# Patient Record
Sex: MALE | Race: WHITE | NOT HISPANIC OR LATINO | Employment: OTHER | URBAN - METROPOLITAN AREA
[De-identification: names, ages, dates, MRNs, and addresses within clinical notes are randomized per-mention and may not be internally consistent; named-entity substitution may affect disease eponyms.]

---

## 2017-02-20 ENCOUNTER — ALLSCRIPTS OFFICE VISIT (OUTPATIENT)
Dept: OTHER | Facility: OTHER | Age: 62
End: 2017-02-20

## 2017-02-21 ENCOUNTER — GENERIC CONVERSION - ENCOUNTER (OUTPATIENT)
Dept: OTHER | Facility: OTHER | Age: 62
End: 2017-02-21

## 2017-03-24 ENCOUNTER — GENERIC CONVERSION - ENCOUNTER (OUTPATIENT)
Dept: OTHER | Facility: OTHER | Age: 62
End: 2017-03-24

## 2017-03-29 ENCOUNTER — ALLSCRIPTS OFFICE VISIT (OUTPATIENT)
Dept: OTHER | Facility: OTHER | Age: 62
End: 2017-03-29

## 2017-04-03 ENCOUNTER — ANESTHESIA (OUTPATIENT)
Dept: GASTROENTEROLOGY | Facility: AMBULARY SURGERY CENTER | Age: 62
End: 2017-04-03
Payer: MEDICARE

## 2017-04-03 ENCOUNTER — GENERIC CONVERSION - ENCOUNTER (OUTPATIENT)
Dept: OTHER | Facility: OTHER | Age: 62
End: 2017-04-03

## 2017-04-03 ENCOUNTER — HOSPITAL ENCOUNTER (OUTPATIENT)
Facility: AMBULARY SURGERY CENTER | Age: 62
Setting detail: OUTPATIENT SURGERY
Discharge: HOME | End: 2017-04-03
Attending: INTERNAL MEDICINE | Admitting: INTERNAL MEDICINE
Payer: MEDICARE

## 2017-04-03 VITALS
DIASTOLIC BLOOD PRESSURE: 58 MMHG | RESPIRATION RATE: 20 BRPM | OXYGEN SATURATION: 100 % | SYSTOLIC BLOOD PRESSURE: 116 MMHG | TEMPERATURE: 98.7 F | HEART RATE: 69 BPM

## 2017-04-03 DIAGNOSIS — D63.8 ANEMIA IN OTHER CHRONIC DISEASES CLASSIFIED ELSEWHERE: ICD-10-CM

## 2017-04-03 DIAGNOSIS — R19.7 DIARRHEA: ICD-10-CM

## 2017-04-03 DIAGNOSIS — E11.9 TYPE 2 DIABETES MELLITUS WITHOUT COMPLICATIONS (HCC): ICD-10-CM

## 2017-04-03 DIAGNOSIS — R19.4 CHANGE IN BOWEL HABITS: ICD-10-CM

## 2017-04-03 PROCEDURE — 88305 TISSUE EXAM BY PATHOLOGIST: CPT | Performed by: INTERNAL MEDICINE

## 2017-04-03 RX ORDER — PROPOFOL 10 MG/ML
INJECTION, EMULSION INTRAVENOUS AS NEEDED
Status: DISCONTINUED | OUTPATIENT
Start: 2017-04-03 | End: 2017-04-03 | Stop reason: SURG

## 2017-04-03 RX ORDER — SODIUM CHLORIDE, SODIUM LACTATE, POTASSIUM CHLORIDE, CALCIUM CHLORIDE 600; 310; 30; 20 MG/100ML; MG/100ML; MG/100ML; MG/100ML
INJECTION, SOLUTION INTRAVENOUS CONTINUOUS PRN
Status: DISCONTINUED | OUTPATIENT
Start: 2017-04-03 | End: 2017-04-03 | Stop reason: SURG

## 2017-04-03 RX ADMIN — PROPOFOL 100 MG: 10 INJECTION, EMULSION INTRAVENOUS at 12:22

## 2017-04-03 RX ADMIN — PROPOFOL 50 MG: 10 INJECTION, EMULSION INTRAVENOUS at 12:29

## 2017-04-03 RX ADMIN — LIDOCAINE HYDROCHLORIDE 40 MG: 20 INJECTION, SOLUTION INTRAVENOUS at 12:22

## 2017-04-03 RX ADMIN — SODIUM CHLORIDE, SODIUM LACTATE, POTASSIUM CHLORIDE, AND CALCIUM CHLORIDE: .6; .31; .03; .02 INJECTION, SOLUTION INTRAVENOUS at 12:20

## 2017-04-03 RX ADMIN — PROPOFOL 10 MG: 10 INJECTION, EMULSION INTRAVENOUS at 12:27

## 2017-04-03 RX ADMIN — PROPOFOL 50 MG: 10 INJECTION, EMULSION INTRAVENOUS at 12:25

## 2017-04-07 ENCOUNTER — GENERIC CONVERSION - ENCOUNTER (OUTPATIENT)
Dept: OTHER | Facility: OTHER | Age: 62
End: 2017-04-07

## 2017-04-10 ENCOUNTER — GENERIC CONVERSION - ENCOUNTER (OUTPATIENT)
Dept: OTHER | Facility: OTHER | Age: 62
End: 2017-04-10

## 2017-04-11 ENCOUNTER — GENERIC CONVERSION - ENCOUNTER (OUTPATIENT)
Dept: OTHER | Facility: OTHER | Age: 62
End: 2017-04-11

## 2017-04-11 LAB
BASOPHILS # BLD AUTO: 0.1 X10E3/UL (ref 0–0.2)
BASOPHILS # BLD AUTO: 1 %
DEPRECATED RDW RBC AUTO: 13.4 % (ref 12.3–15.4)
EOSINOPHIL # BLD AUTO: 0.1 X10E3/UL (ref 0–0.4)
EOSINOPHIL # BLD AUTO: 2 %
HCT VFR BLD AUTO: 33.2 % (ref 37.5–51)
HGB BLD-MCNC: 11.1 G/DL (ref 12.6–17.7)
IMM.GRANULOCYTES (CD4/8) (HISTORICAL): 0 X10E3/UL (ref 0–0.1)
IMM.GRANULOCYTES (CD4/8) (HISTORICAL): 1 %
LYMPHOCYTES # BLD AUTO: 0.8 X10E3/UL (ref 0.7–3.1)
LYMPHOCYTES # BLD AUTO: 13 %
MCH RBC QN AUTO: 35.6 PG (ref 26.6–33)
MCHC RBC AUTO-ENTMCNC: 33.4 G/DL (ref 31.5–35.7)
MCV RBC AUTO: 106 FL (ref 79–97)
MONOCYTES # BLD AUTO: 0.5 X10E3/UL (ref 0.1–0.9)
MONOCYTES (HISTORICAL): 8 %
NEUTROPHILS # BLD AUTO: 4.7 X10E3/UL (ref 1.4–7)
NEUTROPHILS # BLD AUTO: 75 %
PLATELET # BLD AUTO: 172 X10E3/UL (ref 150–379)
RBC (HISTORICAL): 3.12 X10E6/UL (ref 4.14–5.8)
WBC # BLD AUTO: 6.2 X10E3/UL (ref 3.4–10.8)

## 2017-04-12 ENCOUNTER — GENERIC CONVERSION - ENCOUNTER (OUTPATIENT)
Dept: OTHER | Facility: OTHER | Age: 62
End: 2017-04-12

## 2017-04-12 LAB
A/G RATIO (HISTORICAL): 1.6 (ref 1.2–2.2)
ALBUMIN SERPL BCP-MCNC: 4.1 G/DL (ref 3.6–4.8)
ALP SERPL-CCNC: 181 IU/L (ref 39–117)
AST SERPL W P-5'-P-CCNC: 28 IU/L (ref 0–40)
BILIRUB SERPL-MCNC: 0.4 MG/DL (ref 0–1.2)
BUN SERPL-MCNC: 22 MG/DL (ref 8–27)
BUN/CREA RATIO (HISTORICAL): 3 (ref 10–24)
CALCIUM SERPL-MCNC: 9 MG/DL (ref 8.6–10.2)
CHLORIDE SERPL-SCNC: 97 MMOL/L (ref 96–106)
CO2 SERPL-SCNC: 28 MMOL/L (ref 18–29)
CREAT SERPL-MCNC: 6.88 MG/DL (ref 0.76–1.27)
EGFR AFRICAN AMERICAN (HISTORICAL): 9 ML/MIN/1.73
EGFR-AMERICAN CALC (HISTORICAL): 8 ML/MIN/1.73
GLUCOSE SERPL-MCNC: 131 MG/DL (ref 65–99)
INTERPRETATION (HISTORICAL): NORMAL
PDF IMAGE (HISTORICAL): NORMAL
POTASSIUM SERPL-SCNC: 4.7 MMOL/L (ref 3.5–5.2)
SODIUM SERPL-SCNC: 143 MMOL/L (ref 134–144)
TOT. GLOBULIN, SERUM (HISTORICAL): 2.6 G/DL (ref 1.5–4.5)
TOTAL PROTEIN (HISTORICAL): 6.7 G/DL (ref 6–8.5)

## 2017-04-13 ENCOUNTER — GENERIC CONVERSION - ENCOUNTER (OUTPATIENT)
Dept: OTHER | Facility: OTHER | Age: 62
End: 2017-04-13

## 2017-04-13 LAB
EST. AVERAGE GLUCOSE BLD GHB EST-MCNC: 108 MG/DL
HBA1C MFR BLD HPLC: 5.4 %HB

## 2017-04-17 ENCOUNTER — ALLSCRIPTS OFFICE VISIT (OUTPATIENT)
Dept: OTHER | Facility: OTHER | Age: 62
End: 2017-04-17

## 2017-05-03 ENCOUNTER — ALLSCRIPTS OFFICE VISIT (OUTPATIENT)
Dept: OTHER | Facility: OTHER | Age: 62
End: 2017-05-03

## 2017-05-31 LAB
GGT (HISTORICAL): 89 IU/L (ref 0–65)
PROSTATE SPECIFIC ANTIGEN (HISTORICAL): 0.3 NG/ML (ref 0–4)
REFLEX CRITERIA (HISTORICAL): NORMAL

## 2017-06-01 ENCOUNTER — GENERIC CONVERSION - ENCOUNTER (OUTPATIENT)
Dept: OTHER | Facility: OTHER | Age: 62
End: 2017-06-01

## 2017-06-08 ENCOUNTER — ALLSCRIPTS OFFICE VISIT (OUTPATIENT)
Dept: OTHER | Facility: OTHER | Age: 62
End: 2017-06-08

## 2017-08-02 ENCOUNTER — ALLSCRIPTS OFFICE VISIT (OUTPATIENT)
Dept: OTHER | Facility: OTHER | Age: 62
End: 2017-08-02

## 2017-08-08 ENCOUNTER — ALLSCRIPTS OFFICE VISIT (OUTPATIENT)
Dept: OTHER | Facility: OTHER | Age: 62
End: 2017-08-08

## 2017-08-15 ENCOUNTER — ALLSCRIPTS OFFICE VISIT (OUTPATIENT)
Dept: OTHER | Facility: OTHER | Age: 62
End: 2017-08-15

## 2017-08-22 ENCOUNTER — ALLSCRIPTS OFFICE VISIT (OUTPATIENT)
Dept: OTHER | Facility: OTHER | Age: 62
End: 2017-08-22

## 2018-01-05 ENCOUNTER — GENERIC CONVERSION - ENCOUNTER (OUTPATIENT)
Dept: OTHER | Facility: OTHER | Age: 63
End: 2018-01-05

## 2018-01-10 NOTE — RESULT NOTES
Verified Results  (1) MICROALBUMIN CREATININE RATIO, RANDOM URINE 26Oct2016 11:15AM Re[facility]      Test Name Result Flag Reference   MICROALBUMIN/ CREAT R 373 mg/g creatinine H 0-30   MICROALBUMIN,URINE 855 0 mg/L H 0 0-20 0   CREATININE URINE 229 0 mg/dL       (1) CBC/PLT/DIFF 25Oct2016 07:48AM Re[facility]      Test Name Result Flag Reference   WBC COUNT 7 92 Thousand/uL  4 31-10 16   RBC COUNT 2 95 Million/uL L 3 88-5 62   HEMOGLOBIN 10 4 g/dL L 12 0-17 0   HEMATOCRIT 32 0 % L 36 5-49 3    fL H 82-98   MCH 35 3 pg H 26 8-34 3   MCHC 32 5 g/dL  31 4-37 4   RDW 14 4 %  11 6-15 1   MPV 10 5 fL  8 9-12 7   PLATELET COUNT 915 Thousands/uL  149-390   nRBC AUTOMATED 0 /100 WBCs     NEUTROPHILS RELATIVE PERCENT 77 % H 43-75   LYMPHOCYTES RELATIVE PERCENT 12 % L 14-44   MONOCYTES RELATIVE PERCENT 8 %  4-12   EOSINOPHILS RELATIVE PERCENT 2 %  0-6   BASOPHILS RELATIVE PERCENT 1 %  0-1   NEUTROPHILS ABSOLUTE COUNT 6 10 Thousands/?L  1 85-7 62   LYMPHOCYTES ABSOLUTE COUNT 0 98 Thousands/?L  0 60-4 47   MONOCYTES ABSOLUTE COUNT 0 62 Thousand/?L  0 17-1 22   EOSINOPHILS ABSOLUTE COUNT 0 16 Thousand/?L  0 00-0 61   BASOPHILS ABSOLUTE COUNT 0 04 Thousands/?L  0 00-0 10     (1) MAGNESIUM 25Oct2016 07:48AM [facility]      Test Name Result Flag Reference   MAGNESIUM 2 5 mg/dL  1 6-2 6     (1) COMPREHENSIVE METABOLIC PANEL [id number] 99:69KS Re[facility]      Test Name Result Flag Reference   GLUCOSE,RANDM 138 mg/dL     If the patient is fasting, the ADA then defines impaired fasting glucose as > 100 mg/dL and diabetes as > or equal to 123 mg/dL     SODIUM 138 mmol/L  136-145   POTASSIUM 3 6 mmol/L  3 5-5 3   CHLORIDE 100 mmol/L  100-108   CARBON DIOXIDE 31 mmol/L  21-32   ANION GAP (CALC) 7 mmol/L  4-13   BLOOD UREA NITROGEN 23 mg/dL  5-25   CREATININE 6 44 mg/dL H 0 60-1 30   Standardized to IDMS reference method   CALCIUM 8 5 mg/dL  8 3-10 1   BILI, TOTAL 0 70 mg/dL  0 20-1 00   ALK PHOSPHATAS 107 U/L   ALT (SGPT) 29 U/L  12-78   AST(SGOT) 29 U/L  5-45   ALBUMIN 3 6 g/dL  3 5-5 0   TOTAL PROTEIN 7 9 g/dL  6 4-8 2   eGFR Non-African American 8 8 ml/min/1 73sq tonya [facility] Disease Education Program recommendations are as follows:  GFR calculation is accurate only with a steady state creatinine  Chronic Kidney disease less than 60 ml/min/1 73 sq  meters  Kidney failure less than 15 ml/min/1 73 sq  meters  (1) LIPID PANEL, FASTING 25Oct2016 07:48AM Alisha [email or web address]     Test Name Result Flag Reference   CHOLESTEROL 123 mg/dL     HDL,DIRECT 72 mg/dL H 40-60   Specimen collection should occur prior to Metamizole administration due to the potential for falsely depressed results  LDL CHOLESTEROL CALCULATED 35 mg/dL  0-100   Triglyceride:         Normal              <150 mg/dl       Borderline High    150-199 mg/dl       High               200-499 mg/dl       Very High          >499 mg/dl  Cholesterol:         Desirable        <200 mg/dl      Borderline High  200-239 mg/dl      High             >239 mg/dl  HDL Cholesterol:        High    >59 mg/dL      Low     <41 mg/dL  LDL CALCULATED:    This screening LDL is a calculated result  It does not have the accuracy of the Direct Measured LDL in the monitoring of patients with hyperlipidemia and/or statin therapy  Direct Measure LDL (CHJ980) must be ordered separately in these patients  TRIGLYCERIDES 78 mg/dL  <=150   Specimen collection should occur prior to N-Acetylcysteine or Metamizole administration due to the potential for falsely depressed results  (1) TSH 25Oct2016 07:48AM Alisha Hinton     Test Name Result Flag Reference   TSH 3 190 uIU/mL  0 358-3 740   Patients undergoing fluorescein dye angiography may retain small amounts of fluorescein in the body for 48-72 hours post procedure  Samples containing fluorescein can produce falsely depressed TSH values   If the patient had this procedure,a specimen should be resubmitted post fluorescein clearance  (1) HEMOGLOBIN A1C 25Oct2016 07:48AM Kit Proctor     Test Name Result Flag Reference   HEMOGLOBIN A1C 5 1 %  4 2-6 3   EST  AVG   GLUCOSE 100 mg/dl         Discussion/Summary   EDWARD,   REVIEWED BW   LOOKED OK   YOU ARE ANEMIC   SUGAR WAS GOOD   DR Skye Mayer AND DR MYLES CAN SEE THIS BW   RV IF THERE ARE ANY QUESTIONS   DR WATSON

## 2018-01-12 VITALS
WEIGHT: 204 LBS | HEIGHT: 70 IN | SYSTOLIC BLOOD PRESSURE: 130 MMHG | RESPIRATION RATE: 16 BRPM | HEART RATE: 87 BPM | DIASTOLIC BLOOD PRESSURE: 80 MMHG | TEMPERATURE: 98.7 F | BODY MASS INDEX: 29.2 KG/M2 | OXYGEN SATURATION: 98 %

## 2018-01-12 VITALS
WEIGHT: 197 LBS | DIASTOLIC BLOOD PRESSURE: 60 MMHG | SYSTOLIC BLOOD PRESSURE: 110 MMHG | HEART RATE: 72 BPM | RESPIRATION RATE: 14 BRPM | HEIGHT: 70 IN | BODY MASS INDEX: 28.2 KG/M2 | TEMPERATURE: 97.2 F

## 2018-01-12 NOTE — RESULT NOTES
Verified Results  (19[address] [facility]) Comp  Metabolic Panel (13) [id number] 07:09AM Bruna Peacock     Test Name Result Flag Reference   Glucose, Serum 131 mg/dL H 65-99   BUN 22 mg/dL  8-27   Creatinine, Serum 6 88 mg/dL H 0 76-1 27   eGFR If NonAfricn Am 8 mL/min/1 73 L >59   eGFR If Africn Am 9 mL/min/1 73 L >59   BUN/Creatinine Ratio 3 L 10-24   Sodium, Serum 143 mmol/L  134-144   Potassium, Serum 4 7 mmol/L  3 5-5 2   Chloride, Serum 97 mmol/L     Carbon Dioxide, Total 28 mmol/L  18-29   Calcium, Serum 9 0 mg/dL  8 6-10 2   Protein, Total, Serum 6 7 g/dL  6 0-8 5   Albumin, Serum 4 1 g/dL  3 6-4 8   Globulin, Total 2 6 g/dL  1 5-4 5   A/G Ratio 1 6  1 2-2 2   Bilirubin, Total 0 4 mg/dL  0 0-1 2   Alkaline Phosphatase, S 181 IU/L H    AST (SGOT) 28 IU/L  0-40     (LC) [facility] CKD Program [id number] 07:09AM Bruna Peacock     Test Name Result Flag Reference   Interpretation Note     -------------------------------  CHRONIC KIDNEY DISEASE:  EGFR, BLOOD PRESSURE, AND PROTEINURIA ASSESSMENT  We presume eGFR has been less than 60 mL/min/1 73mE2 on at  least two occasions spaced at least 3 months apart  Current  eGFR is 8 mL/min/1 73mE2 corresponding to CKD stage 5  Multiply eGFR by 1 159 if patient is   Consider referral to a nephrologist and appropriate patient  education concerning renal replacement therapies  Suggest  hepatitis B vaccination and confirmation of immunity with  serologic testing  Consider plans for renal replacement  therapy  Creatinine , 6 88 mg/dL  Potassium is within goal,  4 7 mmol/L  EGFR, BLOOD PRESSURE, AND PROTEINURIA TREATMENT SUGGESTIONS  -  Consider institution of renal replacement therapy  Guidelines recommend a target blood pressure of 140/90 mmHg  or less in CKD patients to reduce cardiovascular risk and  CKD progression   Assessment of albuminuria (urine  albumin:creatinine ratio or urine protein:creatinine ratio  preferred) is recommended at least annually in CKD patients  for staging and disease prognosis  EGFR, BLOOD PRESSURE, AND PROTEINURIA FOLLOW-UP  -  fasting Renal Panel within 2 months; Spot Urine Panel is  recommended by KDOQI guidelines, at least yearly;  -  BONE and MINERAL ASSESSMENT  Calcium is within goal, 9 0 mg/dL  Carbon Dioxide is within  goal, 28 mmol/L  KDOQI guidelines recommend the measurement  of 25-hydroxy vitamin D in patients with CKD  BONE and MINERAL TREATMENT SUGGESTIONS  -  Interpretations require simultaneous measurements of serum  calcium and phosphorus  BONE and MINERAL FOLLOW-UP  -  fasting PTH with Renal Panel within 3 months; 25-Hydroxy  Vitamin D is recommended by KDOQI guidelines, at least  yearly;  -  LIPIDS ASSESSMENT  Most recent order does not include a fasting Lipid Panel  LIPIDS FOLLOW-UP  -  fasting Lipid Panel is recommended by KDOQI guidelines, at  least yearly;  -  ANEMIA ASSESSMENT  Most recent order does not include a CBC Panel or iron  studies  ANEMIA FOLLOW-UP  -  CBC is recommended by KDOQI guidelines, at least yearly;  -------------------------------  DISCLAIMER  These assessments and treatment suggestions are provided as  a convenience in support of the physician-patient  relationship and are not intended to replace the physician's  clinical judgment  They are derived from the national  guidelines in addition to other evidence and expert opinion  The clinician should consider this information within the  context of clinical opinion and the individual patient  SEE GUIDANCE FOR CHRONIC KIDNEY DISEASE PROGRAM: National  Kidney Foundation Kidney Disease Outcomes Quality Initiative  (KDOQI (TM)), with its limitations and disclaimers, are at  www kidney  org/professionals/KDOQI  Kidney Disease Improving  Global Outcomes (KDIGO) clinical practice guidelines are at  http://kdigo  org/home/guidelines/  The members of  Fahad Arciniega national advisory panel are listed at  www  [employer] com   This program is intended for patients who  have been diagnosed with stages 3, 4, or pre-dialysis 5 CKD  It is not intended for children, pregnant patients, or  transplant patients  PDF Image

## 2018-01-12 NOTE — RESULT NOTES
Verified Results  (1) GGT [id number] 07:08AM [employer]     Test Name Result Flag Reference   GGT 89 IU/L H 0-65     (LC) PSA Total (Reflex To Free) [id number] 07:08AM [employer]     Test Name Result Flag Reference   Prostate Specific Ag, Serum 0 3 ng/mL  0 0-4 0   Roche ECLIA methodology  According to the American Urological Association, Serum PSA should  decrease and remain at undetectable levels after radical  prostatectomy  The AUA defines biochemical recurrence as an initial  PSA value 0 2 ng/mL or greater followed by a subsequent confirmatory  PSA value 0 2 ng/mL or greater  Values obtained with different assay methods or kits cannot be used  interchangeably  Results cannot be interpreted as absolute evidence  of the presence or absence of malignant disease  Reflex Criteria Comment     The percent free PSA is performed on a reflex basis only when the  total PSA is between 4 0 and 10 0 ng/mL

## 2018-01-13 VITALS
TEMPERATURE: 98.1 F | HEART RATE: 80 BPM | WEIGHT: 206 LBS | DIASTOLIC BLOOD PRESSURE: 70 MMHG | BODY MASS INDEX: 29.49 KG/M2 | SYSTOLIC BLOOD PRESSURE: 120 MMHG | RESPIRATION RATE: 16 BRPM | HEIGHT: 70 IN

## 2018-01-13 VITALS
HEIGHT: 70 IN | RESPIRATION RATE: 16 BRPM | TEMPERATURE: 96.9 F | OXYGEN SATURATION: 99 % | HEART RATE: 69 BPM | DIASTOLIC BLOOD PRESSURE: 80 MMHG | SYSTOLIC BLOOD PRESSURE: 140 MMHG | WEIGHT: 206.5 LBS | BODY MASS INDEX: 29.56 KG/M2

## 2018-01-13 VITALS
RESPIRATION RATE: 16 BRPM | SYSTOLIC BLOOD PRESSURE: 130 MMHG | DIASTOLIC BLOOD PRESSURE: 70 MMHG | TEMPERATURE: 98.5 F | WEIGHT: 202 LBS | BODY MASS INDEX: 28.92 KG/M2 | HEART RATE: 80 BPM | HEIGHT: 70 IN

## 2018-01-14 VITALS
TEMPERATURE: 98.5 F | BODY MASS INDEX: 29.06 KG/M2 | SYSTOLIC BLOOD PRESSURE: 126 MMHG | HEIGHT: 70 IN | WEIGHT: 203 LBS | RESPIRATION RATE: 12 BRPM | DIASTOLIC BLOOD PRESSURE: 86 MMHG | HEART RATE: 70 BPM

## 2018-01-14 NOTE — RESULT NOTES
Message    Spoke to patient      Colon polyps were came back as tubular and tubulovillous adenomas  lmom for pt to call office for results  reminder set  sent task to   [facility]  Repeat colonoscopy in 4 months**            Verified Results  (1) TISSUE EXAM [id number] 08:18AM Lolis Saez     Test Name Result Flag Reference   LAB AP CASE REPORT (Report)     Surgical Pathology Report             Case: [id number]                   Authorizing Provider: Jodie Higgins MD     Collected:      12/12/2016 0818        Ordering Location:   [facility]  Received:      12/12/2016 [address] [facility]                                     Pathologist:      Nakul Garcia DO                               Specimens:  A) - Colon, Random colon biopsies                                   B) - Polyp, Colorectal, Mid ascending colon polyp/hot snare                      C) - Polyp, Colorectal, Distal ascending colon polyp/hot snare   LAB AP FINAL DIAGNOSIS (Report)     A  Colon, random, biopsy:  - Colonic mucosa with no significant pathologic abnormalities  - No changes of chronic, active or microscopic colitis identified    - No dysplasia identified  B  Colon, mid ascending, polyp, biopsy:  - Tubular adenomas (3)  - Negative for high-grade dysplasia  C  Colon, distal ascending, polyp, biopsy:  - Tubulovillous adenoma  - Cauterized margin positive for adenomatous changes  - Negative for high-grade dysplasia  Interpretation performed at [place] [address]  Electronically signed by Nakul aGrcia DO on 12/13/2016 at 1:09 PM   LAB AP SURGICAL ADDITIONAL INFORMATION (Report)     These tests were developed and their performance characteristics   determined by [facility] or [place]  They may not be cleared or approved by the [place] S  Food and   Drug Administration   The FDA has determined that such clearance or approval is not necessary  These tests are used for clinical purposes  They should not be regarded as investigational or for research  This   laboratory has been approved by [place] [address], designated as a high-complexity   laboratory and is qualified to perform these tests  LAB AP GROSS DESCRIPTION (Report)     A  The specimen is received in formalin, labeled with the patient's name   and hospital number, and is designated random colon biopsies  The   specimen consists of multiple tan soft tissue fragments measuring in   aggregate 0 6 x 0 6 x 0 1 cm  Entirely submitted  One cassette  Note: The estimated total formalin fixation time based upon information   provided by the submitting clinician and the standard processing schedule   is 12 25 hours  B  The specimen is received in formalin, labeled with the patient's name   and hospital number, and is designated mid ascending colon polyp  The   specimen consists of 3 tan soft tissue/polypoid fragments measuring 0 6,   0 7 and 0 7 cm  Entirely submitted  One cassette  Note: The estimated total formalin fixation time based upon information   provided by the submitting clinician and the standard processing schedule   is 12 0 hours  C  The specimen is received in formalin, labeled with the patient's name   and hospital number, and is designated distal ascending colon polyp  The specimen consists of a tan polypoid soft tissue fragment measuring 2 6   x 2 5 x 2 2 cm  No margin is identified grossly  The polyp is sectioned   revealing friable tan pink cut surfaces  Entirely submitted  Eight   cassettes  Multiple pieces in each cassette  Note: The estimated total formalin fixation time based upon information   provided by the submitting clinician and the standard processing schedule   is 11 75 hours      MAC

## 2018-01-15 NOTE — PROGRESS NOTES
Chief Complaint  Vit B12 injection given in right deltoid  Active Problems    1  Acute bacterial sinusitis (461 9) (J01 90,B96 89)   2  Anemia in chronic illness (285 29) (D63 8)   3  Anemia in chronic kidney disease (CKD) (285 21) (N18 9,D63 1)   4  Arteriosclerosis of coronary artery (414 00) (I25 10)   5  Benign essential hypertension (401 1) (I10)   6  Change in bowel habits (787 99) (R19 4)   7  Chronic myocardial infarction (414 9) (I25 9)   8  Diabetes with neurologic complications (840 63) (D80 15)   9  Dialysis patient (V45 11) (Z99 2)   10  Diarrhea, unspecified type (787 91) (R19 7)   11  Elevated serum alkaline phosphatase level (790 5) (R74 8)   12  End stage renal disease (585 6) (N18 6)   13  Eyelash inversion (374 05) (H02 059)   14  History of colon polyps (V12 72) (Z86 010)   15  Intestinal malabsorption (579 9) (K90 9)   16  Irritable bowel syndrome with diarrhea (564 1) (K58 0)   17  Long-term insulin use in type 2 diabetes (250 00,V58 67) (E11 9,Z79 4)   18  Malignant essential hypertension (401 0) (I10)   19  Mixed hyperlipidemia (272 2) (E78 2)   20  Nicotine dependence (305 1) (F17 200)   21  Organic impotence (607 84) (N52 9)   22  Pernicious anemia (281 0) (D51 0)   23  Screening PSA (prostate specific antigen) (V76 44) (Z12 5)   24  Status post angioplasty (V45 89) (Z98 62)   25  Type 2 diabetes mellitus (250 00) (E11 9)   26  History of Type 2 diabetes, uncontrolled, with renal manifestation (250 42)    (E11 29,E11 65)    Current Meds   1  Baby Aspirin 81 MG CHEW; 1 Every Day; Therapy: 44SHK3738 to  Requested for: 38BVF3090 Recorded   2  Carvedilol 25 MG Oral Tablet; 1 two times a day; Therapy: 00QDG3621 to (Last Rx:04Oct2012)  Requested for: 46XLT4994 Ordered   3  Clopidogrel Bisulfate 75 MG Oral Tablet; 1 every day; Therapy: 82AEQ5171 to (Last Rx:04Oct2012)  Requested for: 81VMJ9000 Ordered   4  Crestor 20 MG Oral Tablet; TAKE 1 TABLET DAILY;    Therapy: 72Qhv3710 to Recorded   5  Cyanocobalamin 1000 MCG/ML Injection Solution; INJECT 1 ML Weekly 1ml once   weekly for one month, then once monthly; Therapy: 39Onq6799 to (Evaluate:28Cqj2472); Last Rx:86Phq3594 Ordered   6  Enzyme Digest Oral Capsule; Therapy: (Recorded:08Jun2017) to Recorded   7  Lantus SoloStar 100 UNIT/ML SOLN; 28 units at 9 pm;   Therapy: 13UQI2716 to (Last Rx:04Oct2012)  Requested for: 10OIV4489 Ordered   8  Losartan Potassium 50 MG Oral Tablet; Therapy: (Recorded:20Oct2016) to Recorded   9  Nitroglycerin 0 4 MG Sublingual Tablet Sublingual; 1 SL prn; Therapy: 70STC2583 to  Requested for: 20FOL0276 Recorded   10  OneTouch Test STRP; 1 Two Times A Day; Therapy: 39RTO7076 to  Requested for: 31MFL0746 Recorded   11  Pantoprazole Sodium 40 MG Oral Tablet Delayed Release; Therapy: 10QXQ5352 to Recorded   12  Triphrocaps 1 MG Oral Capsule; TAKE 1 CAPSULE BY MOUTH EVERY EVENING; Therapy: 29Rdx2799 to Recorded   13  Xifaxan 550 MG Oral Tablet; One tab TID for 14 days; Therapy: 67JKA0453 to (Last Rx:08Jun2017) Ordered    Allergies    1  No Known Drug Allergies    Future Appointments    Date/Time Provider Specialty Site   09/13/2017 10:45 AM Sophy Donis, [address] [place] [address]   08/22/2017 09:00 AM [facility], Nurse Schedule  [facility]   08/29/2017 09:00 AM [facility], Nurse Schedule  [facility]     Signatures   Electronically signed by : Brittany Luther, [address];  Aug 16 2017 11:57AM EST                       (Author)    Electronically signed by : Amando Etienne DO; Aug 16 2017  1:53PM EST                       (Author)

## 2018-01-15 NOTE — RESULT NOTES
Message      Colon polyps removed came back as precancerous tubular adenoma  Next colonoscopy in 3 years  Patient to call our office for any GI symptoms  patient is aware  [address]         Verified Results  (1) TISSUE EXAM [id number] 12:30PM Rg Santana     Test Name Result Flag Reference   LAB AP CASE REPORT (Report)     Surgical Pathology Report             Case: [id number]                   Authorizing Provider: Raúl Multani MD     Collected:      04/03/2017 1230        Ordering Location:   [facility]  Received:      04/03/2017 [place]                                     Pathologist:      Lisbeth Cedillo MD                              Specimen:  Rectum, Rectal polyp snared   LAB AP FINAL DIAGNOSIS (Report)     A  Rectal polyp:  - Tubular adenoma (adenomatous polyp)  - No high grade dysplasia and no evidence of malignancy  Interpretation performed at [place] [address]  Electronically signed by Lisbeth Cedillo MD on 4/6/2017 at 8:23 AM   LAB AP SURGICAL ADDITIONAL INFORMATION (Report)     These tests were developed and their performance characteristics   determined by [facility] or [employer]  They may not be cleared or approved by the [place]  Food and   Drug Administration  The FDA has determined that such clearance or   approval is not necessary  These tests are used for clinical purposes  They should not be regarded as investigational or for research  This   laboratory has been approved by CLIA 88, designated as a high-complexity   laboratory and is qualified to perform these tests  LAB AP GROSS DESCRIPTION (Report)     A  The specimen is received in formalin, labeled with the patient's name   and hospital number, and is designated rectal polyp snare, is a single   irregularly shaped fragment of tan soft tissue measuring 0 3 cm in   greatest dimension  Entirely submitted   One cassette  Note: The estimated total formalin fixation time based upon information   provided by the submitting clinician and the standard processing schedule   is 16 0 hours   RLR

## 2018-01-15 NOTE — PROGRESS NOTES
Chief Complaint  Vitamin B-12 injection given rt  arm  ck      Active Problems    1  Acute bacterial sinusitis (461 9) (J01 90,B96 89)   2  Anemia in chronic illness (285 29) (D63 8)   3  Anemia in chronic kidney disease (CKD) (285 21) (N18 9,D63 1)   4  Arteriosclerosis of coronary artery (414 00) (I25 10)   5  Benign essential hypertension (401 1) (I10)   6  Change in bowel habits (787 99) (R19 4)   7  Chronic myocardial infarction (414 9) (I25 9)   8  Diabetes with neurologic complications (789 82) (H70 62)   9  Dialysis patient (V45 11) (Z99 2)   10  Diarrhea, unspecified type (787 91) (R19 7)   11  Elevated serum alkaline phosphatase level (790 5) (R74 8)   12  End stage renal disease (585 6) (N18 6)   13  Eyelash inversion (374 05) (H02 059)   14  History of colon polyps (V12 72) (Z86 010)   15  Intestinal malabsorption (579 9) (K90 9)   16  Irritable bowel syndrome with diarrhea (564 1) (K58 0)   17  Long-term insulin use in type 2 diabetes (250 00,V58 67) (E11 9,Z79 4)   18  Malignant essential hypertension (401 0) (I10)   19  Mixed hyperlipidemia (272 2) (E78 2)   20  Nicotine dependence (305 1) (F17 200)   21  Organic impotence (607 84) (N52 9)   22  Pernicious anemia (281 0) (D51 0)   23  Screening PSA (prostate specific antigen) (V76 44) (Z12 5)   24  Status post angioplasty (V45 89) (Z98 62)   25  Type 2 diabetes mellitus (250 00) (E11 9)   26  History of Type 2 diabetes, uncontrolled, with renal manifestation (250 42)    (E11 29,E11 65)    Current Meds   1  Baby Aspirin 81 MG CHEW; 1 Every Day; Therapy: 70MSI8694 to  Requested for: 48ZWZ8680 Recorded   2  Carvedilol 25 MG Oral Tablet; 1 two times a day; Therapy: 07NWR9710 to (Last Rx:04Oct2012)  Requested for: 67FEO4109 Ordered   3  Clopidogrel Bisulfate 75 MG Oral Tablet; 1 every day; Therapy: 11MEQ8634 to (Last Rx:04Oct2012)  Requested for: 01LXL9976 Ordered   4  Crestor 20 MG Oral Tablet; TAKE 1 TABLET DAILY;    Therapy: 58Nic5085 to Recorded   5  Cyanocobalamin 1000 MCG/ML Injection Solution; INJECT 1 ML Weekly 1ml once   weekly for one month, then once monthly; Therapy: 56Mtw1384 to (Evaluate:42Qba2711); Last Rx:23Gdd3814 Ordered   6  Enzyme Digest Oral Capsule; Therapy: (Recorded:08Jun2017) to Recorded   7  Lantus SoloStar 100 UNIT/ML SOLN; 28 units at 9 pm;   Therapy: 24OXX8878 to (Last Rx:04Oct2012)  Requested for: 27PEO9832 Ordered   8  Losartan Potassium 50 MG Oral Tablet; Therapy: (Recorded:20Oct2016) to Recorded   9  Nitroglycerin 0 4 MG Sublingual Tablet Sublingual; 1 SL prn; Therapy: 77CYR6970 to  Requested for: 86AVP3988 Recorded   10  OneTouch Test STRP; 1 Two Times A Day; Therapy: 25HCP5426 to  Requested for: 29PKG5306 Recorded   11  Pantoprazole Sodium 40 MG Oral Tablet Delayed Release; Therapy: 03CAN2611 to Recorded   12  Triphrocaps 1 MG Oral Capsule; TAKE 1 CAPSULE BY MOUTH EVERY EVENING; Therapy: 69Kkp1650 to Recorded   13  Xifaxan 550 MG Oral Tablet; One tab TID for 14 days; Therapy: 90TTL3324 to (Last Rx:08Jun2017) Ordered    Allergies    1   No Known Drug Allergies    Plan  Anemia in chronic illness    · Cyanocobalamin 1000 MCG/ML Injection Solution    Future Appointments    Date/Time Provider Specialty Site   09/13/2017 10:45 AM [place] [address]   08/29/2017 09:00 AM [facility], Nurse Schedule  Akiko Dumas [facility]     Signatures   Electronically signed by : Alejandra Douglas DO; Aug 23 2017  9:24AM EST                       (Author)

## 2018-01-16 NOTE — RESULT NOTES
Verified Results  (LC) Hemoglobin A1c 59Smg7966 07:09AM [facility]     Test Name Result Flag Reference   Hemoglobin A1c 5 4 %Hb     Reference Range:  American Diabetes Association (ADA) Guidelines:  <5 7: Decreased risk for diabetes  5 7 - 6 4: Increased risk for diabetes  >6 4: Ongoing Hyperglycemia of any cause  <7 0: Glycemic control for adults with diabetes   Estimated Average Glucose 108 mg/dL

## 2018-01-17 NOTE — PROGRESS NOTES
Chief Complaint  patient here for Vit B12 injection  Given in right deltoid  PAD#[id number] expires 8/18  Active Problems    1  Acute bacterial sinusitis (461 9) (J01 90,B96 89)   2  Anemia in chronic illness (285 29) (D63 8)   3  Anemia in chronic kidney disease (CKD) (285 21) (N18 9,D63 1)   4  Arteriosclerosis of coronary artery (414 00) (I25 10)   5  Benign essential hypertension (401 1) (I10)   6  Change in bowel habits (787 99) (R19 4)   7  Chronic myocardial infarction (414 9) (I25 9)   8  Diabetes with neurologic complications (418 09) (S58 85)   9  Dialysis patient (V45 11) (Z99 2)   10  Diarrhea, unspecified type (787 91) (R19 7)   11  Elevated serum alkaline phosphatase level (790 5) (R74 8)   12  End stage renal disease (585 6) (N18 6)   13  Eyelash inversion (374 05) (H02 059)   14  History of colon polyps (V12 72) (Z86 010)   15  Intestinal malabsorption (579 9) (K90 9)   16  Irritable bowel syndrome with diarrhea (564 1) (K58 0)   17  Long-term insulin use in type 2 diabetes (250 00,V58 67) (E11 9,Z79 4)   18  Malignant essential hypertension (401 0) (I10)   19  Mixed hyperlipidemia (272 2) (E78 2)   20  Nicotine dependence (305 1) (F17 200)   21  Organic impotence (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 84) (N52 9)   22  Pernicious anemia (281 0) (D51 0)   23  Screening PSA (prostate specific antigen) (V76 44) (Z12 5)   24  Status post angioplasty (V45 89) (Z98 62)   25  Type 2 diabetes mellitus (250 00) (E11 9)   26  History of Type 2 diabetes, uncontrolled, with renal manifestation (250 42)    (E11 29,E11 65)    Current Meds   1  Baby Aspirin 81 MG CHEW; 1 Every Day; Therapy: 40DHE1383 to  Requested for: 06GSA4997 Recorded   2  Carvedilol 25 MG Oral Tablet; 1 two times a day; Therapy: 93SYV2202 to (Last Rx:04Oct2012)  Requested for: 63XFT3572 Ordered   3  Clopidogrel Bisulfate 75 MG Oral Tablet; 1 every day; Therapy: 56YMV5290 to (Last Rx:04Oct2012)  Requested for: 51VMF0167 Ordered   4   Crestor 20 MG Oral Tablet; TAKE 1 TABLET DAILY; Therapy: 70Txf9419 to Recorded   5  Cyanocobalamin 1000 MCG/ML Injection Solution; INJECT 1 ML Weekly 1ml once   weekly for one month, then once monthly; Therapy: 09Rap6622 to (Evaluate:62Gtv3258); Last Rx:40Ozr3176 Ordered   6  Enzyme Digest Oral Capsule; Therapy: (Recorded:08Jun2017) to Recorded   7  Lantus SoloStar 100 UNIT/ML SOLN; 28 units at 9 pm;   Therapy: 74NHO7088 to (Last Rx:04Oct2012)  Requested for: 76OZX0065 Ordered   8  Losartan Potassium 50 MG Oral Tablet; Therapy: (Recorded:20Oct2016) to Recorded   9  Nitroglycerin 0 4 MG Sublingual Tablet Sublingual; 1 SL prn; Therapy: 31IKI7818 to  Requested for: 74LKI6401 Recorded   10  OneTouch Test STRP; 1 Two Times A Day; Therapy: 61NRO5447 to  Requested for: 88OOW4002 Recorded   11  Pantoprazole Sodium 40 MG Oral Tablet Delayed Release; Therapy: 70VRX3871 to Recorded   12  Triphrocaps 1 MG Oral Capsule; TAKE 1 CAPSULE BY MOUTH EVERY EVENING; Therapy: 59Jvp4565 to Recorded   13  Xifaxan 550 MG Oral Tablet; One tab TID for 14 days; Therapy: 82WWA6452 to (Last Rx:08Jun2017) Ordered    Allergies    1  No Known Drug Allergies    Future Appointments    Date/Time Provider Specialty Site   09/13/2017 10:45 AM Prasanth Alvarez, [address] [place] [address],[facility]   08/15/2017 09:00 AM [facility], Nurse Schedule  [facility]   08/22/2017 09:00 AM [facility] [address] [facility], Nurse Schedule  [facility]   08/29/2017 09:00 AM [facility], Nurse Schedule  [facility]     Signatures   Electronically signed by : Glynn Doss [facility];  Aug  9 2017  8:07AM EST                       (Author)    Electronically signed by : Tawanda Garibay DO; Aug  9 2017 11:30AM EST                       (Author)

## 2018-01-23 NOTE — MISCELLANEOUS
Message  GI Reminder Recall [facility]:   Date: 01/05/2018   Dear Mili Favorite:     Review of our records shows you are due for the following: Consult  Please call the following office to schedule your appointment:   [address], [address] [phone]  We look forward to hearing from you!      Sincerely,     [facility]      Signatures   Electronically signed by : Carmenza Dejesus, ; Jan 5 2018  9:23AM EST                       (Author)

## 2018-10-17 ENCOUNTER — OFFICE VISIT (OUTPATIENT)
Dept: FAMILY MEDICINE CLINIC | Facility: CLINIC | Age: 63
End: 2018-10-17
Payer: MEDICARE

## 2018-10-17 VITALS
HEART RATE: 80 BPM | OXYGEN SATURATION: 97 % | BODY MASS INDEX: 30.21 KG/M2 | TEMPERATURE: 99.3 F | SYSTOLIC BLOOD PRESSURE: 160 MMHG | DIASTOLIC BLOOD PRESSURE: 84 MMHG | RESPIRATION RATE: 20 BRPM | HEIGHT: 70 IN | WEIGHT: 211 LBS

## 2018-10-17 DIAGNOSIS — I10 BENIGN ESSENTIAL HYPERTENSION: Primary | ICD-10-CM

## 2018-10-17 DIAGNOSIS — J06.9 ACUTE UPPER RESPIRATORY INFECTION: ICD-10-CM

## 2018-10-17 PROBLEM — R74.8 ELEVATED SERUM ALKALINE PHOSPHATASE LEVEL: Status: ACTIVE | Noted: 2017-05-03

## 2018-10-17 PROBLEM — K90.9 INTESTINAL MALABSORPTION: Status: ACTIVE | Noted: 2017-08-02

## 2018-10-17 PROBLEM — D63.1 ANEMIA IN CHRONIC KIDNEY DISEASE (CKD): Status: ACTIVE | Noted: 2017-02-23

## 2018-10-17 PROBLEM — N18.9 ANEMIA IN CHRONIC KIDNEY DISEASE (CKD): Status: ACTIVE | Noted: 2017-02-23

## 2018-10-17 PROBLEM — D51.0 PERNICIOUS ANEMIA: Status: ACTIVE | Noted: 2017-08-02

## 2018-10-17 PROBLEM — K58.0 IRRITABLE BOWEL SYNDROME WITH DIARRHEA: Status: ACTIVE | Noted: 2017-06-08

## 2018-10-17 PROCEDURE — 99214 OFFICE O/P EST MOD 30 MIN: CPT | Performed by: NURSE PRACTITIONER

## 2018-10-17 RX ORDER — PANTOPRAZOLE SODIUM 40 MG/1
TABLET, DELAYED RELEASE ORAL
COMMUNITY
Start: 2014-01-08 | End: 2019-08-21 | Stop reason: ALTCHOICE

## 2018-10-17 RX ORDER — NITROGLYCERIN 0.4 MG/1
TABLET SUBLINGUAL
COMMUNITY
Start: 2011-07-18

## 2018-10-17 RX ORDER — AZITHROMYCIN 250 MG/1
TABLET, FILM COATED ORAL
Qty: 6 TABLET | Refills: 0 | Status: SHIPPED | OUTPATIENT
Start: 2018-10-17 | End: 2018-10-21

## 2018-10-17 RX ORDER — ALBUTEROL SULFATE 90 UG/1
2 AEROSOL, METERED RESPIRATORY (INHALATION) EVERY 6 HOURS PRN
Qty: 8.5 G | Refills: 0 | Status: SHIPPED | OUTPATIENT
Start: 2018-10-17 | End: 2021-07-26 | Stop reason: HOSPADM

## 2018-10-17 RX ORDER — B COMPLEX, C NO.20/FOLIC ACID 1 MG
1 CAPSULE ORAL
COMMUNITY
Start: 2017-02-20

## 2018-10-17 NOTE — PROGRESS NOTES
Chief Complaint   Patient presents with    URI     cough , congestion    Diarrhea        Patient ID: Luc Crain is a 58 y o  male  Pt states his primary care site for chronic problems/DM as well as his dialysis is at [facility]  Today he presents new c/o onset two days ago a cough and vomiting  Pt notes associated fever, nasal and chest congestion with associated copious mucous production  He denies sore throat or ear pain  He states at times the cough is so bad he vomits and feels short of breath  + smoker  + hx dm  Pt reports he has not had a cigarette since Monday this week/2 days ago  Pt reprots he had a flu vac with his primary  Room air sao2 97%, hr 88  Blood sugar this am 104 and pt reports his primary told him his a1c "Is in a great range"  130-150/70-80's  PT states no bp meds in system because he vomited them up this am  Last dialysis was Monday, due today but was referred for eval due to illness  Past Medical History:   Diagnosis Date    Chronic kidney disease 2012    on dialysis     Diabetes mellitus [facility] )     Hypertension     Myocardial infarction ([facility] [address] ) 2014    x3 others were in 2009 & 2010    Stroke [facility]) 2007    x1       Past Surgical History:   Procedure Laterality Date    CARDIAC SURGERY  2010    stents x3    COLONOSCOPY N/A 12/12/2016    Procedure: COLONOSCOPY;  Surgeon: Dori Mckinnon MD;  Location: [place] [address] GI LAB; Service:     COLONOSCOPY N/A 4/3/2017    Procedure:  COLONOSCOPY;  Surgeon: Dori Mckinnon MD;  Location: [place] [address] GI LAB;   Service:        Patient Active Problem List   Diagnosis    Anemia in chronic illness    Anemia in chronic kidney disease (CKD)    Arteriosclerosis of coronary artery    Benign essential hypertension    Diabetes with neurologic complications (HCC)    Elevated serum alkaline phosphatase level    End stage renal disease (HCC)    Intestinal malabsorption    Irritable bowel syndrome with diarrhea    Long-term insulin use in type 2 diabetes ([facility] )    Mixed hyperlipidemia    Nicotine dependence    Organic impotence    Pernicious anemia    Type 2 diabetes mellitus (HCC)    Acute upper respiratory infection       Family History   Problem Relation Age of Onset    Leukemia Mother     Crohn's disease Father     Transient ischemic attack Brother        Immunization History   Administered Date(s) Administered     Influenza (IM) Preservative Free 10/05/2016, 10/03/2018    Influenza TIV (IM) 12/04/2012, 11/01/2015    Tdap 10/26/2010       No Known Allergies    Current Outpatient Prescriptions   Medication Sig Dispense Refill    amLODIPine (NORVASC) 10 mg tablet Take 10 mg by mouth 2 (two) times a day      aspirin 81 MG tablet Take 81 mg by mouth daily      B Complex-C-Folic Acid (TRIPHROCAPS) 1 MG CAPS Take 1 capsule by mouth      carvedilol (COREG) 25 mg tablet Take 25 mg by mouth 2 (two) times a day with meals      clopidogrel (PLAVIX) 75 mg tablet Take 75 mg by mouth daily      glucose blood test strip by In Vitro route 2 (two) times a day      Insulin Glargine (LANTUS SOLOSTAR) 100 UNIT/ML SOPN Inject 8 Units under the skin daily      LOSARTAN POTASSIUM PO Take 50 mg by mouth daily      nitroglycerin (NITROSTAT) 0 4 mg SL tablet Place under the tongue      pantoprazole (PROTONIX) 40 mg tablet Take by mouth      rifaximin (XIFAXAN) 550 mg tablet Take by mouth      albuterol (PROAIR HFA) 90 mcg/act inhaler Inhale 2 puffs every 6 (six) hours as needed for wheezing 8 5 g 0    azithromycin (ZITHROMAX) 250 mg tablet Take 2 tablets today then 1 tablet daily x 4 days 6 tablet 0     No current facility-administered medications for this visit          Social History     Social History    Marital status: /Civil Union     Spouse name: N/A    Number of children: N/A    Years of education: N/A     Social History Main Topics    Smoking status: Current Every Day Smoker     Packs/day: 0 50     Years: 30 00    Smokeless tobacco: Never Used    Alcohol use Yes      Comment: rarely    Drug use: No    Sexual activity: Not Asked     Other Topics Concern    None     Social History Narrative    None       Review of Systems      Objective:    /84   Pulse 80 Comment: irregular  Temp 99 3 °F (37 4 °C)   Resp 20   Ht 5' 10" (1 778 m)   Wt 95 7 kg (211 lb)   SpO2 97%   BMI 30 28 kg/m²        Physical Exam   Constitutional: He appears well-developed and well-nourished  HENT:   Head: Normocephalic and atraumatic  Mouth/Throat: Oropharynx is clear and moist  No oropharyngeal exudate  Scant effusion bilateral no erythema or exudate  Turbinates are erythematous with clear discharge  Eyes: Conjunctivae are normal    Cardiovascular: Normal rate, regular rhythm and normal heart sounds  Pulmonary/Chest: Effort normal  He has wheezes  Lymphadenopathy:     He has no cervical adenopathy  Assessment/Plan:    No problem-specific Assessment & Plan notes found for this encounter  Diagnoses and all orders for this visit:    Benign essential hypertension  Comments:  Repeat med dose of those lost with vomiting in the morning  Monitor BP at home  Follow-up in two days  ER instructions reviewed  Acute upper respiratory infection  Comments:  Long-term smoker some concern with coexisting COPD  Will treat  F/u on Fri w Dr SANTOS in 2 days  Re dose of BP meds when home and monitor bp  ED instr  rev  Orders:  -     albuterol (PROAIR HFA) 90 mcg/act inhaler; Inhale 2 puffs every 6 (six) hours as needed for wheezing  -     azithromycin (ZITHROMAX) 250 mg tablet; Take 2 tablets today then 1 tablet daily x 4 days    Other orders  -     nitroglycerin (NITROSTAT) 0 4 mg SL tablet; Place under the tongue  -     glucose blood test strip; by In Vitro route 2 (two) times a day  -     pantoprazole (PROTONIX) 40 mg tablet; Take by mouth  -     B Complex-C-Folic Acid (TRIPHROCAPS) 1 MG CAPS;  Take 1 capsule by mouth  - rifaximin (XIFAXAN) 550 mg tablet; Take by mouth        Multiple high risk comorbids  Will treat  Monitor BP and glucose at home  Tylenol for fever as needed  Follow-up in two days  ER instructions reviewed  Patient Instructions   Get an over-the-counter cough syrup with guaifenesin  Report to the emergency room if feeling worse and/or unable to keep down medications  You can take Tylenol as needed for fever  Follow up in the office in two days                      Bossman Hammonds

## 2018-10-17 NOTE — PATIENT INSTRUCTIONS
Get an over-the-counter cough syrup with guaifenesin  Report to the emergency room if feeling worse and/or unable to keep down medications  You can take Tylenol as needed for fever  Follow up in the office in two days

## 2019-03-02 ENCOUNTER — OFFICE VISIT (OUTPATIENT)
Dept: FAMILY MEDICINE CLINIC | Facility: CLINIC | Age: 64
End: 2019-03-02
Payer: MEDICARE

## 2019-03-02 VITALS
HEART RATE: 86 BPM | TEMPERATURE: 97.8 F | SYSTOLIC BLOOD PRESSURE: 138 MMHG | WEIGHT: 212.6 LBS | RESPIRATION RATE: 16 BRPM | BODY MASS INDEX: 30.43 KG/M2 | HEIGHT: 70 IN | DIASTOLIC BLOOD PRESSURE: 64 MMHG

## 2019-03-02 DIAGNOSIS — H66.91 RIGHT OTITIS MEDIA, UNSPECIFIED OTITIS MEDIA TYPE: ICD-10-CM

## 2019-03-02 DIAGNOSIS — K13.79 SORE IN MOUTH: Primary | ICD-10-CM

## 2019-03-02 PROBLEM — Z99.2 ENCOUNTER FOR EXTRACORPOREAL DIALYSIS (HCC): Status: ACTIVE | Noted: 2019-03-02

## 2019-03-02 PROCEDURE — 99213 OFFICE O/P EST LOW 20 MIN: CPT | Performed by: FAMILY MEDICINE

## 2019-03-02 PROCEDURE — 3008F BODY MASS INDEX DOCD: CPT | Performed by: FAMILY MEDICINE

## 2019-03-02 RX ORDER — AMOXICILLIN 400 MG/5ML
800 POWDER, FOR SUSPENSION ORAL 2 TIMES DAILY
Qty: 200 ML | Refills: 0 | Status: SHIPPED | OUTPATIENT
Start: 2019-03-02 | End: 2019-03-12

## 2019-03-02 NOTE — PROGRESS NOTES
Assessment/Plan:     Diagnoses and all orders for this visit:    Sore in mouth  -     amoxicillin (AMOXIL) 400 MG/5ML suspension; Take 10 mL (800 mg total) by mouth 2 (two) times a day for 10 days  -     al mag oxide-diphenhydramine-lidocaine viscous (MAGIC MOUTHWASH) 1:1:1 suspension; Swish and spit 10 mL every 4 (four) hours as needed for mouth pain or discomfort    Right otitis media, unspecified otitis media type  -     amoxicillin (AMOXIL) 400 MG/5ML suspension; Take 10 mL (800 mg total) by mouth 2 (two) times a day for 10 days            Subjective:      Patient ID: Naveed Barber is a 58 y o  male  Chief Complaint   Patient presents with    Sore     on tongue    Earache     right       53-year-old diabetic patient in with complaint of sore on tongue as well as right earache  No discharge from ear, no significant sore throat  No fever or rash  No nausea, vomiting or diarrhea  Some weight loss per patient which she attributes to mouth pain  Overdue for dental work and labs  The following portions of the patient's history were reviewed and updated as appropriate: allergies, current medications, past family history, past medical history, past social history, past surgical history and problem list      Review of Systems   Constitutional: Positive for fatigue  Negative for fever  HENT: Positive for dental problem, ear pain and postnasal drip  Negative for ear discharge  Respiratory: Negative  Cardiovascular: Negative  Endocrine:        DM   Musculoskeletal: Positive for arthralgias  Objective:    /64 (BP Location: Left arm, Patient Position: Sitting, Cuff Size: Standard)   Pulse 86   Temp 97 8 °F (36 6 °C)   Resp 16   Ht 5' 10" (1 778 m)   Wt 96 4 kg (212 lb 9 6 oz)   BMI 30 50 kg/m²        Physical Exam   Constitutional: No distress  HENT:   Mouth/Throat: No oropharyngeal exudate     Poor dentition/gingivitis  Positive apthous ulcer  Right TM pink/dull, canal with mild swelling  Eyes: Conjunctivae are normal  No scleral icterus  Cardiovascular: Normal rate and regular rhythm  Pulmonary/Chest: Effort normal and breath sounds normal  No respiratory distress  Skin: Skin is warm and dry  Nursing note and vitals reviewed            Emil Agudelo MD

## 2019-08-16 ENCOUNTER — OFFICE VISIT (OUTPATIENT)
Dept: URGENT CARE | Facility: CLINIC | Age: 64
End: 2019-08-16
Payer: MEDICARE

## 2019-08-16 VITALS
OXYGEN SATURATION: 97 % | DIASTOLIC BLOOD PRESSURE: 70 MMHG | SYSTOLIC BLOOD PRESSURE: 115 MMHG | BODY MASS INDEX: 29.98 KG/M2 | HEART RATE: 80 BPM | TEMPERATURE: 97.8 F | WEIGHT: 209.44 LBS | RESPIRATION RATE: 17 BRPM | HEIGHT: 70 IN

## 2019-08-16 DIAGNOSIS — S92.351A CLOSED NON-PHYSEAL FRACTURE OF FIFTH METATARSAL BONE OF RIGHT FOOT, INITIAL ENCOUNTER: Primary | ICD-10-CM

## 2019-08-16 PROCEDURE — 99213 OFFICE O/P EST LOW 20 MIN: CPT | Performed by: FAMILY MEDICINE

## 2019-08-16 RX ORDER — ROSUVASTATIN CALCIUM 20 MG/1
20 TABLET, COATED ORAL EVERY EVENING
Refills: 1 | COMMUNITY
Start: 2019-05-29

## 2019-08-16 NOTE — PROGRESS NOTES
[facility]        NAME: Pippa Palmer is a 71 y o  male  : 1955    MRN: [id number]  DATE: 2019  TIME: 9:39 AM    Assessment and Plan   Closed non-physeal fracture of fifth metatarsal bone of right foot, initial encounter [S92 351A]  1  Closed non-physeal fracture of fifth metatarsal bone of right foot, initial encounter  Ambulatory referral to Orthopedic Surgery     Refer to Orthopedic surgery for right 5th metatarsal fracture  Kept in hard sole shoe to reduce risk of future ankle stiffness or gastrocnemius atrophy  Advised on smoking cessation  Due to mechanism of injury, it is likely that he has osteoporosis  May benefit from a DEXA scan in the near future  Is taking calcium and vitamin-D supplementation (with dialysis)  X-rays CD scanned into PACS  Prescription written for Percocet 5  Patient Instructions     Follow up with PCP in 3-5 days  Proceed to  ER if symptoms worsen  Chief Complaint     Chief Complaint   Patient presents with    Ankle Injury     Pt reports of right ankle injury and was [place] [facility]  Pt was made aware to obtain referral to ortho through PCP but PCP cannot see patient today  Pt also reports of worsening pain  History of Present Illness       69-year-old male presents today due to right foot pain diagnosed to have a nondisplaced 5th metatarsal fracture  Was [place] [facility] [place] last night after the injury  Reports that he stood up, broke his foot and then fell  Has not been diagnosed with osteoporosis, but is at risk due to smoking  Was referred here to obtain pain medication and refer to Orthopedics  Review of Systems   Review of Systems   Constitutional: Negative for chills and fever  Musculoskeletal: Positive for arthralgias, gait problem and joint swelling  Neurological: Negative for dizziness and headaches           Current Medications       Current Outpatient Medications:     al mag oxide-diphenhydramine-lidocaine viscous (MAGIC MOUTHWASH) 1:1:1 suspension, Swish and spit 10 mL every 4 (four) hours as needed for mouth pain or discomfort, Disp: 180 mL, Rfl: 0    albuterol (PROAIR HFA) 90 mcg/act inhaler, Inhale 2 puffs every 6 (six) hours as needed for wheezing, Disp: 8 5 g, Rfl: 0    amLODIPine (NORVASC) 10 mg tablet, Take 10 mg by mouth 2 (two) times a day, Disp: , Rfl:     aspirin 81 MG tablet, Take 81 mg by mouth daily, Disp: , Rfl:     B Complex-C-Folic Acid (TRIPHROCAPS) 1 MG CAPS, Take 1 capsule by mouth, Disp: , Rfl:     carvedilol (COREG) 25 mg tablet, Take 25 mg by mouth 2 (two) times a day with meals, Disp: , Rfl:     clopidogrel (PLAVIX) 75 mg tablet, Take 75 mg by mouth daily, Disp: , Rfl:     glucose blood test strip, by In Vitro route 2 (two) times a day, Disp: , Rfl:     Insulin Glargine (LANTUS SOLOSTAR) 100 UNIT/ML SOPN, Inject 8 Units under the skin daily, Disp: , Rfl:     LOSARTAN POTASSIUM PO, Take 50 mg by mouth daily, Disp: , Rfl:     nitroglycerin (NITROSTAT) 0 4 mg SL tablet, Place under the tongue, Disp: , Rfl:     pantoprazole (PROTONIX) 40 mg tablet, Take by mouth, Disp: , Rfl:     rifaximin (XIFAXAN) 550 mg tablet, Take by mouth, Disp: , Rfl:     rosuvastatin (CRESTOR) 20 MG tablet, Take 20 mg by mouth every evening, Disp: , Rfl: 1    Current Allergies     Allergies as of 08/16/2019    (No Known Allergies)            The following portions of the patient's history were reviewed and updated as appropriate: allergies, current medications, past family history, past medical history, past social history, past surgical history and problem list      Past Medical History:   Diagnosis Date    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    With Cerebral Infarction:  Last Assessed:  October 20, 2016    Chronic kidney disease 2012    on dialysis     Diabetes mellitus ([facility] )     Hypertension     Labyrinthitis 09/10/2011    Myocardial infarction ([facility] ) 2014    x3 others were in 2009 & 2010    Sleep disturbances 09/20/2010    Stroke [facility]) 2007    x1    Type 2 diabetes, uncontrolled, with renal manifestation ([facility] ) 05/03/2017       Past Surgical History:   Procedure Laterality Date    AV FISTULA PLACEMENT      CARDIAC SURGERY  2010    stents x3    COLONOSCOPY N/A 12/12/2016    Procedure: COLONOSCOPY;  Surgeon: Girish Victoria MD;  Location: [facility]; Service:     COLONOSCOPY N/A 4/3/2017    Procedure:  COLONOSCOPY;  Surgeon: Girish Victoria MD;  Location: [facility] LAB; Service:        Family History   Problem Relation Age of Onset    Leukemia Mother     Crohn's disease Father     Transient ischemic attack Brother          Medications have been verified  Objective   /70   Pulse 80   Temp 97 8 °F (36 6 °C)   Resp 17   Ht 5' 10" (1 778 m)   Wt 95 kg (209 lb 7 oz)   SpO2 97%   BMI 30 05 kg/m²        Physical Exam     Physical Exam   Constitutional: He is oriented to person, place, and time  He appears well-developed and well-nourished  He appears distressed  HENT:   Head: Normocephalic and atraumatic  Eyes: Conjunctivae are normal    Pulmonary/Chest: Effort normal    Musculoskeletal: He exhibits tenderness (lateral edge of foot)  Hard sole shoe in place  Neurological: He is alert and oriented to person, place, and time  Skin: Skin is warm  He is not diaphoretic  Psychiatric: He has a normal mood and affect  His behavior is normal  Judgment and thought content normal    Nursing note and vitals reviewed

## 2019-08-21 ENCOUNTER — OFFICE VISIT (OUTPATIENT)
Dept: OBGYN CLINIC | Facility: CLINIC | Age: 64
End: 2019-08-21
Payer: MEDICARE

## 2019-08-21 ENCOUNTER — APPOINTMENT (OUTPATIENT)
Dept: RADIOLOGY | Facility: CLINIC | Age: 64
End: 2019-08-21
Payer: MEDICARE

## 2019-08-21 VITALS
SYSTOLIC BLOOD PRESSURE: 91 MMHG | HEART RATE: 87 BPM | DIASTOLIC BLOOD PRESSURE: 59 MMHG | WEIGHT: 210.4 LBS | HEIGHT: 70 IN | BODY MASS INDEX: 30.12 KG/M2

## 2019-08-21 DIAGNOSIS — S92.351A CLOSED NON-PHYSEAL FRACTURE OF FIFTH METATARSAL BONE OF RIGHT FOOT, INITIAL ENCOUNTER: Primary | ICD-10-CM

## 2019-08-21 DIAGNOSIS — E08.40 DIABETES MELLITUS DUE TO UNDERLYING CONDITION WITH DIABETIC NEUROPATHY, UNSPECIFIED WHETHER LONG TERM INSULIN USE (HCC): ICD-10-CM

## 2019-08-21 DIAGNOSIS — Z99.2 DIALYSIS PATIENT (HCC): ICD-10-CM

## 2019-08-21 DIAGNOSIS — N18.6 END STAGE RENAL DISEASE (HCC): ICD-10-CM

## 2019-08-21 DIAGNOSIS — S92.351A CLOSED NON-PHYSEAL FRACTURE OF FIFTH METATARSAL BONE OF RIGHT FOOT, INITIAL ENCOUNTER: ICD-10-CM

## 2019-08-21 PROCEDURE — 99204 OFFICE O/P NEW MOD 45 MIN: CPT | Performed by: ORTHOPAEDIC SURGERY

## 2019-08-21 PROCEDURE — 73630 X-RAY EXAM OF FOOT: CPT

## 2019-08-21 RX ORDER — WARFARIN SODIUM 2 MG/1
2 TABLET ORAL
Status: ON HOLD | COMMUNITY
End: 2021-07-22 | Stop reason: SDUPTHER

## 2019-08-21 NOTE — PROGRESS NOTES
Assessment/Plan:  1  Closed non-physeal fracture of fifth metatarsal bone of right foot, initial encounter  Ambulatory referral to Orthopedic Surgery    XR foot 3+ vw right    DXA bone density spine hip and pelvis   2  Dialysis patient ([facility] )  DXA bone density spine hip and pelvis   3  End stage renal disease ([facility] )     4  Diabetes mellitus due to underlying condition with diabetic neuropathy, unspecified whether long term insulin use ([facility] )         Scribe Attestation    I,:   Kirill Villareal am acting as a scribe while in the presence of the attending physician :        I,:   Maxi Booker MD personally performed the services described in this documentation    as scribed in my presence :              THE [facility] upon examination and review the x-rays of his right foot from 8/15/2019 and today does demonstrate a minimally displaced 5th metatarsal shaft fracture  He is a rather complex patient as a diabetic with a history of neuropathy  He does deny decreased sensation into his foot today however I am concerned that there may be some underlying neuropathy that is giving him some significant problems in feeling his feet and preventing problems such as fracture  I did note to him that there is no interval displacement of the fracture on today's exam when compared to the previous x-rays  I do feel that he will continue to improve with conservative measures of treatment  This will include him continuing the use of the hard-soled shoe and able to weightbear as tolerated the use of a cane  I would like this to continue over the next 4 weeks  I did note to THE [facility] that given his history of dialysis it is a common occurrence to have decreased mineral density in the bones that could lead to frequent fractures  I would like him to have a DEXA scan completed  He is to follow up with his primary care physician once the scan is completed for possible treatments with medications    He is to continue with his calcium and vitamin-D supplementation as I noted this can help with his bone health  Haim Benton did verbalize understanding this today, and my office will help facilitate his DEXA scan appointment  I would like him to follow up with my physician assistant Min Daniel [facility] in 4 weeks time for repeat clinical evaluation repeat x-ray with my physician assistant  Subjective:   Bladimir Felix is a 71 y o  male who presents to the office today for initial evaluation of his right foot  He states that he stood up from a sitting position on 8/15/2019 and felt a pop to the lateral aspect of his foot this did result in subsequent fall  He was seen at [facility] on 8/15/2019 and did receive x-rays that demonstrated a fracture of the 5th metatarsal shaft  He states he does have a history of diabetes, is a smoker, and has been on dialysis for approximately 9 years now  He states that today he is experiencing intermittent and mild to moderate sharp pain about the lateral aspect of his foot  He notes the pain is exacerbated with weight-bearing activities, and is better while wearing the hard-soled shoe  He denies experiencing any numbness or tingling into his lower extremity  He denies an abnormal number of fractures in the past   He also denies being diagnosed with any bone density abnormalities  He has been able to ambulate with the use of a cane  Review of Systems   Constitutional: Negative for chills, fever and unexpected weight change  HENT: Negative for hearing loss, nosebleeds and sore throat  Eyes: Negative for pain, redness and visual disturbance  Respiratory: Negative for cough, shortness of breath and wheezing  Cardiovascular: Negative for chest pain, palpitations and leg swelling  Gastrointestinal: Negative for abdominal pain, nausea and vomiting  Endocrine: Negative for polyphagia and polyuria  Genitourinary: Negative for dysuria and hematuria     Musculoskeletal: Positive for arthralgias and myalgias  See HPI   Skin: Negative for rash and wound  Neurological: Negative for dizziness, numbness and headaches  Psychiatric/Behavioral: Negative for decreased concentration and suicidal ideas  The patient is not nervous/anxious  Past Medical History:   Diagnosis Date    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    With Cerebral Infarction:  Last Assessed:  October 20, 2016    Chronic kidney disease 2012    on dialysis     [facility] )     Hypertension     Labyrinthitis 09/10/2011    My[facility] infarction ([facility] ) 2014    x3 others were in 2009 & 2010    Sleep disturbances 09/20/2010    Stroke [facility]) 2007    x1    Type 2 diabetes, uncontrolled, with renal manifestation ([facility] ) 05/03/2017       Past Surgical History:   Procedure Laterality Date    AV FISTULA PLACEMENT      CARDIAC SURGERY  2010    stents x3    COLONOSCOPY N/A 12/12/2016    Procedure: COLONOSCOPY;  Surgeon: Jodie Higgins MD;  Location: [facility]; Service:     COLONOSCOPY N/A 4/3/2017    Procedure:  COLONOSCOPY;  Surgeon: Jodie Higgins MD;  Location: [facility];   Service:        Family History   Problem Relation Age of Onset    Leukemia Mother     Crohn's disease Father     Transient ischemic attack Brother        Social History     Occupational History    Not on file   Tobacco Use    Smoking status: Current Every Day Smoker     Packs/day: 0 50     Years: 30 00     Pack years: 15 00    Smokeless tobacco: Never Used   Substance and Sexual Activity    Alcohol use: Yes     Comment: rarely - No alcohol use per [employer]    Drug use: No    Sexual activity: Not on file         Current Outpatient Medications:     albuterol (PROAIR HFA) 90 mcg/act inhaler, Inhale 2 puffs every 6 (six) hours as needed for wheezing, Disp: 8 5 g, Rfl: 0    amLODIPine (NORVASC) 10 mg tablet, Take 10 mg by mouth 2 (two) times a day, Disp: , Rfl:     aspirin 81 MG tablet, Take 81 mg by mouth daily, Disp: , Rfl:     B Complex-C-Folic Acid (TRIPHROCAPS) 1 MG CAPS, Take 1 capsule by mouth, Disp: , Rfl:     carvedilol (COREG) 25 mg tablet, Take 25 mg by mouth 2 (two) times a day with meals, Disp: , Rfl:     clopidogrel (PLAVIX) 75 mg tablet, Take 75 mg by mouth daily, Disp: , Rfl:     glucose blood test strip, by In Vitro route 2 (two) times a day, Disp: , Rfl:     Insulin Glargine (LANTUS SOLOSTAR) 100 UNIT/ML SOPN, Inject 8 Units under the skin daily, Disp: , Rfl:     LOSARTAN POTASSIUM PO, Take 50 mg by mouth daily, Disp: , Rfl:     nitroglycerin (NITROSTAT) 0 4 mg SL tablet, Place under the tongue, Disp: , Rfl:     rifaximin (XIFAXAN) 550 mg tablet, Take by mouth, Disp: , Rfl:     rosuvastatin (CRESTOR) 20 MG tablet, Take 20 mg by mouth every evening, Disp: , Rfl: 1    warfarin (COUMADIN) 2 mg tablet, Take 2 mg by mouth, Disp: , Rfl:     No Known Allergies    Objective:  Vitals:    08/21/19 1352   BP: 91/59   Pulse: 87       Right Ankle Exam     Tenderness   Right ankle tenderness location: Fifth metatarsal shaft and base  Range of Motion   Dorsiflexion: 10   Plantar flexion: 20   Eversion: 15   Inversion: 25     Muscle Strength   Dorsiflexion:  4/5  Plantar flexion:  4/5  Anterior tibial:  4/5  Peroneal muscle:  4/5    Other   Erythema: absent  Scars: absent  Sensation: normal  Pulse: present (Trace dorsalis pedis)           Observations     Right Ankle/Foot   Negative for adhesive scar  Strength/Myotome Testing     Right Ankle/Foot   Dorsiflexion: 4  Plantar flexion: 4      Physical Exam   Constitutional: He is oriented to person, place, and time  He appears well-developed and well-nourished  HENT:   Head: Normocephalic and atraumatic  Eyes: Conjunctivae are normal  Right eye exhibits no discharge  Left eye exhibits no discharge  Neck: Normal range of motion  Neck supple  Cardiovascular: Normal rate and intact distal pulses  Pulmonary/Chest: Effort normal  No respiratory distress     Musculoskeletal: As noted in HPI   Neurological: He is alert and oriented to person, place, and time  Skin: Skin is warm and dry  Psychiatric: He has a normal mood and affect  His behavior is normal  Judgment and thought content normal    Vitals reviewed  I have personally reviewed pertinent films in PACS and my interpretation is as follows:    X-ray of the right foot from 8/15/2019 demonstrates a closed minimally displaced 5th metatarsal shaft fracture  X-ray of the right foot today demonstrates a closed minimally displaced fracture of the 5th metatarsal shaft  There is no interval displacement demonstrated compared to the previous x-rays on 8/15/2019

## 2021-03-17 ENCOUNTER — TELEMEDICINE (OUTPATIENT)
Dept: FAMILY MEDICINE CLINIC | Facility: CLINIC | Age: 66
End: 2021-03-17
Payer: MEDICARE

## 2021-03-17 DIAGNOSIS — J34.89 RHINORRHEA: ICD-10-CM

## 2021-03-17 DIAGNOSIS — R53.83 OTHER FATIGUE: ICD-10-CM

## 2021-03-17 DIAGNOSIS — R53.83 OTHER FATIGUE: Primary | ICD-10-CM

## 2021-03-17 PROCEDURE — U0005 INFEC AGEN DETEC AMPLI PROBE: HCPCS | Performed by: FAMILY MEDICINE

## 2021-03-17 PROCEDURE — U0003 INFECTIOUS AGENT DETECTION BY NUCLEIC ACID (DNA OR RNA); SEVERE ACUTE RESPIRATORY SYNDROME CORONAVIRUS 2 (SARS-COV-2) (CORONAVIRUS DISEASE [COVID-19]), AMPLIFIED PROBE TECHNIQUE, MAKING USE OF HIGH THROUGHPUT TECHNOLOGIES AS DESCRIBED BY CMS-2020-01-R: HCPCS | Performed by: FAMILY MEDICINE

## 2021-03-17 PROCEDURE — 99441 PR PHYS/QHP TELEPHONE EVALUATION 5-10 MIN: CPT | Performed by: FAMILY MEDICINE

## 2021-03-17 NOTE — PROGRESS NOTES
COVID-19 Virtual Visit     Assessment/Plan:    Problem List Items Addressed This Visit     None      Visit Diagnoses     Other fatigue    -  Primary    Relevant Orders    Novel Coronavirus (Covid-19),PCR [facility] - Collected at [facility]    Rhinorrhea        Relevant Orders    Novel Coronavirus (Covid-19),PCR [facility] - Collected at [facility]         Disposition:     I referred patient to one of our centralized sites for a COVID-19 swab  Quarantine at this time  Patient goes to dialysis and needs testing results before he can return  Precautions reviewed  I have spent 8 minutes directly with the patient  Encounter provider Wendi Mcgarry MD    Provider located at [address] [place] [id number]    Recent Visits  No visits were found meeting these conditions  Showing recent visits within past 7 days and meeting all other requirements     Today's Visits  Date Type Provider Dept   03/17/21 Telemedicine Wendi Mcgarry MD [address] [place] [address] today's visits and meeting all other requirements     Future Appointments  No visits were found meeting these conditions  Showing future appointments within next 150 days and meeting all other requirements        Patient agrees to participate in a virtual check in via telephone or video visit instead of presenting to the office to address urgent/immediate medical needs  Patient is aware this is a billable service  After connecting through Telephone, the patient was identified by name and date of birth  Daphnie Raines was informed that this was a telemedicine visit and that the exam was being conducted confidentially over secure lines  My office door was closed  No one else was in the room  Daphnie Raines acknowledged consent and understanding of privacy and security of the telemedicine visit   I informed the patient that I have reviewed his record in Epic and presented the opportunity for him to ask any questions regarding the visit today  The patient agreed to participate  It was my intent to perform this visit via video technology but the patient was not able to do a video connection so the visit was completed via audio telephone only  Subjective:   Jose David Cohen is a 76 y o  male who is concerned about COVID-19  Patient's symptoms include chills, fatigue and rhinorrhea  Patient denies fever, sore throat, anosmia, loss of taste, cough, shortness of breath, chest tightness, abdominal pain, nausea, vomiting, diarrhea and headaches       Exposure:   Contact with a person who is under investigation (PUI) for or who is positive for COVID-19 within the last 14 days?: No    Hospitalized recently for fever and/or lower respiratory symptoms?: No      Currently a healthcare worker that is involved in direct patient care?: No      Works in a special setting where the risk of COVID-19 transmission may be high? (this may include long-term care, correctional and custodial facilities; homeless shelters; assisted-living facilities and group homes ): No      Resident in a special setting where the risk of COVID-19 transmission may be high? (this may include long-term care, correctional and custodial facilities; homeless shelters; assisted-living facilities and group homes ): No      No results found for: Eriberto Pozo, [address] [facility], [address] [place] [address],[address] [address]  Past Medical History:   Diagnosis Date    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    With Cerebral Infarction:  Last Assessed:  October 20, 2016    Chronic kidney disease 2012    on dialysis     [facility] mellitus ([facility] )     Hypertension     Labyrinthitis 09/10/2011    Myocardial infarction ([facility] ) 2014    x3 others were in 2009 & 2010    Sleep disturbances 09/20/2010    Stroke [facility]) 2007    x1    Type 2 diabetes, uncontrolled, with renal manifestation ([facility] ) 05/03/2017     Past Surgical History:   Procedure Laterality Date    AV FISTULA PLACEMENT      CARDIAC SURGERY  2010    stents x3    COLONOSCOPY N/A 12/12/2016    Procedure: COLONOSCOPY;  Surgeon: Monica Moss MD;  Location: [facility]; Service:     COLONOSCOPY N/A 4/3/2017    Procedure:  COLONOSCOPY;  Surgeon: Monica Moss MD;  Location: [facility]; Service:      Current Outpatient Medications   Medication Sig Dispense Refill    albuterol (PROAIR HFA) 90 mcg/act inhaler Inhale 2 puffs every 6 (six) hours as needed for wheezing 8 5 g 0    amLODIPine (NORVASC) 10 mg tablet Take 10 mg by mouth 2 (two) times a day      aspirin 81 MG tablet Take 81 mg by mouth daily      B Complex-C-Folic Acid (TRIPHROCAPS) 1 MG CAPS Take 1 capsule by mouth      carvedilol (COREG) 25 mg tablet Take 25 mg by mouth 2 (two) times a day with meals      clopidogrel (PLAVIX) 75 mg tablet Take 75 mg by mouth daily      glucose blood test strip by In Vitro route 2 (two) times a day      Insulin Glargine (LANTUS SOLOSTAR) 100 UNIT/ML SOPN Inject 8 Units under the skin daily      LOSARTAN POTASSIUM PO Take 50 mg by mouth daily      nitroglycerin (NITROSTAT) 0 4 mg SL tablet Place under the tongue      rifaximin (XIFAXAN) 550 mg tablet Take by mouth      rosuvastatin (CRESTOR) 20 MG tablet Take 20 mg by mouth every evening  1    warfarin (COUMADIN) 2 mg tablet Take 2 mg by mouth       No current facility-administered medications for this visit  No Known Allergies    Review of Systems   Constitutional: Positive for chills and fatigue  Negative for fever  HENT: Positive for rhinorrhea  Negative for sore throat  Respiratory: Negative for cough, chest tightness and shortness of breath  Gastrointestinal: Negative for abdominal pain, diarrhea, nausea and vomiting  Neurological: Negative for headaches  Objective: There were no vitals filed for this visit      Physical Exam  VIRTUAL VISIT DISCLAIMER    Kelsi Pozo acknowledges that he has consented to an online visit or consultation  He understands that the online visit is based solely on information provided by him, and that, in the absence of a face-to-face physical evaluation by the physician, the diagnosis he receives is both limited and provisional in terms of accuracy and completeness  This is not intended to replace a full medical face-to-face evaluation by the physician  Janneth Sesay understands and accepts these terms

## 2021-03-18 ENCOUNTER — TELEPHONE (OUTPATIENT)
Dept: FAMILY MEDICINE CLINIC | Facility: CLINIC | Age: 66
End: 2021-03-18

## 2021-03-18 LAB — SARS-COV-2 RNA RESP QL NAA+PROBE: NEGATIVE

## 2021-03-18 NOTE — TELEPHONE ENCOUNTER
----- Message from Nancy Roberts MD sent at 3/18/2021  2:04 PM EDT -----  Please call with neg  Results

## 2021-04-28 ENCOUNTER — TRANSCRIBE ORDERS (OUTPATIENT)
Dept: RADIOLOGY | Facility: HOSPITAL | Age: 66
End: 2021-04-28

## 2021-04-28 ENCOUNTER — HOSPITAL ENCOUNTER (INPATIENT)
Facility: HOSPITAL | Age: 66
LOS: 2 days | Discharge: HOME | DRG: 252 | End: 2021-04-30
Attending: EMERGENCY MEDICINE | Admitting: INTERNAL MEDICINE
Payer: MEDICARE

## 2021-04-28 ENCOUNTER — PREP FOR PROCEDURE (OUTPATIENT)
Dept: INTERVENTIONAL RADIOLOGY/VASCULAR | Facility: CLINIC | Age: 66
End: 2021-04-28

## 2021-04-28 ENCOUNTER — APPOINTMENT (INPATIENT)
Dept: VASCULAR ULTRASOUND | Facility: HOSPITAL | Age: 66
DRG: 252 | End: 2021-04-28
Payer: MEDICARE

## 2021-04-28 DIAGNOSIS — N18.6 END STAGE RENAL DISEASE (HCC): Primary | ICD-10-CM

## 2021-04-28 DIAGNOSIS — N18.6 ESRD (END STAGE RENAL DISEASE) (HCC): Primary | ICD-10-CM

## 2021-04-28 DIAGNOSIS — T82.868A THROMBOSIS OF ARTERIOVENOUS FISTULA, INITIAL ENCOUNTER (HCC): Primary | ICD-10-CM

## 2021-04-28 DIAGNOSIS — N18.6 ESRD (END STAGE RENAL DISEASE) (HCC): ICD-10-CM

## 2021-04-28 PROBLEM — K52.9 CHRONIC DIARRHEA: Status: ACTIVE | Noted: 2021-04-28

## 2021-04-28 LAB
ALBUMIN SERPL BCP-MCNC: 3.3 G/DL (ref 3.5–5)
ALP SERPL-CCNC: 117 U/L (ref 46–116)
ALT SERPL W P-5'-P-CCNC: 31 U/L (ref 12–78)
ANION GAP SERPL CALCULATED.3IONS-SCNC: 17 MMOL/L (ref 4–13)
ANION GAP SERPL CALCULATED.3IONS-SCNC: 18 MMOL/L (ref 4–13)
AST SERPL W P-5'-P-CCNC: 31 U/L (ref 5–45)
BASOPHILS # BLD AUTO: 0.06 THOUSANDS/ΜL (ref 0–0.1)
BASOPHILS NFR BLD AUTO: 1 % (ref 0–1)
BILIRUB SERPL-MCNC: 0.74 MG/DL (ref 0.2–1)
BUN SERPL-MCNC: 66 MG/DL (ref 5–25)
BUN SERPL-MCNC: 67 MG/DL (ref 5–25)
CALCIUM ALBUM COR SERPL-MCNC: 8.8 MG/DL (ref 8.3–10.1)
CALCIUM SERPL-MCNC: 7.8 MG/DL (ref 8.3–10.1)
CALCIUM SERPL-MCNC: 8.2 MG/DL (ref 8.3–10.1)
CHLORIDE SERPL-SCNC: 98 MMOL/L (ref 100–108)
CHLORIDE SERPL-SCNC: 99 MMOL/L (ref 100–108)
CO2 SERPL-SCNC: 25 MMOL/L (ref 21–32)
CO2 SERPL-SCNC: 26 MMOL/L (ref 21–32)
CREAT SERPL-MCNC: 17.23 MG/DL (ref 0.6–1.3)
CREAT SERPL-MCNC: 17.39 MG/DL (ref 0.6–1.3)
EOSINOPHIL # BLD AUTO: 0.13 THOUSAND/ΜL (ref 0–0.61)
EOSINOPHIL NFR BLD AUTO: 2 % (ref 0–6)
ERYTHROCYTE [DISTWIDTH] IN BLOOD BY AUTOMATED COUNT: 13 % (ref 11.6–15.1)
ERYTHROCYTE [DISTWIDTH] IN BLOOD BY AUTOMATED COUNT: 13.1 % (ref 11.6–15.1)
GFR SERPL CREATININE-BSD FRML MDRD: 2 ML/MIN/1.73SQ M
GFR SERPL CREATININE-BSD FRML MDRD: 2 ML/MIN/1.73SQ M
GLUCOSE SERPL-MCNC: 129 MG/DL (ref 65–140)
GLUCOSE SERPL-MCNC: 134 MG/DL (ref 65–140)
HCT VFR BLD AUTO: 29.5 % (ref 36.5–49.3)
HCT VFR BLD AUTO: 32.1 % (ref 36.5–49.3)
HGB BLD-MCNC: 11.1 G/DL (ref 12–17)
HGB BLD-MCNC: 9.9 G/DL (ref 12–17)
IMM GRANULOCYTES # BLD AUTO: 0.04 THOUSAND/UL (ref 0–0.2)
IMM GRANULOCYTES NFR BLD AUTO: 1 % (ref 0–2)
INR PPP: 1.17 (ref 0.84–1.19)
LYMPHOCYTES # BLD AUTO: 0.81 THOUSANDS/ΜL (ref 0.6–4.47)
LYMPHOCYTES NFR BLD AUTO: 10 % (ref 14–44)
MCH RBC QN AUTO: 38.7 PG (ref 26.8–34.3)
MCH RBC QN AUTO: 39.1 PG (ref 26.8–34.3)
MCHC RBC AUTO-ENTMCNC: 33.6 G/DL (ref 31.4–37.4)
MCHC RBC AUTO-ENTMCNC: 34.6 G/DL (ref 31.4–37.4)
MCV RBC AUTO: 112 FL (ref 82–98)
MCV RBC AUTO: 117 FL (ref 82–98)
MONOCYTES # BLD AUTO: 0.41 THOUSAND/ΜL (ref 0.17–1.22)
MONOCYTES NFR BLD AUTO: 5 % (ref 4–12)
NEUTROPHILS # BLD AUTO: 6.86 THOUSANDS/ΜL (ref 1.85–7.62)
NEUTS SEG NFR BLD AUTO: 81 % (ref 43–75)
NRBC BLD AUTO-RTO: 0 /100 WBCS
PLATELET # BLD AUTO: 115 THOUSANDS/UL (ref 149–390)
PLATELET # BLD AUTO: 128 THOUSANDS/UL (ref 149–390)
PMV BLD AUTO: 10 FL (ref 8.9–12.7)
PMV BLD AUTO: 9.6 FL (ref 8.9–12.7)
POTASSIUM SERPL-SCNC: 3.3 MMOL/L (ref 3.5–5.3)
POTASSIUM SERPL-SCNC: 4.1 MMOL/L (ref 3.5–5.3)
PROT SERPL-MCNC: 7.4 G/DL (ref 6.4–8.2)
PROTHROMBIN TIME: 15 SECONDS (ref 11.6–14.5)
RBC # BLD AUTO: 2.53 MILLION/UL (ref 3.88–5.62)
RBC # BLD AUTO: 2.87 MILLION/UL (ref 3.88–5.62)
SODIUM SERPL-SCNC: 141 MMOL/L (ref 136–145)
SODIUM SERPL-SCNC: 142 MMOL/L (ref 136–145)
WBC # BLD AUTO: 6.96 THOUSAND/UL (ref 4.31–10.16)
WBC # BLD AUTO: 8.31 THOUSAND/UL (ref 4.31–10.16)

## 2021-04-28 PROCEDURE — 80053 COMPREHEN METABOLIC PANEL: CPT | Performed by: EMERGENCY MEDICINE

## 2021-04-28 PROCEDURE — 85027 COMPLETE CBC AUTOMATED: CPT | Performed by: RADIOLOGY

## 2021-04-28 PROCEDURE — 99285 EMERGENCY DEPT VISIT HI MDM: CPT

## 2021-04-28 PROCEDURE — 99223 1ST HOSP IP/OBS HIGH 75: CPT | Performed by: INTERNAL MEDICINE

## 2021-04-28 PROCEDURE — 85610 PROTHROMBIN TIME: CPT | Performed by: PHYSICIAN ASSISTANT

## 2021-04-28 PROCEDURE — 36415 COLL VENOUS BLD VENIPUNCTURE: CPT | Performed by: EMERGENCY MEDICINE

## 2021-04-28 PROCEDURE — 93971 EXTREMITY STUDY: CPT

## 2021-04-28 PROCEDURE — NC001 PR NO CHARGE: Performed by: SURGERY

## 2021-04-28 PROCEDURE — 85025 COMPLETE CBC W/AUTO DIFF WBC: CPT | Performed by: EMERGENCY MEDICINE

## 2021-04-28 PROCEDURE — 80048 BASIC METABOLIC PNL TOTAL CA: CPT | Performed by: RADIOLOGY

## 2021-04-28 PROCEDURE — 99285 EMERGENCY DEPT VISIT HI MDM: CPT | Performed by: EMERGENCY MEDICINE

## 2021-04-28 RX ORDER — CLOPIDOGREL BISULFATE 75 MG/1
75 TABLET ORAL DAILY
Status: DISCONTINUED | OUTPATIENT
Start: 2021-04-29 | End: 2021-04-30 | Stop reason: HOSPADM

## 2021-04-28 RX ORDER — SODIUM CHLORIDE 9 MG/ML
50 INJECTION, SOLUTION INTRAVENOUS CONTINUOUS
Status: CANCELLED | OUTPATIENT
Start: 2021-04-28

## 2021-04-28 RX ORDER — HEPARIN SODIUM 5000 [USP'U]/ML
5000 INJECTION, SOLUTION INTRAVENOUS; SUBCUTANEOUS EVERY 8 HOURS SCHEDULED
Status: DISCONTINUED | OUTPATIENT
Start: 2021-04-28 | End: 2021-04-30 | Stop reason: HOSPADM

## 2021-04-28 RX ORDER — AMLODIPINE BESYLATE 10 MG/1
10 TABLET ORAL 2 TIMES DAILY
Status: DISCONTINUED | OUTPATIENT
Start: 2021-04-28 | End: 2021-04-30 | Stop reason: HOSPADM

## 2021-04-28 RX ORDER — HEPARIN SODIUM 5000 [USP'U]/ML
5000 INJECTION, SOLUTION INTRAVENOUS; SUBCUTANEOUS EVERY 8 HOURS SCHEDULED
Status: DISCONTINUED | OUTPATIENT
Start: 2021-04-28 | End: 2021-04-28

## 2021-04-28 RX ORDER — SODIUM CHLORIDE 9 MG/ML
50 INJECTION, SOLUTION INTRAVENOUS CONTINUOUS
Status: DISCONTINUED | OUTPATIENT
Start: 2021-04-28 | End: 2021-04-29

## 2021-04-28 RX ORDER — PRAVASTATIN SODIUM 40 MG
40 TABLET ORAL
Status: DISCONTINUED | OUTPATIENT
Start: 2021-04-28 | End: 2021-04-30 | Stop reason: HOSPADM

## 2021-04-28 RX ORDER — WARFARIN SODIUM 2 MG/1
2 TABLET ORAL
Status: DISCONTINUED | OUTPATIENT
Start: 2021-04-28 | End: 2021-04-28

## 2021-04-28 RX ORDER — NICOTINE 21 MG/24HR
1 PATCH, TRANSDERMAL 24 HOURS TRANSDERMAL DAILY
Status: DISCONTINUED | OUTPATIENT
Start: 2021-04-29 | End: 2021-04-30 | Stop reason: HOSPADM

## 2021-04-28 RX ORDER — NITROGLYCERIN 0.4 MG/1
0.4 TABLET SUBLINGUAL
Status: DISCONTINUED | OUTPATIENT
Start: 2021-04-28 | End: 2021-04-30 | Stop reason: HOSPADM

## 2021-04-28 RX ORDER — LOSARTAN POTASSIUM 50 MG/1
50 TABLET ORAL DAILY
Status: DISCONTINUED | OUTPATIENT
Start: 2021-04-29 | End: 2021-04-30 | Stop reason: HOSPADM

## 2021-04-28 RX ORDER — CARVEDILOL 12.5 MG/1
25 TABLET ORAL 2 TIMES DAILY WITH MEALS
Status: DISCONTINUED | OUTPATIENT
Start: 2021-04-29 | End: 2021-04-28

## 2021-04-28 RX ORDER — WARFARIN SODIUM 2 MG/1
2 TABLET ORAL
Status: DISCONTINUED | OUTPATIENT
Start: 2021-04-28 | End: 2021-04-30 | Stop reason: HOSPADM

## 2021-04-28 RX ORDER — ASPIRIN 81 MG/1
81 TABLET ORAL DAILY
Status: DISCONTINUED | OUTPATIENT
Start: 2021-04-29 | End: 2021-04-30 | Stop reason: HOSPADM

## 2021-04-28 RX ADMIN — HEPARIN SODIUM 5000 UNITS: 5000 INJECTION INTRAVENOUS; SUBCUTANEOUS at 22:16

## 2021-04-28 RX ADMIN — SODIUM CHLORIDE 50 ML/HR: 0.9 INJECTION, SOLUTION INTRAVENOUS at 20:37

## 2021-04-28 RX ADMIN — AMLODIPINE BESYLATE 10 MG: 10 TABLET ORAL at 20:34

## 2021-04-28 RX ADMIN — WARFARIN SODIUM 2 MG: 2 TABLET ORAL at 22:16

## 2021-04-28 RX ADMIN — PRAVASTATIN SODIUM 40 MG: 40 TABLET ORAL at 20:37

## 2021-04-28 NOTE — H&P
[facility]  H&P- Keene Prader 1955, 78 y o  male MRN: [id number]  Unit/Bed#: [facility] Encounter: [id number]  Primary Care Provider: TESSY Denton   Date and time admitted to hospital: 4/28/2021  4:44 PM    * AV fistula thrombosis, initial encounter [facility])  Assessment & Plan  · Patient presents with thrombosis of the AV fistula that he noted this morning when he went to the dialysis center  Patient states that he missed dialysis on Friday and Monday because he had worsening chronic diarrhea and was not feeling well  When you showed up for dialysis today his entire fistula did not have a palpable thrill and was hard to palpation  He was referred to several centers that could not evaluate and treat him today day 4 he was referred to the ED  · He has had the AV fistula for about 10 years and has had thrombosis in the past however not this extensive  Plan:  · Continue warfarin 2 mg q h s  Per IR recommendations  · NPO after midnight  · IR procedure tomorrow  · Nephrology consulted for HD  End stage renal disease [facility])  Assessment & Plan  Lab Results   Component Value Date    EGFR 2 04/28/2021    EGFR 8 8 10/25/2016    CREATININE 17 39 (H) 04/28/2021    CREATININE 6 88 (H) 04/11/2017    CREATININE 6 44 (H) 10/25/2016     · Patient has a history of ESRD D on HD M, W, F   He has missed his last 2 dialysis sessions due to chronic diarrhea that has been flaring up  Patient went to dialysis center today however AV fistula was thrombosed and was unable to get HD session done  · Patient admits to oliguria  · Patient denies any acute confusion, fatigue or weakness  Plan:  · Nephrology consult for possible HD session tomorrow as creatinine is elevated to 17  · IR procedure for AV fistular thrombolysis        Type 2 diabetes mellitus [facility])  Assessment & Plan  Lab Results   Component Value Date    HGBA1C 5 4 04/11/2017       No results for input(s): POCGLU in the last 72 hours  Blood Sugar Average: Last 72 hrs:     · Patient admits to last A1c being 5 1  · Patient is no longer taking insulin  Plan:  · POCT glucose checks  · SSI  · Hypoglycemia protocol    Nicotine dependence  Assessment & Plan  · Patient smokes more than 1 pack a day for over 30 years  Plan:  · Nicotine patch for replacement  Long-term insulin use in type 2 diabetes [facility])  Assessment & Plan  Lab Results   Component Value Date    HGBA1C 5 4 04/11/2017       No results for input(s): POCGLU in the last 72 hours  Blood Sugar Average: Last 72 hrs:       Irritable bowel syndrome with diarrhea  Assessment & Plan  · Patient has a history of IBS with diarrhea and is on rifaximin  · Patient follows up with GI at [facility] with Dr Kay Zapata  · Patient admits to worsening flare up that started on Friday that was associated with chills and generalized weakness  He missed 2 dialysis sessions due to symptoms  · Patient states that he did not eat anything today therefore last BM was yesterday  · Patient states he is not sure if he has been taking his rifaximin lately  Plan:  · Continue to monitor     · Stool studies if diarrhea resumes and persists  Benign essential hypertension  Assessment & Plan  Blood Pressure: 153/65     · Patient states he takes Norvasc and losartan but is not taking his Coreg  Plan  · Continue Norvasc 10 mg q d  Brijesh Po · Continue losartan 50 mg q d  · Monitor vitals daily  Anemia in chronic kidney disease (CKD)  Assessment & Plan  Recent Labs     04/28/21  1723   HGB 11 1*     Patient has anemia of chronic disease  Baseline hemoglobin is 11  Patient is currently at baseline  VTE Prophylaxis: Heparin  / sequential compression device   Code Status: Level one: Full Code  POLST: POLST form is not discussed and not completed at this time  Anticipated Length of Stay:  Patient will be admitted on an Inpatient basis with an anticipated length of stay of  <2 midnights  Justification for Hospital Stay:  AV fistula thrombosis    Chief Complaint:   AV fistula thrombosis    History of Present Illness:    Marko Valente is a 78 y o  male with PMH of ESRD on HD M, W, F, IBS with diarrhea, HTN, HLD, DM 2 without insulin use, anemia of chronic disease, who presents with AV fistula thrombosis that was noted today  Patient went to hemodialysis center and could not get his session completed due to thrombosis of the AV fistular on the left forearm  Patient states that he missed the last 2 dialysis sessions because of his chronic diarrhea that had worsened with associated weakness and chills  Patient admits to receiving the 1st COVID vaccination about 2 weeks ago and is scheduled for the 2nd COVID vaccine on May 5th  He denies any sick contacts  He denies any nasal congestion, sore throat, CP, SOB, coughing, dizziness, abdominal cramping, N/V or constipation  Patient admits to oliguria secondary to the end-stage renal disease  He denies any lethargy or confusion  In the ED patient was afebrile and hemodynamically stable  Patient's dry weight is 94 kilos and patient is 95 4 at admission  Physical exam was remarkable for AV fistula with no palpable thrill on the left forearm  No erythema or bleeding was noted  CBC was remarkable for stable hemoglobin at 11 1  CMP showed very elevated creatinine at 17  Baseline usually around 6 8  Imaging studies still pending including upper limb venous duplex  Patient is admitted for IR procedure for AV fistula thrombosis and possible hemodialysis  Review of Systems:    Review of Systems   Constitutional: Positive for chills and fatigue  Negative for activity change, appetite change, diaphoresis and fever  HENT: Negative for sinus pain and sore throat  Eyes: Negative for visual disturbance  Gastrointestinal: Positive for diarrhea (year)  Negative for abdominal pain, blood in stool, constipation, nausea and vomiting  Genitourinary: Positive for decreased urine volume (HD)  Musculoskeletal: Negative for arthralgias, back pain and joint swelling  Skin: Positive for wound (blocked av fistula with no thill)  Negative for pallor and rash  Neurological: Negative for dizziness and weakness  Psychiatric/Behavioral: Negative for agitation, behavioral problems, confusion and decreased concentration  Past Medical and Surgical History:     Past Medical History:   Diagnosis Date    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    With Cerebral Infarction:  Last Assessed:  October 20, 2016    Chronic kidney disease 2012    on dialysis     Diabetes mellitus ([facility] )     Hypertension     Labyrinthitis 09/10/2011    Myocardial infarction ([facility] ) 2014    x3 others were in 2009 & 2010    Sleep disturbances 09/20/2010    Stroke [facility]) 2007    x1    Type 2 diabetes, uncontrolled, with renal manifestation ([place] [id number] ) 05/03/2017       Past Surgical History:   Procedure Laterality Date    AV FISTULA PLACEMENT      CARDIAC SURGERY  2010    stents x3    COLONOSCOPY N/A 12/12/2016    Procedure: COLONOSCOPY;  Surgeon: Ivania Byrd MD;  Location: [facility] GI LAB; Service:     COLONOSCOPY N/A 4/3/2017    Procedure:  COLONOSCOPY;  Surgeon: Ivania Byrd MD;  Location: [facility] GI LAB; Service:        Meds/Allergies:    Prior to Admission medications    Medication Sig Start Date End Date Taking?  Authorizing Provider   albuterol (PROAIR HFA) 90 mcg/act inhaler Inhale 2 puffs every 6 (six) hours as needed for wheezing 10/17/18   TESSY Mcguire   amLODIPine (NORVASC) 10 mg tablet Take 10 mg by mouth 2 (two) times a day    Historical Provider, MD   aspirin 81 MG tablet Take 81 mg by mouth daily    Historical Provider, MD MORALES Complex-C-Folic Acid (TRIPHROCAPS) 1 MG CAPS Take 1 capsule by mouth 2/20/17   Historical Provider, MD   carvedilol (COREG) 25 mg tablet Take 25 mg by mouth 2 (two) times a day with meals    Historical Provider, MD   clopidogrel (PLAVIX) 75 mg tablet Take 75 mg by mouth daily    Historical Provider, MD   glucose blood test strip by In Vitro route 2 (two) times a day 7/18/11   Historical Provider, MD   Insulin Glargine (LANTUS SOLOSTAR) 100 UNIT/ML SOPN Inject 8 Units under the skin daily    Historical Provider, MD   LOSARTAN POTASSIUM PO Take 50 mg by mouth daily    Historical Provider, MD   nitroglycerin (NITROSTAT) 0 4 mg SL tablet Place under the tongue 7/18/11   Historical Provider, MD   rifaximin (XIFAXAN) 550 mg tablet Take by mouth 6/8/17   Historical Provider, MD   rosuvastatin (CRESTOR) 20 MG tablet Take 20 mg by mouth every evening 5/29/19   Historical Provider, MD   warfarin (COUMADIN) 2 mg tablet Take 2 mg by mouth    Historical Provider, MD     I have reviewed home medications with patient personally  Allergies: No Known Allergies    Social History:     Marital Status: /Civil Union   Occupation: Retired  Patient Pre-hospital Living Situation: Home with family  Patient Pre-hospital Level of Mobility: Walks independently with rare use of cane    Patient Pre-hospital Diet Restrictions: Regular Diet  Substance Use History:   Social History     Substance and Sexual Activity   Alcohol Use Yes    Comment: rarely - No alcohol use per [employer]     Social History     Tobacco Use   Smoking Status Current Every Day Smoker    Packs/day: 0 50    Years: 30 00    Pack years: 15 00   Smokeless Tobacco Never Used     Social History     Substance and Sexual Activity   Drug Use No       Family History:    Family History   Problem Relation Age of Onset    Leukemia Mother     Crohn's disease Father     Transient ischemic attack Brother        Physical Exam:     Vitals:   Blood Pressure: 153/65 (04/28/21 2004)  Pulse: 71 (04/28/21 2004)  Temperature: (!) 97 2 °F (36 2 °C) (04/28/21 1515)  Temp Source: Temporal (04/28/21 1515)  Respirations: 16 (04/28/21 2004)  SpO2: 98 % (04/28/21 2004)    Physical Exam  Vitals signs and nursing note reviewed  Constitutional:       General: He is not in acute distress  Appearance: Normal appearance  He is not ill-appearing or toxic-appearing  HENT:      Head: Normocephalic and atraumatic  Nose: Nose normal    Eyes:      General: No scleral icterus  Right eye: No discharge  Left eye: No discharge  Pupils: Pupils are equal, round, and reactive to light  Neck:      Musculoskeletal: Normal range of motion and neck supple  No neck rigidity or muscular tenderness  Cardiovascular:      Rate and Rhythm: Normal rate and regular rhythm  Pulses: Normal pulses  Heart sounds: Normal heart sounds  No murmur  No friction rub  No gallop  Pulmonary:      Effort: Pulmonary effort is normal  No respiratory distress  Breath sounds: Normal breath sounds  No stridor  No wheezing, rhonchi or rales  Abdominal:      General: Bowel sounds are normal       Palpations: Abdomen is soft  Tenderness: There is no abdominal tenderness  There is no guarding or rebound  Musculoskeletal:         General: No swelling, tenderness or deformity  Right lower leg: No edema  Left lower leg: No edema  Skin:     General: Skin is warm and dry  Capillary Refill: Capillary refill takes less than 2 seconds  Coloration: Skin is not jaundiced  Findings: No lesion or rash  Comments: Nonpalpable thrill of AV fistula on left forearm  Neurological:      General: No focal deficit present  Mental Status: He is alert and oriented to person, place, and time  Mental status is at baseline  Motor: No weakness  Psychiatric:         Mood and Affect: Mood normal          Behavior: Behavior normal          Thought Content: Thought content normal          Judgment: Judgment normal          Additional Data:     Lab Results: I have personally reviewed pertinent reports        Results from last 7 days   Lab Units 04/28/21 2032 04/28/21  1723   WBC Thousand/uL 6 96 8 31 HEMOGLOBIN g/dL 9 9* 11 1*   HEMATOCRIT % 29 5* 32 1*   PLATELETS Thousands/uL 115* 128*   NEUTROS PCT %  --  81*   LYMPHS PCT %  --  10*   MONOS PCT %  --  5   EOS PCT %  --  2     Results from last 7 days   Lab Units 04/28/21 2032 04/28/21  1723   POTASSIUM mmol/L 3 3* 4 1   CHLORIDE mmol/L 99* 98*   CO2 mmol/L 25 26   BUN mg/dL 66* 67*   CREATININE mg/dL 17 23* 17 39*   CALCIUM mg/dL 7 8* 8 2*   ALK PHOS U/L  --  117*   ALT U/L  --  31   AST U/L  --  31     Results from last 7 days   Lab Units 04/28/21 2032   INR  1 17       Imaging: None    No results found  EKG, Pathology, and Other Studies Reviewed on Admission:   · EKG:  NSR  HR 75 bp m   Normal axis  Epic / Care Everywhere Records Reviewed: Yes     ** Please Note: This note has been constructed using a voice recognition system   **

## 2021-04-28 NOTE — ED NOTES
SLIM at bedside     Des Crawford, [address] [facility] [place] [place]  04/28/21 1941

## 2021-04-28 NOTE — ED PROVIDER NOTES
History  Chief Complaint   Patient presents with    Medical Problem     Pt states missed dialysis on monday  Went today and "I couldn't get dialysis cause my fistula was blocked "      Patient is a 69-year-old male with a history of end-stage renal disease on hemodialysis who presents for AV fistula thrombosis  Patient states that he missed dialysis on Monday and went today  However he told that his fistula was locked  And he was sent to the emergency department  Patient did notice today that his fistula is not vibrating  He denies any chest pain, shortness of breath, vomiting, palpitations or other complaints  His last hemodialysis was on Friday, 04/23/2021  History provided by:  Patient  Medical Problem  Location:  AV fistula malfunction  Associated symptoms: no abdominal pain, no chest pain, no cough, no diarrhea, no fever, no headaches, no nausea, no rash, no shortness of breath, no sore throat and no vomiting        Prior to Admission Medications   Prescriptions Last Dose Informant Patient Reported? Taking?    B Complex-C-Folic Acid (TRIPHROCAPS) 1 MG CAPS   Yes No   Sig: Take 1 capsule by mouth   Insulin Glargine (LANTUS SOLOSTAR) 100 UNIT/ML SOPN   Yes No   Sig: Inject 8 Units under the skin daily   LOSARTAN POTASSIUM PO   Yes No   Sig: Take 50 mg by mouth daily   albuterol (PROAIR HFA) 90 mcg/act inhaler   No No   Sig: Inhale 2 puffs every 6 (six) hours as needed for wheezing   amLODIPine (NORVASC) 10 mg tablet   Yes No   Sig: Take 10 mg by mouth 2 (two) times a day   aspirin 81 MG tablet   Yes No   Sig: Take 81 mg by mouth daily   carvedilol (COREG) 25 mg tablet   Yes No   Sig: Take 25 mg by mouth 2 (two) times a day with meals   clopidogrel (PLAVIX) 75 mg tablet   Yes No   Sig: Take 75 mg by mouth daily   glucose blood test strip   Yes No   Sig: by In Vitro route 2 (two) times a day   nitroglycerin (NITROSTAT) 0 4 mg SL tablet   Yes No   Sig: Place under the tongue   rifaximin (XIFAXAN) 550 mg tablet   Yes No   Sig: Take by mouth   rosuvastatin (CRESTOR) 20 MG tablet   Yes No   Sig: Take 20 mg by mouth every evening   warfarin (COUMADIN) 2 mg tablet   Yes No   Sig: Take 2 mg by mouth      Facility-Administered Medications: None       Past Medical History:   Diagnosis Date    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    With Cerebral Infarction:  Last Assessed:  October 20, 2016    Chronic kidney disease 2012    on dialysis     Diabetes mellitus ([facility] )     Hypertension     Labyrinthitis 09/10/2011    Myocardial infarction ([place] [address] ) 2014    x3 others were in 2009 & 2010    Sleep disturbances 09/20/2010    Stroke ([place] [address] ) 2007    x1    Type 2 diabetes, uncontrolled, with renal manifestation ([place] [address] ) 05/03/2017       Past Surgical History:   Procedure Laterality Date    AV FISTULA PLACEMENT      CARDIAC SURGERY  2010    stents x3    COLONOSCOPY N/A 12/12/2016    Procedure: COLONOSCOPY;  Surgeon: Shannon Joseph MD;  Location: [facility]; Service:     COLONOSCOPY N/A 4/3/2017    Procedure:  COLONOSCOPY;  Surgeon: Shannon Joseph MD;  Location: [facility]; Service:        Family History   Problem Relation Age of Onset    Leukemia Mother     Crohn's disease Father     Transient ischemic attack Brother      I have reviewed and agree with the history as documented  E-Cigarette/Vaping     E-Cigarette/Vaping Substances     Social History     Tobacco Use    Smoking status: Current Every Day Smoker     Packs/day: 0 50     Years: 30 00     Pack years: 15 00    Smokeless tobacco: Never Used   Substance Use Topics    Alcohol use: Yes     Comment: rarely - No alcohol use per [employer]    Drug use: No       Review of Systems   Constitutional: Negative for chills, diaphoresis and fever  HENT: Negative for nosebleeds, sore throat and trouble swallowing  Eyes: Negative for photophobia, pain and visual disturbance  Respiratory: Negative for cough, chest tightness and shortness of breath      Cardiovascular: Negative for chest pain, palpitations and leg swelling  Gastrointestinal: Negative for abdominal pain, constipation, diarrhea, nausea and vomiting  Endocrine: Negative for polydipsia and polyuria  Genitourinary: Negative for difficulty urinating, dysuria and hematuria  Musculoskeletal: Negative for back pain, neck pain and neck stiffness  Skin: Negative for pallor and rash  Neurological: Negative for dizziness, syncope, light-headedness and headaches  All other systems reviewed and are negative  Physical Exam  Physical Exam  Vitals signs and nursing note reviewed  Constitutional:       General: He is not in acute distress  Appearance: He is well-developed  HENT:      Head: Normocephalic and atraumatic  Eyes:      Pupils: Pupils are equal, round, and reactive to light  Neck:      Musculoskeletal: Normal range of motion and neck supple  Cardiovascular:      Rate and Rhythm: Normal rate and regular rhythm  Pulses: Normal pulses  Heart sounds: Normal heart sounds  Pulmonary:      Effort: Pulmonary effort is normal  No respiratory distress  Breath sounds: Normal breath sounds  Abdominal:      General: There is no distension  Palpations: Abdomen is soft  Abdomen is not rigid  Tenderness: There is no abdominal tenderness  There is no guarding or rebound  Musculoskeletal: Normal range of motion  General: No tenderness  Arms:    Lymphadenopathy:      Cervical: No cervical adenopathy  Skin:     General: Skin is warm and dry  Capillary Refill: Capillary refill takes less than 2 seconds  Neurological:      Mental Status: He is alert and oriented to person, place, and time  Cranial Nerves: No cranial nerve deficit  Sensory: No sensory deficit           Vital Signs  ED Triage Vitals   Temperature Pulse Respirations Blood Pressure SpO2   04/28/21 1515 04/28/21 1515 04/28/21 1515 04/28/21 1515 04/28/21 1515   (!) 97 2 °F (36 2 °C) 75 16 (!) 171/84 100 %      Temp Source Heart Rate Source Patient Position - Orthostatic VS BP Location FiO2 (%)   04/28/21 1515 04/28/21 1515 04/28/21 1515 04/28/21 1515 --   Temporal Monitor Sitting Right arm       Pain Score       04/28/21 2202       No Pain           Vitals:    04/30/21 0930 04/30/21 1000 04/30/21 1030 04/30/21 1100   BP: 124/67 121/66 121/67 123/70   Pulse: 74 87 67 80   Patient Position - Orthostatic VS: Lying Lying Lying Lying         Visual Acuity      ED Medications  Medications   amLODIPine (NORVASC) tablet 10 mg (10 mg Oral Given 4/29/21 1719)   aspirin (ECOTRIN LOW STRENGTH) EC tablet 81 mg (81 mg Oral Not Given 4/29/21 0801)   clopidogrel (PLAVIX) tablet 75 mg (75 mg Oral Not Given 4/29/21 0802)   losartan (COZAAR) tablet 50 mg (50 mg Oral Given 4/29/21 0801)   nitroglycerin (NITROSTAT) SL tablet 0 4 mg (has no administration in time range)   pravastatin (PRAVACHOL) tablet 40 mg (40 mg Oral Given 4/29/21 1719)   nicotine (NICODERM CQ) 21 mg/24 hr TD 24 hr patch 1 patch (1 patch Transdermal Not Given 4/29/21 0803)   warfarin (COUMADIN) tablet 2 mg (2 mg Oral Given 4/29/21 1719)   heparin (porcine) subcutaneous injection 5,000 Units (5,000 Units Subcutaneous Given 4/30/21 0528)   guaiFENesin (MUCINEX) 12 hr tablet 600 mg (600 mg Oral Given 4/29/21 2010)   ondansetron (ZOFRAN) injection 4 mg (4 mg Intravenous Given 4/29/21 2025)   aluminum-magnesium hydroxide-simethicone (MYLANTA) oral suspension 30 mL (30 mL Oral Not Given 4/29/21 2108)   potassium chloride (K-DUR,KLOR-CON) CR tablet 20 mEq (20 mEq Oral Given 4/29/21 1036)   midazolam (VERSED) injection (0 5 mg Intravenous Given 4/29/21 1503)   fentanyl citrate (PF) 100 MCG/2ML (25 mcg Intravenous Given 4/29/21 1504)   alteplase (CATHFLO) injection (4 mg Intracatheter Given 4/29/21 1401)   heparin (porcine) injection (4,000 Units Intravenous Given 4/29/21 1422)   iodixanol (VISIPAQUE) 320 MG/ML injection 65 mL (65 mL Intravenous Given 4/29/21 1518)       Diagnostic Studies  Results Reviewed     Procedure Component Value Units Date/Time    Comprehensive metabolic panel [[id number]]  (Abnormal) Collected: 04/29/21 0441    Lab Status: Final result Specimen: Blood from Arm, Right Updated: 04/29/21 0513     Sodium 142 mmol/L      Potassium 3 2 mmol/L      Chloride 101 mmol/L      CO2 25 mmol/L      ANION GAP 16 mmol/L      BUN 70 mg/dL      Creatinine 17 74 mg/dL      Glucose 125 mg/dL      Calcium 7 5 mg/dL      Corrected Calcium 8 4 mg/dL      AST 13 U/L      ALT 22 U/L      Alkaline Phosphatase 102 U/L      Total Protein 6 3 g/dL      Albumin 2 9 g/dL      Total Bilirubin 0 73 mg/dL      eGFR 2 ml/min/1 73sq m     Narrative:      [place] guidelines for Chronic Kidney Disease (CKD):     Stage 1 with normal or high GFR (GFR > 90 mL/min/1 73 square meters)    Stage 2 Mild CKD (GFR = 60-89 mL/min/1 73 square meters)    Stage 3A Moderate CKD (GFR = 45-59 mL/min/1 73 square meters)    Stage 3B Moderate CKD (GFR = 30-44 mL/min/1 73 square meters)    Stage 4 Severe CKD (GFR = 15-29 mL/min/1 73 square meters)    Stage 5 End Stage CKD (GFR <15 mL/min/1 73 square meters)  Note: GFR calculation is accurate only with a steady state creatinine    Protime-INR [[id number]]  (Abnormal) Collected: 04/29/21 0441    Lab Status: Final result Specimen: Blood from Arm, Right Updated: 04/29/21 0502     Protime 14 6 seconds      INR 1 13    Protime-INR [[id number]]  (Abnormal) Collected: 04/28/21 2032    Lab Status: Final result Specimen: Blood from Arm, Left Updated: 04/28/21 2101     Protime 15 0 seconds      INR 8 22    Basic metabolic panel [[id number]]  (Abnormal) Collected: 04/28/21 2032    Lab Status: Final result Specimen: Blood from Arm, Left Updated: 04/28/21 2100     Sodium 141 mmol/L      Potassium 3 3 mmol/L      Chloride 99 mmol/L      CO2 25 mmol/L      ANION GAP 17 mmol/L      BUN 66 mg/dL      Creatinine 17 23 mg/dL      Glucose 129 mg/dL      Calcium 7 8 mg/dL      eGFR 2 ml/min/1 73sq m     Narrative:      [place] guidelines for Chronic Kidney Disease (CKD):     Stage 1 with normal or high GFR (GFR > 90 mL/min/1 73 square meters)    Stage 2 Mild CKD (GFR = 60-89 mL/min/1 73 square meters)    Stage 3A Moderate CKD (GFR = 45-59 mL/min/1 73 square meters)    Stage 3B Moderate CKD (GFR = 30-44 mL/min/1 73 square meters)    Stage 4 Severe CKD (GFR = 15-29 mL/min/1 73 square meters)    Stage 5 End Stage CKD (GFR <15 mL/min/1 73 square meters)  Note: GFR calculation is accurate only with a steady state creatinine    CBC [[id number]]  (Abnormal) Collected: 04/28/21 2032    Lab Status: Final result Specimen: Blood from Arm, Left Updated: 04/28/21 2050     WBC 6 96 Thousand/uL      RBC 2 53 Million/uL      Hemoglobin 9 9 g/dL      Hematocrit 29 5 %       fL      MCH 39 1 pg      MCHC 33 6 g/dL      RDW 13 0 %      Platelets 824 Thousands/uL      MPV 9 6 fL     Comprehensive metabolic panel [[id number]]  (Abnormal) Collected: 04/28/21 1723    Lab Status: Final result Specimen: Blood from Arm, Right Updated: 04/28/21 1753     Sodium 142 mmol/L      Potassium 4 1 mmol/L      Chloride 98 mmol/L      CO2 26 mmol/L      ANION GAP 18 mmol/L      BUN 67 mg/dL      Creatinine 17 39 mg/dL      Glucose 134 mg/dL      Calcium 8 2 mg/dL      Corrected Calcium 8 8 mg/dL      AST 31 U/L      ALT 31 U/L      Alkaline Phosphatase 117 U/L      Total Protein 7 4 g/dL      Albumin 3 3 g/dL      Total Bilirubin 0 74 mg/dL      eGFR 2 ml/min/1 73sq m     Narrative:      [place] guidelines for Chronic Kidney Disease (CKD):     Stage 1 with normal or high GFR (GFR > 90 mL/min/1 73 square meters)    Stage 2 Mild CKD (GFR = 60-89 mL/min/1 73 square meters)    Stage 3A Moderate CKD (GFR = 45-59 mL/min/1 73 square meters)    Stage 3B Moderate CKD (GFR = 30-44 mL/min/1 73 square meters)    Stage 4 Severe CKD (GFR = 15-29 mL/min/1 73 square meters)    Stage 5 End Stage CKD (GFR <15 mL/min/1 73 square meters)  Note: GFR calculation is accurate only with a steady state creatinine    CBC and differential [[id number]]  (Abnormal) Collected: 04/28/21 1723    Lab Status: Final result Specimen: Blood from Arm, Right Updated: 04/28/21 1730     WBC 8 31 Thousand/uL      RBC 2 87 Million/uL      Hemoglobin 11 1 g/dL      Hematocrit 32 1 %       fL      MCH 38 7 pg      MCHC 34 6 g/dL      RDW 13 1 %      MPV 10 0 fL      Platelets 879 Thousands/uL      nRBC 0 /100 WBCs      Neutrophils Relative 81 %      Immat GRANS % 1 %      Lymphocytes Relative 10 %      Monocytes Relative 5 %      Eosinophils Relative 2 %      Basophils Relative 1 %      Neutrophils Absolute 6 86 Thousands/µL      Immature Grans Absolute 0 04 Thousand/uL      Lymphocytes Absolute 0 81 Thousands/µL      Monocytes Absolute 0 41 Thousand/µL      Eosinophils Absolute 0 13 Thousand/µL      Basophils Absolute 0 06 Thousands/µL                  VAS upper limb venous duplex scan, unilateral/limited   Final Result by Bard Shoshana MD (04/29 1027)      IR AV fistula/graft declot    (Results Pending)              Procedures  ECG 12 Lead Documentation Only    Date/Time: 4/28/2021 5:17 PM  Performed by: Lelo Hernandez DO  Authorized by: Lelo Hernandez DO     ECG reviewed by me, the ED Provider: yes    Patient location:  ED  Previous ECG:     Previous ECG:  Compared to current    Similarity:  No change    Comparison to cardiac monitor: Yes    Comments:      Normal sinus rhythm at a rate of 75 beats per minute  Prolonged QTc  Normal Axis  Normal QRS  No ST T wave abnormalities  Similar to previous from 11/25/2014        Conscious Sedation Assessment      Classification Score   ASA Scale Assessment  3-Severe systemic disease that results in functional limitation filed at 04/29/2021 1327   Mallampati Classification  Class III: soft palate, base of uvula visible - Moderate Difficulty filed at 04/29/2021 1327             ED Course  ED Course as of Apr 30 1106   Wed Apr 28, 2021   1716 Spoke with vascular surgery who recommends an upper extremity duplex  Also reached out to Interventional Radiology  IR states that they may be able to declot in AV fistula but it depends on how long it has been thrombosed  Fistulas that have been thrombosed for more than a few days or difficult to salvage  He recommends admission with IR, vascular and Nephrology consults  MDM  Number of Diagnoses or Management Options  ESRD (end stage renal disease) ([facility] ): Thrombosis of arteriovenous fistula, initial encounter [facility]): new and requires workup  Diagnosis management comments: Patient with a history of end-stage renal disease presents with AV fistula thrombosis  I discussed case with Nephrology, vascular surgery and Interventional Radiology  Patient does not require emergent dialysis  He will be admitted overnight  Vascular surgery will consult  Interventional Radiology will need to perform a fistulogram tomorrow to determine if they are able to salvage the fistula  Patient is stable at this time  Amount and/or Complexity of Data Reviewed  Clinical lab tests: ordered and reviewed  Tests in the radiology section of CPT®: reviewed and ordered  Tests in the medicine section of CPT®: ordered and reviewed  Review and summarize past medical records: yes  Discuss the patient with other providers: yes  Independent visualization of images, tracings, or specimens: yes    Risk of Complications, Morbidity, and/or Mortality  Presenting problems: high  Diagnostic procedures: moderate  Management options: high    Patient Progress  Patient progress: stable      Disposition  Final diagnoses:    Thrombosis of arteriovenous fistula, initial encounter ([facility] )   ESRD (end stage renal disease) ([facility] )     Time reflects when diagnosis was documented in both MDM as applicable and the Disposition within this note     Time User Action Codes Description Comment    4/28/2021  6:08 PM Karl Kathy Add [[id number]] Thrombosis of arteriovenous fistula, initial encounter ([facility] )     4/28/2021  6:08 PM Jenifer Shena HOLLY Add [N18 [id number]] ESRD (end stage renal disease) [facility])       ED Disposition     ED Disposition Condition Date/Time Comment    Admit Stable Wed Apr 28, 2021  6:07 PM Case was discussed with JOS and the patient's admission status was agreed to be Admission Status: inpatient status to the service of Dr MILLER   Follow-up Information    None         Current Discharge Medication List      CONTINUE these medications which have NOT CHANGED    Details   albuterol (PROAIR HFA) 90 mcg/act inhaler Inhale 2 puffs every 6 (six) hours as needed for wheezing  Qty: 8 5 g, Refills: 0    Associated Diagnoses: Acute upper respiratory infection      amLODIPine (NORVASC) 10 mg tablet Take 10 mg by mouth 2 (two) times a day      aspirin 81 MG tablet Take 81 mg by mouth daily      B Complex-C-Folic Acid (TRIPHROCAPS) 1 MG CAPS Take 1 capsule by mouth      carvedilol (COREG) 25 mg tablet Take 25 mg by mouth 2 (two) times a day with meals      clopidogrel (PLAVIX) 75 mg tablet Take 75 mg by mouth daily      glucose blood test strip by In Vitro route 2 (two) times a day      Insulin Glargine (LANTUS SOLOSTAR) 100 UNIT/ML SOPN Inject 8 Units under the skin daily      LOSARTAN POTASSIUM PO Take 50 mg by mouth daily      nitroglycerin (NITROSTAT) 0 4 mg SL tablet Place under the tongue      rifaximin (XIFAXAN) 550 mg tablet Take by mouth      rosuvastatin (CRESTOR) 20 MG tablet Take 20 mg by mouth every evening  Refills: 1      warfarin (COUMADIN) 2 mg tablet Take 2 mg by mouth           No discharge procedures on file      PDMP Review     None          ED Provider  Electronically Signed by           Mary Beth Sarabia DO  04/30/21 7961

## 2021-04-29 ENCOUNTER — APPOINTMENT (INPATIENT)
Dept: RADIOLOGY | Facility: HOSPITAL | Age: 66
DRG: 252 | End: 2021-04-29
Attending: INTERNAL MEDICINE
Payer: MEDICARE

## 2021-04-29 LAB
ALBUMIN SERPL BCP-MCNC: 2.9 G/DL (ref 3.5–5)
ALP SERPL-CCNC: 102 U/L (ref 46–116)
ALT SERPL W P-5'-P-CCNC: 22 U/L (ref 12–78)
ANION GAP SERPL CALCULATED.3IONS-SCNC: 16 MMOL/L (ref 4–13)
AST SERPL W P-5'-P-CCNC: 13 U/L (ref 5–45)
BILIRUB SERPL-MCNC: 0.73 MG/DL (ref 0.2–1)
BUN SERPL-MCNC: 70 MG/DL (ref 5–25)
CALCIUM ALBUM COR SERPL-MCNC: 8.4 MG/DL (ref 8.3–10.1)
CALCIUM SERPL-MCNC: 7.5 MG/DL (ref 8.3–10.1)
CHLORIDE SERPL-SCNC: 101 MMOL/L (ref 100–108)
CO2 SERPL-SCNC: 25 MMOL/L (ref 21–32)
CREAT SERPL-MCNC: 17.74 MG/DL (ref 0.6–1.3)
GFR SERPL CREATININE-BSD FRML MDRD: 2 ML/MIN/1.73SQ M
GLUCOSE SERPL-MCNC: 125 MG/DL (ref 65–140)
INR PPP: 1.13 (ref 0.84–1.19)
POTASSIUM SERPL-SCNC: 3.2 MMOL/L (ref 3.5–5.3)
PROT SERPL-MCNC: 6.3 G/DL (ref 6.4–8.2)
PROTHROMBIN TIME: 14.6 SECONDS (ref 11.6–14.5)
SODIUM SERPL-SCNC: 142 MMOL/L (ref 136–145)

## 2021-04-29 PROCEDURE — C1894 INTRO/SHEATH, NON-LASER: HCPCS

## 2021-04-29 PROCEDURE — 93971 EXTREMITY STUDY: CPT | Performed by: SURGERY

## 2021-04-29 PROCEDURE — 03CY3ZZ EXTIRPATION OF MATTER FROM UPPER ARTERY, PERCUTANEOUS APPROACH: ICD-10-PCS | Performed by: RADIOLOGY

## 2021-04-29 PROCEDURE — NC001 PR NO CHARGE: Performed by: SURGERY

## 2021-04-29 PROCEDURE — C1757 CATH, THROMBECTOMY/EMBOLECT: HCPCS

## 2021-04-29 PROCEDURE — 80053 COMPREHEN METABOLIC PANEL: CPT | Performed by: PHYSICIAN ASSISTANT

## 2021-04-29 PROCEDURE — 99152 MOD SED SAME PHYS/QHP 5/>YRS: CPT | Performed by: RADIOLOGY

## 2021-04-29 PROCEDURE — 99223 1ST HOSP IP/OBS HIGH 75: CPT | Performed by: INTERNAL MEDICINE

## 2021-04-29 PROCEDURE — 3E05317 INTRODUCTION OF OTHER THROMBOLYTIC INTO PERIPHERAL ARTERY, PERCUTANEOUS APPROACH: ICD-10-PCS | Performed by: RADIOLOGY

## 2021-04-29 PROCEDURE — 99024 POSTOP FOLLOW-UP VISIT: CPT | Performed by: RADIOLOGY

## 2021-04-29 PROCEDURE — 76937 US GUIDE VASCULAR ACCESS: CPT | Performed by: RADIOLOGY

## 2021-04-29 PROCEDURE — 85610 PROTHROMBIN TIME: CPT | Performed by: PHYSICIAN ASSISTANT

## 2021-04-29 PROCEDURE — C1769 GUIDE WIRE: HCPCS

## 2021-04-29 PROCEDURE — 99232 SBSQ HOSP IP/OBS MODERATE 35: CPT | Performed by: INTERNAL MEDICINE

## 2021-04-29 PROCEDURE — 36904 THRMBC/NFS DIALYSIS CIRCUIT: CPT

## 2021-04-29 PROCEDURE — 36901 INTRO CATH DIALYSIS CIRCUIT: CPT

## 2021-04-29 PROCEDURE — 037Y3ZZ DILATION OF UPPER ARTERY, PERCUTANEOUS APPROACH: ICD-10-PCS | Performed by: RADIOLOGY

## 2021-04-29 PROCEDURE — B51W1ZZ FLUOROSCOPY OF DIALYSIS SHUNT/FISTULA USING LOW OSMOLAR CONTRAST: ICD-10-PCS | Performed by: RADIOLOGY

## 2021-04-29 PROCEDURE — 36904 THRMBC/NFS DIALYSIS CIRCUIT: CPT | Performed by: RADIOLOGY

## 2021-04-29 RX ORDER — FENTANYL CITRATE 50 UG/ML
INJECTION, SOLUTION INTRAMUSCULAR; INTRAVENOUS CODE/TRAUMA/SEDATION MEDICATION
Status: COMPLETED | OUTPATIENT
Start: 2021-04-29 | End: 2021-04-29

## 2021-04-29 RX ORDER — GUAIFENESIN 600 MG
600 TABLET, EXTENDED RELEASE 12 HR ORAL EVERY 12 HOURS SCHEDULED
Status: DISCONTINUED | OUTPATIENT
Start: 2021-04-29 | End: 2021-04-30 | Stop reason: HOSPADM

## 2021-04-29 RX ORDER — HEPARIN SODIUM 1000 [USP'U]/ML
INJECTION, SOLUTION INTRAVENOUS; SUBCUTANEOUS CODE/TRAUMA/SEDATION MEDICATION
Status: COMPLETED | OUTPATIENT
Start: 2021-04-29 | End: 2021-04-29

## 2021-04-29 RX ORDER — POTASSIUM CHLORIDE 20 MEQ/1
20 TABLET, EXTENDED RELEASE ORAL ONCE
Status: COMPLETED | OUTPATIENT
Start: 2021-04-29 | End: 2021-04-29

## 2021-04-29 RX ORDER — MAGNESIUM HYDROXIDE/ALUMINUM HYDROXICE/SIMETHICONE 120; 1200; 1200 MG/30ML; MG/30ML; MG/30ML
30 SUSPENSION ORAL EVERY 4 HOURS PRN
Status: DISCONTINUED | OUTPATIENT
Start: 2021-04-29 | End: 2021-04-30 | Stop reason: HOSPADM

## 2021-04-29 RX ORDER — ONDANSETRON 2 MG/ML
4 INJECTION INTRAMUSCULAR; INTRAVENOUS EVERY 8 HOURS PRN
Status: DISCONTINUED | OUTPATIENT
Start: 2021-04-29 | End: 2021-04-30 | Stop reason: HOSPADM

## 2021-04-29 RX ORDER — MIDAZOLAM HYDROCHLORIDE 2 MG/2ML
INJECTION, SOLUTION INTRAMUSCULAR; INTRAVENOUS CODE/TRAUMA/SEDATION MEDICATION
Status: COMPLETED | OUTPATIENT
Start: 2021-04-29 | End: 2021-04-29

## 2021-04-29 RX ADMIN — MIDAZOLAM HYDROCHLORIDE 0.5 MG: 1 INJECTION, SOLUTION INTRAMUSCULAR; INTRAVENOUS at 14:01

## 2021-04-29 RX ADMIN — HEPARIN SODIUM 4000 UNITS: 1000 INJECTION INTRAVENOUS; SUBCUTANEOUS at 14:22

## 2021-04-29 RX ADMIN — POTASSIUM CHLORIDE 20 MEQ: 1500 TABLET, EXTENDED RELEASE ORAL at 10:36

## 2021-04-29 RX ADMIN — WARFARIN SODIUM 2 MG: 2 TABLET ORAL at 17:19

## 2021-04-29 RX ADMIN — GUAIFENESIN 600 MG: 600 TABLET, EXTENDED RELEASE ORAL at 20:10

## 2021-04-29 RX ADMIN — MIDAZOLAM HYDROCHLORIDE 0.5 MG: 1 INJECTION, SOLUTION INTRAMUSCULAR; INTRAVENOUS at 14:31

## 2021-04-29 RX ADMIN — FENTANYL CITRATE 25 MCG: 50 INJECTION, SOLUTION INTRAMUSCULAR; INTRAVENOUS at 15:04

## 2021-04-29 RX ADMIN — FENTANYL CITRATE 25 MCG: 50 INJECTION, SOLUTION INTRAMUSCULAR; INTRAVENOUS at 13:49

## 2021-04-29 RX ADMIN — LOSARTAN POTASSIUM 50 MG: 50 TABLET, FILM COATED ORAL at 08:01

## 2021-04-29 RX ADMIN — ALTEPLASE 4 MG: 2.2 INJECTION, POWDER, LYOPHILIZED, FOR SOLUTION INTRAVENOUS at 14:01

## 2021-04-29 RX ADMIN — PRAVASTATIN SODIUM 40 MG: 40 TABLET ORAL at 17:19

## 2021-04-29 RX ADMIN — FENTANYL CITRATE 25 MCG: 50 INJECTION, SOLUTION INTRAMUSCULAR; INTRAVENOUS at 14:31

## 2021-04-29 RX ADMIN — IODIXANOL 65 ML: 320 INJECTION, SOLUTION INTRAVASCULAR at 15:18

## 2021-04-29 RX ADMIN — MIDAZOLAM HYDROCHLORIDE 0.5 MG: 1 INJECTION, SOLUTION INTRAMUSCULAR; INTRAVENOUS at 13:49

## 2021-04-29 RX ADMIN — HEPARIN SODIUM 5000 UNITS: 5000 INJECTION INTRAVENOUS; SUBCUTANEOUS at 21:08

## 2021-04-29 RX ADMIN — AMLODIPINE BESYLATE 10 MG: 10 TABLET ORAL at 17:19

## 2021-04-29 RX ADMIN — MIDAZOLAM HYDROCHLORIDE 0.5 MG: 1 INJECTION, SOLUTION INTRAMUSCULAR; INTRAVENOUS at 15:03

## 2021-04-29 RX ADMIN — FENTANYL CITRATE 25 MCG: 50 INJECTION, SOLUTION INTRAMUSCULAR; INTRAVENOUS at 14:01

## 2021-04-29 RX ADMIN — ONDANSETRON 4 MG: 2 INJECTION INTRAMUSCULAR; INTRAVENOUS at 20:25

## 2021-04-29 RX ADMIN — AMLODIPINE BESYLATE 10 MG: 10 TABLET ORAL at 08:01

## 2021-04-29 NOTE — ASSESSMENT & PLAN NOTE
· Patient has a history of IBS with diarrhea and is on rifaximin  · Patient follows up with GI at [facility] with Dr Pratibha Garcia  · Patient admits to worsening flare up that started on Friday that was associated with chills and generalized weakness  He missed 2 dialysis sessions due to symptoms  · Patient states that he did not eat anything today therefore last BM was yesterday  · Patient states he is not sure if he has been taking his rifaximin lately  Plan:  · Continue to monitor     · Stool studies if diarrhea resumes and persists

## 2021-04-29 NOTE — ASSESSMENT & PLAN NOTE
Lab Results   Component Value Date    HGBA1C 5 4 04/11/2017       No results for input(s): POCGLU in the last 72 hours  Blood Sugar Average: Last 72 hrs:     · Patient admits to last A1c being 5 1  · Patient is no longer taking insulin  Plan:  · POCT glucose checks    · SSI  · Hypoglycemia protocol

## 2021-04-29 NOTE — PLAN OF CARE
Problem: Potential for Falls  Goal: Patient will remain free of falls  Description: INTERVENTIONS:  - Assess patient frequently for physical needs  -  Identify cognitive and physical deficits and behaviors that affect risk of falls    -  [place] fall precautions as indicated by assessment   - Educate patient/family on patient safety including physical limitations  - Instruct patient to call for assistance with activity based on assessment  - Modify environment to reduce risk of injury  - Consider OT/PT consult to assist with strengthening/mobility  Outcome: Progressing     Problem: PAIN - ADULT  Goal: Verbalizes/displays adequate comfort level or baseline comfort level  Description: Interventions:  - Encourage patient to monitor pain and request assistance  - Assess pain using appropriate pain scale  - Administer analgesics based on type and severity of pain and evaluate response  - Implement non-pharmacological measures as appropriate and evaluate response  - Consider cultural and social influences on pain and pain management  - Notify physician/advanced practitioner if interventions unsuccessful or patient reports new pain  Outcome: Progressing     Problem: INFECTION - ADULT  Goal: Absence or prevention of progression during hospitalization  Description: INTERVENTIONS:  - Assess and monitor for signs and symptoms of infection  - Monitor lab/diagnostic results  - Monitor all insertion sites, i e  indwelling lines, tubes, and drains  - Monitor endotracheal if appropriate and nasal secretions for changes in amount and color  - [place] appropriate cooling/warming therapies per order  - Administer medications as ordered  - Instruct and encourage patient and family to use good hand hygiene technique  - Identify and instruct in appropriate isolation precautions for identified infection/condition  Outcome: Progressing  Goal: Absence of fever/infection during neutropenic period  Description: INTERVENTIONS:  - Monitor WBC    Outcome: Progressing     Problem: SAFETY ADULT  Goal: Patient will remain free of falls  Description: INTERVENTIONS:  - Assess patient frequently for physical needs  -  Identify cognitive and physical deficits and behaviors that affect risk of falls    -  [place] fall precautions as indicated by assessment   - Educate patient/family on patient safety including physical limitations  - Instruct patient to call for assistance with activity based on assessment  - Modify environment to reduce risk of injury  - Consider OT/PT consult to assist with strengthening/mobility  Outcome: Progressing  Goal: Maintain or return to baseline ADL function  Description: INTERVENTIONS:  -  Assess patient's ability to carry out ADLs; assess patient's baseline for ADL function and identify physical deficits which impact ability to perform ADLs (bathing, care of mouth/teeth, toileting, grooming, dressing, etc )  - Assess/evaluate cause of self-care deficits   - Assess range of motion  - Assess patient's mobility; develop plan if impaired  - Assess patient's need for assistive devices and provide as appropriate  - Encourage maximum independence but intervene and supervise when necessary  - Involve family in performance of ADLs  - Assess for home care needs following discharge   - Consider OT consult to assist with ADL evaluation and planning for discharge  - Provide patient education as appropriate  Outcome: Progressing  Goal: Maintain or return mobility status to optimal level  Description: INTERVENTIONS:  - Assess patient's baseline mobility status (ambulation, transfers, stairs, etc )    - Identify cognitive and physical deficits and behaviors that affect mobility  - Identify mobility aids required to assist with transfers and/or ambulation (gait belt, sit-to-stand, lift, walker, cane, etc )  - [place] fall precautions as indicated by assessment  - Record patient progress and toleration of activity level on Mobility SBAR; progress patient to next Phase/Stage  - Instruct patient to call for assistance with activity based on assessment  - Consider rehabilitation consult to assist with strengthening/weightbearing, etc   Outcome: Progressing     Problem: DISCHARGE PLANNING  Goal: Discharge to home or other facility with appropriate resources  Description: INTERVENTIONS:  - Identify barriers to discharge w/patient and caregiver  - Arrange for needed discharge resources and transportation as appropriate  - Identify discharge learning needs (meds, wound care, etc )  - Arrange for interpretive services to assist at discharge as needed  - Refer to Case Management Department for coordinating discharge planning if the patient needs post-hospital services based on physician/advanced practitioner order or complex needs related to functional status, cognitive ability, or social support system  Outcome: Progressing     Problem: Knowledge Deficit  Goal: Patient/family/caregiver demonstrates understanding of disease process, treatment plan, medications, and discharge instructions  Description: Complete learning assessment and assess knowledge base    Interventions:  - Provide teaching at level of understanding  - Provide teaching via preferred learning methods  Outcome: Progressing

## 2021-04-29 NOTE — CONSULTS
Consultation - Vascular Surgery  Roger Bishop 78 y o  male MRN: [id number]  Unit/Bed#: [facility] Encounter: [id number]        Assessment/Plan     Assessment:  78 M with HTN, CAD, DM, ESRD with left upper extremity AV fistula, likely thrombosed    Plan:  IR evaluation for fistulogram and HD catheter placement  Hemodialysis per nephrology  Will likely need new vascular access, may follow-up as an outpatient to discuss this    History of Present Illness     HPI:  Lencho Bishop is a 78 y o  male with a history of hypertension, diabetes, MI, CKD, stroke, and end stage renal disease  He receives dialysis through a left upper extremity AV fistula which was done over 10 years ago in [place]  He states that he last was dialyzed on Friday  He missed dialysis on Monday and when he went for a session today, the staff was unable to access his fistula  He has not notice a thrill or pulse in the area since Friday  He states he has not had problems with the fistula in the past and denies needing any procedures or revisions  Otherwise denies any fevers, chills, chest pain, or shortness of breath  No numbness, tingling or weakness  Review of Systems   Constitutional: Negative  HENT: Negative  Eyes: Negative  Respiratory: Negative  Cardiovascular: Negative  Gastrointestinal: Negative  Endocrine: Negative  Genitourinary: Negative  Musculoskeletal: Negative  Skin: Negative  Allergic/Immunologic: Negative  Neurological: Negative  Hematological: Negative          Historical Information   Past Medical History:   Diagnosis Date    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    With Cerebral Infarction:  Last Assessed:  October 20, 2016    Chronic kidney disease 2012    on dialysis     Diabetes mellitus ([facility] [address] )     Hypertension     Labyrinthitis 09/10/2011    Myocardial infarction [facility]) 2014    x3 others were in 2009 & 2010    Sleep disturbances 09/20/2010    Stroke [facility]) 2007    x1    Type 2 diabetes, uncontrolled, with renal manifestation ([facility] [address] ) 05/03/2017     Past Surgical History:   Procedure Laterality Date    AV FISTULA PLACEMENT      CARDIAC SURGERY  2010    stents x3    COLONOSCOPY N/A 12/12/2016    Procedure: COLONOSCOPY;  Surgeon: Van Dale MD;  Location: [place] [address] GI LAB; Service:     COLONOSCOPY N/A 4/3/2017    Procedure:  COLONOSCOPY;  Surgeon: Van Dale MD;  Location: [place] [address] GI LAB; Service:      Social History   Social History     Substance and Sexual Activity   Alcohol Use Yes    Comment: rarely - No alcohol use per [employer]     Social History     Substance and Sexual Activity   Drug Use No     Social History     Tobacco Use   Smoking Status Current Every Day Smoker    Packs/day: 0 50    Years: 30 00    Pack years: 15 00   Smokeless Tobacco Never Used     Family History:   Family History   Problem Relation Age of Onset    Leukemia Mother     Crohn's disease Father     Transient ischemic attack Brother        Meds/Allergies   PTA meds:   Prior to Admission Medications   Prescriptions Last Dose Informant Patient Reported? Taking?    B Complex-C-Folic Acid (TRIPHROCAPS) 1 MG CAPS   Yes No   Sig: Take 1 capsule by mouth   Insulin Glargine (LANTUS SOLOSTAR) 100 UNIT/ML SOPN   Yes No   Sig: Inject 8 Units under the skin daily   LOSARTAN POTASSIUM PO   Yes No   Sig: Take 50 mg by mouth daily   albuterol (PROAIR HFA) 90 mcg/act inhaler   No No   Sig: Inhale 2 puffs every 6 (six) hours as needed for wheezing   amLODIPine (NORVASC) 10 mg tablet   Yes No   Sig: Take 10 mg by mouth 2 (two) times a day   aspirin 81 MG tablet   Yes No   Sig: Take 81 mg by mouth daily   carvedilol (COREG) 25 mg tablet   Yes No   Sig: Take 25 mg by mouth 2 (two) times a day with meals   clopidogrel (PLAVIX) 75 mg tablet   Yes No   Sig: Take 75 mg by mouth daily   glucose blood test strip   Yes No   Sig: by In Vitro route 2 (two) times a day   nitroglycerin (NITROSTAT) 0 4 mg SL tablet   Yes No   Sig: Place under the tongue   rifaximin (XIFAXAN) 550 mg tablet   Yes No   Sig: Take by mouth   rosuvastatin (CRESTOR) 20 MG tablet   Yes No   Sig: Take 20 mg by mouth every evening   warfarin (COUMADIN) 2 mg tablet   Yes No   Sig: Take 2 mg by mouth      Facility-Administered Medications: None     No Known Allergies    Objective   First Vitals:   Blood Pressure: (!) 171/84 (04/28/21 1515)  Pulse: 75 (04/28/21 1515)  Temperature: (!) 97 2 °F (36 2 °C) (04/28/21 1515)  Temp Source: Temporal (04/28/21 1515)  Respirations: 16 (04/28/21 1515)  SpO2: 100 % (04/28/21 1515)    Current Vitals:   Blood Pressure: 153/65 (04/28/21 2004)  Pulse: 71 (04/28/21 2004)  Temperature: (!) 97 2 °F (36 2 °C) (04/28/21 1515)  Temp Source: Temporal (04/28/21 1515)  Respirations: 16 (04/28/21 2004)  SpO2: 98 % (04/28/21 2004)    No intake or output data in the 24 hours ending 04/28/21 2055    Invasive Devices     Peripheral Intravenous Line            Peripheral IV 04/28/21 Right Arm less than 1 day                Physical Exam  Constitutional:       General: He is not in acute distress  HENT:      Head: Normocephalic  Nose: Nose normal       Mouth/Throat:      Mouth: Mucous membranes are moist    Eyes:      Pupils: Pupils are equal, round, and reactive to light  Neck:      Musculoskeletal: Normal range of motion  Cardiovascular:      Rate and Rhythm: Normal rate  Pulmonary:      Effort: Pulmonary effort is normal    Abdominal:      Palpations: Abdomen is soft  Tenderness: There is no abdominal tenderness  Musculoskeletal:         General: No tenderness  Skin:     General: Skin is warm  Capillary Refill: Capillary refill takes less than 2 seconds  Neurological:      Mental Status: He is alert and oriented to person, place, and time     Psychiatric:         Mood and Affect: Mood normal      L radial AV fistula without palpable thrill    Lab Results:   CBC:   Lab Results   Component Value Date    WBC 6 96 04/28/2021    HGB 9 9 (L) 04/28/2021    HCT 29 5 (L) 04/28/2021     (H) 04/28/2021     (L) 04/28/2021    MCH 39 1 (H) 04/28/2021    MCHC 33 6 04/28/2021    RDW 13 0 04/28/2021    MPV 9 6 04/28/2021    NRBC 0 04/28/2021   , CMP:   Lab Results   Component Value Date    SODIUM 141 04/28/2021    K 3 3 (L) 04/28/2021    CL 99 (L) 04/28/2021    CO2 25 04/28/2021    BUN 66 (H) 04/28/2021    CREATININE 17 23 (H) 04/28/2021    CALCIUM 7 8 (L) 04/28/2021    AST 31 04/28/2021    ALT 31 04/28/2021    ALKPHOS 117 (H) 04/28/2021    EGFR 2 04/28/2021     Imaging: I have personally reviewed pertinent reports  EKG, Pathology, and Other Studies: I have personally reviewed pertinent reports        Code Status: Level 1 - Full Code  Advance Directive and Living Will:      Power of :    POLST:

## 2021-04-29 NOTE — ASSESSMENT & PLAN NOTE
Blood Pressure: 139/78     · Patient states he takes Norvasc and losartan but is not taking his Coreg  Plan  · Continue Norvasc 10 mg q d  Promise Muhammad · Continue losartan 50 mg q d  · Monitor vitals daily

## 2021-04-29 NOTE — ASSESSMENT & PLAN NOTE
Blood Pressure: 153/65     · Patient states he takes Norvasc and losartan but is not taking his Coreg  Plan  · Continue Norvasc 10 mg q d  Gagan Go · Continue losartan 50 mg q d  · Monitor vitals daily

## 2021-04-29 NOTE — ASSESSMENT & PLAN NOTE
Recent Labs     04/28/21  1723   HGB 11 1*     Patient has anemia of chronic disease  Baseline hemoglobin is 11  Patient is currently at baseline

## 2021-04-29 NOTE — ASSESSMENT & PLAN NOTE
· Patient has a history of IBS with diarrhea and is on rifaximin  · Patient follows up with GI at [facility] with Dr Mckinley Morales  · Patient admits to worsening flare up that started on Friday that was associated with chills and generalized weakness  He missed 2 dialysis sessions due to symptoms  · Patient states that he did not eat anything today therefore last BM was yesterday  · Patient states he is not sure if he has been taking his rifaximin lately  Plan:  · Continue to monitor  · Stool studies if diarrhea resumes and persists

## 2021-04-29 NOTE — PLAN OF CARE
Problem: Potential for Falls  Goal: Patient will remain free of falls  Description: INTERVENTIONS:  - Assess patient frequently for physical needs  -  Identify cognitive and physical deficits and behaviors that affect risk of falls    -  [place] fall precautions as indicated by assessment   - Educate patient/family on patient safety including physical limitations  - Instruct patient to call for assistance with activity based on assessment  - Modify environment to reduce risk of injury  - Consider OT/PT consult to assist with strengthening/mobility  Outcome: Progressing     Problem: PAIN - ADULT  Goal: Verbalizes/displays adequate comfort level or baseline comfort level  Description: Interventions:  - Encourage patient to monitor pain and request assistance  - Assess pain using appropriate pain scale  - Administer analgesics based on type and severity of pain and evaluate response  - Implement non-pharmacological measures as appropriate and evaluate response  - Consider cultural and social influences on pain and pain management  - Notify physician/advanced practitioner if interventions unsuccessful or patient reports new pain  Outcome: Progressing     Problem: INFECTION - ADULT  Goal: Absence or prevention of progression during hospitalization  Description: INTERVENTIONS:  - Assess and monitor for signs and symptoms of infection  - Monitor lab/diagnostic results  - Monitor all insertion sites, i e  indwelling lines, tubes, and drains  - Monitor endotracheal if appropriate and nasal secretions for changes in amount and color  - [place] appropriate cooling/warming therapies per order  - Administer medications as ordered  - Instruct and encourage patient and family to use good hand hygiene technique  - Identify and instruct in appropriate isolation precautions for identified infection/condition  Outcome: Progressing  Goal: Absence of fever/infection during neutropenic period  Description: INTERVENTIONS:  - Monitor WBC    Outcome: Progressing     Problem: SAFETY ADULT  Goal: Patient will remain free of falls  Description: INTERVENTIONS:  - Assess patient frequently for physical needs  -  Identify cognitive and physical deficits and behaviors that affect risk of falls    -  [place] fall precautions as indicated by assessment   - Educate patient/family on patient safety including physical limitations  - Instruct patient to call for assistance with activity based on assessment  - Modify environment to reduce risk of injury  - Consider OT/PT consult to assist with strengthening/mobility  Outcome: Progressing  Goal: Maintain or return to baseline ADL function  Description: INTERVENTIONS:  -  Assess patient's ability to carry out ADLs; assess patient's baseline for ADL function and identify physical deficits which impact ability to perform ADLs (bathing, care of mouth/teeth, toileting, grooming, dressing, etc )  - Assess/evaluate cause of self-care deficits   - Assess range of motion  - Assess patient's mobility; develop plan if impaired  - Assess patient's need for assistive devices and provide as appropriate  - Encourage maximum independence but intervene and supervise when necessary  - Involve family in performance of ADLs  - Assess for home care needs following discharge   - Consider OT consult to assist with ADL evaluation and planning for discharge  - Provide patient education as appropriate  Outcome: Progressing  Goal: Maintain or return mobility status to optimal level  Description: INTERVENTIONS:  - Assess patient's baseline mobility status (ambulation, transfers, stairs, etc )    - Identify cognitive and physical deficits and behaviors that affect mobility  - Identify mobility aids required to assist with transfers and/or ambulation (gait belt, sit-to-stand, lift, walker, cane, etc )  - [place] fall precautions as indicated by assessment  - Record patient progress and toleration of activity level on Mobility SBAR; progress patient to next Phase/Stage  - Instruct patient to call for assistance with activity based on assessment  - Consider rehabilitation consult to assist with strengthening/weightbearing, etc   Outcome: Progressing     Problem: DISCHARGE PLANNING  Goal: Discharge to home or other facility with appropriate resources  Description: INTERVENTIONS:  - Identify barriers to discharge w/patient and caregiver  - Arrange for needed discharge resources and transportation as appropriate  - Identify discharge learning needs (meds, wound care, etc )  - Arrange for interpretive services to assist at discharge as needed  - Refer to Case Management Department for coordinating discharge planning if the patient needs post-hospital services based on physician/advanced practitioner order or complex needs related to functional status, cognitive ability, or social support system  Outcome: Progressing     Problem: Knowledge Deficit  Goal: Patient/family/caregiver demonstrates understanding of disease process, treatment plan, medications, and discharge instructions  Description: Complete learning assessment and assess knowledge base  Interventions:  - Provide teaching at level of understanding  - Provide teaching via preferred learning methods  Outcome: Progressing     Problem: Nutrition/Hydration-ADULT  Goal: Nutrient/Hydration intake appropriate for improving, restoring or maintaining nutritional needs  Description: Monitor and assess patient's nutrition/hydration status for malnutrition  Collaborate with interdisciplinary team and initiate plan and interventions as ordered  Monitor patient's weight and dietary intake as ordered or per policy  Utilize nutrition screening tool and intervene as necessary  Determine patient's food preferences and provide high-protein, high-caloric foods as appropriate       INTERVENTIONS:  - Monitor oral intake, urinary output, labs, and treatment plans  - Assess nutrition and hydration status and recommend course of action  - Evaluate amount of meals eaten  - Assist patient with eating if necessary   - Allow adequate time for meals  - Recommend/ encourage appropriate diets, oral nutritional supplements, and vitamin/mineral supplements  - Order, calculate, and assess calorie counts as needed  - Recommend, monitor, and adjust tube feedings and TPN/PPN based on assessed needs  - Assess need for intravenous fluids  - Provide specific nutrition/hydration education as appropriate  - Include patient/family/caregiver in decisions related to nutrition  Outcome: Progressing

## 2021-04-29 NOTE — ASSESSMENT & PLAN NOTE
Recent Labs     04/28/21  1723 04/28/21 2032   HGB 11 1* 9 9*     Patient has anemia of chronic disease  Baseline hemoglobin is 11  Patient is currently at baseline

## 2021-04-29 NOTE — PROGRESS NOTES
[facility]  Progress Note - [facility] 1955, 78 y o  male MRN: [id number]  Unit/Bed#: W -01 Encounter: [id number]  Primary Care Provider: TESSY Lundy   Date and time admitted to hospital: 4/28/2021  4:44 PM    * AV fistula thrombosis, initial encounter [facility])  Assessment & Plan  · Patient presents with thrombosis of the AV fistula that he noted this morning when he went to the dialysis center  Patient states that he missed dialysis on Friday and Monday because he had worsening chronic diarrhea and was not feeling well  When you showed up for dialysis today his entire fistula did not have a palpable thrill and was hard to palpation  He was referred to several centers that could not evaluate and treat him today day 4 he was referred to the ED  · He has had the AV fistula for about 10 years and has had thrombosis in the past however not this extensive  Plan:  · Continue warfarin 2 mg q h s  Per IR recommendations  · Regular diet  · IR procedure complete  · Nephrology consulted for HD  HD planned for tomorrow before discharge  End stage renal disease [facility])  Assessment & Plan  Lab Results   Component Value Date    EGFR 2 04/29/2021    EGFR 2 04/28/2021    EGFR 2 04/28/2021    CREATININE 17 74 (H) 04/29/2021    CREATININE 17 23 (H) 04/28/2021    CREATININE 17 39 (H) 04/28/2021     · Patient has a history of ESRD D on HD M, W, F   He has missed his last 2 dialysis sessions due to chronic diarrhea that has been flaring up  Patient went to dialysis center today however AV fistula was thrombosed and was unable to get HD session done  · Patient admits to oliguria  · Patient denies any acute confusion, fatigue or weakness  · IR procedure complete    Plan:  · Nephrology consult for possible HD session tomorrow as creatinine is elevated to 17  At admission        Type 2 diabetes mellitus [facility])  Assessment & Plan  Lab Results   Component Value Date    HGBA1C 5 4 04/11/2017       No results for input(s): POCGLU in the last 72 hours  Blood Sugar Average: Last 72 hrs:     · Patient admits to last A1c being 5 1  · Patient is no longer taking insulin  Plan:  · POCT glucose checks  · SSI  · Hypoglycemia protocol    Nicotine dependence  Assessment & Plan  · Patient smokes more than 1 pack a day for over 30 years  Plan:  · Nicotine patch for replacement  Irritable bowel syndrome with diarrhea  Assessment & Plan  · Patient has a history of IBS with diarrhea and is on rifaximin  · Patient follows up with GI at [facility] with Dr Alicia Wong  · Patient admits to worsening flare up that started on Friday that was associated with chills and generalized weakness  He missed 2 dialysis sessions due to symptoms  · Patient states that he did not eat anything today therefore last BM was yesterday  · Patient states he is not sure if he has been taking his rifaximin lately  Plan:  · Continue to monitor  · Stool studies if diarrhea resumes and persists  Benign essential hypertension  Assessment & Plan  Blood Pressure: 139/78     · Patient states he takes Norvasc and losartan but is not taking his Coreg  Plan  · Continue Norvasc 10 mg q d  Lajean Cassette · Continue losartan 50 mg q d  · Monitor vitals daily  Anemia in chronic kidney disease (CKD)  Assessment & Plan  Recent Labs     04/28/21  1723 04/28/21  2032   HGB 11 1* 9 9*     Patient has anemia of chronic disease  Baseline hemoglobin is 11  Patient is currently at baseline          VTE Pharmacologic Prophylaxis:   Pharmacologic: Warfarin (Coumadin)  Mechanical VTE Prophylaxis in Place: No    Discussions with Specialists or Other Care Team Provider: IR Team  Nephrology Team     Education and Discussions with Family / Patient: Wife    Current Length of Stay: 1 day(s)    Current Patient Status: Inpatient     Discharge Plan / Estimated Discharge Date: 24 hours    Code Status: Level 1 - Full Code      Subjective:   Patient was awake and stated he did not sleep last night  He states that sometimes he has a hard sleeping  He has been fasting for IR procedures  He denies HA, CP, SOB, palpitations, abdominal pain, n/v/d  Objective:     Vitals:   Temp (24hrs), Av 1 °F (36 7 °C), Min:98 °F (36 7 °C), Max:98 2 °F (36 8 °C)    Temp:  [98 °F (36 7 °C)-98 2 °F (36 8 °C)] 98 °F (36 7 °C)  HR:  [68-95] 95  Resp:  [15-19] 18  BP: (138-188)/(59-93) 139/78  SpO2:  [93 %-100 %] 100 %  Body mass index is 30 02 kg/m²  Input and Output Summary (last 24 hours): Intake/Output Summary (Last 24 hours) at 2021 1650  Last data filed at 2021 1200  Gross per 24 hour   Intake 600 84 ml   Output --   Net 600 84 ml       Physical Exam:     Physical Exam  Vitals signs and nursing note reviewed  Constitutional:       General: He is not in acute distress  Appearance: Normal appearance  He is not ill-appearing or toxic-appearing  HENT:      Head: Normocephalic and atraumatic  Nose: Nose normal    Eyes:      General: No scleral icterus  Right eye: No discharge  Left eye: No discharge  Pupils: Pupils are equal, round, and reactive to light  Neck:      Musculoskeletal: Normal range of motion and neck supple  No neck rigidity or muscular tenderness  Cardiovascular:      Rate and Rhythm: Normal rate and regular rhythm  Pulses: Normal pulses  Heart sounds: Normal heart sounds  No murmur  No friction rub  No gallop  Pulmonary:      Effort: Pulmonary effort is normal  No respiratory distress  Breath sounds: Normal breath sounds  No stridor  No wheezing, rhonchi or rales  Abdominal:      General: Bowel sounds are normal       Palpations: Abdomen is soft  Tenderness: There is no abdominal tenderness  There is no guarding or rebound  Musculoskeletal:         General: No swelling, tenderness or deformity  Right lower leg: No edema  Left lower leg: No edema     Skin:     General: Skin is warm and dry  Capillary Refill: Capillary refill takes less than 2 seconds  Coloration: Skin is not jaundiced  Findings: Bruising (Left forearm AV fistula thrombosis) present  No lesion or rash  Neurological:      General: No focal deficit present  Mental Status: He is alert and oriented to person, place, and time  Mental status is at baseline  Motor: No weakness  Psychiatric:         Mood and Affect: Mood normal          Behavior: Behavior normal          Thought Content: Thought content normal          Judgment: Judgment normal        Additional Data:     Labs:    Results from last 7 days   Lab Units 04/28/21 2032 04/28/21  1723   WBC Thousand/uL 6 96 8 31   HEMOGLOBIN g/dL 9 9* 11 1*   HEMATOCRIT % 29 5* 32 1*   PLATELETS Thousands/uL 115* 128*   NEUTROS PCT %  --  81*   LYMPHS PCT %  --  10*   MONOS PCT %  --  5   EOS PCT %  --  2     Results from last 7 days   Lab Units 04/29/21  0441   POTASSIUM mmol/L 3 2*   CHLORIDE mmol/L 101   CO2 mmol/L 25   BUN mg/dL 70*   CREATININE mg/dL 17 74*   CALCIUM mg/dL 7 5*   ALK PHOS U/L 102   ALT U/L 22   AST U/L 13     Results from last 7 days   Lab Units 04/29/21  0441   INR  1 13       * I Have Reviewed All Lab Data Listed Above  * Additional Pertinent Lab Tests Reviewed:  All Labs Within Last 24 Hours Reviewed    Imaging:    Imaging Reports Reviewed Today Include: None  Imaging Personally Reviewed by Myself Includes:  None    Recent Cultures (last 7 days):           Last 24 Hours Medication List:   Current Facility-Administered Medications   Medication Dose Route Frequency Provider Last Rate    amLODIPine  10 mg Oral BID Erasmo Kevin MD      aspirin  81 mg Oral Daily Erasmo Kevin MD      clopidogrel  75 mg Oral Daily Erasmo Kevin MD      heparin (porcine)  5,000 Units Subcutaneous [facility] Jaylene Alfaro MD      losartan  50 mg Oral Daily Erasmo Kevin MD      nicotine  1 patch Transdermal Daily MD Dajuan Dennis nitroglycerin  0 4 mg Sublingual Q5 Min PRN Zach Brown MD      pravastatin  40 mg Oral Daily With Corky Hernandez MD      warfarin  2 mg Oral Daily (warfarin) Zach Brown MD          Today, Patient Was Seen By: Zach Brown MD    ** Please Note: This note has been constructed using a voice recognition system   **

## 2021-04-29 NOTE — ASSESSMENT & PLAN NOTE
Lab Results   Component Value Date    EGFR 2 04/28/2021    EGFR 8 8 10/25/2016    CREATININE 17 39 (H) 04/28/2021    CREATININE 6 88 (H) 04/11/2017    CREATININE 6 44 (H) 10/25/2016     · Patient has a history of ESRD D on HD M, W, F   He has missed his last 2 dialysis sessions due to chronic diarrhea that has been flaring up  Patient went to dialysis center today however AV fistula was thrombosed and was unable to get HD session done  · Patient admits to oliguria  · Patient denies any acute confusion, fatigue or weakness  Plan:  · Nephrology consult for possible HD session tomorrow as creatinine is elevated to 17  · IR procedure for AV fistular thrombolysis

## 2021-04-29 NOTE — ASSESSMENT & PLAN NOTE
· Patient presents with thrombosis of the AV fistula that he noted this morning when he went to the dialysis center  Patient states that he missed dialysis on Friday and Monday because he had worsening chronic diarrhea and was not feeling well  When you showed up for dialysis today his entire fistula did not have a palpable thrill and was hard to palpation  He was referred to several centers that could not evaluate and treat him today day 4 he was referred to the ED  · He has had the AV fistula for about 10 years and has had thrombosis in the past however not this extensive  Plan:  · Continue warfarin 2 mg q h s  Per IR recommendations  · Regular diet  · IR procedure complete  · Nephrology consulted for HD  HD planned for tomorrow before discharge

## 2021-04-29 NOTE — ASSESSMENT & PLAN NOTE
Lab Results   Component Value Date    HGBA1C 5 4 04/11/2017       No results for input(s): POCGLU in the last 72 hours      Blood Sugar Average: Last 72 hrs:

## 2021-04-29 NOTE — CONSULTS
[address] [facility] Dario 78 y o  male MRN: [id number]  Unit/Bed#: W -01 Encounter: [id number]    ASSESSMENT and PLAN:  1  [facility], Wednesday, Friday[facility]   · Patient missed treatment on Monday and Wednesday this week presents with nonfunctioning fistula  · Thankfully labs are stable, no evidence of volume overload  · Hemodialysis 4/30 unless access is treated earlier today and we can plan on a treatment this afternoon  2  Access:    · Left forearm AVF with no bruit or thrill  · Seen by vascular  Fistula thrombosed patient to go to IR for fistulogram today  · From chart review it appears that last intervention was in 2015  3  Hypertension:    · Blood pressure acceptable, continue outpatient medications  4  Anemia:    · Hemoglobin at goal/near goal  5  Mineral and bone disease/secondary:  Continue outpatient medications  6  Electrolytes:  Hypokalemic will give 20 mEq of K-Dur x1  7  Other:  CAD status post PCI    SUMMARY OF RECOMMENDATIONS:   IR today for fistulogram    HISTORY OF PRESENT ILLNESS:  Requesting Physician: Melba Monk DO  Reason for Consult: ESRD on HD    Zachary Bauer is a 78 y o  male with a history of ESRD on hemodialysis , hypertension , diabetes mellitus, CAD who was admitted to S [facility] after presenting with nonfunctional AV fistula  Patient has hemodialysis every Monday, Wednesday, Friday and missed treatment on Monday  He was sent to the emergency room from hemodialysis unit on Wednesday since AV fistula had no bruit and thrill  He was seen and examined  He is awake and oriented  No shortness of breath  No unusual edema  No bruit and thrill over left arm AV fistula  A renal consultation is requested today for assistance in the management of ESRD  Zachary Bauer is a known ESRD patient who undergoes maintenance hemodialysis at [facility] on Monday, Wednesday, Friday      PAST MEDICAL HISTORY:  Past Medical History:   Diagnosis Date    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    With Cerebral Infarction:  Last Assessed:  October 20, 2016    Chronic kidney disease 2012    on dialysis     Diabetes mellitus ([facility] )     Hypertension     Labyrinthitis 09/10/2011    Myocardial infarction ([facility] ) 2014    x3 others were in 2009 & 2010    Sleep disturbances 09/20/2010    Stroke ([place] [address] ) 2007    x1    Type 2 diabetes, uncontrolled, with renal manifestation ([place] [address] ) 05/03/2017       PAST SURGICAL HISTORY:  Past Surgical History:   Procedure Laterality Date    AV FISTULA PLACEMENT      CARDIAC SURGERY  2010    stents x3    COLONOSCOPY N/A 12/12/2016    Procedure: COLONOSCOPY;  Surgeon: Alvaro Montilla MD;  Location: [facility] GI LAB; Service:     COLONOSCOPY N/A 4/3/2017    Procedure:  COLONOSCOPY;  Surgeon: Alvaro Montilla MD;  Location: [facility] GI LAB;   Service:        ALLERGIES:  No Known Allergies    SOCIAL HISTORY:  Social History     Substance and Sexual Activity   Alcohol Use Yes    Comment: rarely - No alcohol use per [employer]     Social History     Substance and Sexual Activity   Drug Use No     Social History     Tobacco Use   Smoking Status Current Every Day Smoker    Packs/day: 0 50    Years: 30 00    Pack years: 15 00   Smokeless Tobacco Never Used       FAMILY HISTORY:  Family History   Problem Relation Age of Onset   Evans Leukemia Mother     Crohn's disease Father     Transient ischemic attack Brother        MEDICATIONS:    Current Facility-Administered Medications:     amLODIPine (NORVASC) tablet 10 mg, 10 mg, Oral, BID, Сергей Thomas MD, 10 mg at 04/28/21 2034    aspirin (ECOTRIN LOW STRENGTH) EC tablet 81 mg, 81 mg, Oral, Daily, Сергей Thomas MD    clopidogrel (PLAVIX) tablet 75 mg, 75 mg, Oral, Daily, Сергей Thomas MD    heparin (porcine) subcutaneous injection 5,000 Units, 5,000 Units, Subcutaneous, Q8H [address], Julieth Hall MD, Stopped at 04/29/21 0600    losartan (COZAAR) tablet 50 mg, 50 mg, Oral, Daily, MD Nathan Buck nicotine (NICODERM CQ) 21 mg/24 hr TD 24 hr patch 1 patch, 1 patch, Transdermal, Daily, Venus Flores MD    nitroglycerin (NITROSTAT) SL tablet 0 4 mg, 0 4 mg, Sublingual, Q5 Min PRN, Venus Flores MD    pravastatin (PRAVACHOL) tablet 40 mg, 40 mg, Oral, Daily With Nicola Fair MD, 40 mg at 04/28/21 2037    sodium chloride 0 9 % infusion, 50 mL/hr, Intravenous, Continuous, Venus Flores MD, Last Rate: 50 mL/hr at 04/28/21 2037, 50 mL/hr at 04/28/21 2037    warfarin (COUMADIN) tablet 2 mg, 2 mg, Oral, Daily (warfarin), Venus Flores MD, 2 mg at 04/28/21 2216    REVIEW OF SYSTEMS:  Constitutional:  Negative for fevers or chills   HENT: Negative for postnasal drip  Eyes: Negative for visual disturbance  Respiratory: Negative for cough, shortness of breath and wheezing  Cardiovascular: Negative for chest pain, palpitations and leg swelling  Gastrointestinal: Negative for abdominal pain, constipation, diarrhea, nausea and vomiting  Genitourinary: No dysuria, hematuria  Makes very little urine  Musculoskeletal: Negative for unusual arthralgias, back pain and joint swelling  Skin: Negative for rash  Neurological: Negative for focal weakness, headaches, dizziness  Hematological: Negative for easy bruising or bleeding  Psychiatric/Behavioral: Negative for confusion and sleep disturbance  All the systems were reviewed and were negative except as documented on the HPI      PHYSICAL EXAM:  Current Weight: Weight - Scale: 94 9 kg (209 lb 3 5 oz)  First Weight: Weight - Scale: 94 9 kg (209 lb 3 5 oz)  Vitals:    04/28/21 1731 04/28/21 2004 04/28/21 2157 04/29/21 0619   BP: 162/78 153/65 143/77 138/77   BP Location: Right arm Right arm Right arm    Pulse: 72 71 74 74   Resp: 16 16 15 19   Temp:   98 1 °F (36 7 °C) 98 2 °F (36 8 °C)   TempSrc:   Oral    SpO2: 99% 98% 98% 97%   Weight:   94 9 kg (209 lb 3 5 oz)    Height:   5' 10" (1 778 m)        Intake/Output Summary (Last 24 hours) at 4/29/2021 0734  Last data filed at 4/29/2021 0055  Gross per 24 hour   Intake 69 17 ml   Output --   Net 69 17 ml     Physical Exam  Constitutional:       General: He is not in acute distress  Appearance: Normal appearance  He is not toxic-appearing or diaphoretic  HENT:      Head: Normocephalic and atraumatic  Eyes:      General: No scleral icterus  Extraocular Movements: Extraocular movements intact  Conjunctiva/sclera: Conjunctivae normal    Neck:      Musculoskeletal: Normal range of motion and neck supple  Vascular: No carotid bruit  Cardiovascular:      Rate and Rhythm: Normal rate and regular rhythm  Heart sounds: No murmur  No friction rub  No gallop  Arteriovenous access: left arteriovenous access is present  Comments: No bruit or thrill  Pulmonary:      Effort: Pulmonary effort is normal  No respiratory distress  Breath sounds: Normal breath sounds  No stridor  No wheezing, rhonchi or rales  Abdominal:      General: Bowel sounds are normal  There is no distension  Palpations: Abdomen is soft  There is no mass  Tenderness: There is no abdominal tenderness  There is no guarding  Hernia: No hernia is present  Musculoskeletal:         General: No swelling, tenderness or signs of injury  Right lower leg: No edema  Left lower leg: No edema  Skin:     General: Skin is warm and dry  Coloration: Skin is not jaundiced or pale  Findings: No erythema, lesion or rash  Neurological:      Mental Status: He is alert and oriented to person, place, and time  Psychiatric:         Mood and Affect: Mood normal          Behavior: Behavior normal          Thought Content:  Thought content normal          Judgment: Judgment normal          Invasive Devices:      Lab Results:   Results from last 7 days   Lab Units 04/29/21  0441 04/28/21 2032 04/28/21  1723   WBC Thousand/uL  --  6 96 8 31   HEMOGLOBIN g/dL  --  9 9* 11 1*   HEMATOCRIT %  -- 29 5* 32 1*   PLATELETS Thousands/uL  --  115* 128*   POTASSIUM mmol/L 3 2* 3 3* 4 1   CHLORIDE mmol/L 101 99* 98*   CO2 mmol/L 25 25 26   BUN mg/dL 70* 66* 67*   CREATININE mg/dL 17 74* 17 23* 17 39*   CALCIUM mg/dL 7 5* 7 8* 8 2*   ALK PHOS U/L 102  --  117*   ALT U/L 22  --  31   AST U/L 13  --  31     Lab Results   Component Value Date    CALCIUM 7 5 (L) 04/29/2021     Other Studies:

## 2021-04-29 NOTE — CONSULTS
Interventional Radiology  Consultation 2021     IP Consult to IR  Consult performed by: Werner Simpson MD  Consult ordered by: MD Marly Herrera   1955   [id number]      Assessment/Plan:  78year old male with radio-cephalic fistula created 10 years ago  The patient reports no thrill, last dialysis was Friday, 21  US shows completely occluded fistula  Will attempt to perform declot, however given length of occlusion this may be difficult  If unsuccessful will need to place perm-cath for dialysis  Problem List     * (Principal) AV fistula thrombosis, initial encounter ([facility] )    Anemia in chronic illness    Anemia in chronic kidney disease (CKD)    Arteriosclerosis of coronary artery    Benign essential hypertension    Diabetes with neurologic complications (HCC)    Lab Results   Component Value Date    HGBA1C 5 4 2017       No results for input(s): POCGLU in the last 72 hours  Blood Sugar Average: Last 72 hrs:          Elevated serum alkaline phosphatase level    End stage renal disease ([facility] )    Overview Signed 10/17/2018 11:56 AM by TESSY Weir     Procedure/Onset: 2011         Lab Results   Component Value Date    EGFR 2 2021    EGFR 2 2021    EGFR 2 2021    CREATININE 17 74 (H) 2021    CREATININE 17 23 (H) 2021    CREATININE 17 39 (H) 2021         Intestinal malabsorption    Irritable bowel syndrome with diarrhea    Long-term insulin use in type 2 diabetes [facility])    Lab Results   Component Value Date    HGBA1C 5 4 2017       No results for input(s): POCGLU in the last 72 hours      Blood Sugar Average: Last 72 hrs:          Mixed hyperlipidemia    Nicotine dependence    Overview Signed 10/17/2018 11:56 AM by TESSY Weir     Procedure/Onset: 2011         Organic impotence    Overview Signed 10/17/2018 11:56 AM by TESSY Weir     Procedure/Onset: 2004         Pernicious anemia    Type 2 diabetes mellitus ([facility] )    Overview Signed 10/17/2018 11:56 AM by TESSY Gil     Procedure/Onset: 05/21/2007         Lab Results   Component Value Date    HGBA1C 5 4 04/11/2017       No results for input(s): POCGLU in the last 72 hours  Blood Sugar Average: Last 72 hrs:          Acute upper respiratory infection    Encounter for extracorporeal dialysis ([facility] )            Subjective:     Patient ID: Naveed Barber is a 78 y o  male  History of Present Illness  78year old male with radio-cephalic fistula created 10 years ago  The patient reports no thrill since last Friday  US shows completely occluded fistula  Will attempt to perform declot, however given length of occlusion this may be difficult  If unsuccessful will need to place perm-cath for dialysis  Review of Systems  Not performed    Past Medical History:   Diagnosis Date    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    With Cerebral Infarction:  Last Assessed:  October 20, 2016    Chronic kidney disease 2012    on dialysis     Diabetes mellitus ([facility] )     Hypertension     Labyrinthitis 09/10/2011    Myocardial infarction ([facility] ) 2014    x3 others were in 2009 & 2010    Sleep disturbances 09/20/2010    Stroke [facility]) 2007    x1    Type 2 diabetes, uncontrolled, with renal manifestation ([place] [address] ) 05/03/2017        Past Surgical History:   Procedure Laterality Date    AV FISTULA PLACEMENT      CARDIAC SURGERY  2010    stents x3    COLONOSCOPY N/A 12/12/2016    Procedure: COLONOSCOPY;  Surgeon: Leia Lay MD;  Location: [facility] GI LAB; Service:     COLONOSCOPY N/A 4/3/2017    Procedure:  COLONOSCOPY;  Surgeon: Leia Lay MD;  Location: [facility] GI LAB;   Service:         Social History     Tobacco Use   Smoking Status Current Every Day Smoker    Packs/day: 0 50    Years: 30 00    Pack years: 15 00   Smokeless Tobacco Never Used        Social History     Substance and Sexual Activity   Alcohol Use Yes    Comment: rarely - No alcohol use per [employer]        Social History     Substance and Sexual Activity   Drug Use No        No Known Allergies    Current Facility-Administered Medications   Medication Dose Route Frequency Provider Last Rate Last Admin    amLODIPine (NORVASC) tablet 10 mg  10 mg Oral BID Em Alves MD   10 mg at 04/29/21 0801    aspirin (ECOTRIN LOW STRENGTH) EC tablet 81 mg  81 mg Oral Daily Em Alves MD        clopidogrel (PLAVIX) tablet 75 mg  75 mg Oral Daily Em Alves MD        heparin (porcine) subcutaneous injection 5,000 Units  5,000 Units Subcutaneous [facility] Av[facility] Arana MD   Stopped at 04/29/21 0600    losartan (COZAAR) tablet 50 mg  50 mg Oral Daily Em Alves MD   50 mg at 04/29/21 0801    nicotine (NICODERM CQ) 21 mg/24 hr TD 24 hr patch 1 patch  1 patch Transdermal Daily Em Alves MD        nitroglycerin (NITROSTAT) SL tablet 0 4 mg  0 4 mg Sublingual Q5 Min PRN Em Alves MD        pravastatin (PRAVACHOL) tablet 40 mg  40 mg Oral Daily With Milena Valdes MD   40 mg at 04/28/21 2037    warfarin (COUMADIN) tablet 2 mg  2 mg Oral Daily (warfarin) Em Alves MD   2 mg at 04/28/21 2216          Objective:    Vitals:    04/28/21 1731 04/28/21 2004 04/28/21 2157 04/29/21 0619   BP: 162/78 153/65 143/77 138/77   BP Location: Right arm Right arm Right arm    Pulse: 72 71 74 74   Resp: 16 16 15 19   Temp:   98 1 °F (36 7 °C) 98 2 °F (36 8 °C)   TempSrc:   Oral    SpO2: 99% 98% 98% 97%   Weight:   94 9 kg (209 lb 3 5 oz)    Height:   5' 10" (1 778 m)         Physical Exam  Not performed    No results found for: BNP   Lab Results   Component Value Date    WBC 6 96 04/28/2021    HGB 9 9 (L) 04/28/2021    HCT 29 5 (L) 04/28/2021     (H) 04/28/2021     (L) 04/28/2021     Lab Results   Component Value Date    INR 1 13 04/29/2021    INR 1 17 04/28/2021    PROTIME 14 6 (H) 04/29/2021    PROTIME 15 0 (H) 04/28/2021     No results found for: PTT      I have personally reviewed pertinent imaging and laboratory results  Code Status: Level 1 - Full Code  Advance Directive and Living Will:      Power of :    POLST:      IR has been consulted to evaluate the patient, determine the appropriate procedure, and whether or not a procedure can and should be performed  Thank you for allowing me to participate in the care of Devora Campbell  Please don't hesitate to call, text, email, or TigerText with any questions  This text is generated with voice recognition software  There may be translation, syntax,  or grammatical errors  If you have any questions, please contact the dictating provider

## 2021-04-29 NOTE — ASSESSMENT & PLAN NOTE
Lab Results   Component Value Date    EGFR 2 04/29/2021    EGFR 2 04/28/2021    EGFR 2 04/28/2021    CREATININE 17 74 (H) 04/29/2021    CREATININE 17 23 (H) 04/28/2021    CREATININE 17 39 (H) 04/28/2021     · Patient has a history of ESRD D on HD M, W, F   He has missed his last 2 dialysis sessions due to chronic diarrhea that has been flaring up  Patient went to dialysis center today however AV fistula was thrombosed and was unable to get HD session done  · Patient admits to oliguria  · Patient denies any acute confusion, fatigue or weakness  · IR procedure complete    Plan:  · Nephrology consult for possible HD session tomorrow as creatinine is elevated to 17  At admission

## 2021-04-29 NOTE — BRIEF OP NOTE (RAD/CATH)
INTERVENTIONAL RADIOLOGY PROCEDURE NOTE    Date: 4/29/2021    Procedure: Left radio-cephalic fistula declot    Preoperative diagnosis:   1  Thrombosis of arteriovenous fistula, initial encounter ([facility] )    2  ESRD (end stage renal disease) ([facility] )         Postoperative diagnosis: Same  Surgeon: Ken Celaya MD     Assistant: None  No qualified resident was available  Blood loss: Minimal    Specimens: None     Findings: Completely thrombosed left radiocephalic fistula  TPA given, angiojet performed followed by balloon maceration and pulling of arterial anastomotic plug  Flow restored with a good thrill felt on physical exam     Complications: None immediate      Anesthesia: conscious sedation

## 2021-04-29 NOTE — QUICK NOTE
78year old male s/p left arm fistula declot  Called to assess bleeding from access sites following removal of purse string sutures  No active bleeding seen from the lower access site (the one of concern)  Steri strips placed over the site and bandage re-applied  The patient was told to call for his nurse if bleeding continues  The nurse was told to contact IR if bleeding continues  Please do not hesitate to contact IR with any concerns or questions

## 2021-04-29 NOTE — DISCHARGE INSTRUCTIONS
Fistulagram   WHAT YOU NEED TO KNOW:   Your arm or leg my  be sore, swollen, and bruised after the procedure  This is normal and should get better in a few days  DISCHARGE INSTRUCTIONS:     Contact Interventional Radiology at [phone] [place] PATIENTS: Contact Interventional Radiology at [phone]) [place] PATIENTS: Contact Interventional Radiology at [phone]) if:    · You have a fever or chills  · Your puncture site is red, swollen, or draining pus  · You have nausea or are vomiting  · Your skin is itchy, swollen, or you have a rash  · You cannot feel a thrill over your graft or fistula  · You have questions or concerns about your condition or care  Seek care immediately if:     · You have bleeding that does not stop after 10 minutes of holding firm, direct pressure over the puncture site  · Blood soaks through your bandage  · Your hand or foot closest to the graft or fistula feels cold, painful, or numb  · Your hand or foot closest to the graft or fistula is pale or blue  · You have trouble moving your arm or leg closest to the graft or fistula  · Your bruise suddenly gets bigger  Care for your wound as directed:  Remove the bandage in 4 to 6 hours or as         directed  Wash the area once a day with soap and water  Gently pat the area dry  Apply firm, steady pressure to the puncture site if it bleeds  Use a clean gauze or towel to hold pressure for 10 to 15 minutes  Call 911 if you cannot stop the bleeding or the bleeding gets heavier  Feel for a thrill once a day or as directed  Place your index and second finger over your fistula or graft as directed  You should feel a vibration  The vibration means that blood is flowing through your graft or fistula correctly  Rest your arm or leg as directed  Do not lift anything heavier than 5 pounds or do strenuous activity for 24 hours  Prevent damage to your graft or fistula    Do not wear tight-fitting clothing over your graft or fistula  Do not wear tight jewelry on the arm or leg with the graft or fistula  Tell healthcare providers not to do, IVs, blood draws, and blood pressure readings in the arm with your graft or fistula  Do not allow flu shots or vaccinations in your arm with your graft or fistula      Follow up with your healthcare provider as directed

## 2021-04-29 NOTE — PROGRESS NOTES
Progress Note - Vascular Surgery   Tammy Bishop 78 y o  male MRN: [id number]  Unit/Bed#: W -01 Encounter: [id number]    Assessment:  78 M with thrombosed LUE fistula    Plan: To IR for fistulogram today    Subjective/Objective     Subjective: No acute events overnight  Denies chest pain, shortness of breath  Objective:    Blood pressure 138/77, pulse 74, temperature 98 2 °F (36 8 °C), resp  rate 19, height 5' 10" (1 778 m), weight 94 9 kg (209 lb 3 5 oz), SpO2 97 %  ,Body mass index is 30 02 kg/m²        Intake/Output Summary (Last 24 hours) at 4/29/2021 0659  Last data filed at 4/29/2021 0055  Gross per 24 hour   Intake 69 17 ml   Output --   Net 69 17 ml       Invasive Devices     Peripheral Intravenous Line            Peripheral IV 04/28/21 Right Arm less than 1 day                Physical Exam:   General: NAD, AAOx3  Eyes: PERRL  ENT: moist mucous membranes  Neck: supple  CV: RRR +S1/S2  Chest: respirations non-labored  Abdomen: soft, NT ND  Extremities: atraumatic, no edema      Results from last 7 days   Lab Units 04/28/21 2032 04/28/21  1723   WBC Thousand/uL 6 96 8 31   HEMOGLOBIN g/dL 9 9* 11 1*   HEMATOCRIT % 29 5* 32 1*   PLATELETS Thousands/uL 115* 128*     Results from last 7 days   Lab Units 04/29/21  0441 04/28/21 2032 04/28/21  1723   POTASSIUM mmol/L 3 2* 3 3* 4 1   CHLORIDE mmol/L 101 99* 98*   CO2 mmol/L 25 25 26   BUN mg/dL 70* 66* 67*   CREATININE mg/dL 17 74* 17 23* 17 39*   CALCIUM mg/dL 7 5* 7 8* 8 2*     Results from last 7 days   Lab Units 04/29/21 0441 04/28/21 2032   INR  1 13 1 17

## 2021-04-29 NOTE — PLAN OF CARE
Problem: Potential for Falls  Goal: Patient will remain free of falls  Description: INTERVENTIONS:  - Assess patient frequently for physical needs  -  Identify cognitive and physical deficits and behaviors that affect risk of falls    -  [place] fall precautions as indicated by assessment   - Educate patient/family on patient safety including physical limitations  - Instruct patient to call for assistance with activity based on assessment  - Modify environment to reduce risk of injury  - Consider OT/PT consult to assist with strengthening/mobility  Outcome: Progressing

## 2021-04-29 NOTE — ASSESSMENT & PLAN NOTE
· Patient presents with thrombosis of the AV fistula that he noted this morning when he went to the dialysis center  Patient states that he missed dialysis on Friday and Monday because he had worsening chronic diarrhea and was not feeling well  When you showed up for dialysis today his entire fistula did not have a palpable thrill and was hard to palpation  He was referred to several centers that could not evaluate and treat him today day 4 he was referred to the ED  · He has had the AV fistula for about 10 years and has had thrombosis in the past however not this extensive  Plan:  · Continue warfarin 2 mg q h s  Per IR recommendations  · NPO after midnight  · IR procedure tomorrow  · Nephrology consulted for HD

## 2021-04-30 ENCOUNTER — APPOINTMENT (INPATIENT)
Dept: DIALYSIS | Facility: HOSPITAL | Age: 66
DRG: 252 | End: 2021-04-30
Payer: MEDICARE

## 2021-04-30 ENCOUNTER — TRANSITIONAL CARE MANAGEMENT (OUTPATIENT)
Dept: FAMILY MEDICINE CLINIC | Facility: CLINIC | Age: 66
End: 2021-04-30

## 2021-04-30 VITALS
DIASTOLIC BLOOD PRESSURE: 80 MMHG | WEIGHT: 209.22 LBS | BODY MASS INDEX: 29.95 KG/M2 | RESPIRATION RATE: 16 BRPM | TEMPERATURE: 98.2 F | OXYGEN SATURATION: 95 % | SYSTOLIC BLOOD PRESSURE: 122 MMHG | HEIGHT: 70 IN | HEART RATE: 59 BPM

## 2021-04-30 PROCEDURE — NC001 PR NO CHARGE: Performed by: SURGERY

## 2021-04-30 PROCEDURE — NC001 PR NO CHARGE: Performed by: INTERNAL MEDICINE

## 2021-04-30 PROCEDURE — 99239 HOSP IP/OBS DSCHRG MGMT >30: CPT | Performed by: INTERNAL MEDICINE

## 2021-04-30 PROCEDURE — 90935 HEMODIALYSIS ONE EVALUATION: CPT | Performed by: INTERNAL MEDICINE

## 2021-04-30 RX ADMIN — LOSARTAN POTASSIUM 50 MG: 50 TABLET, FILM COATED ORAL at 11:54

## 2021-04-30 RX ADMIN — GUAIFENESIN 600 MG: 600 TABLET, EXTENDED RELEASE ORAL at 11:54

## 2021-04-30 RX ADMIN — CLOPIDOGREL BISULFATE 75 MG: 75 TABLET ORAL at 11:54

## 2021-04-30 RX ADMIN — ASPIRIN 81 MG: 81 TABLET, COATED ORAL at 11:54

## 2021-04-30 RX ADMIN — HEPARIN SODIUM 5000 UNITS: 5000 INJECTION INTRAVENOUS; SUBCUTANEOUS at 05:28

## 2021-04-30 RX ADMIN — AMLODIPINE BESYLATE 10 MG: 10 TABLET ORAL at 11:54

## 2021-04-30 NOTE — ASSESSMENT & PLAN NOTE
Lab Results   Component Value Date    HGBA1C 5 4 04/11/2017       No results for input(s): POCGLU in the last 72 hours  Blood Sugar Average: Last 72 hrs:     · Patient admits to last A1c being 5 1  · Patient is no longer taking insulin  Plan:  ·   Continue dietary management and follow-up with PCP as needed

## 2021-04-30 NOTE — PROGRESS NOTES
Progress Note - Vascular Surgery   Lori Bishop 78 y o  male MRN: [id number]  Unit/Bed#: W -01 Encounter: [id number]    Assessment:  78 M with thrombosed L radiocephalic fistula s/p IR fistulogram/ thrombectomy on 4/29    Plan:  --Palpable thrill on exam  --Resume diet  --Pain control  --Vascular Surgery will sign off, can followup as needed    Subjective/Objective     Subjective:     No acute events overnight  This morning, pt denies any complaints in the left upper extremity  Objective:     Blood pressure 120/65, pulse 72, temperature 98 6 °F (37 °C), temperature source Oral, resp  rate 18, height 5' 10" (1 778 m), weight 94 9 kg (209 lb 3 5 oz), SpO2 92 %  ,Body mass index is 30 02 kg/m²  Intake/Output Summary (Last 24 hours) at 4/30/2021 0239  Last data filed at 4/29/2021 2200  Gross per 24 hour   Intake 771 67 ml   Output 0 ml   Net 771 67 ml       Invasive Devices     Peripheral Intravenous Line            Peripheral IV 04/28/21 Right Arm 1 day          Line            Hemodialysis AV Fistula  Left Forearm -- days                Physical Exam:    GEN: NAD  HEENT: MMM  CV: RRR  Lung: normal effort  Ab: Soft, NT/ND  Extrem: L AVF with palpable thrill  Neuro:  A+Ox3, motor and sensation grossly intact    Lab, Imaging and other studies:  CBC: No results found for: WBC, HGB, HCT, MCV, PLT, ADJUSTEDWBC, MCH, MCHC, RDW, MPV, NRBC, CMP:   Lab Results   Component Value Date    SODIUM 142 04/29/2021    K 3 2 (L) 04/29/2021     04/29/2021    CO2 25 04/29/2021    BUN 70 (H) 04/29/2021    CREATININE 17 74 (H) 04/29/2021    CALCIUM 7 5 (L) 04/29/2021    AST 13 04/29/2021    ALT 22 04/29/2021    ALKPHOS 102 04/29/2021    EGFR 2 04/29/2021   , Coagulation:   Lab Results   Component Value Date    INR 1 13 04/29/2021     VTE Pharmacologic Prophylaxis: Heparin  VTE Mechanical Prophylaxis: sequential compression device

## 2021-04-30 NOTE — ASSESSMENT & PLAN NOTE
Blood Pressure: 122/80     · Patient states he takes Norvasc and losartan but is not taking his Coreg  Plan  · Continue Norvasc 10 mg q d  Golden Venegas · Continue losartan 50 mg q d

## 2021-04-30 NOTE — ASSESSMENT & PLAN NOTE
· Patient has a history of IBS with diarrhea and is on rifaximin  · Patient follows up with GI at [facility] with Dr Jessica Mcknight  · Patient admits to worsening flare up that started on Friday that was associated with chills and generalized weakness  He missed 2 dialysis sessions due to symptoms  · Patient states that he did not eat anything today therefore last BM was yesterday  · Patient states he is not sure if he has been taking his rifaximin lately  Plan:  · Continue follow-up with GI specialist as scheduled

## 2021-04-30 NOTE — PROGRESS NOTES
NEPHROLOGY HOSPITAL PROGRESS NOTE   Baudilio Ashraf 78 y o  male MRN: [id number]  Unit/Bed#: W -01 Encounter: [id number]  Reason for Consult:  ESRD    ASSESSMENT and PLAN:    70-year-old admitted with nonfunctional AV fistula and missed hemodialysis treatment  1  [facility]   · Patient missed treatment on Monday and Wednesday this week presents with nonfunctioning fistula  · AV fistula thrombosed  Taken to IR 4/29 Flow established  · Hemodialysis today after which patient be discharged with plan to return to his outpatient facility on Monday  2  Access:    · Left forearm AVF with no bruit or thrill on admission  · Seen by vascular  Fistula thrombosed   · Status post thrombectomy 4/29  3  Hypertension:    · Blood pressure acceptable, continue outpatient medications  4  Anemia:    · Hemoglobin at goal/near goal  5  Mineral and bone disease/secondary:  Continue outpatient medications  6  Electrolytes:   hypokalemic given a dose of K-Dur 4/29  7  Other:  CAD status post PCI       DISPOSITION:  Stable for discharge after hemodialysis today    SUBJECTIVE / 24H INTERVAL HISTORY:  No acute complaints  No shortness of Breath  No chest pain  No nausea, vomiting, diarrhea  OBJECTIVE:  Current Weight: Weight - Scale: 94 9 kg (209 lb 3 5 oz)  Vitals:    04/29/21 1751 04/29/21 1846 04/29/21 2142 04/30/21 0714   BP: 152/90 139/82 120/65 116/61   BP Location:   Right arm    Pulse: 83 78 72 65   Resp:   18 14   Temp:   98 6 °F (37 °C) 98 3 °F (36 8 °C)   TempSrc:   Oral    SpO2: 96% 93% 92% 95%   Weight:       Height:           Intake/Output Summary (Last 24 hours) at 4/30/2021 0753  Last data filed at 4/29/2021 2200  Gross per 24 hour   Intake 771 67 ml   Output 0 ml   Net 771 67 ml     General: NAD, comfortably lying in bed  Skin: no rash  Eyes: anicteric sclera  ENT: moist mucous membrane  Neck: supple  Chest: CTA b/l, no ronchii, no wheeze, no rubs, no rales  Normal effort  CVS: s1s2, no murmur, no gallop, no rub  Regular rhythm  Abdomen: soft, nontender, nl sounds  Extremities: no edema LE b/l    Bruit and thrill over AV fistula left arm  : no bary  Neuro: AAOX3  Psych: normal affect  Medications:    Current Facility-Administered Medications:     aluminum-magnesium hydroxide-simethicone (MYLANTA) oral suspension 30 mL, 30 mL, Oral, Q4H PRN, Nato Baker MD    amLODIPine (NORVASC) tablet 10 mg, 10 mg, Oral, BID, Francie Joseph MD, 10 mg at 04/29/21 1719    aspirin (ECOTRIN LOW STRENGTH) EC tablet 81 mg, 81 mg, Oral, Daily, Francie Joseph MD    clopidogrel (PLAVIX) tablet 75 mg, 75 mg, Oral, Daily, Francie Joseph MD    [facility]) 12 hr tablet 600 mg, 600 mg, Oral, Q12H [address], Taras Mayorga DO, 600 mg at 04/29/21 2010    heparin (porcine) subcutaneous injection 5,000 Units, 5,000 Units, Subcutaneous, Q8H [address], Keila Escudero MD, 5,000 Units at 04/30/21 0528    losartan (COZAAR) tablet 50 mg, 50 mg, Oral, Daily, Francie Joseph MD, 50 mg at 04/29/21 0801    nicotine (NICODERM CQ) 21 mg/24 hr TD 24 hr patch 1 patch, 1 patch, Transdermal, Daily, Francie Joseph MD    nitroglycerin (NITROSTAT) SL tablet 0 4 mg, 0 4 mg, Sublingual, Q5 Min PRN, Francie Joseph MD    ondansetron [facility]) injection 4 mg, 4 mg, Intravenous, Q8H PRN, Nato Baker MD, 4 mg at 04/29/21 2025    pravastatin (PRAVACHOL) tablet 40 mg, 40 mg, Oral, Daily With Benjamin Hong MD, 40 mg at 04/29/21 1719    warfarin (COUMADIN) tablet 2 mg, 2 mg, Oral, Daily (warfarin), Francie Joseph MD, 2 mg at 04/29/21 1719    Laboratory Results:  Results from last 7 days   Lab Units 04/29/21  0441 04/28/21 2032 04/28/21  1723   WBC Thousand/uL  --  6 96 8 31   HEMOGLOBIN g/dL  --  9 9* 11 1*   HEMATOCRIT %  --  29 5* 32 1*   PLATELETS Thousands/uL  --  115* 128*   POTASSIUM mmol/L 3 2* 3 3* 4 1   CHLORIDE mmol/L 101 99* 98*   CO2 mmol/L 25 25 26   BUN mg/dL 70* 66* 67*   CREATININE mg/dL 17 74* 17 23* 17 39*   CALCIUM mg/dL 7 5* 7 8* 8 2*

## 2021-04-30 NOTE — PLAN OF CARE
Post-Dialysis RN Treatment Note    Blood Pressure:  Pre: 110/64 mm/Hg  Post: 122/80 mmHg   EDW: 95 0 kg    Weight:  Pre: 93 8 kg   Post: 92 8 kg   Mode of weight measurement: Bed Scale   Volume Removed: 1000 ml    Treatment duration: 210 minutes    NS given  No    Treatment shortened?  Yes, system clotted   Medications given during Rx None Reported   Estimated Kt/V  Not Applicable   Access type: AV fistula   Access Status: Yes, describe: Ordered BFR maintained     Report called to primary nurse   Yes, Loyda Rodgers    Problem: METABOLIC, FLUID AND ELECTROLYTES - ADULT  Goal: Electrolytes maintained within normal limits  Description: INTERVENTIONS:  - Monitor labs and assess patient for signs and symptoms of electrolyte imbalances  - Administer electrolyte replacement as ordered  - Monitor response to electrolyte replacements, including repeat lab results as appropriate  - Instruct patient on fluid and nutrition as appropriate  Outcome: Progressing  Goal: Fluid balance maintained  Description: INTERVENTIONS:  - Monitor labs   - Monitor I/O and WT  - Instruct patient on fluid and nutrition as appropriate  - Assess for signs & symptoms of volume excess or deficit  Outcome: Progressing     Problem: HEMATOLOGIC - ADULT  Goal: Maintains hematologic stability  Description: INTERVENTIONS  - Assess for signs and symptoms of bleeding or hemorrhage  - Monitor labs  - Administer supportive blood products/factors as ordered and appropriate  Outcome: Progressing

## 2021-04-30 NOTE — DISCHARGE INSTR - AVS FIRST PAGE
Dear Valentina Aguilar,     It was our pleasure to care for you here at [facility]  It is our hope that we were always able to exceed the expected standards for your care during your stay  You were hospitalized due to AV Fistula   You were cared for on the *** floor by Mi Baird MD under the service of Candelaria Wren, DO with the [facility] who covers for your primary care physician (PCP), Yobani Gudino, while you were hospitalized  If you have any questions or concerns related to this hospitalization, you may contact us at [phone]  For follow up as well as any medication refills, we recommend that you follow up with your primary care physician  A registered nurse will reach out to you by phone within a few days after your discharge to answer any additional questions that you may have after going home  However, at this time we provide for you here, the most important instructions / recommendations at discharge:     · Notable Medication Adjustments -   · None  · Testing Required after Discharge -   · None  · Important follow up information -   · Please follow up with your dialysis center as scheduled on Monday, Wednesday and Friday  · Please follow up with your GI doctor Dr Mone Clinton for your persistent diarrhea as scheduled  · Please follow up with your Primary Care Doctor in one week  · Other Instructions -   Practice social distancing  Adequate hand washing hygiene  Wear mask in public at all times  · Please review this entire after visit summary as additional general instructions including medication list, appointments, activity, diet, any pertinent wound care, and other additional recommendations from your care team that may be provided for you        Sincerely,     Mi Baird MD

## 2021-04-30 NOTE — ASSESSMENT & PLAN NOTE
Lab Results   Component Value Date    EGFR 2 04/29/2021    EGFR 2 04/28/2021    EGFR 2 04/28/2021    CREATININE 17 74 (H) 04/29/2021    CREATININE 17 23 (H) 04/28/2021    CREATININE 17 39 (H) 04/28/2021     · Patient has a history of ESRD D on HD M, W, F   He has missed his last 2 dialysis sessions due to chronic diarrhea that has been flaring up  Patient went to dialysis center today however AV fistula was thrombosed and was unable to get HD session done  · Patient admits to oliguria  · Patient denies any acute confusion, fatigue or weakness  ·   Patient is status post fistulogram/thrombectomy  Of AV fistula  Plan:  ·   Continue outpatient HD as scheduled

## 2021-04-30 NOTE — DISCHARGE SUMMARY
[facility]  Discharge- Deatra [place] 1955, 78 y o  male MRN: [id number]  Unit/Bed#: W -01 Encounter: [id number]  Primary Care Provider: TESSY Gregory   Date and time admitted to hospital: 4/28/2021  4:44 PM    * AV fistula thrombosis, initial encounter [facility])  Assessment & Plan  · Patient presents with thrombosis of the AV fistula that he noted this morning when he went to the dialysis center  Patient states that he missed dialysis on Friday and Monday because he had worsening chronic diarrhea and was not feeling well  When you showed up for dialysis today his entire fistula did not have a palpable thrill and was hard to palpation  He was referred to several centers that could not evaluate and treat him today day 4 he was referred to the ED  · He has had the AV fistula for about 10 years and has had thrombosis in the past however not this extensive  ·   IR  Fistulogram/thrombectomy was successful  Patient had HD today with no issues  Plan:  · Continue warfarin 2 mg q h s  Per IR recommendations  ·   Continue HD via AV fistula  End stage renal disease [facility])  Assessment & Plan  Lab Results   Component Value Date    EGFR 2 04/29/2021    EGFR 2 04/28/2021    EGFR 2 04/28/2021    CREATININE 17 74 (H) 04/29/2021    CREATININE 17 23 (H) 04/28/2021    CREATININE 17 39 (H) 04/28/2021     · Patient has a history of ESRD D on HD M, W, F   He has missed his last 2 dialysis sessions due to chronic diarrhea that has been flaring up  Patient went to dialysis center today however AV fistula was thrombosed and was unable to get HD session done  · Patient admits to oliguria  · Patient denies any acute confusion, fatigue or weakness  ·   Patient is status post fistulogram/thrombectomy  Of AV fistula  Plan:  ·   Continue outpatient HD as scheduled        Type 2 diabetes mellitus [facility])  Assessment & Plan  Lab Results   Component Value Date    HGBA1C 5 4 04/11/2017 No results for input(s): POCGLU in the last 72 hours  Blood Sugar Average: Last 72 hrs:     · Patient admits to last A1c being 5 1  · Patient is no longer taking insulin  Plan:  ·   Continue dietary management and follow-up with PCP as needed  Nicotine dependence  Assessment & Plan  · Patient smokes more than 1 pack a day for over 30 years  Plan:  · Nicotine patch for replacement  Irritable bowel syndrome with diarrhea  Assessment & Plan  · Patient has a history of IBS with diarrhea and is on rifaximin  · Patient follows up with GI at [facility] with Dr Miguel Soliz  · Patient admits to worsening flare up that started on Friday that was associated with chills and generalized weakness  He missed 2 dialysis sessions due to symptoms  · Patient states that he did not eat anything today therefore last BM was yesterday  · Patient states he is not sure if he has been taking his rifaximin lately  Plan:  · Continue follow-up with GI specialist as scheduled  Benign essential hypertension  Assessment & Plan  Blood Pressure: 122/80     · Patient states he takes Norvasc and losartan but is not taking his Coreg  Plan  · Continue Norvasc 10 mg q d  Félix Manifold · Continue losartan 50 mg q d  Anemia in chronic kidney disease (CKD)  Assessment & Plan  Recent Labs     04/28/21  1723 04/28/21 2032   HGB 11 1* 9 9*     Patient has anemia of chronic disease  Baseline hemoglobin is 11  Patient is currently at baseline  Discharging Resident Physician: Clarance Hodgkins, MD  Attending: Corey Wilde DO  PCP: TESSY Doss  Admission Date: 4/28/2021  Discharge Date: 04/30/21    Disposition:     Home    Reason for Admission:   AV fistula thrombosis  Consultations During Hospital Stay:  ·   IR      Procedures Performed:     · AV Fistulogram    Significant Findings / Test Results:     · VAS upper limb  4/28/21 Result: Evaluation shows no evidence of thrombus in the internal jugular vein, subclavian vein, and the brachiocephalic vein  Incidental Findings:   · None    Test Results Pending at Discharge (will require follow up): · None     Outpatient Tests Requested:  · None    Complications:  None    Hospital Course:     Loulou Majano is a 78 y o  male patient   With PMH of ESRD on HD M, W, F, IBS with diarrhea, diabetes who originally presented to the hospital on 4/28/2021 due to  Thrombosed AV Fistula on the left forearm  Patient had missed dialysis twice due to a flare-up of his IBS diarrhea and when he presented for  Dialysis the day  Of admission on 04/28 at his outpatient hemodialysis center, it was found that his AV fistulas completely thrombosed with no palpable thrill  He was referred to several centers which were unable to accommodate patient and patient followed up with the ED  In the ED patient was hemodynamically stable and afebrile  Physical exam was only remarkable for left forearm AV fistula with no palpable thrill  Upper limb venous Doppler scans were negative for thrombus extending to internal jugular vein, subclavian vein or brachiocephalic vein  Patient was admitted for fistulogram/thrombectomy  during admission patient remained in stable condition and  Had successful fistulogram/thrombectomy  He was discharged after successful hemodialysis  And will continue hemodialysis outpatient as scheduled  Condition at Discharge: stable     Discharge Day Visit / Exam:     Subjective:    Vitals: Blood Pressure: 122/80 (04/30/21 1150)  Pulse: 59 (04/30/21 1150)  Temperature: 98 2 °F (36 8 °C) (04/30/21 1150)  Temp Source: Oral (04/30/21 1150)  Respirations: 16 (04/30/21 1150)  Height: 5' 10" (177 8 cm) (04/28/21 2157)  Weight - Scale: 94 9 kg (209 lb 3 5 oz) (04/28/21 2157)  SpO2: 95 % (04/30/21 0714)    Exam:   Physical Exam  Vitals signs and nursing note reviewed  Constitutional:       General: He is not in acute distress  Appearance: Normal appearance   He is not ill-appearing or toxic-appearing  HENT:      Head: Normocephalic and atraumatic  Nose: Nose normal    Eyes:      General: No scleral icterus  Right eye: No discharge  Left eye: No discharge  Pupils: Pupils are equal, round, and reactive to light  Neck:      Musculoskeletal: Normal range of motion and neck supple  No neck rigidity or muscular tenderness  Cardiovascular:      Rate and Rhythm: Normal rate and regular rhythm  Pulses: Normal pulses  Heart sounds: Normal heart sounds  No murmur  No friction rub  No gallop  Pulmonary:      Effort: Pulmonary effort is normal  No respiratory distress  Breath sounds: Normal breath sounds  No stridor  No wheezing, rhonchi or rales  Abdominal:      General: Bowel sounds are normal       Palpations: Abdomen is soft  Tenderness: There is no abdominal tenderness  There is no guarding or rebound  Musculoskeletal:         General: No swelling, tenderness or deformity  Right lower leg: No edema  Left lower leg: No edema  Skin:     General: Skin is warm and dry  Capillary Refill: Capillary refill takes less than 2 seconds  Coloration: Skin is not jaundiced  Findings: No lesion or rash  Comments: AV fistula has palpable thrill  No bleeding at the site  Neurological:      General: No focal deficit present  Mental Status: He is alert and oriented to person, place, and time  Mental status is at baseline  Motor: No weakness  Psychiatric:         Mood and Affect: Mood normal          Behavior: Behavior normal          Thought Content: Thought content normal          Judgment: Judgment normal          Discussion with Family:  Wife  Discharge instructions/Information to patient and family:   See after visit summary for information provided to patient and family        Provisions for Follow-Up Care:  See after visit summary for information related to follow-up care and any pertinent home health orders  Planned Readmission:   None     Discharge Medications:  See after visit summary for reconciled discharge medications provided to patient and family        ** Please Note: This note has been constructed using a voice recognition system **

## 2021-05-11 ENCOUNTER — OFFICE VISIT (OUTPATIENT)
Dept: VASCULAR SURGERY | Facility: CLINIC | Age: 66
End: 2021-05-11
Payer: MEDICARE

## 2021-05-11 ENCOUNTER — TELEPHONE (OUTPATIENT)
Dept: VASCULAR SURGERY | Facility: CLINIC | Age: 66
End: 2021-05-11

## 2021-05-11 VITALS
SYSTOLIC BLOOD PRESSURE: 94 MMHG | DIASTOLIC BLOOD PRESSURE: 50 MMHG | WEIGHT: 210 LBS | HEIGHT: 70 IN | BODY MASS INDEX: 30.06 KG/M2

## 2021-05-11 DIAGNOSIS — N18.6 END STAGE RENAL DISEASE (HCC): Primary | ICD-10-CM

## 2021-05-11 DIAGNOSIS — T82.868A AV FISTULA THROMBOSIS, INITIAL ENCOUNTER (HCC): ICD-10-CM

## 2021-05-11 DIAGNOSIS — I25.118 CORONARY ARTERY DISEASE OF NATIVE HEART WITH STABLE ANGINA PECTORIS, UNSPECIFIED VESSEL OR LESION TYPE (HCC): ICD-10-CM

## 2021-05-11 DIAGNOSIS — I25.10 ARTERIOSCLEROSIS OF CORONARY ARTERY: ICD-10-CM

## 2021-05-11 PROCEDURE — 99204 OFFICE O/P NEW MOD 45 MIN: CPT | Performed by: SURGERY

## 2021-05-11 NOTE — PATIENT INSTRUCTIONS
Obtain cardiology consult  Obtain vein mapping  Return to office in 3 weeks to discuss planning for upper extremity hemodialysis access

## 2021-05-11 NOTE — PROGRESS NOTES
Assessment/Plan:    End stage renal disease ([facility] )  Lab Results   Component Value Date    EGFR 2 04/29/2021    EGFR 2 04/28/2021    EGFR 2 04/28/2021    CREATININE 17 74 (H) 04/29/2021    CREATININE 17 23 (H) 04/28/2021    CREATININE 17 39 (H) 04/28/2021     Patient with history of left radiocephalic AV fistula which is presently functioning  However this is required declotting procedure twice within the last month  Will obtain vein mapping of the bilateral upper extremities to determine best access site for subsequent hemodialysis access procedure  Patient on aspirin, Plavix and Coumadin  Will require cardiology risk assessment with further testing per Cardiology prior to surgery  This was discussed with patient  Patient will return to the office in 2-3 weeks to discuss  Diagnoses and all orders for this visit:    End stage renal disease ([place] [address] )  -     VAS vessel mapping for hemodialysis upper; Future  -     Ambulatory referral to Cardiology; Future    AV fistula thrombosis, initial encounter ([place] [address] )  -     VAS vessel mapping for hemodialysis upper; Future  -     Ambulatory referral to Cardiology; Future    Arteriosclerosis of coronary artery  -     VAS vessel mapping for hemodialysis upper; Future  -     Ambulatory referral to Cardiology; Future    Coronary artery disease of native heart with stable angina pectoris, unspecified vessel or lesion type [facility])  -     Ambulatory referral to Cardiology; Future          Subjective:      Patient ID: Derrick Pink is a 78 y o  male  Patient is here to   Discuss new hemodialysis access  Patient has a left upper extremity radiocephalic AV fistula placed approximately 10 years ago  The fistula has now required declotting twice in the past month  We will obtain a vein mapping study to determine best location for subsequent hemodialysis access  Patient with significant coronary history having undergone PTCA approximately 6 years ago per the patient    Patient states he has occasional chest pain and takes nitroglycerin  Therefore we will obtain a cardiology risk assessment and further testing as needed per Cardiology prior to fistula creation  Patient is on HD at [facility] on M/W/F  Patient's fistula has thrill and bruit  Patient takes ASA 81mg, Rosuvastatin, Plavix, and Warfarin  Patient is a current 0 5 ppd smoker  The following portions of the patient's history were reviewed and updated as appropriate: allergies, current medications, past family history, past medical history, past social history, past surgical history and problem list     Review of Systems   Constitutional: Negative  HENT: Negative  Eyes: Negative  Respiratory: Negative  Cardiovascular: Negative  Gastrointestinal: Negative  Endocrine: Negative  Genitourinary: Negative  Musculoskeletal: Negative  Skin: Negative  Allergic/Immunologic: Negative  Hematological: Negative  Psychiatric/Behavioral: Negative  Objective:      BP 94/50 (BP Location: Right arm, Patient Position: Sitting)   Ht 5' 10" (1 778 m)   Wt 95 3 kg (210 lb)   BMI 30 13 kg/m²          Physical Exam  Vitals signs and nursing note reviewed  Constitutional:       Appearance: He is well-developed  HENT:      Head: Normocephalic and atraumatic  Eyes:      Conjunctiva/sclera: Conjunctivae normal       Pupils: Pupils are equal, round, and reactive to light  Neck:      Musculoskeletal: Normal range of motion and neck supple  Vascular: No JVD  Cardiovascular:      Rate and Rhythm: Normal rate and regular rhythm  Heart sounds: Normal heart sounds  Comments: Palpable thrill left radiocephalic AV fistula  Palpable brachial pulses bilaterally  Pulmonary:      Effort: Pulmonary effort is normal  No respiratory distress  Breath sounds: Normal breath sounds  No stridor  No wheezing or rales  Chest:      Chest wall: No tenderness     Abdominal:      General: There is no distension  Palpations: Abdomen is soft  There is no mass  Tenderness: There is no abdominal tenderness  There is no guarding or rebound  Musculoskeletal: Normal range of motion  General: No tenderness or deformity  Skin:     General: Skin is warm and dry  Findings: No erythema or rash  Neurological:      Mental Status: He is alert and oriented to person, place, and time  Psychiatric:         Behavior: Behavior normal          Thought Content:  Thought content normal

## 2021-05-11 NOTE — LETTER
May 11, 2021     Roseanne June, [address] [facility] [address] [facility] [facility] [address] [address]    Patient: Fiona Markham   YOB: 1955   Date of Visit: 5/11/2021       Dear Dr Nolan Daughters: Thank you for referring Arabella Bradford to me for evaluation  Below are the relevant portions of my assessment and plan of care  Patient is here to discuss new hemodialysis access  Patient has a left upper extremity radiocephalic AV fistula placed approximately 10 years ago  The fistula has now required declotting twice in the past month  We will obtain a vein mapping study to determine best location for subsequent hemodialysis access  Patient with significant coronary history having undergone PTCA approximately 6 years ago per the patient  Patient states he has occasional chest pain and takes nitroglycerin  Therefore we will obtain a cardiology risk assessment and further testing as needed per Cardiology prior to fistula creation  Patient is on HD at [facility] on M/W/F  Patient's fistula has thrill and bruit  Patient takes ASA 81mg, Rosuvastatin, Plavix, and Warfarin  Patient is a current 0 5 ppd smoker  Patient with history of left radiocephalic AV fistula which is presently functioning  However this is required declotting procedure twice within the last month  Will obtain vein mapping of the bilateral upper extremities to determine best access site for subsequent hemodialysis access procedure  Patient on aspirin, Plavix and Coumadin  Will require cardiology risk assessment with further testing per Cardiology prior to surgery  This was discussed with patient  Patient will return to the office in 2-3 weeks to discuss  If you have questions, please do not hesitate to call me  I look forward to following Eugenio Mejia along with you           Sincerely,        Aspen Moe MD        CC: Referral Self  [facility]

## 2021-05-11 NOTE — ASSESSMENT & PLAN NOTE
Lab Results   Component Value Date    EGFR 2 04/29/2021    EGFR 2 04/28/2021    EGFR 2 04/28/2021    CREATININE 17 74 (H) 04/29/2021    CREATININE 17 23 (H) 04/28/2021    CREATININE 17 39 (H) 04/28/2021     Patient with history of left radiocephalic AV fistula which is presently functioning  However this is required declotting procedure twice within the last month  Will obtain vein mapping of the bilateral upper extremities to determine best access site for subsequent hemodialysis access procedure  Patient on aspirin, Plavix and Coumadin  Will require cardiology risk assessment with further testing per Cardiology prior to surgery  This was discussed with patient  Patient will return to the office in 2-3 weeks to discuss

## 2021-05-11 NOTE — TELEPHONE ENCOUNTER
Check out staff:   REMINDER: Reason For Call MUST be formatted as:   (Ordering Provider's Initials) / (Procedure)    ORDERING PROVIDER: Prateek Rock (NPI: [id number])    PROCEDURE: AV fistula thrombosis    ALLERGIES: Patient has no known allergies  DIALYSIS: Yes  Where: [address] [facility] [address] Rangel Days: M/W/F    *Please ROUTE this encounter to [facility] Clearance Pool   AND   Send CLEARANCE FORM(S) to [facility] e-mail group*    Patient requires Cardiology  clearance  Spoke with, Elliott Gifford at [facility], Kameron Bower    Patient's appointment with Oswaldo Figueroa MD has been scheduled for 05/24/2021 at 3:20 pm     Office contact information P: [phone] - F: [phone]

## 2021-05-24 ENCOUNTER — HOSPITAL ENCOUNTER (EMERGENCY)
Facility: HOSPITAL | Age: 66
Discharge: HOME | End: 2021-05-24
Attending: EMERGENCY MEDICINE | Admitting: EMERGENCY MEDICINE
Payer: MEDICARE

## 2021-05-24 ENCOUNTER — APPOINTMENT (EMERGENCY)
Dept: RADIOLOGY | Facility: HOSPITAL | Age: 66
End: 2021-05-24
Attending: RADIOLOGY
Payer: MEDICARE

## 2021-05-24 VITALS
DIASTOLIC BLOOD PRESSURE: 87 MMHG | SYSTOLIC BLOOD PRESSURE: 168 MMHG | RESPIRATION RATE: 20 BRPM | OXYGEN SATURATION: 98 % | TEMPERATURE: 97.2 F | HEART RATE: 81 BPM

## 2021-05-24 DIAGNOSIS — N18.6 ESRD (END STAGE RENAL DISEASE) ON DIALYSIS (HCC): Primary | ICD-10-CM

## 2021-05-24 DIAGNOSIS — Z99.2 ESRD (END STAGE RENAL DISEASE) ON DIALYSIS (HCC): Primary | ICD-10-CM

## 2021-05-24 DIAGNOSIS — T82.868A THROMBOSIS OF ARTERIOVENOUS FISTULA, INITIAL ENCOUNTER (HCC): ICD-10-CM

## 2021-05-24 LAB
ALBUMIN SERPL BCP-MCNC: 3.4 G/DL (ref 3.5–5)
ALP SERPL-CCNC: 114 U/L (ref 46–116)
ALT SERPL W P-5'-P-CCNC: 16 U/L (ref 12–78)
ANION GAP SERPL CALCULATED.3IONS-SCNC: 12 MMOL/L (ref 4–13)
AST SERPL W P-5'-P-CCNC: 20 U/L (ref 5–45)
BASOPHILS # BLD AUTO: 0.06 THOUSANDS/ΜL (ref 0–0.1)
BASOPHILS NFR BLD AUTO: 1 % (ref 0–1)
BILIRUB SERPL-MCNC: 0.63 MG/DL (ref 0.2–1)
BUN SERPL-MCNC: 30 MG/DL (ref 5–25)
CALCIUM ALBUM COR SERPL-MCNC: 9.1 MG/DL (ref 8.3–10.1)
CALCIUM SERPL-MCNC: 8.6 MG/DL (ref 8.3–10.1)
CHLORIDE SERPL-SCNC: 102 MMOL/L (ref 100–108)
CO2 SERPL-SCNC: 30 MMOL/L (ref 21–32)
CREAT SERPL-MCNC: 12.44 MG/DL (ref 0.6–1.3)
EOSINOPHIL # BLD AUTO: 0.12 THOUSAND/ΜL (ref 0–0.61)
EOSINOPHIL NFR BLD AUTO: 2 % (ref 0–6)
ERYTHROCYTE [DISTWIDTH] IN BLOOD BY AUTOMATED COUNT: 13.7 % (ref 11.6–15.1)
GFR SERPL CREATININE-BSD FRML MDRD: 4 ML/MIN/1.73SQ M
GLUCOSE SERPL-MCNC: 125 MG/DL (ref 65–140)
HCT VFR BLD AUTO: 32.5 % (ref 36.5–49.3)
HGB BLD-MCNC: 10.3 G/DL (ref 12–17)
IMM GRANULOCYTES # BLD AUTO: 0.03 THOUSAND/UL (ref 0–0.2)
IMM GRANULOCYTES NFR BLD AUTO: 1 % (ref 0–2)
LYMPHOCYTES # BLD AUTO: 0.66 THOUSANDS/ΜL (ref 0.6–4.47)
LYMPHOCYTES NFR BLD AUTO: 10 % (ref 14–44)
MCH RBC QN AUTO: 37.6 PG (ref 26.8–34.3)
MCHC RBC AUTO-ENTMCNC: 31.7 G/DL (ref 31.4–37.4)
MCV RBC AUTO: 119 FL (ref 82–98)
MONOCYTES # BLD AUTO: 0.32 THOUSAND/ΜL (ref 0.17–1.22)
MONOCYTES NFR BLD AUTO: 5 % (ref 4–12)
NEUTROPHILS # BLD AUTO: 5.39 THOUSANDS/ΜL (ref 1.85–7.62)
NEUTS SEG NFR BLD AUTO: 81 % (ref 43–75)
NRBC BLD AUTO-RTO: 0 /100 WBCS
PLATELET # BLD AUTO: 119 THOUSANDS/UL (ref 149–390)
PMV BLD AUTO: 9.8 FL (ref 8.9–12.7)
POTASSIUM SERPL-SCNC: 3.9 MMOL/L (ref 3.5–5.3)
PROT SERPL-MCNC: 7.7 G/DL (ref 6.4–8.2)
RBC # BLD AUTO: 2.74 MILLION/UL (ref 3.88–5.62)
SODIUM SERPL-SCNC: 144 MMOL/L (ref 136–145)
WBC # BLD AUTO: 6.58 THOUSAND/UL (ref 4.31–10.16)

## 2021-05-24 PROCEDURE — 80053 COMPREHEN METABOLIC PANEL: CPT | Performed by: EMERGENCY MEDICINE

## 2021-05-24 PROCEDURE — 85025 COMPLETE CBC W/AUTO DIFF WBC: CPT | Performed by: EMERGENCY MEDICINE

## 2021-05-24 PROCEDURE — 36558 INSERT TUNNELED CV CATH: CPT

## 2021-05-24 PROCEDURE — 77001 FLUOROGUIDE FOR VEIN DEVICE: CPT

## 2021-05-24 PROCEDURE — 99285 EMERGENCY DEPT VISIT HI MDM: CPT | Performed by: EMERGENCY MEDICINE

## 2021-05-24 PROCEDURE — 36415 COLL VENOUS BLD VENIPUNCTURE: CPT | Performed by: EMERGENCY MEDICINE

## 2021-05-24 PROCEDURE — 99152 MOD SED SAME PHYS/QHP 5/>YRS: CPT

## 2021-05-24 PROCEDURE — C1750 CATH, HEMODIALYSIS,LONG-TERM: HCPCS

## 2021-05-24 PROCEDURE — 99284 EMERGENCY DEPT VISIT MOD MDM: CPT

## 2021-05-24 PROCEDURE — 76937 US GUIDE VASCULAR ACCESS: CPT

## 2021-05-24 RX ORDER — CEFAZOLIN SODIUM 2 G/50ML
SOLUTION INTRAVENOUS
Status: COMPLETED | OUTPATIENT
Start: 2021-05-24 | End: 2021-05-24

## 2021-05-24 RX ORDER — HEPARIN SODIUM 1000 [USP'U]/ML
INJECTION, SOLUTION INTRAVENOUS; SUBCUTANEOUS CODE/TRAUMA/SEDATION MEDICATION
Status: COMPLETED | OUTPATIENT
Start: 2021-05-24 | End: 2021-05-24

## 2021-05-24 RX ORDER — MIDAZOLAM HYDROCHLORIDE 2 MG/2ML
INJECTION, SOLUTION INTRAMUSCULAR; INTRAVENOUS CODE/TRAUMA/SEDATION MEDICATION
Status: COMPLETED | OUTPATIENT
Start: 2021-05-24 | End: 2021-05-24

## 2021-05-24 RX ORDER — FENTANYL CITRATE 50 UG/ML
INJECTION, SOLUTION INTRAMUSCULAR; INTRAVENOUS CODE/TRAUMA/SEDATION MEDICATION
Status: COMPLETED | OUTPATIENT
Start: 2021-05-24 | End: 2021-05-24

## 2021-05-24 RX ADMIN — HEPARIN SODIUM 2300 UNITS: 1000 INJECTION INTRAVENOUS; SUBCUTANEOUS at 15:32

## 2021-05-24 RX ADMIN — CEFAZOLIN SODIUM 2000 MG: 2 SOLUTION INTRAVENOUS at 15:17

## 2021-05-24 RX ADMIN — MIDAZOLAM HYDROCHLORIDE 2 MG: 1 INJECTION, SOLUTION INTRAMUSCULAR; INTRAVENOUS at 15:23

## 2021-05-24 RX ADMIN — FENTANYL CITRATE 100 MCG: 50 INJECTION, SOLUTION INTRAMUSCULAR; INTRAVENOUS at 15:22

## 2021-05-24 RX ADMIN — Medication 20 ML: at 15:22

## 2021-05-24 NOTE — ED NOTES
Pt up for discharge, received sedation during procedure, waiting for ride home       Martha Louis RN  05/24/21 6313

## 2021-05-24 NOTE — ED NOTES
Per dr Maurice Rosa, pt to be d/c after IR procedure  No dialysis       Lorna Bass, RN  05/24/21 6979 [address] [place] [address], RN  05/24/21 6738

## 2021-05-24 NOTE — CONSULTS
Interventional Radiology  Consultation 5/24/2021     Consults  Danisluis Bishop   1955   [phone]      Assessment/Plan:  Patient with end-stage renal disease with nonfunctioning fistula  Patient known to interventional radiology department for most recent thrombolysis of occluded left arm fistula on 04/29/2021  Patient recently had a consult with vascular surgery on 05/11/2021 and plan for new access creation was made  Patient recent dialysis on Friday was uneventful however now is thrombosed   Plan to place a PermCath for now till new access is created by vascular team     This was discussed with Dr Dao Joyner          Problem List     Anemia in chronic illness    Anemia in chronic kidney disease (CKD)    Arteriosclerosis of coronary artery    Benign essential hypertension    Diabetes with neurologic complications (HCC)      Lab Results   Component Value Date    HGBA1C 5 4 04/11/2017         Elevated serum alkaline phosphatase level    [facility]    Overview Signed 10/17/2018 11:56 AM by TESSY Tsang     Procedure/Onset: 07/18/2011         Lab Results   Component Value Date    EGFR 4 05/24/2021    EGFR 2 04/29/2021    EGFR 2 04/28/2021    CREATININE 12 44 (H) 05/24/2021    CREATININE 17 74 (H) 04/29/2021    CREATININE 17 23 (H) 04/28/2021         Intestinal malabsorption    Irritable bowel syndrome with diarrhea    Long-term insulin use in type 2 diabetes [facility])      Lab Results   Component Value Date    HGBA1C 5 4 04/11/2017         Mixed hyperlipidemia    Nicotine dependence    Overview Signed 10/17/2018 11:56 AM by TESSY Tsang     Procedure/Onset: 07/18/2011         Organic impotence    Overview Signed 10/17/2018 11:56 AM by TESSY Tsang     Procedure/Onset: 02/19/2004         Pernicious anemia    Type 2 diabetes mellitus ([facility] )    Overview Signed 10/17/2018 11:56 AM by TESSY Tsang     Procedure/Onset: 05/21/2007           Lab Results   Component Value Date HGBA1C 5 4 04/11/2017         Acute upper respiratory infection    Encounter for extracorporeal dialysis ([facility] )    AV fistula thrombosis, initial encounter ([place] [address] )            Subjective:     Patient ID: Naveed Barber is a 78 y o  male  History of Present Illness  Recurrent thrombosis of left arm fistula  Plan for new access creation by vascular team   Plan to place PermCath for now  Review of Systems      Past Medical History:   Diagnosis Date    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    With Cerebral Infarction:  Last Assessed:  October 20, 2016    Chronic kidney disease 2012    on dialysis     Diabetes mellitus ([place] [address] )     Hypertension     Labyrinthitis 09/10/2011    Myocardial infarction ([place] [address] ) 2014    x3 others were in 2009 & 2010    Sleep disturbances 09/20/2010    Stroke [facility]) 2007    x1    Type 2 diabetes, uncontrolled, with renal manifestation ([place] [address] ) 05/03/2017        Past Surgical History:   Procedure Laterality Date    AV FISTULA PLACEMENT      CARDIAC SURGERY  2010    stents x3    COLONOSCOPY N/A 12/12/2016    Procedure: COLONOSCOPY;  Surgeon: Leia Lay MD;  Location: [place] [address] GI LAB; Service:     COLONOSCOPY N/A 4/3/2017    Procedure:  COLONOSCOPY;  Surgeon: Leia Lay MD;  Location: [place] [address] GI LAB; Service:     IR AV FISTULA/GRAFT DECLOT  4/29/2021        Social History     Tobacco Use   Smoking Status Current Every Day Smoker    Packs/day: 0 50    Years: 30 00    Pack years: 15 00   Smokeless Tobacco Never Used        Social History     Substance and Sexual Activity   Alcohol Use Yes    Comment: rarely - No alcohol use per [employer]        Social History     Substance and Sexual Activity   Drug Use No        No Known Allergies    No current facility-administered medications for this encounter        Current Outpatient Medications   Medication Sig Dispense Refill    albuterol (PROAIR HFA) 90 mcg/act inhaler Inhale 2 puffs every 6 (six) hours as needed for wheezing 8 5 g 0    amLODIPine (NORVASC) 10 mg tablet Take 10 mg by mouth 2 (two) times a day      aspirin 81 MG tablet Take 81 mg by mouth daily      B Complex-C-Folic Acid (TRIPHROCAPS) 1 MG CAPS Take 1 capsule by mouth      clopidogrel (PLAVIX) 75 mg tablet Take 75 mg by mouth daily      glucose blood test strip by In Vitro route 2 (two) times a day      Insulin Glargine (LANTUS SOLOSTAR) 100 UNIT/ML SOPN Inject 8 Units under the skin daily      LOSARTAN POTASSIUM PO Take 50 mg by mouth daily      nitroglycerin (NITROSTAT) 0 4 mg SL tablet Place under the tongue      rifaximin (XIFAXAN) 550 mg tablet Take by mouth      rosuvastatin (CRESTOR) 20 MG tablet Take 20 mg by mouth every evening  1    warfarin (COUMADIN) 2 mg tablet Take 2 mg by mouth            Objective:    Vitals:    05/24/21 1116 05/24/21 1347   BP: (!) 182/79 (!) 179/92   BP Location: Right arm    Pulse: 88 73   Resp: 20 18   Temp: (!) 97 2 °F (36 2 °C)    TempSrc: Temporal    SpO2: 98% 100%        Physical Exam      No results found for: BNP   Lab Results   Component Value Date    WBC 6 58 05/24/2021    HGB 10 3 (L) 05/24/2021    HCT 32 5 (L) 05/24/2021     (H) 05/24/2021     (L) 05/24/2021     Lab Results   Component Value Date    INR 1 13 04/29/2021    INR 1 17 04/28/2021    PROTIME 14 6 (H) 04/29/2021    PROTIME 15 0 (H) 04/28/2021     No results found for: PTT      I have personally reviewed pertinent imaging and laboratory results  Code Status: Prior  Advance Directive and Living Will:      Power of :    POLST:      IR has been consulted to evaluate the patient, determine the appropriate procedure, and whether or not a procedure can and should be performed  Thank you for allowing me to participate in the care of Devora Campbell  Please don't hesitate to call, text, email, or TigerText with any questions  This text is generated with voice recognition software  There may be translation, syntax,  or grammatical errors   If you have any questions, please contact the dictating provider

## 2021-05-24 NOTE — ED NOTES
Pt reports he rescheduled his HD for 9455 tomorrow  Pts son arrived to take patient home  Pt ambulatory in [facility], offers no complaints       Bertha Nixon, RN  05/24/21 9572

## 2021-05-24 NOTE — QUICK NOTE
I spoke with and word in the emergency room  Nonfunctioning AV fistula  The fistula is 6years old in likely reaching the end of its use ability  The plan is for new fistula placement  At this time patient going for PermCath placement  I called [facility] and there is chair availability tomorrow at 1030 therefore he will report there tomorrow for hemodialysis  Labs reviewed  No compelling reason for dialysis today

## 2021-05-24 NOTE — SEDATION DOCUMENTATION
Perm HD successfully placed  Patient tolerated well with sedation  AAOx3 and stable for transfer back to room  AVS reviewed  Report and care assumed by primary RN

## 2021-05-24 NOTE — BRIEF OP NOTE (RAD/CATH)
INTERVENTIONAL RADIOLOGY PROCEDURE NOTE    Date: 5/24/2021    Procedure:  PermCath placement    Preoperative diagnosis:  End-stage renal disease  Recurrent occlusion of left AV fistula    Postoperative diagnosis: Same  Surgeon: Nida Fernandez MD     Assistant: None  No qualified resident was available  Blood loss:  Minimal    Specimens:  None     Findings:  Left neck 27 cm tip to cuff PermCath placed  Catheter positional due to catheter tip location placed in the distal right atrium  Recommending dialysis with patient in sitting position  Discussed with patient  Patient scheduled for new access creation  Complications: None immediate      Anesthesia: conscious sedation

## 2021-05-24 NOTE — ED PROVIDER NOTES
History  Chief Complaint   Patient presents with    Medical Problem     Pt states he went to diaysis today and was told his fistula wasnt working  States his fistula was clotted  Fistula was working fine on friday  Was here for similiar issue last month  Patient is a 65-year-old male with history of end-stage renal disease on hemodialysis who presents with AV fistula thrombosis  Patient states that he received dialysis on Friday without any issues  He went to dialysis today and they were unable to access his fistula  He was sent to the emergency department and told that his fistula was clotted once again  This has occurred twice previously  He was seen here last month and IR was able to declot and salvage his fistula  Patient has no complaints, including shortness of breath, chest pain or other concerns  Patient is following with vascular surgery to discuss new vascular access  History provided by:  Patient  Medical Problem  Location:  Left UE  Quality:  AV fistula thrombosis  Chronicity:  Recurrent  Ineffective treatments:  None tried  Associated symptoms: no abdominal pain, no chest pain, no cough, no diarrhea, no fever, no headaches, no nausea, no rash, no shortness of breath, no sore throat and no vomiting        Prior to Admission Medications   Prescriptions Last Dose Informant Patient Reported? Taking?    B Complex-C-Folic Acid (TRIPHROCAPS) 1 MG CAPS  Self Yes No   Sig: Take 1 capsule by mouth   Insulin Glargine (LANTUS SOLOSTAR) 100 UNIT/ML SOPN  Self Yes No   Sig: Inject 8 Units under the skin daily   LOSARTAN POTASSIUM PO  Self Yes No   Sig: Take 50 mg by mouth daily   albuterol (PROAIR HFA) 90 mcg/act inhaler  Self No No   Sig: Inhale 2 puffs every 6 (six) hours as needed for wheezing   amLODIPine (NORVASC) 10 mg tablet  Self Yes No   Sig: Take 10 mg by mouth 2 (two) times a day   aspirin 81 MG tablet  Self Yes No   Sig: Take 81 mg by mouth daily   clopidogrel (PLAVIX) 75 mg tablet  Self Yes No   Sig: Take 75 mg by mouth daily   glucose blood test strip  Self Yes No   Sig: by In Vitro route 2 (two) times a day   nitroglycerin (NITROSTAT) 0 4 mg SL tablet  Self Yes No   Sig: Place under the tongue   rifaximin (XIFAXAN) 550 mg tablet  Self Yes No   Sig: Take by mouth   rosuvastatin (CRESTOR) 20 MG tablet  Self Yes No   Sig: Take 20 mg by mouth every evening   warfarin (COUMADIN) 2 mg tablet  Self Yes No   Sig: Take 2 mg by mouth      Facility-Administered Medications: None       Past Medical History:   Diagnosis Date    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    With Cerebral Infarction:  Last Assessed:  October 20, 2016    Chronic kidney disease 2012    on dialysis     [facility] )     Hypertension     Labyrinthitis 09/10/2011    Myocardial infarction ([facility] ) 2014    x3 others were in 2009 & 2010    Sleep disturbances 09/20/2010    Stroke ([facility] ) 2007    x1    Type 2 diabetes, uncontrolled, with renal manifestation ([place] [address] ) 05/03/2017       Past Surgical History:   Procedure Laterality Date    AV FISTULA PLACEMENT      CARDIAC SURGERY  2010    stents x3    COLONOSCOPY N/A 12/12/2016    Procedure: COLONOSCOPY;  Surgeon: Van Dale MD;  Location: [facility] GI LAB; Service:     COLONOSCOPY N/A 4/3/2017    Procedure:  COLONOSCOPY;  Surgeon: Van Dale MD;  Location: [facility] GI LAB; Service:     IR AV FISTULA/GRAFT DECLOT  4/29/2021    IR TUNNELED DIALYSIS CATHETER PLACEMENT  5/24/2021       Family History   Problem Relation Age of Onset    Leukemia Mother     Crohn's disease Father     Transient ischemic attack Brother      I have reviewed and agree with the history as documented      E-Cigarette/Vaping     E-Cigarette/Vaping Substances     Social History     Tobacco Use    Smoking status: Current Every Day Smoker     Packs/day: 0 50     Years: 30 00     Pack years: 15 00    Smokeless tobacco: Never Used   Substance Use Topics    Alcohol use: Yes     Comment: rarely - No alcohol use per [employer]    Drug use: No       Review of Systems   Constitutional: Negative for chills, diaphoresis and fever  HENT: Negative for nosebleeds, sore throat and trouble swallowing  Eyes: Negative for photophobia, pain and visual disturbance  Respiratory: Negative for cough, chest tightness and shortness of breath  Cardiovascular: Negative for chest pain, palpitations and leg swelling  Gastrointestinal: Negative for abdominal pain, constipation, diarrhea, nausea and vomiting  Endocrine: Negative for polydipsia and polyuria  Genitourinary: Negative for difficulty urinating, dysuria and hematuria  Musculoskeletal: Negative for back pain, neck pain and neck stiffness  Skin: Negative for pallor and rash  Neurological: Negative for dizziness, syncope, light-headedness and headaches  All other systems reviewed and are negative  Physical Exam  Physical Exam  Vitals signs and nursing note reviewed  Constitutional:       General: He is not in acute distress  Appearance: He is well-developed  HENT:      Head: Normocephalic and atraumatic  Eyes:      Pupils: Pupils are equal, round, and reactive to light  Neck:      Musculoskeletal: Normal range of motion and neck supple  Cardiovascular:      Rate and Rhythm: Normal rate and regular rhythm  Pulses: Normal pulses  Heart sounds: Normal heart sounds  Pulmonary:      Effort: Pulmonary effort is normal  No respiratory distress  Breath sounds: Normal breath sounds  Abdominal:      General: There is no distension  Palpations: Abdomen is soft  Abdomen is not rigid  Tenderness: There is no abdominal tenderness  There is no guarding or rebound  Musculoskeletal: Normal range of motion  General: No tenderness  Arms:    Lymphadenopathy:      Cervical: No cervical adenopathy  Skin:     General: Skin is warm and dry  Capillary Refill: Capillary refill takes less than 2 seconds     Neurological: Mental Status: He is alert and oriented to person, place, and time  Cranial Nerves: No cranial nerve deficit  Sensory: No sensory deficit           Vital Signs  ED Triage Vitals   Temperature Pulse Respirations Blood Pressure SpO2   05/24/21 1116 05/24/21 1116 05/24/21 1116 05/24/21 1116 05/24/21 1116   (!) 97 2 °F (36 2 °C) 88 20 (!) 182/79 98 %      Temp Source Heart Rate Source Patient Position - Orthostatic VS BP Location FiO2 (%)   05/24/21 1116 05/24/21 1116 05/24/21 1116 05/24/21 1116 --   Temporal Monitor Sitting Right arm       Pain Score       05/24/21 1601       No Pain           Vitals:    05/24/21 1525 05/24/21 1530 05/24/21 1535 05/24/21 1601   BP: 169/77 162/79 144/70 168/87   Pulse: 79 78 78 81   Patient Position - Orthostatic VS:             Visual Acuity      ED Medications  Medications   ceFAZolin (ANCEF) IVPB (premix in dextrose) (2,000 mg Intravenous New Bag 5/24/21 1517)   lidocaine-epinephrine 1%-1:036378 buffered (20 mL Infiltration Given 5/24/21 1522)   fentanyl citrate (PF) 100 MCG/2ML (100 mcg Intravenous Given 5/24/21 1522)   midazolam (VERSED) injection (2 mg Intravenous Given 5/24/21 1523)   heparin (porcine) injection (2,300 Units Intravenous Given 5/24/21 1532)       Diagnostic Studies  Results Reviewed     Procedure Component Value Units Date/Time    Comprehensive metabolic panel [[id number]]  (Abnormal) Collected: 05/24/21 1345    Lab Status: Final result Specimen: Blood from Arm, Right Updated: 05/24/21 1412     Sodium 144 mmol/L      Potassium 3 9 mmol/L      Chloride 102 mmol/L      CO2 30 mmol/L      ANION GAP 12 mmol/L      BUN 30 mg/dL      Creatinine 12 44 mg/dL      Glucose 125 mg/dL      Calcium 8 6 mg/dL      Corrected Calcium 9 1 mg/dL      AST 20 U/L      ALT 16 U/L      Alkaline Phosphatase 114 U/L      Total Protein 7 7 g/dL      Albumin 3 4 g/dL      Total Bilirubin 0 63 mg/dL      eGFR 4 ml/min/1 73sq m     Narrative:      National Kidney Disease Foundation guidelines for Chronic Kidney Disease (CKD):     Stage 1 with normal or high GFR (GFR > 90 mL/min/1 73 square meters)    Stage 2 Mild CKD (GFR = 60-89 mL/min/1 73 square meters)    Stage 3A Moderate CKD (GFR = 45-59 mL/min/1 73 square meters)    Stage 3B Moderate CKD (GFR = 30-44 mL/min/1 73 square meters)    Stage 4 Severe CKD (GFR = 15-29 mL/min/1 73 square meters)    Stage 5 End Stage CKD (GFR <15 mL/min/1 73 square meters)  Note: GFR calculation is accurate only with a steady state creatinine    CBC and differential [[id number]]  (Abnormal) Collected: 05/24/21 1345    Lab Status: Final result Specimen: Blood from Arm, Right Updated: 05/24/21 1358     WBC 6 58 Thousand/uL      RBC 2 74 Million/uL      Hemoglobin 10 3 g/dL      Hematocrit 32 5 %       fL      MCH 37 6 pg      MCHC 31 7 g/dL      RDW 13 7 %      MPV 9 8 fL      Platelets 927 Thousands/uL      nRBC 0 /100 WBCs      Neutrophils Relative 81 %      Immat GRANS % 1 %      Lymphocytes Relative 10 %      Monocytes Relative 5 %      Eosinophils Relative 2 %      Basophils Relative 1 %      Neutrophils Absolute 5 39 Thousands/µL      Immature Grans Absolute 0 03 Thousand/uL      Lymphocytes Absolute 0 66 Thousands/µL      Monocytes Absolute 0 32 Thousand/µL      Eosinophils Absolute 0 12 Thousand/µL      Basophils Absolute 0 06 Thousands/µL                  IR tunneled dialysis catheter placement   Final Result by Earline Steiner MD (05/24 1639)   Impression:   1  Successful ultrasound and fluoroscopically guided placement of a 32cm Titan cath via the left internal jugular vein  The tip of the catheter is in the right atrium and may be used immediately                 Workstation performed: [id number]                    Procedures  Procedures  Conscious Sedation Assessment      Classification Score   ASA Scale Assessment  3-Severe systemic disease that results in functional limitation filed at 05/24/2021 1504   Mallampati Classification  Class III: soft palate, base of uvula visible - Moderate Difficulty filed at 05/24/2021 1504             ED Course  ED Course as of May 24 1647   Mon May 24, 2021   1343 Message sent to vascular surgery and Interventional Radiology  1346 Vascular surgery states that they will evaluate patient if IR is unsuccessful at opening his fistula  Dr Guillaume Blanco with IR will evaluate patient  IR consult placed  1418 Message sent to Nephrology  [id number] Nephrology recommends dialysis tomorrow as outpatient  They have arranged for him to get dialysis at 10:30 a m     Vascular surgery agrees with discharge in outpatient follow-up with vascular surgeon in the office  They will discuss new vascular access  In the meantime, patient has a PermaCath in order to receive dialysis  SBIRT 20yo+      Most Recent Value   SBIRT (24 yo +)   In order to provide better care to our patients, we are screening all of our patients for alcohol and drug use  Would it be okay to ask you these screening questions? No Filed at: 05/24/2021 1349                    MDM  Number of Diagnoses or Management Options  ESRD (end stage renal disease) on dialysis [facility]): Thrombosis of arteriovenous fistula, initial encounter [facility]): new and requires workup  Diagnosis management comments: Patient with a history of end-stage renal disease presents with AV fistula thrombosis  Patient received dialysis on Friday but was unable to receive dialysis today  He has no palpable thrill or audible bruit to left AV fistula  I discussed case with vascular surgery, IR and Nephrology  IR placed a PermaCath since this is the 3rd time his current fistula has thrombosed  I discussed case with vascular surgery who states that he may follow-up as an outpatient to discuss surgical intervention  Nephrology agrees that patient is stable for outpatient dialysis  He may follow-up tomorrow at 10:30 a m   At his dialysis center  Patient understands these instructions and is comfortable with this plan  He is asymptomatic this time  He is stable for discharge  Amount and/or Complexity of Data Reviewed  Clinical lab tests: ordered and reviewed  Tests in the medicine section of CPT®: ordered and reviewed  Review and summarize past medical records: yes  Discuss the patient with other providers: yes  Independent visualization of images, tracings, or specimens: yes    Risk of Complications, Morbidity, and/or Mortality  Presenting problems: high  Diagnostic procedures: moderate  Management options: high    Patient Progress  Patient progress: improved      Disposition  Final diagnoses:   ESRD (end stage renal disease) on dialysis ([place] [address] )   Thrombosis of arteriovenous fistula, initial encounter ([place] [address] )     Time reflects when diagnosis was documented in both MDM as applicable and the Disposition within this note     Time User Action Codes Description Comment    5/24/2021  3:49 PM Guanako Chol Add [N18 6,  Z99 2] ESRD (end stage renal disease) on dialysis ([place] [address] )     5/24/2021  3:51 PM Guanako Chol Add [S05 552P] Thrombosis of arteriovenous fistula, initial encounter [facility])       ED Disposition     ED Disposition Condition Date/Time Comment    Discharge Stable Mon May 24, 2021  3:49 PM Dori Bishop discharge to home/self care  Follow-up Information     Follow up With Specialties Details Why Contact Info    Dialysis   Follow-up tomorrow at 10:30 a m  As scheduled for dialysis  Patient's Medications   Discharge Prescriptions    No medications on file     No discharge procedures on file      PDMP Review     None          ED Provider  Electronically Signed by           Alvaro Farias DO  05/24/21 0221

## 2021-05-24 NOTE — DISCHARGE INSTRUCTIONS
Perma-cath Placement   WHAT YOU NEED TO KNOW:   A perma-cath is a catheter placed through a vein into or near your right atrium  Your right atrium is the right upper chamber of your heart  A perma-cath is used for dialysis in an emergency or until a long-term device is ready to use  After your procedure, you will have some pain and swelling on your chest and neck  You may have some bruises on your chest and neck  You may also have 2 dressings, one on your chest and one on your neck  DISCHARGE INSTRUCTIONS:   Call 911 for any of the following:   · You feel lightheaded, short of breath, and have chest pain  · Your catheter comes out   Contact Interventional Radiology at [phone] Dustin PATIENTS: Contact Interventional Radiology at [phone]) Josselin Crzu PATIENTS: Contact Interventional Radiology at [phone]) if:  · Blood soaks through your bandage  · You have new swelling in your arm, neck, face, or chest on your right side  · Your catheter gets wet  · Your bruises or pain get worse  · You have a fever or chills  · Persistent nausea or vomiting  · Your incision is red, swollen, or draining pus  · You have questions or concerns about your condition or care  Self-care:       · Resume your normal diet  · Keep your dressings dry  Do not take a shower or swim  You may take a tub bath, but do not get your dressings wet  Water in your wound can cause bacteria to grow and cause an infection  If your dressing gets wet, dry it off and cover it with dry sterile gauze  Call your healthcare provider  Do not use soaps or ointments  · Do not change your dressings  Your healthcare provider or dialysis nurse will change your dressings  Your dressings should stay in place until your healthcare provider removes them  The dressing on your chest will stay as long as you have the catheter in place  The dressing prevents infection  · Do not remove the red and blue caps from the end of your catheter   The caps prevent air from getting into your catheter    Follow up with your healthcare provider as directed: Write down your questions so you remember to ask them during your visits

## 2021-05-25 NOTE — TELEPHONE ENCOUNTER
Pt had to cx his clearance apt yesterday due to being in ED  Can you please contact him to reschedule? Thank  You

## 2021-05-26 ENCOUNTER — OFFICE VISIT (OUTPATIENT)
Dept: CARDIOLOGY CLINIC | Facility: CLINIC | Age: 66
End: 2021-05-26
Payer: MEDICARE

## 2021-05-26 VITALS
BODY MASS INDEX: 28.49 KG/M2 | SYSTOLIC BLOOD PRESSURE: 122 MMHG | TEMPERATURE: 99.3 F | WEIGHT: 199 LBS | HEIGHT: 70 IN | OXYGEN SATURATION: 96 % | HEART RATE: 82 BPM | DIASTOLIC BLOOD PRESSURE: 68 MMHG | RESPIRATION RATE: 18 BRPM

## 2021-05-26 DIAGNOSIS — Z01.810 PREOP CARDIOVASCULAR EXAM: Primary | ICD-10-CM

## 2021-05-26 DIAGNOSIS — R07.9 CHEST PAIN IN ADULT: ICD-10-CM

## 2021-05-26 DIAGNOSIS — I25.118 CORONARY ARTERY DISEASE OF NATIVE ARTERY OF NATIVE HEART WITH STABLE ANGINA PECTORIS (HCC): ICD-10-CM

## 2021-05-26 PROCEDURE — 99203 OFFICE O/P NEW LOW 30 MIN: CPT | Performed by: INTERNAL MEDICINE

## 2021-05-26 PROCEDURE — 93000 ELECTROCARDIOGRAM COMPLETE: CPT | Performed by: INTERNAL MEDICINE

## 2021-05-26 RX ORDER — PANTOPRAZOLE SODIUM 40 MG/1
40 TABLET, DELAYED RELEASE ORAL DAILY
COMMUNITY
End: 2021-07-26 | Stop reason: HOSPADM

## 2021-05-26 NOTE — PROGRESS NOTES
Consultation - Cardiology Office  [facility]  Zara Bishop 78 y o  male MRN: [id number]  : 1955  Unit/Bed#:  Encounter: [id number]      ASSESSMENT:  Perioperative cardiac risk assessment for AV fistula by vascular surgery    CAD,   Had 3 VD by Cath in 2008, s/p PTCA with bare metal stent to circumflex and PDA and POBA of diagnonal  s/p MI in ,  and acute NSTEMI on 2011 s/p PCI/KARLSO of distal RCA(WS) at [facility]    Patient has not had any cardiology follow-up for 10 years and has not had any testing done either    Chest pain,   occasional requiring use of nitroglycerin    End-stage renal disease, on HD, recurrent occlusion/thrombosis of left AV fistula, status post placement of left neck PermCath    Diabetes mellitus, type 2    Hypertension    Hyperlipidemia    History of CVA     On anticoagulation with warfarin  Patient does not know why he is on warfarin but denies history of atrial fibrillation      RECOMMENDATIONS:  Echocardiogram to evaluate LV function  Pharmacologic nuclear stress test to rule out myocardial ischemia and recurrence of significant Coronary artery disease    Will determine perioperative cardiac risk after checking result of above tests      Thank you for your consultation  If you have any question please call me at [phone]      Primary Care Physician Requesting Consult: Glynn Garner      Reason for Consult / Principal Problem:  Perioperative cardiac risk assessment        HPI :     Migel Bills is a 78y o  year old male who was referred by primary care doctor for evaluation perioperative cardiac risk  Patient has a history of Coronary artery disease, multiple MIs and PCIs, last 1 about 10 years ago since then he has not had any cardiology follow-up    According to the patient she was initially seen at [facility] and then sent to [facility] for probable stent placement  He also admits to chest pain specially with exertion requiring sublingual nitroglycerin  He has thrombosed AV fistula and requires a new fistula implantation    REVIEW OF SYSTEMS:    Constitutional: Negative for activity change, appetite change, chills, fatigue, fever and unexpected weight change  HENT: Negative for congestion, sore throat and trouble swallowing  Eyes: Negative for discharge and redness  Respiratory: Negative for apnea, cough, chest tightness, shortness of breath and wheezing  Cardiovascular: Positive for chest pain, shortness of breath, palpitations and leg swelling  Gastrointestinal: Negative for abdominal distention, abdominal pain, anal bleeding, blood in stool, constipation, diarrhea, nausea and vomiting  Endocrine: Negative for polydipsia, polyphagia and polyuria  Genitourinary: Negative for difficulty urinating, dysuria, flank pain and hematuria  Musculoskeletal: Negative for arthralgias, myalgias and neck stiffness  Skin: Negative for pallor and rash  Allergic/Immunologic: Negative for environmental allergies  Neurological: Negative for dizziness, syncope, light-headedness, numbness and headaches  Hematological: Negative for adenopathy  Does not bruise/bleed easily  Psychiatric/Behavioral: Negative for confusion and hallucinations  The patient is not nervous/anxious        Historical Information   Past Medical History:   Diagnosis Date    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    With Cerebral Infarction:  Last Assessed:  October 20, 2016    Chronic kidney disease 2012    on dialysis     [facility]     Hypertension     Labyrinthitis 09/10/2011    [facility] ) 2014    x3 others were in 2009 & 2010    Sleep disturbances 09/20/2010    Stroke [facility]) 2007    x1    Type 2 diabetes, uncontrolled, with renal manifestation [facility] 05/03/2017     Past Surgical History:   Procedure Laterality Date    AV FISTULA PLACEMENT      CARDIAC SURGERY  2010    stents x3    COLONOSCOPY N/A 12/12/2016 Procedure: COLONOSCOPY;  Surgeon: Miky Blanco MD;  Location: [facility]; Service:     COLONOSCOPY N/A 4/3/2017    Procedure:  COLONOSCOPY;  Surgeon: Miky Blanco MD;  Location: [facility];   Service:     IR AV FISTULA/GRAFT DECLOT  4/29/2021    IR TUNNELED DIALYSIS CATHETER PLACEMENT  5/24/2021     Social History     Substance and Sexual Activity   Alcohol Use Yes    Comment: rarely - No alcohol use per [employer]     Social History     Substance and Sexual Activity   Drug Use No     Social History     Tobacco Use   Smoking Status Current Every Day Smoker    Packs/day: 0 50    Years: 30 00    Pack years: 15 00   Smokeless Tobacco Never Used     Family History:   Family History   Problem Relation Age of Onset    Leukemia Mother     Crohn's disease Father     Transient ischemic attack Brother        Meds/Allergies     No Known Allergies    Current Outpatient Medications:     albuterol (PROAIR HFA) 90 mcg/act inhaler, Inhale 2 puffs every 6 (six) hours as needed for wheezing, Disp: 8 5 g, Rfl: 0    amLODIPine (NORVASC) 10 mg tablet, Take 10 mg by mouth 2 (two) times a day, Disp: , Rfl:     aspirin 81 MG tablet, Take 81 mg by mouth daily, Disp: , Rfl:     B Complex-C-Folic Acid (TRIPHROCAPS) 1 MG CAPS, Take 1 capsule by mouth, Disp: , Rfl:     clopidogrel (PLAVIX) 75 mg tablet, Take 75 mg by mouth daily, Disp: , Rfl:     glucose blood test strip, by In Vitro route 2 (two) times a day, Disp: , Rfl:     Insulin Glargine (LANTUS SOLOSTAR) 100 UNIT/ML SOPN, Inject 8 Units under the skin daily, Disp: , Rfl:     LOSARTAN POTASSIUM PO, Take 50 mg by mouth daily, Disp: , Rfl:     nitroglycerin (NITROSTAT) 0 4 mg SL tablet, Place under the tongue, Disp: , Rfl:     rifaximin (XIFAXAN) 550 mg tablet, Take by mouth, Disp: , Rfl:     rosuvastatin (CRESTOR) 20 MG tablet, Take 20 mg by mouth every evening, Disp: , Rfl: 1    warfarin (COUMADIN) 2 mg tablet, Take 2 mg by mouth, Disp: , Rfl:     Vitals: Blood pressure 122/68, pulse 75, temperature 99 3 °F (37 4 °C), temperature source Temporal, resp  rate 18, height 5' 10" (1 778 m), weight 90 3 kg (199 lb), SpO2 96 %  Body mass index is 28 55 kg/m²  Vitals:    05/26/21 1325   Weight: 90 3 kg (199 lb)     BP Readings from Last 3 Encounters:   05/26/21 122/68   05/24/21 168/87   05/11/21 94/50         PHYSICAL EXAMINATION:  Neurologic:  Alert & oriented x 3, no new focal deficits, Not in any acute distress,  Constitutional:  Well developed, well nourished, non-toxic appearance   Eyes:  Pupil equal and reacting to light, conjunctiva normal, No JVP, No LNP   HENT:  Atraumatic, oropharynx moist, Neck- normal range of motion, no tenderness,  Neck supple   Respiratory:  Bilateral air entry, mostly clear to auscultation  Cardiovascular: S1-S2 regular with a I/VI systolic murmur   GI:  Soft, nondistended, normal bowel sounds, nontender, no hepatosplenomegaly appreciated  Musculoskeletal:  Arthralgias  Skin:  Well hydrated, no rash   Lymphatic:  No lymphadenopathy noted   Extremities:  Trace lower extremity edema    Diagnostic Studies Review Cardio:      EKG:  Normal sinus rhythm, heart rate 85 per minute, prolonged QTC at 497ms    Cardiac testing:   No results found for this or any previous visit  Imaging:  Chest X-Ray:   No Chest XR results available for this patient  CT-scan of the chest:     No CTA results available for this patient    Lab Review   Lab Results   Component Value Date    WBC 6 58 05/24/2021    HGB 10 3 (L) 05/24/2021    HCT 32 5 (L) 05/24/2021     (H) 05/24/2021    RDW 13 7 05/24/2021     (L) 05/24/2021     BMP:  Lab Results   Component Value Date    SODIUM 144 05/24/2021    K 3 9 05/24/2021     05/24/2021    CO2 30 05/24/2021    BUN 30 (H) 05/24/2021    CREATININE 12 44 (H) 05/24/2021    GLUC 125 05/24/2021    CALCIUM 8 6 05/24/2021    CORRECTEDCA 9 1 05/24/2021    EGFR 4 05/24/2021    MG 2 5 10/25/2016     LFT:  Lab Results Component Value Date    AST 20 05/24/2021    ALT 16 05/24/2021    ALKPHOS 114 05/24/2021    TP 7 7 05/24/2021    ALB 3 4 (L) 05/24/2021      Lab Results   Component Value Date    MNE1IOPSXMGS 3 190 10/25/2016     No components found for: [employer] [facility]  Lab Results   Component Value Date    HGBA1C 5 4 04/11/2017     Lipid Profile:   Lab Results   Component Value Date    CHOLESTEROL 123 10/25/2016    HDL 72 (H) 10/25/2016    LDLCALC 35 10/25/2016    TRIG 78 10/25/2016     Lab Results   Component Value Date    CHOLESTEROL 123 10/25/2016     No results found for: CKTOTAL, CKMB, CKMBINDEX, TROPONINI  No results found for: NTBNP   Recent Results (from the past 672 hour(s))   CBC and differential    Collection Time: 04/28/21  5:23 PM   Result Value Ref Range    WBC 8 31 4 31 - 10 16 Thousand/uL    RBC 2 87 (L) 3 88 - 5 62 Million/uL    Hemoglobin 11 1 (L) 12 0 - 17 0 g/dL    Hematocrit 32 1 (L) 36 5 - 49 3 %     (H) 82 - 98 fL    MCH 38 7 (H) 26 8 - 34 3 pg    MCHC 34 6 31 4 - 37 4 g/dL    RDW 13 1 11 6 - 15 1 %    MPV 10 0 8 9 - 12 7 fL    Platelets 709 (L) 225 - 390 Thousands/uL    nRBC 0 /100 WBCs    Neutrophils Relative 81 (H) 43 - 75 %    Immat GRANS % 1 0 - 2 %    Lymphocytes Relative 10 (L) 14 - 44 %    Monocytes Relative 5 4 - 12 %    Eosinophils Relative 2 0 - 6 %    Basophils Relative 1 0 - 1 %    Neutrophils Absolute 6 86 1 85 - 7 62 Thousands/µL    Immature Grans Absolute 0 04 0 00 - 0 20 Thousand/uL    Lymphocytes Absolute 0 81 0 60 - 4 47 Thousands/µL    Monocytes Absolute 0 41 0 17 - 1 22 Thousand/µL    Eosinophils Absolute 0 13 0 00 - 0 61 Thousand/µL    Basophils Absolute 0 06 0 00 - 0 10 Thousands/µL   Comprehensive metabolic panel    Collection Time: 04/28/21  5:23 PM   Result Value Ref Range    Sodium 142 136 - 145 mmol/L    Potassium 4 1 3 5 - 5 3 mmol/L    Chloride 98 (L) 100 - 108 mmol/L    CO2 26 21 - 32 mmol/L    ANION GAP 18 (H) 4 - 13 mmol/L    BUN 67 (H) 5 - 25 mg/dL    Creatinine 17 39 (H) 0 60 - 1 30 mg/dL    Glucose 134 65 - 140 mg/dL    Calcium 8 2 (L) 8 3 - 10 1 mg/dL    Corrected Calcium 8 8 8 3 - 10 1 mg/dL    AST 31 5 - 45 U/L    ALT 31 12 - 78 U/L    Alkaline Phosphatase 117 (H) 46 - 116 U/L    Total Protein 7 4 6 4 - 8 2 g/dL    Albumin 3 3 (L) 3 5 - 5 0 g/dL    Total Bilirubin 0 74 0 20 - 1 00 mg/dL    eGFR 2 ml/min/1 73sq m   Protime-INR    Collection Time: 04/28/21  8:32 PM   Result Value Ref Range    Protime 15 0 (H) 11 6 - 14 5 seconds    INR 1 17 0 84 - 9 57   Basic metabolic panel    Collection Time: 04/28/21  8:32 PM   Result Value Ref Range    Sodium 141 136 - 145 mmol/L    Potassium 3 3 (L) 3 5 - 5 3 mmol/L    Chloride 99 (L) 100 - 108 mmol/L    CO2 25 21 - 32 mmol/L    ANION GAP 17 (H) 4 - 13 mmol/L    BUN 66 (H) 5 - 25 mg/dL    Creatinine 17 23 (H) 0 60 - 1 30 mg/dL    Glucose 129 65 - 140 mg/dL    Calcium 7 8 (L) 8 3 - 10 1 mg/dL    eGFR 2 ml/min/1 73sq m   CBC    Collection Time: 04/28/21  8:32 PM   Result Value Ref Range    WBC 6 96 4 31 - 10 16 Thousand/uL    RBC 2 53 (L) 3 88 - 5 62 Million/uL    Hemoglobin 9 9 (L) 12 0 - 17 0 g/dL    Hematocrit 29 5 (L) 36 5 - 49 3 %     (H) 82 - 98 fL    MCH 39 1 (H) 26 8 - 34 3 pg    MCHC 33 6 31 4 - 37 4 g/dL    RDW 13 0 11 6 - 15 1 %    Platelets 702 (L) 199 - 390 Thousands/uL    MPV 9 6 8 9 - 12 7 fL   Comprehensive metabolic panel    Collection Time: 04/29/21  4:41 AM   Result Value Ref Range    Sodium 142 136 - 145 mmol/L    Potassium 3 2 (L) 3 5 - 5 3 mmol/L    Chloride 101 100 - 108 mmol/L    CO2 25 21 - 32 mmol/L    ANION GAP 16 (H) 4 - 13 mmol/L    BUN 70 (H) 5 - 25 mg/dL    Creatinine 17 74 (H) 0 60 - 1 30 mg/dL    Glucose 125 65 - 140 mg/dL    Calcium 7 5 (L) 8 3 - 10 1 mg/dL    Corrected Calcium 8 4 8 3 - 10 1 mg/dL    AST 13 5 - 45 U/L    ALT 22 12 - 78 U/L    Alkaline Phosphatase 102 46 - 116 U/L    Total Protein 6 3 (L) 6 4 - 8 2 g/dL    Albumin 2 9 (L) 3 5 - 5 0 g/dL    Total Bilirubin 0 73 0 20 - 1 00 mg/dL    eGFR 2 ml/min/1 73sq m   Protime-INR    Collection Time: 04/29/21  4:41 AM   Result Value Ref Range    Protime 14 6 (H) 11 6 - 14 5 seconds    INR 1 13 0 84 - 1 19   CBC and differential    Collection Time: 05/24/21  1:45 PM   Result Value Ref Range    WBC 6 58 4 31 - 10 16 Thousand/uL    RBC 2 74 (L) 3 88 - 5 62 Million/uL    Hemoglobin 10 3 (L) 12 0 - 17 0 g/dL    Hematocrit 32 5 (L) 36 5 - 49 3 %     (H) 82 - 98 fL    MCH 37 6 (H) 26 8 - 34 3 pg    MCHC 31 7 31 4 - 37 4 g/dL    RDW 13 7 11 6 - 15 1 %    MPV 9 8 8 9 - 12 7 fL    Platelets 591 (L) 085 - 390 Thousands/uL    nRBC 0 /100 WBCs    Neutrophils Relative 81 (H) 43 - 75 %    Immat GRANS % 1 0 - 2 %    Lymphocytes Relative 10 (L) 14 - 44 %    Monocytes Relative 5 4 - 12 %    Eosinophils Relative 2 0 - 6 %    Basophils Relative 1 0 - 1 %    Neutrophils Absolute 5 39 1 85 - 7 62 Thousands/µL    Immature Grans Absolute 0 03 0 00 - 0 20 Thousand/uL    Lymphocytes Absolute 0 66 0 60 - 4 47 Thousands/µL    Monocytes Absolute 0 32 0 17 - 1 22 Thousand/µL    Eosinophils Absolute 0 12 0 00 - 0 61 Thousand/µL    Basophils Absolute 0 06 0 00 - 0 10 Thousands/µL   Comprehensive metabolic panel    Collection Time: 05/24/21  1:45 PM   Result Value Ref Range    Sodium 144 136 - 145 mmol/L    Potassium 3 9 3 5 - 5 3 mmol/L    Chloride 102 100 - 108 mmol/L    CO2 30 21 - 32 mmol/L    ANION GAP 12 4 - 13 mmol/L    BUN 30 (H) 5 - 25 mg/dL    Creatinine 12 44 (H) 0 60 - 1 30 mg/dL    Glucose 125 65 - 140 mg/dL    Calcium 8 6 8 3 - 10 1 mg/dL    Corrected Calcium 9 1 8 3 - 10 1 mg/dL    AST 20 5 - 45 U/L    ALT 16 12 - 78 U/L    Alkaline Phosphatase 114 46 - 116 U/L    Total Protein 7 7 6 4 - 8 2 g/dL    Albumin 3 4 (L) 3 5 - 5 0 g/dL    Total Bilirubin 0 63 0 20 - 1 00 mg/dL    eGFR 4 ml/min/1 73sq m           Dr Shaka Chan MD, [facility]      "This note has been constructed using a voice recognition system  Therefore there may be syntax, spelling, and/or grammatical errors   Please call if you have any questions  "

## 2021-05-27 ENCOUNTER — TELEPHONE (OUTPATIENT)
Dept: VASCULAR SURGERY | Facility: CLINIC | Age: 66
End: 2021-05-27

## 2021-05-27 NOTE — TELEPHONE ENCOUNTER
LM for patient to call the office to confirm new appointment time and date with Dr Andrew Jain on 6/25/2021  Patient had r/s his vein mapping and his OV needed to be r/s  Wanted to put him in a spot since we are booking so far out

## 2021-05-28 ENCOUNTER — TELEPHONE (OUTPATIENT)
Dept: CARDIOLOGY CLINIC | Facility: CLINIC | Age: 66
End: 2021-05-28

## 2021-06-03 ENCOUNTER — HOSPITAL ENCOUNTER (OUTPATIENT)
Dept: RADIOLOGY | Facility: HOSPITAL | Age: 66
Discharge: HOME | End: 2021-06-03
Attending: INTERNAL MEDICINE
Payer: MEDICARE

## 2021-06-03 ENCOUNTER — TELEPHONE (OUTPATIENT)
Dept: CARDIOLOGY CLINIC | Facility: CLINIC | Age: 66
End: 2021-06-03

## 2021-06-03 ENCOUNTER — HOSPITAL ENCOUNTER (OUTPATIENT)
Dept: NON INVASIVE DIAGNOSTICS | Facility: HOSPITAL | Age: 66
Discharge: HOME | End: 2021-06-03
Attending: INTERNAL MEDICINE
Payer: MEDICARE

## 2021-06-03 DIAGNOSIS — I25.118 CORONARY ARTERY DISEASE OF NATIVE ARTERY OF NATIVE HEART WITH STABLE ANGINA PECTORIS (HCC): ICD-10-CM

## 2021-06-03 DIAGNOSIS — Z01.810 PREOP CARDIOVASCULAR EXAM: ICD-10-CM

## 2021-06-03 DIAGNOSIS — R07.9 CHEST PAIN IN ADULT: ICD-10-CM

## 2021-06-03 LAB
CHEST PAIN STATEMENT: NORMAL
MAX DIASTOLIC BP: 79 MMHG
MAX HEART RATE: 93 BPM
MAX PREDICTED HEART RATE: 153 BPM
MAX. SYSTOLIC BP: 164 MMHG
PROTOCOL NAME: NORMAL
REASON FOR TERMINATION: NORMAL
TARGET HR FORMULA: NORMAL
TIME IN EXERCISE PHASE: NORMAL

## 2021-06-03 PROCEDURE — 78452 HT MUSCLE IMAGE SPECT MULT: CPT | Performed by: INTERNAL MEDICINE

## 2021-06-03 PROCEDURE — A9502 TC99M TETROFOSMIN: HCPCS

## 2021-06-03 PROCEDURE — G1004 CDSM NDSC: HCPCS

## 2021-06-03 PROCEDURE — 93016 CV STRESS TEST SUPVJ ONLY: CPT | Performed by: INTERNAL MEDICINE

## 2021-06-03 PROCEDURE — 93018 CV STRESS TEST I&R ONLY: CPT | Performed by: INTERNAL MEDICINE

## 2021-06-03 PROCEDURE — 93017 CV STRESS TEST TRACING ONLY: CPT

## 2021-06-03 PROCEDURE — 78452 HT MUSCLE IMAGE SPECT MULT: CPT

## 2021-06-03 RX ADMIN — REGADENOSON 0.4 MG: 0.08 INJECTION, SOLUTION INTRAVENOUS at 09:15

## 2021-06-03 NOTE — TELEPHONE ENCOUNTER
----- Message from Jenel Galeazzi, MD sent at 6/3/2021  3:36 PM EDT -----  Please call and inform patient that his stress test was abnormal   Please call the office to make appointment to discuss the findings and further testing and treatment plans

## 2021-06-03 NOTE — TELEPHONE ENCOUNTER
Pt's stress test was abnormal and he needs an OV w/ cardiology per their note from today  They have left the pt a message

## 2021-06-08 ENCOUNTER — TELEPHONE (OUTPATIENT)
Dept: CARDIOLOGY CLINIC | Facility: CLINIC | Age: 66
End: 2021-06-08

## 2021-06-08 NOTE — TELEPHONE ENCOUNTER
----- Message from Sanket Smith MD sent at 6/3/2021  3:36 PM EDT -----  Please call and inform patient that his stress test was abnormal   Please call the office to make appointment to discuss the findings and further testing and treatment plans

## 2021-06-08 NOTE — TELEPHONE ENCOUNTER
Spoke with patient, message given per Dr Robina Dickens  Patient verbalized understanding  Transferred to  for appointment

## 2021-06-09 ENCOUNTER — HOSPITAL ENCOUNTER (OUTPATIENT)
Dept: RADIOLOGY | Facility: HOSPITAL | Age: 66
Discharge: HOME | End: 2021-06-09
Attending: SURGERY
Payer: MEDICARE

## 2021-06-09 DIAGNOSIS — T82.868A AV FISTULA THROMBOSIS, INITIAL ENCOUNTER (HCC): ICD-10-CM

## 2021-06-09 DIAGNOSIS — N18.6 END STAGE RENAL DISEASE (HCC): ICD-10-CM

## 2021-06-09 DIAGNOSIS — I25.10 ARTERIOSCLEROSIS OF CORONARY ARTERY: ICD-10-CM

## 2021-06-09 PROCEDURE — 93985 DUP-SCAN HEMO COMPL BI STD: CPT

## 2021-06-09 PROCEDURE — 93985 DUP-SCAN HEMO COMPL BI STD: CPT | Performed by: SURGERY

## 2021-06-11 ENCOUNTER — OFFICE VISIT (OUTPATIENT)
Dept: CARDIOLOGY CLINIC | Facility: CLINIC | Age: 66
End: 2021-06-11
Payer: MEDICARE

## 2021-06-11 VITALS
WEIGHT: 205 LBS | HEIGHT: 70 IN | HEART RATE: 109 BPM | SYSTOLIC BLOOD PRESSURE: 140 MMHG | TEMPERATURE: 98.6 F | DIASTOLIC BLOOD PRESSURE: 90 MMHG | OXYGEN SATURATION: 97 % | RESPIRATION RATE: 18 BRPM | BODY MASS INDEX: 29.35 KG/M2

## 2021-06-11 DIAGNOSIS — I10 ESSENTIAL HYPERTENSION: ICD-10-CM

## 2021-06-11 DIAGNOSIS — I25.118 CORONARY ARTERY DISEASE OF NATIVE ARTERY OF NATIVE HEART WITH STABLE ANGINA PECTORIS (HCC): Primary | ICD-10-CM

## 2021-06-11 PROCEDURE — 99214 OFFICE O/P EST MOD 30 MIN: CPT | Performed by: INTERNAL MEDICINE

## 2021-06-11 RX ORDER — CARVEDILOL 3.12 MG/1
3.12 TABLET ORAL 2 TIMES DAILY WITH MEALS
Qty: 180 TABLET | Refills: 3 | Status: SHIPPED | OUTPATIENT
Start: 2021-06-11 | End: 2022-07-25

## 2021-06-11 NOTE — TELEPHONE ENCOUNTER
Pt saw Dr Stacy Dominique today and per note, "I discussed with him the findings of his stress test and informed him that there is some increased cardiac risk for his vascular procedure  I offered him to undergo further testing with catheterization and if needed PCI before the vascular procedure or have the vascular procedure done 1st with some increased cardiac risk  Patient prefers to have the AV fistula done 1st since he is tired of the dialysis catheter  His cardiac medical treatment is being optimized      As such patient has intermediate risk of perioperative cardiac complications with vascular surgery/procedure      Patient understands and accepts the risk and prefers to have the vascular surgery done prior to having further cardiac workup and intervention"

## 2021-06-11 NOTE — PROGRESS NOTES
Progress Note - Cardiology Office  [facility]    Pauly Hatch 78 y o  male MRN: [id number]  : 1955  Encounter: [id number]      ASSESSMENT:  Perioperative cardiac risk assessment for AV fistula by vascular surgery  Patient had mildly abnormal stress test showing a small area of reversible ischemia in the inferior and lateral walls  He does not have any anginal symptoms and is hemodynamically stable  I discussed with him the findings of his stress test and informed him that there is some increased cardiac risk for his vascular procedure  I offered him to undergo further testing with catheterization and if needed PCI before the vascular procedure or have the vascular procedure done 1st with some increased cardiac risk  Patient prefers to have the AV fistula done 1st since he is tired of the dialysis catheter  His cardiac medical treatment is being optimized  As such patient has intermediate risk of perioperative cardiac complications with vascular surgery/procedure      Patient understands and accepts the risk and prefers to have the vascular surgery done prior to having further cardiac workup and intervention     CAD,   Had 3 VD by Cath in 2008, s/p PTCA with bare metal stent to circumflex and PDA and POBA of diagnonal  s/p MI in ,  and acute NSTEMI on 2011 s/p PCI/KARLOS of distal RCA(WS) at [facility]    Patient has not had any cardiology follow-up for 10 years and has not had any testing done either     Chest pain,   occasional requiring use of nitroglycerin    Pharmacologic stress test, 2021:  Abnormal Lexiscan nuclear stress test  There is a small area of reversible ischemia noted in the inferior and lateral walls  Estimated left ventricular ejection fraction is 44%  TID ratio is 1 04     End-stage renal disease, on HD, recurrent occlusion/thrombosis of left AV fistula, status post placement of left neck PermCath     Diabetes mellitus, type 2     Hypertension     Hyperlipidemia     History of CVA 2006 with MRI showing thrombus in the right vertebral artery     On anticoagulation with warfarin  Patient does not know why he is on warfarin but denies history of atrial fibrillation        RECOMMENDATIONS:  Stop amlodipine  Start Coreg 3 125 mg b i d  and titrate up to control blood pressure with heart rate parameters    Patient has intermediate risk of perioperative cardiac complications with vascular procedure/surgery  Patient understands and accepts this risk and prefers to have vascular surgery done prior to further cardiac investigation and intervention      Please call [phone] if any questions  HPI :     Derrick Pink is a 78y o  year old male who came for follow up  Patient had a pharmacologic stress test done as part of his perioperative cardiac risk assessment    The result of the stress test was discussed in detail with the patient and he was also informed of the perioperative cardiac risk in new of the small inferior and lateral wall abnormalities noted on the stress test   Patient denies having any chest pain  Patient understands and accepts the cardiac risk and prefers to have the vascular procedure done prior to undergoing further cardiac testing or intervention        REVIEW OF SYSTEMS:  Review of Systems  Patient denies having chest pain, unusual dyspnea, palpitations or syncope  He has ambulatory/gait dysfunction    Historical Information   Past Medical History:   Diagnosis Date    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    With Cerebral Infarction:  Last Assessed:  October 20, 2016    Chronic kidney disease 2012    on dialysis     Diabetes mellitus ([facility] )     Hypertension     Labyrinthitis 09/10/2011    Myocardial infarction ([facility] ) 2014    x3 others were in 2009 & 2010    Sleep disturbances 09/20/2010    Stroke [facility]) 2007    x1    Type 2 diabetes, uncontrolled, with renal manifestation ([facility] ) 05/03/2017     Past Surgical History:   Procedure Laterality Date    AV FISTULA PLACEMENT      CARDIAC SURGERY  2010    stents x3    COLONOSCOPY N/A 12/12/2016    Procedure: COLONOSCOPY;  Surgeon: Janet Decker MD;  Location: [facility] GI [facility]; Service:     COLONOSCOPY N/A 4/3/2017    Procedure:  COLONOSCOPY;  Surgeon: Janet Decker MD;  Location: [facility];   Service:     IR AV FISTULA/GRAFT DECLOT  4/29/2021    IR TUNNELED DIALYSIS CATHETER PLACEMENT  5/24/2021     Social History     Substance and Sexual Activity   Alcohol Use Yes    Comment: rarely - No alcohol use per [employer]     Social History     Substance and Sexual Activity   Drug Use No     Social History     Tobacco Use   Smoking Status Current Every Day Smoker    Packs/day: 0 50    Years: 30 00    Pack years: 15 00   Smokeless Tobacco Never Used     Family History:   Family History   Problem Relation Age of Onset    Leukemia Mother     Crohn's disease Father     Transient ischemic attack Brother        Meds/Allergies     No Known Allergies    Current Outpatient Medications:     albuterol (PROAIR HFA) 90 mcg/act inhaler, Inhale 2 puffs every 6 (six) hours as needed for wheezing (Patient not taking: Reported on 5/26/2021), Disp: 8 5 g, Rfl: 0    amLODIPine (NORVASC) 10 mg tablet, Take 10 mg by mouth 2 (two) times a day, Disp: , Rfl:     aspirin 81 MG tablet, Take 81 mg by mouth daily, Disp: , Rfl:     B Complex-C-Folic Acid (TRIPHROCAPS) 1 MG CAPS, Take 1 capsule by mouth, Disp: , Rfl:     clopidogrel (PLAVIX) 75 mg tablet, Take 75 mg by mouth daily, Disp: , Rfl:     glucose blood test strip, by In Vitro route 2 (two) times a day, Disp: , Rfl:     Insulin Glargine (LANTUS SOLOSTAR) 100 UNIT/ML SOPN, Inject 8 Units under the skin daily, Disp: , Rfl:     LOSARTAN POTASSIUM PO, Take 50 mg by mouth daily, Disp: , Rfl:     nitroglycerin (NITROSTAT) 0 4 mg SL tablet, Place under the tongue, Disp: , Rfl:     pantoprazole (PROTONIX) 40 mg tablet, Take 40 mg by mouth daily, Disp: , Rfl:     rifaximin (XIFAXAN) 550 mg tablet, Take by mouth, Disp: , Rfl:     rosuvastatin (CRESTOR) 20 MG tablet, Take 20 mg by mouth every evening, Disp: , Rfl: 1    warfarin (COUMADIN) 2 mg tablet, Take 2 mg by mouth, Disp: , Rfl:     Vitals: There were no vitals taken for this visit  There is no height or weight on file to calculate BMI  There were no vitals filed for this visit  BP Readings from Last 3 Encounters:   21 122/68   21 168/87   21 94/50       Physical Exam:  Physical Exam    Neurologic:  Alert & oriented x 3, no new focal deficits, Not in any acute distress,  Constitutional:  Well developed, well nourished, non-toxic appearance   Eyes:  Pupil equal and reacting to light, conjunctiva normal,   HENT:  Neck is supple, no JVD or carotid bruit  Respiratory:  Bilateral air entry, mostly clear to auscultation  Cardiovascular: S1-S2 regular with a I/VI ejection systolic murmur   GI:  Soft, nondistended, normal bowel sounds, nontender, no hepatosplenomegaly appreciated  Musculoskeletal:  Trace edema, and arthralgias  Skin:  Well hydrated, no rash   Lymphatic:  No lymphadenopathy noted   Extremities:   There is trace bilateral lower extremity edema        Diagnostic Studies Review Cardio:    Cardiac testing:     Results for orders placed during the hospital encounter of 21   NM myocardial perfusion spect (rx stress and/or rest)    Sophia Sanchez [address]  [address] [facility] 6 [phone]    Rest/Stress Gated SPECT Myocardial Perfusion Imaging After Regadenoson    Patient: Dylon Ayala  MR number: [id number]  Account number: [de-identified]  : 1955  Age: 78 years  Gender: Male  Status: Outpatient  Location: Stress lab  Height: 70 in  Weight: 199 lb  BP: 164/ 79 mmHg    Allergies: NO KNOWN ALLERGIES    Diagnosis: R07 9 - Chest pain, unspecified, Z01 810 - Encounter for preprocedural cardiovascular examination    Primary Physician:  TESSY Spencer  RN:  PAULINE Bundy  Referring Physician:  Shaka Chan MD  Group:  Aneudy Aleshia  Report Prepared By[de-identified]  PAULINE Bundy  Interpreting Physician:  Shaka Chan MD    INDICATIONS: Evaluation of known coronary artery disease  Pre-operative risk assessment  HISTORY: The patient is male  a 78year old Chest pain status: chest pain  Coronary artery disease risk factors: dyslipidemia, hypertension, smoking, family history of premature coronary artery disease, and diabetes mellitus  Medications:  a blood thinner, a calcium channel blocker, an ACE inhibitor/ARB, aspirin, clopidogrel, a lipid lowering agent, and diabetic medications  PHYSICAL EXAM: Baseline physical exam screening: scant wheezing audible  REST ECG: Normal sinus rhythm  The ECG showed rare premature ventricular contractions  PROCEDURE: The study was performed in the the Stress lab  A regadenoson infusion pharmacologic stress test was performed  Gated SPECT myocardial perfusion imaging was performed after stress and at rest  Systolic blood pressure was 164  mmHg, at the start of the study  Diastolic blood pressure was 79 mmHg, at the start of the study  The heart rate was 76 bpm, at the start of the study  IV double checked  Regadenoson protocol:  Time HR bpm SBP mmHg DBP mmHg Symptoms Rhythm/conduct  Baseline 09:12 76 164 79 none NSR, rare PVC's  Immediate 09:15 91 149 75 none same as above  1 min 09:16 88 151 75 none NSR  2 min 09:17 85 146 63 none same as above  3 min 09:18 81 155 70 none same as above  4 min 09:19 87 140 68 none same as above  No medications or fluids given  STRESS SUMMARY: Duration of pharmacologic stress was 3 min  Maximal heart rate during stress was 93 bpm  The heart rate response to stress was normal  There was resting hypertension with an appropriate blood pressure response to stress    The rate-pressure product for the peak heart rate and blood pressure was 35247  There was no chest pain during stress  The stress test was terminated due to protocol completion  Pre oxygen saturation: 98 %  Peak oxygen saturation: 99 %  No  EKG evidence of Lexiscan induced myocardial ischemia    ISOTOPE ADMINISTRATION:  Resting isotope administration Stress isotope administration  Agent Tetrofosmin Tetrofosmin  Dose 10 2 mCi 32 6 mCi  Date 06/03/2021 06/03/2021    Stress isotope administration  Agent Tetrofosmin  Dose 10 2 mCi  Date 06/03/2021    The radiopharmaceutical was injected at the peak effect of pharmacologic stress  MYOCARDIAL PERFUSION IMAGING:  There is a small sized mild intensity reversible perfusion defect noted in the inferior and lateral walls  SSS 5, SRS 2, SDS 3    GATED SPECT:  Estimated left ventricular ejection fraction is 44%  TID ratio 1 04    SUMMARY:  -  Stress results: There was resting hypertension with an appropriate blood pressure response to stress  There was no chest pain during stress  IMPRESSIONS: Abnormal Lexiscan nuclear stress test  There is a small area of reversible ischemia noted in the inferior and lateral walls  Estimated left ventricular ejection fraction is 44%  TID ratio is 1 04    Prepared and signed by    Catia Ghosh MD  Signed 06/03/2021 13:59:02           Imaging:  Chest X-Ray:   No Chest XR results available for this patient  CT-scan of the chest:     No CTA results available for this patient    Lab Review   Lab Results   Component Value Date    WBC 6 58 05/24/2021    HGB 10 3 (L) 05/24/2021    HCT 32 5 (L) 05/24/2021     (H) 05/24/2021    RDW 13 7 05/24/2021     (L) 05/24/2021     BMP:  Lab Results   Component Value Date    SODIUM 144 05/24/2021    K 3 9 05/24/2021     05/24/2021    CO2 30 05/24/2021    BUN 30 (H) 05/24/2021    CREATININE 12 44 (H) 05/24/2021    GLUC 125 05/24/2021    CALCIUM 8 6 05/24/2021    CORRECTEDCA 9 1 05/24/2021    EGFR 4 05/24/2021    MG 2 5 10/25/2016     LFT:  Lab Results Component Value Date    AST 20 05/24/2021    ALT 16 05/24/2021    ALKPHOS 114 05/24/2021    TP 7 7 05/24/2021    ALB 3 4 (L) 05/24/2021      No components found for: [employer] [facility]  Lab Results   Component Value Date    VGZ7HAOLIHXY 3 190 10/25/2016     Lab Results   Component Value Date    HGBA1C 5 4 04/11/2017     Lipid Profile:   Lab Results   Component Value Date    CHOLESTEROL 123 10/25/2016    HDL 72 (H) 10/25/2016    LDLCALC 35 10/25/2016    TRIG 78 10/25/2016     Lab Results   Component Value Date    CHOLESTEROL 123 10/25/2016     No results found for: CKTOTAL, CKMB, CKMBINDEX, TROPONINI  No results found for: NTBNP   Recent Results (from the past 672 hour(s))   CBC and differential    Collection Time: 05/24/21  1:45 PM   Result Value Ref Range    WBC 6 58 4 31 - 10 16 Thousand/uL    RBC 2 74 (L) 3 88 - 5 62 Million/uL    Hemoglobin 10 3 (L) 12 0 - 17 0 g/dL    Hematocrit 32 5 (L) 36 5 - 49 3 %     (H) 82 - 98 fL    MCH 37 6 (H) 26 8 - 34 3 pg    MCHC 31 7 31 4 - 37 4 g/dL    RDW 13 7 11 6 - 15 1 %    MPV 9 8 8 9 - 12 7 fL    Platelets 506 (L) 782 - 390 Thousands/uL    nRBC 0 /100 WBCs    Neutrophils Relative 81 (H) 43 - 75 %    Immat GRANS % 1 0 - 2 %    Lymphocytes Relative 10 (L) 14 - 44 %    Monocytes Relative 5 4 - 12 %    Eosinophils Relative 2 0 - 6 %    Basophils Relative 1 0 - 1 %    Neutrophils Absolute 5 39 1 85 - 7 62 Thousands/µL    Immature Grans Absolute 0 03 0 00 - 0 20 Thousand/uL    Lymphocytes Absolute 0 66 0 60 - 4 47 Thousands/µL    Monocytes Absolute 0 32 0 17 - 1 22 Thousand/µL    Eosinophils Absolute 0 12 0 00 - 0 61 Thousand/µL    Basophils Absolute 0 06 0 00 - 0 10 Thousands/µL   Comprehensive metabolic panel    Collection Time: 05/24/21  1:45 PM   Result Value Ref Range    Sodium 144 136 - 145 mmol/L    Potassium 3 9 3 5 - 5 3 mmol/L    Chloride 102 100 - 108 mmol/L    CO2 30 21 - 32 mmol/L    ANION GAP 12 4 - 13 mmol/L    BUN 30 (H) 5 - 25 mg/dL    Creatinine 12 44 (H) 0 60 - 1 30 mg/dL    Glucose 125 65 - 140 mg/dL    Calcium 8 6 8 3 - 10 1 mg/dL    Corrected Calcium 9 1 8 3 - 10 1 mg/dL    AST 20 5 - 45 U/L    ALT 16 12 - 78 U/L    Alkaline Phosphatase 114 46 - 116 U/L    Total Protein 7 7 6 4 - 8 2 g/dL    Albumin 3 4 (L) 3 5 - 5 0 g/dL    Total Bilirubin 0 63 0 20 - 1 00 mg/dL    eGFR 4 ml/min/1 73sq m   Stress strip    Collection Time: 06/03/21  9:12 AM   Result Value Ref Range    Protocol Name 100  [employer] SIT     Time In Exercise Phase 00:01:00     MAX  SYSTOLIC  mmHg    Max Diastolic Bp 79 mmHg    Max Heart Rate 93 BPM    Max Predicted Heart Rate 153 BPM    Reason for Termination PROTOCOL COMPLETED     Test Indication Pre-Op Evaluation  Chest Discomfort       Target Hr Formular (220 - Age)*100%     Arrhy During Ex      ECG Interp Before Ex      ECG Interp during Ex      Ex Summary Comment      Chest Pain Statement none     Overall Hr Response To Exercise      Overall BP Response To Exercise               Dr Willi Mcgovern MD, [facility]      "This note has been constructed using a voice recognition system  Therefore there may be syntax, spelling, and/or grammatical errors   Please call if you have any questions  "

## 2021-06-25 ENCOUNTER — OFFICE VISIT (OUTPATIENT)
Dept: VASCULAR SURGERY | Facility: CLINIC | Age: 66
End: 2021-06-25
Payer: MEDICARE

## 2021-06-25 ENCOUNTER — TELEPHONE (OUTPATIENT)
Dept: ADMINISTRATIVE | Facility: HOSPITAL | Age: 66
End: 2021-06-25

## 2021-06-25 VITALS
DIASTOLIC BLOOD PRESSURE: 86 MMHG | HEART RATE: 73 BPM | SYSTOLIC BLOOD PRESSURE: 130 MMHG | WEIGHT: 205 LBS | HEIGHT: 70 IN | BODY MASS INDEX: 29.35 KG/M2 | TEMPERATURE: 98.3 F

## 2021-06-25 DIAGNOSIS — N18.6 END STAGE RENAL DISEASE (HCC): Primary | ICD-10-CM

## 2021-06-25 PROCEDURE — 99214 OFFICE O/P EST MOD 30 MIN: CPT | Performed by: SURGERY

## 2021-06-25 RX ORDER — CHLORHEXIDINE GLUCONATE 0.12 MG/ML
15 RINSE ORAL ONCE
Status: CANCELLED | OUTPATIENT
Start: 2021-07-22 | End: 2021-06-25

## 2021-06-25 RX ORDER — CEFAZOLIN SODIUM 2 G/50ML
2000 SOLUTION INTRAVENOUS ONCE
Status: CANCELLED | OUTPATIENT
Start: 2021-07-22 | End: 2021-06-25

## 2021-06-25 NOTE — ASSESSMENT & PLAN NOTE
Lab Results   Component Value Date    EGFR 4 05/24/2021    EGFR 2 04/29/2021    EGFR 2 04/28/2021    CREATININE 12 44 (H) 05/24/2021    CREATININE 17 74 (H) 04/29/2021    CREATININE 17 23 (H) 04/28/2021     Left upper extremity radiocephalic fistula now occluded  Patient receiving hemodialysis via a left IJ tunneled PermCath  Will schedule for left upper extremity brachiocephalic AV fistula  Patient will require cardiology risk assessment/clearance prior to procedure  Will attempt to schedule within the next 2 weeks

## 2021-06-25 NOTE — PROGRESS NOTES
Assessment/Plan:    End stage renal disease [facility])  Lab Results   Component Value Date    EGFR 4 05/24/2021    EGFR 2 04/29/2021    EGFR 2 04/28/2021    CREATININE 12 44 (H) 05/24/2021    CREATININE 17 74 (H) 04/29/2021    CREATININE 17 23 (H) 04/28/2021     Left upper extremity radiocephalic fistula now occluded  Patient receiving hemodialysis via a left IJ tunneled PermCath  Will schedule for left upper extremity brachiocephalic AV fistula  Patient will require cardiology risk assessment/clearance prior to procedure  Will attempt to schedule within the next 2 weeks  Diagnoses and all orders for this visit:    End stage renal disease ([facility] [address] )  -     Case request operating room: CREATION FISTULA ARTERIOVENOUS (AV); Standing  -     Basic metabolic panel; Future  -     CBC and Platelet; Future  -     APTT; Future  -     PAT Covid Screening; Future  -     Protime-INR; Future  -     Case request operating room: CREATION FISTULA ARTERIOVENOUS (AV)    Other orders  -     Cholecalciferol 50 MCG (2000 UT) CAPS; Take by mouth daily  -     Apply SCD only; Standing  -     Diet NPO; Sips with meds; Standing  -     Void on call to OR; Standing  -     Insert peripheral IV; Standing  -     Nursing Communication CHG bath, have staff wash entire body (neck down) per pre op bathing protocol  Routine, evening prior to, and day of surgery ; Standing  -     Nursing Communication Swab both nares with Povidone-Iodine solution, EXCLUDE if patient has shellfish/Iodine allergy  Routine, day of surgery, on call to OR ; Standing  -     chlorhexidine (PERIDEX) 0 12 % oral rinse 15 mL  -     Place sequential compression device; Standing  -     Shower/scrub; Standing  -     ceFAZolin (ANCEF) IVPB (premix in dextrose) 2,000 mg 50 mL          Subjective:      Patient ID: Fiona Markham is a 78 y o  male  Patient is here to review vein mapping done on 6/09/21    Patient was previously seen in May at which time recommendation for the patient to undergo vein mapping was given  Unfortunately patient did not return to office to discuss surgery  In the interim the left upper extremity radiocephalic fistula has occluded  He now receives his hemodialysis via a left IJ tunneled PermCath  The vein mapping suggests adequate cephalic vein from the antecubital fossa to the shoulder ranging from 2 8-3 mm  The brachial artery bifurcates distal to the antecubital fossa and measures 5 6 mm  Will plan for left upper extremity brachiocephalic AV fistula within the next 1-2 weeks  Patient does have active angina requiring nitroglycerin  Will require cardiology clearance  This was discussed with patient  Risks of surgery discussed with patient  Patient expressed understanding and wishes me to proceed  undergo the vein mapping until Patient is right handed  Patient is currently on dialysis [address] M-W-F  Patient is taking ASA 81mg and smokes 1/2 ppd  Previous visit:  Patient is here to   Discuss new hemodialysis access  Patient has a left upper extremity radiocephalic AV fistula placed approximately 10 years ago  The fistula has now required declotting twice in the past month  We will obtain a vein mapping study to determine best location for subsequent hemodialysis access  Patient with significant coronary history having undergone PTCA approximately 6 years ago per the patient  Patient states he has occasional chest pain and takes nitroglycerin  Therefore we will obtain a cardiology risk assessment and further testing as needed per Cardiology prior to fistula creation  Patient is on HD at [address] on M/W/F  Patient's fistula has thrill and bruit  Patient takes ASA 81mg, Rosuvastatin, Plavix, and Warfarin  Patient is a current 0 5 ppd smoker         The following portions of the patient's history were reviewed and updated as appropriate: allergies, current medications, past family history, past medical history, past social history, past surgical history and problem list     Review of Systems   Constitutional: Negative  HENT: Negative  Eyes: Negative  Respiratory: Negative  Cardiovascular: Negative  Gastrointestinal: Negative  Endocrine: Negative  Genitourinary: Negative  Musculoskeletal: Negative  Skin: Negative  Allergic/Immunologic: Negative  Neurological: Negative  Hematological: Negative  Psychiatric/Behavioral: Negative  Objective:      /86 (BP Location: Right arm, Patient Position: Sitting, Cuff Size: Standard)   Pulse 73   Temp 98 3 °F (36 8 °C) (Tympanic)   Ht 5' 10" (1 778 m)   Wt 93 kg (205 lb)   BMI 29 41 kg/m²          Physical Exam  Vitals and nursing note reviewed  Constitutional:       Appearance: He is well-developed  HENT:      Head: Normocephalic and atraumatic  Eyes:      Conjunctiva/sclera: Conjunctivae normal       Pupils: Pupils are equal, round, and reactive to light  Neck:      Vascular: No JVD  Cardiovascular:      Rate and Rhythm: Normal rate and regular rhythm  Heart sounds: Normal heart sounds  Pulmonary:      Effort: Pulmonary effort is normal  No respiratory distress  Breath sounds: Normal breath sounds  No stridor  No wheezing or rales  Chest:      Chest wall: No tenderness  Abdominal:      General: There is no distension  Palpations: Abdomen is soft  There is no mass  Tenderness: There is no abdominal tenderness  There is no guarding or rebound  Musculoskeletal:         General: No tenderness or deformity  Normal range of motion  Cervical back: Normal range of motion and neck supple  Skin:     General: Skin is warm and dry  Findings: No erythema or rash  Neurological:      Mental Status: He is alert and oriented to person, place, and time  Psychiatric:         Behavior: Behavior normal          Thought Content:  Thought content normal            Operative Scheduling Information:    Hospital:  [facility]    Physician:  Me    Surgery: CHARLES TORRES-NUZHAT needs cards clearence    Urgency:  Urgent: 2wks    Case Length:  Normal    Post-op Bed:  Outpatient    OR Table:  Standard    Equipment Needs:  None    Medication Instructions:  Aspirin:   Continue (do not hold)  Plavix:  Continue (do not hold)    Hydration:  No

## 2021-06-25 NOTE — TELEPHONE ENCOUNTER
S/w pt's wife, Darleen Alves, to schedule his [facility] BC-AVF with Dr Jhony Rasmussen for 7/15/21 but she said she will be away that week and he would like to wait until after so that she can be there to take care of him  I advised we will call next week with a new date

## 2021-06-25 NOTE — H&P (VIEW-ONLY)
Assessment/Plan:    End stage renal disease [facility])  Lab Results   Component Value Date    EGFR 4 05/24/2021    EGFR 2 04/29/2021    EGFR 2 04/28/2021    CREATININE 12 44 (H) 05/24/2021    CREATININE 17 74 (H) 04/29/2021    CREATININE 17 23 (H) 04/28/2021     Left upper extremity radiocephalic fistula now occluded  Patient receiving hemodialysis via a left IJ tunneled PermCath  Will schedule for left upper extremity brachiocephalic AV fistula  Patient will require cardiology risk assessment/clearance prior to procedure  Will attempt to schedule within the next 2 weeks  Diagnoses and all orders for this visit:    End stage renal disease ([facility] [address] )  -     Case request operating room: CREATION FISTULA ARTERIOVENOUS (AV); Standing  -     Basic metabolic panel; Future  -     CBC and Platelet; Future  -     APTT; Future  -     PAT Covid Screening; Future  -     Protime-INR; Future  -     Case request operating room: CREATION FISTULA ARTERIOVENOUS (AV)    Other orders  -     Cholecalciferol 50 MCG (2000 UT) CAPS; Take by mouth daily  -     Apply SCD only; Standing  -     Diet NPO; Sips with meds; Standing  -     Void on call to OR; Standing  -     Insert peripheral IV; Standing  -     Nursing Communication CHG bath, have staff wash entire body (neck down) per pre op bathing protocol  Routine, evening prior to, and day of surgery ; Standing  -     Nursing Communication Swab both nares with Povidone-Iodine solution, EXCLUDE if patient has shellfish/Iodine allergy  Routine, day of surgery, on call to OR ; Standing  -     chlorhexidine (PERIDEX) 0 12 % oral rinse 15 mL  -     Place sequential compression device; Standing  -     Shower/scrub; Standing  -     ceFAZolin (ANCEF) IVPB (premix in dextrose) 2,000 mg 50 mL          Subjective:      Patient ID: Nickie Flores is a 78 y o  male  Patient is here to review vein mapping done on 6/09/21    Patient was previously seen in May at which time recommendation for the patient to undergo vein mapping was given  Unfortunately patient did not return to office to discuss surgery  In the interim the left upper extremity radiocephalic fistula has occluded  He now receives his hemodialysis via a left IJ tunneled PermCath  The vein mapping suggests adequate cephalic vein from the antecubital fossa to the shoulder ranging from 2 8-3 mm  The brachial artery bifurcates distal to the antecubital fossa and measures 5 6 mm  Will plan for left upper extremity brachiocephalic AV fistula within the next 1-2 weeks  Patient does have active angina requiring nitroglycerin  Will require cardiology clearance  This was discussed with patient  Risks of surgery discussed with patient  Patient expressed understanding and wishes me to proceed  undergo the vein mapping until Patient is right handed  Patient is currently on dialysis [address] M-W-F  Patient is taking ASA 81mg and smokes 1/2 ppd  Previous visit:  Patient is here to   Discuss new hemodialysis access  Patient has a left upper extremity radiocephalic AV fistula placed approximately 10 years ago  The fistula has now required declotting twice in the past month  We will obtain a vein mapping study to determine best location for subsequent hemodialysis access  Patient with significant coronary history having undergone PTCA approximately 6 years ago per the patient  Patient states he has occasional chest pain and takes nitroglycerin  Therefore we will obtain a cardiology risk assessment and further testing as needed per Cardiology prior to fistula creation  Patient is on HD at [address] on M/W/F  Patient's fistula has thrill and bruit  Patient takes ASA 81mg, Rosuvastatin, Plavix, and Warfarin  Patient is a current 0 5 ppd smoker         The following portions of the patient's history were reviewed and updated as appropriate: allergies, current medications, past family history, past medical history, past social history, past surgical history and problem list     Review of Systems   Constitutional: Negative  HENT: Negative  Eyes: Negative  Respiratory: Negative  Cardiovascular: Negative  Gastrointestinal: Negative  Endocrine: Negative  Genitourinary: Negative  Musculoskeletal: Negative  Skin: Negative  Allergic/Immunologic: Negative  Neurological: Negative  Hematological: Negative  Psychiatric/Behavioral: Negative  Objective:      /86 (BP Location: Right arm, Patient Position: Sitting, Cuff Size: Standard)   Pulse 73   Temp 98 3 °F (36 8 °C) (Tympanic)   Ht 5' 10" (1 778 m)   Wt 93 kg (205 lb)   BMI 29 41 kg/m²          Physical Exam  Vitals and nursing note reviewed  Constitutional:       Appearance: He is well-developed  HENT:      Head: Normocephalic and atraumatic  Eyes:      Conjunctiva/sclera: Conjunctivae normal       Pupils: Pupils are equal, round, and reactive to light  Neck:      Vascular: No JVD  Cardiovascular:      Rate and Rhythm: Normal rate and regular rhythm  Heart sounds: Normal heart sounds  Pulmonary:      Effort: Pulmonary effort is normal  No respiratory distress  Breath sounds: Normal breath sounds  No stridor  No wheezing or rales  Chest:      Chest wall: No tenderness  Abdominal:      General: There is no distension  Palpations: Abdomen is soft  There is no mass  Tenderness: There is no abdominal tenderness  There is no guarding or rebound  Musculoskeletal:         General: No tenderness or deformity  Normal range of motion  Cervical back: Normal range of motion and neck supple  Skin:     General: Skin is warm and dry  Findings: No erythema or rash  Neurological:      Mental Status: He is alert and oriented to person, place, and time  Psychiatric:         Behavior: Behavior normal          Thought Content:  Thought content normal            Operative Scheduling Information:    Hospital[facility]  [facility]    Physician:  Me    Surgery: CHARLES LINO needs cards clearence    Urgency:  Urgent: 2wks    Case Length:  Normal    Post-op Bed:  Outpatient    OR Table:  Standard    Equipment Needs:  None    Medication Instructions:  Aspirin:   Continue (do not hold)  Plavix:  Continue (do not hold)    Hydration:  No

## 2021-06-25 NOTE — TELEPHONE ENCOUNTER
I see this note under another telephone encounter dated 5/11/21  I think he is already cleared  June 11, 2021  Hernandez Valentine RN  to Liz Cha MD  [facility] Surgery Coordinator        9:25 AM  Note     Pt saw Dr Jamel Guillen today and per note, "I discussed with him the findings of his stress test and informed him that there is some increased cardiac risk for his vascular procedure  I offered him to undergo further testing with catheterization and if needed PCI before the vascular procedure or have the vascular procedure done 1st with some increased cardiac risk  Patient prefers to have the AV fistula done 1st since he is tired of the dialysis catheter  His cardiac medical treatment is being optimized      As such patient has intermediate risk of perioperative cardiac complications with vascular surgery/procedure      Patient understands and accepts the risk and prefers to have the vascular surgery done prior to having further cardiac workup and intervention"      Liz Cha MD  to Hernandez Valentine RN    GD    12:57 PM  ok

## 2021-06-25 NOTE — TELEPHONE ENCOUNTER
Dr Haim Stroud, please see note from 6/11/21, pt was cleared  See note for details  Removed from clearance board

## 2021-06-25 NOTE — LETTER
June 25, 2021     Suri Hairston, [address] [address]    Patient: Marko Valente   YOB: 1955   Date of Visit: 6/25/2021       Dear Dr Stefani Arenas: Thank you for referring Juany Hernandez to me for evaluation  Below are the relevant portions of my assessment and plan of care  Patient is here to review vein mapping done on 6/09/21  Patient was previously seen in May at which time recommendation for the patient to undergo vein mapping was given  Unfortunately patient did not return to office to discuss surgery  In the interim the left upper extremity radiocephalic fistula has occluded  He now receives his hemodialysis via a left IJ tunneled PermCath  The vein mapping suggests adequate cephalic vein from the antecubital fossa to the shoulder ranging from 2 8-3 mm  The brachial artery bifurcates distal to the antecubital fossa and measures 5 6 mm  Will plan for left upper extremity brachiocephalic AV fistula within the next 1-2 weeks  Patient does have active angina requiring nitroglycerin  Will require cardiology clearance  This was discussed with patient  Risks of surgery discussed with patient  Patient expressed understanding and wishes me to proceed  undergo the vein mapping until Patient is right handed  Patient is currently on dialysis [address] 7 M-W-F  Patient is taking ASA 81mg and smokes 1/2 ppd  If you have questions, please do not hesitate to call me  I look forward to following Beryl Gaspar along with you           Sincerely,        Liz Cha MD        CC: [facility]

## 2021-06-25 NOTE — TELEPHONE ENCOUNTER
REMINDER: Under Reason For Call, comments MUST be formatted as:   (Surgeon's Initials) / (Procedure)    Physician / Hospital: Janette Santana (NPI: [id number]) / Amna (Tax: [id number] / NPI: [id number])    Procedure: LUE BC-AVF     Level: 2 - Route clearance(s) to [facility] Clearance Pool     Equipment / Rep Needs: No    Assistant Surgeon: No    Allergies: Patient has no known allergies  Instructions Given: NO Bowel Prep General Instructions     Blood Thinners / Medication Hold: Do not hold Rx - Plavix (Clopidogrel)  and Aspirin (ASA)   Hydration Required: Patient does not require hydration  Dialysis: Yes  [facility] MON WED [facility]    Consent: I certify that patient has signed, printed, timed, and dated their surgery consent  I certify that BOTH sides of the completed surgery consent have been scanned into the patient's Epic chart by myself on 6/25/2021  Yes, I have LABELED the consent in Epic as Consent for Vascular Procedure  Clearances     Levels   1-3 ROUTE this encounter to [facility] Clearance Pool   AND   SEND Clearance Form(s) to Vascular Nursing e-mail group   Level   4 ROUTE this encounter to [facility] Surgery Coordinator Pool  AND   SEND Clearance Form(s) to Vascular Surgery Schedulers e-mail group     (1) ONE CLEARANCE NEEDED - Patient requires Cardiology  clearance  Spoke withSAHRA , at [address]  NO APPOINTMENT REQUIRED FOR CLEARANCE - Per office staff, patient recently seen in office and does not require an appointment  Office contact information P: [phone] - F: [phone]    Yes, I have ROUTED this encounter to [facility] Surgery Coordinator and/or [facility] Clearance Pool

## 2021-06-28 ENCOUNTER — PREP FOR PROCEDURE (OUTPATIENT)
Dept: VASCULAR SURGERY | Facility: CLINIC | Age: 66
End: 2021-06-28

## 2021-06-28 NOTE — TELEPHONE ENCOUNTER
Authorization requirements reviewed  Please refer to Sherita Fallon / Chris Fall number [id number] for case updates

## 2021-06-28 NOTE — TELEPHONE ENCOUNTER
Is patient requesting a call when authorization has been obtained? Patient did not request a call  Surgery Date: 7-22-21  Primary Surgeon: Erma Aguilar (NPI: [id number])  Assisting Surgeon: Not Applicable (N/A)  Facility: [facility] (Tax: [id number] / NPI: [id number])  Inpatient / Outpatient: Outpatient  Level: 2    Clearance Received: Yes, Cardiology   Consent Received: Yes, scanned into Epic on 6-25-21  Medication Hold / Last Dose: No Hold on ASA or Plavix  VQI Spreadsheet: Yes  IR Notified: Not Applicable (N/A)  Rep  Notified: Not Applicable (N/A)  Equipment Needs: Not Applicable (N/A)  Vas Lab Requested: Not Applicable (N/A)  Patient Contacted: 6-28-21    Diagnosis: N18 6  Procedure/ CPT Code(s): (AV) Arteriovenous Fistula // CPT: 37843    For varicose vein related procedures, last LEVDR? Not Applicable (N/A)    Post Operative Date/ Time: 8-6-21 , 11:45am [facility] ([place]) with Erma Aguilar (NPI: [id number])     *Please review with patient med hold, PATs, and check H&P *  PATIENT WAS MAILED SURGERY/SHOWERING/DISCHARGE/COVID INSTRUCTIONS AFTER REVIEWING WITH HER VIA PHONE CALL       Spoke to patient to schedule his surgery patient will go for blood work and have the Covid test done on 7-16-21 [facility]

## 2021-06-29 ENCOUNTER — APPOINTMENT (OUTPATIENT)
Dept: LAB | Facility: CLINIC | Age: 66
End: 2021-06-29
Payer: MEDICARE

## 2021-06-29 DIAGNOSIS — N18.6 END STAGE RENAL DISEASE (HCC): ICD-10-CM

## 2021-06-29 LAB
ANION GAP SERPL CALCULATED.3IONS-SCNC: 5 MMOL/L (ref 4–13)
APTT PPP: 29 SECONDS (ref 23–37)
BUN SERPL-MCNC: 12 MG/DL (ref 5–25)
CALCIUM SERPL-MCNC: 8.8 MG/DL (ref 8.3–10.1)
CHLORIDE SERPL-SCNC: 104 MMOL/L (ref 100–108)
CO2 SERPL-SCNC: 28 MMOL/L (ref 21–32)
CREAT SERPL-MCNC: 7.52 MG/DL (ref 0.6–1.3)
ERYTHROCYTE [DISTWIDTH] IN BLOOD BY AUTOMATED COUNT: 13.4 % (ref 11.6–15.1)
GFR SERPL CREATININE-BSD FRML MDRD: 7 ML/MIN/1.73SQ M
GLUCOSE P FAST SERPL-MCNC: 135 MG/DL (ref 65–99)
HCT VFR BLD AUTO: 32.5 % (ref 36.5–49.3)
HGB BLD-MCNC: 10.7 G/DL (ref 12–17)
INR PPP: 1.08 (ref 0.84–1.19)
MCH RBC QN AUTO: 37.9 PG (ref 26.8–34.3)
MCHC RBC AUTO-ENTMCNC: 32.9 G/DL (ref 31.4–37.4)
MCV RBC AUTO: 115 FL (ref 82–98)
PLATELET # BLD AUTO: 137 THOUSANDS/UL (ref 149–390)
PMV BLD AUTO: 11.3 FL (ref 8.9–12.7)
POTASSIUM SERPL-SCNC: 3.3 MMOL/L (ref 3.5–5.3)
PROTHROMBIN TIME: 14 SECONDS (ref 11.6–14.5)
RBC # BLD AUTO: 2.82 MILLION/UL (ref 3.88–5.62)
SODIUM SERPL-SCNC: 137 MMOL/L (ref 136–145)
WBC # BLD AUTO: 7.28 THOUSAND/UL (ref 4.31–10.16)

## 2021-06-29 PROCEDURE — 85610 PROTHROMBIN TIME: CPT

## 2021-06-29 PROCEDURE — 80048 BASIC METABOLIC PNL TOTAL CA: CPT

## 2021-06-29 PROCEDURE — 85730 THROMBOPLASTIN TIME PARTIAL: CPT

## 2021-06-29 PROCEDURE — 85027 COMPLETE CBC AUTOMATED: CPT

## 2021-06-29 PROCEDURE — 36415 COLL VENOUS BLD VENIPUNCTURE: CPT

## 2021-07-20 NOTE — PRE-PROCEDURE INSTRUCTIONS
My Surgical Experience    The following information was developed to assist you to prepare for your operation  What do I need to do before coming to the hospital?   Arrange for a responsible person to drive you to and from the hospital    Arrange care for your children at home  Children are not allowed in the recovery areas of the hospital   Plan to wear clothing that is easy to put on and take off  If you are having shoulder surgery, wear a shirt that buttons or zippers in the front  Bathing  o Shower the evening before and the morning of your surgery with an antibacterial soap  Please refer to the Pre Op Showering Instructions for Surgery Patients Sheet   o Remove nail polish and all body piercing jewelry  o Do not shave any body part for at least 24 hours before surgery-this includes face, arms, legs and upper body  Food  o Nothing to eat or drink after midnight the night before your surgery  This includes candy and chewing gum  o Exception: If your surgery is after 12:00pm (noon), you may have clear liquids such as 7-Up®, ginger ale, apple or cranberry juice, Jell-O®, water, or clear broth until 8:00 am  o Do not drink milk or juice with pulp on the morning before surgery  o Do not drink alcohol 24 hours before surgery  Medicine  o Follow instructions you received from your surgeon about which medicines you may take on the day of surgery  o If instructed to take medicine on the morning of surgery, take pills with just a small sip of water  Call your prescribing doctor for specific infroamtion on what to do if you take insulin    What should I bring to the hospital?    Bring:  Yue  or a walker, if you have them, for foot or knee surgery   A list of the daily medicines, vitamins, minerals, herbals and nutritional supplements you take   Include the dosages of medicines and the time you take them each day   Glasses, dentures or hearing aids   Minimal clothing; you will be wearing hospital sleepwear   Photo ID; required to verify your identity   If you have a Living Will or Power of , bring a copy of the documents   If you have an ostomy, bring an extra pouch and any supplies you use    Do not bring   Medicines or inhalers   Money, valuables or jewelry    What other information should I know about the day of surgery?  Notify your surgeons if you develop a cold, sore throat, cough, fever, rash or any other illness   Report to the Ambulatory Surgical/Same Day Surgery Unit   You will be instructed to stop at Registration only if you have not been pre-registered   Inform your  fi they do not stay that they will be asked by the staff to leave a phone number where they can be reached   Be available to be reached before surgery  In the event the operating room schedule changes, you may be asked to come in earlier or later than expected    *It is important to tell your doctor and others involved in your health care if you are taking or have been taking any non-prescription drugs, vitamins, minerals, herbals or other nutritional supplements  Any of these may interact with some food or medicines and cause a reaction      Pre-Surgery Instructions:   Medication Instructions    aspirin 81 MG tablet Instructed patient per Anesthesia Guidelines   B Complex-C-Folic Acid (TRIPHROCAPS) 1 MG CAPS Instructed patient per Anesthesia Guidelines   carvedilol (COREG) 3 125 mg tablet Instructed patient per Anesthesia Guidelines   Cholecalciferol 50 MCG (2000 UT) CAPS Instructed patient per Anesthesia Guidelines   clopidogrel (PLAVIX) 75 mg tablet Instructed patient per Anesthesia Guidelines   glucose blood test strip Instructed patient per Anesthesia Guidelines   nitroglycerin (NITROSTAT) 0 4 mg SL tablet Instructed patient per Anesthesia Guidelines   pantoprazole (PROTONIX) 40 mg tablet Instructed patient per Anesthesia Guidelines      rosuvastatin (CRESTOR) 20 MG tablet Instructed patient per Anesthesia Guidelines  To take coreg and protonix a m  of surgery

## 2021-07-21 ENCOUNTER — ANESTHESIA EVENT (OUTPATIENT)
Dept: PERIOP | Facility: HOSPITAL | Age: 66
End: 2021-07-21
Payer: MEDICARE

## 2021-07-22 ENCOUNTER — ANESTHESIA (OUTPATIENT)
Dept: PERIOP | Facility: HOSPITAL | Age: 66
End: 2021-07-22
Payer: MEDICARE

## 2021-07-22 ENCOUNTER — HOSPITAL ENCOUNTER (OUTPATIENT)
Facility: HOSPITAL | Age: 66
Setting detail: OUTPATIENT SURGERY
Discharge: HOME | End: 2021-07-22
Attending: SURGERY | Admitting: SURGERY
Payer: MEDICARE

## 2021-07-22 VITALS
OXYGEN SATURATION: 96 % | SYSTOLIC BLOOD PRESSURE: 134 MMHG | TEMPERATURE: 97.8 F | HEART RATE: 76 BPM | RESPIRATION RATE: 18 BRPM | DIASTOLIC BLOOD PRESSURE: 63 MMHG

## 2021-07-22 DIAGNOSIS — Z98.890 S/P ARTERIOVENOUS (AV) FISTULA CREATION: Primary | ICD-10-CM

## 2021-07-22 LAB
GLUCOSE SERPL-MCNC: 104 MG/DL (ref 65–140)
GLUCOSE SERPL-MCNC: 148 MG/DL (ref 65–140)
POTASSIUM SERPL-SCNC: 3.1 MMOL/L (ref 3.5–5.3)

## 2021-07-22 PROCEDURE — 36821 AV FUSION DIRECT ANY SITE: CPT | Performed by: SURGERY

## 2021-07-22 PROCEDURE — 82948 REAGENT STRIP/BLOOD GLUCOSE: CPT

## 2021-07-22 PROCEDURE — 84132 ASSAY OF SERUM POTASSIUM: CPT | Performed by: ANESTHESIOLOGY

## 2021-07-22 RX ORDER — PROPOFOL 10 MG/ML
INJECTION, EMULSION INTRAVENOUS AS NEEDED
Status: DISCONTINUED | OUTPATIENT
Start: 2021-07-22 | End: 2021-07-22

## 2021-07-22 RX ORDER — SODIUM CHLORIDE 9 MG/ML
20 INJECTION, SOLUTION INTRAVENOUS CONTINUOUS
Status: CANCELLED | OUTPATIENT
Start: 2021-07-22

## 2021-07-22 RX ORDER — FENTANYL CITRATE 50 UG/ML
INJECTION, SOLUTION INTRAMUSCULAR; INTRAVENOUS AS NEEDED
Status: DISCONTINUED | OUTPATIENT
Start: 2021-07-22 | End: 2021-07-22

## 2021-07-22 RX ORDER — ONDANSETRON 2 MG/ML
INJECTION INTRAMUSCULAR; INTRAVENOUS AS NEEDED
Status: DISCONTINUED | OUTPATIENT
Start: 2021-07-22 | End: 2021-07-22

## 2021-07-22 RX ORDER — ACETAMINOPHEN AND CODEINE PHOSPHATE 300; 30 MG/1; MG/1
1 TABLET ORAL EVERY 8 HOURS PRN
Qty: 10 TABLET | Refills: 0 | Status: SHIPPED | OUTPATIENT
Start: 2021-07-22 | End: 2021-07-26 | Stop reason: HOSPADM

## 2021-07-22 RX ORDER — HEPARIN SODIUM 1000 [USP'U]/ML
INJECTION, SOLUTION INTRAVENOUS; SUBCUTANEOUS AS NEEDED
Status: DISCONTINUED | OUTPATIENT
Start: 2021-07-22 | End: 2021-07-22

## 2021-07-22 RX ORDER — WARFARIN SODIUM 2 MG/1
2 TABLET ORAL
Qty: 30 TABLET | Refills: 0
Start: 2021-07-23 | End: 2021-07-26 | Stop reason: HOSPADM

## 2021-07-22 RX ORDER — SODIUM CHLORIDE 9 MG/ML
30 INJECTION, SOLUTION INTRAVENOUS CONTINUOUS
Status: DISCONTINUED | OUTPATIENT
Start: 2021-07-22 | End: 2021-07-22 | Stop reason: HOSPADM

## 2021-07-22 RX ORDER — CHLORHEXIDINE GLUCONATE 0.12 MG/ML
15 RINSE ORAL ONCE
Status: COMPLETED | OUTPATIENT
Start: 2021-07-22 | End: 2021-07-22

## 2021-07-22 RX ORDER — CLOPIDOGREL BISULFATE 75 MG/1
75 TABLET ORAL DAILY
Qty: 30 TABLET | Refills: 0
Start: 2021-07-23

## 2021-07-22 RX ORDER — PROPOFOL 10 MG/ML
INJECTION, EMULSION INTRAVENOUS CONTINUOUS PRN
Status: DISCONTINUED | OUTPATIENT
Start: 2021-07-22 | End: 2021-07-22

## 2021-07-22 RX ORDER — ONDANSETRON 2 MG/ML
4 INJECTION INTRAMUSCULAR; INTRAVENOUS ONCE AS NEEDED
Status: DISCONTINUED | OUTPATIENT
Start: 2021-07-22 | End: 2021-07-22 | Stop reason: HOSPADM

## 2021-07-22 RX ORDER — CEFAZOLIN SODIUM 2 G/50ML
2000 SOLUTION INTRAVENOUS ONCE
Status: COMPLETED | OUTPATIENT
Start: 2021-07-22 | End: 2021-07-22

## 2021-07-22 RX ORDER — LIDOCAINE HYDROCHLORIDE 10 MG/ML
INJECTION, SOLUTION EPIDURAL; INFILTRATION; INTRACAUDAL; PERINEURAL AS NEEDED
Status: DISCONTINUED | OUTPATIENT
Start: 2021-07-22 | End: 2021-07-22

## 2021-07-22 RX ORDER — FENTANYL CITRATE/PF 50 MCG/ML
50 SYRINGE (ML) INJECTION
Status: DISCONTINUED | OUTPATIENT
Start: 2021-07-22 | End: 2021-07-22 | Stop reason: HOSPADM

## 2021-07-22 RX ADMIN — CHLORHEXIDINE GLUCONATE 15 ML: 1.2 SOLUTION ORAL at 10:46

## 2021-07-22 RX ADMIN — PROPOFOL 50 MG: 10 INJECTION, EMULSION INTRAVENOUS at 12:42

## 2021-07-22 RX ADMIN — PROPOFOL 50 MG: 10 INJECTION, EMULSION INTRAVENOUS at 13:07

## 2021-07-22 RX ADMIN — PROPOFOL 50 MG: 10 INJECTION, EMULSION INTRAVENOUS at 12:24

## 2021-07-22 RX ADMIN — PROPOFOL 50 MG: 10 INJECTION, EMULSION INTRAVENOUS at 12:37

## 2021-07-22 RX ADMIN — FENTANYL CITRATE 25 MCG: 50 INJECTION, SOLUTION INTRAMUSCULAR; INTRAVENOUS at 13:05

## 2021-07-22 RX ADMIN — PHENYLEPHRINE HYDROCHLORIDE 20 MCG/MIN: 10 INJECTION INTRAVENOUS at 12:46

## 2021-07-22 RX ADMIN — FENTANYL CITRATE 75 MCG: 50 INJECTION, SOLUTION INTRAMUSCULAR; INTRAVENOUS at 13:09

## 2021-07-22 RX ADMIN — HEPARIN SODIUM 3000 UNITS: 1000 INJECTION INTRAVENOUS; SUBCUTANEOUS at 13:13

## 2021-07-22 RX ADMIN — CEFAZOLIN SODIUM 2000 MG: 2 SOLUTION INTRAVENOUS at 12:14

## 2021-07-22 RX ADMIN — FENTANYL CITRATE 25 MCG: 50 INJECTION, SOLUTION INTRAMUSCULAR; INTRAVENOUS at 12:20

## 2021-07-22 RX ADMIN — PROPOFOL 150 MG: 10 INJECTION, EMULSION INTRAVENOUS at 12:20

## 2021-07-22 RX ADMIN — FENTANYL CITRATE 75 MCG: 50 INJECTION, SOLUTION INTRAMUSCULAR; INTRAVENOUS at 12:40

## 2021-07-22 RX ADMIN — SODIUM CHLORIDE 30 ML/HR: 0.9 INJECTION, SOLUTION INTRAVENOUS at 10:46

## 2021-07-22 RX ADMIN — ONDANSETRON 4 MG: 2 INJECTION INTRAMUSCULAR; INTRAVENOUS at 12:39

## 2021-07-22 RX ADMIN — LIDOCAINE HYDROCHLORIDE 50 MG: 10 INJECTION, SOLUTION EPIDURAL; INFILTRATION; INTRACAUDAL; PERINEURAL at 12:20

## 2021-07-22 NOTE — PERIOPERATIVE NURSING NOTE
Pt  Received from PACU in APU-10 from OR procedure  Operative limb WDL, VSS and pt tolerating liquid, no pain, dressing is dry and intact  Side rails up and call bell at bedside

## 2021-07-22 NOTE — ANESTHESIA POSTPROCEDURE EVALUATION
Post-Op Assessment Note    CV Status:  Stable  Pain Score: 0       Mental Status:  Arousable and sleepy   Hydration Status:  Stable   PONV Controlled:  None   Airway Patency:  Patent      Post Op Vitals Reviewed: Yes      Staff: CRNA   Comments: spontaneously breathing, HOB @ 30 degrees, vss, simnple mask to O2, fully endorsed to recovery w/o AC  Protecting airway  No complications documented      BP   108/64   Temp      Pulse  75   Resp   15   SpO2   98

## 2021-07-22 NOTE — DISCHARGE INSTRUCTIONS
DISCHARGE INSTRUCTIONS  DIALYSIS FISTULA SURGERY    Following discharge from the hospital, you may have some questions about your operation, your activities or your general condition  These instructions may answer some of your questions and help you adjust during the first few weeks following your operation  ACTIVITY:  Limit use of the operated arm to what is absolutely necessary for the first day after surgery  On the second day after surgery, you may start to increase use of your arm as tolerated  One week after surgery, you should start to exercise your hand on the side of the graft by squeezing a ball  This increases blood flow in your graft and arm so your graft will function better  DIET:   Resume your normal diet  Try to eat low fat and low cholesterol foods  DRESSING:   The dressing may be removed on the third day following surgery  INCISION:   You may include the operated area in a shower on the fourth day following surgery  It is normal to have some pain at the surgical site  You will receive a prescription for pain medicine at the time of discharge  There will also be some swelling and bruising around the surgical site  If increasing redness or pain develops at the incision or severe pain, numbness or weakness occurs in the hand, call our office immediately  Numbness in the region of the incision may occur following the surgery  This normally resolves in six to twelve months  ARM SWELLING:    Most patients have some noticeable arm swelling after surgery  This usually disappears within a few weeks  If swelling is present, elevate the arm whenever possible  RESTRICTIONS:   Do not have blood draws, IVs, or blood pressures performed on the operated arm  FISTULA USE:    Your fistula will not be used for six to 12 weeks      PLEASE CALL THE OFFICE IF YOU HAVE ANY QUESTIONS  [phone] Ron Llamas [phone] Judge Andrew SORIANO [phone]  [address] [place] [address] [address], [facility], [address]  [address], [place], [address]  [address] [facility], [address]    [address], 1st Floor, Carla Schmidt [address]  [facility] 19, [address] [facility], [address] [facility] 97   12[address] [facility], [address] [facility], [address]  One [facility], [address] [facility], [facility],[address] [place] [address], Susan Castillo 6

## 2021-07-22 NOTE — INTERVAL H&P NOTE
H&P reviewed  After examining the patient I find no changes in the patients condition since the H&P had been written      Vitals:    07/22/21 0915   BP: 125/69   Pulse: 62   Temp: 97 8 °F (36 6 °C)   SpO2: 100%

## 2021-07-22 NOTE — PERIOPERATIVE NURSING NOTE
Pt remained consistent with findings noted upon return to APU  Discharge criteria was met, IV removed and post op instructions given  Pt and spouse Yogesh Jurist verbalize understanding

## 2021-07-22 NOTE — OP NOTE
OPERATIVE REPORT  PATIENT NAME: Jessica Black    :  1955  MRN: [id number]  Pt Location: [facility] OR ROOM 02    SURGERY DATE: 2021    Surgeon(s) and Role:     * Roxann Mooney MD - Primary     * Lucita Kevin PA-C - Assisting    Preop Diagnosis:  End stage renal disease ([facility] [address] ) [N18 6]    Post-Op Diagnosis Codes:     * End stage renal disease ([facility] [address] ) [N18 6]    Procedure(s) (LRB):  CREATION FISTULA ARTERIOVENOUS (AV) (Left)    Specimen(s):  * No specimens in log *    Estimated Blood Loss:   Minimal    Drains:  * No LDAs found *    Anesthesia Type:   General    Operative Indications:  End stage renal disease ([facility] [address] ) [N18 6]    Operative Findings:  5 mm brachial artery  Cephalic vein noted to be approximately 3 mm  Excellent thrill at case end  Robust signal at ulnar artery at case end  Complications:   None    Procedure and Technique:  The patient was placed on the operating table in the supine position and identified by armband identification and a verbal communication  The patient was prepped and draped in usual sterile fashion and a formal timeout was then called  General mask inhalational anesthesia was administered  A 3 cm transverse incision was made just distal to the antecubital fossa  The cephalic vein was identified and dissected free from surrounding tissue  The cephalic vein was noted to be of adequate caliber, approximately 3 mm  Next the brachial artery was dissected free from surrounding tissue  A silastic vessel loop was placed  Patient then received 3000 units of intravenous heparin  An 18 in sterile tourniquet was then placed over Webril  The arm was exsanguinated with Esmarch  The distal cephalic vein was transected  Distal ends were ligated with 2-0 silk suture  An end-to-side anastomosis was then fashioned after an arteriotomy was created with an 11 blade scalpel and Nuñez scissors  An end-to-side anastomosis was then performed using running 6 0 Prolene suture  Prior to completion of the anastomosis the brachial artery and cephalic vein was flushed with heparinized saline  The tourniquet was released  Flow was 1st established through the fistula  The anastomosis was then completed  An excellent thrill was noted in the cephalic vein  Robust Doppler signal noted at the brachial artery and ulnar artery  The wound was then reapproximated with 3-0 Monocryl and 4-0 Monocryl subcuticular stitch for skin  Exofin glue was applied  Patient tolerated the procedure well and left the operating room in stable condition  The patient was transferred to recovery in stable condition    I was present for the entire procedure, A qualified resident physician was not available and A physician assistant was required during the procedure for retraction tissue handling,dissection and suturing    Vascular Quality Initiative - Hemodialysis Access Placement    Previous Access: L RC-AVF    Status: Outpatient    Side:left    Arterial Anastomosis: Brachial, antecubital    Completion Fistulogram: no    Anesthesia: General    Access Type: AVF     Venous Anastomosis: Cephalic, antecubital    Preop ARTERIAL evaluation and/or treatment: duplex    Preop VENOUS evaluation and/or treatment: ultrasound mapping    *Obtain Target Diameters from study    I was present for the entire procedure, A qualified resident physician was not available and A physician assistant was required during the procedure for retraction tissue handling,dissection and suturing    Patient Disposition:  PACU     SIGNATURE: Tomer Stacy MD  DATE: July 22, 2021  TIME: 2:15 PM

## 2021-07-22 NOTE — ANESTHESIA PREPROCEDURE EVALUATION
Procedure:  CREATION FISTULA ARTERIOVENOUS (AV) (Left Arm Upper)    Relevant Problems   ANESTHESIA (within normal limits)      CARDIO   (+) Arteriosclerosis of coronary artery   (+) Benign essential hypertension   (+) Mixed hyperlipidemia      ENDO   (+) Long-term insulin use in type 2 diabetes (HCC)   (+) Type 2 diabetes mellitus (HCC)      /RENAL   (+) End stage renal disease (HCC)      HEMATOLOGY   (+) Anemia in chronic illness   (+) Anemia in chronic kidney disease (CKD)   (+) Pernicious anemia      PULMONARY   (+) Acute upper respiratory infection        Physical Exam    Airway    Mallampati score: II  TM Distance: >3 FB  Neck ROM: full     Dental   Comment: Some broken teeth without significant loose ,     Cardiovascular  Rhythm: regular, Rate: normal,     Pulmonary  Breath sounds clear to auscultation,     Other Findings        Anesthesia Plan  ASA Score- 4     Anesthesia Type- general with ASA Monitors  Additional Monitors:   Airway Plan: LMA  Plan Factors-    Chart reviewed  EKG reviewed  Existing labs reviewed  Patient summary reviewed  Patient is not a current smoker  Induction- intravenous  Postoperative Plan- Plan for postoperative opioid use  Planned trial extubation    Informed Consent- Anesthetic plan and risks discussed with patient  I personally reviewed this patient with the CRNA  Discussed and agreed on the Anesthesia Plan with the CRNA  Ruben Graves

## 2021-07-26 ENCOUNTER — HOSPITAL ENCOUNTER (OUTPATIENT)
Facility: HOSPITAL | Age: 66
Setting detail: OBSERVATION
Discharge: HOME | End: 2021-07-26
Attending: EMERGENCY MEDICINE | Admitting: INTERNAL MEDICINE
Payer: MEDICARE

## 2021-07-26 ENCOUNTER — APPOINTMENT (EMERGENCY)
Dept: RADIOLOGY | Facility: HOSPITAL | Age: 66
End: 2021-07-26
Payer: MEDICARE

## 2021-07-26 ENCOUNTER — APPOINTMENT (OUTPATIENT)
Dept: DIALYSIS | Facility: HOSPITAL | Age: 66
End: 2021-07-26
Payer: MEDICARE

## 2021-07-26 VITALS
WEIGHT: 196.21 LBS | OXYGEN SATURATION: 99 % | SYSTOLIC BLOOD PRESSURE: 157 MMHG | HEART RATE: 77 BPM | HEIGHT: 70 IN | BODY MASS INDEX: 28.09 KG/M2 | DIASTOLIC BLOOD PRESSURE: 70 MMHG | TEMPERATURE: 97.9 F | RESPIRATION RATE: 16 BRPM

## 2021-07-26 DIAGNOSIS — N18.6 END STAGE RENAL DISEASE (HCC): Primary | ICD-10-CM

## 2021-07-26 DIAGNOSIS — F17.200 SMOKING: ICD-10-CM

## 2021-07-26 DIAGNOSIS — R77.8 ELEVATED TROPONIN: ICD-10-CM

## 2021-07-26 DIAGNOSIS — E87.6 HYPOKALEMIA: ICD-10-CM

## 2021-07-26 DIAGNOSIS — R53.1 WEAKNESS: ICD-10-CM

## 2021-07-26 PROBLEM — R42 DIZZINESS: Status: ACTIVE | Noted: 2021-07-26

## 2021-07-26 PROBLEM — R63.0 POOR APPETITE: Status: ACTIVE | Noted: 2021-07-26

## 2021-07-26 LAB
ALBUMIN SERPL BCP-MCNC: 2.9 G/DL (ref 3.5–5)
ALP SERPL-CCNC: 174 U/L (ref 46–116)
ALT SERPL W P-5'-P-CCNC: 40 U/L (ref 12–78)
ANION GAP SERPL CALCULATED.3IONS-SCNC: 14 MMOL/L (ref 4–13)
APTT PPP: 31 SECONDS (ref 23–37)
AST SERPL W P-5'-P-CCNC: 126 U/L (ref 5–45)
ATRIAL RATE: 77 BPM
BASOPHILS # BLD AUTO: 0.05 THOUSANDS/ΜL (ref 0–0.1)
BASOPHILS NFR BLD AUTO: 1 % (ref 0–1)
BILIRUB SERPL-MCNC: 1.26 MG/DL (ref 0.2–1)
BUN SERPL-MCNC: 21 MG/DL (ref 5–25)
CALCIUM ALBUM COR SERPL-MCNC: 9.2 MG/DL (ref 8.3–10.1)
CALCIUM SERPL-MCNC: 8.3 MG/DL (ref 8.3–10.1)
CHLORIDE SERPL-SCNC: 96 MMOL/L (ref 100–108)
CO2 SERPL-SCNC: 26 MMOL/L (ref 21–32)
CREAT SERPL-MCNC: 10.62 MG/DL (ref 0.6–1.3)
EOSINOPHIL # BLD AUTO: 0.04 THOUSAND/ΜL (ref 0–0.61)
EOSINOPHIL NFR BLD AUTO: 1 % (ref 0–6)
ERYTHROCYTE [DISTWIDTH] IN BLOOD BY AUTOMATED COUNT: 13.2 % (ref 11.6–15.1)
GFR SERPL CREATININE-BSD FRML MDRD: 4 ML/MIN/1.73SQ M
GLUCOSE SERPL-MCNC: 100 MG/DL (ref 65–140)
GLUCOSE SERPL-MCNC: 125 MG/DL (ref 65–140)
GLUCOSE SERPL-MCNC: 152 MG/DL (ref 65–140)
GLUCOSE SERPL-MCNC: 158 MG/DL (ref 65–140)
HCT VFR BLD AUTO: 32.4 % (ref 36.5–49.3)
HGB BLD-MCNC: 10.9 G/DL (ref 12–17)
IMM GRANULOCYTES # BLD AUTO: 0.06 THOUSAND/UL (ref 0–0.2)
IMM GRANULOCYTES NFR BLD AUTO: 1 % (ref 0–2)
INR PPP: 1.05 (ref 0.84–1.19)
LYMPHOCYTES # BLD AUTO: 0.32 THOUSANDS/ΜL (ref 0.6–4.47)
LYMPHOCYTES NFR BLD AUTO: 4 % (ref 14–44)
MCH RBC QN AUTO: 37.7 PG (ref 26.8–34.3)
MCHC RBC AUTO-ENTMCNC: 33.6 G/DL (ref 31.4–37.4)
MCV RBC AUTO: 112 FL (ref 82–98)
MONOCYTES # BLD AUTO: 0.41 THOUSAND/ΜL (ref 0.17–1.22)
MONOCYTES NFR BLD AUTO: 6 % (ref 4–12)
NEUTROPHILS # BLD AUTO: 6.57 THOUSANDS/ΜL (ref 1.85–7.62)
NEUTS SEG NFR BLD AUTO: 87 % (ref 43–75)
NRBC BLD AUTO-RTO: 0 /100 WBCS
P AXIS: 47 DEGREES
PLATELET # BLD AUTO: 98 THOUSANDS/UL (ref 149–390)
PMV BLD AUTO: 10.1 FL (ref 8.9–12.7)
POTASSIUM SERPL-SCNC: 3 MMOL/L (ref 3.5–5.3)
PR INTERVAL: 184 MS
PROT SERPL-MCNC: 6.8 G/DL (ref 6.4–8.2)
PROTHROMBIN TIME: 13.6 SECONDS (ref 11.6–14.5)
QRS AXIS: -9 DEGREES
QRSD INTERVAL: 114 MS
QT INTERVAL: 440 MS
QTC INTERVAL: 497 MS
RBC # BLD AUTO: 2.89 MILLION/UL (ref 3.88–5.62)
SODIUM SERPL-SCNC: 136 MMOL/L (ref 136–145)
T WAVE AXIS: 47 DEGREES
TROPONIN I SERPL-MCNC: 0.07 NG/ML
TROPONIN I SERPL-MCNC: 0.08 NG/ML
VENTRICULAR RATE: 77 BPM
WBC # BLD AUTO: 7.45 THOUSAND/UL (ref 4.31–10.16)

## 2021-07-26 PROCEDURE — 85610 PROTHROMBIN TIME: CPT | Performed by: EMERGENCY MEDICINE

## 2021-07-26 PROCEDURE — 1124F ACP DISCUSS-NO DSCNMKR DOCD: CPT | Performed by: EMERGENCY MEDICINE

## 2021-07-26 PROCEDURE — 93010 ELECTROCARDIOGRAM REPORT: CPT | Performed by: INTERNAL MEDICINE

## 2021-07-26 PROCEDURE — 71045 X-RAY EXAM CHEST 1 VIEW: CPT

## 2021-07-26 PROCEDURE — 82948 REAGENT STRIP/BLOOD GLUCOSE: CPT

## 2021-07-26 PROCEDURE — 84484 ASSAY OF TROPONIN QUANT: CPT | Performed by: EMERGENCY MEDICINE

## 2021-07-26 PROCEDURE — NC001 PR NO CHARGE: Performed by: INTERNAL MEDICINE

## 2021-07-26 PROCEDURE — 99214 OFFICE O/P EST MOD 30 MIN: CPT | Performed by: INTERNAL MEDICINE

## 2021-07-26 PROCEDURE — 85730 THROMBOPLASTIN TIME PARTIAL: CPT | Performed by: EMERGENCY MEDICINE

## 2021-07-26 PROCEDURE — 99285 EMERGENCY DEPT VISIT HI MDM: CPT | Performed by: EMERGENCY MEDICINE

## 2021-07-26 PROCEDURE — 85025 COMPLETE CBC W/AUTO DIFF WBC: CPT | Performed by: EMERGENCY MEDICINE

## 2021-07-26 PROCEDURE — 70450 CT HEAD/BRAIN W/O DYE: CPT

## 2021-07-26 PROCEDURE — G0257 UNSCHED DIALYSIS ESRD PT HOS: HCPCS

## 2021-07-26 PROCEDURE — 99285 EMERGENCY DEPT VISIT HI MDM: CPT

## 2021-07-26 PROCEDURE — 99236 HOSP IP/OBS SAME DATE HI 85: CPT | Performed by: INTERNAL MEDICINE

## 2021-07-26 PROCEDURE — 80053 COMPREHEN METABOLIC PANEL: CPT | Performed by: EMERGENCY MEDICINE

## 2021-07-26 PROCEDURE — 36415 COLL VENOUS BLD VENIPUNCTURE: CPT | Performed by: EMERGENCY MEDICINE

## 2021-07-26 PROCEDURE — 87081 CULTURE SCREEN ONLY: CPT | Performed by: INTERNAL MEDICINE

## 2021-07-26 PROCEDURE — G1004 CDSM NDSC: HCPCS

## 2021-07-26 PROCEDURE — 84484 ASSAY OF TROPONIN QUANT: CPT

## 2021-07-26 PROCEDURE — 93005 ELECTROCARDIOGRAM TRACING: CPT

## 2021-07-26 RX ORDER — NICOTINE 21 MG/24HR
1 PATCH, TRANSDERMAL 24 HOURS TRANSDERMAL DAILY
Status: DISCONTINUED | OUTPATIENT
Start: 2021-07-26 | End: 2021-07-26 | Stop reason: HOSPADM

## 2021-07-26 RX ORDER — NICOTINE 21 MG/24HR
1 PATCH, TRANSDERMAL 24 HOURS TRANSDERMAL DAILY
Qty: 28 PATCH | Refills: 0 | Status: SHIPPED | OUTPATIENT
Start: 2021-07-27 | End: 2022-06-30

## 2021-07-26 RX ORDER — HEPARIN SODIUM 5000 [USP'U]/ML
5000 INJECTION, SOLUTION INTRAVENOUS; SUBCUTANEOUS EVERY 8 HOURS SCHEDULED
Status: DISCONTINUED | OUTPATIENT
Start: 2021-07-26 | End: 2021-07-26 | Stop reason: HOSPADM

## 2021-07-26 RX ORDER — ASPIRIN 81 MG/1
81 TABLET, CHEWABLE ORAL DAILY
Status: DISCONTINUED | OUTPATIENT
Start: 2021-07-27 | End: 2021-07-26 | Stop reason: HOSPADM

## 2021-07-26 RX ORDER — PRAVASTATIN SODIUM 40 MG
40 TABLET ORAL
Status: DISCONTINUED | OUTPATIENT
Start: 2021-07-26 | End: 2021-07-26 | Stop reason: HOSPADM

## 2021-07-26 RX ORDER — POTASSIUM CHLORIDE 20 MEQ/1
40 TABLET, EXTENDED RELEASE ORAL ONCE
Status: COMPLETED | OUTPATIENT
Start: 2021-07-26 | End: 2021-07-26

## 2021-07-26 RX ORDER — CARVEDILOL 3.12 MG/1
3.12 TABLET ORAL 2 TIMES DAILY WITH MEALS
Status: DISCONTINUED | OUTPATIENT
Start: 2021-07-26 | End: 2021-07-26 | Stop reason: HOSPADM

## 2021-07-26 RX ORDER — CLOPIDOGREL BISULFATE 75 MG/1
75 TABLET ORAL DAILY
Status: DISCONTINUED | OUTPATIENT
Start: 2021-07-27 | End: 2021-07-26 | Stop reason: HOSPADM

## 2021-07-26 RX ORDER — MELATONIN
2000 DAILY
Status: DISCONTINUED | OUTPATIENT
Start: 2021-07-27 | End: 2021-07-26 | Stop reason: HOSPADM

## 2021-07-26 RX ORDER — CHOLECALCIFEROL (VITAMIN D3) 10 MCG
1 TABLET ORAL
Status: DISCONTINUED | OUTPATIENT
Start: 2021-07-26 | End: 2021-07-26 | Stop reason: HOSPADM

## 2021-07-26 RX ORDER — ACETAMINOPHEN 325 MG/1
650 TABLET ORAL EVERY 4 HOURS PRN
Status: DISCONTINUED | OUTPATIENT
Start: 2021-07-26 | End: 2021-07-26 | Stop reason: HOSPADM

## 2021-07-26 RX ADMIN — HEPARIN SODIUM 5000 UNITS: 5000 INJECTION INTRAVENOUS; SUBCUTANEOUS at 15:13

## 2021-07-26 RX ADMIN — CARVEDILOL 3.12 MG: 3.12 TABLET, FILM COATED ORAL at 16:49

## 2021-07-26 RX ADMIN — POTASSIUM CHLORIDE 40 MEQ: 1500 TABLET, EXTENDED RELEASE ORAL at 15:13

## 2021-07-26 RX ADMIN — Medication 1 CAPSULE: at 16:49

## 2021-07-26 NOTE — ASSESSMENT & PLAN NOTE
Secondary to were oral intake and dehydration possibly  Patient has not been eating and drinking well over the past 2-3 days, and has been having some diarrhea as well  We will place patient under observation on MedSur unit with telemetry  Twelve lead EKG is negative for any acute ischemia  We will check orthostatic vitals    Troponin was elevated however patient never complained of any shortness of breath or orthopnea or chest pain

## 2021-07-26 NOTE — CONSULTS
[address] [facility] Dario 78 y o  male MRN: [id number]  Unit/Bed#: 3 [place] [address]-02 Encounter: [id number]    ASSESSMENT and PLAN:  1  ESRD on HD ([address], MWF):   · Will arrange for dialysis today which is his regular dialysis day  2  Access:   · L IJ permcath  · He completed a left arm AVF creation on July 22, 2021  3  Hypertension:   · BP is controlled  · He reports taking 2 blood pressure medications at home but does not know the names of this  MAR records indicate the use of carvedilol 3 125 mg twice a day and losartan 50 mg daily  · Recommend restarting carvedilol and losartan  · His EDW is 89 5 kg  He appears to be below his dry weight at this time and there is no fluid overload on examination  · Plan to run even on dialysis today in light of diarrhea over the past 3 days  4  Anemia:   · Hemoglobin is at goal   · Not currently on ALICE  5  Mineral and bone disease:   · Continue renal diet  · He is not on any binders at home or vitamin-D analogs at dialysis  6  Hypokalemia:  · Due to diarrhea  · 4K bath on dialysis today  7  Diarrhea:    SUMMARY OF RECOMMENDATIONS:   HD today   Run even on HD today   Recommend restarting BP medications after HD today if BP is acceptable   Next HD is Wednesday  The above plan was discussed with Dr Vazquez Faustin  The images (CXR) were personally reviewed by me in PACS    HISTORY OF PRESENT ILLNESS:  Requesting Physician: Stephy Peralta MD  Reason for Consult: ESRD on HD    Yadira Wilson is a 78 y o  male who was admitted to [facility] on 7/26/21 after presenting with weakness and lightheadedness  A renal consultation is requested today for assistance in the management of ESRD  Yadira Wilson is a known ESRD patient who undergoes maintenance hemodialysis at [address] on a MWF schedule       Radhaklaudia Moya has a history of hypertension, diabetes mellitus, hyperlipidemia, ESRD on HD, coronary artery disease s/p stent, cardiomyopathy, cerebrovascular disease,  irritable bowel syndrome, anemia of chronic disease  He now presents to [facility] after complaining of dizziness, lightheadedness, and generalized weakness which started the morning of admission  He reports that his last dialysis treatment was on Friday, July 23, 2021, and was rather uneventful  His dry weight is 89 5 kg and he left at his dry weight after dialysis last Friday  Since completing dialysis last Friday, he reports that he had worsening diarrhea over the past 3 days and has not been eating well  He was on his way to dialysis this morning when he experienced symptoms of dizziness, lightheadedness, and generalized weakness  He also reports an episode of nausea and vomiting  Due to his symptoms, he came to the [facility] ER and was subsequently admitted  He denied any chest pain, fever, chills, cough, headache  He admitted to shortness of breath  PAST MEDICAL HISTORY:  Past Medical History:   Diagnosis Date    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    With Cerebral Infarction:  Last Assessed:  October 20, 2016    Chronic kidney disease 2012    on dialysis     Diabetes mellitus ([facility] )     Hypertension     Labyrinthitis 09/10/2011    Myocardial infarction ([facility] ) 2014    x3 others were in 2009 & 2010    Sleep disturbances 09/20/2010    Stroke ([facility] ) 2007    x1    Type 2 diabetes, uncontrolled, with renal manifestation ([facility] ) 05/03/2017     PAST SURGICAL HISTORY:  Past Surgical History:   Procedure Laterality Date    AV FISTULA PLACEMENT      CARDIAC SURGERY  2010    stents x3    COLONOSCOPY N/A 12/12/2016    Procedure: COLONOSCOPY;  Surgeon: Shaniqua Le MD;  Location: [place] [address] GI LAB; Service:     COLONOSCOPY N/A 4/3/2017    Procedure:  COLONOSCOPY;  Surgeon: Shaniqua Le MD;  Location: [place] [address] GI LAB;   Service:     IR AV FISTULA/GRAFT DECLOT  4/29/2021    IR TUNNELED DIALYSIS CATHETER PLACEMENT  5/24/2021    OR ANASTOMOSIS,AV,ANY SITE Left 7/22/2021    Procedure: CREATION FISTULA ARTERIOVENOUS (AV); Surgeon: Martina Serna MD;  Location: [facility];  Service: Vascular     ALLERGIES:  No Known Allergies    SOCIAL HISTORY:  Social History     Substance and Sexual Activity   Alcohol Use Yes    Comment: rarely - No alcohol use per [employer]     Social History     Substance and Sexual Activity   Drug Use No     Social History     Tobacco Use   Smoking Status Current Every Day Smoker    Packs/day: 0 50    Years: 30 00    Pack years: 15 00   Smokeless Tobacco Never Used     FAMILY HISTORY:  Family History   Problem Relation Age of Onset   Ian Ara Leukemia Mother     Crohn's disease Father     Transient ischemic attack Brother      MEDICATIONS:  No current facility-administered medications for this encounter  REVIEW OF SYSTEMS:  All the systems were reviewed and were negative except as documented on the HPI  PHYSICAL EXAM:  Current Weight: Weight - Scale: 89 kg (196 lb 3 4 oz)  First Weight: Weight - Scale: 89 kg (196 lb 3 4 oz)  Vitals:    07/26/21 0755 07/26/21 0828 07/26/21 0848 07/26/21 0900   BP: 139/63 126/73 124/69 118/71   BP Location: Right arm Right arm Right arm Right arm   Pulse: 77 79 65 66   Resp: 16 20 20 20   Temp: 98 2 °F (36 8 °C) 98 2 °F (36 8 °C)     TempSrc: Oral Oral     SpO2: 99%      Weight:       Height:           Intake/Output Summary (Last 24 hours) at 7/26/2021 0908  Last data filed at 7/26/2021 0848  Gross per 24 hour   Intake 200 ml   Output --   Net 200 ml     Physical Exam  General: conscious, coherent, cooperative, not in distress  Skin: warm, dry, good turgor  Eyes: pink conjunctivae, no scleral icterus  ENT: moist lips and mucous membranes  Neck: supple, no JVD  Chest/Lungs: clear breath sounds  CVS: distinct heart sounds, normal rate, regular rhythm, no rub  Abdomen: soft, non-tender, non-distended, normoactive bowel sounds  Extremities: no edema  L arm AVF  : no bray catheter     Neuro: awake, alert, oriented to time, place and person  Psych: appropriate affect  Invasive Devices: : L IJ permcath  Lab Results:   Results from last 7 days   Lab Units 07/26/21  0512 07/22/21  1042   WBC Thousand/uL 7 45  --    HEMOGLOBIN g/dL 10 9*  --    HEMATOCRIT % 32 4*  --    PLATELETS Thousands/uL 98*  --    POTASSIUM mmol/L 3 0* 3 1*   CHLORIDE mmol/L 96*  --    CO2 mmol/L 26  --    BUN mg/dL 21  --    CREATININE mg/dL 10 62*  --    CALCIUM mg/dL 8 3  --    ALK PHOS U/L 174*  --    ALT U/L 40  --    AST U/L 126*  --      Lab Results   Component Value Date    CALCIUM 8 3 07/26/2021     Other Studies:   Chest x-ray personally reviewed by me in PACS showed no infiltrates or vascular congestion

## 2021-07-26 NOTE — ASSESSMENT & PLAN NOTE
Possible secondary to flare-up of his irritable bowel syndrome  Continue supportive care  Encourage increased oral intake

## 2021-07-26 NOTE — ASSESSMENT & PLAN NOTE
Lab Results   Component Value Date    HGBA1C 5 4 04/11/2017       Recent Labs     07/26/21  0505 07/26/21  1132   POCGLU 158* 100       Blood Sugar Average: Last 72 hrs:  (P) 129     Continue patient on low-dose sliding scale insulin with Accu-Chek q i d  Unclear if patient actually has diabetes last to hemoglobin A1c are within normal limits

## 2021-07-26 NOTE — PLAN OF CARE
Problem: Nutrition/Hydration-ADULT  Goal: Nutrient/Hydration intake appropriate for improving, restoring or maintaining nutritional needs  Description: Monitor and assess patient's nutrition/hydration status for malnutrition  Collaborate with interdisciplinary team and initiate plan and interventions as ordered  Monitor patient's weight and dietary intake as ordered or per policy  Utilize nutrition screening tool and intervene as necessary  Determine patient's food preferences and provide high-protein, high-caloric foods as appropriate  INTERVENTIONS:  - Monitor oral intake, urinary output, labs, and treatment plans  - Assess nutrition and hydration status and recommend course of action  - Evaluate amount of meals eaten  - Assist patient with eating if necessary   - Allow adequate time for meals  - Recommend/ encourage appropriate diets, oral nutritional supplements, and vitamin/mineral supplements  - Order, calculate, and assess calorie counts as needed  - Recommend, monitor, and adjust tube feedings and TPN/PPN based on assessed needs  - Assess need for intravenous fluids  - Provide specific nutrition/hydration education as appropriate  - Include patient/family/caregiver in decisions related to nutrition  7/26/2021 0837 by Marisel German RN  Outcome: Progressing  7/26/2021 0837 by Marisel German RN  Outcome: Progressing     Problem: Nutrition/Hydration-ADULT  Goal: Nutrient/Hydration intake appropriate for improving, restoring or maintaining nutritional needs  Description: Monitor and assess patient's nutrition/hydration status for malnutrition  Collaborate with interdisciplinary team and initiate plan and interventions as ordered  Monitor patient's weight and dietary intake as ordered or per policy  Utilize nutrition screening tool and intervene as necessary  Determine patient's food preferences and provide high-protein, high-caloric foods as appropriate       INTERVENTIONS:  - Monitor oral intake, urinary output, labs, and treatment plans  - Assess nutrition and hydration status and recommend course of action  - Evaluate amount of meals eaten  - Assist patient with eating if necessary   - Allow adequate time for meals  - Recommend/ encourage appropriate diets, oral nutritional supplements, and vitamin/mineral supplements  - Order, calculate, and assess calorie counts as needed  - Recommend, monitor, and adjust tube feedings and TPN/PPN based on assessed needs  - Assess need for intravenous fluids  - Provide specific nutrition/hydration education as appropriate  - Include patient/family/caregiver in decisions related to nutrition  7/26/2021 0837 by Mindy Mejia RN  Outcome: Progressing  7/26/2021 0837 by Mindy Mejia RN  Outcome: Progressing     Problem: PAIN - ADULT  Goal: Verbalizes/displays adequate comfort level or baseline comfort level  Description: Interventions:  - Encourage patient to monitor pain and request assistance  - Assess pain using appropriate pain scale  - Administer analgesics based on type and severity of pain and evaluate response  - Implement non-pharmacological measures as appropriate and evaluate response  - Consider cultural and social influences on pain and pain management  - Notify physician/advanced practitioner if interventions unsuccessful or patient reports new pain  7/26/2021 0837 by Mindy Mejia RN  Outcome: Progressing  7/26/2021 0837 by Mindy Mejia RN  Outcome: Progressing

## 2021-07-26 NOTE — ED NOTES
Alert/oriented times four  Lungs with diminished breath sounds at the bases of both lungs  Abdomen semi soft,hypoactive bowel sounds  Left arm fistula with +bruii and +frill,site clean and intact,with healing skin noted due to new access site  Left chest permacath intact,dressing dry and intact  Patient denies pain,only feels tired  HL to RA,20g  Skin is warm and dry  Call bell in reach  Side rails are up       Leslie Patel RN  07/26/21 0393

## 2021-07-26 NOTE — Clinical Note
Case was discussed with Anna Gregorio NP and the patient's admission status was agreed to be Admission Status: inpatient status to the service of Dr Jose Angel Muir

## 2021-07-26 NOTE — ED PROVIDER NOTES
History  Chief Complaint   Patient presents with    Shortness of Breath     pt at dialysis at 0430, started with SOB and weakness before getting dialyzed, dialysis called squad for stroke like symptoms  Pt speaking in full sentences, AOx4  Patient is end-stage renal disease on HD (m/w/f), presents from dialysis center with complaint of shortness of breath and generalized weakness  Patient did not receive dialysis prior to transfer  Per medics, staff at the dialysis center was concerned that patient had stroke-like symptoms, however patient complains of generalized weakness and denies a focal deficit  Patient has a history of a prior CVA with right-sided deficit which is unchanged  He denies chest pain, cough fevers or chills  Patient also has a prior medical history of diabetes and hypertension      History provided by:  Patient  History limited by:  Acuity of condition   used: No    Shortness of Breath  Severity:  Moderate  Onset quality:  Unable to specify  Timing:  Constant  Progression:  Unchanged  Chronicity:  New  Relieved by:  Nothing  Worsened by:  Nothing  Ineffective treatments:  None tried  Associated symptoms: no abdominal pain, no chest pain, no cough, no fever, no rash, no vomiting and no wheezing        Prior to Admission Medications   Prescriptions Last Dose Informant Patient Reported? Taking?    B Complex-C-Folic Acid (TRIPHROCAPS) 1 MG CAPS  Self Yes No   Sig: Take 1 capsule by mouth   Cholecalciferol 50 MCG (2000 UT) CAPS  Self Yes No   Sig: Take by mouth daily   Insulin Glargine (LANTUS SOLOSTAR) 100 UNIT/ML SOPN  Self Yes No   Sig: Inject 8 Units under the skin daily   LOSARTAN POTASSIUM PO  Self Yes No   Sig: Take 50 mg by mouth daily   acetaminophen-codeine (TYLENOL #3) 300-30 mg per tablet   No No   Sig: Take 1 tablet by mouth every 8 (eight) hours as needed for moderate pain for up to 10 doses   albuterol (PROAIR HFA) 90 mcg/act inhaler  Self No No   Sig: Inhale 2 puffs every 6 (six) hours as needed for wheezing   Patient not taking: Reported on 6/25/2021   aspirin 81 MG tablet  Self Yes No   Sig: Take 81 mg by mouth daily   carvedilol (COREG) 3 125 mg tablet  Self No No   Sig: Take 1 tablet (3 125 mg total) by mouth 2 (two) times a day with meals   clopidogrel (Plavix) 75 mg tablet   No No   Sig: Take 1 tablet (75 mg total) by mouth daily   glucose blood test strip  Self Yes No   Sig: by In Vitro route 2 (two) times a day   nitroglycerin (NITROSTAT) 0 4 mg SL tablet  Self Yes No   Sig: Place under the tongue   pantoprazole (PROTONIX) 40 mg tablet  Self Yes No   Sig: Take 40 mg by mouth daily    rifaximin (XIFAXAN) 550 mg tablet  Self Yes No   Sig: Take by mouth    rosuvastatin (CRESTOR) 20 MG tablet  Self Yes No   Sig: Take 20 mg by mouth every evening    warfarin (COUMADIN) 2 mg tablet   No No   Sig: Take 1 tablet (2 mg total) by mouth daily      Facility-Administered Medications: None       Past Medical History:   Diagnosis Date    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    With Cerebral Infarction:  Last Assessed:  October 20, 2016    Chronic kidney disease 2012    on dialysis     Diabetes mellitus ([facility] [place] )     Hypertension     Labyrinthitis 09/10/2011    Myocardial infarction ([facility] ) 2014    x3 others were in 2009 & 2010    Sleep disturbances 09/20/2010    Stroke ([facility] ) 2007    x1    Type 2 diabetes, uncontrolled, with renal manifestation ([facility] [place] [facility] ) 05/03/2017       Past Surgical History:   Procedure Laterality Date    AV FISTULA PLACEMENT      CARDIAC SURGERY  2010    stents x3    COLONOSCOPY N/A 12/12/2016    Procedure: COLONOSCOPY;  Surgeon: Violet Rivera MD;  Location: [place] [address] GI LAB; Service:     COLONOSCOPY N/A 4/3/2017    Procedure:  COLONOSCOPY;  Surgeon: Violet Rivera MD;  Location: [place] [address] GI LAB;   Service:     IR AV FISTULA/GRAFT DECLOT  4/29/2021    IR TUNNELED DIALYSIS CATHETER PLACEMENT  5/24/2021    MO ANASTOMOSIS,AV,ANY SITE Left 7/22/2021    Procedure: CREATION FISTULA ARTERIOVENOUS (AV); Surgeon: Jen Almedia MD;  Location: [place] [facility];  Service: Vascular       Family History   Problem Relation Age of Onset    Leukemia Mother     Crohn's disease Father     Transient ischemic attack Brother      I have reviewed and agree with the history as documented  E-Cigarette/Vaping     E-Cigarette/Vaping Substances     Social History     Tobacco Use    Smoking status: Current Every Day Smoker     Packs/day: 0 50     Years: 30 00     Pack years: 15 00    Smokeless tobacco: Never Used   Substance Use Topics    Alcohol use: Yes     Comment: rarely - No alcohol use per [employer]    Drug use: No       Review of Systems   Constitutional: Negative for chills and fever  Respiratory: Positive for shortness of breath  Negative for cough and wheezing  Cardiovascular: Negative for chest pain and palpitations  Gastrointestinal: Negative for abdominal pain, constipation, diarrhea, nausea and vomiting  Genitourinary: Negative for dysuria, flank pain, hematuria and urgency  Musculoskeletal: Negative for back pain  Skin: Negative for color change and rash  Neurological: Positive for weakness  Negative for light-headedness  All other systems reviewed and are negative  Physical Exam  Physical Exam  Vitals and nursing note reviewed  Constitutional:       Appearance: He is well-developed  HENT:      Head: Normocephalic and atraumatic  Eyes:      Pupils: Pupils are equal, round, and reactive to light  Cardiovascular:      Rate and Rhythm: Normal rate and regular rhythm  Heart sounds: Normal heart sounds  Pulmonary:      Effort: Pulmonary effort is normal  No tachypnea, accessory muscle usage or respiratory distress  Breath sounds: Examination of the right-lower field reveals rales  Examination of the left-lower field reveals rales  Rales present  Chest:      Chest wall: No deformity or tenderness     Abdominal:      General: Bowel sounds are normal  There is no distension  Palpations: Abdomen is soft  There is no mass  Tenderness: There is no abdominal tenderness  There is no guarding or rebound  Musculoskeletal:      Cervical back: Normal range of motion and neck supple  Right lower leg: Edema present  Left lower leg: Edema present  Skin:     General: Skin is warm and dry  Capillary Refill: Capillary refill takes less than 2 seconds  Neurological:      Mental Status: He is alert and oriented to person, place, and time  Psychiatric:         Mood and Affect: Mood is anxious  Behavior: Behavior normal          Thought Content:  Thought content normal          Judgment: Judgment normal          Vital Signs  ED Triage Vitals [07/26/21 0507]   Temperature Pulse Respirations Blood Pressure SpO2   97 9 °F (36 6 °C) 74 20 146/70 100 %      Temp src Heart Rate Source Patient Position - Orthostatic VS BP Location FiO2 (%)   -- Monitor Sitting Right arm --      Pain Score       --           Vitals:    07/26/21 0530 07/26/21 0545 07/26/21 0600 07/26/21 0630   BP: 119/57  103/65 104/58   Pulse: 72 72 74 72   Patient Position - Orthostatic VS:   Lying Lying         Visual Acuity      ED Medications  Medications - No data to display    Diagnostic Studies  Results Reviewed     Procedure Component Value Units Date/Time    Troponin I [[id number]]     Lab Status: No result Specimen: Blood     Troponin I [[id number]]  (Abnormal) Collected: 07/26/21 0512    Lab Status: Final result Specimen: Blood from Arm, Right Updated: 07/26/21 0548     Troponin I 0 07 ng/mL     Comprehensive metabolic panel [[id number]]  (Abnormal) Collected: 07/26/21 0512    Lab Status: Final result Specimen: Blood from Arm, Right Updated: 07/26/21 0542     Sodium 136 mmol/L      Potassium 3 0 mmol/L      Chloride 96 mmol/L      CO2 26 mmol/L      ANION GAP 14 mmol/L      BUN 21 mg/dL      Creatinine 10 62 mg/dL      Glucose 152 mg/dL      Calcium 8 3 mg/dL Corrected Calcium 9 2 mg/dL       U/L      ALT 40 U/L      Alkaline Phosphatase 174 U/L      Total Protein 6 8 g/dL      Albumin 2 9 g/dL      Total Bilirubin 1 26 mg/dL      eGFR 4 ml/min/1 73sq m     Narrative:      National Kidney Disease Foundation guidelines for Chronic Kidney Disease (CKD):     Stage 1 with normal or high GFR (GFR > 90 mL/min/1 73 square meters)    Stage 2 Mild CKD (GFR = 60-89 mL/min/1 73 square meters)    Stage 3A Moderate CKD (GFR = 45-59 mL/min/1 73 square meters)    Stage 3B Moderate CKD (GFR = 30-44 mL/min/1 73 square meters)    Stage 4 Severe CKD (GFR = 15-29 mL/min/1 73 square meters)    Stage 5 End Stage CKD (GFR <15 mL/min/1 73 square meters)  Note: GFR calculation is accurate only with a steady state creatinine    CBC and differential [[id number]]  (Abnormal) Collected: 07/26/21 0512    Lab Status: Final result Specimen: Blood from Arm, Right Updated: 07/26/21 0540     WBC 7 45 Thousand/uL      RBC 2 89 Million/uL      Hemoglobin 10 9 g/dL      Hematocrit 32 4 %       fL      MCH 37 7 pg      MCHC 33 6 g/dL      RDW 13 2 %      MPV 10 1 fL      Platelets 98 Thousands/uL      nRBC 0 /100 WBCs      Neutrophils Relative 87 %      Immat GRANS % 1 %      Lymphocytes Relative 4 %      Monocytes Relative 6 %      Eosinophils Relative 1 %      Basophils Relative 1 %      Neutrophils Absolute 6 57 Thousands/µL      Immature Grans Absolute 0 06 Thousand/uL      Lymphocytes Absolute 0 32 Thousands/µL      Monocytes Absolute 0 41 Thousand/µL      Eosinophils Absolute 0 04 Thousand/µL      Basophils Absolute 0 05 Thousands/µL     Narrative: This is an appended report  These results have been appended to a previously verified report      Josselin Mercado [[id number]]  (Normal) Collected: 07/26/21 0512    Lab Status: Final result Specimen: Blood from Arm, Right Updated: 07/26/21 0533     Protime 13 6 seconds      INR 1 05    APTT [[id number]]  (Normal) Collected: 07/26/21 7228 Lab Status: Final result Specimen: Blood from Arm, Right Updated: 07/26/21 0533     PTT 31 seconds     Fingerstick Glucose (POCT) [[id number]]  (Abnormal) Collected: 07/26/21 0505    Lab Status: Final result Updated: 07/26/21 0511     POC Glucose 158 mg/dl                  CT head without contrast   Final Result by Ranjeet Murphy MD (07/26 0644)      No acute intracranial abnormality  Workstation performed: [id number]         XR chest 1 view portable   ED Interpretation by Gene Johnson DO (07/26 0535)   nad                 Procedures  ECG 12 Lead Documentation Only    Date/Time: 7/26/2021 5:07 AM  Performed by: Gene Johnson DO  Authorized by: Gene Johnson DO     Indications / Diagnosis:  Sob/weakness  ECG reviewed by me, the ED Provider: yes    Patient location:  ED  Previous ECG:     Previous ECG:  Compared to current    Similarity:  No change    Comparison to cardiac monitor: Yes    Interpretation:     Interpretation: non-specific    Rate:     ECG rate:  77bpm    ECG rate assessment: normal    Rhythm:     Rhythm: sinus rhythm    Ectopy:     Ectopy: none    QRS:     QRS axis:  Normal  Conduction:     Conduction: normal    ST segments:     ST segments:  Normal  T waves:     T waves: normal    Other findings:     Other findings: prolonged qTc interval               ED Course        patient ER with complaint of generalized weakness and shortness of breath  I NIH stroke scale is 2 due to previous deficit from prior CVA, patient denies any focal deficit  Labs, CT, x-ray reviewed  Patient with an elevated troponin, mild hypokalemia, chronic anemia  Patient is resting comfortably at this time  He is not in any respiratory distress  Nephrology consulted for dialysis  Patient will be admitted to [facility] for further evaluation            Stroke Assessment     Row Name 07/26/21 5997             NIH Stroke Scale    Interval  Baseline      Level of Consciousness (1a )  0      LOC Questions (1b )  0      LOC Commands (1c )  0      Best Gaze (2 )  0      Visual (3 )  0      Facial Palsy (4 )  0      Motor Arm, Left (5a )  0      Motor Arm, Right (5b )  1      Motor Leg, Left (6a )  0      Motor Leg, Right (6b )  1      Limb Ataxia (7 )  0      Sensory (8 )  0      Best Language (9 )  0      Dysarthria (10 )  0      Extinction and Inattention (11 ) (Formerly Neglect)  0      Total  2          First Filed Value   TPA Decision  Patient not a TPA candidate  Patient is not a candidate options  comment [No new focal weakness  Patient with right-sided deficits from previous CVA which is unchanged ]                  SBIRT 20yo+      Most Recent Value   SBIRT (22 yo +)   In order to provide better care to our patients, we are screening all of our patients for alcohol and drug use  Would it be okay to ask you these screening questions? Yes Filed at: 07/26/2021 7617   Initial Alcohol Screen: US AUDIT-C    1  How often do you have a drink containing alcohol?  0 Filed at: 07/26/2021 0529   2  How many drinks containing alcohol do you have on a typical day you are drinking? 0 Filed at: 07/26/2021 0529   3a  Male UNDER 65: How often do you have five or more drinks on one occasion? 0 Filed at: 07/26/2021 0529   3b  FEMALE Any Age, or MALE 65+: How often do you have 4 or more drinks on one occassion? 0 Filed at: 07/26/2021 0529   Audit-C Score  0 Filed at: 07/26/2021 4464   KIZZY: How many times in the past year have you    Used an illegal drug or used a prescription medication for non-medical reasons?   Never Filed at: 07/26/2021 9831                    MDM  Number of Diagnoses or Management Options  Elevated troponin: new and requires workup  End stage renal disease ([facility] ): established and worsening  Hypokalemia: new and requires workup  Weakness: new and requires workup     Amount and/or Complexity of Data Reviewed  Clinical lab tests: ordered and reviewed  Tests in the radiology section of CPT®: ordered and reviewed    Risk of Complications, Morbidity, and/or Mortality  Presenting problems: high  Diagnostic procedures: high  Management options: high    Patient Progress  Patient progress: improved      Disposition  Final diagnoses:   End stage renal disease ([facility] )   Weakness   Elevated troponin   Hypokalemia     Time reflects when diagnosis was documented in both MDM as applicable and the Disposition within this note     Time User Action Codes Description Comment    7/26/2021  6:47 AM Brand David O Add [N18 6] End stage renal disease ([facility] )     7/26/2021  6:52 AM Brand David O Add [R53 1] Weakness     7/26/2021  6:53 AM Brand David O Add [R77 8] Elevated troponin     7/26/2021  6:54 AM Brand David Add [E87 6] Hypokalemia       ED Disposition     ED Disposition Condition Date/Time Comment    Admit Stable Mon Jul 26, 2021  6:54 AM Case was discussed with Velma Ray NP and the patient's admission status was agreed to be Admission Status: observation status to the service of Dr Aishwarya Merino   Follow-up Information    None         Patient's Medications   Discharge Prescriptions    No medications on file     No discharge procedures on file      PDMP Review       Value Time User    PDMP Reviewed  Yes 7/22/2021  2:07 PM Anali Maxwell PA-C          ED Provider  Electronically Signed by           Jese Mills DO  07/26/21 9977

## 2021-07-26 NOTE — PLAN OF CARE
Problem: METABOLIC, FLUID AND ELECTROLYTES - ADULT  Goal: Electrolytes maintained within normal limits  Description: INTERVENTIONS:  - Monitor labs and assess patient for signs and symptoms of electrolyte imbalances  - Administer electrolyte replacement as ordered  - Monitor response to electrolyte replacements, including repeat lab results as appropriate  - Instruct patient on fluid and nutrition as appropriate  Outcome: Progressing  Goal: Fluid balance maintained  Description: INTERVENTIONS:  - Monitor labs   - Monitor I/O and WT  - Instruct patient on fluid and nutrition as appropriate  - Assess for signs & symptoms of volume excess or deficit  Outcome: Progressing   Post-Dialysis RN Treatment Note    Blood Pressure:  Pre 124/69 mm/Hg  Post 140/78 mmHg   EDW  89 5 kg    Weight:  Pre 86 7 kg   Post Not Applicable kg   Mode of weight measurement: Bed Scale   Volume Removed  0 ml    Treatment duration 240 minutes    NS given  No    Treatment shortened?  No   Medications given during Rx None Reported   Estimated Kt/V  None Reported   Access type: Permacath/TDC   Access Status: Yes, describe: maintained [email or web address]    Report called to primary nurse   Yes        Kenyatta Rodriguez RN

## 2021-07-26 NOTE — DISCHARGE SUMMARY
Discharge Summary - [address] [facility]    Patient Information: Brian Lin 78 y o  male MRN: [id number]  Unit/Bed#: [facility] 397-22 Encounter: [id number]    Discharging Physician / Practitioner: Rhona Nguyen MD  PCP: Glynn Tan  Admission Date: 7/26/2021  Discharge Date: 07/26/21    Reason for Admission: Shortness of Breath (pt at dialysis at 0430, started with SOB and weakness before getting dialyzed, dialysis called squad for stroke like symptoms  Pt speaking in full sentences, AOx4  )      Discharge Diagnoses:     Primary Discharge Diagnosis:     Principal Problem:    Dizziness  Active Problems:    Poor appetite    Anemia in chronic illness    Diabetes with neurologic complications (HCC)    End stage renal disease (HCC)    Irritable bowel syndrome with diarrhea  Resolved Problems:    * No resolved hospital problems  *    Consultations During Hospital Stay:  [facility] TO NEPHROLOGY    Procedures Performed:     Significant Findings:   · Refer to hospital course and above listed diagnosis related plan for details    Imaging while in hospital:  CT Of the head without contrast  Incidental Findings:   Test Results Pending at Discharge (will require follow up):   · As per After Visit Summary     Outpatient Tests Requested:  Complications:  Refer to hospital course and above listed diagnosis related plan, if any    Hospital Course:     Brian Lin is a 78 y o  male with PMHx significant for end-stage renal disease on hemodialysis, type 2 diabetes, hypertension, history of labyrinthitis in the past, history of stroke in 2016 with no residual weakness who presents with chief complaints of dizziness while he was driving towards hemodialysis center  Patient did not feel well and hence came to the ED for further evaluation    He mentioned that over the past few days he has been having worsening of his irritable bowel syndrome plan of with more diarrhea, but no bright red blood per rectum or melena  Did complain of some fatigue and weakness since then      Patient denies any sick contact, no fevers or chills, no cough or cold, no abdominal pain  No urinary symptoms  Patient then came to ED for further evaluation  Patient denies any chest pain or shortness of breath  On arrival to the ED patient his vitals were as follows:  Temperature 97 9°, pulse 74, respiratory 20, blood pressure 119/57, saturating 100% on 2 L of oxygen via nasal cannula  Twelve lead EKG was negative for any acute ischemia, troponin 1st time checked was 0 07 which repeated was 0 08  3 hours later  Laboratory data done in the ED did not show any leukocytosis, a hypokalemia was noted at 3 0  Hemoglobin stable as well  Accu-Cheks were stable at 100 as well  Patient was then admitted for dizziness and fatigue and weakness      Patient does complain of some dizziness while turning his head right or left  Denies any recent cough or cold-like symptoms  No earache or ringing in the ear  No sick contact, no travel history  Patient then underwent hemodialysis, and his shortness of breath improved  Patient did ambulate well without any dizziness later on  He wanted to go home and then patient was discharged home  Patient was encouraged to eat better and have a better oral intake  He was recommended to follow-up with GI as an outpatient regarding management of his Irritable bowel disease  Please see above list of diagnoses and related plan for additional information  Condition at Discharge: fair     Discharge Day Visit / Exam:     Subjective:  I have seen and examined the patient at bedside  Overnight events reviewed with the RN       Vitals: Blood Pressure: 157/70 (07/26/21 1500)  Pulse: 77 (07/26/21 1500)  Temperature: 97 9 °F (36 6 °C) (07/26/21 1500)  Temp Source: Oral (07/26/21 1500)  Respirations: 16 (07/26/21 1500)  Height: 5' 10" (177 8 cm) (07/26/21 0507)  Weight - Scale: 89 kg (196 lb 3 4 oz) (07/26/21 0507)  SpO2: 99 % (07/26/21 1500)  Exam:   Physical Exam  General Exam: Alert and Oriented x 3, NAD  Eyes: ALPA  Neck: Supple  CVS: S1, S2 [place], RRR    R/S: Clear to auscultate anteriorly  Left sided chest wall hemodialysis PermCath site clean and no discharge noted,  Abd: Soft, NT, ND, BS+ve  Extremities: No edema noted  Skin: No acute Rash noted  CNS: No acute FND  Moves all 4 extremities  Psych: Co-operative, Not agitated  Discharge instructions/Information to patient and family:(Discharge Medications and Follow up):   See after visit summary for information provided to patient and family  Provisions for Follow-Up Care:  See after visit summary for information related to follow-up care and any pertinent home health orders  Disposition: Home    Planned Readmission:  No     Discharge Statement:  I spent 35 minutes discharging the patient  This time was spent on the day of discharge  I had direct contact with the patient on the day of discharge  Greater than 50% of the total time was spent examining patient, answering all patient questions, arranging and discussing plan of care with patient as well as directly providing post-discharge instructions  Additional time then spent on discharge activities  Discharge Medications:  See after visit summary for reconciled discharge medications provided to patient and family  ** Please Note: "This note has been constructed using a voice recognition system  Therefore there may be syntax, spelling, and/or grammatical errors   Please call if you have any questions  "**

## 2021-07-26 NOTE — ED NOTES
Patient's O2 saturation decreased to abnormal level,placed on 2L of oxygen via nasal cannula and O2 saturation increased to 100%  Xray at bedside at this time       Sim Torrez RN  07/26/21 5583

## 2021-07-26 NOTE — H&P
Marbin 44  H&P- Jesus Rodríguez 1955, 78 y o  male MRN: [id number]  Unit/Bed#: [address] [place] [address] Encounter: [id number]  Primary Care Provider: TESSY Duenas   Date and time admitted to hospital: 7/26/2021  5:05 AM    * Dizziness  Assessment & Plan  Secondary to were oral intake and dehydration possibly  Patient has not been eating and drinking well over the past 2-3 days, and has been having some diarrhea as well  We will place patient under observation on MedSurg unit with telemetry  Twelve lead EKG is negative for any acute ischemia  We will check orthostatic vitals  Troponin was elevated however patient never complained of any shortness of breath or orthopnea or chest pain    Poor appetite  Assessment & Plan  Possible secondary to flare-up of his irritable bowel syndrome  Continue supportive care  Encourage increased oral intake  Irritable bowel syndrome with diarrhea  Assessment & Plan  Currently no episodes of diarrhea noted  Continue supportive care    End stage renal disease [facility])  Assessment & Plan  Lab Results   Component Value Date    EGFR 4 07/26/2021    EGFR 7 06/29/2021    EGFR 4 05/24/2021    CREATININE 10 62 (H) 07/26/2021    CREATININE 7 52 (H) 06/29/2021    CREATININE 12 44 (H) 05/24/2021     Consulted Nephrology  Patient follows up with [facility] center with Dr Izabel Mishra  Patient is below his dry weight and hence only ultrafiltrate will be done low volume will be taken out  Discussed with Nephrology  Diabetes with neurologic complications [facility])  Assessment & Plan  Lab Results   Component Value Date    HGBA1C 5 4 04/11/2017       Recent Labs     07/26/21  0505 07/26/21  1132   POCGLU 158* 100       Blood Sugar Average: Last 72 hrs:  (P) 129     Continue patient on low-dose sliding scale insulin with Accu-Chek q i d  Unclear if patient actually has diabetes last to hemoglobin A1c are within normal limits        Anemia in chronic illness  Assessment & Plan  Hemoglobin stable 0 9  History and Physical - [facility]    Patient Information: Brian Lin 78 y o  male MRN: [id number]  Unit/Bed#: [facility] 660-34 Encounter: [id number]  Admitting Physician: Rhona Nguyen MD  PCP: TESSY Tan  Date of Admission:  07/26/21        Date of Service:   07/26/21    Hospital Problem List:     Principal Problem:    Dizziness  Active Problems:    Poor appetite    Anemia in chronic illness    Diabetes with neurologic complications (HCC)    End stage renal disease ([facility] [address] )    Irritable bowel syndrome with diarrhea      VTE Prophylaxis: Heparin     Code Status: Level 1 - Full Code    Anticipated Length of Stay:  Patient will be admitted on an Observation basis with an anticipated length of stay of  < 2 midnights  Justification for Hospital Stay: Dizziness, Dehydration  Total Time for Visit, including Counseling / Coordination of Care: 1 hour  Greater than 50% of this total time spent on direct patient counseling and coordination of care  Chief Complaint:     Shortness of Breath (pt at dialysis at 0430, started with SOB and weakness before getting dialyzed, dialysis called squad for stroke like symptoms  Pt speaking in full sentences, AOx4  )    History of Present Illness:    Brian Lin is a 78 y o  male with PMHx significant for end-stage renal disease on hemodialysis, type 2 diabetes, hypertension, history of labyrinthitis in the past, history of stroke in 2016 with no residual weakness who presents with chief complaints of dizziness while he was driving towards hemodialysis center  Patient did not feel well and hence came to the ED for further evaluation  He mentioned that over the past few days he has been having worsening of his irritable bowel syndrome plan of with more diarrhea, but no bright red blood per rectum or melena  Did complain of some fatigue and weakness since then      Patient denies any sick contact, no fevers or chills, no cough or cold, no abdominal pain  No urinary symptoms  Patient then came to ED for further evaluation  Patient denies any chest pain or shortness of breath  On arrival to the ED patient his vitals were as follows:  Temperature 97 9°, pulse 74, respiratory 20, blood pressure 119/57, saturating 100% on 2 L of oxygen via nasal cannula  Twelve lead EKG was negative for any acute ischemia, troponin 1st time checked was 0 07 which repeated was 0 08  3 hours later  Laboratory data done in the ED did not show any leukocytosis, a hypokalemia was noted at 3 0  Hemoglobin stable as well  Accu-Cheks were stable at 100 as well  Patient was then admitted for dizziness and fatigue and weakness  Patient does complain of some dizziness while turning his head right or left  Denies any recent cough or cold-like symptoms  No earache or ringing in the ear  No sick contact, no travel history  Review of Systems:    Review of Systems: A thorough 10 point review of System was done which was negative for other than that mentioned in HPI  Past Medical and Surgical History:     Past Medical History:   Diagnosis Date    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    With Cerebral Infarction:  Last Assessed:  October 20, 2016    Chronic kidney disease 2012    on dialysis     Diabetes mellitus ([facility] [address] )     Hypertension     Labyrinthitis 09/10/2011    Myocardial infarction ([facility] [address] ) 2014    x3 others were in 2009 & 2010    Sleep disturbances 09/20/2010    Stroke [facility]) 2007    x1    Type 2 diabetes, uncontrolled, with renal manifestation ([facility] [address] ) 05/03/2017       Past Surgical History:   Procedure Laterality Date    AV FISTULA PLACEMENT      CARDIAC SURGERY  2010    stents x3    COLONOSCOPY N/A 12/12/2016    Procedure: COLONOSCOPY;  Surgeon: Nick Ramos MD;  Location: [place] [address] GI LAB;   Service:     COLONOSCOPY N/A 4/3/2017    Procedure:  COLONOSCOPY;  Surgeon: Nick Ramos MD;  Location: [place] [address] GI LAB;  Service:     IR AV FISTULA/GRAFT DECLOT  4/29/2021    IR TUNNELED DIALYSIS CATHETER PLACEMENT  5/24/2021    MA ANASTOMOSIS,AV,ANY SITE Left 7/22/2021    Procedure: CREATION FISTULA ARTERIOVENOUS (AV); Surgeon: Sally Winkler MD;  Location: [facility] MAIN OR;  Service: Vascular       Meds/Allergies:    PTA meds:   Prior to Admission Medications   Prescriptions Last Dose Informant Patient Reported? Taking?    B Complex-C-Folic Acid (TRIPHROCAPS) 1 MG CAPS 7/25/2021 at Unknown time Self Yes Yes   Sig: Take 1 capsule by mouth   Cholecalciferol 50 MCG (2000 UT) CAPS 7/25/2021 at Unknown time Self Yes Yes   Sig: Take by mouth daily   Insulin Glargine (LANTUS SOLOSTAR) 100 UNIT/ML SOPN Not Taking at Unknown time Self Yes No   Sig: Inject 8 Units under the skin daily   Patient not taking: Reported on 7/26/2021   LOSARTAN POTASSIUM PO 7/26/2021 at Unknown time Self Yes Yes   Sig: Take 50 mg by mouth daily   acetaminophen-codeine (TYLENOL #3) 300-30 mg per tablet Not Taking at Unknown time  No No   Sig: Take 1 tablet by mouth every 8 (eight) hours as needed for moderate pain for up to 10 doses   Patient not taking: Reported on 7/26/2021   albuterol (PROAIR HFA) 90 mcg/act inhaler  Self No No   Sig: Inhale 2 puffs every 6 (six) hours as needed for wheezing   Patient not taking: Reported on 6/25/2021   aspirin 81 MG tablet 7/26/2021 at Unknown time Self Yes Yes   Sig: Take 81 mg by mouth daily   carvedilol (COREG) 3 125 mg tablet 7/26/2021 at Unknown time Self No Yes   Sig: Take 1 tablet (3 125 mg total) by mouth 2 (two) times a day with meals   clopidogrel (Plavix) 75 mg tablet 7/26/2021 at Unknown time  No Yes   Sig: Take 1 tablet (75 mg total) by mouth daily   glucose blood test strip 7/26/2021 at Unknown time Self Yes Yes   Sig: by In Vitro route 2 (two) times a day   nitroglycerin (NITROSTAT) 0 4 mg SL tablet Unknown at Unknown time Self Yes No   Sig: Place under the tongue   pantoprazole (PROTONIX) 40 mg tablet Not Taking at Unknown time Self Yes No   Sig: Take 40 mg by mouth daily    Patient not taking: Reported on 7/26/2021   rifaximin (XIFAXAN) 550 mg tablet 7/26/2021 at Unknown time Self Yes Yes   Sig: Take by mouth    rosuvastatin (CRESTOR) 20 MG tablet 7/26/2021 at Unknown time Self Yes Yes   Sig: Take 20 mg by mouth every evening    warfarin (COUMADIN) 2 mg tablet Not Taking at Unknown time  No No   Sig: Take 1 tablet (2 mg total) by mouth daily   Patient not taking: Reported on 7/26/2021      Facility-Administered Medications: None       Allergies: No Known Allergies  History:     Marital Status: /Civil Union     Substance Use History:   Social History     Substance and Sexual Activity   Alcohol Use Yes    Comment: rarely - No alcohol use per Allscripts     Social History     Tobacco Use   Smoking Status Current Every Day Smoker    Packs/day: 0 50    Years: 30 00    Pack years: 15 00   Smokeless Tobacco Never Used     Social History     Substance and Sexual Activity   Drug Use No       Family History:    Family History   Problem Relation Age of Onset    Leukemia Mother     Crohn's disease Father     Transient ischemic attack Brother        Physical Exam:     Vitals:   Blood Pressure: 140/78 (07/26/21 1250)  Pulse: 75 (07/26/21 1250)  Temperature: 98 2 °F (36 8 °C) (07/26/21 0828)  Temp Source: Oral (07/26/21 0828)  Respirations: 18 (07/26/21 1250)  Height: 5' 10" (177 8 cm) (07/26/21 0507)  Weight - Scale: 89 kg (196 lb 3 4 oz) (07/26/21 0507)  SpO2: 99 % (07/26/21 0755)    Physical Exam  General Exam: Alert and Oriented x 3, NAD  Eyes: ALPA  Neck: Supple  CVS: S1, S2 [place], RRR    R/S: Clear to auscultate anteriorly  Left sided chest wall hemodialysis PermCath site clean and no discharge noted,  Abd: Soft, NT, ND, BS+ve  Extremities: No edema noted  Skin: No acute Rash noted  CNS: No acute FND  Moves all 4 extremities  Psych: Co-operative, Not agitated       Lab Results: I have personally reviewed pertinent reports  Results from last 7 days   Lab Units 07/26/21  0512   WBC Thousand/uL 7 45   HEMOGLOBIN g/dL 10 9*   HEMATOCRIT % 32 4*   PLATELETS Thousands/uL 98*   NEUTROS PCT % 87*   LYMPHS PCT % 4*   MONOS PCT % 6   EOS PCT % 1     Results from last 7 days   Lab Units 07/26/21  0512   POTASSIUM mmol/L 3 0*   CHLORIDE mmol/L 96*   CO2 mmol/L 26   BUN mg/dL 21   CREATININE mg/dL 10 62*   CALCIUM mg/dL 8 3   ALK PHOS U/L 174*   ALT U/L 40   AST U/L 126*     Results from last 7 days   Lab Units 07/26/21  0512   INR  1 05       Imaging: I have personally reviewed pertinent reports  XR chest 1 view portable    Result Date: 7/26/2021  Narrative: CHEST INDICATION:   sob  COMPARISON:  None EXAM PERFORMED/VIEWS:  XR CHEST PORTABLE FINDINGS:  Tip of dialysis catheter in right atrium  Cardiomediastinal silhouette appears unremarkable  The lungs are clear  No pneumothorax or pleural effusion  Osseous structures appear within normal limits for patient age  Impression: No acute disease in the chest  Workstation performed: [id number]     CT head without contrast    Result Date: 7/26/2021  Narrative: CT BRAIN - WITHOUT CONTRAST INDICATION:   Patient complained of shortness of breath and generalized weakness at dialysis center this morning  No focal neurological deficits, alert and oriented x4, speaking in full sentences  COMPARISON:  None available  TECHNIQUE:  CT examination of the brain was performed  In addition to axial images, sagittal and coronal 2D reformatted images were created and submitted for interpretation  Radiation dose length product (DLP) for this visit:  931 mGy-cm   This examination, like all CT scans performed in the [facility], was performed utilizing techniques to minimize radiation dose exposure, including the use of iterative reconstruction and automated exposure control  IMAGE QUALITY:  Diagnostic   FINDINGS: PARENCHYMA: Decreased attenuation is noted in periventricular and subcortical white matter demonstrating an appearance that is statistically most likely to represent moderate microangiopathic change  Old bilateral lacunae are present  No CT signs of acute territorial infarction  No intracranial mass, mass effect or midline shift  No acute parenchymal hemorrhage  VENTRICLES AND EXTRA-AXIAL SPACES:  Ventricles and extra-axial CSF spaces are mildly prominent commensurate with the degree of volume loss  No hydrocephalus  No acute extra-axial hemorrhage  VISUALIZED ORBITS AND PARANASAL SINUSES:  No acute abnormality involving the orbits  Mild scattered sinus mucosal thickening is noted  Retention cysts versus polyps bilateral maxillary sinuses  No fluid levels are seen  CALVARIUM AND EXTRACRANIAL SOFT TISSUES:  Normal      Impression: No acute intracranial abnormality  Workstation performed: [id number]       CT head without contrast   Final Result      No acute intracranial abnormality  Workstation performed: [id number]         XR chest 1 view portable   ED Interpretation   nad      Final Result      No acute disease in the chest                   Workstation performed: [id number]             EKG, Pathology, and Other Studies Reviewed on Admission:   · normal sinus rhythm at 77 beats per minute  Allscripts/EPIC Records Reviewed: Yes     ** Please Note: "This note has been constructed using a voice recognition system  Therefore there may be syntax, spelling, and/or grammatical errors   Please call if you have any questions  "**

## 2021-07-26 NOTE — ED NOTES
Patient returned from [address] [place] [address] at this time  Family member at bedside       Freddie Farias RN  07/26/21 1323

## 2021-07-26 NOTE — ASSESSMENT & PLAN NOTE
Lab Results   Component Value Date    EGFR 4 07/26/2021    EGFR 7 06/29/2021    EGFR 4 05/24/2021    CREATININE 10 62 (H) 07/26/2021    CREATININE 7 52 (H) 06/29/2021    CREATININE 12 44 (H) 05/24/2021     Consulted Nephrology  Patient follows up with [facility] with Dr Villa Barone  Patient is below his dry weight and hence only ultrafiltrate will be done low volume will be taken out  Discussed with Nephrology

## 2021-07-27 ENCOUNTER — TRANSITIONAL CARE MANAGEMENT (OUTPATIENT)
Dept: FAMILY MEDICINE CLINIC | Facility: CLINIC | Age: 66
End: 2021-07-27

## 2021-07-27 LAB — MRSA NOSE QL CULT: NORMAL

## 2021-07-29 ENCOUNTER — TELEPHONE (OUTPATIENT)
Dept: VASCULAR SURGERY | Facility: CLINIC | Age: 66
End: 2021-07-29

## 2021-07-29 NOTE — TELEPHONE ENCOUNTER
Vascular Nurse Navigator Post Op Phone Call    Post-Discharge phone call attempted to assess patient recovery after vascular surgery will try again later as there was no answer and patient's voicemail box was full and was unable to leave a message  Will attempt to contact at later date  Pt's chart was reviewed prior to call and leaving message  Procedure: CREATION FISTULA ARTERIOVENOUS (AV) (Left)    Date of Procedure: 7/22/21    Surgeon:    Michelle Siegel MD - Primary     * Luis Segovia PA-C - Assisting      Patient was readmitted to [address] [place] [address] on 7/26/21          Anticoagulation pt was discharged on post op?: Aspirin, Warfarin (Coumadin or Jantoven) and Clopidogrel (Plavix)    Statin pt was discharged on post op?:  Rosuvastatin (Crestor)    Dialysis Days and Location: [facility] at [address]    Reminded pt of the following in message:    NEXT SCHEDULED OFFICE VISIT:  8/6/21 at 11 :39 am with Dr Yuan Hilliard at The [address] [place] [address]    To contact [facility] with any concerns

## 2021-08-02 NOTE — TELEPHONE ENCOUNTER
Attempted to contact patient to follow up with him post procedure, but unable to leave a message as patient's voice mailbox is full

## 2021-08-06 ENCOUNTER — OFFICE VISIT (OUTPATIENT)
Dept: VASCULAR SURGERY | Facility: CLINIC | Age: 66
End: 2021-08-06

## 2021-08-06 VITALS
HEART RATE: 68 BPM | WEIGHT: 198 LBS | BODY MASS INDEX: 28.35 KG/M2 | DIASTOLIC BLOOD PRESSURE: 70 MMHG | TEMPERATURE: 98.4 F | SYSTOLIC BLOOD PRESSURE: 120 MMHG | HEIGHT: 70 IN

## 2021-08-06 DIAGNOSIS — I77.0 ARTERIOVENOUS FISTULA (HCC): Primary | ICD-10-CM

## 2021-08-06 DIAGNOSIS — T82.868A AV FISTULA THROMBOSIS, INITIAL ENCOUNTER (HCC): ICD-10-CM

## 2021-08-06 DIAGNOSIS — N18.6 END STAGE RENAL DISEASE (HCC): ICD-10-CM

## 2021-08-06 PROCEDURE — 99024 POSTOP FOLLOW-UP VISIT: CPT | Performed by: SURGERY

## 2021-08-06 NOTE — LETTER
August 6, 2021     Kevin Butcher, [address] [place] [address]    Patient: Temo Jacobson   YOB: 1955   Date of Visit: 8/6/2021       Dear Dr Alicia Medina: Thank you for referring Eddi Coffman to me for evaluation  Below are the relevant portions of my assessment and plan of care  Patient is s/p LUE BC-AVF creation on 7/22, presents today for post-op visit  + thrill and bruit  Denies sensory or motor dysfunction left hand  On HD, MWF @ [facility]  Will obtain ultrasound maturation study in 2 weeks with return visit in 3 weeks  If you have questions, please do not hesitate to call me  I look forward to following Tanika Davis along with you           Sincerely,        Carri Mathews MD        CC: [facility]

## 2021-08-06 NOTE — ASSESSMENT & PLAN NOTE
Group Topic: BH Process Group     Date: December 10  Start Time: 10:00 AM  End Time: 11:00 AM    Focus: Homework review/Check-in  Number in attendance: 9    Licensed Provider Directing Treatment: Cara Clements LPC     Documentation Completed By (Under Supervision of Licensed Provider): Jazmín Vora LPC Student Intern     I was present and agree with the content of the note.   Cara Clements LPC      Attendance: Present  Response Communication: Appropriate topic  MoodAffect: Appropriate to group  Behavior/Socialization: Appropriate to group  Thought Process: Appropriate  Patient Response: Appropriate feedback, Attentive and Interactive     Pt completed her goals over the weekend and reported feeling good.  Pt shared that she motivated herself by not allowing herself to decorate the basement until she completed goals.  Pt mentioned that she saw her brother over the weekend and he helped fix up the house.  Pt spent two hours at [employer] and purchased many items that she says she will return later.  Pt recognized this as compulsive behavior.      Status post creation left upper extremity brachiocephalic AV fistula on 39/63/2537  There is a positive thrill present  Incision is well healed  Patient will return in 3 weeks with a duplex maturation study  Patient instructed on importance of continuing hand motion exercises

## 2021-08-06 NOTE — PROGRESS NOTES
Assessment/Plan:    Arteriovenous fistula (HCC)  Status post creation left upper extremity brachiocephalic AV fistula on 34/52/8897  There is a positive thrill present  Incision is well healed  Patient will return in 3 weeks with a duplex maturation study  Patient instructed on importance of continuing hand motion exercises  Diagnoses and all orders for this visit:    Arteriovenous fistula ([place] [address] )  -     VAS hemodialysis access duplex left upper limb avf; Future    AV fistula thrombosis, initial encounter ([place] [address] )    End stage renal disease (HCC)          Subjective:      Patient ID: Chantel Rocha is a 78 y o  male  Patient is s/p KELLENE BC-AVF creation on 7/22, presents today for post-op visit  + thrill and bruit  Denies sensory or motor dysfunction left hand  On HD, MWF @ [facility]  Will obtain ultrasound maturation study in 2 weeks with return visit in 3 weeks  The following portions of the patient's history were reviewed and updated as appropriate: allergies, current medications, past family history, past medical history, past social history, past surgical history and problem list     Review of Systems   Constitutional: Negative  HENT: Negative  Eyes: Negative  Respiratory: Positive for cough and shortness of breath  Cardiovascular: Negative  Gastrointestinal: Negative  Endocrine: Negative  Genitourinary: Negative  Musculoskeletal: Positive for gait problem  Skin: Negative  Allergic/Immunologic: Negative  Hematological: Bruises/bleeds easily  Psychiatric/Behavioral: Negative  Objective:      /70 (BP Location: Right arm, Patient Position: Sitting)   Pulse 68   Temp 98 4 °F (36 9 °C) (Tympanic)   Ht 5' 10" (1 778 m)   Wt 89 8 kg (198 lb)   BMI 28 41 kg/m²          Physical Exam  Vitals and nursing note reviewed

## 2021-08-20 ENCOUNTER — HOSPITAL ENCOUNTER (OUTPATIENT)
Dept: RADIOLOGY | Facility: HOSPITAL | Age: 66
Discharge: HOME | End: 2021-08-20
Attending: SURGERY
Payer: MEDICARE

## 2021-08-20 DIAGNOSIS — I77.0 ARTERIOVENOUS FISTULA (HCC): ICD-10-CM

## 2021-08-20 PROCEDURE — 93990 DOPPLER FLOW TESTING: CPT

## 2021-08-30 ENCOUNTER — OFFICE VISIT (OUTPATIENT)
Dept: VASCULAR SURGERY | Facility: CLINIC | Age: 66
End: 2021-08-30

## 2021-08-30 VITALS
HEIGHT: 70 IN | WEIGHT: 197 LBS | SYSTOLIC BLOOD PRESSURE: 110 MMHG | BODY MASS INDEX: 28.2 KG/M2 | DIASTOLIC BLOOD PRESSURE: 62 MMHG

## 2021-08-30 DIAGNOSIS — T82.868A AV FISTULA THROMBOSIS, INITIAL ENCOUNTER (HCC): ICD-10-CM

## 2021-08-30 DIAGNOSIS — I77.0 ARTERIOVENOUS FISTULA (HCC): Primary | ICD-10-CM

## 2021-08-30 DIAGNOSIS — N18.6 END STAGE RENAL DISEASE (HCC): ICD-10-CM

## 2021-08-30 DIAGNOSIS — I25.118 CORONARY ARTERY DISEASE OF NATIVE HEART WITH STABLE ANGINA PECTORIS, UNSPECIFIED VESSEL OR LESION TYPE (HCC): ICD-10-CM

## 2021-08-30 DIAGNOSIS — I25.10 ARTERIOSCLEROSIS OF CORONARY ARTERY: ICD-10-CM

## 2021-08-30 PROCEDURE — 93990 DOPPLER FLOW TESTING: CPT | Performed by: SURGERY

## 2021-08-30 PROCEDURE — 99024 POSTOP FOLLOW-UP VISIT: CPT | Performed by: SURGERY

## 2021-08-30 NOTE — ASSESSMENT & PLAN NOTE
Patient presents today s/p L AVF creation done 7/22/21 by Dr Georgiana Malloy  Pt is here to review HD doppler done 8/20/21  Study shows radiographic maturation  On examination there is a good thrill noted in the cephalic vein  I believe the cephalic vein can now be accessed for hemodialysis  This will be performed at his next dialysis session  Patient denies sensory motor dysfunction left hand  Pt is on HD at [facility] on MWF  Patient is currently taking ASA 81mg, Plavix, and Rosuvastatin  Patient is a current smoker  He smokes 5 cigarettes daily

## 2021-08-30 NOTE — LETTER
August 30, 2021     [place] [address]  [address] [place] [address]    Patient: Loida Duke   YOB: 1955   Date of Visit: 8/30/2021       Dear Dr Julius Nino: Thank you for referring Mike Gaspar to me for evaluation  Below are the relevant portions of my assessment and plan of care  Patient presents today s/p L AVF creation done 7/22/21 by Dr Julius Nino  Pt is here to review HD doppler done 8/20/21  Study shows radiographic maturation  On examination there is a good thrill noted in the cephalic vein  I believe the cephalic vein can now be accessed for hemodialysis at his next dialysis session  Patient denies sensory motor dysfunction left hand  Pt is on HD at [address] [place] on MWF  Patient is currently taking ASA 81mg, Plavix, and Rosuvastatin  Patient is a current smoker  He smokes 5 cigarettes daily  If you have questions, please do not hesitate to call me  I look forward to following Tashi Burks along with you           Sincerely,        Zina Busby MD        CC: [facility]

## 2021-08-30 NOTE — PROGRESS NOTES
Assessment/Plan:    Arteriovenous fistula [facility])  Patient presents today s/p L AVF creation done 7/22/21 by Dr Maryann Villalta  Pt is here to review HD doppler done 8/20/21  Study shows radiographic maturation  On examination there is a good thrill noted in the cephalic vein  I believe the cephalic vein can now be accessed for hemodialysis  This will be performed at his next dialysis session  Patient denies sensory motor dysfunction left hand  Pt is on HD at [address] [facility] on MWF  Patient is currently taking ASA 81mg, Plavix, and Rosuvastatin  Patient is a current smoker  He smokes 5 cigarettes daily  Diagnoses and all orders for this visit:    Arteriovenous fistula ([facility] [address] )    End stage renal disease ([facility] [address] )  -     Ambulatory referral to Cardiology    AV fistula thrombosis, initial encounter [facility])  -     Ambulatory referral to Cardiology    Arteriosclerosis of coronary artery  -     Ambulatory referral to Cardiology    Coronary artery disease of native heart with stable angina pectoris, unspecified vessel or lesion type [facility])  -     Ambulatory referral to Cardiology          Subjective:      Patient ID: Memo Quezada is a 78 y o  male  Patient presents today s/p L AVF creation done 7/22/21 by Dr Maryann Villalta  Pt is here to review HD doppler done 8/20/21  Study shows radiographic maturation  On examination there is a good thrill noted in the cephalic vein  I believe the cephalic vein can now be accessed for hemodialysis  This will be performed at his next dialysis session  Patient denies sensory motor dysfunction left hand  Pt is on HD at [address] [facility] on MWF  Patient is currently taking ASA 81mg, Plavix, and Rosuvastatin  Patient is a current smoker  He smokes 5 cigarettes daily          The following portions of the patient's history were reviewed and updated as appropriate: allergies, current medications, past family history, past medical history, past social history, past surgical history and problem list     Review of Systems   Constitutional: Negative  HENT: Negative  Eyes: Negative  Respiratory: Negative  Cardiovascular: Negative  Gastrointestinal: Negative  Endocrine: Negative  Genitourinary: Negative  Musculoskeletal: Negative  Skin: Negative  Allergic/Immunologic: Negative  Neurological: Negative  Hematological: Negative  Psychiatric/Behavioral: Negative            Objective:      /62 (BP Location: Right arm, Patient Position: Sitting, Cuff Size: Standard)   Ht 5' 10" (1 778 m)   Wt 89 4 kg (197 lb)   BMI 28 27 kg/m²          Physical Exam

## 2021-10-03 ENCOUNTER — HOSPITAL ENCOUNTER (EMERGENCY)
Facility: HOSPITAL | Age: 66
Discharge: HOME | End: 2021-10-03
Attending: EMERGENCY MEDICINE
Payer: MEDICARE

## 2021-10-03 VITALS
SYSTOLIC BLOOD PRESSURE: 174 MMHG | OXYGEN SATURATION: 99 % | BODY MASS INDEX: 29.36 KG/M2 | WEIGHT: 204.59 LBS | DIASTOLIC BLOOD PRESSURE: 81 MMHG | RESPIRATION RATE: 20 BRPM | TEMPERATURE: 98.7 F | HEART RATE: 89 BPM
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
## 2021-10-03 DIAGNOSIS — T07.XXXA ABRASIONS OF MULTIPLE SITES: ICD-10-CM

## 2021-10-03 PROCEDURE — 90471 IMMUNIZATION ADMIN: CPT

## 2021-10-03 PROCEDURE — 99282 EMERGENCY DEPT VISIT SF MDM: CPT | Performed by: EMERGENCY MEDICINE

## 2021-10-03 PROCEDURE — 99283 EMERGENCY DEPT VISIT LOW MDM: CPT

## 2021-10-03 PROCEDURE — 90715 TDAP VACCINE 7 YRS/> IM: CPT | Performed by: EMERGENCY MEDICINE

## 2021-10-03 RX ORDER — GINSENG 100 MG
1 CAPSULE ORAL ONCE
Status: COMPLETED | OUTPATIENT
Start: 2021-10-03 | End: 2021-10-03

## 2021-10-03 RX ADMIN — BACITRACIN ZINC 1 LARGE APPLICATION: 500 OINTMENT TOPICAL at 16:28

## 2021-10-03 RX ADMIN — TETANUS TOXOID, REDUCED DIPHTHERIA TOXOID AND ACELLULAR PERTUSSIS VACCINE, ADSORBED 0.5 ML: 5; 2.5; 8; 8; 2.5 SUSPENSION INTRAMUSCULAR at 16:26

## 2021-10-05 ENCOUNTER — OFFICE VISIT (OUTPATIENT)
Dept: FAMILY MEDICINE CLINIC | Facility: CLINIC | Age: 66
End: 2021-10-05
Payer: MEDICARE

## 2021-10-05 VITALS
HEART RATE: 107 BPM | BODY MASS INDEX: 28.55 KG/M2 | TEMPERATURE: 97.9 F | WEIGHT: 199.4 LBS | OXYGEN SATURATION: 80 % | HEIGHT: 70 IN

## 2021-10-05 DIAGNOSIS — R29.6 FREQUENT FALLS: ICD-10-CM

## 2021-10-05 DIAGNOSIS — T14.8XXA WOUND INFECTION: ICD-10-CM

## 2021-10-05 DIAGNOSIS — R23.4 ESCHAR: ICD-10-CM

## 2021-10-05 DIAGNOSIS — S41.101D ARM WOUND, RIGHT, SUBSEQUENT ENCOUNTER: Primary | ICD-10-CM

## 2021-10-05 DIAGNOSIS — L08.9 WOUND INFECTION: ICD-10-CM

## 2021-10-05 PROCEDURE — 99213 OFFICE O/P EST LOW 20 MIN: CPT | Performed by: NURSE PRACTITIONER

## 2021-10-05 RX ORDER — CEPHALEXIN 500 MG/1
500 CAPSULE ORAL EVERY 8 HOURS SCHEDULED
Qty: 14 CAPSULE | Refills: 0 | Status: SHIPPED | OUTPATIENT
Start: 2021-10-05 | End: 2021-10-12

## 2021-10-08 ENCOUNTER — APPOINTMENT (EMERGENCY)
Dept: RADIOLOGY | Facility: HOSPITAL | Age: 66
End: 2021-10-08
Payer: MEDICARE

## 2021-10-08 ENCOUNTER — HOSPITAL ENCOUNTER (EMERGENCY)
Facility: HOSPITAL | Age: 66
Discharge: HOME | End: 2021-10-08
Attending: EMERGENCY MEDICINE
Payer: MEDICARE

## 2021-10-08 VITALS
OXYGEN SATURATION: 98 % | TEMPERATURE: 97.2 F | DIASTOLIC BLOOD PRESSURE: 83 MMHG | RESPIRATION RATE: 20 BRPM | HEART RATE: 83 BPM | SYSTOLIC BLOOD PRESSURE: 176 MMHG

## 2021-10-08 DIAGNOSIS — Z51.89 VISIT FOR WOUND CHECK: ICD-10-CM

## 2021-10-08 DIAGNOSIS — L08.9 WOUND INFECTION: Primary | ICD-10-CM

## 2021-10-08 DIAGNOSIS — T14.8XXA WOUND INFECTION: Primary | ICD-10-CM

## 2021-10-08 LAB
ALBUMIN SERPL BCP-MCNC: 3.1 G/DL (ref 3.5–5)
ALP SERPL-CCNC: 111 U/L (ref 46–116)
ALT SERPL W P-5'-P-CCNC: 23 U/L (ref 12–78)
ANION GAP SERPL CALCULATED.3IONS-SCNC: 7 MMOL/L (ref 4–13)
AST SERPL W P-5'-P-CCNC: 21 U/L (ref 5–45)
BASOPHILS # BLD AUTO: 0.08 THOUSANDS/ΜL (ref 0–0.1)
BASOPHILS NFR BLD AUTO: 1 % (ref 0–1)
BILIRUB SERPL-MCNC: 0.75 MG/DL (ref 0.2–1)
BUN SERPL-MCNC: 11 MG/DL (ref 5–25)
CALCIUM ALBUM COR SERPL-MCNC: 9 MG/DL (ref 8.3–10.1)
CALCIUM SERPL-MCNC: 8.3 MG/DL (ref 8.3–10.1)
CHLORIDE SERPL-SCNC: 99 MMOL/L (ref 100–108)
CO2 SERPL-SCNC: 34 MMOL/L (ref 21–32)
CREAT SERPL-MCNC: 4.2 MG/DL (ref 0.6–1.3)
EOSINOPHIL # BLD AUTO: 0.13 THOUSAND/ΜL (ref 0–0.61)
EOSINOPHIL NFR BLD AUTO: 2 % (ref 0–6)
ERYTHROCYTE [DISTWIDTH] IN BLOOD BY AUTOMATED COUNT: 13.1 % (ref 11.6–15.1)
GFR SERPL CREATININE-BSD FRML MDRD: 14 ML/MIN/1.73SQ M
GLUCOSE SERPL-MCNC: 114 MG/DL (ref 65–140)
HCT VFR BLD AUTO: 31.8 % (ref 36.5–49.3)
HGB BLD-MCNC: 10.4 G/DL (ref 12–17)
IMM GRANULOCYTES # BLD AUTO: 0.04 THOUSAND/UL (ref 0–0.2)
IMM GRANULOCYTES NFR BLD AUTO: 1 % (ref 0–2)
LYMPHOCYTES # BLD AUTO: 0.65 THOUSANDS/ΜL (ref 0.6–4.47)
LYMPHOCYTES NFR BLD AUTO: 10 % (ref 14–44)
MCH RBC QN AUTO: 37.3 PG (ref 26.8–34.3)
MCHC RBC AUTO-ENTMCNC: 32.7 G/DL (ref 31.4–37.4)
MCV RBC AUTO: 114 FL (ref 82–98)
MONOCYTES # BLD AUTO: 0.53 THOUSAND/ΜL (ref 0.17–1.22)
MONOCYTES NFR BLD AUTO: 8 % (ref 4–12)
NEUTROPHILS # BLD AUTO: 5.37 THOUSANDS/ΜL (ref 1.85–7.62)
NEUTS SEG NFR BLD AUTO: 78 % (ref 43–75)
NRBC BLD AUTO-RTO: 0 /100 WBCS
PLATELET # BLD AUTO: 103 THOUSANDS/UL (ref 149–390)
PMV BLD AUTO: 10.8 FL (ref 8.9–12.7)
POTASSIUM SERPL-SCNC: 3.5 MMOL/L (ref 3.5–5.3)
PROT SERPL-MCNC: 7 G/DL (ref 6.4–8.2)
RBC # BLD AUTO: 2.79 MILLION/UL (ref 3.88–5.62)
SODIUM SERPL-SCNC: 140 MMOL/L (ref 136–145)
WBC # BLD AUTO: 6.8 THOUSAND/UL (ref 4.31–10.16)

## 2021-10-08 PROCEDURE — 87070 CULTURE OTHR SPECIMN AEROBIC: CPT | Performed by: EMERGENCY MEDICINE

## 2021-10-08 PROCEDURE — 80053 COMPREHEN METABOLIC PANEL: CPT | Performed by: EMERGENCY MEDICINE

## 2021-10-08 PROCEDURE — 87186 SC STD MICRODIL/AGAR DIL: CPT | Performed by: EMERGENCY MEDICINE

## 2021-10-08 PROCEDURE — 99283 EMERGENCY DEPT VISIT LOW MDM: CPT

## 2021-10-08 PROCEDURE — 85025 COMPLETE CBC W/AUTO DIFF WBC: CPT | Performed by: EMERGENCY MEDICINE

## 2021-10-08 PROCEDURE — 36415 COLL VENOUS BLD VENIPUNCTURE: CPT | Performed by: EMERGENCY MEDICINE

## 2021-10-08 PROCEDURE — 99284 EMERGENCY DEPT VISIT MOD MDM: CPT | Performed by: EMERGENCY MEDICINE

## 2021-10-08 PROCEDURE — 87077 CULTURE AEROBIC IDENTIFY: CPT | Performed by: EMERGENCY MEDICINE

## 2021-10-08 PROCEDURE — 73090 X-RAY EXAM OF FOREARM: CPT

## 2021-10-08 PROCEDURE — 87205 SMEAR GRAM STAIN: CPT | Performed by: EMERGENCY MEDICINE

## 2021-10-08 RX ADMIN — MUPIROCIN 1 APPLICATION: 20 OINTMENT TOPICAL at 14:19

## 2021-10-10 LAB
BACTERIA WND AEROBE CULT: ABNORMAL
BACTERIA WND AEROBE CULT: ABNORMAL
GRAM STN SPEC: ABNORMAL

## 2021-10-12 ENCOUNTER — OFFICE VISIT (OUTPATIENT)
Dept: WOUND CARE | Facility: HOSPITAL | Age: 66
End: 2021-10-12
Payer: MEDICARE

## 2021-10-12 VITALS
HEIGHT: 70 IN | WEIGHT: 197.31 LBS | BODY MASS INDEX: 28.25 KG/M2 | SYSTOLIC BLOOD PRESSURE: 177 MMHG | RESPIRATION RATE: 15 BRPM | TEMPERATURE: 98.9 F | HEART RATE: 87 BPM | DIASTOLIC BLOOD PRESSURE: 96 MMHG

## 2021-10-12 DIAGNOSIS — S51.801A OPEN WOUND OF RIGHT FOREARM, INITIAL ENCOUNTER: Primary | ICD-10-CM

## 2021-10-12 PROCEDURE — 97597 DBRDMT OPN WND 1ST 20 CM/<: CPT | Performed by: NURSE PRACTITIONER

## 2021-10-12 PROCEDURE — 99213 OFFICE O/P EST LOW 20 MIN: CPT | Performed by: NURSE PRACTITIONER

## 2021-10-12 PROCEDURE — 97598 DBRDMT OPN WND ADDL 20CM/<: CPT | Performed by: NURSE PRACTITIONER

## 2021-10-12 PROCEDURE — 99203 OFFICE O/P NEW LOW 30 MIN: CPT | Performed by: NURSE PRACTITIONER

## 2021-10-12 RX ORDER — LIDOCAINE HYDROCHLORIDE 40 MG/ML
5 SOLUTION TOPICAL ONCE
Status: COMPLETED | OUTPATIENT
Start: 2021-10-12 | End: 2021-10-12

## 2021-10-12 RX ADMIN — LIDOCAINE HYDROCHLORIDE 5 ML: 40 SOLUTION TOPICAL at 09:26

## 2021-10-19 ENCOUNTER — OFFICE VISIT (OUTPATIENT)
Dept: WOUND CARE | Facility: HOSPITAL | Age: 66
End: 2021-10-19
Payer: MEDICARE

## 2021-10-19 ENCOUNTER — HOSPITAL ENCOUNTER (EMERGENCY)
Facility: HOSPITAL | Age: 66
Discharge: HOME | End: 2021-10-19
Attending: EMERGENCY MEDICINE
Payer: MEDICARE

## 2021-10-19 VITALS
HEART RATE: 87 BPM | HEIGHT: 70 IN | DIASTOLIC BLOOD PRESSURE: 77 MMHG | OXYGEN SATURATION: 94 % | RESPIRATION RATE: 19 BRPM | SYSTOLIC BLOOD PRESSURE: 140 MMHG | BODY MASS INDEX: 28.18 KG/M2 | WEIGHT: 196.87 LBS | TEMPERATURE: 99 F

## 2021-10-19 VITALS
RESPIRATION RATE: 20 BRPM | DIASTOLIC BLOOD PRESSURE: 79 MMHG | HEART RATE: 66 BPM | TEMPERATURE: 99.1 F | SYSTOLIC BLOOD PRESSURE: 136 MMHG

## 2021-10-19 DIAGNOSIS — L08.9: ICD-10-CM

## 2021-10-19 DIAGNOSIS — S51.801A OPEN WOUND OF RIGHT FOREARM, INITIAL ENCOUNTER: Primary | ICD-10-CM

## 2021-10-19 DIAGNOSIS — R89.9 ABNORMAL LABORATORY TEST: Primary | ICD-10-CM

## 2021-10-19 LAB
ALBUMIN SERPL BCP-MCNC: 2.5 G/DL (ref 3.5–5)
ALP SERPL-CCNC: 84 U/L (ref 46–116)
ALT SERPL W P-5'-P-CCNC: 14 U/L (ref 12–78)
ANION GAP SERPL CALCULATED.3IONS-SCNC: 6 MMOL/L (ref 4–13)
AST SERPL W P-5'-P-CCNC: 13 U/L (ref 5–45)
BASOPHILS # BLD AUTO: 0.04 THOUSANDS/ΜL (ref 0–0.1)
BASOPHILS NFR BLD AUTO: 1 % (ref 0–1)
BILIRUB SERPL-MCNC: 0.78 MG/DL (ref 0.2–1)
BUN SERPL-MCNC: 22 MG/DL (ref 5–25)
CALCIUM ALBUM COR SERPL-MCNC: 9.6 MG/DL (ref 8.3–10.1)
CALCIUM SERPL-MCNC: 8.4 MG/DL (ref 8.3–10.1)
CHLORIDE SERPL-SCNC: 99 MMOL/L (ref 100–108)
CO2 SERPL-SCNC: 33 MMOL/L (ref 21–32)
CREAT SERPL-MCNC: 6.8 MG/DL (ref 0.6–1.3)
EOSINOPHIL # BLD AUTO: 0.12 THOUSAND/ΜL (ref 0–0.61)
EOSINOPHIL NFR BLD AUTO: 3 % (ref 0–6)
ERYTHROCYTE [DISTWIDTH] IN BLOOD BY AUTOMATED COUNT: 12.7 % (ref 11.6–15.1)
GFR SERPL CREATININE-BSD FRML MDRD: 8 ML/MIN/1.73SQ M
GLUCOSE SERPL-MCNC: 131 MG/DL (ref 65–140)
HCT VFR BLD AUTO: 29.8 % (ref 36.5–49.3)
HGB BLD-MCNC: 9.6 G/DL (ref 12–17)
IMM GRANULOCYTES # BLD AUTO: 0.03 THOUSAND/UL (ref 0–0.2)
IMM GRANULOCYTES NFR BLD AUTO: 1 % (ref 0–2)
LACTATE SERPL-SCNC: 1.5 MMOL/L (ref 0.5–2)
LYMPHOCYTES # BLD AUTO: 0.38 THOUSANDS/ΜL (ref 0.6–4.47)
LYMPHOCYTES NFR BLD AUTO: 8 % (ref 14–44)
MCH RBC QN AUTO: 36.6 PG (ref 26.8–34.3)
MCHC RBC AUTO-ENTMCNC: 32.2 G/DL (ref 31.4–37.4)
MCV RBC AUTO: 114 FL (ref 82–98)
MONOCYTES # BLD AUTO: 0.28 THOUSAND/ΜL (ref 0.17–1.22)
MONOCYTES NFR BLD AUTO: 6 % (ref 4–12)
NEUTROPHILS # BLD AUTO: 3.97 THOUSANDS/ΜL (ref 1.85–7.62)
NEUTS SEG NFR BLD AUTO: 81 % (ref 43–75)
NRBC BLD AUTO-RTO: 0 /100 WBCS
PLATELET # BLD AUTO: 108 THOUSANDS/UL (ref 149–390)
PMV BLD AUTO: 11.1 FL (ref 8.9–12.7)
POTASSIUM SERPL-SCNC: 3.1 MMOL/L (ref 3.5–5.3)
PROT SERPL-MCNC: 6.7 G/DL (ref 6.4–8.2)
RBC # BLD AUTO: 2.62 MILLION/UL (ref 3.88–5.62)
SODIUM SERPL-SCNC: 138 MMOL/L (ref 136–145)
WBC # BLD AUTO: 4.82 THOUSAND/UL (ref 4.31–10.16)

## 2021-10-19 PROCEDURE — 99284 EMERGENCY DEPT VISIT MOD MDM: CPT | Performed by: PHYSICIAN ASSISTANT

## 2021-10-19 PROCEDURE — 87040 BLOOD CULTURE FOR BACTERIA: CPT | Performed by: PHYSICIAN ASSISTANT

## 2021-10-19 PROCEDURE — 97597 DBRDMT OPN WND 1ST 20 CM/<: CPT | Performed by: NURSE PRACTITIONER

## 2021-10-19 PROCEDURE — 97598 DBRDMT OPN WND ADDL 20CM/<: CPT | Performed by: NURSE PRACTITIONER

## 2021-10-19 PROCEDURE — 99283 EMERGENCY DEPT VISIT LOW MDM: CPT

## 2021-10-19 PROCEDURE — 36415 COLL VENOUS BLD VENIPUNCTURE: CPT | Performed by: PHYSICIAN ASSISTANT

## 2021-10-19 PROCEDURE — 85025 COMPLETE CBC W/AUTO DIFF WBC: CPT | Performed by: PHYSICIAN ASSISTANT

## 2021-10-19 PROCEDURE — 83605 ASSAY OF LACTIC ACID: CPT | Performed by: PHYSICIAN ASSISTANT

## 2021-10-19 PROCEDURE — 80053 COMPREHEN METABOLIC PANEL: CPT | Performed by: PHYSICIAN ASSISTANT

## 2021-10-19 PROCEDURE — 99215 OFFICE O/P EST HI 40 MIN: CPT | Performed by: NURSE PRACTITIONER

## 2021-10-19 RX ORDER — LIDOCAINE HYDROCHLORIDE 40 MG/ML
5 SOLUTION TOPICAL ONCE
Status: COMPLETED | OUTPATIENT
Start: 2021-10-19 | End: 2021-10-19

## 2021-10-19 RX ADMIN — LIDOCAINE HYDROCHLORIDE 5 ML: 40 SOLUTION TOPICAL at 09:48

## 2021-10-24 LAB
BACTERIA BLD CULT: NORMAL
BACTERIA BLD CULT: NORMAL

## 2021-10-25 ENCOUNTER — OFFICE VISIT (OUTPATIENT)
Dept: WOUND CARE | Facility: HOSPITAL | Age: 66
End: 2021-10-25
Payer: MEDICARE

## 2021-10-25 VITALS
HEART RATE: 89 BPM | RESPIRATION RATE: 15 BRPM | SYSTOLIC BLOOD PRESSURE: 149 MMHG | TEMPERATURE: 99.1 F | DIASTOLIC BLOOD PRESSURE: 76 MMHG

## 2021-10-25 DIAGNOSIS — S51.801A OPEN WOUND OF RIGHT FOREARM, INITIAL ENCOUNTER: Primary | ICD-10-CM

## 2021-10-25 PROCEDURE — 97597 DBRDMT OPN WND 1ST 20 CM/<: CPT | Performed by: NURSE PRACTITIONER

## 2021-10-25 RX ORDER — LIDOCAINE HYDROCHLORIDE 40 MG/ML
5 SOLUTION TOPICAL ONCE
Status: COMPLETED | OUTPATIENT
Start: 2021-10-25 | End: 2021-10-25

## 2021-10-25 RX ADMIN — LIDOCAINE HYDROCHLORIDE 5 ML: 40 SOLUTION TOPICAL at 15:21

## 2021-11-09 ENCOUNTER — TELEPHONE (OUTPATIENT)
Dept: OTHER | Facility: OTHER | Age: 66
End: 2021-11-09

## 2021-11-11 ENCOUNTER — PREP FOR PROCEDURE (OUTPATIENT)
Dept: INTERVENTIONAL RADIOLOGY/VASCULAR | Facility: CLINIC | Age: 66
End: 2021-11-11

## 2021-11-11 DIAGNOSIS — N18.6 ESRD (END STAGE RENAL DISEASE) (HCC): Primary | ICD-10-CM

## 2021-11-15 ENCOUNTER — DOCUMENTATION (OUTPATIENT)
Dept: WOUND CARE | Facility: HOSPITAL | Age: 66
End: 2021-11-15

## 2021-11-23 ENCOUNTER — TELEPHONE (OUTPATIENT)
Dept: VASCULAR SURGERY | Facility: CLINIC | Age: 66
End: 2021-11-23

## 2021-11-23 DIAGNOSIS — T82.868A AV FISTULA THROMBOSIS, INITIAL ENCOUNTER (HCC): Primary | ICD-10-CM

## 2021-11-23 DIAGNOSIS — I77.0 ARTERIOVENOUS FISTULA (HCC): ICD-10-CM

## 2021-11-24 ENCOUNTER — TELEPHONE (OUTPATIENT)
Dept: VASCULAR SURGERY | Facility: CLINIC | Age: 66
End: 2021-11-24

## 2021-11-24 ENCOUNTER — HOSPITAL ENCOUNTER (OUTPATIENT)
Dept: RADIOLOGY | Facility: HOSPITAL | Age: 66
Discharge: HOME | End: 2021-11-24
Payer: MEDICARE

## 2021-11-24 DIAGNOSIS — I77.0 ARTERIOVENOUS FISTULA (HCC): ICD-10-CM

## 2021-11-24 PROCEDURE — 93990 DOPPLER FLOW TESTING: CPT | Performed by: SURGERY

## 2021-11-24 PROCEDURE — 93990 DOPPLER FLOW TESTING: CPT

## 2021-12-03 ENCOUNTER — OFFICE VISIT (OUTPATIENT)
Dept: VASCULAR SURGERY | Facility: CLINIC | Age: 66
End: 2021-12-03
Payer: MEDICARE

## 2021-12-03 VITALS
WEIGHT: 194 LBS | BODY MASS INDEX: 27.77 KG/M2 | RESPIRATION RATE: 20 BRPM | HEART RATE: 84 BPM | HEIGHT: 70 IN | SYSTOLIC BLOOD PRESSURE: 128 MMHG | DIASTOLIC BLOOD PRESSURE: 82 MMHG

## 2021-12-03 DIAGNOSIS — I77.0 ARTERIOVENOUS FISTULA (HCC): Primary | ICD-10-CM

## 2021-12-03 PROCEDURE — 99212 OFFICE O/P EST SF 10 MIN: CPT | Performed by: SURGERY

## 2021-12-07 ENCOUNTER — HOSPITAL ENCOUNTER (OUTPATIENT)
Dept: NON INVASIVE DIAGNOSTICS | Facility: HOSPITAL | Age: 66
Discharge: HOME | End: 2021-12-07
Admitting: RADIOLOGY
Payer: MEDICARE

## 2021-12-07 DIAGNOSIS — N18.6 ESRD (END STAGE RENAL DISEASE) (HCC): ICD-10-CM

## 2021-12-07 PROCEDURE — 36589 REMOVAL TUNNELED CV CATH: CPT

## 2021-12-07 PROCEDURE — 36589 REMOVAL TUNNELED CV CATH: CPT | Performed by: RADIOLOGY

## 2021-12-07 RX ORDER — LIDOCAINE WITH 8.4% SOD BICARB 0.9%(10ML)
SYRINGE (ML) INJECTION CODE/TRAUMA/SEDATION MEDICATION
Status: COMPLETED | OUTPATIENT
Start: 2021-12-07 | End: 2021-12-07

## 2021-12-07 RX ADMIN — Medication 10 ML: at 08:25

## 2022-01-31 ENCOUNTER — APPOINTMENT (EMERGENCY)
Dept: RADIOLOGY | Facility: HOSPITAL | Age: 67
End: 2022-01-31
Payer: MEDICARE

## 2022-01-31 ENCOUNTER — HOSPITAL ENCOUNTER (EMERGENCY)
Facility: HOSPITAL | Age: 67
Discharge: HOME | End: 2022-01-31
Attending: EMERGENCY MEDICINE
Payer: MEDICARE

## 2022-01-31 VITALS
WEIGHT: 190 LBS | SYSTOLIC BLOOD PRESSURE: 138 MMHG | RESPIRATION RATE: 16 BRPM | TEMPERATURE: 97 F | HEART RATE: 85 BPM | BODY MASS INDEX: 27.26 KG/M2 | DIASTOLIC BLOOD PRESSURE: 62 MMHG | OXYGEN SATURATION: 99 %

## 2022-01-31 DIAGNOSIS — S09.90XA HEAD INJURY: ICD-10-CM
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
## 2022-01-31 LAB
ALBUMIN SERPL BCP-MCNC: 2.8 G/DL (ref 3.5–5)
ALP SERPL-CCNC: 208 U/L (ref 46–116)
ALT SERPL W P-5'-P-CCNC: 16 U/L (ref 12–78)
ANION GAP SERPL CALCULATED.3IONS-SCNC: 17 MMOL/L (ref 4–13)
AST SERPL W P-5'-P-CCNC: 26 U/L (ref 5–45)
BASOPHILS # BLD AUTO: 0.06 THOUSANDS/ΜL (ref 0–0.1)
BASOPHILS NFR BLD AUTO: 1 % (ref 0–1)
BILIRUB SERPL-MCNC: 0.74 MG/DL (ref 0.2–1)
BUN SERPL-MCNC: 36 MG/DL (ref 5–25)
CALCIUM ALBUM COR SERPL-MCNC: 10.2 MG/DL (ref 8.3–10.1)
CALCIUM SERPL-MCNC: 9.2 MG/DL (ref 8.3–10.1)
CHLORIDE SERPL-SCNC: 98 MMOL/L (ref 100–108)
CO2 SERPL-SCNC: 25 MMOL/L (ref 21–32)
CREAT SERPL-MCNC: 12.21 MG/DL (ref 0.6–1.3)
EOSINOPHIL # BLD AUTO: 0.16 THOUSAND/ΜL (ref 0–0.61)
EOSINOPHIL NFR BLD AUTO: 3 % (ref 0–6)
ERYTHROCYTE [DISTWIDTH] IN BLOOD BY AUTOMATED COUNT: 13.2 % (ref 11.6–15.1)
GFR SERPL CREATININE-BSD FRML MDRD: 3 ML/MIN/1.73SQ M
GLUCOSE SERPL-MCNC: 73 MG/DL (ref 65–140)
HCT VFR BLD AUTO: 34.1 % (ref 36.5–49.3)
HGB BLD-MCNC: 10.6 G/DL (ref 12–17)
IMM GRANULOCYTES # BLD AUTO: 0.02 THOUSAND/UL (ref 0–0.2)
IMM GRANULOCYTES NFR BLD AUTO: 0 % (ref 0–2)
LYMPHOCYTES # BLD AUTO: 0.55 THOUSANDS/ΜL (ref 0.6–4.47)
LYMPHOCYTES NFR BLD AUTO: 9 % (ref 14–44)
MCH RBC QN AUTO: 33 PG (ref 26.8–34.3)
MCHC RBC AUTO-ENTMCNC: 31.1 G/DL (ref 31.4–37.4)
MCV RBC AUTO: 106 FL (ref 82–98)
MONOCYTES # BLD AUTO: 0.38 THOUSAND/ΜL (ref 0.17–1.22)
MONOCYTES NFR BLD AUTO: 6 % (ref 4–12)
NEUTROPHILS # BLD AUTO: 5.09 THOUSANDS/ΜL (ref 1.85–7.62)
NEUTS SEG NFR BLD AUTO: 81 % (ref 43–75)
NRBC BLD AUTO-RTO: 0 /100 WBCS
PLATELET # BLD AUTO: 121 THOUSANDS/UL (ref 149–390)
PMV BLD AUTO: 11.2 FL (ref 8.9–12.7)
POTASSIUM SERPL-SCNC: 4.5 MMOL/L (ref 3.5–5.3)
PROT SERPL-MCNC: 7.1 G/DL (ref 6.4–8.2)
RBC # BLD AUTO: 3.21 MILLION/UL (ref 3.88–5.62)
SODIUM SERPL-SCNC: 140 MMOL/L (ref 136–145)
WBC # BLD AUTO: 6.26 THOUSAND/UL (ref 4.31–10.16)

## 2022-01-31 PROCEDURE — G1004 CDSM NDSC: HCPCS

## 2022-01-31 PROCEDURE — 80053 COMPREHEN METABOLIC PANEL: CPT | Performed by: EMERGENCY MEDICINE

## 2022-01-31 PROCEDURE — 85025 COMPLETE CBC W/AUTO DIFF WBC: CPT | Performed by: EMERGENCY MEDICINE

## 2022-01-31 PROCEDURE — 72125 CT NECK SPINE W/O DYE: CPT

## 2022-01-31 PROCEDURE — 99284 EMERGENCY DEPT VISIT MOD MDM: CPT

## 2022-01-31 PROCEDURE — 99284 EMERGENCY DEPT VISIT MOD MDM: CPT | Performed by: EMERGENCY MEDICINE

## 2022-01-31 PROCEDURE — 36415 COLL VENOUS BLD VENIPUNCTURE: CPT | Performed by: EMERGENCY MEDICINE

## 2022-01-31 PROCEDURE — 70450 CT HEAD/BRAIN W/O DYE: CPT

## 2022-01-31 NOTE — ED PROVIDER NOTES
History  Chief Complaint   Patient presents with    Fall     Pt had a mechanical fall from standing this AM while getting up to go to the bathroom  C/o back of head/neck pain  C-collar placed upon arrival  Takes asa + plavix  HPI   Patient is a 58-year-old male history of end-stage renal disease on dialysis, CAD, diabetes, hypertension, prior CVA on aspirin, Plavix presenting for evaluation after fall from standing  Patient states that about 2 hours prior to coming to the emergency department he was attempting to get out of bed at home, lost his balance while walking with his walker, fell backwards, and struck his head on a piece of furniture  Patient believes that he lost consciousness, is not sure for how long  Patient denies headache, neck pain, back pain, chest pain, shortness of breath  Patient denies fatigue, fevers, chills  Patient states that he missed his dialysis session on Friday, denies additional medical complaint  Patient recently completed rehabilitation following right hip fracture and arthroplasty  Prior to Admission Medications   Prescriptions Last Dose Informant Patient Reported? Taking?    B Complex-C-Folic Acid (TRIPHROCAPS) 1 MG CAPS  Self Yes No   Sig: Take 1 capsule by mouth   Cholecalciferol 50 MCG (2000 UT) CAPS  Self Yes No   Sig: Take by mouth daily   LOSARTAN POTASSIUM PO  Self Yes No   Sig: Take 50 mg by mouth daily   aspirin 81 MG tablet 1/30/2022 at Unknown time Self Yes Yes   Sig: Take 81 mg by mouth daily   carvedilol (COREG) 3 125 mg tablet  Self No No   Sig: Take 1 tablet (3 125 mg total) by mouth 2 (two) times a day with meals   clopidogrel (Plavix) 75 mg tablet 1/30/2022 at Unknown time Self No Yes   Sig: Take 1 tablet (75 mg total) by mouth daily   glucose blood test strip  Self Yes No   Sig: by In Vitro route 2 (two) times a day   mupirocin (BACTROBAN) 2 % ointment   No No   Sig: Apply topically 3 (three) times a day for 7 days   nicotine (NICODERM CQ) 14 mg/24hr TD 24 hr patch  Self No No   Sig: Place 1 patch on the skin daily   Patient not taking: Reported on 8/30/2021   nitroglycerin (NITROSTAT) 0 4 mg SL tablet  Self Yes No   Sig: Place under the tongue   rifaximin (XIFAXAN) 550 mg tablet  Self Yes No   Sig: Take by mouth    rosuvastatin (CRESTOR) 20 MG tablet  Self Yes No   Sig: Take 20 mg by mouth every evening       Facility-Administered Medications: None       Past Medical History:   Diagnosis Date    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    With Cerebral Infarction:  Last Assessed:  October 20, 2016    Chronic kidney disease 2012    on dialysis     Diabetes mellitus ([facility] )     Hypertension     Labyrinthitis 09/10/2011    Myocardial infarction ([facility] ) 2014    x3 others were in 2009 & 2010    Sleep disturbances 09/20/2010    Stroke ([place] [address] ) 2007    x1    Type 2 diabetes, uncontrolled, with renal manifestation ([place] [address] ) 05/03/2017       Past Surgical History:   Procedure Laterality Date    AV FISTULA PLACEMENT      CARDIAC SURGERY  2010    stents x3    COLONOSCOPY N/A 12/12/2016    Procedure: COLONOSCOPY;  Surgeon: Kamla Morton MD;  Location: [facility] GI LAB; Service:     COLONOSCOPY N/A 4/3/2017    Procedure:  COLONOSCOPY;  Surgeon: Kamla Morton MD;  Location: [facility] GI LAB; Service:     IR AV FISTULA/GRAFT DECLOT  4/29/2021    IR TUNNELED CENTRAL LINE REMOVAL  12/7/2021    IR TUNNELED DIALYSIS CATHETER PLACEMENT  5/24/2021    RI ANASTOMOSIS,AV,ANY SITE Left 7/22/2021    Procedure: CREATION FISTULA ARTERIOVENOUS (AV); Surgeon: Andria Balderas MD;  Location: [facility] OR;  Service: Vascular       Family History   Problem Relation Age of Onset    Leukemia Mother     Crohn's disease Father     Transient ischemic attack Brother      I have reviewed and agree with the history as documented      E-Cigarette/Vaping    E-Cigarette Use Never User      E-Cigarette/Vaping Substances    Nicotine No     THC No     CBD No     Flavoring No     Other No     Unknown No Social History     Tobacco Use    Smoking status: Current Every Day Smoker     Packs/day: 0 50     Years: 30 00     Pack years: 15 00    Smokeless tobacco: Never Used   Vaping Use    Vaping Use: Never used   Substance Use Topics    Alcohol use: Yes     Comment: rarely - No alcohol use per [employer]    Drug use: No       Review of Systems   Constitutional: Negative for chills, fatigue and fever  HENT: Negative for congestion, rhinorrhea and sore throat  Eyes: Negative for photophobia and visual disturbance  Respiratory: Negative for chest tightness and shortness of breath  Cardiovascular: Negative for chest pain, palpitations and leg swelling  Gastrointestinal: Negative for abdominal distention, abdominal pain, diarrhea, nausea and vomiting  Endocrine: Negative for polydipsia and polyuria  Genitourinary: Negative for dysuria and hematuria  Musculoskeletal: Positive for gait problem (At baseline ) and neck pain  Negative for arthralgias, back pain, myalgias and neck stiffness  Skin: Negative for color change, pallor, rash and wound  Neurological: Negative for weakness, numbness and headaches  Psychiatric/Behavioral: Negative for confusion  Physical Exam  Physical Exam  Vitals and nursing note reviewed  Constitutional:       General: He is not in acute distress  Appearance: He is well-developed  He is not diaphoretic  Comments: Well-appearing, nondistressed  HENT:      Head: Normocephalic and atraumatic  Comments: No external signs of trauma     Right Ear: External ear normal       Left Ear: External ear normal       Nose: Nose normal       Mouth/Throat:      Pharynx: No oropharyngeal exudate  Eyes:      Conjunctiva/sclera: Conjunctivae normal       Pupils: Pupils are equal, round, and reactive to light  Cardiovascular:      Rate and Rhythm: Normal rate and regular rhythm  Heart sounds: Normal heart sounds  No murmur heard  No friction rub  No gallop  Pulmonary:      Effort: Pulmonary effort is normal  No respiratory distress  Breath sounds: Normal breath sounds  No wheezing  Chest:      Chest wall: No tenderness  Abdominal:      General: Bowel sounds are normal  There is no distension  Palpations: Abdomen is soft  There is no mass  Tenderness: There is no abdominal tenderness  There is no guarding or rebound  Musculoskeletal:         General: No deformity  Comments: Midline C7 tenderness without step-offs or deformities  No thoracic or lumbar tenderness, step-offs, deformities  Full strength and sensation bilaterally in upper and lower extremities  Skin:     General: Skin is warm and dry  Capillary Refill: Capillary refill takes less than 2 seconds  Neurological:      Mental Status: He is alert and oriented to person, place, and time  Comments: AAO x4  Cranial nerves 2-12 intact  Pupils 3 mm bilaterally, reactive to light  Normal EOM  Vision grossly intact     Psychiatric:         Behavior: Behavior normal          Vital Signs  ED Triage Vitals   Temperature Pulse Respirations Blood Pressure SpO2   01/31/22 0608 01/31/22 0608 01/31/22 0608 01/31/22 0608 01/31/22 0608   (!) 96 8 °F (36 °C) 89 20 147/67 97 %      Temp Source Heart Rate Source Patient Position - Orthostatic VS BP Location FiO2 (%)   01/31/22 0608 01/31/22 0608 01/31/22 0608 01/31/22 0608 --   Tympanic Monitor Sitting Right arm       Pain Score       01/31/22 0743       No Pain           Vitals:    01/31/22 0608 01/31/22 0743   BP: 147/67 138/62   Pulse: 89 85   Patient Position - Orthostatic VS: Sitting Sitting         Visual Acuity      ED Medications  Medications - No data to display    Diagnostic Studies  Results Reviewed     Procedure Component Value Units Date/Time    Comprehensive metabolic panel [[id number]]  (Abnormal) Collected: 01/31/22 0614    Lab Status: Final result Specimen: Blood from Arm, Right Updated: 01/31/22 0644     Sodium 140 mmol/L      Potassium 4 5 mmol/L      Chloride 98 mmol/L      CO2 25 mmol/L      ANION GAP 17 mmol/L      BUN 36 mg/dL      Creatinine 12 21 mg/dL      Glucose 73 mg/dL      Calcium 9 2 mg/dL      Corrected Calcium 10 2 mg/dL      AST 26 U/L      ALT 16 U/L      Alkaline Phosphatase 208 U/L      Total Protein 7 1 g/dL      Albumin 2 8 g/dL      Total Bilirubin 0 74 mg/dL      eGFR 3 ml/min/1 73sq m     Narrative:      National Kidney Disease Foundation guidelines for Chronic Kidney Disease (CKD):     Stage 1 with normal or high GFR (GFR > 90 mL/min/1 73 square meters)    Stage 2 Mild CKD (GFR = 60-89 mL/min/1 73 square meters)    Stage 3A Moderate CKD (GFR = 45-59 mL/min/1 73 square meters)    Stage 3B Moderate CKD (GFR = 30-44 mL/min/1 73 square meters)    Stage 4 Severe CKD (GFR = 15-29 mL/min/1 73 square meters)    Stage 5 End Stage CKD (GFR <15 mL/min/1 73 square meters)  Note: GFR calculation is accurate only with a steady state creatinine    CBC and differential [[id number]]  (Abnormal) Collected: 01/31/22 0614    Lab Status: Final result Specimen: Blood from Arm, Right Updated: 01/31/22 0627     WBC 6 26 Thousand/uL      RBC 3 21 Million/uL      Hemoglobin 10 6 g/dL      Hematocrit 34 1 %       fL      MCH 33 0 pg      MCHC 31 1 g/dL      RDW 13 2 %      MPV 11 2 fL      Platelets 838 Thousands/uL      nRBC 0 /100 WBCs      Neutrophils Relative 81 %      Immat GRANS % 0 %      Lymphocytes Relative 9 %      Monocytes Relative 6 %      Eosinophils Relative 3 %      Basophils Relative 1 %      Neutrophils Absolute 5 09 Thousands/µL      Immature Grans Absolute 0 02 Thousand/uL      Lymphocytes Absolute 0 55 Thousands/µL      Monocytes Absolute 0 38 Thousand/µL      Eosinophils Absolute 0 16 Thousand/µL      Basophils Absolute 0 06 Thousands/µL                  CT spine cervical without contrast   Final Result by Seth Rajput MD (01/31 0719)      No cervical spine fracture or traumatic malalignment  Workstation performed: [id number]         CT head without contrast   Final Result by Matthew Huffman MD (01/31 3010)      No acute intracranial abnormality  Left parietal scalp hematoma  Workstation performed: [id number]                    Procedures  Procedures         ED Course                                             MDM  Number of Diagnoses or Management Options  Fall, initial encounter  Head injury  Diagnosis management comments: Plan for medical workup including CBC, CMP given patient's fall and recent missed dialysis session  CT head given patient's history of head trauma, loss of consciousness, anti-platelet use including aspirin, Plavix  Unable to clear patient via nexus criteria given midline cervical tenderness at C7  Plan for CT cervical spine  Patient signed out to Dr Adam Burgess pending imaging results and test of ambulation, ultimately discharged  Disposition  Final diagnoses:   Fall, initial encounter   Head injury     Time reflects when diagnosis was documented in both MDM as applicable and the Disposition within this note     Time User Action Codes Description Comment    1/31/2022  7:21 AM Mansoor Abarca Add [K43  AKCD] Fall, initial encounter     1/31/2022  7:21 AM Mansoor Abarca Add [S09 [id number]] Head injury       ED Disposition     ED Disposition Condition Date/Time Comment    Discharge Stable Mon Jan 31, 2022  7:21 AM Zara Bishop discharge to home/self care              Follow-up Information     Follow up With Specialties Details Why Martha [address], [address] Nurse Practitioner Schedule an appointment as soon as possible for a visit  As needed [address] [place] [id number]  [phone]            Discharge Medication List as of 1/31/2022  7:22 AM      CONTINUE these medications which have NOT CHANGED    Details   aspirin 81 MG tablet Take 81 mg by mouth daily, Historical Med      clopidogrel (Plavix) 75 mg tablet Take 1 tablet (75 mg total) by mouth daily, Starting Fri 7/23/2021, No Print      B Complex-C-Folic Acid (TRIPHROCAPS) 1 MG CAPS Take 1 capsule by mouth, Starting Mon 2/20/2017, Historical Med      carvedilol (COREG) 3 125 mg tablet Take 1 tablet (3 125 mg total) by mouth 2 (two) times a day with meals, Starting Fri 6/11/2021, Normal      Cholecalciferol 50 MCG (2000 UT) CAPS Take by mouth daily, Starting Mon 3/22/2021, Historical Med      glucose blood test strip by In Vitro route 2 (two) times a day, Starting Mon 7/18/2011, Historical Med      LOSARTAN POTASSIUM PO Take 50 mg by mouth daily, Historical Med      mupirocin (BACTROBAN) 2 % ointment Apply topically 3 (three) times a day for 7 days, Starting Fri 10/8/2021, Until Fri 10/15/2021, Normal      nicotine (NICODERM CQ) 14 mg/24hr TD 24 hr patch Place 1 patch on the skin daily, Starting Tue 7/27/2021, Normal      nitroglycerin (NITROSTAT) 0 4 mg SL tablet Place under the tongue, Starting Mon 7/18/2011, Historical Med      rifaximin (XIFAXAN) 550 mg tablet Take by mouth , Starting Thu 6/8/2017, Historical Med      rosuvastatin (CRESTOR) 20 MG tablet Take 20 mg by mouth every evening , Starting Wed 5/29/2019, Historical Med             No discharge procedures on file      PDMP Review       Value Time User    PDMP Reviewed  Yes 7/22/2021  2:07 PM Bertram Leung PA-C          ED Provider  Electronically Signed by           Sandra Goetz MD  01/31/22 4476

## 2022-03-25 ENCOUNTER — HOSPITAL ENCOUNTER (OUTPATIENT)
Dept: NON INVASIVE DIAGNOSTICS | Facility: HOSPITAL | Age: 67
Discharge: HOME | End: 2022-03-25
Attending: RADIOLOGY | Admitting: RADIOLOGY
Payer: MEDICARE

## 2022-03-25 ENCOUNTER — PREP FOR PROCEDURE (OUTPATIENT)
Dept: INTERVENTIONAL RADIOLOGY/VASCULAR | Facility: CLINIC | Age: 67
End: 2022-03-25

## 2022-03-25 VITALS
RESPIRATION RATE: 18 BRPM | BODY MASS INDEX: 25.31 KG/M2 | WEIGHT: 176.81 LBS | HEART RATE: 70 BPM | DIASTOLIC BLOOD PRESSURE: 72 MMHG | TEMPERATURE: 97.6 F | OXYGEN SATURATION: 98 % | HEIGHT: 70 IN | SYSTOLIC BLOOD PRESSURE: 136 MMHG

## 2022-03-25 DIAGNOSIS — N18.6 ESRD (END STAGE RENAL DISEASE) (HCC): ICD-10-CM

## 2022-03-25 DIAGNOSIS — N18.6 ESRD (END STAGE RENAL DISEASE) (HCC): Primary | ICD-10-CM

## 2022-03-25 LAB — GLUCOSE SERPL-MCNC: 121 MG/DL (ref 65–140)

## 2022-03-25 PROCEDURE — C1757 CATH, THROMBECTOMY/EMBOLECT: HCPCS

## 2022-03-25 PROCEDURE — C1769 GUIDE WIRE: HCPCS

## 2022-03-25 PROCEDURE — C1725 CATH, TRANSLUMIN NON-LASER: HCPCS

## 2022-03-25 PROCEDURE — C1894 INTRO/SHEATH, NON-LASER: HCPCS

## 2022-03-25 PROCEDURE — 76937 US GUIDE VASCULAR ACCESS: CPT | Performed by: RADIOLOGY

## 2022-03-25 PROCEDURE — 99152 MOD SED SAME PHYS/QHP 5/>YRS: CPT | Performed by: RADIOLOGY

## 2022-03-25 PROCEDURE — 36905 THRMBC/NFS DIALYSIS CIRCUIT: CPT | Performed by: RADIOLOGY

## 2022-03-25 PROCEDURE — 82948 REAGENT STRIP/BLOOD GLUCOSE: CPT

## 2022-03-25 PROCEDURE — G9198 NO ORDER FOR CEPH NO REASON: HCPCS | Performed by: RADIOLOGY

## 2022-03-25 PROCEDURE — 36905 THRMBC/NFS DIALYSIS CIRCUIT: CPT

## 2022-03-25 RX ORDER — OXYCODONE HYDROCHLORIDE 5 MG/1
5 TABLET ORAL EVERY 4 HOURS PRN
Status: DISCONTINUED | OUTPATIENT
Start: 2022-03-25 | End: 2022-03-26 | Stop reason: HOSPADM

## 2022-03-25 RX ORDER — HEPARIN SODIUM 1000 [USP'U]/ML
INJECTION, SOLUTION INTRAVENOUS; SUBCUTANEOUS CODE/TRAUMA/SEDATION MEDICATION
Status: DISCONTINUED | OUTPATIENT
Start: 2022-03-25 | End: 2022-03-26 | Stop reason: HOSPADM

## 2022-03-25 RX ORDER — NITROGLYCERIN 20 MG/100ML
INJECTION INTRAVENOUS CODE/TRAUMA/SEDATION MEDICATION
Status: DISCONTINUED | OUTPATIENT
Start: 2022-03-25 | End: 2022-03-26 | Stop reason: HOSPADM

## 2022-03-25 RX ORDER — FENTANYL CITRATE 50 UG/ML
INJECTION, SOLUTION INTRAMUSCULAR; INTRAVENOUS CODE/TRAUMA/SEDATION MEDICATION
Status: DISCONTINUED | OUTPATIENT
Start: 2022-03-25 | End: 2022-03-26 | Stop reason: HOSPADM

## 2022-03-25 RX ORDER — MIDAZOLAM HYDROCHLORIDE 2 MG/2ML
INJECTION, SOLUTION INTRAMUSCULAR; INTRAVENOUS CODE/TRAUMA/SEDATION MEDICATION
Status: DISCONTINUED | OUTPATIENT
Start: 2022-03-25 | End: 2022-03-26 | Stop reason: HOSPADM

## 2022-03-25 RX ORDER — ACETAMINOPHEN 325 MG/1
650 TABLET ORAL EVERY 4 HOURS PRN
Status: DISCONTINUED | OUTPATIENT
Start: 2022-03-25 | End: 2022-03-26 | Stop reason: HOSPADM

## 2022-03-25 RX ORDER — LIDOCAINE HYDROCHLORIDE 10 MG/ML
INJECTION, SOLUTION EPIDURAL; INFILTRATION; INTRACAUDAL; PERINEURAL CODE/TRAUMA/SEDATION MEDICATION
Status: DISCONTINUED | OUTPATIENT
Start: 2022-03-25 | End: 2022-03-26 | Stop reason: HOSPADM

## 2022-03-25 RX ORDER — FENTANYL CITRATE 50 UG/ML
25 INJECTION, SOLUTION INTRAMUSCULAR; INTRAVENOUS EVERY 2 HOUR PRN
Status: CANCELLED | OUTPATIENT
Start: 2022-03-25 | End: 2022-03-27

## 2022-03-25 RX ADMIN — Medication 200 MCG: at 16:16

## 2022-03-25 RX ADMIN — LIDOCAINE HYDROCHLORIDE 5 ML: 10 INJECTION, SOLUTION EPIDURAL; INFILTRATION; INTRACAUDAL; PERINEURAL at 15:14

## 2022-03-25 RX ADMIN — ALTEPLASE 2 MG: 2.2 INJECTION, POWDER, LYOPHILIZED, FOR SOLUTION INTRAVENOUS at 15:38

## 2022-03-25 RX ADMIN — FENTANYL CITRATE 25 MCG: 50 INJECTION, SOLUTION INTRAMUSCULAR; INTRAVENOUS at 15:30

## 2022-03-25 RX ADMIN — MIDAZOLAM 1 MG: 1 INJECTION INTRAMUSCULAR; INTRAVENOUS at 15:10

## 2022-03-25 RX ADMIN — FENTANYL CITRATE 25 MCG: 50 INJECTION, SOLUTION INTRAMUSCULAR; INTRAVENOUS at 15:25

## 2022-03-25 RX ADMIN — Medication 200 MCG: at 15:35

## 2022-03-25 RX ADMIN — Medication 200 MCG: at 16:51

## 2022-03-25 RX ADMIN — Medication 150 MCG: at 16:43

## 2022-03-25 RX ADMIN — MIDAZOLAM 1 MG: 1 INJECTION INTRAMUSCULAR; INTRAVENOUS at 15:24

## 2022-03-25 RX ADMIN — FENTANYL CITRATE 50 MCG: 50 INJECTION, SOLUTION INTRAMUSCULAR; INTRAVENOUS at 15:58

## 2022-03-25 RX ADMIN — FENTANYL CITRATE 50 MCG: 50 INJECTION, SOLUTION INTRAMUSCULAR; INTRAVENOUS at 16:37

## 2022-03-25 RX ADMIN — FENTANYL CITRATE 50 MCG: 50 INJECTION, SOLUTION INTRAMUSCULAR; INTRAVENOUS at 15:10

## 2022-03-25 RX ADMIN — MIDAZOLAM 1 MG: 1 INJECTION INTRAMUSCULAR; INTRAVENOUS at 15:50

## 2022-03-25 RX ADMIN — ALTEPLASE 2 MG: 2.2 INJECTION, POWDER, LYOPHILIZED, FOR SOLUTION INTRAVENOUS at 15:13

## 2022-03-25 RX ADMIN — IOHEXOL 50 ML: 350 INJECTION, SOLUTION INTRAVENOUS at 17:01

## 2022-03-25 RX ADMIN — HEPARIN SODIUM 3500 UNITS: 1000 INJECTION INTRAVENOUS; SUBCUTANEOUS at 15:34

## 2022-03-25 RX ADMIN — LIDOCAINE HYDROCHLORIDE 10 ML: 10 INJECTION, SOLUTION EPIDURAL; INFILTRATION; INTRACAUDAL; PERINEURAL at 16:55

## 2022-03-25 NOTE — BRIEF OP NOTE (RAD/CATH)
INTERVENTIONAL RADIOLOGY PROCEDURE NOTE    Date: 3/25/2022    Procedure: IR AV FISTULA/GRAFT DECLOT     Preoperative diagnosis:   1  ESRD (end stage renal disease) ([facility] )       Postoperative diagnosis: Same  Surgeon: Eligio Agee MD     Assistant: None  No qualified resident was available  Blood loss: minimal    Specimens: none    Findings: Successful fistula declot  May use fistula immediately  Complications: None immediate      Anesthesia: conscious sedation

## 2022-03-25 NOTE — DISCHARGE INSTRUCTIONS
[facility] [address]     Interventional Radiology Phone Number [phone]  WHAT YOU NEED TO KNOW:   Your arm or leg may be sore, swollen, and bruised after the procedure  This is normal and should get better in a few days  DISCHARGE INSTRUCTIONS:   Call 911 for any of the following:   · You have any of the following signs of a heart attack:    Squeezing, pressure, or pain in your chest that lasts longer than 5 minutes or returns  ¨ Discomfort or pain in your back, neck, jaw, stomach, or arm   ¨ Trouble breathing  ¨ Nausea or vomiting  ¨ Lightheadedness or a sudden cold sweat, especially with chest pain or trouble breathing  · You have any of the following signs of a stroke:    ¨ Numbness or drooping on one side of your face   ¨ Weakness in an arm or leg  ¨ Confusion or difficulty speaking  ¨ Dizziness, a severe headache, or vision loss  · You feel lightheaded, short of breath, and have chest pain  · You cough up blood  · You have trouble breathing  · You have bleeding that does not stop after 10 minutes of holding firm, direct pressure over the puncture site  ·   CALL Interventional Radiology at [phone]    ;    IF:   · Blood soaks through your bandage  · Your hand or foot closest to the graft or fistula feels cold, painful, or numb  Your hand or foot closest to the graft or fistula is pale or blue  · You have trouble moving your arm or leg closest to the graft or fistula  · Your bruise suddenly gets bigger   · You have a fever or chills  · Your puncture site is red, swollen, or draining pus  · You have nausea or are vomiting  · Your skin is itchy, swollen, or you have a rash  · You cannot feel a thrill over your graft or fistula  · You have questions or concerns about your condition or care  Procedural Sedation   WHAT YOU NEED TO KNOW:   Procedural sedation is medicine used during procedures to help you feel relaxed and calm  You will remember little to none of the procedure   After sedation you may feel tired, weak, or unsteady on your feet  You may also have trouble concentrating or short-term memory loss  These symptoms should go away in 24 hours or less  DISCHARGE INSTRUCTIONS:     Call 911 or have someone else call for any of the following: You have sudden trouble breathing      You cannot be woken  Contact Interventional Radiology at [phone]   [place] PATIENTS: Contact Interventional Radiology at [phone]) Lizette Escobedo PATIENTS: Contact Interventional Radiology at [phone]) if any of the following occur: You have a severe headache or dizziness      Your heart is beating faster than usual     You have a fever or chills      Your skin is itchy, swollen, or you have a rash      You have nausea or are vomiting for more than 8 hours after the procedure       You have questions or concerns about your condition or care  Self-care:   Have someone stay with you for 24 hours  This person can drive you to errands and help you do things around the house  This person can also watch for problems       Rest and do quiet activities for 24 hours  Do not exercise, ride a bike, or play sports  Stand up slowly to prevent dizziness and falls  Take short walks around the house with another person  Slowly return to your usual activities the next day       Do not drive or use dangerous machines or tools for 24 hours  You may injure yourself or others  Examples include a lawnmower, saw, or drill  Do not return to work for 24 hours if you use dangerous machines or tools for work       Do not make important decisions for 24 hours  For example, do not sign important papers or invest money       Drink liquids as directed  Liquids help flush the sedation medicine out of your body  Ask how much liquid to drink each day and which liquids are best for you       Eat small, frequent meals to prevent nausea and vomiting  Start with clear liquids such as juice or broth   If you do not vomit after clear liquids, you can eat your usual foods       Do not drink alcohol or take medicines that make you drowsy  This includes medicines that help you sleep and anxiety medicines  Ask your healthcare provider if it is safe for you to take pain medicine  Follow up with your healthcare provider as directed: Write down your questions so you remember to ask them during your visits  ·   Medicines: You may  need any of the following:  · Acetaminophen  decreases pain  It is available without a doctor's order  Ask how much to take and how often to take it  Follow directions  Read the labels of all other medicines you are using to see if they also contain acetaminophen, or ask your doctor or pharmacist  Acetaminophen can cause liver damage if not taken correctly  Do not take more than 4 grams (4,000 milligrams) of acetaminophen in one day  · Blood thinners help prevent blood clots  Examples of blood thinners include heparin and warfarin  Clots can cause strokes, heart attacks, and death  The following are general safety guidelines to follow while you are taking a blood thinner:  ¨ Watch for bleeding and bruising while you take blood thinners  Watch for bleeding from your gums or nose  Watch for blood in your urine and bowel movements  Use a soft washcloth on your skin, and a soft toothbrush to brush your teeth  This can keep your skin and gums from bleeding  If you shave, use an electric shaver  Do not play contact sports  ¨ Tell your dentist and other healthcare providers that you take anticoagulants  Wear a bracelet or necklace that says you take this medicine  ¨ Do not start or stop any medicines unless your healthcare provider tells you to  Many medicines cannot be used with blood thinners  ¨ Tell your healthcare provider right away if you forget to take the medicine, or if you take too much  ¨ Warfarin  is a blood thinner that you may need to take   The following are things you should be aware of if you take warfarin  § Foods and medicines can affect the amount of warfarin in your blood  Do not make major changes to your diet while you take warfarin  Warfarin works best when you eat about the same amount of vitamin K every day  Vitamin K is found in green leafy vegetables and certain other foods  Ask for more information about what to eat when you are taking warfarin  § You will need to see your healthcare provider for follow-up visits when you are on warfarin  You will need regular blood tests  These tests are used to decide how much medicine you need  · Take your medicine as directed  Call your healthcare provider if you think your medicine is not helping or if you have side effects  Tell him if you are allergic to any medicine  Keep a list of the medicines, vitamins, and herbs you take  Include the amounts, and when and why you take them  Bring the list or the pill bottles to follow-up visits  Carry your medicine list with you in case of an emergency  Care for your wound as directed:  Remove the bandage in 4 to 6 hours or as directed  Wash the area once a day with soap and water  Gently pat the area dry  Self-care:   · Apply firm, steady pressure to the puncture site if it bleeds  Use a clean gauze or towel to hold pressure for 10 to 15 minutes  Call 911 if you cannot stop the bleeding or the bleeding gets heavier  · Feel for a thrill once a day or as directed  Place your index and second finger over your fistula or graft as directed  You should feel a vibration  The vibration means that blood is flowing through your graft or fistula correctly  · Rest your arm or leg as directed  Do not lift anything heavier than 5 pounds or do strenuous activity for 24 hours  · Prevent damage to your graft or fistula  Do not wear tight-fitting clothing over your graft or fistula  Do not wear tight jewelry on the arm or leg with the graft or fistula   Tell healthcare providers not to do, IVs, blood draws, and blood pressure readings in the arm with your graft or fistula  Do not allow flu shots or vaccinations in your arm with your graft or fistula  Follow up with your healthcare provider as directed:  Write down your questions so you remember to ask them during your visits  © 2016 [address] [employer] is for End User's use only and may not be sold, redistributed or otherwise used for commercial purposes  All illustrations and images included in CareNotes® are the copyrighted property of [employer]  or Ritchie Corey  The above information is an  only  It is not intended as medical advice for individual conditions or treatments  Talk to your doctor, nurse or pharmacist before following any medical regimen to see if it is safe and effective for you

## 2022-03-25 NOTE — PERIOPERATIVE NURSING NOTE
Patient received into APU via wheelchair, oriented to room 2, patient assisted with walking to the bed with walker and RN  Patient assisted with changing into his gown  Patient denies pain or discomfort  Left upper arm fistula warm and dry, Rn could not feel the thrill or hear the bruit  Patient to go to IR for declotting of the fistula  Left radial pulse is weak as is right radial pulse

## 2022-03-25 NOTE — PERIOPERATIVE NURSING NOTE
During pt discharge at 6:03, pressure dressing sites persisted to bleed, pressure held again for ten minutes and IR was called  Additional dressings brought up and placed by IR nurses Magi and Scott  After pressure held for another 10 minutes, vss, pulses radial and pedal pulses remain to be bounding and thrill noted at procedure site  Doctor was called and updated on pt status since phase 2 and per Dr Linh Hurtado, pt was allowed to go home  IV removed  Assisted pt with dressing  Safely ambulated to family car and discharged to his son's care, Mirandaaime Portillo  All discharge instructions were given, no questions per the pt

## 2022-03-25 NOTE — PERIOPERATIVE NURSING NOTE
Received pt from IR and pt vss, no pain or nausea noted  Thrill and bounding noted at both procedure sites  Bleeding noted on steri strips, IR notified and alternative dressings to be brought up for effective wound pressure to assist in bleeding

## 2022-03-28 ENCOUNTER — HOSPITAL ENCOUNTER (OUTPATIENT)
Facility: HOSPITAL | Age: 67
Setting detail: OBSERVATION
Discharge: HOME | End: 2022-03-29
Attending: EMERGENCY MEDICINE | Admitting: FAMILY MEDICINE
Payer: MEDICARE

## 2022-03-28 ENCOUNTER — APPOINTMENT (OUTPATIENT)
Dept: NON INVASIVE DIAGNOSTICS | Facility: HOSPITAL | Age: 67
End: 2022-03-28
Attending: FAMILY MEDICINE
Payer: MEDICARE

## 2022-03-28 ENCOUNTER — APPOINTMENT (OUTPATIENT)
Dept: RADIOLOGY | Facility: HOSPITAL | Age: 67
End: 2022-03-28
Payer: MEDICARE

## 2022-03-28 DIAGNOSIS — Z99.2 END-STAGE RENAL DISEASE ON HEMODIALYSIS (HCC): ICD-10-CM

## 2022-03-28 DIAGNOSIS — T82.590A DIALYSIS AV FISTULA MALFUNCTION (HCC): Primary | ICD-10-CM

## 2022-03-28 DIAGNOSIS — N18.6 END-STAGE RENAL DISEASE ON HEMODIALYSIS (HCC): ICD-10-CM

## 2022-03-28 PROBLEM — E87.6 HYPOKALEMIA: Status: ACTIVE | Noted: 2022-03-28

## 2022-03-28 LAB
ALBUMIN SERPL BCP-MCNC: 2.7 G/DL (ref 3.5–5)
ALP SERPL-CCNC: 182 U/L (ref 46–116)
ALT SERPL W P-5'-P-CCNC: 12 U/L (ref 12–78)
ANION GAP SERPL CALCULATED.3IONS-SCNC: 14 MMOL/L (ref 4–13)
APTT PPP: 29 SECONDS (ref 23–37)
AST SERPL W P-5'-P-CCNC: 15 U/L (ref 5–45)
ATRIAL RATE: 70 BPM
BASOPHILS # BLD AUTO: 0.04 THOUSANDS/ΜL (ref 0–0.1)
BASOPHILS NFR BLD AUTO: 1 % (ref 0–1)
BILIRUB SERPL-MCNC: 0.61 MG/DL (ref 0.2–1)
BUN SERPL-MCNC: 36 MG/DL (ref 5–25)
CALCIUM ALBUM COR SERPL-MCNC: 8.9 MG/DL (ref 8.3–10.1)
CALCIUM SERPL-MCNC: 7.9 MG/DL (ref 8.3–10.1)
CARDIAC TROPONIN I PNL SERPL HS: 23 NG/L
CHLORIDE SERPL-SCNC: 99 MMOL/L (ref 100–108)
CO2 SERPL-SCNC: 24 MMOL/L (ref 21–32)
CREAT SERPL-MCNC: 13.22 MG/DL (ref 0.6–1.3)
EOSINOPHIL # BLD AUTO: 0.08 THOUSAND/ΜL (ref 0–0.61)
EOSINOPHIL NFR BLD AUTO: 1 % (ref 0–6)
ERYTHROCYTE [DISTWIDTH] IN BLOOD BY AUTOMATED COUNT: 16.3 % (ref 11.6–15.1)
FLUAV RNA RESP QL NAA+PROBE: NEGATIVE
FLUBV RNA RESP QL NAA+PROBE: NEGATIVE
GFR SERPL CREATININE-BSD FRML MDRD: 3 ML/MIN/1.73SQ M
GLUCOSE SERPL-MCNC: 106 MG/DL (ref 65–140)
GLUCOSE SERPL-MCNC: 121 MG/DL (ref 65–140)
GLUCOSE SERPL-MCNC: 139 MG/DL (ref 65–140)
GLUCOSE SERPL-MCNC: 81 MG/DL (ref 65–140)
GLUCOSE SERPL-MCNC: 91 MG/DL (ref 65–140)
GLUCOSE SERPL-MCNC: 94 MG/DL (ref 65–140)
HCT VFR BLD AUTO: 40.7 % (ref 36.5–49.3)
HGB BLD-MCNC: 13.3 G/DL (ref 12–17)
IMM GRANULOCYTES # BLD AUTO: 0.02 THOUSAND/UL (ref 0–0.2)
IMM GRANULOCYTES NFR BLD AUTO: 0 % (ref 0–2)
INR PPP: 1.09 (ref 0.84–1.19)
LYMPHOCYTES # BLD AUTO: 0.57 THOUSANDS/ΜL (ref 0.6–4.47)
LYMPHOCYTES NFR BLD AUTO: 8 % (ref 14–44)
MAGNESIUM SERPL-MCNC: 1.9 MG/DL (ref 1.6–2.6)
MCH RBC QN AUTO: 34.5 PG (ref 26.8–34.3)
MCHC RBC AUTO-ENTMCNC: 32.7 G/DL (ref 31.4–37.4)
MCV RBC AUTO: 106 FL (ref 82–98)
MONOCYTES # BLD AUTO: 0.36 THOUSAND/ΜL (ref 0.17–1.22)
MONOCYTES NFR BLD AUTO: 5 % (ref 4–12)
NEUTROPHILS # BLD AUTO: 6.18 THOUSANDS/ΜL (ref 1.85–7.62)
NEUTS SEG NFR BLD AUTO: 85 % (ref 43–75)
NRBC BLD AUTO-RTO: 0 /100 WBCS
NT-PROBNP SERPL-MCNC: ABNORMAL PG/ML
P AXIS: 43 DEGREES
PHOSPHATE SERPL-MCNC: 3.9 MG/DL (ref 2.3–4.1)
PLATELET # BLD AUTO: 125 THOUSANDS/UL (ref 149–390)
PMV BLD AUTO: 10 FL (ref 8.9–12.7)
POTASSIUM SERPL-SCNC: 2.9 MMOL/L (ref 3.5–5.3)
PR INTERVAL: 190 MS
PROT SERPL-MCNC: 6.4 G/DL (ref 6.4–8.2)
PROTHROMBIN TIME: 13.9 SECONDS (ref 11.6–14.5)
QRS AXIS: -26 DEGREES
QRSD INTERVAL: 118 MS
QT INTERVAL: 454 MS
QTC INTERVAL: 490 MS
RBC # BLD AUTO: 3.85 MILLION/UL (ref 3.88–5.62)
RSV RNA RESP QL NAA+PROBE: NEGATIVE
SARS-COV-2 RNA RESP QL NAA+PROBE: NEGATIVE
SODIUM SERPL-SCNC: 137 MMOL/L (ref 136–145)
T WAVE AXIS: 58 DEGREES
VENTRICULAR RATE: 70 BPM
WBC # BLD AUTO: 7.25 THOUSAND/UL (ref 4.31–10.16)

## 2022-03-28 PROCEDURE — C1725 CATH, TRANSLUMIN NON-LASER: HCPCS

## 2022-03-28 PROCEDURE — 36905 THRMBC/NFS DIALYSIS CIRCUIT: CPT

## 2022-03-28 PROCEDURE — 83880 ASSAY OF NATRIURETIC PEPTIDE: CPT | Performed by: EMERGENCY MEDICINE

## 2022-03-28 PROCEDURE — 84484 ASSAY OF TROPONIN QUANT: CPT | Performed by: EMERGENCY MEDICINE

## 2022-03-28 PROCEDURE — 84100 ASSAY OF PHOSPHORUS: CPT | Performed by: EMERGENCY MEDICINE

## 2022-03-28 PROCEDURE — 85025 COMPLETE CBC W/AUTO DIFF WBC: CPT | Performed by: EMERGENCY MEDICINE

## 2022-03-28 PROCEDURE — G9198 NO ORDER FOR CEPH NO REASON: HCPCS | Performed by: RADIOLOGY

## 2022-03-28 PROCEDURE — 99153 MOD SED SAME PHYS/QHP EA: CPT

## 2022-03-28 PROCEDURE — 99152 MOD SED SAME PHYS/QHP 5/>YRS: CPT

## 2022-03-28 PROCEDURE — 83735 ASSAY OF MAGNESIUM: CPT | Performed by: EMERGENCY MEDICINE

## 2022-03-28 PROCEDURE — 80053 COMPREHEN METABOLIC PANEL: CPT | Performed by: EMERGENCY MEDICINE

## 2022-03-28 PROCEDURE — C1894 INTRO/SHEATH, NON-LASER: HCPCS

## 2022-03-28 PROCEDURE — 93005 ELECTROCARDIOGRAM TRACING: CPT

## 2022-03-28 PROCEDURE — 85730 THROMBOPLASTIN TIME PARTIAL: CPT | Performed by: EMERGENCY MEDICINE

## 2022-03-28 PROCEDURE — 36905 THRMBC/NFS DIALYSIS CIRCUIT: CPT | Performed by: RADIOLOGY

## 2022-03-28 PROCEDURE — 0241U HB NFCT DS VIR RESP RNA 4 TRGT: CPT | Performed by: EMERGENCY MEDICINE

## 2022-03-28 PROCEDURE — C1757 CATH, THROMBECTOMY/EMBOLECT: HCPCS

## 2022-03-28 PROCEDURE — 99285 EMERGENCY DEPT VISIT HI MDM: CPT | Performed by: EMERGENCY MEDICINE

## 2022-03-28 PROCEDURE — NC001 PR NO CHARGE: Performed by: RADIOLOGY

## 2022-03-28 PROCEDURE — 99214 OFFICE O/P EST MOD 30 MIN: CPT | Performed by: INTERNAL MEDICINE

## 2022-03-28 PROCEDURE — 71045 X-RAY EXAM CHEST 1 VIEW: CPT

## 2022-03-28 PROCEDURE — C2623 CATH, TRANSLUMIN, DRUG-COAT: HCPCS

## 2022-03-28 PROCEDURE — 82948 REAGENT STRIP/BLOOD GLUCOSE: CPT

## 2022-03-28 PROCEDURE — C1769 GUIDE WIRE: HCPCS

## 2022-03-28 PROCEDURE — 93010 ELECTROCARDIOGRAM REPORT: CPT | Performed by: INTERNAL MEDICINE

## 2022-03-28 PROCEDURE — 99152 MOD SED SAME PHYS/QHP 5/>YRS: CPT | Performed by: RADIOLOGY

## 2022-03-28 PROCEDURE — 36415 COLL VENOUS BLD VENIPUNCTURE: CPT | Performed by: EMERGENCY MEDICINE

## 2022-03-28 PROCEDURE — 76937 US GUIDE VASCULAR ACCESS: CPT | Performed by: RADIOLOGY

## 2022-03-28 PROCEDURE — 99285 EMERGENCY DEPT VISIT HI MDM: CPT

## 2022-03-28 PROCEDURE — 85610 PROTHROMBIN TIME: CPT | Performed by: EMERGENCY MEDICINE

## 2022-03-28 PROCEDURE — 99220 PR INITIAL OBSERVATION CARE/DAY 70 MINUTES: CPT | Performed by: FAMILY MEDICINE

## 2022-03-28 RX ORDER — HEPARIN SODIUM 1000 [USP'U]/ML
INJECTION, SOLUTION INTRAVENOUS; SUBCUTANEOUS CODE/TRAUMA/SEDATION MEDICATION
Status: COMPLETED | OUTPATIENT
Start: 2022-03-28 | End: 2022-03-28

## 2022-03-28 RX ORDER — CLOPIDOGREL BISULFATE 75 MG/1
75 TABLET ORAL DAILY
Status: DISCONTINUED | OUTPATIENT
Start: 2022-03-29 | End: 2022-03-29 | Stop reason: HOSPADM

## 2022-03-28 RX ORDER — LIDOCAINE WITH 8.4% SOD BICARB 0.9%(10ML)
SYRINGE (ML) INJECTION CODE/TRAUMA/SEDATION MEDICATION
Status: COMPLETED | OUTPATIENT
Start: 2022-03-28 | End: 2022-03-28

## 2022-03-28 RX ORDER — POTASSIUM CHLORIDE 20 MEQ/1
40 TABLET, EXTENDED RELEASE ORAL ONCE
Status: COMPLETED | OUTPATIENT
Start: 2022-03-28 | End: 2022-03-28

## 2022-03-28 RX ORDER — HEPARIN SODIUM 5000 [USP'U]/ML
5000 INJECTION, SOLUTION INTRAVENOUS; SUBCUTANEOUS EVERY 8 HOURS SCHEDULED
Status: DISCONTINUED | OUTPATIENT
Start: 2022-03-29 | End: 2022-03-29 | Stop reason: HOSPADM

## 2022-03-28 RX ORDER — ASPIRIN 81 MG/1
81 TABLET ORAL DAILY
Status: DISCONTINUED | OUTPATIENT
Start: 2022-03-29 | End: 2022-03-29 | Stop reason: HOSPADM

## 2022-03-28 RX ORDER — POTASSIUM CHLORIDE 20 MEQ/1
40 TABLET, EXTENDED RELEASE ORAL ONCE
Status: DISCONTINUED | OUTPATIENT
Start: 2022-03-28 | End: 2022-03-28

## 2022-03-28 RX ORDER — CARVEDILOL 3.12 MG/1
3.12 TABLET ORAL 2 TIMES DAILY WITH MEALS
Status: DISCONTINUED | OUTPATIENT
Start: 2022-03-28 | End: 2022-03-29 | Stop reason: HOSPADM

## 2022-03-28 RX ORDER — MIDAZOLAM HYDROCHLORIDE 2 MG/2ML
INJECTION, SOLUTION INTRAMUSCULAR; INTRAVENOUS CODE/TRAUMA/SEDATION MEDICATION
Status: COMPLETED | OUTPATIENT
Start: 2022-03-28 | End: 2022-03-28

## 2022-03-28 RX ORDER — NICOTINE 21 MG/24HR
1 PATCH, TRANSDERMAL 24 HOURS TRANSDERMAL DAILY
Status: DISCONTINUED | OUTPATIENT
Start: 2022-03-28 | End: 2022-03-29 | Stop reason: HOSPADM

## 2022-03-28 RX ORDER — ACETAMINOPHEN 325 MG/1
650 TABLET ORAL EVERY 6 HOURS PRN
Status: DISCONTINUED | OUTPATIENT
Start: 2022-03-28 | End: 2022-03-29 | Stop reason: HOSPADM

## 2022-03-28 RX ORDER — ATORVASTATIN CALCIUM 40 MG/1
40 TABLET, FILM COATED ORAL
Status: DISCONTINUED | OUTPATIENT
Start: 2022-03-28 | End: 2022-03-29 | Stop reason: HOSPADM

## 2022-03-28 RX ORDER — FENTANYL CITRATE 50 UG/ML
INJECTION, SOLUTION INTRAMUSCULAR; INTRAVENOUS CODE/TRAUMA/SEDATION MEDICATION
Status: COMPLETED | OUTPATIENT
Start: 2022-03-28 | End: 2022-03-28

## 2022-03-28 RX ADMIN — CARVEDILOL 3.12 MG: 3.12 TABLET, FILM COATED ORAL at 17:11

## 2022-03-28 RX ADMIN — MIDAZOLAM 1 MG: 1 INJECTION INTRAMUSCULAR; INTRAVENOUS at 14:50

## 2022-03-28 RX ADMIN — FENTANYL CITRATE 50 MCG: 50 INJECTION, SOLUTION INTRAMUSCULAR; INTRAVENOUS at 14:39

## 2022-03-28 RX ADMIN — FENTANYL CITRATE 25 MCG: 50 INJECTION, SOLUTION INTRAMUSCULAR; INTRAVENOUS at 15:25

## 2022-03-28 RX ADMIN — FENTANYL CITRATE 25 MCG: 50 INJECTION, SOLUTION INTRAMUSCULAR; INTRAVENOUS at 16:15

## 2022-03-28 RX ADMIN — POTASSIUM CHLORIDE 40 MEQ: 1500 TABLET, EXTENDED RELEASE ORAL at 11:23

## 2022-03-28 RX ADMIN — LIDOCAINE HYDROCHLORIDE 3 ML: 10 INJECTION, SOLUTION INFILTRATION; PERINEURAL at 14:45

## 2022-03-28 RX ADMIN — ATORVASTATIN CALCIUM 40 MG: 40 TABLET, FILM COATED ORAL at 17:11

## 2022-03-28 RX ADMIN — IOHEXOL 150 ML: 350 INJECTION, SOLUTION INTRAVENOUS at 17:12

## 2022-03-28 RX ADMIN — Medication 100 MCG: at 16:22

## 2022-03-28 RX ADMIN — FENTANYL CITRATE 25 MCG: 50 INJECTION, SOLUTION INTRAMUSCULAR; INTRAVENOUS at 15:06

## 2022-03-28 RX ADMIN — HEPARIN SODIUM 3000 UNITS: 1000 INJECTION INTRAVENOUS; SUBCUTANEOUS at 14:51

## 2022-03-28 RX ADMIN — MIDAZOLAM 1 MG: 1 INJECTION INTRAMUSCULAR; INTRAVENOUS at 14:39

## 2022-03-28 RX ADMIN — ACETAMINOPHEN 650 MG: 325 TABLET, FILM COATED ORAL at 22:05

## 2022-03-28 RX ADMIN — ALTEPLASE 2 MG: 2.2 INJECTION, POWDER, LYOPHILIZED, FOR SOLUTION INTRAVENOUS at 14:52

## 2022-03-28 RX ADMIN — Medication 100 MCG: at 16:25

## 2022-03-28 NOTE — ASSESSMENT & PLAN NOTE
Patient presented to the ER due to nonfunctioning AV fistula  Patient underwent fistulogram on 03/25 with thrombectomy and angioplasty  Patient had repeat fistulogram by IR with mechanical thrombolysis and angioplasty of the thrombosed dialysis fistula  Patient had dialysis without any problem today

## 2022-03-28 NOTE — ASSESSMENT & PLAN NOTE
Lab Results   Component Value Date    HGBA1C 5 4 04/11/2017       Recent Labs     03/28/22  1008 03/28/22  1152   POCGLU 91 94     Blood sugars have been stable

## 2022-03-28 NOTE — CONSULTS
[address] [facility] Dario 78 y o  male MRN: [id number]  Unit/Bed#: ED 01 Encounter: [id number]    ASSESSMENT and PLAN:  1  ESRD on HD ([address], MWF):   · Last HD is 3/21/22  · Fortunately, no urgent need for HD today as electrolytes and volume  · Will plan for HD once access issue is addressed  2  Access:   · L arm AVF is non functional    · IR consulted for fistulagram      3  Hypertension:   · Home meds per MAR: Carvedilol 3 125 mg BID and Losartan 50 mg daily   · HD records indicate only Carvedilol 3 125 mg BID  · BP is acceptable  · Continue same home BP meds  · EDW is 77 kg      4  Anemia:   · Hgb at goal      5  Hypokalemia:  · KDur 40 meq PO x 1 today  6  Mineral and bone disease:   · Not on binders or Vitamin D analogues  7  Diarrhea  · Per SLIM  SUMMARY OF RECOMMENDATIONS:   No urgent need for HD   IR consult for fistulagram     Kdur 40 meq PO x 1     Will plan for HD once fistulagram completed - either later today or tomorrow  HISTORY OF PRESENT ILLNESS:  Requesting Physician: Noah Marsh DO  Reason for Consult: ESRD on HD    Maegan Bahena is a 78 y o  male who was admitted to [address] [place] [address] on 3/28/22 after being sent from his HD unit due to a non functioning AVF  A renal consultation is requested today for assistance in the management of ESRD  Maegan Bahena is a known ESRD patient who undergoes maintenance hemodialysis at [address] on a MWF schedule  He reports that he was in his usual state of health until about 1 week ago when he developed worsening diarrhea  He reports that his diarrhea is a chronic problem that comes in "waves"  His last HD treatment was on March 21, 2022 (Monday)  He reports missing HD on Wednesday and Friday due to diarrhea  Of note, he had a fistulagram done on 3/25/22 and had a declot at that time due to a non functioning AVF   He went to [address] [facility] [address] today for his regular HD treatment and his AVF was noted to be non functional and he was sent to the hospital for further evaluation  He also reports the presence of nausea, vomiting, and abdominal pain  He denied any fever, chills, chest pain, shortness of breath, leg swelling, headache, joint pain  PAST MEDICAL HISTORY:  Past Medical History:   Diagnosis Date    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    With Cerebral Infarction:  Last Assessed:  October 20, 2016    Chronic kidney disease 2012    on dialysis     Diabetes mellitus ([facility] )     Hypertension     Labyrinthitis 09/10/2011    Myocardial infarction ([facility] ) 2014    x3 others were in 2009 & 2010    Sleep disturbances 09/20/2010    Stroke ([place] [address] ) 2007    x1    Type 2 diabetes, uncontrolled, with renal manifestation ([place] [address] ) 05/03/2017     PAST SURGICAL HISTORY:  Past Surgical History:   Procedure Laterality Date    AV FISTULA PLACEMENT      CARDIAC SURGERY  2010    stents x3    COLONOSCOPY N/A 12/12/2016    Procedure: COLONOSCOPY;  Surgeon: Thony Sarmiento MD;  Location: [place] [address] GI LAB; Service:     COLONOSCOPY N/A 4/3/2017    Procedure:  COLONOSCOPY;  Surgeon: Thony Sarmiento MD;  Location: [place] [address] GI LAB; Service:     IR AV FISTULA/GRAFT DECLOT  4/29/2021    IR AV FISTULA/GRAFT DECLOT  3/25/2022    IR TUNNELED CENTRAL LINE REMOVAL  12/7/2021    IR TUNNELED DIALYSIS CATHETER PLACEMENT  5/24/2021    DC ANASTOMOSIS,AV,ANY SITE Left 7/22/2021    Procedure: CREATION FISTULA ARTERIOVENOUS (AV);   Surgeon: Yoon Johnson MD;  Location: [facility];  Service: Vascular     ALLERGIES:  No Known Allergies    SOCIAL HISTORY:  Social History     Substance and Sexual Activity   Alcohol Use Yes    Comment: rarely - No alcohol use per [employer]     Social History     Substance and Sexual Activity   Drug Use No     Social History     Tobacco Use   Smoking Status Current Every Day Smoker    Packs/day: 0 50    Years: 30 00    Pack years: 15 00    Types: Cigarettes   Smokeless Tobacco Never Used     FAMILY HISTORY:  Family History   Problem Relation Age of Onset   Lavon Vasquez Leukemia Mother     Crohn's disease Father     Transient ischemic attack Brother      MEDICATIONS:  No current facility-administered medications for this encounter  Current Outpatient Medications:     aspirin 81 MG tablet, Take 81 mg by mouth daily, Disp: , Rfl:     B Complex-C-Folic Acid (TRIPHROCAPS) 1 MG CAPS, Take 1 capsule by mouth, Disp: , Rfl:     carvedilol (COREG) 3 125 mg tablet, Take 1 tablet (3 125 mg total) by mouth 2 (two) times a day with meals, Disp: 180 tablet, Rfl: 3    Cholecalciferol 50 MCG (2000 UT) CAPS, Take by mouth daily, Disp: , Rfl:     clopidogrel (Plavix) 75 mg tablet, Take 1 tablet (75 mg total) by mouth daily, Disp: 30 tablet, Rfl: 0    glucose blood test strip, by In Vitro route 2 (two) times a day, Disp: , Rfl:     LOSARTAN POTASSIUM PO, Take 50 mg by mouth daily, Disp: , Rfl:     mupirocin (BACTROBAN) 2 % ointment, Apply topically 3 (three) times a day for 7 days, Disp: 15 g, Rfl: 0    nicotine (NICODERM CQ) 14 mg/24hr TD 24 hr patch, Place 1 patch on the skin daily (Patient not taking: Reported on 8/30/2021), Disp: 28 patch, Rfl: 0    nitroglycerin (NITROSTAT) 0 4 mg SL tablet, Place under the tongue, Disp: , Rfl:     rifaximin (XIFAXAN) 550 mg tablet, Take by mouth , Disp: , Rfl:     rosuvastatin (CRESTOR) 20 MG tablet, Take 20 mg by mouth every evening , Disp: , Rfl: 1    REVIEW OF SYSTEMS:  All the systems were reviewed and were negative except as documented on the HPI      PHYSICAL EXAM:  Current Weight: Weight - Scale: 80 7 kg (177 lb 14 6 oz)  First Weight: Weight - Scale: 80 7 kg (177 lb 14 6 oz)  Vitals:    03/28/22 0553 03/28/22 0555   BP:  154/74   BP Location:  Right arm   Pulse:  78   Resp:  18   Temp:  97 6 °F (36 4 °C)   TempSrc: Tympanic Oral   SpO2:  100%   Weight:  80 7 kg (177 lb 14 6 oz)     No intake or output data in the 24 hours ending 03/28/22 0907  Physical Exam  General: conscious, coherent, cooperative, not in distress  Skin: warm, dry, good turgor  Eyes: pink conjunctivae, no scleral icterus  ENT: moist lips and mucous membranes  Respiratory: equal chest expansion, clear breath sounds  Cardiovascular: distinct heart sounds, normal rate, regular rhythm, no rub  Abdomen: soft, non-tender, non-distended, normoactive bowel sounds  Extremities: no edema  L arm AVF without thrill or bruit  Genitourinary: no bray catheter  Neuro: awake, alert, oriented to time, place and person  Psych: appropriate affect  Lab Results:   Results from last 7 days   Lab Units 03/28/22  0607   WBC Thousand/uL 7 25   HEMOGLOBIN g/dL 13 3   HEMATOCRIT % 40 7   PLATELETS Thousands/uL 125*   POTASSIUM mmol/L 2 9*   CHLORIDE mmol/L 99*   CO2 mmol/L 24   BUN mg/dL 36*   CREATININE mg/dL 13 22*   CALCIUM mg/dL 7 9*   MAGNESIUM mg/dL 1 9   PHOSPHORUS mg/dL 3 9   ALK PHOS U/L 182*   ALT U/L 12   AST U/L 15     Lab Results   Component Value Date    CALCIUM 7 9 (L) 03/28/2022    PHOS 3 9 03/28/2022     Other Studies: None

## 2022-03-28 NOTE — DISCHARGE INSTRUCTIONS
Fistulagram   WHAT YOU NEED TO KNOW:   Your arm or leg my  be sore, swollen, and bruised after the procedure  This is normal and should get better in a few days  DISCHARGE INSTRUCTIONS:     Contact Interventional Radiology at [phone] [place] PATIENTS: Contact Interventional Radiology at [phone]) [place] PATIENTS: Contact Interventional Radiology at [phone]) if:    · You have a fever or chills  · Your puncture site is red, swollen, or draining pus  · You have nausea or are vomiting  · Your skin is itchy, swollen, or you have a rash  · You cannot feel a thrill over your graft or fistula  · You have questions or concerns about your condition or care  Seek care immediately if:     · You have bleeding that does not stop after 10 minutes of holding firm, direct pressure over the puncture site  · Blood soaks through your bandage  · Your hand or foot closest to the graft or fistula feels cold, painful, or numb  · Your hand or foot closest to the graft or fistula is pale or blue  · You have trouble moving your arm or leg closest to the graft or fistula  · Your bruise suddenly gets bigger  Care for your wound as directed:  Remove the bandage in 4 to 6 hours or as         directed  Wash the area once a day with soap and water  Gently pat the area dry  Apply firm, steady pressure to the puncture site if it bleeds  Use a clean gauze or towel to hold pressure for 10 to 15 minutes  Call 911 if you cannot stop the bleeding or the bleeding gets heavier  Feel for a thrill once a day or as directed  Place your index and second finger over your fistula or graft as directed  You should feel a vibration  The vibration means that blood is flowing through your graft or fistula correctly  Rest your arm or leg as directed  Do not lift anything heavier than 5 pounds or do strenuous activity for 24 hours  Prevent damage to your graft or fistula    Do not wear tight-fitting clothing over your graft or fistula  Do not wear tight jewelry on the arm or leg with the graft or fistula  Tell healthcare providers not to do, IVs, blood draws, and blood pressure readings in the arm with your graft or fistula  Do not allow flu shots or vaccinations in your arm with your graft or fistula      Follow up with your healthcare provider as directed

## 2022-03-28 NOTE — SEDATION DOCUMENTATION
Procedure ended  Fistulagram declot completed by Dr Teofilo Yoo  No bleeding or hematoma noted at both access site  Fistula positive for pulse and thrill  Patient transferred to inpatient room via bed

## 2022-03-28 NOTE — BRIEF OP NOTE (RAD/CATH)
INTERVENTIONAL RADIOLOGY PROCEDURE NOTE    Date: 3/28/2022    Procedure: IR AV FISTULA/GRAFT DECLOT    Preoperative diagnosis:   1  ESRD (end stage renal disease) ([facility] )         Postoperative diagnosis: Same  Surgeon: Ginny Paulson MD     Assistant: None  No qualified resident was available  Blood loss: Minimal    Specimens: None    Findings: Complete occlusion again seen of LUE brachiocephalic fistula  Mechanical thrombectomy performed with angiojet, sunil balloon and balloon maceration  Central cephalic vein stenosis again treated with 6 mm POBA and DCB  More peripheral mild stenosis treated with 6 mm POBA  At the end of the procedure a thrill was noted and no significant stenosis was seen  Complications: None immediate      Anesthesia: conscious sedation

## 2022-03-28 NOTE — ED PROVIDER NOTES
History  Chief Complaint   Patient presents with    Vascular Access Problem     pt informed has not had dialysis since monday 3/21, has had access problems, went friday 3/25 to IR to fix fistula  went to dialysis today and were unable to use access  58-year-old male with past history of end-stage renal disease on M/W/F hemodialysis, diabetes, hypertension, mi, CVA, presents to the ED for fistula malfunction  Patient states that his last dialysis was on 03/01/2022  His fistula has not work since that time  Patient went to dialysis today and they could not feel a thrill or bruit over his fistula site  Patient was sent to the emergency department for further evaluation  Patient has no other complaints at this time  History provided by:  Patient      Prior to Admission Medications   Prescriptions Last Dose Informant Patient Reported? Taking?    B Complex-C-Folic Acid (TRIPHROCAPS) 1 MG CAPS  Self Yes No   Sig: Take 1 capsule by mouth   Cholecalciferol 50 MCG (2000 UT) CAPS  Self Yes No   Sig: Take by mouth daily   LOSARTAN POTASSIUM PO  Self Yes No   Sig: Take 50 mg by mouth daily   aspirin 81 MG tablet  Self Yes No   Sig: Take 81 mg by mouth daily   carvedilol (COREG) 3 125 mg tablet  Self No No   Sig: Take 1 tablet (3 125 mg total) by mouth 2 (two) times a day with meals   clopidogrel (Plavix) 75 mg tablet  Self No No   Sig: Take 1 tablet (75 mg total) by mouth daily   glucose blood test strip  Self Yes No   Sig: by In Vitro route 2 (two) times a day   mupirocin (BACTROBAN) 2 % ointment   No No   Sig: Apply topically 3 (three) times a day for 7 days   nicotine (NICODERM CQ) 14 mg/24hr TD 24 hr patch  Self No No   Sig: Place 1 patch on the skin daily   Patient not taking: Reported on 8/30/2021   nitroglycerin (NITROSTAT) 0 4 mg SL tablet  Self Yes No   Sig: Place under the tongue   rifaximin (XIFAXAN) 550 mg tablet  Self Yes No   Sig: Take by mouth    rosuvastatin (CRESTOR) 20 MG tablet  Self Yes No   Sig: Take 20 mg by mouth every evening       Facility-Administered Medications: None       Past Medical History:   Diagnosis Date    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    With Cerebral Infarction:  Last Assessed:  October 20, 2016    Chronic kidney disease 2012    on dialysis     Diabetes mellitus ([facility] )     Hypertension     Labyrinthitis 09/10/2011    Myocardial infarction ([facility] ) 2014    x3 others were in 2009 & 2010    Sleep disturbances 09/20/2010    Stroke [facility]) 2007    x1    Type 2 diabetes, uncontrolled, with renal manifestation ([place] [address] ) 05/03/2017       Past Surgical History:   Procedure Laterality Date    AV FISTULA PLACEMENT      CARDIAC SURGERY  2010    stents x3    COLONOSCOPY N/A 12/12/2016    Procedure: COLONOSCOPY;  Surgeon: Mychal Sotelo MD;  Location: [facility] GI LAB; Service:     COLONOSCOPY N/A 4/3/2017    Procedure:  COLONOSCOPY;  Surgeon: Mychal Sotelo MD;  Location: [facility] GI LAB; Service:     IR AV FISTULA/GRAFT DECLOT  4/29/2021    IR AV FISTULA/GRAFT DECLOT  3/25/2022    IR TUNNELED CENTRAL LINE REMOVAL  12/7/2021    IR TUNNELED DIALYSIS CATHETER PLACEMENT  5/24/2021    AK ANASTOMOSIS,AV,ANY SITE Left 7/22/2021    Procedure: CREATION FISTULA ARTERIOVENOUS (AV); Surgeon: Amber De Oliveira MD;  Location: [facility];  Service: Vascular       Family History   Problem Relation Age of Onset    Leukemia Mother     Crohn's disease Father     Transient ischemic attack Brother      I have reviewed and agree with the history as documented  E-Cigarette/Vaping    E-Cigarette Use Never User      E-Cigarette/Vaping Substances    Nicotine No     THC No     CBD No     Flavoring No     Other No     Unknown No      Social History     Tobacco Use    Smoking status: Current Every Day Smoker     Packs/day: 0 50     Years: 30 00     Pack years: 15 00     Types: Cigarettes    Smokeless tobacco: Never Used   Vaping Use    Vaping Use: Never used   Substance Use Topics    Alcohol use:  Yes Comment: rarely - No alcohol use per [employer]    Drug use: No       Review of Systems   Constitutional: Negative for activity change, fatigue and fever  HENT: Negative for congestion, ear discharge and sore throat  Eyes: Negative for pain and redness  Respiratory: Negative for cough, chest tightness, shortness of breath and wheezing  Cardiovascular: Negative for chest pain  Gastrointestinal: Negative for abdominal pain, diarrhea, nausea and vomiting  Endocrine: Negative for cold intolerance  Genitourinary: Negative for dysuria and urgency  Musculoskeletal: Negative for arthralgias and back pain  Neurological: Negative for dizziness, weakness and headaches  Psychiatric/Behavioral: Negative for agitation and behavioral problems  Physical Exam  Physical Exam  Vitals and nursing note reviewed  Constitutional:       Appearance: He is well-developed  HENT:      Head: Normocephalic and atraumatic  Nose: Nose normal    Eyes:      Conjunctiva/sclera: Conjunctivae normal    Cardiovascular:      Rate and Rhythm: Normal rate and regular rhythm  Heart sounds: Normal heart sounds  Pulmonary:      Effort: Pulmonary effort is normal       Breath sounds: Normal breath sounds  Abdominal:      General: Bowel sounds are normal  There is no distension  Palpations: Abdomen is soft  Tenderness: There is no abdominal tenderness  Musculoskeletal:         General: Normal range of motion  Cervical back: Normal range of motion and neck supple  Comments: Pulses intact bilateral upper extremities  Fistula noted to left upper arm with no thrills or bruit noted  Skin:     General: Skin is warm  Neurological:      General: No focal deficit present  Mental Status: He is alert and oriented to person, place, and time  Psychiatric:         Mood and Affect: Mood normal          Behavior: Behavior normal          Thought Content:  Thought content normal          Judgment: Judgment normal          Vital Signs  ED Triage Vitals   Temperature Pulse Respirations Blood Pressure SpO2   03/28/22 0555 03/28/22 0555 03/28/22 0555 03/28/22 0555 03/28/22 0555   97 6 °F (36 4 °C) 78 18 154/74 100 %      Temp Source Heart Rate Source Patient Position - Orthostatic VS BP Location FiO2 (%)   03/28/22 0553 03/28/22 0555 03/28/22 0555 03/28/22 0555 --   Tympanic Monitor Lying Right arm       Pain Score       03/28/22 0555       No Pain           Vitals:    03/28/22 0555   BP: 154/74   Pulse: 78   Patient Position - Orthostatic VS: Lying         Visual Acuity      ED Medications  Medications - No data to display    Diagnostic Studies  Results Reviewed     Procedure Component Value Units Date/Time    HS Troponin 0hr (reflex protocol) [[id number]]  (Normal) Collected: 03/28/22 0607    Lab Status: Final result Specimen: Blood from Arm, Right Updated: 03/28/22 0636     hs TnI 0hr 23 ng/L     HS Troponin I 2hr [[id number]]     Lab Status: No result Specimen: Blood     Comprehensive metabolic panel [[id number]]  (Abnormal) Collected: 03/28/22 0607    Lab Status: Final result Specimen: Blood from Arm, Right Updated: 03/28/22 0631     Sodium 137 mmol/L      Potassium 2 9 mmol/L      Chloride 99 mmol/L      CO2 24 mmol/L      ANION GAP 14 mmol/L      BUN 36 mg/dL      Creatinine 13 22 mg/dL      Glucose 106 mg/dL      Calcium 7 9 mg/dL      Corrected Calcium 8 9 mg/dL      AST 15 U/L      ALT 12 U/L      Alkaline Phosphatase 182 U/L      Total Protein 6 4 g/dL      Albumin 2 7 g/dL      Total Bilirubin 0 61 mg/dL      eGFR 3 ml/min/1 73sq m     Narrative:      Michael guidelines for Chronic Kidney Disease (CKD):     Stage 1 with normal or high GFR (GFR > 90 mL/min/1 73 square meters)    Stage 2 Mild CKD (GFR = 60-89 mL/min/1 73 square meters)    Stage 3A Moderate CKD (GFR = 45-59 mL/min/1 73 square meters)    Stage 3B Moderate CKD (GFR = 30-44 mL/min/1 73 square meters)    Stage 4 Severe CKD (GFR = 15-29 mL/min/1 73 square meters)    Stage 5 End Stage CKD (GFR <15 mL/min/1 73 square meters)  Note: GFR calculation is accurate only with a steady state creatinine    Protime-INR [[id number]]  (Normal) Collected: 03/28/22 0607    Lab Status: Final result Specimen: Blood from Arm, Right Updated: 03/28/22 0627     Protime 13 9 seconds      INR 1 09    APTT [[id number]]  (Normal) Collected: 03/28/22 0607    Lab Status: Final result Specimen: Blood from Arm, Right Updated: 03/28/22 0627     PTT 29 seconds     CBC and differential [[id number]]  (Abnormal) Collected: 03/28/22 0607    Lab Status: Final result Specimen: Blood from Arm, Right Updated: 03/28/22 0619     WBC 7 25 Thousand/uL      RBC 3 85 Million/uL      Hemoglobin 13 3 g/dL      Hematocrit 40 7 %       fL      MCH 34 5 pg      MCHC 32 7 g/dL      RDW 16 3 %      MPV 10 0 fL      Platelets 038 Thousands/uL      nRBC 0 /100 WBCs      Neutrophils Relative 85 %      Immat GRANS % 0 %      Lymphocytes Relative 8 %      Monocytes Relative 5 %      Eosinophils Relative 1 %      Basophils Relative 1 %      Neutrophils Absolute 6 18 Thousands/µL      Immature Grans Absolute 0 02 Thousand/uL      Lymphocytes Absolute 0 57 Thousands/µL      Monocytes Absolute 0 36 Thousand/µL      Eosinophils Absolute 0 08 Thousand/µL      Basophils Absolute 0 04 Thousands/µL     COVID/FLU/RSV - 2 hour TAT [[id number]] Collected: 03/28/22 0898    Lab Status: In process Specimen: Nares from Nose Updated: 03/28/22 0613    Magnesium [[id number]] Collected: 03/28/22 0866    Lab Status: In process Specimen: Blood from Arm, Right Updated: 03/28/22 0610    Phosphorus [[id number]] Collected: 03/28/22 0347    Lab Status: In process Specimen: Blood from Arm, Right Updated: 03/28/22 0610    NT-BNP PRO [[id number]] Collected: 03/28/22 5859    Lab Status:  In process Specimen: Blood from Arm, Right Updated: 03/28/22 0610                 XR chest 1 view portable    (Results Pending)              Procedures  ECG 12 Lead Documentation Only    Date/Time: 3/28/2022 5:59 AM  Performed by: Nelly Caldera DO  Authorized by: Nelly Caldera DO     Indications / Diagnosis:  Dialysis access problem  ECG reviewed by me, the ED Provider: yes    Patient location:  ED  Previous ECG:     Previous ECG:  Compared to current    Similarity:  No change    Comparison to cardiac monitor: Yes    Interpretation:     Interpretation: abnormal    Comments:      Sinus rhythm, rate 70, normal axis, QTC prolonged at 490 milliseconds, no acute ST/T-wave abnormalities noted, QT prolongation is mildly improved compared to previous study, otherwise unchanged from baseline  ED Course  ED Course as of 03/28/22 0637   Mon Mar 28, 2022   0608 IR consult is placed for fistula evaluation  7617 Case discussed with nephrologist on-call, Dr Tigist Castanon, who agrees with IR consult and admission for dialysis  Dr Tigist Castanon will see patient in consult  MDM  Number of Diagnoses or Management Options  Dialysis AV fistula malfunction [facility]): new and requires workup  End-stage renal disease on hemodialysis [facility]): new and requires workup  Diagnosis management comments: Obtain blood work, EKG, chest x-ray  Consult IR and Nephrology  Plan for admission       Amount and/or Complexity of Data Reviewed  Clinical lab tests: ordered and reviewed  Tests in the radiology section of CPT®: ordered and reviewed  Tests in the medicine section of CPT®: ordered and reviewed  Review and summarize past medical records: yes  Independent visualization of images, tracings, or specimens: yes    Risk of Complications, Morbidity, and/or Mortality  General comments: Patient's EKG does not show any signs of hyperkalemia  Potassium is at baseline  BUN and creatinine is mildly elevated  Patient presented with malfunction of his fistula  IR consult and Nephrology consult placed    Case discussed with nephrologist, Dr Luca Judd, who agrees with admission for fistula evaluation as well as hemodialysis  Dr Luca Judd will see patient in consult  Patient is currently admitted to medicine service  Patient agrees with admission plans  Patient Progress  Patient progress: stable      Disposition  Final diagnoses:   Dialysis AV fistula malfunction ([facility] )   End-stage renal disease on hemodialysis ([facility] )     Time reflects when diagnosis was documented in both MDM as applicable and the Disposition within this note     Time User Action Codes Description Comment    3/28/2022  6:19 AM Rockney Half Add [T82 590A] Dialysis AV fistula malfunction ([facility] [address] )     3/28/2022  6:19 AM Rockney Half Add [N18 6,  Z99 2] End-stage renal disease on hemodialysis [facility])       ED Disposition     ED Disposition Condition Date/Time Comment    Admit Stable Mon Mar 28, 2022  6:32 AM Case was discussed with Dr Jasen Cassidy and the patient's admission status was agreed to be Admission Status: observation status to the service of Dr Ranjan Swain  Follow-up Information    None         Patient's Medications   Discharge Prescriptions    No medications on file       No discharge procedures on file      PDMP Review       Value Time User    PDMP Reviewed  Yes 7/22/2021  2:07 PM Pat Koo PA-C          ED Provider  Electronically Signed by           Claudette Grigsby DO  03/28/22 0493

## 2022-03-28 NOTE — ASSESSMENT & PLAN NOTE
Lab Results   Component Value Date    EGFR 3 03/28/2022    EGFR 3 01/31/2022    EGFR 8 10/19/2021    CREATININE 13 22 (H) 03/28/2022    CREATININE 12 21 (H) 01/31/2022    CREATININE 6 80 (H) 10/19/2021     Plan of care coordinated with Nephrology and Plan for dialysis tomorrow   Patient appears clinically stable and no indication for urgent dialysis  Patient underwent hemodialysis without any problem today

## 2022-03-28 NOTE — ASSESSMENT & PLAN NOTE
Hemoglobin stable  Repeat lab work in a m      Results from last 7 days   Lab Units 03/28/22  0607   HEMOGLOBIN g/dL 13 3   HEMATOCRIT % 40 7   MCV fL 106*

## 2022-03-28 NOTE — CONSULTS
Interventional Radiology  Consultation 3/28/2022     Consults  Opal Bishop   1955   [id number]      Assessment/Plan:  78year old male with ESRD and LUE fistula  Declot of the fistula was performed 3/25/22  Unfortunately the patient did not go to his dialysis treatment scheduled for Saturday morning so he has not had dialysis since last Monday (3/21/22)  When he arrived for dialysis this AM his fistula was non-functioning  Will plan for fistulagram and possible repeat declot this afternoon  Potassium is low, however vitals appear normal and stable      Medical Problems             Problem List     Anemia in chronic illness    Anemia in chronic kidney disease (CKD)    Arteriosclerosis of coronary artery    Benign essential hypertension    Diabetes with neurologic complications [facility])    Lab Results   Component Value Date    HGBA1C 5 4 04/11/2017       Recent Labs     03/25/22  1340   POCGLU 121       Blood Sugar Average: Last 72 hrs:          Elevated serum alkaline phosphatase level    End stage renal disease ([facility] )    Overview Signed 10/17/2018 11:56 AM by TESSY Blackburn     Procedure/Onset: 07/18/2011         Lab Results   Component Value Date    EGFR 3 03/28/2022    EGFR 3 01/31/2022    EGFR 8 10/19/2021    CREATININE 13 22 (H) 03/28/2022    CREATININE 12 21 (H) 01/31/2022    CREATININE 6 80 (H) 10/19/2021         Intestinal malabsorption    Irritable bowel syndrome with diarrhea    Long-term insulin use in type 2 diabetes ([facility] )    Lab Results   Component Value Date    HGBA1C 5 4 04/11/2017       Recent Labs     03/25/22  1340   POCGLU 121       Blood Sugar Average: Last 72 hrs:          Mixed hyperlipidemia    Nicotine dependence    Overview Signed 10/17/2018 11:56 AM by TESSY Blackburn     Procedure/Onset: 07/18/2011         Organic impotence    Overview Signed 10/17/2018 11:56 AM by TESSY Blackburn     Procedure/Onset: 02/19/2004         Pernicious anemia    Type 2 diabetes mellitus ([facility] ) Overview Signed 10/17/2018 11:56 AM by TESSY Valerio     Procedure/Onset: 05/21/2007         Lab Results   Component Value Date    HGBA1C 5 4 04/11/2017       Recent Labs     03/25/22  1340   POCGLU 121       Blood Sugar Average: Last 72 hrs:          Acute upper respiratory infection    Encounter for extracorporeal dialysis ([place] [address] )    AV fistula thrombosis, initial encounter ([place] [address] )    Dizziness    Poor appetite    Arteriovenous fistula (HCC)                  Subjective:     Patient ID: Desi Barker is a 78 y o  male  History of Present Illness  78year old male with ESRD and LUE fistula  Declot of the fistula was performed 3/25/22  Unfortunately the patient did not go to his dialysis treatment scheduled for Saturday morning so he has not had dialysis since last Monday (3/21/22)  When he arrived for dialysis this AM his fistula was non-functioning  Will plan for fistulagram and possible repeat declot this afternoon  Potassium is low, however vitals appear normal and stable  Review of Systems  as above    Past Medical History:   Diagnosis Date    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    With Cerebral Infarction:  Last Assessed:  October 20, 2016    Chronic kidney disease 2012    on dialysis     Diabetes mellitus ([facility] )     Hypertension     Labyrinthitis 09/10/2011    Myocardial infarction ([place] [address] ) 2014    x3 others were in 2009 & 2010    Sleep disturbances 09/20/2010    Stroke [facility]) 2007    x1    Type 2 diabetes, uncontrolled, with renal manifestation ([place] [address] ) 05/03/2017        Past Surgical History:   Procedure Laterality Date    AV FISTULA PLACEMENT      CARDIAC SURGERY  2010    stents x3    COLONOSCOPY N/A 12/12/2016    Procedure: COLONOSCOPY;  Surgeon: Rose Quezada MD;  Location: [facility] GI LAB; Service:     COLONOSCOPY N/A 4/3/2017    Procedure:  COLONOSCOPY;  Surgeon: Rose Quezada MD;  Location: [facility] GI LAB;   Service:     IR AV FISTULA/GRAFT DECLOT  4/29/2021    IR AV FISTULA/GRAFT DECLOT 3/25/2022    IR TUNNELED CENTRAL LINE REMOVAL  12/7/2021    IR TUNNELED DIALYSIS CATHETER PLACEMENT  5/24/2021    SD ANASTOMOSIS,AV,ANY SITE Left 7/22/2021    Procedure: CREATION FISTULA ARTERIOVENOUS (AV); Surgeon: Jackie Mcnair MD;  Location: [address] [place] [address];  Service: Vascular        Social History     Tobacco Use   Smoking Status Current Every Day Smoker    Packs/day: 0 50    Years: 30 00    Pack years: 15 00    Types: Cigarettes   Smokeless Tobacco Never Used        Social History     Substance and Sexual Activity   Alcohol Use Yes    Comment: rarely - No alcohol use per All[employer]        Social History     Substance and Sexual Activity   Drug Use No        No Known Allergies    Current Facility-Administered Medications   Medication Dose Route Frequency Provider Last Rate Last Admin    potassium chloride (K-DUR,KLOR-CON) CR tablet 40 mEq  40 mEq Oral Once Lala Martinez DO              Objective:    Vitals:    03/28/22 0553 03/28/22 0555 03/28/22 0915 03/28/22 0940   BP:  154/74 158/76 162/81   BP Location:  Right arm  Right arm   Pulse:  78 58 68   Resp:  18  20   Temp:  97 6 °F (36 4 °C)  98 2 °F (36 8 °C)   TempSrc: Tympanic Oral  Oral   SpO2:  100% 96% 98%   Weight:  80 7 kg (177 lb 14 6 oz)  78 kg (171 lb 14 4 oz)        Physical Exam  Not performed    No results found for: BNP   Lab Results   Component Value Date    WBC 7 25 03/28/2022    HGB 13 3 03/28/2022    HCT 40 7 03/28/2022     (H) 03/28/2022     (L) 03/28/2022     Lab Results   Component Value Date    INR 1 09 03/28/2022    INR 1 05 07/26/2021    INR 1 08 06/29/2021    PROTIME 13 9 03/28/2022    PROTIME 13 6 07/26/2021    PROTIME 14 0 06/29/2021     Lab Results   Component Value Date    PTT 29 03/28/2022         I have personally reviewed pertinent imaging and laboratory results       Code Status: Prior  Advance Directive and Living Will:      Power of :    POLST:      IR has been consulted to evaluate the patient, determine the appropriate procedure, and whether or not a procedure can and should be performed  Thank you for allowing me to participate in the care of Keene Prader  Please don't hesitate to call, text, email, or TigerText with any questions  This text is generated with voice recognition software  There may be translation, syntax,  or grammatical errors  If you have any questions, please contact the dictating provider

## 2022-03-28 NOTE — H&P
History and Physical - [facility]    Patient Information: Salty Nevarez 78 y o  male MRN: [id number]  Unit/Bed#: ED 01 Encounter: [id number]  Admitting Physician: Danisha Worrell DO  PCP: Glynn Ortega  Date of Admission:  03/28/22        Hospital Problem List:     Principal Problem:    Dialysis AV fistula malfunction ([facility] )  Active Problems:    Hypokalemia    End stage renal disease ([facility] )    Anemia in chronic kidney disease (CKD)    Benign essential hypertension    Type 2 diabetes mellitus ([facility] )      Assessment/Plan:    * Dialysis AV fistula malfunction [facility])  Assessment & Plan  Patient presented to the ER due to nonfunctioning AV fistula  Patient underwent fistulogram on 03/25 with thrombectomy and angioplasty  Plan for repeat fistulogram today by IR        Hypokalemia  Assessment & Plan  Potassium 2 9  Replete and Repeat lab work in a m  End stage renal disease [facility])  Assessment & Plan  Lab Results   Component Value Date    EGFR 3 03/28/2022    EGFR 3 01/31/2022    EGFR 8 10/19/2021    CREATININE 13 22 (H) 03/28/2022    CREATININE 12 21 (H) 01/31/2022    CREATININE 6 80 (H) 10/19/2021     Plan of care coordinated with Nephrology and Plan for dialysis tomorrow   Patient appears clinically stable and no indication for urgent dialysis  Last hemodialysis on 03/21    Type 2 diabetes mellitus [facility])  Assessment & Plan  Lab Results   Component Value Date    HGBA1C 5 4 04/11/2017       Recent Labs     03/28/22  1008 03/28/22  1152   POCGLU 91 94     As patient is NPO will place patient on Accu-Cheks only  Check A1c    Benign essential hypertension  Assessment & Plan  Continue Coreg  Patient not on losartan  Monitor BPs    Anemia in chronic kidney disease (CKD)  Assessment & Plan  Hemoglobin stable  Repeat lab work in a m      Results from last 7 days   Lab Units 03/28/22  0607   HEMOGLOBIN g/dL 13 3   HEMATOCRIT % 40 7   MCV fL 106*           VTE Prophylaxis: Pharmacologic VTE Prophylaxis contraindicated due to Hold for IR intervention  / sequential compression device   Code Status: Level 1 - Full Code    Anticipated Length of Stay:  Patient will be admitted on an Observation basis with an anticipated length of stay of  1 midnights  Justification for Hospital Stay:  AV fistula malfunction    Total Time for Visit, including Counseling / Coordination of Care: 45 minutes  Greater than 50% of this total time spent on direct patient counseling and coordination of care  Chief Complaint:     Vascular Access Problem (pt informed has not had dialysis since monday 3/21, has had access problems, went friday 3/25 to IR to fix fistula  went to dialysis today and were unable to use access  )    History of Present Illness:    Pranav Single is a 78 y o  male who presents with nonfunctioning AV fistula  He was sent in by dialysis center as they could not feel a thrill or bruit over his fistula site  Last dialysis was on 03/21  On 03/25 he underwent fistulogram by IR with thrombectomy and angioplasty  Review of Systems:    Review of Systems   Constitutional: Negative for appetite change, chills and fever  HENT: Negative for congestion and trouble swallowing  Eyes: Negative for photophobia and visual disturbance  Respiratory: Negative for cough, chest tightness and shortness of breath  Cardiovascular: Negative for chest pain and leg swelling  Gastrointestinal: Positive for diarrhea (chronic)  Negative for abdominal pain, nausea and vomiting  Genitourinary: Negative for dysuria, frequency and hematuria  Musculoskeletal: Negative for back pain  Skin: Negative for wound  Neurological: Negative for weakness and headaches  Hematological: Does not bruise/bleed easily  Psychiatric/Behavioral: Negative for agitation         Past Medical and Surgical History:     Past Medical History:   Diagnosis Date    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    With Cerebral Infarction:  Last Assessed:  October 20, 2016    Chronic kidney disease 2012    on dialysis     Diabetes mellitus ([facility] )     Hypertension     Labyrinthitis 09/10/2011    Myocardial infarction [facility]) 2014    x3 others were in 2009 & 2010    Sleep disturbances 09/20/2010    Stroke [facility]) 2007    x1    Type 2 diabetes, uncontrolled, with renal manifestation ([facility] ) 05/03/2017       Past Surgical History:   Procedure Laterality Date    AV FISTULA PLACEMENT      CARDIAC SURGERY  2010    stents x3    COLONOSCOPY N/A 12/12/2016    Procedure: COLONOSCOPY;  Surgeon: Lucy Aggarwal MD;  Location: [place] [address] GI LAB; Service:     COLONOSCOPY N/A 4/3/2017    Procedure:  COLONOSCOPY;  Surgeon: Lucy Aggarwal MD;  Location: [place] [address] GI LAB; Service:     IR AV FISTULA/GRAFT DECLOT  4/29/2021    IR AV FISTULA/GRAFT DECLOT  3/25/2022    IR TUNNELED CENTRAL LINE REMOVAL  12/7/2021    IR TUNNELED DIALYSIS CATHETER PLACEMENT  5/24/2021    NM ANASTOMOSIS,AV,ANY SITE Left 7/22/2021    Procedure: CREATION FISTULA ARTERIOVENOUS (AV); Surgeon: Aspen Moe MD;  Location: [place] [facility];  Service: Vascular       Meds/Allergies:    PTA meds:   Prior to Admission Medications   Prescriptions Last Dose Informant Patient Reported? Taking?    B Complex-C-Folic Acid (TRIPHROCAPS) 1 MG CAPS 3/27/2022 at Unknown time Self Yes Yes   Sig: Take 1 capsule by mouth   Cholecalciferol 50 MCG (2000 UT) CAPS 3/27/2022 at Unknown time Self Yes Yes   Sig: Take by mouth daily   LOSARTAN POTASSIUM PO 3/27/2022 at Unknown time Self Yes Yes   Sig: Take 50 mg by mouth daily   aspirin 81 MG tablet 3/27/2022 at Unknown time Self Yes Yes   Sig: Take 81 mg by mouth daily   carvedilol (COREG) 3 125 mg tablet 3/27/2022 at Unknown time Self No Yes   Sig: Take 1 tablet (3 125 mg total) by mouth 2 (two) times a day with meals   clopidogrel (Plavix) 75 mg tablet 3/27/2022 at Unknown time Self No Yes   Sig: Take 1 tablet (75 mg total) by mouth daily   glucose blood test strip 3/27/2022 at Unknown time Self Yes Yes   Sig: by In Vitro route 2 (two) times a day   mupirocin (BACTROBAN) 2 % ointment   No No   Sig: Apply topically 3 (three) times a day for 7 days   nicotine (NICODERM CQ) 14 mg/24hr TD 24 hr patch  Self No No   Sig: Place 1 patch on the skin daily   Patient not taking: Reported on 8/30/2021   nitroglycerin (NITROSTAT) 0 4 mg SL tablet More than a month at Unknown time Self Yes No   Sig: Place under the tongue   rifaximin (XIFAXAN) 550 mg tablet 3/27/2022 at Unknown time Self Yes Yes   Sig: Take by mouth    rosuvastatin (CRESTOR) 20 MG tablet 3/27/2022 at Unknown time Self Yes Yes   Sig: Take 20 mg by mouth every evening       Facility-Administered Medications: None       Allergies: No Known Allergies  History:     Marital Status: /Civil Union     Substance Use History:   Social History     Substance and Sexual Activity   Alcohol Use Yes    Comment: rarely - No alcohol use per Allscripts     Social History     Tobacco Use   Smoking Status Current Every Day Smoker    Packs/day: 0 50    Years: 30 00    Pack years: 15 00    Types: Cigarettes   Smokeless Tobacco Never Used     Social History     Substance and Sexual Activity   Drug Use No       Family History:    Family History   Problem Relation Age of Onset    Leukemia Mother     Crohn's disease Father     Transient ischemic attack Brother        Physical Exam:     Vitals:   Blood Pressure: 154/74 (03/28/22 0555)  Pulse: 78 (03/28/22 0555)  Temperature: 97 6 °F (36 4 °C) (03/28/22 0555)  Temp Source: Oral (03/28/22 0555)  Respirations: 18 (03/28/22 0555)  Weight - Scale: 80 7 kg (177 lb 14 6 oz) (03/28/22 0555)  SpO2: 100 % (03/28/22 0555)    Physical Exam  Constitutional:       General: He is not in acute distress  Appearance: He is not diaphoretic  HENT:      Head: Normocephalic and atraumatic  Eyes:      General: No scleral icterus  Right eye: No discharge  Left eye: No discharge     Cardiovascular:      Rate and Rhythm: Normal rate and regular rhythm  Pulmonary:      Effort: Pulmonary effort is normal  No respiratory distress  Breath sounds: Normal breath sounds  No wheezing or rales  Abdominal:      General: Bowel sounds are normal  There is no distension  Palpations: Abdomen is soft  Tenderness: There is no abdominal tenderness  Musculoskeletal:      Right lower leg: No edema  Left lower leg: No edema  Comments: Left arm AV fistula   Neurological:      Mental Status: He is alert and oriented to person, place, and time  Lab Results: I have personally reviewed pertinent reports  Results from last 7 days   Lab Units 03/28/22  0607   WBC Thousand/uL 7 25   HEMOGLOBIN g/dL 13 3   HEMATOCRIT % 40 7   PLATELETS Thousands/uL 125*   NEUTROS PCT % 85*   LYMPHS PCT % 8*   MONOS PCT % 5   EOS PCT % 1     Results from last 7 days   Lab Units 03/28/22  0607   POTASSIUM mmol/L 2 9*   CHLORIDE mmol/L 99*   CO2 mmol/L 24   BUN mg/dL 36*   CREATININE mg/dL 13 22*   CALCIUM mg/dL 7 9*   ALK PHOS U/L 182*   ALT U/L 12   AST U/L 15     Results from last 7 days   Lab Units 03/28/22  0607   INR  1 09       Imaging: I have personally reviewed pertinent reports  XR chest 1 view portable    Result Date: 3/28/2022  Narrative: CHEST INDICATION:   dialysis patient  Vascular access problem  COMPARISON:  Chest radiograph from 7/26/2021  EXAM PERFORMED/VIEWS:  XR CHEST PORTABLE FINDINGS: Cardiomediastinal silhouette appears unremarkable  Lungs clear  Mild blunting of the left costophrenic sulcus  No pneumothorax  Old posterior right rib fractures  Impression: No acute pulmonary parenchymal disease  Blunting of the left costophrenic sulcus which could be due to a trace left effusion or benign chronic pleural thickening   Workstation performed: [id number]      AV fistula/graft declot    Result Date: 3/26/2022  Narrative: Arteriovenous fistulogram decot and angioplasty Clinical History: N18 6: End stage renal disease prior left radiocephalic fistula occluded and this passed June, the patient underwent creation of a left brachiocephalic fistula which has now occluded 9 months later  Contrast: 50 mL of iohexol (OMNIPAQUE) Fluoro time: 14 4 min Number of Images: 76 Radiation Dose: 60 mGy Conscious sedation time: 2 hours Technique: The patient was brought to the interventional radiology suite and identified verbally and by wristband  The patient was placed supine on the table and the existing left upper extremity brachiocephalic fistula was prepped and draped in usual sterile fashion  Next, a a micropuncture needle was placed into the fistula just above the elbow just above the arterial anastomosis with the needle seen in the occluded fistula by ultrasound  2 mg of TPA was introduced  A micropuncture set was placed and exchanged for 6-Danish sheath  This was directed towards the venous outflow in the upper arm  A catheter was placed up into the cephalic vein and subclavian vein  AngioJet thrombolysis and thrombectomy was performed from the elbow up to the subclavian vein  Next, access was gained into the cephalic vein directed down the arm towards the arterial anastomosis with a 6-Danish sheath placed and then a catheter placed into the brachial artery for an angiogram followed by placing a Norm catheter into the brachial artery to pull the arterial plug  Angioplastied was then performed of the arterial anastomosis using 4 and 5 mm angioplasty balloons  Next, angioplasty was performed of the long 25 cm segment diffuse narrowing of the cephalic  vein in the upper arm  Nitroglycerin was given  This was angioplastied with 5 and 6 mm angioplasty balloons  A postprocedure fistulogram was performed  A postprocedure central venogram was performed  Next, both sheaths were removed, and hemostasis was achieved via purse strings which were removed 20 minutes later    The patient tolerated the procedure well, including conscious sedation with no immediate complications seen  Impression: Impression: Totally occluded left brachiocephalic fistula which was occluded all the way up the arm to the central cephalic vein  Successful AngioJet thrombectomy and declotting as well as angioplasty of the arterial anastomosis up a 5 mm balloon  The  entire cephalic vein in the upper arm was narrowed and was angioplastied with 5 and 6 mm angioplastied balloons and is now widely patent  It is difficult to say if this narrowing was spasm due to no arterial flow  There is likely a possible culprit stenosis at the upper cephalic vein near the junction of the subclavian vein  PLAN: The fistula may be used immediately  Workstation performed: [id number]       XR chest 1 view portable   Final Result      No acute pulmonary parenchymal disease  Blunting of the left costophrenic sulcus which could be due to a trace left effusion or benign chronic pleural thickening  Workstation performed: [id number]             EKG, Pathology, and Other Studies Reviewed on Admission:   · EKG shows sinus rhythm with prolonged QTC    Allscripts/EPIC Records Reviewed: Yes     ** Please Note: "This note has been constructed using a voice recognition system  Therefore there may be syntax, spelling, and/or grammatical errors   Please call if you have any questions  "**

## 2022-03-28 NOTE — PLAN OF CARE
Problem: MOBILITY - ADULT  Goal: Maintain or return to baseline ADL function  Description: INTERVENTIONS:  -  Assess patient's ability to carry out ADLs; assess patient's baseline for ADL function and identify physical deficits which impact ability to perform ADLs (bathing, care of mouth/teeth, toileting, grooming, dressing, etc )  - Assess/evaluate cause of self-care deficits   - Assess range of motion  - Assess patient's mobility; develop plan if impaired  - Assess patient's need for assistive devices and provide as appropriate  - Encourage maximum independence but intervene and supervise when necessary  - Involve family in performance of ADLs  - Assess for home care needs following discharge   - Consider OT consult to assist with ADL evaluation and planning for discharge  - Provide patient education as appropriate  Outcome: Progressing  Goal: Maintains/Returns to pre admission functional level  Description: INTERVENTIONS:  - Perform BMAT or MOVE assessment daily    - Set and communicate daily mobility goal to care team and patient/family/caregiver  - Collaborate with rehabilitation services on mobility goals if consulted  - Perform Range of Motion 3 times a day  - Reposition patient every 3 hours    - Dangle patient 3 times a day  - Stand patient 3 times a day  - Ambulate patient 3 times a day  - Out of bed to chair 3 times a day   - Out of bed for meals 3 times a day  - Out of bed for toileting  - Record patient progress and toleration of activity level   Outcome: Progressing     Problem: Potential for Falls  Goal: Patient will remain free of falls  Description: INTERVENTIONS:  - Educate patient/family on patient safety including physical limitations  - Instruct patient to call for assistance with activity   - Consult OT/PT to assist with strengthening/mobility   - Keep Call bell within reach  - Keep bed low and locked with side rails adjusted as appropriate  - Keep care items and personal belongings within reach  - Initiate and maintain comfort rounds  - Make Fall Risk Sign visible to staff  - Offer Toileting every 2 Hours, in advance of need  - Initiate/Maintain 1alarm  - Obtain necessary fall risk management equipment: call bell , bed alarm  - Apply yellow socks and bracelet for high fall risk patients  - Consider moving patient to room near nurses station  Outcome: Progressing     Problem: Prexisting or High Potential for Compromised Skin Integrity  Goal: Skin integrity is maintained or improved  Description: INTERVENTIONS:  - Identify patients at risk for skin breakdown  - Assess and monitor skin integrity  - Assess and monitor nutrition and hydration status  - Monitor labs   - Assess for incontinence   - Turn and reposition patient  - Assist with mobility/ambulation  - Relieve pressure over bony prominences  - Avoid friction and shearing  - Provide appropriate hygiene as needed including keeping skin clean and dry  - Evaluate need for skin moisturizer/barrier cream  - Collaborate with interdisciplinary team   - Patient/family teaching  - Consider wound care consult   Outcome: Progressing     Problem: PAIN - ADULT  Goal: Verbalizes/displays adequate comfort level or baseline comfort level  Description: Interventions:  - Encourage patient to monitor pain and request assistance  - Assess pain using appropriate pain scale  - Administer analgesics based on type and severity of pain and evaluate response  - Implement non-pharmacological measures as appropriate and evaluate response  - Consider cultural and social influences on pain and pain management  - Notify physician/advanced practitioner if interventions unsuccessful or patient reports new pain  Outcome: Progressing     Problem: INFECTION - ADULT  Goal: Absence or prevention of progression during hospitalization  Description: INTERVENTIONS:  - Assess and monitor for signs and symptoms of infection  - Monitor lab/diagnostic results  - Monitor all insertion sites, i e  indwelling lines, tubes, and drains  - Monitor endotracheal if appropriate and nasal secretions for changes in amount and color  - [place] appropriate cooling/warming therapies per order  - Administer medications as ordered  - Instruct and encourage patient and family to use good hand hygiene technique  - Identify and instruct in appropriate isolation precautions for identified infection/condition  Outcome: Progressing  Goal: Absence of fever/infection during neutropenic period  Description: INTERVENTIONS:  - Monitor WBC    Outcome: Progressing     Problem: SAFETY ADULT  Goal: Maintain or return to baseline ADL function  Description: INTERVENTIONS:  -  Assess patient's ability to carry out ADLs; assess patient's baseline for ADL function and identify physical deficits which impact ability to perform ADLs (bathing, care of mouth/teeth, toileting, grooming, dressing, etc )  - Assess/evaluate cause of self-care deficits   - Assess range of motion  - Assess patient's mobility; develop plan if impaired  - Assess patient's need for assistive devices and provide as appropriate  - Encourage maximum independence but intervene and supervise when necessary  - Involve family in performance of ADLs  - Assess for home care needs following discharge   - Consider OT consult to assist with ADL evaluation and planning for discharge  - Provide patient education as appropriate  Outcome: Progressing  Goal: Maintains/Returns to pre admission functional level  Description: INTERVENTIONS:  - Perform BMAT or MOVE assessment daily    - Set and communicate daily mobility goal to care team and patient/family/caregiver  - Collaborate with rehabilitation services on mobility goals if consulted  - Perform Range of Motion 3 times a day  - Reposition patient every 3 hours    - Dangle patient 3 times a day  - Stand patient 3 times a day  - Ambulate patient 3 times a day  - Out of bed to chair 3 times a day   - Out of bed for meals 3 times a day  - Out of bed for toileting  - Record patient progress and toleration of activity level   Outcome: Progressing  Goal: Patient will remain free of falls  Description: INTERVENTIONS:  - Educate patient/family on patient safety including physical limitations  - Instruct patient to call for assistance with activity   - Consult OT/PT to assist with strengthening/mobility   - Keep Call bell within reach  - Keep bed low and locked with side rails adjusted as appropriate  - Keep care items and personal belongings within reach  - Initiate and maintain comfort rounds  - Make Fall Risk Sign visible to staff  - Offer Toileting every 3 Hours, in advance of need  - Initiate/Maintain 3alarm  - Obtain necessary fall risk management equipment: 3  - Apply yellow socks and bracelet for high fall risk patients  - Consider moving patient to room near nurses station  Outcome: Progressing     Problem: DISCHARGE PLANNING  Goal: Discharge to home or other facility with appropriate resources  Description: INTERVENTIONS:  - Identify barriers to discharge w/patient and caregiver  - Arrange for needed discharge resources and transportation as appropriate  - Identify discharge learning needs (meds, wound care, etc )  - Arrange for interpretive services to assist at discharge as needed  - Refer to Case Management Department for coordinating discharge planning if the patient needs post-hospital services based on physician/advanced practitioner order or complex needs related to functional status, cognitive ability, or social support system  Outcome: Progressing     Problem: Knowledge Deficit  Goal: Patient/family/caregiver demonstrates understanding of disease process, treatment plan, medications, and discharge instructions  Description: Complete learning assessment and assess knowledge base    Interventions:  - Provide teaching at level of understanding  - Provide teaching via preferred learning methods  Outcome: Progressing

## 2022-03-29 ENCOUNTER — APPOINTMENT (OUTPATIENT)
Dept: DIALYSIS | Facility: HOSPITAL | Age: 67
End: 2022-03-29
Payer: MEDICARE

## 2022-03-29 VITALS
HEART RATE: 87 BPM | WEIGHT: 171.9 LBS | OXYGEN SATURATION: 98 % | SYSTOLIC BLOOD PRESSURE: 149 MMHG | TEMPERATURE: 97.5 F | DIASTOLIC BLOOD PRESSURE: 82 MMHG | BODY MASS INDEX: 24.61 KG/M2 | RESPIRATION RATE: 17 BRPM | HEIGHT: 70 IN

## 2022-03-29 LAB
ALBUMIN SERPL BCP-MCNC: 2.2 G/DL (ref 3.5–5)
ALP SERPL-CCNC: 157 U/L (ref 46–116)
ALT SERPL W P-5'-P-CCNC: 7 U/L (ref 12–78)
ANION GAP SERPL CALCULATED.3IONS-SCNC: 11 MMOL/L (ref 4–13)
AST SERPL W P-5'-P-CCNC: 10 U/L (ref 5–45)
BILIRUB SERPL-MCNC: 0.45 MG/DL (ref 0.2–1)
BUN SERPL-MCNC: 41 MG/DL (ref 5–25)
CALCIUM ALBUM COR SERPL-MCNC: 8.7 MG/DL (ref 8.3–10.1)
CALCIUM SERPL-MCNC: 7.3 MG/DL (ref 8.3–10.1)
CHLORIDE SERPL-SCNC: 100 MMOL/L (ref 100–108)
CO2 SERPL-SCNC: 23 MMOL/L (ref 21–32)
CREAT SERPL-MCNC: 14.3 MG/DL (ref 0.6–1.3)
ERYTHROCYTE [DISTWIDTH] IN BLOOD BY AUTOMATED COUNT: 16.2 % (ref 11.6–15.1)
EST. AVERAGE GLUCOSE BLD GHB EST-MCNC: 80 MG/DL
GFR SERPL CREATININE-BSD FRML MDRD: 3 ML/MIN/1.73SQ M
GLUCOSE P FAST SERPL-MCNC: 94 MG/DL (ref 65–99)
GLUCOSE SERPL-MCNC: 109 MG/DL (ref 65–140)
GLUCOSE SERPL-MCNC: 91 MG/DL (ref 65–140)
GLUCOSE SERPL-MCNC: 93 MG/DL (ref 65–140)
GLUCOSE SERPL-MCNC: 94 MG/DL (ref 65–140)
HBA1C MFR BLD: 4.4 %
HCT VFR BLD AUTO: 35 % (ref 36.5–49.3)
HGB BLD-MCNC: 11.6 G/DL (ref 12–17)
MCH RBC QN AUTO: 34.9 PG (ref 26.8–34.3)
MCHC RBC AUTO-ENTMCNC: 33.1 G/DL (ref 31.4–37.4)
MCV RBC AUTO: 105 FL (ref 82–98)
PLATELET # BLD AUTO: 85 THOUSANDS/UL (ref 149–390)
PMV BLD AUTO: 9.5 FL (ref 8.9–12.7)
POTASSIUM SERPL-SCNC: 3.2 MMOL/L (ref 3.5–5.3)
PROT SERPL-MCNC: 5.1 G/DL (ref 6.4–8.2)
RBC # BLD AUTO: 3.32 MILLION/UL (ref 3.88–5.62)
SODIUM SERPL-SCNC: 134 MMOL/L (ref 136–145)
WBC # BLD AUTO: 4.85 THOUSAND/UL (ref 4.31–10.16)

## 2022-03-29 PROCEDURE — 85027 COMPLETE CBC AUTOMATED: CPT | Performed by: FAMILY MEDICINE

## 2022-03-29 PROCEDURE — 90935 HEMODIALYSIS ONE EVALUATION: CPT | Performed by: INTERNAL MEDICINE

## 2022-03-29 PROCEDURE — 82948 REAGENT STRIP/BLOOD GLUCOSE: CPT

## 2022-03-29 PROCEDURE — G0257 UNSCHED DIALYSIS ESRD PT HOS: HCPCS

## 2022-03-29 PROCEDURE — 83036 HEMOGLOBIN GLYCOSYLATED A1C: CPT | Performed by: FAMILY MEDICINE

## 2022-03-29 PROCEDURE — 80053 COMPREHEN METABOLIC PANEL: CPT | Performed by: FAMILY MEDICINE

## 2022-03-29 PROCEDURE — 99217 PR OBSERVATION CARE DISCHARGE MANAGEMENT: CPT | Performed by: INTERNAL MEDICINE

## 2022-03-29 RX ADMIN — CLOPIDOGREL BISULFATE 75 MG: 75 TABLET ORAL at 12:06

## 2022-03-29 RX ADMIN — HEPARIN SODIUM 5000 UNITS: 5000 INJECTION INTRAVENOUS; SUBCUTANEOUS at 06:27

## 2022-03-29 RX ADMIN — ASPIRIN 81 MG: 81 TABLET, COATED ORAL at 12:06

## 2022-03-29 NOTE — DISCHARGE SUMMARY
Avelino U  65   Discharge- Kang Leonard 1955, 78 y o  male MRN: [id number]  Unit/Bed#: [address] [place] [address] Encounter: [id number]  Primary Care Provider: TESSY Baca   Date and time admitted to hospital: 3/28/2022  6:09 AM    * Dialysis AV fistula malfunction [facility])  Assessment & Plan  Patient presented to the ER due to nonfunctioning AV fistula  Patient underwent fistulogram on 03/25 with thrombectomy and angioplasty  Patient had repeat fistulogram by IR with mechanical thrombolysis and angioplasty of the thrombosed dialysis fistula  Patient had dialysis without any problem today        Hypokalemia  Assessment & Plan  Potassium 2 9  Improved with replacement  Patient received 4 K bath with dialysis    Type 2 diabetes mellitus [facility])  Assessment & Plan  Lab Results   Component Value Date    HGBA1C 5 4 04/11/2017       Recent Labs     03/28/22  1008 03/28/22  1152   POCGLU 91 94     Blood sugars have been stable    End stage renal disease [facility])  Assessment & Plan  Lab Results   Component Value Date    EGFR 3 03/28/2022    EGFR 3 01/31/2022    EGFR 8 10/19/2021    CREATININE 13 22 (H) 03/28/2022    CREATININE 12 21 (H) 01/31/2022    CREATININE 6 80 (H) 10/19/2021     Plan of care coordinated with Nephrology and Plan for dialysis tomorrow   Patient appears clinically stable and no indication for urgent dialysis  Patient underwent hemodialysis without any problem today    Benign essential hypertension  Assessment & Plan  Continue Coreg  Patient not on losartan      Anemia in chronic kidney disease (CKD)  Assessment & Plan  Hemoglobin stable  Repeat lab work in a m      Results from last 7 days   Lab Units 03/28/22  0607   HEMOGLOBIN g/dL 13 3   HEMATOCRIT % 40 7   MCV fL 106*       Medical Problems             Resolved Problems  Date Reviewed: 3/29/2022    None              Discharging Physician / Practitioner: Demi Hart MD  PCP: Glynn Baca  Admission Date:   Admission Orders (From admission, onward)     Ordered        03/28/22 0820  Place in Observation  Once                      Discharge Date: 03/29/22    Consultations During Hospital Stay:  · IR and Nephrology    Procedures Performed:   · Fistulogram with thrombolysis and insertion of stent       Outpatient Tests Requested:  · Follow-up outpatient at the dialysis center    Complications:  None    Reason for Admission:     Hospital Course:   Phyllis Ruiz is a 78 y o  male patient with past medical history of cerebral thrombosis, end-stage renal disease, diabetes, hypertension who originally presented to the hospital on 3/28/2022 due to malfunctioning AV fistula  Patient was sent in by the dialysis center as the could not feel the thrill or bruit over the fistula site  Patient's last dialysis was on 03/21  Patient underwent fistulogram with thrombectomy and angioplasty by IR  Patient later underwent dialysis without any problem patient remained stable  Please see above list of diagnoses and related plan for additional information  Condition at Discharge: stable    Discharge Day Visit / Exam:   Subjective:  Patient is feeling well and anxious to go home  Patient had some diarrhea which is chronic per patient  Patient denies any abdominal pain  Vitals: Blood Pressure: 149/82 (03/29/22 1203)  Pulse: 87 (03/29/22 1203)  Temperature: 97 5 °F (36 4 °C) (03/29/22 1203)  Temp Source: Oral (03/29/22 1203)  Respirations: 17 (03/29/22 1203)  Height: 5' 10" (177 8 cm) (03/28/22 0940)  Weight - Scale: 78 kg (171 lb 14 4 oz) (03/28/22 0940)  SpO2: 98 % (03/29/22 1203)  Exam:   Physical Exam  Constitutional:       Appearance: Normal appearance  HENT:      Head: Normocephalic and atraumatic  Eyes:      Extraocular Movements: Extraocular movements intact  Pupils: Pupils are equal, round, and reactive to light  Cardiovascular:      Rate and Rhythm: Normal rate and regular rhythm  Heart sounds: No murmur heard    No gallop  Pulmonary:      Effort: Pulmonary effort is normal       Breath sounds: Normal breath sounds  Abdominal:      General: Bowel sounds are normal       Palpations: Abdomen is soft  Tenderness: There is no abdominal tenderness  Musculoskeletal:         General: No swelling or deformity  Normal range of motion  Cervical back: Normal range of motion and neck supple  Skin:     General: Skin is warm and dry  Neurological:      General: No focal deficit present  Mental Status: He is alert  Discussion with Family: yes    Discharge instructions/Information to patient and family:   See after visit summary for information provided to patient and family  Provisions for Follow-Up Care:  See after visit summary for information related to follow-up care and any pertinent home health orders  Disposition:   Home    Planned Readmission: No     Discharge Statement:  I spent 25 minutes discharging the patient  This time was spent on the day of discharge  I had direct contact with the patient on the day of discharge  Greater than 50% of the total time was spent examining patient, answering all patient questions, arranging and discussing plan of care with patient as well as directly providing post-discharge instructions  Additional time then spent on discharge activities  Discharge Medications:  See after visit summary for reconciled discharge medications provided to patient and/or family        **Please Note: This note may have been constructed using a voice recognition system**

## 2022-03-29 NOTE — PLAN OF CARE
Post-Dialysis RN Treatment Note    Blood Pressure:  Pre 137/76 mm/Hg  Post 139/78 mmHg   EDW  77 kg    Weight:  Pre 75 kg   Post 75kg kg   Mode of weight measurement: Bed Scale   Volume Removed  0 ml    Treatment duration 180 minutes    NS given  No    Treatment shortened?  Yes, describe: 3 hrs at patient request   Medications given during Rx Not Applicable   Estimated Kt/V  None Reported   Access type: AV fistula   Access Issues: No    Report called to primary nurse   Yes    Pt on treatment for 3 hrs on a 4k bath, UF goal even due to pt being below dry weight  Problem: METABOLIC, FLUID AND ELECTROLYTES - ADULT  Goal: Electrolytes maintained within normal limits  Description: INTERVENTIONS:  - Monitor labs and assess patient for signs and symptoms of electrolyte imbalances  - Administer electrolyte replacement as ordered  - Monitor response to electrolyte replacements, including repeat lab results as appropriate  - Instruct patient on fluid and nutrition as appropriate  Outcome: Progressing  Goal: Fluid balance maintained  Description: INTERVENTIONS:  - Monitor labs   - Monitor I/O and WT  - Instruct patient on fluid and nutrition as appropriate  - Assess for signs & symptoms of volume excess or deficit  Outcome: Progressing

## 2022-03-29 NOTE — PROGRESS NOTES
Joleen 49 PROGRESS NOTE   Angeles Louis 78 y o  male MRN: [id number]  Unit/Bed#: [address] [place] [address] Encounter: [id number]  Reason for Consult: ESRD on HD    ASSESSMENT and PLAN:  1  ESRD on HD [facility]):   · HD today due to missed HD yesterday       2  Access:   · L arm AVF is functional now  · S/p fistulagram and declot on 3/28/22       3  Hypertension:   · Home meds: Carvedilol 3 125 mg BID  · BP is acceptable  · No changes  · EDW is 77 kg and he is under his EDW  · No UF on HD today  4  Anemia:   · Hgb at goal    · Not on ALICE in the hospital      5  Hypokalemia:  · K is 3 2 today - better  · 4K bath on HD       6  Mineral and bone disease:   · Not on binders or Vitamin D analogues  7  Diarrhea: Per SLIM  DISPOSITION:  · HD today  · Stable for discharge from renal standpoint after HD  SUBJECTIVE / 24H INTERVAL HISTORY:  Had fistulagram yesterday with declot of AVF  Still with diarrhea  No CP or SOB  HEMODIALYSIS PROCEDURE NOTE  The patient was seen and examined on hemodialysis    Time: 3 hours  Sodium: 137 Blood flow: 350   Dialyzer: F160 Potassium: 4 Dialysate flow: 500   Access: L arm AVF Bicarbonate: 33 Ultrafiltration goal: Even   Medications on HD: None     OBJECTIVE:  Current Weight: Weight - Scale: 78 kg (171 lb 14 4 oz)  Vitals:    03/29/22 0746 03/29/22 0810 03/29/22 0823 03/29/22 0830   BP: 142/77 137/76 134/73 138/79   BP Location:  Right arm Right arm Right arm   Pulse: 79 65 60 61   Resp: 18 18 16 16   Temp: 97 6 °F (36 4 °C) 97 5 °F (36 4 °C)     TempSrc: Oral Tympanic     SpO2: 97%      Weight:       Height:           Intake/Output Summary (Last 24 hours) at 3/29/2022 0841  Last data filed at 3/29/2022 5255  Gross per 24 hour   Intake 200 ml   Output --   Net 200 ml     General: conscious, cooperative, no distress  Skin: dry  Eyes: pink conjunctivae  ENT: moist mucous membranes  Respiratory: equal chest expansion, clear breath sounds  Cardiovascular: distinct heart sounds, normal rate, regular rhythm, no rub  Abdomen: soft, non tender, non distended, normal bowel sounds  Extremities: no edema  Genitourinary: no bray catheter  Neuro: awake, alert     Psych: appropriate affect    Medications:    Current Facility-Administered Medications:     acetaminophen (TYLENOL) tablet 650 mg, 650 mg, Oral, Q6H PRN, Rigoberto Song MD, 650 mg at 03/28/22 2205    aspirin (ECOTRIN LOW STRENGTH) EC tablet 81 mg, 81 mg, Oral, Daily, Lala Revankar, DO    atorvastatin (LIPITOR) tablet 40 mg, 40 mg, Oral, Daily With Dinner, Lala Marankar, DO, 40 mg at 03/28/22 1711    carvedilol (COREG) tablet 3 125 mg, 3 125 mg, Oral, BID With Meals, Lala Reyesar, DO, 3 125 mg at 03/28/22 1711    clopidogrel (PLAVIX) tablet 75 mg, 75 mg, Oral, Daily, Lala Revankar, DO    heparin (porcine) subcutaneous injection 5,000 Units, 5,000 Units, Subcutaneous, Q8H SRINI, Lala Revankar, DO, 5,000 Units at 03/29/22 0627    insulin lispro (HumaLOG) 100 units/mL subcutaneous injection 1-5 Units, 1-5 Units, Subcutaneous, TID AC **AND** Fingerstick Glucose (POCT), , , TID AC, Lala Revankar, DO    insulin lispro (HumaLOG) 100 units/mL subcutaneous injection 1-5 Units, 1-5 Units, Subcutaneous, HS, Lala Revankar, DO    nicotine (NICODERM CQ) 14 mg/24hr TD 24 hr patch 1 patch, 1 patch, Transdermal, Daily, Lala Revberthaar, DO    Laboratory Results:  Results from last 7 days   Lab Units 03/29/22  0642 03/28/22  0607   WBC Thousand/uL 4 85 7 25   HEMOGLOBIN g/dL 11 6* 13 3   HEMATOCRIT % 35 0* 40 7   PLATELETS Thousands/uL 85* 125*   POTASSIUM mmol/L 3 2* 2 9*   CHLORIDE mmol/L 100 99*   CO2 mmol/L 23 24   BUN mg/dL 41* 36*   CREATININE mg/dL 14 30* 13 22*   CALCIUM mg/dL 7 3* 7 9*   MAGNESIUM mg/dL  --  1 9   PHOSPHORUS mg/dL  --  3 9

## 2022-03-29 NOTE — PLAN OF CARE
Problem: MOBILITY - ADULT  Goal: Maintain or return to baseline ADL function  Description: INTERVENTIONS:  -  Assess patient's ability to carry out ADLs; assess patient's baseline for ADL function and identify physical deficits which impact ability to perform ADLs (bathing, care of mouth/teeth, toileting, grooming, dressing, etc )  - Assess/evaluate cause of self-care deficits   - Assess range of motion  - Assess patient's mobility; develop plan if impaired  - Assess patient's need for assistive devices and provide as appropriate  - Encourage maximum independence but intervene and supervise when necessary  - Involve family in performance of ADLs  - Assess for home care needs following discharge   - Consider OT consult to assist with ADL evaluation and planning for discharge  - Provide patient education as appropriate  Outcome: Progressing  Goal: Maintains/Returns to pre admission functional level  Description: INTERVENTIONS:  - Perform BMAT or MOVE assessment daily    - Set and communicate daily mobility goal to care team and patient/family/caregiver     - Collaborate with rehabilitation services on mobility goals if consulted  - Out of bed for toileting  - Record patient progress and toleration of activity level   Outcome: Progressing     Problem: Potential for Falls  Goal: Patient will remain free of falls  Description: INTERVENTIONS:  - Educate patient/family on patient safety including physical limitations  - Instruct patient to call for assistance with activity   - Consult OT/PT to assist with strengthening/mobility   - Keep Call bell within reach  - Keep bed low and locked with side rails adjusted as appropriate  - Keep care items and personal belongings within reach  - Initiate and maintain comfort rounds  - Make Fall Risk Sign visible to staff  - Offer Toileting every 2 Hours, in advance of need  - Initiate/Maintain bed alarm  - Obtain necessary fall risk management equipment:  - Apply yellow socks and bracelet for high fall risk patients  - Consider moving patient to room near nurses station  Outcome: Progressing     Problem: Prexisting or High Potential for Compromised Skin Integrity  Goal: Skin integrity is maintained or improved  Description: INTERVENTIONS:  - Identify patients at risk for skin breakdown  - Assess and monitor skin integrity  - Assess and monitor nutrition and hydration status  - Monitor labs   - Assess for incontinence   - Turn and reposition patient  - Assist with mobility/ambulation  - Relieve pressure over bony prominences  - Avoid friction and shearing  - Provide appropriate hygiene as needed including keeping skin clean and dry  - Evaluate need for skin moisturizer/barrier cream  - Collaborate with interdisciplinary team   - Patient/family teaching  - Consider wound care consult   Outcome: Progressing     Problem: PAIN - ADULT  Goal: Verbalizes/displays adequate comfort level or baseline comfort level  Description: Interventions:  - Encourage patient to monitor pain and request assistance  - Assess pain using appropriate pain scale  - Administer analgesics based on type and severity of pain and evaluate response  - Implement non-pharmacological measures as appropriate and evaluate response  - Consider cultural and social influences on pain and pain management  - Notify physician/advanced practitioner if interventions unsuccessful or patient reports new pain  Outcome: Progressing     Problem: INFECTION - ADULT  Goal: Absence or prevention of progression during hospitalization  Description: INTERVENTIONS:  - Assess and monitor for signs and symptoms of infection  - Monitor lab/diagnostic results  - Monitor all insertion sites, i e  indwelling lines, tubes, and drains  - Monitor endotracheal if appropriate and nasal secretions for changes in amount and color  - Mcbrides appropriate cooling/warming therapies per order  - Administer medications as ordered  - Instruct and encourage patient and family to use good hand hygiene technique  - Identify and instruct in appropriate isolation precautions for identified infection/condition  Outcome: Progressing  Goal: Absence of fever/infection during neutropenic period  Description: INTERVENTIONS:  - Monitor WBC    Outcome: Progressing     Problem: SAFETY ADULT  Goal: Maintain or return to baseline ADL function  Description: INTERVENTIONS:  -  Assess patient's ability to carry out ADLs; assess patient's baseline for ADL function and identify physical deficits which impact ability to perform ADLs (bathing, care of mouth/teeth, toileting, grooming, dressing, etc )  - Assess/evaluate cause of self-care deficits   - Assess range of motion  - Assess patient's mobility; develop plan if impaired  - Assess patient's need for assistive devices and provide as appropriate  - Encourage maximum independence but intervene and supervise when necessary  - Involve family in performance of ADLs  - Assess for home care needs following discharge   - Consider OT consult to assist with ADL evaluation and planning for discharge  - Provide patient education as appropriate  Outcome: Progressing  Goal: Maintains/Returns to pre admission functional level  Description: INTERVENTIONS:  - Perform BMAT or MOVE assessment daily    - Set and communicate daily mobility goal to care team and patient/family/caregiver  - Collaborate with rehabilitation services on mobility goals if consulted  - Perform Range of Motion 3 times a day    -- Out of bed for toileting  - Record patient progress and toleration of activity level   Outcome: Progressing  Goal: Patient will remain free of falls  Description: INTERVENTIONS:  - Educate patient/family on patient safety including physical limitations  - Instruct patient to call for assistance with activity   - Consult OT/PT to assist with strengthening/mobility   - Keep Call bell within reach  - Keep bed low and locked with side rails adjusted as appropriate  - Keep care items and personal belongings within reach  - Initiate and maintain comfort rounds  - Make Fall Risk Sign visible to staff  - Offer Toileting every 2 Hours, in advance of need  - Initiate/Maintain bed alarm  - Obtain necessary fall risk management equipment:   - Apply yellow socks and bracelet for high fall risk patients  - Consider moving patient to room near nurses station  Outcome: Progressing     Problem: DISCHARGE PLANNING  Goal: Discharge to home or other facility with appropriate resources  Description: INTERVENTIONS:  - Identify barriers to discharge w/patient and caregiver  - Arrange for needed discharge resources and transportation as appropriate  - Identify discharge learning needs (meds, wound care, etc )  - Arrange for interpretive services to assist at discharge as needed  - Refer to Case Management Department for coordinating discharge planning if the patient needs post-hospital services based on physician/advanced practitioner order or complex needs related to functional status, cognitive ability, or social support system  Outcome: Progressing

## 2022-04-03 ENCOUNTER — HOSPITAL ENCOUNTER (INPATIENT)
Facility: HOSPITAL | Age: 67
LOS: 1 days | DRG: 640 | End: 2022-04-03
Attending: EMERGENCY MEDICINE | Admitting: INTERNAL MEDICINE
Payer: MEDICARE

## 2022-04-03 ENCOUNTER — APPOINTMENT (EMERGENCY)
Dept: RADIOLOGY | Facility: HOSPITAL | Age: 67
DRG: 640 | End: 2022-04-03
Payer: MEDICARE

## 2022-04-03 ENCOUNTER — HOSPITAL ENCOUNTER (INPATIENT)
Facility: HOSPITAL | Age: 67
LOS: 6 days | Discharge: NURSING, REHAB OR LONG-TERM CARE | DRG: 521 | End: 2022-04-09
Attending: INTERNAL MEDICINE | Admitting: INTERNAL MEDICINE
Payer: MEDICARE

## 2022-04-03 ENCOUNTER — APPOINTMENT (INPATIENT)
Dept: RADIOLOGY | Facility: HOSPITAL | Age: 67
DRG: 521 | End: 2022-04-03
Payer: MEDICARE

## 2022-04-03 VITALS
WEIGHT: 177.03 LBS | SYSTOLIC BLOOD PRESSURE: 124 MMHG | OXYGEN SATURATION: 95 % | RESPIRATION RATE: 16 BRPM | BODY MASS INDEX: 25.34 KG/M2 | HEART RATE: 51 BPM | DIASTOLIC BLOOD PRESSURE: 68 MMHG | TEMPERATURE: 97.9 F | HEIGHT: 70 IN

## 2022-04-03 DIAGNOSIS — S72.001A CLOSED FRACTURE OF RIGHT HIP (HCC): ICD-10-CM

## 2022-04-03 DIAGNOSIS — R11.2 NAUSEA VOMITING AND DIARRHEA: ICD-10-CM

## 2022-04-03 DIAGNOSIS — R53.1 GENERALIZED WEAKNESS: ICD-10-CM

## 2022-04-03 DIAGNOSIS — S72.141A DISPLACED INTERTROCHANTERIC FRACTURE OF RIGHT FEMUR, INITIAL ENCOUNTER FOR CLOSED FRACTURE (HCC): Primary | ICD-10-CM

## 2022-04-03 DIAGNOSIS — N18.6 END STAGE RENAL DISEASE (HCC): ICD-10-CM

## 2022-04-03 DIAGNOSIS — F10.20 ALCOHOL DEPENDENCE (HCC): ICD-10-CM

## 2022-04-03 DIAGNOSIS — E87.6 HYPOKALEMIA: Primary | ICD-10-CM

## 2022-04-03 DIAGNOSIS — R19.7 NAUSEA VOMITING AND DIARRHEA: ICD-10-CM

## 2022-04-03 PROBLEM — R26.2 AMBULATORY DYSFUNCTION: Status: ACTIVE | Noted: 2022-04-03

## 2022-04-03 PROBLEM — E87.5 HYPERKALEMIA: Status: ACTIVE | Noted: 2022-04-03

## 2022-04-03 PROBLEM — D69.6 THROMBOCYTOPENIA (HCC): Status: ACTIVE | Noted: 2022-04-03

## 2022-04-03 PROBLEM — M84.351A: Status: ACTIVE | Noted: 2022-04-03

## 2022-04-03 LAB
2HR DELTA HS TROPONIN: 1 NG/L
4HR DELTA HS TROPONIN: -1 NG/L
ALBUMIN SERPL BCP-MCNC: 2.4 G/DL (ref 3.5–5)
ALP SERPL-CCNC: 217 U/L (ref 46–116)
ALT SERPL W P-5'-P-CCNC: 18 U/L (ref 12–78)
ANION GAP SERPL CALCULATED.3IONS-SCNC: 10 MMOL/L (ref 4–13)
APTT PPP: 29 SECONDS (ref 23–37)
AST SERPL W P-5'-P-CCNC: 22 U/L (ref 5–45)
ATRIAL RATE: 75 BPM
BASOPHILS # BLD AUTO: 0.05 THOUSANDS/ΜL (ref 0–0.1)
BASOPHILS NFR BLD AUTO: 1 % (ref 0–1)
BILIRUB SERPL-MCNC: 0.44 MG/DL (ref 0.2–1)
BUN SERPL-MCNC: 15 MG/DL (ref 5–25)
CALCIUM ALBUM COR SERPL-MCNC: 9 MG/DL (ref 8.3–10.1)
CALCIUM SERPL-MCNC: 7.7 MG/DL (ref 8.3–10.1)
CARDIAC TROPONIN I PNL SERPL HS: 24 NG/L
CARDIAC TROPONIN I PNL SERPL HS: 25 NG/L
CARDIAC TROPONIN I PNL SERPL HS: 26 NG/L
CHLORIDE SERPL-SCNC: 98 MMOL/L (ref 100–108)
CO2 SERPL-SCNC: 26 MMOL/L (ref 21–32)
CREAT SERPL-MCNC: 8.24 MG/DL (ref 0.6–1.3)
EOSINOPHIL # BLD AUTO: 0.18 THOUSAND/ΜL (ref 0–0.61)
EOSINOPHIL NFR BLD AUTO: 3 % (ref 0–6)
ERYTHROCYTE [DISTWIDTH] IN BLOOD BY AUTOMATED COUNT: 15.8 % (ref 11.6–15.1)
ETHANOL SERPL-MCNC: 57 MG/DL (ref 0–3)
FERRITIN SERPL-MCNC: 808 NG/ML (ref 8–388)
FLUAV RNA RESP QL NAA+PROBE: NEGATIVE
FLUBV RNA RESP QL NAA+PROBE: NEGATIVE
FOLATE SERPL-MCNC: 3.7 NG/ML (ref 3.1–17.5)
GFR SERPL CREATININE-BSD FRML MDRD: 6 ML/MIN/1.73SQ M
GLUCOSE SERPL-MCNC: 104 MG/DL (ref 65–140)
GLUCOSE SERPL-MCNC: 134 MG/DL (ref 65–140)
HCT VFR BLD AUTO: 37.1 % (ref 36.5–49.3)
HGB BLD-MCNC: 11.8 G/DL (ref 12–17)
IMM GRANULOCYTES # BLD AUTO: 0.03 THOUSAND/UL (ref 0–0.2)
IMM GRANULOCYTES NFR BLD AUTO: 1 % (ref 0–2)
INR PPP: 1.07 (ref 0.84–1.19)
IRON SATN MFR SERPL: 52 % (ref 20–50)
IRON SERPL-MCNC: 75 UG/DL (ref 65–175)
LYMPHOCYTES # BLD AUTO: 0.64 THOUSANDS/ΜL (ref 0.6–4.47)
LYMPHOCYTES NFR BLD AUTO: 12 % (ref 14–44)
MAGNESIUM SERPL-MCNC: 2 MG/DL (ref 1.6–2.6)
MCH RBC QN AUTO: 34.6 PG (ref 26.8–34.3)
MCHC RBC AUTO-ENTMCNC: 31.8 G/DL (ref 31.4–37.4)
MCV RBC AUTO: 109 FL (ref 82–98)
MONOCYTES # BLD AUTO: 0.3 THOUSAND/ΜL (ref 0.17–1.22)
MONOCYTES NFR BLD AUTO: 5 % (ref 4–12)
NEUTROPHILS # BLD AUTO: 4.35 THOUSANDS/ΜL (ref 1.85–7.62)
NEUTS SEG NFR BLD AUTO: 78 % (ref 43–75)
NRBC BLD AUTO-RTO: 0 /100 WBCS
P AXIS: 45 DEGREES
PLATELET # BLD AUTO: 100 THOUSANDS/UL (ref 149–390)
PMV BLD AUTO: 9.7 FL (ref 8.9–12.7)
POTASSIUM SERPL-SCNC: 2.6 MMOL/L (ref 3.5–5.3)
POTASSIUM SERPL-SCNC: 3.9 MMOL/L (ref 3.5–5.3)
PR INTERVAL: 214 MS
PROT SERPL-MCNC: 5.9 G/DL (ref 6.4–8.2)
PROTHROMBIN TIME: 13.7 SECONDS (ref 11.6–14.5)
QRS AXIS: -15 DEGREES
QRSD INTERVAL: 120 MS
QT INTERVAL: 444 MS
QTC INTERVAL: 495 MS
RBC # BLD AUTO: 3.41 MILLION/UL (ref 3.88–5.62)
RSV RNA RESP QL NAA+PROBE: NEGATIVE
SARS-COV-2 RNA RESP QL NAA+PROBE: NEGATIVE
SODIUM SERPL-SCNC: 134 MMOL/L (ref 136–145)
T WAVE AXIS: 53 DEGREES
TIBC SERPL-MCNC: 145 UG/DL (ref 250–450)
VENTRICULAR RATE: 75 BPM
VIT B12 SERPL-MCNC: 419 PG/ML (ref 100–900)
WBC # BLD AUTO: 5.55 THOUSAND/UL (ref 4.31–10.16)

## 2022-04-03 PROCEDURE — 82746 ASSAY OF FOLIC ACID SERUM: CPT | Performed by: INTERNAL MEDICINE

## 2022-04-03 PROCEDURE — 99285 EMERGENCY DEPT VISIT HI MDM: CPT

## 2022-04-03 PROCEDURE — 80053 COMPREHEN METABOLIC PANEL: CPT | Performed by: EMERGENCY MEDICINE

## 2022-04-03 PROCEDURE — 71045 X-RAY EXAM CHEST 1 VIEW: CPT

## 2022-04-03 PROCEDURE — 84484 ASSAY OF TROPONIN QUANT: CPT | Performed by: EMERGENCY MEDICINE

## 2022-04-03 PROCEDURE — 96365 THER/PROPH/DIAG IV INF INIT: CPT

## 2022-04-03 PROCEDURE — 99285 EMERGENCY DEPT VISIT HI MDM: CPT | Performed by: EMERGENCY MEDICINE

## 2022-04-03 PROCEDURE — 99236 HOSP IP/OBS SAME DATE HI 85: CPT | Performed by: INTERNAL MEDICINE

## 2022-04-03 PROCEDURE — 36415 COLL VENOUS BLD VENIPUNCTURE: CPT | Performed by: EMERGENCY MEDICINE

## 2022-04-03 PROCEDURE — 84132 ASSAY OF SERUM POTASSIUM: CPT | Performed by: INTERNAL MEDICINE

## 2022-04-03 PROCEDURE — 73552 X-RAY EXAM OF FEMUR 2/>: CPT

## 2022-04-03 PROCEDURE — 0241U HB NFCT DS VIR RESP RNA 4 TRGT: CPT | Performed by: EMERGENCY MEDICINE

## 2022-04-03 PROCEDURE — 82077 ASSAY SPEC XCP UR&BREATH IA: CPT | Performed by: EMERGENCY MEDICINE

## 2022-04-03 PROCEDURE — 73502 X-RAY EXAM HIP UNI 2-3 VIEWS: CPT

## 2022-04-03 PROCEDURE — 82728 ASSAY OF FERRITIN: CPT | Performed by: INTERNAL MEDICINE

## 2022-04-03 PROCEDURE — 85730 THROMBOPLASTIN TIME PARTIAL: CPT | Performed by: EMERGENCY MEDICINE

## 2022-04-03 PROCEDURE — 93005 ELECTROCARDIOGRAM TRACING: CPT

## 2022-04-03 PROCEDURE — 85025 COMPLETE CBC W/AUTO DIFF WBC: CPT | Performed by: EMERGENCY MEDICINE

## 2022-04-03 PROCEDURE — 83735 ASSAY OF MAGNESIUM: CPT | Performed by: EMERGENCY MEDICINE

## 2022-04-03 PROCEDURE — 85610 PROTHROMBIN TIME: CPT | Performed by: EMERGENCY MEDICINE

## 2022-04-03 PROCEDURE — 82607 VITAMIN B-12: CPT | Performed by: INTERNAL MEDICINE

## 2022-04-03 PROCEDURE — 83550 IRON BINDING TEST: CPT | Performed by: INTERNAL MEDICINE

## 2022-04-03 PROCEDURE — 82948 REAGENT STRIP/BLOOD GLUCOSE: CPT

## 2022-04-03 PROCEDURE — 99223 1ST HOSP IP/OBS HIGH 75: CPT | Performed by: INTERNAL MEDICINE

## 2022-04-03 PROCEDURE — 93010 ELECTROCARDIOGRAM REPORT: CPT | Performed by: INTERNAL MEDICINE

## 2022-04-03 PROCEDURE — 87081 CULTURE SCREEN ONLY: CPT | Performed by: INTERNAL MEDICINE

## 2022-04-03 PROCEDURE — 84484 ASSAY OF TROPONIN QUANT: CPT | Performed by: INTERNAL MEDICINE

## 2022-04-03 PROCEDURE — 83540 ASSAY OF IRON: CPT | Performed by: INTERNAL MEDICINE

## 2022-04-03 RX ORDER — HEPARIN SODIUM 5000 [USP'U]/ML
5000 INJECTION, SOLUTION INTRAVENOUS; SUBCUTANEOUS EVERY 8 HOURS SCHEDULED
Status: DISCONTINUED | OUTPATIENT
Start: 2022-04-03 | End: 2022-04-03 | Stop reason: HOSPADM

## 2022-04-03 RX ORDER — NYSTATIN 100000 [USP'U]/G
POWDER TOPICAL 2 TIMES DAILY
Status: DISCONTINUED | OUTPATIENT
Start: 2022-04-03 | End: 2022-04-09 | Stop reason: HOSPADM

## 2022-04-03 RX ORDER — CHOLECALCIFEROL (VITAMIN D3) 10 MCG
1 TABLET ORAL
Status: CANCELLED | OUTPATIENT
Start: 2022-04-04

## 2022-04-03 RX ORDER — LOSARTAN POTASSIUM 50 MG/1
50 TABLET ORAL DAILY
Status: DISCONTINUED | OUTPATIENT
Start: 2022-04-04 | End: 2022-04-03 | Stop reason: HOSPADM

## 2022-04-03 RX ORDER — MELATONIN
2000 DAILY
Status: CANCELLED | OUTPATIENT
Start: 2022-04-04

## 2022-04-03 RX ORDER — HEPARIN SODIUM 5000 [USP'U]/ML
5000 INJECTION, SOLUTION INTRAVENOUS; SUBCUTANEOUS EVERY 8 HOURS SCHEDULED
Status: COMPLETED | OUTPATIENT
Start: 2022-04-03 | End: 2022-04-03

## 2022-04-03 RX ORDER — ACETAMINOPHEN 325 MG/1
650 TABLET ORAL EVERY 6 HOURS PRN
Status: DISCONTINUED | OUTPATIENT
Start: 2022-04-03 | End: 2022-04-03 | Stop reason: HOSPADM

## 2022-04-03 RX ORDER — ACETAMINOPHEN 325 MG/1
650 TABLET ORAL EVERY 6 HOURS PRN
Status: DISCONTINUED | OUTPATIENT
Start: 2022-04-03 | End: 2022-04-03

## 2022-04-03 RX ORDER — ACETAMINOPHEN 325 MG/1
975 TABLET ORAL EVERY 8 HOURS SCHEDULED
Status: DISCONTINUED | OUTPATIENT
Start: 2022-04-03 | End: 2022-04-09 | Stop reason: HOSPADM

## 2022-04-03 RX ORDER — HYDROMORPHONE HCL/PF 1 MG/ML
0.5 SYRINGE (ML) INJECTION
Status: DISCONTINUED | OUTPATIENT
Start: 2022-04-03 | End: 2022-04-03

## 2022-04-03 RX ORDER — FOLIC ACID 1 MG/1
1 TABLET ORAL DAILY
Status: DISCONTINUED | OUTPATIENT
Start: 2022-04-04 | End: 2022-04-04

## 2022-04-03 RX ORDER — NICOTINE 21 MG/24HR
1 PATCH, TRANSDERMAL 24 HOURS TRANSDERMAL DAILY
Status: CANCELLED | OUTPATIENT
Start: 2022-04-04

## 2022-04-03 RX ORDER — MELATONIN
2000 DAILY
Status: DISCONTINUED | OUTPATIENT
Start: 2022-04-03 | End: 2022-04-03 | Stop reason: HOSPADM

## 2022-04-03 RX ORDER — ONDANSETRON 2 MG/ML
4 INJECTION INTRAMUSCULAR; INTRAVENOUS EVERY 6 HOURS PRN
Status: CANCELLED | OUTPATIENT
Start: 2022-04-03

## 2022-04-03 RX ORDER — LOSARTAN POTASSIUM 50 MG/1
50 TABLET ORAL DAILY
Status: CANCELLED | OUTPATIENT
Start: 2022-04-04

## 2022-04-03 RX ORDER — ATORVASTATIN CALCIUM 40 MG/1
40 TABLET, FILM COATED ORAL
Status: DISCONTINUED | OUTPATIENT
Start: 2022-04-03 | End: 2022-04-03 | Stop reason: HOSPADM

## 2022-04-03 RX ORDER — CHOLECALCIFEROL (VITAMIN D3) 10 MCG
1 TABLET ORAL
Status: DISCONTINUED | OUTPATIENT
Start: 2022-04-04 | End: 2022-04-09 | Stop reason: HOSPADM

## 2022-04-03 RX ORDER — CARVEDILOL 3.12 MG/1
3.12 TABLET ORAL 2 TIMES DAILY WITH MEALS
Status: DISCONTINUED | OUTPATIENT
Start: 2022-04-03 | End: 2022-04-03 | Stop reason: HOSPADM

## 2022-04-03 RX ORDER — HEPARIN SODIUM 5000 [USP'U]/ML
5000 INJECTION, SOLUTION INTRAVENOUS; SUBCUTANEOUS EVERY 8 HOURS SCHEDULED
Status: CANCELLED | OUTPATIENT
Start: 2022-04-03

## 2022-04-03 RX ORDER — HYDROMORPHONE HCL/PF 1 MG/ML
0.5 SYRINGE (ML) INJECTION
Status: CANCELLED | OUTPATIENT
Start: 2022-04-03

## 2022-04-03 RX ORDER — ASPIRIN 81 MG/1
81 TABLET ORAL DAILY
Status: CANCELLED | OUTPATIENT
Start: 2022-04-04

## 2022-04-03 RX ORDER — OXYCODONE HYDROCHLORIDE 5 MG/1
5 TABLET ORAL EVERY 6 HOURS PRN
Status: DISCONTINUED | OUTPATIENT
Start: 2022-04-03 | End: 2022-04-09 | Stop reason: HOSPADM

## 2022-04-03 RX ORDER — NICOTINE 21 MG/24HR
1 PATCH, TRANSDERMAL 24 HOURS TRANSDERMAL DAILY
Status: DISCONTINUED | OUTPATIENT
Start: 2022-04-03 | End: 2022-04-03 | Stop reason: HOSPADM

## 2022-04-03 RX ORDER — MELATONIN
2000 DAILY
Status: DISCONTINUED | OUTPATIENT
Start: 2022-04-04 | End: 2022-04-09 | Stop reason: HOSPADM

## 2022-04-03 RX ORDER — ATORVASTATIN CALCIUM 40 MG/1
40 TABLET, FILM COATED ORAL
Status: DISCONTINUED | OUTPATIENT
Start: 2022-04-04 | End: 2022-04-09 | Stop reason: HOSPADM

## 2022-04-03 RX ORDER — POTASSIUM CHLORIDE 14.9 MG/ML
20 INJECTION INTRAVENOUS ONCE
Status: COMPLETED | OUTPATIENT
Start: 2022-04-03 | End: 2022-04-03

## 2022-04-03 RX ORDER — POTASSIUM CHLORIDE 20 MEQ/1
40 TABLET, EXTENDED RELEASE ORAL ONCE
Status: COMPLETED | OUTPATIENT
Start: 2022-04-03 | End: 2022-04-03

## 2022-04-03 RX ORDER — OXYCODONE HYDROCHLORIDE 5 MG/1
5 TABLET ORAL EVERY 4 HOURS PRN
Status: CANCELLED | OUTPATIENT
Start: 2022-04-03

## 2022-04-03 RX ORDER — OXYCODONE HYDROCHLORIDE 5 MG/1
2.5 TABLET ORAL EVERY 6 HOURS PRN
Status: DISCONTINUED | OUTPATIENT
Start: 2022-04-03 | End: 2022-04-09 | Stop reason: HOSPADM

## 2022-04-03 RX ORDER — NICOTINE 21 MG/24HR
1 PATCH, TRANSDERMAL 24 HOURS TRANSDERMAL DAILY
Status: DISCONTINUED | OUTPATIENT
Start: 2022-04-04 | End: 2022-04-09 | Stop reason: HOSPADM

## 2022-04-03 RX ORDER — HYDROMORPHONE HCL/PF 1 MG/ML
0.5 SYRINGE (ML) INJECTION
Status: DISCONTINUED | OUTPATIENT
Start: 2022-04-03 | End: 2022-04-08

## 2022-04-03 RX ORDER — ONDANSETRON 2 MG/ML
4 INJECTION INTRAMUSCULAR; INTRAVENOUS EVERY 6 HOURS PRN
Status: DISCONTINUED | OUTPATIENT
Start: 2022-04-03 | End: 2022-04-09 | Stop reason: HOSPADM

## 2022-04-03 RX ORDER — ATORVASTATIN CALCIUM 40 MG/1
40 TABLET, FILM COATED ORAL
Status: CANCELLED | OUTPATIENT
Start: 2022-04-03

## 2022-04-03 RX ORDER — ONDANSETRON 2 MG/ML
4 INJECTION INTRAMUSCULAR; INTRAVENOUS EVERY 6 HOURS PRN
Status: DISCONTINUED | OUTPATIENT
Start: 2022-04-03 | End: 2022-04-03 | Stop reason: HOSPADM

## 2022-04-03 RX ORDER — OXYCODONE HYDROCHLORIDE 5 MG/1
5 TABLET ORAL EVERY 4 HOURS PRN
Status: DISCONTINUED | OUTPATIENT
Start: 2022-04-03 | End: 2022-04-03

## 2022-04-03 RX ORDER — LOSARTAN POTASSIUM 50 MG/1
50 TABLET ORAL DAILY
Status: DISCONTINUED | OUTPATIENT
Start: 2022-04-04 | End: 2022-04-05

## 2022-04-03 RX ORDER — CARVEDILOL 3.12 MG/1
3.12 TABLET ORAL 2 TIMES DAILY WITH MEALS
Status: DISCONTINUED | OUTPATIENT
Start: 2022-04-04 | End: 2022-04-05

## 2022-04-03 RX ORDER — CARVEDILOL 3.12 MG/1
3.12 TABLET ORAL 2 TIMES DAILY WITH MEALS
Status: CANCELLED | OUTPATIENT
Start: 2022-04-03

## 2022-04-03 RX ORDER — OXYCODONE HYDROCHLORIDE 5 MG/1
5 TABLET ORAL EVERY 4 HOURS PRN
Status: DISCONTINUED | OUTPATIENT
Start: 2022-04-03 | End: 2022-04-03 | Stop reason: HOSPADM

## 2022-04-03 RX ORDER — ASPIRIN 81 MG/1
81 TABLET ORAL DAILY
Status: DISCONTINUED | OUTPATIENT
Start: 2022-04-04 | End: 2022-04-09 | Stop reason: HOSPADM

## 2022-04-03 RX ORDER — CHOLECALCIFEROL (VITAMIN D3) 10 MCG
1 TABLET ORAL
Status: DISCONTINUED | OUTPATIENT
Start: 2022-04-04 | End: 2022-04-03 | Stop reason: HOSPADM

## 2022-04-03 RX ORDER — ACETAMINOPHEN 325 MG/1
650 TABLET ORAL EVERY 6 HOURS PRN
Status: CANCELLED | OUTPATIENT
Start: 2022-04-03

## 2022-04-03 RX ORDER — HYDROMORPHONE HCL/PF 1 MG/ML
0.5 SYRINGE (ML) INJECTION
Status: DISCONTINUED | OUTPATIENT
Start: 2022-04-03 | End: 2022-04-03 | Stop reason: HOSPADM

## 2022-04-03 RX ORDER — SODIUM CHLORIDE, SODIUM LACTATE, POTASSIUM CHLORIDE, CALCIUM CHLORIDE 600; 310; 30; 20 MG/100ML; MG/100ML; MG/100ML; MG/100ML
50 INJECTION, SOLUTION INTRAVENOUS CONTINUOUS
Status: DISCONTINUED | OUTPATIENT
Start: 2022-04-04 | End: 2022-04-05

## 2022-04-03 RX ORDER — ASPIRIN 81 MG/1
81 TABLET ORAL DAILY
Status: DISCONTINUED | OUTPATIENT
Start: 2022-04-04 | End: 2022-04-03 | Stop reason: HOSPADM

## 2022-04-03 RX ORDER — NICOTINE 21 MG/24HR
1 PATCH, TRANSDERMAL 24 HOURS TRANSDERMAL DAILY
Status: DISCONTINUED | OUTPATIENT
Start: 2022-04-03 | End: 2022-04-03 | Stop reason: SDUPTHER

## 2022-04-03 RX ORDER — POTASSIUM CHLORIDE 20 MEQ/1
20 TABLET, EXTENDED RELEASE ORAL ONCE
Status: COMPLETED | OUTPATIENT
Start: 2022-04-03 | End: 2022-04-03

## 2022-04-03 RX ADMIN — RIFAXIMIN 550 MG: 550 TABLET ORAL at 15:29

## 2022-04-03 RX ADMIN — SODIUM CHLORIDE, SODIUM LACTATE, POTASSIUM CHLORIDE, AND CALCIUM CHLORIDE 50 ML/HR: .6; .31; .03; .02 INJECTION, SOLUTION INTRAVENOUS at 23:33

## 2022-04-03 RX ADMIN — ATORVASTATIN CALCIUM 40 MG: 40 TABLET, FILM COATED ORAL at 15:35

## 2022-04-03 RX ADMIN — FOLIC ACID 1 MG: 5 INJECTION, SOLUTION INTRAMUSCULAR; INTRAVENOUS; SUBCUTANEOUS at 23:34

## 2022-04-03 RX ADMIN — POTASSIUM CHLORIDE 20 MEQ: 14.9 INJECTION, SOLUTION INTRAVENOUS at 10:43

## 2022-04-03 RX ADMIN — Medication 2000 UNITS: at 15:29

## 2022-04-03 RX ADMIN — NYSTATIN: 100000 POWDER TOPICAL at 22:42

## 2022-04-03 RX ADMIN — POTASSIUM CHLORIDE 40 MEQ: 1500 TABLET, EXTENDED RELEASE ORAL at 15:29

## 2022-04-03 RX ADMIN — OXYCODONE HYDROCHLORIDE 5 MG: 5 TABLET ORAL at 15:54

## 2022-04-03 RX ADMIN — CARVEDILOL 3.12 MG: 3.12 TABLET, FILM COATED ORAL at 15:35

## 2022-04-03 RX ADMIN — HEPARIN SODIUM 5000 UNITS: 5000 INJECTION INTRAVENOUS; SUBCUTANEOUS at 15:35

## 2022-04-03 RX ADMIN — POTASSIUM CHLORIDE 20 MEQ: 1500 TABLET, EXTENDED RELEASE ORAL at 10:48

## 2022-04-03 RX ADMIN — ACETAMINOPHEN 975 MG: 325 TABLET ORAL at 21:17

## 2022-04-03 RX ADMIN — HEPARIN SODIUM 5000 UNITS: 5000 INJECTION INTRAVENOUS; SUBCUTANEOUS at 21:17

## 2022-04-03 NOTE — QUICK NOTE
Notified of consult by ED after patient's admission  XRs reviewed  Patient will require an escalation in orthopedic care for this injury  Recommended SLIM to SLIM transfer to [facility]  SLIM attending and ED attending aware of recommendations  [facility] ortho team aware of impending SLIM to SLIM transfer for escalation of care  Dse ROSE    Division of Adult Reconstruction  Department of Orthopaedic Surgery  [facility]

## 2022-04-03 NOTE — ASSESSMENT & PLAN NOTE
Patient present nausea and vomiting for 1 week and diarrhea for the past 3 days  Possible viral gastroenteritis  Abdominal exam is benign  If patient does not get better with symptomatic treatment can consider CT scan of the abdomen pelvis and possible GI evaluation  Check stool for bacterial panel and C diff toxin A and B  Continue diabetic diet as tolerated  Zofran p r n

## 2022-04-03 NOTE — ASSESSMENT & PLAN NOTE
Patient sustained a fall about a week ago and has been complaining of right hip pain    Right hip and pelvic x-ray showed acute nondisplaced intertrochanteric fracture of the right femur and lesser trochanteric avulsion also noted not necessarily acute  Patient to be transferred to [facility] as orthopedic service not available tomorrow   Symptomatic treatment with oxycodone for moderate pain and Dilaudid for severe pain  Non weight-bearing   Patient is on Plavix should be held at the current time in preparation for surgery

## 2022-04-03 NOTE — ASSESSMENT & PLAN NOTE
Severe hypokalemia likely secondary to vomiting and diarrhea  Potassium 2 6 upon admission  Patient is a KCl 20 milliequivalents IV and 20 milliequivalents p o  Today in the ED    Patient will be given 40 milliequivalents times another dose  Follow-up BMP  EKG showed some nonspecific interventricular conduction delay  Patient will be monitored on telemetry

## 2022-04-03 NOTE — ASSESSMENT & PLAN NOTE
Patient sustained a fall about a week ago and has been complaining of right hip pain    Right hip and pelvic x-ray showed acute nondisplaced intertrochanteric fracture of the right femur and lesser trochanteric avulsion also noted not necessarily acute  Patient to be transferred to [facility] as orthopedic service not available tomorrow   Symptomatic treatment with oxycodone for moderate pain and Dilaudid for severe pain  Non weight-bearing   Patient is on Plavix should be held at the current time in preparation for surgery

## 2022-04-03 NOTE — PLAN OF CARE
Problem: Potential for Falls  Goal: Patient will remain free of falls  Description: INTERVENTIONS:  - Educate patient/family on patient safety including physical limitations  - Instruct patient to call for assistance with activity   - Consult OT/PT to assist with strengthening/mobility   - Keep Call bell within reach  - Keep bed low and locked with side rails adjusted as appropriate  - Keep care items and personal belongings within reach  - Initiate and maintain comfort rounds  - Make Fall Risk Sign visible to staff  - Offer Toileting every 2 Hours, in advance of need  - Initiate/Maintain bed alarm  - Obtain necessary fall risk management equipment: walker  - Apply yellow socks and bracelet for high fall risk patients  - Consider moving patient to room near nurses station  Outcome: Not Progressing     Problem: MOBILITY - ADULT  Goal: Maintain or return to baseline ADL function  Description: INTERVENTIONS:  -  Assess patient's ability to carry out ADLs; assess patient's baseline for ADL function and identify physical deficits which impact ability to perform ADLs (bathing, care of mouth/teeth, toileting, grooming, dressing, etc )  - Assess/evaluate cause of self-care deficits   - Assess range of motion  - Assess patient's mobility; develop plan if impaired  - Assess patient's need for assistive devices and provide as appropriate  - Encourage maximum independence but intervene and supervise when necessary  - Involve family in performance of ADLs  - Assess for home care needs following discharge   - Consider OT consult to assist with ADL evaluation and planning for discharge  - Provide patient education as appropriate  Outcome: Not Progressing  Goal: Maintains/Returns to pre admission functional level  Description: INTERVENTIONS:  - Perform BMAT or MOVE assessment daily    - Set and communicate daily mobility goal to care team and patient/family/caregiver     - Collaborate with rehabilitation services on mobility goals if consulted  - Perform Range of Motion 3 times a day  - Reposition patient every 2 hours    - Dangle patient 3 times a day  - Stand patient 3 times a day  - Ambulate patient 3 times a day  - Out of bed to chair 3 times a day   - Out of bed for meals 3 times a day  - Out of bed for toileting  - Record patient progress and toleration of activity level   Outcome: Not Progressing

## 2022-04-03 NOTE — ASSESSMENT & PLAN NOTE
Lab Results   Component Value Date    EGFR 6 04/03/2022    EGFR 3 03/29/2022    EGFR 3 03/28/2022    CREATININE 8 24 (H) 04/03/2022    CREATININE 14 30 (H) 03/29/2022    CREATININE 13 22 (H) 03/28/2022   Patient is on Monday Wednesday Friday dialysis schedule  [facility] for [facility] tomorrow

## 2022-04-03 NOTE — EMTALA/ACUTE CARE TRANSFER
[address] [facility] [address]  [place]  [place] [address]  Dept: [phone]      ACUTE CARE TRANSFER CONSENT    NAME Slime Vargas                                         1955                              MRN [id number]    I have been informed of my rights regarding examination, treatment, and transfer   by Dr Jeri Parmar MD    Benefits:   higher level of care    Risks:   worsening pain sudden death      Transfer Request:  I acknowledge that my medical condition has been evaluated and explained to me by the treating physician or other qualified medical person and/or my attending physician who has recommended and offered to me further medical examination and treatment  I understand the Hospital's obligation with respect to the treatment and stabilization of my medical condition  I nevertheless request to be transferred  I release the Hospital, the doctor, and any other persons caring for me from all responsibility or liability for any injury or ill effects that may result from my transfer and agree to accept all responsibility for the consequences of my choice to transfer, rather than receive stabilizing treatment at the Hospital  I understand that because the transfer is my request, my insurance may not provide reimbursement for the services  The Hospital will assist and direct me and my family in how to make arrangements for transfer, but the hospital is not liable for any fees charged by the transport service  In spite of this understanding, I refuse to consent to further medical examination and treatment which has been offered to me, and request transfer to    I authorize the performance of emergency medical procedures and treatments upon me in both transit and upon arrival at the receiving facility  Additionally, I authorize the release of any and all medical records to the receiving facility and request they be transported with me, if possible      I authorize the performance of emergency medical procedures and treatments upon me in both transit and upon arrival at the receiving facility  Additionally, I authorize the release of any and all medical records to the receiving facility and request they be transported with me, if possible  I understand that the safest mode of transportation during a medical emergency is an ambulance and that the Hospital advocates the use of this mode of transport  Risks of traveling to the receiving facility by car, including absence of medical control, life sustaining equipment, such as oxygen, and medical personnel has been explained to me and I fully understand them  (RODERICK CORRECT BOX BELOW)  [  ]  I consent to the stated transfer and to be transported by ambulance/helicopter  [  ]  I consent to the stated transfer, but refuse transportation by ambulance and accept full responsibility for my transportation by car  I understand the risks of non-ambulance transfers and I exonerate the Hospital and its staff from any deterioration in my condition that results from this refusal     X___________________________________________    DATE  22  TIME________  Signature of patient or legally responsible individual signing on patient behalf           RELATIONSHIP TO PATIENT_________________________          Provider Certification    NAME Kaleb Bishop                                        [facility] 1955                              MRN [id number]    A medical screening exam was performed on the above named patient    Based on the examination:    Condition Necessitating Transfer hip fracture    Patient Condition:   stable    Reason for Transfer:   fracture needing needing ORIF    Transfer Requirements: Facility     · Space available and qualified personnel available for treatment as acknowledged by    · Agreed to accept transfer and to provide appropriate medical treatment as acknowledged by          · Appropriate medical records of the examination and treatment of the patient are provided at the time of transfer   [address] [facility], [address] _______  · Transfer will be performed by qualified personnel from    and appropriate transfer equipment as required, including the use of necessary and appropriate life support measures  Provider Certification: I have examined the patient and explained the following risks and benefits of being transferred/refusing transfer to the patient/family:         Based on these reasonable risks and benefits to the patient and/or the unborn child(heather), and based upon the information available at the time of the patients examination, I certify that the medical benefits reasonably to be expected from the provision of appropriate medical treatments at another medical facility outweigh the increasing risks, if any, to the individuals medical condition, and in the case of labor to the unborn child, from effecting the transfer      X____________________________________________ DATE 04/03/22        TIME_______      ORIGINAL - SEND TO MEDICAL RECORDS   COPY - SEND WITH PATIENT DURING TRANSFER

## 2022-04-03 NOTE — DISCHARGE SUMMARY
Marbin 44  Discharge- Marva Heads 1955, 78 y o  male MRN: [id number]  Unit/Bed#: 2 [address] 202 Encounter: [id number]  Primary Care Provider: TESSY Feldman   Date and time admitted to hospital: 4/3/2022  9:05 AM    * Nausea, vomiting and diarrhea  Assessment & Plan  Patient present nausea and vomiting for 1 week and diarrhea for the past 3 days  Possible viral gastroenteritis  Abdominal exam is benign  If patient does not get better with symptomatic treatment can consider CT scan of the abdomen pelvis and possible GI evaluation  Check stool for bacterial panel and C diff toxin A and B  Continue diabetic diet as tolerated  Zofran p r n  Closed right hip fracture [facility])  Assessment & Plan  Patient sustained a fall about a week ago and has been complaining of right hip pain  Right hip and pelvic x-ray showed acute nondisplaced intertrochanteric fracture of the right femur and lesser trochanteric avulsion also noted not necessarily acute  Patient to be transferred to [facility] as orthopedic service not available tomorrow   Symptomatic treatment with oxycodone for moderate pain and Dilaudid for severe pain  Non weight-bearing   Patient is on Plavix should be held at the current time in preparation for surgery    Hypokalemia  Assessment & Plan  Severe hypokalemia likely secondary to vomiting and diarrhea  Potassium 2 6 upon admission  Patient is a KCl 20 milliequivalents IV and 20 milliequivalents p o  Today in the ED    Patient will be given 40 milliequivalents times another dose  Follow-up BMP in am  EKG showed some nonspecific interventricular conduction delay  Patient will be monitored on telemetry      Nicotine dependence  Assessment & Plan  Continue Nicoderm patch    End stage renal disease [facility])  Assessment & Plan  Lab Results   Component Value Date    EGFR 6 04/03/2022    EGFR 3 03/29/2022    EGFR 3 03/28/2022    CREATININE 8 24 (H) 04/03/2022    CREATININE 14 30 (H) 03/29/2022    CREATININE 13 22 (H) 03/28/2022   Patient is on Monday Wednesday Friday dialysis schedule  [facility] nephrology consultation for dialysis tomorrow    Hypertension  Assessment & Plan  Continue Coreg 3 125 milligram p o  B i d  And losartan 50 milligram p o  Daily    CAD (coronary artery disease)  Assessment & Plan  Status post PCI  Continue aspirin, Coreg and statin  Hold Plavix in preparation for surgery        Medical Problems             Resolved Problems  Date Reviewed: 4/3/2022    None              Discharging Physician / Practitioner: Song Dumont MD  PCP: Frederica Harada, [address] [place] [address]  Admission Date:   Admission Orders (From admission, onward)     Ordered        04/03/22 1330  Inpatient Admission  Once            04/03/22 1114  Place in Observation  Once                      Discharge Date: 14/16/70      Complications:  None    Reason for Admission:     Hospital Course:   Shannon Anglin is a 78 y o  male patient with past medical history of CAD status post PCI, consider disease, hypertension, diabetes, hyperlipidemia who originally presented to the hospital on 4/3/2022 due to nausea vomiting diarrhea on right hip pain  In the emergency room patient workup showed severe hypokalemia and also right hip fracture  Films were evaluated by Orthopedics who recommended transfer to [facility]  Discussed with David Christensen from [address] [place] [address]  Please see above list of diagnoses and related plan for additional information  Condition at Discharge: stable    Discharge Day Visit / Exam:   * Please refer to separate progress note for these details *    Discussion with Family: Updated  (wife) via phone  Discharge instructions/Information to patient and family:   See after visit summary for information provided to patient and family  Provisions for Follow-Up Care:  See after visit summary for information related to follow-up care and any pertinent home health orders  Disposition:   [address] Transfer to [facility]    Planned Readmission: No     Discharge Statement:  I spent 25 minutes discharging the patient  This time was spent on the day of discharge  I had direct contact with the patient on the day of discharge  Greater than 50% of the total time was spent examining patient, answering all patient questions, arranging and discussing plan of care with patient as well as directly providing post-discharge instructions  Additional time then spent on discharge activities  Discharge Medications:  See after visit summary for reconciled discharge medications provided to patient and/or family        **Please Note: This note may have been constructed using a voice recognition system**

## 2022-04-03 NOTE — H&P
[address] 1955, 78 y o  male MRN: [id number]  Unit/Bed#: 2 South 202 Encounter: [id number]  Primary Care Provider: TESSY Bañuelos   Date and time admitted to hospital: 4/3/2022  9:05 AM    * Nausea, vomiting and diarrhea  Assessment & Plan  Patient present nausea and vomiting for 1 week and diarrhea for the past 3 days  Possible viral gastroenteritis  Abdominal exam is benign  If patient does not get better with symptomatic treatment can consider CT scan of the abdomen pelvis and possible GI evaluation  Check stool for bacterial panel and C diff toxin A and B  Continue diabetic diet as tolerated  Zofran p r n  Closed right hip fracture [facility])  Assessment & Plan  Patient sustained a fall about a week ago and has been complaining of right hip pain  Right hip and pelvic x-ray showed acute nondisplaced intertrochanteric fracture of the right femur and lesser trochanteric avulsion also noted not necessarily acute  Patient to be [facility] to [facility] as orthopedic service not available tomorrow   Symptomatic treatment with oxycodone for moderate pain and Dilaudid for severe pain  Non weight-bearing   Patient is on Plavix should be held at the current time in preparation for surgery    Hypokalemia  Assessment & Plan  Severe hypokalemia likely secondary to vomiting and diarrhea  Potassium 2 6 upon admission  Patient is a KCl 20 milliequivalents IV and 20 milliequivalents p o  Today in the ED    Patient will be given 40 milliequivalents times another dose  Follow-up BMP  EKG showed some nonspecific interventricular conduction delay  Patient will be monitored on telemetry    Nicotine dependence  Assessment & Plan  Continue Nicoderm patch    End stage renal disease [facility])  Assessment & Plan  Lab Results   Component Value Date    EGFR 6 04/03/2022    EGFR 3 03/29/2022    EGFR 3 03/28/2022    CREATININE 8 24 (H) 04/03/2022    CREATININE 14 30 (H) 03/29/2022 CREATININE 13 22 (H) 03/28/2022   Patient is on Monday Wednesday Friday dialysis schedule  [facility] nephrology consultation for dialysis tomorrow    Hypertension  Assessment & Plan  Continue Coreg 3 125 milligram p o  B i d  And losartan 50 milligram p o  Daily    CAD (coronary artery disease)  Assessment & Plan  Status post PCI  Continue aspirin, Coreg and statin  Hold Plavix in preparation for surgery    VTE Pharmacologic Prophylaxis: VTE Score: 3 Moderate Risk (Score 3-4) - Pharmacological DVT Prophylaxis Ordered: heparin  Code Status: Level 1 - Full Code   Discussion with family: Updated  (wife) via phone  Anticipated Length of Stay: Patient will be admitted on an inpatient basis with an anticipated length of stay of greater than 2 midnights secondary to Nausea vomiting diarrhea and hip fracture from hypokalemia  Total Time for Visit, including Counseling / Coordination of Care: 60 minutes Greater than 50% of this total time spent on direct patient counseling and coordination of care  Chief Complaint:  Right hip pain nausea vomiting and diarrhea    History of Present Illness:  Magalie Rod is a 78 y o  male with a PMH of end-stage renal disease on hemodialysis, hypertension, diabetes who presents with nausea vomiting and diarrhea on hip pain  Patient reports he has been having nausea with multiple episodes of vomiting for the past 1 week  Patient denies any abdominal pain  Patient also was having diarrhea about 5-7 bowel movements per day for the past 3 days  Patient denies any blood or mucus in the stool  Patient also sustained a fall about a week ago and has been complaining of right hip pain  Patient was found by the family lying in supine posture on the couch  Patient otherwise denies any chest pain, headache, dizziness, fever or chills  In the patient's vital signs were stable  Labs showed a potassium level of 2 6    Hip x-ray showed acute intertrochanteric fracture of the right femur  Review of Systems:  Review of Systems   Constitutional: Negative for chills, diaphoresis, fatigue and unexpected weight change  HENT: Negative for congestion, ear discharge, ear pain, facial swelling, hearing loss, mouth sores, nosebleeds, postnasal drip, rhinorrhea, sinus pressure, sneezing, sore throat, tinnitus, trouble swallowing and voice change  Eyes: Negative for photophobia, discharge, redness and visual disturbance  Respiratory: Negative for cough, chest tightness, shortness of breath, wheezing and stridor  Cardiovascular: Negative for chest pain, palpitations and leg swelling  Gastrointestinal: Positive for diarrhea, nausea and vomiting  Negative for abdominal distention, abdominal pain, anal bleeding, blood in stool and constipation  Endocrine: Negative for polydipsia, polyphagia and polyuria  Genitourinary: Negative for decreased urine volume, difficulty urinating, dysuria, flank pain, frequency, hematuria and urgency  Musculoskeletal: Negative for arthralgias, back pain and neck stiffness  Right hip pain   Skin: Negative for pallor and rash  Neurological: Positive for weakness  Negative for dizziness, seizures, facial asymmetry, speech difficulty, light-headedness, numbness and headaches  Hematological: Negative for adenopathy  Does not bruise/bleed easily  Psychiatric/Behavioral: Negative for agitation and confusion         Past Medical and Surgical History:   Past Medical History:   Diagnosis Date    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    With Cerebral Infarction:  Last Assessed:  October 20, 2016    Chronic kidney disease 2012    on dialysis     [facility] mellitus [facility] [place]     Hypertension     Labyrinthitis 09/10/2011    Myocardial infarction ([facility] [place] [facility] ) 2014    x3 others were in 2009 & 2010    Sleep disturbances 09/20/2010    Stroke [facility]) 2007    x1    Type 2 diabetes, uncontrolled, with renal manifestation 05/03/2017       Past Surgical History:   Procedure Laterality Date    AV FISTULA PLACEMENT      CARDIAC SURGERY  2010    stents x3    COLONOSCOPY N/A 12/12/2016    Procedure: COLONOSCOPY;  Surgeon: Lux Caal MD;  Location: [place] [address] GI LAB; Service:     COLONOSCOPY N/A 4/3/2017    Procedure:  COLONOSCOPY;  Surgeon: Lux Caal MD;  Location: [place] [address] GI LAB; Service:     IR AV FISTULA/GRAFT DECLOT  4/29/2021    IR AV FISTULA/GRAFT DECLOT  3/25/2022    IR AV FISTULAGRAM/GRAFTOGRAM  3/28/2022    IR TUNNELED CENTRAL LINE REMOVAL  12/7/2021    IR TUNNELED DIALYSIS CATHETER PLACEMENT  5/24/2021    MA ANASTOMOSIS,AV,ANY SITE Left 7/22/2021    Procedure: CREATION FISTULA ARTERIOVENOUS (AV); Surgeon: Loc Mendez MD;  [facility] OR;  Service: Vascular       Meds/Allergies:  Prior to Admission medications    Medication Sig Start Date End Date Taking?  Authorizing Provider   aspirin 81 MG tablet Take 81 mg by mouth daily   Yes Historical Provider, MD   B Complex-ANITHA-Folic Acid (TRIPHROCAPS) 1 MG CAPS Take 1 capsule by mouth 2/20/17  Yes Historical Provider, MD   carvedilol (COREG) 3 125 mg tablet Take 1 tablet (3 125 mg total) by mouth 2 (two) times a day with meals 6/11/21  Yes Daniele Kinsey MD   clopidogrel (Plavix) 75 mg tablet Take 1 tablet (75 mg total) by mouth daily 7/23/21  Yes Aleyda Garduno PA-C   LOSARTAN POTASSIUM PO Take 50 mg by mouth daily   Yes Historical Provider, MD   rosuvastatin (CRESTOR) 20 MG tablet Take 20 mg by mouth every evening  5/29/19  Yes Historical Provider, MD   Cholecalciferol 50 MCG (2000 UT) CAPS Take by mouth daily 3/22/21   Historical Provider, MD   glucose blood test strip by In Vitro route 2 (two) times a day 7/18/11   Historical Provider, MD   mupirocin (BACTROBAN) 2 % ointment Apply topically 3 (three) times a day for 7 days 10/8/21 10/15/21  Fuentes Toussaint,    nicotine (NICODERM CQ) 14 mg/24hr TD 24 hr patch Place 1 patch on the skin daily  Patient not taking: Reported on 8/30/2021 7/27/21   Christel STRICKLAND Kee Rose MD   nitroglycerin (NITROSTAT) 0 4 mg SL tablet Place under the tongue  Patient not taking: Reported on 4/3/2022  7/18/11   Historical Provider, MD   rifaximin Prashanth Sloop) 550 mg tablet Take by mouth  6/8/17   Historical Provider, MD     I have reviewed home medications using recent Epic encounter  Allergies: No Known Allergies    Social History:  Marital Status: /Civil Union     Substance Use History:   Social History     Substance and Sexual Activity   Alcohol Use Yes    Comment: rarely - No alcohol use per [employer]     Social History     Tobacco Use   Smoking Status Current Every Day Smoker    Packs/day: 0 50    Years: 30 00    Pack years: 15 00    Types: Cigarettes   Smokeless Tobacco Never Used     Social History     Substance and Sexual Activity   Drug Use No       Family History:  Family History   Problem Relation Age of Onset    Leukemia Mother     Crohn's disease Father     Transient ischemic attack Brother        Physical Exam:     Vitals:   Blood Pressure: 121/69 (04/03/22 1227)  Pulse: 87 (04/03/22 1227)  Temperature: 97 9 °F (36 6 °C) (04/03/22 1227)  Temp Source: Tympanic (04/03/22 0911)  Respirations: 16 (04/03/22 1227)  Height: 5' 10" (177 8 cm) (04/03/22 0911)  Weight - Scale: 80 3 kg (177 lb 0 5 oz) (04/03/22 0911)  SpO2: 95 % (04/03/22 1227)    Physical Exam  Constitutional:       Appearance: Normal appearance  HENT:      Head: Normocephalic and atraumatic  Eyes:      Extraocular Movements: Extraocular movements intact  Pupils: Pupils are equal, round, and reactive to light  Cardiovascular:      Rate and Rhythm: Normal rate and regular rhythm  Heart sounds: No murmur heard  No gallop  Pulmonary:      Effort: Pulmonary effort is normal       Breath sounds: Normal breath sounds  Abdominal:      General: Bowel sounds are normal       Palpations: Abdomen is soft  Tenderness: There is no abdominal tenderness     Musculoskeletal:         General: No swelling or deformity  Normal range of motion  Cervical back: Normal range of motion and neck supple  Skin:     General: Skin is warm and dry  Neurological:      General: No focal deficit present  Mental Status: He is alert  Additional Data:     Lab Results:  Results from last 7 days   Lab Units 04/03/22  0939   WBC Thousand/uL 5 55   HEMOGLOBIN g/dL 11 8*   HEMATOCRIT % 37 1   PLATELETS Thousands/uL 100*   NEUTROS PCT % 78*   LYMPHS PCT % 12*   MONOS PCT % 5   EOS PCT % 3     Results from last 7 days   Lab Units 04/03/22  0939   SODIUM mmol/L 134*   POTASSIUM mmol/L 2 6*   CHLORIDE mmol/L 98*   CO2 mmol/L 26   BUN mg/dL 15   CREATININE mg/dL 8 24*   ANION GAP mmol/L 10   CALCIUM mg/dL 7 7*   ALBUMIN g/dL 2 4*   TOTAL BILIRUBIN mg/dL 0 44   ALK PHOS U/L 217*   ALT U/L 18   AST U/L 22   GLUCOSE RANDOM mg/dL 104     Results from last 7 days   Lab Units 04/03/22  0939   INR  1 07     Results from last 7 days   Lab Units 04/03/22  0912 03/29/22  1126 03/29/22  0742 03/29/22  0626 03/28/22  2336 03/28/22  2118 03/28/22  1708 03/28/22  1152 03/28/22  1008   POC GLUCOSE mg/dl 134 91 109 93 121 139 81 94 91     Results from last 7 days   Lab Units 03/29/22  0642   HEMOGLOBIN A1C % 4 4           Imaging: Reviewed radiology reports from this admission including: chest xray and Hip and pelvic x-ray  XR hip/pelv 2-3 vws right if performed   Final Result by Ila Freed MD (04/03 1147)      There is an acute appearing nondisplaced intertrochanteric fracture of the right femur  Lesser trochanteric avulsion is also noted but this is not necessarily acute  Compression screw tip projects beyond the cortex of the right femoral head  The study was marked in [facility] for immediate notification              Workstation performed: [id number]         XR chest 1 view   Final Result by Amrik López MD (04/03 1134)      Ill-defined opacity in the left cardiophrenic sulcus, likely a benign pericardial fat pad                   Workstation performed: [id number]             EKG and Other Studies Reviewed on Admission:   · EKG: NSR  HR 75, first-degree AV block on nonspecific intraventricular conduction delay  ** Please Note: This note has been constructed using a voice recognition system   **

## 2022-04-03 NOTE — [place] UNIVERSAL TRANSFER FORM
[place] UNIVERSAL TRANSFER FORM  (ALL ITEMS MUST BE COMPLETED)    1  TRANSFER FROM: [facility] [address] [facility]      TRANSFER TO: [facility]    2  DATE OF TRANSFER: 4/3/2022                        TIME OF TRANSFER:     3  PATIENT NAME: KYAW Mejia      YOB: 1955                             GENDER: male    4  LANGUAGE:   English    5  PHYSICIAN NAME:  Aly Hartman MD                   PHONE: [phone]    6  CODE STATUS: Level 1 - Full Code        Out of Hospital DNR Attached: No    7  :                                      :  Extended Emergency Contact Information  Primary Emergency Contact: Mary Bishop  Address: [address] [place] [address] [address] Phone: [phone]  Mobile Phone: [phone]  Relation: Randi Guerra [facility] Representative/Proxy:  No           Legal Guardian:  No             NAME OF:           HEALTH CARE REPRESENTATIVE/PROXY:                                         OR           LEGAL GUARDIAN, IF NOT :                                               PHONE:  (Day)           (Night)                        (Cell)    8  REASON FOR TRANSFER: (Must include brief medical history and recent changes in physical function or cognition ) surgery            V/S: /68   Pulse (!) 51   Temp 97 9 °F (36 6 °C)   Resp 16   Ht 5' 10" (1 778 m)   Wt 80 3 kg (177 lb 0 5 oz)   SpO2 95%   BMI 25 40 kg/m²           PAIN: None    9  PRIMARY DIAGNOSIS: Nausea, vomiting and diarrhea      Secondary Diagnosis:         Pacemaker: No      Internal Defib: No          Mental Health Diagnosis (if Applicable):    10  RESTRAINTS: No     11  RESPIRATORY NEEDS: None    12  ISOLATION/PRECAUTION: C-diff    13  ALLERGY: Patient has no known allergies  14  SENSORY:       Vision Good    15  SKIN CONDITION: No Wounds    16  DIET: Regular    17   IV ACCESS: Saline Lock    18  PERSONAL ITEMS SENT WITH PATIENT: Other clothes    19  ATTACHED DOCUMENTS: MUST ATTACH CURRENT MEDICATION INFORMATION Face Sheet and Discharge Summary    20  AT RISK ALERTS:Falls and Pressure Ulcer        HARM TO: N/A    21  WEIGHT BEARING STATUS:         Left Leg: Limited        Right Leg: Limited    22  MENTAL STATUS:Alert    23  FUNCTION:        Walk: Not Able        Transfer: Not Able        Toilet: Not Able        Feed: Self    24  IMMUNIZATIONS/SCREENING:     Immunization History   Administered Date(s) Administered    Influenza, seasonal, injectable 12/04/2012, 11/01/2015    Influenza, seasonal, injectable, preservative free 10/05/2016, 10/03/2018    Tdap 10/26/2010, 10/03/2021       25  BOWEL: Continent    26  BLADDER: Continent    27   SENDING FACILITY CONTACT: Ember Denny                  Title: RN        Unit: 2 South        Phone: [phone] [address] (if known):        Title:        Unit:         Phone:         FORM PREFILLED BY (if applicable)       Title:       Unit:        Phone:         [address]      Title: RN      Phone: [phone]

## 2022-04-03 NOTE — ASSESSMENT & PLAN NOTE
Lab Results   Component Value Date    EGFR 6 04/03/2022    EGFR 3 03/29/2022    EGFR 3 03/28/2022    CREATININE 8 24 (H) 04/03/2022    CREATININE 14 30 (H) 03/29/2022    CREATININE 13 22 (H) 03/28/2022   Patient is on Monday Wednesday Friday dialysis schedule  [facility]

## 2022-04-03 NOTE — ASSESSMENT & PLAN NOTE
Severe hypokalemia likely secondary to vomiting and diarrhea  Potassium 2 6 upon admission  Patient is a KCl 20 milliequivalents IV and 20 milliequivalents p o  Today in the ED    Patient will be given 40 milliequivalents times another dose  Follow-up BMP in am  EKG showed some nonspecific interventricular conduction delay  Patient will be monitored on telemetry

## 2022-04-03 NOTE — ED PROVIDER NOTES
History  Chief Complaint   Patient presents with    Weakness - Generalized     patient reports weakness for 4 days, as well as nausea, vomiting, and diarrhea  Family found the patient laying supine on the couch, per them, for four days since the fall    Hip Pain     patient reports falling out of bed on wednesday     Pt in the ED with c/o generalized weakness with n/v/d  He is ESRD on HD - m/w/f  Pt also suffered a mechanical fall 4 days ago, and has persistent right hip pain  He denies head trauma or LOC  Pt admits to frequent ETOH use - most recently yesterday  He has a hx of previous CVA with rle weakness, DM      History provided by:  Patient and EMS personnel  History limited by:  Mental status change   used: No    Hip Pain  Location:  Right hip  Severity:  Moderate  Onset quality:  Unable to specify  Timing:  Constant  Progression:  Unchanged  Chronicity:  New  Associated symptoms: no abdominal pain, no chest pain, no cough, no diarrhea, no fever, no nausea, no rash, no shortness of breath, no vomiting and no wheezing        Prior to Admission Medications   Prescriptions Last Dose Informant Patient Reported? Taking?    B Complex-C-Folic Acid (TRIPHROCAPS) 1 MG CAPS 4/3/2022 at Unknown time Self Yes Yes   Sig: Take 1 capsule by mouth   Cholecalciferol 50 MCG (2000 UT) CAPS Unknown at Unknown time Self Yes No   Sig: Take by mouth daily   LOSARTAN POTASSIUM PO 4/3/2022 at Unknown time Self Yes Yes   Sig: Take 50 mg by mouth daily   aspirin 81 MG tablet 4/3/2022 at Unknown time Self Yes Yes   Sig: Take 81 mg by mouth daily   carvedilol (COREG) 3 125 mg tablet 4/3/2022 at Unknown time Self No Yes   Sig: Take 1 tablet (3 125 mg total) by mouth 2 (two) times a day with meals   clopidogrel (Plavix) 75 mg tablet 4/3/2022 at Unknown time Self No Yes   Sig: Take 1 tablet (75 mg total) by mouth daily   glucose blood test strip  Self Yes No   Sig: by In Vitro route 2 (two) times a day   mupirocin (BACTROBAN) 2 % ointment   No No   Sig: Apply topically 3 (three) times a day for 7 days   nicotine (NICODERM CQ) 14 mg/24hr TD 24 hr patch  Self No No   Sig: Place 1 patch on the skin daily   Patient not taking: Reported on 8/30/2021   nitroglycerin (NITROSTAT) 0 4 mg SL tablet Not Taking at Unknown time Self Yes No   Sig: Place under the tongue   Patient not taking: Reported on 4/3/2022    rifaximin (XIFAXAN) 550 mg tablet Unknown at Unknown time Self Yes No   Sig: Take by mouth    rosuvastatin (CRESTOR) 20 MG tablet 4/2/2022 at Unknown time Self Yes Yes   Sig: Take 20 mg by mouth every evening       Facility-Administered Medications: None       Past Medical History:   Diagnosis Date    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    With Cerebral Infarction:  Last Assessed:  October 20, 2016    Chronic kidney disease 2012    on dialysis     Diabetes mellitus ([facility] [address] )     Hypertension     Labyrinthitis 09/10/2011    Myocardial infarction ([facility] [address] ) 2014    x3 others were in 2009 & 2010    Sleep disturbances 09/20/2010    Stroke [facility]) 2007    x1    Type 2 diabetes, uncontrolled, with renal manifestation 05/03/2017       Past Surgical History:   Procedure Laterality Date    AV FISTULA PLACEMENT      CARDIAC SURGERY  2010    stents x3    COLONOSCOPY N/A 12/12/2016    Procedure: COLONOSCOPY;  Surgeon: Klaudia Peace MD;  Location: [place] [address] GI LAB; Service:     COLONOSCOPY N/A 4/3/2017    Procedure:  COLONOSCOPY;  Surgeon: Klaudia Peace MD;  Location: [place] [address] GI LAB; Service:     IR AV FISTULA/GRAFT DECLOT  4/29/2021    IR AV FISTULA/GRAFT DECLOT  3/25/2022    IR AV FISTULAGRAM/GRAFTOGRAM  3/28/2022    IR TUNNELED CENTRAL LINE REMOVAL  12/7/2021    IR TUNNELED DIALYSIS CATHETER PLACEMENT  5/24/2021    NM ANASTOMOSIS,AV,ANY SITE Left 7/22/2021    Procedure: CREATION FISTULA ARTERIOVENOUS (AV);   Surgeon: Dano Helms MD;  Location: [facility];  Service: Vascular       Family History   Problem Relation Age of Onset    Leukemia Mother     Crohn's disease Father     Transient ischemic attack Brother      I have reviewed and agree with the history as documented  E-Cigarette/Vaping    E-Cigarette Use Never User      E-Cigarette/Vaping Substances    Nicotine No     THC No     CBD No     Flavoring No     Other No     Unknown No      Social History     Tobacco Use    Smoking status: Current Every Day Smoker     Packs/day: 0 50     Years: 30 00     Pack years: 15 00     Types: Cigarettes    Smokeless tobacco: Never Used   Vaping Use    Vaping Use: Never used   Substance Use Topics    Alcohol use: Yes     Comment: rarely - No alcohol use per [employer]    Drug use: No       Review of Systems   Constitutional: Negative for chills and fever  Respiratory: Negative for cough, shortness of breath and wheezing  Cardiovascular: Negative for chest pain and palpitations  Gastrointestinal: Negative for abdominal pain, constipation, diarrhea, nausea and vomiting  Genitourinary: Negative for dysuria, flank pain, hematuria and urgency  Musculoskeletal: Positive for gait problem  Negative for back pain and joint swelling  Skin: Negative for color change and rash  All other systems reviewed and are negative  Physical Exam  Physical Exam  Vitals and nursing note reviewed  Constitutional:       Appearance: He is well-developed  HENT:      Head: Normocephalic and atraumatic  Eyes:      Pupils: Pupils are equal, round, and reactive to light  Cardiovascular:      Rate and Rhythm: Normal rate and regular rhythm  Heart sounds: Normal heart sounds  Pulmonary:      Effort: Pulmonary effort is normal       Breath sounds: Normal breath sounds  Abdominal:      General: Bowel sounds are normal  There is no distension  Palpations: Abdomen is soft  There is no mass  Tenderness: There is no abdominal tenderness  There is no guarding or rebound  Musculoskeletal:         General: Tenderness present   No swelling or deformity  Cervical back: Normal range of motion and neck supple  Right hip: No tenderness or bony tenderness  Decreased range of motion  Left hip: Normal    Skin:     General: Skin is warm and dry  Capillary Refill: Capillary refill takes less than 2 seconds  Neurological:      General: No focal deficit present  Mental Status: He is alert and oriented to person, place, and time  Psychiatric:         Behavior: Behavior normal          Thought Content:  Thought content normal          Judgment: Judgment normal          Vital Signs  ED Triage Vitals [04/03/22 0911]   Temperature Pulse Respirations Blood Pressure SpO2   97 7 °F (36 5 °C) 76 16 119/62 98 %      Temp Source Heart Rate Source Patient Position - Orthostatic VS BP Location FiO2 (%)   Tympanic Monitor Lying Right arm --      Pain Score       10 - Worst Possible Pain           Vitals:    04/03/22 0945 04/03/22 1145 04/03/22 1224 04/03/22 1227   BP: 153/67 165/69 121/69 121/69   Pulse: 82 80 76 87   Patient Position - Orthostatic VS:             Visual Acuity      ED Medications  Medications   aspirin (ECOTRIN LOW STRENGTH) EC tablet 81 mg (has no administration in time range)   carvedilol (COREG) tablet 3 125 mg (has no administration in time range)   cholecalciferol (VITAMIN D3) tablet 2,000 Units (has no administration in time range)   losartan (COZAAR) tablet 50 mg (has no administration in time range)   nicotine (NICODERM CQ) 14 mg/24hr TD 24 hr patch 1 patch (has no administration in time range)   atorvastatin (LIPITOR) tablet 40 mg (has no administration in time range)   b complex-vitamin C-folic acid (NEPHROCAPS) capsule 1 capsule (has no administration in time range)   rifaximin (XIFAXAN) tablet 550 mg (has no administration in time range)   ondansetron (ZOFRAN) injection 4 mg (has no administration in time range)   acetaminophen (TYLENOL) tablet 650 mg (has no administration in time range)   nicotine (NICODERM CQ) 14 mg/24hr TD 24 hr patch 1 patch (has no administration in time range)   heparin (porcine) subcutaneous injection 5,000 Units (has no administration in time range)   oxyCODONE (ROXICODONE) IR tablet 5 mg (has no administration in time range)   HYDROmorphone (DILAUDID) injection 0 5 mg (has no administration in time range)   potassium chloride (K-DUR,KLOR-CON) CR tablet 40 mEq (has no administration in time range)   potassium chloride 20 mEq IVPB (premix) (20 mEq Intravenous New Bag 4/3/22 1043)   potassium chloride (K-DUR,KLOR-CON) CR tablet 20 mEq (20 mEq Oral Given 4/3/22 1048)       Diagnostic Studies  Results Reviewed     Procedure Component Value Units Date/Time    HS Troponin I 2hr [[id number]]  (Normal) Collected: 04/03/22 1140    Lab Status: Final result Specimen: Blood from Line, Venous Updated: 04/03/22 1210     hs TnI 2hr 26 ng/L      Delta 2hr hsTnI 1 ng/L     HS Troponin I 4hr [[id number]]     Lab Status: No result Specimen: Blood     COVID/FLU/RSV - 2 hour TAT [[id number]]  (Normal) Collected: 04/03/22 0939    Lab Status: Final result Specimen: Nares from Nose Updated: 04/03/22 1030     SARS-CoV-2 Negative     INFLUENZA A PCR Negative     INFLUENZA B PCR Negative     RSV PCR Negative    Narrative:      FOR PEDIATRIC PATIENTS - copy/paste COVID Guidelines URL to browser: [email or web address]    SARS-CoV-2 assay is a Nucleic Acid Amplification assay intended for the  qualitative detection of nucleic acid from SARS-CoV-2 in nasopharyngeal  swabs  Results are for the presumptive identification of SARS-CoV-2 RNA  Positive results are indicative of infection with SARS-CoV-2, the virus  causing COVID-19, but do not rule out bacterial infection or co-infection  with other viruses  Laboratories within the [place] and its  territories are required to report all positive results to the appropriate  public health authorities   Negative results do not preclude SARS-CoV-2  infection and should not be used as the sole basis for treatment or other  patient management decisions  Negative results must be combined with  clinical observations, patient history, and epidemiological information  This test has not been FDA cleared or approved  This test has been authorized by FDA under an Emergency Use Authorization  (EUA)  This test is only authorized for the duration of time the  declaration that circumstances exist justifying the authorization of the  emergency use of an in vitro diagnostic tests for detection of SARS-CoV-2  virus and/or diagnosis of COVID-19 infection under section 564(b)(1) of  the Act, 21 U  S C  493LMQ-0(I)(0), unless the authorization is terminated  or revoked sooner  The test has been validated but independent review by FDA  and CLIA is pending  Test performed using [employer] Gene[employer]pert: This RT-PCR assay targets N2,  a region unique to SARS-CoV-2  A conserved region in the E-gene was chosen  for pan-Sarbecovirus detection which includes SARS-CoV-2      CBC and differential [[id number]]  (Abnormal) Collected: 04/03/22 0939    Lab Status: Final result Specimen: Blood from Arm, Right Updated: 04/03/22 1016     WBC 5 55 Thousand/uL      RBC 3 41 Million/uL      Hemoglobin 11 8 g/dL      Hematocrit 37 1 %       fL      MCH 34 6 pg      MCHC 31 8 g/dL      RDW 15 8 %      MPV 9 7 fL      Platelets 737 Thousands/uL      nRBC 0 /100 WBCs      Neutrophils Relative 78 %      Immat GRANS % 1 %      Lymphocytes Relative 12 %      Monocytes Relative 5 %      Eosinophils Relative 3 %      Basophils Relative 1 %      Neutrophils Absolute 4 35 Thousands/µL      Immature Grans Absolute 0 03 Thousand/uL      Lymphocytes Absolute 0 64 Thousands/µL      Monocytes Absolute 0 30 Thousand/µL      Eosinophils Absolute 0 18 Thousand/µL      Basophils Absolute 0 05 Thousands/µL     Narrative:      No Clots    HS Troponin 0hr (reflex protocol) [[id number]]  (Normal) Collected: 04/03/22 0939    Lab Status: Final result Specimen: Blood from Arm, Right Updated: 04/03/22 1016     hs TnI 0hr 25 ng/L     Comprehensive metabolic panel [[id number]]  (Abnormal) Collected: 04/03/22 0939    Lab Status: Final result Specimen: Blood from Arm, Right Updated: 04/03/22 1015     Sodium 134 mmol/L      Potassium 2 6 mmol/L      Chloride 98 mmol/L      CO2 26 mmol/L      ANION GAP 10 mmol/L      BUN 15 mg/dL      Creatinine 8 24 mg/dL      Glucose 104 mg/dL      Calcium 7 7 mg/dL      Corrected Calcium 9 0 mg/dL      AST 22 U/L      ALT 18 U/L      Alkaline Phosphatase 217 U/L      Total Protein 5 9 g/dL      Albumin 2 4 g/dL      Total Bilirubin 0 44 mg/dL      eGFR 6 ml/min/1 73sq m     Narrative:      National Kidney Disease Foundation guidelines for Chronic Kidney Disease (CKD):     Stage 1 with normal or high GFR (GFR > 90 mL/min/1 73 square meters)    Stage 2 Mild CKD (GFR = 60-89 mL/min/1 73 square meters)    Stage 3A Moderate CKD (GFR = 45-59 mL/min/1 73 square meters)    Stage 3B Moderate CKD (GFR = 30-44 mL/min/1 73 square meters)    Stage 4 Severe CKD (GFR = 15-29 mL/min/1 73 square meters)    Stage 5 End Stage CKD (GFR <15 mL/min/1 73 square meters)  Note: GFR calculation is accurate only with a steady state creatinine    Magnesium [[id number]]  (Normal) Collected: 04/03/22 0939    Lab Status: Final result Specimen: Blood from Arm, Right Updated: 04/03/22 1009     Magnesium 2 0 mg/dL     Ethanol [[id number]]  (Abnormal) Collected: 04/03/22 0940    Lab Status: Final result Specimen: Blood from Arm, Right Updated: 04/03/22 1005     Ethanol Lvl 57 mg/dL     Protime-INR [[id number]]  (Normal) Collected: 04/03/22 0939    Lab Status: Final result Specimen: Blood from Arm, Right Updated: 04/03/22 1002     Protime 13 7 seconds      INR 1 07    APTT [[id number]]  (Normal) Collected: 04/03/22 0939    Lab Status: Final result Specimen: Blood from Arm, Right Updated: 04/03/22 1002     PTT 29 seconds     Fingerstick Glucose (POCT) [[id number]]  (Normal) Collected: 04/03/22 0912    Lab Status: Final result Updated: 04/03/22 0913     POC Glucose 134 mg/dl                  XR hip/pelv 2-3 vws right if performed   Final Result by Abigail Munoz MD (04/03 1147)      There is an acute appearing nondisplaced intertrochanteric fracture of the right femur  Lesser trochanteric avulsion is also noted but this is not necessarily acute  Compression screw tip projects beyond the cortex of the right femoral head  The study was marked in [facility] for immediate notification  Workstation performed: [id number]         XR chest 1 view   Final Result by Wade Chase MD (04/03 1134)      Ill-defined opacity in the left cardiophrenic sulcus, likely a benign pericardial fat pad  Workstation performed: [id number]                    Procedures  Procedures         ED Course                               SBIRT 20yo+      Most Recent Value   SBIRT (22 yo +)    In order to provide better care to our patients, we are screening all of our patients for alcohol and drug use  Would it be okay to ask you these screening questions? Yes Filed at: 04/03/2022 5903   Initial Alcohol Screen: US AUDIT-C     1  How often do you have a drink containing alcohol? 3 Filed at: 04/03/2022 0952   2  How many drinks containing alcohol do you have on a typical day you are drinking? 2 Filed at: 04/03/2022 0952   3a  Male UNDER 65: How often do you have five or more drinks on one occasion? 0 Filed at: 04/03/2022 0952   3b  FEMALE Any Age, or MALE 65+: How often do you have 4 or more drinks on one occassion? 0 Filed at: 04/03/2022 7297   Audit-C Score 5 Filed at: 04/03/2022 9376   KIZZY: How many times in the past year have you    Used an illegal drug or used a prescription medication for non-medical reasons?  Never Filed at: 04/03/2022 3857                    MDM  Number of Diagnoses or Management Options  Alcohol dependence ([facility] ): new and requires workup  Generalized weakness: new and requires workup  Hypokalemia: new and requires workup     Amount and/or Complexity of Data Reviewed  Clinical lab tests: ordered and reviewed  Tests in the radiology section of CPT®: ordered and reviewed    Risk of Complications, Morbidity, and/or Mortality  Presenting problems: high  Diagnostic procedures: high  Management options: high    Patient Progress  Patient progress: stable      Disposition  Final diagnoses:   Hypokalemia   Generalized weakness   Alcohol dependence ([place] [address] )     Time reflects when diagnosis was documented in both MDM as applicable and the Disposition within this note     Time User Action Codes Description Comment    4/3/2022 11:13 AM Jyl Shaggy O Add [E87 6] Hypokalemia     4/3/2022 11:13 AM Jyl Shaggy O Add [R53 1] Generalized weakness     4/3/2022 11:13 AM Jyl Shaggy O Add [F10 20] Alcohol dependence ([place] [address] )     4/3/2022 12:30 PM Micah Fontanez [N18 6] End stage renal disease ([place] [address] )     4/3/2022 12:33 PM Micah Fontanez Add [S72 001A] Closed fracture of right hip [facility])       ED Disposition     ED Disposition Condition Date/Time Comment    Admit Stable Sun Apr 3, 2022 11:12 AM Case was discussed with Dr Hank Calloway and the patient's admission status was agreed to be Admission Status: observation status to the service of Dr Hank Calloway   Follow-up Information    None         Current Discharge Medication List      CONTINUE these medications which have NOT CHANGED    Details   aspirin 81 MG tablet Take 81 mg by mouth daily      B Complex-C-Folic Acid (TRIPHROCAPS) 1 MG CAPS Take 1 capsule by mouth      carvedilol (COREG) 3 125 mg tablet Take 1 tablet (3 125 mg total) by mouth 2 (two) times a day with meals  Qty: 180 tablet, Refills: 3    Associated Diagnoses: Coronary artery disease of native artery of native heart with stable angina pectoris ([place] [address] );  Essential hypertension      clopidogrel (Plavix) 75 mg tablet Take 1 tablet (75 mg total) by mouth daily  Qty: 30 tablet, Refills: 0    Associated Diagnoses: S/P arteriovenous (AV) fistula creation      LOSARTAN POTASSIUM PO Take 50 mg by mouth daily      rosuvastatin (CRESTOR) 20 MG tablet Take 20 mg by mouth every evening   Refills: 1      Cholecalciferol 50 MCG (2000 UT) CAPS Take by mouth daily      glucose blood test strip by In Vitro route 2 (two) times a day      mupirocin (BACTROBAN) 2 % ointment Apply topically 3 (three) times a day for 7 days  Qty: 15 g, Refills: 0    Associated Diagnoses: Wound infection      nicotine (NICODERM CQ) 14 mg/24hr TD 24 hr patch Place 1 patch on the skin daily  Qty: 28 patch, Refills: 0    Associated Diagnoses: Smoking      nitroglycerin (NITROSTAT) 0 4 mg SL tablet Place under the tongue      rifaximin (XIFAXAN) 550 mg tablet Take by mouth              No discharge procedures on file      PDMP Review       Value Time User    PDMP Reviewed  Yes 7/22/2021  2:07 PM Faviola Wetzel PA-C          ED Provider  Electronically Signed by           Elizabeth Polo DO  04/03/22 5536

## 2022-04-04 ENCOUNTER — ANESTHESIA (INPATIENT)
Dept: PERIOP | Facility: HOSPITAL | Age: 67
DRG: 521 | End: 2022-04-04
Payer: MEDICARE

## 2022-04-04 ENCOUNTER — TELEPHONE (OUTPATIENT)
Dept: FAMILY MEDICINE CLINIC | Facility: CLINIC | Age: 67
End: 2022-04-04

## 2022-04-04 ENCOUNTER — APPOINTMENT (INPATIENT)
Dept: RADIOLOGY | Facility: HOSPITAL | Age: 67
DRG: 521 | End: 2022-04-04
Payer: MEDICARE

## 2022-04-04 ENCOUNTER — APPOINTMENT (INPATIENT)
Dept: DIALYSIS | Facility: HOSPITAL | Age: 67
DRG: 521 | End: 2022-04-04
Payer: MEDICARE

## 2022-04-04 ENCOUNTER — ANESTHESIA EVENT (INPATIENT)
Dept: PERIOP | Facility: HOSPITAL | Age: 67
DRG: 521 | End: 2022-04-04
Payer: MEDICARE

## 2022-04-04 LAB
ABO GROUP BLD: NORMAL
ABO GROUP BLD: NORMAL
ANION GAP SERPL CALCULATED.3IONS-SCNC: 5 MMOL/L (ref 4–13)
ANISOCYTOSIS BLD QL SMEAR: PRESENT
ARTIFACT: PRESENT
ATRIAL RATE: 71 BPM
BASOPHILS # BLD MANUAL: 0.07 THOUSAND/UL (ref 0–0.1)
BASOPHILS NFR MAR MANUAL: 2 % (ref 0–1)
BLD GP AB SCN SERPL QL: NEGATIVE
BUN SERPL-MCNC: 19 MG/DL (ref 5–25)
CALCIUM SERPL-MCNC: 7.9 MG/DL (ref 8.3–10.1)
CHLORIDE SERPL-SCNC: 102 MMOL/L (ref 100–108)
CO2 SERPL-SCNC: 28 MMOL/L (ref 21–32)
CREAT SERPL-MCNC: 9.12 MG/DL (ref 0.6–1.3)
EOSINOPHIL # BLD MANUAL: 0.1 THOUSAND/UL (ref 0–0.4)
EOSINOPHIL NFR BLD MANUAL: 3 % (ref 0–6)
ERYTHROCYTE [DISTWIDTH] IN BLOOD BY AUTOMATED COUNT: 15.8 % (ref 11.6–15.1)
GFR SERPL CREATININE-BSD FRML MDRD: 5 ML/MIN/1.73SQ M
GLUCOSE SERPL-MCNC: 152 MG/DL (ref 65–140)
GLUCOSE SERPL-MCNC: 197 MG/DL (ref 65–140)
GLUCOSE SERPL-MCNC: 219 MG/DL (ref 65–140)
GLUCOSE SERPL-MCNC: 87 MG/DL (ref 65–140)
GLUCOSE SERPL-MCNC: 94 MG/DL (ref 65–140)
HCT VFR BLD AUTO: 31.1 % (ref 36.5–49.3)
HGB BLD-MCNC: 9.9 G/DL (ref 12–17)
LYMPHOCYTES # BLD AUTO: 0.27 THOUSAND/UL (ref 0.6–4.47)
LYMPHOCYTES # BLD AUTO: 8 % (ref 14–44)
MACROCYTES BLD QL AUTO: PRESENT
MCH RBC QN AUTO: 34.4 PG (ref 26.8–34.3)
MCHC RBC AUTO-ENTMCNC: 31.8 G/DL (ref 31.4–37.4)
MCV RBC AUTO: 108 FL (ref 82–98)
MONOCYTES # BLD AUTO: 0 THOUSAND/UL (ref 0–1.22)
MONOCYTES NFR BLD: 0 % (ref 4–12)
NEUTROPHILS # BLD MANUAL: 2.97 THOUSAND/UL (ref 1.85–7.62)
NEUTS BAND NFR BLD MANUAL: 1 % (ref 0–8)
NEUTS SEG NFR BLD AUTO: 86 % (ref 43–75)
P AXIS: 51 DEGREES
PLATELET BLD QL SMEAR: ABNORMAL
PMV BLD AUTO: 10.1 FL (ref 8.9–12.7)
POTASSIUM SERPL-SCNC: 3.7 MMOL/L (ref 3.5–5.3)
PR INTERVAL: 212 MS
QRS AXIS: -25 DEGREES
QRSD INTERVAL: 110 MS
QT INTERVAL: 466 MS
QTC INTERVAL: 506 MS
RBC # BLD AUTO: 2.88 MILLION/UL (ref 3.88–5.62)
RBC MORPH BLD: PRESENT
RH BLD: POSITIVE
RH BLD: POSITIVE
SODIUM SERPL-SCNC: 135 MMOL/L (ref 136–145)
SPECIMEN EXPIRATION DATE: NORMAL
T WAVE AXIS: 34 DEGREES
VENTRICULAR RATE: 71 BPM
WBC # BLD AUTO: 3.41 THOUSAND/UL (ref 4.31–10.16)

## 2022-04-04 PROCEDURE — 5A1D70Z PERFORMANCE OF URINARY FILTRATION, INTERMITTENT, LESS THAN 6 HOURS PER DAY: ICD-10-PCS | Performed by: INTERNAL MEDICINE

## 2022-04-04 PROCEDURE — 99222 1ST HOSP IP/OBS MODERATE 55: CPT | Performed by: INTERNAL MEDICINE

## 2022-04-04 PROCEDURE — 0SRR0JZ REPLACEMENT OF RIGHT HIP JOINT, FEMORAL SURFACE WITH SYNTHETIC SUBSTITUTE, OPEN APPROACH: ICD-10-PCS | Performed by: ORTHOPAEDIC SURGERY

## 2022-04-04 PROCEDURE — 82948 REAGENT STRIP/BLOOD GLUCOSE: CPT

## 2022-04-04 PROCEDURE — 86920 COMPATIBILITY TEST SPIN: CPT

## 2022-04-04 PROCEDURE — 27236 TREAT THIGH FRACTURE: CPT | Performed by: ORTHOPAEDIC SURGERY

## 2022-04-04 PROCEDURE — 86850 RBC ANTIBODY SCREEN: CPT | Performed by: ORTHOPAEDIC SURGERY

## 2022-04-04 PROCEDURE — C1776 JOINT DEVICE (IMPLANTABLE): HCPCS | Performed by: ORTHOPAEDIC SURGERY

## 2022-04-04 PROCEDURE — 85007 BL SMEAR W/DIFF WBC COUNT: CPT | Performed by: INTERNAL MEDICINE

## 2022-04-04 PROCEDURE — 99223 1ST HOSP IP/OBS HIGH 75: CPT | Performed by: ORTHOPAEDIC SURGERY

## 2022-04-04 PROCEDURE — C1713 ANCHOR/SCREW BN/BN,TIS/BN: HCPCS | Performed by: ORTHOPAEDIC SURGERY

## 2022-04-04 PROCEDURE — 86900 BLOOD TYPING SEROLOGIC ABO: CPT | Performed by: ORTHOPAEDIC SURGERY

## 2022-04-04 PROCEDURE — 0QP604Z REMOVAL OF INTERNAL FIXATION DEVICE FROM RIGHT UPPER FEMUR, OPEN APPROACH: ICD-10-PCS | Performed by: ORTHOPAEDIC SURGERY

## 2022-04-04 PROCEDURE — 86901 BLOOD TYPING SEROLOGIC RH(D): CPT | Performed by: ORTHOPAEDIC SURGERY

## 2022-04-04 PROCEDURE — 90935 HEMODIALYSIS ONE EVALUATION: CPT | Performed by: INTERNAL MEDICINE

## 2022-04-04 PROCEDURE — 85027 COMPLETE CBC AUTOMATED: CPT | Performed by: INTERNAL MEDICINE

## 2022-04-04 PROCEDURE — 80048 BASIC METABOLIC PNL TOTAL CA: CPT | Performed by: INTERNAL MEDICINE

## 2022-04-04 PROCEDURE — 20680 REMOVAL OF IMPLANT DEEP: CPT | Performed by: ORTHOPAEDIC SURGERY

## 2022-04-04 PROCEDURE — 93010 ELECTROCARDIOGRAM REPORT: CPT | Performed by: INTERNAL MEDICINE

## 2022-04-04 DEVICE — ARTICUL/EZE FEMORAL HEAD DIAMETER 28MM +5 12/14 TAPER
Type: IMPLANTABLE DEVICE | Site: HIP | Status: FUNCTIONAL
Brand: ARTICUL/EZE

## 2022-04-04 DEVICE — CORAIL HIP SYSTEM CEMENTLESS FEMORAL STEM HA COATED REVISION 12/14 AMT 135 DEGREES STANDARD COLLARED SIZE 13
Type: IMPLANTABLE DEVICE | Status: FUNCTIONAL
Brand: CORAIL

## 2022-04-04 DEVICE — 1.7MM COCR CABLE WITH TI CRIMP 750MM-STERILE: Type: IMPLANTABLE DEVICE | Site: HIP | Status: FUNCTIONAL

## 2022-04-04 DEVICE — SELF CENTERING BI-POLAR HEAD 28MM ID 52MM OD
Type: IMPLANTABLE DEVICE | Site: HIP | Status: FUNCTIONAL
Brand: SELF CENTERING

## 2022-04-04 RX ORDER — ALBUMIN, HUMAN INJ 5% 5 %
SOLUTION INTRAVENOUS CONTINUOUS PRN
Status: DISCONTINUED | OUTPATIENT
Start: 2022-04-04 | End: 2022-04-04

## 2022-04-04 RX ORDER — FENTANYL CITRATE/PF 50 MCG/ML
25 SYRINGE (ML) INJECTION
Status: DISCONTINUED | OUTPATIENT
Start: 2022-04-04 | End: 2022-04-04

## 2022-04-04 RX ORDER — EPHEDRINE SULFATE 50 MG/ML
INJECTION INTRAVENOUS AS NEEDED
Status: DISCONTINUED | OUTPATIENT
Start: 2022-04-04 | End: 2022-04-04

## 2022-04-04 RX ORDER — ATORVASTATIN CALCIUM 40 MG/1
40 TABLET, FILM COATED ORAL
Status: DISCONTINUED | OUTPATIENT
Start: 2022-04-04 | End: 2022-04-04 | Stop reason: ALTCHOICE

## 2022-04-04 RX ORDER — HYDROMORPHONE HCL/PF 1 MG/ML
0.5 SYRINGE (ML) INJECTION
Status: DISCONTINUED | OUTPATIENT
Start: 2022-04-04 | End: 2022-04-04

## 2022-04-04 RX ORDER — CEFAZOLIN SODIUM 2 G/50ML
2000 SOLUTION INTRAVENOUS
Status: COMPLETED | OUTPATIENT
Start: 2022-04-05 | End: 2022-04-04

## 2022-04-04 RX ORDER — ALBUMIN, HUMAN INJ 5% 5 %
12.5 SOLUTION INTRAVENOUS ONCE
Status: COMPLETED | OUTPATIENT
Start: 2022-04-04 | End: 2022-04-04

## 2022-04-04 RX ORDER — OXYCODONE HYDROCHLORIDE 5 MG/1
5 TABLET ORAL EVERY 4 HOURS PRN
Qty: 30 TABLET | Refills: 0 | Status: SHIPPED | OUTPATIENT
Start: 2022-04-04 | End: 2022-04-09 | Stop reason: HOSPADM

## 2022-04-04 RX ORDER — LIDOCAINE HYDROCHLORIDE 10 MG/ML
INJECTION, SOLUTION EPIDURAL; INFILTRATION; INTRACAUDAL; PERINEURAL AS NEEDED
Status: DISCONTINUED | OUTPATIENT
Start: 2022-04-04 | End: 2022-04-04

## 2022-04-04 RX ORDER — ONDANSETRON 2 MG/ML
4 INJECTION INTRAMUSCULAR; INTRAVENOUS ONCE AS NEEDED
Status: DISCONTINUED | OUTPATIENT
Start: 2022-04-04 | End: 2022-04-04

## 2022-04-04 RX ORDER — CEFAZOLIN SODIUM 1 G/50ML
1000 SOLUTION INTRAVENOUS EVERY 8 HOURS
Status: COMPLETED | OUTPATIENT
Start: 2022-04-04 | End: 2022-04-05

## 2022-04-04 RX ORDER — ASPIRIN 81 MG/1
81 TABLET ORAL DAILY
Status: DISCONTINUED | OUTPATIENT
Start: 2022-04-05 | End: 2022-04-04 | Stop reason: SDUPTHER

## 2022-04-04 RX ORDER — ROCURONIUM BROMIDE 10 MG/ML
INJECTION, SOLUTION INTRAVENOUS AS NEEDED
Status: DISCONTINUED | OUTPATIENT
Start: 2022-04-04 | End: 2022-04-04

## 2022-04-04 RX ORDER — CLOPIDOGREL BISULFATE 75 MG/1
75 TABLET ORAL DAILY
Status: CANCELLED | OUTPATIENT
Start: 2022-04-05

## 2022-04-04 RX ORDER — LOSARTAN POTASSIUM 50 MG/1
50 TABLET ORAL DAILY
Status: DISCONTINUED | OUTPATIENT
Start: 2022-04-05 | End: 2022-04-04 | Stop reason: ALTCHOICE

## 2022-04-04 RX ORDER — FENTANYL CITRATE 50 UG/ML
INJECTION, SOLUTION INTRAMUSCULAR; INTRAVENOUS AS NEEDED
Status: DISCONTINUED | OUTPATIENT
Start: 2022-04-04 | End: 2022-04-04

## 2022-04-04 RX ORDER — SODIUM CHLORIDE 9 MG/ML
INJECTION, SOLUTION INTRAVENOUS CONTINUOUS PRN
Status: DISCONTINUED | OUTPATIENT
Start: 2022-04-04 | End: 2022-04-04

## 2022-04-04 RX ORDER — SODIUM CHLORIDE, SODIUM LACTATE, POTASSIUM CHLORIDE, CALCIUM CHLORIDE 600; 310; 30; 20 MG/100ML; MG/100ML; MG/100ML; MG/100ML
125 INJECTION, SOLUTION INTRAVENOUS CONTINUOUS
Status: DISCONTINUED | OUTPATIENT
Start: 2022-04-04 | End: 2022-04-04

## 2022-04-04 RX ORDER — DEXAMETHASONE SODIUM PHOSPHATE 10 MG/ML
INJECTION, SOLUTION INTRAMUSCULAR; INTRAVENOUS AS NEEDED
Status: DISCONTINUED | OUTPATIENT
Start: 2022-04-04 | End: 2022-04-04

## 2022-04-04 RX ORDER — VASOPRESSIN 20 U/ML
INJECTION PARENTERAL AS NEEDED
Status: DISCONTINUED | OUTPATIENT
Start: 2022-04-04 | End: 2022-04-04

## 2022-04-04 RX ORDER — CARVEDILOL 3.12 MG/1
3.12 TABLET ORAL 2 TIMES DAILY WITH MEALS
Status: DISCONTINUED | OUTPATIENT
Start: 2022-04-04 | End: 2022-04-04 | Stop reason: ALTCHOICE

## 2022-04-04 RX ORDER — HYDROMORPHONE HCL 110MG/55ML
PATIENT CONTROLLED ANALGESIA SYRINGE INTRAVENOUS AS NEEDED
Status: DISCONTINUED | OUTPATIENT
Start: 2022-04-04 | End: 2022-04-04

## 2022-04-04 RX ORDER — ONDANSETRON 2 MG/ML
INJECTION INTRAMUSCULAR; INTRAVENOUS AS NEEDED
Status: DISCONTINUED | OUTPATIENT
Start: 2022-04-04 | End: 2022-04-04

## 2022-04-04 RX ORDER — HEPARIN SODIUM 5000 [USP'U]/ML
5000 INJECTION, SOLUTION INTRAVENOUS; SUBCUTANEOUS EVERY 8 HOURS SCHEDULED
Status: DISCONTINUED | OUTPATIENT
Start: 2022-04-05 | End: 2022-04-09 | Stop reason: HOSPADM

## 2022-04-04 RX ORDER — GLYCOPYRROLATE 0.2 MG/ML
INJECTION INTRAMUSCULAR; INTRAVENOUS AS NEEDED
Status: DISCONTINUED | OUTPATIENT
Start: 2022-04-04 | End: 2022-04-04

## 2022-04-04 RX ORDER — CHOLECALCIFEROL (VITAMIN D3) 10 MCG
1 TABLET ORAL
Status: DISCONTINUED | OUTPATIENT
Start: 2022-04-04 | End: 2022-04-04 | Stop reason: ALTCHOICE

## 2022-04-04 RX ORDER — MELATONIN
2000 DAILY
Status: DISCONTINUED | OUTPATIENT
Start: 2022-04-05 | End: 2022-04-04 | Stop reason: ALTCHOICE

## 2022-04-04 RX ORDER — PROPOFOL 10 MG/ML
INJECTION, EMULSION INTRAVENOUS AS NEEDED
Status: DISCONTINUED | OUTPATIENT
Start: 2022-04-04 | End: 2022-04-04

## 2022-04-04 RX ADMIN — LIDOCAINE HYDROCHLORIDE 100 MG: 10 INJECTION, SOLUTION EPIDURAL; INFILTRATION; INTRACAUDAL; PERINEURAL at 13:54

## 2022-04-04 RX ADMIN — OXYCODONE HYDROCHLORIDE 5 MG: 5 TABLET ORAL at 20:57

## 2022-04-04 RX ADMIN — HYDROMORPHONE HYDROCHLORIDE 0.5 MG: 1 INJECTION, SOLUTION INTRAMUSCULAR; INTRAVENOUS; SUBCUTANEOUS at 18:17

## 2022-04-04 RX ADMIN — Medication 25 MCG: at 16:36

## 2022-04-04 RX ADMIN — SUGAMMADEX 161 MG: 100 INJECTION, SOLUTION INTRAVENOUS at 15:20

## 2022-04-04 RX ADMIN — ONDANSETRON 4 MG: 2 INJECTION INTRAMUSCULAR; INTRAVENOUS at 14:20

## 2022-04-04 RX ADMIN — EPHEDRINE SULFATE 10 MG: 50 INJECTION INTRAVENOUS at 14:53

## 2022-04-04 RX ADMIN — ASPIRIN 81 MG: 81 TABLET, COATED ORAL at 18:15

## 2022-04-04 RX ADMIN — ALBUMIN (HUMAN): 12.5 INJECTION, SOLUTION INTRAVENOUS at 15:19

## 2022-04-04 RX ADMIN — ACETAMINOPHEN 975 MG: 325 TABLET ORAL at 21:03

## 2022-04-04 RX ADMIN — ATORVASTATIN CALCIUM 40 MG: 40 TABLET, FILM COATED ORAL at 18:15

## 2022-04-04 RX ADMIN — SODIUM CHLORIDE: 9 INJECTION, SOLUTION INTRAVENOUS at 13:49

## 2022-04-04 RX ADMIN — FENTANYL CITRATE 25 MCG: 50 INJECTION INTRAMUSCULAR; INTRAVENOUS at 13:54

## 2022-04-04 RX ADMIN — GLYCOPYRROLATE 0.1 MG: 0.2 INJECTION, SOLUTION INTRAMUSCULAR; INTRAVENOUS at 14:20

## 2022-04-04 RX ADMIN — ALBUMIN (HUMAN): 12.5 INJECTION, SOLUTION INTRAVENOUS at 14:41

## 2022-04-04 RX ADMIN — FENTANYL CITRATE 75 MCG: 50 INJECTION INTRAMUSCULAR; INTRAVENOUS at 14:00

## 2022-04-04 RX ADMIN — VASOPRESSIN 0.5 UNITS: 20 INJECTION INTRAVENOUS at 14:55

## 2022-04-04 RX ADMIN — Medication 25 MCG: at 16:25

## 2022-04-04 RX ADMIN — ACETAMINOPHEN 975 MG: 325 TABLET ORAL at 05:24

## 2022-04-04 RX ADMIN — CEFAZOLIN SODIUM 2000 MG: 2 SOLUTION INTRAVENOUS at 14:00

## 2022-04-04 RX ADMIN — NOREPINEPHRINE BITARTRATE 2 MCG/MIN: 1 INJECTION, SOLUTION, CONCENTRATE INTRAVENOUS at 15:00

## 2022-04-04 RX ADMIN — PHENYLEPHRINE HYDROCHLORIDE 20 MCG/MIN: 10 INJECTION INTRAVENOUS at 14:00

## 2022-04-04 RX ADMIN — ROCURONIUM BROMIDE 4 MG: 50 INJECTION INTRAVENOUS at 13:54

## 2022-04-04 RX ADMIN — EPHEDRINE SULFATE 10 MG: 50 INJECTION INTRAVENOUS at 14:48

## 2022-04-04 RX ADMIN — ALBUMIN (HUMAN) 12.5 G: 12.5 INJECTION, SOLUTION INTRAVENOUS at 16:41

## 2022-04-04 RX ADMIN — RIFAXIMIN 550 MG: 550 TABLET ORAL at 20:58

## 2022-04-04 RX ADMIN — CEFAZOLIN SODIUM 1000 MG: 1 SOLUTION INTRAVENOUS at 21:03

## 2022-04-04 RX ADMIN — HYDROMORPHONE HYDROCHLORIDE 0.5 MG: 2 INJECTION, SOLUTION INTRAMUSCULAR; INTRAVENOUS; SUBCUTANEOUS at 15:24

## 2022-04-04 RX ADMIN — VASOPRESSIN 0.5 UNITS: 20 INJECTION INTRAVENOUS at 15:17

## 2022-04-04 RX ADMIN — VASOPRESSIN 0.5 UNITS: 20 INJECTION INTRAVENOUS at 15:35

## 2022-04-04 RX ADMIN — DEXAMETHASONE SODIUM PHOSPHATE 10 MG: 10 INJECTION, SOLUTION INTRAMUSCULAR; INTRAVENOUS at 14:20

## 2022-04-04 RX ADMIN — HYDROMORPHONE HYDROCHLORIDE 0.5 MG: 2 INJECTION, SOLUTION INTRAMUSCULAR; INTRAVENOUS; SUBCUTANEOUS at 14:32

## 2022-04-04 RX ADMIN — PROPOFOL 150 MG: 10 INJECTION, EMULSION INTRAVENOUS at 13:54

## 2022-04-04 RX ADMIN — SODIUM CHLORIDE 0.2 MCG/KG/HR: 900 INJECTION INTRAVENOUS at 14:20

## 2022-04-04 RX ADMIN — NYSTATIN: 100000 POWDER TOPICAL at 18:18

## 2022-04-04 NOTE — CASE MANAGEMENT
Case Management Assessment & Discharge Planning Note    Patient name Anne Gimenez  Location [facility] 607/[facility] 359-82 MRN [id number]  : 1955 Date 2022       Current Admission Date: 4/3/2022  Current Admission Diagnosis:Right intertrochanteric femur fracture   Patient Active Problem List    Diagnosis Date Noted    Nausea, vomiting and diarrhea 2022    Right intertrochanteric femur fracture 2022    Thrombocytopenia ([facility] [address] ) 2022    Ambulatory dysfunction 2022    Dialysis AV fistula malfunction ([facility] ) 2022    Hypokalemia 2022    Arteriovenous fistula ([place] [address] ) 2021    Dizziness 2021    Poor appetite 2021    AV fistula thrombosis, initial encounter ([place] [address] ) 2021    Encounter for extracorporeal dialysis ([place] [address] ) 2019    Acute upper respiratory infection 10/17/2018    Intestinal malabsorption 2017    Pernicious anemia 2017    Irritable bowel syndrome with diarrhea 2017    Elevated serum alkaline phosphatase level 2017    Anemia 2017    Anemia in chronic illness 10/20/2016    CAD (coronary artery disease) 10/20/2016    Hypertension 10/20/2016    Long-term insulin use in type 2 diabetes ([facility] ) 2016    Diabetes with neurologic complications ([place] [address] )     Mixed hyperlipidemia 10/04/2012    End stage renal disease ([place] [address] ) 2012    Nicotine dependence 2012    Organic impotence 2012    Type 2 diabetes mellitus ([facility] ) 2012      LOS (days): 1  Geometric Mean LOS (GMLOS) (days):   Days to GMLOS:     OBJECTIVE:  PATIENT READMITTED TO HOSPITAL  Risk of Unplanned Readmission Score: 28         Current admission status: Inpatient       Preferred Pharmacy:   [facility] [id number], [address] [facility], [address] [place] [id number]  Phone: [phone] Fax: [phone]    [facility] [address], [place] - [address]  [address]  [place] [address]   Phone: [phone] Fax: [phone]    Primary Care Provider: TESSY Coyne    Primary Insurance: MEDICARE  Secondary Insurance: AARP    ASSESSMENT:  Active Health Care Proxies     [facility] Representative - Spouse   Primary Phone: [phone] (Mobile)  Home Phone: [phone]                 Patient Information  Admitted from[de-identified] Home  Mental Status: Other (Comment) (off the floor)  During Assessment patient was accompanied by: Not accompanied during assessment  Assessment information provided by[de-identified] Spouse  Primary Caregiver: Self  Support Systems: Self,Spouse/significant other  [place] of Residence: Other (specify in comment box) ([place])  Type of Current Residence: 2 story home  Upon entering residence, is there a bedroom on the main floor (no further steps)?: No (patient was sleeping on the sofa)  A bedroom is located on the following floor levels of residence (select all that apply):: 2nd Floor  Upon entering residence, is there a bathroom on the main floor (no further steps)?: Yes  Number of steps to 2nd floor from main floor: One Flight  In the last 12 months, was there a time when you were not able to pay the mortgage or rent on time?: No  In the last 12 months, how many places have you lived?: 1  In the last 12 months, was there a time when you did not have a steady place to sleep or slept in a shelter (including now)?: No  Homeless/housing insecurity resource given?: No  Living Arrangements: Lives w/ Spouse/significant other  Is patient a ?: No    Activities of Daily Living Prior to Admission  Functional Status: Independent  Completes ADLs independently?: Yes  Ambulates independently?: Yes  Does patient use assisted devices?: Yes  Assisted Devices (DME) used: Walker,Straight Cane,Shower Chair  Does patient currently own DME?: Yes  What DME does the patient currently own?: Shower Chair,Straight Cane,Walker  Does patient have a history of Outpatient Therapy (PT/OT)?: No  Does the patient have a history of Short-Term Rehab?: Yes ([facility])  Does patient have a history of HHC?: Yes (unsure of agency)  Does patient currently have [facility]?: No         Patient Information Continued  Income Source: Pension/nursing home  Does patient have prescription coverage?: Yes  Within the past 12 months, you worried that your food would run out before you got the money to buy more : Never true  Within the past 12 months, the food you bought just didnt last and you didnt have money to get more : Never true  Food insecurity resource given?: N/A  Does patient receive dialysis treatments?: Yes (MWF, [facility] 4:30 am chair time)  Does patient have a history of substance abuse?: No  Does patient have a history of Mental Health Diagnosis?: No         Means of Transportation  Means of Transport to Appts[de-identified] Family transport  In the past 12 months, has lack of transportation kept you from medical appointments or from getting medications?: No  In the past 12 months, has lack of transportation kept you from meetings, work, or from getting things needed for daily living?: No  Was application for public transport provided?: N/A        DISCHARGE DETAILS:    Discharge planning discussed with[de-identified] Wife, Caio [place] of Choice: Yes  Comments - Freedom of Choice: discussed [address]       Contacts  Patient Contacts: Kortney Nevarez  Relationship to Patient[de-identified] Family  Contact Method: Phone  Phone Number: [phone]  Reason/Outcome: Continuity of Care,Emergency Contact,Discharge Planning    Other Referral/Resources/Interventions Provided:  Referral Comments: Wife agreeable to referral entered to [facility] dialysis center, due to patient having been there in the past    CM reviewed d/c planning process including the following: identifying help at home, patient preference for d/c planning needs, Discharge Lounge, [facility] Meds to Bed program, availability of treatment team to discuss questions or concerns patient and/or family may have regarding understanding medications and recognizing signs and symptoms once discharged  CM also encouraged patient to follow up with all recommended appointments after discharge  Patient advised of importance for patient and family to participate in managing patients medical well being  Information obtained from wife  Patient was IADLS, has walker, cane, shower chair at home  Fully vaccinated for covid    Dialysis [facility] MWF at 0430 am

## 2022-04-04 NOTE — PLAN OF CARE
Pt continues to be off floor in OR  Pt off unit since 0700 at HD then straight to OR   Will cont to follow and monitor

## 2022-04-04 NOTE — PLAN OF CARE
Patient is for a 4 hour HD session on a 3K3  0Ca in substitute for a 4K2 5Ca bath for a serum potassium of 3 7 mmol/L and a net UF goal of 3L as tolerated  Post-Dialysis RN Treatment Note    Blood Pressure:  Pre 123/83 mm/Hg  Post 136/75 mmHg   EDW  77 0 kg    Weight:  Pre 81 2 kg   Post 79 2 kg   Mode of weight measurement: Bed Scale   Volume Removed  3800 ml/ gross goal    Treatment duration 240 minutes    NS given  No    Treatment shortened?  No   Medications given during Rx None Reported   Estimated Kt/V  Not Applicable   Access type: AV fistula   Access Issues: No    Report called to primary nurse   Yes     Problem: METABOLIC, FLUID AND ELECTROLYTES - ADULT  Goal: Electrolytes maintained within normal limits  Description: INTERVENTIONS:  - Monitor labs and assess patient for signs and symptoms of electrolyte imbalances  - Administer electrolyte replacement as ordered  - Monitor response to electrolyte replacements, including repeat lab results as appropriate  - Instruct patient on fluid and nutrition as appropriate  Outcome: Progressing  Goal: Fluid balance maintained  Description: INTERVENTIONS:  - Monitor labs   - Monitor I/O and WT  - Instruct patient on fluid and nutrition as appropriate  - Assess for signs & symptoms of volume excess or deficit  Outcome: Progressing

## 2022-04-04 NOTE — ASSESSMENT & PLAN NOTE
History of coronary disease with drug-eluting stent placement  Continue aspirin beta-blocker statin  Plavix placed on hold at [facility] in anticipation of operative intervention for right femur fracture  Monitor

## 2022-04-04 NOTE — PLAN OF CARE
Problem: Prexisting or High Potential for Compromised Skin Integrity  Goal: Skin integrity is maintained or improved  Description: INTERVENTIONS:  - Identify patients at risk for skin breakdown  - Assess and monitor skin integrity  - Assess and monitor nutrition and hydration status  - Monitor labs   - Assess for incontinence   - Turn and reposition patient  - Assist with mobility/ambulation  - Relieve pressure over bony prominences  - Avoid friction and shearing  - Provide appropriate hygiene as needed including keeping skin clean and dry  - Evaluate need for skin moisturizer/barrier cream  - Collaborate with interdisciplinary team   - Patient/family teaching  - Consider wound care consult   Outcome: Progressing     Problem: MOBILITY - ADULT  Goal: Maintain or return to baseline ADL function  Description: INTERVENTIONS:  -  Assess patient's ability to carry out ADLs; assess patient's baseline for ADL function and identify physical deficits which impact ability to perform ADLs (bathing, care of mouth/teeth, toileting, grooming, dressing, etc )  - Assess/evaluate cause of self-care deficits   - Assess range of motion  - Assess patient's mobility; develop plan if impaired  - Assess patient's need for assistive devices and provide as appropriate  - Encourage maximum independence but intervene and supervise when necessary  - Involve family in performance of ADLs  - Assess for home care needs following discharge   - Consider OT consult to assist with ADL evaluation and planning for discharge  - Provide patient education as appropriate  Outcome: Progressing  Goal: Maintains/Returns to pre admission functional level  Description: INTERVENTIONS:  - Perform BMAT or MOVE assessment daily    - Set and communicate daily mobility goal to care team and patient/family/caregiver  - Collaborate with rehabilitation services on mobility goals if consulted  - Perform Range of Motion  times a day    - Reposition patient every hours   - Dangle patient  times a day  - Stand patient  times a day  - Ambulate patient  times a day  - Out of bed to chair  times a day   - Out of bed for meal times a day  - Out of bed for toileting  - Record patient progress and toleration of activity level   Outcome: Progressing     Problem: Nutrition/Hydration-ADULT  Goal: Nutrient/Hydration intake appropriate for improving, restoring or maintaining nutritional needs  Description: Monitor and assess patient's nutrition/hydration status for malnutrition  Collaborate with interdisciplinary team and initiate plan and interventions as ordered  Monitor patient's weight and dietary intake as ordered or per policy  Utilize nutrition screening tool and intervene as necessary  Determine patient's food preferences and provide high-protein, high-caloric foods as appropriate       INTERVENTIONS:  - Monitor oral intake, urinary output, labs, and treatment plans  - Assess nutrition and hydration status and recommend course of action  - Evaluate amount of meals eaten  - Assist patient with eating if necessary   - Allow adequate time for meals  - Recommend/ encourage appropriate diets, oral nutritional supplements, and vitamin/mineral supplements  - Order, calculate, and assess calorie counts as needed  - Recommend, monitor, and adjust tube feedings and TPN/PPN based on assessed needs  - Assess need for intravenous fluids  - Provide specific nutrition/hydration education as appropriate  - Include patient/family/caregiver in decisions related to nutrition  Outcome: Progressing     Problem: METABOLIC, FLUID AND ELECTROLYTES - ADULT  Goal: Electrolytes maintained within normal limits  Description: INTERVENTIONS:  - Monitor labs and assess patient for signs and symptoms of electrolyte imbalances  - Administer electrolyte replacement as ordered  - Monitor response to electrolyte replacements, including repeat lab results as appropriate  - Instruct patient on fluid and nutrition as appropriate  Outcome: Progressing  Goal: Fluid balance maintained  Description: INTERVENTIONS:  - Monitor labs   - Monitor I/O and WT  - Instruct patient on fluid and nutrition as appropriate  - Assess for signs & symptoms of volume excess or deficit  Outcome: Progressing

## 2022-04-04 NOTE — H&P
[facility]  H&P- [facility] 1955, 78 y o  male MRN: [id number]  Unit/Bed#: [facility] 607-01 Encounter: [id number]  Primary Care Provider: TESSY Smalls   Date and time admitted to hospital: 4/3/2022  7:19 PM    * Right intertrochanteric femur fracture  Assessment & Plan  Right intertrochanteric femur fracture following fall  Referred to [facility] [address] for orthopedic evaluation and possible operative intervention  Orthopedics following  DVT prophylaxis  Analgesics  Physical therapy  Preoperative evaluation - patient at least intermediate risk for proposed surgical procedure  06/2021 Outpatient cardiac stress test and cardiology inputs for preoperative evaluation at that time noted    Nausea, vomiting and diarrhea  Assessment & Plan  Nausea vomiting diarrhea likely gastroenteritis  Spouse reports ongoing symptoms, on and off since couple of weeks  Follow-up on stool studies  If persistent or worsens will consider GI consultation  Supportive care    Hypokalemia  Assessment & Plan  Hypokalemia likely due GI losses  Replete  Monitor    Ambulatory dysfunction  Assessment & Plan  Patient at high risk for falls  Safe ambulation  Fall precautions  Physical therapy      Thrombocytopenia ([facility] [address] )  Assessment & Plan  Monitor    Type 2 diabetes mellitus ([facility] [address] )  Assessment & Plan  Lab Results   Component Value Date    HGBA1C 4 4 03/29/2022       Recent Labs     04/03/22  0912   POCGLU 134       Blood Sugar Average: Last 72 hrs:     A1c 4 4  Monitor Accu-Cheks  Avoid hypoglycemia  Hypoglycemia protocol in place    Nicotine dependence  Assessment & Plan  Tobacco cessation discussed and encouraged    Mixed hyperlipidemia  Assessment & Plan  Continue statin    End stage renal disease [facility])  Assessment & Plan  Lab Results   Component Value Date    EGFR 6 04/03/2022    EGFR 3 03/29/2022    EGFR 3 03/28/2022    CREATININE 8 24 (H) 04/03/2022    CREATININE 14 30 (H) 03/29/2022 CREATININE 13 22 (H) 03/28/2022     History of ESRD on hemodialysis  Hemodialysis per nephrology      Hypertension  Assessment & Plan  Monitor blood pressures  Avoid hypotension    CAD (coronary artery disease)  Assessment & Plan  History of coronary disease with drug-eluting stent placement  Continue aspirin beta-blocker statin  Plavix placed on hold at [facility] in anticipation of operative intervention for right femur fracture  Monitor    Anemia  Assessment & Plan  Chronic anemia with macrocytosis  Check iron studies, folic acid Q50 levels  Monitor hemoglobin        VTE Pharmacologic Prophylaxis: VTE Score: 10 High Risk (Score >/= 5) - Pharmacological DVT Prophylaxis Ordered: heparin  Sequential Compression Devices Ordered  Code Status: Level 1 - Full Code   Discussion with family: Discussed with the patient, called and updated spouse [facility]  Anticipated Length of Stay: Patient will be admitted on an inpatient basis with an anticipated length of stay of greater than 2 midnights secondary to Right femur fracture for operative intervention with Orthopedics  Chief Complaint:     Transfer from [facility] for orthopedic evaluation    History of Present Illness:  Laury Munroe is a 78 y o  male with a PMH of diabetes mellitus type 2, hypertension, end-stage renal disease on hemodialysis, gait instability, history of coronary disease with stents in place, dyslipidemia    Patient initially presented to [facility] with symptoms of right hip pain  He also has ongoing symptoms of nausea vomiting and diarrhea ongoing for few weeks  He had a fall about few days back and had right hip pain    Given persistent symptoms he presented to [facility] imaging studies revealed right intertrochanteric femur fracture, case was discussed with Orthopedics and was recommended transferred to [facility] for operative intervention  Presently is comfortably in bed reports feeling tired  He has poor appetite going on for last few weeks  He reports presently his nausea is feeling better and no episodes of diarrhea since arrival to the [facility]     He denies exertional chest pain shortness of breath diaphoresis presyncope syncope  Denies cough chest tightness wheezing  No history of fever chills sweats constitutional symptoms  Outpatient Cardiac stress test and cardiology note dated 06/2021 reviewed  His hospital stay at [facility] reviewed  Additional history is obtained from spouse Bassam Estevez    Review of Systems:  Review of Systems   All other systems reviewed and are negative  Past Medical and Surgical History:   Past Medical History:   Diagnosis Date    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    With Cerebral Infarction:  Last Assessed:  October 20, 2016    Chronic kidney disease 2012    on dialysis     Diabetes mellitus ([facility] [address] )     Hypertension     Labyrinthitis 09/10/2011    Myocardial infarction ([facility] [address] ) 2014    x3 others were in 2009 & 2010    Sleep disturbances 09/20/2010    Stroke [facility]) 2007    x1    Type 2 diabetes, uncontrolled, with renal manifestation 05/03/2017       Past Surgical History:   Procedure Laterality Date    AV FISTULA PLACEMENT      CARDIAC SURGERY  2010    stents x3    COLONOSCOPY N/A 12/12/2016    Procedure: COLONOSCOPY;  Surgeon: No Nava MD;  Location: [place] [address] GI LAB; Service:     COLONOSCOPY N/A 4/3/2017    Procedure:  COLONOSCOPY;  Surgeon: No Nava MD;  Location: [place] [address] GI LAB; Service:     IR AV FISTULA/GRAFT DECLOT  4/29/2021    IR AV FISTULA/GRAFT DECLOT  3/25/2022    IR AV FISTULAGRAM/GRAFTOGRAM  3/28/2022    IR TUNNELED CENTRAL LINE REMOVAL  12/7/2021    IR TUNNELED DIALYSIS CATHETER PLACEMENT  5/24/2021    DE ANASTOMOSIS,AV,ANY SITE Left 7/22/2021    Procedure: CREATION FISTULA ARTERIOVENOUS (AV);   Surgeon: Mini Najera MD;  Location: [place] MAIN OR;  Service: Vascular       Meds/Allergies:  Prior to Admission medications    Medication Sig Start Date End Date Taking? Authorizing Provider   aspirin 81 MG tablet Take 81 mg by mouth daily    Historical Provider, MD   B Complex-C-Folic Acid (TRIPHROCAPS) 1 MG CAPS Take 1 capsule by mouth 2/20/17   Historical Provider, MD   carvedilol (COREG) 3 125 mg tablet Take 1 tablet (3 125 mg total) by mouth 2 (two) times a day with meals 6/11/21   Tami Buenrostro MD   Cholecalciferol 50 MCG (2000 UT) CAPS Take by mouth daily 3/22/21   Historical Provider, MD   clopidogrel (Plavix) 75 mg tablet Take 1 tablet (75 mg total) by mouth daily 7/23/21   Royce Bautista PA-C   glucose blood test strip by In Vitro route 2 (two) times a day 7/18/11   Historical Provider, MD   LOSARTAN POTASSIUM PO Take 50 mg by mouth daily    Historical Provider, MD   mupirocin (BACTROBAN) 2 % ointment Apply topically 3 (three) times a day for 7 days 10/8/21 10/15/21  Luis Toussaint DO   nicotine (NICODERM CQ) 14 mg/24hr TD 24 hr patch Place 1 patch on the skin daily  Patient not taking: Reported on 8/30/2021 7/27/21   Stefanie Camejo MD   nitroglycerin (NITROSTAT) 0 4 mg SL tablet Place under the tongue  Patient not taking: Reported on 4/3/2022  7/18/11   Historical Provider, MD   rifaximin Fronie Ask) 550 mg tablet Take by mouth  6/8/17   Historical Provider, MD   rosuvastatin (CRESTOR) 20 MG tablet Take 20 mg by mouth every evening  5/29/19   Historical Provider, MD     I have reviewed home medications using recent Epic encounter      Allergies: No Known Allergies    Social History:  Marital Status: /Civil Union   Occupation:   Patient Pre-hospital Living Situation: Home  Patient Pre-hospital Level of Mobility: Ambulatory dysfunction with high risk of falls, ambulates with a cane  Patient Pre-hospital Diet Restrictions: no  Substance Use History:   Social History     Substance and Sexual Activity Alcohol Use Yes    Comment: rarely - No alcohol use per [employer]     Social History     Tobacco Use   Smoking Status Current Every Day Smoker    Packs/day: 0 50    Years: 30 00    Pack years: 15 00    Types: Cigarettes   Smokeless Tobacco Never Used     Social History     Substance and Sexual Activity   Drug Use No       Family History:  Family History   Problem Relation Age of Onset    Leukemia Mother     Crohn's disease Father     Transient ischemic attack Brother        Physical Exam:     Vitals:   Blood Pressure: 137/66 (04/03/22 1940)  Pulse: 72 (04/03/22 1940)  Temperature: 97 6 °F (36 4 °C) (04/03/22 1940)  Respirations: 16 (04/03/22 1940)  SpO2: 99 % (04/03/22 1940)    Physical Exam     Comfortably in bed  Features of protein calorie malnutrition noted  Neck supple  Lungs emphysematous  Diminished breath sounds bilaterally  Heart sounds S1 and S2 noted  Abdomen soft nontender  Awake alert obeys simple commands  No pedal edema  Features of peripheral arterial disease lower extremities noted  No rash      Additional Data:     Lab Results:  Results from last 7 days   Lab Units 04/03/22  0939   WBC Thousand/uL 5 55   HEMOGLOBIN g/dL 11 8*   HEMATOCRIT % 37 1   PLATELETS Thousands/uL 100*   NEUTROS PCT % 78*   LYMPHS PCT % 12*   MONOS PCT % 5   EOS PCT % 3     Results from last 7 days   Lab Units 04/03/22 2019 04/03/22  0939 04/03/22  0939   SODIUM mmol/L  --   --  134*   POTASSIUM mmol/L 3 9   < > 2 6*   CHLORIDE mmol/L  --   --  98*   CO2 mmol/L  --   --  26   BUN mg/dL  --   --  15   CREATININE mg/dL  --   --  8 24*   ANION GAP mmol/L  --   --  10   CALCIUM mg/dL  --   --  7 7*   ALBUMIN g/dL  --   --  2 4*   TOTAL BILIRUBIN mg/dL  --   --  0 44   ALK PHOS U/L  --   --  217*   ALT U/L  --   --  18   AST U/L  --   --  22   GLUCOSE RANDOM mg/dL  --   --  104    < > = values in this interval not displayed       Results from last 7 days   Lab Units 04/03/22  0939   INR  1 07     Results from last 7 days Lab Units 04/03/22  0912 03/29/22  1126 03/29/22  0742 03/29/22  0626 03/28/22  2336 03/28/22  2118 03/28/22  1708 03/28/22  1152 03/28/22  1008   POC GLUCOSE mg/dl 134 91 109 93 121 139 81 94 91     Results from last 7 days   Lab Units 03/29/22  0642   HEMOGLOBIN A1C % 4 4           Imaging: Reviewed radiology reports from this admission including: chest xray and 06/2021 cardiac stress test      EKG and Other Studies Reviewed on Admission:   · EKG:  Not available for review, reported as sinus rhythm first-degree AV block    ** Please Note: This note has been constructed using a voice recognition system   **

## 2022-04-04 NOTE — ASSESSMENT & PLAN NOTE
Pt presented with acute nondisplaced intertrochanteric fracture of the right femur following mechanical fall   Pt with history of prior intertrochanteric femur fracture treated with cephalomedullary nail 12/25/2021  Transferred to [facility] from [facility] for ortho   Pt underwent hardware removal and conversion to a bipolar hemiarthroplasty on 4/4  DVT prophylaxis  Pain control   PT/OT

## 2022-04-04 NOTE — ASSESSMENT & PLAN NOTE
History of coronary disease with drug-eluting stent placement  Continue aspirin beta-blocker statin  Plavix placed on hold in anticipation of operative intervention for right femur fracture  Restart 4/5

## 2022-04-04 NOTE — DISCHARGE INSTRUCTIONS
Discharge Instructions - Orthopedics  Vinay Bishop 78 y o  male MRN: [id number]  Unit/Bed#: PACU 10    Weight Bearing Status:                                           Weight Bearing as tolerated to right leg  Be sure to maintain Hip Precautions (no bending at waist, no crossing legs, on internally rotating surgical leg)    DVT prophylaxis:  Complete course of Lovenox as directed    Pain:  Continue analgesics as directed    Showering Instructions:   Do not shower until followup     Dressing Instructions:   Keep dressing clean, dry and intact until follow up appointment  Driving Instructions:  No driving until cleared by Orthopaedic Surgery  PT/OT:  Continue PT/OT on outpatient basis as directed    Appt Instructions: If you do not have your appointment, please call the clinic at [phone] to follow up with Dr Timoteo Morris in 2 weeks    Contact the office sooner if you experience any increased numbness/tingling in the extremities        Miscellaneous:  None

## 2022-04-04 NOTE — PLAN OF CARE
Problem: Prexisting or High Potential for Compromised Skin Integrity  Goal: Skin integrity is maintained or improved  Description: INTERVENTIONS:  - Identify patients at risk for skin breakdown  - Assess and monitor skin integrity  - Assess and monitor nutrition and hydration status  - Monitor labs   - Assess for incontinence   - Turn and reposition patient  - Assist with mobility/ambulation  - Relieve pressure over bony prominences  - Avoid friction and shearing  - Provide appropriate hygiene as needed including keeping skin clean and dry  - Evaluate need for skin moisturizer/barrier cream  - Collaborate with interdisciplinary team   - Patient/family teaching  - Consider wound care consult   Outcome: Progressing     Problem: MOBILITY - ADULT  Goal: Maintain or return to baseline ADL function  Description: INTERVENTIONS:  -  Assess patient's ability to carry out ADLs; assess patient's baseline for ADL function and identify physical deficits which impact ability to perform ADLs (bathing, care of mouth/teeth, toileting, grooming, dressing, etc )  - Assess/evaluate cause of self-care deficits   - Assess range of motion  - Assess patient's mobility; develop plan if impaired  - Assess patient's need for assistive devices and provide as appropriate  - Encourage maximum independence but intervene and supervise when necessary  - Involve family in performance of ADLs  - Assess for home care needs following discharge   - Consider OT consult to assist with ADL evaluation and planning for discharge  - Provide patient education as appropriate  Outcome: Progressing  Goal: Maintains/Returns to pre admission functional level  Description: INTERVENTIONS:  - Perform BMAT or MOVE assessment daily    - Set and communicate daily mobility goal to care team and patient/family/caregiver  - Collaborate with rehabilitation services on mobility goals if consulted  - Perform Range of Motion  times a day    - Reposition patient every hours   - Dangle patient  times a day  - Stand patient  times a day  - Ambulate patient  times a day  - Out of bed to chair  times a day   - Out of bed for amarjit times a day  - Out of bed for toileting  - Record patient progress and toleration of activity level   Outcome: Progressing     Problem: Nutrition/Hydration-ADULT  Goal: Nutrient/Hydration intake appropriate for improving, restoring or maintaining nutritional needs  Description: Monitor and assess patient's nutrition/hydration status for malnutrition  Collaborate with interdisciplinary team and initiate plan and interventions as ordered  Monitor patient's weight and dietary intake as ordered or per policy  Utilize nutrition screening tool and intervene as necessary  Determine patient's food preferences and provide high-protein, high-caloric foods as appropriate       INTERVENTIONS:  - Monitor oral intake, urinary output, labs, and treatment plans  - Assess nutrition and hydration status and recommend course of action  - Evaluate amount of meals eaten  - Assist patient with eating if necessary   - Allow adequate time for meals  - Recommend/ encourage appropriate diets, oral nutritional supplements, and vitamin/mineral supplements  - Order, calculate, and assess calorie counts as needed  - Recommend, monitor, and adjust tube feedings and TPN/PPN based on assessed needs  - Assess need for intravenous fluids  - Provide specific nutrition/hydration education as appropriate  - Include patient/family/caregiver in decisions related to nutrition  Outcome: Progressing

## 2022-04-04 NOTE — CONSULTS
Orthopedics   Omega Kocher Scollon 78 y o  male MRN: [id number]  Unit/Bed#: [facility] 607-01      Chief Complaint:   right hip pain    HPI:   78 y o  male ambulates with walker status post fall 4 days ago complaining of right hip pain and inability to bear weight  Pain is well localized to the hip and is made worse with motion or contact to the area  Denies numbness or tingling  He had a prior right intertrochanteric femur fracture treated with a right cThe patient had a prior intertrochanteric femur fracture treated with cephalomedullary nail at [facility] 12/25/2021  He has ESRD on dialysis, DM, prior stroke and 3 MI's  Review Of Systems:   · Skin: Normal  · Neuro: See HPI  · Musculoskeletal: See HPI  · 14 point review of systems negative except as stated above     Past Medical History:   Past Medical History:   Diagnosis Date    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    With Cerebral Infarction:  Last Assessed:  October 20, 2016    Chronic kidney disease 2012    on dialysis     Diabetes mellitus ([facility] [address] )     Hypertension     Labyrinthitis 09/10/2011    Myocardial infarction ([facility] [address] ) 2014    x3 others were in 2009 & 2010    Sleep disturbances 09/20/2010    Stroke [facility]) 2007    x1    Type 2 diabetes, uncontrolled, with renal manifestation 05/03/2017       Past Surgical History:   Past Surgical History:   Procedure Laterality Date    AV FISTULA PLACEMENT      CARDIAC SURGERY  2010    stents x3    COLONOSCOPY N/A 12/12/2016    Procedure: COLONOSCOPY;  Surgeon: Bhupendra Kumari MD;  Location: [facility] GI LAB; Service:     COLONOSCOPY N/A 4/3/2017    Procedure:  COLONOSCOPY;  Surgeon: Bhupendra Kumari MD;  Location: [facility] GI LAB;   Service:     IR AV FISTULA/GRAFT DECLOT  4/29/2021    IR AV FISTULA/GRAFT DECLOT  3/25/2022    IR AV FISTULAGRAM/GRAFTOGRAM  3/28/2022    IR TUNNELED CENTRAL LINE REMOVAL  12/7/2021    IR TUNNELED DIALYSIS CATHETER PLACEMENT  5/24/2021    NJ ANASTOMOSIS,AV,ANY SITE Left 7/22/2021 Procedure: CREATION FISTULA ARTERIOVENOUS (AV);   Surgeon: Izabela Leon MD;  Location: [facility]IN OR;  Service: Vascular       Family History:  Family history reviewed and non-contributory  Family History   Problem Relation Age of Onset    Leukemia Mother     Crohn's disease Father     Transient ischemic attack Brother        Social History:  Social History     Socioeconomic History    Marital status: /Civil Union     Spouse name: Tevin Vasquez Number of children: 2    Years of education: 15    Highest education level: Some college, no degree   Occupational History    Not on file   Tobacco Use    Smoking status: Current Every Day Smoker     Packs/day: 0 50     Years: 30 00     Pack years: 15 00     Types: Cigarettes    Smokeless tobacco: Never Used   Vaping Use    Vaping Use: Never used   Substance and Sexual Activity    Alcohol use: Yes     Comment: rarely - No alcohol use per [employer]    Drug use: No    Sexual activity: Not on file   Other Topics Concern    Not on file   Social History Narrative    Daily caffeinated coffee consumption     Social Determinants of Health     Financial Resource Strain: Not on file   Food Insecurity: Not on file   Transportation Needs: Not on file   Physical Activity: Not on file   Stress: Not on file   Social Connections: Not on file   Intimate Partner Violence: Not on file   Housing Stability: Not on file       Allergies:   No Known Allergies        Labs:  0   Lab Value Date/Time    HCT 37 1 04/03/2022 0939    HCT 35 0 (L) 03/29/2022 0642    HCT 40 7 03/28/2022 0607    HCT 33 2 (L) 04/11/2017 0712    HGB 11 8 (L) 04/03/2022 0939    HGB 11 6 (L) 03/29/2022 0642    HGB 13 3 03/28/2022 0607    HGB 11 1 (L) 04/11/2017 0712    INR 1 07 04/03/2022 0939    WBC 5 55 04/03/2022 0939    WBC 4 85 03/29/2022 0642    WBC 7 25 03/28/2022 0607    WBC 6 2 04/11/2017 0712       Meds:    Current Facility-Administered Medications:     acetaminophen (TYLENOL) tablet 975 mg, 975 mg, Oral, Q8H [address], Kev Villeda MD, 975 mg at 04/03/22 2117    [START ON 4/4/2022] aspirin (ECOTRIN LOW STRENGTH) EC tablet 81 mg, 81 mg, Oral, Daily, MD Maribell Venegas  [START ON 4/4/2022] atorvastatin (LIPITOR) tablet 40 mg, 40 mg, Oral, Daily With MD Maribell Crews  [START ON 4/4/2022] b complex-vitamin C-folic acid (NEPHROCAPS) capsule 1 capsule, 1 capsule, Oral, Daily With MD Maribell Crews  [START ON 4/4/2022] carvedilol (COREG) tablet 3 125 mg, 3 125 mg, Oral, BID With Meals, MD Maribell Venegas  [START ON 4/4/2022] cholecalciferol (VITAMIN D3) tablet 2,000 Units, 2,000 Units, Oral, Daily, Kev Villeda MD    heparin (porcine) subcutaneous injection 5,000 Units, 5,000 Units, Subcutaneous, Q8H [address], Kev Villeda MD, 5,000 Units at 04/03/22 2117    HYDROmorphone (DILAUDID) injection 0 5 mg, 0 5 mg, Intravenous, Q3H PRN, MD Maribell Venegas  [START ON 4/4/2022] losartan (COZAAR) tablet 50 mg, 50 mg, Oral, Daily, MD Maribell Venegas  [START ON 4/4/2022] nicotine (NICODERM CQ) 14 mg/24hr TD 24 hr patch 1 patch, 1 patch, Transdermal, Daily, Kev Villeda MD    nystatin (MYCOSTATIN) powder, , Topical, BID, Kev Villeda MD    [facility]ron [facility]) injection 4 mg, 4 mg, Intravenous, Q6H PRN, Kev Villeda MD    oxyCODONE (ROXICODONE) IR tablet 2 5 mg, 2 5 mg, Oral, Q6H PRN, Kev Villeda MD    oxyCODONE (ROXICODONE) IR tablet 5 mg, 5 mg, Oral, Q6H PRN, Kev Villeda MD  Maribell Kilgore  [START ON 4/4/2022] rifaximin (XIFAXAN) tablet 550 mg, 550 mg, Oral, Q12H [address], Kev Villeda MD    Blood Culture:   Lab Results   Component Value Date    BLOODCX No Growth After 5 Days  10/19/2021    BLOODCX No Growth After 5 Days   10/19/2021       Wound Culture:   Lab Results   Component Value Date    WOUNDCULT 1+ Growth of Citrobacter freundii (A) 10/08/2021    WOUNDCULT 1+ Growth of Enterobacter cloacae complex (A) 10/08/2021       Ins and Outs:  No intake/output data recorded  Physical Exam:   /66   Pulse 72   Temp 97 6 °F (36 4 °C)   Resp 16   SpO2 99%   Gen: Alert and oriented to person, place, time  HEENT: EOMI, eyes clear, moist mucus membranes, hearing intact  Respiratory: Bilateral chest rise  No audible wheezing found  Cardiovascular: Regular Rate and Rhythm  Abdomen: soft nontender/nondistended  Musculoskeletal: right lower extremity  · Skin intact, limb externally rotated  · Tender to palpation over hip  · Pain with internal/external rotation of the hip  · Sensation intact L3-S1  · Intact ankle dorsi/plantar flexion, EHL/FHL  · 2+ DP pulse    Radiology:   I personally reviewed the films  X-rays right hip shows right cephalomedullarly josephine with intertrochanteric femur fracture and superior screw cut out     _*_*_*_*_*_*_*_*_*_*_*_*_*_*_*_*_*_*_*_*_*_*_*_*_*_*_*_*_*_*_*_*_*_*_*_*_*_*_*_*_*    Assessment:  66 y o male status post fall x 4 days with right cephalomedullarly screw cut out  The patient will require operative treatment including removal of implant and conversion to arthroplasty  Informed consent was obtained      Plan:   · Non weight bearing right lower extremity  · Analgesics for pain  · NPO at midnight  · Medicine consult for all medical management and preoperative risk stratification  · To OR for removal of hardware and conversion to hemiarthroplasty  · Dispo: Ortho will follow      Wilma Amaya MD

## 2022-04-04 NOTE — ASSESSMENT & PLAN NOTE
Lab Results   Component Value Date    EGFR 5 04/04/2022    EGFR 6 04/03/2022    EGFR 3 03/29/2022    CREATININE 9 12 (H) 04/04/2022    CREATININE 8 24 (H) 04/03/2022    CREATININE 14 30 (H) 03/29/2022     History of ESRD on hemodialysis on MWF schedule  Hemodialysis per nephrology

## 2022-04-04 NOTE — ASSESSMENT & PLAN NOTE
Nausea vomiting diarrhea likely gastroenteritis  Spouse reports ongoing symptoms, on and off since couple of weeks  Follow-up on stool studies  If persistent or worsens will consider GI consultation  Supportive care

## 2022-04-04 NOTE — ASSESSMENT & PLAN NOTE
Lab Results   Component Value Date    K 4 7 04/05/2022    K 3 7 04/04/2022    K 3 9 04/03/2022       Acute on chronic, possibly 2/2 GI losses   Now improved

## 2022-04-04 NOTE — ANESTHESIA PREPROCEDURE EVALUATION
Procedure:  REMOVAL HARDWARE HIP (Right Hip)  HEMIARTHROPLASTY HIP (BIPOLAR) (Right Hip)    Relevant Problems   CARDIO   (+) CAD (coronary artery disease)   (+) Hypertension   (+) Mixed hyperlipidemia      ENDO   (+) Long-term insulin use in type 2 diabetes (HCC)   (+) Type 2 diabetes mellitus (HCC)      /RENAL   (+) End stage renal disease (HCC)      HEMATOLOGY   (+) Anemia   (+) Anemia in chronic illness   (+) Pernicious anemia   (+) Thrombocytopenia (HCC)      PULMONARY   (+) Acute upper respiratory infection      Diabetes mellitus ([facility] [address] )    Chronic kidney disease on dialysis    Hypertension    Myocardial infarction ([facility] [address] ) x3 others were in 2009 & 2010   Stroke [facility]) x1   Cerebral thrombosis With Cerebral Infarction:  Last Assessed:  October 20, 2016   Labyrinthitis    Sleep disturbances    Type 2 diabetes, uncontrolled, with renal manifestation        Physical Exam    Airway    Mallampati score: II  TM Distance: >3 FB  Neck ROM: full     Dental       Cardiovascular  Cardiovascular exam normal    Pulmonary  Pulmonary exam normal     Other Findings        Anesthesia Plan  ASA Score- 4     Anesthesia Type- general with ASA Monitors  Additional Monitors:   Airway Plan:           Plan Factors-Exercise tolerance (METS): >4 METS  Chart reviewed  EKG reviewed  Imaging results reviewed  Existing labs reviewed  Patient summary reviewed  Induction- intravenous  Postoperative Plan- Plan for postoperative opioid use  Planned trial extubation    Informed Consent- Anesthetic plan and risks discussed with patient  I personally reviewed this patient with the CRNA  Discussed and agreed on the Anesthesia Plan with the CRNA  Mesha Garcia

## 2022-04-04 NOTE — ANESTHESIA POSTPROCEDURE EVALUATION
Post-Op Assessment Note    CV Status:  Stable  Pain Score: 0    Pain management: adequate     Mental Status:  Alert and awake   Hydration Status:  Euvolemic and stable   PONV Controlled:  Controlled   Airway Patency:  Patent and adequate      Post Op Vitals Reviewed: Yes      Staff: CRNA         No complications documented      BP   90/51   Temp 98 2 °F (36 8 °C) (04/04/22 1548)    Pulse  107   Resp   12   SpO2   100 RA

## 2022-04-04 NOTE — ASSESSMENT & PLAN NOTE
Likely 2/2 viral gastroenteritis  Spouse reports ongoing symptoms, on and off since couple of weeks  Follow-up on stool studies  If persistent or worsens will consider GI consultation  Supportive care

## 2022-04-04 NOTE — ASSESSMENT & PLAN NOTE
Lab Results   Component Value Date    EGFR 6 04/03/2022    EGFR 3 03/29/2022    EGFR 3 03/28/2022    CREATININE 8 24 (H) 04/03/2022    CREATININE 14 30 (H) 03/29/2022    CREATININE 13 22 (H) 03/28/2022     History of ESRD on hemodialysis  Hemodialysis per nephrology

## 2022-04-04 NOTE — ASSESSMENT & PLAN NOTE
Right intertrochanteric femur fracture following fall  Referred to [address] for orthopedic evaluation and possible operative intervention  Orthopedics following  DVT prophylaxis  Analgesics  Physical therapy  Preoperative evaluation - patient at least intermediate risk for proposed surgical procedure  06/2021 Outpatient cardiac stress test and cardiology inputs for preoperative evaluation at that time noted

## 2022-04-04 NOTE — PROGRESS NOTES
Dialysis note:    I saw and examined patient during hemodialysis treatment at 9:50 AM on 4/4/2022  The patient was receiving hemodialysis for treatment of end stage renal disease  I also reviewed vital signs, intake and output, lab results and recent events, and agree with dialysis order  Tolerating HD

## 2022-04-04 NOTE — OP NOTE
OPERATIVE REPORT  PATIENT NAME: Lina Phan    :  1955  MRN: [id number]  Pt Location: BE OR ROOM 18    SURGERY DATE: 2022    Surgeon(s) and Role:     * Nydia Rene MD - Primary     * Eddi Harrison MD - Assisting     * Vanda Bond PA-C - Assisting    Preop Diagnosis:  Displaced intertrochanteric fracture of right femur, initial encounter for closed fracture ([facility] ) Felisha Hassan    Post-Op Diagnosis Codes:     * Displaced intertrochanteric fracture of right femur, initial encounter for closed fracture ([facility] [address] ) [S72 141A]    Procedure(s) (LRB):  REMOVAL HARDWARE HIP (Right)  HEMIARTHROPLASTY HIP (BIPOLAR) (Right)    Specimen(s):  * No specimens in log *    Estimated Blood Loss:   500 mL    Drains:  * No LDAs found *    Anesthesia Type:   Choice    Operative Indications:  Displaced intertrochanteric fracture of right femur, initial encounter for closed fracture ([facility] ) [S72 141A]  Mechanical failure of internal orthopedic device right hip    Operative Findings:  TFN removed, bipolar hemiarthroplasty performed  13  Revision corail   Fifty-two outer shell, 28+ 5 inner head     Complications:   None    Procedure and Technique: Following induction of adequate level of general anesthesia, he was then placed in the left leg is, right-side-up position  An axillary roll was placed underneath the left axilla  The right hip and lateral thigh were then prepped draped sterilely  A posterior-lateral approach was created incorporating his 2 previous surgical incisions  Full-thickness flaps raised in order to access the tensor fascia  This was split, expose the deep layer the hip  The posterior soft tissue sleeve was taken off the proximal femur with subperiosteal dissection  Hip capsule was opened  The femoral head was then dislocated posteriorly  Screw cut of the femoral head was identified  The top of the nail was identified, the set screw was backed off  The lag screw was then removed    A freshen up cut was performed along the base the femoral neck, femoral head was sized on the back table  Utilizing a ball on a stick technique, 52 outer shell was noted be appropriate  TFN nail was removed if the distal locking bolt was removed  A tension cable was placed between the cortical hole proximally and along the proximal shaft  This was to prevent iatrogenic fracture during femoral broaching  The femoral canal was then sounded, and broached  Utilizing 13  Broach, good fit and fill were noted  The 13 trial was inserted, this was mated to a 52 outer shell, 28+ 5 inner head, hip was located, taken through range of motion, found to be quite stable  Trial components removed if the hip was carefully dislocated  The insert components were assembled and introduced the patient's right hip in standard fashion  Hip was once more located, taken through range of motion, deemed stable  Satisfied with the extent of surgery, the wounds then flushed with saline closed  A Betadine soak was initiated  Deep layers were closed number Vicryl suture  The tensor fascia was then closed number Vicryl suture  The subcu tissue closed with a mixture 1  For deep layer, 2 O Vicryl for the subcutaneous tissues, and skin staples the skin  Sterile dressings were applied    He was then awakened from general anesthesia and taken recovery room stable condition plans to include hip dislocation precautions, physical therapy weight-bearing to tolerance is my recommendation, and it will be up to the medical team to determine at this patient with end-stage renal disease requires any DVT prophylaxis stronger than what he currently takes   I was present for the entire procedure    Patient Disposition:  PACU       SIGNATURE: Jamie Drake MD  DATE: April 4, 2022  TIME: 3:38 PM

## 2022-04-04 NOTE — ASSESSMENT & PLAN NOTE
Lab Results   Component Value Date    HGBA1C 4 4 03/29/2022       Recent Labs     04/03/22  0912   POCGLU 134       Blood Sugar Average: Last 72 hrs:     A1c 4 4  Monitor Accu-Cheks  Avoid hypoglycemia  Hypoglycemia protocol in place

## 2022-04-04 NOTE — ASSESSMENT & PLAN NOTE
Lab Results   Component Value Date    HGBA1C 4 4 03/29/2022     Recent Labs     04/03/22  0912 04/04/22  0659   POCGLU 134 94   Blood Sugar Average: Last 72 hrs:  (P) 94   Monitor Accu-Cheks, if normal over next 24 hrs will d/c   Hypoglycemia protocol in place

## 2022-04-04 NOTE — ASSESSMENT & PLAN NOTE
Lab Results   Component Value Date    HGB 7 9 (L) 04/05/2022    HGB 9 9 (L) 04/04/2022    HGB 11 8 (L) 04/03/2022     Chronic anemia with macrocytosis  Iron 52%, TIBC 145, Iron 75   FA 3 7, B12 419  Acute post-op blood loss anemia due to a drop in Hbg of > 2 0g from preop levels  Monitor vital signs and resuscitate as needed  Consider blood transfusion in the next 24 hours with hemodialysis if continues to drop

## 2022-04-04 NOTE — CONSULTS
Consultation - Nephrology   Kaleb Maddi Bishop 78 y o  male MRN: [id number]  [facility] Encounter: [id number]    ASSESSMENT AND PLAN:  Patient is 57-year-old male with ESRD on HD on MWF schedule, anemia, hypertension, diabetes, CVA, presented with right side hip pain  We are consulted for dialysis management  ESRD on HD on MWF schedule, [address]  -HD today  Outpatient dry weight 77 kg  Today pre HD weight 81 2 kg  Goal UF to outpatient dry weight   -currently remains on LR 50 mL/hour in anticipation of surgery today  Discontinue IV fluid once patient is tolerating diet today  CKD anemia  -hemoglobin dropped to 9 9 today  Not on ALICE based on recent outpatient dialysis records   -transfuse p r n  For hemoglobin less than seven  -I had detailed discussion regarding risks versus benefit of Epogen  He does have remote history of CVA many years ago and also recently had a fistula thrombosis requiring thrombolysis and angioplasty in March 2022   -currently not on Epogen although if hemoglobin continued to drop further, this may need to be considered, patient fully understands all the risks involved and okay with Epogen if needed  Mild hyponatremia, continue to monitor with dialysis, strict fluid restriction 1 2 L per day  CKD BMD, continue to monitor phosphorus, PTH  Access, left upper arm AV fistula    Hypertension  -blood pressure overall acceptable with occasional high readings  ?  Pain contributing  Continue to monitor with UF removal   -currently on Coreg 3 125 mg b i d , losartan 50 mg daily,    Hypokalemia, improved with replacement  Continue to monitor with dialysis  Mechanical complication of internal orthopedic device on right hip, plan noted for hardware removal and bipolar hemiarthroplasty as per Orthopedic      Discussed above plan in detail with primary team     HISTORY OF PRESENT ILLNESS:  Requesting Physician: Smita Matt MD  Reason for Consult: ESRD    Alannah Alberts is a 78y o  year old male who was admitted to [place] [address] after presenting with right hip pain  A renal consultation is requested today for assistance in the management of dialysis  Old medical records including prior lab values were reviewed from [facility]  Patient had a fall several days ago, has been having right hip pain  Recently had a fistula thrombosis requiring thrombolysis  Patient currently has some right-sided hip pain, he is scheduled for OR as per Orthopedic  He had his dialysis on Friday without any issues  He denies any chest pain or worsening shortness of breath at the time of my encounter  PAST MEDICAL HISTORY:  Past Medical History:   Diagnosis Date    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    With Cerebral Infarction:  Last Assessed:  October 20, 2016    Chronic kidney disease 2012    on dialysis     Diabetes mellitus ([facility] [address] )     Hypertension     Labyrinthitis 09/10/2011    Myocardial infarction ([facility] [address] ) 2014    x3 others were in 2009 & 2010    Sleep disturbances 09/20/2010    Stroke ([facility] [address] ) 2007    x1    Type 2 diabetes, uncontrolled, with renal manifestation 05/03/2017       PAST SURGICAL HISTORY:  Past Surgical History:   Procedure Laterality Date    AV FISTULA PLACEMENT      CARDIAC SURGERY  2010    stents x3    COLONOSCOPY N/A 12/12/2016    Procedure: COLONOSCOPY;  Surgeon: Yomaira Katz MD;  Location: [place] [address] GI LAB; Service:     COLONOSCOPY N/A 4/3/2017    Procedure:  COLONOSCOPY;  Surgeon: Yomaira Katz MD;  Location: [place] [address] GI LAB; Service:     IR AV FISTULA/GRAFT DECLOT  4/29/2021    IR AV FISTULA/GRAFT DECLOT  3/25/2022    IR AV FISTULAGRAM/GRAFTOGRAM  3/28/2022    IR TUNNELED CENTRAL LINE REMOVAL  12/7/2021    IR TUNNELED DIALYSIS CATHETER PLACEMENT  5/24/2021    VA ANASTOMOSIS,AV,ANY SITE Left 7/22/2021    Procedure: CREATION FISTULA ARTERIOVENOUS (AV);   Surgeon: Marleta Jeans, MD;  Location: [address] [place] [address];  Service: Vascular ALLERGIES:  No Known Allergies    SOCIAL HISTORY:  Social History     Substance and Sexual Activity   Alcohol Use Yes    Comment: rarely - No alcohol use per [employer]     Social History     Substance and Sexual Activity   Drug Use No     Social History     Tobacco Use   Smoking Status Current Every Day Smoker    Packs/day: 0 50    Years: 30 00    Pack years: 15 00    Types: Cigarettes   Smokeless Tobacco Never Used       FAMILY HISTORY:  Family History   Problem Relation Age of Onset    Leukemia Mother     Crohn's disease Father     Transient ischemic attack Brother        MEDICATIONS:    Current Facility-Administered Medications:     acetaminophen (TYLENOL) tablet 975 mg, 975 mg, Oral, Q8H [address], Emily Ayon MD, 975 mg at 04/04/22 0524    aspirin (ECOTRIN LOW STRENGTH) EC tablet 81 mg, 81 mg, Oral, Daily, Emily Ayon MD    atorvastatin (LIPITOR) tablet 40 mg, 40 mg, Oral, Daily With Dinner, Emily Ayon MD    b complex-vitamin C-folic acid (NEPHROCAPS) capsule 1 capsule, 1 capsule, Oral, Daily With Dinner, Emily Ayon MD    carvedilol (COREG) tablet 3 125 mg, 3 125 mg, Oral, BID With Meals, Emily Ayon MD    cholecalciferol (VITAMIN D3) tablet 2,000 Units, 2,000 Units, Oral, Daily, Emily Ayon MD    folic acid (FOLVITE) tablet 1 mg, 1 mg, Oral, Daily, Emily Ayon MD    HYDROmorphone (DILAUDID) injection 0 5 mg, 0 5 mg, Intravenous, Q3H PRN, Emily Ayon MD    lactated ringers infusion, 50 mL/hr, Intravenous, Continuous, Daniel Das MD, Last Rate: 50 mL/hr at 04/04/22 0005, 50 mL/hr at 04/04/22 0005    losartan (COZAAR) tablet 50 mg, 50 mg, Oral, Daily, Emily Ayon MD    nicotine (NICODERM CQ) 14 mg/24hr TD 24 hr patch 1 patch, 1 patch, Transdermal, Daily, Emily Ayon MD    nystatin (MYCOSTATIN) powder, , Topical, BID, Emily Ayon MD, Given at 04/03/22 2242    ondansetron (Chavo Weinberg) injection 4 mg, 4 mg, Intravenous, Q6H PRN, Carolynn Romberg, MD    oxyCODONE (ROXICODONE) IR tablet 2 5 mg, 2 5 mg, Oral, Q6H PRN, Carolynn Romberg, MD    oxyCODONE (ROXICODONE) IR tablet 5 mg, 5 mg, Oral, Q6H PRN, Carolynn Romberg, MD    rifaximin Armando Quant) tablet 550 mg, 550 mg, Oral, Q12H [facility], Carolynn Romberg, MD    REVIEW OF SYSTEMS:  Complete 10 point review of systems were obtained and discussed in length with the patient  Complete review of systems were negative / unremarkable except mentioned in the HPI section       PHYSICAL EXAM:  Current Weight:    First Weight:    Vitals:    04/04/22 0900   BP: 136/72   Pulse: 71   Resp: 20   Temp:    SpO2:        Intake/Output Summary (Last 24 hours) at 4/4/2022 0926  Last data filed at 4/4/2022 0745  Gross per 24 hour   Intake 696 67 ml   Output 0 ml   Net 696 67 ml     Wt Readings from Last 3 Encounters:   04/03/22 80 3 kg (177 lb 0 5 oz)   03/28/22 78 kg (171 lb 14 4 oz)   03/25/22 80 2 kg (176 lb 12 9 oz)     Temp Readings from Last 3 Encounters:   04/04/22 97 7 °F (36 5 °C) (Oral)   04/03/22 97 9 °F (36 6 °C)   03/29/22 97 5 °F (36 4 °C) (Oral)     BP Readings from Last 3 Encounters:   04/04/22 136/72   04/03/22 124/68   03/29/22 149/82     Pulse Readings from Last 3 Encounters:   04/04/22 71   04/03/22 (!) 51   03/29/22 87        Physical Examination:  General:  Lying in bed, no acute distress  Eyes:  Mild conjunctival pallor present  ENT:  External examination of ears and nose unremarkable  Neck:  No obvious lymphadenopathy appreciated  Respiratory:  Bilateral air entry present  CVS:  S1, S2 present  GI:  Soft, nondistended  CNS:  Active alert oriented x3  Extremities:  No significant edema  Psych:  Conscious, coherent oriented  Skin:  No new rash    Invasive Devices:      Lab Results:   Results from last 7 days   Lab Units 04/04/22  0432 04/03/22 2019 04/03/22  0939 03/29/22  0642   WBC Thousand/uL 3 41*  --  5 55 4 85   HEMOGLOBIN g/dL 9 9* --  11 8* 11 6*   HEMATOCRIT % 31 1*  --  37 1 35 0*   PLATELETS Thousands/uL  --   --  100* 85*   POTASSIUM mmol/L 3 7 3 9 2 6* 3 2*   CHLORIDE mmol/L 102  --  98* 100   CO2 mmol/L 28  --  26 23   BUN mg/dL 19  --  15 41*   CREATININE mg/dL 9 12*  --  8 24* 14 30*   CALCIUM mg/dL 7 9*  --  7 7* 7 3*   MAGNESIUM mg/dL  --   --  2 0  --        Other Studies:   XR femur 2 vw right   Final Result by Say Huggins MD (04/04 1425)      Stable appearance right hip fracture status post ORIF                  Workstation performed: [id number]         Chest x-ray images personally reviewed which does not show any significant pleural effusion  Portions of the record may have been created with voice recognition software  Occasional wrong word or "sound a like" substitutions may have occurred due to the inherent limitations of voice recognition software  Read the chart carefully and recognize, using context, where substitutions have occurred

## 2022-04-04 NOTE — CONSULTS
Consultation - Geriatric Medicine   Princess Bishop 78 y o  male MRN: [id number]  Unit/Bed#: [facility] 607-01 Encounter: [id number]      Assessment/Plan     Ambulatory dysfunction with fall  -s/p fall approximately 2 weeks prior to initial presentation  -(+) head strike (-) loss of consciousness  -injuries as outlined below  -recently requiring use of cane for ambulation  -hx recurrent falls with at least one additional  in past six months  -remains high risk future falls due to age, impaired vision, hx fall, right hemiparesis as late effect of remote CVA, deconditioning/debility and unfamiliar environment   -encourage good body mechanics and assist with all transfers  -keep personal items and call bell close to prevent reaching  -maintain environment free of fall hazards  -encourage appropriate footwear and adequate lighting at all times when out of bed  -recommend home fall risk assessment and personal fall alert system if returning home  -PT and OT pending    Right femoral head screw cut out  -s/p fall as outlined above  -underwent implant removal and conversion to arthroplasty with Orthopedics yesterday  -WBAT  -neurovascular checks per protocol  -continue acute pain control  -continue tx acute blood loss anemia  -PT and OT pending    Acute pain due to trauma  -recommend pain control per Geriatric pain protocol:  Tylenol 975mg Q8H scheduled  Roxicodone 2 5mg Q4H PRN moderate pain  Roxicodone 5mg Q4H PRN severe pain  Dilaudid 0 2mg Q4H PRN  -consider adjuncts such as lidocaine patch topically  -encourage addition of non-pharmacologic pain treatment including ice and frequent repositioning  -recommend  bowel regimen to prevent and treat constipation due to increased risk with acute pain and opiate pain medications    Acute blood loss anemia  -evidenced by >2g drop in Hb from 11 8 on admission to 7 9 this morning  -monitor for ongoing/active bleeding  -transfuse/fluid resuscitate as indicated with consideration of volume status given patient maintained on HD for ESRD    Hypotension  -persistently hypotensive overnight lowest being 85/54 - asymptomatic  -optimize hemodynamics and recommend hold parameters for antihypertensives    ESRD on HD  -s/p HD session yesterday  -Nephrology on board - managing HD    Osteoporosis  -evidence by fragility fracture femur sustained in fall from standing height  -multifactorial including bone mineral disease secondary to ESRD  -consider checking vitamin-D levels to ensure adequate stores  -outpatient follow-up with PCP for discussion definitive anti-resorptive therapy    DM-II without long-term use of insulin  -hemoglobin A1c 4 4  -diet controlled  -continue close outpatient follow-up with PCP for routine diabetic screenings and cares    Nicotine dependence to cigarettes  -continue daily bedside cessation counseling  -nicotine patch available during hospitalization    Cognitive screening  -alert and oriented, denies memory concerns  -reportedly independent with ADLs and IADLs  -no prior cognitive testing on record for review   -CTH obtained 1/31/22 personally reviewed, reveals at least mild to moderate chronic microangiopathic changes  -B12 419, recommend checking TSH  -encourage use of sensory assist devices such as corrective lenses at appropriate times to reduce risk sensory impairment contributing to isolation, confusion, encephalopathy and more precipitous cognitive decline  -encourage patient remain physically, socially, and cognitively active and engaged to maintain cognitive acuity    Impaired Vision  -recommend use of corrective lenses at all appropriate times  -encourage adequate lighting and encourage use of assistance with ambulation  -keep personal belongings close to person to avoid reaching  -encourage appropriate footwear at all times  -consider large font for printed materials provided to patient    Deconditioning/debility/frailty  -clinical frailty scale stage 5, mildly frail  -multifactorial including age, history CVA with residual right hemiparesis, ESRD on HD multiple additional chronic medical comorbidities now with fall and acute traumatic injury superimposed  -albumin low at 2 4, encourage well-balanced nutrition, consider nutritional supplements between meals if oral intake is poor  -continue optimization chronic medical conditions and address acute metabolic derangements as arise  -monitor for and treat any anxiety/mood/depression symptoms if present as may impact patient's response to therapies as well as overall sense of well-being and quality of life  -continue psychosocial support of patient and family    Delirium precautions  -Patient is high risk of delirium due to age, fall, traumatic injury, acute pain, hospitalization  -Initiate delirium precautions  -maintain normal sleep/wake cycle  -minimize overnight interruptions, group overnight vitals/labs/nursing checks as possible  -dim lights, close blinds and turn off tv to minimize stimulation and encourage sleep environment in evenings  -ensure that pain is well controlled   -monitor for fecal and urinary retention which may precipitate delirium  -encourage early mobilization and ambulation with assistance  -provide frequent reorientation and redirection as indicated and appropriate  -encourage family and friends at the bedside to help help calm patient if anxious - for familiarity and reassurance  -minimize use of medications which may precipitate or worsen delirium such as tramadol, benzodiazepine, anticholinergics, and benadryl  -encourage hydration and nutrition   -redirect unwanted behaviors as first line    Home medication review    Will need to confirm with [address] [place] [address] [phone] when open during business hours as not open time of initial consultation      Care coordination:  Rounded with Jailene Cunningham (RN) and Laura Fu (RN)    History of Present Illness   Physician Requesting Consult: Mj Rowan MD  Reason for Consult / Principal Problem:  Fall  Hx and PE limited by: N/A  Additional history obtained from: Chart review and patient evaluation    HPI: Lucas Leung is a 78y o  year old male with ESRD on HD, hypertension, DM-II with long-term use of insulin, nicotine dependence due to cigarettes, B12 deficiency, vitamin-D deficiency, rickets, mild protein calorie malnutrition and hyperlipidemia who is admitted to the medical service with ambulatory dysfunction and fall found to have femur fracture on admission imaging, he is being seen in consultation by Geriatrics for high risk developing delirium during hospitalization  He seen examined at the bedside where she is lying resting comfortably, he reports that about 2 weeks ago he was having nausea vomiting and diarrhea and was rushing to the restroom when he tripped over his manan lift causing him to fall striking the right side of his body, he reports head strike with no loss of consciousness  He [facility] the [facility] on [facility] with severe right hip pain and was found to have screw cutout of the right femur which was recently inserted due to femur fracture  He was transferred to the [facility] for orthopedic evaluation and surgical intervention  He underwent conversion to right hip hemiarthroplasty yesterday with Orthopedics and reports that other than mild soreness he has been doing well since the procedure  Prior to admission he was residing home with his wife and family, he reports independence with ADLs and IADLs including driving until his initial fall this past December resulting in femur fracture requiring surgical fixation  Since that time his ambulation has been improving and he was only requiring use of cane for ambulation  He requires use of glasses but reports does not frequently wear them, he denies use of hearing aids or dentures, denies memory or cognitive concerns      Inpatient consult to Gerontology  Consult performed by: Raul Muhammad DO  Consult ordered by: Alberto Hardin PA-C        Review of Systems   Constitutional: Negative for appetite change, chills and fever  HENT: Negative  Eyes: Positive for visual disturbance (Requires glasses for distance -does not typically wear)  Respiratory: Negative  Negative for shortness of breath  Cardiovascular: Negative  Gastrointestinal: Negative  Endocrine: Negative  Genitourinary: Negative  Musculoskeletal: Positive for arthralgias ( right hip) and gait problem  Skin: Negative  Neurological: Positive for weakness ( right-sided as result of remote CVA)  Negative for dizziness, light-headedness, numbness and headaches  Hematological: Negative  Psychiatric/Behavioral: Positive for sleep disturbance ( last evening due to pain)  All other systems reviewed and are negative  Historical Information   Past Medical History:   Diagnosis Date    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    With Cerebral Infarction:  Last Assessed:  October 20, 2016    Chronic kidney disease 2012    on dialysis     Diabetes mellitus ([facility] [address] )     Hypertension     Labyrinthitis 09/10/2011    Myocardial infarction ([facility] [address] ) 2014    x3 others were in 2009 & 2010    Sleep disturbances 09/20/2010    Stroke [facility]) 2007    x1    Type 2 diabetes, uncontrolled, with renal manifestation 05/03/2017     Past Surgical History:   Procedure Laterality Date    AV FISTULA PLACEMENT      CARDIAC SURGERY  2010    stents x3    COLONOSCOPY N/A 12/12/2016    Procedure: COLONOSCOPY;  Surgeon: No Nava MD;  Location: [place] [address] GI LAB; Service:     COLONOSCOPY N/A 4/3/2017    Procedure:  COLONOSCOPY;  Surgeon: No Nava MD;  Location: [place] [address] GI LAB;   Service:     IR AV FISTULA/GRAFT DECLOT  4/29/2021    IR AV FISTULA/GRAFT DECLOT  3/25/2022    IR AV FISTULAGRAM/GRAFTOGRAM  3/28/2022    IR TUNNELED CENTRAL LINE REMOVAL  12/7/2021    IR TUNNELED DIALYSIS CATHETER PLACEMENT  5/24/2021    [place] ANASTOMOSIS,AV,ANY SITE Left 7/22/2021    Procedure: CREATION FISTULA ARTERIOVENOUS (AV); Surgeon: Suma Holman MD;  Location: [place] [facility];  Service: Vascular     Social History   Social History     Substance and Sexual Activity   Alcohol Use Yes    Comment: rarely - No alcohol use per [employer]     Social History     Substance and Sexual Activity   Drug Use No     Social History     Tobacco Use   Smoking Status Current Every Day Smoker    Packs/day: 0 50    Years: 30 00    Pack years: 15 00    Types: Cigarettes   Smokeless Tobacco Never Used     Family History:   Family History   Problem Relation Age of Onset    Leukemia Mother     Crohn's disease Father     Transient ischemic attack Brother      Meds/Allergies   all current active meds have been reviewed    No Known Allergies    Objective     Intake/Output Summary (Last 24 hours) at 4/4/2022 0710  Last data filed at 4/4/2022 0501  Gross per 24 hour   Intake 496 67 ml   Output 0 ml   Net 496 67 ml     Invasive Devices  Report    Peripheral Intravenous Line            Peripheral IV 04/03/22 Right;Upper;Ventral (anterior) Arm <1 day          Line            Hemodialysis AV Fistula  Left Forearm -- days              Physical Exam  Vitals and nursing note reviewed  Constitutional:       General: He is not in acute distress  Appearance: Normal appearance  Comments: Elderly male appears stated age   HENT:      Head: Normocephalic  Nose: Nose normal       Mouth/Throat:      Mouth: Mucous membranes are dry  Eyes:      General: No scleral icterus  Right eye: No discharge  Left eye: No discharge  Conjunctiva/sclera: Conjunctivae normal       Comments: Pupils asymmetric, right slightly larger than the left   Neck:      Comments: Trachea midline   Phonation normal  Cardiovascular:      Rate and Rhythm: Normal rate and regular rhythm     Pulmonary:      Effort: Pulmonary effort is normal  No respiratory distress  Breath sounds: No wheezing  Abdominal:      General: Bowel sounds are normal  There is no distension  Palpations: Abdomen is soft  Tenderness: There is no abdominal tenderness  Musculoskeletal:      Cervical back: Neck supple  Right lower leg: No edema  Left lower leg: No edema  Comments: Reduced overall muscle mass    Abductor pillow in place   Skin:     General: Skin is warm and dry  Comments: Debris under nails of hands bilaterally   Neurological:      Mental Status: He is alert  Comments: Awake and alert, oriented, answers questions appropriately   Psychiatric:         Mood and Affect: Mood normal          Behavior: Behavior normal       Comments: Polite pleasant and cooperative       Lab Results:     I have personally reviewed pertinent lab results      I have personally reviewed the following imaging study reports in PACS:    4/3/22  - chest x-ray, right hip/pelvis  4/4/22 - right femur XR    Therapies:   PT:  Pending  OT:  Pending    VTE Prophylaxis: Heparin    Code Status: Level 1 - Full Code  Advance Directive and Living Will:      Power of :    POLST:      Family and Social Support:   Living Arrangements: Lives w/ Family members  Support Systems: Self  Assistance Needed: tbd  Type of Current Residence: Private residence  [address]: No    Goals of Care: get moving

## 2022-04-05 PROBLEM — E87.6 HYPOKALEMIA: Status: RESOLVED | Noted: 2022-03-28 | Resolved: 2022-04-05

## 2022-04-05 LAB
ANION GAP SERPL CALCULATED.3IONS-SCNC: 7 MMOL/L (ref 4–13)
BUN SERPL-MCNC: 11 MG/DL (ref 5–25)
CALCIUM SERPL-MCNC: 8.1 MG/DL (ref 8.3–10.1)
CHLORIDE SERPL-SCNC: 102 MMOL/L (ref 100–108)
CO2 SERPL-SCNC: 26 MMOL/L (ref 21–32)
CREAT SERPL-MCNC: 5.05 MG/DL (ref 0.6–1.3)
ERYTHROCYTE [DISTWIDTH] IN BLOOD BY AUTOMATED COUNT: 15.7 % (ref 11.6–15.1)
GFR SERPL CREATININE-BSD FRML MDRD: 10 ML/MIN/1.73SQ M
GLUCOSE SERPL-MCNC: 134 MG/DL (ref 65–140)
GLUCOSE SERPL-MCNC: 191 MG/DL (ref 65–140)
GLUCOSE SERPL-MCNC: 206 MG/DL (ref 65–140)
GLUCOSE SERPL-MCNC: 218 MG/DL (ref 65–140)
GLUCOSE SERPL-MCNC: 227 MG/DL (ref 65–140)
HCT VFR BLD AUTO: 25 % (ref 36.5–49.3)
HGB BLD-MCNC: 7.9 G/DL (ref 12–17)
MCH RBC QN AUTO: 34.6 PG (ref 26.8–34.3)
MCHC RBC AUTO-ENTMCNC: 31.6 G/DL (ref 31.4–37.4)
MCV RBC AUTO: 110 FL (ref 82–98)
MRSA NOSE QL CULT: NORMAL
PLATELET # BLD AUTO: 75 THOUSANDS/UL (ref 149–390)
PMV BLD AUTO: 10.5 FL (ref 8.9–12.7)
POTASSIUM SERPL-SCNC: 4.7 MMOL/L (ref 3.5–5.3)
RBC # BLD AUTO: 2.28 MILLION/UL (ref 3.88–5.62)
SODIUM SERPL-SCNC: 135 MMOL/L (ref 136–145)
WBC # BLD AUTO: 5.66 THOUSAND/UL (ref 4.31–10.16)

## 2022-04-05 PROCEDURE — 99232 SBSQ HOSP IP/OBS MODERATE 35: CPT | Performed by: PHYSICIAN ASSISTANT

## 2022-04-05 PROCEDURE — 99232 SBSQ HOSP IP/OBS MODERATE 35: CPT | Performed by: INTERNAL MEDICINE

## 2022-04-05 PROCEDURE — 99222 1ST HOSP IP/OBS MODERATE 55: CPT | Performed by: INTERNAL MEDICINE

## 2022-04-05 PROCEDURE — 82948 REAGENT STRIP/BLOOD GLUCOSE: CPT

## 2022-04-05 PROCEDURE — 97167 OT EVAL HIGH COMPLEX 60 MIN: CPT

## 2022-04-05 PROCEDURE — NC001 PR NO CHARGE: Performed by: ORTHOPAEDIC SURGERY

## 2022-04-05 PROCEDURE — 80048 BASIC METABOLIC PNL TOTAL CA: CPT | Performed by: ORTHOPAEDIC SURGERY

## 2022-04-05 PROCEDURE — 97163 PT EVAL HIGH COMPLEX 45 MIN: CPT

## 2022-04-05 PROCEDURE — 85027 COMPLETE CBC AUTOMATED: CPT | Performed by: ORTHOPAEDIC SURGERY

## 2022-04-05 RX ORDER — ALBUMIN (HUMAN) 12.5 G/50ML
25 SOLUTION INTRAVENOUS ONCE
Status: COMPLETED | OUTPATIENT
Start: 2022-04-05 | End: 2022-04-05

## 2022-04-05 RX ORDER — LOSARTAN POTASSIUM 50 MG/1
50 TABLET ORAL DAILY
Status: DISCONTINUED | OUTPATIENT
Start: 2022-04-06 | End: 2022-04-05

## 2022-04-05 RX ORDER — CALCIUM CARBONATE 200(500)MG
1000 TABLET,CHEWABLE ORAL 2 TIMES DAILY PRN
Status: DISCONTINUED | OUTPATIENT
Start: 2022-04-05 | End: 2022-04-09 | Stop reason: HOSPADM

## 2022-04-05 RX ORDER — MIDODRINE HYDROCHLORIDE 5 MG/1
5 TABLET ORAL ONCE
Status: COMPLETED | OUTPATIENT
Start: 2022-04-05 | End: 2022-04-05

## 2022-04-05 RX ORDER — CLOPIDOGREL BISULFATE 75 MG/1
75 TABLET ORAL DAILY
Status: DISCONTINUED | OUTPATIENT
Start: 2022-04-05 | End: 2022-04-09 | Stop reason: HOSPADM

## 2022-04-05 RX ORDER — CARVEDILOL 3.12 MG/1
3.12 TABLET ORAL 2 TIMES DAILY WITH MEALS
Status: DISCONTINUED | OUTPATIENT
Start: 2022-04-05 | End: 2022-04-09 | Stop reason: HOSPADM

## 2022-04-05 RX ADMIN — NYSTATIN: 100000 POWDER TOPICAL at 08:20

## 2022-04-05 RX ADMIN — ATORVASTATIN CALCIUM 40 MG: 40 TABLET, FILM COATED ORAL at 17:40

## 2022-04-05 RX ADMIN — ACETAMINOPHEN 975 MG: 325 TABLET ORAL at 05:45

## 2022-04-05 RX ADMIN — ACETAMINOPHEN 975 MG: 325 TABLET ORAL at 12:50

## 2022-04-05 RX ADMIN — INSULIN LISPRO 1 UNITS: 100 INJECTION, SOLUTION INTRAVENOUS; SUBCUTANEOUS at 17:41

## 2022-04-05 RX ADMIN — CALCIUM CARBONATE (ANTACID) CHEW TAB 500 MG 1000 MG: 500 CHEW TAB at 23:39

## 2022-04-05 RX ADMIN — NEPHROCAP 1 CAPSULE: 1 CAP ORAL at 17:40

## 2022-04-05 RX ADMIN — RIFAXIMIN 550 MG: 550 TABLET ORAL at 21:33

## 2022-04-05 RX ADMIN — Medication 2000 UNITS: at 08:19

## 2022-04-05 RX ADMIN — OXYCODONE HYDROCHLORIDE 5 MG: 5 TABLET ORAL at 03:55

## 2022-04-05 RX ADMIN — MIDODRINE HYDROCHLORIDE 5 MG: 5 TABLET ORAL at 18:20

## 2022-04-05 RX ADMIN — RIFAXIMIN 550 MG: 550 TABLET ORAL at 08:19

## 2022-04-05 RX ADMIN — HEPARIN SODIUM 5000 UNITS: 5000 INJECTION INTRAVENOUS; SUBCUTANEOUS at 21:33

## 2022-04-05 RX ADMIN — ASPIRIN 81 MG: 81 TABLET, COATED ORAL at 08:19

## 2022-04-05 RX ADMIN — NYSTATIN: 100000 POWDER TOPICAL at 17:42

## 2022-04-05 RX ADMIN — ALBUMIN (HUMAN) 25 G: 0.25 INJECTION, SOLUTION INTRAVENOUS at 18:29

## 2022-04-05 RX ADMIN — CLOPIDOGREL BISULFATE 75 MG: 75 TABLET ORAL at 12:49

## 2022-04-05 RX ADMIN — OXYCODONE HYDROCHLORIDE 5 MG: 5 TABLET ORAL at 19:37

## 2022-04-05 RX ADMIN — ACETAMINOPHEN 975 MG: 325 TABLET ORAL at 21:33

## 2022-04-05 RX ADMIN — HEPARIN SODIUM 5000 UNITS: 5000 INJECTION INTRAVENOUS; SUBCUTANEOUS at 05:45

## 2022-04-05 RX ADMIN — HEPARIN SODIUM 5000 UNITS: 5000 INJECTION INTRAVENOUS; SUBCUTANEOUS at 12:49

## 2022-04-05 RX ADMIN — HYDROMORPHONE HYDROCHLORIDE 0.5 MG: 1 INJECTION, SOLUTION INTRAMUSCULAR; INTRAVENOUS; SUBCUTANEOUS at 08:19

## 2022-04-05 RX ADMIN — HYDROMORPHONE HYDROCHLORIDE 0.5 MG: 1 INJECTION, SOLUTION INTRAMUSCULAR; INTRAVENOUS; SUBCUTANEOUS at 01:04

## 2022-04-05 RX ADMIN — OXYCODONE HYDROCHLORIDE 5 MG: 5 TABLET ORAL at 13:28

## 2022-04-05 RX ADMIN — CEFAZOLIN SODIUM 1000 MG: 1 SOLUTION INTRAVENOUS at 05:45

## 2022-04-05 NOTE — PROGRESS NOTES
Orthopedics   Nohemi Bishop 78 y o  male MRN: [id number]  [facility]      Subjective:  78 y o male post operative day 1 right conversion to hip hemiarthroplasty  Pt doing well  Pain comes and goes      Labs:  0   Lab Value Date/Time    HCT 25 0 (L) 04/05/2022 0439    HCT 31 1 (L) 04/04/2022 0432    HCT 37 1 04/03/2022 0939    HCT 33 2 (L) 04/11/2017 0712    HGB 7 9 (L) 04/05/2022 0439    HGB 9 9 (L) 04/04/2022 0432    HGB 11 8 (L) 04/03/2022 0939    HGB 11 1 (L) 04/11/2017 0712    INR 1 07 04/03/2022 0939    WBC 5 66 04/05/2022 0439    WBC 3 41 (L) 04/04/2022 0432    WBC 5 55 04/03/2022 0939    WBC 6 2 04/11/2017 0712       Meds:    Current Facility-Administered Medications:     acetaminophen (TYLENOL) tablet 975 mg, 975 mg, Oral, Q8H [address], Jose Malagon PA-C, 975 mg at 04/05/22 0545    aspirin (ECOTRIN LOW STRENGTH) EC tablet 81 mg, 81 mg, Oral, Daily, Jose Malagon PA-C, 81 mg at 04/04/22 1815    atorvastatin (LIPITOR) tablet 40 mg, 40 mg, Oral, Daily With Dinner, Siva Lobato PA-C, 40 mg at 04/04/22 1815    b complex-vitamin C-folic acid (NEPHROCAPS) capsule 1 capsule, 1 capsule, Oral, Daily With Dinner, Siva Lobato PA-C    carvedilol (COREG) tablet 3 125 mg, 3 125 mg, Oral, BID With Meals, Siva Lobato PA-C    cholecalciferol (VITAMIN D3) tablet 2,000 Units, 2,000 Units, Oral, Daily, Siva Lobato PA-C    heparin (porcine) subcutaneous injection 5,000 Units, 5,000 Units, Subcutaneous, Q8H [address], Praveen Ramirez MD, 5,000 Units at 04/05/22 0545    HYDROmorphone (DILAUDID) injection 0 5 mg, 0 5 mg, Intravenous, Q3H PRN, Siva Lobato PA-C, 0 5 mg at 04/05/22 0104    lactated ringers infusion, 50 mL/hr, Intravenous, Continuous, Jose Malagon PA-C, Last Rate: 50 mL/hr at 04/04/22 1847, 50 mL/hr at 04/04/22 1847    losartan (COZAAR) tablet 50 mg, 50 mg, Oral, Daily, Siva Lobato PA-C    nicotine (NICODERM CQ) 14 mg/24hr TD 24 hr patch 1 patch, 1 patch, Transdermal, Daily, Radha Mcclelland PA-C    nystatin (MYCOSTATIN) powder, , Topical, BID, Radha Mcclelland PA-C, Given at 04/04/22 1818    ondansetron (ZOFRAN) injection 4 mg, 4 mg, Intravenous, Q6H PRN, Katrina Morris MD    oxyCODONE (ROXICODONE) IR tablet 2 5 mg, 2 5 mg, Oral, Q6H PRN, Radha Mcclelland PA-C    oxyCODONE (ROXICODONE) IR tablet 5 mg, 5 mg, Oral, Q6H PRN, Radha Mcclelland PA-C, 5 mg at 04/05/22 0355    rifaximin (XIFAXAN) tablet 550 mg, 550 mg, Oral, Q12H [address], OCTAVIO Boucher-ANITHA, 550 mg at 04/04/22 2058    Blood Culture:   Lab Results   Component Value Date    BLOODCX No Growth After 5 Days  10/19/2021    BLOODCX No Growth After 5 Days  10/19/2021       Wound Culture:   Lab Results   Component Value Date    WOUNDCULT 1+ Growth of Citrobacter freundii (A) 10/08/2021    WOUNDCULT 1+ Growth of Enterobacter cloacae complex (A) 10/08/2021       Ins and Outs:  I/O last 24 hours: In: 1914 7 [P O :418; I V :846 7; Other:300; IV Piggyback:350]  Out: 4300 [Other:3800; Blood:500]          Physical Exam:  Vitals:    04/05/22 0634   BP: 95/54   Pulse: 72   Resp: 18   Temp:    SpO2: 99%     right lower extremity:  · Dressings C/D/I  · Able to DF ankle and fire EHL in setting of previous stroke  · Sensation grossly intact in setting of previous stroke  · 2+ dorsalis pedis     Assessment: 66 y o male post operative day 1 right conversion to hip hemiarthroplasty   Doing well    Plan:  · Weight Bearing as tolerated  · Up and out of bed  · Posterior total hip precautions  · Abduction pillow while in bed  · DVT prophylaxis  · Analgesics  · PT/OT  · Patient noted to have acute blood loss anemia due to a drop in Hbg of > 2 0g from preop levels, will monitor vital signs and resuscitate with IV fluids as needed    Katrina Morris MD

## 2022-04-05 NOTE — PLAN OF CARE
Problem: Prexisting or High Potential for Compromised Skin Integrity  Goal: Skin integrity is maintained or improved  Description: INTERVENTIONS:  - Identify patients at risk for skin breakdown  - Assess and monitor skin integrity  - Assess and monitor nutrition and hydration status  - Monitor labs   - Assess for incontinence   - Turn and reposition patient  - Assist with mobility/ambulation  - Relieve pressure over bony prominences  - Avoid friction and shearing  - Provide appropriate hygiene as needed including keeping skin clean and dry  - Evaluate need for skin moisturizer/barrier cream  - Collaborate with interdisciplinary team   - Patient/family teaching  - Consider wound care consult   Outcome: Progressing     Problem: MOBILITY - ADULT  Goal: Maintain or return to baseline ADL function  Description: INTERVENTIONS:  -  Assess patient's ability to carry out ADLs; assess patient's baseline for ADL function and identify physical deficits which impact ability to perform ADLs (bathing, care of mouth/teeth, toileting, grooming, dressing, etc )  - Assess/evaluate cause of self-care deficits   - Assess range of motion  - Assess patient's mobility; develop plan if impaired  - Assess patient's need for assistive devices and provide as appropriate  - Encourage maximum independence but intervene and supervise when necessary  - Involve family in performance of ADLs  - Assess for home care needs following discharge   - Consider OT consult to assist with ADL evaluation and planning for discharge  - Provide patient education as appropriate  Outcome: Progressing  Goal: Maintains/Returns to pre admission functional level  Description: INTERVENTIONS:  - Perform BMAT or MOVE assessment daily    - Set and communicate daily mobility goal to care team and patient/family/caregiver  - Collaborate with rehabilitation services on mobility goals if consulted  - Reposition patient every 2 hours    - Out of bed to chair    - Out of bed for meals   - Out of bed for toileting  - Record patient progress and toleration of activity level   Outcome: Progressing     Problem: Nutrition/Hydration-ADULT  Goal: Nutrient/Hydration intake appropriate for improving, restoring or maintaining nutritional needs  Description: Monitor and assess patient's nutrition/hydration status for malnutrition  Collaborate with interdisciplinary team and initiate plan and interventions as ordered  Monitor patient's weight and dietary intake as ordered or per policy  Utilize nutrition screening tool and intervene as necessary  Determine patient's food preferences and provide high-protein, high-caloric foods as appropriate       INTERVENTIONS:  - Monitor oral intake, urinary output, labs, and treatment plans  - Assess nutrition and hydration status and recommend course of action  - Evaluate amount of meals eaten  - Assist patient with eating if necessary   - Allow adequate time for meals  - Recommend/ encourage appropriate diets, oral nutritional supplements, and vitamin/mineral supplements  - Order, calculate, and assess calorie counts as needed  - Assess need for intravenous fluids  - Provide specific nutrition/hydration education as appropriate  - Include patient/family/caregiver in decisions related to nutrition  Outcome: Progressing     Problem: METABOLIC, FLUID AND ELECTROLYTES - ADULT  Goal: Electrolytes maintained within normal limits  Description: INTERVENTIONS:  - Monitor labs and assess patient for signs and symptoms of electrolyte imbalances  - Administer electrolyte replacement as ordered  - Monitor response to electrolyte replacements, including repeat lab results as appropriate  - Instruct patient on fluid and nutrition as appropriate  Outcome: Progressing  Goal: Fluid balance maintained  Description: INTERVENTIONS:  - Monitor labs   - Monitor I/O and WT  - Instruct patient on fluid and nutrition as appropriate  - Assess for signs & symptoms of volume excess or deficit  Outcome: Progressing     Problem: Potential for Falls  Goal: Patient will remain free of falls  Description: INTERVENTIONS:  - Educate patient/family on patient safety including physical limitations  - Instruct patient to call for assistance with activity   - Consult OT/PT to assist with strengthening/mobility   - Keep Call bell within reach  - Keep bed low and locked with side rails adjusted as appropriate  - Keep care items and personal belongings within reach  - Initiate and maintain comfort rounds  - Make Fall Risk Sign visible to staff  - Offer Toileting every 2 Hours, in advance of need  - Initiate/Maintain alarm  - Obtain necessary fall risk management equipment  - Apply yellow socks and bracelet for high fall risk patients  - Consider moving patient to room near nurses station  Outcome: Progressing

## 2022-04-05 NOTE — WOUND OSTOMY CARE
Consult Note - Wound   Janneth Sesay 78 y o  male MRN: [id number]  Unit/Bed#: [facility] 347-19 Encounter: [id number]        History and Present Illness: Patient is seen for wound care consult today   The patient is a 78year old male with a past medical history of CAD status post PCI, hypertension , diabetes , ESRD , hyperlipidemia and presented to the ED with nausea vomiting and right hip pain   Patient reports he had a fall at home and hit the manan lift   Patient has a right intertrochanteric femur  Fracture  He is POD 1 from a a repair of the fracture of the right femur   Assessment Findings:   1  Mid back - bruising with a small noted abrasion in the center of the bruise   The area is dry and intact with adhered tan scab  2  Sacral area - bruised area that is starting to resolve   3  Groin area is dry and intact with decrease redness - noted nystatin is ordered   4  Upper back area on the right is a bruised area   5  Right heel - question bruise versus DTI area   Noted dispersed area of bruised purple hue with blanchable areas   Allevyn foam on a heels elevated     Discussed the assessment with the patient and the RN   Plan of care placed below   Skin care plans:  1-Allevyn foams to bilateral heels letty with a P and date peel and check skin integrity every shift change every 3 days   2-Cleanse sacral buttocks with soap and water apply allevyn foa letty with a P and date check skin integrity every shift change every 3 days or when soiled   3-Elevate heels to offload pressure  4-Ehob cushion when out of bed  5-Turn/repoisiton q2h or when medically stable for pressure re-distribution on skin  6-Moisturize skin daily with skin nourishing cream  7  P 500 low air loss mattress   8   Mid back abrasion apply 3 M no sting daily         Wounds:  Wound 12/07/21 Catheter entry/exit site Chest Left (Active)       Wound 04/03/22  Abrasion(s) Back Medial (Active)   Wound Image   04/05/22 1050   Wound Description Clean;Dry; Intact 04/05/22 1050   Pressure Injury Stage  04/05/22 0815   Afua-wound Assessment Clean;Dry; Intact 04/05/22 1050   Wound Length (cm) 1 cm 04/05/22 1050   Wound Width (cm) 1 cm 04/05/22 1050   Wound Depth (cm) 0 cm 04/05/22 1050   Wound Surface Area (cm^2) 1 cm^2 04/05/22 1050   Wound Volume (cm^3) 0 cm^3 04/05/22 1050   Calculated Wound Volume (cm^3) 0 cm^3 04/05/22 1050   Drainage Amount None 04/05/22 1050   Treatments Site care 04/05/22 1050   Dressing Other (Comment) 04/05/22 1050   Patient Tolerance Tolerated well 04/05/22 1050       Wound 04/03/22 MASD Groin (Active)   Wound Image   04/03/22 2001   Wound Description Beefy red 04/05/22 0815   Afua-wound Assessment Clean;Dry; Intact;Fragile 04/05/22 0815   Treatments Site care 04/05/22 0815   Dressing Open to air 04/05/22 0815       Wound 04/03/22 Back Lateral;Right;Upper (Active)   Wound Image   04/05/22 1049   Wound Description Clean;Dry; Intact; Other (Comment) 04/05/22 1049   Pressure Injury Stage  04/05/22 0815   Afua-wound Assessment Clean;Dry; Intact 04/05/22 1049   Wound Length (cm) 0 cm 04/05/22 1049   Wound Width (cm) 0 cm 04/05/22 1049   Wound Depth (cm) 0 cm 04/05/22 1049   Wound Surface Area (cm^2) 0 cm^2 04/05/22 1049   Wound Volume (cm^3) 0 cm^3 04/05/22 1049   Calculated Wound Volume (cm^3) 0 cm^3 04/05/22 1049   Drainage Amount None 04/05/22 1049   Treatments Site care 04/05/22 1049   Dressing Open to air 04/05/22 1049   Patient Tolerance Tolerated well 04/05/22 1049       Wound 04/03/22 Buttocks (Active)   Wound Image   04/05/22 1051   Wound Description Clean;Dry; Intact; Other (Comment) 04/05/22 1051   Pressure Injury Stage  04/05/22 0815   Afua-wound Assessment Clean;Dry; Intact 04/05/22 1051   Wound Length (cm) 0 cm 04/05/22 1051   Wound Width (cm) 0 cm 04/05/22 1051   Wound Depth (cm) 0 cm 04/05/22 1051   Wound Surface Area (cm^2) 0 cm^2 04/05/22 1051   Wound Volume (cm^3) 0 cm^3 04/05/22 1051   Calculated Wound Volume (cm^3) 0 cm^3 04/05/22 1051   Drainage Amount None 04/05/22 1051   Treatments Cleansed;Site care 04/05/22 1051   Dressing Open to air 04/05/22 1051   Patient Tolerance Tolerated well 04/05/22 1051       Wound 04/05/22 Heel Right (Active)   Wound Image   04/05/22 1052   Wound Description Clean;Dry; Intact;Fragile 04/05/22 1052   Afua-wound Assessment Clean;Dry; Intact 04/05/22 1052   Wound Length (cm) 0 cm 04/05/22 1052   Wound Width (cm) 0 cm 04/05/22 1052   Wound Depth (cm) 0 cm 04/05/22 1052   Wound Surface Area (cm^2) 0 cm^2 04/05/22 1052   Wound Volume (cm^3) 0 cm^3 04/05/22 1052   Calculated Wound Volume (cm^3) 0 cm^3 04/05/22 1052   Drainage Amount None 04/05/22 1052   Treatments Site care 04/05/22 1052   Dressing Foam, Silicon (eg   Allevyn, etc) 04/05/22 1052   Patient Tolerance Tolerated well 04/05/22 1052     Wound care will follow weekly call or tiger text with questions

## 2022-04-05 NOTE — ASSESSMENT & PLAN NOTE
Lab Results   Component Value Date    HGBA1C 4 4 03/29/2022       Recent Labs     04/04/22  1608 04/04/22  1724 04/04/22 2053 04/05/22  0632   POCGLU 219* 152* 197* 227*       Blood Sugar Average: Last 72 hrs:  (P) 173

## 2022-04-05 NOTE — PROGRESS NOTES
NEPHROLOGY PROGRESS NOTE   Bo Gray 78 y o  male MRN: [id number]  Unit/Bed#: [facility] 607-01 Encounter: [id number]  Reason for Consult: ESRD    ASSESSMENT AND PLAN:  Patient is 68-year-old male with ESRD on HD on MWF schedule, anemia, hypertension, diabetes, CVA, presented with right side hip pain  We are consulted for dialysis management      ESRD on HD on MWF schedule, [facility]  -next HD tomorrow  Outpatient dry weight 77 kg, post HD weight 79 2 kg, bed scale       CKD anemia  -hemoglobin significantly dropping 7 9 today  Not on ALICE based on recent outpatient dialysis records   -transfuse p r n  For hemoglobin less than seven  -I had detailed discussion regarding risks versus benefit of Epogen  He does have remote history of CVA many years ago and also recently had a fistula thrombosis requiring thrombolysis and angioplasty in March 2022   -currently not on Epogen although if hemoglobin continued to drop further, this may need to be considered, patient fully understands all the risks involved and okay with Epogen if needed      Mild hyponatremia, continue to monitor with dialysis, strict fluid restriction 1 2 L per day      CKD BMD, continue to monitor phosphorus, PTH      Access, left upper arm AV fistula     Hypertension  -blood pressure lower    -currently on Coreg 3 125 mg b i d , discontinue losartan for time being      Mechanical complication of internal orthopedic device on right hip, status post bipolar hemiarthroplasty on 4/4/22      Discussed above plan in detail with primary team     SUBJECTIVE:  Patient seen and examined at bedside  Chest pain, shortness breath, nausea vomiting    OBJECTIVE:  Current Weight:    Vitals:    04/05/22 1038   BP: 95/53   Pulse: 71   Resp: 18   Temp:    SpO2: 98%       Intake/Output Summary (Last 24 hours) at 4/5/2022 1237  Last data filed at 4/5/2022 0900  Gross per 24 hour   Intake 1679 3 ml   Output 500 ml   Net 1179 3 ml     Wt Readings from Last 3 Encounters:   04/03/22 80 3 kg (177 lb 0 5 oz)   03/28/22 78 kg (171 lb 14 4 oz)   03/25/22 80 2 kg (176 lb 12 9 oz)     Temp Readings from Last 3 Encounters:   04/05/22 (!) 97 4 °F (36 3 °C)   04/03/22 97 9 °F (36 6 °C)   03/29/22 97 5 °F (36 4 °C) (Oral)     BP Readings from Last 3 Encounters:   04/05/22 95/53   04/03/22 124/68   03/29/22 149/82     Pulse Readings from Last 3 Encounters:   04/05/22 71   04/03/22 (!) 51   03/29/22 87        Physical Examination:  General:  Lying in bed, no distress   Eyes:  Mild conjunctival pallor  ENT:  External examination of ears, nose unremarkable  Neck:  No obvious lymphadenopathy appreciated  Respiratory:  Bilateral air entry present  CVS:  S1, S2 present  GI:  Soft, nondistended  CNS:  Active alert oriented x3  Skin:  No new rash  Musculoskeletal:  No obvious new gross deformity noted    Medications:    Current Facility-Administered Medications:     acetaminophen (TYLENOL) tablet 975 mg, 975 mg, Oral, Q8H [facility], Jose Malagon PA-C, 975 mg at 04/05/22 0545    aspirin (ECOTRIN LOW STRENGTH) EC tablet 81 mg, 81 mg, Oral, Daily, Jose Malagon PA-C, 81 mg at 04/05/22 8806    atorvastatin (LIPITOR) tablet 40 mg, 40 mg, Oral, Daily With Marquis Morse PA-C, 40 mg at 04/04/22 1815    b complex-vitamin C-folic acid (NEPHROCAPS) capsule 1 capsule, 1 capsule, Oral, Daily With Dinner, Royce Mariscal PA-C    carvedilol (COREG) tablet 3 125 mg, 3 125 mg, Oral, BID With Meals, Joya Guzman PA-C    cholecalciferol (VITAMIN D3) tablet 2,000 Units, 2,000 Units, Oral, Daily, Royce Mariscal PA-C, 2,000 Units at 04/05/22 0819    clopidogrel (PLAVIX) tablet 75 mg, 75 mg, Oral, Daily, Aaron Guzman PA-C    heparin (porcine) subcutaneous injection 5,000 Units, 5,000 Units, Subcutaneous, Q8H [facility], Amilcar Landis MD, 5,000 Units at 04/05/22 0545    HYDROmorphone (DILAUDID) injection 0 5 mg, 0 5 mg, Intravenous, Q3H PRN, Jose Malagon PA-C, 0 5 mg at 04/05/22 0819    insulin lispro (HumaLOG) 100 units/mL subcutaneous injection 1-5 Units, 1-5 Units, Subcutaneous, TID AC **AND** Fingerstick Glucose (POCT), , , TID AC, Aaron Guzman PA-C    insulin lispro (HumaLOG) 100 units/mL subcutaneous injection 1-5 Units, 1-5 Units, Subcutaneous, HS, Aaron Guzman PA-C    [START ON 4/6/2022] losartan (COZAAR) tablet 50 mg, 50 mg, Oral, Daily, Aaron Guzman PA-C    nicotine (NICODERM CQ) 14 mg/24hr TD 24 hr patch 1 patch, 1 patch, Transdermal, Daily, Parul HeadSUDHA    nystatin (MYCOSTATIN) powder, , Topical, BID, Parul Head, SUDHA, Given at 04/05/22 0820    ondansetron (ZOFRAN) injection 4 mg, 4 mg, Intravenous, Q6H PRN, Varsha Mae MD    oxyCODONE (ROXICODONE) IR tablet 2 5 mg, 2 5 mg, Oral, Q6H PRN, Parul HeadSUDHA    oxyCODONE (ROXICODONE) IR tablet 5 mg, 5 mg, Oral, Q6H PRN, Parul Head, SUDHA, 5 mg at 04/05/22 0355    rifaximin (XIFAXAN) tablet 550 mg, 550 mg, Oral, Q12H [facility], Jose Malagon PA-C, 550 mg at 04/05/22 8695    Laboratory Results:  Results from last 7 days   Lab Units 04/05/22  0439 04/04/22  0432 04/03/22  2019 04/03/22  0939   WBC Thousand/uL 5 66 3 41*  --  5 55   HEMOGLOBIN g/dL 7 9* 9 9*  --  11 8*   HEMATOCRIT % 25 0* 31 1*  --  37 1   PLATELETS Thousands/uL 75*  --   --  100*   SODIUM mmol/L 135* 135*  --  134*   POTASSIUM mmol/L 4 7 3 7 3 9 2 6*   CHLORIDE mmol/L 102 102  --  98*   CO2 mmol/L 26 28  --  26   BUN mg/dL 11 19  --  15   CREATININE mg/dL 5 05* 9 12*  --  8 24*   CALCIUM mg/dL 8 1* 7 9*  --  7 7*   MAGNESIUM mg/dL  --   --   --  2 0       XR femur 2 vw right   Final Result by Kary Black MD (04/04 7039)      Stable appearance right hip fracture status post ORIF                  Workstation performed: [id number]             Portions of the record may have been created with voice recognition software   Occasional wrong word or "sound a like" substitutions may have occurred due to the inherent limitations of voice recognition software  Read the chart carefully and recognize, using context, where substitutions have occurred

## 2022-04-05 NOTE — OCCUPATIONAL THERAPY NOTE
Occupational Therapy Evaluation     Patient Name: Yenny Priest  AUCGC'M Date: 4/5/2022  Problem List  Principal Problem:    Right intertrochanteric femur fracture  Active Problems:    Acute on chronic anemia    CAD (coronary artery disease)    Hypertension    End stage renal disease (HCC)    Mixed hyperlipidemia    Nicotine dependence    Type 2 diabetes mellitus (HCC)    Nausea, vomiting and diarrhea    Thrombocytopenia (HCC)    Ambulatory dysfunction    Past Medical History  Past Medical History:   Diagnosis Date    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    With Cerebral Infarction:  Last Assessed:  October 20, 2016    Chronic kidney disease 2012    on dialysis     Diabetes mellitus ([facility] [address] )     Hypertension     Labyrinthitis 09/10/2011    Myocardial infarction ([facility] [address] ) 2014    x3 others were in 2009 & 2010    Sleep disturbances 09/20/2010    Stroke [facility]) 2007    x1    Type 2 diabetes, uncontrolled, with renal manifestation 05/03/2017     Past Surgical History  Past Surgical History:   Procedure Laterality Date    AV FISTULA PLACEMENT      CARDIAC SURGERY  2010    stents x3    COLONOSCOPY N/A 12/12/2016    Procedure: COLONOSCOPY;  Surgeon: Shai Christopher MD;  Location: [place] [address] GI LAB; Service:     COLONOSCOPY N/A 4/3/2017    Procedure:  COLONOSCOPY;  Surgeon: Shai Christopher MD;  Location: [place] [address] GI LAB; Service:     HARDWARE REMOVAL Right 4/4/2022    Procedure: REMOVAL HARDWARE HIP;  Surgeon: Luci Kelley MD;  Location:  MAIN OR;  Service: Orthopedics    IR AV FISTULA/GRAFT DECLOT  4/29/2021    IR AV FISTULA/GRAFT DECLOT  3/25/2022    IR AV FISTULAGRAM/GRAFTOGRAM  3/28/2022    IR TUNNELED CENTRAL LINE REMOVAL  12/7/2021    IR TUNNELED DIALYSIS CATHETER PLACEMENT  5/24/2021    CT ANASTOMOSIS,AV,ANY SITE Left 7/22/2021    Procedure: CREATION FISTULA ARTERIOVENOUS (AV);   Surgeon: Joshua Espinoza MD;  Location: [facility];  Service: Vascular    CT PARTIAL HIP REPLACEMENT Right 4/4/2022 Procedure: HEMIARTHROPLASTY HIP (BIPOLAR); Surgeon: Naida Kan MD;  Location: [facility];  Service: Orthopedics         04/05/22 1350   OT Last Visit   OT Visit Date 04/05/22   Note Type   Note type Evaluation   Restrictions/Precautions   Weight Bearing Precautions Per Order Yes   RUE Weight Bearing Per Order WBAT   LUE Weight Bearing Per Order WBAT   RLE Weight Bearing Per Order WBAT   LLE Weight Bearing Per Order WBAT   Braces or Orthoses   (abduction wedge )   Other Precautions Chair Alarm; Bed Alarm;WBS;THR;Fall Risk;Pain   Pain Assessment   Pain Assessment Tool 0-10   Pain Score 8   Pain Location/Orientation Orientation: Right;Location: Hip   Hospital Pain Intervention(s) Repositioned; Ambulation/increased activity; Emotional support;Relaxation technique   Home Living   Type of [address] [place] [address] Two level; Able to live on main level with bedroom/bathroom;Stairs to enter with rails   Bathroom Shower/Tub Tub/shower unit   [address] chair   Home Equipment Walker   Prior Function   Level of [address] with ADLs and functional mobility   Lives With Spouse;Son;Family  (MIL)   Receives Help From Family   ADL Assistance Independent   IADLs Needs assistance   Falls in the last 6 months 1 to 4   Vocational Retired   [address] and mobilty with RW - reports he has hip kit from prior hip sx- admits to 1 additional fall - shares homemaking with family    Reciprocal Relationships supportive family - reports wife and son work during the day and pt is home with [address] [facility] who is bedbound   Service to Others retired - assists to care for mother in law who is bedbound   Intrinsic Gratification mostly sedentary pta   Subjective   Subjective "I'm waiting for my pain medications" - pt noted to have pushed button on phone vs call bell   ADL   Eating Assistance 5  Supervision/Setup   Grooming Assistance 5  Supervision/Setup   UB Bathing Assistance 4 Minimal Assistance   LB Bathing Assistance 3  Moderate Assistance   UB Dressing Assistance 4  Minimal Assistance   LB Dressing Assistance 3  Moderate Assistance   Toileting Assistance  3  Moderate Assistance   Bed Mobility   Supine to Sit 3  Moderate assistance   Transfers   Sit to Stand 4  Minimal assistance   Stand to Sit 4  Minimal assistance   Stand pivot 4  Minimal assistance   Functional Mobility   Functional Mobility 4  Minimal assistance   Additional items Rolling walker   Balance   Static Sitting Fair +   Dynamic Sitting Fair   Static Standing Fair -   Dynamic Standing Poor +   Ambulatory Poor +   Activity Tolerance   Activity Tolerance Patient limited by fatigue;Patient limited by pain   RUE Assessment   RUE Assessment WFL   LUE Assessment   LUE Assessment WFL   Cognition   Arousal/Participation Alert; Cooperative   Attention Attends with cues to redirect   Orientation Level Oriented X4   Memory Decreased recall of precautions   Following Commands Follows one step commands with increased time or repetition   Assessment   Limitation Decreased ADL status; Decreased endurance;Decreased self-care trans;Decreased high-level ADLs   Prognosis Good   Assessment Pt is a 78 y o  male who was admitted to [facility] on 4/3/2022 with Displaced intertrochanteric fracture of right femur, initial encounter for closed fracture [facility]) s/p TAMIKO conversion to  hemiarthroplasty   Pt's problem list also includes PMH of DM, previous surgery and hypotension, osteoporosis, nicotine dependence, ESRD on HD  At baseline pt was completing adls and mobility independently with use of RW- admits to 1 additional fall - shares homemaking with family  Pt lives with spouse, son and MIL however reports spouse and son work during the day and MIL is bedbound  Currently pt requires moderate assist for overall ADLS and min to mod assist for functional mobility/transfers   Pt currently presents with impairments in the following categories -steps to enter environment, difficulty performing ADLS, difficulty performing IADLS  and environment activity tolerance, endurance, standing balance/tolerance, sitting balance/tolerance and attention   These impairments, as well as pt's fatigue, pain, hip precautions, orthopedic restricitions , WBS , decreased caregiver support, risk for falls and home environment  limit pt's ability to safely engage in all baseline areas of occupation, includingbathing, dressing, toileting, functional mobility/transfers, community mobility, care of pets , laundry , driving, house maintenance, meal prep, cleaning, social participation  and leisure activities  From OT standpoint, recommend inpt rehab upon D/C  OT will continue to follow to address the below stated goals  Goals   Patient Goals go home    LTG Time Frame 10-14   Long Term Goal #1 refer to established goals below    Plan   Treatment Interventions ADL retraining;Functional transfer training; Endurance training;Cognitive reorientation;Patient/family training;Equipment evaluation/education; Compensatory technique education; Energy conservation; Activityengagement   Goal Expiration Date 04/19/22   OT Frequency 3-5x/wk   Recommendation   OT Discharge Recommendation Post acute rehabilitation services   AM-PAC Daily Activity Inpatient   Lower Body Dressing 2   Bathing 2   Toileting 2   Upper Body Dressing 3   Grooming 4   Eating 4   Daily Activity Raw Score 17   Daily Activity Standardized Score (Calc for Raw Score >=11) 37 26   AM-PAC Applied Cognition Inpatient   Following a Speech/Presentation 3   Understanding Ordinary Conversation 4   Taking Medications 4   Remembering Where Things Are Placed or Put Away 3   Remembering List of 4-5 Errands 3   Taking Care of Complicated Tasks 3   Applied Cognition Raw Score 20   Applied Cognition Standardized Score 41 76       OCCUPATIONAL THERAPY GOALS:    *Mod I adls after setup with use of LHAE  *Mod I toileting and clothing management *Mod I functional mobility and transfers to/from all surfaces with fair+ dynamic balance and safety for participation in dynamic adls and iadl tasks   *Demonstrate good carryover with safe use of RW, THR prec and use of LHAE during functional tasks   *Assess DME needs   *Increase activity tolerance to 35-40 minutes for participation in adls and enjoyable activities  *Pt to participate in ongoing functional cognitive assessment with good attention/participation to assist with safe d/c recommendations    The patient's raw score on the AM-PAC Daily Activity inpatient short form is 17, standardized score is 37 26, less than 39 4  Patients at this level are likely to benefit from discharge to post-acute rehabilitation services  Please refer to the recommendation of the Occupational Therapist for safe discharge planning      Tanesha Jones

## 2022-04-05 NOTE — PROGRESS NOTES
[address] [facility]  Progress Note - Becca Messinae 1955, 78 y o  male MRN: [id number]  Unit/Bed#: [facility] 607-01 Encounter: [id number]  Primary Care Provider: TESSY Linn   Date and time admitted to hospital: 4/3/2022  7:19 PM    * Right intertrochanteric femur fracture  Assessment & Plan  Pt presented with acute nondisplaced intertrochanteric fracture of the right femur following mechanical fall   Pt with history of prior intertrochanteric femur fracture treated with cephalomedullary nail 12/25/2021  Transferred to [facility] from [facility] for ortho   Pt underwent hardware removal and conversion to a bipolar hemiarthroplasty on 4/4  DVT prophylaxis  Pain control   PT/OT    Nausea, vomiting and diarrhea  Assessment & Plan  Likely 2/2 viral gastroenteritis  Spouse reports ongoing symptoms, on and off since couple of weeks  Follow-up on stool studies  If persistent or worsens will consider GI consultation  Supportive care    Acute on chronic anemia  Assessment & Plan  Lab Results   Component Value Date    HGB 7 9 (L) 04/05/2022    HGB 9 9 (L) 04/04/2022    HGB 11 8 (L) 04/03/2022     Chronic anemia with macrocytosis  Iron 52%, TIBC 145, Iron 75   FA 3 7, B12 419  Acute post-op blood loss anemia due to a drop in Hbg of > 2 0g from preop levels  Monitor vital signs and resuscitate as needed  Consider blood transfusion in the next 24 hours with hemodialysis if continues to drop    Type 2 diabetes mellitus [facility])  Assessment & Plan  Lab Results   Component Value Date    HGBA1C 4 4 03/29/2022     Recent Labs     04/03/22  0912 04/04/22  0659   POCGLU 134 94   Blood Sugar Average: Last 72 hrs:  (P) 94   Monitor Accu-Cheks, if normal over next 24 hrs will d/c   Hypoglycemia protocol in place    End stage renal disease [facility])  Assessment & Plan  Lab Results   Component Value Date    EGFR 5 04/04/2022    EGFR 6 04/03/2022    EGFR 3 03/29/2022    CREATININE 9 12 (H) 04/04/2022    CREATININE 8 24 (H) 04/03/2022    CREATININE 14 30 (H) 03/29/2022     History of ESRD on hemodialysis on MWF schedule  Hemodialysis per nephrology    Ambulatory dysfunction  Assessment & Plan  Patient at high risk for falls  Fall precautions, PT/OT postoperatively    Nicotine dependence  Assessment & Plan  Tobacco cessation discussed and encouraged    Mixed hyperlipidemia  Assessment & Plan  Continue statin    Hypertension  Assessment & Plan  Continue losartan, coreg w/ hold parameters     CAD (coronary artery disease)  Assessment & Plan  History of coronary disease with drug-eluting stent placement  Continue aspirin beta-blocker statin  Plavix placed on hold in anticipation of operative intervention for right femur fracture  Restart 4/5    Hypokalemia-resolved as of 4/5/2022  Assessment & Plan  Lab Results   Component Value Date    K 4 7 04/05/2022    K 3 7 04/04/2022    K 3 9 04/03/2022       Acute on chronic, possibly 2/2 GI losses   Now improved     VTE Pharmacologic Prophylaxis: VTE Score: 10 High Risk (Score >/= 5) - Pharmacological DVT Prophylaxis Ordered: heparin  Sequential Compression Devices Ordered  Patient Centered Rounds: I performed bedside rounds with nursing staff today  Discussions with Specialists or Other Care Team Provider: nursing     Education and Discussions with Family / Patient: Patient declined call to   Time Spent for Care: 30 minutes  More than 50% of total time spent on counseling and coordination of care as described above  Current Length of Stay: 2 day(s)  Current Patient Status: Inpatient   Certification Statement: The patient will continue to require additional inpatient hospital stay due to pending improvement in pain control, management of hgb, PT OT   Discharge Plan: Anticipate discharge in 48 hrs to discharge location to be determined pending rehab evaluations  Code Status: Level 1 - Full Code    Subjective:   Pt seen and examined at bedside   Feels well, some pain in hip  Doesn't want to get out of bed and do therapy  Objective:     Vitals:   Temp (24hrs), Av 6 °F (36 4 °C), Min:97 2 °F (36 2 °C), Max:98 2 °F (36 8 °C)    Temp:  [97 2 °F (36 2 °C)-98 2 °F (36 8 °C)] 97 4 °F (36 3 °C)  HR:  [] 71  Resp:  [13-20] 18  BP: ()/(44-69) 95/53  SpO2:  [96 %-100 %] 98 %  There is no height or weight on file to calculate BMI  Input and Output Summary (last 24 hours): Intake/Output Summary (Last 24 hours) at 2022 1202  Last data filed at 2022 0900  Gross per 24 hour   Intake 1679 3 ml   Output 500 ml   Net 1179 3 ml     Physical Exam:   Physical Exam  Constitutional:       Appearance: Normal appearance  He is not ill-appearing  Comments: Appeared older than stated age    HENT:      Head: Normocephalic and atraumatic  Mouth/Throat:      Mouth: Mucous membranes are moist    Eyes:      Extraocular Movements: Extraocular movements intact  Cardiovascular:      Rate and Rhythm: Normal rate and regular rhythm  Pulmonary:      Effort: Pulmonary effort is normal       Breath sounds: Normal breath sounds  Abdominal:      General: Abdomen is flat  Palpations: Abdomen is soft  Musculoskeletal:         General: No swelling  Normal range of motion  Cervical back: Normal range of motion and neck supple  Skin:     General: Skin is warm  Neurological:      General: No focal deficit present  Mental Status: He is alert     Psychiatric:         Mood and Affect: Mood normal        Additional Data:     Labs:  Results from last 7 days   Lab Units 22  0439 22  0432 22  0939 22  0939   WBC Thousand/uL 5 66 3 41*   < > 5 55   HEMOGLOBIN g/dL 7 9* 9 9*   < > 11 8*   HEMATOCRIT % 25 0* 31 1*   < > 37 1   PLATELETS Thousands/uL 75*  --    < > 100*   BANDS PCT %  --  1  --   --    NEUTROS PCT %  --   --   --  78*   LYMPHS PCT %  --   --   --  12*   LYMPHO PCT %  --  8*  --   --    MONOS PCT %  --   --   --  5   MONO PCT % --  0*  --   --    EOS PCT %  --  3  --  3    < > = values in this interval not displayed  Results from last 7 days   Lab Units 04/05/22  0439 04/03/22  2019 04/03/22  0939   SODIUM mmol/L 135*   < > 134*   POTASSIUM mmol/L 4 7   < > 2 6*   CHLORIDE mmol/L 102   < > 98*   CO2 mmol/L 26   < > 26   BUN mg/dL 11   < > 15   CREATININE mg/dL 5 05*   < > 8 24*   ANION GAP mmol/L 7   < > 10   CALCIUM mg/dL 8 1*   < > 7 7*   ALBUMIN g/dL  --   --  2 4*   TOTAL BILIRUBIN mg/dL  --   --  0 44   ALK PHOS U/L  --   --  217*   ALT U/L  --   --  18   AST U/L  --   --  22   GLUCOSE RANDOM mg/dL 206*   < > 104    < > = values in this interval not displayed  Results from last 7 days   Lab Units 04/03/22  0939   INR  1 07     Results from last 7 days   Lab Units 04/05/22  1129 04/05/22  0632 04/04/22  2053 04/04/22  1724 04/04/22  1608 04/04/22  0659 04/03/22  0912   POC GLUCOSE mg/dl 218* 227* 197* 152* 219* 94 134               Lines/Drains:  Invasive Devices  Report    Peripheral Intravenous Line            Peripheral IV 04/03/22 Right;Upper;Ventral (anterior) Arm 2 days    Peripheral IV 04/04/22 Dorsal (posterior); Right Hand 1 day          Line            Hemodialysis AV Fistula  Left Forearm -- days              Imaging: Reviewed radiology reports from this admission including: chest xray    Recent Cultures (last 7 days):         Last 24 Hours Medication List:   Current Facility-Administered Medications   Medication Dose Route Frequency Provider Last Rate    acetaminophen  975 mg Oral Q8H [facility] Jose Malagon PA-C      aspirin  81 mg Oral Daily Parul Carias PA-C      atorvastatin  40 mg Oral Daily With [employer]      b complex-vitamin C-folic acid  1 capsule Oral Daily With [employer], SUDHA      carvedilol  3 125 mg Oral BID With Meals Damion Guzman PA-C      cholecalciferol  2,000 Units Oral Daily Parul Carias, SUDHA      clopidogrel  75 mg Oral Daily Aaron HOLLY SUDHA Guzman      heparin (porcine)  5,000 Units Subcutaneous Q8H [address], MD      HYDROmorphone  0 5 mg Intravenous Q3H PRN Genevamainor Lassiter PA-C      insulin lispro  1-5 Units Subcutaneous TID AC Aaron Guzman PA-C      insulin lispro  1-5 Units Subcutaneous HS Aaron Guzman PA-C      [START ON 4/6/2022] losartan  50 mg Oral Daily Aaron Guzman PA-C      nicotine  1 patch Transdermal Daily Robbin SUDHA Lassiter      nystatin   Topical BID Robbinanisa Lassiter PA-C      ondansetron  4 mg Intravenous Q6H PRN Esmer Nguyen MD      oxyCODONE  2 5 mg Oral Q6H PRN Genevamainor Lassiter PA-C      oxyCODONE  5 mg Oral Q6H PRN Robbin SUDHA Lassiter      rifaximin  550 mg Oral Q12H [address] Genevaanisa Lassiter PA-C          Today, Patient Was Seen By: Nazia Yuen PA-C    **Please Note: This note may have been constructed using a voice recognition system  **

## 2022-04-05 NOTE — PHYSICAL THERAPY NOTE
Physical Therapy Evaluation    Patient's Name: Daphnie Raines    Admitting Diagnosis  Hip fracture ([facility] ) [S72 009A]    Problem List  Patient Active Problem List   Diagnosis    Anemia in chronic illness    Acute on chronic anemia    CAD (coronary artery disease)    Hypertension    Diabetes with neurologic complications ([facility] )    Elevated serum alkaline phosphatase level    End stage renal disease ([facility] )    Intestinal malabsorption    Irritable bowel syndrome with diarrhea    Mixed hyperlipidemia    Nicotine dependence    Organic impotence    Pernicious anemia    Type 2 diabetes mellitus ([facility] )    Acute upper respiratory infection    Encounter for extracorporeal dialysis ([facility] )    AV fistula thrombosis, initial encounter ([place] [address] )    Dizziness    Poor appetite    Arteriovenous fistula (HCC)    Dialysis AV fistula malfunction (HCC)    Nausea, vomiting and diarrhea    Right intertrochanteric femur fracture    Thrombocytopenia ([facility] )    Ambulatory dysfunction       Past Medical History  Past Medical History:   Diagnosis Date    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    With Cerebral Infarction:  Last Assessed:  October 20, 2016    Chronic kidney disease 2012    on dialysis     Diabetes mellitus ([facility] )     Hypertension     Labyrinthitis 09/10/2011    Myocardial infarction ([facility] ) 2014    x3 others were in 2009 & 2010    Sleep disturbances 09/20/2010    Stroke [facility]) 2007    x1    Type 2 diabetes, uncontrolled, with renal manifestation 05/03/2017       Past Surgical History  Past Surgical History:   Procedure Laterality Date    AV FISTULA PLACEMENT      CARDIAC SURGERY  2010    stents x3    COLONOSCOPY N/A 12/12/2016    Procedure: COLONOSCOPY;  Surgeon: Ashutosh Rosas MD;  Location: [facility] GI LAB; Service:     COLONOSCOPY N/A 4/3/2017    Procedure:  COLONOSCOPY;  Surgeon: Ashutosh Rosas MD;  Location: [facility] GI LAB;   Service:     HARDWARE REMOVAL Right 4/4/2022    Procedure: REMOVAL HARDWARE HIP; Surgeon: Damián Griffiths MD;  Location: [facility];  Service: Orthopedics    IR AV FISTULA/GRAFT DECLOT  4/29/2021    IR AV FISTULA/GRAFT DECLOT  3/25/2022    IR AV FISTULAGRAM/GRAFTOGRAM  3/28/2022    IR TUNNELED CENTRAL LINE REMOVAL  12/7/2021    IR TUNNELED DIALYSIS CATHETER PLACEMENT  5/24/2021    AL ANASTOMOSIS,AV,ANY SITE Left 7/22/2021    Procedure: CREATION FISTULA ARTERIOVENOUS (AV); Surgeon: Tl Savage MD;  Location: [facility];  Service: Vascular    AL PARTIAL HIP REPLACEMENT Right 4/4/2022    Procedure: HEMIARTHROPLASTY HIP (BIPOLAR); Surgeon: Damián Griffiths MD;  Location: [facility];  Service: Orthopedics        04/05/22 1349   PT Last Visit   PT Visit Date 04/05/22   Note Type   Note type Evaluation   Pain Assessment   Pain Assessment Tool 0-10   Pain Score 8   Pain Location/Orientation Orientation: Right;Location: Hip   Hospital Pain Intervention(s) Repositioned; Ambulation/increased activity   Restrictions/Precautions   Weight Bearing Precautions Per Order Yes   RLE Weight Bearing Per Order WBAT   Braces or Orthoses Other (Comment)  (hip abduction wedge)   Other Precautions Cognitive; Chair Alarm; Bed Alarm;WBS;THR;Fall Risk;Pain; Posterior hip precautions   Home Living   Type of [address] [place], [place] [address] Two level; Able to live on main level with bedroom/bathroom;Stairs to enter with rails  (3 PHILLY, FFSU)   Home Equipment Walker   Prior Function   Level of [place] Independent with ADLs and functional mobility   Lives With Spouse;Son  (MIL is bedbound)   Receives Help From Family  (pt reports spouse and son both work during the day)   ADL Assistance Independent   IADLs Needs assistance   Falls in the last 6 months 1 to 4  (3)   Comments Pt states he fell on 12/25 resulting in hip fx s/p surgical fixation and transition to rehab facility until the end of January  Pt states he has been ambulatory within the home with RW      General   Family/Caregiver Present No   Cognition Overall Cognitive Status Impaired   Arousal/Participation Alert   Attention Attends with cues to redirect   Orientation Level Oriented X4   Following Commands Follows one step commands with increased time or repetition   Comments Pt pleasant and cooperative throughout session, limited safety awareness   RLE Assessment   RLE Assessment X   Strength RLE   R Knee Extension 3/5   R Ankle Dorsiflexion 3-/5   R Hip Flexion 2-/5   LLE Assessment   LLE Assessment WFL   Light Touch   RLE Light Touch Grossly intact   LLE Light Touch Grossly intact   Bed Mobility   Supine to Sit 3  Moderate assistance   Additional items Assist x 1; Increased time required;Verbal cues   Transfers   Sit to Stand 4  Minimal assistance   Additional items Assist x 1; Increased time required;Verbal cues   Stand to Sit 4  Minimal assistance   Additional items Assist x 1; Increased time required;Verbal cues   Additional Comments Pt educated on posterolateral hip precautions, VC's for hand placement, technique to maintain hip precautions with bed mobility/transfers   Ambulation/Elevation   Gait pattern Decreased foot clearance; Excessively slow; Short stride; Antalgic;Decreased R stance   Gait Assistance 4  Minimal assist   Additional items Assist x 1  (+2 for chair follow)   Assistive Device Rolling walker   Distance 30 ft, limited by pain   Balance   Static Sitting Fair +   Dynamic Sitting Fair   Static Standing Fair -   Dynamic Standing Poor +   Ambulatory Poor +   Activity Tolerance   Activity Tolerance Patient limited by pain   Medical Staff Made Aware Co-evaluation with Caroline RODRIGUEZ given medical complexity, decreased activity tolerance, novelty of education   Nurse Made Aware RN updated  Chair alarm engaged at end of session   Assessment   Prognosis Fair   Problem List Decreased strength;Decreased range of motion;Decreased endurance; Impaired balance;Decreased mobility; Decreased cognition;Decreased safety awareness;Pain;Orthopedic restrictions Assessment Pt is a 78 y o  male seen for PT evaluation s/p admit to [facility] on 4/3/2022  Pt was admitted with a primary dx of: Fall resulting in displaced intertrochanteric fx of R femur s/p removal of hip hardware with hemiarthroplasty performed on 4/4 by Dr Charis Quinones  Pt cleared by ortho for PT evaluation WBAT, posterior hip precautions  PT now consulted for assessment of mobility and d/c needs  Pts current comorbidities effecting treatment include: DM, ESRD on HD MWF, HTN, CAD, Tobacco abuse  Pts current clinical presentation is Unstable/ Unpredictable (high complexity) due to Ongoing medical management for primary dx, Decreased activity tolerance compared to baseline, Fall risk, Increased assistance needed from caregiver at current time, Cog status, Trending lab values, s/p surgical intervention  Prior to admission, pt was independent with household mobility with RW  Upon evaluation, pt currently is requiring modA for bed mobility; Philip for transfers and Philip for ambulation 30 ft w/ RW  Pt presents at PT eval functioning below baseline and currently w/ overall mobility deficits 2* to: RLE weakness, decreased ROM, impaired balance, gait deviations, pain, decreased activity tolerance compared to baseline, decreased functional mobility tolerance compared to baseline, decreased safety awareness, fall risk  Pt currently at a fall risk 2* to impairments listed above  Pt will continue to benefit from skilled acute PT interventions to address stated impairments; to maximize functional mobility; for ongoing pt/ family training; and DME needs  At conclusion of PT session pt returned back in chair and chair alarm engaged with phone and call bell within reach  Pt denies any further questions at this time  Recommend IP rehab upon hospital D/C     Barriers to Discharge Decreased caregiver support   Goals   Patient Goals to go home   STG Expiration Date 04/19/22   Short Term Goal #1 In 14 days pt will be able to: 1  Demonstrate ability to perform all aspects of bed mobility independently to increase functional independence  2  Perform functional transfers with RW independently to facilitate safe return to previous living environment  3   Ambulate 150 ft with RW and supervision with stable vitals to improve safety with household distances and reduce fall risk  4  Improve LE strength grades by 1 to increase ease of functional mobility with transfers and gait  5  Pt will demonstrate improved balance by one grade in order to decrease risk of falls  6  Climb 3 steps with supervision and 1 HR to simulate entrance to home  PT Treatment Day 0   Plan   Treatment/Interventions Functional transfer training;LE strengthening/ROM; Elevations; Therapeutic exercise; Endurance training;Cognitive reorientation;Patient/family training;Equipment eval/education; Bed mobility;Gait training   PT Frequency Other (Comment)  (3-6x/week)   Recommendation   PT Discharge Recommendation Post acute rehabilitation services   Equipment Recommended   (pt owns RW)   AM-PAC Basic Mobility Inpatient   Turning in Bed Without Bedrails 3   Lying on Back to Sitting on Edge of Flat Bed 2   Moving Bed to Chair 3   Standing Up From Chair 3   Walk in Room 3   Climb 3-5 Stairs 2   Basic Mobility Inpatient Raw Score 16   Basic Mobility Standardized Score 38 32   Highest Level Of Mobility   -[facility] Goal 5: Stand one or more mins       Amy Grant, PT, DPT, GCS

## 2022-04-05 NOTE — PROGRESS NOTES
Slim PA_C Tana notified pt with low Sbp 85/54, recheck 92/55, Hr 74  Pt status S/p H/w removal and hemiarthroplasty #1,  Pt is a HD pt M,W,F, has LR running @ 50 ml/hr, asymptomatic   Slim PA-C continue to monitor, No new orders

## 2022-04-05 NOTE — DISCHARGE INSTR - OTHER ORDERS
Skin care plans:  1-Allevyn foams to bilateral heels letty with a P and date peel and check skin integrity every shift change every 3 days   2-Cleanse sacral buttocks with soap and water apply allevyn foa letty with a P and date check skin integrity every shift change every 3 days or when soiled   3-Elevate heels to offload pressure  4-Ehob cushion when out of bed  5-Turn/repoisiton q2h or when medically stable for pressure re-distribution on skin  6-Moisturize skin daily with skin nourishing cream  7  P 500 low air loss mattress   8   Mid back abrasion apply 3 M no sting daily

## 2022-04-05 NOTE — PLAN OF CARE
Problem: OCCUPATIONAL THERAPY ADULT  Goal: Performs self-care activities at highest level of function for planned discharge setting  See evaluation for individualized goals  Description: Treatment Interventions: ADL retraining,Functional transfer training,Endurance training,Cognitive reorientation,Patient/family training,Equipment evaluation/education,Compensatory technique education,Energy conservation,Activityengagement          See flowsheet documentation for full assessment, interventions and recommendations  Note: Limitation: Decreased ADL status,Decreased endurance,Decreased self-care trans,Decreased high-level ADLs  Prognosis: Good  Assessment: Pt is a 78 y o  male who was admitted to [facility] on 4/3/2022 with Displaced intertrochanteric fracture of right femur, initial encounter for closed fracture [facility]) s/p TAMIKO conversion to  hemiarthroplasty   Pt's problem list also includes PMH of DM, previous surgery and hypotension, osteoporosis, nicotine dependence, ESRD on HD  At baseline pt was completing adls and mobility independently with use of RW- admits to 1 additional fall - shares homemaking with family  Pt lives with spouse, son and MIL however reports spouse and son work during the day and MIL is bedbound  Currently pt requires moderate assist for overall ADLS and min to mod assist for functional mobility/transfers  Pt currently presents with impairments in the following categories -steps to enter environment, difficulty performing ADLS, difficulty performing IADLS  and environment activity tolerance, endurance, standing balance/tolerance, sitting balance/tolerance and attention    These impairments, as well as pt's fatigue, pain, hip precautions, orthopedic restricitions , WBS , decreased caregiver support, risk for falls and home environment  limit pt's ability to safely engage in all baseline areas of occupation, includingbathing, dressing, toileting, functional mobility/transfers, community mobility, care of pets , laundry , driving, house maintenance, meal prep, cleaning, social participation  and leisure activities  From OT standpoint, recommend inpt rehab upon D/C  OT will continue to follow to address the below stated goals        OT Discharge Recommendation: Post acute rehabilitation services

## 2022-04-06 ENCOUNTER — APPOINTMENT (INPATIENT)
Dept: DIALYSIS | Facility: HOSPITAL | Age: 67
DRG: 521 | End: 2022-04-06
Payer: MEDICARE

## 2022-04-06 ENCOUNTER — APPOINTMENT (INPATIENT)
Dept: RADIOLOGY | Facility: HOSPITAL | Age: 67
DRG: 521 | End: 2022-04-06
Payer: MEDICARE

## 2022-04-06 LAB
ANION GAP SERPL CALCULATED.3IONS-SCNC: 7 MMOL/L (ref 4–13)
BUN SERPL-MCNC: 22 MG/DL (ref 5–25)
CALCIUM SERPL-MCNC: 8.1 MG/DL (ref 8.3–10.1)
CHLORIDE SERPL-SCNC: 101 MMOL/L (ref 100–108)
CO2 SERPL-SCNC: 27 MMOL/L (ref 21–32)
CREAT SERPL-MCNC: 6.14 MG/DL (ref 0.6–1.3)
ERYTHROCYTE [DISTWIDTH] IN BLOOD BY AUTOMATED COUNT: 16.1 % (ref 11.6–15.1)
GFR SERPL CREATININE-BSD FRML MDRD: 8 ML/MIN/1.73SQ M
GLUCOSE SERPL-MCNC: 115 MG/DL (ref 65–140)
GLUCOSE SERPL-MCNC: 119 MG/DL (ref 65–140)
GLUCOSE SERPL-MCNC: 121 MG/DL (ref 65–140)
GLUCOSE SERPL-MCNC: 125 MG/DL (ref 65–140)
GLUCOSE SERPL-MCNC: 93 MG/DL (ref 65–140)
HCT VFR BLD AUTO: 20.7 % (ref 36.5–49.3)
HCT VFR BLD AUTO: 26 % (ref 36.5–49.3)
HGB BLD-MCNC: 6.7 G/DL (ref 12–17)
HGB BLD-MCNC: 8.9 G/DL (ref 12–17)
MCH RBC QN AUTO: 34.9 PG (ref 26.8–34.3)
MCHC RBC AUTO-ENTMCNC: 32.4 G/DL (ref 31.4–37.4)
MCV RBC AUTO: 108 FL (ref 82–98)
PLATELET # BLD AUTO: 84 THOUSANDS/UL (ref 149–390)
PMV BLD AUTO: 10.5 FL (ref 8.9–12.7)
POTASSIUM SERPL-SCNC: 4.5 MMOL/L (ref 3.5–5.3)
RBC # BLD AUTO: 1.92 MILLION/UL (ref 3.88–5.62)
SODIUM SERPL-SCNC: 135 MMOL/L (ref 136–145)
WBC # BLD AUTO: 5.83 THOUSAND/UL (ref 4.31–10.16)

## 2022-04-06 PROCEDURE — 85014 HEMATOCRIT: CPT | Performed by: INTERNAL MEDICINE

## 2022-04-06 PROCEDURE — 85018 HEMOGLOBIN: CPT | Performed by: INTERNAL MEDICINE

## 2022-04-06 PROCEDURE — 82948 REAGENT STRIP/BLOOD GLUCOSE: CPT

## 2022-04-06 PROCEDURE — 90935 HEMODIALYSIS ONE EVALUATION: CPT | Performed by: INTERNAL MEDICINE

## 2022-04-06 PROCEDURE — 99232 SBSQ HOSP IP/OBS MODERATE 35: CPT | Performed by: INTERNAL MEDICINE

## 2022-04-06 PROCEDURE — 5A1D70Z PERFORMANCE OF URINARY FILTRATION, INTERMITTENT, LESS THAN 6 HOURS PER DAY: ICD-10-PCS | Performed by: INTERNAL MEDICINE

## 2022-04-06 PROCEDURE — P9016 RBC LEUKOCYTES REDUCED: HCPCS

## 2022-04-06 PROCEDURE — NC001 PR NO CHARGE: Performed by: ORTHOPAEDIC SURGERY

## 2022-04-06 PROCEDURE — 73502 X-RAY EXAM HIP UNI 2-3 VIEWS: CPT

## 2022-04-06 PROCEDURE — 80048 BASIC METABOLIC PNL TOTAL CA: CPT | Performed by: ORTHOPAEDIC SURGERY

## 2022-04-06 PROCEDURE — 30233N1 TRANSFUSION OF NONAUTOLOGOUS RED BLOOD CELLS INTO PERIPHERAL VEIN, PERCUTANEOUS APPROACH: ICD-10-PCS | Performed by: ORTHOPAEDIC SURGERY

## 2022-04-06 PROCEDURE — 85027 COMPLETE CBC AUTOMATED: CPT | Performed by: ORTHOPAEDIC SURGERY

## 2022-04-06 RX ORDER — HYDROMORPHONE HCL IN WATER/PF 6 MG/30 ML
0.2 PATIENT CONTROLLED ANALGESIA SYRINGE INTRAVENOUS ONCE
Status: COMPLETED | OUTPATIENT
Start: 2022-04-06 | End: 2022-04-06

## 2022-04-06 RX ORDER — ALBUMIN (HUMAN) 12.5 G/50ML
12.5 SOLUTION INTRAVENOUS ONCE
Status: COMPLETED | OUTPATIENT
Start: 2022-04-07 | End: 2022-04-07

## 2022-04-06 RX ADMIN — OXYCODONE HYDROCHLORIDE 5 MG: 5 TABLET ORAL at 17:53

## 2022-04-06 RX ADMIN — ACETAMINOPHEN 975 MG: 325 TABLET ORAL at 12:37

## 2022-04-06 RX ADMIN — CLOPIDOGREL BISULFATE 75 MG: 75 TABLET ORAL at 12:04

## 2022-04-06 RX ADMIN — ASPIRIN 81 MG: 81 TABLET, COATED ORAL at 12:04

## 2022-04-06 RX ADMIN — NYSTATIN: 100000 POWDER TOPICAL at 17:56

## 2022-04-06 RX ADMIN — OXYCODONE HYDROCHLORIDE 5 MG: 5 TABLET ORAL at 12:04

## 2022-04-06 RX ADMIN — HYDROMORPHONE HYDROCHLORIDE 0.2 MG: 0.2 INJECTION, SOLUTION INTRAMUSCULAR; INTRAVENOUS; SUBCUTANEOUS at 12:36

## 2022-04-06 RX ADMIN — HYDROMORPHONE HYDROCHLORIDE 0.5 MG: 1 INJECTION, SOLUTION INTRAMUSCULAR; INTRAVENOUS; SUBCUTANEOUS at 10:46

## 2022-04-06 RX ADMIN — ACETAMINOPHEN 975 MG: 325 TABLET ORAL at 21:16

## 2022-04-06 RX ADMIN — CALCIUM CARBONATE (ANTACID) CHEW TAB 500 MG 1000 MG: 500 CHEW TAB at 12:09

## 2022-04-06 RX ADMIN — CALCIUM CARBONATE (ANTACID) CHEW TAB 500 MG 1000 MG: 500 CHEW TAB at 17:54

## 2022-04-06 RX ADMIN — OXYCODONE HYDROCHLORIDE 5 MG: 5 TABLET ORAL at 02:30

## 2022-04-06 RX ADMIN — ATORVASTATIN CALCIUM 40 MG: 40 TABLET, FILM COATED ORAL at 17:54

## 2022-04-06 RX ADMIN — NYSTATIN: 100000 POWDER TOPICAL at 12:05

## 2022-04-06 RX ADMIN — HEPARIN SODIUM 5000 UNITS: 5000 INJECTION INTRAVENOUS; SUBCUTANEOUS at 21:16

## 2022-04-06 RX ADMIN — HEPARIN SODIUM 5000 UNITS: 5000 INJECTION INTRAVENOUS; SUBCUTANEOUS at 13:10

## 2022-04-06 RX ADMIN — Medication 2000 UNITS: at 12:03

## 2022-04-06 RX ADMIN — NEPHROCAP 1 CAPSULE: 1 CAP ORAL at 17:53

## 2022-04-06 RX ADMIN — RIFAXIMIN 550 MG: 550 TABLET ORAL at 21:16

## 2022-04-06 RX ADMIN — RIFAXIMIN 550 MG: 550 TABLET ORAL at 12:04

## 2022-04-06 RX ADMIN — HEPARIN SODIUM 5000 UNITS: 5000 INJECTION INTRAVENOUS; SUBCUTANEOUS at 06:55

## 2022-04-06 NOTE — ASSESSMENT & PLAN NOTE
Lab Results   Component Value Date    HGBA1C 4 4 03/29/2022     Recent Labs     04/05/22  1129 04/05/22  1616 04/05/22  2106 04/06/22  0623   POCGLU 218* 191* 134 119   Blood Sugar Average: Last 72 hrs:  (P) 169 25   · Only diet controlled at home  · On sliding scale insulin/Accu-Cheks  · Monitor p r n

## 2022-04-06 NOTE — PLAN OF CARE
Target UF goal 3L as tolerated  Patient requesting to dialyze for 3 hours on 3K bath os serum K of 4 6 per protocol  Post-Dialysis RN Treatment Note    Blood Pressure:  Pre 132/118 mm/Hg  Post 116/78 mmHg   EDW  77 kg    Weight:  Pre 79 2kg kg   Post 78 5 kg   Mode of weight measurement: Bed Scale   Volume Removed  1502 ml    Treatment duration 3 hours    NS given  No    Treatment shortened?  Yes, describe: PT requesting shortened tx   Medications given during Rx Dilaudid 0 5mg, 1 Unit PRBCs   Estimated Kt/V  1 35   Access type: AV fistula   Access Issues: Yes, describe: Bleeding from cannualtion sites    Report called to primary nurse   Yes, Sruthi Venegas RN    Problem: METABOLIC, FLUID AND ELECTROLYTES - ADULT  Goal: Electrolytes maintained within normal limits  Description: INTERVENTIONS:  - Monitor labs and assess patient for signs and symptoms of electrolyte imbalances  - Administer electrolyte replacement as ordered  - Monitor response to electrolyte replacements, including repeat lab results as appropriate  - Instruct patient on fluid and nutrition as appropriate  Outcome: Progressing  Goal: Fluid balance maintained  Description: INTERVENTIONS:  - Monitor labs   - Monitor I/O and WT  - Instruct patient on fluid and nutrition as appropriate  - Assess for signs & symptoms of volume excess or deficit  Outcome: Progressing

## 2022-04-06 NOTE — PHYSICAL THERAPY NOTE
Physical Therapy Cancellation Note    Patient's Name: Constance Medrano       04/06/22 1050   PT Last Visit   PT Visit Date 04/06/22   Note Type   Note Type Cancelled Session   Cancel Reasons Patient off floor/test  (Pt off floor for HD    Will follow for PT tx session  )       Anderson Shah, PT, DPT, GCS

## 2022-04-06 NOTE — ASSESSMENT & PLAN NOTE
Lab Results   Component Value Date    K 4 5 04/06/2022    K 4 7 04/05/2022    K 3 7 04/04/2022       Acute on chronic, possibly 2/2 GI losses   Now improved

## 2022-04-06 NOTE — ASSESSMENT & PLAN NOTE
Pt presented with acute nondisplaced intertrochanteric fracture of the right femur following mechanical fall  Pt with history of prior intertrochanteric femur fracture treated with cephalomedullary nail 12/25/2021   Transferred to [facility] from [facility] for ortho internvetion  · Pt underwent hardware removal and conversion to a bipolar hemiarthroplasty on 4/4  · DVT prophylaxis  · Pain control   · PT/OT recommending rehab, case management input appreciated

## 2022-04-06 NOTE — PROGRESS NOTES
Orthopedics   Frida Bishop 78 y o  male MRN: [id number]  Unit/Bed#: PPHP 607-01      Subjective:  78 y o male post operative day 2 right conversion to hip hemiarthroplasty  Pt doing well  Pain better controlled      Labs:  0   Lab Value Date/Time    HCT 25 0 (L) 04/05/2022 0439    HCT 31 1 (L) 04/04/2022 0432    HCT 37 1 04/03/2022 0939    HCT 33 2 (L) 04/11/2017 0712    HGB 7 9 (L) 04/05/2022 0439    HGB 9 9 (L) 04/04/2022 0432    HGB 11 8 (L) 04/03/2022 0939    HGB 11 1 (L) 04/11/2017 0712    INR 1 07 04/03/2022 0939    WBC 5 66 04/05/2022 0439    WBC 3 41 (L) 04/04/2022 0432    WBC 5 55 04/03/2022 0939    WBC 6 2 04/11/2017 0712       Meds:    Current Facility-Administered Medications:     acetaminophen (TYLENOL) tablet 975 mg, 975 mg, Oral, Q8H [address], Jose Malagon PA-C, 975 mg at 04/05/22 2133    aspirin (ECOTRIN LOW STRENGTH) EC tablet 81 mg, 81 mg, Oral, Daily, Jose Malagon PA-C, 81 mg at 04/05/22 6462    atorvastatin (LIPITOR) tablet 40 mg, 40 mg, Oral, Daily With Verna Harrison PA-C, 40 mg at 04/05/22 1740    b complex-vitamin C-folic acid (NEPHROCAPS) capsule 1 capsule, 1 capsule, Oral, Daily With Dinner, Sintia Gonzalez PA-C, 1 capsule at 04/05/22 1740    calcium carbonate (TUMS) chewable tablet 1,000 mg, 1,000 mg, Oral, BID PRN, Jace Mullen PA-C, 1,000 mg at 04/05/22 2339    carvedilol (COREG) tablet 3 125 mg, 3 125 mg, Oral, BID With Meals, Danilo Guzman PA-C    cholecalciferol (VITAMIN D3) tablet 2,000 Units, 2,000 Units, Oral, Daily, Sintia Gonzalez PA-C, 2,000 Units at 04/05/22 0819    clopidogrel (PLAVIX) tablet 75 mg, 75 mg, Oral, Daily, Aaron Guzman PA-C, 75 mg at 04/05/22 1249    heparin (porcine) subcutaneous injection 5,000 Units, 5,000 Units, Subcutaneous, Q8H [address], Ludwig Page MD, 5,000 Units at 04/05/22 2133    HYDROmorphone (DILAUDID) injection 0 5 mg, 0 5 mg, Intravenous, Q3H PRN, Sintia Gonzalez PA-C, 0 5 mg at 04/05/22 3237    insulin lispro (HumaLOG) 100 units/mL subcutaneous injection 1-5 Units, 1-5 Units, Subcutaneous, TID AC, 1 Units at 04/05/22 1741 **AND** Fingerstick Glucose (POCT), , , TID AC, Aaron Guzman PA-C    insulin lispro (HumaLOG) 100 units/mL subcutaneous injection 1-5 Units, 1-5 Units, Subcutaneous, HS, Aaron Guzman PA-C    nicotine (NICODERM CQ) 14 mg/24hr TD 24 hr patch 1 patch, 1 patch, Transdermal, Daily, Alberto Hardin PA-C    nystatin (MYCOSTATIN) powder, , Topical, BID, Alberto Hardin PA-C, Given at 04/05/22 1742    ondansetron (ZOFRAN) injection 4 mg, 4 mg, Intravenous, Q6H PRN, Roya Becker MD    oxyCODONE (ROXICODONE) IR tablet 2 5 mg, 2 5 mg, Oral, Q6H PRN, Alberto Hardin PA-C    oxyCODONE (ROXICODONE) IR tablet 5 mg, 5 mg, Oral, Q6H PRN, Alberto Hardin PA-C, 5 mg at 04/06/22 0230    rifaximin (XIFAXAN) tablet 550 mg, 550 mg, Oral, Q12H [facility], Jose Malagon PA-C, 550 mg at 04/05/22 2133    Blood Culture:   Lab Results   Component Value Date    BLOODCX No Growth After 5 Days  10/19/2021    BLOODCX No Growth After 5 Days  10/19/2021       Wound Culture:   Lab Results   Component Value Date    WOUNDCULT 1+ Growth of Citrobacter freundii (A) 10/08/2021    WOUNDCULT 1+ Growth of Enterobacter cloacae complex (A) 10/08/2021       Ins and Outs:  I/O last 24 hours: In: 1649 3 [P O :656; I V :893 3; IV Piggyback:100]  Out: 0           Physical Exam:  Vitals:    04/06/22 0626   BP: (!) 114/49   Pulse: 76   Resp: 17   Temp: 98 °F (36 7 °C)   SpO2: 100%     right lower extremity:  · Dressings C/D/I with no active drainage  · Abduction pillow in place  · Able to DF ankle and fire EHL in setting of previous stroke  · Sensation grossly intact in setting of previous stroke  · 2+ dorsalis pedis     Assessment: 66 y o male post operative day 2 right conversion to hip hemiarthroplasty   Doing well    Plan:  · Weight Bearing as tolerated  · Up and out of bed  · Posterior total hip precautions  · Abduction pillow while in bed  · DVT prophylaxis  · Analgesics  · PT/OT  · Patient noted to have acute blood loss anemia due to a drop in Hbg of > 2 0g from preop levels, will monitor vital signs and resuscitate with IV fluids as needed    Vahid Rees MD

## 2022-04-06 NOTE — ASSESSMENT & PLAN NOTE
Lab Results   Component Value Date    HGB 6 7 (LL) 04/06/2022    HGB 7 9 (L) 04/05/2022    HGB 9 9 (L) 04/04/2022     Chronic anemia with macrocytosis  · Iron 52%, TIBC 145, Iron 75   · FA 3 7, B12 419  · Acute post-op blood loss anemia due to a drop in Hbg of > 2 0g from preop levels  · Hemoglobin 6 7 this morning, will transfuse 1 unit PRBCs with hemodialysis; patient was symptomatic with hypotension overnight  · Follow-up post transfusion hemoglobin  · Nephro also starting Epo

## 2022-04-06 NOTE — ASSESSMENT & PLAN NOTE
Patient hypotensive overnight, suspect related to postop blood loss anemia  · Status post albumin and midodrine x1  · Cozaar on hold, continue Coreg 3 125 mg b i d   · Transfuse 1 unit PRBCs

## 2022-04-06 NOTE — OCCUPATIONAL THERAPY NOTE
Occupational Therapy Treatment Note:       04/06/22 1500   Note Type   Note Type Cancelled Session  (pt with low hgb, nsg cancelled therapy this pm)   NATALIA Grover

## 2022-04-06 NOTE — ASSESSMENT & PLAN NOTE
Lab Results   Component Value Date    EGFR 8 04/06/2022    EGFR 10 04/05/2022    EGFR 5 04/04/2022    CREATININE 6 14 (H) 04/06/2022    CREATININE 5 05 (H) 04/05/2022    CREATININE 9 12 (H) 04/04/2022     History of ESRD on hemodialysis on MWF schedule  · Hemodialysis per nephrology

## 2022-04-06 NOTE — PROGRESS NOTES
[address] [facility]  Progress Note - Linden Moreno 1955, 78 y o  male MRN: [id number]  Unit/Bed#: [facility] 607-01 Encounter: [id number]  Primary Care Provider: TESSY Austin   Date and time admitted to hospital: 4/3/2022  7:19 PM    Patient with severe R thigh pain s/p HD  Dressing examined, dark black drainage present on bandage  Thigh edema noted  Severely TTP  Had DP pulse, leg warm  Able to wiggle toes  Ortho called and notified  Dressing note from their exam early this AM reported dressing was c/d/i  * Right intertrochanteric femur fracture  Assessment & Plan  Pt presented with acute nondisplaced intertrochanteric fracture of the right femur following mechanical fall  Pt with history of prior intertrochanteric femur fracture treated with cephalomedullary nail 12/25/2021   Transferred to [facility] from [facility] for ortho internvetion  · Pt underwent hardware removal and conversion to a bipolar hemiarthroplasty on 4/4  · DVT prophylaxis  · Pain control   · PT/OT recommending rehab, case management input appreciated    End stage renal disease [facility])  Assessment & Plan  Lab Results   Component Value Date    EGFR 8 04/06/2022    EGFR 10 04/05/2022    EGFR 5 04/04/2022    CREATININE 6 14 (H) 04/06/2022    CREATININE 5 05 (H) 04/05/2022    CREATININE 9 12 (H) 04/04/2022     History of ESRD on hemodialysis on MWF schedule  · Hemodialysis per nephrology    Acute on chronic anemia  Assessment & Plan  Lab Results   Component Value Date    HGB 6 7 (LL) 04/06/2022    HGB 7 9 (L) 04/05/2022    HGB 9 9 (L) 04/04/2022     Chronic anemia with macrocytosis  · Iron 52%, TIBC 145, Iron 75   · FA 3 7, B12 419  · Acute post-op blood loss anemia due to a drop in Hbg of > 2 0g from preop levels  · Hemoglobin 6 7 this morning, will transfuse 1 unit PRBCs with hemodialysis; patient was symptomatic with hypotension overnight  · Follow-up post transfusion hemoglobin  · Nephro also starting Epo    Thrombocytopenia (HCC)  Assessment & Plan  · Relatively stable, monitor CBC    Nausea, vomiting and diarrhea  Assessment & Plan  Likely 2/2 viral gastroenteritis  · Spouse reports ongoing symptoms, on and off since couple of weeks  · Supportive care    Type 2 diabetes mellitus [facility])  Assessment & Plan  Lab Results   Component Value Date    HGBA1C 4 4 03/29/2022     Recent Labs     04/05/22  1129 04/05/22  1616 04/05/22  2106 04/06/22  0623   POCGLU 218* 191* 134 119   Blood Sugar Average: Last 72 hrs:  (P) 169 25   · Only diet controlled at home  · On sliding scale insulin/Accu-Cheks  · Monitor p r n  Nicotine dependence  Assessment & Plan  · Tobacco cessation discussed and encouraged    Mixed hyperlipidemia  Assessment & Plan  · Continue statin    Hypertension  Assessment & Plan  Patient hypotensive overnight, suspect related to postop blood loss anemia  · Status post albumin and midodrine x1  · Cozaar on hold, continue Coreg 3 125 mg b i d   · Transfuse 1 unit PRBCs      CAD (coronary artery disease)  Assessment & Plan  History of coronary disease with drug-eluting stent placement  · Continue aspirin, beta-blocker statin  · Plavix was initially held given surgical intervention, was resumed on 04/05      VTE Pharmacologic Prophylaxis: VTE Score: 10 Moderate Risk (Score 3-4) - Pharmacological DVT Prophylaxis Ordered: heparin  Patient Centered Rounds: I performed bedside rounds with nursing staff today  Discussions with Specialists or Other Care Team Provider: appreciate renal and ortho input, d/w CM    Education and Discussions with Family / Patient: Patient declined call to   Time Spent for Care: 30 minutes  More than 50% of total time spent on counseling and coordination of care as described above      Current Length of Stay: 3 day(s)  Current Patient Status: Inpatient   Certification Statement: The patient will continue to require additional inpatient hospital stay due to post op course, rehab   Discharge Plan: Anticipate discharge in 24-48 hrs to rehab facility  Code Status: Level 1 - Full Code    Subjective:   Seen post HD, severe R thigh pain  No other concerns  Objective:     Vitals:   Temp (24hrs), Av 5 °F (36 4 °C), Min:96 7 °F (35 9 °C), Max:98 7 °F (37 1 °C)    Temp:  [96 7 °F (35 9 °C)-98 7 °F (37 1 °C)] 98 7 °F (37 1 °C)  HR:  [53-94] 84  Resp:  [17-19] 18  BP: ()/() 99/70  SpO2:  [95 %-100 %] 95 %  There is no height or weight on file to calculate BMI  Input and Output Summary (last 24 hours): Intake/Output Summary (Last 24 hours) at 2022 1205  Last data filed at 2022 1100  Gross per 24 hour   Intake 820 ml   Output 2002 ml   Net -1182 ml       Physical Exam:   Physical Exam  Vitals and nursing note reviewed  Constitutional:       General: He is not in acute distress  Cardiovascular:      Rate and Rhythm: Normal rate and regular rhythm  Pulmonary:      Effort: No respiratory distress  Abdominal:      General: There is no distension  Tenderness: There is no abdominal tenderness  Musculoskeletal:         General: Swelling (R thigh) present  Comments: Dressing saturated with dark blood, TTP   Neurological:      Mental Status: He is oriented to person, place, and time     Psychiatric:         Mood and Affect: Mood normal           Additional Data:     Labs:  Results from last 7 days   Lab Units 22  0458 22  0439 22  0432 22  0939 22  0939   WBC Thousand/uL 5 83   < > 3 41*   < > 5 55   HEMOGLOBIN g/dL 6 7*   < > 9 9*   < > 11 8*   HEMATOCRIT % 20 7*   < > 31 1*   < > 37 1   PLATELETS Thousands/uL 84*   < >  --   --  100*   BANDS PCT %  --   --  1  --   --    NEUTROS PCT %  --   --   --   --  78*   LYMPHS PCT %  --   --   --   --  12*   LYMPHO PCT %  --   --  8*  --   --    MONOS PCT %  --   --   --   --  5   MONO PCT %  --   --  0*  --   --    EOS PCT %  --   --  3  --  3    < > = values in this interval not displayed  Results from last 7 days   Lab Units 04/06/22  0458 04/03/22  2019 04/03/22  0939   SODIUM mmol/L 135*   < > 134*   POTASSIUM mmol/L 4 5   < > 2 6*   CHLORIDE mmol/L 101   < > 98*   CO2 mmol/L 27   < > 26   BUN mg/dL 22   < > 15   CREATININE mg/dL 6 14*   < > 8 24*   ANION GAP mmol/L 7   < > 10   CALCIUM mg/dL 8 1*   < > 7 7*   ALBUMIN g/dL  --   --  2 4*   TOTAL BILIRUBIN mg/dL  --   --  0 44   ALK PHOS U/L  --   --  217*   ALT U/L  --   --  18   AST U/L  --   --  22   GLUCOSE RANDOM mg/dL 121   < > 104    < > = values in this interval not displayed  Results from last 7 days   Lab Units 04/03/22  0939   INR  1 07     Results from last 7 days   Lab Units 04/06/22  0623 04/05/22  2106 04/05/22  1616 04/05/22  1129 04/05/22  9593 04/04/22  2053 04/04/22  1724 04/04/22  1608 04/04/22  0659 04/03/22  0912   POC GLUCOSE mg/dl 119 134 191* 218* 227* 197* 152* 219* 94 134               Lines/Drains:  Invasive Devices  Report    Peripheral Intravenous Line            Peripheral IV 04/03/22 Right;Upper;Ventral (anterior) Arm 3 days    Peripheral IV 04/04/22 Dorsal (posterior); Right Hand 2 days          Line            Hemodialysis AV Fistula  Left Forearm -- days                      Imaging: No pertinent imaging reviewed      Recent Cultures (last 7 days):         Last 24 Hours Medication List:   Current Facility-Administered Medications   Medication Dose Route Frequency Provider Last Rate    acetaminophen  975 mg Oral Q8H [address] Jose Malagon PA-C      aspirin  81 mg Oral Daily Vanda Bond PA-C      atorvastatin  40 mg Oral Daily With [employer]      b complex-vitamin C-folic acid  1 capsule Oral Daily With [employer]      calcium carbonate  1,000 mg Oral BID PRN Kay Lucia PA-C      carvedilol  3 125 mg Oral BID With Meals Nathaniel Guzman PA-C      cholecalciferol  2,000 Units Oral Daily Rollo SUDHA Bond      clopidogrel  75 mg Oral Daily Aaron Guzman PA-C      [START ON 4/8/2022] epoetin jah  4,000 Units Intravenous After Dialysis Tyron Shook MD      heparin (porcine)  5,000 Units Subcutaneous Q8H [address], [place]      HYDROmorphone  0 5 mg Intravenous Q3H PRN Mitul To PA-C      insulin lispro  1-5 Units Subcutaneous TID AC Aaron Guzman PA-C      insulin lispro  1-5 Units Subcutaneous HS Aaron Guzman PA-C      nicotine  1 patch Transdermal Daily Mitul To PA-C      nystatin   Topical BID Mitul To PA-C      ondansetron  4 mg Intravenous Q6H PRN Ti Carney MD      oxyCODONE  2 5 mg Oral Q6H PRN Udaylett SUDHA To      oxyCODONE  5 mg Oral Q6H PRN Mitul To PA-C      rifaximin  550 mg Oral Q12H [address] Mitul To PA-C          Today, Patient Was Seen By: Natasha Clement PA-C    **Please Note: This note may have been constructed using a voice recognition system  **

## 2022-04-06 NOTE — PLAN OF CARE
Problem: Prexisting or High Potential for Compromised Skin Integrity  Goal: Skin integrity is maintained or improved  Description: INTERVENTIONS:  - Identify patients at risk for skin breakdown  - Assess and monitor skin integrity  - Assess and monitor nutrition and hydration status  - Monitor labs   - Assess for incontinence   - Turn and reposition patient  - Assist with mobility/ambulation  - Relieve pressure over bony prominences  - Avoid friction and shearing  - Provide appropriate hygiene as needed including keeping skin clean and dry  - Evaluate need for skin moisturizer/barrier cream  - Collaborate with interdisciplinary team   - Patient/family teaching  - Consider wound care consult   Outcome: Progressing     Problem: MOBILITY - ADULT  Goal: Maintain or return to baseline ADL function  Description: INTERVENTIONS:  -  Assess patient's ability to carry out ADLs; assess patient's baseline for ADL function and identify physical deficits which impact ability to perform ADLs (bathing, care of mouth/teeth, toileting, grooming, dressing, etc )  - Assess/evaluate cause of self-care deficits   - Assess range of motion  - Assess patient's mobility; develop plan if impaired  - Assess patient's need for assistive devices and provide as appropriate  - Encourage maximum independence but intervene and supervise when necessary  - Involve family in performance of ADLs  - Assess for home care needs following discharge   - Consider OT consult to assist with ADL evaluation and planning for discharge  - Provide patient education as appropriate  Outcome: Progressing  Goal: Maintains/Returns to pre admission functional level  Description: INTERVENTIONS:  - Perform BMAT or MOVE assessment daily    - Set and communicate daily mobility goal to care team and patient/family/caregiver  - Collaborate with rehabilitation services on mobility goals if consulted  - Reposition patient every 2 hours    - Out of bed to chair    - Out of bed for meals   - Out of bed for toileting  - Record patient progress and toleration of activity level   Outcome: Progressing     Problem: Nutrition/Hydration-ADULT  Goal: Nutrient/Hydration intake appropriate for improving, restoring or maintaining nutritional needs  Description: Monitor and assess patient's nutrition/hydration status for malnutrition  Collaborate with interdisciplinary team and initiate plan and interventions as ordered  Monitor patient's weight and dietary intake as ordered or per policy  Utilize nutrition screening tool and intervene as necessary  Determine patient's food preferences and provide high-protein, high-caloric foods as appropriate       INTERVENTIONS:  - Monitor oral intake, urinary output, labs, and treatment plans  - Assess nutrition and hydration status and recommend course of action  - Evaluate amount of meals eaten  - Assist patient with eating if necessary   - Allow adequate time for meals  - Recommend/ encourage appropriate diets, oral nutritional supplements, and vitamin/mineral supplements  - Order, calculate, and assess calorie counts as needed  - Assess need for intravenous fluids  - Provide specific nutrition/hydration education as appropriate  - Include patient/family/caregiver in decisions related to nutrition  Outcome: Progressing     Problem: METABOLIC, FLUID AND ELECTROLYTES - ADULT  Goal: Electrolytes maintained within normal limits  Description: INTERVENTIONS:  - Monitor labs and assess patient for signs and symptoms of electrolyte imbalances  - Administer electrolyte replacement as ordered  - Monitor response to electrolyte replacements, including repeat lab results as appropriate  - Instruct patient on fluid and nutrition as appropriate  Outcome: Progressing  Goal: Fluid balance maintained  Description: INTERVENTIONS:  - Monitor labs   - Monitor I/O and WT  - Instruct patient on fluid and nutrition as appropriate  - Assess for signs & symptoms of volume excess or deficit  Outcome: Progressing     Problem: Potential for Falls  Goal: Patient will remain free of falls  Description: INTERVENTIONS:  - Educate patient/family on patient safety including physical limitations  - Instruct patient to call for assistance with activity   - Consult OT/PT to assist with strengthening/mobility   - Keep Call bell within reach  - Keep bed low and locked with side rails adjusted as appropriate  - Keep care items and personal belongings within reach  - Initiate and maintain comfort rounds  - Make Fall Risk Sign visible to staff  - Offer Toileting every 2 Hours, in advance of need  - Initiate/Maintain alarm  - Obtain necessary fall risk management equipment  - Apply yellow socks and bracelet for high fall risk patients  - Consider moving patient to room near nurses station  Outcome: Progressing     Problem: HEMATOLOGIC - ADULT  Goal: Maintains hematologic stability  Description: INTERVENTIONS  - Assess for signs and symptoms of bleeding or hemorrhage  - Monitor labs  - Administer supportive blood products/factors as ordered and appropriate  Outcome: Progressing     Problem: MUSCULOSKELETAL - ADULT  Goal: Maintain or return mobility to safest level of function  Description: INTERVENTIONS:  - Assess patient's ability to carry out ADLs; assess patient's baseline for ADL function and identify physical deficits which impact ability to perform ADLs (bathing, care of mouth/teeth, toileting, grooming, dressing, etc )  - Assess/evaluate cause of self-care deficits   - Assess range of motion  - Assess patient's mobility  - Assess patient's need for assistive devices and provide as appropriate  - Encourage maximum independence but intervene and supervise when necessary  - Involve family in performance of ADLs  - Assess for home care needs following discharge   - Consider OT consult to assist with ADL evaluation and planning for discharge  - Provide patient education as appropriate  Outcome: Progressing  Goal: Maintain proper alignment of affected body part  Description: INTERVENTIONS:  - Support, maintain and protect limb and body alignment  - Provide patient/ family with appropriate education  Outcome: Progressing

## 2022-04-06 NOTE — ASSESSMENT & PLAN NOTE
History of coronary disease with drug-eluting stent placement  · Continue aspirin, beta-blocker statin  · Plavix was initially held given surgical intervention, was resumed on 04/05

## 2022-04-06 NOTE — ASSESSMENT & PLAN NOTE
Likely 2/2 viral gastroenteritis  · Spouse reports ongoing symptoms, on and off since couple of weeks  · Supportive care

## 2022-04-06 NOTE — CASE MANAGEMENT
Case Management Progress Note    Patient name Jose David Backbone  Location [facility] 607/[facility] 637-03 MRN [id number]  : 1955 Date 2022       LOS (days): 3  Geometric Mean LOS (GMLOS) (days): 6 20  Days to GMLOS:3 5        OBJECTIVE:        Current admission status: Inpatient  Preferred Pharmacy:   [facility] [address] [id number], [address], [address] [place] [address] [address] [place] [address] [address] [address]  Phone: [phone] Fax: Ema Arredondo [address] [address] [address]  [address] [facility],[address] [place] [address] [address] [address]  Phone: [phone] Fax: [phone]    Primary Care Provider: TESSY Leon    Primary Insurance: MEDICARE  Secondary Insurance: AARP    PROGRESS NOTE: TC from [address] [place] [address], stating that they cover transportation costs to dialysis  Informed Mary London that patient goes to [facility] chair time 0430  Mary London informed that patient will most likely be discharged later this week or early next week

## 2022-04-06 NOTE — PROGRESS NOTES
NEPHROLOGY PROGRESS NOTE   Linden Moreno 78 y o  male MRN: [id number]  [facility] Encounter: [id number]  Reason for Consult: ESRD    ASSESSMENT AND PLAN:  Patient is 79-year-old male with ESRD on HD on MWF schedule, anemia, hypertension, diabetes, CVA, presented with right side hip pain   We are consulted for dialysis management      ESRD on HD on MWF schedule, [facility]  -HD today  Outpatient dry weight 77 kg    I saw and examined patient during hemodialysis treatment at 11:00 AM on 4/6/2022  The patient was receiving hemodialysis for treatment of end stage renal disease  I also reviewed vital signs, intake and output, lab results and recent events, and agree with dialysis order  Tolerating HD  BP lower      CKD anemia  -hemoglobin significantly dropping 6 7 today  Not on ALICE based on recent outpatient dialysis records   -transfuse p r n  For hemoglobin less than seven  -I had detailed discussion regarding risks versus benefit of Josep Firth does have remote history of CVA many years ago and also recently had a fistula thrombosis requiring thrombolysis and angioplasty in March 2022   -as per my prior discussion with patient regarding Epogen, patient is agreeable to proceed after understanding all the risks versus benefit    Given significantly dropping hemoglobin requiring blood transfusion, will start low-dose Epogen 4000 units with each dialysis      Mild hyponatremia, continue to monitor with dialysis, strict fluid restriction 1 2 L per day      CKD BMD, continue to monitor phosphorus, PTH      Access, left upper arm AV fistula     Hypertension  -blood pressure lower    -currently on Coreg 3 125 mg b i d ,  losartan discontinued   -if blood pressure remains lower, may consider midodrine with dialysis      Mechanical complication of internal orthopedic device on right hip, status post bipolar hemiarthroplasty on 4/4/22      Discussed above plan in detail with primary team     SUBJECTIVE:  Patient seen and examined at bedside  Patient complaining of significant pain issues  Denies any shortness of breath, nausea vomiting    OBJECTIVE:  Current Weight:    Vitals:    04/06/22 1050   BP: 101/71   Pulse: 82   Resp: 18   Temp: (!) 97 1 °F (36 2 °C)   SpO2:        Intake/Output Summary (Last 24 hours) at 4/6/2022 1059  Last data filed at 4/6/2022 0755  Gross per 24 hour   Intake 520 ml   Output 0 ml   Net 520 ml     Wt Readings from Last 3 Encounters:   04/03/22 80 3 kg (177 lb 0 5 oz)   03/28/22 78 kg (171 lb 14 4 oz)   03/25/22 80 2 kg (176 lb 12 9 oz)     Temp Readings from Last 3 Encounters:   04/06/22 (!) 97 1 °F (36 2 °C)   04/03/22 97 9 °F (36 6 °C)   03/29/22 97 5 °F (36 4 °C) (Oral)     BP Readings from Last 3 Encounters:   04/06/22 101/71   04/03/22 124/68   03/29/22 149/82     Pulse Readings from Last 3 Encounters:   04/06/22 82   04/03/22 (!) 51   03/29/22 87        Physical Examination:  General:  Lying in bed, no acute distress   Eyes:  Mild conjunctival pallor present  ENT:  External examination of ears and nose unremarkable  Neck:  No obvious lymphadenopathy appreciated  Respiratory:  Bilateral air entry present  CVS:  S1, S2 present  GI:  Soft, nondistended  CNS:  Active alert oriented x3  Skin:  No new rash  Musculoskeletal:  No obvious new gross deformity noted    Medications:    Current Facility-Administered Medications:     acetaminophen (TYLENOL) tablet 975 mg, 975 mg, Oral, Q8H [address], Jose Malagon PA-C, 975 mg at 04/05/22 2133    aspirin (ECOTRIN LOW STRENGTH) EC tablet 81 mg, 81 mg, Oral, Daily, Jose Malagon PA-C, 81 mg at 04/05/22 6030    atorvastatin (LIPITOR) tablet 40 mg, 40 mg, Oral, Daily With Renzo Sauce, PA-ANITHA, 40 mg at 04/05/22 1740    b complex-vitamin C-folic acid (NEPHROCAPS) capsule 1 capsule, 1 capsule, Oral, Daily With Renzo Sauce, PA-C, 1 capsule at 04/05/22 1740    calcium carbonate (TUMS) chewable tablet 1,000 mg, 1,000 mg, Oral, BID PRN, Leslie Rebollar PA-C, 1,000 mg at 04/05/22 2339    carvedilol (COREG) tablet 3 125 mg, 3 125 mg, Oral, BID With Meals, Shlomo Degrootrosa elena Guzman PA-C    cholecalciferol (VITAMIN D3) tablet 2,000 Units, 2,000 Units, Oral, Daily, Roggenanisa Lassiter PA-C, 2,000 Units at 04/05/22 3638    clopidogrel (PLAVIX) tablet 75 mg, 75 mg, Oral, Daily, Aaron Guzman PA-C, 75 mg at 04/05/22 1249    heparin (porcine) subcutaneous injection 5,000 Units, 5,000 Units, Subcutaneous, Q8H [address], Esmer Nguyen MD, 5,000 Units at 04/06/22 0655    HYDROmorphone (DILAUDID) injection 0 5 mg, 0 5 mg, Intravenous, Q3H PRN, Robbin Lassiter PA-C, 0 5 mg at 04/06/22 1046    insulin lispro (HumaLOG) 100 units/mL subcutaneous injection 1-5 Units, 1-5 Units, Subcutaneous, TID AC, 1 Units at 04/05/22 1741 **AND** Fingerstick Glucose (POCT), , , TID AC, Aaron Guzman PA-C    insulin lispro (HumaLOG) 100 units/mL subcutaneous injection 1-5 Units, 1-5 Units, Subcutaneous, HS, Aaron Guzman PA-C    nicotine (NICODERM CQ) 14 mg/24hr TD 24 hr patch 1 patch, 1 patch, Transdermal, Daily, Roggenanisa Lassiter PA-C    nystatin (MYCOSTATIN) powder, , Topical, BID, RobbinOCTAVIO Ivan-C, Given at 04/05/22 1742    ondansetron (ZOFRAN) injection 4 mg, 4 mg, Intravenous, Q6H PRN, Esmer Nguyen MD    oxyCODONE (ROXICODONE) IR tablet 2 5 mg, 2 5 mg, Oral, Q6H PRN, OCTAVIO Baron-ANITHA    oxyCODONE (ROXICODONE) IR tablet 5 mg, 5 mg, Oral, Q6H PRN, Roggen Maikol, PA-C, 5 mg at 04/06/22 0230    rifaximin (XIFAXAN) tablet 550 mg, 550 mg, Oral, Q12H [address], Jose Malagon, PA-C, 550 mg at 04/05/22 2133    Laboratory Results:  Results from last 7 days   Lab Units 04/06/22  0458 04/05/22  0439 04/04/22  0432 04/03/22  2019 04/03/22  0939   WBC Thousand/uL 5 83 5 66 3 41*  --  5 55   HEMOGLOBIN g/dL 6 7* 7 9* 9 9*  --  11 8*   HEMATOCRIT % 20 7* 25 0* 31 1*  --  37 1   PLATELETS Thousands/uL 84* 75* --   --  100*   SODIUM mmol/L 135* 135* 135*  --  134*   POTASSIUM mmol/L 4 5 4 7 3 7 3 9 2 6*   CHLORIDE mmol/L 101 102 102  --  98*   CO2 mmol/L 27 26 28  --  26   BUN mg/dL 22 11 19  --  15   CREATININE mg/dL 6 14* 5 05* 9 12*  --  8 24*   CALCIUM mg/dL 8 1* 8 1* 7 9*  --  7 7*   MAGNESIUM mg/dL  --   --   --   --  2 0       XR femur 2 vw right   Final Result by Miko Sanchez MD (04/04 0162)      Stable appearance right hip fracture status post ORIF                  Workstation performed: [id number]             Portions of the record may have been created with voice recognition software  Occasional wrong word or "sound a like" substitutions may have occurred due to the inherent limitations of voice recognition software  Read the chart carefully and recognize, using context, where substitutions have occurred

## 2022-04-07 PROBLEM — R19.7 NAUSEA, VOMITING AND DIARRHEA: Status: RESOLVED | Noted: 2022-04-03 | Resolved: 2022-04-07

## 2022-04-07 PROBLEM — R11.2 NAUSEA, VOMITING AND DIARRHEA: Status: RESOLVED | Noted: 2022-04-03 | Resolved: 2022-04-07

## 2022-04-07 LAB
ABO GROUP BLD BPU: NORMAL
ABO GROUP BLD BPU: NORMAL
ANION GAP SERPL CALCULATED.3IONS-SCNC: 5 MMOL/L (ref 4–13)
BPU ID: NORMAL
BPU ID: NORMAL
BUN SERPL-MCNC: 12 MG/DL (ref 5–25)
CALCIUM SERPL-MCNC: 8.2 MG/DL (ref 8.3–10.1)
CHLORIDE SERPL-SCNC: 99 MMOL/L (ref 100–108)
CO2 SERPL-SCNC: 32 MMOL/L (ref 21–32)
CREAT SERPL-MCNC: 3.87 MG/DL (ref 0.6–1.3)
CROSSMATCH: NORMAL
CROSSMATCH: NORMAL
ERYTHROCYTE [DISTWIDTH] IN BLOOD BY AUTOMATED COUNT: 17.9 % (ref 11.6–15.1)
FLUAV RNA RESP QL NAA+PROBE: NEGATIVE
FLUBV RNA RESP QL NAA+PROBE: NEGATIVE
GFR SERPL CREATININE-BSD FRML MDRD: 15 ML/MIN/1.73SQ M
GLUCOSE SERPL-MCNC: 112 MG/DL (ref 65–140)
GLUCOSE SERPL-MCNC: 119 MG/DL (ref 65–140)
GLUCOSE SERPL-MCNC: 131 MG/DL (ref 65–140)
GLUCOSE SERPL-MCNC: 84 MG/DL (ref 65–140)
GLUCOSE SERPL-MCNC: 88 MG/DL (ref 65–140)
HCT VFR BLD AUTO: 24 % (ref 36.5–49.3)
HGB BLD-MCNC: 8 G/DL (ref 12–17)
MCH RBC QN AUTO: 34.8 PG (ref 26.8–34.3)
MCHC RBC AUTO-ENTMCNC: 33.3 G/DL (ref 31.4–37.4)
MCV RBC AUTO: 104 FL (ref 82–98)
PLATELET # BLD AUTO: 89 THOUSANDS/UL (ref 149–390)
PMV BLD AUTO: 10.2 FL (ref 8.9–12.7)
POTASSIUM SERPL-SCNC: 3.4 MMOL/L (ref 3.5–5.3)
RBC # BLD AUTO: 2.3 MILLION/UL (ref 3.88–5.62)
RSV RNA RESP QL NAA+PROBE: NEGATIVE
SARS-COV-2 RNA RESP QL NAA+PROBE: NEGATIVE
SODIUM SERPL-SCNC: 136 MMOL/L (ref 136–145)
UNIT DISPENSE STATUS: NORMAL
UNIT DISPENSE STATUS: NORMAL
UNIT PRODUCT CODE: NORMAL
UNIT PRODUCT CODE: NORMAL
UNIT PRODUCT VOLUME: 350 ML
UNIT PRODUCT VOLUME: 350 ML
UNIT RH: NORMAL
UNIT RH: NORMAL
WBC # BLD AUTO: 3.6 THOUSAND/UL (ref 4.31–10.16)

## 2022-04-07 PROCEDURE — 80048 BASIC METABOLIC PNL TOTAL CA: CPT | Performed by: PHYSICIAN ASSISTANT

## 2022-04-07 PROCEDURE — 99232 SBSQ HOSP IP/OBS MODERATE 35: CPT | Performed by: INTERNAL MEDICINE

## 2022-04-07 PROCEDURE — 97530 THERAPEUTIC ACTIVITIES: CPT

## 2022-04-07 PROCEDURE — 82948 REAGENT STRIP/BLOOD GLUCOSE: CPT

## 2022-04-07 PROCEDURE — 97116 GAIT TRAINING THERAPY: CPT

## 2022-04-07 PROCEDURE — NC001 PR NO CHARGE: Performed by: ORTHOPAEDIC SURGERY

## 2022-04-07 PROCEDURE — 85027 COMPLETE CBC AUTOMATED: CPT | Performed by: PHYSICIAN ASSISTANT

## 2022-04-07 PROCEDURE — 0241U HB NFCT DS VIR RESP RNA 4 TRGT: CPT | Performed by: INTERNAL MEDICINE

## 2022-04-07 RX ORDER — ALBUMIN (HUMAN) 12.5 G/50ML
12.5 SOLUTION INTRAVENOUS ONCE
Status: DISCONTINUED | OUTPATIENT
Start: 2022-04-07 | End: 2022-04-07

## 2022-04-07 RX ORDER — GUAIFENESIN 600 MG
600 TABLET, EXTENDED RELEASE 12 HR ORAL EVERY 12 HOURS SCHEDULED
Status: DISCONTINUED | OUTPATIENT
Start: 2022-04-07 | End: 2022-04-09 | Stop reason: HOSPADM

## 2022-04-07 RX ORDER — POTASSIUM CHLORIDE 20 MEQ/1
40 TABLET, EXTENDED RELEASE ORAL ONCE
Status: COMPLETED | OUTPATIENT
Start: 2022-04-07 | End: 2022-04-07

## 2022-04-07 RX ORDER — METOCLOPRAMIDE HYDROCHLORIDE 5 MG/ML
10 INJECTION INTRAMUSCULAR; INTRAVENOUS ONCE
Status: COMPLETED | OUTPATIENT
Start: 2022-04-07 | End: 2022-04-07

## 2022-04-07 RX ADMIN — NYSTATIN: 100000 POWDER TOPICAL at 12:14

## 2022-04-07 RX ADMIN — METOCLOPRAMIDE HYDROCHLORIDE 10 MG: 5 INJECTION INTRAMUSCULAR; INTRAVENOUS at 20:35

## 2022-04-07 RX ADMIN — RIFAXIMIN 550 MG: 550 TABLET ORAL at 08:24

## 2022-04-07 RX ADMIN — HEPARIN SODIUM 5000 UNITS: 5000 INJECTION INTRAVENOUS; SUBCUTANEOUS at 05:00

## 2022-04-07 RX ADMIN — POTASSIUM CHLORIDE 40 MEQ: 20 TABLET, EXTENDED RELEASE ORAL at 12:12

## 2022-04-07 RX ADMIN — Medication 2000 UNITS: at 08:24

## 2022-04-07 RX ADMIN — ASPIRIN 81 MG: 81 TABLET, COATED ORAL at 08:24

## 2022-04-07 RX ADMIN — ACETAMINOPHEN 975 MG: 325 TABLET ORAL at 05:00

## 2022-04-07 RX ADMIN — HEPARIN SODIUM 5000 UNITS: 5000 INJECTION INTRAVENOUS; SUBCUTANEOUS at 21:14

## 2022-04-07 RX ADMIN — ONDANSETRON 4 MG: 2 INJECTION INTRAMUSCULAR; INTRAVENOUS at 15:00

## 2022-04-07 RX ADMIN — HEPARIN SODIUM 5000 UNITS: 5000 INJECTION INTRAVENOUS; SUBCUTANEOUS at 15:00

## 2022-04-07 RX ADMIN — CALCIUM CARBONATE (ANTACID) CHEW TAB 500 MG 1000 MG: 500 CHEW TAB at 12:16

## 2022-04-07 RX ADMIN — RIFAXIMIN 550 MG: 550 TABLET ORAL at 21:14

## 2022-04-07 RX ADMIN — NYSTATIN: 100000 POWDER TOPICAL at 17:44

## 2022-04-07 RX ADMIN — OXYCODONE HYDROCHLORIDE 5 MG: 5 TABLET ORAL at 05:04

## 2022-04-07 RX ADMIN — CLOPIDOGREL BISULFATE 75 MG: 75 TABLET ORAL at 08:24

## 2022-04-07 RX ADMIN — ONDANSETRON 4 MG: 2 INJECTION INTRAMUSCULAR; INTRAVENOUS at 09:07

## 2022-04-07 RX ADMIN — ALBUMIN (HUMAN) 12.5 G: 0.25 INJECTION, SOLUTION INTRAVENOUS at 00:23

## 2022-04-07 RX ADMIN — CARVEDILOL 3.12 MG: 3.12 TABLET, FILM COATED ORAL at 08:24

## 2022-04-07 RX ADMIN — HYDROMORPHONE HYDROCHLORIDE 0.5 MG: 1 INJECTION, SOLUTION INTRAMUSCULAR; INTRAVENOUS; SUBCUTANEOUS at 15:04

## 2022-04-07 RX ADMIN — OXYCODONE HYDROCHLORIDE 5 MG: 5 TABLET ORAL at 12:12

## 2022-04-07 RX ADMIN — HYDROMORPHONE HYDROCHLORIDE 0.5 MG: 1 INJECTION, SOLUTION INTRAMUSCULAR; INTRAVENOUS; SUBCUTANEOUS at 09:07

## 2022-04-07 NOTE — PROGRESS NOTES
Progress note- Nephrology   Ocie Neeta Dario 78 y o  male MRN: [id number]  Unit/Bed#: [facility] 607-01 Encounter: [id number]      ASSESSMENT/PLAN:  End-stage renal disease:   Assessment:   [address] MWF [address]   EDW:  77 kg   Last dialysis treatment on 4/6/2022 for UF -1 5 liyers   Patient reported intense uncontrollable pain during HD yesterday   Requesting to be medicated prior to HD  Plan:   Next dialysis treatment tomorrow   No urgent need for dialysis at this time   Avoid nephrotoxins, adjust meds to appropriate GFR   Will continue to follow I/O, labs and volume status    Access:   Assessment and Plan:   Left AV fistula-positive bruit and thrill    Hypertension:  Assessment and Plan:   Current BP cannot on the softer side   Avoid variations in blood pressure   Losartan recently discontinued   May need to consider adding midodrine with HD-will evaluate patient in a m     Current medications include:  Carvedilol 3 125 mg 2 times daily,   Will UF as blood pressure allows    Anemia likely Chronic Kidney Disease:  Assessment and Plan:   Status post transfusion 04/06/2022 for hemoglobin 6 7   Current hemoglobin 8 0   Patient previously in on ALICE based on recent outpatient records for he has a remote history of CVA   Per Javier Stephenson note from yesterday-had discussion with patient yesterday regarding risks benefits with Epogen   Patient to start Epogen 4000 units with each HD   Will hemoglobin 9-10    Transfuse for hemoglobin less than 7 0    Bone mineral disease of Chronic Kidney Disease:  Assessment and Plan:   Currently not on binders phosphorus is 3 9   On vitamin-D to 2000 units daily   Continue with renal diet   Check PTH and phosphorus as outpatient    Electrolytes/Acid Base:  Assessment:  Mild hyponatremia:  Likely volume dependent in the setting of ER SD  · Improved with HD and current sodium 136  Mild hypo kalemia:  Current potassium 3 4  · Status post K-Dur 40 mEq x 1 per primary team  · Will continue to monitor in the setting of ER SD  Plan:   Sodium, phosphorus, potassium, and acidosis to be corrected with dialysis   Will continue to monitor       Mechanical complication of internal orthopedic device on right hip:  Status post bipolar hemiarthroplasty on 04/04/2022  · Orthopedic following  · Pain control per primary team  · PT OT      Disposition:  Patient stable from a nephrology/dialysis standpoint  Okay for discharge when medically/surgically stable per primary team    SUBJECTIVE:  Patient seen and examined at bedside      OBJECTIVE:  Current Weight:    Vitals:    04/07/22 0236 04/07/22 0342 04/07/22 0754 04/07/22 0824   BP: 112/57  116/58    BP Location:       Pulse: 72  58 87   Resp: 16  16    Temp: 97 8 °F (36 6 °C)  97 8 °F (36 6 °C)    TempSrc:       SpO2: 98% 95% 100%        Intake/Output Summary (Last 24 hours) at 4/7/2022 1028  Last data filed at 4/6/2022 1100  Gross per 24 hour   Intake 300 ml   Output 2002 ml   Net -1702 ml     General:  No acute distress, cooperative, observed walking in hallway with PT-taking multiple stops  Skin:  Warm and dry without rash  ENMT:  Mucous membranes moist, sclera anicteric  Respiratory:  Essentially clear on auscultation without crackles, rhonchi, wheezes  Cardiac:  Regular rate and rhythm without rub, or murmur, no lower extremity edema, no JVD  GI:  Soft, nontender, no distention, active bowel sounds  Muscle skeletal:  Left AV fistula positive bruit and thrill  Neuro:  Alert oriented and awake,   Psych:  Appropriate affect    Medications:    Current Facility-Administered Medications:     acetaminophen (TYLENOL) tablet 975 mg, 975 mg, Oral, Q8H [facility], Jose Malagon PA-C, 975 mg at 04/07/22 0500    aspirin (ECOTRIN LOW STRENGTH) EC tablet 81 mg, 81 mg, Oral, Daily, Jose Malagon PA-C, 81 mg at 04/07/22 0824    atorvastatin (LIPITOR) tablet 40 mg, 40 mg, Oral, Daily With Dinner, Kelli Cha PA-C, 40 mg at 04/06/22 1754    b complex-vitamin C-folic acid (NEPHROCAPS) capsule 1 capsule, 1 capsule, Oral, Daily With Dinner, Parul Carias PA-C, 1 capsule at 04/06/22 1753    calcium carbonate (TUMS) chewable tablet 1,000 mg, 1,000 mg, Oral, BID PRN, Kristen Debbie, SUDHA, 1,000 mg at 04/06/22 1754    carvedilol (COREG) tablet 3 125 mg, 3 125 mg, Oral, BID With Meals, Damion Guzman PA-C, 3 125 mg at 04/07/22 1565    cholecalciferol (VITAMIN D3) tablet 2,000 Units, 2,000 Units, Oral, Daily, Parul Carias PA-C, 2,000 Units at 04/07/22 0824    clopidogrel (PLAVIX) tablet 75 mg, 75 mg, Oral, Daily, Aaron Guzman PA-C, 75 mg at 04/07/22 0824    [START ON 4/8/2022] epoetin jah (EPOGEN,PROCRIT) injection 4,000 Units, 4,000 Units, Intravenous, After Dialysis, Stephany Garcia MD    heparin (porcine) subcutaneous injection 5,000 Units, 5,000 Units, Subcutaneous, Q8H [address], Varsha Mae MD, 5,000 Units at 04/07/22 0500    HYDROmorphone (DILAUDID) injection 0 5 mg, 0 5 mg, Intravenous, Q3H PRN, Parul Carias PA-C, 0 5 mg at 04/07/22 0907    insulin lispro (HumaLOG) 100 units/mL subcutaneous injection 1-5 Units, 1-5 Units, Subcutaneous, TID AC, 1 Units at 04/05/22 1741 **AND** Fingerstick Glucose (POCT), , , TID AC, Aaron Guzman PA-C    insulin lispro (HumaLOG) 100 units/mL subcutaneous injection 1-5 Units, 1-5 Units, Subcutaneous, HS, Aaron Guzman PA-C    nicotine (NICODERM CQ) 14 mg/24hr TD 24 hr patch 1 patch, 1 patch, Transdermal, Daily, Parul Carias, PA-C    nystatin (MYCOSTATIN) powder, , Topical, BID, Parul Head, PA-C, Given at 04/06/22 1756    ondansetron (ZOFRAN) injection 4 mg, 4 mg, Intravenous, Q6H PRN, Varsha Mae MD, 4 mg at 04/07/22 0907    oxyCODONE (ROXICODONE) IR tablet 2 5 mg, 2 5 mg, Oral, Q6H PRN, OCTAVIO Vaughan-C    oxyCODONE (ROXICODONE) IR tablet 5 mg, 5 mg, Oral, Q6H PRN, Parul Carias, PA-C, 5 mg at 04/07/22 6064   potassium chloride (K-DUR,KLOR-CON) CR tablet 40 mEq, 40 mEq, Oral, Once, HookLogicletty Monroy, SUDHA    rifaximin (XIFAXAN) tablet 550 mg, 550 mg, Oral, Q12H [facility], Alden Jocy, SUDHA, 550 mg at 04/07/22 5898    Laboratory Results:  Results from last 7 days   Lab Units 04/07/22  0150 04/06/22  1531 04/06/22  0458 04/05/22  0439 04/04/22  0432 04/03/22  2019 04/03/22  0939   WBC Thousand/uL 3 60*  --  5 83 5 66 3 41*  --  5 55   HEMOGLOBIN g/dL 8 0* 8 9* 6 7* 7 9* 9 9*  --  11 8*   HEMATOCRIT % 24 0* 26 0* 20 7* 25 0* 31 1*  --  37 1   PLATELETS Thousands/uL 89*  --  84* 75*  --   --  100*   SODIUM mmol/L 136  --  135* 135* 135*  --  134*   POTASSIUM mmol/L 3 4*  --  4 5 4 7 3 7 3 9 2 6*   CHLORIDE mmol/L 99*  --  101 102 102  --  98*   CO2 mmol/L 32  --  27 26 28  --  26   BUN mg/dL 12  --  22 11 19  --  15   CREATININE mg/dL 3 87*  --  6 14* 5 05* 9 12*  --  8 24*   CALCIUM mg/dL 8 2*  --  8 1* 8 1* 7 9*  --  7 7*   MAGNESIUM mg/dL  --   --   --   --   --   --  2 0

## 2022-04-07 NOTE — PLAN OF CARE
Problem: OCCUPATIONAL THERAPY ADULT  Goal: Performs self-care activities at highest level of function for planned discharge setting  See evaluation for individualized goals  Description: Treatment Interventions: ADL retraining,Functional transfer training,Endurance training,Cognitive reorientation,Patient/family training,Equipment evaluation/education,Compensatory technique education,Energy conservation,Activityengagement          See flowsheet documentation for full assessment, interventions and recommendations  Outcome: Progressing  Note: Limitation: Decreased ADL status,Decreased endurance,Decreased self-care trans,Decreased high-level ADLs  Prognosis: Good  Assessment: Pt is a 78 y o  male who was admitted to [facility] on 4/3/2022 with Displaced intertrochanteric fracture of right femur, initial encounter for closed fracture [facility]) s/p TAMIKO conversion to  hemiarthroplasty   Pt's problem list also includes PMH of DM, previous surgery and hypotension, osteoporosis, nicotine dependence, ESRD on HD  At baseline pt was completing adls and mobility independently with use of RW- admits to 1 additional fall - shares homemaking with family  Pt lives with spouse, son and MIL however reports spouse and son work during the day and MIL is bedbound  Currently pt requires moderate assist for overall ADLS and min to mod assist for functional mobility/transfers  Pt currently presents with impairments in the following categories -steps to enter environment, difficulty performing ADLS, difficulty performing IADLS  and environment activity tolerance, endurance, standing balance/tolerance, sitting balance/tolerance and attention    These impairments, as well as pt's fatigue, pain, hip precautions, orthopedic restricitions , WBS , decreased caregiver support, risk for falls and home environment  limit pt's ability to safely engage in all baseline areas of occupation, includingbathing, dressing, toileting, functional mobility/transfers, community mobility, care of pets , laundry , driving, house maintenance, meal prep, cleaning, social participation  and leisure activities  From OT standpoint, recommend inpt rehab upon D/C  OT will continue to follow to address the below stated goals        OT Discharge Recommendation: Post acute rehabilitation services        Angi Moreno

## 2022-04-07 NOTE — ASSESSMENT & PLAN NOTE
Lab Results   Component Value Date    EGFR 15 04/07/2022    EGFR 8 04/06/2022    EGFR 10 04/05/2022    CREATININE 3 87 (H) 04/07/2022    CREATININE 6 14 (H) 04/06/2022    CREATININE 5 05 (H) 04/05/2022     History of ESRD on hemodialysis on MWF schedule  · Hemodialysis per nephrology

## 2022-04-07 NOTE — ASSESSMENT & PLAN NOTE
Patient hypotensive 4/5->4/6--suspect related to postop blood loss anemia  · Status post albumin and midodrine x1  · Cozaar on hold, continue Coreg 3 125 mg b i d   · S/p 1 unit PRBC on 4/6

## 2022-04-07 NOTE — PLAN OF CARE
Problem: PHYSICAL THERAPY ADULT  Goal: Performs mobility at highest level of function for planned discharge setting  See evaluation for individualized goals  Description: Treatment/Interventions: Functional transfer training,LE strengthening/ROM,Elevations,Therapeutic exercise,Endurance training,Cognitive reorientation,Patient/family training,Equipment eval/education,Bed mobility,Gait training  Equipment Recommended:  (pt owns RW)       See flowsheet documentation for full assessment, interventions and recommendations  Outcome: Progressing  Note: Prognosis: Fair  Problem List: Decreased strength,Decreased range of motion,Decreased endurance,Impaired balance,Decreased mobility,Decreased safety awareness,Orthopedic restrictions,Pain  Assessment: (P) Pt able to increase ambulation bouts and distance this session, however continues to complain of R lateral hip/thigh "tightness" which improved with distance/mobility  Pt with 2 episodes of nausea, which he believed to be associated with pain, RN updated and arrived with medication  Pt encouraged to perform seated LE exercise and OOB to chair throughout the day with nursing staff to prevent further stiffness, verbalized understanding  Pt able to recall 1/3 hip precautions, re-educated on remaining  Pt will benefit from continued skilled PT intervention during course of hospital stay to improve strength, endurance and balance to improve safety with functional mobility  Recommend IP rehab upon hospital D/C  Barriers to Discharge: Decreased caregiver support        PT Discharge Recommendation: Post acute rehabilitation services          See flowsheet documentation for full assessment

## 2022-04-07 NOTE — ASSESSMENT & PLAN NOTE
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

## 2022-04-07 NOTE — PHYSICAL THERAPY NOTE
Physical Therapy Treatment Note    Patient's Name: Anne Gimenez  : 22 0909   PT Last Visit   PT Visit Date 22   Note Type   Note Type Treatment   Pain Assessment   Pain Assessment Tool 0-10   Pain Score 8   Pain Location/Orientation Orientation: Right;Location: Hip   Hospital Pain Intervention(s) Repositioned; Ambulation/increased activity   Restrictions/Precautions   Weight Bearing Precautions Per Order Yes   RLE Weight Bearing Per Order WBAT   Braces or Orthoses Other (Comment)  (hip abduction pillow)   Other Precautions Cognitive; Chair Alarm; Bed Alarm;WBS;THR;Fall Risk;Pain  (Posterolateral hip precautions)   General   Chart Reviewed Yes   Family/Caregiver Present No   Cognition   Overall Cognitive Status Impaired   Arousal/Participation Alert   Attention Attends with cues to redirect   Orientation Level Oriented X4   Following Commands Follows one step commands with increased time or repetition   Comments Pt pleasant and cooperative, limited insight to impairments, frequent cues for safety   Bed Mobility   Supine to Sit 3  Moderate assistance   Additional items Assist x 1; Increased time required;Verbal cues   Additional Comments Increased pain with movement, cues for sequencing to maintain hip precautions   Transfers   Sit to Stand 4  Minimal assistance   Additional items Assist x 1; Increased time required;Verbal cues   Stand to Sit 4  Minimal assistance   Additional items Assist x 1; Increased time required;Verbal cues   Additional Comments with RW, VC's for hand placement, performed multiple trials throughout session, improved to CGA on final trial   Ambulation/Elevation   Gait pattern Decreased foot clearance;Decreased R stance; Step to;Short stride; Forward Flexion;Narrow VAIBHAV; Improper Weight shift   Gait Assistance 4  Minimal assist   Additional items Assist x 1   Assistive Device Rolling walker   Distance 8 ftx1, 15 ftx1, 20 ftx2, seated rest break between trials  VC's for proximity to RW, increased step length, increased step height   Balance   Static Sitting Fair +   Dynamic Sitting Fair   Static Standing Fair -   Dynamic Standing Poor +   Ambulatory Poor +   Activity Tolerance   Activity Tolerance Patient limited by pain   Nurse Made Aware RN updated  Chair alarm engaged at end of session   Assessment   Prognosis Fair   Problem List Decreased strength;Decreased range of motion;Decreased endurance; Impaired balance;Decreased mobility; Decreased safety awareness;Orthopedic restrictions;Pain   Assessment Pt able to increase ambulation bouts and distance this session, however continues to complain of R lateral hip/thigh "tightness" which improved with distance/mobility  Pt with 2 episodes of nausea, which he believed to be associated with pain, RN updated and arrived with medication  Pt encouraged to perform seated LE exercise and OOB to chair throughout the day with nursing staff to prevent further stiffness, verbalized understanding  Pt able to recall 1/3 hip precautions, re-educated on remaining  Pt will benefit from continued skilled PT intervention during course of hospital stay to improve strength, endurance and balance to improve safety with functional mobility  Recommend IP rehab upon hospital D/C  Goals   Patient Goals to get rid of the pain   STG Expiration Date 04/19/22   PT Treatment Day 1   Plan   Treatment/Interventions Functional transfer training;LE strengthening/ROM; Elevations; Therapeutic exercise; Endurance training;Cognitive reorientation;Patient/family training;Equipment eval/education; Bed mobility;Gait training   Progress Progressing toward goals   PT Frequency Other (Comment)  (3-6x/week)   Recommendation   PT Discharge Recommendation Post acute rehabilitation services   AM-PAC Basic Mobility Inpatient   Turning in Bed Without Bedrails 3   Lying on Back to Sitting on Edge of Flat Bed 2   Moving Bed to Chair 3   Standing Up From Chair 3   Walk in Room 3   Climb 3-5 Stairs 2   Basic Mobility Inpatient Raw Score 16   Basic Mobility Standardized Score 38 32   Highest Level Of Mobility   [facility] Goal 5: Stand one or more mins       Tash Savage, PT, DPT, GCS

## 2022-04-07 NOTE — ASSESSMENT & PLAN NOTE
Lab Results   Component Value Date    HGBA1C 4 4 03/29/2022     Recent Labs     04/06/22  0623 04/06/22  1215 04/06/22  1626 04/06/22 2052   POCGLU 119 125 93 115   Blood Sugar Average: Last 72 hrs:  (P) 926 [id number]   · Only diet controlled at home  · On sliding scale insulin/Accu-Cheks  · Monitor p r n

## 2022-04-07 NOTE — OCCUPATIONAL THERAPY NOTE
Occupational Therapy Treatment Note:       04/07/22 1535   Note Type   Cancel Reasons Other  (pt vomitting, and painfull  sats was oob this am  ref pm ot)

## 2022-04-07 NOTE — ASSESSMENT & PLAN NOTE
Lab Results   Component Value Date    HGB 8 0 (L) 04/07/2022    HGB 8 9 (L) 04/06/2022    HGB 6 7 (LL) 04/06/2022     Chronic anemia with macrocytosis  · Iron 52%, TIBC 145, Iron 75, FA 3 7, B12 419  · Acute post-op blood loss anemia due to a drop in Hbg of > 2 0g from preop levels  · Hemoglobin 6 7 on AM of 4/6, s/p 1 unit PRBCs with hemodialysis  · Hgb improved to 8 9 post transfusion, now 8 0 most recently   · Nephro also starting Epogen with HD  · Monitor CBC

## 2022-04-07 NOTE — PROGRESS NOTES
Progress Note - Geriatric Medicine   Tomi Bishop 78 y o  male MRN: [id number]  [facility] Encounter: [id number]      Assessment/Plan:    Ambulatory dysfunction with fall  -delayed presentation to hospital  -injuries as outlined  -remains high risk future falls, continue fall precautions, address modifiable risk factors  -maintain environment free of fall hazards  -PT OT following    Right femoral head screw cut out  -s/p implant removal and conversion to arthroplasty  -acute pain control suboptimal as was unable to receive medications overnight due to hypotension, blood pressures better s/p PRBC transfusion and hopefully no longer be limiting factor, if continues to have episodes of hypotension and pain severe and unable to be controlled consider low-dose IV fentanyl due to short half-life and transient effect on blood pressures  -continue tx acute blood loss anemia  -PT/OT following    Acute pain due to trauma  -recommendations as above    Acute blood loss anemia  -hemoglobin 6 7 yesterday, 8 9 s/p PRBC transfusion now 8 0 this morning  -continue to monitor closely and transfuse as necessary  -continue optimization of hemodynamics    ESRD on HD  -Neophro following - managing HD    DM-II without long-term use of insulin  -continue diet control and healthy lifestyle modifications  -outpatient follow-up with PCP for routine diabetic screenings and cares    Impaired vision  -encourage use of corrective lenses appropriate times  -maintain environment free of fall hazards, ensure appropriate lighting and footwear when out of bed    Nicotine dependence due to cigarettes  -continue to provide cessation counseling  -nicotine patch available during hospitalization  -strongly encourage close outpatient follow-up with PCP for ongoing supports    Frailty syndrome in geriatric patient  -clinical frailty scale stage 5, mildly frail  -patient may be sensitive to even mild metabolic derangements due to underlying chronic comorbidities and extremely limited physiologic and metabolic reserves  -continue optimize chronic conditions and address acute derangements as arise    Care coordination: juliette with Christen     Subjective:     Patient seen and examined at bedside where he is lying resting comfortably, reports that he did not sleep well overnight, denies pain or acute complaints other than mild fatigue  Nursing reports patient unable to receive pain medication at times overnight due to hypotension now improved, no additional acute events reported overnight  Review of Systems   Constitutional: Negative  Negative for chills and fever  HENT: Negative  Eyes: Negative  Respiratory: Negative  Negative for cough and shortness of breath  Cardiovascular: Negative  Negative for chest pain and palpitations  Gastrointestinal: Negative  Negative for abdominal pain, nausea and vomiting  Endocrine: Negative  Genitourinary: Negative  Negative for difficulty urinating and dysuria  Musculoskeletal: Positive for gait problem  Skin: Negative  Allergic/Immunologic: Negative  Neurological: Negative for dizziness, light-headedness and numbness  Hematological: Negative  Psychiatric/Behavioral: Negative  All other systems reviewed and are negative  Objective:     Vitals: Blood pressure 112/57, pulse 72, temperature 97 8 °F (36 6 °C), resp  rate 16, SpO2 95 %  ,There is no height or weight on file to calculate BMI  Intake/Output Summary (Last 24 hours) at 4/7/2022 0739  Last data filed at 4/6/2022 1100  Gross per 24 hour   Intake 500 ml   Output 2002 ml   Net -1502 ml       Current Medications: Reviewed    Physical Exam:   Physical Exam  Vitals and nursing note reviewed  Constitutional:       General: He is not in acute distress  Appearance: Normal appearance  Comments: Appears stated age   HENT:      Head: Normocephalic        Comments: Unshaven     Nose: Nose normal       Mouth/Throat: Mouth: Mucous membranes are dry  Comments: Dentition poor  Eyes:      General:         Right eye: No discharge  Left eye: No discharge  Neck:      Comments: Phonation normal  Cardiovascular:      Rate and Rhythm: Normal rate  Pulses: Normal pulses  Pulmonary:      Effort: Pulmonary effort is normal  No respiratory distress  Breath sounds: No wheezing  Abdominal:      General: Bowel sounds are normal  There is no distension  Palpations: Abdomen is soft  Musculoskeletal:      Cervical back: Neck supple  Right lower leg: Edema present  Left lower leg: Edema present  Comments: Mildly reduced overall muscle mass, abductor pillow in place   Skin:     General: Skin is warm and dry  Neurological:      Mental Status: He is alert  Comments: Awake alert oriented, answers questions appropriately, speech clear fluent, moving all 4 extremities spontaneously, no focal deficits appreciated   Psychiatric:         Mood and Affect: Mood normal          Behavior: Behavior normal       Comments: Polite pleasant and cooperative        Invasive Devices  Report    Peripheral Intravenous Line            Peripheral IV 04/03/22 Right;Upper;Ventral (anterior) Arm 3 days    Peripheral IV 04/04/22 Dorsal (posterior); Right Hand 3 days          Line            Hemodialysis AV Fistula  Left Forearm -- days              Lab, Imaging and other studies: I have personally reviewed pertinent reports

## 2022-04-07 NOTE — ASSESSMENT & PLAN NOTE
Pt presented with acute nondisplaced intertrochanteric fracture of the right femur following mechanical fall  Pt with history of prior intertrochanteric femur fracture treated with cephalomedullary nail 12/25/2021   Transferred to [facility] from [facility] for ortho intervention  · Pt underwent hardware removal and conversion to a bipolar hemiarthroplasty on 4/4  · DVT prophylaxis x28 days recommended   · Pain control with tylenol 975 mg q8h, PO oxycodone PRN, IV dilaudid for breakthrough  · Incision saturated with increased pain on 4/6, ortho evaluated, s/p xray, results pending   · PT/OT recommending rehab, case management input appreciated

## 2022-04-07 NOTE — PROGRESS NOTES
Orthopedics   Kaleb Bishop 78 y o  male MRN: [id number]  Unit/Bed#: [facility] 607-01      Subjective:  78 y o male post operative day 3 right conversion to hip hemiarthroplasty  Pt doing well   Pain controlled this AM  He is resting comfortably in bed    Labs:  0   Lab Value Date/Time    HCT 24 0 (L) 04/07/2022 0150    HCT 26 0 (L) 04/06/2022 1531    HCT 20 7 (L) 04/06/2022 0458    HCT 33 2 (L) 04/11/2017 0712    HGB 8 0 (L) 04/07/2022 0150    HGB 8 9 (L) 04/06/2022 1531    HGB 6 7 (LL) 04/06/2022 0458    HGB 11 1 (L) 04/11/2017 0712    INR 1 07 04/03/2022 0939    WBC 3 60 (L) 04/07/2022 0150    WBC 5 83 04/06/2022 0458    WBC 5 66 04/05/2022 0439    WBC 6 2 04/11/2017 0712       Meds:    Current Facility-Administered Medications:     acetaminophen (TYLENOL) tablet 975 mg, 975 mg, Oral, Q8H [address], Jose Malagon PA-C, 975 mg at 04/07/22 0500    aspirin (ECOTRIN LOW STRENGTH) EC tablet 81 mg, 81 mg, Oral, Daily, Jose Malagon PA-C, 81 mg at 04/06/22 1204    atorvastatin (LIPITOR) tablet 40 mg, 40 mg, Oral, Daily With Dinner, Kolby Shama PA-C, 40 mg at 04/06/22 1754    b complex-vitamin C-folic acid (NEPHROCAPS) capsule 1 capsule, 1 capsule, Oral, Daily With Dinner, Kolby Starch PA-C, 1 capsule at 04/06/22 1753    calcium carbonate (TUMS) chewable tablet 1,000 mg, 1,000 mg, Oral, BID PRN, OCTAVIO Lancaster-ANITHA, 1,000 mg at 04/06/22 1754    carvedilol (COREG) tablet 3 125 mg, 3 125 mg, Oral, BID With Meals, OCTAVIO Rose-C    cholecalciferol (VITAMIN D3) tablet 2,000 Units, 2,000 Units, Oral, Daily, Kolby Sesay PA-C, 2,000 Units at 04/06/22 1203    clopidogrel (PLAVIX) tablet 75 mg, 75 mg, Oral, Daily, Aaron Guzman PA-C, 75 mg at 04/06/22 1204    [START ON 4/8/2022] epoetin jah (EPOGEN,PROCRIT) injection 4,000 Units, 4,000 Units, Intravenous, After Dialysis, Iona Quiroz MD    heparin (porcine) subcutaneous injection 5,000 Units, 5,000 Units, Subcutaneous, Q8H [address], Abena Richardson MD, 5,000 Units at 04/07/22 0500    HYDROmorphone (DILAUDID) injection 0 5 mg, 0 5 mg, Intravenous, Q3H PRN, Tigre Valdes PA-C, 0 5 mg at 04/06/22 1046    insulin lispro (HumaLOG) 100 units/mL subcutaneous injection 1-5 Units, 1-5 Units, Subcutaneous, TID AC, 1 Units at 04/05/22 1741 **AND** Fingerstick Glucose (POCT), , , TID AC, Aaron Guzman PA-C    insulin lispro (HumaLOG) 100 units/mL subcutaneous injection 1-5 Units, 1-5 Units, Subcutaneous, HS, Aaron Guzman PA-C    nicotine (NICODERM CQ) 14 mg/24hr TD 24 hr patch 1 patch, 1 patch, Transdermal, Daily, Tigre Valdes PA-C    nystatin (MYCOSTATIN) powder, , Topical, BID, Tigre Valdes PA-C, Given at 04/06/22 1756    ondansetron (ZOFRAN) injection 4 mg, 4 mg, Intravenous, Q6H PRN, Abena Richardson MD    oxyCODONE (ROXICODONE) IR tablet 2 5 mg, 2 5 mg, Oral, Q6H PRN, Tigre Valdes PA-C    oxyCODONE (ROXICODONE) IR tablet 5 mg, 5 mg, Oral, Q6H PRN, Tigre Valdes PA-C, 5 mg at 04/07/22 0504    rifaximin (XIFAXAN) tablet 550 mg, 550 mg, Oral, Q12H [address], Jose Malagon PA-C, 550 mg at 04/06/22 2116    Blood Culture:   Lab Results   Component Value Date    BLOODCX No Growth After 5 Days  10/19/2021    BLOODCX No Growth After 5 Days  10/19/2021       Wound Culture:   Lab Results   Component Value Date    WOUNDCULT 1+ Growth of Citrobacter freundii (A) 10/08/2021    WOUNDCULT 1+ Growth of Enterobacter cloacae complex (A) 10/08/2021       Ins and Outs:  I/O last 24 hours:   In: 500 [I V :500]  Out: 2002 [Other:2002]          Physical Exam:  Vitals:    04/07/22 0342   BP:    Pulse:    Resp:    Temp:    SpO2: 95%     right lower extremity:  · Dressings C/D/I with no active drainage  · Thigh swollen but soft and compressible  · Able to DF ankle and fire EHL in setting of previous stroke  · Sensation grossly intact in setting of previous stroke  · 2+ dorsalis pedis     Assessment: 66 y o male post operative day 3 right conversion to hip hemiarthroplasty   Doing well    Plan:  · Weight Bearing as tolerated  · Up and out of bed  · Posterior total hip precautions  · Abduction pillow while in bed  · DVT prophylaxis-per primary team  · Analgesics  · PT/OT  · Patient noted to have acute blood loss anemia due to a drop in Hbg of > 2 0g from preop levels, will monitor vital signs and resuscitate with IV fluids as needed    Monik Frazier MD

## 2022-04-07 NOTE — PROGRESS NOTES
[facility]  Progress Note - Ramiro Khanna 1955, 78 y o  male MRN: [id number]  [facility]counter: [id number]  Primary Care Provider: TESSY Ramirez   Date and time admitted to hospital: 4/3/2022  7:19 PM    * Right intertrochanteric femur fracture  Assessment & Plan  Pt presented with acute nondisplaced intertrochanteric fracture of the right femur following mechanical fall  Pt with history of prior intertrochanteric femur fracture treated with cephalomedullary nail 12/25/2021   Transferred to [facility] from [facility] for ortho intervention  · Pt underwent hardware removal and conversion to a bipolar hemiarthroplasty on 4/4  · DVT prophylaxis x28 days recommended   · Pain control with tylenol 975 mg q8h, PO oxycodone PRN, IV dilaudid for breakthrough  · Incision saturated with increased pain on 4/6, ortho evaluated, s/p xray, results pending   · PT/OT recommending rehab, case management input appreciated    End stage renal disease [facility])  Assessment & Plan  Lab Results   Component Value Date    EGFR 15 04/07/2022    EGFR 8 04/06/2022    EGFR 10 04/05/2022    CREATININE 3 87 (H) 04/07/2022    CREATININE 6 14 (H) 04/06/2022    CREATININE 5 05 (H) 04/05/2022     History of ESRD on hemodialysis on MWF schedule  · Hemodialysis per nephrology    Acute on chronic anemia  Assessment & Plan  Lab Results   Component Value Date    HGB 8 0 (L) 04/07/2022    HGB 8 9 (L) 04/06/2022    HGB 6 7 (LL) 04/06/2022     Chronic anemia with macrocytosis  · Iron 52%, TIBC 145, Iron 75, FA 3 7, B12 419  · Acute post-op blood loss anemia due to a drop in Hbg of > 2 0g from preop levels  · Hemoglobin 6 7 on AM of 4/6, s/p 1 unit PRBCs with hemodialysis  · Hgb improved to 8 9 post transfusion, now 8 0 most recently   · Nephro also starting Epogen with HD  · Monitor CBC    Thrombocytopenia ([facility] [address] )  Assessment & Plan  · Relatively stable, monitor CBC    Type 2 diabetes mellitus ([facility] [address] )  Assessment & Plan  Lab Results   Component Value Date    HGBA1C 4 4 03/29/2022     Recent Labs     04/06/22  0623 04/06/22  1215 04/06/22  1626 04/06/22 2052   POCGLU 119 125 93 115   Blood Sugar Average: Last 72 hrs:  (P) 832 [id number]   · Only diet controlled at home  · On sliding scale insulin/Accu-Cheks  · Monitor p r n  Nicotine dependence  Assessment & Plan  · Tobacco cessation discussed and encouraged    Mixed hyperlipidemia  Assessment & Plan  · Continue statin    Hypertension  Assessment & Plan  Patient hypotensive 4/5->4/6--suspect related to postop blood loss anemia  · Status post albumin and midodrine x1  · Cozaar on hold, continue Coreg 3 125 mg b i d   · S/p 1 unit PRBC on 4/6      CAD (coronary artery disease)  Assessment & Plan  Had triple vessel disease by cath in April 2008, s/p PTCA with bare metal stent to circumflex and PDA and POBA of diagnonal  s/p MI in 2009, 2010 and acute NSTEMI on 02/02/2011 s/p PCI/KARLOS of distal RCA(WS) at [address]  · Last stress test 6/2021, cards notes reviewed   · Continue aspirin, beta-blocker, plavix statin  · Recommend outpatient f/u with primary cardiologist Dr Jada Tamez    Nausea, vomiting and diarrhea-resolved as of 4/7/2022  Assessment & Plan  Likely 2/2 viral gastroenteritis  · Spouse reports ongoing symptoms, on and off since couple of weeks  · Supportive care        VTE Pharmacologic Prophylaxis: VTE Score: 10 Moderate Risk (Score 3-4) - Pharmacological DVT Prophylaxis Ordered: heparin  Patient Centered Rounds: I performed bedside rounds with nursing staff today  Discussions with Specialists or Other Care Team Provider:  [address] [place] [address] input  Discussed with case management    Education and Discussions with Family / Patient: Patient declined call to   Time Spent for Care: 30 minutes  More than 50% of total time spent on counseling and coordination of care as described above      Current Length of Stay: 4 day(s)  Current Patient Status: Inpatient   Certification Statement: The patient will continue to require additional inpatient hospital stay due to Monitoring of hemoglobin, monitoring of dressing, rehab planning  Discharge Plan: Anticipate discharge tomorrow to rehab facility  Code Status: Level 1 - Full Code    Subjective:   Patient reports feeling okay today  Still has a lot of pain in right thigh, overall improved though from yesterday  Had a little bit of nausea while working with therapy no further diarrhea or vomiting however  He is unsure why he is on Xifaxan  Objective:     Vitals:   Temp (24hrs), Av 6 °F (36 4 °C), Min:96 7 °F (35 9 °C), Max:98 7 °F (37 1 °C)    Temp:  [96 7 °F (35 9 °C)-98 7 °F (37 1 °C)] 97 8 °F (36 6 °C)  HR:  [58-94] 87  Resp:  [16-19] 16  BP: ()/() 116/58  SpO2:  [95 %-100 %] 100 %  There is no height or weight on file to calculate BMI  Input and Output Summary (last 24 hours): Intake/Output Summary (Last 24 hours) at 2022 0917  Last data filed at 2022 1100  Gross per 24 hour   Intake 300 ml   Output 2002 ml   Net -1702 ml       Physical Exam:   Physical Exam  Vitals and nursing note reviewed  Constitutional:       General: He is not in acute distress  Cardiovascular:      Rate and Rhythm: Normal rate and regular rhythm  Pulmonary:      Effort: No respiratory distress  Abdominal:      General: There is no distension  Tenderness: There is no abdominal tenderness  Musculoskeletal:      Comments: Left upper extremity AV fistula noted, right hip dressing saturated with dark blood at the proximal aspect, size swollen however still soft  Painful to palpation   Neurological:      Mental Status: He is oriented to person, place, and time     Psychiatric:         Mood and Affect: Mood normal           Additional Data:     Labs:  Results from last 7 days   Lab Units 22  0150 22  0439 22  0432 22  0939 22  0939   WBC Thousand/uL 3 60*   < > 3 41*   < > 5 55   HEMOGLOBIN g/dL 8 0*   < > 9 9*   < > 11 8*   HEMATOCRIT % 24 0*   < > 31 1*   < > 37 1   PLATELETS Thousands/uL 89*   < >  --   --  100*   BANDS PCT %  --   --  1  --   --    NEUTROS PCT %  --   --   --   --  78*   LYMPHS PCT %  --   --   --   --  12*   LYMPHO PCT %  --   --  8*  --   --    MONOS PCT %  --   --   --   --  5   MONO PCT %  --   --  0*  --   --    EOS PCT %  --   --  3  --  3    < > = values in this interval not displayed  Results from last 7 days   Lab Units 04/07/22  0150 04/03/22 2019 04/03/22  0939   SODIUM mmol/L 136   < > 134*   POTASSIUM mmol/L 3 4*   < > 2 6*   CHLORIDE mmol/L 99*   < > 98*   CO2 mmol/L 32   < > 26   BUN mg/dL 12   < > 15   CREATININE mg/dL 3 87*   < > 8 24*   ANION GAP mmol/L 5   < > 10   CALCIUM mg/dL 8 2*   < > 7 7*   ALBUMIN g/dL  --   --  2 4*   TOTAL BILIRUBIN mg/dL  --   --  0 44   ALK PHOS U/L  --   --  217*   ALT U/L  --   --  18   AST U/L  --   --  22   GLUCOSE RANDOM mg/dL 88   < > 104    < > = values in this interval not displayed  Results from last 7 days   Lab Units 04/03/22  0939   INR  1 07     Results from last 7 days   Lab Units 04/06/22 2052 04/06/22  1626 04/06/22  1215 04/06/22  0623 04/05/22  2106 04/05/22  1616 04/05/22  1129 04/05/22  9924 04/04/22  2053 04/04/22  1724 04/04/22  1608 04/04/22  0659   POC GLUCOSE mg/dl 115 93 125 119 134 191* 218* 227* 197* 152* 219* 94               Lines/Drains:  Invasive Devices  Report    Peripheral Intravenous Line            Peripheral IV 04/03/22 Right;Upper;Ventral (anterior) Arm 3 days    Peripheral IV 04/04/22 Dorsal (posterior); Right Hand 3 days          Line            Hemodialysis AV Fistula  Left Forearm -- days                      Imaging: No pertinent imaging reviewed      Recent Cultures (last 7 days):         Last 24 Hours Medication List:   Current Facility-Administered Medications   Medication Dose Route Frequency Provider Last Rate    acetaminophen  975 mg Oral [facility] Miryam Walker PA-C      aspirin  81 mg Oral Daily Miryam Walker PA-C      atorvastatin  40 mg Oral Daily With [employer]      b complex-vitamin C-folic acid  1 capsule Oral Daily With Dinner Jose Juanito layne PA-C      calcium carbonate  1,000 mg Oral BID PRN Rach Urban PA-C      carvedilol  3 125 mg Oral BID With Meals Es Elisabeth Guzman PA-C      cholecalciferol  2,000 Units Oral Daily Miryam Walker PA-C      clopidogrel  75 mg Oral Daily Aaron Guzman PA-C      [START ON 4/8/2022] epoetin jah  4,000 Units Intravenous After Dialysis Jessica Aleman MD      heparin (porcine)  5,000 Units Subcutaneous Q8H [address], MD      HYDROmorphone  0 5 mg Intravenous Q3H PRN Miryam Walker PA-C      insulin lispro  1-5 Units Subcutaneous TID AC Aaron Guzman PA-C      insulin lispro  1-5 Units Subcutaneous HS Aaron Guzman PA-C      nicotine  1 patch Transdermal Daily Miryam Walker PA-C      nystatin   Topical BID Miryam Walker PA-C      ondansetron  4 mg Intravenous Q6H PRN Emilia Cervantes MD      oxyCODONE  2 5 mg Oral Q6H PRN Miryam Walker PA-C      oxyCODONE  5 mg Oral Q6H PRN Miryam Walker PA-C      potassium chloride  40 mEq Oral Once Trino Monroy PA-C      rifaximin  550 mg Oral Q12H [address] Miryam Walker PA-C          Today, Patient Was Seen By: Trino Monroy PA-C    **Please Note: This note may have been constructed using a voice recognition system  **

## 2022-04-07 NOTE — CASE MANAGEMENT
Case Management Discharge Planning Note    Patient name Daphnie Raines  [facility] MRN [id number]  : 1955 Date 2022       Current Admission Date: 4/3/2022  Current Admission Diagnosis:Right intertrochanteric femur fracture   Patient Active Problem List    Diagnosis Date Noted    Right intertrochanteric femur fracture 2022    Thrombocytopenia ([facility] [address] ) 2022    Dialysis AV fistula malfunction ([facility] ) 2022    Arteriovenous fistula ([facility] ) 2021    Dizziness 2021    AV fistula thrombosis, initial encounter ([place] [address] ) 2021    Encounter for extracorporeal dialysis ([place] [address] ) 2019    Acute upper respiratory infection 10/17/2018    Intestinal malabsorption 2017    Pernicious anemia 2017    Irritable bowel syndrome with diarrhea 2017    Elevated serum alkaline phosphatase level 2017    Acute on chronic anemia 2017    CAD (coronary artery disease) 10/20/2016    Hypertension 10/20/2016    Diabetes with neurologic complications ([place] [address] )     Mixed hyperlipidemia 10/04/2012    End stage renal disease ([place] [address] ) 2012    Nicotine dependence 2012    Organic impotence 2012    Type 2 diabetes mellitus ([place] [address] ) 2012      LOS (days): 4  Geometric Mean LOS (GMLOS) (days): 6 20  Days to GMLOS:2 4     OBJECTIVE:  Risk of Unplanned Readmission Score: 27         Current admission status: Inpatient   Preferred Pharmacy:   [facility] [address] [place] [address] [id number], [address] [facility],[address], [address] [place] [address] [address] [address]  Phone: [phone] Fax: Ema Arredondo [address] [facility], [address] [facility] [address]  [address] [place] [address],[address] [place] [address] [address] [address]  Phone: [phone] Fax: [phone]    Primary Care Provider: TESSY Bañuelos    Primary Insurance: MEDICARE  Secondary Insurance: AARP    DISCHARGE DETAILS:    Discharge planning discussed with[de-identified] Wife, Patria Beauchamp of Choice: Yes  Comments - Freedom of Choice: Discussed FOC  CM contacted family/caregiver?: Yes  Were Treatment Team discharge recommendations reviewed with patient/caregiver?: Yes  Did patient/caregiver verbalize understanding of patient care needs?: Yes  Were patient/caregiver advised of the risks associated with not following Treatment Team discharge recommendations?: Yes    Contacts  Patient Contacts: Ivory Boland  Relationship to Patient[de-identified] Family  Contact Method: Phone  Phone Number: [phone]  Reason/Outcome: Continuity of Care,Emergency Contact,Discharge Planning       Other Referral/Resources/Interventions Provided:  Interventions: Short Term Rehab  Referral Comments: Lita Cota at [facility] able to accept patient tomorrow 4/8  wife in agreement with DCP          IMM Given (Date):: 04/07/22  IMM Given to[de-identified] Family     IMM reviewed with patient's caregiver, patient's caregiver agrees with discharge determination        Accepting Facility Name, [address] : Adarsh Marley at [facility]  Receiving Facility/Agency Phone Number: [phone] ext [phone]  Facility/Agency Fax Number: [phone]

## 2022-04-08 ENCOUNTER — APPOINTMENT (INPATIENT)
Dept: DIALYSIS | Facility: HOSPITAL | Age: 67
DRG: 521 | End: 2022-04-08
Payer: MEDICARE

## 2022-04-08 PROBLEM — R19.7 NAUSEA VOMITING AND DIARRHEA: Status: ACTIVE | Noted: 2022-04-08

## 2022-04-08 PROBLEM — R11.2 NAUSEA VOMITING AND DIARRHEA: Status: ACTIVE | Noted: 2022-04-08

## 2022-04-08 LAB
ANION GAP SERPL CALCULATED.3IONS-SCNC: 9 MMOL/L (ref 4–13)
BASOPHILS # BLD AUTO: 0.03 THOUSANDS/ΜL (ref 0–0.1)
BASOPHILS NFR BLD AUTO: 0 % (ref 0–1)
BUN SERPL-MCNC: 23 MG/DL (ref 5–25)
CALCIUM SERPL-MCNC: 8.4 MG/DL (ref 8.3–10.1)
CHLORIDE SERPL-SCNC: 99 MMOL/L (ref 100–108)
CO2 SERPL-SCNC: 28 MMOL/L (ref 21–32)
CREAT SERPL-MCNC: 5.97 MG/DL (ref 0.6–1.3)
EOSINOPHIL # BLD AUTO: 0.01 THOUSAND/ΜL (ref 0–0.61)
EOSINOPHIL NFR BLD AUTO: 0 % (ref 0–6)
ERYTHROCYTE [DISTWIDTH] IN BLOOD BY AUTOMATED COUNT: 17.6 % (ref 11.6–15.1)
GFR SERPL CREATININE-BSD FRML MDRD: 8 ML/MIN/1.73SQ M
GLUCOSE SERPL-MCNC: 137 MG/DL (ref 65–140)
GLUCOSE SERPL-MCNC: 141 MG/DL (ref 65–140)
GLUCOSE SERPL-MCNC: 142 MG/DL (ref 65–140)
GLUCOSE SERPL-MCNC: 168 MG/DL (ref 65–140)
GLUCOSE SERPL-MCNC: 178 MG/DL (ref 65–140)
HCT VFR BLD AUTO: 29.3 % (ref 36.5–49.3)
HGB BLD-MCNC: 9.7 G/DL (ref 12–17)
IMM GRANULOCYTES # BLD AUTO: 0.11 THOUSAND/UL (ref 0–0.2)
IMM GRANULOCYTES NFR BLD AUTO: 1 % (ref 0–2)
LYMPHOCYTES # BLD AUTO: 0.39 THOUSANDS/ΜL (ref 0.6–4.47)
LYMPHOCYTES NFR BLD AUTO: 5 % (ref 14–44)
MCH RBC QN AUTO: 35.1 PG (ref 26.8–34.3)
MCHC RBC AUTO-ENTMCNC: 33.1 G/DL (ref 31.4–37.4)
MCV RBC AUTO: 106 FL (ref 82–98)
MONOCYTES # BLD AUTO: 0.53 THOUSAND/ΜL (ref 0.17–1.22)
MONOCYTES NFR BLD AUTO: 7 % (ref 4–12)
NEUTROPHILS # BLD AUTO: 6.86 THOUSANDS/ΜL (ref 1.85–7.62)
NEUTS SEG NFR BLD AUTO: 87 % (ref 43–75)
NRBC BLD AUTO-RTO: 0 /100 WBCS
PLATELET # BLD AUTO: 115 THOUSANDS/UL (ref 149–390)
PMV BLD AUTO: 10.4 FL (ref 8.9–12.7)
POTASSIUM SERPL-SCNC: 4.4 MMOL/L (ref 3.5–5.3)
RBC # BLD AUTO: 2.76 MILLION/UL (ref 3.88–5.62)
SODIUM SERPL-SCNC: 136 MMOL/L (ref 136–145)
WBC # BLD AUTO: 7.93 THOUSAND/UL (ref 4.31–10.16)

## 2022-04-08 PROCEDURE — 99232 SBSQ HOSP IP/OBS MODERATE 35: CPT | Performed by: NURSE PRACTITIONER

## 2022-04-08 PROCEDURE — 5A1D70Z PERFORMANCE OF URINARY FILTRATION, INTERMITTENT, LESS THAN 6 HOURS PER DAY: ICD-10-PCS | Performed by: INTERNAL MEDICINE

## 2022-04-08 PROCEDURE — 80048 BASIC METABOLIC PNL TOTAL CA: CPT | Performed by: NURSE PRACTITIONER

## 2022-04-08 PROCEDURE — NC001 PR NO CHARGE: Performed by: ORTHOPAEDIC SURGERY

## 2022-04-08 PROCEDURE — 90935 HEMODIALYSIS ONE EVALUATION: CPT | Performed by: INTERNAL MEDICINE

## 2022-04-08 PROCEDURE — 82948 REAGENT STRIP/BLOOD GLUCOSE: CPT

## 2022-04-08 PROCEDURE — 85025 COMPLETE CBC W/AUTO DIFF WBC: CPT | Performed by: INTERNAL MEDICINE

## 2022-04-08 RX ORDER — PANTOPRAZOLE SODIUM 40 MG/1
40 TABLET, DELAYED RELEASE ORAL
Status: DISCONTINUED | OUTPATIENT
Start: 2022-04-08 | End: 2022-04-09 | Stop reason: HOSPADM

## 2022-04-08 RX ORDER — PROMETHAZINE HYDROCHLORIDE 25 MG/ML
25 INJECTION, SOLUTION INTRAMUSCULAR; INTRAVENOUS ONCE
Status: COMPLETED | OUTPATIENT
Start: 2022-04-08 | End: 2022-04-08

## 2022-04-08 RX ADMIN — ATORVASTATIN CALCIUM 40 MG: 40 TABLET, FILM COATED ORAL at 17:33

## 2022-04-08 RX ADMIN — OXYCODONE HYDROCHLORIDE 5 MG: 5 TABLET ORAL at 23:57

## 2022-04-08 RX ADMIN — GUAIFENESIN 600 MG: 600 TABLET, EXTENDED RELEASE ORAL at 21:20

## 2022-04-08 RX ADMIN — HEPARIN SODIUM 5000 UNITS: 5000 INJECTION INTRAVENOUS; SUBCUTANEOUS at 21:20

## 2022-04-08 RX ADMIN — PROMETHAZINE HYDROCHLORIDE 25 MG: 25 INJECTION INTRAMUSCULAR; INTRAVENOUS at 12:43

## 2022-04-08 RX ADMIN — CLOPIDOGREL BISULFATE 75 MG: 75 TABLET ORAL at 09:43

## 2022-04-08 RX ADMIN — ONDANSETRON 4 MG: 2 INJECTION INTRAMUSCULAR; INTRAVENOUS at 10:21

## 2022-04-08 RX ADMIN — HEPARIN SODIUM 5000 UNITS: 5000 INJECTION INTRAVENOUS; SUBCUTANEOUS at 05:11

## 2022-04-08 RX ADMIN — RIFAXIMIN 550 MG: 550 TABLET ORAL at 09:43

## 2022-04-08 RX ADMIN — NEPHROCAP 1 CAPSULE: 1 CAP ORAL at 17:33

## 2022-04-08 RX ADMIN — NYSTATIN: 100000 POWDER TOPICAL at 17:34

## 2022-04-08 RX ADMIN — ACETAMINOPHEN 975 MG: 325 TABLET ORAL at 12:43

## 2022-04-08 RX ADMIN — PANTOPRAZOLE SODIUM 40 MG: 40 TABLET, DELAYED RELEASE ORAL at 12:43

## 2022-04-08 RX ADMIN — GUAIFENESIN 600 MG: 600 TABLET, EXTENDED RELEASE ORAL at 09:43

## 2022-04-08 RX ADMIN — NICOTINE 1 PATCH: 14 PATCH, EXTENDED RELEASE TRANSDERMAL at 09:48

## 2022-04-08 RX ADMIN — RIFAXIMIN 550 MG: 550 TABLET ORAL at 21:20

## 2022-04-08 RX ADMIN — ASPIRIN 81 MG: 81 TABLET, COATED ORAL at 09:43

## 2022-04-08 RX ADMIN — EPOETIN ALFA 4000 UNITS: 4000 SOLUTION INTRAVENOUS; SUBCUTANEOUS at 15:26

## 2022-04-08 RX ADMIN — NYSTATIN: 100000 POWDER TOPICAL at 09:48

## 2022-04-08 RX ADMIN — OXYCODONE HYDROCHLORIDE 5 MG: 5 TABLET ORAL at 18:49

## 2022-04-08 RX ADMIN — Medication 2000 UNITS: at 09:43

## 2022-04-08 RX ADMIN — HEPARIN SODIUM 5000 UNITS: 5000 INJECTION INTRAVENOUS; SUBCUTANEOUS at 12:43

## 2022-04-08 NOTE — ASSESSMENT & PLAN NOTE
Lab Results   Component Value Date    HGB 9 7 (L) 04/08/2022    HGB 8 0 (L) 04/07/2022    HGB 8 9 (L) 04/06/2022     Chronic anemia with macrocytosis  · Iron 52%, TIBC 145, Iron 75, FA 3 7, B12 419  · Acute post-op blood loss anemia due to a drop in Hbg of > 2 0g from preop levels  · Hemoglobin 6 7 on AM of 4/6, s/p 1 unit PRBCs with hemodialysis  · Hgb improved to 8 9 post transfusion, now 8 0 most recently   · Nephro started Epogen with HD  · Monitor CBC as needed

## 2022-04-08 NOTE — TRANSPORTATION MEDICAL NECESSITY
Section I - General Information    Name of Patient: Nicolás Solano                 : 1955    Medicare #: [id number]  Transport Date: 22 (PCS is valid for round trips on this date and for all repetitive trips in the 60-day range as noted below )  Origin: [facility]                                                         Destination: Hernando Gleason at [facility]   Is the pt's stay covered under Medicare Part A (PPS/DRG)   []     Closest appropriate facility? If no, why is transport to more distant facility required? Yes  If hospice pt, is this transport related to pt's terminal illness? NA       Section II - Medical Necessity Questionnaire  Ambulance transportation is medically necessary only if other means of transport are contraindicated or would be potentially harmful to the patient  To meet this requirement, the patient must either be "bed confined" or suffer from a condition such that transport by means other than ambulance is contraindicated by the patient's condition  The following questions must be answered by the medical professional signing below for this form to be valid:    1)  Describe the MEDICAL CONDITION (physical and/or mental) of this patient AT [address] [place] [address] that requires the patient to be transported in an ambulance and why transport by other means is contraindicated by the patient's condition: Right intertrochanteric femur fracture    2) Is the patient "bed confined" as defined below? No  To be "be confined" the patient must satisfy all three of the following conditions: (1) unable to get up from bed without Assistance; AND (2) unable to ambulate; AND (3) unable to sit in a chair or wheelchair  3) Can this patient safely be transported by car or wheelchair van (i e , seated during transport without a medical attendant or monitoring)?    No    4) In addition to completing questions 1-3 above, please check any of the following conditions that apply*:   *Note: supporting documentation for any boxes checked must be maintained in the patient's medical records  If hosp-hosp transfer, describe services needed at 2nd facility not available at 1st facility? Moderate/severe pain on movement   Other(specify) Fall risk      Section III - Signature of Physician or Healthcare Professional  I certify that the above information is true and correct based on my evaluation of this patient, and represent that the patient requires transport by ambulance and that other forms of transport are contraindicated  I understand that this information will be used by the Centers for Medicare and Medicaid Services (CMS) to support the determination of medical necessity for ambulance services, and I represent that I have personal knowledge of the patient's condition at time of transport  []  If this box is checked, I also certify that the patient is physically or mentally incapable of signing the ambulance service's claim and that the institution with which I am affiliated has furnished care, services, or assistance to the patient  My signature below is made on behalf of the patient pursuant to 42 CFR §424 36(b)(4)  In accordance with 42 CFR §424 37, the specific reason(s) that the patient is physically or mentally incapable of signing the claim form is as follows:     Signature of Physician* or Healthcare Professional______________________________________________________________  Signature Date 04/08/22 (For scheduled repetitive transports, this form is not valid for transports performed more than 60 days after this date)    Printed Name & Credentials of Physician or Healthcare Professional (MD, DO, RN, etc )____[facility] ____________________________  *Form must be signed by patient's attending physician for scheduled, repetitive transports   For non-repetitive, unscheduled ambulance transports, if unable to obtain the signature of the attending physician, any of the following may sign (choose appropriate option below)  [] Physician Assistant []  Clinical Nurse Specialist []  Registered Nurse  []  Nurse Practitioner  [x] Discharge Planner

## 2022-04-08 NOTE — ASSESSMENT & PLAN NOTE
Pt presented with acute nondisplaced intertrochanteric fracture of the right femur following mechanical fall  Pt with history of prior intertrochanteric femur fracture treated with cephalomedullary nail 12/25/2021  [facility] to hospitals from [facility] for ortho intervention  · Pt underwent hardware removal and conversion to a bipolar hemiarthroplasty on 4/4  · DVT prophylaxis x28 days recommended   · Pain control with tylenol 975 mg q8h, PO oxycodone PRN, discontinue intravenous Dilaudid  · Incision saturated with increased pain on 4/6, ortho evaluated, s/p xray which is stable  · PT/OT recommending rehab, case management following  Discharge later today versus tomorrow

## 2022-04-08 NOTE — PROGRESS NOTES
Orthopedics   Harinder Bishop 78 y o  male MRN: [id number]  Unit/Bed#: [facility] 607-01      Subjective:  66 y o male post operative day 4 right conversion to hip hemiarthroplasty  Pt was nauseous last night with vomiting and diarrhea  Feels weak this morning      Labs:  0   Lab Value Date/Time    HCT 29 3 (L) 04/08/2022 0450    HCT 24 0 (L) 04/07/2022 0150    HCT 26 0 (L) 04/06/2022 1531    HCT 33 2 (L) 04/11/2017 0712    HGB 9 7 (L) 04/08/2022 0450    HGB 8 0 (L) 04/07/2022 0150    HGB 8 9 (L) 04/06/2022 1531    HGB 11 1 (L) 04/11/2017 0712    INR 1 07 04/03/2022 0939    WBC 7 93 04/08/2022 0450    WBC 3 60 (L) 04/07/2022 0150    WBC 5 83 04/06/2022 0458    WBC 6 2 04/11/2017 0712       Meds:    Current Facility-Administered Medications:     acetaminophen (TYLENOL) tablet 975 mg, 975 mg, Oral, Q8H [address], Jose Malagon PA-C, 975 mg at 04/07/22 0500    aspirin (ECOTRIN LOW STRENGTH) EC tablet 81 mg, 81 mg, Oral, Daily, Jose Malagon PA-C, 81 mg at 04/07/22 9425    atorvastatin (LIPITOR) tablet 40 mg, 40 mg, Oral, Daily With Juju Pang PA-C, 40 mg at 04/06/22 1754    b complex-vitamin C-folic acid (NEPHROCAPS) capsule 1 capsule, 1 capsule, Oral, Daily With Dinner, Ester Oquendo PA-C, 1 capsule at 04/06/22 1753    calcium carbonate (TUMS) chewable tablet 1,000 mg, 1,000 mg, Oral, BID PRN, Tre Watson PA-C, 1,000 mg at 04/07/22 1216    carvedilol (COREG) tablet 3 125 mg, 3 125 mg, Oral, BID With Meals, Aaron Guzman PA-C, 3 125 mg at 04/07/22 0824    cholecalciferol (VITAMIN D3) tablet 2,000 Units, 2,000 Units, Oral, Daily, Ester Oquendo PA-C, 2,000 Units at 04/07/22 0824    clopidogrel (PLAVIX) tablet 75 mg, 75 mg, Oral, Daily, Aaron Guzman PA-C, 75 mg at 04/07/22 0824    epoetin jah (EPOGEN,PROCRIT) injection 4,000 Units, 4,000 Units, Intravenous, After Dialysis, Benna Dakins, MD    guaiFENesin (MUCINEX) 12 hr tablet 600 mg, 600 mg, Oral, Q12H [address], Antonella Zhou Jacinto Nye MD    heparin (porcine) subcutaneous injection 5,000 Units, 5,000 Units, Subcutaneous, Q8H [address], Eddi Harrison MD, 5,000 Units at 04/08/22 0511    HYDROmorphone (DILAUDID) injection 0 5 mg, 0 5 mg, Intravenous, Q3H PRN, OCTAVIO Godoy-ANITHA, 0 5 mg at 04/07/22 1504    insulin lispro (HumaLOG) 100 units/mL subcutaneous injection 1-5 Units, 1-5 Units, Subcutaneous, TID AC, 1 Units at 04/05/22 1741 **AND** Fingerstick Glucose (POCT), , , TID AC, Aaron Guzman PA-C    insulin lispro (HumaLOG) 100 units/mL subcutaneous injection 1-5 Units, 1-5 Units, Subcutaneous, HS, Aaron Guzman PA-C    nicotine (NICODERM CQ) 14 mg/24hr TD 24 hr patch 1 patch, 1 patch, Transdermal, Daily, Vanda Bond PA-C    nystatin (MYCOSTATIN) powder, , Topical, BID, Vanda Bond PA-C, Given at 04/07/22 1744    ondansetron (ZOFRAN) injection 4 mg, 4 mg, Intravenous, Q6H PRN, Eddi Harrison MD, 4 mg at 04/07/22 1500    oxyCODONE (ROXICODONE) IR tablet 2 5 mg, 2 5 mg, Oral, Q6H PRN, Vanda Bond PA-C    oxyCODONE (ROXICODONE) IR tablet 5 mg, 5 mg, Oral, Q6H PRN, Vanda Bond PA-C, 5 mg at 04/07/22 1212    phenol (CHLORASEPTIC) 1 4 % mucosal liquid 1 spray, 1 spray, Mouth/Throat, Q2H PRN, Sonia Garner MD    rifaximin Arrshyam Jonesh) tablet 550 mg, 550 mg, Oral, Q12H [address], Jose Malagon PA-C, 550 mg at 04/07/22 2114    Blood Culture:   Lab Results   Component Value Date    BLOODCX No Growth After 5 Days  10/19/2021    BLOODCX No Growth After 5 Days  10/19/2021       Wound Culture:   Lab Results   Component Value Date    WOUNDCULT 1+ Growth of Citrobacter freundii (A) 10/08/2021    WOUNDCULT 1+ Growth of Enterobacter cloacae complex (A) 10/08/2021       Ins and Outs:  I/O last 24 hours:   In: 120 [P O :120]  Out: 0           Physical Exam:  Vitals:    04/07/22 2239   BP: 109/67   Pulse: 87   Resp: 18   Temp: 98 2 °F (36 8 °C)   SpO2: 97%     right lower extremity:  · Dressings C/D/I with no active drainage  · Thigh swollen but soft and compressible  · Abduction pillow in room  · Able to DF ankle and fire EHL in setting of previous stroke  · Sensation grossly intact in setting of previous stroke  · 2+ dorsalis pedis     Assessment: 66 y o male post operative day 4 right conversion to hip hemiarthroplasty   Doing well    Plan:  · Weight Bearing as tolerated  · Up and out of bed  · Posterior total hip precautions  · Abduction pillow while in bed  · DVT prophylaxis-per primary team  · Analgesics  · PT/OT  · Patient noted to have acute blood loss anemia due to a drop in Hbg of > 2 0g from preop levels, will monitor vital signs and resuscitate with IV fluids as needed    Mari Aponte MD

## 2022-04-08 NOTE — PROGRESS NOTES
Progress note- Nephrology   Palma Bishop 78 y o  male MRN: [id number]  Unit/Bed#: [facility] 607-01 Encounter: [id number]      ASSESSMENT/PLAN:  End-stage renal disease:   Assessment:  · On maintenance hemodialysis every MWF at [address] [facility]  · EDW:  77 kg  · Last dialysis treatment on 4/6/2022 for UF -1 5 liyers  · Requesting to be medicated prior to HD  · Electrolytes within normal limits potassium 4 4  Plan:  · Next dialysis treatment planned for today  · Avoid nephrotoxins, adjust meds to appropriate GFR  · Will continue to follow I/O, labs and volume status     Access:   Assessment and Plan:  · Left AV fistula-positive bruit and thrill     Hypertension:  Assessment and Plan:  · BP stable running 100-117 over 60s  · Avoid variations in blood pressure  · Losartan recently discontinued  · May need to consider adding midodrine with HD-will evaluate patient in a m    · Current medications include:  Carvedilol 3 125 mg 2 times daily  · Will UF as blood pressure allows     Anemia likely Chronic Kidney Disease:  Assessment and Plan:  · Status post transfusion 04/06/2022 for hemoglobin 6 7  · Current hemoglobin 9 7-at goal however now with nausea vomiting diarrhea could be component of  volume depletion-monitor for now  · Patient previously in on ALICE based on recent outpatient records for he has a remote history of CVA  · Per Cleotis Seat note from 4/6/2022 had discussion with patient  regarding risks benefits with Epogen  · Patient to stared on Epogen 4000 units with each HD  · Will hemoglobin 9-10   · Transfuse for hemoglobin less than 7 0     Bone mineral disease of Chronic Kidney Disease:  Assessment and Plan:  · Currently not on binders phosphorus is 3 9  · On vitamin-D to 2000 units daily  · Continue with renal diet  · Check PTH and phosphorus as outpatient     Electrolytes/Acid Base:  Assessment:  Mild hyponatremia:  Likely volume dependent in the setting of ER SD  · Improved with HD and current sodium 136-stable  Mild hypo kalemia:  Current potassium 4 4 status post or replacement 4/7/2022  Plan:  · Sodium, phosphorus, potassium, and acidosis to be corrected with dialysis  · Will continue to monitor      Right intratrochanteric femur fracture status post mechanical fall :  Assessment plan:  · Has history of prior intertrochanteric femur fracture treated with cephalomedullary nail on 12/25/2021  · Status post bipolar hemiarthroplasty on 04/04/2022  · Orthopedic following  · Pain control per primary team  · PT/OT  · Case management following Plan for short-term rehab    Nausea vomiting diarrhea:  · Per primary team  · Previously resolved however patient reporting this has returned overnight    Disposition:  Patient stable from a dialysis standpoint okay for discharge when medically surgically stable per primary team    SUBJECTIVE:  Patient seen and examined at bedside  Patient reporting having nausea vomiting and diarrhea overnight  Per RN this is a chronic issue  Patient appeared to have eaten his breakfast as well    Continues with leg pain    OBJECTIVE:  Current Weight:    Vitals:    04/08/22 0648 04/08/22 0843 04/08/22 0950 04/08/22 1003   BP: 115/68 100/61 100/61    BP Location:       Pulse: 83 83 85 82   Resp: 19   19   Temp: 97 7 °F (36 5 °C)   97 9 °F (36 6 °C)   TempSrc:       SpO2: 99%  94% 100%       Intake/Output Summary (Last 24 hours) at 4/8/2022 1053  Last data filed at 4/8/2022 0900  Gross per 24 hour   Intake 120 ml   Output 0 ml   Net 120 ml     General:  No acute distress, cooperative, out of bed, chronically ill-appearing  Skin:  Warm and dry without rash  ENMT:  Mucous membranes fairly moist, sclera anicteric  Respiratory:  Essentially clear on auscultation without crackles, rhonchi, wheezes  Cardiac:  Regular rate and rhythm without rub, or murmur, no lower extremity edema, no JVD  GI:  Soft, nontender, no distention, active bowel sounds  Muscle skeletal:  Left AV fistula positive bruit and thrill  Neuro:  Alert oriented and awake,   Psych:  Appropriate affect    Medications:    Current Facility-Administered Medications:     acetaminophen (TYLENOL) tablet 975 mg, 975 mg, Oral, Q8H [address], Jose Malagon PA-C, 975 mg at 04/07/22 0500    aspirin (ECOTRIN LOW STRENGTH) EC tablet 81 mg, 81 mg, Oral, Daily, Jose Malagon PA-C, 81 mg at 04/08/22 8204    atorvastatin (LIPITOR) tablet 40 mg, 40 mg, Oral, Daily With Dinner, Carla Puckett PA-C, 40 mg at 04/06/22 1754    b complex-vitamin C-folic acid (NEPHROCAPS) capsule 1 capsule, 1 capsule, Oral, Daily With Dinner, Carla Puckett PA-C, 1 capsule at 04/06/22 1753    calcium carbonate (TUMS) chewable tablet 1,000 mg, 1,000 mg, Oral, BID PRN, Mirian Ac PA-C, 1,000 mg at 04/07/22 1216    carvedilol (COREG) tablet 3 125 mg, 3 125 mg, Oral, BID With Meals, Maite Guzman PA-C, 3 125 mg at 04/07/22 0824    cholecalciferol (VITAMIN D3) tablet 2,000 Units, 2,000 Units, Oral, Daily, Carla Puckett PA-C, 2,000 Units at 04/08/22 0943    clopidogrel (PLAVIX) tablet 75 mg, 75 mg, Oral, Daily, Aaron Guzman PA-C, 75 mg at 04/08/22 0943    epoetin jah (EPOGEN,PROCRIT) injection 4,000 Units, 4,000 Units, Intravenous, After Dialysis, Pily Kurtz MD    guaiFENesin (MUCINEX) 12 hr tablet 600 mg, 600 mg, Oral, Q12H [address], Anibal Salcedo MD, 600 mg at 04/08/22 0943    heparin (porcine) subcutaneous injection 5,000 Units, 5,000 Units, Subcutaneous, Q8H [address], Colt Marshall MD, 5,000 Units at 04/08/22 0511    insulin lispro (HumaLOG) 100 units/mL subcutaneous injection 1-5 Units, 1-5 Units, Subcutaneous, TID AC, 1 Units at 04/05/22 1741 **AND** Fingerstick Glucose (POCT), , , TID AC, Aaron Guzman PA-C    insulin lispro (HumaLOG) 100 units/mL subcutaneous injection 1-5 Units, 1-5 Units, Subcutaneous, HS, Aaron Guzman PA-C    nicotine (NICODERM CQ) 14 mg/24hr TD 24 hr patch 1 patch, 1 patch, Transdermal, Daily, Bob Barrios PA-C, 1 patch at 04/08/22 4377    nystatin (MYCOSTATIN) powder, , Topical, BID, Bob Barrios PA-C, Given at 04/08/22 0948    ondansetron (ZOFRAN) injection 4 mg, 4 mg, Intravenous, Q6H PRN, Tony Ambrosio MD, 4 mg at 04/08/22 1021    oxyCODONE (ROXICODONE) IR tablet 2 5 mg, 2 5 mg, Oral, Q6H PRN, Bob Barrios PA-C    oxyCODONE (ROXICODONE) IR tablet 5 mg, 5 mg, Oral, Q6H PRN, Bob Barrios PA-C, 5 mg at 04/07/22 1212    pantoprazole (PROTONIX) EC tablet 40 mg, 40 mg, Oral, Early Morning, TESSY Cleveland    phenol (CHLORASEPTIC) 1 4 % mucosal liquid 1 spray, 1 spray, Mouth/Throat, Q2H PRN, Pascale Duncan MD    promethazine (PHENERGAN) injection 25 mg, 25 mg, Intravenous, Once, TESSY Cleveland    rifaximin Dolph Person) tablet 550 mg, 550 mg, Oral, Q12H [address], Bob Barrios PA-C, 550 mg at 04/08/22 1349    Laboratory Results:  Results from last 7 days   Lab Units 04/08/22  0450 04/07/22  0150 04/06/22  1531 04/06/22  0458 04/05/22  0439 04/04/22  0432 04/03/22  2019 04/03/22  0939   WBC Thousand/uL 7 93 3 60*  --  5 83 5 66 3 41*  --  5 55   HEMOGLOBIN g/dL 9 7* 8 0* 8 9* 6 7* 7 9* 9 9*  --  11 8*   HEMATOCRIT % 29 3* 24 0* 26 0* 20 7* 25 0* 31 1*  --  37 1   PLATELETS Thousands/uL 115* 89*  --  84* 75*  --   --  100*   SODIUM mmol/L 136 136  --  135* 135* 135*  --  134*   POTASSIUM mmol/L 4 4 3 4*  --  4 5 4 7 3 7 3 9 2 6*   CHLORIDE mmol/L 99* 99*  --  101 102 102  --  98*   CO2 mmol/L 28 32  --  27 26 28  --  26   BUN mg/dL 23 12  --  22 11 19  --  15   CREATININE mg/dL 5 97* 3 87*  --  6 14* 5 05* 9 12*  --  8 24*   CALCIUM mg/dL 8 4 8 2*  --  8 1* 8 1* 7 9*  --  7 7*   MAGNESIUM mg/dL  --   --   --   --   --   --   --  2 0

## 2022-04-08 NOTE — PLAN OF CARE
Uf 1 0kg  Problem: METABOLIC, FLUID AND ELECTROLYTES - ADULT  Goal: Electrolytes maintained within normal limits  Description: INTERVENTIONS:  - Monitor labs and assess patient for signs and symptoms of electrolyte imbalances  - Administer electrolyte replacement as ordered  - Monitor response to electrolyte replacements, including repeat lab results as appropriate  - Instruct patient on fluid and nutrition as appropriate  Outcome: Progressing  Goal: Fluid balance maintained  Description: INTERVENTIONS:  - Monitor labs   - Monitor I/O and WT  - Instruct patient on fluid and nutrition as appropriate  - Assess for signs & symptoms of volume excess or deficit  Outcome: Progressing

## 2022-04-08 NOTE — PROGRESS NOTES
[facility]  Progress Note - Earlyne Mo 1955, 78 y o  male MRN: [id number]  Unit/Bed#: [facility] 607-01 Encounter: [id number]  Primary Care Provider: TESSY Schuster   Date and time admitted to hospital: 4/3/2022  7:19 PM    * Right intertrochanteric femur fracture  Assessment & Plan  Pt presented with acute nondisplaced intertrochanteric fracture of the right femur following mechanical fall  Pt with history of prior intertrochanteric femur fracture treated with cephalomedullary nail 12/25/2021  Transferred to [facility] from [facility] for ortho intervention  · Pt underwent hardware removal and conversion to a bipolar hemiarthroplasty on 4/4  · DVT prophylaxis x28 days recommended   · Pain control with tylenol 975 mg q8h, PO oxycodone PRN, discontinue intravenous Dilaudid  · Incision saturated with increased pain on 4/6, ortho evaluated, s/p xray which is stable  · PT/OT recommending rehab, case management following  Discharge later today versus tomorrow  Nausea vomiting and diarrhea  Assessment & Plan  · Started overnight   P r n  Antiemetics  Diet as tolerated  Somewhat improved this morning    · If persists, consider CT abdomen pelvis  · Supportive care  · Monitor      End stage renal disease [facility])  Assessment & Plan  Lab Results   Component Value Date    EGFR 8 04/08/2022    EGFR 15 04/07/2022    EGFR 8 04/06/2022    CREATININE 5 97 (H) 04/08/2022    CREATININE 3 87 (H) 04/07/2022    CREATININE 6 14 (H) 04/06/2022     History of ESRD on hemodialysis on MWF schedule  · Hemodialysis per nephrology    Acute on chronic anemia  Assessment & Plan  Lab Results   Component Value Date    HGB 9 7 (L) 04/08/2022    HGB 8 0 (L) 04/07/2022    HGB 8 9 (L) 04/06/2022     Chronic anemia with macrocytosis  · Iron 52%, TIBC 145, Iron 75, FA 3 7, B12 419  · Acute post-op blood loss anemia due to a drop in Hbg of > 2 0g from preop levels  · Hemoglobin 6 7 on AM of 4/6, s/p 1 unit PRBCs with hemodialysis  · Hgb improved to 8 9 post transfusion, now 8 0 most recently   · Nephro started Epogen with HD  · Monitor CBC as needed    CAD (coronary artery disease)  Assessment & Plan  Had triple vessel disease by cath in April 2008, s/p PTCA with bare metal stent to circumflex and PDA and POBA of diagnonal  s/p MI in 2009, 2010 and acute NSTEMI on 02/02/2011 s/p PCI/KARLOS of distal RCA(WS) at [address]  · Last stress test 6/2021, cards notes reviewed   · Continue aspirin, beta-blocker, plavix statin  · Recommend outpatient f/u with primary cardiologist Dr Estela Linda    Hypertension  Assessment & Plan  Patient hypotensive 4/5->4/6--suspect related to postop blood loss anemia  · Status post albumin and midodrine x1 as well as 1 unit PRBC on 04/06  · Cozaar on hold, continue Coreg 3 125 mg b i d  · Blood pressure remains on soft side however stable  Mixed hyperlipidemia  Assessment & Plan  · Continue statin    Type 2 diabetes mellitus [facility])  Assessment & Plan  Lab Results   Component Value Date    HGBA1C 4 4 03/29/2022     Recent Labs     04/07/22  1131 04/07/22  1650 04/07/22  2113 04/08/22  0647   POCGLU 119 112 131 142*     · Only diet controlled at home  · On sliding scale insulin/Accu-Cheks  · Monitor p r n  Nicotine dependence  Assessment & Plan  · Tobacco cessation discussed and encouraged    Thrombocytopenia (HCC)  Assessment & Plan  · Improving, monitor CBC as needed       VTE Pharmacologic Prophylaxis: VTE Score: 10 Moderate Risk (Score 3-4) - Pharmacological DVT Prophylaxis Ordered: heparin  Patient Centered Rounds: I evaluated the patient without nursing staff present due to Attempted to call x3, unable to reach  Discussions with Specialists or Other Care Team Provider: nursing, case management    Education and Discussions with Family / Patient: Patient declined call to   Time Spent for Care: 30 minutes   More than 50% of total time spent on counseling and coordination of care as described above  Current Length of Stay: 5 day(s)  Current Patient Status: Inpatient   Certification Statement: The patient will continue to require additional inpatient hospital stay due to pending rehab placement  Discharge Plan: Anticipate discharge later today or tomorrow to rehab facility  Code Status: Level 1 - Full Code    Subjective:   Patient complains of nausea overnight with vomiting  Also had some diarrhea  Did not eat breakfast   Somewhat improved this morning  No abdominal pain  Objective:     Vitals:   Temp (24hrs), Av 9 °F (36 6 °C), Min:97 5 °F (36 4 °C), Max:98 2 °F (36 8 °C)    Temp:  [97 5 °F (36 4 °C)-98 2 °F (36 8 °C)] 97 9 °F (36 6 °C)  HR:  [78-98] 82  Resp:  [12-19] 19  BP: (100-117)/(60-68) 100/61  SpO2:  [93 %-100 %] 100 %  There is no height or weight on file to calculate BMI  Input and Output Summary (last 24 hours): Intake/Output Summary (Last 24 hours) at 2022 1041  Last data filed at 2022 0900  Gross per 24 hour   Intake 120 ml   Output 0 ml   Net 120 ml       Physical Exam:   Physical Exam  Vitals and nursing note reviewed  Constitutional:       Appearance: He is obese  Cardiovascular:      Rate and Rhythm: Normal rate  Abdominal:      General: Bowel sounds are normal  There is no distension  Palpations: Abdomen is soft  Tenderness: There is no abdominal tenderness  Musculoskeletal:         General: No swelling  Comments: Right hip dressing clean dry and intact   Skin:     General: Skin is warm  Neurological:      Mental Status: He is alert and oriented to person, place, and time  Mental status is at baseline     Psychiatric:         Mood and Affect: Mood normal           Additional Data:     Labs:  Results from last 7 days   Lab Units 22  0450 22  0439 22  0432   WBC Thousand/uL 7 93   < > 3 41*   HEMOGLOBIN g/dL 9 7*   < > 9 9*   HEMATOCRIT % 29 3*   < > 31 1*   PLATELETS Thousands/uL 115*   < >  --    BANDS PCT %  --   --  1   NEUTROS PCT % 87*  --   --    LYMPHS PCT % 5*  --   --    LYMPHO PCT %  --   --  8*   MONOS PCT % 7  --   --    MONO PCT %  --   --  0*   EOS PCT % 0  --  3    < > = values in this interval not displayed  Results from last 7 days   Lab Units 04/08/22  0450 04/03/22  2019 04/03/22  0939   SODIUM mmol/L 136   < > 134*   POTASSIUM mmol/L 4 4   < > 2 6*   CHLORIDE mmol/L 99*   < > 98*   CO2 mmol/L 28   < > 26   BUN mg/dL 23   < > 15   CREATININE mg/dL 5 97*   < > 8 24*   ANION GAP mmol/L 9   < > 10   CALCIUM mg/dL 8 4   < > 7 7*   ALBUMIN g/dL  --   --  2 4*   TOTAL BILIRUBIN mg/dL  --   --  0 44   ALK PHOS U/L  --   --  217*   ALT U/L  --   --  18   AST U/L  --   --  22   GLUCOSE RANDOM mg/dL 168*   < > 104    < > = values in this interval not displayed  Results from last 7 days   Lab Units 04/03/22  0939   INR  1 07     Results from last 7 days   Lab Units 04/08/22  0647 04/07/22  2113 04/07/22  1650 04/07/22  1131 04/07/22  0820 04/06/22  2052 04/06/22  1626 04/06/22  1215 04/06/22  0623 04/05/22  2106 04/05/22  1616 04/05/22  1129   POC GLUCOSE mg/dl 142* 131 112 119 84 115 93 125 119 134 191* 218*               Lines/Drains:  Invasive Devices  Report    Peripheral Intravenous Line            Peripheral IV 04/04/22 Dorsal (posterior); Right Hand 4 days    Peripheral IV 04/08/22 Right;Upper Arm <1 day          Line            Hemodialysis AV Fistula  Left Forearm -- days                      Imaging: Reviewed radiology reports from this admission including: xray hip    Recent Cultures (last 7 days):         Last 24 Hours Medication List:   Current Facility-Administered Medications   Medication Dose Route Frequency Provider Last Rate    acetaminophen  975 mg Oral Q8H [address] Jose Malagon PA-C      aspirin  81 mg Oral Daily Sherine Goldberg, PA-C      atorvastatin  40 mg Oral Daily With [employer], PA-C      b complex-vitamin C-folic acid  1 capsule Oral Daily With [employer] Roena Romberg, PA-C      calcium carbonate  1,000 mg Oral BID PRN Floyd Campos PA-C      carvedilol  3 125 mg Oral BID With Meals Cassius Guzman PA-C      cholecalciferol  2,000 Units Oral Daily Roena Romberg, PA-C      clopidogrel  75 mg Oral Daily Aaron Guzman PA-C      epoetin jah  4,000 Units Intravenous After Dialysis Ronnie Holly MD      guaiFENesin  600 mg Oral Q12H [address] Humble Roach MD      heparin (porcine)  5,000 Units Subcutaneous [facility] Janes Peña MD      insulin lispro  1-5 Units Subcutaneous TID AC Aaron Guzman PA-C      insulin lispro  1-5 Units Subcutaneous HS Aaron Guzman PA-C      nicotine  1 patch Transdermal Daily Roena Romberg, PA-C      nystatin   Topical BID Roena Romberg, PA-C      ondansetron  4 mg Intravenous Q6H PRN Janes Peña MD      oxyCODONE  2 5 mg Oral Q6H PRN Roena Romberg, PA-C      oxyCODONE  5 mg Oral Q6H PRN Roena Romberg, PA-C      phenol  1 spray Mouth/Throat Q2H PRN Humble Roach MD      promethazine  25 mg Intravenous Once TESSY Ramirez      rifaximin  550 mg Oral Q12H [address] Roena Romberg, PA-C          Today, Patient Was Seen By: TESSY Ramirez    **Please Note: This note may have been constructed using a voice recognition system  **

## 2022-04-08 NOTE — HEMODIALYSIS
Post-Dialysis RN Treatment Note    Blood Pressure:  Pre 104/80 mm/Hg  Post 100/80   85 mmHg   EDW  77kg    Weight:  Pre78 kg   Post 77 5 kg   Mode of weight measurement: bed    Volume Removed  500 ml    Treatment duration 90 minutes    NS given  YES 300ml   Treatment shortened?  Yes   Medications given during Rx Epo 4 0k   Estimated Kt/V  0 7   Access type: AVF   Access Issues:  NA   Report called to primary nurse   YES  Steven Montez RN

## 2022-04-08 NOTE — CASE MANAGEMENT
Case Management Discharge Planning Note    Patient name Ronny Atkins  [facility]N [id number]  : 1955 Date 2022       Current Admission Date: 4/3/2022  Current Admission Diagnosis:Right intertrochanteric femur fracture   Patient Active Problem List    Diagnosis Date Noted    Nausea vomiting and diarrhea 2022    Right intertrochanteric femur fracture 2022    Thrombocytopenia ([facility] ) 2022    D[facility] fistula malfunction ([facility] ) 2022    Arteriovenous fistula ([facility] ) 2021    Dizziness 2021    [facility] fistula thrombosis, initial encounter [facility] ) 2021    Encounter for extracorporeal dialysis ([place] [address] ) 2019    Acute upper respiratory infection 10/17/2018    Intestinal malabsorption 2017    Pernicious anemia 2017    Irritable bowel syndrome with diarrhea 2017    Elevated serum alkaline phosphatase level 2017    Acute on chronic anemia 2017    CAD (coronary artery disease) 10/20/2016    Hypertension 10/20/2016    Diabetes with neurologic complications ([facility] )     Mixed hyperlipidemia 10/04/2012    End stage renal disease ([facility] ) 2012    Nicotine dependence 2012    Organic impotence 2012    Type 2 diabetes mellitus ([facility] ) 2012      LOS (days): 5  Geometric Mean LOS (GMLOS) (days): 6 20  Days to GMLOS:1 5     OBJECTIVE:  Risk of Unplanned Readmission Score: 24         Current admission status: Inpatient   Preferred Pharmacy:   [facility] [id number], 797 [facility], [address] [place] [address] Rd [address]  Phone: [phone] Fax: Ema Arredondo [phone] [place] [address] [address]  403 [facility],[address] [facility] [address] [address]  Phone: [phone] Fax: [phone]    Primary Care Provider: TESSY Cm    Primary Insurance: MEDICARE  Secondary Insurance: [facility]    DISCHARGE DETAILS:    Contacts  Patient Contacts: Jace Lowry Dario  Relationship to Patient[de-identified] Family  Contact Method: Phone  Phone Number: [phone]  Reason/Outcome: Discharge Planning     Other Referral/Resources/Interventions Provided:  Interventions: Short Term Rehab  Referral Comments: CM informed pt is scheduled for evening dialysis at 1:30pm  Due to same 1:30pm transport to [facility]  CM inquired if [facility] can accept pt tomorrow morning 4/9, due to late dialysis today  CM is currently awaiting a response back at this time, and will continue to follow for coordination of weekend d/c  Treatment Team Recommendation: Short Term Rehab  Discharge Destination Plan[de-identified] Short Term Rehab  Transport at Discharge : [facility]    Additional Comments: CM confirmed with Donnia Locket will make transport arrangements for pt to OP dialysis during rehab stay

## 2022-04-08 NOTE — ASSESSMENT & PLAN NOTE
· Started overnight   P r n  Antiemetics  Diet as tolerated  Somewhat improved this morning    · If persists, consider CT abdomen pelvis  · Supportive care  · Monitor

## 2022-04-08 NOTE — ASSESSMENT & PLAN NOTE
Had triple vessel disease by cath in April 2008, s/p PTCA with bare metal stent to circumflex and PDA and POBA of diagnonal  s/p MI in 2009, 2010 and acute NSTEMI on 02/02/2011 s/p PCI/KARLOS of distal RCA(WS) at [facility]  · Last stress test 6/2021, cards notes reviewed   · Continue aspirin, beta-blocker, plavix statin  · Recommend outpatient f/u with primary cardiologist Dr Monica Swain

## 2022-04-08 NOTE — ASSESSMENT & PLAN NOTE
Lab Results   Component Value Date    HGBA1C 4 4 03/29/2022     Recent Labs     04/07/22  1131 04/07/22  1650 04/07/22  2113 04/08/22  0647   POCGLU 119 112 131 142*     · Only diet controlled at home  · On sliding scale insulin/Accu-Cheks  · Monitor p r n

## 2022-04-08 NOTE — CASE MANAGEMENT
Case Management Discharge Planning Note    Patient name Ronny Atkins  [facility] [id number]  : 1955 Date 2022       Current Admission Date: 4/3/2022  Current Admission Diagnosis:Right intertrochanteric femur fracture   Patient Active Problem List    Diagnosis Date Noted    Nausea vomiting and diarrhea 2022    Right intertrochanteric femur fracture 2022    [facility] ) 2022    Dialysis [facility] malfunction [facility] ) 2022    Arteriovenous fistula ([facility] ) 2021    Dizziness 2021    AV fistula thrombosis, initial encounter ([facility] ) 2021    Encounter for extracorporeal dialysis ([facility] ) 2019    Acute upper respiratory infection 10/17/2018    Intestinal malabsorption 2017    Pernicious anemia 2017    Irritable bowel syndrome with diarrhea 2017    Elevated serum alkaline phosphatase level 2017    Acute on chronic anemia 2017    CAD (coronary artery disease) 10/20/2016    Hypertension 10/20/2016    Diabetes with neurologic complications [facility] )     Mixed hyperlipidemia 10/04/2012    End stage renal disease ([facility] ) 2012    Nicotine dependence 2012    Organic impotence 2012    Type 2 diabetes mellitus ([facility] ) 2012      LOS (days): 5  Geometric Mean LOS (GMLOS) (days): 6 20  Days to GMLOS:1 4     OBJECTIVE:  Risk of Unplanned Readmission Score: 25         Current admission status: Inpatient   Preferred Pharmacy:   [facility] [employer] [id number], 616 [facility],[address], 1200 [facility], [address] [place] [address] [address] [place] [address] Rd [address]  Phone: [phone] Fax: Ema Arredondo 35 [phone] [place], [place] [address]  403 [facility],[address] [facility] [address] [address]  Phone: [phone] Fax: [phone]    Primary Care Provider: TESSY Cm    Primary Insurance: MEDICARE  Secondary Insurance: Health system    DISCHARGE DETAILS:  Contacts  Patient Contacts: Tommy Alvarado Dario  Relationship to Patient[de-identified] Family  Contact Method: Phone  Phone Number: [phone]  Reason/Outcome: Discharge Planning    Other Referral/Resources/Interventions Provided:  Referral Comments: Per communication with [facility], pt can admit tomorrow morning  Transport rescheduled for tomorrow p/u  CMN form updated  Copy placed in medical records bin  Treatment Team Recommendation: Short Term Rehab  Discharge Destination Plan[de-identified] Short Term Rehab  Transport at Discharge : [facility]  Dispatcher Contacted: Yes  Number/Name of Dispatcher: KIMMIE     ETA of Transport (Date): 04/09/22  ETA of Transport (Time):  ([facility] request made for 10am tomorrow 4/9/22   CM will follow for confirmed transport time )

## 2022-04-08 NOTE — ASSESSMENT & PLAN NOTE
Lab Results   Component Value Date    EGFR 8 04/08/2022    EGFR 15 04/07/2022    EGFR 8 04/06/2022    CREATININE 5 97 (H) 04/08/2022    CREATININE 3 87 (H) 04/07/2022    CREATININE 6 14 (H) 04/06/2022     History of ESRD on hemodialysis on MWF schedule  · Hemodialysis per nephrology

## 2022-04-09 VITALS
SYSTOLIC BLOOD PRESSURE: 111 MMHG | DIASTOLIC BLOOD PRESSURE: 53 MMHG | RESPIRATION RATE: 12 BRPM | OXYGEN SATURATION: 97 % | HEART RATE: 86 BPM | TEMPERATURE: 98.3 F

## 2022-04-09 PROBLEM — R19.7 NAUSEA VOMITING AND DIARRHEA: Status: RESOLVED | Noted: 2022-04-08 | Resolved: 2022-04-09

## 2022-04-09 PROBLEM — R11.2 NAUSEA VOMITING AND DIARRHEA: Status: RESOLVED | Noted: 2022-04-08 | Resolved: 2022-04-09

## 2022-04-09 LAB
ANION GAP SERPL CALCULATED.3IONS-SCNC: 3 MMOL/L (ref 4–13)
BASOPHILS # BLD AUTO: 0.02 THOUSANDS/ΜL (ref 0–0.1)
BASOPHILS NFR BLD AUTO: 0 % (ref 0–1)
BUN SERPL-MCNC: 13 MG/DL (ref 5–25)
CALCIUM SERPL-MCNC: 8.2 MG/DL (ref 8.3–10.1)
CHLORIDE SERPL-SCNC: 101 MMOL/L (ref 100–108)
CO2 SERPL-SCNC: 32 MMOL/L (ref 21–32)
CREAT SERPL-MCNC: 4.54 MG/DL (ref 0.6–1.3)
EOSINOPHIL # BLD AUTO: 0.05 THOUSAND/ΜL (ref 0–0.61)
EOSINOPHIL NFR BLD AUTO: 1 % (ref 0–6)
ERYTHROCYTE [DISTWIDTH] IN BLOOD BY AUTOMATED COUNT: 18.8 % (ref 11.6–15.1)
GFR SERPL CREATININE-BSD FRML MDRD: 12 ML/MIN/1.73SQ M
GLUCOSE SERPL-MCNC: 121 MG/DL (ref 65–140)
GLUCOSE SERPL-MCNC: 138 MG/DL (ref 65–140)
GLUCOSE SERPL-MCNC: 185 MG/DL (ref 65–140)
HCT VFR BLD AUTO: 26.4 % (ref 36.5–49.3)
HGB BLD-MCNC: 8.7 G/DL (ref 12–17)
IMM GRANULOCYTES # BLD AUTO: 0.11 THOUSAND/UL (ref 0–0.2)
IMM GRANULOCYTES NFR BLD AUTO: 2 % (ref 0–2)
LYMPHOCYTES # BLD AUTO: 0.81 THOUSANDS/ΜL (ref 0.6–4.47)
LYMPHOCYTES NFR BLD AUTO: 16 % (ref 14–44)
MCH RBC QN AUTO: 35.1 PG (ref 26.8–34.3)
MCHC RBC AUTO-ENTMCNC: 33 G/DL (ref 31.4–37.4)
MCV RBC AUTO: 107 FL (ref 82–98)
MONOCYTES # BLD AUTO: 0.41 THOUSAND/ΜL (ref 0.17–1.22)
MONOCYTES NFR BLD AUTO: 8 % (ref 4–12)
NEUTROPHILS # BLD AUTO: 3.6 THOUSANDS/ΜL (ref 1.85–7.62)
NEUTS SEG NFR BLD AUTO: 73 % (ref 43–75)
NRBC BLD AUTO-RTO: 0 /100 WBCS
PLATELET # BLD AUTO: 107 THOUSANDS/UL (ref 149–390)
PMV BLD AUTO: 10.5 FL (ref 8.9–12.7)
POTASSIUM SERPL-SCNC: 3.4 MMOL/L (ref 3.5–5.3)
RBC # BLD AUTO: 2.48 MILLION/UL (ref 3.88–5.62)
SODIUM SERPL-SCNC: 136 MMOL/L (ref 136–145)
WBC # BLD AUTO: 5 THOUSAND/UL (ref 4.31–10.16)

## 2022-04-09 PROCEDURE — 99232 SBSQ HOSP IP/OBS MODERATE 35: CPT | Performed by: INTERNAL MEDICINE

## 2022-04-09 PROCEDURE — 82948 REAGENT STRIP/BLOOD GLUCOSE: CPT

## 2022-04-09 PROCEDURE — 99239 HOSP IP/OBS DSCHRG MGMT >30: CPT | Performed by: NURSE PRACTITIONER

## 2022-04-09 PROCEDURE — 80048 BASIC METABOLIC PNL TOTAL CA: CPT | Performed by: NURSE PRACTITIONER

## 2022-04-09 PROCEDURE — 85025 COMPLETE CBC W/AUTO DIFF WBC: CPT | Performed by: NURSE PRACTITIONER

## 2022-04-09 RX ORDER — ACETAMINOPHEN 325 MG/1
975 TABLET ORAL EVERY 8 HOURS SCHEDULED
Refills: 0
Start: 2022-04-09

## 2022-04-09 RX ORDER — PROMETHAZINE HYDROCHLORIDE 25 MG/ML
25 INJECTION, SOLUTION INTRAMUSCULAR; INTRAVENOUS ONCE
Status: COMPLETED | OUTPATIENT
Start: 2022-04-09 | End: 2022-04-09

## 2022-04-09 RX ORDER — NYSTATIN 100000 [USP'U]/G
POWDER TOPICAL 2 TIMES DAILY
Qty: 15 G | Refills: 0
Start: 2022-04-09 | End: 2022-06-30

## 2022-04-09 RX ORDER — CALCIUM CARBONATE 200(500)MG
1000 TABLET,CHEWABLE ORAL 2 TIMES DAILY PRN
Refills: 0
Start: 2022-04-09

## 2022-04-09 RX ORDER — OXYCODONE HYDROCHLORIDE 5 MG/1
TABLET ORAL
Qty: 10 TABLET | Refills: 0 | Status: SHIPPED | OUTPATIENT
Start: 2022-04-09 | End: 2022-06-30

## 2022-04-09 RX ORDER — PANTOPRAZOLE SODIUM 40 MG/1
40 TABLET, DELAYED RELEASE ORAL
Refills: 0
Start: 2022-04-10

## 2022-04-09 RX ORDER — HEPARIN SODIUM 5000 [USP'U]/ML
5000 INJECTION, SOLUTION INTRAVENOUS; SUBCUTANEOUS EVERY 8 HOURS SCHEDULED
Qty: 1 ML | Refills: 0
Start: 2022-04-04 | End: 2022-06-30

## 2022-04-09 RX ORDER — PROMETHAZINE HYDROCHLORIDE 25 MG/1
25 TABLET ORAL EVERY 6 HOURS PRN
Qty: 30 TABLET | Refills: 0
Start: 2022-04-09 | End: 2022-06-30

## 2022-04-09 RX ADMIN — HEPARIN SODIUM 5000 UNITS: 5000 INJECTION INTRAVENOUS; SUBCUTANEOUS at 13:26

## 2022-04-09 RX ADMIN — OXYCODONE HYDROCHLORIDE 5 MG: 5 TABLET ORAL at 13:30

## 2022-04-09 RX ADMIN — ACETAMINOPHEN 975 MG: 325 TABLET ORAL at 06:24

## 2022-04-09 RX ADMIN — ASPIRIN 81 MG: 81 TABLET, COATED ORAL at 08:51

## 2022-04-09 RX ADMIN — PANTOPRAZOLE SODIUM 40 MG: 40 TABLET, DELAYED RELEASE ORAL at 06:24

## 2022-04-09 RX ADMIN — GUAIFENESIN 600 MG: 600 TABLET, EXTENDED RELEASE ORAL at 08:51

## 2022-04-09 RX ADMIN — RIFAXIMIN 550 MG: 550 TABLET ORAL at 08:51

## 2022-04-09 RX ADMIN — Medication 2000 UNITS: at 08:51

## 2022-04-09 RX ADMIN — OXYCODONE HYDROCHLORIDE 5 MG: 5 TABLET ORAL at 06:20

## 2022-04-09 RX ADMIN — CLOPIDOGREL BISULFATE 75 MG: 75 TABLET ORAL at 08:51

## 2022-04-09 RX ADMIN — PROMETHAZINE HYDROCHLORIDE 25 MG: 25 INJECTION INTRAMUSCULAR; INTRAVENOUS at 09:41

## 2022-04-09 RX ADMIN — HEPARIN SODIUM 5000 UNITS: 5000 INJECTION INTRAVENOUS; SUBCUTANEOUS at 06:24

## 2022-04-09 RX ADMIN — ACETAMINOPHEN 975 MG: 325 TABLET ORAL at 13:26

## 2022-04-09 NOTE — DISCHARGE SUMMARY
Discharge Summary - [facility] Internal Medicine    Patient Information: Daphnie Raines 78 y o  male MRN: [id number]  Unit/Bed#: [facility] 118-10 Encounter: [id number]    Discharging Physician / Practitioner: TESSY Huffman  PCP: TESSY Bañuelos  Admission Date: 4/3/2022  Discharge Date: 04/09/22    Reason for Admission:  Mechanical fall, acute nondisplaced intertrochanteric fracture of the right femur status post  hemiarthroplasty 4/4 by Orthopedic surgery    Discharge Diagnoses:     Principal Problem:    Right intertrochanteric femur fracture  Active Problems:    End stage renal disease (HCC)    Acute on chronic anemia    CAD (coronary artery disease)    Hypertension    Mixed hyperlipidemia    Type 2 diabetes mellitus ([facility] )    Nicotine dependence    Thrombocytopenia ([facility] )  Resolved Problems:    Nausea vomiting and diarrhea    Nausea, vomiting and diarrhea      Consultations During Hospital Stay:  · Ortho  · PT/OT  · Case management    Procedures Performed:     Right hemiarthroplasty 4/4 by Orthopedic surgery    Significant Findings / Test Results:     XR hip/pelv 2-3 vws right if performed   Final Result by Merly Mccain MD (04/07 4672)      Intact right hip arthroplasty  Greater and lesser trochanter fractures were present on the previous x-ray  Workstation performed: [id number]         XR femur 2 vw right   Final Result by Celeste Eugene MD (04/04 5194)      Stable appearance right hip fracture status post ORIF                  Workstation performed: [id number]             Incidental Findings:   · None    Test Results Pending at Discharge (will require follow up):    · None     Outpatient Tests Requested:  · Outpatient follow-up with PCP within 1 week  · Outpatient follow-up with Orthopedic surgery within 2 weeks    Complications:  None    Hospital Course:     Daphnie Raines is a 78 y o  male patient with past medical history of ESRD on HD, CAD status post stenting in the past, hypertension, hyperlipidemia, type 2 diabetes, nicotine dependence who originally presented to the hospital on 4/3/2022 due to mechanical fall resulting in right intertrochanteric femur fracture  Patient was seen by Orthopedic surgery, underwent hardware removal and conversion to bipolar hemiarthroplasty 04/04/2022  Patient did require blood transfusion postoperatively for acute blood loss anemia  Subsequent hemoglobin has been stable  Pain has been controlled  Patient has had intermittent nausea and vomiting and several episodes of diarrhea which is improving  Patient is tolerating regular diet  Can continue p r n  Phenergan as this seems to be what worked best for patient  Stable for discharge to rehab  Outpatient follow-up with PCP following discharge from rehab  Will need outpatient follow-up with Orthopedic surgery in 2 weeks  Condition at Discharge: stable     Discharge Day Visit / Exam:     Subjective:  Patient offers no acute complaints  Nausea has improved  No further diarrhea  Vitals: Blood Pressure: (!) 114/47 (04/09/22 0844)  Pulse: 86 (04/09/22 0844)  Temperature: 98 3 °F (36 8 °C) (04/09/22 0735)  Temp Source: Oral (04/08/22 1615)  Respirations: 16 (04/08/22 2244)  SpO2: 97 % (04/09/22 0844)  Exam:   Physical Exam  Vitals and nursing note reviewed  Constitutional:       Appearance: He is obese  Cardiovascular:      Rate and Rhythm: Normal rate  Pulmonary:      Breath sounds: Normal breath sounds  Abdominal:      General: Bowel sounds are normal  There is no distension  Palpations: Abdomen is soft  Tenderness: There is no abdominal tenderness  Musculoskeletal:         General: Normal range of motion  Skin:     General: Skin is warm  Comments: Right hip dressing clean dry and intact   Neurological:      Mental Status: He is alert and oriented to person, place, and time  Mental status is at baseline     Psychiatric:         Mood and Affect: Mood normal  Discussion with Family:  Patient  Declined family update  Discharge instructions/Information to patient and family:   See after visit summary for information provided to patient and family  Provisions for Follow-Up Care:  See after visit summary for information related to follow-up care and any pertinent home health orders  Disposition:     Other [place] [facility] [employer] to [facility]:   · Not Applicable to this Patient - Not Applicable to this Patient    Planned Readmission: no     Discharge Statement:  I spent 40 minutes discharging the patient  This time was spent on the day of discharge  I had direct contact with the patient on the day of discharge  Greater than 50% of the total time was spent examining patient, answering all patient questions, arranging and discussing plan of care with patient as well as directly providing post-discharge instructions  Additional time then spent on discharge activities  Discharge Medications:  See after visit summary for reconciled discharge medications provided to patient and family        ** Please Note: This note has been constructed using a voice recognition system **

## 2022-04-09 NOTE — CASE MANAGEMENT
Case Management Discharge Planning Note    Patient name Donna Romero  Location [facility]-53 MRN [id number]  : 1955 Date 2022       Current Admission Date: 4/3/2022  Current Admission Diagnosis:Right intertrochanteric femur fracture   Patient Active Problem List    Diagnosis Date Noted    Right intertrochanteric femur fracture 2022    [facility] ) 2022    Dialysis AV fistula malfunction ([facility] ) 2022    Arteriovenous fistula ([facility] ) 2021    Dizziness 2021    AV fistula thrombosis, initial encounter ([place] [address] ) 2021    Encounter for extracorporeal dialysis ([place] [address] ) 2019    Acute upper respiratory infection 10/17/2018    Intestinal malabsorption 2017    Pernicious anemia 2017    Irritable bowel syndrome with diarrhea 2017    Elevated serum alkaline phosphatase level 2017    Acute on chronic anemia 2017    CAD (coronary artery disease) 10/20/2016    Hypertension 10/20/2016    Diabetes with neurologic complications ([place] [address] )     Mixed hyperlipidemia 10/04/2012    End stage renal disease ([place] [address] ) 2012    Nicotine dependence 2012    Organic impotence 2012    Type 2 diabetes mellitus ([place] [address] ) 2012      LOS (days): 6  Geometric Mean LOS (GMLOS) (days): 6 20  Days to GMLOS:0 6     OBJECTIVE:  Risk of Unplanned Readmission Score: 25         Current admission status: Inpatient   Preferred Pharmacy:   [facility] [address] [place], [place] [id number], 202 [facility],[address], [address] [facility] [address] [address]  Phone: [phone] Fax: [phone]    [facility] [address] [place] [address] [address]  403 [facility],[address] [facility] [address] [address]  Phone: [phone] Fax: [phone]    Primary Care Provider: TESSY Townsend    Primary Insurance: MEDICARE  Secondary Insurance: AARP    DISCHARGE DETAILS:    Contacts  Patient Contacts: Samuel Lazo (informed of transport time today )  Relationship to Patient[de-identified] Family  Contact Method: Phone  Phone Number: [phone]  Reason/Outcome: Discharge Planning    Other Referral/Resources/Interventions Provided:  Interventions: Short Term Rehab  Referral Comments: Per communication with [facility], Pt is medically cleared for d/c to rehab today  CM spoke with Mikel gary/ [facility] admission's, who confirmed pt can admit today  P: [id number]  Treatment Team Recommendation: Short Term Rehab  Discharge Destination Plan[de-identified] Short Term Rehab  Transport at Discharge : [facility]  Dispatcher Contacted: Yes  Number/Name of Dispatcher: [employer] by Rafaela and Unit #):  [place]  ETA of Transport (Date): 04/09/22  ETA of Transport (Time): 1700 (5pm)    Accepting Facility Name, [address] : Henrry Almaguer at [facility]  Receiving Facility/Agency Phone Number: [phone]  Facility/Agency Fax Number: [phone]

## 2022-04-09 NOTE — PROGRESS NOTES
NEPHROLOGY PROGRESS NOTE   Cheyenne Boeck 78 y o  male MRN: [id number]  Unit/Bed#: [facility] 607-01 Encounter: [id number]  Reason for Consult: ESRD    ASSESSMENT AND PLAN:  ESRD on HD on [facility] schedule, [facility]  -HD today   Outpatient dry weight 77 kg  Goal UF to outpatient dry weight      CKD anemia, was started on low-dose Epogen while inpatient after discussing risks versus benefit given patient's prior remote history of CVA and also recent history of fistula thrombosis requiring thrombolysis in March 2022  Further ALICE management as per outpatient dialysis unit upon discharge  CKD BMD, continue to monitor phosphorus, PTH    Hypertension  Blood pressure remains lower  Currently remains on Coreg      Mechanical complication of internal orthopedic device on right hip, status post bipolar hemiarthroplasty on 4/4/22  Likely being discharged today  Discussed above plan in detail with primary team     SUBJECTIVE:  Patient seen and examined at bedside   No chest pain, shortness of breath, nausea, vomiting, abdominal pain    OBJECTIVE:  Current Weight:    Vitals:    04/09/22 0844   BP: (!) 114/47   Pulse: 86   Resp:    Temp:    SpO2: 97%       Intake/Output Summary (Last 24 hours) at 4/9/2022 0932  Last data filed at 4/8/2022 1633  Gross per 24 hour   Intake 1150 ml   Output 1250 ml   Net -100 ml     Wt Readings from Last 3 Encounters:   04/03/22 80 3 kg (177 lb 0 5 oz)   03/28/22 78 kg (171 lb 14 4 oz)   03/25/22 80 2 kg (176 lb 12 9 oz)     Temp Readings from Last 3 Encounters:   04/09/22 98 3 °F (36 8 °C)   04/03/22 97 9 °F (36 6 °C)   03/29/22 97 5 °F (36 4 °C) (Oral)     BP Readings from Last 3 Encounters:   04/09/22 (!) 114/47   04/03/22 124/68   03/29/22 149/82     Pulse Readings from Last 3 Encounters:   04/09/22 86   04/03/22 (!) 51   03/29/22 87        Physical Examination:  General:  Lying in bed, no acute distress   Eyes:  Mild conjunctival pallor present  ENT:  External examination of ears and nose unremarkable  Neck:  No obvious lymphadenopathy appreciated  Respiratory:  Bilateral air entry present  CVS:  S1, S2 present  GI:  Soft, nondistended  CNS:  Active alert oriented x3  Skin:  No new rash  Musculoskeletal:  No obvious new gross deformity noted    Medications:    Current Facility-Administered Medications:     acetaminophen (TYLENOL) tablet 975 mg, 975 mg, Oral, Q8H [facility], Jose Malagon PA-C, 975 mg at 04/09/22 3133    aspirin (ECOTRIN LOW STRENGTH) EC tablet 81 mg, 81 mg, Oral, Daily, Jose Malagon PA-C, 81 mg at 04/09/22 0851    atorvastatin (LIPITOR) tablet 40 mg, 40 mg, Oral, Daily With Richrd Lesches, PA-C, 40 mg at 04/08/22 1733    b complex-vitamin C-folic acid (NEPHROCAPS) capsule 1 capsule, 1 capsule, Oral, Daily With Dinner, Gabi Nelson PA-C, 1 capsule at 04/08/22 1733    calcium carbonate (TUMS) chewable tablet 1,000 mg, 1,000 mg, Oral, BID PRN, Lori Parry PA-C, 1,000 mg at 04/07/22 1216    carvedilol (COREG) tablet 3 125 mg, 3 125 mg, Oral, BID With Meals, Aaron Guzman PA-C, 3 125 mg at 04/07/22 0824    cholecalciferol (VITAMIN D3) tablet 2,000 Units, 2,000 Units, Oral, Daily, Gabi Nelson PA-C, 2,000 Units at 04/09/22 0851    clopidogrel (PLAVIX) tablet 75 mg, 75 mg, Oral, Daily, Aaron Guzman PA-C, 75 mg at 04/09/22 0851    epoetin jah (EPOGEN,PROCRIT) injection 4,000 Units, 4,000 Units, Intravenous, After Dialysis, Tyler Elizalde MD, 4,000 Units at 04/08/22 1526    guaiFENesin (MUCINEX) 12 hr tablet 600 mg, 600 mg, Oral, Q12H [facility], Chelsie Beltran MD, 600 mg at 04/09/22 0851    heparin (porcine) subcutaneous injection 5,000 Units, 5,000 Units, Subcutaneous, Q8H [facility], Saundra Marcano MD, 5,000 Units at 04/09/22 0624    insulin lispro (HumaLOG) 100 units/mL subcutaneous injection 1-5 Units, 1-5 Units, Subcutaneous, TID AC, 1 Units at 04/05/22 1741 **AND** Fingerstick Glucose (POCT), , , TID AC, Aaron Guzman PA-C   insulin lispro (HumaLOG) 100 units/mL subcutaneous injection 1-5 Units, 1-5 Units, Subcutaneous, HS, Aaron Guzman PA-C    nicotine (NICODERM CQ) 14 mg/24hr TD 24 hr patch 1 patch, 1 patch, Transdermal, Daily, Alden Sandra PA-C, 1 patch at 04/08/22 0948    nystatin (MYCOSTATIN) powder, , Topical, BID, Alden Sandra PA-C, Given at 04/08/22 1734    ondansetron (ZOFRAN) injection 4 mg, 4 mg, Intravenous, Q6H PRN, Minor Mcmillan MD, 4 mg at 04/08/22 1021    oxyCODONE (ROXICODONE) IR tablet 2 5 mg, 2 5 mg, Oral, Q6H PRN, Alden Sandra PA-C    oxyCODONE (ROXICODONE) IR tablet 5 mg, 5 mg, Oral, Q6H PRN, Alden Sandra PA-C, 5 mg at 04/09/22 0620    pantoprazole (PROTONIX) EC tablet 40 mg, 40 mg, Oral, Early Morning, TESSY Cleveland, 40 mg at 04/09/22 0624    phenol (CHLORASEPTIC) 1 4 % mucosal liquid 1 spray, 1 spray, Mouth/Throat, Q2H PRN, Amber Klein MD    promethazine (PHENERGAN) injection 25 mg, 25 mg, Intravenous, Once, TESSY Cleveland    rifaximin Kit Peppers) tablet 550 mg, 550 mg, Oral, Q12H [facility], Alden Sandra PA-C, 550 mg at 04/09/22 1663    Laboratory Results:  Results from last 7 days   Lab Units 04/08/22  0450 04/07/22  0150 04/06/22  1531 04/06/22  0458 04/05/22  0439 04/04/22  0432 04/03/22  2019 04/03/22  0939   WBC Thousand/uL 7 93 3 60*  --  5 83 5 66 3 41*  --  5 55   HEMOGLOBIN g/dL 9 7* 8 0* 8 9* 6 7* 7 9* 9 9*  --  11 8*   HEMATOCRIT % 29 3* 24 0* 26 0* 20 7* 25 0* 31 1*  --  37 1   PLATELETS Thousands/uL 115* 89*  --  84* 75*  --   --  100*   SODIUM mmol/L 136 136  --  135* 135* 135*  --  134*   POTASSIUM mmol/L 4 4 3 4*  --  4 5 4 7 3 7 3 9 2 6*   CHLORIDE mmol/L 99* 99*  --  101 102 102  --  98*   CO2 mmol/L 28 32  --  27 26 28  --  26   BUN mg/dL 23 12  --  22 11 19  --  15   CREATININE mg/dL 5 97* 3 87*  --  6 14* 5 05* 9 12*  --  8 24*   CALCIUM mg/dL 8 4 8 2*  --  8 1* 8 1* 7 9*  --  7 7*   MAGNESIUM mg/dL  --   --   --   --   --   --   --  2 0 XR hip/pelv 2-3 vws right if performed   Final Result by Yash Escalante MD (04/07 4722)      Intact right hip arthroplasty  Greater and lesser trochanter fractures were present on the previous x-ray  Workstation performed: [id number]         XR femur 2 vw right   Final Result by Melissa Estevez MD (04/04 3506)      Stable appearance right hip fracture status post ORIF                  Workstation performed: [id number]             Portions of the record may have been created with voice recognition software  Occasional wrong word or "sound a like" substitutions may have occurred due to the inherent limitations of voice recognition software  Read the chart carefully and recognize, using context, where substitutions have occurred

## 2022-04-11 ENCOUNTER — TRANSITIONAL CARE MANAGEMENT (OUTPATIENT)
Dept: FAMILY MEDICINE CLINIC | Facility: CLINIC | Age: 67
End: 2022-04-11

## 2022-04-14 ENCOUNTER — TELEPHONE (OUTPATIENT)
Dept: OBGYN CLINIC | Facility: HOSPITAL | Age: 67
End: 2022-04-14

## 2022-04-14 NOTE — TELEPHONE ENCOUNTER
Hello,  Please advise if the following patient can be forced onto the schedule:    Patient:  Phani Swenson    : 53    MRN: [id number]    Call back #: [facility]  - [address] [place] [address]: Medicare    Reason for appointment: PO R Femoral Fx  - 2 week PO needed    Requested doctor/location: [place]      Thank you

## 2022-04-21 NOTE — PHYSICIAN ADVISOR
Current patient class: Inpatient  The patient is currently on Hospital Day: 1 at [place] [address]      The patient was admitted to the hospital at  1:30 PM on 4/3/22 for the following diagnosis:  Hypokalemia [E87 6]  Alcohol dependence ([address] ) [F10 20]  Hip pain [M25 559]  Weakness [R53 1]  Generalized weakness [R53 1]       There was documentation in the medical record of an expected length of stay of at least 2 midnights  The patient was therefore expected to satisfy the 2 midnight benchmark and given the 2 midnight presumption was appropriate for INPATIENT ADMISSION  Given this expectation of a satisfying stay, CMS instructs us that the patient is most often appropriate for inpatient admission under part A provided medical necessity is documented in the chart  After review of the relevant documentation, labs, vital signs and test results, the patient is appropriate for INPATIENT ADMISSION  Admission to the hospital as an inpatient is a complex decision making process which requires the practitioner to consider the patients presenting complaint, history and physical examination and all relevant testing  With this in mind, in this case, the patient was deemed appropriate for INPATIENT ADMISSION  After review of the documentation and testing available at the time of the admission, I concur with this clinical determination of medical necessity  For the reason noted, the patient was discharged before reaching 2 midnights as an inpatient  Patient transferred for orthopedic evaluation  Rationale is as follows: The patient is a 78 yrs old Male who presented to the ED at 4/3/2022  9:05 AM with a chief complaint of Weakness - Generalized (patient reports weakness for 4 days, as well as nausea, vomiting, and diarrhea   Family found the patient laying supine on the couch, per them, for four days since the fall) and Hip Pain (patient reports falling out of bed on wednesday) Patient initially admitted with nausea, vomiting and diarrhea with severe hypokalemia to 2 6  EKG showed nonspecific changes and he received IV and PO Kcl  He had also sustained a recent fall with complaints of right hip pain  Right hip and pelvic x-ray showed acute nondisplaced intertrochanteric fracture of the right femur and lesser trochanteric avulsion also noted not necessarily acute  He was transferred to [facility] on hospital day 1 for orthopedics evaluation  He subsequently was admitted at [facility] from 4/3/22-4/9/22  Given length of entire stay, he was inpatient appropriate  The patients vitals on arrival were   ED Triage Vitals [04/03/22 0911]   Temperature Pulse Respirations Blood Pressure SpO2   97 7 °F (36 5 °C) 76 16 119/62 98 %      Temp Source Heart Rate Source Patient Position - Orthostatic VS BP Location FiO2 (%)   Tympanic Monitor Lying Right arm --      Pain Score       10 - Worst Possible Pain           Past Medical History:   Diagnosis Date    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    With Cerebral Infarction:  Last Assessed:  October 20, 2016    Chronic kidney disease 2012    on dialysis     Diabetes mellitus ([facility] [address] )     Hypertension     Labyrinthitis 09/10/2011    Myocardial infarction ([facility] [address] ) 2014    x3 others were in 2009 & 2010    Sleep disturbances 09/20/2010    Stroke [facility]) 2007    x1    Type 2 diabetes, uncontrolled, with renal manifestation 05/03/2017     Past Surgical History:   Procedure Laterality Date    AV FISTULA PLACEMENT      CARDIAC SURGERY  2010    stents x3    COLONOSCOPY N/A 12/12/2016    Procedure: COLONOSCOPY;  Surgeon: Lux Caal MD;  Location: [facility]; Service:     COLONOSCOPY N/A 4/3/2017    Procedure:  COLONOSCOPY;  Surgeon: Lux Caal MD;  Location: [facility];   Service:     HARDWARE REMOVAL Right 4/4/2022    Procedure: REMOVAL HARDWARE HIP;  Surgeon: Gabriella Irene MD;  Location:  MAIN OR;  Service: Orthopedics    IR AV FISTULA/GRAFT DECLOT  4/29/2021    IR AV FISTULA/GRAFT DECLOT  3/25/2022    IR AV FISTULAGRAM/GRAFTOGRAM  3/28/2022    IR TUNNELED CENTRAL LINE REMOVAL  12/7/2021    IR TUNNELED DIALYSIS CATHETER PLACEMENT  5/24/2021    SC ANASTOMOSIS,AV,ANY SITE Left 7/22/2021    Procedure: CREATION FISTULA ARTERIOVENOUS (AV); Surgeon: Tl Savage MD;  Location: [facility];  Service: Vascular    SC PARTIAL HIP REPLACEMENT Right 4/4/2022    Procedure: HEMIARTHROPLASTY HIP (BIPOLAR); Surgeon: Damián Griffiths MD;  Location:  MAIN OR;  Service: Orthopedics           Consults have been placed to:   None    Vitals:    04/03/22 1224 04/03/22 1227 04/03/22 1529 04/03/22 1531   BP: 121/69 121/69 124/68 124/68   BP Location:       Pulse: 76 87 61 (!) 51   Resp:  16 16 16   Temp: 97 9 °F (36 6 °C) 97 9 °F (36 6 °C)     TempSrc:       SpO2: 100% 95% 95% 95%   Weight:       Height:           Most recent labs:    No results for input(s): WBC, HGB, HCT, PLT, K, NA, CALCIUM, BUN, CREATININE, LIPASE, AMYLASE, INR, TROPONINI, CKTOTAL, AST, ALT, ALKPHOS, BILITOT in the last 72 hours  Scheduled Meds:  Continuous Infusions:No current facility-administered medications for this encounter  PRN Meds:      Surgical procedures (if appropriate):

## 2022-06-30 ENCOUNTER — LAB (OUTPATIENT)
Dept: LAB | Facility: CLINIC | Age: 67
End: 2022-06-30
Payer: MEDICARE

## 2022-06-30 ENCOUNTER — OFFICE VISIT (OUTPATIENT)
Dept: FAMILY MEDICINE CLINIC | Facility: CLINIC | Age: 67
End: 2022-06-30
Payer: MEDICARE

## 2022-06-30 VITALS
HEART RATE: 84 BPM | SYSTOLIC BLOOD PRESSURE: 130 MMHG | BODY MASS INDEX: 22.62 KG/M2 | WEIGHT: 158 LBS | TEMPERATURE: 98.1 F | HEIGHT: 70 IN | DIASTOLIC BLOOD PRESSURE: 70 MMHG | RESPIRATION RATE: 16 BRPM

## 2022-06-30 DIAGNOSIS — F17.200 NICOTINE DEPENDENCE, UNCOMPLICATED, UNSPECIFIED NICOTINE PRODUCT TYPE: ICD-10-CM

## 2022-06-30 DIAGNOSIS — E11.22 TYPE 2 DIABETES MELLITUS WITH CHRONIC KIDNEY DISEASE ON CHRONIC DIALYSIS, UNSPECIFIED WHETHER LONG TERM INSULIN USE (HCC): ICD-10-CM

## 2022-06-30 DIAGNOSIS — N18.6 TYPE 2 DIABETES MELLITUS WITH CHRONIC KIDNEY DISEASE ON CHRONIC DIALYSIS, UNSPECIFIED WHETHER LONG TERM INSULIN USE (HCC): ICD-10-CM

## 2022-06-30 DIAGNOSIS — Z99.2 TYPE 2 DIABETES MELLITUS WITH CHRONIC KIDNEY DISEASE ON CHRONIC DIALYSIS, UNSPECIFIED WHETHER LONG TERM INSULIN USE (HCC): ICD-10-CM

## 2022-06-30 DIAGNOSIS — Z99.2 TYPE 2 DIABETES MELLITUS WITH CHRONIC KIDNEY DISEASE ON CHRONIC DIALYSIS, UNSPECIFIED WHETHER LONG TERM INSULIN USE (HCC): Primary | ICD-10-CM

## 2022-06-30 DIAGNOSIS — Z99.2 DEPENDENCE ON RENAL DIALYSIS (HCC): ICD-10-CM

## 2022-06-30 DIAGNOSIS — Z00.00 MEDICARE ANNUAL WELLNESS VISIT, INITIAL: ICD-10-CM

## 2022-06-30 DIAGNOSIS — E11.22 TYPE 2 DIABETES MELLITUS WITH CHRONIC KIDNEY DISEASE ON CHRONIC DIALYSIS, UNSPECIFIED WHETHER LONG TERM INSULIN USE (HCC): Primary | ICD-10-CM

## 2022-06-30 DIAGNOSIS — N18.6 END STAGE RENAL DISEASE (HCC): ICD-10-CM

## 2022-06-30 DIAGNOSIS — N18.6 TYPE 2 DIABETES MELLITUS WITH CHRONIC KIDNEY DISEASE ON CHRONIC DIALYSIS, UNSPECIFIED WHETHER LONG TERM INSULIN USE (HCC): Primary | ICD-10-CM

## 2022-06-30 DIAGNOSIS — I25.10 CORONARY ARTERY DISEASE INVOLVING NATIVE HEART WITHOUT ANGINA PECTORIS, UNSPECIFIED VESSEL OR LESION TYPE: ICD-10-CM

## 2022-06-30 DIAGNOSIS — R60.9 EDEMA, UNSPECIFIED TYPE: ICD-10-CM

## 2022-06-30 PROBLEM — R42 DIZZINESS: Status: RESOLVED | Noted: 2021-07-26 | Resolved: 2022-06-30

## 2022-06-30 PROBLEM — J06.9 ACUTE UPPER RESPIRATORY INFECTION: Status: RESOLVED | Noted: 2018-10-17 | Resolved: 2022-06-30

## 2022-06-30 PROBLEM — T82.590A DIALYSIS AV FISTULA MALFUNCTION (HCC): Status: RESOLVED | Noted: 2022-03-28 | Resolved: 2022-06-30

## 2022-06-30 LAB
EST. AVERAGE GLUCOSE BLD GHB EST-MCNC: 80 MG/DL
HBA1C MFR BLD: 4.4 %

## 2022-06-30 PROCEDURE — 83036 HEMOGLOBIN GLYCOSYLATED A1C: CPT

## 2022-06-30 PROCEDURE — 99214 OFFICE O/P EST MOD 30 MIN: CPT | Performed by: FAMILY MEDICINE

## 2022-06-30 PROCEDURE — 36415 COLL VENOUS BLD VENIPUNCTURE: CPT

## 2022-06-30 PROCEDURE — G0438 PPPS, INITIAL VISIT: HCPCS | Performed by: FAMILY MEDICINE

## 2022-06-30 RX ORDER — DOCUSATE SODIUM 100 MG/1
CAPSULE, LIQUID FILLED ORAL
COMMUNITY
Start: 2022-05-13

## 2022-06-30 NOTE — PATIENT INSTRUCTIONS
Medicare Preventive Visit Patient Instructions  Thank you for completing your Welcome to Medicare Visit or Medicare Annual Wellness Visit today  Your next wellness visit will be due in one year (7/1/2023)  The screening/preventive services that you may require over the next 5-10 years are detailed below  Some tests may not apply to you based off risk factors and/or age  Screening tests ordered at today's visit but not completed yet may show as past due  Also, please note that scanned in results may not display below  Preventive Screenings:  Service Recommendations Previous Testing/Comments   Colorectal Cancer Screening  · Colonoscopy    · Fecal Occult Blood Test (FOBT)/Fecal Immunochemical Test (FIT)  · Fecal DNA/Cologuard Test  · Flexible Sigmoidoscopy Age: 54-65 years old   Colonoscopy: every 10 years (May be performed more frequently if at higher risk)  OR  FOBT/FIT: every 1 year  OR  Cologuard: every 3 years  OR  Sigmoidoscopy: every 5 years  Screening may be recommended earlier than age 48 if at higher risk for colorectal cancer  Also, an individualized decision between you and your healthcare provider will decide whether screening between the ages of 74-80 would be appropriate  Colonoscopy: 04/03/2017  FOBT/FIT: Not on file  Cologuard: Not on file  Sigmoidoscopy: Not on file          Prostate Cancer Screening Individualized decision between patient and health care provider in men between ages of 53-78   Medicare will cover every 12 months beginning on the day after your 50th birthday PSA: No results in last 5 years           Hepatitis C Screening Once for adults born between [birth year] and 1965  More frequently in patients at high risk for Hepatitis C Hep C Antibody: Not on file        Diabetes Screening 1-2 times per year if you're at risk for diabetes or have pre-diabetes Fasting glucose: 94 mg/dL   A1C: 4 4 %        Cholesterol Screening Once every 5 years if you don't have a lipid disorder   May order more often based on risk factors  Lipid panel: Not on file           Other Preventive Screenings Covered by Medicare:  1  Abdominal Aortic Aneurysm (AAA) Screening: covered once if your at risk  You're considered to be at risk if you have a family history of AAA or a male between the age of 73-68 who smoking at least 100 cigarettes in your lifetime  2  Lung Cancer Screening: covers low dose CT scan once per year if you meet all of the following conditions: (1) Age 50-69; (2) No signs or symptoms of lung cancer; (3) Current smoker or have quit smoking within the last 15 years; (4) You have a tobacco smoking history of at least 30 pack years (packs per day x number of years you smoked); (5) You get a written order from a healthcare provider  3  Glaucoma Screening: covered annually if you're considered high risk: (1) You have diabetes OR (2) Family history of glaucoma OR (3)  aged 48 and older OR (3)  American aged 72 and older  3  Osteoporosis Screening: covered every 2 years if you meet one of the following conditions: (1) Have a vertebral abnormality; (2) On glucocorticoid therapy for more than 3 months; (3) Have primary hyperparathyroidism; (4) On osteoporosis medications and need to assess response to drug therapy  5  HIV Screening: covered annually if you're between the age of 12-76  Also covered annually if you are younger than 13 and older than 72 with risk factors for HIV infection  For pregnant patients, it is covered up to 3 times per pregnancy      Immunizations:  Immunization Recommendations   Influenza Vaccine Annual influenza vaccination during flu season is recommended for all persons aged >= 6 months who do not have contraindications   Pneumococcal Vaccine (Prevnar and Pneumovax)  * Prevnar = PCV13  * Pneumovax = PPSV23 Adults 25-60 years old: 1-3 doses may be recommended based on certain risk factors  Adults 72 years old: Prevnar (PCV13) vaccine recommended followed by Pneumovax (PPSV23) vaccine  If already received PPSV23 since turning 65, then PCV13 recommended at least one year after PPSV23 dose  Hepatitis B Vaccine 3 dose series if at intermediate or high risk (ex: diabetes, end stage renal disease, liver disease)   Tetanus (Td) Vaccine - COST NOT COVERED BY MEDICARE PART B Following completion of primary series, a booster dose should be given every 10 years to maintain immunity against tetanus  Td may also be given as tetanus wound prophylaxis  Tdap Vaccine - COST NOT COVERED BY MEDICARE PART B Recommended at least once for all adults  For pregnant patients, recommended with each pregnancy  Shingles Vaccine (Shingrix) - COST NOT COVERED BY MEDICARE PART B  2 shot series recommended in those aged 48 and above     Health Maintenance Due:      Topic Date Due    Hepatitis C Screening  Never done    Colorectal Cancer Screening  04/03/2020     Immunizations Due:      Topic Date Due    COVID-19 Vaccine (1) Never done     Advance Directives   What are advance directives? Advance directives are legal documents that state your wishes and plans for medical care  These plans are made ahead of time in case you lose your ability to make decisions for yourself  Advance directives can apply to any medical decision, such as the treatments you want, and if you want to donate organs  What are the types of advance directives? There are many types of advance directives, and each state has rules about how to use them  You may choose a combination of any of the following:  · Living will: This is a written record of the treatment you want  You can also choose which treatments you do not want, which to limit, and which to stop at a certain time  This includes surgery, medicine, IV fluid, and tube feedings  · Durable power of  for healthcare [facility]): This is a written record that states who you want to make healthcare choices for you when you are unable to make them for yourself   This person, called a proxy, is usually a family member or a friend  You may choose more than 1 proxy  · Do not resuscitate (DNR) order:  A DNR order is used in case your heart stops beating or you stop breathing  It is a request not to have certain forms of treatment, such as CPR  A DNR order may be included in other types of advance directives  · Medical directive: This covers the care that you want if you are in a coma, near death, or unable to make decisions for yourself  You can list the treatments you want for each condition  Treatment may include pain medicine, surgery, blood transfusions, dialysis, IV or tube feedings, and a ventilator (breathing machine)  · Values history: This document has questions about your views, beliefs, and how you feel and think about life  This information can help others choose the care that you would choose  Why are advance directives important? An advance directive helps you control your care  Although spoken wishes may be used, it is better to have your wishes written down  Spoken wishes can be misunderstood, or not followed  Treatments may be given even if you do not want them  An advance directive may make it easier for your family to make difficult choices about your care  Cigarette Smoking and Your Health   Risks to your health if you smoke:  Nicotine and other chemicals found in tobacco damage every cell in your body  Even if you are a light smoker, you have an increased risk for cancer, heart disease, and lung disease  If you are pregnant or have diabetes, smoking increases your risk for complications  Benefits to your health if you stop smoking:   · You decrease respiratory symptoms such as coughing, wheezing, and shortness of breath  · You reduce your risk for cancers of the lung, mouth, throat, kidney, bladder, pancreas, stomach, and cervix  If you already have cancer, you increase the benefits of chemotherapy   You also reduce your risk for cancer returning or a second cancer from developing  · You reduce your risk for heart disease, blood clots, heart attack, and stroke  · You reduce your risk for lung infections, and diseases such as pneumonia, asthma, chronic bronchitis, and emphysema  · Your circulation improves  More oxygen can be delivered to your body  If you have diabetes, you lower your risk for complications, such as kidney, artery, and eye diseases  You also lower your risk for nerve damage  Nerve damage can lead to amputations, poor vision, and blindness  · You improve your body's ability to heal and to fight infections  For more information and support to stop smoking:   · Smokefree  gov  Phone: 0- 774 - 718-7306  Web Address: www [place]  [facility] Information is for End User's use only and may not be sold, redistributed or otherwise used for commercial purposes   All illustrations and images included in CareNotes® are the copyrighted property of A D A M , Inc  or [address] [place] [address]

## 2022-06-30 NOTE — PROGRESS NOTES
Assessment and Plan:     Problem List Items Addressed This Visit        Endocrine    Type 2 diabetes mellitus ([place] [address] ) - Primary   -  Likely resolved     Relevant Orders    Hemoglobin A1C       Cardiovascular and Mediastinum    CAD (coronary artery disease)    Relevant Orders    Ambulatory Referral to Cardiology       Genitourinary    End stage renal disease ([place] [address] )  F/u with nephrologist        Other    Nicotine dependence    Dependence on renal dialysis ([place] [address] )      Other Visit Diagnoses     Medicare annual wellness visit, initial        Edema, unspecified type      - elevation of legs, low sodium diet          rto in 6 m      Preventive health issues were discussed with patient, and age appropriate screening tests were ordered as noted in patient's After Visit Summary  Personalized health advice and appropriate referrals for health education or preventive services given if needed, as noted in patient's After Visit Summary  History of Present Illness:     Patient presents for a Medicare Wellness Visit    Pt is seeing for f/u DM2 -  Lost over 100 lb with diet - BG at home in 90s - recently had 2 hip surgeries  -  Under ortho care -  Finished PT - using a walker      Patient Care Team:  Yeni Muñiz MD as PCP - General (Family Medicine)  Radha Méndez as PCP - Wound (Wound Care)  MD Endy Cuevas MD as Endoscopist  Andrew Rivero MD (Vascular Surgery)     Review of Systems:     Review of Systems   Constitutional: Negative  Respiratory: Negative  Cardiovascular: Negative  Gastrointestinal: Negative  Genitourinary: Negative  Musculoskeletal: Positive for gait problem  Skin: Negative           Problem List:     Patient Active Problem List   Diagnosis    Acute on chronic anemia    CAD (coronary artery disease)    Hypertension    Diabetes with neurologic complications (HCC)    Elevated serum alkaline phosphatase level    End stage renal disease ([place] [address] )    Intestinal malabsorption    Irritable bowel syndrome with diarrhea    Mixed hyperlipidemia    Nicotine dependence    Organic impotence    Pernicious anemia    [facility]    Encounter for extracorporeal dialysis ([place] [address] )    AV fistula thrombosis, initial encounter ([place] [address] )    Arteriovenous fistula ([facility] )    Right intertrochanteric femur fracture    Thrombocytopenia (HCC)    Dependence on renal dialysis ([facility] )    Vitamin D deficiency, unspecified      Past Medical and Surgical History:     Past Medical History:   Diagnosis Date    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    With Cerebral Infarction:  Last Assessed:  October 20, 2016    Chronic kidney disease 2012    on dialysis     Diabetes mellitus [facility]     Hypertension     Labyrinthitis 09/10/2011    My[facility] infarction ([facility] ) 2014    x3 others were in 2009 & 2010    Sleep disturbances 09/20/2010    Stroke ([place] [address] ) 2007    x1    Type 2 diabetes, uncontrolled, with renal manifestation 05/03/2017     Past Surgical History:   Procedure Laterality Date    AV FISTULA PLACEMENT      CARDIAC SURGERY  2010    stents x3    COLONOSCOPY N/A 12/12/2016    Procedure: COLONOSCOPY;  Surgeon: Serene Denton MD;  Location: [place] [address] GI LAB; Service:     COLONOSCOPY N/A 4/3/2017    Procedure:  COLONOSCOPY;  Surgeon: Serene Denton MD;  Location: [place] [address] GI LAB; Service:     HARDWARE REMOVAL Right 4/4/2022    Procedure: REMOVAL HARDWARE HIP;  Surgeon: Omayra Chicas MD;  Location:  MAIN OR;  Service: Orthopedics    IR AV FISTULA/GRAFT DECLOT  4/29/2021    IR AV FISTULA/GRAFT DECLOT  3/25/2022    IR AV FISTULAGRAM/GRAFTOGRAM  3/28/2022    IR TUNNELED CENTRAL LINE REMOVAL  12/7/2021    IR TUNNELED DIALYSIS CATHETER PLACEMENT  5/24/2021    RI ANASTOMOSIS,AV,ANY SITE Left 7/22/2021    Procedure: CREATION FISTULA ARTERIOVENOUS (AV);   Surgeon: Renee Nobles MD;  Location: [facility];  Service: Vascular    RI PARTIAL HIP REPLACEMENT Right 4/4/2022    Procedure: HEMIARTHROPLASTY HIP (BIPOLAR); Surgeon: Slime Griffiths MD;  [facility];  Service: Orthopedics      Family History:     Family History   Problem Relation Age of Onset   [facility] Leukemia Mother     Crohn's disease Father     Transient ischemic attack Brother       Social History:     Social History     Socioeconomic History    Marital status: /Civil Union     Spouse name: Thania Castillo Number of children: 2    Years of education: 15    Highest education level: Some college, no degree   Occupational History    None   Tobacco Use    Smoking status: Current Every Day Smoker     Packs/day: 0 50     Years: 30 00     Pack years: 15 00     Types: Cigarettes    Smokeless tobacco: Never Used   Vaping Use    Vaping Use: Never used   Substance and Sexual Activity    Alcohol use: Yes     Comment: rarely - No alcohol use per [employer]    Drug use: No    Sexual activity: None   Other Topics Concern    None   Social History Narrative    Daily caffeinated coffee consumption     Social Determinants of Health     Financial Resource Strain: Not on file   Food Insecurity: No Food Insecurity    Worried About Running Out of Food in the Last Year: Never true    Gissel of Food in the Last Year: Never true   Transportation Needs: No Transportation Needs    Lack of Transportation (Medical): No    Lack of Transportation (Non-Medical):  No   Physical Activity: Not on file   Stress: Not on file   Social Connections: Not on file   Intimate Partner Violence: Not on file   Housing Stability: Low Risk     Unable to Pay for Housing in the Last Year: No    Number of Places Lived in the Last Year: 1    Unstable Housing in the Last Year: No      Medications and Allergies:     Current Outpatient Medications   Medication Sig Dispense Refill    acetaminophen (TYLENOL) 325 mg tablet Take 3 tablets (975 mg total) by mouth every 8 (eight) hours  0    ascorbic acid (VITAMIN C) 500 mg tablet TAKE ONE TABLET BY MOUTH EVERY EVENING FOR SUPPLEMENT      atorvastatin (LIPITOR) 80 mg tablet Take 80 mg by mouth daily at bedtime      B Complex-C-Folic Acid (TRIPHROCAPS) 1 MG CAPS Take 1 capsule by mouth      calcium carbonate (TUMS) 500 mg chewable tablet Chew 2 tablets (1,000 mg total) 2 (two) times a day as needed for indigestion or heartburn  0    carvedilol (COREG) 3 125 mg tablet Take 1 tablet (3 125 mg total) by mouth 2 (two) times a day with meals 180 tablet 3    Cholecalciferol 50 MCG (2000 UT) CAPS Take by mouth daily      Dix Choice Comfort EZ 33G X 4 MM MISC USE UPTO 3 TIMES DAILY      clopidogrel (Plavix) 75 mg tablet Take 1 tablet (75 mg total) by mouth daily 30 tablet 0    glucose blood test strip by In Vitro route 2 (two) times a day      Heparin Sodium, Porcine, (heparin, porcine,) 5,000 units/mL Inject 1 mL (5,000 Units total) under the skin every 8 (eight) hours for 28 days 1 mL 0    nitroglycerin (NITROSTAT) 0 4 mg SL tablet Place under the tongue      pantoprazole (PROTONIX) 40 mg tablet Take 1 tablet (40 mg total) by mouth daily in the early morning  0    rosuvastatin (CRESTOR) 20 MG tablet Take 20 mg by mouth every evening   1    aspirin (ECOTRIN LOW STRENGTH) 81 mg EC tablet Take 81 mg by mouth daily      docusate sodium (COLACE) 100 mg capsule TAKE 1 CAPSULE BY MOUTH DAILY FOR CONSTIPATION       No current facility-administered medications for this visit       No Known Allergies   Immunizations:     Immunization History   Administered Date(s) Administered    Hep B, adult 03/01/2013, 04/01/2013, 05/01/2013, 09/02/2013, 02/01/2015    Influenza, high dose seasonal 0 7 mL 09/23/2020, 10/11/2021    Influenza, seasonal, injectable 12/04/2012, 11/01/2015    Influenza, seasonal, injectable, preservative free 10/05/2016, 10/03/2018    Pneumococcal Conjugate 13-Valent 08/26/2019    Pneumococcal Polysaccharide PPV23 03/08/2013, 11/14/2014, 11/11/2019    Tdap 10/26/2010, 10/03/2021 Health Maintenance:         Topic Date Due    Hepatitis C Screening  Never done    Colorectal Cancer Screening  04/03/2020         Topic Date Due    COVID-19 Vaccine (1) Never done      Medicare Screening Tests and Risk Assessments:     Mis Roblero is here for his Initial Wellness visit  Health Risk Assessment:   Patient rates overall health as fair  Patient feels that their physical health rating is same  Patient is very satisfied with their life  Eyesight was rated as same  Hearing was rated as same  Patient feels that their emotional and mental health rating is much better  Patients states they are never, rarely angry  Patient states they are sometimes unusually tired/fatigued  Pain experienced in the last 7 days has been some  Patient's pain rating has been 5/10  Patient states that he has experienced no weight loss or gain in last 6 months  Lost 20 lb  -  Trying to eat less  - lost total 100 lb in 1 y     Fall Risk Screening: In the past year, patient has experienced: history of falling in past year    Number of falls: 2 or more  Injured during fall?: Yes    Feels unsteady when standing or walking?: Yes    Worried about falling?: Yes      Home Safety:  Patient has trouble with stairs inside or outside of their home  Patient has working smoke alarms and has working carbon monoxide detector  Home safety hazards include: none  Nutrition:   Current diet is Diabetic, Low Saturated Fat, Limited junk food and No Added Salt  Medications:   Patient is not currently taking any over-the-counter supplements  Patient is able to manage medications  Activities of Daily Living (ADLs)/Instrumental Activities of Daily Living (IADLs):   Walk and transfer into and out of bed and chair?: Yes  Dress and groom yourself?: Yes    Bathe or shower yourself?: Yes    Feed yourself?  Yes  Do your laundry/housekeeping?: Yes  Manage your money, pay your bills and track your expenses?: Yes  Make your own meals?: Yes    Do your own shopping?: Yes    Previous Hospitalizations:   Any hospitalizations or ED visits within the last 12 months?: Yes    How many hospitalizations have you had in the last year?: 1-2    Advance Care Planning:   Living will: No    Durable POA for healthcare: No    Advanced directive: No    Advanced directive counseling given: Yes      Cognitive Screening:   Provider or family/friend/caregiver concerned regarding cognition?: No    PREVENTIVE SCREENINGS      Cardiovascular Screening:    General: Screening Not Indicated and History Lipid Disorder      Diabetes Screening:     General: Screening Not Indicated and History Diabetes      Colorectal Cancer Screening:     General: Screening Current      Prostate Cancer Screening:    General: Screening Not Indicated      Osteoporosis Screening:    General: Screening Not Indicated    Due for: Bone Density CT Axial      Abdominal Aortic Aneurysm (AAA) Screening:    Risk factors include: age between 73-67 yo and tobacco use        General: Patient Declines      Lung Cancer Screening:     General: Screening Not Indicated      Hepatitis C Screening:    General: Screening Not Indicated    Screening, Brief Intervention, and Referral to Treatment (SBIRT)    Screening  Typical number of drinks in a day: 1  Typical number of drinks in a week: 7  Interpretation: Low risk drinking behavior  Single Item Drug Screening:  How often have you used an illegal drug (including marijuana) or a prescription medication for non-medical reasons in the past year? never    Single Item Drug Screen Score: 0  Interpretation: Negative screen for possible drug use disorder    No exam data present     Physical Exam:     /70 (BP Location: Right arm, Patient Position: Sitting, Cuff Size: Standard)   Pulse 84   Temp 98 1 °F (36 7 °C)   Resp 16   Ht 5' 10" (1 778 m)   Wt 71 7 kg (158 lb)   BMI 22 67 kg/m²     Physical Exam  Constitutional:       General: He is not in acute distress  Appearance: He is not ill-appearing  Cardiovascular:      Rate and Rhythm: Normal rate and regular rhythm  Heart sounds: No murmur heard  Pulmonary:      Effort: Pulmonary effort is normal  No respiratory distress  Breath sounds: No wheezing, rhonchi or rales  Abdominal:      Palpations: Abdomen is soft  Tenderness: There is no abdominal tenderness  Musculoskeletal:      Right lower leg: Edema (+1) present  Left lower leg: Edema (+1) present  Comments: Using a walker    Neurological:      General: No focal deficit present  Mental Status: He is alert and oriented to person, place, and time     Psychiatric:         Mood and Affect: Mood normal           Akin Juares MD

## 2022-07-13 LAB — HCV AB SER-ACNC: NON REACTIVE

## 2022-07-24 DIAGNOSIS — I10 ESSENTIAL HYPERTENSION: ICD-10-CM

## 2022-07-24 DIAGNOSIS — I25.118 CORONARY ARTERY DISEASE OF NATIVE ARTERY OF NATIVE HEART WITH STABLE ANGINA PECTORIS (HCC): ICD-10-CM

## 2022-07-25 ENCOUNTER — PREP FOR PROCEDURE (OUTPATIENT)
Dept: INTERVENTIONAL RADIOLOGY/VASCULAR | Facility: HOSPITAL | Age: 67
End: 2022-07-25

## 2022-07-25 DIAGNOSIS — T82.9XXD COMPLICATION OF ARTERIOVENOUS DIALYSIS FISTULA, SUBSEQUENT ENCOUNTER: Primary | ICD-10-CM

## 2022-07-25 RX ORDER — CARVEDILOL 3.12 MG/1
TABLET ORAL
Qty: 180 TABLET | Refills: 3 | Status: SHIPPED | OUTPATIENT
Start: 2022-07-25

## 2022-07-25 NOTE — H&P
Left upper arm dialysis AV fistula  Last seen 03/28/2022  At that time it was thrombosed and of small caliber  I do not see a dialysis catheter on chest x-ray  Angioplasty was performed of the cephalic arch, mid upper arm, as well as the anastomosis  Suspect he has recurrence of 1 of the stenosis

## 2022-07-27 ENCOUNTER — RA CDI HCC (OUTPATIENT)
Dept: OTHER | Facility: HOSPITAL | Age: 67
End: 2022-07-27

## 2022-07-27 ENCOUNTER — OFFICE VISIT (OUTPATIENT)
Dept: GASTROENTEROLOGY | Facility: CLINIC | Age: 67
End: 2022-07-27
Payer: MEDICARE

## 2022-07-27 ENCOUNTER — OFFICE VISIT (OUTPATIENT)
Dept: CARDIOLOGY CLINIC | Facility: CLINIC | Age: 67
End: 2022-07-27
Payer: MEDICARE

## 2022-07-27 VITALS
DIASTOLIC BLOOD PRESSURE: 70 MMHG | HEIGHT: 70 IN | BODY MASS INDEX: 21.33 KG/M2 | SYSTOLIC BLOOD PRESSURE: 102 MMHG | OXYGEN SATURATION: 97 % | TEMPERATURE: 97.8 F | HEART RATE: 105 BPM | WEIGHT: 149 LBS

## 2022-07-27 VITALS
HEIGHT: 70 IN | SYSTOLIC BLOOD PRESSURE: 140 MMHG | BODY MASS INDEX: 21.35 KG/M2 | DIASTOLIC BLOOD PRESSURE: 80 MMHG | WEIGHT: 149.1 LBS

## 2022-07-27 DIAGNOSIS — R74.8 ABNORMAL LEVELS OF OTHER SERUM ENZYMES: ICD-10-CM

## 2022-07-27 DIAGNOSIS — I25.10 CORONARY ARTERY DISEASE INVOLVING NATIVE HEART WITHOUT ANGINA PECTORIS, UNSPECIFIED VESSEL OR LESION TYPE: Primary | ICD-10-CM

## 2022-07-27 DIAGNOSIS — R19.7 DIARRHEA, UNSPECIFIED TYPE: Primary | ICD-10-CM

## 2022-07-27 PROCEDURE — 99214 OFFICE O/P EST MOD 30 MIN: CPT | Performed by: INTERNAL MEDICINE

## 2022-07-27 PROCEDURE — 99204 OFFICE O/P NEW MOD 45 MIN: CPT | Performed by: INTERNAL MEDICINE

## 2022-07-27 PROCEDURE — 93000 ELECTROCARDIOGRAM COMPLETE: CPT | Performed by: INTERNAL MEDICINE

## 2022-07-27 NOTE — PROGRESS NOTES
[facility] - Outpatient Consultation  Angie Bishop 75 y o  male MRN: [id number]  Encounter: [id number]        ASSESSMENT AND PLAN:          Diarrhea, unspecified type  Differential diagnosis is broad  Nocturnal symptoms make a functional bowel disorder less likely  Will evaluate with laboratory testing as below  We will certainly recommend colonoscopy but will wait until after his cardiac catheterization to schedule this  In the meantime he can continue Imodium as needed    -     Gamma GT; Future  -     Celiac Disease Panel; Future  -     TSH w/Reflex; Future  -     Calprotectin,Fecal; Future  -     Pancreatic elastase, fecal; Future  -     Gastrin; Future  -     Chromogranin A; Future  -     Giardia antigen; Future    Abnormal levels of other serum enzymes   Elevated alkaline phosphatase  Will check GGT  -     Gamma GT; Future        ______________________________________________________________________    HPI:  Patient with history of diabetes, coronary artery disease, chronic renal failure on hemodialysis for the past 10 years presents with ongoing watery diarrhea and crampy lower abdominal discomfort for approximately 1 year  This began insidiously and occurs on a near daily basis  Does not seem to be associated with any particular foods  He does describe nocturnal symptoms and urgency as well as tenesmus  Has had no blood in his stool, no unexplained weight loss, no nausea or vomiting, fever chills, jaundice or rash  Patient was seen by Dr Abram Thomas for change in bowel habit with diarrhea in November 2016  Underwent colonoscopy at that time with 2 5 cm pedunculated tubulovillous adenoma removed from the ascending colon  Random biopsies were obtained and negative for microscopic colitis  Most recent colonoscopy in April 2017 with small rectal adenoma     Recent labs reviewed  Has an isolated elevation of alkaline phosphatase    Has a family history of Crohn's disease in his father  Has been using OTC Imodium on a regular basis which does provide benefit but often leads to constipation  Patient had cardiac stress test which was reportedly abnormal and has been recommended to undergo cardiac catheterization  REVIEW OF SYSTEMS:    Review of Systems   Constitutional: Negative for decreased appetite, fever and weight loss  Gastrointestinal: Positive for bloating, abdominal pain, change in bowel habit and diarrhea  Negative for dysphagia, hematochezia, jaundice, melena and vomiting  Historical Information   Past Medical History:   Diagnosis Date    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    With Cerebral Infarction:  Last Assessed:  October 20, 2016    Chronic kidney disease 2012    on dialysis     [facility]     Hypertension     Labyrinthitis 09/10/2011    [facility] ) 2014    x3 others were in 2009 & 2010    Sleep disturbances 09/20/2010    Stroke [facility]) 2007    x1    Type 2 diabetes, uncontrolled, with renal manifestation 05/03/2017     Past Surgical History:   Procedure Laterality Date    AV FISTULA PLACEMENT      CARDIAC SURGERY  2010    stents x3    COLONOSCOPY N/A 12/12/2016    Procedure: COLONOSCOPY;  Surgeon: Ambar Cespedes MD;  Location[facility]; Service:     COLONOSCOPY N/A 4/3/2017    Procedure:  COLONOSCOPY;  Surgeon: Ambar Cespedes MD;  Location: [facility]; Service:     HARDWARE REMOVAL Right 4/4/2022    Procedure: REMOVAL HARDWARE HIP;  Surgeon: Daisy Gomez MD;  Location:  MAIN OR;  Service: Orthopedics    IR AV FISTULA/GRAFT DECLOT  4/29/2021    IR AV FISTULA/GRAFT DECLOT  3/25/2022    IR AV FISTULAGRAM/GRAFTOGRAM  3/28/2022    IR TUNNELED CENTRAL LINE REMOVAL  12/7/2021    IR TUNNELED DIALYSIS CATHETER PLACEMENT  5/24/2021    AK ANASTOMOSIS,AV,ANY SITE Left 7/22/2021    Procedure: CREATION FISTULA ARTERIOVENOUS (AV);   Surgeon: Chayo Ray MD;  Location: [address] [place] [address];  Service: Vascular  OK PARTIAL HIP REPLACEMENT Right 4/4/2022    Procedure: HEMIARTHROPLASTY HIP (BIPOLAR); Surgeon: Levon Foster MD;  [facility];  Service: Orthopedics     Social History   Social History     Substance and Sexual Activity   Alcohol Use Yes    Comment: rarely - No alcohol use per [employer]     Social History     Substance and Sexual Activity   Drug Use No     Social History     Tobacco Use   Smoking Status Current Every Day Smoker    Packs/day: 0 50    Years: 30 00    Pack years: 15 00    Types: Cigarettes   Smokeless Tobacco Never Used     Family History   Problem Relation Age of Onset    Leukemia Mother     Crohn's disease Father     Transient ischemic attack Brother        Meds/Allergies       Current Outpatient Medications:     aspirin (ECOTRIN LOW STRENGTH) 81 mg EC tablet    atorvastatin (LIPITOR) 20 mg tablet    clopidogrel (Plavix) 75 mg tablet    pantoprazole (PROTONIX) 40 mg tablet    acetaminophen (TYLENOL) 325 mg tablet    ascorbic acid (VITAMIN C) 500 mg tablet    B Complex-C-Folic Acid (TRIPHROCAPS) 1 MG CAPS    calcium carbonate (TUMS) 500 mg chewable tablet    carvedilol (COREG) 3 125 mg tablet    Cholecalciferol 50 MCG (2000 UT) CAPS    Harvey Choice Comfort EZ 33G X 4 MM MISC    docusate sodium (COLACE) 100 mg capsule    glucose blood test strip    Heparin Sodium, Porcine, (heparin, porcine,) 5,000 units/mL    nitroglycerin (NITROSTAT) 0 4 mg SL tablet    rosuvastatin (CRESTOR) 20 MG tablet    No Known Allergies        Objective     Blood pressure 140/80, height 5' 10" (1 778 m), weight 67 6 kg (149 lb 1 6 oz)  Body mass index is 21 39 kg/m²  PHYSICAL EXAM:      Physical Exam         Lab Results:   No visits with results within 1 Day(s) from this visit  Latest known visit with results is:   Lab on 06/30/2022   Component Date Value    Hemoglobin A1C 06/30/2022 4 4     EAG 06/30/2022 80          Radiology Results:   No results found

## 2022-07-27 NOTE — PROGRESS NOTES
Progress Note - Cardiology Office  [facility]    Staci Welch 75 y o  male MRN: [id number]  : 1955  Encounter: [id number]      ASSESSMENT:   Perioperative cardiac risk assessment for IR intervention of AV fistula tomorrow    Patient had a mildly abnormal stress test showing a small area of reversible ischemia in the inferior and lateral walls  He does not have any anginal symptoms  I again discussed with him the findings of his stress test and informed him that there is some increased cardiac risk for his vascular procedure  I offered him to undergo further testing with catheterization and if needed PCI before the IR/vascular procedure, or, have the IR/vascular procedure done 1st with some increased cardiac risk  Patient prefers to have the AV fistula intervention done 1st which is scheduled for tomorrow      As such patient has intermediate risk of perioperative cardiac complications with IR/vascular procedure      Patient understands and accepts the risk and prefers to have the procedure on the AV fistula done prior to having further cardiac workup and intervention     CAD,   Had 3 VD by Cath in 2008, s/p PTCA with bare metal stent to circumflex and PDA and POBA of diagnonal    s/p MI in ,  and acute NSTEMI on 2011   s/p PCI/KARLOS of distal RCA(WS) at [facility]     History of Chest pain,   No recurrence  Has not needed to use sublingual nitroglycerin for more than a year     Pharmacologic stress test, 2021:  Abnormal Lexiscan nuclear stress test  There is a small area of reversible ischemia noted in the inferior and lateral walls  Estimated left ventricular ejection fraction is 44%  TID ratio is 1 04     End-stage renal disease, on HD, recurrent occlusion/thrombosis of left AV fistula     H/O Diabetes mellitus, type 2, now no longer diabetic     Hypertension  BP has been running low normal to hypotensive specially during dialysis  Therefore will hold Coreg for now     Hyperlipidemia     History of CVA 2006 with MRI showing thrombus in the right vertebral artery             RECOMMENDATIONS:  Hold Coreg till blood pressure is adequate including during dialysis    Patient has intermediate risk of perioperative cardiac complications with IR intervention of AV fistula  Patient understands and accepts this risk and prefers to have IR procedure done prior to further cardiac investigation and intervention         Please call [phone] if any questions  HPI :     Page Jeff is a 75y o  year old male who has end-stage renal disease and a left upper extremity AV fistula and has had recurrent occlusion/thrombosis  He is going to have IR intervention done again on the fistula tomorrow  He denies any chest pain and has not used sublingual nitroglycerin for more than a year  His blood pressure is low normal and according to him it is below normal when he is undergoing dialysis  Therefore I am going to hold his Coreg for now till blood pressure is adequate for resuming  We again discussed the cardiac wrist which are at least intermediate for any noncardiac surgery or procedure specially vascular  Patient understands and accepts the risk and wishes to proceed with the IR procedure prior to undergoing any further cardiac intervention  Like previously I again told him to follow up with us as soon as his dialysis access related issues have been handled so the E can pursue further with his cardiac workup and management         REVIEW OF SYSTEMS:  Denies chest pain, unusual dyspnea, palpitations or syncope  Has significant symptoms due to IBS and according to him has not eaten since Sunday    Historical Information   Past Medical History:   Diagnosis Date    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    With Cerebral Infarction:  Last Assessed:  October 20, 2016    Chronic kidney disease 2012    on dialysis     Diabetes mellitus ([facility] [address] )     Hypertension     Labyrinthitis 09/10/2011    Myocardial infarction [facility]) 2014    x3 others were in 2009 & 2010    Sleep disturbances 09/20/2010    Stroke [facility]) 2007    x1    Type 2 diabetes, uncontrolled, with renal manifestation 05/03/2017     Past Surgical History:   Procedure Laterality Date    AV FISTULA PLACEMENT      CARDIAC SURGERY  2010    stents x3    COLONOSCOPY N/A 12/12/2016    Procedure: COLONOSCOPY;  Surgeon: Keiry Lovett MD;  Location: [place] [address] GI LAB; Service:     COLONOSCOPY N/A 4/3/2017    Procedure:  COLONOSCOPY;  Surgeon: Keiry Lovett MD;  Location: [place] [address] GI LAB; Service:     HARDWARE REMOVAL Right 4/4/2022    Procedure: REMOVAL HARDWARE HIP;  Surgeon: Андрей Pisano MD;  Location:  MAIN OR;  Service: Orthopedics    IR AV FISTULA/GRAFT DECLOT  4/29/2021    IR AV FISTULA/GRAFT DECLOT  3/25/2022    IR AV FISTULAGRAM/GRAFTOGRAM  3/28/2022    IR TUNNELED CENTRAL LINE REMOVAL  12/7/2021    IR TUNNELED DIALYSIS CATHETER PLACEMENT  5/24/2021    LA ANASTOMOSIS,AV,ANY SITE Left 7/22/2021    Procedure: CREATION FISTULA ARTERIOVENOUS (AV); Surgeon: Gray Holliday MD;  Location: [facility];  Service: Vascular    LA PARTIAL HIP REPLACEMENT Right 4/4/2022    Procedure: HEMIARTHROPLASTY HIP (BIPOLAR);   Surgeon: Андрей Pisano MD;  Location:  MAIN OR;  Service: Orthopedics     Social History     Substance and Sexual Activity   Alcohol Use Yes    Comment: rarely - No alcohol use per [employer]     Social History     Substance and Sexual Activity   Drug Use No     Social History     Tobacco Use   Smoking Status Current Every Day Smoker    Packs/day: 0 50    Years: 30 00    Pack years: 15 00    Types: Cigarettes   Smokeless Tobacco Never Used     Family History:   Family History   Problem Relation Age of Onset    Leukemia Mother     Crohn's disease Father     Transient ischemic attack Brother        Meds/Allergies     No Known Allergies    Current Outpatient Medications:     aspirin (Janine Holy LOW STRENGTH) 81 mg EC tablet, Take 81 mg by mouth daily, Disp: , Rfl:     atorvastatin (LIPITOR) 20 mg tablet, Take 20 mg by mouth daily at bedtime, Disp: , Rfl:     carvedilol (COREG) 3 125 mg tablet, TAKE 1 TABLET(3 125 MG) BY MOUTH TWICE DAILY WITH MEALS, Disp: 180 tablet, Rfl: 3    clopidogrel (Plavix) 75 mg tablet, Take 1 tablet (75 mg total) by mouth daily, Disp: 30 tablet, Rfl: 0    pantoprazole (PROTONIX) 40 mg tablet, Take 1 tablet (40 mg total) by mouth daily in the early morning, Disp: , Rfl: 0    acetaminophen (TYLENOL) 325 mg tablet, Take 3 tablets (975 mg total) by mouth every 8 (eight) hours (Patient not taking: Reported on 7/27/2022), Disp: , Rfl: 0    ascorbic acid (VITAMIN C) 500 mg tablet, TAKE ONE TABLET BY MOUTH EVERY EVENING FOR SUPPLEMENT (Patient not taking: Reported on 7/27/2022), Disp: , Rfl:     B Complex-C-Folic Acid (TRIPHROCAPS) 1 MG CAPS, Take 1 capsule by mouth (Patient not taking: Reported on 7/27/2022), Disp: , Rfl:     calcium carbonate (TUMS) 500 mg chewable tablet, Chew 2 tablets (1,000 mg total) 2 (two) times a day as needed for indigestion or heartburn (Patient not taking: Reported on 7/27/2022), Disp: , Rfl: 0    Cholecalciferol 50 MCG (2000 UT) CAPS, Take by mouth daily (Patient not taking: Reported on 7/27/2022), Disp: , Rfl:     Martin Choice Comfort EZ 33G X 4 MM MISC, USE UPTO 3 TIMES DAILY (Patient not taking: Reported on 7/27/2022), Disp: , Rfl:     docusate sodium (COLACE) 100 mg capsule, TAKE 1 CAPSULE BY MOUTH DAILY FOR CONSTIPATION (Patient not taking: Reported on 7/27/2022), Disp: , Rfl:     glucose blood test strip, by In Vitro route 2 (two) times a day (Patient not taking: Reported on 7/27/2022), Disp: , Rfl:     Heparin Sodium, Porcine, (heparin, porcine,) 5,000 units/mL, Inject 1 mL (5,000 Units total) under the skin every 8 (eight) hours for 28 days, Disp: 1 mL, Rfl: 0    nitroglycerin (NITROSTAT) 0 4 mg SL tablet, Place under the tongue (Patient not taking: Reported on 2022), Disp: , Rfl:     rosuvastatin (CRESTOR) 20 MG tablet, Take 20 mg by mouth every evening  (Patient not taking: Reported on 2022), Disp: , Rfl: 1    Vitals: Blood pressure 102/70, pulse 105, temperature 97 8 °F (36 6 °C), height 5' 10" (1 778 m), weight 67 6 kg (149 lb), SpO2 97 %  Body mass index is 21 38 kg/m²  Vitals:    22 1000   Weight: 67 6 kg (149 lb)     BP Readings from Last 3 Encounters:   22 102/70   22 130/70   22 111/53       Physical Exam:    Neurologic:  Alert & oriented x 3, Not in any acute distress,  Constitutional:  Well developed, well nourished, non-toxic appearance   Eyes:  Pupil equal and reacting to light, conjunctiva normal,   HENT:  Atraumatic, oropharynx moist, Neck- normal range of motion, no tenderness,  Neck supple, No JVP, No LNP   Respiratory:  Bilateral air entry, mostly clear to auscultation  Cardiovascular: S1-S2 regular with a I/VI ejection systolic murmur   GI:  Soft, nondistended, normal bowel sounds, nontender, no hepatosplenomegaly appreciated  Musculoskeletal: no tenderness, no deformities  Skin:  Well hydrated, no rash   Lymphatic:  No lymphadenopathy noted   Extremities:  Left upper extremity AV fistula covered with bandage        Diagnostic Studies Review Cardio:      EKG:  Sinus tachycardia with occasional PVCs and fusion complexes  Heart rate 105 per minute  Possible left atrial enlargement    Nonspecific ST and T-wave abnormality    Cardiac testing:       Results for orders placed during the hospital encounter of 21    NM myocardial perfusion spect (rx stress and/or rest)    Narrative  Daniel 39  [address] [facility] [phone]    Rest/Stress Gated SPECT Myocardial Perfusion Imaging After Regadenoson    Patient: Patria Bellamy  MR number: [id number]  Account number: [de-identified]  : 1955  Age: 78 years  Gender: Male  Status: Outpatient  Location: Stress lab  Height: 70 in  Weight: 199 lb  BP: 164/ 79 mmHg    Allergies: NO KNOWN ALLERGIES    Diagnosis: R07 9 - Chest pain, unspecified, Z01 810 - Encounter for preprocedural cardiovascular examination    Primary Physician:  TESSY Tompkins  RN:  PAULINE Hammond  Referring Physician:  Connie Wong MD  Group:  Denis Marie  Report Prepared By[de-identified]  PAULINE Hammond  Interpreting Physician:  Connie Wong MD    INDICATIONS: Evaluation of known coronary artery disease  Pre-operative risk assessment  HISTORY: The patient is male  a 78year old Chest pain status: chest pain  Coronary artery disease risk factors: dyslipidemia, hypertension, smoking, family history of premature coronary artery disease, and diabetes mellitus  Medications:  a blood thinner, a calcium channel blocker, an ACE inhibitor/ARB, aspirin, clopidogrel, a lipid lowering agent, and diabetic medications  PHYSICAL EXAM: Baseline physical exam screening: scant wheezing audible  REST ECG: Normal sinus rhythm  The ECG showed rare premature ventricular contractions  PROCEDURE: The study was performed in the the Stress lab  A regadenoson infusion pharmacologic stress test was performed  Gated SPECT myocardial perfusion imaging was performed after stress and at rest  Systolic blood pressure was 164  mmHg, at the start of the study  Diastolic blood pressure was 79 mmHg, at the start of the study  The heart rate was 76 bpm, at the start of the study  IV double checked  Regadenoson protocol:  Time HR bpm SBP mmHg DBP mmHg Symptoms Rhythm/conduct  Baseline 09:12 76 164 79 none NSR, rare PVC's  Immediate 09:15 91 149 75 none same as above  1 min 09:16 88 151 75 none NSR  2 min 09:17 85 146 63 none same as above  3 min 09:18 81 155 70 none same as above  4 min 09:19 87 140 68 none same as above  No medications or fluids given  STRESS SUMMARY: Duration of pharmacologic stress was 3 min   Maximal heart rate during stress was 93 bpm  The heart rate response to stress was normal  There was resting hypertension with an appropriate blood pressure response to stress  The rate-pressure product for the peak heart rate and blood pressure was 94991  There was no chest pain during stress  The stress test was terminated due to protocol completion  Pre oxygen saturation: 98 %  Peak oxygen saturation: 99 %  No  EKG evidence of Lexiscan induced myocardial ischemia    ISOTOPE ADMINISTRATION:  Resting isotope administration Stress isotope administration  Agent Tetrofosmin Tetrofosmin  Dose 10 2 mCi 32 6 mCi  Date 06/03/2021 06/03/2021    Stress isotope administration  Agent Tetrofosmin  Dose 10 2 mCi  Date 06/03/2021    The radiopharmaceutical was injected at the peak effect of pharmacologic stress  MYOCARDIAL PERFUSION IMAGING:  There is a small sized mild intensity reversible perfusion defect noted in the inferior and lateral walls  SSS 5, SRS 2, SDS 3    GATED SPECT:  Estimated left ventricular ejection fraction is 44%  TID ratio 1 04    SUMMARY:  -  Stress results: There was resting hypertension with an appropriate blood pressure response to stress  There was no chest pain during stress  IMPRESSIONS: Abnormal Lexiscan nuclear stress test  There is a small area of reversible ischemia noted in the inferior and lateral walls  Estimated left ventricular ejection fraction is 44%  TID ratio is 1 04    Prepared and signed by    Rita Saldana MD  Signed 06/03/2021 13:59:02      Imaging:  Chest X-Ray:   No Chest XR results available for this patient  CT-scan of the chest:     No CTA results available for this patient    Lab Review   Lab Results   Component Value Date    WBC 5 00 04/09/2022    HGB 8 7 (L) 04/09/2022    HCT 26 4 (L) 04/09/2022     (H) 04/09/2022    RDW 18 8 (H) 04/09/2022     (L) 04/09/2022     BMP:  Lab Results   Component Value Date    SODIUM 136 04/09/2022    K 3 4 (L) 04/09/2022     04/09/2022    CO2 32 04/09/2022    BUN 13 04/09/2022    CREATININE 4 54 (H) 04/09/2022    GLUC 138 04/09/2022    GLUF 94 03/29/2022    CALCIUM 8 2 (L) 04/09/2022    CORRECTEDCA 9 0 04/03/2022    EGFR 12 04/09/2022    MG 2 0 04/03/2022     LFT:  Lab Results   Component Value Date    AST 22 04/03/2022    ALT 18 04/03/2022    ALKPHOS 217 (H) 04/03/2022    TP 5 9 (L) 04/03/2022    ALB 2 4 (L) 04/03/2022      No components found for: [facility]  Lab Results   Component Value Date    NBC1KXZKGHBL 3 190 10/25/2016     Lab Results   Component Value Date    HGBA1C 4 4 06/30/2022     Lipid Profile:   Lab Results   Component Value Date    CHOLESTEROL 123 10/25/2016    HDL 72 (H) 10/25/2016    LDLCALC 35 10/25/2016    TRIG 78 10/25/2016     Lab Results   Component Value Date    CHOLESTEROL 123 10/25/2016     Lab Results   Component Value Date    TROPONINI 0 08 (H) 07/26/2021     Lab Results   Component Value Date    NTBNP 17,457 (H) 03/28/2022      Recent Results (from the past 672 hour(s))   Hemoglobin A1C    Collection Time: 06/30/22 11:13 AM   Result Value Ref Range    Hemoglobin A1C 4 4 Normal 3 8-5 6%; PreDiabetic 5 7-6 4%; Diabetic >=6 5%; Glycemic control for adults with diabetes <7 0% %    EAG 80 mg/dl             Dr Maria R Bolton MD, [facility]      "This note has been constructed using a voice recognition system  Therefore there may be syntax, spelling, and/or grammatical errors   Please call if you have any questions  "

## 2022-07-27 NOTE — LETTER
July 29, 2022     Susen Koyanagi, [address] [facility]    Patient: Valentina Aguilar   YOB: 1955   Date of Visit: 7/27/2022       Dear Dr Lora Quick: Thank you for referring Sureshchelsy Nieves to me for evaluation  Below are my notes for this consultation  If you have questions, please do not hesitate to call me  I look forward to following your patient along with you  Sincerely,        Mario Alberto Keen MD        CC: No Recipients  Mario Alberto Keen MD  7/27/2022  3:42 PM  Signed  [facility] - Outpatient Consultation  Theo Bishop 75 y o  male MRN: [id number]  Encounter: [id number]        ASSESSMENT AND PLAN:          Diarrhea, unspecified type  Differential diagnosis is broad  Nocturnal symptoms make a functional bowel disorder less likely  Will evaluate with laboratory testing as below  We will certainly recommend colonoscopy but will wait until after his cardiac catheterization to schedule this  In the meantime he can continue Imodium as needed    -     Gamma GT; Future  -     Celiac Disease Panel; Future  -     TSH w/Reflex; Future  -     Calprotectin,Fecal; Future  -     Pancreatic elastase, fecal; Future  -     Gastrin; Future  -     Chromogranin A; Future  -     Giardia antigen; Future    Abnormal levels of other serum enzymes   Elevated alkaline phosphatase  Will check GGT  -     Gamma GT; Future        ______________________________________________________________________    HPI:  Patient with history of diabetes, coronary artery disease, chronic renal failure on hemodialysis for the past 10 years presents with ongoing watery diarrhea and crampy lower abdominal discomfort for approximately 1 year  This began insidiously and occurs on a near daily basis  Does not seem to be associated with any particular foods  He does describe nocturnal symptoms and urgency as well as tenesmus    Has had no blood in his stool, no unexplained weight loss, no nausea or vomiting, fever chills, jaundice or rash  Patient was seen by Dr Bridget Baxter for change in bowel habit with diarrhea in November 2016  Underwent colonoscopy at that time with 2 5 cm pedunculated tubulovillous adenoma removed from the ascending colon  Random biopsies were obtained and negative for microscopic colitis  Most recent colonoscopy in April 2017 with small rectal adenoma     Recent labs reviewed  Has an isolated elevation of alkaline phosphatase  Has a family history of Crohn's disease in his father  Has been using OTC Imodium on a regular basis which does provide benefit but often leads to constipation  Patient had cardiac stress test which was reportedly abnormal and has been recommended to undergo cardiac catheterization  REVIEW OF SYSTEMS:    Review of Systems   Constitutional: Negative for decreased appetite, fever and weight loss  Gastrointestinal: Positive for bloating, abdominal pain, change in bowel habit and diarrhea  Negative for dysphagia, hematochezia, jaundice, melena and vomiting  Historical Information   Past Medical History:   Diagnosis Date    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    With Cerebral Infarction:  Last Assessed:  October 20, 2016    Chronic kidney disease 2012    on dialysis     Diabetes mellitus ([facility] [address] )     Hypertension     Labyrinthitis 09/10/2011    Myocardial infarction ([facility] [address] ) 2014    x3 others were in 2009 & 2010    Sleep disturbances 09/20/2010    Stroke [facility]) 2007    x1    Type 2 diabetes, uncontrolled, with renal manifestation 05/03/2017     Past Surgical History:   Procedure Laterality Date    AV FISTULA PLACEMENT      CARDIAC SURGERY  2010    stents x3    COLONOSCOPY N/A 12/12/2016    Procedure: COLONOSCOPY;  Surgeon: Aleyda Puente MD;  Location: [facility] GI LAB; Service:     COLONOSCOPY N/A 4/3/2017    Procedure:  COLONOSCOPY;  Surgeon: Aleyda Puente MD;  Location: [facility] GI LAB;   Service:    Nils Rodriguez REMOVAL Right 4/4/2022    Procedure: REMOVAL HARDWARE HIP;  Surgeon: Yolanda Denney MD;  [facility];  Service: Orthopedics    IR AV FISTULA/GRAFT DECLOT  4/29/2021    IR AV FISTULA/GRAFT DECLOT  3/25/2022    IR AV FISTULAGRAM/GRAFTOGRAM  3/28/2022    IR TUNNELED CENTRAL LINE REMOVAL  12/7/2021    IR TUNNELED DIALYSIS CATHETER PLACEMENT  5/24/2021    [place] ANASTOMOSIS,AV,ANY SITE Left 7/22/2021    Procedure: CREATION FISTULA ARTERIOVENOUS (AV); Surgeon: Dhaval Arthur MD;  Location: [facility];  Service: Vascular    [place] PARTIAL HIP REPLACEMENT Right 4/4/2022    Procedure: HEMIARTHROPLASTY HIP (BIPOLAR);   Surgeon: Yolanda Denney MD;  [facility]: [facility];  Service: Orthopedics     Social History   Social History     Substance and Sexual Activity   Alcohol Use Yes    Comment: rarely - No alcohol use per [employer]     Social History     Substance and Sexual Activity   Drug Use No     Social History     Tobacco Use   Smoking Status Current Every Day Smoker    Packs/day: 0 50    Years: 30 00    Pack years: 15 00    Types: Cigarettes   Smokeless Tobacco Never Used     Family History   Problem Relation Age of Onset    Leukemia Mother     Crohn's disease Father     Transient ischemic attack Brother        Meds/Allergies       Current Outpatient Medications:     aspirin (ECOTRIN LOW STRENGTH) 81 mg EC tablet    atorvastatin (LIPITOR) 20 mg tablet    clopidogrel (Plavix) 75 mg tablet    pantoprazole (PROTONIX) 40 mg tablet    acetaminophen (TYLENOL) 325 mg tablet    ascorbic acid (VITAMIN C) 500 mg tablet    B Complex-C-Folic Acid (TRIPHROCAPS) 1 MG CAPS    calcium carbonate (TUMS) 500 mg chewable tablet    carvedilol (COREG) 3 125 mg tablet    Cholecalciferol 50 MCG (2000 UT) CAPS    [place]ouch Choice Comfort EZ 33G X 4 MM MISC    docusate sodium (COLACE) 100 mg capsule    glucose blood test strip    Heparin Sodium, Porcine, (heparin, porcine,) 5,000 units/mL    nitroglycerin (NITROSTAT) 0 4 mg SL tablet    rosuvastatin (CRESTOR) 20 MG tablet    No Known Allergies        Objective     Blood pressure 140/80, height 5' 10" (1 778 m), weight 67 6 kg (149 lb 1 6 oz)  Body mass index is 21 39 kg/m²  PHYSICAL EXAM:      Physical Exam         Lab Results:   No visits with results within 1 Day(s) from this visit  Latest known visit with results is:   Lab on 06/30/2022   Component Date Value    Hemoglobin A1C 06/30/2022 4 4     EAG 06/30/2022 80          Radiology Results:   No results found

## 2022-07-27 NOTE — PROGRESS NOTES
Z99 2 is on the problem list and already billed this year    [facility]  [facility]       Chart reviewed, no opportunity found:   [address]        Patients Insurance     Medicare Insurance: Medicare

## 2022-07-28 ENCOUNTER — HOSPITAL ENCOUNTER (OUTPATIENT)
Dept: NON INVASIVE DIAGNOSTICS | Facility: HOSPITAL | Age: 67
Discharge: HOME | End: 2022-07-28
Attending: RADIOLOGY
Payer: MEDICARE

## 2022-07-28 ENCOUNTER — APPOINTMENT (OUTPATIENT)
Dept: LAB | Facility: CLINIC | Age: 67
End: 2022-07-28
Payer: MEDICARE

## 2022-07-28 VITALS
TEMPERATURE: 96.7 F | BODY MASS INDEX: 21.24 KG/M2 | HEART RATE: 67 BPM | WEIGHT: 148.4 LBS | SYSTOLIC BLOOD PRESSURE: 145 MMHG | OXYGEN SATURATION: 97 % | DIASTOLIC BLOOD PRESSURE: 65 MMHG | HEIGHT: 70 IN | RESPIRATION RATE: 13 BRPM

## 2022-07-28 DIAGNOSIS — R19.7 DIARRHEA, UNSPECIFIED TYPE: ICD-10-CM

## 2022-07-28 DIAGNOSIS — T82.9XXD COMPLICATION OF ARTERIOVENOUS DIALYSIS FISTULA, SUBSEQUENT ENCOUNTER: ICD-10-CM

## 2022-07-28 DIAGNOSIS — R74.8 ABNORMAL LEVELS OF OTHER SERUM ENZYMES: ICD-10-CM

## 2022-07-28 LAB
GGT SERPL-CCNC: 322 U/L (ref 5–85)
TSH SERPL DL<=0.05 MIU/L-ACNC: 3.99 UIU/ML (ref 0.45–4.5)

## 2022-07-28 PROCEDURE — C1725 CATH, TRANSLUMIN NON-LASER: HCPCS

## 2022-07-28 PROCEDURE — 86364 TISS TRNSGLTMNASE EA IG CLAS: CPT

## 2022-07-28 PROCEDURE — 82977 ASSAY OF GGT: CPT

## 2022-07-28 PROCEDURE — 86231 EMA EACH IG CLASS: CPT

## 2022-07-28 PROCEDURE — C2623 CATH, TRANSLUMIN, DRUG-COAT: HCPCS

## 2022-07-28 PROCEDURE — C1769 GUIDE WIRE: HCPCS

## 2022-07-28 PROCEDURE — 82784 ASSAY IGA/IGD/IGG/IGM EACH: CPT

## 2022-07-28 PROCEDURE — 76937 US GUIDE VASCULAR ACCESS: CPT | Performed by: RADIOLOGY

## 2022-07-28 PROCEDURE — 36907 BALO ANGIOP CTR DIALYSIS SEG: CPT

## 2022-07-28 PROCEDURE — 36902 INTRO CATH DIALYSIS CIRCUIT: CPT

## 2022-07-28 PROCEDURE — 99152 MOD SED SAME PHYS/QHP 5/>YRS: CPT | Performed by: RADIOLOGY

## 2022-07-28 PROCEDURE — 86316 IMMUNOASSAY TUMOR OTHER: CPT

## 2022-07-28 PROCEDURE — 36415 COLL VENOUS BLD VENIPUNCTURE: CPT

## 2022-07-28 PROCEDURE — C1894 INTRO/SHEATH, NON-LASER: HCPCS

## 2022-07-28 PROCEDURE — C1887 CATHETER, GUIDING: HCPCS

## 2022-07-28 PROCEDURE — 82941 ASSAY OF GASTRIN: CPT

## 2022-07-28 PROCEDURE — 36902 INTRO CATH DIALYSIS CIRCUIT: CPT | Performed by: RADIOLOGY

## 2022-07-28 PROCEDURE — 84443 ASSAY THYROID STIM HORMONE: CPT

## 2022-07-28 PROCEDURE — 86258 DGP ANTIBODY EACH IG CLASS: CPT

## 2022-07-28 RX ORDER — LIDOCAINE WITH 8.4% SOD BICARB 0.9%(10ML)
SYRINGE (ML) INJECTION CODE/TRAUMA/SEDATION MEDICATION
Status: COMPLETED | OUTPATIENT
Start: 2022-07-28 | End: 2022-07-28

## 2022-07-28 RX ORDER — MIDAZOLAM HYDROCHLORIDE 2 MG/2ML
INJECTION, SOLUTION INTRAMUSCULAR; INTRAVENOUS CODE/TRAUMA/SEDATION MEDICATION
Status: COMPLETED | OUTPATIENT
Start: 2022-07-28 | End: 2022-07-28

## 2022-07-28 RX ORDER — FENTANYL CITRATE 50 UG/ML
INJECTION, SOLUTION INTRAMUSCULAR; INTRAVENOUS CODE/TRAUMA/SEDATION MEDICATION
Status: COMPLETED | OUTPATIENT
Start: 2022-07-28 | End: 2022-07-28

## 2022-07-28 RX ORDER — HEPARIN SODIUM 1000 [USP'U]/ML
INJECTION, SOLUTION INTRAVENOUS; SUBCUTANEOUS CODE/TRAUMA/SEDATION MEDICATION
Status: COMPLETED | OUTPATIENT
Start: 2022-07-28 | End: 2022-07-28

## 2022-07-28 RX ADMIN — HEPARIN SODIUM 1000 UNITS: 1000 INJECTION INTRAVENOUS; SUBCUTANEOUS at 10:44

## 2022-07-28 RX ADMIN — MIDAZOLAM 1 MG: 1 INJECTION INTRAMUSCULAR; INTRAVENOUS at 11:40

## 2022-07-28 RX ADMIN — FENTANYL CITRATE 25 MCG: 50 INJECTION, SOLUTION INTRAMUSCULAR; INTRAVENOUS at 10:47

## 2022-07-28 RX ADMIN — FENTANYL CITRATE 25 MCG: 50 INJECTION, SOLUTION INTRAMUSCULAR; INTRAVENOUS at 10:58

## 2022-07-28 RX ADMIN — MIDAZOLAM 0.5 MG: 1 INJECTION INTRAMUSCULAR; INTRAVENOUS at 10:47

## 2022-07-28 RX ADMIN — HEPARIN SODIUM 1000 UNITS: 1000 INJECTION INTRAVENOUS; SUBCUTANEOUS at 11:49

## 2022-07-28 RX ADMIN — IOHEXOL 150 ML: 350 INJECTION, SOLUTION INTRAVENOUS at 12:26

## 2022-07-28 RX ADMIN — Medication 10 ML: at 11:40

## 2022-07-28 RX ADMIN — FENTANYL CITRATE 25 MCG: 50 INJECTION, SOLUTION INTRAMUSCULAR; INTRAVENOUS at 10:24

## 2022-07-28 RX ADMIN — FENTANYL CITRATE 25 MCG: 50 INJECTION, SOLUTION INTRAMUSCULAR; INTRAVENOUS at 11:40

## 2022-07-28 RX ADMIN — HEPARIN SODIUM 2000 UNITS: 1000 INJECTION INTRAVENOUS; SUBCUTANEOUS at 10:27

## 2022-07-28 RX ADMIN — HEPARIN SODIUM 1000 UNITS: 1000 INJECTION INTRAVENOUS; SUBCUTANEOUS at 11:17

## 2022-07-28 RX ADMIN — MIDAZOLAM 0.5 MG: 1 INJECTION INTRAMUSCULAR; INTRAVENOUS at 10:23

## 2022-07-28 NOTE — PERIOPERATIVE NURSING NOTE
Vitals wdl, dressing clean dry intact, iv access removed, patient refused fluids, discharge education provided to patient and spouse, both stated understanding, left floor via wheelchair, discharged to home

## 2022-07-28 NOTE — DISCHARGE INSTRUCTIONS
Fistulagram   WHAT YOU NEED TO KNOW:   Your arm or leg my  be sore, swollen, and bruised after the procedure  This is normal and should get better in a few days  DISCHARGE INSTRUCTIONS:     Contact Interventional Radiology at [phone] and follow prompts if you experience any:    You have a fever or chills  Your puncture site is red, swollen, or draining pus  You have nausea or are vomiting  Your skin is itchy, swollen, or you have a rash  You cannot feel a thrill over your graft or fistula  You have questions or concerns about your condition or care  Seek care immediately if:     You have bleeding that does not stop after 10 minutes of holding firm, direct pressure over the puncture site  Blood soaks through your bandage  Your hand or foot closest to the graft or fistula feels cold, painful, or numb  Your hand or foot closest to the graft or fistula is pale or blue  You have trouble moving your arm or leg closest to the graft or fistula  Your bruise suddenly gets bigger  Care for your wound as directed:  Remove the bandage in 4 to 6 hours or as         directed  Wash the area once a day with soap and water  Gently pat the area dry  Apply firm, steady pressure to the puncture site if it bleeds  Use a clean gauze or towel to hold pressure for 10 to 15 minutes  Call 911 if you cannot stop the bleeding or the bleeding gets heavier  Feel for a thrill once a day or as directed  Place your index and second finger over your fistula or graft as directed  You should feel a vibration  The vibration means that blood is flowing through your graft or fistula correctly  Rest your arm or leg as directed  Do not lift anything heavier than 5 pounds or do strenuous activity for 24 hours  Prevent damage to your graft or fistula  Do not wear tight-fitting clothing over your graft or fistula  Do not wear tight jewelry on the arm or leg with the graft or fistula   Tell healthcare providers not to do, IVs, blood draws, and blood pressure readings in the arm with your graft or fistula  Do not allow flu shots or vaccinations in your arm with your graft or fistula  Follow up with your healthcare provider as directed     Procedural Sedation   WHAT YOU NEED TO KNOW:   Procedural sedation is medicine used during procedures to help you feel relaxed and calm  You will remember little to none of the procedure  After sedation you may feel tired, weak, or unsteady on your feet  You may also have trouble concentrating or short-term memory loss  These symptoms should go away in 24 hours or less  DISCHARGE INSTRUCTIONS:   Call 911 or have someone else call for any of the following: You have sudden trouble breathing  You cannot be woken  Contact Interventional Radiology at [phone] PATIENTS: Contact Interventional Radiology at [phone] [facility] PATIENTS: Contact Interventional Radiology at [phone]) if any of the following occur: You have a severe headache or dizziness  Your heart is beating faster than usual     You have a fever or chills  Your skin is itchy, swollen, or you have a rash  You have nausea or are vomiting for more than 8 hours after the procedure  You have questions or concerns about your condition or care  Self-care:   Have someone stay with you for 24 hours  This person can drive you to errands and help you do things around the house  This person can also watch for problems  Rest and do quiet activities for 24 hours  Do not exercise, ride a bike, or play sports  Stand up slowly to prevent dizziness and falls  Take short walks around the house with another person  Slowly return to your usual activities the next day  Do not drive or use dangerous machines or tools for 24 hours  You may injure yourself or others  Examples include a lawnmower, saw, or drill   Do not return to work for 24 hours if you use dangerous machines or tools for work  Do not make important decisions for 24 hours  For example, do not sign important papers or invest money  Drink liquids as directed  Liquids help flush the sedation medicine out of your body  Ask how much liquid to drink each day and which liquids are best for you  Eat small, frequent meals to prevent nausea and vomiting  Start with clear liquids such as juice or broth  If you do not vomit after clear liquids, you can eat your usual foods  Do not drink alcohol or take medicines that make you drowsy  This includes medicines that help you sleep and anxiety medicines  Ask your healthcare provider if it is safe for you to take pain medicine  Follow up with your healthcare provider as directed: Write down your questions so you remember to ask them during your visits

## 2022-07-28 NOTE — SEDATION DOCUMENTATION
Pt tolerated fistulogram  1 Woggle placed and will be removed in recovery  Report given to receiving nurse in [facility] [facility]  Vitals stable

## 2022-07-29 LAB
ENDOMYSIUM IGA SER QL: NEGATIVE
GLIADIN PEPTIDE IGA SER-ACNC: 6 UNITS (ref 0–19)
GLIADIN PEPTIDE IGG SER-ACNC: 3 UNITS (ref 0–19)
IGA SERPL-MCNC: 525 MG/DL (ref 61–437)
TTG IGA SER-ACNC: <2 U/ML (ref 0–3)
TTG IGG SER-ACNC: 5 U/ML (ref 0–5)

## 2022-07-31 LAB — GASTRIN SERPL-MCNC: 33 PG/ML (ref 0–115)

## 2022-08-01 LAB — CGA SERPL-MCNC: 202.4 NG/ML (ref 0–101.8)

## 2022-09-21 ENCOUNTER — PREP FOR PROCEDURE (OUTPATIENT)
Dept: INTERVENTIONAL RADIOLOGY/VASCULAR | Facility: CLINIC | Age: 67
End: 2022-09-21

## 2022-09-21 ENCOUNTER — HOSPITAL ENCOUNTER (OUTPATIENT)
Dept: NON INVASIVE DIAGNOSTICS | Facility: HOSPITAL | Age: 67
Discharge: HOME | End: 2022-09-21
Attending: STUDENT IN AN ORGANIZED HEALTH CARE EDUCATION/TRAINING PROGRAM
Payer: MEDICARE

## 2022-09-21 VITALS
TEMPERATURE: 97.7 F | OXYGEN SATURATION: 99 % | HEART RATE: 67 BPM | BODY MASS INDEX: 22.16 KG/M2 | RESPIRATION RATE: 9 BRPM | WEIGHT: 154.76 LBS | SYSTOLIC BLOOD PRESSURE: 146 MMHG | DIASTOLIC BLOOD PRESSURE: 69 MMHG | HEIGHT: 70 IN

## 2022-09-21 DIAGNOSIS — N18.6 END STAGE RENAL DISEASE (HCC): Primary | ICD-10-CM

## 2022-09-21 DIAGNOSIS — N18.6 END STAGE RENAL DISEASE (HCC): ICD-10-CM

## 2022-09-21 PROCEDURE — 36905 THRMBC/NFS DIALYSIS CIRCUIT: CPT | Performed by: RADIOLOGY

## 2022-09-21 PROCEDURE — 36558 INSERT TUNNELED CV CATH: CPT

## 2022-09-21 PROCEDURE — C1894 INTRO/SHEATH, NON-LASER: HCPCS

## 2022-09-21 PROCEDURE — 36905 THRMBC/NFS DIALYSIS CIRCUIT: CPT

## 2022-09-21 PROCEDURE — 77001 FLUOROGUIDE FOR VEIN DEVICE: CPT | Performed by: RADIOLOGY

## 2022-09-21 PROCEDURE — 99152 MOD SED SAME PHYS/QHP 5/>YRS: CPT | Performed by: RADIOLOGY

## 2022-09-21 PROCEDURE — C1769 GUIDE WIRE: HCPCS

## 2022-09-21 PROCEDURE — 36558 INSERT TUNNELED CV CATH: CPT | Performed by: RADIOLOGY

## 2022-09-21 PROCEDURE — 99152 MOD SED SAME PHYS/QHP 5/>YRS: CPT

## 2022-09-21 PROCEDURE — 77001 FLUOROGUIDE FOR VEIN DEVICE: CPT

## 2022-09-21 PROCEDURE — C1725 CATH, TRANSLUMIN NON-LASER: HCPCS

## 2022-09-21 PROCEDURE — 36902 INTRO CATH DIALYSIS CIRCUIT: CPT

## 2022-09-21 PROCEDURE — C1750 CATH, HEMODIALYSIS,LONG-TERM: HCPCS

## 2022-09-21 PROCEDURE — 99153 MOD SED SAME PHYS/QHP EA: CPT

## 2022-09-21 PROCEDURE — C1757 CATH, THROMBECTOMY/EMBOLECT: HCPCS

## 2022-09-21 PROCEDURE — 76937 US GUIDE VASCULAR ACCESS: CPT

## 2022-09-21 PROCEDURE — 76937 US GUIDE VASCULAR ACCESS: CPT | Performed by: RADIOLOGY

## 2022-09-21 RX ORDER — CEFAZOLIN SODIUM 1 G/50ML
SOLUTION INTRAVENOUS
Status: COMPLETED | OUTPATIENT
Start: 2022-09-21 | End: 2022-09-21

## 2022-09-21 RX ORDER — SODIUM CHLORIDE 9 MG/ML
50 INJECTION, SOLUTION INTRAVENOUS CONTINUOUS
Status: DISCONTINUED | OUTPATIENT
Start: 2022-09-21 | End: 2022-09-22 | Stop reason: HOSPADM

## 2022-09-21 RX ORDER — SODIUM CHLORIDE 9 MG/ML
50 INJECTION, SOLUTION INTRAVENOUS CONTINUOUS
Status: CANCELLED | OUTPATIENT
Start: 2022-09-21

## 2022-09-21 RX ORDER — FENTANYL CITRATE 50 UG/ML
INJECTION, SOLUTION INTRAMUSCULAR; INTRAVENOUS CODE/TRAUMA/SEDATION MEDICATION
Status: COMPLETED | OUTPATIENT
Start: 2022-09-21 | End: 2022-09-21

## 2022-09-21 RX ORDER — MIDAZOLAM HYDROCHLORIDE 2 MG/2ML
INJECTION, SOLUTION INTRAMUSCULAR; INTRAVENOUS CODE/TRAUMA/SEDATION MEDICATION
Status: COMPLETED | OUTPATIENT
Start: 2022-09-21 | End: 2022-09-21

## 2022-09-21 RX ORDER — LIDOCAINE HYDROCHLORIDE 10 MG/ML
INJECTION, SOLUTION EPIDURAL; INFILTRATION; INTRACAUDAL; PERINEURAL CODE/TRAUMA/SEDATION MEDICATION
Status: COMPLETED | OUTPATIENT
Start: 2022-09-21 | End: 2022-09-21

## 2022-09-21 RX ADMIN — IOHEXOL 30 ML: 300 INJECTION, SOLUTION INTRAVENOUS at 17:38

## 2022-09-21 RX ADMIN — LIDOCAINE HYDROCHLORIDE 3 ML: 10 INJECTION, SOLUTION EPIDURAL; INFILTRATION; INTRACAUDAL; PERINEURAL at 16:08

## 2022-09-21 RX ADMIN — LIDOCAINE HYDROCHLORIDE 5 ML: 10 INJECTION, SOLUTION EPIDURAL; INFILTRATION; INTRACAUDAL; PERINEURAL at 17:15

## 2022-09-21 RX ADMIN — FENTANYL CITRATE 50 MCG: 50 INJECTION, SOLUTION INTRAMUSCULAR; INTRAVENOUS at 15:46

## 2022-09-21 RX ADMIN — MIDAZOLAM HYDROCHLORIDE 1 MG: 1 INJECTION, SOLUTION INTRAMUSCULAR; INTRAVENOUS at 15:59

## 2022-09-21 RX ADMIN — FENTANYL CITRATE 50 MCG: 50 INJECTION, SOLUTION INTRAMUSCULAR; INTRAVENOUS at 16:00

## 2022-09-21 RX ADMIN — FENTANYL CITRATE 50 MCG: 50 INJECTION, SOLUTION INTRAMUSCULAR; INTRAVENOUS at 16:56

## 2022-09-21 RX ADMIN — LIDOCAINE HYDROCHLORIDE 5 ML: 10 INJECTION, SOLUTION EPIDURAL; INFILTRATION; INTRACAUDAL; PERINEURAL at 17:17

## 2022-09-21 RX ADMIN — CEFAZOLIN SODIUM 1000 MG: 1 SOLUTION INTRAVENOUS at 16:50

## 2022-09-21 RX ADMIN — FENTANYL CITRATE 50 MCG: 50 INJECTION, SOLUTION INTRAMUSCULAR; INTRAVENOUS at 16:24

## 2022-09-21 RX ADMIN — SODIUM CHLORIDE 50 ML/HR: 0.9 INJECTION, SOLUTION INTRAVENOUS at 14:44

## 2022-09-21 RX ADMIN — MIDAZOLAM HYDROCHLORIDE 1 MG: 1 INJECTION, SOLUTION INTRAMUSCULAR; INTRAVENOUS at 16:23

## 2022-09-21 RX ADMIN — MIDAZOLAM HYDROCHLORIDE 1 MG: 1 INJECTION, SOLUTION INTRAMUSCULAR; INTRAVENOUS at 16:14

## 2022-09-21 RX ADMIN — MIDAZOLAM HYDROCHLORIDE 1 MG: 1 INJECTION, SOLUTION INTRAMUSCULAR; INTRAVENOUS at 15:46

## 2022-09-21 NOTE — BRIEF OP NOTE (RAD/CATH)
INTERVENTIONAL RADIOLOGY PROCEDURE NOTE    Date: 9/21/2022    Procedure: IR TUNNELED CATHETER PLACEMENT    Preoperative diagnosis:   1  End stage renal disease (HCC)         Postoperative diagnosis: Same  Surgeon: Swapna Roach MD     Assistant: None  No qualified resident was available  Blood loss:  1 mL    Specimens:  None     Findings:  28 cm 16 [place] Titan dialysis catheter placed via right internal jugular vein  Tip in RA, okay to use  Complications: None immediate      Anesthesia: conscious sedation

## 2022-09-21 NOTE — BRIEF OP NOTE (RAD/CATH)
INTERVENTIONAL RADIOLOGY PROCEDURE NOTE    Date: 9/21/2022    Procedure: IR AV FISTULA/GRAFT DECLOT    Preoperative diagnosis:   1  End stage renal disease (HCC)         Postoperative diagnosis: Same  Surgeon: Antonieta Nissen, MD     Assistant: None  No qualified resident was available  Blood loss:  3 mL    Specimens:  None     Findings:  Severely atretice cephalic fistula outflow  Unable to successfully restore flow  A tunneled catheter was placed  Complications: None immediate      Anesthesia: conscious sedation

## 2022-09-21 NOTE — H&P
Interventional Radiology  History and Physical 9/21/2022     Lula MEDEL [facility]   1955   [id number]    Assessment/Plan:  Clotted left upper extremity fistula  It has been less than 2 months since the patient last had a clotted fistula  Plan to thrombectomized and treat fistula  If unable to restore patency will place a tunneled catheter  Problem List Items Addressed This Visit        Genitourinary    End stage renal disease ([facility] [address] )    Relevant Orders    IR AV fistula/graft declot             Subjective:     Patient ID: Marko Valente is a 75 y o  male  History of Present Illness  Patient has diabetes and end-stage renal disease  He has been maintained on dialysis 3 times per week via a left upper extremity brachiocephalic fistula  Patient has had multiple episodes of fistula thrombosis  Most recently, he presented at the end of July  Will plan to attempt a thrombectomy of this fistula  If unable to restore patency so that the fistula is functional for dialysis, will place a catheter until a new access can be placed  Past Medical History:   Diagnosis Date    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    With Cerebral Infarction:  Last Assessed:  October 20, 2016    Chronic kidney disease 2012    on dialysis     Diabetes mellitus ([facility] [address] )     Hypertension     Labyrinthitis 09/10/2011    Myocardial infarction ([facility] [address] ) 2014    x3 others were in 2009 & 2010    Sleep disturbances 09/20/2010    Stroke [facility]) 2007    x1    Type 2 diabetes, uncontrolled, with renal manifestation 05/03/2017        Past Surgical History:   Procedure Laterality Date    AV FISTULA PLACEMENT      CARDIAC SURGERY  2010    stents x3    COLONOSCOPY N/A 12/12/2016    Procedure: COLONOSCOPY;  Surgeon: Monica Burgess MD;  Location: [facility] GI LAB; Service:     COLONOSCOPY N/A 4/3/2017    Procedure:  COLONOSCOPY;  Surgeon: Monica Burgess MD;  Location: [facility] GI LAB;   Service:     HARDWARE REMOVAL Right 4/4/2022    Procedure: REMOVAL HARDWARE HIP;  Surgeon: Jamal Zimmerman MD;  Location: [facility];  Service: Orthopedics    IR AV FISTULA/GRAFT DECLOT  4/29/2021    IR AV FISTULA/GRAFT DECLOT  3/25/2022    IR AV FISTULAGRAM/GRAFTOGRAM  3/28/2022    IR AV FISTULAGRAM/GRAFTOGRAM  7/28/2022    IR TUNNELED CENTRAL LINE REMOVAL  12/7/2021    IR TUNNELED DIALYSIS CATHETER PLACEMENT  5/24/2021    VA ANASTOMOSIS,AV,ANY SITE Left 7/22/2021    Procedure: CREATION FISTULA ARTERIOVENOUS (AV); Surgeon: Christy Gastelum MD;  Location: [facility];  Service: Vascular    VA PARTIAL HIP REPLACEMENT Right 4/4/2022    Procedure: HEMIARTHROPLASTY HIP (BIPOLAR);   Surgeon: Jamal Zimmerman MD;  [facility];  Service: Orthopedics        Social History     Tobacco Use   Smoking Status Current Every Day Smoker    Packs/day: 0 50    Years: 30 00    Pack years: 15 00    Types: Cigarettes   Smokeless Tobacco Never Used        Social History     Substance and Sexual Activity   Alcohol Use Yes    Comment: rarely - No alcohol use per [employer]        Social History     Substance and Sexual Activity   Drug Use Yes    Types: Marijuana    Comment: occasional brownie for sleep        No Known Allergies    Current Outpatient Medications   Medication Sig Dispense Refill    ascorbic acid (VITAMIN C) 500 mg tablet TAKE ONE TABLET BY MOUTH EVERY EVENING FOR SUPPLEMENT      aspirin (ECOTRIN LOW STRENGTH) 81 mg EC tablet Take 81 mg by mouth daily      atorvastatin (LIPITOR) 20 mg tablet Take 20 mg by mouth daily at bedtime      B Complex-C-Folic Acid (TRIPHROCAPS) 1 MG CAPS Take 1 capsule by mouth      carvedilol (COREG) 3 125 mg tablet TAKE 1 TABLET(3 125 MG) BY MOUTH TWICE DAILY WITH MEALS 180 tablet 3    Cholecalciferol 50 MCG (2000 UT) CAPS Take by mouth daily      clopidogrel (Plavix) 75 mg tablet Take 1 tablet (75 mg total) by mouth daily 30 tablet 0    acetaminophen (TYLENOL) 325 mg tablet Take 3 tablets (975 mg total) by mouth every 8 (eight) hours (Patient not taking: No sig reported)  0    calcium carbonate (TUMS) 500 mg chewable tablet Chew 2 tablets (1,000 mg total) 2 (two) times a day as needed for indigestion or heartburn (Patient not taking: No sig reported)  0    [place] Comfort EZ 33G X 4 MM MISC USE UPTO 3 TIMES DAILY (Patient not taking: No sig reported)      docusate sodium (COLACE) 100 mg capsule TAKE 1 CAPSULE BY MOUTH DAILY FOR CONSTIPATION (Patient not taking: No sig reported)      glucose blood test strip by In Vitro route 2 (two) times a day (Patient not taking: No sig reported)      nitroglycerin (NITROSTAT) 0 4 mg SL tablet Place under the tongue (Patient not taking: No sig reported)      pantoprazole (PROTONIX) 40 mg tablet Take 1 tablet (40 mg total) by mouth daily in the early morning  0    rosuvastatin (CRESTOR) 20 MG tablet Take 20 mg by mouth every evening  (Patient not taking: No sig reported)  1     Current Facility-Administered Medications   Medication Dose Route Frequency Provider Last Rate Last Admin    sodium chloride 0 9 % infusion  50 mL/hr Intravenous Continuous Smith Christensen MD 50 mL/hr at 09/21/22 1444 50 mL/hr at 09/21/22 1444          Objective:    Vitals:    09/21/22 1431   BP: 147/77   Pulse: 67   Resp: 19   Temp: 97 7 °F (36 5 °C)   TempSrc: Temporal   SpO2: 99%   Weight: 70 2 kg (154 lb 12 2 oz)   Height: 5' 10" (1 778 m)        Physical Exam  Constitutional:       Appearance: Normal appearance  Cardiovascular:      Rate and Rhythm: Normal rate and regular rhythm  Heart sounds: Normal heart sounds  Comments: No pulse or thrill within left upper extremity brachiocephalic fistula  Pulmonary:      Effort: Pulmonary effort is normal       Breath sounds: Normal breath sounds  Skin:     General: Skin is warm  Neurological:      Mental Status: He is alert and oriented to person, place, and time             No results found for: BNP   Lab Results   Component Value Date    WBC 5 00 04/09/2022    HGB 8 7 (L) 04/09/2022    HCT 26 4 (L) 04/09/2022     (H) 04/09/2022     (L) 04/09/2022     Lab Results   Component Value Date    INR 1 07 04/03/2022    INR 1 09 03/28/2022    INR 1 05 07/26/2021    PROTIME 13 7 04/03/2022    PROTIME 13 9 03/28/2022    PROTIME 13 6 07/26/2021     Lab Results   Component Value Date    PTT 29 04/03/2022         I have personally reviewed pertinent imaging and laboratory results  Code Status: Prior  Advance Directive and Living Will:      Power of :    POLST:      This text is generated with voice recognition software  There may be translation, syntax,  or grammatical errors  If you have any questions, please contact the dictating provider

## 2022-10-04 ENCOUNTER — OFFICE VISIT (OUTPATIENT)
Dept: VASCULAR SURGERY | Facility: CLINIC | Age: 67
End: 2022-10-04
Payer: MEDICARE

## 2022-10-04 VITALS
WEIGHT: 153 LBS | HEIGHT: 70 IN | HEART RATE: 66 BPM | DIASTOLIC BLOOD PRESSURE: 72 MMHG | SYSTOLIC BLOOD PRESSURE: 110 MMHG | BODY MASS INDEX: 21.9 KG/M2

## 2022-10-04 DIAGNOSIS — I77.0 ARTERIOVENOUS FISTULA (HCC): ICD-10-CM

## 2022-10-04 DIAGNOSIS — N18.6 END STAGE RENAL DISEASE (HCC): Primary | ICD-10-CM

## 2022-10-04 DIAGNOSIS — T82.868A AV FISTULA THROMBOSIS, INITIAL ENCOUNTER (HCC): ICD-10-CM

## 2022-10-04 DIAGNOSIS — Z99.2 DEPENDENCE ON RENAL DIALYSIS (HCC): ICD-10-CM

## 2022-10-04 PROCEDURE — 99214 OFFICE O/P EST MOD 30 MIN: CPT | Performed by: SURGERY

## 2022-10-04 NOTE — LETTER
October 4, 2022     Willemmaulik Lorenzo, [address]    Patient: Beth Fernandez   YOB: 1955   Date of Visit: 10/4/2022       Dear Dr Hannah Pod: Thank you for referring Sam Burrell to me for evaluation  Below are the relevant portions of my assessment and plan of care  Patient presents for his initial visit after fistulagram on 9/21/22, which was unsuccessful and recanalization of the left upper extremity brachiocephalic AV fistula despite multiple angioplasties of the outflow cephalic vein  A tunneled RIJ PermCath was placed  Patient will undergo vein mapping and plan for new access surgery  If you have questions, please do not hesitate to call me  I look forward to following Bhakti Cramer along with you           Sincerely,        Brittany Dangelo MD        CC: Roxann Hay MD

## 2022-10-04 NOTE — PROGRESS NOTES
Assessment/Plan:    End stage renal disease [facility])  Lab Results   Component Value Date    EGFR 12 04/09/2022    EGFR 8 04/08/2022    EGFR 15 04/07/2022    CREATININE 4 54 (H) 04/09/2022    CREATININE 5 97 (H) 04/08/2022    CREATININE 3 87 (H) 04/07/2022     Fistulagram 9/21/22, which was unsuccessful and recanalization of the left upper extremity brachiocephalic AV fistula despite multiple angioplasties of the outflow cephalic vein  A tunneled RIJ PermCath was placed  Patient will undergo vein mapping and plan for new access surgery  AV fistula thrombosis, initial encounter ([facility] [address] )  Fistulagram 9/21/22, which was unsuccessful and recanalization of the left upper extremity brachiocephalic AV fistula despite multiple angioplasties of the outflow cephalic vein  A tunneled RIJ PermCath was placed  Patient will undergo vein mapping and plan for new access surgery  Diagnoses and all orders for this visit:    End stage renal disease ([facility] [address] )  -     VAS vessel mapping for hemodialysis upper; Future    Arteriovenous fistula (HCC)  -     VAS vessel mapping for hemodialysis upper; Future    AV fistula thrombosis, initial encounter ([facility] [address] )  -     VAS vessel mapping for hemodialysis upper; Future    Dependence on renal dialysis (HCC)  -     VAS vessel mapping for hemodialysis upper; Future          Subjective:      Patient ID: Migel Bills is a 75 y o  male  Pt is here to rev Fistulagram 9/21/22, which was unsuccessful and recanalization of the left upper extremity brachiocephalic AV fistula despite multiple angioplasties of the outflow cephalic vein  A tunneled RIJ PermCath was placed  Patient will undergo vein mapping and plan for new access surgery  Pt goes to [facility]  Pt is taking ASA, Atorvastatin and Plavix  Pt is a smoker and smokes 1/2 PPD             The following portions of the patient's history were reviewed and updated as appropriate: allergies, current medications, past family history, past medical history, past social history, past surgical history and problem list     Review of Systems   Constitutional: Negative  HENT: Negative  Eyes: Negative  Respiratory: Negative  Cardiovascular: Negative  Gastrointestinal: Negative  Endocrine: Negative  Genitourinary: Negative  Musculoskeletal: Negative  Skin: Negative  Allergic/Immunologic: Negative  Neurological: Negative  Hematological: Negative  Psychiatric/Behavioral: Negative  Objective:      /72 (BP Location: Right arm, Patient Position: Sitting, Cuff Size: Standard)   Pulse 66   Ht 5' 10" (1 778 m)   Wt 69 4 kg (153 lb)   BMI 21 95 kg/m²          Physical Exam  Vitals and nursing note reviewed  Constitutional:       Appearance: He is well-developed  HENT:      Head: Normocephalic and atraumatic  Eyes:      Conjunctiva/sclera: Conjunctivae normal       Pupils: Pupils are equal, round, and reactive to light  Neck:      Vascular: No JVD  Pulmonary:      Effort: No respiratory distress  Breath sounds: No stridor  No wheezing or rales  Chest:      Chest wall: No tenderness  Abdominal:      General: There is no distension  Palpations: There is no mass  Tenderness: There is no abdominal tenderness  There is no guarding or rebound  Musculoskeletal:         General: No tenderness or deformity  Normal range of motion  Cervical back: Normal range of motion and neck supple  Skin:     General: Skin is warm and dry  Findings: No erythema or rash  Comments: Left upper extremity brachiocephalic fistula occluded  Overlying skin intact   Neurological:      Mental Status: He is alert and oriented to person, place, and time  Psychiatric:         Behavior: Behavior normal          Thought Content:  Thought content normal

## 2022-10-04 NOTE — ASSESSMENT & PLAN NOTE
Fistulagram 9/21/22, which was unsuccessful and recanalization of the left upper extremity brachiocephalic AV fistula despite multiple angioplasties of the outflow cephalic vein  A tunneled RIJ PermCath was placed  Patient will undergo vein mapping and plan for new access surgery

## 2022-10-04 NOTE — ASSESSMENT & PLAN NOTE
Lab Results   Component Value Date    EGFR 12 04/09/2022    EGFR 8 04/08/2022    EGFR 15 04/07/2022    CREATININE 4 54 (H) 04/09/2022    CREATININE 5 97 (H) 04/08/2022    CREATININE 3 87 (H) 04/07/2022     Fistulagram 9/21/22, which was unsuccessful and recanalization of the left upper extremity brachiocephalic AV fistula despite multiple angioplasties of the outflow cephalic vein  A tunneled RIJ PermCath was placed  Patient will undergo vein mapping and plan for new access surgery

## 2022-11-08 ENCOUNTER — TELEPHONE (OUTPATIENT)
Dept: VASCULAR SURGERY | Facility: CLINIC | Age: 67
End: 2022-11-08

## 2022-11-08 ENCOUNTER — HOSPITAL ENCOUNTER (OUTPATIENT)
Dept: RADIOLOGY | Facility: HOSPITAL | Age: 67
Discharge: HOME | End: 2022-11-08
Attending: SURGERY

## 2022-11-08 DIAGNOSIS — N18.6 END STAGE RENAL DISEASE (HCC): ICD-10-CM

## 2022-11-08 DIAGNOSIS — Z99.2 DEPENDENCE ON RENAL DIALYSIS (HCC): ICD-10-CM

## 2022-11-08 DIAGNOSIS — I77.0 ARTERIOVENOUS FISTULA (HCC): ICD-10-CM

## 2022-11-08 DIAGNOSIS — T82.868A AV FISTULA THROMBOSIS, INITIAL ENCOUNTER (HCC): ICD-10-CM

## 2022-11-08 DIAGNOSIS — N18.6 END STAGE RENAL DISEASE (HCC): Primary | ICD-10-CM

## 2022-11-08 NOTE — TELEPHONE ENCOUNTER
Called pt X 2  Pt answered, and then the call was disconnected  Called pt back and there was no answer  Unable to LM as VM was full  Will try back later  Physical Therapy Daily Treatment       Visit Count: 9  Plan of Care Dates: Initial: 10/26/17 Through: 1/19/18  Insurance Information: physical and speech therapy combined cap of $1980/$3700 per calendar year, has used 3 therapy visits prior this year  Next Referring Provider Visit: 11/22/17    Referred by: Rory Watt MD  Medical Diagnosis (from order):  Left leg weakness [R29.898]   Insurance: 1. MEDICARE  2. MEDICAID [id number]    Date of Onset: new onset; 10/14/17     Diagnosis Precautions: safety precautions. Needs supervision with sitting and hands on assist when up right  Chart reviewed: Relevant co-morbidities, allergies, tests and medications: HTN, 2 previous right sided CVA 's- 5/19/17 and June-July 2017 per son     SUBJECTIVE-  Shaquille Espinosa via video      Patient reports he continues to feel safer doing things at home.      Current Pain: 0/10 -through intrepeter      Functional Change: able to walk with close supervision/contact guard, sitting taller and standing without assistance    OBJECTIVE         Treatment     Therapeutic Exercise:   Nu step seat 6 arms 8 level 6   9 minutes  Leg press 75# 3 x 10 with manual support for left foot positioning, left 45# 2 x 10  Obstacle course with stepping on/off 1 air ex, , weaving around 5 cones x 2, stepping over foam roller, up/down 4\" curb step with SBA  (Up/down 4 steps with 2 rails and 1 rail reciprocal pattern without LOB)  Sit to stand from lowered plinth x 10 without assist  Single leg stance with SBA x 5 Sec alternating between right and left  Tandem stance without support with supervision x 30 sec  Feet together- EO x 60 sec, EC x 30 sec  In hallway- sidestep, marching and retro walking x 50' each direction with railing on right side  Gait without assistive device with supervision  200' no LOB- limited left arm swing  Patient pushed therapist in wheelchair x 200'- mild veering to the right  (Supine PROM hip flexion, 90/90, piriformis, sidelying hip extension)  (Tall  kneeling alt shoulder flexion x 10 B)  (1/2 kneeling x 60 seconds)  (Bridging with feet on blue swiss ball x 10)  (6\" lateral, forward step ups x 10)  (DF, eversion with YTB x 10 each)  Rowing 35# 2 x 10  Step ups on step 360 x 10   Partial squats on step 360 x 10  (Standing on airex for reaching and moving rings at waist to shoulder level x 1 each direction- good trunk rotation and contralateral wt shifting with both sides, no LOB)      exs not done 11/17:  hooklying clams with blue tband x 15  Short arc quad 5# 2 x 10 Left  AA hip abduction in right sidelying  Static stand on airex x 30 sec    Current Home Program (not performed this date except as noted above):   QS, bridges, supine hip flexion and 90/90   Swiss ball knee flexion B and with left-  11/06- issued blue tband for clams  at a counter or in corner for balance-  B heel raises 2 x 10, alt hip flexion, abduction and SLS using UE as needed for balance x 10-15 B    ASSESSMENT     Balance without assistive device is also improving allowing for increased safety and independence at home.   Varied strength and balance activities continue to help improve his safety with functional activities.      Pain after treatment: 0/10  Result of above outlined education: Verbalizes understanding and Needs reinforcement    Goals:       To be obtained by end of this plan of care:  1. Patient independent with modified and progressed home exercise program.  2. Patient will perform lower body dressing Supervision incorporating left and right upper extremity as a stabilizer for balance with use of the following pieces of adaptive equipment - to be determined   3. Patient to have MCNALLY score of \"to be determined at later date after test\"/56 to reduce falls risk.  4. Patient to have Dynamic Gait Index score of \"to be determined at later date after test\"/24 to reduce falls risk.  5. Patient to ambulate \"to be determined at later date after test\" feet in the 6 minute walk test for  independence in community ambulation with no assistive device.  6. Patient to ambulate Moderate Worth over various surfaces/obstacles of level surfaces, unlevel surfaces for safety at home and community.  7. Patient will perform sit to/from supine with Modified Worth.     PLAN   Nu step, leg press,  standing hip abduction, extension and seated physioball trunk control exercises.     4 point and kneeling work on mat table, functional balance activities in standing.      Re-assess MMT and functional test for follow up with PCP.        THERAPY DAILY BILLING   Primary Insurance:  MEDICARE  Secondary Insurance: MEDICAID [id number]    Evaluation Procedures:  No evaluation codes were used on this date of service    Timed Procedures:  Gait Training, 10 minutes  Neuromuscular Re-Education, 15 minutes  Therapeutic Exercise, 20 minutes    Untimed Procedures:  No untimed codes were used on this date of service    Total Treatment Time: 45 minutes        G-Code:  G-Code Score ABN form  insurance type does not require G-codes  Modifier based on outcome measure(s)/functional testing/clinical judgement as listed above    The referring provider's electronic or written signature on the evaluation authorizes the therapy plan of care and certifies the need for these services, furnished under this plan of care while under their care.  Physician Signature on file.

## 2022-11-08 NOTE — TELEPHONE ENCOUNTER
Spoke with pt  Reviewed Eliquis instructions with him  Pt verbalized understanding of all  Advised pt to contact the office if he has any issues getting the medication from the pharmacy

## 2022-11-08 NOTE — TELEPHONE ENCOUNTER
Received a call from Nakita Farrell, at the [facility]  Pt was there for vein mapping  Results show the right IJV has a thrombus  Also a thrombus in the cephalic vein at the distal forearm  Nakita Farrell stated that pt left the lab  She will have the report in by lunch time

## 2022-11-08 NOTE — TELEPHONE ENCOUNTER
Joni, realized that Xarelto cannot be prescribed to patients with ESRD  I ordered Eliquis however this may not be covered by insurance  Might have to look into this

## 2022-11-08 NOTE — TELEPHONE ENCOUNTER
Called the Vascular Lab  The thrombus appears to be acute  Pended the Xarelto scripts below for provider to review and sign  Will then call pt with instructions

## 2022-11-08 NOTE — TELEPHONE ENCOUNTER
Received call from patint, [facility] informed him Eliquis is not covered by his insurance  Called [facility] and s/w pharmacist, he states it was denied, we will be receiving fax w/ directions to pre auth   Advised him to disregard the rx for #28 tablets Eliquis 5mg two tabs (10mg) bid, we will be providing samples to pt until prior auth obtained  He will then need the rx for Eliquis 5mg BID #60 once approved  Advised norberto to  samples to begin today, he stated he will come tomorrow to [place] office ([address] [place] [address]), advised him against this, he should begin medication today  Patient states he will try to get here by 5pm   2 boxes #14 each Eliquis 5mg LOT [id number] exp April 2024 w/ directions to take two 5mg tablets for a total of 10mg by mouth twice per day for 7 days provided to  for pt

## 2022-11-08 NOTE — TELEPHONE ENCOUNTER
Did they note if this was acute? It appears patient has a R IJ perm cath   If it is acute patient will need Xarelto called into the pharmacy as it does not appear that he is on anticoagulation

## 2022-11-09 NOTE — TELEPHONE ENCOUNTER
Did not receive the fax from Jacob Benito to initiate the prior auth  Called [employer]  They had the incorrect fax number on file  They will be refaxing the information to the correct fax number

## 2022-11-09 NOTE — TELEPHONE ENCOUNTER
Received fax  Called #[phone] to initiate prior auth  Member ID #[id number]  Spoke with Miky [facility]  Clinical questions answered  Will receive a determination via fax  Confirmed [facility] had correct fax number   Prior auth request # [id number]

## 2022-11-10 NOTE — TELEPHONE ENCOUNTER
Received denial for eliquis reason embolism and thrombosis of superficial vein is not covered  Discussed w/ FARHAT STILL, IJ vein is not superficial, it is deep  Called number on denial letter and requested peer to peer, s/w Maritza and informed of above  Pharmacist was not available to speak w/ however she forwarded request to appeals verification and we will have response in 24-72 hours

## 2022-11-14 NOTE — TELEPHONE ENCOUNTER
Called [facility] to do an appeal with a peer to peer  I was told that since a denial was already received, I could not do a peer to peer , I had to start an appeal    I called [phone] for an expedited appeal    After a lengthy period on phone, I was given a case number of [id number] and I faxed the doppler and notes as to why patient is on eliquis because of renal failure  Faxed to [phone] attn expedied appeal    She said I could check back on 11/16 for decision

## 2022-11-15 NOTE — TELEPHONE ENCOUNTER
Spoke with pt's wife  She is going to  another weeks worth of Eliquis samples  Made her aware that pt's dose now changes to Eliquis 5 mg twice daily  Lot # [id number], exp  4/2024  One box of #14 tabs to be dispensed to pt

## 2022-11-15 NOTE — TELEPHONE ENCOUNTER
Pt was given 7 day supply for Eliquis 10mg BID one week ago  Appeal w/ insurance pending  Called pt to ask him to  additional samples, vm not set up  Called wife, left vm requesting same, asked that they return our call

## 2022-11-15 NOTE — TELEPHONE ENCOUNTER
[facility], they have approved Eliquis 5mg BID, effective 11/9/22 through 12/31/23, Yonatan Abdi #[id number]  Called [facility] and s/w pharmacist and notified of same  She did run rx through insurance and confirmed it was approved, pt's copay $223 95  I s/w pt's wife and notified of same  Advised to keep apt w/ Dr Carroll Rodríguez 12/2/22, they will do so  no abdominal pain, no bloating, no constipation, no diarrhea, no nausea and no vomiting.

## 2022-12-02 ENCOUNTER — OFFICE VISIT (OUTPATIENT)
Dept: VASCULAR SURGERY | Facility: CLINIC | Age: 67
End: 2022-12-02

## 2022-12-02 VITALS
DIASTOLIC BLOOD PRESSURE: 62 MMHG | RESPIRATION RATE: 20 BRPM | BODY MASS INDEX: 22.05 KG/M2 | SYSTOLIC BLOOD PRESSURE: 112 MMHG | HEIGHT: 70 IN | HEART RATE: 60 BPM | WEIGHT: 154 LBS

## 2022-12-02 DIAGNOSIS — I25.10 ARTERIOSCLEROSIS OF CORONARY ARTERY: ICD-10-CM

## 2022-12-02 DIAGNOSIS — N18.6 END-STAGE RENAL DISEASE ON HEMODIALYSIS (HCC): Primary | ICD-10-CM

## 2022-12-02 DIAGNOSIS — I77.0 ARTERIOVENOUS FISTULA (HCC): ICD-10-CM

## 2022-12-02 DIAGNOSIS — T82.868A AV FISTULA THROMBOSIS, INITIAL ENCOUNTER (HCC): ICD-10-CM

## 2022-12-02 DIAGNOSIS — Z99.2 STAGE 5 CHRONIC KIDNEY DISEASE ON CHRONIC DIALYSIS (HCC): ICD-10-CM

## 2022-12-02 DIAGNOSIS — I25.118 CORONARY ARTERY DISEASE OF NATIVE HEART WITH STABLE ANGINA PECTORIS, UNSPECIFIED VESSEL OR LESION TYPE (HCC): ICD-10-CM

## 2022-12-02 DIAGNOSIS — N18.6 STAGE 5 CHRONIC KIDNEY DISEASE ON CHRONIC DIALYSIS (HCC): ICD-10-CM

## 2022-12-02 DIAGNOSIS — Z99.2 END-STAGE RENAL DISEASE ON HEMODIALYSIS (HCC): Primary | ICD-10-CM

## 2022-12-02 PROBLEM — N18.9 CHRONIC KIDNEY DISEASE: Status: ACTIVE | Noted: 2022-12-02

## 2022-12-02 NOTE — ASSESSMENT & PLAN NOTE
Lab Results   Component Value Date    EGFR 12 04/09/2022    EGFR 8 04/08/2022    EGFR 15 04/07/2022    CREATININE 4 54 (H) 04/09/2022    CREATININE 5 97 (H) 04/08/2022    CREATININE 3 87 (H) 04/07/2022

## 2022-12-02 NOTE — PROGRESS NOTES
Assessment/Plan:    End-stage renal disease on hemodialysis [facility]  Lab Results   Component Value Date    EGFR 12 04/09/2022    EGFR 8 04/08/2022    EGFR 15 04/07/2022    CREATININE 4 54 (H) 04/09/2022    CREATININE 5 97 (H) 04/08/2022    CREATININE 3 87 (H) 04/07/2022     Patient had HD vein mapping on 11/8/22, however unfortunately the left upper extremity was not studied  Reviewing patient's previous vein mapping, performed over 1 year ago (6/29/21) he did have an adequate sized basilic vein  He now has had an occluded left upper extremity radiocephalic and brachiocephalic AV fistula  However, I would like to explore the basilic vein before abandoning the left upper extremity  Currently patient has a right IJ tunneled PermCath in place, this was placed on 09/21/2022, unfortunately patient has not come to my office until today  Patient will undergo vein mapping of his left upper extremity basilic vein to determine whether to proceed with basilic vein transposition or placement of an AV graft  Patient will also have the left upper extremity arterial system interrogated with duplex  Chronic kidney disease  Lab Results   Component Value Date    EGFR 12 04/09/2022    EGFR 8 04/08/2022    EGFR 15 04/07/2022    CREATININE 4 54 (H) 04/09/2022    CREATININE 5 97 (H) 04/08/2022    CREATININE 3 87 (H) 04/07/2022      Diagnoses and all orders for this visit:    End-stage renal disease on hemodialysis ([facility] [address] )  -     VAS vessel mapping for hemodialysis upper; Future  -     VAS upper limb arterial duplex, unilateral/limited, graft; Future    Arteriovenous fistula (HCC)  -     VAS vessel mapping for hemodialysis upper; Future  -     VAS upper limb arterial duplex, unilateral/limited, graft; Future    AV fistula thrombosis, initial encounter ([facility] [address] )  -     VAS vessel mapping for hemodialysis upper; Future  -     VAS upper limb arterial duplex, unilateral/limited, graft;  Future    Arteriosclerosis of coronary artery  -     VAS vessel mapping for hemodialysis upper; Future  -     VAS upper limb arterial duplex, unilateral/limited, graft; Future    Coronary artery disease of native heart with stable angina pectoris, unspecified vessel or lesion type ([facility] [address] )  -     VAS vessel mapping for hemodialysis upper; Future  -     VAS upper limb arterial duplex, unilateral/limited, graft; Future    Stage 5 chronic kidney disease on chronic dialysis (HCC)  -     VAS vessel mapping for hemodialysis upper; Future  -     VAS upper limb arterial duplex, unilateral/limited, graft; Future      Subjective:      Patient ID: Constance Medrano is a 75 y o  male  Patient had HD vein mapping on 11/8/22, however unfortunately the left upper extremity was not studied  Reviewing patient's previous vein mapping, performed over 1 year ago (6/29/21) he did have an adequate sized basilic vein  He now has had an occluded left upper extremity radiocephalic and brachiocephalic AV fistula  However, I would like to explore the basilic vein before abandoning the left upper extremity  Currently patient has a right IJ tunneled PermCath in place, this was placed on 09/21/2022, unfortunately patient has not come to my office until today  Patient will undergo vein mapping of his left upper extremity basilic vein to determine whether to proceed with basilic vein transposition or placement of an AV graft  Pt does to HD MWF at [facility]  Pt smokes 1/2 ppd  Patient was recently placed on Eliquis by Nephrology for a right IJ DVT  Pt is on ASA 81 mg, Eliquis, Plavix, and Atorvastatin  The following portions of the patient's history were reviewed and updated as appropriate: allergies, current medications, past family history, past medical history, past social history, past surgical history and problem list     Review of Systems   Constitutional: Negative  HENT: Negative  Eyes: Negative  Respiratory: Negative  Cardiovascular: Negative  Gastrointestinal: Negative  Endocrine: Negative  Genitourinary: Positive for enuresis  Musculoskeletal: Positive for gait problem  Skin: Negative  Allergic/Immunologic: Negative  Neurological: Positive for numbness  Hematological: Negative  Psychiatric/Behavioral: Negative  Objective:  /62 (BP Location: Right arm, Patient Position: Sitting)   Pulse 60   Resp 20   Ht 5' 10" (1 778 m)   Wt 69 9 kg (154 lb)   BMI 22 10 kg/m²      Physical Exam  Vitals and nursing note reviewed  Constitutional:       Appearance: He is well-developed and well-nourished  HENT:      Head: Normocephalic and atraumatic  Mouth/Throat:      Mouth: Oropharynx is clear and moist    Eyes:      Extraocular Movements: EOM normal       Conjunctiva/sclera: Conjunctivae normal       Pupils: Pupils are equal, round, and reactive to light  Neck:      Vascular: No JVD  Cardiovascular:      Comments: Occluded radiocephalic and brachiocephalic left upper extremity AV fistulas  Pulmonary:      Effort: No respiratory distress  Breath sounds: No stridor  No wheezing or rales  Chest:      Chest wall: No tenderness  Abdominal:      General: There is no distension  Palpations: There is no mass  Tenderness: There is no abdominal tenderness  There is no guarding or rebound  Musculoskeletal:         General: No tenderness or deformity  Normal range of motion  Cervical back: Normal range of motion and neck supple  Skin:     General: Skin is warm and dry  Findings: No erythema or rash  Neurological:      Mental Status: He is alert and oriented to person, place, and time  Psychiatric:         Mood and Affect: Mood and affect normal          Behavior: Behavior normal          Thought Content:  Thought content normal

## 2022-12-02 NOTE — ASSESSMENT & PLAN NOTE
Lab Results   Component Value Date    EGFR 12 04/09/2022    EGFR 8 04/08/2022    EGFR 15 04/07/2022    CREATININE 4 54 (H) 04/09/2022    CREATININE 5 97 (H) 04/08/2022    CREATININE 3 87 (H) 04/07/2022     Patient had HD vein mapping on 11/8/22, however unfortunately the left upper extremity was not studied  Reviewing patient's previous vein mapping, performed over 1 year ago (6/29/21) he did have an adequate sized basilic vein  He now has had an occluded left upper extremity radiocephalic and brachiocephalic AV fistula  However, I would like to explore the basilic vein before abandoning the left upper extremity  Currently patient has a right IJ tunneled PermCath in place, this was placed on 09/21/2022, unfortunately patient has not come to my office until today  Patient will undergo vein mapping of his left upper extremity basilic vein to determine whether to proceed with basilic vein transposition or placement of an AV graft  Patient will also have the left upper extremity arterial system interrogated with duplex

## 2022-12-02 NOTE — LETTER
December 2, 2022     Keren Casiano, [address]    Patient: Bo Gray   YOB: 1955   Date of Visit: 12/2/2022       Dear Dr Fifi Rothman: Thank you for referring Dede Sheikh to me for evaluation  Below are the relevant portions of my assessment and plan of care  Patient had HD vein mapping on 11/8/22, however unfortunately the left upper extremity was not studied  Reviewing patient's previous vein mapping, performed over 1 year ago (6/29/21) he did have an adequate sized basilic vein  He now has had an occluded left upper extremity radiocephalic and brachiocephalic AV fistula  However, I would like to explore the basilic vein before abandoning the left upper extremity  Currently patient has a right IJ tunneled PermCath in place, this was placed on 09/21/2022, unfortunately patient has not come to my office until today  Patient will undergo vein mapping of his left upper extremity basilic vein to determine whether to proceed with basilic vein transposition or placement of an AV graft  If you have questions, please do not hesitate to call me  I look forward to following Syed Decker along with you           Sincerely,        Jere Sanchez MD        CC: No Recipients

## 2022-12-15 ENCOUNTER — HOSPITAL ENCOUNTER (OUTPATIENT)
Dept: RADIOLOGY | Facility: HOSPITAL | Age: 67
Discharge: HOME | End: 2022-12-15
Attending: SURGERY

## 2022-12-15 ENCOUNTER — HOSPITAL ENCOUNTER (OUTPATIENT)
Dept: RADIOLOGY | Facility: HOSPITAL | Age: 67
End: 2022-12-15
Attending: SURGERY

## 2022-12-15 DIAGNOSIS — N18.6 END-STAGE RENAL DISEASE ON HEMODIALYSIS (HCC): ICD-10-CM

## 2022-12-15 DIAGNOSIS — Z99.2 END-STAGE RENAL DISEASE ON HEMODIALYSIS (HCC): ICD-10-CM

## 2022-12-15 DIAGNOSIS — T82.868A AV FISTULA THROMBOSIS, INITIAL ENCOUNTER (HCC): ICD-10-CM

## 2022-12-15 DIAGNOSIS — I77.0 ARTERIOVENOUS FISTULA (HCC): ICD-10-CM

## 2022-12-15 DIAGNOSIS — N18.6 STAGE 5 CHRONIC KIDNEY DISEASE ON CHRONIC DIALYSIS (HCC): ICD-10-CM

## 2022-12-15 DIAGNOSIS — Z99.2 STAGE 5 CHRONIC KIDNEY DISEASE ON CHRONIC DIALYSIS (HCC): ICD-10-CM

## 2022-12-15 DIAGNOSIS — I25.10 ARTERIOSCLEROSIS OF CORONARY ARTERY: ICD-10-CM

## 2022-12-15 DIAGNOSIS — I25.118 CORONARY ARTERY DISEASE OF NATIVE HEART WITH STABLE ANGINA PECTORIS, UNSPECIFIED VESSEL OR LESION TYPE (HCC): ICD-10-CM

## 2022-12-29 ENCOUNTER — TELEPHONE (OUTPATIENT)
Dept: VASCULAR SURGERY | Facility: CLINIC | Age: 67
End: 2022-12-29

## 2022-12-29 ENCOUNTER — OFFICE VISIT (OUTPATIENT)
Dept: VASCULAR SURGERY | Facility: CLINIC | Age: 67
End: 2022-12-29

## 2022-12-29 VITALS
DIASTOLIC BLOOD PRESSURE: 66 MMHG | HEART RATE: 82 BPM | SYSTOLIC BLOOD PRESSURE: 120 MMHG | BODY MASS INDEX: 23.19 KG/M2 | HEIGHT: 70 IN | WEIGHT: 162 LBS | TEMPERATURE: 98 F

## 2022-12-29 DIAGNOSIS — I82.C29 CHRONIC DEEP VEIN THROMBOSIS (DVT) OF INTERNAL JUGULAR VEIN (HCC): Primary | ICD-10-CM

## 2022-12-29 DIAGNOSIS — N18.6 END STAGE RENAL DISEASE (HCC): ICD-10-CM

## 2022-12-29 RX ORDER — CHLORHEXIDINE GLUCONATE 0.12 MG/ML
15 RINSE ORAL ONCE
OUTPATIENT
Start: 2022-12-29 | End: 2022-12-29

## 2022-12-29 NOTE — PROGRESS NOTES
Assessment/Plan:    End stage renal disease ([facility] [address] )  Lab Results   Component Value Date    EGFR 12 04/09/2022    EGFR 8 04/08/2022    EGFR 15 04/07/2022    CREATININE 4 54 (H) 04/09/2022    CREATININE 5 97 (H) 04/08/2022    CREATININE 3 87 (H) 04/07/2022     75year-old male with history of end-stage renal disease and history of failed left radiocephalic and left brachiocephalic AV fistulas  Most recent AV fistula done July 2021  This unfortunately failed due to stenosis of the distal cephalic vein  Patient has a right IJ catheter in place which he gets dialyzed through  He has an associated right IJ thrombus that he has been on Eliquis now for 2 months for  His vein mapping shows inadequate veins of the right upper extremity however left basilic vein appears to be adequate greater than 2 5 mm in diameter  Discussed with the patient the idea of a single stage and two-stage basilic vein transposition  There is also possibility of needing AV graft  This would not be able to be determined until we are in the operating room evaluating the vein under ultrasound and determining then which conduit to use  He understands this and agrees to the procedure  We will have him scheduled after he has cardiac clearance    Chronic deep vein thrombosis (DVT) of internal jugular vein (HCC)  Patient on Eliquis for a right IJ catheter associated thrombus  Will refill his prescription for 1 more month       Diagnoses and all orders for this visit:    Chronic deep vein thrombosis (DVT) of internal jugular vein (HCC)    End stage renal disease (HCC)  -     apixaban (Eliquis) 5 mg; Take 2 tablets (10 mg total) by mouth 2 (two) times a day 10mg BID x7 days then on day 8 switch to 5mg BID  -     Case request operating room: left brachiobasilic fistula, possible AVG; Standing  -     Type and screen; Future  -     Protime-INR; Future  -     Basic metabolic panel; Future  -     CBC and Platelet;  Future  -     Ambulatory referral to Cardiology; Future  -     Case request operating room: left brachiobasilic fistula, possible AVG    Other orders  -     Diet NPO; Sips with meds; Standing  -     Void on call to OR; Standing  -     Insert peripheral IV; Standing  -     Nursing Communication CHG bath, have staff wash entire body (neck down) per pre op bathing protocol  Routine, evening prior to, and day of surgery ; Standing  -     Nursing Communication Swab both nares with Povidone-Iodine solution, EXCLUDE if patient has shellfish/Iodine allergy  Routine, day of surgery, on call to OR ; Standing  -     chlorhexidine (PERIDEX) 0 12 % oral rinse 15 mL  -     Place sequential compression device; Standing          Subjective:      Patient ID: Ginger Cason is a 75 y o  male  Patient presents today to discuss dialysis access  Vein mapping done 12/15  Patient is R handed and on dialysis via R chest cath, MWF @ [facility]    56-year-old male with history of end-stage renal disease on hemodialysis with a history of left radiocephalic and left brachiocephalic fistula was that a failed currently being dialyzed via a right IJ tunneled dialysis catheter  Patient is currently on Eliquis due to a catheter associated thrombus in the right IJ  Patient is here today to discuss dialysis access  He states he is relatively ambidextrous and does not feel as though a left or right arm AV access would matter      The following portions of the patient's history were reviewed and updated as appropriate: allergies, current medications, past family history, past medical history, past social history, past surgical history and problem list     I have spent 45 minutes with Patient  today in which greater than 50% of this time was spent in counseling/coordination of care regarding Diagnostic results, Prognosis, Risks and benefits of tx options, Intructions for management, Patient and family education, Importance of tx compliance, Risk factor reductions and Impressions  Review of Systems   Constitutional: Negative  HENT: Negative  Eyes: Negative  Respiratory: Positive for cough and shortness of breath  Cardiovascular: Negative  Gastrointestinal: Negative  Endocrine: Negative  Genitourinary: Negative  Musculoskeletal: Positive for gait problem  Skin: Negative  Allergic/Immunologic: Negative  Hematological: Bruises/bleeds easily  Psychiatric/Behavioral: Negative  Objective:      /66 (BP Location: Left arm, Patient Position: Sitting)   Pulse 82   Temp 98 °F (36 7 °C) (Tympanic)   Ht 5' 10" (1 778 m)   Wt 73 5 kg (162 lb)   BMI 23 24 kg/m²          Physical Exam  Constitutional:       Appearance: Normal appearance  HENT:      Head: Normocephalic and atraumatic  Eyes:      Extraocular Movements: Extraocular movements intact  Pupils: Pupils are equal, round, and reactive to light  Cardiovascular:      Rate and Rhythm: Normal rate and regular rhythm  Pulses:           Radial pulses are 2+ on the right side and 2+ on the left side  Comments: Scars from previous surgery on left arm  Pulmonary:      Effort: Pulmonary effort is normal    Abdominal:      Palpations: Abdomen is soft  Tenderness: There is no abdominal tenderness  Musculoskeletal:         General: Normal range of motion  Cervical back: Normal range of motion  Neurological:      General: No focal deficit present  Mental Status: He is oriented to person, place, and time  Psychiatric:         Mood and Affect: Mood normal        Operative Scheduling Information:    Hospital:  [facility]    Physician:  Millie Fletcher    Surgery: Left brachiobasilic fistula, possible AVG    Urgency:  Standard    Level:  Level 4: Outpatients to be scheduled for screening procedures and elective surgery that can be delayed for longer than one month without reasonable expectation of detriment to patient      Case Length:  Normal    Post-op Bed:  Outpatient    OR Table:  Standard    Equipment Needs:  None    Medication Instructions:  Eliquis:  Hold for 2 days prior to procedure    Hydration:  No    Contrast Allergy:  no

## 2022-12-29 NOTE — ASSESSMENT & PLAN NOTE
Lab Results   Component Value Date    EGFR 12 04/09/2022    EGFR 8 04/08/2022    EGFR 15 04/07/2022    CREATININE 4 54 (H) 04/09/2022    CREATININE 5 97 (H) 04/08/2022    CREATININE 3 87 (H) 04/07/2022     75year-old male with history of end-stage renal disease and history of failed left radiocephalic and left brachiocephalic AV fistulas  Most recent AV fistula done July 2021  This unfortunately failed due to stenosis of the distal cephalic vein  Patient has a right IJ catheter in place which he gets dialyzed through  He has an associated right IJ thrombus that he has been on Eliquis now for 2 months for  His vein mapping shows inadequate veins of the right upper extremity however left basilic vein appears to be adequate greater than 2 5 mm in diameter  Discussed with the patient the idea of a single stage and two-stage basilic vein transposition  There is also possibility of needing AV graft  This would not be able to be determined until we are in the operating room evaluating the vein under ultrasound and determining then which conduit to use  He understands this and agrees to the procedure    We will have him scheduled after he has cardiac clearance

## 2022-12-29 NOTE — LETTER
22    Re: Cardiology  Clearance    Patient Name: Brittney Malave   Patient : 1955   Patient MRN: [id number]   Patient Phone: [phone]     Dr Da Bennett MD is requesting clearance for above patient, prior to proceeding with left brachiobasilic fistula, possible AVG   Patient's procedure will be scheduled at [facility] once clearance has been obtained  Patient will be given general anesthesia  We spoke with your office today regarding clearance request   Minus Flo has scheduled patient's clearance appointment for 2023 at 8:00 AM in your [place] office  Please fax your recommendations, including any medication recommendations, to [phone], attention nursing  Or route your recommendations to Epic pool [facility] Clearance Pool [[id number]]  Please reach out with any questions or concerns      Sincerely,    Karen  [facility]

## 2022-12-29 NOTE — TELEPHONE ENCOUNTER
REMINDER: Under Reason For Call, comments MUST be formatted as:   (Surgeon's Initials) / (Procedure)      Special Instructions / FYI:     Procedure: left brachiobasilic fistula, possible AVG     Level: 4 - Route clearance(s) to [facility] Surgery Coordinator Pool    Allergies: Patient has no known allergies  Instructions Given: NO Bowel Prep General Instructions     Dialysis: Yes  Where: [facility] // Days: Monday, Wednesday, and Friday    Return Visit Required Prior to Procedure: No     Consent: I certify that patient has signed, printed, timed, and dated their surgery consent  I certify that the patient's LEGAL NAME and DATE OF BIRTH are written in the upper left corner on BOTH sides of the consent  I certify that BOTH sides of the completed surgery consent have been scanned into the patient's Epic chart by myself on 12/29/2022  Yes, I have LABELED the consent in Epic as Consent for Vascular Procedure  For Surgical Clearances     Levels   1-3   ROUTE this encounter to [facility] Clearance Pool (AND)   [facility] Surgery Coordinator Pool     Level   4   ROUTE this encounter to [facility] Surgery Coordinator Pool       HYDRATION CLEARANCES   ONLY ROUTE TO  [facility] Clearance Pool     (1) ONE CLEARANCE NEEDED - Cardiology  Clearance // Clearing Provider's Name (Dieudonne Mcnally): Domingo Diaz //  Spoke with: Appt already made  //  Maria Ines P: [phone] - F: Via Epic    Yes, I have ROUTED this encounter to [facility] Surgery Coordinator and/or [facility] Clearance Pool

## 2022-12-29 NOTE — ASSESSMENT & PLAN NOTE
Patient on Eliquis for a right IJ catheter associated thrombus    Will refill his prescription for 1 more month

## 2023-01-01 ENCOUNTER — TELEPHONE (OUTPATIENT)
Age: 68
End: 2023-01-01

## 2023-01-05 ENCOUNTER — OFFICE VISIT (OUTPATIENT)
Dept: FAMILY MEDICINE CLINIC | Facility: CLINIC | Age: 68
End: 2023-01-05

## 2023-01-05 ENCOUNTER — TELEPHONE (OUTPATIENT)
Dept: ADMINISTRATIVE | Facility: OTHER | Age: 68
End: 2023-01-05

## 2023-01-05 VITALS
RESPIRATION RATE: 18 BRPM | DIASTOLIC BLOOD PRESSURE: 60 MMHG | BODY MASS INDEX: 23.48 KG/M2 | SYSTOLIC BLOOD PRESSURE: 90 MMHG | WEIGHT: 164 LBS | HEIGHT: 70 IN | TEMPERATURE: 97.9 F | HEART RATE: 60 BPM

## 2023-01-05 DIAGNOSIS — E11.22 TYPE 2 DIABETES MELLITUS WITH CHRONIC KIDNEY DISEASE ON CHRONIC DIALYSIS, UNSPECIFIED WHETHER LONG TERM INSULIN USE (HCC): Primary | ICD-10-CM

## 2023-01-05 DIAGNOSIS — I10 PRIMARY HYPERTENSION: ICD-10-CM

## 2023-01-05 DIAGNOSIS — E78.2 MIXED HYPERLIPIDEMIA: ICD-10-CM

## 2023-01-05 DIAGNOSIS — Z99.2 END-STAGE RENAL DISEASE ON HEMODIALYSIS (HCC): ICD-10-CM

## 2023-01-05 DIAGNOSIS — N18.6 END-STAGE RENAL DISEASE ON HEMODIALYSIS (HCC): ICD-10-CM

## 2023-01-05 DIAGNOSIS — Z99.2 TYPE 2 DIABETES MELLITUS WITH CHRONIC KIDNEY DISEASE ON CHRONIC DIALYSIS, UNSPECIFIED WHETHER LONG TERM INSULIN USE (HCC): Primary | ICD-10-CM

## 2023-01-05 DIAGNOSIS — N18.6 TYPE 2 DIABETES MELLITUS WITH CHRONIC KIDNEY DISEASE ON CHRONIC DIALYSIS, UNSPECIFIED WHETHER LONG TERM INSULIN USE (HCC): Primary | ICD-10-CM

## 2023-01-05 LAB — SL AMB POCT HEMOGLOBIN AIC: 4.8 (ref ?–6.5)

## 2023-01-05 NOTE — TELEPHONE ENCOUNTER
----- Message from Germán Carrion sent at 1/5/2023  8:32 AM EST -----  Regarding: care gap request  01/05/23 8:33 AM    Hello, our patient Derrick Pink has had a DM eye exam completed/performed  Please assist in updating the patient chart by contacting Dr Josesito Brock The date of service is 2021/2022      Thank you,  KULDIP Cuevas PG

## 2023-01-05 NOTE — TELEPHONE ENCOUNTER
Lm for pt advising if he is cleared by cardiology on 1/12/23 we will schedule his left brachiobasilic fistula, possible AVG with Dr Yared Delcid on 1/17/23 in [facility]  I asked him to please call me back to confirm he got this message

## 2023-01-11 LAB
LEFT EYE DIABETIC RETINOPATHY: NORMAL
RIGHT EYE DIABETIC RETINOPATHY: NORMAL

## 2023-01-11 NOTE — TELEPHONE ENCOUNTER
As a follow-up, a second attempt has been made for outreach via telephone call to facility  Please see Contacts section for details    First document received is being scanned into chart as consult, as no mention in notes about dm retinopathy/ called requesting last DM eye exam with documentation if found or not    Thank you  Collins Parada MA

## 2023-01-11 NOTE — TELEPHONE ENCOUNTER
Upon review of the In Basket request we were able to locate, review, and update the patient chart as requested for Diabetic Eye Exam     Any additional questions or concerns should be emailed to the Practice Liaisons via the appropriate education email address, please do not reply via In Basket      Thank you  Eugenie Griffiths MA

## 2023-01-12 ENCOUNTER — OFFICE VISIT (OUTPATIENT)
Dept: CARDIOLOGY CLINIC | Facility: CLINIC | Age: 68
End: 2023-01-12

## 2023-01-12 ENCOUNTER — TELEPHONE (OUTPATIENT)
Dept: CARDIOLOGY CLINIC | Facility: CLINIC | Age: 68
End: 2023-01-12

## 2023-01-12 ENCOUNTER — PREP FOR PROCEDURE (OUTPATIENT)
Dept: CARDIOLOGY CLINIC | Facility: CLINIC | Age: 68
End: 2023-01-12

## 2023-01-12 VITALS
DIASTOLIC BLOOD PRESSURE: 62 MMHG | SYSTOLIC BLOOD PRESSURE: 106 MMHG | WEIGHT: 162 LBS | BODY MASS INDEX: 23.19 KG/M2 | HEIGHT: 70 IN | TEMPERATURE: 98.5 F | HEART RATE: 77 BPM | OXYGEN SATURATION: 100 %

## 2023-01-12 DIAGNOSIS — N18.6 END STAGE RENAL DISEASE (HCC): ICD-10-CM

## 2023-01-12 DIAGNOSIS — I25.10 CORONARY ARTERY DISEASE INVOLVING NATIVE HEART WITHOUT ANGINA PECTORIS, UNSPECIFIED VESSEL OR LESION TYPE: ICD-10-CM

## 2023-01-12 DIAGNOSIS — I10 ESSENTIAL HYPERTENSION: ICD-10-CM

## 2023-01-12 DIAGNOSIS — I25.118 CORONARY ARTERY DISEASE OF NATIVE ARTERY OF NATIVE HEART WITH STABLE ANGINA PECTORIS (HCC): Primary | ICD-10-CM

## 2023-01-12 DIAGNOSIS — Z01.810 PRE-OPERATIVE CARDIOVASCULAR EXAMINATION: Primary | ICD-10-CM

## 2023-01-12 DIAGNOSIS — I25.118 CORONARY ARTERY DISEASE OF NATIVE ARTERY OF NATIVE HEART WITH STABLE ANGINA PECTORIS (HCC): ICD-10-CM

## 2023-01-12 NOTE — TELEPHONE ENCOUNTER
Pt is scheduled for Cardiac Cath    Date: Fri 2/3/23    Time: 8:30 am    Arrive by: 7:45 am    Dr Humaira Emery is vaccinated    Pt aware he is to have a   NPO after midnight

## 2023-01-12 NOTE — PROGRESS NOTES
Progress Note - Cardiology Office  [facility]    Derrick Pink 75 y o  male MRN: [id number]  : 1955  Encounter: [id number]      ASSESSMENT:   Perioperative cardiac risk assessment for surgery/ left brachiobasilic fistula, possible AVG with Dr Yovany Mora on 23 in B       Patient has a history of coronary artery disease, MIs and multiple PCI's in the past   His last pharmacologic nuclear stress test was abnormal and he was previously also advised to undergo cardiac catheterization  I again advised him the same and he is agreeable to have his coronary arteries evaluated prior to the vascular surgical procedure  CAD,   Had 3 VD by Cath in 2008, s/p PTCA with bare metal stent to circumflex and PDA and POBA of diagnonal     s/p MI in ,  and acute NSTEMI on 2011   s/p PCI/KARLOS of distal RCA(WS) at [facility]     Pharmacologic stress test, 2021:  Abnormal Lexiscan nuclear stress test  There is a small area of reversible ischemia noted in the inferior and lateral walls  Estimated left ventricular ejection fraction is 44%  TID ratio is 1 04     End-stage renal disease, on HD     H/O Diabetes mellitus, type 2, now no longer diabetic     History of hypertension     Hyperlipidemia     History of CVA  with MRI showing thrombus in the right vertebral artery              RECOMMENDATIONS:  Will schedule for left heart catheterization ASAP and subsequently determine perioperative cardiac risk for vascular surgery  Patient has end-stage renal disease and needs a left brachiocephalic fistula  He also has a history of CAD, multiple MIs and PCI's  His last stress test in 2021 was abnormal and he was previously also advised to undergo cardiac catheterization which she had deferred  Today he is agreeable to undergo the procedure prior to going for his vascular surgical procedure  Please call [phone] if any questions      HPI :     Derrick Pink is a 75 y o  year old male who came for perioperative cardiac risk assessment prior to undergoing vascular surgery  Patient has end-stage renal disease and needs a left brachiocephalic fistula  He also has a history of CAD, multiple MIs and PCI's  His last stress test in June 2021 was abnormal and he was previously also advised to undergo cardiac catheterization which she had deferred  Today he is agreeable to undergo the procedure prior to going for his vascular surgical procedure  REVIEW OF SYSTEMS:  Denies any acute cardiac symptoms today  Denies chest pain, unusual dyspnea or syncope  Has numbness and gait problem    Historical Information   Past Medical History:   Diagnosis Date   • 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    With Cerebral Infarction:  Last Assessed:  October 20, 2016   • Chronic kidney disease 2012    on dialysis    • Diabetes mellitus ([facility] [address] )    • Hypertension    • Labyrinthitis 09/10/2011   • Myocardial infarction [facility]) 2014    x3 others were in 2009 & 2010   • Sleep disturbances 09/20/2010   • Stroke [facility]) 2007    x1   • Type 2 diabetes, uncontrolled, with renal manifestation 05/03/2017     Past Surgical History:   Procedure Laterality Date   • AV FISTULA PLACEMENT     • CARDIAC SURGERY  2010    stents x3   • COLONOSCOPY N/A 12/12/2016    Procedure: COLONOSCOPY;  Surgeon: Santo Jessica MD;  Location: [facility] GI LAB; Service:    • COLONOSCOPY N/A 4/3/2017    Procedure:  COLONOSCOPY;  Surgeon: Santo Jessica MD;  Location: [facility] GI LAB;   Service:    • HARDWARE REMOVAL Right 4/4/2022    Procedure: REMOVAL HARDWARE HIP;  Surgeon: Ollie Junior MD;  Location: [facility];  Service: Orthopedics   • IR AV FISTULA/GRAFT DECLOT  4/29/2021   • IR AV FISTULA/GRAFT DECLOT  3/25/2022   • IR AV FISTULA/GRAFT DECLOT  9/21/2022   • IR AV FISTULAGRAM/GRAFTOGRAM  3/28/2022   • IR AV FISTULAGRAM/GRAFTOGRAM  7/28/2022   • IR TUNNELED CENTRAL LINE REMOVAL  12/7/2021   • IR TUNNELED DIALYSIS CATHETER PLACEMENT 5/24/2021   • ME ARTERIOVENOUS ANASTOMOSIS OPEN DIRECT Left 7/22/2021    Procedure: CREATION FISTULA ARTERIOVENOUS (AV); Surgeon: Yakelin Cotton MD;  Location: [facility];  Service: Vascular   • ME HEMIARTHROPLASTY HIP PARTIAL Right 4/4/2022    Procedure: HEMIARTHROPLASTY HIP (BIPOLAR);   Surgeon: Piper Herrmann MD;  Location:  MAIN OR;  Service: Orthopedics     Social History     Substance and Sexual Activity   Alcohol Use Yes    Comment: rarely - No alcohol use per [employer]     Social History     Substance and Sexual Activity   Drug Use Yes   • Types: Marijuana    Comment: occasional brownie for sleep     Social History     Tobacco Use   Smoking Status Every Day   • Packs/day: 0 50   • Years: 30 00   • Pack years: 15 00   • Types: Cigarettes   Smokeless Tobacco Never     Family History:   Family History   Problem Relation Age of Onset   • Leukemia Mother    • Crohn's disease Father    • Transient ischemic attack Brother        Meds/Allergies     No Known Allergies    Current Outpatient Medications:   •  apixaban (Eliquis) 5 mg, Take 2 tablets (10 mg total) by mouth 2 (two) times a day 10mg BID x7 days then on day 8 switch to 5mg BID, Disp: 120 tablet, Rfl: 0  •  ascorbic acid (VITAMIN C) 500 mg tablet, TAKE ONE TABLET BY MOUTH EVERY EVENING FOR SUPPLEMENT, Disp: , Rfl:   •  aspirin (ECOTRIN LOW STRENGTH) 81 mg EC tablet, Take 81 mg by mouth daily, Disp: , Rfl:   •  atorvastatin (LIPITOR) 20 mg tablet, Take 20 mg by mouth daily at bedtime, Disp: , Rfl:   •  B Complex-C-Folic Acid (TRIPHROCAPS) 1 MG CAPS, Take 1 capsule by mouth, Disp: , Rfl:   •  Cholecalciferol 50 MCG (2000 UT) CAPS, Take by mouth daily, Disp: , Rfl:   •  Medanales Choice Comfort EZ 33G X 4 MM MISC, , Disp: , Rfl:   •  clopidogrel (Plavix) 75 mg tablet, Take 1 tablet (75 mg total) by mouth daily, Disp: 30 tablet, Rfl: 0  •  glucose blood test strip, Test 2 (two) times a day, Disp: , Rfl:   •  acetaminophen (TYLENOL) 325 mg tablet, Take 3 tablets (975 mg total) by mouth every 8 (eight) hours (Patient not taking: Reported on 7/27/2022), Disp: , Rfl: 0  •  apixaban (Eliquis) 5 mg, Take 1 tablet (5 mg total) by mouth 2 (two) times a day Take 10mg BID x7 days  On day 8, switch to 5mg BID, Disp: 60 tablet, Rfl: 0  •  calcium carbonate (TUMS) 500 mg chewable tablet, Chew 2 tablets (1,000 mg total) 2 (two) times a day as needed for indigestion or heartburn (Patient not taking: Reported on 1/12/2023), Disp: , Rfl: 0  •  carvedilol (COREG) 3 125 mg tablet, TAKE 1 TABLET(3 125 MG) BY MOUTH TWICE DAILY WITH MEALS (Patient not taking: Reported on 1/12/2023), Disp: 180 tablet, Rfl: 3  •  docusate sodium (COLACE) 100 mg capsule, , Disp: , Rfl:   •  nitroglycerin (NITROSTAT) 0 4 mg SL tablet, Place under the tongue (Patient not taking: Reported on 1/12/2023), Disp: , Rfl:   •  pantoprazole (PROTONIX) 40 mg tablet, Take 1 tablet (40 mg total) by mouth daily in the early morning (Patient not taking: Reported on 12/2/2022), Disp: , Rfl: 0  •  rosuvastatin (CRESTOR) 20 MG tablet, Take 20 mg by mouth every evening (Patient not taking: Reported on 1/5/2023), Disp: , Rfl: 1    Vitals: Blood pressure 106/62, pulse 77, temperature 98 5 °F (36 9 °C), height 5' 10" (1 778 m), weight 73 5 kg (162 lb), SpO2 100 %  ?  Body mass index is 23 24 kg/m²    Vitals:    01/12/23 0750   Weight: 73 5 kg (162 lb)     BP Readings from Last 3 Encounters:   01/12/23 106/62   01/05/23 90/60   12/29/22 120/66       Physical Exam:    Neurologic:  Alert & oriented x 3, no new focal deficits, Not in any acute distress,  Constitutional:  Well developed, well nourished, non-toxic appearance   Eyes:  Pupil equal and reacting to light, conjunctiva normal,   HENT:  Atraumatic, oropharynx moist, Neck- normal range of motion, no tenderness,  Neck supple, No JVP, No LNP   Respiratory:  Bilateral air entry, mostly clear to auscultation  Cardiovascular: S1-S2 regular with a I/VI systolic murmur   GI: Soft, nondistended, normal bowel sounds, nontender, no hepatosplenomegaly appreciated  Musculoskeletal: Gait problem  Skin:  Well hydrated, no rash   Lymphatic:  No lymphadenopathy noted   Extremities:  No edema,      Diagnostic Studies Review Cardio:      EKG:Normal sinus rhythm, heart rate 77/min, cannot rule out inferior infarct of undetermined age    Cardiac testing:     Results for orders placed during the hospital encounter of 21    NM myocardial perfusion spect (rx stress and/or rest)    [facility]  [address]  [address] [facility] [address]   [phone]    Rest/Stress Gated SPECT Myocardial Perfusion Imaging After Regadenoson    Patient: Sana Villegas  MR number: [id number]  Account number: [de-identified]  : 1955  Age: 78 years  Gender: Male  Status: Outpatient  Location: Stress lab  Height: 70 in  Weight: 199 lb  BP: 164/ 79 mmHg    Allergies: NO KNOWN ALLERGIES    Diagnosis: R07 9 - Chest pain, unspecified, Z01 810 - Encounter for preprocedural cardiovascular examination    Primary Physician:  TESSY Ortega  RN:  PAULINE Sorenson  Referring Physician:  Kristan Goldman MD  Group:  [facility] Munoz  Report Prepared By[de-identified]  PAULINE Sorenson  Interpreting Physician:  Kristan Goldman MD    INDICATIONS: Evaluation of known coronary artery disease  Pre-operative risk assessment  HISTORY: The patient is male  a 78year old Chest pain status: chest pain  Coronary artery disease risk factors: dyslipidemia, hypertension, smoking, family history of premature coronary artery disease, and diabetes mellitus  Medications:  a blood thinner, a calcium channel blocker, an ACE inhibitor/ARB, aspirin, clopidogrel, a lipid lowering agent, and diabetic medications  PHYSICAL EXAM: Baseline physical exam screening: scant wheezing audible  REST ECG: Normal sinus rhythm  The ECG showed rare premature ventricular contractions      PROCEDURE: The study was performed in the the Stress lab  A regadenoson infusion pharmacologic stress test was performed  Gated SPECT myocardial perfusion imaging was performed after stress and at rest  Systolic blood pressure was 164  mmHg, at the start of the study  Diastolic blood pressure was 79 mmHg, at the start of the study  The heart rate was 76 bpm, at the start of the study  IV double checked  Regadenoson protocol:  Time HR bpm SBP mmHg DBP mmHg Symptoms Rhythm/conduct  Baseline 09:12 76 164 79 none NSR, rare PVC's  Immediate 09:15 91 149 75 none same as above  1 min 09:16 88 151 75 none NSR  2 min 09:17 85 146 63 none same as above  3 min 09:18 81 155 70 none same as above  4 min 09:19 87 140 68 none same as above  No medications or fluids given  STRESS SUMMARY: Duration of pharmacologic stress was 3 min  Maximal heart rate during stress was 93 bpm  The heart rate response to stress was normal  There was resting hypertension with an appropriate blood pressure response to stress  The rate-pressure product for the peak heart rate and blood pressure was 19494  There was no chest pain during stress  The stress test was terminated due to protocol completion  Pre oxygen saturation: 98 %  Peak oxygen saturation: 99 %  No  EKG evidence of Lexiscan induced myocardial ischemia    ISOTOPE ADMINISTRATION:  Resting isotope administration Stress isotope administration  Agent Tetrofosmin Tetrofosmin  Dose 10 2 mCi 32 6 mCi  Date 06/03/2021 06/03/2021    Stress isotope administration  Agent Tetrofosmin  Dose 10 2 mCi  Date 06/03/2021    The radiopharmaceutical was injected at the peak effect of pharmacologic stress  MYOCARDIAL PERFUSION IMAGING:  There is a small sized mild intensity reversible perfusion defect noted in the inferior and lateral walls  SSS 5, SRS 2, SDS 3    GATED SPECT:  Estimated left ventricular ejection fraction is 44%  TID ratio 1 04    SUMMARY:  -  Stress results:  There was resting hypertension with an appropriate blood pressure response to stress  There was no chest pain during stress  IMPRESSIONS: Abnormal Lexiscan nuclear stress test  There is a small area of reversible ischemia noted in the inferior and lateral walls  Estimated left ventricular ejection fraction is 44%  TID ratio is 1 04    Prepared and signed by    Lucretia Parra MD  Signed 06/03/2021 13:59:02    No results found for this or any previous visit  Imaging:  Chest X-Ray:   No Chest XR results available for this patient  CT-scan of the chest:     No CTA results available for this patient    Lab Review   Lab Results   Component Value Date    WBC 5 00 04/09/2022    HGB 8 7 (L) 04/09/2022    HCT 26 4 (L) 04/09/2022     (H) 04/09/2022    RDW 18 8 (H) 04/09/2022     (L) 04/09/2022     BMP:  Lab Results   Component Value Date    SODIUM 136 04/09/2022    K 3 4 (L) 04/09/2022     04/09/2022    CO2 32 04/09/2022    BUN 13 04/09/2022    CREATININE 4 54 (H) 04/09/2022    GLUC 138 04/09/2022    GLUF 94 03/29/2022    CALCIUM 8 2 (L) 04/09/2022    CORRECTEDCA 9 0 04/03/2022    EGFR 12 04/09/2022    MG 2 0 04/03/2022     LFT:  Lab Results   Component Value Date    AST 22 04/03/2022    ALT 18 04/03/2022    ALKPHOS 217 (H) 04/03/2022    TP 5 9 (L) 04/03/2022    ALB 2 4 (L) 04/03/2022      No components found for: [employer] [facility]  Lab Results   Component Value Date    KAE1GYTUQOEU 3 990 07/28/2022     Lab Results   Component Value Date    HGBA1C 4 8 01/05/2023     Lipid Profile:   Lab Results   Component Value Date    CHOLESTEROL 123 10/25/2016    HDL 72 (H) 10/25/2016    LDLCALC 35 10/25/2016    TRIG 78 10/25/2016     Lab Results   Component Value Date    CHOLESTEROL 123 10/25/2016     Lab Results   Component Value Date    TROPONINI 0 08 (H) 07/26/2021     Lab Results   Component Value Date    NTBNP 17,457 (H) 03/28/2022      Recent Results (from the past 672 hour(s))   POCT hemoglobin A1c    Collection Time: 01/05/23  8:23 AM   Result Value Ref Range    Hemoglobin A1C 4 8 6 5 Dr Javier Dahl MD, [facility]      "This note has been constructed using a voice recognition system  Therefore there may be syntax, spelling, and/or grammatical errors   Please call if you have any questions  "

## 2023-01-12 NOTE — H&P (VIEW-ONLY)
Progress Note - Cardiology Office  [facility]    Kenneth Lima 75 y o  male MRN: [id number]  : 1955  Encounter: [id number]      ASSESSMENT:   Perioperative cardiac risk assessment for surgery/ left brachiobasilic fistula, possible AVG with Dr Lloyd Jesus on 23 in B       Patient has a history of coronary artery disease, MIs and multiple PCI's in the past   His last pharmacologic nuclear stress test was abnormal and he was previously also advised to undergo cardiac catheterization  I again advised him the same and he is agreeable to have his coronary arteries evaluated prior to the vascular surgical procedure  CAD,   Had 3 VD by Cath in 2008, s/p PTCA with bare metal stent to circumflex and PDA and POBA of diagnonal     s/p MI in ,  and acute NSTEMI on 2011   s/p PCI/KARLOS of distal RCA(WS) at [facility]     Pharmacologic stress test, 2021:  Abnormal Lexiscan nuclear stress test  There is a small area of reversible ischemia noted in the inferior and lateral walls  Estimated left ventricular ejection fraction is 44%  TID ratio is 1 04     End-stage renal disease, on HD     H/O Diabetes mellitus, type 2, now no longer diabetic     History of hypertension     Hyperlipidemia     History of CVA  with MRI showing thrombus in the right vertebral artery              RECOMMENDATIONS:  Will schedule for left heart catheterization ASAP and subsequently determine perioperative cardiac risk for vascular surgery  Patient has end-stage renal disease and needs a left brachiocephalic fistula  He also has a history of CAD, multiple MIs and PCI's  His last stress test in 2021 was abnormal and he was previously also advised to undergo cardiac catheterization which she had deferred  Today he is agreeable to undergo the procedure prior to going for his vascular surgical procedure  Please call [phone] if any questions      HPI :     Kenneth Lima is a 75 y o  year old male who came for perioperative cardiac risk assessment prior to undergoing vascular surgery  Patient has end-stage renal disease and needs a left brachiocephalic fistula  He also has a history of CAD, multiple MIs and PCI's  His last stress test in June 2021 was abnormal and he was previously also advised to undergo cardiac catheterization which she had deferred  Today he is agreeable to undergo the procedure prior to going for his vascular surgical procedure  REVIEW OF SYSTEMS:  Denies any acute cardiac symptoms today  Denies chest pain, unusual dyspnea or syncope  Has numbness and gait problem    Historical Information   Past Medical History:   Diagnosis Date   • 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    With Cerebral Infarction:  Last Assessed:  October 20, 2016   • Chronic kidney disease 2012    on dialysis    • Diabetes mellitus ([facility] [address] )    • Hypertension    • Labyrinthitis 09/10/2011   • Myocardial infarction [facility]) 2014    x3 others were in 2009 & 2010   • Sleep disturbances 09/20/2010   • Stroke [facility] 2007    x1   • Type 2 diabetes, uncontrolled, with renal manifestation 05/03/2017     Past Surgical History:   Procedure Laterality Date   • AV FISTULA PLACEMENT     • CARDIAC SURGERY  2010    stents x3   • COLONOSCOPY N/A 12/12/2016    Procedure: COLONOSCOPY;  Surgeon: Ashutosh Jay MD;  Location: [place] [address] GI LAB; Service:    • COLONOSCOPY N/A 4/3/2017    Procedure:  COLONOSCOPY;  Surgeon: Ashutosh Jay MD;  Location: [place] [address] GI LAB;   Service:    • HARDWARE REMOVAL Right 4/4/2022    Procedure: REMOVAL HARDWARE HIP;  Surgeon: Junella Klinefelter, MD;  Location: [facility];  Service: Orthopedics   • IR AV FISTULA/GRAFT DECLOT  4/29/2021   • IR AV FISTULA/GRAFT DECLOT  3/25/2022   • IR AV FISTULA/GRAFT DECLOT  9/21/2022   • IR AV FISTULAGRAM/GRAFTOGRAM  3/28/2022   • IR AV FISTULAGRAM/GRAFTOGRAM  7/28/2022   • IR TUNNELED CENTRAL LINE REMOVAL  12/7/2021   • IR TUNNELED DIALYSIS CATHETER PLACEMENT 5/24/2021   • IL ARTERIOVENOUS ANASTOMOSIS OPEN DIRECT Left 7/22/2021    Procedure: CREATION FISTULA ARTERIOVENOUS (AV); Surgeon: Rosey Gaspar MD;  Location: [facility];  Service: Vascular   • IL HEMIARTHROPLASTY HIP PARTIAL Right 4/4/2022    Procedure: HEMIARTHROPLASTY HIP (BIPOLAR);   Surgeon: Silvina Schwarz MD;  Location:  MAIN OR;  Service: Orthopedics     Social History     Substance and Sexual Activity   Alcohol Use Yes    Comment: rarely - No alcohol use per [employer]     Social History     Substance and Sexual Activity   Drug Use Yes   • Types: Marijuana    Comment: occasional brownie for sleep     Social History     Tobacco Use   Smoking Status Every Day   • Packs/day: 0 50   • Years: 30 00   • Pack years: 15 00   • Types: Cigarettes   Smokeless Tobacco Never     Family History:   Family History   Problem Relation Age of Onset   • Leukemia Mother    • Crohn's disease Father    • Transient ischemic attack Brother        Meds/Allergies     No Known Allergies    Current Outpatient Medications:   •  apixaban (Eliquis) 5 mg, Take 2 tablets (10 mg total) by mouth 2 (two) times a day 10mg BID x7 days then on day 8 switch to 5mg BID, Disp: 120 tablet, Rfl: 0  •  ascorbic acid (VITAMIN C) 500 mg tablet, TAKE ONE TABLET BY MOUTH EVERY EVENING FOR SUPPLEMENT, Disp: , Rfl:   •  aspirin (ECOTRIN LOW STRENGTH) 81 mg EC tablet, Take 81 mg by mouth daily, Disp: , Rfl:   •  atorvastatin (LIPITOR) 20 mg tablet, Take 20 mg by mouth daily at bedtime, Disp: , Rfl:   •  B Complex-C-Folic Acid (TRIPHROCAPS) 1 MG CAPS, Take 1 capsule by mouth, Disp: , Rfl:   •  Cholecalciferol 50 MCG (2000 UT) CAPS, Take by mouth daily, Disp: , Rfl:   •  [place] Choice Comfort EZ 33G X 4 MM MISC, , Disp: , Rfl:   •  clopidogrel (Plavix) 75 mg tablet, Take 1 tablet (75 mg total) by mouth daily, Disp: 30 tablet, Rfl: 0  •  glucose blood test strip, Test 2 (two) times a day, Disp: , Rfl:   •  acetaminophen (TYLENOL) 325 mg tablet, Take 3 tablets (975 mg total) by mouth every 8 (eight) hours (Patient not taking: Reported on 7/27/2022), Disp: , Rfl: 0  •  apixaban (Eliquis) 5 mg, Take 1 tablet (5 mg total) by mouth 2 (two) times a day Take 10mg BID x7 days  On day 8, switch to 5mg BID, Disp: 60 tablet, Rfl: 0  •  calcium carbonate (TUMS) 500 mg chewable tablet, Chew 2 tablets (1,000 mg total) 2 (two) times a day as needed for indigestion or heartburn (Patient not taking: Reported on 1/12/2023), Disp: , Rfl: 0  •  carvedilol (COREG) 3 125 mg tablet, TAKE 1 TABLET(3 125 MG) BY MOUTH TWICE DAILY WITH MEALS (Patient not taking: Reported on 1/12/2023), Disp: 180 tablet, Rfl: 3  •  docusate sodium (COLACE) 100 mg capsule, , Disp: , Rfl:   •  nitroglycerin (NITROSTAT) 0 4 mg SL tablet, Place under the tongue (Patient not taking: Reported on 1/12/2023), Disp: , Rfl:   •  pantoprazole (PROTONIX) 40 mg tablet, Take 1 tablet (40 mg total) by mouth daily in the early morning (Patient not taking: Reported on 12/2/2022), Disp: , Rfl: 0  •  rosuvastatin (CRESTOR) 20 MG tablet, Take 20 mg by mouth every evening (Patient not taking: Reported on 1/5/2023), Disp: , Rfl: 1    Vitals: Blood pressure 106/62, pulse 77, temperature 98 5 °F (36 9 °C), height 5' 10" (1 778 m), weight 73 5 kg (162 lb), SpO2 100 %  ?  Body mass index is 23 24 kg/m²    Vitals:    01/12/23 0750   Weight: 73 5 kg (162 lb)     BP Readings from Last 3 Encounters:   01/12/23 106/62   01/05/23 90/60   12/29/22 120/66       Physical Exam:    Neurologic:  Alert & oriented x 3, no new focal deficits, Not in any acute distress,  Constitutional:  Well developed, well nourished, non-toxic appearance   Eyes:  Pupil equal and reacting to light, conjunctiva normal,   HENT:  Atraumatic, oropharynx moist, Neck- normal range of motion, no tenderness,  Neck supple, No JVP, No LNP   Respiratory:  Bilateral air entry, mostly clear to auscultation  Cardiovascular: S1-S2 regular with a I/VI systolic murmur   GI: Soft, nondistended, normal bowel sounds, nontender, no hepatosplenomegaly appreciated  Musculoskeletal: Gait problem  Skin:  Well hydrated, no rash   Lymphatic:  No lymphadenopathy noted   Extremities:  No edema,      Diagnostic Studies Review Cardio:      EKG:Normal sinus rhythm, heart rate 77/min, cannot rule out inferior infarct of undetermined age    Cardiac testing:     Results for orders placed during the hospital encounter of 21    NM myocardial perfusion spect (rx stress and/or rest)    [address]  [address] [facility]   [phone]    Rest/Stress Gated SPECT Myocardial Perfusion Imaging After Regadenoson    Patient: Judah Juarez  MR number: [id number]  Account number: [de-identified]  : 1955  Age: 78 years  Gender: Male  Status: Outpatient  Location: Stress lab  Height: 70 in  Weight: 199 lb  BP: 164/ 79 mmHg    Allergies: NO KNOWN ALLERGIES    Diagnosis: R07 9 - Chest pain, unspecified, Z01 810 - Encounter for preprocedural cardiovascular examination    Primary Physician:  TESSY aBl  RN:  PAULINE Tse  Referring Physician:  Felix Bateman MD  Group:  Jevon Boss  Report Prepared By[de-identified]  PAULINE Tse  Interpreting Physician:  Felxi Bateman MD    INDICATIONS: Evaluation of known coronary artery disease  Pre-operative risk assessment  HISTORY: The patient is male  a 78year old Chest pain status: chest pain  Coronary artery disease risk factors: dyslipidemia, hypertension, smoking, family history of premature coronary artery disease, and diabetes mellitus  Medications:  a blood thinner, a calcium channel blocker, an ACE inhibitor/ARB, aspirin, clopidogrel, a lipid lowering agent, and diabetic medications  PHYSICAL EXAM: Baseline physical exam screening: scant wheezing audible  REST ECG: Normal sinus rhythm  The ECG showed rare premature ventricular contractions      PROCEDURE: The study was performed in the the Stress lab  A regadenoson infusion pharmacologic stress test was performed  Gated SPECT myocardial perfusion imaging was performed after stress and at rest  Systolic blood pressure was 164  mmHg, at the start of the study  Diastolic blood pressure was 79 mmHg, at the start of the study  The heart rate was 76 bpm, at the start of the study  IV double checked  Regadenoson protocol:  Time HR bpm SBP mmHg DBP mmHg Symptoms Rhythm/conduct  Baseline 09:12 76 164 79 none NSR, rare PVC's  Immediate 09:15 91 149 75 none same as above  1 min 09:16 88 151 75 none NSR  2 min 09:17 85 146 63 none same as above  3 min 09:18 81 155 70 none same as above  4 min 09:19 87 140 68 none same as above  No medications or fluids given  STRESS SUMMARY: Duration of pharmacologic stress was 3 min  Maximal heart rate during stress was 93 bpm  The heart rate response to stress was normal  There was resting hypertension with an appropriate blood pressure response to stress  The rate-pressure product for the peak heart rate and blood pressure was 94572  There was no chest pain during stress  The stress test was terminated due to protocol completion  Pre oxygen saturation: 98 %  Peak oxygen saturation: 99 %  No  EKG evidence of Lexiscan induced myocardial ischemia    ISOTOPE ADMINISTRATION:  Resting isotope administration Stress isotope administration  Agent Tetrofosmin Tetrofosmin  Dose 10 2 mCi 32 6 mCi  Date 06/03/2021 06/03/2021    Stress isotope administration  Agent Tetrofosmin  Dose 10 2 mCi  Date 06/03/2021    The radiopharmaceutical was injected at the peak effect of pharmacologic stress  MYOCARDIAL PERFUSION IMAGING:  There is a small sized mild intensity reversible perfusion defect noted in the inferior and lateral walls  SSS 5, SRS 2, SDS 3    GATED SPECT:  Estimated left ventricular ejection fraction is 44%  TID ratio 1 04    SUMMARY:  -  Stress results:  There was resting hypertension with an appropriate blood pressure response to stress  There was no chest pain during stress  IMPRESSIONS: Abnormal Lexiscan nuclear stress test  There is a small area of reversible ischemia noted in the inferior and lateral walls  Estimated left ventricular ejection fraction is 44%  TID ratio is 1 04    Prepared and signed by    Vicente Shelton MD  Signed 06/03/2021 13:59:02    No results found for this or any previous visit  Imaging:  Chest X-Ray:   No Chest XR results available for this patient  CT-scan of the chest:     No CTA results available for this patient    Lab Review   Lab Results   Component Value Date    WBC 5 00 04/09/2022    HGB 8 7 (L) 04/09/2022    HCT 26 4 (L) 04/09/2022     (H) 04/09/2022    RDW 18 8 (H) 04/09/2022     (L) 04/09/2022     BMP:  Lab Results   Component Value Date    SODIUM 136 04/09/2022    K 3 4 (L) 04/09/2022     04/09/2022    CO2 32 04/09/2022    BUN 13 04/09/2022    CREATININE 4 54 (H) 04/09/2022    GLUC 138 04/09/2022    GLUF 94 03/29/2022    CALCIUM 8 2 (L) 04/09/2022    CORRECTEDCA 9 0 04/03/2022    EGFR 12 04/09/2022    MG 2 0 04/03/2022     LFT:  Lab Results   Component Value Date    AST 22 04/03/2022    ALT 18 04/03/2022    ALKPHOS 217 (H) 04/03/2022    TP 5 9 (L) 04/03/2022    ALB 2 4 (L) 04/03/2022      No components found for: [employer] [facility]  Lab Results   Component Value Date    ROE2PYXMOWRT 3 990 07/28/2022     Lab Results   Component Value Date    HGBA1C 4 8 01/05/2023     Lipid Profile:   Lab Results   Component Value Date    CHOLESTEROL 123 10/25/2016    HDL 72 (H) 10/25/2016    LDLCALC 35 10/25/2016    TRIG 78 10/25/2016     Lab Results   Component Value Date    CHOLESTEROL 123 10/25/2016     Lab Results   Component Value Date    TROPONINI 0 08 (H) 07/26/2021     Lab Results   Component Value Date    NTBNP 17,457 (H) 03/28/2022      Recent Results (from the past 672 hour(s))   POCT hemoglobin A1c    Collection Time: 01/05/23  8:23 AM   Result Value Ref Range    Hemoglobin A1C 4 8 6 5 Dr Naun Enamorado MD, [facility] [place]      "This note has been constructed using a voice recognition system  Therefore there may be syntax, spelling, and/or grammatical errors   Please call if you have any questions  "

## 2023-01-12 NOTE — H&P (VIEW-ONLY)
Progress Note - Cardiology Office  [address] [facility]    Cordell Costa 75 y o  male MRN: [id number]  : 1955  Encounter: [id number]      ASSESSMENT:   Perioperative cardiac risk assessment for surgery/ left brachiobasilic fistula, possible AVG with Dr Zac Quiñonez on 23 in B       Patient has a history of coronary artery disease, MIs and multiple PCI's in the past   His last pharmacologic nuclear stress test was abnormal and he was previously also advised to undergo cardiac catheterization  I again advised him the same and he is agreeable to have his coronary arteries evaluated prior to the vascular surgical procedure  CAD,   Had 3 VD by Cath in 2008, s/p PTCA with bare metal stent to circumflex and PDA and POBA of diagnonal     s/p MI in ,  and acute NSTEMI on 2011   s/p PCI/KARLOS of distal RCA(WS) at [facility]     Pharmacologic stress test, 2021:  Abnormal Lexiscan nuclear stress test  There is a small area of reversible ischemia noted in the inferior and lateral walls  Estimated left ventricular ejection fraction is 44%  TID ratio is 1 04     End-stage renal disease, on HD     H/O Diabetes mellitus, type 2, now no longer diabetic     History of hypertension     Hyperlipidemia     History of CVA  with MRI showing thrombus in the right vertebral artery              RECOMMENDATIONS:  Will schedule for left heart catheterization ASAP and subsequently determine perioperative cardiac risk for vascular surgery  Patient has end-stage renal disease and needs a left brachiocephalic fistula  He also has a history of CAD, multiple MIs and PCI's  His last stress test in 2021 was abnormal and he was previously also advised to undergo cardiac catheterization which she had deferred  Today he is agreeable to undergo the procedure prior to going for his vascular surgical procedure  Please call [phone] if any questions      HPI :     Cordell Costa is a 75 y o  year old male who came for perioperative cardiac risk assessment prior to undergoing vascular surgery  Patient has end-stage renal disease and needs a left brachiocephalic fistula  He also has a history of CAD, multiple MIs and PCI's  His last stress test in June 2021 was abnormal and he was previously also advised to undergo cardiac catheterization which she had deferred  Today he is agreeable to undergo the procedure prior to going for his vascular surgical procedure  REVIEW OF SYSTEMS:  Denies any acute cardiac symptoms today  Denies chest pain, unusual dyspnea or syncope  Has numbness and gait problem    Historical Information   Past Medical History:   Diagnosis Date   • 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    With Cerebral Infarction:  Last Assessed:  October 20, 2016   • Chronic kidney disease 2012    on dialysis    • Diabetes mellitus ([facility] [address] )    • Hypertension    • Labyrinthitis 09/10/2011   • Myocardial infarction [facility]) 2014    x3 others were in 2009 & 2010   • Sleep disturbances 09/20/2010   • Stroke [facility]) 2007    x1   • Type 2 diabetes, uncontrolled, with renal manifestation 05/03/2017     Past Surgical History:   Procedure Laterality Date   • AV FISTULA PLACEMENT     • CARDIAC SURGERY  2010    stents x3   • COLONOSCOPY N/A 12/12/2016    Procedure: COLONOSCOPY;  Surgeon: Tai Pizarro MD;  Location: [facility]; Service:    • COLONOSCOPY N/A 4/3/2017    Procedure:  COLONOSCOPY;  Surgeon: Tai Pizarro MD;  Location: [facility];   Service:    • HARDWARE REMOVAL Right 4/4/2022    Procedure: REMOVAL HARDWARE HIP;  Surgeon: Jami Monge MD;  Location: [facility];  Service: Orthopedics   • IR AV FISTULA/GRAFT DECLOT  4/29/2021   • IR AV FISTULA/GRAFT DECLOT  3/25/2022   • IR AV FISTULA/GRAFT DECLOT  9/21/2022   • IR AV FISTULAGRAM/GRAFTOGRAM  3/28/2022   • IR AV FISTULAGRAM/GRAFTOGRAM  7/28/2022   • IR TUNNELED CENTRAL LINE REMOVAL  12/7/2021   • IR TUNNELED DIALYSIS CATHETER PLACEMENT 5/24/2021   • TN ARTERIOVENOUS ANASTOMOSIS OPEN DIRECT Left 7/22/2021    Procedure: CREATION FISTULA ARTERIOVENOUS (AV); Surgeon: Tristan Amaya MD;  Location: [facility];  Service: Vascular   • TN HEMIARTHROPLASTY HIP PARTIAL Right 4/4/2022    Procedure: HEMIARTHROPLASTY HIP (BIPOLAR);   Surgeon: Black Ellison MD;  Location:  MAIN OR;  Service: Orthopedics     Social History     Substance and Sexual Activity   Alcohol Use Yes    Comment: rarely - No alcohol use per [employer]     Social History     Substance and Sexual Activity   Drug Use Yes   • Types: Marijuana    Comment: occasional brownie for sleep     Social History     Tobacco Use   Smoking Status Every Day   • Packs/day: 0 50   • Years: 30 00   • Pack years: 15 00   • Types: Cigarettes   Smokeless Tobacco Never     Family History:   Family History   Problem Relation Age of Onset   • Leukemia Mother    • Crohn's disease Father    • Transient ischemic attack Brother        Meds/Allergies     No Known Allergies    Current Outpatient Medications:   •  apixaban (Eliquis) 5 mg, Take 2 tablets (10 mg total) by mouth 2 (two) times a day 10mg BID x7 days then on day 8 switch to 5mg BID, Disp: 120 tablet, Rfl: 0  •  ascorbic acid (VITAMIN C) 500 mg tablet, TAKE ONE TABLET BY MOUTH EVERY EVENING FOR SUPPLEMENT, Disp: , Rfl:   •  aspirin (ECOTRIN LOW STRENGTH) 81 mg EC tablet, Take 81 mg by mouth daily, Disp: , Rfl:   •  atorvastatin (LIPITOR) 20 mg tablet, Take 20 mg by mouth daily at bedtime, Disp: , Rfl:   •  B Complex-C-Folic Acid (TRIPHROCAPS) 1 MG CAPS, Take 1 capsule by mouth, Disp: , Rfl:   •  Cholecalciferol 50 MCG (2000 UT) CAPS, Take by mouth daily, Disp: , Rfl:   •  [place] Choice Comfort EZ 33G X 4 MM MISC, , Disp: , Rfl:   •  clopidogrel (Plavix) 75 mg tablet, Take 1 tablet (75 mg total) by mouth daily, Disp: 30 tablet, Rfl: 0  •  glucose blood test strip, Test 2 (two) times a day, Disp: , Rfl:   •  acetaminophen (TYLENOL) 325 mg tablet, Take 3 tablets (975 mg total) by mouth every 8 (eight) hours (Patient not taking: Reported on 7/27/2022), Disp: , Rfl: 0  •  apixaban (Eliquis) 5 mg, Take 1 tablet (5 mg total) by mouth 2 (two) times a day Take 10mg BID x7 days  On day 8, switch to 5mg BID, Disp: 60 tablet, Rfl: 0  •  calcium carbonate (TUMS) 500 mg chewable tablet, Chew 2 tablets (1,000 mg total) 2 (two) times a day as needed for indigestion or heartburn (Patient not taking: Reported on 1/12/2023), Disp: , Rfl: 0  •  carvedilol (COREG) 3 125 mg tablet, TAKE 1 TABLET(3 125 MG) BY MOUTH TWICE DAILY WITH MEALS (Patient not taking: Reported on 1/12/2023), Disp: 180 tablet, Rfl: 3  •  docusate sodium (COLACE) 100 mg capsule, , Disp: , Rfl:   •  nitroglycerin (NITROSTAT) 0 4 mg SL tablet, Place under the tongue (Patient not taking: Reported on 1/12/2023), Disp: , Rfl:   •  pantoprazole (PROTONIX) 40 mg tablet, Take 1 tablet (40 mg total) by mouth daily in the early morning (Patient not taking: Reported on 12/2/2022), Disp: , Rfl: 0  •  rosuvastatin (CRESTOR) 20 MG tablet, Take 20 mg by mouth every evening (Patient not taking: Reported on 1/5/2023), Disp: , Rfl: 1    Vitals: Blood pressure 106/62, pulse 77, temperature 98 5 °F (36 9 °C), height 5' 10" (1 778 m), weight 73 5 kg (162 lb), SpO2 100 %  ?  Body mass index is 23 24 kg/m²    Vitals:    01/12/23 0750   Weight: 73 5 kg (162 lb)     BP Readings from Last 3 Encounters:   01/12/23 106/62   01/05/23 90/60   12/29/22 120/66       Physical Exam:    Neurologic:  Alert & oriented x 3, no new focal deficits, Not in any acute distress,  Constitutional:  Well developed, well nourished, non-toxic appearance   Eyes:  Pupil equal and reacting to light, conjunctiva normal,   HENT:  Atraumatic, oropharynx moist, Neck- normal range of motion, no tenderness,  Neck supple, No JVP, No LNP   Respiratory:  Bilateral air entry, mostly clear to auscultation  Cardiovascular: S1-S2 regular with a I/VI systolic murmur   GI: Soft, nondistended, normal bowel sounds, nontender, no hepatosplenomegaly appreciated  Musculoskeletal: Gait problem  Skin:  Well hydrated, no rash   Lymphatic:  No lymphadenopathy noted   Extremities:  No edema,      Diagnostic Studies Review Cardio:      EKG:Normal sinus rhythm, heart rate 77/min, cannot rule out inferior infarct of undetermined age    Cardiac testing:     Results for orders placed during the hospital encounter of 21    NM myocardial perfusion spect (rx stress and/or rest)    [facility]  [address]  [address] [facility] [place] [place]  [phone]    Rest/Stress Gated SPECT Myocardial Perfusion Imaging After Regadenoson    Patient: Quiana Lima  MR number: [id number]  Account number: [de-identified]  : 1955  Age: 78 years  Gender: Male  Status: Outpatient  Location: Stress lab  Height: 70 in  Weight: 199 lb  BP: 164/ 79 mmHg    Allergies: NO KNOWN ALLERGIES    Diagnosis: R07 9 - Chest pain, unspecified, Z01 810 - Encounter for preprocedural cardiovascular examination    Primary Physician:  TESSY Brian  RN:  PAULINE Taveras  Referring Physician:  Oriana Velarde MD  Group:  Tito Mtz  Report Prepared By[de-identified]  PAULINE Taveras  Interpreting Physician:  Oriana Velarde MD    INDICATIONS: Evaluation of known coronary artery disease  Pre-operative risk assessment  HISTORY: The patient is male  a 78year old Chest pain status: chest pain  Coronary artery disease risk factors: dyslipidemia, hypertension, smoking, family history of premature coronary artery disease, and diabetes mellitus  Medications:  a blood thinner, a calcium channel blocker, an ACE inhibitor/ARB, aspirin, clopidogrel, a lipid lowering agent, and diabetic medications  PHYSICAL EXAM: Baseline physical exam screening: scant wheezing audible  REST ECG: Normal sinus rhythm  The ECG showed rare premature ventricular contractions      PROCEDURE: The study was performed in the the Stress lab  A regadenoson infusion pharmacologic stress test was performed  Gated SPECT myocardial perfusion imaging was performed after stress and at rest  Systolic blood pressure was 164  mmHg, at the start of the study  Diastolic blood pressure was 79 mmHg, at the start of the study  The heart rate was 76 bpm, at the start of the study  IV double checked  Regadenoson protocol:  Time HR bpm SBP mmHg DBP mmHg Symptoms Rhythm/conduct  Baseline 09:12 76 164 79 none NSR, rare PVC's  Immediate 09:15 91 149 75 none same as above  1 min 09:16 88 151 75 none NSR  2 min 09:17 85 146 63 none same as above  3 min 09:18 81 155 70 none same as above  4 min 09:19 87 140 68 none same as above  No medications or fluids given  STRESS SUMMARY: Duration of pharmacologic stress was 3 min  Maximal heart rate during stress was 93 bpm  The heart rate response to stress was normal  There was resting hypertension with an appropriate blood pressure response to stress  The rate-pressure product for the peak heart rate and blood pressure was 38743  There was no chest pain during stress  The stress test was terminated due to protocol completion  Pre oxygen saturation: 98 %  Peak oxygen saturation: 99 %  No  EKG evidence of Lexiscan induced myocardial ischemia    ISOTOPE ADMINISTRATION:  Resting isotope administration Stress isotope administration  Agent Tetrofosmin Tetrofosmin  Dose 10 2 mCi 32 6 mCi  Date 06/03/2021 06/03/2021    Stress isotope administration  Agent Tetrofosmin  Dose 10 2 mCi  Date 06/03/2021    The radiopharmaceutical was injected at the peak effect of pharmacologic stress  MYOCARDIAL PERFUSION IMAGING:  There is a small sized mild intensity reversible perfusion defect noted in the inferior and lateral walls  SSS 5, SRS 2, SDS 3    GATED SPECT:  Estimated left ventricular ejection fraction is 44%  TID ratio 1 04    SUMMARY:  -  Stress results:  There was resting hypertension with an appropriate blood pressure response to stress  There was no chest pain during stress  IMPRESSIONS: Abnormal Lexiscan nuclear stress test  There is a small area of reversible ischemia noted in the inferior and lateral walls  Estimated left ventricular ejection fraction is 44%  TID ratio is 1 04    Prepared and signed by    Danielle Wood MD  Signed 06/03/2021 13:59:02    No results found for this or any previous visit  Imaging:  Chest X-Ray:   No Chest XR results available for this patient  CT-scan of the chest:     No CTA results available for this patient    Lab Review   Lab Results   Component Value Date    WBC 5 00 04/09/2022    HGB 8 7 (L) 04/09/2022    HCT 26 4 (L) 04/09/2022     (H) 04/09/2022    RDW 18 8 (H) 04/09/2022     (L) 04/09/2022     BMP:  Lab Results   Component Value Date    SODIUM 136 04/09/2022    K 3 4 (L) 04/09/2022     04/09/2022    CO2 32 04/09/2022    BUN 13 04/09/2022    CREATININE 4 54 (H) 04/09/2022    GLUC 138 04/09/2022    GLUF 94 03/29/2022    CALCIUM 8 2 (L) 04/09/2022    CORRECTEDCA 9 0 04/03/2022    EGFR 12 04/09/2022    MG 2 0 04/03/2022     LFT:  Lab Results   Component Value Date    AST 22 04/03/2022    ALT 18 04/03/2022    ALKPHOS 217 (H) 04/03/2022    TP 5 9 (L) 04/03/2022    ALB 2 4 (L) 04/03/2022      No components found for: [employer] [facility]  Lab Results   Component Value Date    KIO0CQYPCNLV 3 990 07/28/2022     Lab Results   Component Value Date    HGBA1C 4 8 01/05/2023     Lipid Profile:   Lab Results   Component Value Date    CHOLESTEROL 123 10/25/2016    HDL 72 (H) 10/25/2016    LDLCALC 35 10/25/2016    TRIG 78 10/25/2016     Lab Results   Component Value Date    CHOLESTEROL 123 10/25/2016     Lab Results   Component Value Date    TROPONINI 0 08 (H) 07/26/2021     Lab Results   Component Value Date    NTBNP 17,457 (H) 03/28/2022      Recent Results (from the past 672 hour(s))   POCT hemoglobin A1c    Collection Time: 01/05/23  8:23 AM   Result Value Ref Range    Hemoglobin A1C 4 8 6 5 Dr Dwight Miles MD, [facility]      "This note has been constructed using a voice recognition system  Therefore there may be syntax, spelling, and/or grammatical errors   Please call if you have any questions  "

## 2023-01-16 NOTE — PROGRESS NOTES
Chief Complaint   Patient presents with   • Diabetes     Dm check    f/u chronic conditions      Patient ID: Daniela Martinez is a 75 y o  male  HPI  Pt is seeing for f/u DM2, HTN, HLD - stable, has end stage renal disease on hemodialysis -  Under nephrologist care     The following portions of the patient's history were reviewed and updated as appropriate: allergies, current medications, past family history, past medical history, past social history, past surgical history and problem list     Review of Systems   Constitutional: Negative  Respiratory: Negative  Cardiovascular: Negative  Gastrointestinal: Negative  Genitourinary: Negative  Musculoskeletal: Negative  Skin: Negative  Neurological: Negative          Current Outpatient Medications   Medication Sig Dispense Refill   • apixaban (Eliquis) 5 mg Take 2 tablets (10 mg total) by mouth 2 (two) times a day 10mg BID x7 days then on day 8 switch to 5mg  tablet 0   • ascorbic acid (VITAMIN C) 500 mg tablet TAKE ONE TABLET BY MOUTH EVERY EVENING FOR SUPPLEMENT     • aspirin (ECOTRIN LOW STRENGTH) 81 mg EC tablet Take 81 mg by mouth daily     • atorvastatin (LIPITOR) 20 mg tablet Take 20 mg by mouth daily at bedtime     • B Complex-C-Folic Acid (TRIPHROCAPS) 1 MG CAPS Take 1 capsule by mouth     • calcium carbonate (TUMS) 500 mg chewable tablet Chew 2 tablets (1,000 mg total) 2 (two) times a day as needed for indigestion or heartburn (Patient not taking: Reported on 1/12/2023)  0   • Cholecalciferol 50 MCG (2000 UT) CAPS Take by mouth daily     • [place] Choice Comfort EZ 33G X 4 MM MISC      • clopidogrel (Plavix) 75 mg tablet Take 1 tablet (75 mg total) by mouth daily 30 tablet 0   • docusate sodium (COLACE) 100 mg capsule  (Patient not taking: Reported on 1/12/2023)     • glucose blood test strip Test 2 (two) times a day     • nitroglycerin (NITROSTAT) 0 4 mg SL tablet Place under the tongue (Patient not taking: Reported on 1/12/2023) • acetaminophen (TYLENOL) 325 mg tablet Take 3 tablets (975 mg total) by mouth every 8 (eight) hours (Patient not taking: Reported on 7/27/2022)  0   • apixaban (Eliquis) 5 mg Take 1 tablet (5 mg total) by mouth 2 (two) times a day Take 10mg BID x7 days  On day 8, switch to 5mg BID 60 tablet 0   • carvedilol (COREG) 3 125 mg tablet TAKE 1 TABLET(3 125 MG) BY MOUTH TWICE DAILY WITH MEALS (Patient not taking: Reported on 1/12/2023) 180 tablet 3   • pantoprazole (PROTONIX) 40 mg tablet Take 1 tablet (40 mg total) by mouth daily in the early morning (Patient not taking: Reported on 12/2/2022)  0   • rosuvastatin (CRESTOR) 20 MG tablet Take 20 mg by mouth every evening (Patient not taking: Reported on 1/5/2023)  1     No current facility-administered medications for this visit  Objective:    BP 90/60 (BP Location: Left arm, Patient Position: Sitting, Cuff Size: Large)   Pulse 60   Temp 97 9 °F (36 6 °C)   Resp 18   Ht 5' 10" (1 778 m)   Wt 74 4 kg (164 lb)   BMI 23 53 kg/m²        Physical Exam  Constitutional:       Appearance: He is not ill-appearing  Cardiovascular:      Rate and Rhythm: Normal rate and regular rhythm  Heart sounds: No murmur heard  Pulmonary:      Effort: Pulmonary effort is normal  No respiratory distress  Breath sounds: No wheezing, rhonchi or rales  Abdominal:      Palpations: Abdomen is soft  Tenderness: There is no abdominal tenderness  Musculoskeletal:      Right lower leg: No edema  Left lower leg: No edema  Neurological:      General: No focal deficit present  Mental Status: He is alert and oriented to person, place, and time  Psychiatric:         Mood and Affect: Mood normal            Labs in chart were reviewed    Recent Results (from the past 672 hour(s))   POCT hemoglobin A1c    Collection Time: 01/05/23  8:23 AM   Result Value Ref Range    Hemoglobin A1C 4 8 6 5       Assessment/Plan:         Diagnoses and all orders for this visit:    Type 2 diabetes mellitus with chronic kidney disease on chronic dialysis, unspecified whether long term insulin use (HCC)  -     POCT hemoglobin A1c    Primary hypertension    End-stage renal disease on hemodialysis (HCC)    Mixed hyperlipidemia      All stable  Cont meds    F/u with speciaists     rto in 6 m                 Alanna Patel MD

## 2023-02-03 ENCOUNTER — HOSPITAL ENCOUNTER (OUTPATIENT)
Facility: HOSPITAL | Age: 68
Setting detail: OUTPATIENT SURGERY
Discharge: HOME | End: 2023-02-03
Attending: INTERNAL MEDICINE | Admitting: INTERNAL MEDICINE

## 2023-02-03 VITALS
HEART RATE: 70 BPM | DIASTOLIC BLOOD PRESSURE: 57 MMHG | TEMPERATURE: 98.4 F | SYSTOLIC BLOOD PRESSURE: 112 MMHG | OXYGEN SATURATION: 100 % | RESPIRATION RATE: 16 BRPM

## 2023-02-03 DIAGNOSIS — Z01.810 PRE-OPERATIVE CARDIOVASCULAR EXAMINATION: ICD-10-CM

## 2023-02-03 DIAGNOSIS — I10 ESSENTIAL HYPERTENSION: ICD-10-CM

## 2023-02-03 DIAGNOSIS — I25.118 CORONARY ARTERY DISEASE OF NATIVE ARTERY OF NATIVE HEART WITH STABLE ANGINA PECTORIS (HCC): ICD-10-CM

## 2023-02-03 DIAGNOSIS — N18.6 END STAGE RENAL DISEASE (HCC): ICD-10-CM

## 2023-02-03 LAB
ANION GAP SERPL CALCULATED.3IONS-SCNC: 10 MMOL/L (ref 4–13)
BASOPHILS # BLD AUTO: 0.04 THOUSANDS/ÂΜL (ref 0–0.1)
BASOPHILS NFR BLD AUTO: 1 % (ref 0–1)
BUN SERPL-MCNC: 44 MG/DL (ref 5–25)
CALCIUM SERPL-MCNC: 8.8 MG/DL (ref 8.3–10.1)
CATH EF QUANTITATIVE: 50 %
CHLORIDE SERPL-SCNC: 99 MMOL/L (ref 96–108)
CO2 SERPL-SCNC: 31 MMOL/L (ref 21–32)
CREAT SERPL-MCNC: 7.81 MG/DL (ref 0.6–1.3)
EOSINOPHIL # BLD AUTO: 0.17 THOUSAND/ÂΜL (ref 0–0.61)
EOSINOPHIL NFR BLD AUTO: 3 % (ref 0–6)
ERYTHROCYTE [DISTWIDTH] IN BLOOD BY AUTOMATED COUNT: 14.1 % (ref 11.6–15.1)
GFR SERPL CREATININE-BSD FRML MDRD: 6 ML/MIN/1.73SQ M
GLUCOSE P FAST SERPL-MCNC: 140 MG/DL (ref 65–99)
GLUCOSE SERPL-MCNC: 140 MG/DL (ref 65–140)
HCT VFR BLD AUTO: 35.5 % (ref 36.5–49.3)
HGB BLD-MCNC: 12.1 G/DL (ref 12–17)
IMM GRANULOCYTES # BLD AUTO: 0.05 THOUSAND/UL (ref 0–0.2)
IMM GRANULOCYTES NFR BLD AUTO: 1 % (ref 0–2)
LYMPHOCYTES # BLD AUTO: 0.69 THOUSANDS/ÂΜL (ref 0.6–4.47)
LYMPHOCYTES NFR BLD AUTO: 11 % (ref 14–44)
MCH RBC QN AUTO: 41 PG (ref 26.8–34.3)
MCHC RBC AUTO-ENTMCNC: 34.1 G/DL (ref 31.4–37.4)
MCV RBC AUTO: 120 FL (ref 82–98)
MONOCYTES # BLD AUTO: 0.48 THOUSAND/ÂΜL (ref 0.17–1.22)
MONOCYTES NFR BLD AUTO: 8 % (ref 4–12)
NEUTROPHILS # BLD AUTO: 4.77 THOUSANDS/ÂΜL (ref 1.85–7.62)
NEUTS SEG NFR BLD AUTO: 76 % (ref 43–75)
NRBC BLD AUTO-RTO: 0 /100 WBCS
PLATELET # BLD AUTO: 96 THOUSANDS/UL (ref 149–390)
PMV BLD AUTO: 9.6 FL (ref 8.9–12.7)
POTASSIUM SERPL-SCNC: 3.9 MMOL/L (ref 3.5–5.3)
RBC # BLD AUTO: 2.95 MILLION/UL (ref 3.88–5.62)
SODIUM SERPL-SCNC: 140 MMOL/L (ref 135–147)
WBC # BLD AUTO: 6.2 THOUSAND/UL (ref 4.31–10.16)

## 2023-02-03 RX ORDER — ONDANSETRON 2 MG/ML
4 INJECTION INTRAMUSCULAR; INTRAVENOUS EVERY 6 HOURS PRN
Status: DISCONTINUED | OUTPATIENT
Start: 2023-02-03 | End: 2023-02-06 | Stop reason: HOSPADM

## 2023-02-03 RX ORDER — IODIXANOL 320 MG/ML
INJECTION, SOLUTION INTRAVASCULAR CODE/TRAUMA/SEDATION MEDICATION
Status: DISCONTINUED | OUTPATIENT
Start: 2023-02-03 | End: 2023-02-03 | Stop reason: HOSPADM

## 2023-02-03 RX ORDER — MIDAZOLAM HYDROCHLORIDE 2 MG/2ML
INJECTION, SOLUTION INTRAMUSCULAR; INTRAVENOUS CODE/TRAUMA/SEDATION MEDICATION
Status: DISCONTINUED | OUTPATIENT
Start: 2023-02-03 | End: 2023-02-03 | Stop reason: HOSPADM

## 2023-02-03 RX ORDER — TRAMADOL HYDROCHLORIDE 50 MG/1
50 TABLET ORAL EVERY 12 HOURS PRN
Status: DISCONTINUED | OUTPATIENT
Start: 2023-02-03 | End: 2023-02-06 | Stop reason: HOSPADM

## 2023-02-03 RX ORDER — LIDOCAINE HYDROCHLORIDE 10 MG/ML
INJECTION, SOLUTION EPIDURAL; INFILTRATION; INTRACAUDAL; PERINEURAL CODE/TRAUMA/SEDATION MEDICATION
Status: DISCONTINUED | OUTPATIENT
Start: 2023-02-03 | End: 2023-02-03 | Stop reason: HOSPADM

## 2023-02-03 RX ORDER — ASPIRIN 81 MG/1
162 TABLET, CHEWABLE ORAL DAILY
Status: DISCONTINUED | OUTPATIENT
Start: 2023-02-03 | End: 2023-02-06 | Stop reason: HOSPADM

## 2023-02-03 RX ORDER — FENTANYL CITRATE 50 UG/ML
INJECTION, SOLUTION INTRAMUSCULAR; INTRAVENOUS CODE/TRAUMA/SEDATION MEDICATION
Status: DISCONTINUED | OUTPATIENT
Start: 2023-02-03 | End: 2023-02-03 | Stop reason: HOSPADM

## 2023-02-03 NOTE — CONSULTS
[address] [facility] Dario 75 y o  male MRN: [id number]  Unit/Bed#: [place] CATH LAB ROOM Encounter: [id number]    ASSESSMENT and PLAN:    End Stage Kidney Disease  -Modality:In-Center Hemodialysis  -Schedule: Monday/Wednesday/Friday  -Dialysis Unit: [address] [place] [address] Président Nick Rangel  -Access: Tunneled permacath, being planned for an AV graft placement  -Presciption: Reviewed, dry weight 73 kg  -Status: Stable  Blood work appears to be stable with a normal potassium  Examines about euvolemic  -Plan:   · Plan for an elective cardiac catheterization  · Tentatively plan for discharge after the cardiac cath  · Can go to his outpatient dialysis unit tomorrow, I did confirm this with his outpatient unit    Mineral bone disorder-chronic kidney disease  --Can resume home medications upon discharge    Coronary artery disease  -- Abnormal stress test plan for cardiac catheterization today  -- Continue atorvastatin, Plavix, aspirin as per cardiology    Anemia of chronic disease  -- Hemoglobin above target    Hypertension  -- Normotensive and euvolemic      SUMMARY OF RECOMMENDATIONS:    Please see above    Patient stable from a renal standpoint for discharge after cardiac catheterization    Patient can go to his outpatient dialysis unit tomorrow  I did confirm this with his outpatient unit  I did discuss this with cardiologist Dr Wes Frost, he also agrees with the plan  HISTORY OF PRESENT ILLNESS:  Requesting Physician: Kody Abarca MD  Reason for Consult: ESRD    Ila Casiano is a 75 y o  male who was electively admitted to [place] [address] for diagnostic cardiac catheterization  A renal consultation is requested today for assistance in the management of ESRD  Patient had undergone a cardiac risk assessment for impending surgery for an AV graft and was found to have an abnormal stress test   He actively complains of no chest pain or shortness of breath  He underwent dialysis this past Wednesday  Laboratory data reviewed and his electrolytes are stable  He does not examine volume overloaded  PAST MEDICAL HISTORY:  Past Medical History:   Diagnosis Date   • 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    With Cerebral Infarction:  Last Assessed:  October 20, 2016   • Chronic kidney disease 2012    on dialysis    • Diabetes mellitus ([facility] [address] )    • Hypertension    • Labyrinthitis 09/10/2011   • Myocardial infarction [facility]) 2014    x3 others were in 2009 & 2010   • Sleep disturbances 09/20/2010   • Stroke [facility]) 2007    x1   • Type 2 diabetes, uncontrolled, with renal manifestation 05/03/2017       PAST SURGICAL HISTORY:  Past Surgical History:   Procedure Laterality Date   • AV FISTULA PLACEMENT     • CARDIAC SURGERY  2010    stents x3   • COLONOSCOPY N/A 12/12/2016    Procedure: COLONOSCOPY;  Surgeon: Sury Juarez MD;  Location: [facility] GI LAB; Service:    • COLONOSCOPY N/A 4/3/2017    Procedure:  COLONOSCOPY;  Surgeon: Sury Juarez MD;  Location: [facility] GI LAB; Service:    • HARDWARE REMOVAL Right 4/4/2022    Procedure: REMOVAL HARDWARE HIP;  Surgeon: Brian Ariza MD;  Location:  MAIN OR;  Service: Orthopedics   • IR AV FISTULA/GRAFT DECLOT  4/29/2021   • IR AV FISTULA/GRAFT DECLOT  3/25/2022   • IR AV FISTULA/GRAFT DECLOT  9/21/2022   • IR AV FISTULAGRAM/GRAFTOGRAM  3/28/2022   • IR AV FISTULAGRAM/GRAFTOGRAM  7/28/2022   • IR TUNNELED CENTRAL LINE REMOVAL  12/7/2021   • IR TUNNELED DIALYSIS CATHETER PLACEMENT  5/24/2021   • FL ARTERIOVENOUS ANASTOMOSIS OPEN DIRECT Left 7/22/2021    Procedure: CREATION FISTULA ARTERIOVENOUS (AV); Surgeon: Pop Lawler MD;  Location: [place] MAIN OR;  Service: Vascular   • FL HEMIARTHROPLASTY HIP PARTIAL Right 4/4/2022    Procedure: HEMIARTHROPLASTY HIP (BIPOLAR);   Surgeon: Brian Ariza MD;  Location:  MAIN OR;  Service: Orthopedics       ALLERGIES:  No Known Allergies    SOCIAL HISTORY:  Social History     Substance and Sexual Activity   Alcohol Use Yes Comment: rarely - No alcohol use per [employer]     Social History     Substance and Sexual Activity   Drug Use Yes   • Types: Marijuana    Comment: occasional brownie for sleep     Social History     Tobacco Use   Smoking Status Every Day   • Packs/day: 0 50   • Years: 30 00   • Pack years: 15 00   • Types: Cigarettes   Smokeless Tobacco Never       FAMILY HISTORY:  Family History   Problem Relation Age of Onset   • Leukemia Mother    • Crohn's disease Father    • Transient ischemic attack Brother        MEDICATIONS:  No current facility-administered medications for this encounter  REVIEW OF SYSTEMS:  Constitutional: Negative for fatigue, anorexia, fever, chills, diaphoresis  HENT: Negative for postnasal drip  Eyes: Negative for visual disturbance  Respiratory: Negative for cough, shortness of breath and wheezing  Cardiovascular: Negative for chest pain, palpitations and leg swelling  Gastrointestinal: Negative for abdominal pain, constipation, diarrhea, nausea and vomiting  Genitourinary: No dysuria, hematuria  Endocrine: Negative for polyuria  Musculoskeletal: Negative for arthralgias, back pain and joint swelling  Skin: Negative for rash  Neurological: Negative for focal weakness, headaches, dizziness  Hematological: Negative for easy bruising or bleeding  Psychiatric/Behavioral: Negative for confusion and sleep disturbance  All the systems were reviewed and were negative except as documented on the HPI  PHYSICAL EXAM:  Current Weight:    First Weight:    Vitals:    02/03/23 0803   BP: 111/59   BP Location: Right arm   Pulse: 67   Resp: 18   Temp: 98 4 °F (36 9 °C)   SpO2: 99%     No intake or output data in the 24 hours ending 02/03/23 0858  Physical Exam  Vitals and nursing note reviewed  Exam conducted with a chaperone present  Constitutional:       General: He is not in acute distress  Appearance: Normal appearance  He is not ill-appearing, toxic-appearing or diaphoretic  HENT:      Head: Normocephalic and atraumatic  Mouth/Throat:      Mouth: Mucous membranes are dry  Eyes:      General: No scleral icterus  Right eye: No discharge  Left eye: No discharge  Cardiovascular:      Rate and Rhythm: Normal rate  Pulmonary:      Effort: No respiratory distress  Abdominal:      General: There is no distension  Tenderness: There is no guarding  Musculoskeletal:         General: No swelling  Skin:     General: Skin is dry  Coloration: Skin is not pale  Findings: No erythema or rash  Neurological:      Mental Status: He is alert and oriented to person, place, and time  Psychiatric:         Mood and Affect: Mood normal          Behavior: Behavior normal          Thought Content:  Thought content normal          Judgment: Judgment normal            Invasive Devices:      Lab Results:   Results from last 7 days   Lab Units 02/03/23  0809   POTASSIUM mmol/L 3 9   CHLORIDE mmol/L 99   CO2 mmol/L 31   BUN mg/dL 44*   CREATININE mg/dL 7 81*   CALCIUM mg/dL 8 8

## 2023-02-03 NOTE — PERIOPERATIVE NURSING NOTE
Assisted OOB to [facility]  Right groin dsg dry, intact 
Discharge instructions reviewed with pt, verbalizes understanding 
Pedal pulses  And post tib pulses positive bilaterally
Positioned in reverse trendelenberg position, given lunch    VSS
Received from PACU  Awake,alert  Right groin femstop @40mm/hg   Dsg in groin dry,intact 
Right groin dsg dry, intact    Surrounding tissue soft with no echymosis
Right groin dsg dry,intact  Surrounding skin soft, no echymosis 
Right groin femstop removed  Dressing dry, intact  Surrounding skin soft with no echymosis  Pt remains flat , instructed not to bend  
Right groin femstop to 20mm/hg
Transferred to same day surgery in the care of ALONSO rn,
EMS

## 2023-02-03 NOTE — INTERVAL H&P NOTE
Update: (This section must be completed if the H&P was completed greater than 24 hrs to procedure or admission)    Patient seen and evaluated in the Cath Lab  Nephrology also evaluated the patient  Patient has end-stage renal disease, history of CAD, previous history of MI, history of stent of circumflex with bare-metal in 2008, PDA and balloon dilatation of diagonal   Later on PCI of distal right coronary artery at [facility] no need surgery and scheduled for cardiac aspiration by the cardiology team   All the risk-benefit alternate complication discussed with the patient  As per my discussion with nephrology patient will be scheduled for dialysis tomorrow  At this time patient is comfortable lying flat  All the risk-benefit alternate discussed with him wishes to proceed consent was obtained  Patient re-evaluated   Accept as history and physical     Aminah Soto MD/February 3, 2023/9:00 AM dyspnea/cough

## 2023-02-07 ENCOUNTER — TELEPHONE (OUTPATIENT)
Dept: CARDIOLOGY CLINIC | Facility: CLINIC | Age: 68
End: 2023-02-07

## 2023-02-07 NOTE — TELEPHONE ENCOUNTER
Patient having procedure  with Dr Wilver Grigsby and Dr Estelita Thomas tomorrow at [facility] what medications should be held

## 2023-02-08 ENCOUNTER — APPOINTMENT (OUTPATIENT)
Dept: DIALYSIS | Facility: HOSPITAL | Age: 68
End: 2023-02-08

## 2023-02-08 ENCOUNTER — HOSPITAL ENCOUNTER (OUTPATIENT)
Facility: HOSPITAL | Age: 68
Setting detail: OUTPATIENT SURGERY
Discharge: HOME | End: 2023-02-09
Attending: INTERNAL MEDICINE | Admitting: INTERNAL MEDICINE

## 2023-02-08 DIAGNOSIS — I25.118 CORONARY ARTERY DISEASE OF NATIVE ARTERY OF NATIVE HEART WITH STABLE ANGINA PECTORIS (HCC): ICD-10-CM

## 2023-02-08 DIAGNOSIS — Z95.820 S/P ANGIOPLASTY WITH STENT: Primary | ICD-10-CM

## 2023-02-08 DIAGNOSIS — I25.9 CHRONIC ISCHEMIC HEART DISEASE: ICD-10-CM

## 2023-02-08 DIAGNOSIS — N18.6 END-STAGE RENAL DISEASE ON HEMODIALYSIS (HCC): ICD-10-CM

## 2023-02-08 DIAGNOSIS — Z99.2 END-STAGE RENAL DISEASE ON HEMODIALYSIS (HCC): ICD-10-CM

## 2023-02-08 LAB
FLUAV RNA RESP QL NAA+PROBE: NEGATIVE
FLUBV RNA RESP QL NAA+PROBE: NEGATIVE
KCT BLD-ACNC: 239 SEC (ref 89–137)
KCT BLD-ACNC: 279 SEC (ref 89–137)
RSV RNA RESP QL NAA+PROBE: NEGATIVE
SARS-COV-2 RNA RESP QL NAA+PROBE: NEGATIVE
SPECIMEN SOURCE: ABNORMAL
SPECIMEN SOURCE: ABNORMAL

## 2023-02-08 DEVICE — PERCLOSE™ PROSTYLE™ SUTURE-MEDIATED CLOSURE AND REPAIR SYSTEM
Type: IMPLANTABLE DEVICE | Status: FUNCTIONAL
Brand: PERCLOSE™ PROSTYLE™

## 2023-02-08 DEVICE — EVEROLIMUS-ELUTING PLATINUM CHROMIUM CORONARY STENT SYSTEM
Type: IMPLANTABLE DEVICE | Status: FUNCTIONAL
Brand: SYNERGY™ XD

## 2023-02-08 DEVICE — XIENCE SKYPOINT™ EVEROLIMUS ELUTING CORONARY STENT SYSTEM 4.00 MM X 38 MM / RAPID-EXCHANGE
Type: IMPLANTABLE DEVICE | Status: FUNCTIONAL
Brand: XIENCE SKYPOINT™

## 2023-02-08 RX ORDER — SODIUM CHLORIDE 9 MG/ML
INJECTION, SOLUTION INTRAVENOUS
Status: DISCONTINUED | OUTPATIENT
Start: 2023-02-08 | End: 2023-02-08 | Stop reason: HOSPADM

## 2023-02-08 RX ORDER — ONDANSETRON 2 MG/ML
4 INJECTION INTRAMUSCULAR; INTRAVENOUS EVERY 6 HOURS PRN
Status: DISCONTINUED | OUTPATIENT
Start: 2023-02-08 | End: 2023-02-09 | Stop reason: HOSPADM

## 2023-02-08 RX ORDER — CALCIUM CARBONATE 200(500)MG
1000 TABLET,CHEWABLE ORAL 2 TIMES DAILY PRN
Status: DISCONTINUED | OUTPATIENT
Start: 2023-02-08 | End: 2023-02-09 | Stop reason: HOSPADM

## 2023-02-08 RX ORDER — ATORVASTATIN CALCIUM 40 MG/1
40 TABLET, FILM COATED ORAL
Status: DISCONTINUED | OUTPATIENT
Start: 2023-02-08 | End: 2023-02-09 | Stop reason: HOSPADM

## 2023-02-08 RX ORDER — FENTANYL CITRATE 50 UG/ML
INJECTION, SOLUTION INTRAMUSCULAR; INTRAVENOUS CODE/TRAUMA/SEDATION MEDICATION
Status: DISCONTINUED | OUTPATIENT
Start: 2023-02-08 | End: 2023-02-08 | Stop reason: HOSPADM

## 2023-02-08 RX ORDER — CLOPIDOGREL BISULFATE 75 MG/1
75 TABLET ORAL DAILY
Status: DISCONTINUED | OUTPATIENT
Start: 2023-02-09 | End: 2023-02-09 | Stop reason: HOSPADM

## 2023-02-08 RX ORDER — PANTOPRAZOLE SODIUM 40 MG/1
40 TABLET, DELAYED RELEASE ORAL
Status: DISCONTINUED | OUTPATIENT
Start: 2023-02-09 | End: 2023-02-09 | Stop reason: HOSPADM

## 2023-02-08 RX ORDER — SODIUM CHLORIDE 9 MG/ML
125 INJECTION, SOLUTION INTRAVENOUS CONTINUOUS
Status: DISCONTINUED | OUTPATIENT
Start: 2023-02-08 | End: 2023-02-08

## 2023-02-08 RX ORDER — ACETAMINOPHEN 325 MG/1
650 TABLET ORAL EVERY 4 HOURS PRN
Status: DISCONTINUED | OUTPATIENT
Start: 2023-02-08 | End: 2023-02-09 | Stop reason: HOSPADM

## 2023-02-08 RX ORDER — ASPIRIN 81 MG/1
324 TABLET, CHEWABLE ORAL ONCE
Status: COMPLETED | OUTPATIENT
Start: 2023-02-08 | End: 2023-02-08

## 2023-02-08 RX ORDER — HEPARIN SODIUM 1000 [USP'U]/ML
INJECTION, SOLUTION INTRAVENOUS; SUBCUTANEOUS CODE/TRAUMA/SEDATION MEDICATION
Status: DISCONTINUED | OUTPATIENT
Start: 2023-02-08 | End: 2023-02-08 | Stop reason: HOSPADM

## 2023-02-08 RX ORDER — NITROGLYCERIN 20 MG/100ML
INJECTION INTRAVENOUS CODE/TRAUMA/SEDATION MEDICATION
Status: DISCONTINUED | OUTPATIENT
Start: 2023-02-08 | End: 2023-02-08 | Stop reason: HOSPADM

## 2023-02-08 RX ORDER — ADENOSINE 3 MG/ML
INJECTION, SOLUTION INTRAVENOUS
Status: DISCONTINUED | OUTPATIENT
Start: 2023-02-08 | End: 2023-02-08 | Stop reason: HOSPADM

## 2023-02-08 RX ORDER — NITROGLYCERIN 0.4 MG/1
0.4 TABLET SUBLINGUAL
Qty: 30 TABLET | Refills: 1 | Status: CANCELLED | OUTPATIENT
Start: 2023-02-08

## 2023-02-08 RX ORDER — NITROGLYCERIN 0.4 MG/1
0.4 TABLET SUBLINGUAL
Status: DISCONTINUED | OUTPATIENT
Start: 2023-02-08 | End: 2023-02-09 | Stop reason: HOSPADM

## 2023-02-08 RX ORDER — ASPIRIN 81 MG/1
81 TABLET ORAL DAILY
Status: DISCONTINUED | OUTPATIENT
Start: 2023-02-09 | End: 2023-02-09 | Stop reason: HOSPADM

## 2023-02-08 RX ORDER — MIDAZOLAM HYDROCHLORIDE 2 MG/2ML
INJECTION, SOLUTION INTRAMUSCULAR; INTRAVENOUS CODE/TRAUMA/SEDATION MEDICATION
Status: DISCONTINUED | OUTPATIENT
Start: 2023-02-08 | End: 2023-02-08 | Stop reason: HOSPADM

## 2023-02-08 RX ORDER — LIDOCAINE HYDROCHLORIDE 10 MG/ML
INJECTION, SOLUTION EPIDURAL; INFILTRATION; INTRACAUDAL; PERINEURAL CODE/TRAUMA/SEDATION MEDICATION
Status: DISCONTINUED | OUTPATIENT
Start: 2023-02-08 | End: 2023-02-08 | Stop reason: HOSPADM

## 2023-02-08 RX ORDER — HYDROMORPHONE HCL IN WATER/PF 6 MG/30 ML
0.2 PATIENT CONTROLLED ANALGESIA SYRINGE INTRAVENOUS ONCE
Status: COMPLETED | OUTPATIENT
Start: 2023-02-08 | End: 2023-02-08

## 2023-02-08 RX ADMIN — HYDROMORPHONE HYDROCHLORIDE 0.2 MG: 0.2 INJECTION, SOLUTION INTRAMUSCULAR; INTRAVENOUS; SUBCUTANEOUS at 19:50

## 2023-02-08 RX ADMIN — ATORVASTATIN CALCIUM 40 MG: 40 TABLET, FILM COATED ORAL at 18:04

## 2023-02-08 RX ADMIN — ACETAMINOPHEN 650 MG: 325 TABLET ORAL at 18:04

## 2023-02-08 RX ADMIN — ASPIRIN 243 MG: 81 TABLET, CHEWABLE ORAL at 07:23

## 2023-02-08 NOTE — CONSULTS
Consultation - Nephrology   Cliff Desi Bishop 75 y o  male MRN: [id number]  Unit/Bed#: [facility] CATH LAB ROOM Encounter: [id number]    ASSESSMENT and PLAN:  1  ESRD on HD (MWF at [address]): plan for IHD today per outpatient schedule, TW 73kg per prior records  IVF discontinued as patient at risk of volume overload   2  Access: right chest permacath well functioning, outpatient planning for AVG placement  3  Hypertension: BP low normal, UF as tolerated, not on antihypertensives  4  Anemia: Hgb above goal as per last CBC, hold ALICE  5  CAD s/p LHC with PCI/KARLOS today: plan per cardiology    SUMMARY OF RECOMMENDATIONS:  Plan for IHD today per outpatient schedule  HISTORY OF PRESENT ILLNESS:  Requesting Physician: Kalpesh Tran MD  Reason for Consult: ESRD on HD    Roma Koenig is a 75y o  year old male who was admitted to [facility] after presenting for elective PCI  A renal consultation is requested today for assistance in the management of ESRD  Roma Koenig is a known ESRD patient who undergoes maintenance hemodialysis at [address] on MWF  Last HD was Monday, today is Wednesday  He denies fevers, chills, nausea, vomiting, diarrhea, constipation, chest pain, chest breath or leg edema  He has been on hemodialysis for about 11 years with multiple failed access in the left arm  He currently has a right chest permacath  He states it works well  Denies any complaints      PAST MEDICAL HISTORY:  Past Medical History:   Diagnosis Date   • 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    With Cerebral Infarction:  Last Assessed:  October 20, 2016   • Chronic kidney disease 2012    on dialysis    • Diabetes mellitus ([facility] [address] )    • Hypertension    • Labyrinthitis 09/10/2011   • Myocardial infarction [facility]) 2014    x3 others were in 2009 & 2010   • Sleep disturbances 09/20/2010   • Stroke [facility]) 2007    x1   • Type 2 diabetes, uncontrolled, with renal manifestation 05/03/2017       PAST SURGICAL HISTORY:  Past Surgical History:   Procedure Laterality Date   • AV FISTULA PLACEMENT     • CARDIAC CATHETERIZATION Left 2/3/2023    Procedure: Cardiac Left Heart Cath;  Surgeon: Adalid Harrison MD;  Location: [place] [address] CATH LAB; Service: Cardiology   • CARDIAC SURGERY  2010    stents x3   • COLONOSCOPY N/A 12/12/2016    Procedure: COLONOSCOPY;  Surgeon: Amanda Hall MD;  Location: [facility] GI LAB; Service:    • COLONOSCOPY N/A 4/3/2017    Procedure:  COLONOSCOPY;  Surgeon: Amanda Hall MD;  Location: [facility] GI LAB; Service:    • HARDWARE REMOVAL Right 4/4/2022    Procedure: REMOVAL HARDWARE HIP;  Surgeon: Grazyna Salomon MD;  Location:  MAIN OR;  Service: Orthopedics   • IR AV FISTULA/GRAFT DECLOT  4/29/2021   • IR AV FISTULA/GRAFT DECLOT  3/25/2022   • IR AV FISTULA/GRAFT DECLOT  9/21/2022   • IR AV FISTULAGRAM/GRAFTOGRAM  3/28/2022   • IR AV FISTULAGRAM/GRAFTOGRAM  7/28/2022   • IR TUNNELED CENTRAL LINE REMOVAL  12/7/2021   • IR TUNNELED DIALYSIS CATHETER PLACEMENT  5/24/2021   • MI ARTERIOVENOUS ANASTOMOSIS OPEN DIRECT Left 7/22/2021    Procedure: CREATION FISTULA ARTERIOVENOUS (AV); Surgeon: Jocelynn Alanis MD;  Location: [place] MAIN OR;  Service: Vascular   • MI HEMIARTHROPLASTY HIP PARTIAL Right 4/4/2022    Procedure: HEMIARTHROPLASTY HIP (BIPOLAR);   Surgeon: Grazyna Salomon MD;  Location:  MAIN OR;  Service: Orthopedics       ALLERGIES:  No Known Allergies    SOCIAL HISTORY:  Social History     Substance and Sexual Activity   Alcohol Use Yes    Comment: rarely - No alcohol use per [employer]     Social History     Substance and Sexual Activity   Drug Use Yes   • Types: Marijuana    Comment: occasional brownie for sleep     Social History     Tobacco Use   Smoking Status Every Day   • Packs/day: 0 50   • Years: 30 00   • Pack years: 15 00   • Types: Cigarettes   Smokeless Tobacco Never       FAMILY HISTORY:  Family History   Problem Relation Age of Onset   • Leukemia Mother    • Crohn's disease Father • Transient ischemic attack Brother        MEDICATIONS:    Current Facility-Administered Medications:   •  acetaminophen (TYLENOL) tablet 650 mg, 650 mg, Oral, Q4H PRN, Tinnie Rump, CRNP  •  [START ON 2/9/2023] apixaban (ELIQUIS) tablet 5 mg, 5 mg, Oral, BID, Cheryl Gerardo MD  •  [START ON 2/9/2023] aspirin (ECOTRIN LOW STRENGTH) EC tablet 81 mg, 81 mg, Oral, Daily, Tinnie Rump, CRNP  •  atorvastatin (LIPITOR) tablet 40 mg, 40 mg, Oral, After Dinner, Tinnie Rump, CRNP  •  calcium carbonate (TUMS) chewable tablet 1,000 mg, 1,000 mg, Oral, BID PRN, Tinnie Rump, CRNP  •  [START ON 2/9/2023] clopidogrel (PLAVIX) tablet 75 mg, 75 mg, Oral, Daily, Tinnie Rump, CRNP  •  nitroglycerin (NITROSTAT) SL tablet 0 4 mg, 0 4 mg, Sublingual, Q5 Min PRN, Tinnie Rump, CRNP  •  ondansetron [facility]) injection 4 mg, 4 mg, Intravenous, Q6H PRN, Tinnie Rump, CRNP  •  [START ON 2/9/2023] pantoprazole (PROTONIX) EC tablet 40 mg, 40 mg, Oral, Early Morning, Tinnie Rump, CRNP  •  sodium chloride 0 9 % infusion, 125 mL/hr, Intravenous, Continuous, Tinnie Rump, CRNP    REVIEW OF SYSTEMS:  More than 10 systems were reviewed  No other pertinent positive findings other than those mentioned in HPI  PHYSICAL EXAM:  Current Weight: Weight - Scale: 71 2 kg (157 lb)  First Weight: Weight - Scale: 71 2 kg (157 lb)  Vitals:    02/08/23 1215 02/08/23 1300 02/08/23 1310 02/08/23 1330   BP: 100/56 117/78 119/82 95/62   BP Location:  Right arm Right arm Right arm   Pulse: 59 62 60 67   Resp:  12 12 (!) 11   Temp:  (!) 96 °F (35 6 °C)     TempSrc:  Tympanic     SpO2:    100%   Weight:       Height:           Intake/Output Summary (Last 24 hours) at 2/8/2023 1338  Last data filed at 2/8/2023 1300  Gross per 24 hour   Intake 200 ml   Output --   Net 200 ml     Physical Exam  Vitals reviewed  Constitutional:       General: He is not in acute distress  Appearance: Normal appearance  He is well-developed  He is not diaphoretic     HENT: Head: Normocephalic and atraumatic  Nose: Nose normal       Mouth/Throat:      Mouth: Mucous membranes are moist       Pharynx: No oropharyngeal exudate  Eyes:      General: No scleral icterus  Right eye: No discharge  Left eye: No discharge  Comments: eyeglasses   Neck:      Thyroid: No thyromegaly  Cardiovascular:      Rate and Rhythm: Normal rate and regular rhythm  Heart sounds: Normal heart sounds  Pulmonary:      Effort: Pulmonary effort is normal       Breath sounds: Normal breath sounds  No wheezing or rales  Abdominal:      General: Bowel sounds are normal  There is no distension  Palpations: Abdomen is soft  Tenderness: There is no abdominal tenderness  Musculoskeletal:         General: No swelling  Normal range of motion  Cervical back: Neck supple  Lymphadenopathy:      Cervical: No cervical adenopathy  Skin:     General: Skin is warm and dry  Coloration: Skin is not jaundiced  Findings: No rash  Neurological:      Mental Status: He is alert        Comments: awake   Psychiatric:         Mood and Affect: Mood normal          Behavior: Behavior normal          Invasive Devices:      Lab Results:   Results from last 7 days   Lab Units 02/03/23  0809   WBC Thousand/uL 6 20   HEMOGLOBIN g/dL 12 1   HEMATOCRIT % 35 5*   PLATELETS Thousands/uL 96*   POTASSIUM mmol/L 3 9   CHLORIDE mmol/L 99   CO2 mmol/L 31   BUN mg/dL 44*   CREATININE mg/dL 7 81*   CALCIUM mg/dL 8 8     Lab Results   Component Value Date    CALCIUM 8 8 02/03/2023    PHOS 3 9 03/28/2022

## 2023-02-08 NOTE — CONSULTS
Please see consult note completed earlier by me, Dr Roxann Gage  [Provider aware of all medications taken (including OTC)] : Patient stated provider is aware of all medications ~he/she~ is taking including OTC

## 2023-02-08 NOTE — PLAN OF CARE
Received from Cardiac cath lab  Plan 3 5 hrs of hemodialysis  On a 4 k bath  To uf 2 4 kg, patient states will cramp if going for more fluid  Post-Dialysis RN Treatment Note    Blood Pressure:  Pre 117/78 mm/Hg  Post 107/77 mmHg   EDW  73 kg    Weight:  Pre 78 2 kg   Post 76 kg   Mode of weight measurement: Bed Scale   Volume Removed  1400 ml    Treatment duration 150 minutes,  ordered 210 minutes   NS given  No    Treatment shortened? yes, patient stated he had enough dialysis today   C/o nausea and leg cramps   Rivera Levo NP notified    Medications given during Rx None Reported   Estimated Kt/V 1 05   Access type: Permcath   Access Issues: No    Report called to primary nurse   Yes Candida Obrien RN  Problem: METABOLIC, FLUID AND ELECTROLYTES - ADULT  Goal: Electrolytes maintained within normal limits  Description: INTERVENTIONS:  - Monitor labs and assess patient for signs and symptoms of electrolyte imbalances  - Administer electrolyte replacement as ordered  - Monitor response to electrolyte replacements, including repeat lab results as appropriate  - Instruct patient on fluid and nutrition as appropriate  Outcome: Progressing  Goal: Fluid balance maintained  Description: INTERVENTIONS:  - Monitor labs   - Monitor I/O and WT  - Instruct patient on fluid and nutrition as appropriate  - Assess for signs & symptoms of volume excess or deficit  Outcome: Progressing  Goal: Glucose maintained within target range  Description: INTERVENTIONS:  - Monitor Blood Glucose as ordered  - Assess for signs and symptoms of hyperglycemia and hypoglycemia  - Administer ordered medications to maintain glucose within target range  - Assess nutritional intake and initiate nutrition service referral as needed  Outcome: Progressing

## 2023-02-08 NOTE — DISCHARGE SUMMARY
Discharge Summary - Melida Daniels 75 y o  male MRN: [id number]    Unit/Bed#: [facility] CATH LAB ROOM Encounter: [id number]    Admission Date: 2/8/2023   Discharge Date: 2/9/2203    Disposition: Home    Condition at Discharge: opal Lin MD      OP Cardiologist: Dr Linn Reece    Interventional cardiologist: Dr Trung Finn    Admitting Diagnosis:  Known CAD admitted for high risk PCI with abnormal stress test    Secondary Diagnoses:  History of vessel coronary artery disease status post metal stent to circumflex, PDA and probe to the D1  Status post MI in 2009, 2010 and non-STEMI in 2011 that is post PCI drug-eluting stent to distal RCA  ESRD on HD  - need new left brachial cephalic fistula  Hyperlipidemia  Diabetes type 2  History of hypertension  History of CVA 2006 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  History of chronic DVT in the internal jugular vein 11/22  on Eliquis until 1 month ago  Discontinued by vascular    Discharge Diagnosis:   CAD  S/P PCI and overlapping 4 0x38mm and 4 0x28mm drug-eluting stents mid and proximal RCA, and a non-overlapping 4 0x12mm KARLOS  RCA ostium      Consultants: Nephrology,Dr Leahy Phlegm    Procedures: Hemodialysis      BP 97/63 (BP Location: Right arm)   Pulse 76   Temp (!) 96 °F (35 6 °C) (Tympanic)   Resp 15   Ht 5' 10" (1 778 m)   Wt 71 2 kg (157 lb)   SpO2 100%   BMI 22 53 kg/m²       Review of Systems   All other systems reviewed and are negative  Physical Exam  Constitutional:       Appearance: Normal appearance  HENT:      Head: Normocephalic and atraumatic  Mouth/Throat:      Mouth: Mucous membranes are moist    Cardiovascular:      Rate and Rhythm: Normal rate and regular rhythm  Pulses: Normal pulses  Heart sounds: Normal heart sounds  Pulmonary:      Effort: Pulmonary effort is normal       Breath sounds: Normal breath sounds  Abdominal:      General: Abdomen is flat  Palpations: Abdomen is soft     Musculoskeletal: Right lower leg: No edema  Left lower leg: No edema  Skin:     General: Skin is warm and dry  Capillary Refill: Capillary refill takes 2 to 3 seconds  Neurological:      Mental Status: He is alert and oriented to person, place, and time  Psychiatric:         Behavior: Behavior normal      Right groin no hematoma with mild ecchymosis and no bruit  HPI and Hospital Course:     79-year-old male with history of end-stage renal disease and on hemodialysis  Patient needs a new left brachiocephalic fistula  Due to his history of coronary disease he underwent a cardiac catheterization with Dr Aubree Betts on 2/3/2023 which revealed the following:     •  2nd Mrg lesion is 10% stenosed  •  Mid RCA to Dist RCA lesion is 10% stenosed  •  Mid RCA lesion is 90% stenosed  Which appears to be the culprit vessel for abnormal stress test  •  Ost RCA lesion is 35% stenosed  •  Prox RCA lesion is 70% stenosed  •  Prox LAD lesion is 70% stenosed  •  Mid LAD lesion is 60% stenosed  •  1st Diag lesion is 60% stenosed  •  LVEF appears to be 50% with no gradient across aortic valve  LVEDP was mildly elevated    His complex coronary disease required the procedure to be performed at [facility]  He was electively admitted on 2/8/2023 for high risk PCI  Results of this cath revealed the following:     Mid LAD lesion is 50% stenosed  Ost LAD lesion is 50% stenosed  Ost RCA lesion is 70% stenosed  Mid RCA lesion is 90% stenosed  Prox RCA lesion is 70% stenosed      He underwent complex PCI as follows:     Borderline stenoses of the proximal and mid LAD were assessed by DFR and FFR, and were negative for evidence of flow-limitation (DFR=0 90; FFR= 0 84)  There were 70% ostial, 70% proximal, and 90% mid RCA stenoses  IVUS interrogation disclose no evidence of significant calcification    Following pre-dilation, overlapping 4 0x38mm and 4 0x28mm drug-eluting stents were placed in the mid and proximal RCA, and a non-overlapping 4 0x12mm KARLOS was placed in the RCA ostium  There was 0% residual stenosis and ZEFERINO III flow at all sites  Plan: Clopidogrel and aspirin and statin therapy and  follow-up with Dr Hussain Oliva has been made  Smoking cessation was discussed with the patient he was not interested with Khine cessation medication  Patient is not on beta-blocker  Patient is not an MI  Patient has stable ischemic coronary artery disease who underwent an elective PCI  Patient does not benefit from beta-blocker based on [facility] guideline for stable ischemic heart disease (SIHD)        Current Facility-Administered Medications   Medication Dose Route Frequency   • acetaminophen (TYLENOL) tablet 650 mg  650 mg Oral Q4H PRN   • [START ON 2/9/2023] apixaban (ELIQUIS) tablet 5 mg  5 mg Oral BID   • [START ON 2/9/2023] aspirin (ECOTRIN LOW STRENGTH) EC tablet 81 mg  81 mg Oral Daily   • atorvastatin (LIPITOR) tablet 40 mg  40 mg Oral After Dinner   • calcium carbonate (TUMS) chewable tablet 1,000 mg  1,000 mg Oral BID PRN   • [START ON 2/9/2023] clopidogrel (PLAVIX) tablet 75 mg  75 mg Oral Daily   • nitroglycerin (NITROSTAT) SL tablet 0 4 mg  0 4 mg Sublingual Q5 Min PRN   • ondansetron (ZOFRAN) injection 4 mg  4 mg Intravenous Q6H PRN   • [START ON 2/9/2023] pantoprazole (PROTONIX) EC tablet 40 mg  40 mg Oral Early Morning       Pertinent Labs/diagnostics:        Lab Ressults:  Recent Results (from the past 24 hour(s))   COVID/FLU/RSV    Collection Time: 02/08/23 11:43 AM    Specimen: Nose; Nares   Result Value Ref Range    SARS-CoV-2 Negative Negative    INFLUENZA A PCR Negative Negative    INFLUENZA B PCR Negative Negative    RSV PCR Negative Negative           Lipid Profile:   No results found for: CHOL  Lab Results   Component Value Date    HDL 72 (H) 10/25/2016     Lab Results   Component Value Date    LDLCALC 35 10/25/2016     Lab Results   Component Value Date    TRIG 78 10/25/2016 Tele NSR      Discharge instructions/Information to patient and family:   See after visit summary for information provided to patient and family  Provisions for Follow-Up Care:  See after visit summary for information related to follow-up care and any pertinent home health orders  Planned Readmission: No    Discharge Statement:  I spent 45 minutes minutes discharging the patient  This time was spent on the day of discharge  I had direct contact with the patient on the day of discharge  Additional documentation is required if more than 30 minutes were spent on discharge  ** Please Note: Fluency Direct Dictation voice to text software may have been used in the creation of this document   **

## 2023-02-08 NOTE — INTERVAL H&P NOTE
H&P reviewed  After examining the patient I find no changes in the patients condition since the H&P had been written      Vitals:    02/08/23 0740   BP: 106/64   Pulse: 64   Resp: 18   Temp: 98 1 °F (36 7 °C)   SpO2: 100%       For LAD hemodynamic assessment with potential RCA PCI  -2/3 [facility] (STEPHANIE matos): prox + mid LAD 70/60%, patent OM2 stent, sequential & calcified prox-mid RCA lesions with patient distal stent    ECG 02/08/23  Sinus rhythm, borderline 1st degree AV block, HR 67      Ansley Vick MD / 02/08/23 / 7:57 AM

## 2023-02-09 VITALS
RESPIRATION RATE: 15 BRPM | HEIGHT: 70 IN | HEART RATE: 74 BPM | BODY MASS INDEX: 22.48 KG/M2 | DIASTOLIC BLOOD PRESSURE: 55 MMHG | WEIGHT: 157 LBS | TEMPERATURE: 98 F | SYSTOLIC BLOOD PRESSURE: 92 MMHG | OXYGEN SATURATION: 96 %

## 2023-02-09 LAB
ANION GAP SERPL CALCULATED.3IONS-SCNC: 9 MMOL/L (ref 4–13)
ATRIAL RATE: 59 BPM
BUN SERPL-MCNC: 22 MG/DL (ref 5–25)
CALCIUM SERPL-MCNC: 7.9 MG/DL (ref 8.3–10.1)
CHLORIDE SERPL-SCNC: 99 MMOL/L (ref 96–108)
CHOLEST SERPL-MCNC: 129 MG/DL
CO2 SERPL-SCNC: 29 MMOL/L (ref 21–32)
CREAT SERPL-MCNC: 5.83 MG/DL (ref 0.6–1.3)
ERYTHROCYTE [DISTWIDTH] IN BLOOD BY AUTOMATED COUNT: 14 % (ref 11.6–15.1)
GFR SERPL CREATININE-BSD FRML MDRD: 9 ML/MIN/1.73SQ M
GLUCOSE P FAST SERPL-MCNC: 106 MG/DL (ref 65–99)
GLUCOSE SERPL-MCNC: 106 MG/DL (ref 65–140)
HCT VFR BLD AUTO: 32.3 % (ref 36.5–49.3)
HDLC SERPL-MCNC: 57 MG/DL
HGB BLD-MCNC: 10.8 G/DL (ref 12–17)
LDLC SERPL CALC-MCNC: 51 MG/DL (ref 0–100)
LDLC SERPL DIRECT ASSAY-MCNC: 67 MG/DL (ref 0–100)
MCH RBC QN AUTO: 40.6 PG (ref 26.8–34.3)
MCHC RBC AUTO-ENTMCNC: 33.4 G/DL (ref 31.4–37.4)
MCV RBC AUTO: 121 FL (ref 82–98)
NONHDLC SERPL-MCNC: 72 MG/DL
P AXIS: 64 DEGREES
PLATELET # BLD AUTO: 104 THOUSANDS/UL (ref 149–390)
PMV BLD AUTO: 10.5 FL (ref 8.9–12.7)
POTASSIUM SERPL-SCNC: 4.6 MMOL/L (ref 3.5–5.3)
PR INTERVAL: 204 MS
QRS AXIS: -5 DEGREES
QRSD INTERVAL: 112 MS
QT INTERVAL: 470 MS
QTC INTERVAL: 465 MS
RBC # BLD AUTO: 2.66 MILLION/UL (ref 3.88–5.62)
SODIUM SERPL-SCNC: 137 MMOL/L (ref 135–147)
T WAVE AXIS: 63 DEGREES
TRIGL SERPL-MCNC: 103 MG/DL
VENTRICULAR RATE: 59 BPM
WBC # BLD AUTO: 5.42 THOUSAND/UL (ref 4.31–10.16)

## 2023-02-09 RX ORDER — OXYCODONE HYDROCHLORIDE 5 MG/1
5 TABLET ORAL EVERY 4 HOURS PRN
Status: DISCONTINUED | OUTPATIENT
Start: 2023-02-09 | End: 2023-02-09 | Stop reason: HOSPADM

## 2023-02-09 RX ADMIN — ASPIRIN 81 MG: 81 TABLET, COATED ORAL at 09:10

## 2023-02-09 RX ADMIN — OXYCODONE HYDROCHLORIDE 5 MG: 5 TABLET ORAL at 02:20

## 2023-02-09 RX ADMIN — PANTOPRAZOLE SODIUM 40 MG: 40 TABLET, DELAYED RELEASE ORAL at 06:44

## 2023-02-09 RX ADMIN — CLOPIDOGREL BISULFATE 75 MG: 75 TABLET ORAL at 09:10

## 2023-02-09 NOTE — QUICK NOTE
Patient patient is stable from a nephrology perspective  Okay for discharge from renal perspective back to dialysis Monday Wednesday Friday at [address] [place] [address]

## 2023-02-09 NOTE — PLAN OF CARE
Problem: METABOLIC, FLUID AND ELECTROLYTES - ADULT  Goal: Electrolytes maintained within normal limits  Description: INTERVENTIONS:  - Monitor labs and assess patient for signs and symptoms of electrolyte imbalances  - Administer electrolyte replacement as ordered  - Monitor response to electrolyte replacements, including repeat lab results as appropriate  - Instruct patient on fluid and nutrition as appropriate  Outcome: Progressing  Goal: Fluid balance maintained  Description: INTERVENTIONS:  - Monitor labs   - Monitor I/O and WT  - Instruct patient on fluid and nutrition as appropriate  - Assess for signs & symptoms of volume excess or deficit  Outcome: Progressing  Goal: Glucose maintained within target range  Description: INTERVENTIONS:  - Monitor Blood Glucose as ordered  - Assess for signs and symptoms of hyperglycemia and hypoglycemia  - Administer ordered medications to maintain glucose within target range  - Assess nutritional intake and initiate nutrition service referral as needed  Outcome: Progressing     Problem: PAIN - ADULT  Goal: Verbalizes/displays adequate comfort level or baseline comfort level  Description: Interventions:  - Encourage patient to monitor pain and request assistance  - Assess pain using appropriate pain scale  - Administer analgesics based on type and severity of pain and evaluate response  - Implement non-pharmacological measures as appropriate and evaluate response  - Consider cultural and social influences on pain and pain management  - Notify physician/advanced practitioner if interventions unsuccessful or patient reports new pain  Outcome: Progressing

## 2023-02-10 LAB
ATRIAL RATE: 67 BPM
P AXIS: 60 DEGREES
PR INTERVAL: 196 MS
QRS AXIS: -13 DEGREES
QRSD INTERVAL: 110 MS
QT INTERVAL: 442 MS
QTC INTERVAL: 467 MS
T WAVE AXIS: 67 DEGREES
VENTRICULAR RATE: 67 BPM

## 2023-02-17 ENCOUNTER — PREP FOR PROCEDURE (OUTPATIENT)
Dept: VASCULAR SURGERY | Facility: CLINIC | Age: 68
End: 2023-02-17

## 2023-02-20 ENCOUNTER — APPOINTMENT (OUTPATIENT)
Dept: LAB | Facility: CLINIC | Age: 68
End: 2023-02-20

## 2023-02-20 DIAGNOSIS — N18.6 END STAGE RENAL DISEASE (HCC): ICD-10-CM

## 2023-02-20 LAB
ABO GROUP BLD: NORMAL
ANION GAP SERPL CALCULATED.3IONS-SCNC: 9 MMOL/L (ref 4–13)
BLD GP AB SCN SERPL QL: NEGATIVE
BUN SERPL-MCNC: 41 MG/DL (ref 5–25)
CALCIUM SERPL-MCNC: 9.1 MG/DL (ref 8.3–10.1)
CHLORIDE SERPL-SCNC: 99 MMOL/L (ref 96–108)
CO2 SERPL-SCNC: 28 MMOL/L (ref 21–32)
CREAT SERPL-MCNC: 7.7 MG/DL (ref 0.6–1.3)
ERYTHROCYTE [DISTWIDTH] IN BLOOD BY AUTOMATED COUNT: 12.8 % (ref 11.6–15.1)
GFR SERPL CREATININE-BSD FRML MDRD: 6 ML/MIN/1.73SQ M
GLUCOSE P FAST SERPL-MCNC: 137 MG/DL (ref 65–99)
HCT VFR BLD AUTO: 38.5 % (ref 36.5–49.3)
HGB BLD-MCNC: 12.8 G/DL (ref 12–17)
INR PPP: 1.02 (ref 0.84–1.19)
MCH RBC QN AUTO: 40 PG (ref 26.8–34.3)
MCHC RBC AUTO-ENTMCNC: 33.2 G/DL (ref 31.4–37.4)
MCV RBC AUTO: 120 FL (ref 82–98)
PLATELET # BLD AUTO: 146 THOUSANDS/UL (ref 149–390)
PMV BLD AUTO: 10.8 FL (ref 8.9–12.7)
POTASSIUM SERPL-SCNC: 4.1 MMOL/L (ref 3.5–5.3)
PROTHROMBIN TIME: 13.6 SECONDS (ref 11.6–14.5)
RBC # BLD AUTO: 3.2 MILLION/UL (ref 3.88–5.62)
RH BLD: POSITIVE
SODIUM SERPL-SCNC: 136 MMOL/L (ref 135–147)
SPECIMEN EXPIRATION DATE: NORMAL
T4 FREE SERPL-MCNC: 0.83 NG/DL (ref 0.76–1.46)
TSH SERPL DL<=0.05 MIU/L-ACNC: 6.28 UIU/ML (ref 0.45–4.5)
WBC # BLD AUTO: 7.51 THOUSAND/UL (ref 4.31–10.16)

## 2023-02-21 LAB
ENDOMYSIUM IGA SER QL: NEGATIVE
GLIADIN PEPTIDE IGA SER-ACNC: 8 UNITS (ref 0–19)
GLIADIN PEPTIDE IGG SER-ACNC: 4 UNITS (ref 0–19)
IGA SERPL-MCNC: 523 MG/DL (ref 61–437)
TTG IGA SER-ACNC: <2 U/ML (ref 0–3)
TTG IGG SER-ACNC: 3 U/ML (ref 0–5)

## 2023-02-22 ENCOUNTER — TELEPHONE (OUTPATIENT)
Dept: PULMONOLOGY | Facility: CLINIC | Age: 68
End: 2023-02-22

## 2023-03-08 ENCOUNTER — OFFICE VISIT (OUTPATIENT)
Dept: GASTROENTEROLOGY | Facility: CLINIC | Age: 68
End: 2023-03-08

## 2023-03-08 VITALS — HEIGHT: 70 IN | BODY MASS INDEX: 25.2 KG/M2 | WEIGHT: 176 LBS

## 2023-03-08 DIAGNOSIS — R19.7 DIARRHEA, UNSPECIFIED TYPE: Primary | ICD-10-CM

## 2023-03-08 DIAGNOSIS — R74.8 ABNORMAL LEVELS OF OTHER SERUM ENZYMES: ICD-10-CM

## 2023-03-08 NOTE — PROGRESS NOTES
[facility] - Outpatient Follow-up Note  Manuel Caraballo 75 y o  male MRN: [id number]  Encounter: [id number]          ASSESSMENT AND PLAN:      1  Diarrhea, unspecified type  Etiology remains unclear, possibly functional   Will evaluate further as below  Has found the Imodium does work well to control his symptoms though a full dose tends to swing him into constipation  He will continue to use this but titrate the dose down using the liquid form  Would recommend further evaluation with colonoscopy though given his recent cardiac stents will likely be unable to hold Plavix  We will follow-up here in a month or 2    - Stool culture; Future  - Pancreatic elastase, fecal; Future  - Calprotectin,Fecal; Future    2  Abnormal levels of other serum enzymes    - Antinuclear Antibodies (IBRAHIMA), IFA; Future  - Antimitochondrial antibody; Future  - Anti-smooth muscle antibody, IgG; Future  - Hepatic function panel; Future    ______________________________________________________________________    SUBJECTIVE: Patient seen in July 2022 with chronic diarrhea  Symptoms resolved spontaneously and he felt well with normal bowel function for 5 to 6 months but symptoms seem to have returned once again out of the blue  No change in medication or diet  No other clear changes  No recent illness or antibiotics  Reports having to rush to the bathroom approximately 20 to 30 minutes after almost any oral intake  Denies nocturnal symptoms at this time  Negative sprue serologies  Did have an elevated TSH but normal free T4  Slightly elevated chromogranin A but normal gastrin  Patient on PPI  Also has had an isolated elevated alkaline phosphatase with elevated GGT  He did not have stool studies performed as previously ordered    History of advanced adenomatous polyps last colonoscopy 2017      REVIEW OF SYSTEMS:    ROS       Historical Information   Past Medical History:   Diagnosis Date   • 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    With Cerebral Infarction:  Last Assessed:  October 20, 2016   • Chronic kidney disease 2012    on dialysis    • Diabetes mellitus ([facility] [address] )    • Hypertension    • Labyrinthitis 09/10/2011   • Myocardial infarction [facility]) 2014    x3 others were in 2009 & 2010   • Sleep disturbances 09/20/2010   • Stroke [facility]) 2007    x1   • Type 2 diabetes, uncontrolled, with renal manifestation 05/03/2017     Past Surgical History:   Procedure Laterality Date   • AV FISTULA PLACEMENT     • CARDIAC CATHETERIZATION Left 2/3/2023    Procedure: Cardiac Left Heart Cath;  Surgeon: Keturah Sacks, MD;  Location: [place] [address] CATH LAB; Service: Cardiology   • CARDIAC CATHETERIZATION N/A 2/8/2023    Procedure: Cardiac catheterization with complex PCI with Dr Svetlana Espinosa, patient is a renal dialysis patient;  Surgeon: Tayla Feliz MD;  Location:  CARDIAC CATH LAB; Service: Cardiology   • CARDIAC SURGERY  2010    stents x3   • COLONOSCOPY N/A 12/12/2016    Procedure: COLONOSCOPY;  Surgeon: Rudolph Gu MD;  Location: [facility] GI LAB; Service:    • COLONOSCOPY N/A 4/3/2017    Procedure:  COLONOSCOPY;  Surgeon: Rudolph Gu MD;  Location: [facility] GI LAB; Service:    • HARDWARE REMOVAL Right 4/4/2022    Procedure: REMOVAL HARDWARE HIP;  Surgeon: Isabel Ojeda MD;  Location:  MAIN OR;  Service: Orthopedics   • IR AV FISTULA/GRAFT DECLOT  4/29/2021   • IR AV FISTULA/GRAFT DECLOT  3/25/2022   • IR AV FISTULA/GRAFT DECLOT  9/21/2022   • IR AV FISTULAGRAM/GRAFTOGRAM  3/28/2022   • IR AV FISTULAGRAM/GRAFTOGRAM  7/28/2022   • IR TUNNELED CENTRAL LINE REMOVAL  12/7/2021   • IR TUNNELED DIALYSIS CATHETER PLACEMENT  5/24/2021   • TX ARTERIOVENOUS ANASTOMOSIS OPEN DIRECT Left 7/22/2021    Procedure: CREATION FISTULA ARTERIOVENOUS (AV); Surgeon: Glenna Andre MD;  Location: [facility] MAIN OR;  Service: Vascular   • TX HEMIARTHROPLASTY HIP PARTIAL Right 4/4/2022    Procedure: HEMIARTHROPLASTY HIP (BIPOLAR);   Surgeon: Vinny Kirkpatrick Lorena Roach MD;  Location: [facility] MAIN OR;  Service: Orthopedics     Social History   Social History     Substance and Sexual Activity   Alcohol Use Yes    Comment: rarely - No alcohol use per [employer]     Social History     Substance and Sexual Activity   Drug Use Yes   • Types: Marijuana    Comment: occasional brownie for sleep     Social History     Tobacco Use   Smoking Status Every Day   • Packs/day: 0 50   • Years: 30 00   • Pack years: 15 00   • Types: Cigarettes   Smokeless Tobacco Never     Family History   Problem Relation Age of Onset   • Leukemia Mother    • Crohn's disease Father    • Transient ischemic attack Brother        Meds/Allergies       Current Outpatient Medications:   •  ascorbic acid (VITAMIN C) 500 mg tablet  •  aspirin (ECOTRIN LOW STRENGTH) 81 mg EC tablet  •  atorvastatin (LIPITOR) 20 mg tablet  •  B Complex-C-Folic Acid (TRIPHROCAPS) 1 MG CAPS  •  calcium carbonate (TUMS) 500 mg chewable tablet  •  Cholecalciferol 50 MCG (2000 UT) CAPS  •  [place] Choice Comfort EZ 33G X 4 MM MISC  •  clopidogrel (Plavix) 75 mg tablet  •  glucose blood test strip  •  pantoprazole (PROTONIX) 40 mg tablet  •  rosuvastatin (CRESTOR) 20 MG tablet    No Known Allergies        Objective     Height 5' 10" (1 778 m), weight 79 8 kg (176 lb)  Body mass index is 25 25 kg/m²  PHYSICAL EXAM:      Physical Exam     Lab Results:   No visits with results within 1 Day(s) from this visit     Latest known visit with results is:   Appointment on 02/20/2023   Component Date Value   • ABO Grouping 02/20/2023 O    • Rh Factor 02/20/2023 Positive    • Antibody Screen 02/20/2023 Negative    • Specimen Expiration Date 02/20/2023 [id number]    • Protime 02/20/2023 13 6    • INR 02/20/2023 1 02    • Sodium 02/20/2023 136    • Potassium 02/20/2023 4 1    • Chloride 02/20/2023 99    • CO2 02/20/2023 28    • ANION GAP 02/20/2023 9    • BUN 02/20/2023 41 (H)    • Creatinine 02/20/2023 7 70 (H)    • Glucose, Fasting 02/20/2023 137 (H)    • Calcium 02/20/2023 9 1    • eGFR 02/20/2023 6    • WBC 02/20/2023 7 51    • RBC 02/20/2023 3 20 (L)    • Hemoglobin 02/20/2023 12 8    • Hematocrit 02/20/2023 38 5    • MCV 02/20/2023 120 (H)    • MCH 02/20/2023 40 0 (H)    • MCHC 02/20/2023 33 2    • RDW 02/20/2023 12 8    • Platelets 68/89/9820 146 (L)    • MPV 02/20/2023 10 8    • TSH 3RD GENERATON 02/20/2023 6 280 (H)    • IgA 02/20/2023 523 (H)    • Gliadin IgA 02/20/2023 8    • Gliadin IgG 02/20/2023 4    • Tissue Transglut Ab IGG 02/20/2023 3    • TISSUE TRANSGLUTAMINASE * 02/20/2023 <2    • Endomysial IgA 02/20/2023 Negative    • Free T4 02/20/2023 0 83          Radiology Results:   Cardiac catheterization    Addendum Date: 2/8/2023 Addendum:   •  Mid LAD lesion is 50% stenosed  •  Ost LAD lesion is 50% stenosed  •  Ost RCA lesion is 70% stenosed  •  Mid RCA lesion is 90% stenosed  •  Prox RCA lesion is 70% stenosed  Borderline stenoses of the proximal and mid LAD were assessed by DFR and FFR, and were negative for evidence of flow-limitation (DFR=0 90; FFR= 0 84)  There were 70% ostial, 70% proximal, and 90% mid RCA stenoses  IVUS interrogation disclose no evidence of significant calcification  Following pre-dilation, overlapping 4 0x38mm and 4 0x28mm drug-eluting stents were placed in the mid and proximal RCA, and a non-overlapping 4 0x12mm KARLOS was placed in the RCA ostium  There was 0% residual stenosis and ZEFERINO III flow at all sites  Plan: ASA, clopidogrel, statin therapy, follow-up with Dr Kassi Lowe  Result Date: 2/8/2023  Narrative: •  Mid LAD lesion is 50% stenosed  •  Ost LAD lesion is 50% stenosed  •  Ost RCA lesion is 70% stenosed  •  Mid RCA lesion is 90% stenosed  •  Prox RCA lesion is 70% stenosed  Borderline stenoses of the proximal and mid LAD were assessed by DFR and FFR, and were negative for evidence of flow-limitation (DFR=0 90; FFR= 0 84)  There were 70% ostial, 70% proximal, and 90% mid RCA stenoses    IVUS interrogation disclose no evidence of significant calcification  Following pre-dilation, overlapping 4 0x38mm and 4 0x28mm drug-eluting stents were placed in the mid and proximal RCA, and a non-overlapping 4 0x12mm KARLOS was placed in the RCA ostium  There was 0% residual stenosis and ZEFERINO III flow at all sites  Plan: Clopidogrel and apixaban anticoagulation, statin therapy, follow-up with Dr Dhaval Walker

## 2023-03-10 ENCOUNTER — ANESTHESIA EVENT (OUTPATIENT)
Dept: PERIOP | Facility: HOSPITAL | Age: 68
End: 2023-03-10

## 2023-03-10 RX ORDER — LOPERAMIDE HYDROCHLORIDE 2 MG/1
2 CAPSULE ORAL 4 TIMES DAILY PRN
COMMUNITY

## 2023-03-10 RX ORDER — LANOLIN ALCOHOL/MO/W.PET/CERES
1000 CREAM (GRAM) TOPICAL DAILY
COMMUNITY
Start: 2023-01-24

## 2023-03-10 NOTE — PRE-PROCEDURE INSTRUCTIONS
Pre-Surgery Instructions:   Medication Instructions   • ascorbic acid (VITAMIN C) 500 mg tablet Hold this medication for 7 days before surgery, unless specifically told by MD to take     • aspirin (ECOTRIN LOW STRENGTH) 81 mg EC tablet Per surgeon no hold pre op, Hold day of surgery     • atorvastatin (LIPITOR) 20 mg tablet Normally takes at night  • B Complex-C-Folic Acid (TRIPHROCAPS) 1 MG CAPS Normally takes at night  • Cholecalciferol 50 MCG (2000 UT) CAPS Hold this medication for 7 days before surgery, unless specifically told by MD to take   • clopidogrel (Plavix) 75 mg tablet No hold per cardiology, Hold day of surgery     • loperamide (IMODIUM) 2 mg capsule Hold day of surgery     • rosuvastatin (CRESTOR) 20 MG tablet Normally takes at night  • vitamin B-12 (VITAMIN B-12) 1,000 mcg tablet Hold this medication for 7 days before surgery, unless specifically told by MD to take        Medication instructions for day surgery reviewed  Please use only a sip of water to take your instructed medications  Avoid all over the counter vitamins, supplements and NSAIDS for one week prior to surgery per anesthesia guidelines  Tylenol is ok to take as needed  You will receive a call one business day prior to surgery with an arrival time and hospital directions  If your surgery is scheduled on a Monday, the hospital will be calling you on the Friday prior to your surgery  If you have not heard from anyone by 8pm, please call the hospital supervisor through the hospital  at [phone]  [place] [phone])  Do not eat or drink anything after midnight the night before your surgery, including candy, mints, lifesavers, or chewing gum  Do not drink alcohol 24hrs before your surgery  Try not to smoke at least 24hrs before your surgery  Follow the pre surgery showering instructions as listed in the [facility] Surgical Experience Booklet” or otherwise provided by your surgeon's office   Do not shave the surgical area 24 hours before surgery  Do not apply any lotions, creams, including makeup, cologne, deodorant, or perfumes after showering on the day of your surgery  No contact lenses, eye make-up, or artificial eyelashes  Remove nail polish, including gel polish, and any artificial, gel, or acrylic nails if possible  Remove all jewelry including rings and body piercing jewelry  Wear causal clothing that is easy to take on and off  Consider your type of surgery  Keep any valuables, jewelry, piercings at home  Please bring any specially ordered equipment (sling, braces) if indicated  Arrange for a responsible person to drive you to and from the hospital on the day of your surgery  Visitor Guidelines discussed  Call the surgeon's office with any new illnesses, exposures, or additional questions prior to surgery  Please reference your [facility] Surgical Experience Booklet” for additional information to prepare for your upcoming surgery

## 2023-03-13 ENCOUNTER — APPOINTMENT (OUTPATIENT)
Dept: LAB | Facility: CLINIC | Age: 68
End: 2023-03-13

## 2023-03-13 DIAGNOSIS — R74.8 ABNORMAL LEVELS OF OTHER SERUM ENZYMES: ICD-10-CM

## 2023-03-13 DIAGNOSIS — R19.7 DIARRHEA, UNSPECIFIED TYPE: ICD-10-CM

## 2023-03-13 LAB
ALBUMIN SERPL BCP-MCNC: 3.1 G/DL (ref 3.5–5)
ALP SERPL-CCNC: 121 U/L (ref 46–116)
ALT SERPL W P-5'-P-CCNC: 13 U/L (ref 12–78)
AST SERPL W P-5'-P-CCNC: 11 U/L (ref 5–45)
BILIRUB DIRECT SERPL-MCNC: 0.12 MG/DL (ref 0–0.2)
BILIRUB SERPL-MCNC: 0.33 MG/DL (ref 0.2–1)
PROT SERPL-MCNC: 6.8 G/DL (ref 6.4–8.4)
TSH SERPL DL<=0.05 MIU/L-ACNC: 2.79 UIU/ML (ref 0.45–4.5)

## 2023-03-14 LAB
ACTIN IGG SERPL-ACNC: 8 UNITS (ref 0–19)
ANA HOMOGEN TITR SER: ABNORMAL {TITER}
ANA TITR SER IF: POSITIVE {TITER}
ENDOMYSIUM IGA SER QL: NEGATIVE
GLIADIN PEPTIDE IGA SER-ACNC: 7 UNITS (ref 0–19)
GLIADIN PEPTIDE IGG SER-ACNC: 2 UNITS (ref 0–19)
IGA SERPL-MCNC: 536 MG/DL (ref 61–437)
MITOCHONDRIA M2 IGG SER-ACNC: <20 UNITS (ref 0–20)
SL AMB NOTE:: ABNORMAL
TTG IGA SER-ACNC: <2 U/ML (ref 0–3)
TTG IGG SER-ACNC: 3 U/ML (ref 0–5)

## 2023-03-21 ENCOUNTER — HOSPITAL ENCOUNTER (OUTPATIENT)
Facility: HOSPITAL | Age: 68
Setting detail: OUTPATIENT SURGERY
Discharge: HOME | End: 2023-03-21
Attending: SURGERY | Admitting: SURGERY

## 2023-03-21 ENCOUNTER — ANESTHESIA (OUTPATIENT)
Dept: PERIOP | Facility: HOSPITAL | Age: 68
End: 2023-03-21

## 2023-03-21 VITALS
HEART RATE: 80 BPM | WEIGHT: 166.89 LBS | OXYGEN SATURATION: 97 % | HEIGHT: 70 IN | DIASTOLIC BLOOD PRESSURE: 51 MMHG | RESPIRATION RATE: 18 BRPM | SYSTOLIC BLOOD PRESSURE: 87 MMHG | BODY MASS INDEX: 23.89 KG/M2 | TEMPERATURE: 98.6 F

## 2023-03-21 DIAGNOSIS — N18.6 END-STAGE RENAL DISEASE ON HEMODIALYSIS (HCC): Primary | ICD-10-CM

## 2023-03-21 DIAGNOSIS — Z99.2 END-STAGE RENAL DISEASE ON HEMODIALYSIS (HCC): Primary | ICD-10-CM

## 2023-03-21 LAB
ABO GROUP BLD: NORMAL
BLD GP AB SCN SERPL QL: NEGATIVE
GLUCOSE SERPL-MCNC: 132 MG/DL (ref 65–140)
GLUCOSE SERPL-MCNC: 136 MG/DL (ref 65–140)
RH BLD: POSITIVE
SPECIMEN EXPIRATION DATE: NORMAL

## 2023-03-21 DEVICE — PROPATEN VASCULAR GRAFT SW 4-7MMX45CM TAPERED HEPARIN
Type: IMPLANTABLE DEVICE | Site: ARM | Status: FUNCTIONAL
Brand: GORE PROPATEN VASCULAR GRAFT

## 2023-03-21 RX ORDER — METOCLOPRAMIDE HYDROCHLORIDE 5 MG/ML
10 INJECTION INTRAMUSCULAR; INTRAVENOUS ONCE AS NEEDED
Status: DISCONTINUED | OUTPATIENT
Start: 2023-03-21 | End: 2023-03-21 | Stop reason: HOSPADM

## 2023-03-21 RX ORDER — ONDANSETRON 2 MG/ML
4 INJECTION INTRAMUSCULAR; INTRAVENOUS ONCE AS NEEDED
Status: DISCONTINUED | OUTPATIENT
Start: 2023-03-21 | End: 2023-03-21 | Stop reason: HOSPADM

## 2023-03-21 RX ORDER — ALBUMIN, HUMAN INJ 5% 5 %
12.5 SOLUTION INTRAVENOUS ONCE
Status: COMPLETED | OUTPATIENT
Start: 2023-03-21 | End: 2023-03-21

## 2023-03-21 RX ORDER — CEFAZOLIN SODIUM 1 G/3ML
INJECTION, POWDER, FOR SOLUTION INTRAMUSCULAR; INTRAVENOUS AS NEEDED
Status: DISCONTINUED | OUTPATIENT
Start: 2023-03-21 | End: 2023-03-21

## 2023-03-21 RX ORDER — LIDOCAINE HYDROCHLORIDE 10 MG/ML
INJECTION, SOLUTION EPIDURAL; INFILTRATION; INTRACAUDAL; PERINEURAL AS NEEDED
Status: DISCONTINUED | OUTPATIENT
Start: 2023-03-21 | End: 2023-03-21 | Stop reason: HOSPADM

## 2023-03-21 RX ORDER — ONDANSETRON 2 MG/ML
INJECTION INTRAMUSCULAR; INTRAVENOUS AS NEEDED
Status: DISCONTINUED | OUTPATIENT
Start: 2023-03-21 | End: 2023-03-21

## 2023-03-21 RX ORDER — HYDROMORPHONE HCL IN WATER/PF 6 MG/30 ML
0.2 PATIENT CONTROLLED ANALGESIA SYRINGE INTRAVENOUS
Status: DISCONTINUED | OUTPATIENT
Start: 2023-03-21 | End: 2023-03-21 | Stop reason: HOSPADM

## 2023-03-21 RX ORDER — FENTANYL CITRATE 50 UG/ML
INJECTION, SOLUTION INTRAMUSCULAR; INTRAVENOUS AS NEEDED
Status: DISCONTINUED | OUTPATIENT
Start: 2023-03-21 | End: 2023-03-21

## 2023-03-21 RX ORDER — HEPARIN SODIUM 1000 [USP'U]/ML
INJECTION, SOLUTION INTRAVENOUS; SUBCUTANEOUS AS NEEDED
Status: DISCONTINUED | OUTPATIENT
Start: 2023-03-21 | End: 2023-03-21

## 2023-03-21 RX ORDER — KETAMINE HCL IN NACL, ISO-OSM 100MG/10ML
SYRINGE (ML) INJECTION AS NEEDED
Status: DISCONTINUED | OUTPATIENT
Start: 2023-03-21 | End: 2023-03-21

## 2023-03-21 RX ORDER — FENTANYL CITRATE/PF 50 MCG/ML
25 SYRINGE (ML) INJECTION
Status: DISCONTINUED | OUTPATIENT
Start: 2023-03-21 | End: 2023-03-21 | Stop reason: HOSPADM

## 2023-03-21 RX ORDER — SODIUM CHLORIDE 9 MG/ML
INJECTION, SOLUTION INTRAVENOUS CONTINUOUS PRN
Status: DISCONTINUED | OUTPATIENT
Start: 2023-03-21 | End: 2023-03-21

## 2023-03-21 RX ORDER — MAGNESIUM HYDROXIDE 1200 MG/15ML
LIQUID ORAL AS NEEDED
Status: DISCONTINUED | OUTPATIENT
Start: 2023-03-21 | End: 2023-03-21 | Stop reason: HOSPADM

## 2023-03-21 RX ORDER — OXYCODONE HYDROCHLORIDE 5 MG/1
5 TABLET ORAL EVERY 6 HOURS PRN
Qty: 10 TABLET | Refills: 0 | Status: SHIPPED | OUTPATIENT
Start: 2023-03-21

## 2023-03-21 RX ORDER — CHLORHEXIDINE GLUCONATE 0.12 MG/ML
15 RINSE ORAL ONCE
Status: COMPLETED | OUTPATIENT
Start: 2023-03-21 | End: 2023-03-21

## 2023-03-21 RX ADMIN — SODIUM CHLORIDE: 0.9 INJECTION, SOLUTION INTRAVENOUS at 08:02

## 2023-03-21 RX ADMIN — PHENYLEPHRINE HYDROCHLORIDE 50 MCG/MIN: 10 INJECTION INTRAVENOUS at 08:14

## 2023-03-21 RX ADMIN — Medication 10 MG: at 08:26

## 2023-03-21 RX ADMIN — HEPARIN SODIUM 5000 UNITS: 1000 INJECTION INTRAVENOUS; SUBCUTANEOUS at 09:25

## 2023-03-21 RX ADMIN — FENTANYL CITRATE 50 MCG: 50 INJECTION, SOLUTION INTRAMUSCULAR; INTRAVENOUS at 08:16

## 2023-03-21 RX ADMIN — Medication 20 MG: at 08:28

## 2023-03-21 RX ADMIN — CHLORHEXIDINE GLUCONATE 0.12% ORAL RINSE 15 ML: 1.2 LIQUID ORAL at 07:29

## 2023-03-21 RX ADMIN — FENTANYL CITRATE 25 MCG: 50 INJECTION, SOLUTION INTRAMUSCULAR; INTRAVENOUS at 08:59

## 2023-03-21 RX ADMIN — ALBUMIN (HUMAN) 12.5 G: 12.5 INJECTION, SOLUTION INTRAVENOUS at 11:28

## 2023-03-21 RX ADMIN — HEPARIN SODIUM 1000 UNITS: 1000 INJECTION INTRAVENOUS; SUBCUTANEOUS at 09:54

## 2023-03-21 RX ADMIN — FENTANYL CITRATE 25 MCG: 50 INJECTION, SOLUTION INTRAMUSCULAR; INTRAVENOUS at 08:10

## 2023-03-21 RX ADMIN — Medication 20 MG: at 08:59

## 2023-03-21 RX ADMIN — CEFAZOLIN 2000 MG: 1 INJECTION, POWDER, FOR SOLUTION INTRAMUSCULAR; INTRAVENOUS at 08:16

## 2023-03-21 RX ADMIN — ONDANSETRON 4 MG: 2 INJECTION INTRAMUSCULAR; INTRAVENOUS at 10:44

## 2023-03-21 RX ADMIN — PHENYLEPHRINE HYDROCHLORIDE 25 MCG/MIN: 10 INJECTION INTRAVENOUS at 11:14

## 2023-03-21 NOTE — H&P
H&P Exam - Vascular Surgery   Ab Bishop 75 y o  male MRN: [id number]  Unit/Bed#: OR POOL Encounter: [id number]    Assessment/Plan     Assessment:  74 yo M with failed left RC and BC fistulas needing permanent access  Patient now s/p PCI for CAD in earlyfebruary  Plan:  Left brachiobasilic fistula or AVG placement    History of Present Illness     HPI:  Beth Fernandez is a 75 y o  male who presents with ESRD on HD  Patient has hx of failed LUE access using the cephalic vein  Patient needs permanent access  Recently underwent PCI in early February, completed his [facility] course for his IJ thormbus    Review of Systems    Historical Information   Past Medical History:   Diagnosis Date   • 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    With Cerebral Infarction:  Last Assessed:  October 20, 2016   • Chronic kidney disease 2012    on dialysis    • COVID 05/2022   • Diabetes mellitus ([facility] [address] )    • Dialysis patient [facility])     T/TH/Sat   • GERD (gastroesophageal reflux disease)    • Hyperlipidemia    • Hypertension    • Labyrinthitis 09/10/2011   • Myocardial infarction [facility]) 2014    x3 others were in 2009 & 2010   • Sleep disturbances 09/20/2010   • Stroke [facility]) 2007    x1, RLE deficit- uses walker   • Type 2 diabetes, uncontrolled, with renal manifestation 05/03/2017     Past Surgical History:   Procedure Laterality Date   • AV FISTULA PLACEMENT     • CARDIAC CATHETERIZATION Left 02/03/2023    Procedure: Cardiac Left Heart Cath;  Surgeon: Lawrence Mann MD;  Location: [place] [address] CATH LAB; Service: Cardiology   • CARDIAC CATHETERIZATION N/A 02/08/2023    Procedure: Cardiac catheterization with complex PCI with Dr Esther Salcedo, patient is a renal dialysis patient;  Surgeon: Maryla Bence, MD;  Location: [facility] CARDIAC CATH LAB; Service: Cardiology   • CARDIAC SURGERY  2010    stents x3   • COLONOSCOPY N/A 12/12/2016    Procedure: COLONOSCOPY;  Surgeon: Eveline Amaya MD;  Location: [facility] GI LAB;   Service:    • COLONOSCOPY N/A 04/03/2017 Procedure:  COLONOSCOPY;  Surgeon: Risa Jiang MD;  Location: [facility] GI LAB; Service:    • HARDWARE REMOVAL Right 04/04/2022    Procedure: REMOVAL HARDWARE HIP;  Surgeon: Chante Joseph MD;  Location:  MAIN OR;  Service: Orthopedics   • IR AV FISTULA/GRAFT DECLOT  04/29/2021   • IR AV FISTULA/GRAFT DECLOT  03/25/2022   • IR AV FISTULA/GRAFT DECLOT  09/21/2022   • IR AV FISTULAGRAM/GRAFTOGRAM  03/28/2022   • IR AV FISTULAGRAM/GRAFTOGRAM  07/28/2022   • IR TUNNELED CENTRAL LINE REMOVAL  12/07/2021   • IR TUNNELED DIALYSIS CATHETER PLACEMENT  05/24/2021   • PORTACATH PLACEMENT      per pt "port in chest"   • GA ARTERIOVENOUS ANASTOMOSIS OPEN DIRECT Left 07/22/2021    Procedure: CREATION FISTULA ARTERIOVENOUS (AV); Surgeon: Rae Lee MD;  Location: [facility];  Service: Vascular   • GA HEMIARTHROPLASTY HIP PARTIAL Right 04/04/2022    Procedure: HEMIARTHROPLASTY HIP (BIPOLAR); Surgeon: Chante Joseph MD;  Location:  MAIN OR;  Service: Orthopedics     Social History   Social History     Substance and Sexual Activity   Alcohol Use Yes    Comment: rarely - No alcohol use per [employer]     Social History     Substance and Sexual Activity   Drug Use Not Currently   • Types: Marijuana    Comment: occasional brownie for sleep     Social History     Tobacco Use   Smoking Status Every Day   • Packs/day: 0 50   • Years: 30 00   • Pack years: 15 00   • Types: Cigarettes   Smokeless Tobacco Never     E-Cigarette/Vaping   • E-Cigarette Use Never User      E-Cigarette/Vaping Substances   • Nicotine No    • THC No    • CBD No    • Flavoring No    • Other No    • Unknown No      Family History: non-contributory    Meds/Allergies   all current active meds have been reviewed  No Known Allergies    Objective   Vitals: Blood pressure (!) 84/48, pulse 79, temperature (!) 97 3 °F (36 3 °C), temperature source Temporal, resp  rate 14, height 5' 10" (1 778 m), weight 79 8 kg (176 lb), SpO2 98 %  ,Body mass index is 25 25 kg/m²  No intake or output data in the 24 hours ending 03/21/23 0703  Invasive Devices     Line  Duration           Hemodialysis AV Fistula  Left Upper arm -- days          Hemodialysis Catheter  Duration           HD Permanent Double Catheter 180 days                Physical Exam  Vitals and nursing note reviewed  Constitutional:       General: He is not in acute distress  Appearance: He is well-developed  HENT:      Head: Normocephalic and atraumatic  Eyes:      Conjunctiva/sclera: Conjunctivae normal    Cardiovascular:      Rate and Rhythm: Normal rate and regular rhythm  Pulmonary:      Effort: Pulmonary effort is normal  No respiratory distress  Breath sounds: Normal breath sounds  Abdominal:      Palpations: Abdomen is soft  Musculoskeletal:      Cervical back: Neck supple  Skin:     General: Skin is warm and dry  Capillary Refill: Capillary refill takes less than 2 seconds  Neurological:      Mental Status: He is alert  Psychiatric:         Mood and Affect: Mood normal          Lab Results: I have personally reviewed pertinent reports  Imaging: I have personally reviewed pertinent reports  EKG, Pathology, and Other Studies: I have personally reviewed pertinent reports        Code Status: Prior  Advance Directive and Living Will:      Power of :    POLST:      Counseling / Coordination of Care  None

## 2023-03-21 NOTE — ANESTHESIA PREPROCEDURE EVALUATION
Procedure:  left brachiobasilic fistula, possible AVG (Left: Arm Upper)    Relevant Problems   CARDIO   (+) Chronic deep vein thrombosis (DVT) of internal jugular vein (HCC)   (+) Coronary artery disease involving native coronary artery   (+) Hypertension   (+) Mixed hyperlipidemia      ENDO   (+) Type 2 diabetes mellitus (HCC)      /RENAL   (+) Chronic kidney disease   (+) Dependence on renal dialysis (HCC)   (+) End stage renal disease (HCC)   (+) End-stage renal disease on hemodialysis (HCC)      HEMATOLOGY   (+) Acute on chronic anemia   (+) Pernicious anemia   (+) Thrombocytopenia (HCC)        Physical Exam    Airway  Comment: Full beard  Mallampati score: II  TM Distance: >3 FB  Neck ROM: full     Dental   Comment: Several chips/broken teeth, denies loose teeth,     Cardiovascular      Pulmonary      Other Findings        Anesthesia Plan  ASA Score- 4     Anesthesia Type- general with ASA Monitors  Additional Monitors:   Airway Plan: LMA  Plan Factors-    Chart reviewed  Existing labs reviewed  Patient summary reviewed  Induction- intravenous  Postoperative Plan- Plan for postoperative opioid use  Planned trial extubation    Informed Consent- Anesthetic plan and risks discussed with patient  I personally reviewed this patient with the CRNA  Discussed and agreed on the Anesthesia Plan with the CRNA  Sharon Koenig

## 2023-03-21 NOTE — OP NOTE
OPERATIVE REPORT  PATIENT NAME: Keene Prader    :  1955  MRN: [id number]  Pt Location: [facility] OR ROOM 07    SURGERY DATE: 3/21/2023    Surgeon(s) and Role:     * Aneesh Lmaar MD - Primary     * Claudene Lyme, MD - Assisting     * Shelbie Chen DO - Assisting    Preop Diagnosis:  End stage renal disease ([facility] [address] ) [N18 6]    Post-Op Diagnosis Codes:     * End stage renal disease ([facility] [address] ) [N18 6]    Procedure(s):  Left - left brachiobasilic fistula  AVG Graft    Specimen(s):  * No specimens in log *    Estimated Blood Loss:   Minimal    Drains:  * No LDAs found *    Anesthesia Type:   Choice    Operative Indications:  End stage renal disease ([facility] [address] ) [N18 6]    76 y/o male with PMH HTN, ESRD, LUE Radiocephalic AVF s/p thrombosis, LUE Brachial-cephalic non-maturation, CAD c PCI, CVA presenting for Left Upper Extremity AV Graft placement  Discussed risk and benefits of intervention  Operative Findings:  Left brachial artery approx 6mm, soft  Robust inflow    Left Basilic vein large proximally in arm  In mid-forearm a sclerotic portion was present  The decision to place a graft was made  Left Brachial to Basilic Vein anstamosis at junction of basilic vein and axillary vein  Post-op left doppler biphasic doppler signals in radial, ulnar, and thrill in AVG  Complications:   None    Procedure and Technique:    After informed consent was obtained, the patient was brought to the operating room and placed in the supine position  He was given anesthesia and an LMA was placed  He was given IV antibiotics  Duplex ultrasound was used to examine the brachial artery and axillary, basilic vein and these were found to be of adequate size and quality    He was prepped and draped in the usual sterile fashion, exposing the left arm circumferentially  A timeout was performed  A transverse incision was made at the antecubital fossa  Cautery was used to dissect through the soft tissue   The brachial artery was then identified and dissected free  Vessel loops were placed proximally and distally  Next, a longitudinal incision was made at the upper arm  Cautery was used to dissect through the soft tissue  The basilic vein was identified at the tributary to the axillary vein and dissected free  A curved tunneler was used to make a lazy tunnel anteriorly from one incision to the other  A 4-7mm Propaten graft was selected and pulled through the tunnel making sure that it slides easily and that it was not kinked or twisted  6000 units of IV heparin were given  The vessel loops were pulled taught on the brachial artery and an 11-blade was used to make a longitudinal incision  This was lengthened with Nuñez scissors  The 4mm end of the graft was cut to length and beveled  The anastomosis was created using 6-0 prolene suture tied at the heel and run circumferentially  After the anastomosis was complete, the proximal vessel loop was released and the graft was bled and flushed with heparinized saline  The graft was clamped with a profunda near the anastomosis and the distal arterial vessel loop was released  The a vascular clamp was placed on the vein and an 11-blade was used to make a longitudinal incision, which was lengthened with Nuñez scissors  The 7mm end of the graft was cut to length and beveled  The anastomosis was created using 6-0 prolene suture tied at the heel and run circumferentially  Prior to completion, the graft and vein were bled and flushed  After completion, the loops were released and flow was restored  An excellent thrill could be felt in the graft and a radial pulse was palpated  The wounds were checked for hemostasis and copiously irrigated with saline solution  They were then closed with 3-0 vicryl running suture for the subcutaneous tissue and 4-0 monocryl running suture in the subcuticular layer  The incisions were dressed with histoacryl glue    The patient was allowed to awaken and was extubated  He was transferred to the PACU for postoperative care  I was present for the entire procedure  Dr Rob Stephenson was present the entire procedure  All sharps and instruments were correct x2  Patient Disposition:  PACU         SIGNATURE: Sophia StefanyDO  DATE: March 21, 2023  TIME: 10:46 AM      Vascular Quality Initiative - Hemodialysis Access Placement    Pre-admission Information   Functional status: Fully active; able to carry on all predisease activities without restriction  ESRD: ESRD: Hemodialysis dependent  Current Access Type : Percutaneous Catheter    Historical Information      Previous Access: left   Access Type/Location: ACCESS TYPE: Surgical AVF  Access Location: Forearm Cephalic  Procedure Information      Status: Outpatient     Side:left      Anesthesia: General     Access Type: Access Type: AV Graft  Reason not autogenous: Suitable vein not available Conduit Type: Prosthetic Graft Inflow Artery is:  Brachial, Upper Arm  Intraoperative Artery taget diameter is: 6mm  Outflow Vein is: Basilic, Upper Arm  Intraoperative Vein target diameter is: 8mm  Anastomotic Artery Length 6mm, Anastomotic Vein Length 12mm  Anastomotic Material is: Monofilament non-absorbable suture    Anastomosis type is: Continuous  Cocomitant Procedure performed-: None    Completion Fistulogram: no     Preop ARTERIAL evaluation and/or treatment: duplex    Preop VENOUS evaluation and/or treatment: ultrasound mapping    *Obtain Target Diameters from study          Post op Information     Discharge Status: Home    Post op Complications: None

## 2023-03-21 NOTE — ANESTHESIA POSTPROCEDURE EVALUATION
Post-Op Assessment Note    CV Status:  Stable  Pain Score: 0    Pain management: adequate     Mental Status:  Alert and sleepy   Hydration Status:  Euvolemic   PONV Controlled:  Controlled   Airway Patency:  Patent   Two or more mitigation strategies used for obstructive sleep apnea   Post Op Vitals Reviewed: Yes      Staff: CRNA         No notable events documented      BP  146/63   Temp     Pulse  72   Resp   18   SpO2   98

## 2023-03-21 NOTE — DISCHARGE INSTR - AVS FIRST PAGE
DISCHARGE INSTRUCTIONS  DIALYSIS FISTULA SURGERY    ACTIVITY:  Limit use of the operated arm to what is necessary for the first day after surgery  On the second day after surgery, you may start to increase use of your arm as tolerated  Avoid heavy lifting (no more than 15 lbs) for the first one week  You should start to exercise your hand on the side of the fistula by squeezing a stress ball or a rolled-up sock  This increases blood flow in your fistula and arm so your fistula will function better  Feel for a thrill every day  The thrill is the vibration or pulse you feel over the fistula that means the blood is flowing through it  If you cannot feel a thrill, call our office ([phone])  DIET:   Resume your normal diet  Good nutrition is important for healing of your incision  DRESSING:   You may have surgical glue at your surgical site  There are stitches present under the skin which will absorb on their own  The glue is used to cover the incision, assist in closure, and prevent contamination  This adhesive will darken and peel away on its own within one to two weeks  Do not pick at it  If you have a dressing over your surgical site, remove this on the second day after surgery  INCISION:   If you do not have a dialysis catheter in place, you may shower and get your incision wet  Wash incision daily with soap and water, but do not rub or scrub the incision; rinse thoroughly and pat dry  You may have stitches or staples to close your incision and it is okay for these to get wet  Do not bathe in a tub or swim for the first 4 week following surgery or if you have any open wounds  It is normal to have mild swelling or discoloration around the incision  If increasing redness or pain develops, call our office immediately  Numbness in the region of the incision may occur following the surgery  This normally improves over six to twelve months    If you have numbness or pain in your hand, please call our office immediately  DO NOT put any powders, creams, ointments, or lotions on your incision  ARM SWELLING:    Most patients have some noticeable arm swelling after surgery  This usually disappears within a few weeks  If swelling is present, elevate the arm whenever possible  RESTRICTIONS:   Do NOT have blood draws, IV's, or blood pressures performed on the operated arm  FISTULA USE:    Your fistula will not be used until it has fully matured - approximately 6 to 12 weeks  If you are using a catheter for dialysis, this will not be removed until after your fistula has matured and is being used for dialysis without any issues  FOLLOW UP STUDIES:  A Doppler ultrasound will be performed about 5-6 weeks after surgery  Your surgeon will arrange this at your first postoperative visit  FOLLOW UP APPOINTMENTS:  Making and keeping follow up appointments and ultrasound tests are important to your recovery  If you have difficulty making it to or keeping your follow up appointments, call the office  If you have increased pain, fever >101 5, increased drainage, redness or a bad smell at your surgery site, new coldness/numbness of your arm or leg, please call us immediately and GO directly to the ER  PLEASE CALL THE OFFICE IF YOU HAVE ANY QUESTIONS  [phone]  [phone] [address] [facility] , [address], [place], [address]  [address], [address], [place], [address] [facility]  [address] [facility], [facility], [address] [place] [address]  [address] [facility], [address] [facility],[address], [facility] [address] [facility]  [address], [address]st Floor, [address]la [address]  [facility] 19, [address] [facility], [address] [place] [id number] [facility]  [address] [facility], [address] [facility], [address] [facility]  One [facility], [address] [facility], [address] [facility],[address], Susan Castillo [address]  [address] [facility], [employer] [employer] [place], [address]  [address] [place] [address] [address]

## 2023-03-23 ENCOUNTER — TELEPHONE (OUTPATIENT)
Dept: VASCULAR SURGERY | Facility: CLINIC | Age: 68
End: 2023-03-23

## 2023-03-23 NOTE — TELEPHONE ENCOUNTER
Vascular Nurse Navigator Post Op Phone Call    Post-Discharge phone call attempted to assess patient recovery after vascular surgery I left a message on answering machine  Will attempt to contact at later date  Pt's chart was reviewed prior to call and leaving message  Procedure: Left - left brachiobasilic fistula  AVG Graft    Date of Procedure:  3/21/23    Surgeon:    Devin Callaway MD - Primary     * Bonny Iyer MD - [address] [facility], DO - Assisting      Anticoagulation pt was discharged on post op?: Aspirin and Clopidogrel (Plavix)    Statin pt was discharged on post op?:  Lipitor (atorvastatin), :1; Rosuvastatin (Crestor)    Dialysis Days and Location:  MWF at [facility]    Reminded pt of the following in message:    NEXT SCHEDULED OFFICE VISIT:  4/5/23 at 1 pm with Dr Surya Ybarra at [address] [place] [address]    To contact [facility] with any concerns

## 2023-03-28 ENCOUNTER — HOSPITAL ENCOUNTER (EMERGENCY)
Facility: HOSPITAL | Age: 68
Discharge: HOME | End: 2023-03-28
Attending: EMERGENCY MEDICINE

## 2023-03-28 ENCOUNTER — APPOINTMENT (EMERGENCY)
Dept: RADIOLOGY | Facility: HOSPITAL | Age: 68
End: 2023-03-28

## 2023-03-28 VITALS
SYSTOLIC BLOOD PRESSURE: 109 MMHG | OXYGEN SATURATION: 94 % | DIASTOLIC BLOOD PRESSURE: 57 MMHG | WEIGHT: 172 LBS | RESPIRATION RATE: 18 BRPM | TEMPERATURE: 98.4 F | BODY MASS INDEX: 24.68 KG/M2 | HEART RATE: 76 BPM

## 2023-03-28 DIAGNOSIS — M79.89 LEFT ARM SWELLING: ICD-10-CM

## 2023-03-28 DIAGNOSIS — R11.2 NAUSEA VOMITING AND DIARRHEA: Primary | ICD-10-CM

## 2023-03-28 DIAGNOSIS — R19.7 NAUSEA VOMITING AND DIARRHEA: Primary | ICD-10-CM

## 2023-03-28 LAB
ALBUMIN SERPL BCP-MCNC: 3.5 G/DL (ref 3.5–5)
ALP SERPL-CCNC: 163 U/L (ref 34–104)
ALT SERPL W P-5'-P-CCNC: 4 U/L (ref 7–52)
ANION GAP SERPL CALCULATED.3IONS-SCNC: 13 MMOL/L (ref 4–13)
AST SERPL W P-5'-P-CCNC: 16 U/L (ref 13–39)
BASOPHILS # BLD AUTO: 0.06 THOUSANDS/ÂΜL (ref 0–0.1)
BASOPHILS NFR BLD AUTO: 1 % (ref 0–1)
BILIRUB SERPL-MCNC: 0.54 MG/DL (ref 0.2–1)
BUN SERPL-MCNC: 20 MG/DL (ref 5–25)
CALCIUM SERPL-MCNC: 8.1 MG/DL (ref 8.4–10.2)
CHLORIDE SERPL-SCNC: 95 MMOL/L (ref 96–108)
CO2 SERPL-SCNC: 26 MMOL/L (ref 21–32)
CREAT SERPL-MCNC: 8.96 MG/DL (ref 0.6–1.3)
EOSINOPHIL # BLD AUTO: 0.08 THOUSAND/ÂΜL (ref 0–0.61)
EOSINOPHIL NFR BLD AUTO: 2 % (ref 0–6)
ERYTHROCYTE [DISTWIDTH] IN BLOOD BY AUTOMATED COUNT: 12.9 % (ref 11.6–15.1)
GFR SERPL CREATININE-BSD FRML MDRD: 5 ML/MIN/1.73SQ M
GLUCOSE SERPL-MCNC: 104 MG/DL (ref 65–140)
HCT VFR BLD AUTO: 33.8 % (ref 36.5–49.3)
HGB BLD-MCNC: 11.1 G/DL (ref 12–17)
IMM GRANULOCYTES # BLD AUTO: 0.07 THOUSAND/UL (ref 0–0.2)
IMM GRANULOCYTES NFR BLD AUTO: 1 % (ref 0–2)
LIPASE SERPL-CCNC: <6 U/L (ref 11–82)
LYMPHOCYTES # BLD AUTO: 0.61 THOUSANDS/ÂΜL (ref 0.6–4.47)
LYMPHOCYTES NFR BLD AUTO: 12 % (ref 14–44)
MCH RBC QN AUTO: 38.8 PG (ref 26.8–34.3)
MCHC RBC AUTO-ENTMCNC: 32.8 G/DL (ref 31.4–37.4)
MCV RBC AUTO: 118 FL (ref 82–98)
MONOCYTES # BLD AUTO: 0.53 THOUSAND/ÂΜL (ref 0.17–1.22)
MONOCYTES NFR BLD AUTO: 10 % (ref 4–12)
NEUTROPHILS # BLD AUTO: 3.91 THOUSANDS/ÂΜL (ref 1.85–7.62)
NEUTS SEG NFR BLD AUTO: 74 % (ref 43–75)
NRBC BLD AUTO-RTO: 0 /100 WBCS
PLATELET # BLD AUTO: 86 THOUSANDS/UL (ref 149–390)
PMV BLD AUTO: 9.9 FL (ref 8.9–12.7)
POTASSIUM SERPL-SCNC: 3.2 MMOL/L (ref 3.5–5.3)
PROT SERPL-MCNC: 6.3 G/DL (ref 6.4–8.4)
RBC # BLD AUTO: 2.86 MILLION/UL (ref 3.88–5.62)
SODIUM SERPL-SCNC: 134 MMOL/L (ref 135–147)
WBC # BLD AUTO: 5.26 THOUSAND/UL (ref 4.31–10.16)

## 2023-03-28 RX ORDER — ONDANSETRON 2 MG/ML
4 INJECTION INTRAMUSCULAR; INTRAVENOUS ONCE
Status: DISCONTINUED | OUTPATIENT
Start: 2023-03-28 | End: 2023-03-28 | Stop reason: HOSPADM

## 2023-03-28 RX ORDER — ONDANSETRON 4 MG/1
4 TABLET, FILM COATED ORAL EVERY 6 HOURS
Qty: 12 TABLET | Refills: 0 | Status: SHIPPED | OUTPATIENT
Start: 2023-03-28

## 2023-03-28 NOTE — ED PROVIDER NOTES
History  Chief Complaint   Patient presents with   • Vomiting     States vomiting and diarrhea for 3-4 days, moist cough and SOB    , states has low BP  Dialysis on Tues/Thurs/Sat, did not have dialysis today    • Arm Swelling     Patient had left brachiobasilic fistula on 5/71, arm is swollen , radial pulse with doppler      HPI  Patient is a 79-year-old male history of end-stage renal disease on Tuesday Thursday Saturday dialysis schedule, recent placement of left brachiobasilic fistula on 2/68 by Dr Axel Sandra presenting for evaluation of multiple complaints including nausea, vomiting, diarrhea, fatigue as well as left arm swelling since the time of the surgery  Patient denies any pain in the affected extremity, denies any significant erythema, warmth  Patient has been moderately insensate since the time of the surgery, states that this is not recently changed, denies any loss of motor function  Patient has had a junky cough productive of white sputum, denies chest pain, shortness of breath, wheezing  Patient is an everyday smoker  Patient states 2-3 episodes of nonbloody nonbilious emesis, overall decreased diet, and episodes of nonbloody nonmelanotic loose stool too numerous to count over the course of the past few days  Patient denies abdominal pain, abdominal distention, decreased flatus  Patient stating that he had contact with a friend around the same interval who is having somewhat similar symptoms  Patient denies recent travel  Prior to Admission Medications   Prescriptions Last Dose Informant Patient Reported? Taking?    B Complex-C-Folic Acid (TRIPHROCAPS) 1 MG CAPS   Yes No   Sig: Take 1 capsule by mouth daily   Cholecalciferol 50 MCG (2000 UT) CAPS   Yes No   Sig: Take by mouth daily   Blessing Choice Comfort EZ 33G X 4 MM MISC   Yes No   ascorbic acid (VITAMIN C) 500 mg tablet   Yes No   Sig: TAKE ONE TABLET BY MOUTH EVERY EVENING FOR SUPPLEMENT   aspirin (ECOTRIN LOW STRENGTH) 81 mg EC tablet   Yes No   Sig: Take 81 mg by mouth daily   atorvastatin (LIPITOR) 20 mg tablet   Yes No   Sig: Take 20 mg by mouth daily at bedtime   calcium carbonate (TUMS) 500 mg chewable tablet   No No   Sig: Chew 2 tablets (1,000 mg total) 2 (two) times a day as needed for indigestion or heartburn   Patient not taking: Reported on 3/21/2023   clopidogrel (Plavix) 75 mg tablet   No No   Sig: Take 1 tablet (75 mg total) by mouth daily   glucose blood test strip   Yes No   Sig: Test 2 (two) times a day   loperamide (IMODIUM) 2 mg capsule   Yes No   Sig: Take 2 mg by mouth 4 (four) times a day as needed for diarrhea   oxyCODONE (Roxicodone) 5 immediate release tablet   No No   Sig: Take 1 tablet (5 mg total) by mouth every 6 (six) hours as needed for moderate pain Max Daily Amount: 20 mg   rosuvastatin (CRESTOR) 20 MG tablet   Yes No   Sig: Take 20 mg by mouth every evening   vitamin B-12 (VITAMIN B-12) 1,000 mcg tablet   Yes No   Sig: Take 1,000 mcg by mouth daily      Facility-Administered Medications: None       Past Medical History:   Diagnosis Date   • 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    With Cerebral Infarction:  Last Assessed:  October 20, 2016   • Chronic kidney disease 2012    on dialysis    • COVID 05/2022   • Diabetes mellitus ([address] )    • Dialysis patient [facility])     T/TH/Sat   • GERD (gastroesophageal reflux disease)    • Hyperlipidemia    • Hypertension    • Labyrinthitis 09/10/2011   • Myocardial infarction [facility]) 2014    x3 others were in 2009 & 2010   • Sleep disturbances 09/20/2010   • Stroke [facility]) 2007    x1, RLE deficit- uses walker   • Type 2 diabetes, uncontrolled, with renal manifestation 05/03/2017       Past Surgical History:   Procedure Laterality Date   • AV FISTULA PLACEMENT     • CARDIAC CATHETERIZATION Left 02/03/2023    Procedure: Cardiac Left Heart Cath;  Surgeon: Mathew Duke MD;  Location: [place] [address] CATH LAB;   Service: Cardiology   • CARDIAC CATHETERIZATION N/A 02/08/2023    Procedure: "Cardiac catheterization with complex PCI with Dr Madiha Nicole, patient is a renal dialysis patient;  Surgeon: Rocky Asencio MD;  [facility]; Service: Cardiology   • CARDIAC SURGERY  2010    stents x3   • COLONOSCOPY N/A 12/12/2016    Procedure: COLONOSCOPY;  Surgeon: Floyd Pizarro MD;  Location: [facility] GI LAB; Service:    • COLONOSCOPY N/A 04/03/2017    Procedure:  COLONOSCOPY;  Surgeon: Floyd Pizarro MD;  Location: [facility] GI LAB; Service:    • HARDWARE REMOVAL Right 04/04/2022    Procedure: REMOVAL HARDWARE HIP;  Surgeon: Renee Layton MD;  Location:  MAIN OR;  Service: Orthopedics   • IR AV FISTULA/GRAFT DECLOT  04/29/2021   • IR AV FISTULA/GRAFT DECLOT  03/25/2022   • IR AV FISTULA/GRAFT DECLOT  09/21/2022   • IR AV FISTULAGRAM/GRAFTOGRAM  03/28/2022   • IR AV FISTULAGRAM/GRAFTOGRAM  07/28/2022   • IR TUNNELED CENTRAL LINE REMOVAL  12/07/2021   • IR TUNNELED DIALYSIS CATHETER PLACEMENT  05/24/2021   • PORTACATH PLACEMENT      per pt \"port in chest\"   • ID ARTERIOVENOUS ANASTOMOSIS OPEN DIRECT Left 07/22/2021    Procedure: CREATION FISTULA ARTERIOVENOUS (AV); Surgeon: Bright Christy MD;  Location: [address] [place] [address];  Service: Vascular   • ID ARTERIOVENOUS ANASTOMOSIS OPEN DIRECT Left 3/21/2023    Procedure: left brachiobasilic fistula,  AVG Graft;  Surgeon: Sophy Sultana MD;  Location:  MAIN OR;  Service: Vascular   • ID HEMIARTHROPLASTY HIP PARTIAL Right 04/04/2022    Procedure: HEMIARTHROPLASTY HIP (BIPOLAR); Surgeon: Renee Layton MD;  Location:  MAIN OR;  Service: Orthopedics       Family History   Problem Relation Age of Onset   • Leukemia Mother    • Crohn's disease Father    • Transient ischemic attack Brother      I have reviewed and agree with the history as documented      E-Cigarette/Vaping   • E-Cigarette Use Never User      E-Cigarette/Vaping Substances   • Nicotine No    • THC No    • CBD No    • Flavoring No    • Other No    • Unknown No      Social " History     Tobacco Use   • Smoking status: Every Day     Packs/day: 0 50     Years: 30 00     Pack years: 15 00     Types: Cigarettes   • Smokeless tobacco: Never   Vaping Use   • Vaping Use: Never used   Substance Use Topics   • Alcohol use: Yes     Comment: rarely - No alcohol use per [employer]   • Drug use: Not Currently     Types: Marijuana     Comment: occasional brownie for sleep       Review of Systems   Constitutional: Negative for chills, fatigue and fever  HENT: Positive for rhinorrhea  Respiratory: Positive for cough  Negative for chest tightness, shortness of breath and wheezing  Cardiovascular: Negative for chest pain  Gastrointestinal: Positive for diarrhea, nausea and vomiting  Negative for abdominal distention, abdominal pain and blood in stool  Musculoskeletal: Negative for arthralgias and myalgias  Skin: Negative for color change, pallor, rash and wound  Neurological: Positive for numbness (Left arm distal to the bypass graft unchanged since time of surgery)  Psychiatric/Behavioral: Negative for confusion  Physical Exam  Physical Exam  Vitals and nursing note reviewed  Constitutional:       General: He is not in acute distress  Appearance: He is well-developed  He is not diaphoretic  Comments: Well-appearing, nontoxic, nondistressed   HENT:      Head: Normocephalic and atraumatic  Comments: Moist mucous membranes     Right Ear: External ear normal       Left Ear: External ear normal       Nose: Nose normal       Mouth/Throat:      Pharynx: No oropharyngeal exudate  Eyes:      Conjunctiva/sclera: Conjunctivae normal       Pupils: Pupils are equal, round, and reactive to light  Cardiovascular:      Rate and Rhythm: Normal rate and regular rhythm  Heart sounds: Normal heart sounds  No murmur heard  No friction rub  No gallop  Comments: Regular rate and rhythm, no murmurs rubs or gallops    Extremities warm and well-perfused without mottling  Pulmonary:      Effort: Pulmonary effort is normal  No respiratory distress  Breath sounds: Rhonchi present  No wheezing  Comments: No increased work of breathing  Slight rhonchi bilaterally in the lung bases  No wheezes or rales  Satting 94% on room air indicating adequate oxygenation  Speaking in complete sentences  Chest:      Chest wall: No tenderness  Abdominal:      General: Bowel sounds are normal  There is no distension  Palpations: Abdomen is soft  There is no mass  Tenderness: There is no abdominal tenderness  There is no guarding or rebound  Comments: Abdomen soft, nontender, nondistended without rigidity, rebound, guarding   Musculoskeletal:         General: No deformity  Comments: Fistula in place left upper extremity  Thrill can be auscultated  Pitting edema throughout left upper extremity  Compartments are all soft  Normal capillary refill  Patient stating some decreased sensation throughout left upper extremity unchanged since time of surgery   Skin:     General: Skin is warm and dry  Capillary Refill: Capillary refill takes less than 2 seconds  Neurological:      Mental Status: He is alert and oriented to person, place, and time        Comments: AAOx3   Psychiatric:         Behavior: Behavior normal          Vital Signs  ED Triage Vitals [03/28/23 1730]   Temperature Pulse Respirations Blood Pressure SpO2   98 4 °F (36 9 °C) 78 20 100/57 100 %      Temp Source Heart Rate Source Patient Position - Orthostatic VS BP Location FiO2 (%)   Tympanic Monitor Sitting Left arm --      Pain Score       --           Vitals:    03/28/23 1845 03/28/23 1945 03/28/23 1951 03/28/23 2135   BP: 101/56 113/59 113/59 109/57   Pulse: 73 82  76   Patient Position - Orthostatic VS:   Lying Lying         Visual Acuity      ED Medications  Medications - No data to display    Diagnostic Studies  Results Reviewed     Procedure Component Value Units Date/Time    CBC and differential [[id number]]  (Abnormal) Collected: 03/28/23 1828    Lab Status: Final result Specimen: Blood from Arm, Right Updated: 03/28/23 1945     WBC 5 26 Thousand/uL      RBC 2 86 Million/uL      Hemoglobin 11 1 g/dL      Hematocrit 33 8 %       fL      MCH 38 8 pg      MCHC 32 8 g/dL      RDW 12 9 %      MPV 9 9 fL      Platelets 86 Thousands/uL      nRBC 0 /100 WBCs      Neutrophils Relative 74 %      Immat GRANS % 1 %      Lymphocytes Relative 12 %      Monocytes Relative 10 %      Eosinophils Relative 2 %      Basophils Relative 1 %      Neutrophils Absolute 3 91 Thousands/µL      Immature Grans Absolute 0 07 Thousand/uL      Lymphocytes Absolute 0 61 Thousands/µL      Monocytes Absolute 0 53 Thousand/µL      Eosinophils Absolute 0 08 Thousand/µL      Basophils Absolute 0 06 Thousands/µL     Comprehensive metabolic panel [[id number]]  (Abnormal) Collected: 03/28/23 1828    Lab Status: Final result Specimen: Blood from Arm, Right Updated: 03/28/23 1902     Sodium 134 mmol/L      Potassium 3 2 mmol/L      Chloride 95 mmol/L      CO2 26 mmol/L      ANION GAP 13 mmol/L      BUN 20 mg/dL      Creatinine 8 96 mg/dL      Glucose 104 mg/dL      Calcium 8 1 mg/dL      AST 16 U/L      ALT 4 U/L      Alkaline Phosphatase 163 U/L      Total Protein 6 3 g/dL      Albumin 3 5 g/dL      Total Bilirubin 0 54 mg/dL      eGFR 5 ml/min/1 73sq m     Narrative:      [place] guidelines for Chronic Kidney Disease (CKD):   •  Stage 1 with normal or high GFR (GFR > 90 mL/min/1 73 square meters)  •  Stage 2 Mild CKD (GFR = 60-89 mL/min/1 73 square meters)  •  Stage 3A Moderate CKD (GFR = 45-59 mL/min/1 73 square meters)  •  Stage 3B Moderate CKD (GFR = 30-44 mL/min/1 73 square meters)  •  Stage 4 Severe CKD (GFR = 15-29 mL/min/1 73 square meters)  •  Stage 5 End Stage CKD (GFR <15 mL/min/1 73 square meters)  Note: GFR calculation is accurate only with a steady state creatinine    Lipase [[id number]] (Abnormal) Collected: 03/28/23 1828    Lab Status: Final result Specimen: Blood from Arm, Right Updated: 03/28/23 1902     Lipase <6 u/L                  XR chest 1 view portable   Final Result by Armond Woods MD (03/29 1118)   Interval placement of right IJ dialysis catheter      No acute cardiopulmonary disease  Workstation performed: [id number]                    Procedures  Procedures         ED Course                               SBIRT 20yo+    Flowsheet Row Most Recent Value   SBIRT (23 yo +)    In order to provide better care to our patients, we are screening all of our patients for alcohol and drug use  Would it be okay to ask you these screening questions? No Filed at: 03/28/2023 5594                    Medical Decision Making  I obtained history from the patient  I reviewed external medical documentation noting op note from 3/21 for left upper extremity fistula formation  Patient with nausea, vomiting, diarrhea consistent with viral syndrome  Patient not actively vomiting in the emergency department, denies abdominal pain, has a benign abdominal examination  Well-appearing with grossly normal vital signs  I ordered and reviewed labs including CBC, CMP, lipase to evaluate for electrolyte derangement, leukocytosis, anemia, elevated lipase to suggest pancreatitis  I treated patient symptomatically with Zofran with improvement of nausea  Patient with pitting edema of the left upper extremity  I discussed patient with Sabrina Rose with vascular surgery team who states that some pitting edema is not atypical in the postoperative setting of this procedure  Left upper extremity vascularly intact and decreased sensation unchanged since time of initial surgery  Patient well-appearing on reassessment  Provided with prescription for Zofran, instructions to follow-up with a scheduled appointment with vascular surgery, discharged with return precautions      Amount and/or Complexity of Data Reviewed  Labs: ordered  Radiology: ordered  Risk  Prescription drug management  Disposition  Final diagnoses:   Nausea vomiting and diarrhea   Left arm swelling     Time reflects when diagnosis was documented in both MDM as applicable and the Disposition within this note     Time User Action Codes Description Comment    3/28/2023  9:14 PM Claire Callaway Add [R11 2,  R19 7] Nausea vomiting and diarrhea     3/28/2023  9:14 PM Claire Callaway Add [M79 [id number]] Left arm swelling       ED Disposition     ED Disposition   Discharge    Condition   Stable    Date/Time   Tue Mar 28, 2023  9:14 PM    Comment   Michele Bishop discharge to home/self care                 Follow-up Information     Follow up With Specialties Details Why Contact Info Additional Information    [address] [facility] Emergency Department Emergency Medicine  If symptoms worsen [address] [place] [address] Emergency Department, [place], [place], [address]          Discharge Medication List as of 3/28/2023  9:32 PM      START taking these medications    Details   ondansetron (ZOFRAN) 4 mg tablet Take 1 tablet (4 mg total) by mouth every 6 (six) hours, Starting Tue 3/28/2023, Normal         CONTINUE these medications which have NOT CHANGED    Details   ascorbic acid (VITAMIN C) 500 mg tablet TAKE ONE TABLET BY MOUTH EVERY EVENING FOR SUPPLEMENT, Historical Med      aspirin (ECOTRIN LOW STRENGTH) 81 mg EC tablet Take 81 mg by mouth daily, Starting Wed 1/26/2022, Historical Med      atorvastatin (LIPITOR) 20 mg tablet Take 20 mg by mouth daily at bedtime, Starting Wed 1/26/2022, Historical Med      B Complex-C-Folic Acid (TRIPHROCAPS) 1 MG CAPS Take 1 capsule by mouth daily, Starting Mon 2/20/2017, Historical Med      calcium carbonate (TUMS) 500 mg chewable tablet Chew 2 tablets (1,000 mg total) 2 (two) times a day as needed for indigestion or heartburn, Starting Sat 4/9/2022, No Print      Cholecalciferol 50 MCG (2000 UT) CAPS Take by mouth daily, Starting Mon 3/22/2021, Historical Med      Dorris Choice Comfort EZ 33G X 4 MM MISC Historical Med      clopidogrel (Plavix) 75 mg tablet Take 1 tablet (75 mg total) by mouth daily, Starting Fri 7/23/2021, No Print      glucose blood test strip Test 2 (two) times a day, Starting Mon 7/18/2011, Historical Med      loperamide (IMODIUM) 2 mg capsule Take 2 mg by mouth 4 (four) times a day as needed for diarrhea, Historical Med      oxyCODONE (Roxicodone) 5 immediate release tablet Take 1 tablet (5 mg total) by mouth every 6 (six) hours as needed for moderate pain Max Daily Amount: 20 mg, Starting Tue 3/21/2023, Normal      rosuvastatin (CRESTOR) 20 MG tablet Take 20 mg by mouth every evening, Starting Wed 5/29/2019, Historical Med      vitamin B-12 (VITAMIN B-12) 1,000 mcg tablet Take 1,000 mcg by mouth daily, Starting Tue 1/24/2023, Historical Med             No discharge procedures on file      PDMP Review       Value Time User    PDMP Reviewed  Yes 7/22/2021  2:07 PM Lela Oneal PA-C          ED Provider  Electronically Signed by           Stevo Leon MD  03/30/23 3572

## 2023-03-29 NOTE — DISCHARGE INSTRUCTIONS
Use the prescribed Zofran for nausea and vomiting  If you have any severe worsening nausea and vomiting, severe abdominal pain, significant pain of your left arm, spreading redness, warmth in the area of the new fistula, or if your left hand becomes cool or pale, return to the emergency department immediately  Follow-up with your scheduled appointment with vascular surgery

## 2023-03-30 NOTE — TELEPHONE ENCOUNTER
Attempted to contact patient and left message to return call  Also left post op appointment information in message

## 2023-03-31 LAB
ATRIAL RATE: 70 BPM
P AXIS: 16 DEGREES
PR INTERVAL: 144 MS
QRS AXIS: -11 DEGREES
QRSD INTERVAL: 108 MS
QT INTERVAL: 446 MS
QTC INTERVAL: 481 MS
T WAVE AXIS: 61 DEGREES
VENTRICULAR RATE: 70 BPM

## 2023-04-24 ENCOUNTER — TELEPHONE (OUTPATIENT)
Dept: VASCULAR SURGERY | Facility: CLINIC | Age: 68
End: 2023-04-24

## 2023-04-24 ENCOUNTER — OFFICE VISIT (OUTPATIENT)
Dept: VASCULAR SURGERY | Facility: CLINIC | Age: 68
End: 2023-04-24

## 2023-04-24 DIAGNOSIS — Z99.2 ESRD (END STAGE RENAL DISEASE) ON DIALYSIS (HCC): Primary | ICD-10-CM

## 2023-04-24 DIAGNOSIS — T82.868A AV FISTULA THROMBOSIS, INITIAL ENCOUNTER (HCC): ICD-10-CM

## 2023-04-24 DIAGNOSIS — N18.6 ESRD (END STAGE RENAL DISEASE) ON DIALYSIS (HCC): Primary | ICD-10-CM

## 2023-04-24 RX ORDER — CHLORHEXIDINE GLUCONATE 0.12 MG/ML
15 RINSE ORAL ONCE
OUTPATIENT
Start: 2023-04-24 | End: 2023-04-24

## 2023-04-24 NOTE — H&P (VIEW-ONLY)
Assessment/Plan:    AV fistula thrombosis, initial encounter [facility])  65-year-old male status post left brachial basilic AV graft done on 3/21/2023  This is unfortunately thrombosed  I am not sure as to when this thrombosed however we are at the 4-week letty  This will unfortunately make it quite difficult to salvage and likely will need a new AV graft  Patient did have an episode of diarrhea with hypotension that could be a contributing factor to this  We will plan for a left ax ax loop graft  Hopefully with the increased inflow from an axillary artery this will provide better patency of the AV graft  I will keep him on aspirin and Plavix for now  If this fails again we may need to discuss anticoagulation for the patient  If he continues to have hypotension this again may be a cause for his graft thrombosis, we may need to discuss midodrine  ESRD (end stage renal disease) on dialysis [facility])  Lab Results   Component Value Date    EGFR 5 03/28/2023    EGFR 6 02/20/2023    EGFR 9 02/09/2023    CREATININE 8 96 (H) 03/28/2023    CREATININE 7 70 (H) 02/20/2023    CREATININE 5 83 (H) 02/09/2023        Diagnoses and all orders for this visit:    ESRD (end stage renal disease) on dialysis [facility])  -     Case request operating room: CREATION of LEFT FISTULA ARTERIOVENOUS (AV) GRAFT, possible Right upper extremity; Standing  -     Type and screen; Future  -     Protime-INR; Future  -     CBC and Platelet; Future  -     Basic metabolic panel; Future  -     Case request operating room: CREATION of LEFT FISTULA ARTERIOVENOUS (AV) GRAFT, possible Right upper extremity    AV fistula thrombosis, initial encounter ([address] )    Other orders  -     Diet NPO; Sips with meds; Standing  -     Void on call to OR; Standing  -     Insert peripheral IV; Standing  -     Nursing Communication CHG bath, have staff wash entire body (neck down) per pre op bathing protocol   Routine, evening prior to, and day of surgery ; Standing  - Nursing Communication Swab both nares with Povidone-Iodine solution, EXCLUDE if patient has shellfish/Iodine allergy, and replace with nasal alcohol swabstick  Routine, day of surgery, on call to OR ; Standing  -     chlorhexidine (PERIDEX) 0 12 % oral rinse 15 mL  -     Place sequential compression device; Standing        Subjective:      Patient ID: Brannon Trevino is a 70 y o  male  Patient present for a AVG done on 3/21/23  Patient c/o numbness on L hand  Patient Fistula has no Thrill or Bruit  Patient do dialysis T,TH,S   Patient is currently taking ASA, Atorvastatin and Plavix  Patient smokes  05PPD  HPI  59-year-old male with end-stage renal disease on dialysis status post left brachial basilic AV graft done on 3/21/23  Patient unfortunately has not made his postop appointment his first and has been seen since surgery by vascular surgery  The patient has been receiving dialysis via his right IJ dialysis catheter  He states at the dialysis center they were not able to hear a bruit and told him he should follow-up with us    The following portions of the patient's history were reviewed and updated as appropriate: allergies, current medications, past family history, past medical history, past social history, past surgical history and problem list     I have spent a total time of 30 minutes on 04/24/23 in caring for this patient including Prognosis, Risks and benefits of tx options, Instructions for management, Patient and family education, Importance of tx compliance, Risk factor reductions, Impressions, Counseling / Coordination of care, Documenting in the medical record, Reviewing / ordering tests, medicine, procedures   and Obtaining or reviewing history    Review of Systems   Constitutional: Negative  HENT: Negative  Eyes: Negative  Respiratory: Negative  Cardiovascular: Negative  Gastrointestinal: Negative  Endocrine: Negative  Genitourinary: Negative      Musculoskeletal: Positive for gait problem (wheelchair)  Skin: Negative  Allergic/Immunologic: Negative  Hematological: Negative  Psychiatric/Behavioral: Negative  Objective: There were no vitals taken for this visit  Physical Exam  Constitutional:       Appearance: Normal appearance  HENT:      Head: Normocephalic and atraumatic  Eyes:      Extraocular Movements: Extraocular movements intact  Pupils: Pupils are equal, round, and reactive to light  Cardiovascular:      Rate and Rhythm: Normal rate and regular rhythm  Pulses:           Radial pulses are 2+ on the right side and 2+ on the left side  Comments: LUE AVG without thrill or bruit  Pulmonary:      Effort: Pulmonary effort is normal       Breath sounds: Normal breath sounds  Abdominal:      Palpations: Abdomen is soft  Tenderness: There is no abdominal tenderness  Musculoskeletal:         General: Normal range of motion  Cervical back: Normal range of motion  Neurological:      General: No focal deficit present  Mental Status: He is oriented to person, place, and time     Psychiatric:         Mood and Affect: Mood normal          Operative Scheduling Information:    Hospital:  [facility]    Physician:  Renee Flores    Surgery: Left Ax-Ax Loop AV graft, possibel right AV Graft    Urgency:  Standard    Level:  Level 3: Outpatients to be scheduled for elective surgery than can be delayed up to 4 weeks without reasonable expectation of detriment to patient    Case Length:  Normal    Post-op Bed:  Outpatient    OR Table:  Standard    Equipment Needs:  None    Medication Instructions:  Aspirin:   Continue (do not hold)  Plavix:  Continue (do not hold)    Hydration:  No    Contrast Allergy:  no

## 2023-04-24 NOTE — ASSESSMENT & PLAN NOTE
65-year-old male status post left brachial basilic AV graft done on 3/21/2023  This is unfortunately thrombosed  I am not sure as to when this thrombosed however we are at the 4-week letty  This will unfortunately make it quite difficult to salvage and likely will need a new AV graft  Patient did have an episode of diarrhea with hypotension that could be a contributing factor to this  We will plan for a left ax ax loop graft  Hopefully with the increased inflow from an axillary artery this will provide better patency of the AV graft  I will keep him on aspirin and Plavix for now  If this fails again we may need to discuss anticoagulation for the patient  If he continues to have hypotension this again may be a cause for his graft thrombosis, we may need to discuss midodrine

## 2023-04-24 NOTE — PROGRESS NOTES
Assessment/Plan:    AV fistula thrombosis, initial encounter [facility])  74-year-old male status post left brachial basilic AV graft done on 3/21/2023  This is unfortunately thrombosed  I am not sure as to when this thrombosed however we are at the 4-week letty  This will unfortunately make it quite difficult to salvage and likely will need a new AV graft  Patient did have an episode of diarrhea with hypotension that could be a contributing factor to this  We will plan for a left ax ax loop graft  Hopefully with the increased inflow from an axillary artery this will provide better patency of the AV graft  I will keep him on aspirin and Plavix for now  If this fails again we may need to discuss anticoagulation for the patient  If he continues to have hypotension this again may be a cause for his graft thrombosis, we may need to discuss midodrine  ESRD (end stage renal disease) on dialysis [facility])  Lab Results   Component Value Date    EGFR 5 03/28/2023    EGFR 6 02/20/2023    EGFR 9 02/09/2023    CREATININE 8 96 (H) 03/28/2023    CREATININE 7 70 (H) 02/20/2023    CREATININE 5 83 (H) 02/09/2023        Diagnoses and all orders for this visit:    ESRD (end stage renal disease) on dialysis [facility])  -     Case request operating room: CREATION of LEFT FISTULA ARTERIOVENOUS (AV) GRAFT, possible Right upper extremity; Standing  -     Type and screen; Future  -     Protime-INR; Future  -     CBC and Platelet; Future  -     Basic metabolic panel; Future  -     Case request operating room: CREATION of LEFT FISTULA ARTERIOVENOUS (AV) GRAFT, possible Right upper extremity    AV fistula thrombosis, initial encounter ([facility] [address] )    Other orders  -     Diet NPO; Sips with meds; Standing  -     Void on call to OR; Standing  -     Insert peripheral IV; Standing  -     Nursing Communication CHG bath, have staff wash entire body (neck down) per pre op bathing protocol   Routine, evening prior to, and day of surgery ; Standing  - Nursing Communication Swab both nares with Povidone-Iodine solution, EXCLUDE if patient has shellfish/Iodine allergy, and replace with nasal alcohol swabstick  Routine, day of surgery, on call to OR ; Standing  -     chlorhexidine (PERIDEX) 0 12 % oral rinse 15 mL  -     Place sequential compression device; Standing        Subjective:      Patient ID: Lauren Baker is a 70 y o  male  Patient present for a AVG done on 3/21/23  Patient c/o numbness on L hand  Patient Fistula has no Thrill or Bruit  Patient do dialysis T,TH,S   Patient is currently taking ASA, Atorvastatin and Plavix  Patient smokes  05PPD  HPI  50-year-old male with end-stage renal disease on dialysis status post left brachial basilic AV graft done on 3/21/23  Patient unfortunately has not made his postop appointment his first and has been seen since surgery by vascular surgery  The patient has been receiving dialysis via his right IJ dialysis catheter  He states at the dialysis center they were not able to hear a bruit and told him he should follow-up with us    The following portions of the patient's history were reviewed and updated as appropriate: allergies, current medications, past family history, past medical history, past social history, past surgical history and problem list     I have spent a total time of 30 minutes on 04/24/23 in caring for this patient including Prognosis, Risks and benefits of tx options, Instructions for management, Patient and family education, Importance of tx compliance, Risk factor reductions, Impressions, Counseling / Coordination of care, Documenting in the medical record, Reviewing / ordering tests, medicine, procedures   and Obtaining or reviewing history    Review of Systems   Constitutional: Negative  HENT: Negative  Eyes: Negative  Respiratory: Negative  Cardiovascular: Negative  Gastrointestinal: Negative  Endocrine: Negative  Genitourinary: Negative      Musculoskeletal: Positive for gait problem (wheelchair)  Skin: Negative  Allergic/Immunologic: Negative  Hematological: Negative  Psychiatric/Behavioral: Negative  Objective: There were no vitals taken for this visit  Physical Exam  Constitutional:       Appearance: Normal appearance  HENT:      Head: Normocephalic and atraumatic  Eyes:      Extraocular Movements: Extraocular movements intact  Pupils: Pupils are equal, round, and reactive to light  Cardiovascular:      Rate and Rhythm: Normal rate and regular rhythm  Pulses:           Radial pulses are 2+ on the right side and 2+ on the left side  Comments: LUE AVG without thrill or bruit  Pulmonary:      Effort: Pulmonary effort is normal       Breath sounds: Normal breath sounds  Abdominal:      Palpations: Abdomen is soft  Tenderness: There is no abdominal tenderness  Musculoskeletal:         General: Normal range of motion  Cervical back: Normal range of motion  Neurological:      General: No focal deficit present  Mental Status: He is oriented to person, place, and time     Psychiatric:         Mood and Affect: Mood normal          Operative Scheduling Information:    Hospital:  [facility]    Physician:  Osmel Waite    Surgery: Left Ax-Ax Loop AV graft, possibel right AV Graft    Urgency:  Standard    Level:  Level 3: Outpatients to be scheduled for elective surgery than can be delayed up to 4 weeks without reasonable expectation of detriment to patient    Case Length:  Normal    Post-op Bed:  Outpatient    OR Table:  Standard    Equipment Needs:  None    Medication Instructions:  Aspirin:   Continue (do not hold)  Plavix:  Continue (do not hold)    Hydration:  No    Contrast Allergy:  no

## 2023-04-24 NOTE — TELEPHONE ENCOUNTER
REMINDER: Under Reason For Call, comments MUST be formatted as:   (Surgeon's Initials) / (Procedure)      Special Instructions / FYI:     Procedure: LEFT FISTULA ARTERIOVENOUS (AV) GRAFT,    Level: 2 - Route clearance(s) to [facility] Clearance Pool     Allergies: Patient has no known allergies  Instructions Given: NO Bowel Prep General Instructions     Dialysis: Yes  Where: [facility]  // Days: Tuesday, Thursday, and Saturday    Return Visit Required Prior to Procedure: No     Consent: I certify that patient has signed, printed, timed, and dated their surgery consent  I certify that the patient's LEGAL NAME and DATE OF BIRTH are written in the upper left corner on BOTH sides of the consent  I certify that BOTH sides of the completed surgery consent have been scanned into the patient's Epic chart by myself on 4/24/2023  Yes, I have LABELED the consent in Epic as Consent for Vascular Procedure  For Surgical Clearances     Levels   1-3   ROUTE this encounter to [facility] Clearance Pool (AND)   [facility] Surgery Coordinator Pool     Level   4   ROUTE this encounter to [facility] Surgery Coordinator Pool       HYDRATION CLEARANCES   ONLY ROUTE TO  [facility] Clearance Pool     Patient does not require any pre operative clearance  Yes, I have ROUTED this encounter to [facility] Surgery Coordinator and/or [facility] Clearance Pool

## 2023-04-24 NOTE — ASSESSMENT & PLAN NOTE
Lab Results   Component Value Date    EGFR 5 03/28/2023    EGFR 6 02/20/2023    EGFR 9 02/09/2023    CREATININE 8 96 (H) 03/28/2023    CREATININE 7 70 (H) 02/20/2023    CREATININE 5 83 (H) 02/09/2023

## 2023-04-27 ENCOUNTER — PREP FOR PROCEDURE (OUTPATIENT)
Dept: VASCULAR SURGERY | Facility: CLINIC | Age: 68
End: 2023-04-27

## 2023-04-27 NOTE — TELEPHONE ENCOUNTER
Authorization requirements reviewed  Please refer to [address] number [id number] for case updates      No authorization Required

## 2023-04-27 NOTE — TELEPHONE ENCOUNTER
Verified patient's insurance   CONFIRMED - Patient's insurance is Medicare  Is patient requesting a call when authorization has been obtained? Patient did not request a call  Surgery Date: 5/12/23  Primary Surgeon: Lilian Belcher // Mickie Yanes (NPI: [id number])  Assisting Surgeon: Not Applicable (N/A)  Facility: [facility] (Tax: [id number] / NPI: [id number])  Inpatient / Outpatient: Outpatient  Level: 3    Clearance Received: No clearance ordered  Consent Received: Consent will be signed day of procedure  Medication Hold / Last Dose: Not Applicable (N/A)  IR Notified: Not Applicable (N/A)  Rep   Notified: Not Applicable (N/A)  Equipment Needs: Not Applicable (N/A)  Vas Lab Requested: Not Applicable (N/A)  Patient Contacted: 4/27/23    Diagnosis: N18 6, Z99 2  Procedure/ CPT Code(s): (AV) Arteriovenous Fistula // CPT: 71521    For varicose vein related procedures:   Last LEVDR: Not Applicable (N/A)  CEAP Classification: Not Applicable (N/A)  VCSS: Not Applicable (N/A)    Post Operative Date/ Time: 5/31/23 , 11:30am [place] with Mickie Yanes (NPI: [id number])     Pt will have blood work done at [place] [address]

## 2023-05-01 ENCOUNTER — HOSPITAL ENCOUNTER (OUTPATIENT)
Dept: RADIOLOGY | Facility: HOSPITAL | Age: 68
Discharge: HOME | End: 2023-05-01
Attending: INTERNAL MEDICINE

## 2023-05-01 DIAGNOSIS — R74.8 ABNORMAL LEVELS OF OTHER SERUM ENZYMES: ICD-10-CM

## 2023-05-11 ENCOUNTER — APPOINTMENT (OUTPATIENT)
Dept: LAB | Facility: CLINIC | Age: 68
End: 2023-05-11

## 2023-05-11 DIAGNOSIS — N18.6 ESRD (END STAGE RENAL DISEASE) ON DIALYSIS (HCC): ICD-10-CM

## 2023-05-11 DIAGNOSIS — Z99.2 ESRD (END STAGE RENAL DISEASE) ON DIALYSIS (HCC): ICD-10-CM

## 2023-05-11 LAB
ANION GAP SERPL CALCULATED.3IONS-SCNC: 5 MMOL/L (ref 4–13)
BUN SERPL-MCNC: 20 MG/DL (ref 5–25)
CALCIUM SERPL-MCNC: 8.8 MG/DL (ref 8.3–10.1)
CHLORIDE SERPL-SCNC: 97 MMOL/L (ref 96–108)
CO2 SERPL-SCNC: 32 MMOL/L (ref 21–32)
CREAT SERPL-MCNC: 7.36 MG/DL (ref 0.6–1.3)
ERYTHROCYTE [DISTWIDTH] IN BLOOD BY AUTOMATED COUNT: 13.2 % (ref 11.6–15.1)
GFR SERPL CREATININE-BSD FRML MDRD: 6 ML/MIN/1.73SQ M
GLUCOSE P FAST SERPL-MCNC: 126 MG/DL (ref 65–99)
HCT VFR BLD AUTO: 38.5 % (ref 36.5–49.3)
HGB BLD-MCNC: 12.5 G/DL (ref 12–17)
INR PPP: 0.98 (ref 0.84–1.19)
MCH RBC QN AUTO: 37.5 PG (ref 26.8–34.3)
MCHC RBC AUTO-ENTMCNC: 32.5 G/DL (ref 31.4–37.4)
MCV RBC AUTO: 116 FL (ref 82–98)
PLATELET # BLD AUTO: 102 THOUSANDS/UL (ref 149–390)
PMV BLD AUTO: 11.7 FL (ref 8.9–12.7)
POTASSIUM SERPL-SCNC: 3.8 MMOL/L (ref 3.5–5.3)
PROTHROMBIN TIME: 13.2 SECONDS (ref 11.6–14.5)
RBC # BLD AUTO: 3.33 MILLION/UL (ref 3.88–5.62)
SODIUM SERPL-SCNC: 134 MMOL/L (ref 135–147)
WBC # BLD AUTO: 5.58 THOUSAND/UL (ref 4.31–10.16)

## 2023-05-17 ENCOUNTER — ANESTHESIA EVENT (OUTPATIENT)
Dept: PERIOP | Facility: HOSPITAL | Age: 68
End: 2023-05-17

## 2023-05-17 LAB
ABO GROUP BLD: NORMAL
BLD GP AB SCN SERPL QL: NEGATIVE
RH BLD: POSITIVE
SPECIMEN EXPIRATION DATE: NORMAL

## 2023-05-17 NOTE — PRE-PROCEDURE INSTRUCTIONS
Pre-Surgery Instructions:   Medication Instructions   • ascorbic acid (VITAMIN C) 500 mg tablet Stop taking 7 days prior to surgery  • aspirin (ECOTRIN LOW STRENGTH) 81 mg EC tablet Instructions provided by MD   • atorvastatin (LIPITOR) 20 mg tablet Take night before surgery   • B Complex-C-Folic Acid (TRIPHROCAPS) 1 MG CAPS Stop taking 7 days prior to surgery  • Cholecalciferol 50 MCG (2000 UT) CAPS Stop taking 7 days prior to surgery  • Maceo Choice Comfort EZ 33G X 4 MM MISC Stop taking 7 days prior to surgery  • clopidogrel (Plavix) 75 mg tablet Instructions provided by MD      Medication instructions for day surgery reviewed  Please use only a sip of water to take your instructed medications  Avoid all over the counter vitamins, supplements and NSAIDS for one week prior to surgery per anesthesia guidelines  Pt reports he has been  Holding all vitamins  Tylenol is ok to take as needed  You will receive a call one business day prior to surgery with an arrival time and hospital directions  If your surgery is scheduled on a Monday, the hospital will be calling you on the Friday prior to your surgery  If you have not heard from anyone by 8pm, please call the hospital supervisor through the hospital  at [phone]  [facility] [phone])  Do not eat or drink anything after midnight the night before your surgery, including candy, mints, lifesavers, or chewing gum  Do not drink alcohol 24hrs before your surgery  Try not to smoke at least 24hrs before your surgery  Follow the pre surgery showering instructions as listed in the [facility] Surgical Experience Booklet” or otherwise provided by your surgeon's office  Do not shave the surgical area 24 hours before surgery  Do not apply any lotions, creams, including makeup, cologne, deodorant, or perfumes after showering on the day of your surgery  No contact lenses, eye make-up, or artificial eyelashes   Remove nail polish, including gel polish, and any artificial, gel, or acrylic nails if possible  Remove all jewelry including rings and body piercing jewelry  Wear causal clothing that is easy to take on and off  Consider your type of surgery  Keep any valuables, jewelry, piercings at home  Please bring any specially ordered equipment (sling, braces) if indicated  Arrange for a responsible person to drive you to and from the hospital on the day of your surgery  Visitor Guidelines discussed  Call the surgeon's office with any new illnesses, exposures, or additional questions prior to surgery  Please reference your [facility] Surgical Experience Booklet” for additional information to prepare for your upcoming surgery

## 2023-05-19 ENCOUNTER — ANESTHESIA (OUTPATIENT)
Dept: PERIOP | Facility: HOSPITAL | Age: 68
End: 2023-05-19

## 2023-05-19 ENCOUNTER — HOSPITAL ENCOUNTER (INPATIENT)
Facility: HOSPITAL | Age: 68
End: 2023-05-19
Attending: SURGERY | Admitting: SURGERY

## 2023-05-19 ENCOUNTER — TELEPHONE (OUTPATIENT)
Dept: GASTROENTEROLOGY | Facility: CLINIC | Age: 68
End: 2023-05-19

## 2023-05-19 ENCOUNTER — TELEPHONE (OUTPATIENT)
Dept: VASCULAR SURGERY | Facility: CLINIC | Age: 68
End: 2023-05-19

## 2023-05-19 DIAGNOSIS — Z78.9 PROBLEM WITH VASCULAR ACCESS: ICD-10-CM

## 2023-05-19 DIAGNOSIS — T82.868S: ICD-10-CM

## 2023-05-19 DIAGNOSIS — N18.6 END-STAGE RENAL DISEASE ON HEMODIALYSIS (HCC): Primary | ICD-10-CM

## 2023-05-19 DIAGNOSIS — N18.6 ESRD (END STAGE RENAL DISEASE) ON DIALYSIS (HCC): ICD-10-CM

## 2023-05-19 DIAGNOSIS — Z99.2 ESRD (END STAGE RENAL DISEASE) ON DIALYSIS (HCC): ICD-10-CM

## 2023-05-19 DIAGNOSIS — R07.9 CHEST PAIN, UNSPECIFIED TYPE: ICD-10-CM

## 2023-05-19 DIAGNOSIS — Z99.2 END-STAGE RENAL DISEASE ON HEMODIALYSIS (HCC): Primary | ICD-10-CM

## 2023-05-19 DIAGNOSIS — Z01.818 PRE-OP TESTING: ICD-10-CM

## 2023-05-19 LAB
4HR DELTA HS TROPONIN: 0 NG/L
ABO GROUP BLD: NORMAL
ALBUMIN SERPL BCP-MCNC: 2.8 G/DL (ref 3.5–5)
ALP SERPL-CCNC: 103 U/L (ref 46–116)
ALT SERPL W P-5'-P-CCNC: 10 U/L (ref 12–78)
ANION GAP SERPL CALCULATED.3IONS-SCNC: 10 MMOL/L (ref 4–13)
APTT PPP: 32 SECONDS (ref 23–37)
AST SERPL W P-5'-P-CCNC: 17 U/L (ref 5–45)
BASE EXCESS BLDA CALC-SCNC: 5 MMOL/L (ref -2–3)
BILIRUB SERPL-MCNC: 0.77 MG/DL (ref 0.2–1)
BLD GP AB SCN SERPL QL: NEGATIVE
BUN SERPL-MCNC: 15 MG/DL (ref 5–25)
CA-I BLD-SCNC: 1 MMOL/L (ref 1.12–1.32)
CALCIUM ALBUM COR SERPL-MCNC: 9.1 MG/DL (ref 8.3–10.1)
CALCIUM SERPL-MCNC: 8.1 MG/DL (ref 8.3–10.1)
CARDIAC TROPONIN I PNL SERPL HS: 12 NG/L
CARDIAC TROPONIN I PNL SERPL HS: 12 NG/L
CHLORIDE SERPL-SCNC: 102 MMOL/L (ref 96–108)
CO2 SERPL-SCNC: 24 MMOL/L (ref 21–32)
CREAT SERPL-MCNC: 5.8 MG/DL (ref 0.6–1.3)
ERYTHROCYTE [DISTWIDTH] IN BLOOD BY AUTOMATED COUNT: 13.7 % (ref 11.6–15.1)
GFR SERPL CREATININE-BSD FRML MDRD: 9 ML/MIN/1.73SQ M
GLUCOSE P FAST SERPL-MCNC: 148 MG/DL (ref 65–99)
GLUCOSE SERPL-MCNC: 134 MG/DL (ref 65–140)
GLUCOSE SERPL-MCNC: 141 MG/DL (ref 65–140)
GLUCOSE SERPL-MCNC: 148 MG/DL (ref 65–140)
GLUCOSE SERPL-MCNC: 93 MG/DL (ref 65–140)
HCO3 BLDA-SCNC: 27.6 MMOL/L (ref 22–28)
HCT VFR BLD AUTO: 30.9 % (ref 36.5–49.3)
HCT VFR BLD CALC: 28 % (ref 36.5–49.3)
HGB BLD-MCNC: 10 G/DL (ref 12–17)
HGB BLDA-MCNC: 9.5 G/DL (ref 12–17)
INR PPP: 1.2 (ref 0.84–1.19)
MAGNESIUM SERPL-MCNC: 1.7 MG/DL (ref 1.6–2.6)
MCH RBC QN AUTO: 38.2 PG (ref 26.8–34.3)
MCHC RBC AUTO-ENTMCNC: 32.4 G/DL (ref 31.4–37.4)
MCV RBC AUTO: 118 FL (ref 82–98)
PCO2 BLD: 29 MMOL/L (ref 21–32)
PCO2 BLD: 31.8 MM HG (ref 36–44)
PH BLD: 7.55 [PH] (ref 7.35–7.45)
PHOSPHATE SERPL-MCNC: 2.5 MG/DL (ref 2.3–4.1)
PLATELET # BLD AUTO: 162 THOUSANDS/UL (ref 149–390)
PMV BLD AUTO: 10.8 FL (ref 8.9–12.7)
PO2 BLD: 111 MM HG (ref 75–129)
POTASSIUM BLD-SCNC: 3.5 MMOL/L (ref 3.5–5.3)
POTASSIUM SERPL-SCNC: 3.5 MMOL/L (ref 3.5–5.3)
PROT SERPL-MCNC: 5.4 G/DL (ref 6.4–8.4)
PROTHROMBIN TIME: 15.4 SECONDS (ref 11.6–14.5)
RBC # BLD AUTO: 2.62 MILLION/UL (ref 3.88–5.62)
RH BLD: POSITIVE
SAO2 % BLD FROM PO2: 99 % (ref 60–85)
SODIUM BLD-SCNC: 138 MMOL/L (ref 136–145)
SODIUM SERPL-SCNC: 136 MMOL/L (ref 135–147)
SPECIMEN EXPIRATION DATE: NORMAL
SPECIMEN SOURCE: ABNORMAL
SPECIMEN SOURCE: NORMAL
WBC # BLD AUTO: 15.04 THOUSAND/UL (ref 4.31–10.16)

## 2023-05-19 PROCEDURE — 03180ZF BYPASS LEFT BRACHIAL ARTERY TO LOWER ARM VEIN, OPEN APPROACH: ICD-10-PCS | Performed by: SURGERY

## 2023-05-19 DEVICE — PROPATEN VASCULAR GRAFT SW 4-7MMX45CM TAPERED HEPARIN
Type: IMPLANTABLE DEVICE | Site: ARM | Status: FUNCTIONAL
Brand: GORE PROPATEN VASCULAR GRAFT

## 2023-05-19 RX ORDER — ALBUMIN, HUMAN INJ 5% 5 %
12.5 SOLUTION INTRAVENOUS ONCE
Status: COMPLETED | OUTPATIENT
Start: 2023-05-19 | End: 2023-05-19

## 2023-05-19 RX ORDER — LIDOCAINE HYDROCHLORIDE 10 MG/ML
INJECTION, SOLUTION EPIDURAL; INFILTRATION; INTRACAUDAL; PERINEURAL
Status: DISCONTINUED | OUTPATIENT
Start: 2023-05-19 | End: 2023-05-19

## 2023-05-19 RX ORDER — PROPOFOL 10 MG/ML
INJECTION, EMULSION INTRAVENOUS AS NEEDED
Status: DISCONTINUED | OUTPATIENT
Start: 2023-05-19 | End: 2023-05-19

## 2023-05-19 RX ORDER — CHLORHEXIDINE GLUCONATE 0.12 MG/ML
15 RINSE ORAL ONCE
Status: COMPLETED | OUTPATIENT
Start: 2023-05-19 | End: 2023-05-19

## 2023-05-19 RX ORDER — ACETAMINOPHEN 325 MG/1
650 TABLET ORAL EVERY 6 HOURS PRN
Status: DISCONTINUED | OUTPATIENT
Start: 2023-05-19 | End: 2023-05-24 | Stop reason: HOSPADM

## 2023-05-19 RX ORDER — ROCURONIUM BROMIDE 10 MG/ML
INJECTION, SOLUTION INTRAVENOUS AS NEEDED
Status: DISCONTINUED | OUTPATIENT
Start: 2023-05-19 | End: 2023-05-19

## 2023-05-19 RX ORDER — SODIUM CHLORIDE 9 MG/ML
INJECTION, SOLUTION INTRAVENOUS CONTINUOUS PRN
Status: DISCONTINUED | OUTPATIENT
Start: 2023-05-19 | End: 2023-05-19

## 2023-05-19 RX ORDER — WARFARIN SODIUM 5 MG/1
5 TABLET ORAL
Status: DISCONTINUED | OUTPATIENT
Start: 2023-05-19 | End: 2023-05-22

## 2023-05-19 RX ORDER — ESMOLOL HYDROCHLORIDE 10 MG/ML
INJECTION INTRAVENOUS AS NEEDED
Status: DISCONTINUED | OUTPATIENT
Start: 2023-05-19 | End: 2023-05-19

## 2023-05-19 RX ORDER — HYDROMORPHONE HCL/PF 1 MG/ML
0.5 SYRINGE (ML) INJECTION
Status: DISCONTINUED | OUTPATIENT
Start: 2023-05-19 | End: 2023-05-24 | Stop reason: HOSPADM

## 2023-05-19 RX ORDER — FENTANYL CITRATE 50 UG/ML
INJECTION, SOLUTION INTRAMUSCULAR; INTRAVENOUS AS NEEDED
Status: DISCONTINUED | OUTPATIENT
Start: 2023-05-19 | End: 2023-05-19

## 2023-05-19 RX ORDER — ONDANSETRON 2 MG/ML
INJECTION INTRAMUSCULAR; INTRAVENOUS AS NEEDED
Status: DISCONTINUED | OUTPATIENT
Start: 2023-05-19 | End: 2023-05-19

## 2023-05-19 RX ORDER — PROTAMINE SULFATE 10 MG/ML
INJECTION, SOLUTION INTRAVENOUS AS NEEDED
Status: DISCONTINUED | OUTPATIENT
Start: 2023-05-19 | End: 2023-05-19

## 2023-05-19 RX ORDER — ALBUMIN, HUMAN INJ 5% 5 %
SOLUTION INTRAVENOUS CONTINUOUS PRN
Status: DISCONTINUED | OUTPATIENT
Start: 2023-05-19 | End: 2023-05-19

## 2023-05-19 RX ORDER — CEFAZOLIN SODIUM 2 G/50ML
SOLUTION INTRAVENOUS AS NEEDED
Status: DISCONTINUED | OUTPATIENT
Start: 2023-05-19 | End: 2023-05-19

## 2023-05-19 RX ORDER — HEPARIN SODIUM 1000 [USP'U]/ML
INJECTION, SOLUTION INTRAVENOUS; SUBCUTANEOUS AS NEEDED
Status: DISCONTINUED | OUTPATIENT
Start: 2023-05-19 | End: 2023-05-19

## 2023-05-19 RX ORDER — HEPARIN SODIUM 10000 [USP'U]/100ML
500 INJECTION, SOLUTION INTRAVENOUS CONTINUOUS
Status: DISCONTINUED | OUTPATIENT
Start: 2023-05-19 | End: 2023-05-22

## 2023-05-19 RX ORDER — ALBUTEROL SULFATE 90 UG/1
AEROSOL, METERED RESPIRATORY (INHALATION) AS NEEDED
Status: DISCONTINUED | OUTPATIENT
Start: 2023-05-19 | End: 2023-05-19

## 2023-05-19 RX ORDER — HYDROMORPHONE HCL IN WATER/PF 6 MG/30 ML
0.2 PATIENT CONTROLLED ANALGESIA SYRINGE INTRAVENOUS
Status: DISCONTINUED | OUTPATIENT
Start: 2023-05-19 | End: 2023-05-19 | Stop reason: HOSPADM

## 2023-05-19 RX ORDER — SODIUM CHLORIDE, SODIUM LACTATE, POTASSIUM CHLORIDE, CALCIUM CHLORIDE 600; 310; 30; 20 MG/100ML; MG/100ML; MG/100ML; MG/100ML
INJECTION, SOLUTION INTRAVENOUS CONTINUOUS PRN
Status: DISCONTINUED | OUTPATIENT
Start: 2023-05-19 | End: 2023-05-19

## 2023-05-19 RX ORDER — FENTANYL CITRATE/PF 50 MCG/ML
25 SYRINGE (ML) INJECTION
Status: COMPLETED | OUTPATIENT
Start: 2023-05-19 | End: 2023-05-19

## 2023-05-19 RX ORDER — OXYCODONE HYDROCHLORIDE 5 MG/1
5 TABLET ORAL EVERY 4 HOURS PRN
Status: DISCONTINUED | OUTPATIENT
Start: 2023-05-19 | End: 2023-05-24 | Stop reason: HOSPADM

## 2023-05-19 RX ORDER — LIDOCAINE HYDROCHLORIDE 10 MG/ML
INJECTION, SOLUTION EPIDURAL; INFILTRATION; INTRACAUDAL; PERINEURAL AS NEEDED
Status: DISCONTINUED | OUTPATIENT
Start: 2023-05-19 | End: 2023-05-19

## 2023-05-19 RX ORDER — ATORVASTATIN CALCIUM 20 MG/1
20 TABLET, FILM COATED ORAL
Status: DISCONTINUED | OUTPATIENT
Start: 2023-05-19 | End: 2023-05-24 | Stop reason: HOSPADM

## 2023-05-19 RX ADMIN — FENTANYL CITRATE 50 MCG: 50 INJECTION INTRAMUSCULAR; INTRAVENOUS at 12:31

## 2023-05-19 RX ADMIN — PHENYLEPHRINE HYDROCHLORIDE 50 MCG/MIN: 10 INJECTION INTRAVENOUS at 16:42

## 2023-05-19 RX ADMIN — ATORVASTATIN CALCIUM 20 MG: 20 TABLET, FILM COATED ORAL at 21:01

## 2023-05-19 RX ADMIN — ALBUMIN (HUMAN): 12.5 INJECTION, SOLUTION INTRAVENOUS at 14:52

## 2023-05-19 RX ADMIN — HEPARIN SODIUM 500 UNITS/HR: 10000 INJECTION, SOLUTION INTRAVENOUS at 20:55

## 2023-05-19 RX ADMIN — CHLORHEXIDINE GLUCONATE 15 ML: 1.2 SOLUTION ORAL at 10:50

## 2023-05-19 RX ADMIN — LIDOCAINE HYDROCHLORIDE 0.5 ML: 10 INJECTION, SOLUTION EPIDURAL; INFILTRATION; INTRACAUDAL; PERINEURAL at 16:30

## 2023-05-19 RX ADMIN — ROCURONIUM BROMIDE 10 MG: 10 INJECTION, SOLUTION INTRAVENOUS at 13:53

## 2023-05-19 RX ADMIN — OXYCODONE HYDROCHLORIDE 5 MG: 5 TABLET ORAL at 21:01

## 2023-05-19 RX ADMIN — FENTANYL CITRATE 25 MCG: 50 INJECTION INTRAMUSCULAR; INTRAVENOUS at 17:05

## 2023-05-19 RX ADMIN — DESMOPRESSIN ACETATE 23.2 MCG: 4 SOLUTION INTRAVENOUS at 16:04

## 2023-05-19 RX ADMIN — ROCURONIUM BROMIDE 10 MG: 10 INJECTION, SOLUTION INTRAVENOUS at 12:48

## 2023-05-19 RX ADMIN — PROPOFOL 150 MG: 10 INJECTION, EMULSION INTRAVENOUS at 12:31

## 2023-05-19 RX ADMIN — ROCURONIUM BROMIDE 40 MG: 10 INJECTION, SOLUTION INTRAVENOUS at 12:31

## 2023-05-19 RX ADMIN — FENTANYL CITRATE 25 MCG: 50 INJECTION INTRAMUSCULAR; INTRAVENOUS at 17:15

## 2023-05-19 RX ADMIN — HYDROMORPHONE HYDROCHLORIDE 0.5 MG: 1 INJECTION, SOLUTION INTRAMUSCULAR; INTRAVENOUS; SUBCUTANEOUS at 23:34

## 2023-05-19 RX ADMIN — ALBUTEROL SULFATE 4 PUFF: 90 AEROSOL, METERED RESPIRATORY (INHALATION) at 12:55

## 2023-05-19 RX ADMIN — HEPARIN SODIUM 7000 UNITS: 1000 INJECTION INTRAVENOUS; SUBCUTANEOUS at 13:39

## 2023-05-19 RX ADMIN — HEPARIN SODIUM 1000 UNITS: 1000 INJECTION INTRAVENOUS; SUBCUTANEOUS at 14:08

## 2023-05-19 RX ADMIN — CEFAZOLIN SODIUM 2000 MG: 2 SOLUTION INTRAVENOUS at 12:36

## 2023-05-19 RX ADMIN — FENTANYL CITRATE 25 MCG: 50 INJECTION INTRAMUSCULAR; INTRAVENOUS at 13:08

## 2023-05-19 RX ADMIN — FENTANYL CITRATE 25 MCG: 50 INJECTION INTRAMUSCULAR; INTRAVENOUS at 13:22

## 2023-05-19 RX ADMIN — ONDANSETRON 4 MG: 2 INJECTION INTRAMUSCULAR; INTRAVENOUS at 12:31

## 2023-05-19 RX ADMIN — SODIUM CHLORIDE, SODIUM LACTATE, POTASSIUM CHLORIDE, AND CALCIUM CHLORIDE: .6; .31; .03; .02 INJECTION, SOLUTION INTRAVENOUS at 12:24

## 2023-05-19 RX ADMIN — FENTANYL CITRATE 25 MCG: 50 INJECTION INTRAMUSCULAR; INTRAVENOUS at 16:57

## 2023-05-19 RX ADMIN — PHENYLEPHRINE HYDROCHLORIDE 30 MCG/MIN: 10 INJECTION INTRAVENOUS at 12:42

## 2023-05-19 RX ADMIN — WARFARIN SODIUM 5 MG: 5 TABLET ORAL at 21:09

## 2023-05-19 RX ADMIN — ESMOLOL HYDROCHLORIDE 20 MG: 100 INJECTION, SOLUTION INTRAVENOUS at 12:48

## 2023-05-19 RX ADMIN — PROTAMINE SULFATE 10 MG: 10 INJECTION, SOLUTION INTRAVENOUS at 15:00

## 2023-05-19 RX ADMIN — ALBUMIN (HUMAN) 12.5 G: 12.5 INJECTION, SOLUTION INTRAVENOUS at 16:00

## 2023-05-19 RX ADMIN — ROCURONIUM BROMIDE 10 MG: 10 INJECTION, SOLUTION INTRAVENOUS at 13:23

## 2023-05-19 RX ADMIN — SUGAMMADEX 160 MG: 100 INJECTION, SOLUTION INTRAVENOUS at 15:38

## 2023-05-19 RX ADMIN — PROTAMINE SULFATE 20 MG: 10 INJECTION, SOLUTION INTRAVENOUS at 14:53

## 2023-05-19 RX ADMIN — LIDOCAINE HYDROCHLORIDE 50 MG: 10 INJECTION, SOLUTION EPIDURAL; INFILTRATION; INTRACAUDAL; PERINEURAL at 12:31

## 2023-05-19 RX ADMIN — ROCURONIUM BROMIDE 10 MG: 10 INJECTION, SOLUTION INTRAVENOUS at 14:37

## 2023-05-19 RX ADMIN — HEPARIN SODIUM 1000 UNITS: 1000 INJECTION INTRAVENOUS; SUBCUTANEOUS at 14:37

## 2023-05-19 RX ADMIN — FENTANYL CITRATE 25 MCG: 50 INJECTION INTRAMUSCULAR; INTRAVENOUS at 16:49

## 2023-05-19 NOTE — OP NOTE
OPERATIVE REPORT  PATIENT NAME: Garland Phillips    :  1955  MRN: [id number]  Pt Location: BE OR ROOM 08    SURGERY DATE: 2023    Surgeon(s) and Role:     * Jamie Orellana MD - Primary     * Sharon Bailey MD - Fellow    Preop Diagnosis:  ESRD (end stage renal disease) on dialysis ([facility] [address] ) [N18 6, Z99 2]    Post-Op Diagnosis Codes:     * ESRD (end stage renal disease) on dialysis ([facility] [address] ) [N18 6, Z99 2]    Procedure(s):  LEFT BRACHIAL-AXILLARY AVG    Specimen(s):  * No specimens in log *    Estimated Blood Loss:   400 mL    Drains:  * No LDAs found *    Anesthesia Type:   General w/ Regional    Operative Indications:  ESRD (end stage renal disease) on dialysis ([facility] [address] ) [N18 6, Z99 2]    Garland Phillips is a 68M with HTN, ESRD, prior failed left radiocephalic/brachicephalic fistula and recent thrombosis of L brachiobasilic fistula presenting for AV access creation  Risks, benefits and alternatives were discussed with patient and consent was obtained  Operative Findings:  Left axillary vein of good quality in diameter of approximately 6 to 8 mm  The axillary artery was densely calcified and not suitable for anastomosis  Therefore, the brachial artery proximal to previous brachial anastomosis was used that was free of atherosclerotic disease  Quite oozy from tunneling site requiring reversal with protamine and DDAVP  There was a nice thrill along the AV graft and radial signal at the end of the case  Complications:   None    Procedure and Technique:  The patient was brought to the operating room placed on the operating table supine fashion     General anesthesia was induced  He was given IV antibiotics  Duplex ultrasound was used to examine the brachial artery and axillary vein and these were found to be of adequate size  He was prepped and draped in the usual sterile fashion, exposing the left arm circumferentially  A timeout was performed        A longitudinal incision was made along the previous scar with a scalpel and dissected down through the subcutaneous tissue  The axillary vein was identified and circumferentially dissected and encircled with Vesseloops  The axillary artery was then identified but found to have thickened calcific disease and therefore not suitable for anastomosis  A jejunal incision was then made proximal to the prior brachial anastomosis along the medial arm  Cautery was used to dissect through the soft tissue  The brachial artery was then identified and dissected free  Vessel loops were placed proximally and distally  A curved tunneler was used to make a lazy tunnel anteriorly from one incision to the other  A 4-7mm Propaten graft was selected and pulled through the tunnel making sure that it slides easily and that it was not kinked or twisted  The patient was then systemically heparinized  Profunda clamps were placed on the brachial artery and an 11-blade was used to make a longitudinal incision  This was lengthened with Nuñez scissors  The 4mm end of the graft was cut to length and beveled  The anastomosis was created using 6-0 prolene suture tied at the heel and run circumferentially  After the anastomosis was complete, the proximal vessel loop was released and the graft was bled and flushed with heparinized saline  The graft was clamped with a profunda near the anastomosis and the distal arterial vessel loop was released  A Huffman Derra clamp was placed on the axillary vein and an 11-blade was used to make a longitudinal incision, which was lengthened with Nuñez scissors  The 7mm end of the graft was cut to length and beveled  The anastomosis was created using 6-0 prolene suture tied at the heel and run circumferentially  Prior to completion, the graft and vein were bled and flushed  After completion, the clamps were released and flow was restored  An excellent thrill could be felt in the graft with radial signal present        There was significant oozing from the tunneling site that was managed with multiple hemostatic agents, protamine reversal and DDAVP  Then irrigated with saline solution  They were then closed with 2-0 vicryl running suture for the subcutaneous tissue and skin staples  The incisions were dressed with Mepilex  The patient was allowed to awaken and was extubated  He was transferred to the PACU for postoperative care  Dr Dimitri Cortez was scrubbed, present and participated entire case  Patient Disposition:  PACU         SIGNATURE: Lovely Vieira MD  DATE: May 19, 2023  TIME: 4:09 PM        Vascular Quality Initiative - Hemodialysis Access Placement    Pre-admission Information   Functional status: Restricted in physically strenuous activity but ambulatory and able to carry out work of a light or sedentary nature  ESRD: ESRD: Hemodialysis dependent  Current Access Type : Tunneled Catheter    Historical Information      Previous Access: left   Access Type/Location: ACCESS TYPE: AV graft Reason not autogenous: Suitable vein not available Access Location: Upper arm Basilic        Procedure Information      Status: Inpatient     Side:left      Anesthesia: General     Access Type: Access Type: AV Graft  Reason not autogenous: Suitable vein not available Conduit Type: Prosthetic Graft Inflow Artery is: Brachial, Antecubital Intraoperative Artery taget diameter is: 6mm  Outflow Vein is: Axillary  Intraoperative Vein target diameter is: 6mm  Anastomotic Artery Length 7mm, Anastomotic Vein Length 7mm  Anastomotic Material is: PTFE suture    Anastomosis type is: Continuous  Cocomitant Procedure performed-: None    Completion Fistulogram: no     Preop ARTERIAL evaluation and/or treatment: duplex    Preop VENOUS evaluation and/or treatment: ultrasound mapping    *Obtain Target Diameters from study          Post op Information     Discharge Status: Other Hospital    Post op Complications: None

## 2023-05-19 NOTE — TELEPHONE ENCOUNTER
Received fax from [facility]  They are requesting a 5 day clopidogrel hold for an endoscopic ultrasound, which is scheduled for 6/8/23  Pt is current inpatient and having surgery today - CREATION of LEFT FISTULA ARTERIOVENOUS (AV) GRAFT, possible Right upper extremity (Left: Arm Upper), by Dr Fidelina Delgadillo  Routed to triage to advise

## 2023-05-19 NOTE — ANESTHESIA PROCEDURE NOTES
Arterial Line Insertion  Performed by: Freya Shore MD  Authorized by: Freya Shore MD   Consent: Verbal consent obtained  Written consent obtained  Risks and benefits: risks, benefits and alternatives were discussed  Consent given by: patient  Patient understanding: patient states understanding of the procedure being performed  Patient consent: the patient's understanding of the procedure matches consent given  Procedure consent: procedure consent matches procedure scheduled  Relevant documents: relevant documents present and verified  Required items: required blood products, implants, devices, and special equipment available  Patient identity confirmed: arm band and verbally with patient  Preparation: Patient was prepped and draped in the usual sterile fashion    Indications: multiple ABGs and hemodynamic monitoring  Orientation:  Right  Location: radial arterylidocaine (PF) (XYLOCAINE-MPF) 1 % - Infiltration   0 5 mL - 5/19/2023 4:30:00 PM  Procedure Details:  Needle gauge: 20  Seldinger technique: Seldinger technique used  Number of attempts: 1    Post-procedure:  Post-procedure: dressing applied  Waveform: good waveform and waveform confirmed  Post-procedure CNS: normal and unchanged  Patient tolerance: patient tolerated the procedure well with no immediate complications  Comments: Pre-inducton  U/s guided

## 2023-05-19 NOTE — INTERVAL H&P NOTE
H&P reviewed  After examining the patient I find no changes in the patients condition since the H&P had been written      Vitals:    05/19/23 1044   BP: 95/58   Pulse:    Resp:    Temp:    SpO2:

## 2023-05-19 NOTE — ANESTHESIA PREPROCEDURE EVALUATION
Procedure:  CREATION of LEFT FISTULA ARTERIOVENOUS (AV) GRAFT, possible Right upper extremity (Left: Arm Upper)    Relevant Problems   CARDIO   (+) Chronic deep vein thrombosis (DVT) of internal jugular vein (HCC)   (+) Coronary artery disease involving native coronary artery   (+) Hypertension   (+) Mixed hyperlipidemia      ENDO   (+) Type 2 diabetes mellitus (HCC)      /RENAL   (+) Chronic kidney disease   (+) Dependence on renal dialysis (HCC)   (+) ESRD (end stage renal disease) on dialysis (HCC)   (+) End-stage renal disease on hemodialysis (HCC)      HEMATOLOGY   (+) Acute on chronic anemia   (+) Pernicious anemia   (+) Thrombocytopenia (HCC)      2/8/2023: oLAD 50, mLAD 50 (not flow-limiting); oRCA 70, mRCA 90, pRCA 70 (KARLOS, --> 0)       Anesthesia Plan  ASA Score- 4     Anesthesia Type- general with ASA Monitors  Additional Monitors:   Airway Plan: LMA  Comment: General anesthesia, LMA; standard ASA monitors  Risks and benefits discussed with patient; patient consented and agrees to proceed  I saw and evaluated the patient  If seen with CRNA, we have discussed the anesthetic plan and I am in agreement that the plan is appropriate for the patient  Prior LMA 4  Plan Factors-    Induction- intravenous  Postoperative Plan- Plan for postoperative opioid use  Planned trial extubation    Informed Consent- Anesthetic plan and risks discussed with patient  I personally reviewed this patient with the CRNA  Discussed and agreed on the Anesthesia Plan with the CRNA  Suzie Adam

## 2023-05-19 NOTE — TELEPHONE ENCOUNTER
Reviewed Dr Adolfo Nix recent [address] note  Patient is s/p AVG 3/21, access thrombosed and now is having AVG today w/ Dr Daija Tristan   Will need to await outcome of today's procedure to determine Plavix hold

## 2023-05-19 NOTE — ANESTHESIA POSTPROCEDURE EVALUATION
Post-Op Assessment Note    CV Status:  Stable  Pain Score: 0          Post Op Vitals Reviewed: Yes      Staff: Anesthesiologist   Comments: mild hypotension, likely low preload - mentating, being treated with elvi/fluid        No notable events documented      BP (!) 90/48 (05/19/23 1600)    Temp      Pulse 74 (05/19/23 1600)   Resp 18 (05/19/23 1600)    SpO2 100 % (05/19/23 1600)

## 2023-05-19 NOTE — TELEPHONE ENCOUNTER
----- Message from Cecil Torres MD sent at 5/19/2023  7:48 AM EDT -----  Regarding: RE: EUS/EGD  Eus without cytology   ----- Message -----  From: Hernandez Jensen  Sent: 5/18/2023   3:41 PM EDT  To: Cecil Torres MD  Subject: EUS/EGD                                          Hi Dr Jorge Gimenez, Dr Rodriguez Push is ordering an EGD/EUS on this patient  Is FNA/Cytology needed?  Please advise, thank you!  ----- Message -----  From: Griselda Cordon, MD  Sent: 5/17/2023   2:35 PM EDT  To: Skylar Rivera    Ordering EGD/EUS

## 2023-05-19 NOTE — CONSULTS
Consultation - Nephrology   [facility] George Bishop 70 y o  male MRN: [id number]  Unit/Bed#: OR POOL Encounter: [id number]      ASSESSMENT/PLAN:  End-stage renal disease:   • On maintenance hemodialysis every TTS at [facility] (per patient report), [place] (care everywhere reports Monday Wednesday Friday PM shift)  • EDW: 76 5 kg  • Last dialysis treatment yesterday as through care everywhere  • Next dialysis treatment plan tomorrow as scheduled  • No urgent need for dialysis at this time  • Avoid nephrotoxins, adjust meds to appropriate GFR  • Will continue to follow I/O, labs and volume status    Access:   • Right chest PermCath-site intact clean and dry  • Status post left brachial AV graft placement today 5/19/2023-Dr Marline Mejía  • Left AV fistula 3/21/2023 has been thrombosed  Hypertension:  • Having perturbations of blood pressure  • Avoid variations in blood pressure  • Home medications include: none  • Will UF as blood pressure allows    Anemia likely Chronic Kidney Disease:  • ALICE: Not on ALICE as outpatient  • Most recent hemoglobin 12 5 on 5/11/2023  • Labwork pending  • Transfuse for hemoglobin less than 7 0    Bone mineral disease of Chronic Kidney Disease:  • Not on phosphorus binders as outpatient  • Secondary hyperparathyroidism: Calcitriol 0 75 mcg with HD-monitor to start  • Recommend renal diet when eating  • Check PTH and phosphorus as outpatient    Electrolytes/Acid Base:  • Sodium, phosphorus, potassium, and acidosis to be corrected with dialysis  • Will continue to monitor       Medical records through [address] [facility] and care everywhere has been reviewed for this patient encounter    HISTORY OF PRESENT ILLNESS:  Requesting Physician: Monster Rios MD  Reason for Consult: ESRD on HD    Tong Clark is a 70y o  year old male with a past medical history of ERSD, hypertension, hyperlipidemia, diabetes II, anemia, thrombocytopenia, DVT, and CAD who presented today for creation of new AV graft    Patient had 400 mL blood loss  Of note previous left brachial AV graft done on 3/21/2023 has been thrombosed  A renal consultation is requested today for assistance in the management of ESRD  Patient reports he has hemodialysis every TTS a m  and last dialysis treatment was Thursday  Denies any issues with his PermCath  PAST MEDICAL HISTORY:  Past Medical History:   Diagnosis Date   • 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    With Cerebral Infarction:  Last Assessed:  October 20, 2016   • Chronic kidney disease 2012    on dialysis    • COVID 05/2022   • Diabetes mellitus ([address] )    • Dialysis patient [facility])     T/TH/Sat   • GERD (gastroesophageal reflux disease)    • Hyperlipidemia    • Hypertension    • Labyrinthitis 09/10/2011   • Myocardial infarction [facility] 2014    x3 others were in 2009 & 2010   • Sleep disturbances 09/20/2010   • Stroke [facility] 2007    x1, RLE deficit- uses walker   • Type 2 diabetes, uncontrolled, with renal manifestation 05/03/2017       PAST SURGICAL HISTORY:  Past Surgical History:   Procedure Laterality Date   • AV FISTULA PLACEMENT     • CARDIAC CATHETERIZATION Left 02/03/2023    Procedure: Cardiac Left Heart Cath;  Surgeon: Mayra Estevez MD;  Location: [place] [address] CATH LAB; Service: Cardiology   • CARDIAC CATHETERIZATION N/A 02/08/2023    Procedure: Cardiac catheterization with complex PCI with Dr Sanjay Soliz, patient is a renal dialysis patient;  Surgeon: Sherin Fry MD;  Location:  CARDIAC CATH LAB; Service: Cardiology   • CARDIAC SURGERY  2010    stents x3   • COLONOSCOPY N/A 12/12/2016    Procedure: COLONOSCOPY;  Surgeon: Aretha Carr MD;  Location: [facility] GI LAB; Service:    • COLONOSCOPY N/A 04/03/2017    Procedure:  COLONOSCOPY;  Surgeon: Aretha Carr MD;  Location: [facility] GI LAB;   Service:    • HARDWARE REMOVAL Right 04/04/2022    Procedure: REMOVAL HARDWARE HIP;  Surgeon: Ambika Herman MD;  Location:  MAIN OR;  Service: Orthopedics   • IR AV FISTULA/GRAFT DECLOT "04/29/2021   • IR AV FISTULA/GRAFT DECLOT  03/25/2022   • IR AV FISTULA/GRAFT DECLOT  09/21/2022   • IR AV FISTULAGRAM/GRAFTOGRAM  03/28/2022   • IR AV FISTULAGRAM/GRAFTOGRAM  07/28/2022   • IR TUNNELED CENTRAL LINE REMOVAL  12/07/2021   • IR TUNNELED DIALYSIS CATHETER PLACEMENT  05/24/2021   • PORTACATH PLACEMENT      per pt \"port in chest\"   • RI ARTERIOVENOUS ANASTOMOSIS OPEN DIRECT Left 07/22/2021    Procedure: CREATION FISTULA ARTERIOVENOUS (AV); Surgeon: Edwina Estrada MD;  Location: [address] [place] [address];  Service: Vascular   • RI ARTERIOVENOUS ANASTOMOSIS OPEN DIRECT Left 3/21/2023    Procedure: left brachiobasilic fistula,  AVG Graft;  Surgeon: Deep Kidd MD;  Location: [facility] MAIN OR;  Service: Vascular   • RI HEMIARTHROPLASTY HIP PARTIAL Right 04/04/2022    Procedure: HEMIARTHROPLASTY HIP (BIPOLAR);   Surgeon: Manpreet Cottrell MD;  Location: [facility] MAIN OR;  Service: Orthopedics       ALLERGIES:  No Known Allergies    SOCIAL HISTORY:  Social History     Substance and Sexual Activity   Alcohol Use Yes    Comment: rarely - No alcohol use per [employer]     Social History     Substance and Sexual Activity   Drug Use Not Currently   • Types: Marijuana    Comment: occasional brownie for sleep     Social History     Tobacco Use   Smoking Status Every Day   • Packs/day: 0 25   • Years: 30 00   • Pack years: 7 50   • Types: Cigarettes   Smokeless Tobacco Never       FAMILY HISTORY:  Family History   Problem Relation Age of Onset   • Leukemia Mother    • Crohn's disease Father    • Transient ischemic attack Brother        MEDICATIONS:    Current Facility-Administered Medications:   •  acetaminophen (TYLENOL) tablet 650 mg, 650 mg, Oral, Q6H PRN, Deb Barnett MD  •  [START ON 5/20/2023] aspirin (ECOTRIN LOW STRENGTH) EC tablet 81 mg, 81 mg, Oral, Daily, Deb Barnett MD  •  atorvastatin (LIPITOR) tablet 20 mg, 20 mg, Oral, HS, Deb Barnett MD  •  desmopressin (DDAVP) 23 2 mcg in sodium chloride 0 9 % 50 mL IVPB, " 0 3 mcg/kg, Intravenous, Once, Frida Clark CRNA, Last Rate: 100 mL/hr at 05/19/23 1604, 23 2 mcg at 05/19/23 1604  •  heparin (porcine) 2,000 Units, papaverine 60 mg in multi-electrolyte (PLASMALYTE-A/ISOLYTE-S PH 7 4) 500 mL irrigation, , Irrigation, Once, Ketan Ponce MD  •  heparin (porcine) 25,000 units in 0 45% NaCl 250 mL infusion (premix), 500 Units/hr, Intravenous, Continuous, Sri Hoang MD  •  lactated ringers bolus 500 mL, 500 mL, Intravenous, Once PRN **AND** lactated ringers bolus 500 mL, 500 mL, Intravenous, Once PRN, Sri Hoang MD  •  oxyCODONE (ROXICODONE) IR tablet 2 5 mg, 2 5 mg, Oral, Q4H PRN **OR** oxyCODONE (ROXICODONE) IR tablet 5 mg, 5 mg, Oral, Q4H PRN, Sri Hoang MD  •  sodium chloride 0 9 % bolus 500 mL, 500 mL, Intravenous, Once PRN **AND** sodium chloride 0 9 % bolus 500 mL, 500 mL, Intravenous, Once PRN, Sri Hoang MD  •  warfarin (COUMADIN) tablet 5 mg, 5 mg, Oral, Daily (warfarin), Sri Hoang MD    REVIEW OF SYSTEMS:  Patient seen and examined at bedside  Review of Systems   Constitutional: Negative  Negative for activity change, appetite change, chills, diaphoresis, fatigue and fever  HENT: Negative  Negative for congestion and facial swelling  Respiratory: Negative  Cardiovascular: Negative  Gastrointestinal: Negative  Endocrine: Negative  Genitourinary: Negative  Musculoskeletal: Negative  Skin: Negative  Allergic/Immunologic: Negative  Neurological: Negative  Hematological: Negative  Psychiatric/Behavioral: Negative            PHYSICAL EXAM:  Current Weight: Weight - Scale: 77 1 kg (170 lb)  First Weight: Weight - Scale: 77 1 kg (170 lb)  Vitals:    05/19/23 1029 05/19/23 1044 05/19/23 1551 05/19/23 1600   BP: (!) 95/47 95/58 (!) 65/45 (!) 90/48   BP Location: Right arm      Pulse: 58  93 74   Resp: 18  16 18   Temp: 98 2 °F (36 8 °C)  97 9 °F (36 6 °C)    TempSrc: Temporal      SpO2: 99%  100% 100%   Weight:  77 1 kg "(170 lb)     Height:  5' 10\" (1 778 m)         Intake/Output Summary (Last 24 hours) at 5/19/2023 1607  Last data filed at 5/19/2023 1548  Gross per 24 hour   Intake 1050 ml   Output 400 ml   Net 650 ml     Physical Exam  Vitals and nursing note reviewed  Constitutional:       Appearance: Normal appearance  HENT:      Head: Normocephalic and atraumatic  Nose: Nose normal       Mouth/Throat:      Mouth: Mucous membranes are dry  Pharynx: Oropharynx is clear  Eyes:      Extraocular Movements: Extraocular movements intact  Conjunctiva/sclera: Conjunctivae normal    Cardiovascular:      Rate and Rhythm: Normal rate and regular rhythm  Pulses: Normal pulses  Pulmonary:      Effort: Pulmonary effort is normal       Breath sounds: Normal breath sounds  Abdominal:      General: Bowel sounds are normal       Palpations: Abdomen is soft  Musculoskeletal:      Cervical back: Normal range of motion and neck supple  Comments: Left arm AV graft with bruit and thrill noted   Skin:     General: Skin is warm  Neurological:      General: No focal deficit present  Mental Status: He is alert and oriented to person, place, and time  Psychiatric:         Mood and Affect: Mood normal          Behavior: Behavior normal               Invasive Devices: Right chest PermCath  Site intact clean and dry     Lab Results:         Invalid input(s): ALBUMIN  Lab Results   Component Value Date    CALCIUM 8 8 05/11/2023    PHOS 3 9 03/28/2022       Portions of the record may have been created with voice recognition software  Occasional wrong word or \"sound a like\" substitutions may have occurred due to the inherent limitations of voice recognition software   Read the chart carefully and recognize,   "

## 2023-05-19 NOTE — TELEPHONE ENCOUNTER
MD Sam Adam CRNP; [facility] 7 minutes ago (4:35 PM)     MQ  This is a difficult issue because I am placing him on coumadin and stopping his plavix because of his multiple graft thrombosis  They will need to hold off on the endoscopy until I know his graft is functioning   We cannot stop it during this time     Carol Morse

## 2023-05-20 ENCOUNTER — APPOINTMENT (INPATIENT)
Dept: DIALYSIS | Facility: HOSPITAL | Age: 68
End: 2023-05-20

## 2023-05-20 LAB
ANION GAP SERPL CALCULATED.3IONS-SCNC: 2 MMOL/L (ref 4–13)
BUN SERPL-MCNC: 20 MG/DL (ref 5–25)
CALCIUM SERPL-MCNC: 7.5 MG/DL (ref 8.3–10.1)
CHLORIDE SERPL-SCNC: 102 MMOL/L (ref 96–108)
CO2 SERPL-SCNC: 30 MMOL/L (ref 21–32)
CREAT SERPL-MCNC: 7.05 MG/DL (ref 0.6–1.3)
ERYTHROCYTE [DISTWIDTH] IN BLOOD BY AUTOMATED COUNT: 13.9 % (ref 11.6–15.1)
GFR SERPL CREATININE-BSD FRML MDRD: 7 ML/MIN/1.73SQ M
GLUCOSE P FAST SERPL-MCNC: 119 MG/DL (ref 65–99)
GLUCOSE SERPL-MCNC: 119 MG/DL (ref 65–140)
HCT VFR BLD AUTO: 28.1 % (ref 36.5–49.3)
HGB BLD-MCNC: 8.7 G/DL (ref 12–17)
INR PPP: 1.16 (ref 0.84–1.19)
MCH RBC QN AUTO: 36.6 PG (ref 26.8–34.3)
MCHC RBC AUTO-ENTMCNC: 31 G/DL (ref 31.4–37.4)
MCV RBC AUTO: 118 FL (ref 82–98)
PLATELET # BLD AUTO: 156 THOUSANDS/UL (ref 149–390)
PMV BLD AUTO: 10.1 FL (ref 8.9–12.7)
POTASSIUM SERPL-SCNC: 3.9 MMOL/L (ref 3.5–5.3)
PROTHROMBIN TIME: 15 SECONDS (ref 11.6–14.5)
RBC # BLD AUTO: 2.38 MILLION/UL (ref 3.88–5.62)
SODIUM SERPL-SCNC: 134 MMOL/L (ref 135–147)
WBC # BLD AUTO: 8.1 THOUSAND/UL (ref 4.31–10.16)

## 2023-05-20 RX ORDER — LOPERAMIDE HYDROCHLORIDE 2 MG/1
2 CAPSULE ORAL 4 TIMES DAILY PRN
Status: DISCONTINUED | OUTPATIENT
Start: 2023-05-20 | End: 2023-05-22

## 2023-05-20 RX ADMIN — ATORVASTATIN CALCIUM 20 MG: 20 TABLET, FILM COATED ORAL at 21:13

## 2023-05-20 RX ADMIN — HYDROMORPHONE HYDROCHLORIDE 0.5 MG: 1 INJECTION, SOLUTION INTRAMUSCULAR; INTRAVENOUS; SUBCUTANEOUS at 20:10

## 2023-05-20 RX ADMIN — OXYCODONE HYDROCHLORIDE 5 MG: 5 TABLET ORAL at 02:20

## 2023-05-20 RX ADMIN — WARFARIN SODIUM 5 MG: 5 TABLET ORAL at 18:55

## 2023-05-20 RX ADMIN — PHENYLEPHRINE HYDROCHLORIDE 80 MCG/MIN: 10 INJECTION INTRAVENOUS at 04:39

## 2023-05-20 RX ADMIN — OXYCODONE HYDROCHLORIDE 5 MG: 5 TABLET ORAL at 08:05

## 2023-05-20 RX ADMIN — LOPERAMIDE HYDROCHLORIDE 2 MG: 2 CAPSULE ORAL at 12:13

## 2023-05-20 RX ADMIN — OXYCODONE HYDROCHLORIDE 5 MG: 5 TABLET ORAL at 18:54

## 2023-05-20 RX ADMIN — OXYCODONE HYDROCHLORIDE 5 MG: 5 TABLET ORAL at 12:11

## 2023-05-20 RX ADMIN — ASPIRIN 81 MG: 81 TABLET, COATED ORAL at 08:05

## 2023-05-20 RX ADMIN — PHENYLEPHRINE HYDROCHLORIDE 80 MCG/MIN: 10 INJECTION INTRAVENOUS at 14:31

## 2023-05-20 RX ADMIN — OXYCODONE HYDROCHLORIDE 5 MG: 5 TABLET ORAL at 22:57

## 2023-05-20 RX ADMIN — HYDROMORPHONE HYDROCHLORIDE 0.5 MG: 1 INJECTION, SOLUTION INTRAMUSCULAR; INTRAVENOUS; SUBCUTANEOUS at 04:27

## 2023-05-20 NOTE — PLAN OF CARE
Chronic hemodialysis treatment planned for 180 minutes using a 4 K+ bath for potassium 3 9 this morning  Fluid goal 1500 ml/1000 ml net as tolerated  Treatment review with Dr Mikael Elizondo via [address] [place] [address]  Post-Dialysis RN Treatment Note    Blood Pressure:  Pre 102/59 mm/Hg  Post 93/55 mmHg   EDW:   76 5 kg    Weight:  Pre 79 6 kg   Post 78 5 kg   Mode of weight measurement:   Bed scale   Volume Removed:   1500 ml/1000 ml net   Treatment duration:  180 minutes    NS given:  100 ml x 1 for map <60   Treatment shortened:   Yes    Dr Mikael Elizondo aware   Medications given during Rx:   none   Estimated Kt/V:   none   Access type:    RIJ Permacath   Access Issues:   Maintains 450 bfr   Report called to primary nurse:   Verbal to Ray Chavez RN          Problem: METABOLIC, FLUID AND ELECTROLYTES - ADULT  Goal: Electrolytes maintained within normal limits  Description: INTERVENTIONS:  - Monitor labs and assess patient for signs and symptoms of electrolyte imbalances  - Administer electrolyte replacement as ordered  - Monitor response to electrolyte replacements, including repeat lab results as appropriate  - Instruct patient on fluid and nutrition as appropriate  Outcome: Progressing  Goal: Fluid balance maintained  Description: INTERVENTIONS:  - Monitor labs   - Monitor I/O and WT  - Instruct patient on fluid and nutrition as appropriate  - Assess for signs & symptoms of volume excess or deficit  Outcome: Progressing

## 2023-05-20 NOTE — PROGRESS NOTES
"Progress Note - Vascular Surgery     Assessment/Plan:  68M s/p left brachial axillary AVG, LUE ecchymosis    Plan: diet, wean elvi, work with PT, coumadin bridge    Subjective:  Patient c/o pain in LUE, no other pain at this time  States BP is persistently low as outpatient  Vitals:  BP 97/53   Pulse 78   Temp 98 °F (36 7 °C)   Resp 13   Ht 5' 10\" (1 778 m)   Wt 77 1 kg (170 lb)   SpO2 93%   BMI 24 39 kg/m²     I/Os:  I/O last 3 completed shifts: In: 1350 [I V :800; IV Piggyback:550]  Out: 400 [Blood:400]  No intake/output data recorded  Lab Results and Cultures:   Lab Results   Component Value Date    WBC 15 04 (H) 05/19/2023    HGB 9 5 (L) 05/19/2023    HCT 28 (L) 05/19/2023     (H) 05/19/2023     05/19/2023     Lab Results   Component Value Date    GLUCOSE 141 (H) 05/19/2023    CALCIUM 8 1 (L) 05/19/2023     04/11/2017    K 3 5 05/19/2023    CO2 29 05/19/2023     05/19/2023    BUN 15 05/19/2023    CREATININE 5 80 (H) 05/19/2023     Lab Results   Component Value Date    INR 1 20 (H) 05/19/2023    INR 0 98 05/11/2023    INR 1 02 02/20/2023    PROTIME 15 4 (H) 05/19/2023    PROTIME 13 2 05/11/2023    PROTIME 13 6 02/20/2023        Blood Culture:   Lab Results   Component Value Date    BLOODCX No Growth After 5 Days  10/19/2021    BLOODCX No Growth After 5 Days   10/19/2021   ,   Urinalysis: No results found for: [place] Abdirahman, SPECGRAV, PHUR, LEUKOCYTESUR, NITRITE, PROTEINUA, GLUCOSEU, KETONESU, BILIRUBINUR, BLOODU,   Urine Culture: No results found for: URINECX,   Wound Culure:   Lab Results   Component Value Date    WOUNDCULT 1+ Growth of Citrobacter freundii (A) 10/08/2021    WOUNDCULT 1+ Growth of Enterobacter cloacae complex (A) 10/08/2021       Medications:  Current Facility-Administered Medications   Medication Dose Route Frequency   • acetaminophen (TYLENOL) tablet 650 mg  650 mg Oral Q6H PRN   • aspirin (ECOTRIN LOW STRENGTH) EC tablet 81 mg  81 mg Oral Daily   • " atorvastatin (LIPITOR) tablet 20 mg  20 mg Oral HS   • heparin (porcine) 25,000 units in 0 45% NaCl 250 mL infusion (premix)  500 Units/hr Intravenous Continuous   • HYDROmorphone (DILAUDID) injection 0 5 mg  0 5 mg Intravenous Q3H PRN   • lactated ringers bolus 500 mL  500 mL Intravenous Once PRN    And   • lactated ringers bolus 500 mL  500 mL Intravenous Once PRN   • oxyCODONE (ROXICODONE) split tablet 2 5 mg  2 5 mg Oral Q4H PRN    Or   • oxyCODONE (ROXICODONE) IR tablet 5 mg  5 mg Oral Q4H PRN   • phenylephrine (ABDOULAYE-SYNEPHRINE) 50 mg (STANDARD CONCENTRATION) in sodium chloride 0 9% 250 mL   mcg/min Intravenous Titrated   • sodium chloride 0 9 % bolus 500 mL  500 mL Intravenous Once PRN    And   • sodium chloride 0 9 % bolus 500 mL  500 mL Intravenous Once PRN   • warfarin (COUMADIN) tablet 5 mg  5 mg Oral Daily (warfarin)       Imaging:  n/a    Physical Exam:    General appearance: alert and oriented, in no acute distress  Skin: Skin color, texture, turgor normal  No rashes or lesions, + ecchymosis LUE  Neurologic: Grossly normal  Head: Normocephalic, without obvious abnormality, atraumatic  Eyes: EOMI  Lungs: No respiratory distress  Heart: regular rate  Abdomen: soft, NT  Extremities: motor intact, sensation intact, no edema, + ecchymosis LUE    Wound/Incision:  + swelling and ecchymosis of LUE     Pulse exam:  Radial: Right: 1+ Left[de-identified] doppler signal  Ulnar: Right: 1+ Left[de-identified] non-palpable      Eliezer Tolliver PA-C  5/20/2023\

## 2023-05-20 NOTE — PROGRESS NOTES
NEPHROLOGY PROGRESS NOTE   Sean Raines 70 y o  male MRN: [id number]  Unit/Bed#: [facility] 505-01 Encounter: [id number]  Reason for Consult: RON    ASSESSMENT AND PLAN:  Patient is 21-year-old male with significant medical issues of ESRD on HD, hypertension, hyperlipidemia, diabetes, DVT, CAD, presented for AV graft placement  We are consulted for dialysis management      ESRD on HD on TTS schedule at [facility]  -HD today  Noted patient is currently on Luis F-Synephrine for hypotension  We will do trial of IHD, if unable to tolerate or significantly increased requirement of vasopressors, may need to consider CRRT  -Outpatient dry weight 76 5 kg      Access, currently has permacath being used for dialysis  -Status post left-sided AV graft placement on 5/19/2023, bruit and thrill present  -Prior left-sided fistula thrombosed     Mild hyponatremia, serum sodium 134, continue to monitor with dialysis  Fluid restriction 1 2 L/day recommended     Anemia in CKD  -Not on ALICE as outpatient  Hemoglobin dropping 8 7 today  Continue to closely monitor  Transfuse as needed for hemoglobin less than 7       CKD BMD, on Calcitrol 0 75 mcg with dialysis as outpatient  Continue same   -Continue to monitor phosphorus, PTH     Status postelective left upper extremity AV graft placement with bruit and thrill present  Vascular surgery follow-up     Chronic hypotension, patient mentions that his blood pressure runs lower during dialysis  - Outpatient dialysis unit records reviewed, on last few HD sessions, blood pressure seems to be ranging from 110s to 130s SBP  -Currently remains on Luis F-Synephrine  -Monitor BP    Discussed above plan in detail with primary team and they agree with above recommendations  SUBJECTIVE:  Patient seen and examined at bedside  Denies chest pain, nausea or vomiting      OBJECTIVE:  Current Weight: Weight - Scale: 77 1 kg (170 lb)  Vitals:    05/20/23 0630   BP: 130/60   Pulse:    Resp: Temp:    SpO2:        Intake/Output Summary (Last 24 hours) at 5/20/2023 1033  Last data filed at 5/20/2023 0820  Gross per 24 hour   Intake 1681 18 ml   Output 400 ml   Net 1281 18 ml     Wt Readings from Last 3 Encounters:   05/19/23 77 1 kg (170 lb)   04/10/23 77 1 kg (170 lb)   03/28/23 78 kg (172 lb)     Temp Readings from Last 3 Encounters:   05/19/23 98 °F (36 7 °C)   03/28/23 98 4 °F (36 9 °C) (Tympanic)   03/21/23 98 6 °F (37 °C) (Temporal)     BP Readings from Last 3 Encounters:   05/20/23 130/60   04/10/23 98/52   03/28/23 109/57     Pulse Readings from Last 3 Encounters:   05/20/23 64   04/10/23 68   03/28/23 76        Physical Examination:  Eyes: Mild conjunctival pallor present  Neck: No obvious lymphadenopathy appreciated  Respiratory: Bilateral air entry present  CVS: No significant edema in legs  GI: Soft, nondistended  CNS: Active alert oriented times3  Skin: No new rash  Musculoskeletal: No obvious new gross deformity noted    Medications:    Current Facility-Administered Medications:   •  acetaminophen (TYLENOL) tablet 650 mg, 650 mg, Oral, Q6H PRN, Tonya Ley MD  •  aspirin (ECOTRIN LOW STRENGTH) EC tablet 81 mg, 81 mg, Oral, Daily, Tonya Ley MD, 81 mg at 05/20/23 6878  •  atorvastatin (LIPITOR) tablet 20 mg, 20 mg, Oral, HS, Tonya Ley MD, 20 mg at 05/19/23 2101  •  heparin (porcine) 25,000 units in 0 45% NaCl 250 mL infusion (premix), 500 Units/hr, Intravenous, Continuous, Tonya Ley MD, Last Rate: 5 mL/hr at 05/19/23 2055, 500 Units/hr at 05/19/23 2055  •  HYDROmorphone (DILAUDID) injection 0 5 mg, 0 5 mg, Intravenous, Q3H PRN, Sophia Monreal PA-C, 0 5 mg at 05/20/23 6015  •  lactated ringers bolus 500 mL, 500 mL, Intravenous, Once PRN **AND** lactated ringers bolus 500 mL, 500 mL, Intravenous, Once PRN, Tonya Ley MD  •  loperamide (IMODIUM) capsule 2 mg, 2 mg, Oral, 4x Daily PRN, Anuja Watson MD  •  oxyCODONE (ROXICODONE) split tablet 2 5 mg, 2 5 mg, Oral, Q4H "PRN **OR** oxyCODONE (ROXICODONE) IR tablet 5 mg, 5 mg, Oral, Q4H PRN, Lazaro Galaviz MD, 5 mg at 05/20/23 0805  •  phenylephrine (ABDOULAYE-SYNEPHRINE) 50 mg (STANDARD CONCENTRATION) in sodium chloride 0 9% 250 mL,  mcg/min, Intravenous, Titrated, Gaby Colbert MD, Last Rate: 24 mL/hr at 05/20/23 0951, 80 mcg/min at 05/20/23 0951  •  sodium chloride 0 9 % bolus 500 mL, 500 mL, Intravenous, Once PRN **AND** sodium chloride 0 9 % bolus 500 mL, 500 mL, Intravenous, Once PRN, Lazaro Galaviz MD  •  warfarin (COUMADIN) tablet 5 mg, 5 mg, Oral, Daily (warfarin), Lazaro Galaviz MD, 5 mg at 05/19/23 2109    Laboratory Results:  Results from last 7 days   Lab Units 05/20/23  0441 05/19/23  1649 05/19/23  1641 05/19/23  1618   WBC Thousand/uL 8 10  --   --  15 04*   HEMOGLOBIN g/dL 8 7*  --   --  10 0*   I STAT HEMOGLOBIN g/dl  --  9 5*  --   --    HEMATOCRIT % 28 1*  --   --  30 9*   HEMATOCRIT, ISTAT %  --  28*  --   --    PLATELETS Thousands/uL 156  --   --  162   SODIUM mmol/L 134*  --  136  --    POTASSIUM mmol/L 3 9  --  3 5  --    CHLORIDE mmol/L 102  --  102  --    CO2 mmol/L 30  --  24  --    CO2, I-STAT mmol/L  --  29  --   --    BUN mg/dL 20  --  15  --    CREATININE mg/dL 7 05*  --  5 80*  --    CALCIUM mg/dL 7 5*  --  8 1*  --    MAGNESIUM mg/dL  --   --  1 7  --    PHOSPHORUS mg/dL  --   --  2 5  --    GLUCOSE, ISTAT mg/dl  --  141*  --   --        No orders to display       Portions of the record may have been created with voice recognition software  Occasional wrong word or \"sound a like\" substitutions may have occurred due to the inherent limitations of voice recognition software  Read the chart carefully and recognize, using context, where substitutions have occurred      "

## 2023-05-20 NOTE — QUICK NOTE
"Post Op Check Note   Rommel Boyd 70 y o  male MRN: [id number]  Unit/Bed#: [facility] 505-01 Encounter: [id number]    ASSESSMENT:  Rommel Boyd is a 70 y o  male who is status post left brachial axillary AVG, chest pain resolved, normal EKG, troponins normal, LUE ecchymosis       Subjective: Patient complains of LUE pain, tenderness to touch    Physical Exam:  GEN: NAD  CV: RRR  Lung: Normal effort  Ab: Soft, NT/ND  Neuro: A+Ox3  Incisions: + ecchymosis, + thrill along graft  Pulse: Radial signal intact    Blood pressure 97/53, pulse 78, temperature 98 °F (36 7 °C), resp  rate 13, height 5' 10\" (1 778 m), weight 77 1 kg (170 lb), SpO2 93 %  ,Body mass index is 24 39 kg/m²        Intake/Output Summary (Last 24 hours) at 5/20/2023 0239  Last data filed at 5/19/2023 1628  Gross per 24 hour   Intake 1350 ml   Output 400 ml   Net 950 ml       Invasive Devices     Peripheral Intravenous Line  Duration           Peripheral IV 05/19/23 Right Hand 1 day    Peripheral IV 05/19/23 Right Antecubital <1 day          Arterial Line  Duration           Arterial Line 05/19/23 Right Radial <1 day          Line  Duration           Hemodialysis AV Fistula  Left Upper arm -- days    Hemodialysis AV Fistula 05/19/23 Left Upper arm <1 day          Hemodialysis Catheter  Duration           HD Permanent Double Catheter 240 days                        "

## 2023-05-21 PROBLEM — I95.9 HYPOTENSION: Status: ACTIVE | Noted: 2023-05-21

## 2023-05-21 LAB
ANION GAP SERPL CALCULATED.3IONS-SCNC: 3 MMOL/L (ref 4–13)
BUN SERPL-MCNC: 9 MG/DL (ref 5–25)
CALCIUM SERPL-MCNC: 8 MG/DL (ref 8.3–10.1)
CHLORIDE SERPL-SCNC: 104 MMOL/L (ref 96–108)
CO2 SERPL-SCNC: 30 MMOL/L (ref 21–32)
CREAT SERPL-MCNC: 4.6 MG/DL (ref 0.6–1.3)
ERYTHROCYTE [DISTWIDTH] IN BLOOD BY AUTOMATED COUNT: 13.6 % (ref 11.6–15.1)
GFR SERPL CREATININE-BSD FRML MDRD: 12 ML/MIN/1.73SQ M
GLUCOSE SERPL-MCNC: 126 MG/DL (ref 65–140)
GLUCOSE SERPL-MCNC: 88 MG/DL (ref 65–140)
HCT VFR BLD AUTO: 29.1 % (ref 36.5–49.3)
HCT VFR BLD AUTO: 29.3 % (ref 36.5–49.3)
HGB BLD-MCNC: 9.3 G/DL (ref 12–17)
HGB BLD-MCNC: 9.5 G/DL (ref 12–17)
INR PPP: 1.85 (ref 0.84–1.19)
MCH RBC QN AUTO: 37.8 PG (ref 26.8–34.3)
MCHC RBC AUTO-ENTMCNC: 32 G/DL (ref 31.4–37.4)
MCV RBC AUTO: 118 FL (ref 82–98)
PLATELET # BLD AUTO: 110 THOUSANDS/UL (ref 149–390)
PMV BLD AUTO: 10.7 FL (ref 8.9–12.7)
POTASSIUM SERPL-SCNC: 3.7 MMOL/L (ref 3.5–5.3)
PROTHROMBIN TIME: 21.6 SECONDS (ref 11.6–14.5)
RBC # BLD AUTO: 2.46 MILLION/UL (ref 3.88–5.62)
SODIUM SERPL-SCNC: 137 MMOL/L (ref 135–147)
WBC # BLD AUTO: 4.5 THOUSAND/UL (ref 4.31–10.16)

## 2023-05-21 RX ORDER — CALCIUM GLUCONATE 20 MG/ML
2 INJECTION, SOLUTION INTRAVENOUS ONCE
Status: COMPLETED | OUTPATIENT
Start: 2023-05-21 | End: 2023-05-21

## 2023-05-21 RX ORDER — SENNOSIDES 8.6 MG
1 TABLET ORAL
Status: DISCONTINUED | OUTPATIENT
Start: 2023-05-21 | End: 2023-05-21

## 2023-05-21 RX ORDER — AMOXICILLIN 250 MG
1 CAPSULE ORAL 2 TIMES DAILY PRN
Status: DISCONTINUED | OUTPATIENT
Start: 2023-05-21 | End: 2023-05-24 | Stop reason: HOSPADM

## 2023-05-21 RX ORDER — POLYETHYLENE GLYCOL 3350 17 G/17G
17 POWDER, FOR SOLUTION ORAL DAILY PRN
Status: DISCONTINUED | OUTPATIENT
Start: 2023-05-21 | End: 2023-05-24 | Stop reason: HOSPADM

## 2023-05-21 RX ORDER — MIDODRINE HYDROCHLORIDE 5 MG/1
10 TABLET ORAL
Status: DISCONTINUED | OUTPATIENT
Start: 2023-05-21 | End: 2023-05-24 | Stop reason: HOSPADM

## 2023-05-21 RX ORDER — AMOXICILLIN 250 MG
1 CAPSULE ORAL 2 TIMES DAILY PRN
Status: DISCONTINUED | OUTPATIENT
Start: 2023-05-21 | End: 2023-05-21

## 2023-05-21 RX ORDER — MIDODRINE HYDROCHLORIDE 5 MG/1
5 TABLET ORAL
Status: DISCONTINUED | OUTPATIENT
Start: 2023-05-21 | End: 2023-05-21

## 2023-05-21 RX ADMIN — HYDROMORPHONE HYDROCHLORIDE 0.5 MG: 1 INJECTION, SOLUTION INTRAMUSCULAR; INTRAVENOUS; SUBCUTANEOUS at 05:49

## 2023-05-21 RX ADMIN — ATORVASTATIN CALCIUM 20 MG: 20 TABLET, FILM COATED ORAL at 21:11

## 2023-05-21 RX ADMIN — OXYCODONE HYDROCHLORIDE 5 MG: 5 TABLET ORAL at 12:31

## 2023-05-21 RX ADMIN — HYDROMORPHONE HYDROCHLORIDE 0.5 MG: 1 INJECTION, SOLUTION INTRAMUSCULAR; INTRAVENOUS; SUBCUTANEOUS at 00:25

## 2023-05-21 RX ADMIN — PHENYLEPHRINE HYDROCHLORIDE 50 MCG/MIN: 10 INJECTION INTRAVENOUS at 03:51

## 2023-05-21 RX ADMIN — ASPIRIN 81 MG: 81 TABLET, COATED ORAL at 09:49

## 2023-05-21 RX ADMIN — CALCIUM GLUCONATE 2 G: 20 INJECTION, SOLUTION INTRAVENOUS at 13:39

## 2023-05-21 RX ADMIN — HEPARIN SODIUM 500 UNITS/HR: 10000 INJECTION, SOLUTION INTRAVENOUS at 15:15

## 2023-05-21 RX ADMIN — MIDODRINE HYDROCHLORIDE 10 MG: 5 TABLET ORAL at 15:06

## 2023-05-21 RX ADMIN — PHENYLEPHRINE HYDROCHLORIDE 40 MCG/MIN: 10 INJECTION INTRAVENOUS at 17:11

## 2023-05-21 RX ADMIN — MIDODRINE HYDROCHLORIDE 5 MG: 5 TABLET ORAL at 09:49

## 2023-05-21 RX ADMIN — CALCIUM GLUCONATE 2 G: 20 INJECTION, SOLUTION INTRAVENOUS at 15:27

## 2023-05-21 RX ADMIN — OXYCODONE HYDROCHLORIDE 5 MG: 5 TABLET ORAL at 17:07

## 2023-05-21 RX ADMIN — WARFARIN SODIUM 5 MG: 5 TABLET ORAL at 17:06

## 2023-05-21 RX ADMIN — OXYCODONE HYDROCHLORIDE 5 MG: 5 TABLET ORAL at 03:33

## 2023-05-21 NOTE — ASSESSMENT & PLAN NOTE
Lab Results   Component Value Date    HGBA1C 4 8 01/05/2023       Recent Labs     05/19/23  1036 05/19/23  1556   POCGLU 93 134       Blood Sugar Average: Last 72 hrs:  (P) 113 5     SSI

## 2023-05-21 NOTE — PROGRESS NOTES
"Progress Note - Vascular Surgery     Assessment/Plan:  70 M s/p left brachial axillary AVG, LUE, persistent hypotension    Plan: wean elvi, medicine consult, PT, coumadin bridge    Subjective:  Patient c/o pain in LUE, BP remains persistently low    Vitals:  /54   Pulse 72   Temp 98 °F (36 7 °C)   Resp 16   Ht 5' 10\" (1 778 m)   Wt 77 1 kg (170 lb)   SpO2 96%   BMI 24 39 kg/m²     I/Os:  I/O last 3 completed shifts: In: 2281 2 [I V :1631 2; Other:100; IV Piggyback:550]  Out: 1900 [Other:1500; Blood:400]  No intake/output data recorded  Lab Results and Cultures:   Lab Results   Component Value Date    WBC 8 10 05/20/2023    HGB 8 7 (L) 05/20/2023    HCT 28 1 (L) 05/20/2023     (H) 05/20/2023     05/20/2023     Lab Results   Component Value Date    GLUCOSE 141 (H) 05/19/2023    CALCIUM 7 5 (L) 05/20/2023     04/11/2017    K 3 9 05/20/2023    CO2 30 05/20/2023     05/20/2023    BUN 20 05/20/2023    CREATININE 7 05 (H) 05/20/2023     Lab Results   Component Value Date    INR 1 16 05/20/2023    INR 1 20 (H) 05/19/2023    INR 0 98 05/11/2023    PROTIME 15 0 (H) 05/20/2023    PROTIME 15 4 (H) 05/19/2023    PROTIME 13 2 05/11/2023        Blood Culture:   Lab Results   Component Value Date    BLOODCX No Growth After 5 Days  10/19/2021    BLOODCX No Growth After 5 Days   10/19/2021   ,   Urinalysis: No results found for: [place], SPECGRAV, PHUR, LEUKOCYTESUR, NITRITE, PROTEINUA, GLUCOSEU, KETONESU, BILIRUBINUR, BLOODU,   Urine Culture: No results found for: URINECX,   Wound Culure:   Lab Results   Component Value Date    WOUNDCULT 1+ Growth of Citrobacter freundii (A) 10/08/2021    WOUNDCULT 1+ Growth of Enterobacter cloacae complex (A) 10/08/2021       Medications:  Current Facility-Administered Medications   Medication Dose Route Frequency   • acetaminophen (TYLENOL) tablet 650 mg  650 mg Oral Q6H PRN   • aspirin (ECOTRIN LOW STRENGTH) EC tablet 81 mg  81 mg Oral Daily   • " atorvastatin (LIPITOR) tablet 20 mg  20 mg Oral HS   • heparin (porcine) 25,000 units in 0 45% NaCl 250 mL infusion (premix)  500 Units/hr Intravenous Continuous   • HYDROmorphone (DILAUDID) injection 0 5 mg  0 5 mg Intravenous Q3H PRN   • loperamide (IMODIUM) capsule 2 mg  2 mg Oral 4x Daily PRN   • oxyCODONE (ROXICODONE) split tablet 2 5 mg  2 5 mg Oral Q4H PRN    Or   • oxyCODONE (ROXICODONE) IR tablet 5 mg  5 mg Oral Q4H PRN   • phenylephrine (ABDOULAYE-SYNEPHRINE) 50 mg (STANDARD CONCENTRATION) in sodium chloride 0 9% 250 mL   mcg/min Intravenous Titrated   • warfarin (COUMADIN) tablet 5 mg  5 mg Oral Daily (warfarin)       Physical Exam:     General appearance: alert and oriented, in no acute distress  Skin: Skin color, texture, turgor normal  No rashes or lesions, + ecchymosis LUE  Neurologic: Grossly normal  Head: Normocephalic, without obvious abnormality, atraumatic  Eyes: EOMI  Lungs: No respiratory distress  Heart: regular rate  Abdomen: soft, NT  Extremities: motor intact, sensation intact, no edema, + ecchymosis LUE     Wound/Incision:  + swelling and ecchymosis of LUE      Pulse exam:  Radial: Right: 1+ Left[de-identified] doppler signal  Ulnar: Right: 1+ Left: non-palpable    Shelliz Sadler PA-C  5/21/2023

## 2023-05-21 NOTE — ASSESSMENT & PLAN NOTE
Lab Results   Component Value Date    EGFR 12 05/21/2023    EGFR 7 05/20/2023    EGFR 9 05/19/2023    CREATININE 4 60 (H) 05/21/2023    CREATININE 7 05 (H) 05/20/2023    CREATININE 5 80 (H) 05/19/2023   · Received dialysis yesterday with fluid removal   · Will discuss plans with nephrology today regarding dialysis plans

## 2023-05-21 NOTE — PLAN OF CARE
Problem: Prexisting or High Potential for Compromised Skin Integrity  Goal: Skin integrity is maintained or improved  Description: INTERVENTIONS:  - Identify patients at risk for skin breakdown  - Assess and monitor skin integrity  - Assess and monitor nutrition and hydration status  - Monitor labs   - Assess for incontinence   - Turn and reposition patient  - Assist with mobility/ambulation  - Relieve pressure over bony prominences  - Avoid friction and shearing  - Provide appropriate hygiene as needed including keeping skin clean and dry  - Evaluate need for skin moisturizer/barrier cream  - Collaborate with interdisciplinary team   - Patient/family teaching  - Consider wound care consult   Outcome: Progressing     Problem: MOBILITY - ADULT  Goal: Maintain or return to baseline ADL function  Description: INTERVENTIONS:  -  Assess patient's ability to carry out ADLs; assess patient's baseline for ADL function and identify physical deficits which impact ability to perform ADLs (bathing, care of mouth/teeth, toileting, grooming, dressing, etc )  - Assess/evaluate cause of self-care deficits   - Assess range of motion  - Assess patient's mobility; develop plan if impaired  - Assess patient's need for assistive devices and provide as appropriate  - Encourage maximum independence but intervene and supervise when necessary  - Involve family in performance of ADLs  - Assess for home care needs following discharge   - Consider OT consult to assist with ADL evaluation and planning for discharge  - Provide patient education as appropriate  Outcome: Progressing  Goal: Maintains/Returns to pre admission functional level  Description: INTERVENTIONS:  - Perform BMAT or MOVE assessment daily    - Set and communicate daily mobility goal to care team and patient/family/caregiver  - Collaborate with rehabilitation services on mobility goals if consulted  - Perform Range of Motion  times a day    - Reposition patient every hours   - Dangle patient  times a day  - Stand patient  times a day  - Ambulate patient  times a day  - Out of bed to chair  times a day   - Out of bed for meals times a day  - Out of bed for toileting  - Record patient progress and toleration of activity level   Outcome: Progressing     Problem: METABOLIC, FLUID AND ELECTROLYTES - ADULT  Goal: Electrolytes maintained within normal limits  Description: INTERVENTIONS:  - Monitor labs and assess patient for signs and symptoms of electrolyte imbalances  - Administer electrolyte replacement as ordered  - Monitor response to electrolyte replacements, including repeat lab results as appropriate  - Instruct patient on fluid and nutrition as appropriate  Outcome: Progressing  Goal: Fluid balance maintained  Description: INTERVENTIONS:  - Monitor labs   - Monitor I/O and WT  - Instruct patient on fluid and nutrition as appropriate  - Assess for signs & symptoms of volume excess or deficit  Outcome: Progressing

## 2023-05-21 NOTE — CONSULTS
[facility]  Consult  Name: Jace Liang 70 y o  male I MRN: [id number]  Unit/Bed#: [facility] 505-01 I Date of Admission: 5/19/2023   Date of Service: 5/21/2023 I Hospital Day: 1    Consults    Assessment/Plan   Hypotension  Assessment & Plan  Developed hypotension in post operative period treated with IVF and evli  Unable to be weaned to off  Plan:  · Evaluate for etiology of hypotension  · Recheck STAT H&H  · ECHO pending  · Replace calcium   · Wean elvi for goal MAP >60 with adequate mentation  · Started on midodrine 5mg TID by previous team, continue      * Arteriovenous fistula ([address] )  Assessment & Plan  POD#2 from creation of left fistual AV graft for dialysis     · Management per vascular team   · Will reach out to vascular to recheck compartments     End-stage renal disease on hemodialysis [facility])  Assessment & Plan  Lab Results   Component Value Date    EGFR 12 05/21/2023    EGFR 7 05/20/2023    EGFR 9 05/19/2023    CREATININE 4 60 (H) 05/21/2023    CREATININE 7 05 (H) 05/20/2023    CREATININE 5 80 (H) 05/19/2023   · Received dialysis yesterday with fluid removal   · Will discuss plans with nephrology today regarding dialysis plans    Acute on chronic anemia  Assessment & Plan  · EBL 400cc for AV fistula creation procedure   · HgB drop by 2g from baseline  · Recheck STAT H&H now    Type 2 diabetes mellitus [facility])  Assessment & Plan  Lab Results   Component Value Date    HGBA1C 4 8 01/05/2023       Recent Labs     05/19/23  1036 05/19/23  1556   POCGLU 93 134       Blood Sugar Average: Last 72 hrs:  (P) 113 5     SSI         History of Present Illness     HPI: Jace Liang is a 70 y o  PMHx CAD, ESRD, HTN, HLD, anemia, who presented for creation of left fistual AV graft  Patient dialysis dependent with previous AV graft that had thrombosed  Intraop EBL 400cc and was noted to have mild hypotension post operatively treated with IVF and elvi    As patient was unable to be weaned off of neosynephrine post operatively and critical care was asked to take over care for the patient  Patient reports he feels well, no dizziness/lightheadedness  He states that he a history of hypotension with dialysis and has always been symptomatic with it  He complains of left arm swelling and pain  History obtained from chart review and the patient  Historical Information   Past Medical History:  04/23/2008: Cerebral thrombosis      Comment:  With Cerebral Infarction:  Last Assessed:  October 20, 2016  2012: Chronic kidney disease      Comment:  on dialysis   05/2022: COVID  No date: Diabetes mellitus ([address] )  No date: Dialysis patient [facility])      Comment:  T/TH/Sat  No date: GERD (gastroesophageal reflux disease)  No date: Hyperlipidemia  No date: Hypertension  09/10/2011: Labyrinthitis  2014: Myocardial infarction [facility])      Comment:  x3 others were in 2009 & 2010 09/20/2010: Sleep disturbances  2007: Stroke [facility])      Comment:  x1, RLE deficit- uses walker  05/03/2017: Type 2 diabetes, uncontrolled, with renal manifestation Past Surgical History:  No date: AV FISTULA PLACEMENT  02/03/2023: CARDIAC CATHETERIZATION; Left      Comment:  Procedure: Cardiac Left Heart Cath;  Surgeon: Mariluz De La Cruz MD;  Location: [place] [address] CATH LAB; Service:                Cardiology  02/08/2023: CARDIAC CATHETERIZATION; N/A      Comment:  Procedure: Cardiac catheterization with complex PCI with               Dr Sanjay Soliz, patient is a renal dialysis patient;  Surgeon:               Sherin Fry MD;  [facility]: [facility]; Service: Cardiology  2010: CARDIAC SURGERY      Comment:  stents x3  12/12/2016: COLONOSCOPY; N/A      Comment:  Procedure: COLONOSCOPY;  Surgeon: Aretha Carr MD;                 Location: [place] [address] GI LAB;   Service:   04/03/2017: COLONOSCOPY; N/A      Comment:  Procedure:  COLONOSCOPY;  Surgeon: Aretha Carr MD; "Location: [place] [address] GI LAB; Service:   04/04/2022: HARDWARE REMOVAL; Right      Comment:  Procedure: REMOVAL HARDWARE HIP;  Surgeon: Destiny Garcia MD;  Location: [facility] MAIN [facility];  Service:                Orthopedics  04/29/2021: IR AV FISTULA/GRAFT DECLOT  03/25/2022: IR AV FISTULA/GRAFT DECLOT  09/21/2022: IR AV FISTULA/GRAFT DECLOT  03/28/2022: IR AV FISTULAGRAM/GRAFTOGRAM  07/28/2022: IR AV FISTULAGRAM/GRAFTOGRAM  12/07/2021: IR TUNNELED CENTRAL LINE REMOVAL  05/24/2021: IR TUNNELED DIALYSIS CATHETER PLACEMENT  No date: PORTACATH PLACEMENT      Comment:  per pt \"port in chest\"  07/22/2021: [place] ARTERIOVENOUS ANASTOMOSIS OPEN DIRECT; Left      Comment:  Procedure: CREATION FISTULA ARTERIOVENOUS (AV); Surgeon: Lashawn Carranza MD;  Location: [facility];                 Service: Vascular  3/21/2023: [place] ARTERIOVENOUS ANASTOMOSIS OPEN DIRECT; Left      Comment:  Procedure: left brachiobasilic fistula,  AVG Graft;                 Surgeon: Tiffanie Aldridge MD;  Location:  MAIN OR;               Service: Vascular  04/04/2022: TX HEMIARTHROPLASTY HIP PARTIAL; Right      Comment:  Procedure: HEMIARTHROPLASTY HIP (BIPOLAR);   Surgeon:                Abigail Grubbs MD;  Location:  MAIN OR;                 Service: Orthopedics   Current Outpatient Medications   Medication Instructions   • apixaban (ELIQUIS) 5 mg, Oral, 2 times daily   • ascorbic acid (VITAMIN C) 500 mg tablet TAKE ONE TABLET BY MOUTH EVERY EVENING FOR SUPPLEMENT   • aspirin (ECOTRIN LOW STRENGTH) 81 mg, Oral, Daily   • atorvastatin (LIPITOR) 20 mg, Oral, Daily at bedtime   • B Complex-C-Folic Acid (TRIPHROCAPS) 1 MG CAPS 1 capsule, Oral, Daily   • Cholecalciferol 50 MCG (2000 UT) CAPS Oral, Daily   • [place] Choice Comfort EZ 33G X 4 MM MISC    • glucose blood test strip In Vitro, 2 times daily   • loperamide (IMODIUM) 2 mg, Oral, 4 times daily PRN   • oxyCODONE (ROXICODONE) 5 mg, Oral, Every 6 hours PRN   • " vitamin B-12 (VITAMIN B-12) 1,000 mcg, Oral, Daily    No Known Allergies   Social History     Tobacco Use   • Smoking status: Every Day     Packs/day: 0 25     Years: 30 00     Pack years: 7 50     Types: Cigarettes   • Smokeless tobacco: Never   Vaping Use   • Vaping Use: Never used   Substance Use Topics   • Alcohol use: Yes     Comment: rarely - No alcohol use per [employer]   • Drug use: Not Currently     Types: Marijuana     Comment: occasional brownie for sleep    Family History   Problem Relation Age of Onset   • Leukemia Mother    • Crohn's disease Father    • Transient ischemic attack Brother         Objective                            Vitals I/O      Most Recent Min/Max in 24hrs   Temp (!) 97 3 °F (36 3 °C) Temp  Min: 97 3 °F (36 3 °C)  Max: 98 3 °F (36 8 °C)   Pulse 60 Pulse  Min: 57  Max: 94   Resp 16 Resp  Min: 16  Max: 16   /55 BP  Min: 67/40  Max: 125/56   O2 Sat 97 % SpO2  Min: 92 %  Max: 97 %      Intake/Output Summary (Last 24 hours) at 5/21/2023 1205  Last data filed at 5/21/2023 3752  Gross per 24 hour   Intake 1375 95 ml   Output 1500 ml   Net -124 05 ml         Diet Renal; Renal [place]; No; No     Invasive Monitoring Physical exam   Arterial Line  [place] /39  No data recorded   MAP 67 mmHg  No data recorded    Physical Exam  Constitutional:       General: He is not in acute distress  Appearance: He is not ill-appearing  HENT:      Head: Normocephalic  Mouth/Throat:      Mouth: Mucous membranes are moist    Eyes:      Pupils: Pupils are equal, round, and reactive to light  Cardiovascular:      Rate and Rhythm: Normal rate and regular rhythm  Pulmonary:      Effort: Pulmonary effort is normal  No respiratory distress  Abdominal:      General: There is no distension  Palpations: Abdomen is soft  Tenderness: There is no abdominal tenderness  Musculoskeletal:      Right lower leg: No edema  Left lower leg: No edema        Comments: Left upper extremity with significant swelling, numbness in fingers  Motor intact  Skin:     General: Skin is warm and dry  Neurological:      General: No focal deficit present  Mental Status: He is alert  Mental status is at baseline                Diagnostic Studies      No new     Medications:  Scheduled PRN   aspirin, 81 mg, Daily  atorvastatin, 20 mg, HS  calcium gluconate, 2 g, Once   Followed by  calcium gluconate, 2 g, Once  midodrine, 5 mg, TID AC  warfarin, 5 mg, Daily (warfarin)      acetaminophen, 650 mg, Q6H PRN  HYDROmorphone, 0 5 mg, Q3H PRN  loperamide, 2 mg, 4x Daily PRN  oxyCODONE, 2 5 mg, Q4H PRN   Or  oxyCODONE, 5 mg, Q4H PRN  polyethylene glycol, 17 g, Daily PRN  senna-docusate sodium, 1 tablet, BID PRN       Continuous    heparin (porcine), 500 Units/hr, Last Rate: 500 Units/hr (05/19/23 2055)  phenylephrine (ABDOULAYE-SYNEPHRINE) 50 mg (STANDARD CONCENTRATION) in sodium chloride 0 9% 250 mL,  mcg/min, Last Rate: 60 mcg/min (05/21/23 1017)         Labs:    CBC    Recent Labs     05/20/23 0441 05/21/23  0551   WBC 8 10 4 50   HGB 8 7* 9 3*   HCT 28 1* 29 1*    110*     BMP    Recent Labs     05/20/23 0441 05/21/23  0551   SODIUM 134* 137   K 3 9 3 7    104   CO2 30 30   AGAP 2* 3*   BUN 20 9   CREATININE 7 05* 4 60*   CALCIUM 7 5* 8 0*       Coags    Recent Labs     05/19/23  1641 05/20/23 0441 05/21/23  0551   INR 1 20* 1 16 1 85*   PTT 32  --   --         Additional Electrolytes  Recent Labs     05/19/23 1641 05/19/23  1649   MG 1 7  --    PHOS 2 5  --    CAIONIZED  --  1 00*          Blood Gas    No recent results  No recent results LFTs  Recent Labs     05/19/23  1641   ALT 10*   AST 17   ALKPHOS 103   ALB 2 8*   TBILI 0 77       Infectious  No recent results  Glucose  Recent Labs     05/19/23  1641 05/20/23 0441 05/21/23  0551   GLUC 148* [address] [place], [place]

## 2023-05-21 NOTE — PROGRESS NOTES
NEPHROLOGY PROGRESS NOTE   Alda Anderson 70 y o  male MRN: [id number]  Unit/Bed#: [facility] 505-01 Encounter: [id number]  Reason for Consult: ESRD    ASSESSMENT AND PLAN:  Patient is 54-year-old male with significant medical issues of ESRD on HD, hypertension, hyperlipidemia, diabetes, DVT, CAD, presented for AV graft placement   We are consulted for dialysis management      ESRD on HD on TTS schedule at [address] [place] [address]  -Had dialysis yesterday, post HD weight 78 5 kg     -Outpatient dry weight 76 5 kg   -Patient still remains on Luis F-Synephrine     Access, currently has permacath being used for dialysis  -Status post left-sided AV graft placement on 5/19/2023, bruit and thrill present  -Prior left-sided fistula thrombosed     Mild hyponatremia, improving with serum sodium 137 with dialysis   Fluid restriction 1 2 L/day recommended     Anemia in CKD  -Not on ALICE as outpatient   Hemoglobin overall stable 9 3 today  Continue to closely monitor  Transfuse as needed for hemoglobin less than 7       CKD BMD, on Calcitrol 0 75 mcg with dialysis as outpatient   Continue same   -Continue to monitor phosphorus, PTH     Status postelective left upper extremity AV graft placement with bruit and thrill present   Vascular surgery follow-up     Chronic hypotension, patient mentions that his blood pressure runs lower during dialysis  - Outpatient dialysis unit records reviewed, on last few HD sessions as outpatient, blood pressure seems to be ranging from 110s to 130s SBP  -Unable to wean off of Luis F-Synephrine   -Will start midodrine 5 mg p o  3 times daily  -Consider echocardiogram to evaluate for pericardial effusion     Discussed above plan in detail with primary team and they agree with above recommendations  SUBJECTIVE:  Patient seen and examined at bedside    Denies nausea, vomiting    OBJECTIVE:  Current Weight: Weight - Scale: 77 1 kg (170 lb)  Vitals:    05/21/23 0815   BP: (!) 67/40   Pulse:    Temp:    SpO2: Intake/Output Summary (Last 24 hours) at 5/21/2023 0848  Last data filed at 5/21/2023 0834  Gross per 24 hour   Intake 1375 95 ml   Output 1500 ml   Net -124 05 ml     Wt Readings from Last 3 Encounters:   05/19/23 77 1 kg (170 lb)   04/10/23 77 1 kg (170 lb)   03/28/23 78 kg (172 lb)     Temp Readings from Last 3 Encounters:   05/21/23 98 °F (36 7 °C)   03/28/23 98 4 °F (36 9 °C) (Tympanic)   03/21/23 98 6 °F (37 °C) (Temporal)     BP Readings from Last 3 Encounters:   05/21/23 (!) 67/40   04/10/23 98/52   03/28/23 109/57     Pulse Readings from Last 3 Encounters:   05/21/23 87   04/10/23 68   03/28/23 76        Physical Examination:  Eyes: Mild conjunctival pallor present  Neck: No obvious lymphadenopathy appreciated  Respiratory: Bilateral air entry  CVS: No significant edema  GI: Soft, nondistended  CNS: Active alert oriented x3  Skin: No new rash  Musculoskeletal: No obvious new gross deformity noted    Medications:    Current Facility-Administered Medications:   •  acetaminophen (TYLENOL) tablet 650 mg, 650 mg, Oral, Q6H PRN, Morena Stewart MD  •  aspirin (ECOTRIN LOW STRENGTH) EC tablet 81 mg, 81 mg, Oral, Daily, Morena Stewart MD, 81 mg at 05/20/23 7252  •  atorvastatin (LIPITOR) tablet 20 mg, 20 mg, Oral, HS, Morena Stewart MD, 20 mg at 05/20/23 2113  •  heparin (porcine) 25,000 units in 0 45% NaCl 250 mL infusion (premix), 500 Units/hr, Intravenous, Continuous, Morena Stewart MD, Last Rate: 5 mL/hr at 05/19/23 2055, 500 Units/hr at 05/19/23 2055  •  HYDROmorphone (DILAUDID) injection 0 5 mg, 0 5 mg, Intravenous, Q3H PRN, Eliezer Tolliver PA-C, 0 5 mg at 05/21/23 0487  •  loperamide (IMODIUM) capsule 2 mg, 2 mg, Oral, 4x Daily PRN, Joni Douglas MD, 2 mg at 05/20/23 1213  •  oxyCODONE (ROXICODONE) split tablet 2 5 mg, 2 5 mg, Oral, Q4H PRN **OR** oxyCODONE (ROXICODONE) IR tablet 5 mg, 5 mg, Oral, Q4H PRN, Morena Stewart MD, 5 mg at 05/21/23 3672  •  phenylephrine (ABDOULAYE-SYNEPHRINE) 50 mg (STANDARD "CONCENTRATION) in sodium chloride 0 9% 250 mL,  mcg/min, Intravenous, Titrated, Sophia Monreal PA-C, Last Rate: 24 mL/hr at 05/21/23 0830, 80 mcg/min at 05/21/23 0830  •  warfarin (COUMADIN) tablet 5 mg, 5 mg, Oral, Daily (warfarin), Tonya Ley MD, 5 mg at 05/20/23 5664    Laboratory Results:  Results from last 7 days   Lab Units 05/21/23  0551 05/20/23  0441 05/19/23  1649 05/19/23  1641 05/19/23  1618   WBC Thousand/uL 4 50 8 10  --   --  15 04*   HEMOGLOBIN g/dL 9 3* 8 7*  --   --  10 0*   I STAT HEMOGLOBIN g/dl  --   --  9 5*  --   --    HEMATOCRIT % 29 1* 28 1*  --   --  30 9*   HEMATOCRIT, ISTAT %  --   --  28*  --   --    PLATELETS Thousands/uL 110* 156  --   --  162   SODIUM mmol/L 137 134*  --  136  --    POTASSIUM mmol/L 3 7 3 9  --  3 5  --    CHLORIDE mmol/L 104 102  --  102  --    CO2 mmol/L 30 30  --  24  --    CO2, I-STAT mmol/L  --   --  29  --   --    BUN mg/dL 9 20  --  15  --    CREATININE mg/dL 4 60* 7 05*  --  5 80*  --    CALCIUM mg/dL 8 0* 7 5*  --  8 1*  --    MAGNESIUM mg/dL  --   --   --  1 7  --    PHOSPHORUS mg/dL  --   --   --  2 5  --    GLUCOSE, ISTAT mg/dl  --   --  141*  --   --        No orders to display       Portions of the record may have been created with voice recognition software  Occasional wrong word or \"sound a like\" substitutions may have occurred due to the inherent limitations of voice recognition software  Read the chart carefully and recognize, using context, where substitutions have occurred      "

## 2023-05-21 NOTE — PROGRESS NOTES
"Follow up Consultation    Nephrology   Washington Bishop 70 y o  male MRN: [id number]  Unit/Bed#: [facility] 505-01 Encounter: [id number]      Physician Requesting Consult: Melissa Do, *        ASSESSMENT/PLAN:   65-year-old male with multiple comorbidities including hypertension, diabetes, hyperlipidemia, DVT, CAD and ESRD TTS admitted for elective AV graft placement  Nephrology consulted for dialysis management  ESRD:   gets usual dialysis TTS at [facility]  access: Left-sided AV graft placed 5/19/2023  Currently has permacath   estimated dry weight: Outpatient dry weight 76 5 kg  last dialysis treatment on 5/20/2023  next dialysis treatment on  check BMP, phosphorus with dialysis  avoid nephrotoxins, adjust meds to appropriate GFR    Acid-base electrolytes:  Hyponatremia:  most recent  Sodium, phosphorus, potassium, acidosis to be corrected with dialysis    H&H/anemia:  most recent hemoglobin at  maintain hemoglobin 10-12 grams/deciliter  Recommendations: Not on ALICE as an outpatient    Blood pressure/hypotension:   Current medications: continue midodrine 5 mg p o  3 times daily  Recommendations:  continue above medications check echo to rule out pericardial effusion  Wean off of Luis F-Synephrine as tolerable    Bone mineral disease/secondary hyperparathyroidism of renal origin:   Recommendations: continue calcitriol 0 75 mcg with dialysis  Follow-up intact PTH as an outpatient    Other medical problems:  AV graft:  Management per primary team  Follow-up with vascular status post graft placement on 5/19/2023  Has history of prior left-sided fistula thrombosed  Thanks for the consult  Will continue to follow  Please call with questions    Above-mentioned orders and Plan were discussed with the team in depth and they agree    Flor Shaffer MD, FASN, 5/21/2023, 3:35 PM        Objective :      PHYSICAL EXAM  BP (!) 74/45   Pulse (!) 50   Temp 98 2 °F (36 8 °C)   Resp 16   Ht 5' 10\" " (1 778 m)   Wt 77 1 kg (170 lb)   SpO2 97%   BMI 24 39 kg/m²   Temp (24hrs), Av 9 °F (36 6 °C), Min:97 3 °F (36 3 °C), Max:98 3 °F (36 8 °C)        Intake/Output Summary (Last 24 hours) at 2023 1535  Last data filed at 2023 1519  Gross per 24 hour   Intake 1799 66 ml   Output 1500 ml   Net 299 66 ml       I/O last 24 hours:   In:  7 [P O :437; I V :1362 7; Other:100; IV Piggyback:100]  Out: 1500 [Other:1500]      Current Weight: Weight - Scale: 77 1 kg (170 lb)  First Weight: Weight - Scale: 77 1 kg (170 lb)  Physical Exam        Review of Systems    Scheduled Meds:  Current Facility-Administered Medications   Medication Dose Route Frequency Provider Last Rate   • acetaminophen  650 mg Oral Q6H PRN Kirill Amador MD     • aspirin  81 mg Oral Daily Kirill Amador MD     • atorvastatin  20 mg Oral HS Kirill Amador MD     • calcium gluconate  2 g Intravenous Once Christy Appiah PA-C 2 g (23 1527)   • heparin (porcine)  500 Units/hr Intravenous Continuous Kirill Amador  Units/hr (23 1515)   • HYDROmorphone  0 5 mg Intravenous Q3H PRN Eugenio Landers PA-C     • loperamide  2 mg Oral 4x Daily PRN Gino Libman, MD     • midodrine  10 mg Oral TID AC Terri Alba MD     • oxyCODONE  2 5 mg Oral Q4H PRN Kirill Amador MD      Or   • oxyCODONE  5 mg Oral Q4H PRN Kirill Amador MD     • phenylephrine (ABDOULAYE-SYNEPHRINE) 50 mg (STANDARD CONCENTRATION) in sodium chloride 0 9% 250 mL   mcg/min Intravenous Titrated Eugenio Landers PA-C 90 mcg/min (23 1533)   • polyethylene glycol  17 g Oral Daily PRN William Rebolledo MD     • senna-docusate sodium  1 tablet Oral BID PRN William Rebolledo MD     • warfarin  5 mg Oral Daily (warfarin) Kirill Amador MD         PRN Meds: •  acetaminophen  •  HYDROmorphone  •  loperamide  •  oxyCODONE **OR** oxyCODONE  •  polyethylene glycol  •  senna-docusate sodium    Continuous Infusions:heparin (porcine), 500 Units/hr, Last Rate: "500 Units/hr (05/21/23 1515)  phenylephrine (ABDOULAYE-SYNEPHRINE) 50 mg (STANDARD CONCENTRATION) in sodium chloride 0 9% 250 mL,  mcg/min, Last Rate: 90 mcg/min (05/21/23 1533)          Invasive Devices: Invasive Devices     Peripheral Intravenous Line  Duration           Peripheral IV 05/19/23 Right Hand 2 days    Peripheral IV 05/21/23 Distal;Right;Upper;Ventral (anterior) Arm <1 day          Line  Duration           Hemodialysis AV Fistula  Left Upper arm -- days    Hemodialysis AV Fistula 05/19/23 Left Upper arm 2 days          Hemodialysis Catheter  Duration           HD Permanent Double Catheter 241 days                  LABORATORY:    Results from last 7 days   Lab Units 05/21/23  1223 05/21/23  0551 05/20/23  0441 05/19/23  1649 05/19/23  1641 05/19/23  1618   WBC Thousand/uL  --  4 50 8 10  --   --  15 04*   HEMOGLOBIN g/dL 9 5* 9 3* 8 7*  --   --  10 0*   I STAT HEMOGLOBIN g/dl  --   --   --  9 5*  --   --    HEMATOCRIT % 29 3* 29 1* 28 1*  --   --  30 9*   HEMATOCRIT, ISTAT %  --   --   --  28*  --   --    PLATELETS Thousands/uL  --  110* 156  --   --  162   POTASSIUM mmol/L  --  3 7 3 9  --  3 5  --    CHLORIDE mmol/L  --  104 102  --  102  --    CO2 mmol/L  --  30 30  --  24  --    CO2, I-STAT mmol/L  --   --   --  29  --   --    BUN mg/dL  --  9 20  --  15  --    CREATININE mg/dL  --  4 60* 7 05*  --  5 80*  --    CALCIUM mg/dL  --  8 0* 7 5*  --  8 1*  --    MAGNESIUM mg/dL  --   --   --   --  1 7  --    PHOSPHORUS mg/dL  --   --   --   --  2 5  --    GLUCOSE, ISTAT mg/dl  --   --   --  141*  --   --       rest all reviewed    RADIOLOGY:  No orders to display     Rest all reviewed    Portions of the record may have been created with voice recognition software  Occasional wrong word or \"sound a like\" substitutions may have occurred due to the inherent limitations of voice recognition software  Read the chart carefully and recognize, using context, where substitutions have occurred  If you have any " questions, please contact the dictating provider  "created with voice recognition software  Occasional wrong word or \"sound a like\" substitutions may have occurred due to the inherent limitations of voice recognition software  Read the chart carefully and recognize, using context, where substitutions have occurred  If you have any questions, please contact the dictating provider      "

## 2023-05-21 NOTE — ASSESSMENT & PLAN NOTE
Developed hypotension in post operative period treated with IVF and elvi  Unable to be weaned to off       Plan:  · Evaluate for etiology of hypotension  · Recheck STAT H&H  · ECHO pending  · Replace calcium   · Wean elvi for goal MAP >60 with adequate mentation  · Started on midodrine 5mg TID by previous team, continue

## 2023-05-21 NOTE — ASSESSMENT & PLAN NOTE
· EBL 400cc for AV fistula creation procedure   · HgB drop by 2g from baseline  · Recheck STAT H&H now

## 2023-05-22 ENCOUNTER — APPOINTMENT (INPATIENT)
Dept: RADIOLOGY | Facility: HOSPITAL | Age: 68
End: 2023-05-22

## 2023-05-22 ENCOUNTER — APPOINTMENT (INPATIENT)
Dept: NON INVASIVE DIAGNOSTICS | Facility: HOSPITAL | Age: 68
End: 2023-05-22

## 2023-05-22 VITALS
BODY MASS INDEX: 24.34 KG/M2 | OXYGEN SATURATION: 95 % | HEIGHT: 70 IN | RESPIRATION RATE: 15 BRPM | HEART RATE: 71 BPM | TEMPERATURE: 97.3 F | DIASTOLIC BLOOD PRESSURE: 54 MMHG | WEIGHT: 170 LBS | SYSTOLIC BLOOD PRESSURE: 95 MMHG

## 2023-05-22 LAB
ABO GROUP BLD: NORMAL
ANION GAP SERPL CALCULATED.3IONS-SCNC: 3 MMOL/L (ref 4–13)
AORTIC ROOT: 2.9 CM
APICAL FOUR CHAMBER EJECTION FRACTION: 65 %
ASCENDING AORTA: 3.2 CM
AV LVOT MEAN GRADIENT: 5 MMHG
AV LVOT PEAK GRADIENT: 8 MMHG
BLD GP AB SCN SERPL QL: NEGATIVE
BUN SERPL-MCNC: 16 MG/DL (ref 5–25)
CALCIUM SERPL-MCNC: 8.2 MG/DL (ref 8.3–10.1)
CHLORIDE SERPL-SCNC: 105 MMOL/L (ref 96–108)
CO2 SERPL-SCNC: 25 MMOL/L (ref 21–32)
CREAT SERPL-MCNC: 6.73 MG/DL (ref 0.6–1.3)
DOP CALC LVOT PEAK VEL VTI: 31.59 CM
DOP CALC LVOT PEAK VEL: 1.4 M/S
E WAVE DECELERATION TIME: 293 MS
ERYTHROCYTE [DISTWIDTH] IN BLOOD BY AUTOMATED COUNT: 13.3 % (ref 11.6–15.1)
ERYTHROCYTE [DISTWIDTH] IN BLOOD BY AUTOMATED COUNT: 13.9 % (ref 11.6–15.1)
FRACTIONAL SHORTENING: 35 (ref 28–44)
GFR SERPL CREATININE-BSD FRML MDRD: 7 ML/MIN/1.73SQ M
GLUCOSE SERPL-MCNC: 112 MG/DL (ref 65–140)
HCT VFR BLD AUTO: 19.8 % (ref 36.5–49.3)
HCT VFR BLD AUTO: 24.1 % (ref 36.5–49.3)
HGB BLD-MCNC: 6.6 G/DL (ref 12–17)
HGB BLD-MCNC: 7.4 G/DL (ref 12–17)
HGB BLD-MCNC: 9.9 G/DL (ref 12–17)
INR PPP: 2.58 (ref 0.84–1.19)
INTERVENTRICULAR SEPTUM IN DIASTOLE (PARASTERNAL SHORT AXIS VIEW): 1.4 CM
INTERVENTRICULAR SEPTUM: 1.4 CM (ref 0.6–1.1)
LAAS-AP2: 27.2 CM2
LAAS-AP4: 27.2 CM2
LEFT ATRIUM SIZE: 3.9 CM
LEFT INTERNAL DIMENSION IN SYSTOLE: 2.6 CM (ref 2.1–4)
LEFT VENTRICULAR INTERNAL DIMENSION IN DIASTOLE: 4 CM (ref 3.5–6)
LEFT VENTRICULAR POSTERIOR WALL IN END DIASTOLE: 1.4 CM
LEFT VENTRICULAR STROKE VOLUME: 47 ML
LVSV (TEICH): 47 ML
MCH RBC QN AUTO: 37.2 PG (ref 26.8–34.3)
MCH RBC QN AUTO: 39.5 PG (ref 26.8–34.3)
MCHC RBC AUTO-ENTMCNC: 30.7 G/DL (ref 31.4–37.4)
MCHC RBC AUTO-ENTMCNC: 33.3 G/DL (ref 31.4–37.4)
MCV RBC AUTO: 119 FL (ref 82–98)
MCV RBC AUTO: 121 FL (ref 82–98)
MV E'TISSUE VEL-SEP: 5 CM/S
MV PEAK A VEL: 1.13 M/S
MV PEAK E VEL: 73 CM/S
MV STENOSIS PRESSURE HALF TIME: 85 MS
MV VALVE AREA P 1/2 METHOD: 2.59
PLATELET # BLD AUTO: 92 THOUSANDS/UL (ref 149–390)
PLATELET # BLD AUTO: 96 THOUSANDS/UL (ref 149–390)
PMV BLD AUTO: 11.2 FL (ref 8.9–12.7)
PMV BLD AUTO: 11.3 FL (ref 8.9–12.7)
POTASSIUM SERPL-SCNC: 5.1 MMOL/L (ref 3.5–5.3)
PROTHROMBIN TIME: 28 SECONDS (ref 11.6–14.5)
RBC # BLD AUTO: 1.67 MILLION/UL (ref 3.88–5.62)
RBC # BLD AUTO: 1.99 MILLION/UL (ref 3.88–5.62)
RH BLD: POSITIVE
RIGHT ATRIUM AREA SYSTOLE A4C: 15.8 CM2
RIGHT VENTRICLE ID DIMENSION: 3.5 CM
SL CV ECHO LV DYNAMIC OBSTRUCTION PEAK GRADIENT (REST): 13 MMHG
SL CV ECHO LV DYNAMIC OBSTRUCTION PEAK GRADIENT (VALSAL: 30 MMHG
SL CV LEFT ATRIUM LENGTH A2C: 6.5 CM
SL CV LV EF: 65
SL CV PED ECHO LEFT VENTRICLE DIASTOLIC VOLUME (MOD BIPLANE) 2D: 72 ML
SL CV PED ECHO LEFT VENTRICLE SYSTOLIC VOLUME (MOD BIPLANE) 2D: 24 ML
SODIUM SERPL-SCNC: 133 MMOL/L (ref 135–147)
SPECIMEN EXPIRATION DATE: NORMAL
TRICUSPID ANNULAR PLANE SYSTOLIC EXCURSION: 2 CM
WBC # BLD AUTO: 3.08 THOUSAND/UL (ref 4.31–10.16)
WBC # BLD AUTO: 3.24 THOUSAND/UL (ref 4.31–10.16)

## 2023-05-22 PROCEDURE — 5A1D70Z PERFORMANCE OF URINARY FILTRATION, INTERMITTENT, LESS THAN 6 HOURS PER DAY: ICD-10-PCS | Performed by: INTERNAL MEDICINE

## 2023-05-22 PROCEDURE — 30233N1 TRANSFUSION OF NONAUTOLOGOUS RED BLOOD CELLS INTO PERIPHERAL VEIN, PERCUTANEOUS APPROACH: ICD-10-PCS | Performed by: ANESTHESIOLOGY

## 2023-05-22 RX ORDER — WARFARIN SODIUM 2.5 MG/1
2.5 TABLET ORAL
Status: DISCONTINUED | OUTPATIENT
Start: 2023-05-22 | End: 2023-05-22

## 2023-05-22 RX ORDER — LOPERAMIDE HCL 1 MG/7.5ML
2 SUSPENSION ORAL 4 TIMES DAILY PRN
Status: DISCONTINUED | OUTPATIENT
Start: 2023-05-22 | End: 2023-05-24 | Stop reason: HOSPADM

## 2023-05-22 RX ORDER — LIDOCAINE HYDROCHLORIDE 10 MG/ML
10 INJECTION, SOLUTION EPIDURAL; INFILTRATION; INTRACAUDAL; PERINEURAL ONCE
Status: DISCONTINUED | OUTPATIENT
Start: 2023-05-22 | End: 2023-05-24 | Stop reason: HOSPADM

## 2023-05-22 RX ADMIN — OXYCODONE HYDROCHLORIDE 5 MG: 5 TABLET ORAL at 19:13

## 2023-05-22 RX ADMIN — HYDROMORPHONE HYDROCHLORIDE 0.5 MG: 1 INJECTION, SOLUTION INTRAMUSCULAR; INTRAVENOUS; SUBCUTANEOUS at 10:55

## 2023-05-22 RX ADMIN — OXYCODONE HYDROCHLORIDE 5 MG: 5 TABLET ORAL at 00:33

## 2023-05-22 RX ADMIN — MIDODRINE HYDROCHLORIDE 10 MG: 5 TABLET ORAL at 15:51

## 2023-05-22 RX ADMIN — HYDROMORPHONE HYDROCHLORIDE 0.5 MG: 1 INJECTION, SOLUTION INTRAMUSCULAR; INTRAVENOUS; SUBCUTANEOUS at 05:40

## 2023-05-22 RX ADMIN — MIDODRINE HYDROCHLORIDE 10 MG: 5 TABLET ORAL at 06:06

## 2023-05-22 RX ADMIN — OXYCODONE HYDROCHLORIDE 5 MG: 5 TABLET ORAL at 23:56

## 2023-05-22 RX ADMIN — HYDROMORPHONE HYDROCHLORIDE 0.5 MG: 1 INJECTION, SOLUTION INTRAMUSCULAR; INTRAVENOUS; SUBCUTANEOUS at 01:53

## 2023-05-22 RX ADMIN — ASPIRIN 81 MG: 81 TABLET, COATED ORAL at 09:00

## 2023-05-22 RX ADMIN — Medication 2.5 MG: at 09:00

## 2023-05-22 RX ADMIN — HYDROMORPHONE HYDROCHLORIDE 0.5 MG: 1 INJECTION, SOLUTION INTRAMUSCULAR; INTRAVENOUS; SUBCUTANEOUS at 20:06

## 2023-05-22 RX ADMIN — Medication 2.5 MG: at 04:33

## 2023-05-22 RX ADMIN — Medication 2.5 MG: at 13:02

## 2023-05-22 RX ADMIN — MIDODRINE HYDROCHLORIDE 10 MG: 5 TABLET ORAL at 10:55

## 2023-05-22 RX ADMIN — ATORVASTATIN CALCIUM 20 MG: 20 TABLET, FILM COATED ORAL at 21:05

## 2023-05-22 NOTE — TELEPHONE ENCOUNTER
Called and spoke with wife per the Vascular doctor, Dr Camargo All, pt can't hold blood thinner, procedure needs to be postponed until cleared  Cancelled 6/8 EUS  Will wait to get clearance

## 2023-05-22 NOTE — PLAN OF CARE
Problem: PHYSICAL THERAPY ADULT  Goal: Performs mobility at highest level of function for planned discharge setting  See evaluation for individualized goals  Description: Treatment/Interventions: Functional transfer training, LE strengthening/ROM, Therapeutic exercise, Endurance training, Patient/family training, Equipment eval/education, Bed mobility, Gait training, Elevations, Spoke to nursing  Equipment Recommended: Samantha Vincent (pt owns)       See flowsheet documentation for full assessment, interventions and recommendations  Note: Prognosis: Good  Problem List: Decreased strength, Decreased endurance, Impaired balance, Decreased mobility, Pain  Assessment: Pt seen for high complexity PT evaluation due to decrease in functional mobility status compared to baseline  Pt with active PT eval/treat orders at this time  Pt is a 70 y o  M who [place] [facility] [place] fistula on [place]/19/23, pt is s/p L brachial-axillary AVG  Pt  has a past medical history of Cerebral thrombosis (04/23/2008), Chronic kidney disease (2012), COVID (05/2022), Diabetes mellitus ([place] [address] ), Dialysis patient [facility]), GERD (gastroesophageal reflux disease), Hyperlipidemia, Hypertension, Labyrinthitis (09/10/2011), Myocardial infarction ([facility] ) (2014), Sleep disturbances (09/20/2010), Stroke ([place] [address] ) (2007), and Type 2 diabetes, uncontrolled, with renal manifestation (05/03/2017)  Pt resides with spouse in [facility] with 3STE  Pt presents with decreased strength, balance, endurance that contribute to limitations in bed mobility, functional transfers, functional mobility  Pt requires supervision for STS and short distance ambulation at this time  Pt left upright in bedside chair with all needs in reach  Pt will benefit from skilled therapy in order to address current impairments and functional limitations  PT to follow pt and recommending OPPT  The patient's AM-PAC Basic Mobility Inpatient Short Form Raw Score is 18   A Raw score of greater than 16 suggests the patient may benefit from discharge to home  Please also refer to the recommendation of the Physical Therapist for safe discharge planning  Barriers to Discharge: Inaccessible home environment, Decreased caregiver support     PT Discharge Recommendation: Home with outpatient rehabilitation    See flowsheet documentation for full assessment

## 2023-05-22 NOTE — ASSESSMENT & PLAN NOTE
Developed hypotension in post operative period treated with IVF and luis f  Unable to be weaned to off       Plan:  · Evaluate for etiology of hypotension  · ECHO pending  · Luis F has been weaned off  · Continue Midodrine 10 TID

## 2023-05-22 NOTE — PHYSICAL THERAPY NOTE
Physical Therapy Evaluation     Patient's Name: Sonam Chin    Admitting Diagnosis  ESRD (end stage renal disease) on dialysis ([facility] ) [N18 6, Z99 2]    Problem List  Patient Active Problem List   Diagnosis    Acute on chronic anemia    Coronary artery disease involving native coronary artery    Hypertension    Diabetes with neurologic complications (HCC)    Elevated serum alkaline phosphatase level    ESRD (end stage renal disease) on dialysis ([facility] )    Intestinal malabsorption    Irritable bowel syndrome with diarrhea    Mixed hyperlipidemia    Nicotine dependence    Organic impotence    Pernicious anemia    Type 2 diabetes mellitus ([place] [address] )    Encounter for extracorporeal dialysis ([place] [address] )    AV fistula thrombosis, initial encounter ([place] [address] )    Arteriovenous fistula ([place] [address] )    Right intertrochanteric femur fracture    Thrombocytopenia ([place] [address] )    Dependence on renal dialysis ([place] [address] )    Vitamin D deficiency, unspecified    End-stage renal disease on hemodialysis ([place] [address] )    Chronic kidney disease    Chronic deep vein thrombosis (DVT) of internal jugular vein (HCC)    S/P angioplasty with stent    Hypotension       Past Medical History  Past Medical History:   Diagnosis Date    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    With Cerebral Infarction:  Last Assessed:  October 20, 2016    Chronic kidney disease 2012    on dialysis     COVID 05/2022    Diabetes mellitus ([place] [address] )     Dialysis patient [facility])     T/TH/Sat    GERD (gastroesophageal reflux disease)     Hyperlipidemia     Hypertension     Labyrinthitis 09/10/2011    Myocardial infarction [facility]) 2014    x3 others were in 2009 & 2010    Sleep disturbances 09/20/2010    Stroke ([place] [address] ) 2007    x1, RLE deficit- uses walker    Type 2 diabetes, uncontrolled, with renal manifestation 05/03/2017       Past Surgical History  Past Surgical History:   Procedure Laterality Date    AV FISTULA PLACEMENT      CARDIAC CATHETERIZATION Left 02/03/2023    Procedure: Cardiac Left Heart Cath;  Surgeon: Delmar Fuentes Shakira Parrish MD;  Location: [place] [address] CATH LAB; Service: Cardiology    CARDIAC CATHETERIZATION N/A 02/08/2023    Procedure: Cardiac catheterization with complex PCI with Dr Indra Montana, patient is a renal dialysis patient;  Surgeon: Leigh Mendoza MD;  Location:  CARDIAC CATH LAB; Service: Cardiology    CARDIAC SURGERY  2010    stents x3    COLONOSCOPY N/A 12/12/2016    Procedure: COLONOSCOPY;  Surgeon: Geraldine Hoyt MD;  Location: [facility] GI LAB; Service:     COLONOSCOPY N/A 04/03/2017    Procedure:  COLONOSCOPY;  Surgeon: Geraldine Hoyt MD;  Location: [facility] GI LAB; Service:     HARDWARE REMOVAL Right 04/04/2022    Procedure: REMOVAL HARDWARE HIP;  Surgeon: Kim Ortiz MD;  Location: [facility];  Service: Orthopedics    IR AV FISTULA/GRAFT DECLOT  04/29/2021    IR AV FISTULA/GRAFT DECLOT  03/25/2022    IR AV FISTULA/GRAFT DECLOT  09/21/2022    IR AV FISTULAGRAM/GRAFTOGRAM  03/28/2022    IR AV FISTULAGRAM/GRAFTOGRAM  07/28/2022    IR TUNNELED CENTRAL LINE REMOVAL  12/07/2021    IR TUNNELED DIALYSIS CATHETER PLACEMENT  05/24/2021    PORTACATH PLACEMENT      per pt \"port in chest\"    PA ARTERIOVENOUS ANASTOMOSIS OPEN DIRECT Left 07/22/2021    Procedure: CREATION FISTULA ARTERIOVENOUS (AV); Surgeon: Twin Vasquez MD;  Location: [address] [place] [address];  Service: Vascular    PA ARTERIOVENOUS ANASTOMOSIS OPEN DIRECT Left 3/21/2023    Procedure: left brachiobasilic fistula,  AVG Graft;  Surgeon: Lili Gutierrez MD;  [facility]: [facility];  Service: Vascular    PA ARTERIOVENOUS ANASTOMOSIS OPEN DIRECT Left 5/19/2023    Procedure: CREATION of LEFT FISTULA ARTERIOVENOUS (AV) GRAFT;  Surgeon: Lili Gutierrez MD;  [facility];  Service: Vascular    PA HEMIARTHROPLASTY HIP PARTIAL Right 04/04/2022    Procedure: HEMIARTHROPLASTY HIP (BIPOLAR);   Surgeon: Kim Ortiz MD;  [facility];  Service: Orthopedics          05/22/23 0930   PT Last Visit   PT Visit Date 05/22/23   Note Type   Note type " Evaluation   Pain Assessment   Pain Assessment Tool 0-10   Pain Score No Pain   Restrictions/Precautions   Weight Bearing Precautions Per Order No   Other Precautions Multiple lines;Telemetry; Fall Risk;Pain   Home Living   Type of [address] One level  (3STE)   Bathroom Shower/Tub Walk-in shower   Bathroom Toilet Raised   Bathroom Equipment Shower chair;Grab bars in [place] [address]  (uses PTA)   Prior Function   Level of [place] Independent with ADLs; Independent with functional mobility; Independent with IADLS   Lives With Spouse   Falls in the last 6 months 0   General   Family/Caregiver Present No   Cognition   Overall Cognitive Status WFL   Arousal/Participation Alert   Orientation Level Oriented X4   Memory Within functional limits   Following Commands Follows all commands and directions without difficulty   Subjective   Subjective Pleasant and agreeable to participate in therapy session   RLE Assessment   RLE Assessment   (functionally 3+/5)   LLE Assessment   LLE Assessment   (functionally 3+/5)   Bed Mobility   Supine to Sit 4  Minimal assistance   Additional items Assist x 1;HOB elevated; Increased time required;Verbal cues   Additional Comments Left OOB in chair with all needs in reach   Transfers   Sit to Stand 5  Supervision   Stand to Sit 5  Supervision   Additional Comments with RW   Ambulation/Elevation   Gait pattern Excessively slow; Short stride; Foward flexed   Gait Assistance 5  Supervision   Additional items Verbal cues   Assistive Device Rolling walker   Distance 3 ft x 1  (pt refusing additional at this time)   Balance   Static Sitting Fair +   Dynamic Sitting Fair   Static Standing Fair -   Dynamic Standing Fair -   Ambulatory Fair -   Activity Tolerance   Activity Tolerance Patient limited by fatigue   Nurse Made Aware RN cleared pt to be seen by PT   Assessment   Prognosis Good   Problem List Decreased strength;Decreased endurance; Impaired balance;Decreased mobility;Pain   Assessment Pt seen for high complexity PT evaluation due to decrease in functional mobility status compared to baseline  Pt with active PT eval/treat orders at this time  Pt is a 70 y o  M who presented to [facility] with arteriovenous fistula on 5/19/23, pt is s/p L brachial-axillary AVG  Pt  has a past medical history of Cerebral thrombosis (04/23/2008), Chronic kidney disease (2012), COVID (05/2022), Diabetes mellitus ([place] [address] ), Dialysis patient [facility]), GERD (gastroesophageal reflux disease), Hyperlipidemia, Hypertension, Labyrinthitis (09/10/2011), Myocardial infarction ([place] [address] ) (2014), Sleep disturbances (09/20/2010), Stroke ([place] [address] ) (2007), and Type 2 diabetes, uncontrolled, with renal manifestation (05/03/2017)  Pt resides with spouse in [facility] with 3STE  Pt presents with decreased strength, balance, endurance that contribute to limitations in bed mobility, functional transfers, functional mobility  Pt requires supervision for STS and short distance ambulation at this time  Pt left upright in bedside chair with all needs in reach  Pt will benefit from skilled therapy in order to address current impairments and functional limitations  PT to follow pt and recommending OPPT  The patient's AM-PAC Basic Mobility Inpatient Short Form Raw Score is 18  A Raw score of greater than 16 suggests the patient may benefit from discharge to home  Please also refer to the recommendation of the Physical Therapist for safe discharge planning  Barriers to Discharge Inaccessible home environment;Decreased caregiver support   Goals   Patient Goals to go home   STG Expiration Date 06/05/23   Short Term Goal #1 1  Pt will demonstrate ability to perform all aspects of bed mobility with I in order to increase independence and decrease burden on caregivers  2  Pt will demonstrate ability to perform functional transfers with Mod I in order to increase independence and decrease burden on caregivers    3  Pt will demonstrate ability to ambulate 200 ft with least restrictive AD with Mod I in order to return to mobility safely  4  Pt will demonstrate ability to negotiate full flight steps with/without HR and Mod I in order to return to household/community mobility safely  5  Pt will demonstrate improved balance by one grade order to decrease risk of falls  6  Pt will increase b/l LE strength by 1 grade in order to increase ease of functional mobility and transfers  Plan   Treatment/Interventions Functional transfer training;LE strengthening/ROM; Therapeutic exercise; Endurance training;Patient/family training;Equipment eval/education; Bed mobility;Gait training;Elevations; Spoke to nursing   PT Frequency 2-3x/wk   Recommendation   PT Discharge Recommendation Home with outpatient rehabilitation   Equipment Recommended Walker  (pt owns)   Gladisbanisela 8 in Flat Bed Without Bedrails 3   Lying on Back to Sitting on Edge of Flat Bed Without Bedrails 3   Moving Bed to Chair 3   Standing Up From Chair Using Arms 3   Walk in Room 3   Climb 3-5 Stairs With Railing 3   Basic Mobility Inpatient Raw Score 18   Basic Mobility Standardized Score 41 05   Highest Level Of Mobility   JH-HLM Goal 6: Walk 10 steps or more   JH-HLM Achieved 4: Move to chair/commode   Modified Rosi Scale   Modified Cortez Scale 4         Reta Ya, PT, DPT

## 2023-05-22 NOTE — QUICK NOTE
Critical Care Interval Transfer Note:    Please refer to progress note from earlier today for full details  Barriers to discharge:   · None      Consults: IP CONSULT TO NEPHROLOGY  INPATIENT CONSULT TO IR    Recommended to review admission imaging for incidental findings and document in discharge navigator: Chart reviewed, no known incidental findings noted at this time  Discharge Plan: Anticipate discharge in 48-72 hrs to home  Central access plan: continue permacath for HD    PT Recommendations: Home with outpatient rehabilitation  OT Recommendations: Post acute rehabilitation services    Patient seen and evaluated by Critical Care today and deemed to be appropriate for transfer to Med Surg  Spoke to Dr Michelle Rowland from [facility] to accept transfer  Critical care can be contacted via [employer] with any questions or concerns

## 2023-05-22 NOTE — ASSESSMENT & PLAN NOTE
POD#3 from creation of left fistual AV graft for dialysis     · Management per vascular team   · Serial NV checks

## 2023-05-22 NOTE — TELEPHONE ENCOUNTER
Received clearance request back from vascular doctor denying request to hold plavix  Procedure needs to be postponed  I called wife and informed her we are cancelling procedure 6/8 until further notice

## 2023-05-22 NOTE — ASSESSMENT & PLAN NOTE
70 M s/p left brachial axillary AVG, CHARLES 5/19     Plan:   Left arm ACE wrap and elevation for edema control  Hypotension improved, on midodrine, Luis F off  Heparin gtt bridge to coumadin, monitor INR, Goal 2-3

## 2023-05-22 NOTE — TELEMEDICINE
e-Consult (IPC)  - Interventional Radiology  Angeles Esposito 70 y o  male MRN: [id number]  Unit/Bed#: [facility] 505-01 Encounter: [id number]          Interventional Radiology has been consulted to evaluate Angeles Esposito    We were consulted by critical care concerning this patient with need for vascular access  Inpatient Consult to IR  Consult performed by: Farzaneh Hanley PA-C  Consult ordered by: Linh Chavez        05/22/23    Assessment/Recommendation:     70year old male with pmh of ESRD with recent fistula creation, with difficult vascular access  Currently with IV but need for lab draws      - discussed with Manas STILL  Temporary IJ line placement pending IR schedule, tomorrow at the earliest  Will discuss tomorrow about timing    - patient does not need to be npo    11-20 minutes, >50% of the total time devoted to medical consultative verbal/EMR discussion between providers  Written report will be generated in the EMR  Thank you for allowing Interventional Radiology to participate in the care of Angeles Esposito  Please don't hesitate to call or TigerText us with any questions       Farzaneh Hanley PA-C

## 2023-05-22 NOTE — NURSING NOTE
Received consult for midline  Pt noted to have ESRD on HD  Has fistula and HD catheter  Discussed with nephrology, Dr Niru Hall, who requested a tunneled line  Midlines are not tunneled lines, instead they are longer catheters in the upper arm  Nephrology not ok with access placed in the upper arm  Message relayed to ordering provider (Jae Marie)  Bedside consult to be d/c'd

## 2023-05-22 NOTE — ASSESSMENT & PLAN NOTE
Lab Results   Component Value Date    HGBA1C 4 8 01/05/2023       Recent Labs     05/19/23  1036 05/19/23  1556 05/21/23  1514   POCGLU 93 134 126       Blood Sugar Average: Last 72 hrs:  (P) 539 [id number]     SSI

## 2023-05-22 NOTE — PROGRESS NOTES
"[facility]  Progress Note  Name: Gia Headley  MRN: [id number]  Unit/Bed#: [facility] 562-58 I Date of Admission: 5/19/2023   Date of Service: 5/22/2023 I Hospital Day: 2    Assessment/Plan   * Arteriovenous fistula [facility])  Assessment & Plan  70 M s/p left brachial axillary AVG, LUE 5/19     Plan:   Monitor for symptoms of Steal  Hypotension improved, on midodrine, Luis F off  Heparin gtt bridge to coumadin, monitor INR, Goal 2-3             Subjective:  Pt doing well, no events overnight    Vitals:  BP (!) 109/48 (BP Location: Right arm)   Pulse 68   Temp (!) 97 4 °F (36 3 °C) (Oral)   Resp 16   Ht 5' 10\" (1 778 m)   Wt 77 1 kg (170 lb)   SpO2 97%   BMI 24 39 kg/m²     I/Os:  I/O last 3 completed shifts: In: 1399 7 [P O :437; I V :862 7; IV Piggyback:100]  Out: 0   No intake/output data recorded  Lab Results and Cultures:   Lab Results   Component Value Date    WBC 3 08 (L) 05/22/2023    HGB 7 4 (L) 05/22/2023    HCT 24 1 (L) 05/22/2023     (H) 05/22/2023     (L) 05/21/2023     Lab Results   Component Value Date    GLUCOSE 141 (H) 05/19/2023    CALCIUM 8 2 (L) 05/22/2023     04/11/2017    K 5 1 05/22/2023    CO2 25 05/22/2023     05/22/2023    BUN 16 05/22/2023    CREATININE 6 73 (H) 05/22/2023     Lab Results   Component Value Date    INR 2 58 (H) 05/22/2023    INR 1 85 (H) 05/21/2023    INR 1 16 05/20/2023    PROTIME 28 0 (H) 05/22/2023    PROTIME 21 6 (H) 05/21/2023    PROTIME 15 0 (H) 05/20/2023        Blood Culture:   Lab Results   Component Value Date    BLOODCX No Growth After 5 Days  10/19/2021    BLOODCX No Growth After 5 Days   10/19/2021   ,   Urinalysis: No results found for: [place], SPECGRAV, PHUR, LEUKOCYTESUR, NITRITE, PROTEINUA, GLUCOSEU, KETONESU, BILIRUBINUR, BLOODU,   Urine Culture: No results found for: URINECX,   Wound Culure:   Lab Results   Component Value Date    WOUNDCULT 1+ Growth of Citrobacter freundii (A) " 10/08/2021    WOUNDCULT 1+ Growth of Enterobacter cloacae complex (A) 10/08/2021       Medications:  Current Facility-Administered Medications   Medication Dose Route Frequency   • acetaminophen (TYLENOL) tablet 650 mg  650 mg Oral Q6H PRN   • aspirin (ECOTRIN LOW STRENGTH) EC tablet 81 mg  81 mg Oral Daily   • atorvastatin (LIPITOR) tablet 20 mg  20 mg Oral HS   • heparin (porcine) 25,000 units in 0 45% NaCl 250 mL infusion (premix)  500 Units/hr Intravenous Continuous   • HYDROmorphone (DILAUDID) injection 0 5 mg  0 5 mg Intravenous Q3H PRN   • loperamide (IMODIUM) capsule 2 mg  2 mg Oral 4x Daily PRN   • midodrine (PROAMATINE) tablet 10 mg  10 mg Oral TID AC   • oxyCODONE (ROXICODONE) split tablet 2 5 mg  2 5 mg Oral Q4H PRN    Or   • oxyCODONE (ROXICODONE) IR tablet 5 mg  5 mg Oral Q4H PRN   • phenylephrine (ABDOULAYE-SYNEPHRINE) 50 mg (STANDARD CONCENTRATION) in sodium chloride 0 9% 250 mL   mcg/min Intravenous Titrated   • polyethylene glycol (MIRALAX) packet 17 g  17 g Oral Daily PRN   • senna-docusate sodium (SENOKOT S) 8 6-50 mg per tablet 1 tablet  1 tablet Oral BID PRN   • warfarin (COUMADIN) tablet 5 mg  5 mg Oral Daily (warfarin)         Physical Exam:    General appearance: alert and oriented, in no acute distress  Lungs: clear to auscultation bilaterally  Heart: S1, S2 normal  Abdomen: soft, non-tender; bowel sounds normal; no masses,  no organomegaly  Extremities: Left arm edema, M/S intact, hand cool, incision CDI  +thrill            Jerald Opitz, PA-C  5/22/2023

## 2023-05-22 NOTE — PROGRESS NOTES
[facility] [facility]  Progress Note  Name: Guillermo Mohamud  MRN: [id number]  Unit/Bed#: [facility] 099-28 I Date of Admission: 5/19/2023   Date of Service: 5/22/2023 I Hospital Day: 2    Assessment/Plan   Hypotension  Assessment & Plan  Developed hypotension in post operative period treated with IVF and luis f  Unable to be weaned to off  Plan:  · Evaluate for etiology of hypotension  · ECHO pending  · Luis F has been weaned off  · Continue Midodrine 10 TID      * Arteriovenous fistula ([address] )  Assessment & Plan  POD#3 from creation of left fistual AV graft for dialysis     · Management per vascular team   · Serial NV checks     End-stage renal disease on hemodialysis [facility])  Assessment & Plan  Lab Results   Component Value Date    EGFR 12 05/21/2023    EGFR 7 05/20/2023    EGFR 9 05/19/2023    CREATININE 4 60 (H) 05/21/2023    CREATININE 7 05 (H) 05/20/2023    CREATININE 5 80 (H) 05/19/2023   · Last Dialysis 5/20  · Will discuss plans with nephrology today regarding dialysis plans    Acute on chronic anemia  Assessment & Plan  · EBL 400cc for AV fistula creation procedure   · HgB drop by 2g from baseline  · Trend HH daily    Type 2 diabetes mellitus [facility])  Assessment & Plan  Lab Results   Component Value Date    HGBA1C 4 8 01/05/2023       Recent Labs     05/19/23  1036 05/19/23  1556 05/21/23  1514   POCGLU 93 134 126       Blood Sugar Average: Last 72 hrs:  (P) 616 [id number]     SSI             ICU Core Measures     A: Assess, Prevent, and Manage Pain · Has pain been assessed? Yes  · Need for changes to pain regimen? No   B: Both SAT/SAT  · N/A   C: Choice of Sedation · RASS Goal: 0 Alert and Calm  · Need for changes to sedation or analgesia regimen? No   D: Delirium · CAM-ICU: Negative   E: Early Mobility  · Plan for early mobility? Yes   F: Family Engagement · Plan for family engagement today? No       Review of Invasive Devices:      Central access plan: HD cath in place    Plan for fistula maturation      Prophylaxis:  VTE VTE covered by:  heparin (porcine), Intravenous, 500 Units/hr at 05/21/23 1515  warfarin, Oral, 5 mg at 05/21/23 1706       Stress Ulcer  not ordered       Subjective   HPI/24hr events: 69 y/o male with PMHx CAD, ESRD, HPTN, HLD, and anemia of chronic disease who now presents with hypotension following new fistula creation  Midodrine increased to 10 TID  Weaned off of elvi at 2300  Review of Systems   Constitutional: Positive for fatigue  Respiratory: Negative  Musculoskeletal:        R arm swelling          Objective                            Vitals I/O      Most Recent Min/Max in 24hrs   Temp (!) 97 4 °F (36 3 °C) Temp  Min: 97 3 °F (36 3 °C)  Max: 98 2 °F (36 8 °C)   Pulse 68 Pulse  Min: 50  Max: 87   Resp 16 Resp  Min: 14  Max: 16   BP (!) 109/48 BP  Min: 67/40  Max: 125/56   O2 Sat 98 % SpO2  Min: 94 %  Max: 98 %      Intake/Output Summary (Last 24 hours) at 5/22/2023 0434  Last data filed at 5/21/2023 1519  Gross per 24 hour   Intake 1399 66 ml   Output --   Net 1399 66 ml         Diet Renal; Renal [place]; No; No     Invasive Monitoring Physical exam    Physical Exam  Constitutional:       Appearance: He is obese  HENT:      Head: Normocephalic  Mouth/Throat:      Mouth: Mucous membranes are moist    Eyes:      Pupils: Pupils are equal, round, and reactive to light  Cardiovascular:      Rate and Rhythm: Normal rate and regular rhythm  Pulmonary:      Effort: Pulmonary effort is normal       Breath sounds: Normal breath sounds  Abdominal:      General: There is distension  Palpations: Abdomen is soft  Tenderness: There is no abdominal tenderness  Musculoskeletal:      Comments: RUE swollen, warm, NV intact   Skin:     Capillary Refill: Capillary refill takes less than 2 seconds  Neurological:      General: No focal deficit present  Mental Status: He is alert                Diagnostic Studies Medications:  Scheduled PRN   aspirin, 81 mg, Daily  atorvastatin, 20 mg, HS  midodrine, 10 mg, TID AC  warfarin, 5 mg, Daily (warfarin)      acetaminophen, 650 mg, Q6H PRN  HYDROmorphone, 0 5 mg, Q3H PRN  loperamide, 2 mg, 4x Daily PRN  oxyCODONE, 2 5 mg, Q4H PRN   Or  oxyCODONE, 5 mg, Q4H PRN  polyethylene glycol, 17 g, Daily PRN  senna-docusate sodium, 1 tablet, BID PRN       Continuous    heparin (porcine), 500 Units/hr, Last Rate: 500 Units/hr (05/21/23 1515)  phenylephine,  mcg/min, Last Rate: Stopped (05/21/23 2302)         Labs:    CBC    Recent Labs     05/20/23 0441 05/21/23  0551 05/21/23  1223   WBC 8 10 4 50  --    HGB 8 7* 9 3* 9 5*   HCT 28 1* 29 1* 29 3*    110*  --      BMP    Recent Labs     05/20/23  0441 05/21/23  0551   SODIUM 134* 137   K 3 9 3 7    104   CO2 30 30   AGAP 2* 3*   BUN 20 9   CREATININE 7 05* 4 60*   CALCIUM 7 5* 8 0*       Coags    Recent Labs     05/20/23  0441 05/21/23  0551   INR 1 16 1 85*        Additional Electrolytes  No recent results       Blood Gas    No recent results  No recent results LFTs  No recent results    Infectious  No recent results  Glucose  Recent Labs     05/20/23  0441 05/21/23  0551   GLUC 119 88                   Luz Hammonds PA-C

## 2023-05-22 NOTE — ASSESSMENT & PLAN NOTE
Lab Results   Component Value Date    EGFR 12 05/21/2023    EGFR 7 05/20/2023    EGFR 9 05/19/2023    CREATININE 4 60 (H) 05/21/2023    CREATININE 7 05 (H) 05/20/2023    CREATININE 5 80 (H) 05/19/2023   · Last Dialysis 5/20  · Will discuss plans with nephrology today regarding dialysis plans

## 2023-05-22 NOTE — RESTORATIVE TECHNICIAN NOTE
Restorative Technician Note      Patient Name: Humera Kelly     Note Type: Mobility  Patient Position Upon Consult: Supine  Activity Performed: Ambulated  Assistive Device: Roller walker  Patient Position at End of Consult:  Other (comment) (stretcher)

## 2023-05-22 NOTE — PROGRESS NOTES
Follow up Consultation    Nephrology   Izzy Willard Dario 70 y o  male MRN: [id number]  Unit/Bed#: [facility] 505-01 Encounter: [id number]      Physician Requesting Consult: Cori Moody DO        ASSESSMENT/PLAN:   79-year-old male with multiple comorbidities including hypertension, diabetes, hyperlipidemia, DVT, CAD and ESRD TTS admitted for elective AV graft placement  Nephrology consulted for dialysis management  ESRD:   gets usual dialysis TTS at [address] [facility]  access: Left-sided AV graft placed 5/19/2023  Currently has permacath   estimated dry weight: Outpatient dry weight 76 5 kg  last dialysis treatment on 5/23/2023  next dialysis treatment on 5/25/2023  No acute indication for dialysis today  check BMP, phosphorus with dialysis  avoid nephrotoxins, adjust meds to appropriate GFR  Stable for discharge from renal standpoint if cleared per vascular surgery    Acid-base electrolytes:  Hyponatremia:  most recent sodium at 135 mEq  Hyperkalemia borderline: Most recent potassium 4 2 mEq resolved   sodium, phosphorus, potassium, acidosis to be corrected with dialysis    H&H/anemia:  most recent hemoglobin at 8 8 g/dL  maintain hemoglobin 10-12 grams/deciliter  Recommendations: Not on ALICE as an outpatient, improved status post PRBC 5/22    Blood pressure/hypotension:   Current medications: continue midodrine 10 mg p o  3 times daily  Recommendations: Improved and stable  No longer on Luis F-Synephrine    Bone mineral disease/secondary hyperparathyroidism of renal origin:   Recommendations: continue calcitriol 0 75 mcg with dialysis  Follow-up intact PTH as an outpatient    Other medical problems:  AV graft:  Management per primary team  Follow-up with vascular status post graft placement on 5/19/2023  Has history of prior left-sided fistula thrombosed  Does have significant edema left arm, as per vascular that is expected  No further intervention needed        Thanks for the consult  Will continue to follow  "  Please call with questions  Above-mentioned orders and Plan in terms of dialysis today stable for discharge from renal standpoint medically cleared were discussed with the team in depth and they agree    MERITER MD ANDREY, [facility] Center[facility]IE, 2023, 11:27 AM                Objective :  Patient seen and examined in his room earlier this a m  and then again while on hemodialysis at 2:35 pm, no issues with blood flow  Reporting increased edema in left arm where new AV graft was created  Status post PRBC yesterday  For dialysis today    PHYSICAL EXAM  /55   Pulse 56   Temp 97 5 °F (36 4 °C) (Oral)   Resp 17   Ht 5' 10\" (1 778 m)   Wt 77 1 kg (170 lb)   SpO2 96%   BMI 24 39 kg/m²   Temp (24hrs), Av 5 °F (36 4 °C), Min:97 3 °F (36 3 °C), Max:97 8 °F (36 6 °C)      No intake or output data in the 24 hours ending 23 1801    I/O last 24 hours: In: 1399 7 [P O :437; I V :862 7; IV Piggyback:100]  Out: -       Current Weight: Weight - Scale: 77 1 kg (170 lb)  First Weight: Weight - Scale: 77 1 kg (170 lb)  Physical Exam  Vitals and nursing note reviewed  Constitutional:       General: He is not in acute distress  Appearance: Normal appearance  He is normal weight  He is not ill-appearing, toxic-appearing or diaphoretic  HENT:      Head: Normocephalic and atraumatic  Mouth/Throat:      Mouth: Mucous membranes are moist       Pharynx: Oropharynx is clear  No oropharyngeal exudate  Eyes:      General: No scleral icterus  Conjunctiva/sclera: Conjunctivae normal    Neck:      Comments: Right IJ permacath  Cardiovascular:      Rate and Rhythm: Normal rate  Heart sounds: No friction rub  Pulmonary:      Effort: No respiratory distress  Breath sounds: Normal breath sounds  No stridor  Abdominal:      General: There is no distension  Palpations: Abdomen is soft  There is no mass  Tenderness: There is no abdominal tenderness     Musculoskeletal:         General: Swelling " present  Cervical back: Normal range of motion  No rigidity  Comments: Left arm AV graft positive thrill +2 edema left arm,   Skin:     General: Skin is warm  Coloration: Skin is not jaundiced  Neurological:      General: No focal deficit present  Mental Status: He is alert and oriented to person, place, and time  Psychiatric:         Mood and Affect: Mood normal          Behavior: Behavior normal              Review of Systems   Constitutional: Negative for chills and fatigue  HENT: Negative for congestion  Respiratory: Negative for cough  Cardiovascular: Negative for leg swelling  Gastrointestinal: Negative for abdominal pain and diarrhea  Genitourinary: Negative for hematuria  Musculoskeletal: Negative for back pain  Neurological: Negative for headaches  Psychiatric/Behavioral: Negative for agitation and confusion  All other systems reviewed and are negative        Scheduled Meds:  Current Facility-Administered Medications   Medication Dose Route Frequency Provider Last Rate   • acetaminophen  650 mg Oral Q6H PRN TESSY Rivero     • aspirin  81 mg Oral Daily TESSY Rivero     • atorvastatin  20 mg Oral HS TESSY Rivero     • HYDROmorphone  0 5 mg Intravenous Q3H PRN TESSY Rivero     • lidocaine (PF)  10 mL Infiltration Once [employer] TESSY LONG     • loperamide  2 mg Oral 4x Daily PRN TESSY Rivero     • midodrine  10 mg Oral TID AC TESSY Rivero     • oxyCODONE  2 5 mg Oral Q4H PRN TESSY Rivero      Or   • oxyCODONE  5 mg Oral Q4H PRN TESSY Rivero     • polyethylene glycol  17 g Oral Daily PRN TESSY Rivero     • senna-docusate sodium  1 tablet Oral BID PRN TESSY Rivero         PRN Meds: •  acetaminophen  •  HYDROmorphone  •  loperamide  •  oxyCODONE **OR** oxyCODONE  •  polyethylene glycol  •  senna-docusate sodium    Continuous Infusions:       Invasive Devices: Invasive Devices     Peripheral Intravenous Line  Duration           Peripheral IV 05/21/23 Distal;Right;Upper;Ventral (anterior) Arm 1 day    Peripheral IV 05/22/23 Right;Ventral (anterior) Forearm <1 day          Line  Duration           Hemodialysis AV Fistula  Left Upper arm -- days    Hemodialysis AV Fistula 05/19/23 Left Upper arm 3 days          Hemodialysis Catheter  Duration           HD Permanent Double Catheter 243 days                  LABORATORY:    Results from last 7 days   Lab Units 05/22/23  1128 05/22/23  0602 05/21/23  1223 05/21/23  0551 05/20/23  0441 05/19/23  1649 05/19/23  1641 05/19/23  1618   WBC Thousand/uL 3 24* 3 08*  --  4 50 8 10  --   --  15 04*   HEMOGLOBIN g/dL 6 6* 7 4* 9 5* 9 3* 8 7*  --   --  10 0*   I STAT HEMOGLOBIN g/dl  --   --   --   --   --  9 5*  --   --    HEMATOCRIT % 19 8* 24 1* 29 3* 29 1* 28 1*  --   --  30 9*   HEMATOCRIT, ISTAT %  --   --   --   --   --  28*  --   --    PLATELETS Thousands/uL 92* 96*  --  110* 156  --   --  162   POTASSIUM mmol/L  --  5 1  --  3 7 3 9  --  3 5  --    CHLORIDE mmol/L  --  105  --  104 102  --  102  --    CO2 mmol/L  --  25  --  30 30  --  24  --    CO2, I-STAT mmol/L  --   --   --   --   --  29  --   --    BUN mg/dL  --  16  --  9 20  --  15  --    CREATININE mg/dL  --  6 73*  --  4 60* 7 05*  --  5 80*  --    CALCIUM mg/dL  --  8 2*  --  8 0* 7 5*  --  8 1*  --    MAGNESIUM mg/dL  --   --   --   --   --   --  1 7  --    PHOSPHORUS mg/dL  --   --   --   --   --   --  2 5  --    GLUCOSE, ISTAT mg/dl  --   --   --   --   --  141*  --   --       rest all reviewed    RADIOLOGY:  CT abdomen pelvis wo contrast   Final Result by Edin Hancock MD (05/22 7763)      1  No evidence of bleeding in the abdomen or pelvis  2   Cholelithiasis with stable biliary ductal dilatation of uncertain etiology  3   Evidence of iron overload and splenomegaly, stable from prior MRI  "  4   Trace right pleural effusion  Workstation performed: [id number]         IR non-tunneled central line placement    (Results Pending)     Rest all reviewed    Portions of the record may have been created with voice recognition software  Occasional wrong word or \"sound a like\" substitutions may have occurred due to the inherent limitations of voice recognition software  Read the chart carefully and recognize, using context, where substitutions have occurred  If you have any questions, please contact the dictating provider      "

## 2023-05-22 NOTE — RESTORATIVE TECHNICIAN NOTE
Restorative Technician Note      Patient Name: Sharon Shaw     Note Type: Mobility  Patient Position Upon Consult: Supine  Activity Performed: Range of motion; Repositioned  Patient Position at End of Consult: All needs within reach;  Other (comment) (Sitting up in bed)

## 2023-05-23 ENCOUNTER — APPOINTMENT (INPATIENT)
Dept: DIALYSIS | Facility: HOSPITAL | Age: 68
End: 2023-05-23
Attending: INTERNAL MEDICINE

## 2023-05-23 LAB
ABO GROUP BLD BPU: NORMAL
ANION GAP SERPL CALCULATED.3IONS-SCNC: 3 MMOL/L (ref 4–13)
BPU ID: NORMAL
BUN SERPL-MCNC: 21 MG/DL (ref 5–25)
CALCIUM SERPL-MCNC: 8.1 MG/DL (ref 8.3–10.1)
CHLORIDE SERPL-SCNC: 104 MMOL/L (ref 96–108)
CO2 SERPL-SCNC: 28 MMOL/L (ref 21–32)
CREAT SERPL-MCNC: 8.4 MG/DL (ref 0.6–1.3)
CROSSMATCH: NORMAL
ERYTHROCYTE [DISTWIDTH] IN BLOOD BY AUTOMATED COUNT: 16.6 % (ref 11.6–15.1)
GFR SERPL CREATININE-BSD FRML MDRD: 5 ML/MIN/1.73SQ M
GLUCOSE SERPL-MCNC: 98 MG/DL (ref 65–140)
HCT VFR BLD AUTO: 26.6 % (ref 36.5–49.3)
HGB BLD-MCNC: 8.8 G/DL (ref 12–17)
INR PPP: 3.48 (ref 0.84–1.19)
MAGNESIUM SERPL-MCNC: 2.1 MG/DL (ref 1.6–2.6)
MCH RBC QN AUTO: 36.2 PG (ref 26.8–34.3)
MCHC RBC AUTO-ENTMCNC: 33.1 G/DL (ref 31.4–37.4)
MCV RBC AUTO: 110 FL (ref 82–98)
PHOSPHATE SERPL-MCNC: 3.8 MG/DL (ref 2.3–4.1)
PLATELET # BLD AUTO: 107 THOUSANDS/UL (ref 149–390)
PMV BLD AUTO: 10.7 FL (ref 8.9–12.7)
POTASSIUM SERPL-SCNC: 4.2 MMOL/L (ref 3.5–5.3)
PROTHROMBIN TIME: 35.2 SECONDS (ref 11.6–14.5)
RBC # BLD AUTO: 2.43 MILLION/UL (ref 3.88–5.62)
SODIUM SERPL-SCNC: 135 MMOL/L (ref 135–147)
UNIT DISPENSE STATUS: NORMAL
UNIT PRODUCT CODE: NORMAL
UNIT PRODUCT VOLUME: 350 ML
UNIT RH: NORMAL
WBC # BLD AUTO: 4.12 THOUSAND/UL (ref 4.31–10.16)

## 2023-05-23 RX ORDER — ONDANSETRON 2 MG/ML
4 INJECTION INTRAMUSCULAR; INTRAVENOUS EVERY 6 HOURS PRN
Status: DISCONTINUED | OUTPATIENT
Start: 2023-05-23 | End: 2023-05-24 | Stop reason: HOSPADM

## 2023-05-23 RX ADMIN — Medication 2.5 MG: at 11:54

## 2023-05-23 RX ADMIN — HYDROMORPHONE HYDROCHLORIDE 0.5 MG: 1 INJECTION, SOLUTION INTRAMUSCULAR; INTRAVENOUS; SUBCUTANEOUS at 13:30

## 2023-05-23 RX ADMIN — OXYCODONE HYDROCHLORIDE 5 MG: 5 TABLET ORAL at 22:06

## 2023-05-23 RX ADMIN — OXYCODONE HYDROCHLORIDE 5 MG: 5 TABLET ORAL at 18:12

## 2023-05-23 RX ADMIN — HYDROMORPHONE HYDROCHLORIDE 0.5 MG: 1 INJECTION, SOLUTION INTRAMUSCULAR; INTRAVENOUS; SUBCUTANEOUS at 00:50

## 2023-05-23 RX ADMIN — HYDROMORPHONE HYDROCHLORIDE 0.5 MG: 1 INJECTION, SOLUTION INTRAMUSCULAR; INTRAVENOUS; SUBCUTANEOUS at 06:24

## 2023-05-23 RX ADMIN — OXYCODONE HYDROCHLORIDE 5 MG: 5 TABLET ORAL at 05:15

## 2023-05-23 RX ADMIN — HYDROMORPHONE HYDROCHLORIDE 0.5 MG: 1 INJECTION, SOLUTION INTRAMUSCULAR; INTRAVENOUS; SUBCUTANEOUS at 19:53

## 2023-05-23 RX ADMIN — ATORVASTATIN CALCIUM 20 MG: 20 TABLET, FILM COATED ORAL at 21:01

## 2023-05-23 RX ADMIN — MIDODRINE HYDROCHLORIDE 10 MG: 5 TABLET ORAL at 17:20

## 2023-05-23 RX ADMIN — ONDANSETRON 4 MG: 2 INJECTION INTRAMUSCULAR; INTRAVENOUS at 11:36

## 2023-05-23 RX ADMIN — MIDODRINE HYDROCHLORIDE 10 MG: 5 TABLET ORAL at 06:24

## 2023-05-23 RX ADMIN — MIDODRINE HYDROCHLORIDE 10 MG: 5 TABLET ORAL at 11:41

## 2023-05-23 NOTE — ASSESSMENT & PLAN NOTE
Lab Results   Component Value Date    EGFR 5 05/23/2023    EGFR 7 05/22/2023    EGFR 12 05/21/2023    CREATININE 8 40 (H) 05/23/2023    CREATININE 6 73 (H) 05/22/2023    CREATININE 4 60 (H) 05/21/2023   gets usual dialysis TTS at [address]  access: Left-sided AV graft placed 5/19/2023    Currently has permacath   estimated dry weight: Outpatient dry weight 76 5 kg  last dialysis treatment on 5/23/2023

## 2023-05-23 NOTE — PROGRESS NOTES
"14[facility]  Progress Note  Name: Glenn Ashraf  MRN: [id number]  Unit/Bed#: [facility] 505-01 I Date of Admission: 5/19/2023   Date of Service: 5/23/2023 I Hospital Day: 3    Assessment/Plan   * Arteriovenous fistula [facility])  Assessment & Plan  70 M s/p left brachial axillary AVG, LUE 5/19     Plan:   Left AVG surgical site with thrill/bruit, edema as expected for procedure, Left ulnar doppler sig present, hand w/ M/S intact to exam prior to surgery  Continue HD via Permacath  ABLA related to chronic anemia and surgical blood loss, appropriate response to Tx  Hypotension improved, on midodrine, Luis F off  Continue coumadin- consider decreased dosage, monitor INR 3 48, Goal 2-3  [facility]           Subjective:  Pt feeling better today, hand improved following wrap removal yesterday    Vitals:  /69   Pulse 97   Temp 98 °F (36 7 °C)   Resp 17   Ht 5' 10\" (1 778 m)   Wt 77 1 kg (170 lb)   SpO2 94%   BMI 24 39 kg/m²     I/Os:  I/O last 3 completed shifts: In: 390 [Blood:390]  Out: -   No intake/output data recorded  Lab Results and Cultures:   Lab Results   Component Value Date    WBC 4 12 (L) 05/23/2023    HGB 8 8 (L) 05/23/2023    HCT 26 6 (L) 05/23/2023     (H) 05/23/2023     (L) 05/23/2023     Lab Results   Component Value Date    GLUCOSE 141 (H) 05/19/2023    CALCIUM 8 1 (L) 05/23/2023     04/11/2017    K 4 2 05/23/2023    CO2 28 05/23/2023     05/23/2023    BUN 21 05/23/2023    CREATININE 8 40 (H) 05/23/2023     Lab Results   Component Value Date    INR 3 48 (H) 05/23/2023    INR 2 58 (H) 05/22/2023    INR 1 85 (H) 05/21/2023    PROTIME 35 2 (H) 05/23/2023    PROTIME 28 0 (H) 05/22/2023    PROTIME 21 6 (H) 05/21/2023        Blood Culture:   Lab Results   Component Value Date    BLOODCX No Growth After 5 Days  10/19/2021    BLOODCX No Growth After 5 Days   10/19/2021   ,   Urinalysis: No results found for: " [place], SPECGRAV, PHUR, LEUKOCYTESUR, NITRITE, PROTEINUA, GLUCOSEU, KETONESU, BILIRUBINUR, BLOODU,   Urine Culture: No results found for: URINECX,   Wound Culure:   Lab Results   Component Value Date    WOUNDCULT 1+ Growth of Citrobacter freundii (A) 10/08/2021    WOUNDCULT 1+ Growth of Enterobacter cloacae complex (A) 10/08/2021       Medications:  Current Facility-Administered Medications   Medication Dose Route Frequency   • acetaminophen (TYLENOL) tablet 650 mg  650 mg Oral Q6H PRN   • aspirin (ECOTRIN LOW STRENGTH) EC tablet 81 mg  81 mg Oral Daily   • atorvastatin (LIPITOR) tablet 20 mg  20 mg Oral HS   • HYDROmorphone (DILAUDID) injection 0 5 mg  0 5 mg Intravenous Q3H PRN   • lidocaine (PF) (XYLOCAINE-MPF) 1 % injection 10 mL  10 mL Infiltration Once   • loperamide (IMODIUM) oral liquid 2 mg  2 mg Oral 4x Daily PRN   • midodrine (PROAMATINE) tablet 10 mg  10 mg Oral TID AC   • oxyCODONE (ROXICODONE) split tablet 2 5 mg  2 5 mg Oral Q4H PRN    Or   • oxyCODONE (ROXICODONE) IR tablet 5 mg  5 mg Oral Q4H PRN   • polyethylene glycol (MIRALAX) packet 17 g  17 g Oral Daily PRN   • senna-docusate sodium (SENOKOT S) 8 6-50 mg per tablet 1 tablet  1 tablet Oral BID PRN       Physical Exam:    General appearance: alert and oriented, in no acute distress  Lungs: clear to auscultation bilaterally  Heart: S1, S2 normal  Abdomen: soft, non-tender; bowel sounds normal; no masses,  no organomegaly  Extremities: Left arm edema, M/S grossly intact, +thrill/bruit    Wound/Incision:  LUE incision clean, dry, and intact    Pulse exam:  Radial:  Left[de-identified] 0  Ulnar:  Left[de-identified] doppler signal    Isaiah Root PA-C  5/23/2023

## 2023-05-23 NOTE — ASSESSMENT & PLAN NOTE
70 M s/p left brachial axillary AVG, CHARLES 5/19     Plan:   Left AVG surgical site with thrill/bruit, edema as expected for procedure, Left ulnar doppler sig present, hand w/ M/S intact to exam prior to surgery  Continue HD via Permacath  Swelling on the arm as expected as per vascular surgery will further follow-up outpatient with them  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  inr is 3 48 today  hold coumadin tonight and recheck inr tomorrow

## 2023-05-23 NOTE — PROGRESS NOTES
[facility]  Progress Note  Name: Aminah Brumfield  MRN: [id number]  Unit/Bed#: PPHP 443-21 I Date of Admission: 5/19/2023   Date of Service: 5/23/2023 I Hospital Day: 3    Assessment/Plan   * Arteriovenous fistula [facility])  Assessment & Plan  70 M s/p left brachial axillary AVG, LUE 5/19     Plan:   Left AVG surgical site with thrill/bruit, edema as expected for procedure, Left ulnar doppler sig present, hand w/ M/S intact to exam prior to surgery  Continue HD via Permacath  Swelling on the arm as expected as per vascular surgery will further follow-up outpatient with them  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  inr is 3 48 today  hold coumadin tonight and recheck inr tomorrow    Hypotension  Assessment & Plan  Off Pressors  Continue midodrine 10 mg 3 times daily  Blood pressures currently acceptable    End-stage renal disease on hemodialysis [facility])  Assessment & Plan  Lab Results   Component Value Date    EGFR 5 05/23/2023    EGFR 7 05/22/2023    EGFR 12 05/21/2023    CREATININE 8 40 (H) 05/23/2023    CREATININE 6 73 (H) 05/22/2023    CREATININE 4 60 (H) 05/21/2023   gets usual dialysis TTS at [address] [place] [address]  access: Left-sided AV graft placed 5/19/2023  Currently has permacath   estimated dry weight: Outpatient dry weight 76 5 kg  last dialysis treatment on 5/23/2023    Acute on chronic anemia  Assessment & Plan  Lab Results   Component Value Date    EGFR 5 05/23/2023    EGFR 7 05/22/2023    EGFR 12 05/21/2023    CREATININE 8 40 (H) 05/23/2023    CREATININE 6 73 (H) 05/22/2023    CREATININE 4 60 (H) 05/21/2023   hb is stable at 8 8         VTE Pharmacologic Prophylaxis:   Pharmacologic: Warfarin (Coumadin)  Mechanical VTE Prophylaxis in Place: Yes    Patient Centered Rounds: I have performed bedside rounds with nursing staff today      Discussions with Specialists or Other Care Team Provider: nato renal and vascular    Education and Discussions with Family / Patient: nato patient    Time Spent for Care: 30 minutes  More than 50% of total time spent on counseling and coordination of care as described above  Current Length of Stay: 3 day(s)    Current Patient Status: Inpatient   Certification Statement: The patient will continue to require additional inpatient hospital stay due to av fistula    Discharge Plan: dc home in 1-2 days    Code Status: Level 1 - Full Code      Subjective:   Is complaining of swelling in his arm where his fistula was placed otherwise he is feeling well  Objective:     Vitals:   Temp (24hrs), Av 8 °F (36 6 °C), Min:97 5 °F (36 4 °C), Max:98 1 °F (36 7 °C)    Temp:  [97 5 °F (36 4 °C)-98 1 °F (36 7 °C)] 98 °F (36 7 °C)  HR:  [53-97] 53  Resp:  [15-17] 16  BP: ()/(47-77) 121/68  SpO2:  [94 %-97 %] 94 %  Body mass index is 24 39 kg/m²  Input and Output Summary (last 24 hours): Intake/Output Summary (Last 24 hours) at 2023 1432  Last data filed at 2023 1355  Gross per 24 hour   Intake 908 ml   Output 0 ml   Net 908 ml       Physical Exam:     Physical Exam  Vitals and nursing note reviewed  Constitutional:       Appearance: Normal appearance  HENT:      Head: Normocephalic and atraumatic  Right Ear: External ear normal       Left Ear: External ear normal       Nose: Nose normal       Mouth/Throat:      Pharynx: Oropharynx is clear  Cardiovascular:      Rate and Rhythm: Normal rate and regular rhythm  Heart sounds: Normal heart sounds  Pulmonary:      Effort: Pulmonary effort is normal       Breath sounds: Normal breath sounds  Abdominal:      General: Bowel sounds are normal       Palpations: Abdomen is soft  Tenderness: There is no abdominal tenderness  Musculoskeletal:         General: Normal range of motion  Cervical back: Normal range of motion and neck supple  Comments: swelling of the left arm   Skin:     General: Skin is warm and dry        Capillary Refill: Capillary refill takes less than 2 seconds  Neurological:      General: No focal deficit present  Mental Status: He is alert and oriented to person, place, and time  Psychiatric:         Mood and Affect: Mood normal            Additional Data:     Labs:    Results from last 7 days   Lab Units 05/23/23  0514   WBC Thousand/uL 4 12*   HEMOGLOBIN g/dL 8 8*   HEMATOCRIT % 26 6*   PLATELETS Thousands/uL 107*     Results from last 7 days   Lab Units 05/23/23  0514 05/19/23  1649 05/19/23  1641   SODIUM mmol/L 135   < > 136   POTASSIUM mmol/L 4 2   < > 3 5   CHLORIDE mmol/L 104   < > 102   CO2 mmol/L 28   < > 24   CO2, I-STAT   --    < >  --    BUN mg/dL 21   < > 15   CREATININE mg/dL 8 40*   < > 5 80*   ANION GAP mmol/L 3*   < > 10   CALCIUM mg/dL 8 1*   < > 8 1*   ALBUMIN g/dL  --   --  2 8*   TOTAL BILIRUBIN mg/dL  --   --  0 77   ALK PHOS U/L  --   --  103   ALT U/L  --   --  10*   AST U/L  --   --  17   GLUCOSE RANDOM mg/dL 98   < > 148*    < > = values in this interval not displayed  Results from last 7 days   Lab Units 05/23/23  0514   INR  3 48*     Results from last 7 days   Lab Units 05/21/23  1514 05/19/23  1556 05/19/23  1036   POC GLUCOSE mg/dl 126 134 93                   * I Have Reviewed All Lab Data Listed Above  * Additional Pertinent Lab Tests Reviewed:  Lois 66 Admission Reviewed    Imaging:    Imaging Reports Reviewed Today Include: CT abdomen pelvis  Imaging Personally Reviewed by Myself Includes: None    Recent Cultures (last 7 days):           Last 24 Hours Medication List:   Current Facility-Administered Medications   Medication Dose Route Frequency Provider Last Rate   • acetaminophen  650 mg Oral Q6H PRN Sintia Malagon V CRNP     • aspirin  81 mg Oral Daily GOOD RiveroNP     • atorvastatin  20 mg Oral HS Sintia Spiroyesica V CRNP     • HYDROmorphone  0 5 mg Intravenous Q3H PRN Sintia Malagon V CRNP     • lidocaine (PF)  10 mL Infiltration Once [employer] [facility] TESSY Phillips     • loperamide  2 mg Oral 4x Daily PRN TESSY Rivero     • midodrine  10 mg Oral TID AC TESSY Rivero     • ondansetron  4 mg Intravenous Q6H PRN Ana Monroy MD     • oxyCODONE  2 5 mg Oral Q4H PRN TESSY Rivero      Or   • oxyCODONE  5 mg Oral Q4H PRN TESSY Rivero     • polyethylene glycol  17 g Oral Daily PRN TESSY Rivero     • senna-docusate sodium  1 tablet Oral BID PRN TESSY Flores          Today, Patient Was Seen By: Ana Monroy MD    ** Please Note: Dictation voice to text software may have been used in the creation of this document   **

## 2023-05-23 NOTE — HEMODIALYSIS
Post-Dialysis RN Treatment Note    Blood Pressure:  Pre 103/62 mm/Hg  Post 96/67 mmHg   EDW  76 5 kg    Weight:  Pre 79 kg   Post 78 kg   Mode of weight measurement: Bed Scale   Volume Removed  1000 ml    Treatment duration 180 minutes    NS given  No    Treatment shortened?  No   Medications given during Rx None Reported   Estimated Kt/V  1 73    Access type: Permacath/TDC   Access Issues: No    Report called to primary nurse   Yes Vania ROBLES RN

## 2023-05-23 NOTE — PROGRESS NOTES
Follow up Consultation    Nephrology   Izzy Bishop 70 y o  male MRN: [id number]  Unit/Bed#: [facility] 505-01 Encounter: [id number]      Physician Requesting Consult: Akanksha Lechuga MD        ASSESSMENT/PLAN:   79-year-old male with multiple comorbidities including hypertension, diabetes, hyperlipidemia, DVT, CAD and ESRD TTS admitted for elective AV graft placement  Nephrology consulted for dialysis management  ESRD:   gets usual dialysis TTS at [address] [place]  access: Left-sided AV graft placed 5/19/2023  Currently has permacath   estimated dry weight: Outpatient dry weight 76 5 kg  last dialysis treatment on 5/23/2023  next dialysis treatment on 5/25/2023  No acute indication for dialysis today  We will aim for UF close to 2 kg with treatment tomorrow as tolerable  check BMP, phosphorus with dialysis  avoid nephrotoxins, adjust meds to appropriate GFR  Stable for discharge from renal standpoint if cleared per vascular surgery    Acid-base electrolytes:  Hyponatremia:  most recent sodium at 135 mEq  Hyperkalemia borderline: Most recent potassium 4 2 mEq resolved, recheck BMP  sodium, phosphorus, potassium, acidosis to be corrected with dialysis    H&H/anemia:  most recent hemoglobin at 8 8 g/dL  maintain hemoglobin 10-12 grams/deciliter  Recommendations: Not on ALICE as an outpatient, improved status post PRBC 5/22    Blood pressure/hypotension:   Current medications: continue midodrine 10 mg p o  3 times daily  Recommendations: Improved and stable  No longer on vasopressors    Bone mineral disease/secondary hyperparathyroidism of renal origin:   Recommendations: continue calcitriol 0 75 mcg with dialysis  Follow-up intact PTH as an outpatient    Other medical problems:  AV graft:  Management per primary team  Follow-up with vascular status post graft placement on 5/19/2023  Has history of prior left-sided fistula thrombosed  Does have significant edema left arm, as per vascular that is expected    No further "intervention needed        Thanks for the consult  Will continue to follow  Please call with questions  Above-mentioned orders and Plan in terms of dialysis tomorrow and will aim for increase UF stable for discharge from renal standpoint medically cleared were discussed with the team in depth and they agree    MERITER MD ANDREY, [facility] Center[facility], 2023, 12:53 PM                    Objective :  Patient seen and examined in his room no overnight events status post hemodialysis yesterday 1 L UF  Still persistent upper extremity edema  PHYSICAL EXAM  /62 (BP Location: Right arm)   Pulse 60   Temp 98 °F (36 7 °C)   Resp 16   Ht 5' 10\" (1 778 m)   Wt 77 1 kg (170 lb)   SpO2 94%   BMI 24 39 kg/m²   Temp (24hrs), Av 8 °F (36 6 °C), Min:97 5 °F (36 4 °C), Max:98 1 °F (36 7 °C)        Intake/Output Summary (Last 24 hours) at 2023 1450  Last data filed at 2023 1355  Gross per 24 hour   Intake 908 ml   Output 0 ml   Net 908 ml       I/O last 24 hours: In: 712 [P O :318; I V :200; Blood:390]  Out: 0       Current Weight: Weight - Scale: 77 1 kg (170 lb)  First Weight: Weight - Scale: 77 1 kg (170 lb)  Physical Exam  Vitals and nursing note reviewed  Constitutional:       General: He is not in acute distress  Appearance: Normal appearance  He is normal weight  He is not ill-appearing, toxic-appearing or diaphoretic  HENT:      Head: Normocephalic and atraumatic  Mouth/Throat:      Pharynx: No oropharyngeal exudate  Eyes:      General: No scleral icterus  Conjunctiva/sclera: Conjunctivae normal    Neck:      Comments: IJ permacath  Cardiovascular:      Rate and Rhythm: Normal rate  Heart sounds: Normal heart sounds  No friction rub  Pulmonary:      Effort: No respiratory distress  Breath sounds: Normal breath sounds  No stridor  Abdominal:      General: There is no distension  Palpations: Abdomen is soft  There is no mass  Tenderness:  There is no abdominal " tenderness  Musculoskeletal:         General: Swelling present  Cervical back: Normal range of motion  No rigidity  Comments: Left arm AV graft, left arm 2+ edema  Palpable thrill, no lower extremity edema   Skin:     General: Skin is warm  Coloration: Skin is not jaundiced  Neurological:      General: No focal deficit present  Mental Status: He is alert and oriented to person, place, and time  Psychiatric:         Mood and Affect: Mood normal          Behavior: Behavior normal              Review of Systems   Constitutional: Negative for chills and fatigue  HENT: Negative for congestion  Respiratory: Negative for cough and shortness of breath  Cardiovascular: Negative for leg swelling  Gastrointestinal: Negative for abdominal pain and diarrhea  Musculoskeletal: Negative for back pain  Left arm edema   Neurological: Negative for headaches  Psychiatric/Behavioral: Negative for agitation  All other systems reviewed and are negative        Scheduled Meds:  Current Facility-Administered Medications   Medication Dose Route Frequency Provider Last Rate   • acetaminophen  650 mg Oral Q6H PRN TESSY Rivero     • aspirin  81 mg Oral Daily TESSY Rivero     • atorvastatin  20 mg Oral HS TESSY Rivero     • HYDROmorphone  0 5 mg Intravenous Q3H PRN TESSY Rivero     • lidocaine (PF)  10 mL Infiltration [employer] TESSY LONG     • loperamide  2 mg Oral 4x Daily PRN TESSY Rivero     • midodrine  10 mg Oral TID AC TESSY Rivero     • ondansetron  4 mg Intravenous Q6H PRN Cristo Castro MD     • oxyCODONE  2 5 mg Oral Q4H PRN TESSY Rivero      Or   • oxyCODONE  5 mg Oral Q4H PRN TESSY Rivero     • polyethylene glycol  17 g Oral Daily PRN TESSY Rivero     • senna-docusate sodium  1 tablet Oral BID PRN [email or web address] TESSY LONG         PRN Meds: •  acetaminophen  •  HYDROmorphone  •  loperamide  •  ondansetron  •  oxyCODONE **OR** oxyCODONE  •  polyethylene glycol  •  senna-docusate sodium    Continuous Infusions:       Invasive Devices:    Invasive Devices     Peripheral Intravenous Line  Duration           Peripheral IV 05/21/23 Distal;Right;Upper;Ventral (anterior) Arm 2 days    Peripheral IV 05/22/23 Right;Ventral (anterior) Forearm 1 day          Line  Duration           Hemodialysis AV Fistula  Left Upper arm -- days    Hemodialysis AV Fistula 05/19/23 Left Upper arm 4 days          Hemodialysis Catheter  Duration           HD Permanent Double Catheter 243 days                  LABORATORY:    Results from last 7 days   Lab Units 05/23/23  0514 05/22/23  1910 05/22/23  1128 05/22/23  0602 05/21/23  1223 05/21/23  0551 05/20/23  0441 05/19/23  1649 05/19/23  1641 05/19/23  1618   WBC Thousand/uL 4 12*  --  3 24* 3 08*  --  4 50 8 10  --   --  15 04*   HEMOGLOBIN g/dL 8 8* 9 9* 6 6* 7 4* 9 5* 9 3* 8 7*  --   --  10 0*   I STAT HEMOGLOBIN g/dl  --   --   --   --   --   --   --  9 5*  --   --    HEMATOCRIT % 26 6*  --  19 8* 24 1* 29 3* 29 1* 28 1*  --   --  30 9*   HEMATOCRIT, ISTAT %  --   --   --   --   --   --   --  28*  --   --    PLATELETS Thousands/uL 107*  --  92* 96*  --  110* 156  --   --  162   POTASSIUM mmol/L 4 2  --   --  5 1  --  3 7 3 9  --  3 5  --    CHLORIDE mmol/L 104  --   --  105  --  104 102  --  102  --    CO2 mmol/L 28  --   --  25  --  30 30  --  24  --    CO2, I-STAT mmol/L  --   --   --   --   --   --   --  29  --   --    BUN mg/dL 21  --   --  16  --  9 20  --  15  --    CREATININE mg/dL 8 40*  --   --  6 73*  --  4 60* 7 05*  --  5 80*  --    CALCIUM mg/dL 8 1*  --   --  8 2*  --  8 0* 7 5*  --  8 1*  --    MAGNESIUM mg/dL 2 1  --   --   --   --   --   --   --  1 7  --    PHOSPHORUS mg/dL 3 8  --   --   --   --   --   --   --  2 5  --    GLUCOSE, ISTAT mg/dl  --   --   --   --   --   --   --  141*  --   --       rest all "reviewed    RADIOLOGY:  CT abdomen pelvis wo contrast   Final Result by Jeana Christine MD (05/22 1994)      1  No evidence of bleeding in the abdomen or pelvis  2   Cholelithiasis with stable biliary ductal dilatation of uncertain etiology  3   Evidence of iron overload and splenomegaly, stable from prior MRI  4   Trace right pleural effusion  Workstation performed: [id number]         IR non-tunneled central line placement    (Results Pending)     Rest all reviewed    Portions of the record may have been created with voice recognition software  Occasional wrong word or \"sound a like\" substitutions may have occurred due to the inherent limitations of voice recognition software  Read the chart carefully and recognize, using context, where substitutions have occurred  If you have any questions, please contact the dictating provider      "

## 2023-05-23 NOTE — PLAN OF CARE
Pt on HD for 3 hours with a UF goal of 1 5 L per patient  Pt on a 3 k 2 5 deandre bath for a potassium of 4 2 on 05/23/23    Problem: METABOLIC, FLUID AND ELECTROLYTES - ADULT  Goal: Electrolytes maintained within normal limits  Description: INTERVENTIONS:  - Monitor labs and assess patient for signs and symptoms of electrolyte imbalances  - Administer electrolyte replacement as ordered  - Monitor response to electrolyte replacements, including repeat lab results as appropriate  - Instruct patient on fluid and nutrition as appropriate  Outcome: Progressing  Goal: Fluid balance maintained  Description: INTERVENTIONS:  - Monitor labs   - Monitor I/O and WT  - Instruct patient on fluid and nutrition as appropriate  - Assess for signs & symptoms of volume excess or deficit  Outcome: Progressing

## 2023-05-23 NOTE — ASSESSMENT & PLAN NOTE
70 M s/p left brachial axillary AVG, CHARLES 5/19     Plan:   Left AVG surgical site with thrill/bruit, edema as expected for procedure, Left ulnar doppler sig present, hand w/ M/S intact to exam prior to surgery  Continue HD via Permacath  ABLA related to chronic anemia and surgical blood loss, appropriate response to Tx  Hypotension improved, on midodrine, Luis F off  Continue coumadin- consider decreased dosage, monitor INR 3 48, Goal 2-3  Outpatient follow[facility]up in the [facility]

## 2023-05-23 NOTE — ASSESSMENT & PLAN NOTE
Lab Results   Component Value Date    EGFR 5 05/23/2023    EGFR 7 05/22/2023    EGFR 12 05/21/2023    CREATININE 8 40 (H) 05/23/2023    CREATININE 6 73 (H) 05/22/2023    CREATININE 4 60 (H) 05/21/2023   hb is stable at 8 8

## 2023-05-23 NOTE — DISCHARGE INSTR - AVS FIRST PAGE
PLEASE TAKE ELIQUIS 2 5 MG TWICE DAILY  PLEASE BREAK THE 5 MG PILL IN HALF AND TAKE 1/2 PILL TWICE DAILY  DISCHARGE INSTRUCTIONS  DIALYSIS GRAFT SURGERY    ACTIVITY:  Limit use of the operated arm to what is necessary for the first day after surgery  On the second day after surgery, you may start to increase use of your arm as tolerated  Avoid heavy lifting (no more than 15 lbs) for the first one week  You should start to exercise your hand on the side of the graft by squeezing a stress ball or a rolled-up sock  This increases blood flow in your graft and arm so your it will function better  Feel for a thrill every day  The thrill is the vibration or pulse you feel over the graft that means the blood is flowing through it  If you cannot feel a thrill, call our office ([phone])  DIET:   Resume your normal diet  Good nutrition is important for healing of your incision  DRESSING:   You may have surgical glue at your surgical site  There are stitches present under the skin which will absorb on their own  The glue is used to cover the access, assist in closure, and prevent contamination  This adhesive will darken and peel away on its own within one to two weeks  Do not pick at it  If you have a dressing over your surgical site, remove this on the second day after surgery  INCISION:   If you do not have a dialysis catheter in place, you may shower and get your incision wet  Wash incision daily with soap and water, but do not rub or scrub the incision; rinse thoroughly and pat dry  You may have stitches or staples to close your incision and it is okay for these to get wet  Do not bathe in a tub or swim for the first 4 week following surgery or if you have any open wounds  It is normal to have mild swelling or discoloration around the incision  If increasing redness or pain develops, call our office immediately  Numbness in the region of the incision may occur following the surgery  This normally improves over six to twelve months  If you have numbness or pain in your hand, please call our office immediately  DO NOT put any powders, creams, ointments, or lotions on your incision  ARM SWELLING:    Most patients have some noticeable arm swelling after surgery  This usually disappears within a few weeks  If swelling is present, elevate the arm whenever possible  RESTRICTIONS:   Do NOT have blood draws, IV's, or blood pressures performed on the operated arm  GRAFT USE:    Your graft will be used for dialysis after the pain and swelling has diminished  This takes at least two weeks after graft placement  If you are using a catheter for dialysis, this will not be removed until after your graft is being used for dialysis without any issues  FOLLOW UP APPOINTMENTS:  Making and keeping follow up appointments and ultrasound tests are important to your recovery  If you have difficulty making it to or keeping your follow up appointments, call the office  If you have increased pain, fever >101 5, increased drainage, redness or a bad smell at your surgery site, new coldness/numbness of your arm or leg, please call us immediately and GO directly to the ER  Appt w/ Dr Ban Caballero: 6/7/2023 at 1:30pm, [facility] office      PLEASE [address]  [phone]  [phone]  [address] [place] [address] , [facility] 206, [place], [address] [place] [address] Rd  [address], [address], [place], [address] [facility] [address]  [address]   [address] [facility], [address] [place] [address]  [address] [place] [address], [facility], [facility], [address] [facility]  Ema Pina [address], [address]st Floor, Carla Schmidt 34  [address], [address] [facility], [address], [facility] 77, 830 [facility]  1307 [facility], 86[address] [facility], [address]  One [facility], [address] [facility] [address], [address] [facility], Susan Castillo 6  [address] [facility], [place], Caro Adams, [address]

## 2023-05-24 VITALS
OXYGEN SATURATION: 95 % | HEART RATE: 67 BPM | WEIGHT: 170 LBS | RESPIRATION RATE: 16 BRPM | SYSTOLIC BLOOD PRESSURE: 105 MMHG | BODY MASS INDEX: 24.34 KG/M2 | HEIGHT: 70 IN | TEMPERATURE: 98 F | DIASTOLIC BLOOD PRESSURE: 56 MMHG

## 2023-05-24 LAB
ATRIAL RATE: 57 BPM
ATRIAL RATE: 96 BPM
INR PPP: 2.45 (ref 0.84–1.19)
P AXIS: 51 DEGREES
P AXIS: 71 DEGREES
PR INTERVAL: 171 MS
PR INTERVAL: 176 MS
PROTHROMBIN TIME: 26.8 SECONDS (ref 11.6–14.5)
QRS AXIS: -6 DEGREES
QRS AXIS: 1 DEGREES
QRSD INTERVAL: 100 MS
QRSD INTERVAL: 108 MS
QT INTERVAL: 433 MS
QT INTERVAL: 486 MS
QTC INTERVAL: 473 MS
QTC INTERVAL: 548 MS
T WAVE AXIS: 78 DEGREES
T WAVE AXIS: 80 DEGREES
VENTRICULAR RATE: 57 BPM
VENTRICULAR RATE: 96 BPM

## 2023-05-24 RX ORDER — OXYCODONE HYDROCHLORIDE 5 MG/1
5 TABLET ORAL EVERY 8 HOURS PRN
Qty: 10 TABLET | Refills: 0 | Status: SHIPPED | OUTPATIENT
Start: 2023-05-24 | End: 2023-05-27

## 2023-05-24 RX ORDER — MIDODRINE HYDROCHLORIDE 10 MG/1
10 TABLET ORAL
Qty: 90 TABLET | Refills: 0 | Status: ON HOLD | OUTPATIENT
Start: 2023-05-24 | End: 2023-06-23

## 2023-05-24 RX ADMIN — HYDROMORPHONE HYDROCHLORIDE 0.5 MG: 1 INJECTION, SOLUTION INTRAMUSCULAR; INTRAVENOUS; SUBCUTANEOUS at 01:16

## 2023-05-24 RX ADMIN — OXYCODONE HYDROCHLORIDE 5 MG: 5 TABLET ORAL at 08:39

## 2023-05-24 RX ADMIN — MIDODRINE HYDROCHLORIDE 10 MG: 5 TABLET ORAL at 11:59

## 2023-05-24 RX ADMIN — OXYCODONE HYDROCHLORIDE 5 MG: 5 TABLET ORAL at 13:24

## 2023-05-24 RX ADMIN — OXYCODONE HYDROCHLORIDE 5 MG: 5 TABLET ORAL at 17:33

## 2023-05-24 RX ADMIN — HYDROMORPHONE HYDROCHLORIDE 0.5 MG: 1 INJECTION, SOLUTION INTRAMUSCULAR; INTRAVENOUS; SUBCUTANEOUS at 04:40

## 2023-05-24 RX ADMIN — MIDODRINE HYDROCHLORIDE 10 MG: 5 TABLET ORAL at 17:33

## 2023-05-24 RX ADMIN — MIDODRINE HYDROCHLORIDE 10 MG: 5 TABLET ORAL at 06:25

## 2023-05-24 RX ADMIN — OXYCODONE HYDROCHLORIDE 5 MG: 5 TABLET ORAL at 03:31

## 2023-05-24 RX ADMIN — ASPIRIN 81 MG: 81 TABLET, COATED ORAL at 08:40

## 2023-05-24 NOTE — PHYSICAL THERAPY NOTE
PHYSICAL THERAPY NOTE          Patient Name: Una Rose  NBBLN'[id number] Date: 5/24/2023 05/24/23 0943   PT Last Visit   PT Visit Date 05/24/23   Note Type   Note Type Treatment   Pain Assessment   Pain Assessment Tool 0-10   Pain Score 10 - Worst Possible Pain   Pain Location/Orientation Orientation: Left; Location: Arm   Pain Onset/Description Onset: Ongoing;Frequency: Constant/Continuous   Patient's Stated Pain Goal No pain   Hospital Pain Intervention(s) Repositioned; Ambulation/increased activity; Emotional support; Rest   Restrictions/Precautions   Weight Bearing Precautions Per Order No   Other Precautions Multiple lines;Telemetry; Fall Risk;Pain   General   Chart Reviewed Yes   Family/Caregiver Present No   Cognition   Overall Cognitive Status WFL   Arousal/Participation Alert   Attention Within functional limits   Orientation Level Oriented X4   Memory Within functional limits   Following Commands Follows all commands and directions without difficulty   Comments Patient pleasant and cooperative   Subjective   Subjective Patient agreeable to therapy   Bed Mobility   Supine to Sit 4  Minimal assistance   Additional items Assist x 1;HOB elevated;Verbal cues; Increased time required   Additional Comments Patient in bed on arrival, but left in bedside chair following activity   Transfers   Sit to Stand 5  Supervision   Additional items Assist x 1;Verbal cues   Stand to Sit 5  Supervision   Additional items Assist x 1;Verbal cues   Additional Comments at [facility] [place], [place]   Ambulation/Elevation   Gait pattern Excessively slow; Short stride; Foward flexed   Gait Assistance 5  Supervision   Additional items Assist x 1;Verbal cues   Assistive Device Rolling walker   Distance 20'   Ambulation/Elevation Additional Comments limited by LUE pain and fatigue   Balance   Static Sitting Fair +   Dynamic Sitting Fair   Static Standing Fair -   Dynamic Standing [address], & 5 -   Activity Tolerance   Activity Tolerance Patient limited by fatigue;Patient limited by pain   Nurse Made Aware RN cleared patient for therapy   Exercises   Hip Flexion Sitting;10 reps;AROM; Bilateral   Knee AROM Long Arc Quad Sitting;10 reps;AROM; Bilateral   Ankle Pumps Sitting;10 reps;AROM; Bilateral   Assessment   Prognosis Good   Problem List Decreased strength;Decreased endurance; Impaired balance;Decreased mobility;Pain   Assessment Patient received in bed  Patient agreeable to therapy  Patient performs bed mobility with min A  Patient performs functional transfers with supervision at [facility] [place], [place]  Patient performs ambulation with Supervision using RW  Patient educated on BLE therapeutic exercise, but patient states fatigue and does not perform all activity  Patient left in bedside chair with all needs met and call bell/personal items within reach  The patient's AM-PAC Basic Mobility Inpatient Short Form Raw Score is 18 showing further need for skilled PT services in order to improve functional mobility, decrease need for assistance, and return to PLOF  A Raw score of greater than or equal to 16 suggests the patient may benefit from discharge to home  PT continues to recommend home with OPPT on d/c  Will continue to follow as able  Barriers to Discharge Inaccessible home environment;Decreased caregiver support   Goals   Patient Goals to go home   PT Treatment Day 1   Plan   Treatment/Interventions Functional transfer training;LE strengthening/ROM; Elevations; Therapeutic exercise; Endurance training;Patient/family training;Equipment eval/education; Bed mobility;Gait training;Spoke to case management;Spoke to nursing;OT   Progress Progressing toward goals   PT Frequency 2-3x/wk   Recommendation   PT Discharge Recommendation Home with outpatient rehabilitation   Equipment Recommended [place] walker   AM-PAC Basic Mobility Inpatient   Turning in Flat Bed Without Bedrails 3   Lying on Back to Sitting on Edge of Flat Bed Without Bedrails 3   Moving Bed to Chair 3   Standing Up From Chair Using Arms 3   Walk in Room 3   Climb 3-5 Stairs With Railing 3   Basic Mobility Inpatient Raw Score 18   Basic Mobility Standardized Score 41 05   Highest Level Of Mobility   -M Goal 6: Walk 10 steps or more   -HLM Achieved 6: Walk 10 steps or more   End of Consult   Patient Position at End of Consult Bedside chair; All needs within reach     Urbano Rosario, PT, DPT

## 2023-05-24 NOTE — PLAN OF CARE
Problem: Prexisting or High Potential for Compromised Skin Integrity  Goal: Skin integrity is maintained or improved  Description: INTERVENTIONS:  - Identify patients at risk for skin breakdown  - Assess and monitor skin integrity  - Assess and monitor nutrition and hydration status  - Monitor labs   - Assess for incontinence   - Turn and reposition patient  - Assist with mobility/ambulation  - Relieve pressure over bony prominences  - Avoid friction and shearing  - Provide appropriate hygiene as needed including keeping skin clean and dry  - Evaluate need for skin moisturizer/barrier cream  - Collaborate with interdisciplinary team   - Patient/family teaching  - Consider wound care consult   Outcome: Progressing     Problem: MOBILITY - ADULT  Goal: Maintain or return to baseline ADL function  Description: INTERVENTIONS:  -  Assess patient's ability to carry out ADLs; assess patient's baseline for ADL function and identify physical deficits which impact ability to perform ADLs (bathing, care of mouth/teeth, toileting, grooming, dressing, etc )  - Assess/evaluate cause of self-care deficits   - Assess range of motion  - Assess patient's mobility; develop plan if impaired  - Assess patient's need for assistive devices and provide as appropriate  - Encourage maximum independence but intervene and supervise when necessary  - Involve family in performance of ADLs  - Assess for home care needs following discharge   - Consider OT consult to assist with ADL evaluation and planning for discharge  - Provide patient education as appropriate  Outcome: Progressing  Goal: Maintains/Returns to pre admission functional level  Description: INTERVENTIONS:  - Perform BMAT or MOVE assessment daily    - Set and communicate daily mobility goal to care team and patient/family/caregiver  - Collaborate with rehabilitation services on mobility goals if consulted  - Perform Range of Motion *** times a day    - Reposition patient every *** hours   - Dangle patient *** times a day  - Stand patient *** times a day  - Ambulate patient *** times a day  - Out of bed to chair *** times a day   - Out of bed for meals *** times a day  - Out of bed for toileting  - Record patient progress and toleration of activity level   Outcome: Progressing     Problem: METABOLIC, FLUID AND ELECTROLYTES - ADULT  Goal: Electrolytes maintained within normal limits  Description: INTERVENTIONS:  - Monitor labs and assess patient for signs and symptoms of electrolyte imbalances  - Administer electrolyte replacement as ordered  - Monitor response to electrolyte replacements, including repeat lab results as appropriate  - Instruct patient on fluid and nutrition as appropriate  Outcome: Progressing  Goal: Fluid balance maintained  Description: INTERVENTIONS:  - Monitor labs   - Monitor I/O and WT  - Instruct patient on fluid and nutrition as appropriate  - Assess for signs & symptoms of volume excess or deficit  Outcome: Progressing

## 2023-05-24 NOTE — ASSESSMENT & PLAN NOTE
Lab Results   Component Value Date    CREATININE 8 40 (H) 05/23/2023    CREATININE 6 73 (H) 05/22/2023    CREATININE 4 60 (H) 05/21/2023    EGFR 5 05/23/2023    EGFR 7 05/22/2023    EGFR 12 05/21/2023   hb is stable at 8 8
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
## 2023-05-24 NOTE — ASSESSMENT & PLAN NOTE
Lab Results   Component Value Date    CREATININE 8 40 (H) 05/23/2023    CREATININE 6 73 (H) 05/22/2023    CREATININE 4 60 (H) 05/21/2023    EGFR 5 05/23/2023    EGFR 7 05/22/2023    EGFR 12 05/21/2023   gets usual dialysis TTS at [facility]  access: Left-sided AV graft placed 5/19/2023    Currently has permacath   estimated dry weight: Outpatient dry weight 76 5 kg  last dialysis treatment on 5/23/2023

## 2023-05-24 NOTE — PLAN OF CARE
Problem: PHYSICAL THERAPY ADULT  Goal: Performs mobility at highest level of function for planned discharge setting  See evaluation for individualized goals  Description: Treatment/Interventions: Functional transfer training, LE strengthening/ROM, Therapeutic exercise, Endurance training, Patient/family training, Equipment eval/education, Bed mobility, Gait training, Elevations, Spoke to nursing  Equipment Recommended: Ángel Smith (pt owns)       See flowsheet documentation for full assessment, interventions and recommendations  Note: Prognosis: Good  Problem List: Decreased strength, Decreased endurance, Impaired balance, Decreased mobility, Pain  Assessment: Patient received in bed  Patient agreeable to therapy  Patient performs bed mobility with min A  Patient performs functional transfers with supervision at [facility]  Patient performs ambulation with Supervision using RW  Patient educated on BLE therapeutic exercise, but patient states fatigue and does not perform all activity  Patient left in bedside chair with all needs met and call bell/personal items within reach  The patient's AM-PAC Basic Mobility Inpatient Short Form Raw Score is 18 showing further need for skilled PT services in order to improve functional mobility, decrease need for assistance, and return to PLOF  A Raw score of greater than or equal to 16 suggests the patient may benefit from discharge to home  PT continues to recommend home with OPPT on d/c  Will continue to follow as able  Barriers to Discharge: Inaccessible home environment, Decreased caregiver support     PT Discharge Recommendation: Home with outpatient rehabilitation    See flowsheet documentation for full assessment

## 2023-05-24 NOTE — ASSESSMENT & PLAN NOTE
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

## 2023-05-25 LAB — MRSA NOSE QL CULT: NORMAL

## 2023-05-26 ENCOUNTER — TELEPHONE (OUTPATIENT)
Dept: VASCULAR SURGERY | Facility: CLINIC | Age: 68
End: 2023-05-26

## 2023-05-26 NOTE — TELEPHONE ENCOUNTER
Vascular Nurse [address] Op Call    Procedure: LEFT BRACHIAL-AXILLARY AVG    Date of Procedure:  5/19/23    Surgeon:    John Madrigal MD - Primary     * Ruth Lyle MD - Fellow       Discharge Date:  5/24/23    Discharge Disposition:  Home       Painful tingling or numbness in your fingers?: Yes, same as prior to surgery    Paleness/Coolness in hands/fingers?: Yes, same as prior to surgery    Redness, swelling or pus from your wound?: No    Bleeding?: No    Thrill present?: Yes    Anticoagulation pt was discharged on post op?: Aspirin and Apixaban (Eliquis)    Statin pt was discharged on post op?:  Lipitor (atorvastatin)    Fever/chills?: No    Uncontrolled Pain?: No      Reviewed discharge instructions and incision care with patient  Dialysis Days and Location:  [facility] at [address] [place] [address] OFFICE VISIT:  6/7/23 at 1:30 pm with Dr Evan Avila at [facility] Confirmed?: Yes      Any Questions or Concerns? Patient stated that he is doing okay since discharge  He stated that he has swelling in his arm  He stated that he is elevating his arm  Reviewed incision care with him - wash daily with soap and water  All questions answered  No concerns expressed at this time

## 2023-05-28 ENCOUNTER — HOSPITAL ENCOUNTER (EMERGENCY)
Facility: HOSPITAL | Age: 68
Discharge: HOME | End: 2023-05-28
Attending: EMERGENCY MEDICINE | Admitting: EMERGENCY MEDICINE
Payer: MEDICARE

## 2023-05-28 VITALS
TEMPERATURE: 97.7 F | DIASTOLIC BLOOD PRESSURE: 53 MMHG | WEIGHT: 172.62 LBS | OXYGEN SATURATION: 100 % | SYSTOLIC BLOOD PRESSURE: 88 MMHG | HEIGHT: 70 IN | RESPIRATION RATE: 21 BRPM | BODY MASS INDEX: 24.71 KG/M2 | HEART RATE: 62 BPM

## 2023-05-28 DIAGNOSIS — Z48.89 ENCOUNTER FOR POST SURGICAL WOUND CHECK: Primary | ICD-10-CM

## 2023-05-28 PROCEDURE — 99284 EMERGENCY DEPT VISIT MOD MDM: CPT

## 2023-05-28 NOTE — ED PROVIDER NOTES
History  Chief Complaint   Patient presents with   • Wound Check     Pt bleeding from fistula and surgical wound since Friday 9 days ago     70yoM POD 9 AV graft CHARLES presents with oozing of blood from multiple wounds on the arms at sites of IV sticks / dressings and also from edge of surgical wound  No other concerns  On ASA,plavix and eliquis as recommended by his vascular surgeon  Prior to Admission Medications   Prescriptions Last Dose Informant Patient Reported? Taking?    B Complex-C-Folic Acid (TRIPHROCAPS) 1 MG CAPS  Self Yes No   Sig: Take 1 capsule by mouth daily   Cholecalciferol 50 MCG (2000 UT) CAPS  Self Yes No   Sig: Take by mouth daily   [place] Choice Comfort EZ 33G X 4 MM MISC  Self Yes No   apixaban (Eliquis) 5 mg   No No   Sig: Take 1 tablet (5 mg total) by mouth 2 (two) times a day   ascorbic acid (VITAMIN C) 500 mg tablet  Self Yes No   Sig: TAKE ONE TABLET BY MOUTH EVERY EVENING FOR SUPPLEMENT   aspirin (ECOTRIN LOW STRENGTH) 81 mg EC tablet  Self Yes No   Sig: Take 81 mg by mouth daily   atorvastatin (LIPITOR) 20 mg tablet  Self Yes No   Sig: Take 20 mg by mouth daily at bedtime   glucose blood test strip  Self Yes No   Sig: Test 2 (two) times a day   loperamide (IMODIUM) 2 mg capsule   Yes No   Sig: Take 2 mg by mouth 4 (four) times a day as needed for diarrhea   midodrine (PROAMATINE) 10 MG tablet   No No   Sig: Take 1 tablet (10 mg total) by mouth 3 (three) times a day before meals   oxyCODONE (Roxicodone) 5 immediate release tablet   No No   Sig: Take 1 tablet (5 mg total) by mouth every 8 (eight) hours as needed for severe pain for up to 3 days And take 0 5 tablet (2 5 mg total) by mouth every 8 (eight) hours as needed for moderate pain Max Daily Amount: 15 mg   vitamin B-12 (VITAMIN B-12) 1,000 mcg tablet   Yes No   Sig: Take 1,000 mcg by mouth daily      Facility-Administered Medications: None       Past Medical History:   Diagnosis Date   • 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 Cerebral Infarction:  Last Assessed:  October 20, 2016   • Chronic kidney disease 2012    on dialysis    • COVID 05/2022   • Diabetes mellitus ([address] )    • Dialysis patient [facility])     T/TH/Sat   • GERD (gastroesophageal reflux disease)    • Hyperlipidemia    • Hypertension    • Labyrinthitis 09/10/2011   • Myocardial infarction [facility]) 2014    x3 others were in 2009 & 2010   • Sleep disturbances 09/20/2010   • Stroke [facility]) 2007    x1, RLE deficit- uses walker   • Type 2 diabetes, uncontrolled, with renal manifestation 05/03/2017       Past Surgical History:   Procedure Laterality Date   • AV FISTULA PLACEMENT     • CARDIAC CATHETERIZATION Left 02/03/2023    Procedure: Cardiac Left Heart Cath;  Surgeon: Renetta Sevilla MD;  Location: [place] [address] CATH LAB; Service: Cardiology   • CARDIAC CATHETERIZATION N/A 02/08/2023    Procedure: Cardiac catheterization with complex PCI with Dr Pauly Landeros, patient is a renal dialysis patient;  Surgeon: Prashanth Erazo MD;  [facility] CARDI[facility]; Service: Cardiology   • CARDIAC SURGERY  2010    stents x3   • COLONOSCOPY N/A 12/12/2016    Procedure: COLONOSCOPY;  Surgeon: Tom Granger MD;  Location: [facility] GI LAB; Service:    • COLONOSCOPY N/A 04/03/2017    Procedure:  COLONOSCOPY;  Surgeon: Tom Granger MD;  Location: [facility] GI LAB;   Service:    • HARDWARE REMOVAL Right 04/04/2022    Procedure: REMOVAL HARDWARE HIP;  Surgeon: Arsenio Sue MD;  Location: [facility] MAIN OR;  Service: Orthopedics   • IR AV FISTULA/GRAFT DECLOT  04/29/2021   • IR AV FISTULA/GRAFT DECLOT  03/25/2022   • IR AV FISTULA/GRAFT DECLOT  09/21/2022   • IR AV FISTULAGRAM/GRAFTOGRAM  03/28/2022   • IR AV FISTULAGRAM/GRAFTOGRAM  07/28/2022   • IR TUNNELED CENTRAL LINE REMOVAL  12/07/2021   • IR TUNNELED DIALYSIS CATHETER PLACEMENT  05/24/2021   • PORTACATH PLACEMENT      per pt \"port in chest\"   • MD ARTERIOVENOUS ANASTOMOSIS OPEN DIRECT Left 07/22/2021    Procedure: CREATION FISTULA ARTERIOVENOUS (AV);  " Surgeon: Dilma Rick MD;  Location: [facility];  Service: Vascular   • MI ARTERIOVENOUS ANASTOMOSIS OPEN DIRECT Left 3/21/2023    Procedure: left brachiobasilic fistula,  AVG Graft;  Surgeon: Emilie Mann MD;  [facility];  Service: Vascular   • MI ARTERIOVENOUS ANASTOMOSIS OPEN DIRECT Left 5/19/2023    Procedure: CREATION of LEFT FISTULA ARTERIOVENOUS (AV) GRAFT;  Surgeon: Emilie Mann MD;  [facility];  Service: Vascular   • MI HEMIARTHROPLASTY HIP PARTIAL Right 04/04/2022    Procedure: HEMIARTHROPLASTY HIP (BIPOLAR); Surgeon: Janis Breaux MD;  [facility];  Service: Orthopedics       Family History   Problem Relation Age of Onset   • Leukemia Mother    • Crohn's disease Father    • Transient ischemic attack Brother      I have reviewed and agree with the history as documented  E-Cigarette/Vaping   • E-Cigarette Use Never User      E-Cigarette/Vaping Substances   • Nicotine No    • THC No    • CBD No    • Flavoring No    • Other No    • Unknown No      Social History     Tobacco Use   • Smoking status: Every Day     Packs/day: 0 25     Years: 30 00     Total pack years: 7 50     Types: Cigarettes   • Smokeless tobacco: Never   Vaping Use   • Vaping Use: Never used   Substance Use Topics   • Alcohol use: Yes     Comment: rarely - No alcohol use per [employer]   • Drug use: Not Currently     Types: Marijuana     Comment: occasional brownie for sleep       Review of Systems   Neurological: Negative for weakness and light-headedness  Physical Exam  Physical Exam  Vitals reviewed  Constitutional:       Appearance: He is well-developed  HENT:      Head: Normocephalic and atraumatic  Right Ear: External ear normal       Left Ear: External ear normal       Nose: Nose normal  No rhinorrhea        Mouth/Throat:      Mouth: Mucous membranes are moist    Eyes:      Conjunctiva/sclera: Conjunctivae normal    Cardiovascular:      Rate and Rhythm: Normal rate and regular rhythm  Pulmonary:      Effort: Pulmonary effort is normal       Breath sounds: Normal breath sounds  No wheezing or rales  Abdominal:      Palpations: Abdomen is soft  Tenderness: There is no abdominal tenderness  Musculoskeletal:      Cervical back: Neck supple  Right lower leg: No edema  Left lower leg: No edema  Skin:     General: Skin is warm and dry  Comments: Edematous arms  Very slow oozing of serosanguinous drainage L axillary wound, multiple IV sites bilat arms   Neurological:      Mental Status: He is alert and oriented to person, place, and time  Psychiatric:         Mood and Affect: Mood normal          Vital Signs  ED Triage Vitals   Temperature Pulse Respirations Blood Pressure SpO2   05/28/23 0748 05/28/23 0748 05/28/23 0748 05/28/23 0748 05/28/23 0759   97 7 °F (36 5 °C) 69 20 (!) 88/53 100 %      Temp Source Heart Rate Source Patient Position - Orthostatic VS BP Location FiO2 (%)   05/28/23 0748 05/28/23 0748 05/28/23 0748 05/28/23 0748 --   Oral Monitor Sitting Right arm       Pain Score       05/28/23 0748       No Pain           Vitals:    05/28/23 0748 05/28/23 0800   BP: (!) 88/53 (!) 88/53   Pulse: 69 62   Patient Position - Orthostatic VS: Sitting          Visual Acuity      ED Medications  Medications - No data to display    Diagnostic Studies  Results Reviewed     None                 No orders to display              Procedures  Procedures         ED Course                               SBIRT 22yo+    Flowsheet Row Most Recent Value   Initial Alcohol Screen: US AUDIT-C     1  How often do you have a drink containing alcohol? 1 Filed at: 05/28/2023 0757   2  How many drinks containing alcohol do you have on a typical day you are drinking? 0 Filed at: 05/28/2023 0757   3a  Male UNDER 65: How often do you have five or more drinks on one occasion? 0 Filed at: 05/28/2023 0757   3b  FEMALE Any Age, or MALE 65+:  How often do you have 4 or more drinks on one occassion? 0 Filed at: 05/28/2023 0757   Audit-C Score 1 Filed at: 05/28/2023 8876   KIZZY: How many times in the past year have you    Used an illegal drug or used a prescription medication for non-medical reasons? Never Filed at: 05/28/2023 0757                    Medical Decision Making  surgifoam and dressings applied to bilat arms with good hemostasis  Confirmed with on call vasc surgery resident that pt is to continue asa plavix and eliquis  Disposition  Final diagnoses:   Encounter for post surgical wound check     Time reflects when diagnosis was documented in both MDM as applicable and the Disposition within this note     Time User Action Codes Description Comment    5/28/2023  8:33 AM Shaniqua Saldivar Add [Z48 89] Encounter for post surgical wound check       ED Disposition     ED Disposition   Discharge    Condition   Stable    Date/Time   Sun May 28, 2023 310 [address] discharge to home/self care                 Follow-up Information     Follow up With Specialties Details Why Contact Info    Vascular surgery June 7              Discharge Medication List as of 5/28/2023  8:36 AM      CONTINUE these medications which have NOT CHANGED    Details   apixaban (Eliquis) 5 mg Take 1 tablet (5 mg total) by mouth 2 (two) times a day, Starting Wed 5/24/2023, Until Fri 6/23/2023, Normal      ascorbic acid (VITAMIN C) 500 mg tablet TAKE ONE TABLET BY MOUTH EVERY EVENING FOR SUPPLEMENT, Historical Med      aspirin (ECOTRIN LOW STRENGTH) 81 mg EC tablet Take 81 mg by mouth daily, Starting Wed 1/26/2022, Historical Med      atorvastatin (LIPITOR) 20 mg tablet Take 20 mg by mouth daily at bedtime, Starting Wed 1/26/2022, Historical Med      B Complex-C-Folic Acid (TRIPHROCAPS) 1 MG CAPS Take 1 capsule by mouth daily, Starting Mon 2/20/2017, Historical Med      Cholecalciferol 50 MCG (2000 UT) CAPS Take by mouth daily, Starting Mon 3/22/2021, Historical Med      [employer] Choice Comfort EZ 33G X 4 MM MISC Historical Med      glucose blood test strip Test 2 (two) times a day, Starting Mon 7/18/2011, Historical Med      loperamide (IMODIUM) 2 mg capsule Take 2 mg by mouth 4 (four) times a day as needed for diarrhea, Historical Med      midodrine (PROAMATINE) 10 MG tablet Take 1 tablet (10 mg total) by mouth 3 (three) times a day before meals, Starting Wed 5/24/2023, Until Fri 6/23/2023, Normal      vitamin B-12 (VITAMIN B-12) 1,000 mcg tablet Take 1,000 mcg by mouth daily, Starting Tue 1/24/2023, Historical Med         STOP taking these medications       oxyCODONE (Roxicodone) 5 immediate release tablet Comments:   Reason for Stopping:               No discharge procedures on file      PDMP Review       Value Time User    PDMP Reviewed  Yes 5/24/2023  5:40 PM Ciera Haley MD          ED Provider  Electronically Signed by           Buddy Aranda DO  05/28/23 0772

## 2023-05-30 ENCOUNTER — TRANSITIONAL CARE MANAGEMENT (OUTPATIENT)
Dept: FAMILY MEDICINE CLINIC | Facility: CLINIC | Age: 68
End: 2023-05-30

## 2023-05-31 ENCOUNTER — TELEPHONE (OUTPATIENT)
Dept: FAMILY MEDICINE CLINIC | Facility: CLINIC | Age: 68
End: 2023-05-31

## 2023-05-31 NOTE — TELEPHONE ENCOUNTER
Called patient  He was discharged 5/24 from [facility]- too late for [facility]IN[facility]  Scheduled hospital follow up 6/2 with Dr Woods Links

## 2023-05-31 NOTE — TELEPHONE ENCOUNTER
Tcm completed , patient had to hang up before making an appointment because he was really tired , please call to schedule tcm appt this afternoon

## 2023-06-02 ENCOUNTER — HOSPITAL ENCOUNTER (EMERGENCY)
Facility: HOSPITAL | Age: 68
End: 2023-06-02
Attending: EMERGENCY MEDICINE
Payer: MEDICARE

## 2023-06-02 ENCOUNTER — OFFICE VISIT (OUTPATIENT)
Dept: FAMILY MEDICINE CLINIC | Facility: CLINIC | Age: 68
End: 2023-06-02

## 2023-06-02 ENCOUNTER — HOSPITAL ENCOUNTER (INPATIENT)
Facility: HOSPITAL | Age: 68
LOS: 13 days | Discharge: NURSING, REHAB OR LONG-TERM CARE | DRG: 907 | End: 2023-06-15
Attending: INTERNAL MEDICINE | Admitting: INTERNAL MEDICINE
Payer: MEDICARE

## 2023-06-02 VITALS
SYSTOLIC BLOOD PRESSURE: 101 MMHG | HEART RATE: 82 BPM | BODY MASS INDEX: 24.2 KG/M2 | RESPIRATION RATE: 17 BRPM | DIASTOLIC BLOOD PRESSURE: 53 MMHG | OXYGEN SATURATION: 97 % | TEMPERATURE: 98 F | WEIGHT: 169 LBS | HEIGHT: 70 IN

## 2023-06-02 VITALS
RESPIRATION RATE: 18 BRPM | WEIGHT: 169 LBS | TEMPERATURE: 98 F | SYSTOLIC BLOOD PRESSURE: 104 MMHG | HEART RATE: 86 BPM | DIASTOLIC BLOOD PRESSURE: 60 MMHG | HEIGHT: 70 IN | BODY MASS INDEX: 24.2 KG/M2 | OXYGEN SATURATION: 98 %

## 2023-06-02 DIAGNOSIS — R57.9 SHOCK (HCC): ICD-10-CM

## 2023-06-02 DIAGNOSIS — N18.6 END-STAGE RENAL DISEASE ON HEMODIALYSIS (HCC): ICD-10-CM

## 2023-06-02 DIAGNOSIS — D69.6 THROMBOCYTOPENIA, UNSPECIFIED (HCC): ICD-10-CM

## 2023-06-02 DIAGNOSIS — S41.102A OPEN WOUND OF LEFT AXILLARY REGION, INITIAL ENCOUNTER: ICD-10-CM

## 2023-06-02 DIAGNOSIS — Z99.2 END-STAGE RENAL DISEASE ON HEMODIALYSIS (HCC): ICD-10-CM

## 2023-06-02 DIAGNOSIS — T82.7XXA INFECTION OF AV GRAFT FOR DIALYSIS (HCC): Primary | ICD-10-CM

## 2023-06-02 DIAGNOSIS — I77.0 ARTERIOVENOUS FISTULA (HCC): ICD-10-CM

## 2023-06-02 DIAGNOSIS — Z99.2 ESRD (END STAGE RENAL DISEASE) ON DIALYSIS (HCC): Primary | ICD-10-CM

## 2023-06-02 DIAGNOSIS — I25.10 CORONARY ARTERY DISEASE INVOLVING NATIVE CORONARY ARTERY OF NATIVE HEART WITHOUT ANGINA PECTORIS: ICD-10-CM

## 2023-06-02 DIAGNOSIS — N18.6 TYPE 2 DIABETES MELLITUS WITH CHRONIC KIDNEY DISEASE ON CHRONIC DIALYSIS, UNSPECIFIED WHETHER LONG TERM INSULIN USE (HCC): ICD-10-CM

## 2023-06-02 DIAGNOSIS — Z48.89 ENCOUNTER FOR POST SURGICAL WOUND CHECK: Primary | ICD-10-CM

## 2023-06-02 DIAGNOSIS — E11.22 TYPE 2 DIABETES MELLITUS WITH CHRONIC KIDNEY DISEASE ON CHRONIC DIALYSIS, UNSPECIFIED WHETHER LONG TERM INSULIN USE (HCC): ICD-10-CM

## 2023-06-02 DIAGNOSIS — Z99.2 TYPE 2 DIABETES MELLITUS WITH CHRONIC KIDNEY DISEASE ON CHRONIC DIALYSIS, UNSPECIFIED WHETHER LONG TERM INSULIN USE (HCC): ICD-10-CM

## 2023-06-02 DIAGNOSIS — N18.6 ESRD (END STAGE RENAL DISEASE) ON DIALYSIS (HCC): Primary | ICD-10-CM

## 2023-06-02 LAB
ALBUMIN SERPL BCP-MCNC: 3.3 G/DL (ref 3.5–5)
ALP SERPL-CCNC: 109 U/L (ref 34–104)
ALT SERPL W P-5'-P-CCNC: 7 U/L (ref 7–52)
ANION GAP SERPL CALCULATED.3IONS-SCNC: 11 MMOL/L (ref 4–13)
AST SERPL W P-5'-P-CCNC: 31 U/L (ref 13–39)
BASOPHILS # BLD AUTO: 0.06 THOUSANDS/ÂΜL (ref 0–0.1)
BASOPHILS NFR BLD AUTO: 1 % (ref 0–1)
BILIRUB SERPL-MCNC: 0.88 MG/DL (ref 0.2–1)
BUN SERPL-MCNC: 19 MG/DL (ref 5–25)
CALCIUM ALBUM COR SERPL-MCNC: 9.3 MG/DL (ref 8.3–10.1)
CALCIUM SERPL-MCNC: 8.7 MG/DL (ref 8.4–10.2)
CHLORIDE SERPL-SCNC: 97 MMOL/L (ref 96–108)
CO2 SERPL-SCNC: 31 MMOL/L (ref 21–32)
CREAT SERPL-MCNC: 6.78 MG/DL (ref 0.6–1.3)
EOSINOPHIL # BLD AUTO: 0.07 THOUSAND/ÂΜL (ref 0–0.61)
EOSINOPHIL NFR BLD AUTO: 1 % (ref 0–6)
ERYTHROCYTE [DISTWIDTH] IN BLOOD BY AUTOMATED COUNT: 14.6 % (ref 11.6–15.1)
GFR SERPL CREATININE-BSD FRML MDRD: 7 ML/MIN/1.73SQ M
GLUCOSE SERPL-MCNC: 109 MG/DL (ref 65–140)
HCT VFR BLD AUTO: 33.8 % (ref 36.5–49.3)
HGB BLD-MCNC: 10.9 G/DL (ref 12–17)
IMM GRANULOCYTES # BLD AUTO: 0.06 THOUSAND/UL (ref 0–0.2)
IMM GRANULOCYTES NFR BLD AUTO: 1 % (ref 0–2)
LACTATE SERPL-SCNC: 1.7 MMOL/L (ref 0.5–2)
LYMPHOCYTES # BLD AUTO: 0.92 THOUSANDS/ÂΜL (ref 0.6–4.47)
LYMPHOCYTES NFR BLD AUTO: 14 % (ref 14–44)
MCH RBC QN AUTO: 36.6 PG (ref 26.8–34.3)
MCHC RBC AUTO-ENTMCNC: 32.2 G/DL (ref 31.4–37.4)
MCV RBC AUTO: 113 FL (ref 82–98)
MONOCYTES # BLD AUTO: 0.53 THOUSAND/ÂΜL (ref 0.17–1.22)
MONOCYTES NFR BLD AUTO: 8 % (ref 4–12)
NEUTROPHILS # BLD AUTO: 4.91 THOUSANDS/ÂΜL (ref 1.85–7.62)
NEUTS SEG NFR BLD AUTO: 75 % (ref 43–75)
NRBC BLD AUTO-RTO: 0 /100 WBCS
PLATELET # BLD AUTO: 153 THOUSANDS/UL (ref 149–390)
PLATELET # BLD AUTO: 169 THOUSANDS/UL (ref 149–390)
PMV BLD AUTO: 10.6 FL (ref 8.9–12.7)
PMV BLD AUTO: 11.3 FL (ref 8.9–12.7)
POTASSIUM SERPL-SCNC: 4 MMOL/L (ref 3.5–5.3)
PROT SERPL-MCNC: 6.5 G/DL (ref 6.4–8.4)
RBC # BLD AUTO: 2.98 MILLION/UL (ref 3.88–5.62)
SODIUM SERPL-SCNC: 139 MMOL/L (ref 135–147)
WBC # BLD AUTO: 6.55 THOUSAND/UL (ref 4.31–10.16)

## 2023-06-02 PROCEDURE — 87040 BLOOD CULTURE FOR BACTERIA: CPT | Performed by: EMERGENCY MEDICINE

## 2023-06-02 PROCEDURE — 85049 AUTOMATED PLATELET COUNT: CPT | Performed by: INTERNAL MEDICINE

## 2023-06-02 PROCEDURE — 36415 COLL VENOUS BLD VENIPUNCTURE: CPT | Performed by: EMERGENCY MEDICINE

## 2023-06-02 PROCEDURE — 80053 COMPREHEN METABOLIC PANEL: CPT | Performed by: EMERGENCY MEDICINE

## 2023-06-02 PROCEDURE — 96375 TX/PRO/DX INJ NEW DRUG ADDON: CPT

## 2023-06-02 PROCEDURE — 99223 1ST HOSP IP/OBS HIGH 75: CPT | Performed by: INTERNAL MEDICINE

## 2023-06-02 PROCEDURE — 85025 COMPLETE CBC W/AUTO DIFF WBC: CPT | Performed by: EMERGENCY MEDICINE

## 2023-06-02 PROCEDURE — 96366 THER/PROPH/DIAG IV INF ADDON: CPT

## 2023-06-02 PROCEDURE — 99284 EMERGENCY DEPT VISIT MOD MDM: CPT

## 2023-06-02 PROCEDURE — NC001 PR NO CHARGE: Performed by: SURGERY

## 2023-06-02 PROCEDURE — 83605 ASSAY OF LACTIC ACID: CPT | Performed by: EMERGENCY MEDICINE

## 2023-06-02 PROCEDURE — 96365 THER/PROPH/DIAG IV INF INIT: CPT

## 2023-06-02 PROCEDURE — 02HV33Z INSERTION OF INFUSION DEVICE INTO SUPERIOR VENA CAVA, PERCUTANEOUS APPROACH: ICD-10-PCS | Performed by: STUDENT IN AN ORGANIZED HEALTH CARE EDUCATION/TRAINING PROGRAM

## 2023-06-02 RX ORDER — ATORVASTATIN CALCIUM 20 MG/1
20 TABLET, FILM COATED ORAL
Status: DISCONTINUED | OUTPATIENT
Start: 2023-06-02 | End: 2023-06-15 | Stop reason: HOSPADM

## 2023-06-02 RX ORDER — OXYCODONE HYDROCHLORIDE 5 MG/1
5 TABLET ORAL EVERY 4 HOURS PRN
Status: DISCONTINUED | OUTPATIENT
Start: 2023-06-02 | End: 2023-06-02

## 2023-06-02 RX ORDER — ACETAMINOPHEN 325 MG/1
975 TABLET ORAL EVERY 8 HOURS SCHEDULED
Status: DISCONTINUED | OUTPATIENT
Start: 2023-06-02 | End: 2023-06-04

## 2023-06-02 RX ORDER — HYDROMORPHONE HCL/PF 1 MG/ML
0.5 SYRINGE (ML) INJECTION EVERY 4 HOURS PRN
Status: DISCONTINUED | OUTPATIENT
Start: 2023-06-02 | End: 2023-06-02

## 2023-06-02 RX ORDER — HEPARIN SODIUM 5000 [USP'U]/ML
5000 INJECTION, SOLUTION INTRAVENOUS; SUBCUTANEOUS EVERY 8 HOURS SCHEDULED
Status: DISCONTINUED | OUTPATIENT
Start: 2023-06-02 | End: 2023-06-15 | Stop reason: HOSPADM

## 2023-06-02 RX ORDER — HYDROMORPHONE HCL/PF 1 MG/ML
0.5 SYRINGE (ML) INJECTION EVERY 4 HOURS PRN
Status: DISCONTINUED | OUTPATIENT
Start: 2023-06-02 | End: 2023-06-09

## 2023-06-02 RX ORDER — MIDODRINE HYDROCHLORIDE 5 MG/1
10 TABLET ORAL
Status: DISCONTINUED | OUTPATIENT
Start: 2023-06-03 | End: 2023-06-15 | Stop reason: HOSPADM

## 2023-06-02 RX ORDER — ONDANSETRON 2 MG/ML
4 INJECTION INTRAMUSCULAR; INTRAVENOUS EVERY 6 HOURS PRN
Status: DISCONTINUED | OUTPATIENT
Start: 2023-06-02 | End: 2023-06-12

## 2023-06-02 RX ORDER — ASCORBIC ACID, THIAMINE MONONITRATE,RIBOFLAVIN, NIACINAMIDE, PYRIDOXINE HYDROCHLORIDE, FOLIC ACID, CYANOCOBALAMIN, BIOTIN, CALCIUM PANTOTHENATE, 100; 1.5; 1.7; 20; 10; 1; 6000; 150000; 5 MG/1; MG/1; MG/1; MG/1; MG/1; MG/1; UG/1; UG/1; MG/1
1 CAPSULE, LIQUID FILLED ORAL DAILY
Status: DISCONTINUED | OUTPATIENT
Start: 2023-06-03 | End: 2023-06-15 | Stop reason: HOSPADM

## 2023-06-02 RX ORDER — VANCOMYCIN HYDROCHLORIDE 500 MG/100ML
7.5 INJECTION, SOLUTION INTRAVENOUS
Status: DISCONTINUED | OUTPATIENT
Start: 2023-06-03 | End: 2023-06-03

## 2023-06-02 RX ORDER — CLOPIDOGREL BISULFATE 75 MG/1
75 TABLET ORAL DAILY
Status: DISCONTINUED | OUTPATIENT
Start: 2023-06-03 | End: 2023-06-15 | Stop reason: HOSPADM

## 2023-06-02 RX ORDER — OXYCODONE HYDROCHLORIDE 5 MG/1
5 TABLET ORAL EVERY 4 HOURS PRN
Status: DISCONTINUED | OUTPATIENT
Start: 2023-06-02 | End: 2023-06-15 | Stop reason: HOSPADM

## 2023-06-02 RX ADMIN — ATORVASTATIN CALCIUM 20 MG: 20 TABLET, FILM COATED ORAL at 22:12

## 2023-06-02 RX ADMIN — ACETAMINOPHEN 975 MG: 325 TABLET, FILM COATED ORAL at 22:13

## 2023-06-02 RX ADMIN — MORPHINE SULFATE 2 MG: 2 INJECTION, SOLUTION INTRAMUSCULAR; INTRAVENOUS at 19:48

## 2023-06-02 RX ADMIN — PIPERACILLIN AND TAZOBACTAM 2.25 G: 2; .25 INJECTION, POWDER, FOR SOLUTION INTRAVENOUS at 22:14

## 2023-06-02 RX ADMIN — OXYCODONE HYDROCHLORIDE 5 MG: 5 TABLET ORAL at 22:13

## 2023-06-02 RX ADMIN — VANCOMYCIN HYDROCHLORIDE 1250 MG: 10 INJECTION, POWDER, LYOPHILIZED, FOR SOLUTION INTRAVENOUS at 18:48

## 2023-06-02 RX ADMIN — HYDROMORPHONE HYDROCHLORIDE 0.5 MG: 1 INJECTION, SOLUTION INTRAMUSCULAR; INTRAVENOUS; SUBCUTANEOUS at 23:32

## 2023-06-02 RX ADMIN — HEPARIN SODIUM 5000 UNITS: 5000 INJECTION INTRAVENOUS; SUBCUTANEOUS at 22:14

## 2023-06-02 NOTE — PROGRESS NOTES
Vascular surgery telephone communication    Contacted regarding Mr Demetrios Mcardle with concern for left upper extremity wound dehiscence of AV graft placement from 5/19    On review of imaging patient has a significant dehiscence of the venous axillary exposure incision  Graft is very likely exposed to air  This represents clinically infected prosthetic material with the risk of significant hemorrhage and limb loss if left in place    Recommend transfer to [facility] for left upper extremity AV graft explant  Recommend broad-spectrum antibiotics as well as blood cultures  Discussed with Dr Brittany Majano Doctor and ED team

## 2023-06-02 NOTE — PROGRESS NOTES
Assessment & Plan     1  ESRD (end stage renal disease) on dialysis [facility])  -     Comprehensive metabolic panel; Future  -     Transfer to other facility    2  Thrombocytopenia, unspecified (HCC)  -     CBC and differential; Future    3  Type 2 diabetes mellitus with chronic kidney disease on chronic dialysis, unspecified whether long term insulin use ([facility] )    4  Open wound of left axillary region, initial encounter  -     Ambulatory Referral to Wound Care; Future  -     Transfer to other facility    5  Coronary artery disease involving native coronary artery of native heart without angina pectoris    6  Arteriovenous fistula ([facility] )      Patient is a pleasant 69-year-old male coming in for transition of care management after s/p left brachial axillary AVG on 5/19/2023 with vascular surgery  Patient does have appropriate follow-up with vascular team next Wednesday  HD right-sided permacath without signs of infection, patient does have follow-up with dialysis tomorrow morning  Recommend CBC/CMP labs  Confirmed per charting Aspirin/Plavix/Eliquis therapy  Appreciate vascular team recommendations, appears patient will most likely need to transition back to Coumadin  Patient will also be following with cardiologist to determine long-term antiplatelet/antithrombotic medications  No signs of bleeding on exam today  Left axilla regional wound, dissidence of staples, please see media chart  Recommending emergency room evaluation for appropriate closure given high risk for infection in the setting of significant chronic disease  Patient in agreement with plan  Subjective     Transitional Care Management Review:   Ericka Enciso is a 70 y o  male here for TCM follow up       During the TCM phone call patient stated:  TCM Call     Date and time call was made  5/31/2023  9:08 AM    Hospital care reviewed  Records reviewed    Patient was hospitial[facility] at  [facility]    Date of Admission  05/19/23    Date "of discharge  05/24/23    Diagnosis  artyeriovenous fistula    Disposition  Home    Were the patients medications reviewed and updated  No    Current Symptoms  Swelling      TCM Call     Post hospital issues  Reduced activity    Should patient be enrolled in anticoag monitoring? No    Did you obtain your prescribed medications  Yes    Do you need help managing your prescriptions or medications  No    Is transportation to your appointment needed  No    I have advised the patient to call PCP with any new or worsening symptoms  reji dodge/cma 5/31/23    Living Arrangements  Spouse or Significiant other    Are you recieving any outpatient services  No    Are you recieving home care services  No    Are you using any community resources  No    Current waiver services  No    Have you fallen in the last 12 months  No    Interperter language line needed  No        Transition of care management office visit, left fistula wound checkup  Review of Systems   Constitutional: Negative for chills, fatigue and fever  Respiratory: Negative for cough, shortness of breath and wheezing  Cardiovascular: Negative for chest pain, palpitations and leg swelling  Skin: Positive for wound  Negative for rash  Neurological: Negative for dizziness and headaches  Objective     /60 (BP Location: Left arm, Patient Position: Sitting, Cuff Size: Large)   Pulse 86   Temp 98 °F (36 7 °C)   Resp 18   Ht 5' 10\" (1 778 m)   Wt 76 7 kg (169 lb)   SpO2 98%   BMI 24 25 kg/m²      Physical Exam  Constitutional:       General: He is not in acute distress  HENT:      Head: Normocephalic and atraumatic  Eyes:      General: No scleral icterus  Conjunctiva/sclera: Conjunctivae normal    Cardiovascular:      Rate and Rhythm: Normal rate and regular rhythm  Pulses: Normal pulses  Heart sounds: No murmur heard  Pulmonary:      Effort: Pulmonary effort is normal  No respiratory distress        Breath sounds: Normal " breath sounds  No wheezing or rales  Abdominal:      General: Bowel sounds are normal  There is no distension  Tenderness: There is no abdominal tenderness  There is no guarding  Musculoskeletal:         General: Swelling present  Normal range of motion  Comments: Left axilla wound, please see media  Skin:     General: Skin is warm and dry  Capillary Refill: Capillary refill takes less than 2 seconds  Coloration: Skin is not jaundiced  Neurological:      General: No focal deficit present  Mental Status: He is alert and oriented to person, place, and time  Mental status is at baseline     Psychiatric:         Mood and Affect: Mood normal          Behavior: Behavior normal        Medications have been reviewed by provider in current encounter    Matheus Staples DO

## 2023-06-02 NOTE — ED PROVIDER NOTES
History  Chief Complaint   Patient presents with   • Wound Check     Pt primary care wanted pt to have wound on left arm evaluated     69-year-old male presents to the ED after he went to see his PCP for postop check and found some of the staples on his graft had fallen open it approximately 2 to 3 inch area of open wound with some drainage out of it  Does not appear to be bleeding  Patient denies any fevers chills nausea vomiting or diarrhea no other complaints states the arm is more swollen  History provided by:  Patient   used: No        Prior to Admission Medications   Prescriptions Last Dose Informant Patient Reported? Taking?    B Complex-C-Folic Acid (TRIPHROCAPS) 1 MG CAPS  Self Yes No   Sig: Take 1 capsule by mouth daily   Cholecalciferol 50 MCG (2000 UT) CAPS  Self Yes No   Sig: Take by mouth daily   [place] Choice Comfort EZ 33G X 4 MM MISC  Self Yes No   Clopidogrel Bisulfate (PLAVIX PO)   Yes No   Sig: Take by mouth   apixaban (Eliquis) 5 mg   No No   Sig: Take 1 tablet (5 mg total) by mouth 2 (two) times a day   ascorbic acid (VITAMIN C) 500 mg tablet  Self Yes No   Sig: TAKE ONE TABLET BY MOUTH EVERY EVENING FOR SUPPLEMENT   aspirin (ECOTRIN LOW STRENGTH) 81 mg EC tablet  Self Yes No   Sig: Take 81 mg by mouth daily   atorvastatin (LIPITOR) 20 mg tablet  Self Yes No   Sig: Take 20 mg by mouth daily at bedtime   glucose blood test strip  Self Yes No   Sig: Test 2 (two) times a day   midodrine (PROAMATINE) 10 MG tablet   No No   Sig: Take 1 tablet (10 mg total) by mouth 3 (three) times a day before meals   vitamin B-12 (VITAMIN B-12) 1,000 mcg tablet   Yes No   Sig: Take 1,000 mcg by mouth daily      Facility-Administered Medications: None       Past Medical History:   Diagnosis Date   • 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    With Cerebral Infarction:  Last Assessed:  October 20, 2016   • Chronic kidney disease 2012    on dialysis    • COVID 05/2022   • Diabetes mellitus ([facility] [address] )    • "Dialysis patient [facility])     T/TH/Sat   • GERD (gastroesophageal reflux disease)    • Hyperlipidemia    • Hypertension    • Labyrinthitis 09/10/2011   • Myocardial infarction [facility]) 2014    x3 others were in 2009 & 2010   • Sleep disturbances 09/20/2010   • Stroke [facility]) 2007    x1, RLE deficit- uses walker   • Type 2 diabetes, uncontrolled, with renal manifestation 05/03/2017       Past Surgical History:   Procedure Laterality Date   • AV FISTULA PLACEMENT     • CARDIAC CATHETERIZATION Left 02/03/2023    Procedure: Cardiac Left Heart Cath;  Surgeon: Kerwin Heath MD;  Location: [place] [address] CATH LAB; Service: Cardiology   • CARDIAC CATHETERIZATION N/A 02/08/2023    Procedure: Cardiac catheterization with complex PCI with Dr Luis F Whyte, patient is a renal dialysis patient;  Surgeon: Seth Schaefer MD;  Location:  CARDIAC CATH LAB; Service: Cardiology   • CARDIAC SURGERY  2010    stents x3   • COLONOSCOPY N/A 12/12/2016    Procedure: COLONOSCOPY;  Surgeon: Sebastian Montero MD;  Location: [facility] GI LAB; Service:    • COLONOSCOPY N/A 04/03/2017    Procedure:  COLONOSCOPY;  Surgeon: Sebastian Montero MD;  Location: [facility] GI LAB; Service:    • HARDWARE REMOVAL Right 04/04/2022    Procedure: REMOVAL HARDWARE HIP;  Surgeon: Sam Quesada MD;  Location: [facility] OR;  Service: Orthopedics   • IR AV FISTULA/GRAFT DECLOT  04/29/2021   • IR AV FISTULA/GRAFT DECLOT  03/25/2022   • IR AV FISTULA/GRAFT DECLOT  09/21/2022   • IR AV FISTULAGRAM/GRAFTOGRAM  03/28/2022   • IR AV FISTULAGRAM/GRAFTOGRAM  07/28/2022   • IR TUNNELED CENTRAL LINE REMOVAL  12/07/2021   • IR TUNNELED DIALYSIS CATHETER PLACEMENT  05/24/2021   • PORTACATH PLACEMENT      per pt \"port in chest\"   • [place] ARTERIOVENOUS ANASTOMOSIS OPEN DIRECT Left 07/22/2021    Procedure: CREATION FISTULA ARTERIOVENOUS (AV);   Surgeon: Jesus Balbuena MD;  Location: [facility] OR;  Service: Vascular   • [place] ARTERIOVENOUS ANASTOMOSIS OPEN DIRECT Left 3/21/2023    Procedure: left " brachiobasilic fistula,  AVG Graft;  Surgeon: Jorge Nguyen MD;  [facility];  Service: Vascular   • [place] ARTERIOVENOUS ANASTOMOSIS OPEN DIRECT Left 5/19/2023    Procedure: CREATION of LEFT FISTULA ARTERIOVENOUS (AV) GRAFT;  Surgeon: Jorge Nguyen MD;  Location: [facility];  Service: Vascular   • WA HEMIARTHROPLASTY HIP PARTIAL Right 04/04/2022    Procedure: HEMIARTHROPLASTY HIP (BIPOLAR); Surgeon: Ema Hobbs MD;  Location: [facility];  Service: Orthopedics       Family History   Problem Relation Age of Onset   • Leukemia Mother    • Crohn's disease Father    • Transient ischemic attack Brother      I have reviewed and agree with the history as documented  E-Cigarette/Vaping   • E-Cigarette Use Never User      E-Cigarette/Vaping Substances   • Nicotine No    • THC No    • CBD No    • Flavoring No    • Other No    • Unknown No      Social History     Tobacco Use   • Smoking status: Every Day     Packs/day: 0 25     Years: 30 00     Total pack years: 7 50     Types: Cigarettes   • Smokeless tobacco: Never   Vaping Use   • Vaping Use: Never used   Substance Use Topics   • Alcohol use: Yes     Comment: rarely - No alcohol use per [employer]   • Drug use: Not Currently     Types: Marijuana     Comment: occasional brownie for sleep       Review of Systems   Constitutional: Negative for activity change, chills, diaphoresis and fever  HENT: Negative for congestion, ear pain, nosebleeds, sore throat, trouble swallowing and voice change  Eyes: Negative for pain, discharge and redness  Respiratory: Negative for apnea, cough, choking, shortness of breath, wheezing and stridor  Cardiovascular: Negative for chest pain and palpitations  Gastrointestinal: Negative for abdominal distention, abdominal pain, constipation, diarrhea, nausea and vomiting  Endocrine: Negative for polydipsia     Genitourinary: Negative for difficulty urinating, dysuria, flank pain, frequency, hematuria and urgency  Musculoskeletal: Negative for back pain, gait problem, joint swelling, myalgias, neck pain and neck stiffness  Skin: Positive for wound  Negative for pallor and rash  Neurological: Negative for dizziness, tremors, syncope, speech difficulty, weakness, numbness and headaches  Hematological: Negative for adenopathy  Psychiatric/Behavioral: Negative for confusion, hallucinations, self-injury and suicidal ideas  The patient is not nervous/anxious  Physical Exam  Physical Exam  Vitals and nursing note reviewed  Constitutional:       General: He is not in acute distress  Appearance: He is well-developed  He is not diaphoretic  HENT:      Head: Normocephalic and atraumatic  Right Ear: External ear normal       Left Ear: External ear normal       Nose: Nose normal    Eyes:      Conjunctiva/sclera: Conjunctivae normal       Pupils: Pupils are equal, round, and reactive to light  Cardiovascular:      Rate and Rhythm: Normal rate and regular rhythm  Heart sounds: Normal heart sounds  Pulmonary:      Effort: Pulmonary effort is normal       Breath sounds: Normal breath sounds  Abdominal:      General: Bowel sounds are normal       Palpations: Abdomen is soft  Musculoskeletal:         General: Normal range of motion  Cervical back: Normal range of motion and neck supple  Skin:     General: Skin is warm and dry  Comments: Several loosened staples in the graft on the left   Neurological:      Mental Status: He is alert and oriented to person, place, and time  Deep Tendon Reflexes: Reflexes are normal and symmetric  Psychiatric:         Behavior: Behavior is cooperative           Vital Signs  ED Triage Vitals [06/02/23 1609]   Temperature Pulse Respirations Blood Pressure SpO2   98 °F (36 7 °C) 84 18 116/59 98 %      Temp Source Heart Rate Source Patient Position - Orthostatic VS BP Location FiO2 (%)   Tympanic Monitor Sitting Right arm --      Pain Score 5           Vitals:    06/02/23 1609   BP: 116/59   Pulse: 84   Patient Position - Orthostatic VS: Sitting         Visual Acuity      ED Medications  Medications   vancomycin (VANCOCIN) 1,250 mg in sodium chloride 0 9 % 250 mL IVPB (has no administration in time range)   piperacillin-tazobactam (ZOSYN) IVPB 3 375 g (has no administration in time range)       Diagnostic Studies  Results Reviewed     Procedure Component Value Units Date/Time    Blood culture #1 [[id number]] Collected: 06/02/23 1840    Lab Status: No result Specimen: Blood from Arm, Right     CBC and differential [[id number]]  (Abnormal) Collected: 06/02/23 1826    Lab Status: Final result Specimen: Blood from Line, Venous Updated: 06/02/23 1832     WBC 6 55 Thousand/uL      RBC 2 98 Million/uL      Hemoglobin 10 9 g/dL      Hematocrit 33 8 %       fL      MCH 36 6 pg      MCHC 32 2 g/dL      RDW 14 6 %      MPV 11 3 fL      Platelets 643 Thousands/uL      nRBC 0 /100 WBCs      Neutrophils Relative 75 %      Immat GRANS % 1 %      Lymphocytes Relative 14 %      Monocytes Relative 8 %      Eosinophils Relative 1 %      Basophils Relative 1 %      Neutrophils Absolute 4 91 Thousands/µL      Immature Grans Absolute 0 06 Thousand/uL      Lymphocytes Absolute 0 92 Thousands/µL      Monocytes Absolute 0 53 Thousand/µL      Eosinophils Absolute 0 07 Thousand/µL      Basophils Absolute 0 06 Thousands/µL     Blood culture #2 [[id number]] Collected: 06/02/23 1826    Lab Status: In process Specimen: Blood from Arm, Right Updated: 06/02/23 1830    Comprehensive metabolic panel [[id number]] Collected: 06/02/23 1826    Lab Status:  In process Specimen: Blood from Line, Venous Updated: 06/02/23 1829    Lactic acid, plasma (w/reflex if result > 2 0) [[id number]] Collected: 06/02/23 1826    Lab Status: No result Specimen: Blood from Line, Venous                  No orders to display              Procedures  Procedures         ED Course SBIRT 22yo+    Flowsheet Row Most Recent Value   Initial Alcohol Screen: US AUDIT-C     1  How often do you have a drink containing alcohol? 0 Filed at: 06/02/2023 1612   2  How many drinks containing alcohol do you have on a typical day you are drinking? 0 Filed at: 06/02/2023 1612   3a  Male UNDER 65: How often do you have five or more drinks on one occasion? 0 Filed at: 06/02/2023 1612   3b  FEMALE Any Age, or MALE 65+: How often do you have 4 or more drinks on one occassion? 0 Filed at: 06/02/2023 1612   Audit-C Score 0 Filed at: 06/02/2023 1612   KIZZY: How many times in the past year have you    Used an illegal drug or used a prescription medication for non-medical reasons? Never Filed at: 06/02/2023 1612                    Medical Decision Making  79-year-old male with open graft wound will need work-up I will do labs blood cultures give antibiotics and notify vascular surgery  Encounter for post surgical wound check: acute illness or injury  Amount and/or Complexity of Data Reviewed  Labs: ordered  Discussion of management or test interpretation with external provider(s): Vascular surgery advises to transport to [facility] [place] [place] or [facility] for further work-up          Disposition  Final diagnoses:   Encounter for post surgical wound check     Time reflects when diagnosis was documented in both MDM as applicable and the Disposition within this note     Time User Action Codes Description Comment    6/2/2023  6:18 PM Dionicio Nelson Add [L10 57] Encounter for post surgical wound check       ED Disposition     ED Disposition   Transfer to Another Facility-In Network    Condition   --    Date/Time   Fri Jun 2, 2023  6:21 PM    Comment   Eneida Neely should be transferred out to [facility] Elli SAWYER Documentation    [address] Most Recent Value   Patient Condition The patient has been stabilized such that within reasonable medical probability, no material deterioration of the patient condition or the condition of the unborn child(heather) is likely to result from the transfer   Reason for Transfer Level of Care needed not available at this facility   Benefits of Transfer Specialized equipment and/or services available at the receiving facility (Include comment)________________________   Risks of Transfer Potential deterioration of medical condition, Increased discomfort during transfer   Accepting Physician Dr Flex Alatorre Name, [facility]    (Name & Tel number) pacs   Transported by (Company and Unit #) [facility]   Sending MD Dr Dar Hills   Provider Certification General risk, such as traffic hazards, adverse weather conditions, rough terrain or turbulence, possible failure of equipment (including vehicle or aircraft), or consequences of actions of persons outside the control of the transport personnel      RN Documentation    72 Randi Schultz Name, [facility]    (Name & Tel number) pacs   Transported by [employer] and Unit #) [facility]      Follow-up Information    None         Patient's Medications   Discharge Prescriptions    No medications on file       No discharge procedures on file      PDMP Review       Value Time User    PDMP Reviewed  Yes 5/24/2023  5:40 PM Noelle Horner MD          ED Provider  Electronically Signed by           Roger Kincaid DO  06/02/23 5031

## 2023-06-02 NOTE — EMTALA/ACUTE CARE TRANSFER
[address] [address]  Dept: [phone]      EMTALA TRANSFER CONSENT    NAME Christiano May                                         1955                              MRN [id number]    I have been informed of my rights regarding examination, treatment, and transfer   by Dr Dimple Douglas DO    Benefits: Specialized equipment and/or services available at the receiving facility (Include comment)________________________    Risks: Potential deterioration of medical condition, Increased discomfort during transfer      Consent for Transfer:  I acknowledge that my medical condition has been evaluated and explained to me by the emergency department physician or other qualified medical person and/or my attending physician, who has recommended that I be transferred to the service of  Accepting Physician: Dr Lonnie Andino at [address] [place] [address] Name, [facility] : [facility]  The above potential benefits of such transfer, the potential risks associated with such transfer, and the probable risks of not being transferred have been explained to me, and I fully understand them  The doctor has explained that, in my case, the benefits of transfer outweigh the risks  I agree to be transferred  I authorize the performance of emergency medical procedures and treatments upon me in both transit and upon arrival at the receiving facility  Additionally, I authorize the release of any and all medical records to the receiving facility and request they be transported with me, if possible  I understand that the safest mode of transportation during a medical emergency is an ambulance and that the Hospital advocates the use of this mode of transport   Risks of traveling to the receiving facility by car, including absence of medical control, life sustaining equipment, such as oxygen, and medical personnel has been explained to me and I fully understand them     ([facility])  [  ]  I consent to the stated transfer and to be transported by ambulance/helicopter  [  ]  I consent to the stated transfer, but refuse transportation by ambulance and accept full responsibility for my transportation by car  I understand the risks of non-ambulance transfers and I exonerate the Hospital and its staff from any deterioration in my condition that results from this refusal     X___________________________________________    DATE  23  TIME________  Signature of patient or legally responsible individual signing on patient behalf           RELATIONSHIP TO PATIENT_________________________          Provider Certification    NAME Yi Bishop                                         1955                              MRN [id number]    A medical screening exam was performed on the above named patient  Based on the examination:    Condition Necessitating Transfer The encounter diagnosis was Encounter for post surgical wound check  Patient Condition: The patient has been stabilized such that within reasonable medical probability, no material deterioration of the patient condition or the condition of the unborn child(heather) is likely to result from the transfer    Reason for Transfer: Level of Care needed not available at this facility    Transfer Requirements: [address]   · Space available and qualified personnel available for treatment as acknowledged by [facility]  · Agreed to accept transfer and to provide appropriate medical treatment as acknowledged by       Dr Charisse Pires  · Appropriate medical records of the examination and treatment of the patient are provided at the time of transfer   [address], [address] _______  · Transfer will be performed by qualified personnel from [facility]  and appropriate transfer equipment as required, including the use of necessary and appropriate life support measures      Provider Certification: I have examined the patient and explained the following risks and benefits of being transferred/refusing transfer to the patient/family:  General risk, such as traffic hazards, adverse weather conditions, rough terrain or turbulence, possible failure of equipment (including vehicle or aircraft), or consequences of actions of persons outside the control of the transport personnel      Based on these reasonable risks and benefits to the patient and/or the unborn child(heather), and based upon the information available at the time of the patient’s examination, I certify that the medical benefits reasonably to be expected from the provision of appropriate medical treatments at another medical facility outweigh the increasing risks, if any, to the individual’s medical condition, and in the case of labor to the unborn child, from effecting the transfer      X____________________________________________ DATE 06/02/23        TIME_______      ORIGINAL - SEND TO MEDICAL RECORDS   COPY - SEND WITH PATIENT DURING TRANSFER

## 2023-06-03 ENCOUNTER — ANESTHESIA EVENT (INPATIENT)
Dept: PERIOP | Facility: HOSPITAL | Age: 68
End: 2023-06-03

## 2023-06-03 ENCOUNTER — ANESTHESIA (INPATIENT)
Dept: PERIOP | Facility: HOSPITAL | Age: 68
End: 2023-06-03

## 2023-06-03 LAB
ABO GROUP BLD: NORMAL
ANION GAP SERPL CALCULATED.3IONS-SCNC: 1 MMOL/L (ref 4–13)
ANION GAP SERPL CALCULATED.3IONS-SCNC: 4 MMOL/L (ref 4–13)
BASOPHILS # BLD AUTO: 0.05 THOUSANDS/ÂΜL (ref 0–0.1)
BASOPHILS NFR BLD AUTO: 1 % (ref 0–1)
BLD GP AB SCN SERPL QL: NEGATIVE
BUN SERPL-MCNC: 22 MG/DL (ref 5–25)
BUN SERPL-MCNC: 26 MG/DL (ref 5–25)
CALCIUM SERPL-MCNC: 7.9 MG/DL (ref 8.3–10.1)
CALCIUM SERPL-MCNC: 8.2 MG/DL (ref 8.3–10.1)
CHLORIDE SERPL-SCNC: 102 MMOL/L (ref 96–108)
CHLORIDE SERPL-SCNC: 103 MMOL/L (ref 96–108)
CO2 SERPL-SCNC: 29 MMOL/L (ref 21–32)
CO2 SERPL-SCNC: 35 MMOL/L (ref 21–32)
CREAT SERPL-MCNC: 7.74 MG/DL (ref 0.6–1.3)
CREAT SERPL-MCNC: 7.76 MG/DL (ref 0.6–1.3)
EOSINOPHIL # BLD AUTO: 0.11 THOUSAND/ÂΜL (ref 0–0.61)
EOSINOPHIL NFR BLD AUTO: 3 % (ref 0–6)
ERYTHROCYTE [DISTWIDTH] IN BLOOD BY AUTOMATED COUNT: 14.7 % (ref 11.6–15.1)
ERYTHROCYTE [DISTWIDTH] IN BLOOD BY AUTOMATED COUNT: 22 % (ref 11.6–15.1)
GFR SERPL CREATININE-BSD FRML MDRD: 6 ML/MIN/1.73SQ M
GFR SERPL CREATININE-BSD FRML MDRD: 6 ML/MIN/1.73SQ M
GLUCOSE SERPL-MCNC: 118 MG/DL (ref 65–140)
GLUCOSE SERPL-MCNC: 184 MG/DL (ref 65–140)
GLUCOSE SERPL-MCNC: 186 MG/DL (ref 65–140)
GLUCOSE SERPL-MCNC: 97 MG/DL (ref 65–140)
HCT VFR BLD AUTO: 22.8 % (ref 36.5–49.3)
HCT VFR BLD AUTO: 24.2 % (ref 36.5–49.3)
HCT VFR BLD AUTO: 29.4 % (ref 36.5–49.3)
HGB BLD-MCNC: 7.6 G/DL (ref 12–17)
HGB BLD-MCNC: 7.7 G/DL (ref 12–17)
HGB BLD-MCNC: 9 G/DL (ref 12–17)
IMM GRANULOCYTES # BLD AUTO: 0.04 THOUSAND/UL (ref 0–0.2)
IMM GRANULOCYTES NFR BLD AUTO: 1 % (ref 0–2)
LYMPHOCYTES # BLD AUTO: 0.76 THOUSANDS/ÂΜL (ref 0.6–4.47)
LYMPHOCYTES NFR BLD AUTO: 19 % (ref 14–44)
MAGNESIUM SERPL-MCNC: 1.8 MG/DL (ref 1.6–2.6)
MCH RBC QN AUTO: 33.8 PG (ref 26.8–34.3)
MCH RBC QN AUTO: 35.2 PG (ref 26.8–34.3)
MCHC RBC AUTO-ENTMCNC: 30.6 G/DL (ref 31.4–37.4)
MCHC RBC AUTO-ENTMCNC: 31.8 G/DL (ref 31.4–37.4)
MCV RBC AUTO: 106 FL (ref 82–98)
MCV RBC AUTO: 115 FL (ref 82–98)
MONOCYTES # BLD AUTO: 0.28 THOUSAND/ÂΜL (ref 0.17–1.22)
MONOCYTES NFR BLD AUTO: 7 % (ref 4–12)
NEUTROPHILS # BLD AUTO: 2.71 THOUSANDS/ÂΜL (ref 1.85–7.62)
NEUTS SEG NFR BLD AUTO: 69 % (ref 43–75)
NRBC BLD AUTO-RTO: 0 /100 WBCS
PHOSPHATE SERPL-MCNC: 3.1 MG/DL (ref 2.3–4.1)
PLATELET # BLD AUTO: 125 THOUSANDS/UL (ref 149–390)
PLATELET # BLD AUTO: 133 THOUSANDS/UL (ref 149–390)
PMV BLD AUTO: 10.8 FL (ref 8.9–12.7)
PMV BLD AUTO: 10.9 FL (ref 8.9–12.7)
POTASSIUM SERPL-SCNC: 3.1 MMOL/L (ref 3.5–5.3)
POTASSIUM SERPL-SCNC: 3.2 MMOL/L (ref 3.5–5.3)
RBC # BLD AUTO: 2.28 MILLION/UL (ref 3.88–5.62)
RBC # BLD AUTO: 2.56 MILLION/UL (ref 3.88–5.62)
RH BLD: POSITIVE
SODIUM SERPL-SCNC: 136 MMOL/L (ref 135–147)
SODIUM SERPL-SCNC: 138 MMOL/L (ref 135–147)
SPECIMEN EXPIRATION DATE: NORMAL
WBC # BLD AUTO: 3.95 THOUSAND/UL (ref 4.31–10.16)
WBC # BLD AUTO: 8.62 THOUSAND/UL (ref 4.31–10.16)

## 2023-06-03 PROCEDURE — 85025 COMPLETE CBC W/AUTO DIFF WBC: CPT | Performed by: INTERNAL MEDICINE

## 2023-06-03 PROCEDURE — 85018 HEMOGLOBIN: CPT

## 2023-06-03 PROCEDURE — 03PY0JZ REMOVAL OF SYNTHETIC SUBSTITUTE FROM UPPER ARTERY, OPEN APPROACH: ICD-10-PCS | Performed by: SURGERY

## 2023-06-03 PROCEDURE — 87075 CULTR BACTERIA EXCEPT BLOOD: CPT | Performed by: SURGERY

## 2023-06-03 PROCEDURE — 86901 BLOOD TYPING SEROLOGIC RH(D): CPT | Performed by: PHYSICIAN ASSISTANT

## 2023-06-03 PROCEDURE — 83735 ASSAY OF MAGNESIUM: CPT | Performed by: STUDENT IN AN ORGANIZED HEALTH CARE EDUCATION/TRAINING PROGRAM

## 2023-06-03 PROCEDURE — 86900 BLOOD TYPING SEROLOGIC ABO: CPT | Performed by: PHYSICIAN ASSISTANT

## 2023-06-03 PROCEDURE — 99291 CRITICAL CARE FIRST HOUR: CPT | Performed by: INTERNAL MEDICINE

## 2023-06-03 PROCEDURE — 05BY3ZZ EXCISION OF UPPER VEIN, PERCUTANEOUS APPROACH: ICD-10-PCS | Performed by: SURGERY

## 2023-06-03 PROCEDURE — 82948 REAGENT STRIP/BLOOD GLUCOSE: CPT

## 2023-06-03 PROCEDURE — 87205 SMEAR GRAM STAIN: CPT | Performed by: SURGERY

## 2023-06-03 PROCEDURE — 99223 1ST HOSP IP/OBS HIGH 75: CPT | Performed by: INTERNAL MEDICINE

## 2023-06-03 PROCEDURE — 76937 US GUIDE VASCULAR ACCESS: CPT | Performed by: INTERNAL MEDICINE

## 2023-06-03 PROCEDURE — 87186 SC STD MICRODIL/AGAR DIL: CPT | Performed by: SURGERY

## 2023-06-03 PROCEDURE — 85014 HEMATOCRIT: CPT

## 2023-06-03 PROCEDURE — P9017 PLASMA 1 DONOR FRZ W/IN 8 HR: HCPCS

## 2023-06-03 PROCEDURE — 85027 COMPLETE CBC AUTOMATED: CPT | Performed by: STUDENT IN AN ORGANIZED HEALTH CARE EDUCATION/TRAINING PROGRAM

## 2023-06-03 PROCEDURE — 86850 RBC ANTIBODY SCREEN: CPT | Performed by: PHYSICIAN ASSISTANT

## 2023-06-03 PROCEDURE — 80048 BASIC METABOLIC PNL TOTAL CA: CPT | Performed by: STUDENT IN AN ORGANIZED HEALTH CARE EDUCATION/TRAINING PROGRAM

## 2023-06-03 PROCEDURE — 87077 CULTURE AEROBIC IDENTIFY: CPT | Performed by: SURGERY

## 2023-06-03 PROCEDURE — 80048 BASIC METABOLIC PNL TOTAL CA: CPT | Performed by: INTERNAL MEDICINE

## 2023-06-03 PROCEDURE — 87070 CULTURE OTHR SPECIMN AEROBIC: CPT | Performed by: SURGERY

## 2023-06-03 PROCEDURE — P9016 RBC LEUKOCYTES REDUCED: HCPCS

## 2023-06-03 PROCEDURE — 35903 EXCISION GRAFT EXTREMITY: CPT | Performed by: SURGERY

## 2023-06-03 PROCEDURE — 86923 COMPATIBILITY TEST ELECTRIC: CPT

## 2023-06-03 PROCEDURE — 84100 ASSAY OF PHOSPHORUS: CPT | Performed by: STUDENT IN AN ORGANIZED HEALTH CARE EDUCATION/TRAINING PROGRAM

## 2023-06-03 PROCEDURE — 36556 INSERT NON-TUNNEL CV CATH: CPT | Performed by: INTERNAL MEDICINE

## 2023-06-03 PROCEDURE — 87081 CULTURE SCREEN ONLY: CPT | Performed by: INTERNAL MEDICINE

## 2023-06-03 PROCEDURE — 99024 POSTOP FOLLOW-UP VISIT: CPT | Performed by: SURGERY

## 2023-06-03 RX ORDER — HYDROMORPHONE HCL/PF 1 MG/ML
0.5 SYRINGE (ML) INJECTION
Status: DISCONTINUED | OUTPATIENT
Start: 2023-06-03 | End: 2023-06-03 | Stop reason: HOSPADM

## 2023-06-03 RX ORDER — SENNOSIDES 8.6 MG
1 TABLET ORAL
Status: DISCONTINUED | OUTPATIENT
Start: 2023-06-03 | End: 2023-06-10

## 2023-06-03 RX ORDER — PROTAMINE SULFATE 10 MG/ML
INJECTION, SOLUTION INTRAVENOUS AS NEEDED
Status: DISCONTINUED | OUTPATIENT
Start: 2023-06-03 | End: 2023-06-03

## 2023-06-03 RX ORDER — FENTANYL CITRATE/PF 50 MCG/ML
25 SYRINGE (ML) INJECTION
Status: DISCONTINUED | OUTPATIENT
Start: 2023-06-03 | End: 2023-06-03 | Stop reason: HOSPADM

## 2023-06-03 RX ORDER — ALBUMIN, HUMAN INJ 5% 5 %
SOLUTION INTRAVENOUS CONTINUOUS PRN
Status: DISCONTINUED | OUTPATIENT
Start: 2023-06-03 | End: 2023-06-03

## 2023-06-03 RX ORDER — MAGNESIUM HYDROXIDE 1200 MG/15ML
LIQUID ORAL AS NEEDED
Status: DISCONTINUED | OUTPATIENT
Start: 2023-06-03 | End: 2023-06-03 | Stop reason: HOSPADM

## 2023-06-03 RX ORDER — LIDOCAINE HYDROCHLORIDE 10 MG/ML
INJECTION, SOLUTION EPIDURAL; INFILTRATION; INTRACAUDAL; PERINEURAL AS NEEDED
Status: DISCONTINUED | OUTPATIENT
Start: 2023-06-03 | End: 2023-06-03

## 2023-06-03 RX ORDER — CHLORHEXIDINE GLUCONATE 0.12 MG/ML
15 RINSE ORAL ONCE
Status: COMPLETED | OUTPATIENT
Start: 2023-06-03 | End: 2023-06-03

## 2023-06-03 RX ORDER — NOREPINEPHRINE BITARTRATE 1 MG/ML
INJECTION, SOLUTION INTRAVENOUS
Status: COMPLETED
Start: 2023-06-03 | End: 2023-06-03

## 2023-06-03 RX ORDER — CHLORHEXIDINE GLUCONATE 0.12 MG/ML
15 RINSE ORAL EVERY 12 HOURS SCHEDULED
Status: DISCONTINUED | OUTPATIENT
Start: 2023-06-03 | End: 2023-06-15 | Stop reason: HOSPADM

## 2023-06-03 RX ORDER — FENTANYL CITRATE 50 UG/ML
INJECTION, SOLUTION INTRAMUSCULAR; INTRAVENOUS AS NEEDED
Status: DISCONTINUED | OUTPATIENT
Start: 2023-06-03 | End: 2023-06-03

## 2023-06-03 RX ORDER — ROCURONIUM BROMIDE 10 MG/ML
INJECTION, SOLUTION INTRAVENOUS AS NEEDED
Status: DISCONTINUED | OUTPATIENT
Start: 2023-06-03 | End: 2023-06-03

## 2023-06-03 RX ORDER — ONDANSETRON 2 MG/ML
4 INJECTION INTRAMUSCULAR; INTRAVENOUS ONCE AS NEEDED
Status: DISCONTINUED | OUTPATIENT
Start: 2023-06-03 | End: 2023-06-03 | Stop reason: HOSPADM

## 2023-06-03 RX ORDER — POTASSIUM CHLORIDE 20 MEQ/1
40 TABLET, EXTENDED RELEASE ORAL
Status: DISPENSED | OUTPATIENT
Start: 2023-06-03 | End: 2023-06-03

## 2023-06-03 RX ORDER — ONDANSETRON 2 MG/ML
INJECTION INTRAMUSCULAR; INTRAVENOUS AS NEEDED
Status: DISCONTINUED | OUTPATIENT
Start: 2023-06-03 | End: 2023-06-03

## 2023-06-03 RX ORDER — PHENTOLAMINE MESYLATE 5 MG/1
5 INJECTION INTRAMUSCULAR; INTRAVENOUS ONCE
Status: COMPLETED | OUTPATIENT
Start: 2023-06-03 | End: 2023-06-03

## 2023-06-03 RX ORDER — EPHEDRINE SULFATE 50 MG/ML
INJECTION INTRAVENOUS AS NEEDED
Status: DISCONTINUED | OUTPATIENT
Start: 2023-06-03 | End: 2023-06-03

## 2023-06-03 RX ORDER — PROPOFOL 10 MG/ML
INJECTION, EMULSION INTRAVENOUS AS NEEDED
Status: DISCONTINUED | OUTPATIENT
Start: 2023-06-03 | End: 2023-06-03

## 2023-06-03 RX ORDER — SODIUM CHLORIDE 9 MG/ML
INJECTION, SOLUTION INTRAVENOUS CONTINUOUS PRN
Status: DISCONTINUED | OUTPATIENT
Start: 2023-06-03 | End: 2023-06-03

## 2023-06-03 RX ORDER — VANCOMYCIN HYDROCHLORIDE 500 MG/100ML
7.5 INJECTION, SOLUTION INTRAVENOUS
Status: DISCONTINUED | OUTPATIENT
Start: 2023-06-03 | End: 2023-06-04 | Stop reason: DRUGHIGH

## 2023-06-03 RX ORDER — HEPARIN SODIUM 1000 [USP'U]/ML
INJECTION, SOLUTION INTRAVENOUS; SUBCUTANEOUS AS NEEDED
Status: DISCONTINUED | OUTPATIENT
Start: 2023-06-03 | End: 2023-06-03

## 2023-06-03 RX ADMIN — POTASSIUM CHLORIDE 40 MEQ: 1500 TABLET, EXTENDED RELEASE ORAL at 16:55

## 2023-06-03 RX ADMIN — FENTANYL CITRATE 25 MCG: 50 INJECTION, SOLUTION INTRAMUSCULAR; INTRAVENOUS at 14:09

## 2023-06-03 RX ADMIN — FENTANYL CITRATE 25 MCG: 50 INJECTION, SOLUTION INTRAMUSCULAR; INTRAVENOUS at 12:21

## 2023-06-03 RX ADMIN — HEPARIN SODIUM 5000 UNITS: 5000 INJECTION INTRAVENOUS; SUBCUTANEOUS at 05:03

## 2023-06-03 RX ADMIN — NOREPINEPHRINE BITARTRATE 4 MG: 1 INJECTION, SOLUTION, CONCENTRATE INTRAVENOUS at 16:26

## 2023-06-03 RX ADMIN — EPHEDRINE SULFATE 5 MG: 50 INJECTION INTRAVENOUS at 09:37

## 2023-06-03 RX ADMIN — CYANOCOBALAMIN TAB 500 MCG 1000 MCG: 500 TAB at 08:06

## 2023-06-03 RX ADMIN — MIDODRINE HYDROCHLORIDE 10 MG: 5 TABLET ORAL at 16:55

## 2023-06-03 RX ADMIN — PHENYLEPHRINE HYDROCHLORIDE 60 MCG/MIN: 10 INJECTION INTRAVENOUS at 16:20

## 2023-06-03 RX ADMIN — HYDROMORPHONE HYDROCHLORIDE 0.5 MG: 1 INJECTION, SOLUTION INTRAMUSCULAR; INTRAVENOUS; SUBCUTANEOUS at 22:42

## 2023-06-03 RX ADMIN — PHENYLEPHRINE HYDROCHLORIDE 40 MCG/MIN: 10 INJECTION INTRAVENOUS at 09:42

## 2023-06-03 RX ADMIN — CLOPIDOGREL BISULFATE 75 MG: 75 TABLET ORAL at 08:06

## 2023-06-03 RX ADMIN — CHLORHEXIDINE GLUCONATE 15 ML: 1.2 SOLUTION ORAL at 07:09

## 2023-06-03 RX ADMIN — ROCURONIUM BROMIDE 10 MG: 10 INJECTION, SOLUTION INTRAVENOUS at 11:08

## 2023-06-03 RX ADMIN — SODIUM CHLORIDE: 0.9 INJECTION, SOLUTION INTRAVENOUS at 09:59

## 2023-06-03 RX ADMIN — PROTAMINE SULFATE 10 MG: 10 INJECTION, SOLUTION INTRAVENOUS at 12:21

## 2023-06-03 RX ADMIN — ALBUMIN (HUMAN): 12.5 INJECTION, SOLUTION INTRAVENOUS at 11:39

## 2023-06-03 RX ADMIN — ATORVASTATIN CALCIUM 20 MG: 20 TABLET, FILM COATED ORAL at 21:09

## 2023-06-03 RX ADMIN — FENTANYL CITRATE 25 MCG: 50 INJECTION, SOLUTION INTRAMUSCULAR; INTRAVENOUS at 13:47

## 2023-06-03 RX ADMIN — PHENYLEPHRINE HYDROCHLORIDE 50 MCG/MIN: 10 INJECTION INTRAVENOUS at 14:20

## 2023-06-03 RX ADMIN — PHENTOLAMINE MESYLATE 2.5 MG: 5 INJECTION, POWDER, FOR SOLUTION INTRAMUSCULAR; INTRAVENOUS at 15:35

## 2023-06-03 RX ADMIN — OXYCODONE HYDROCHLORIDE 5 MG: 5 TABLET ORAL at 06:31

## 2023-06-03 RX ADMIN — DESMOPRESSIN ACETATE 23.2 MCG: 4 SOLUTION INTRAVENOUS at 13:50

## 2023-06-03 RX ADMIN — FENTANYL CITRATE 25 MCG: 50 INJECTION, SOLUTION INTRAMUSCULAR; INTRAVENOUS at 10:50

## 2023-06-03 RX ADMIN — OXYCODONE HYDROCHLORIDE 5 MG: 5 TABLET ORAL at 02:17

## 2023-06-03 RX ADMIN — ROCURONIUM BROMIDE 10 MG: 10 INJECTION, SOLUTION INTRAVENOUS at 10:51

## 2023-06-03 RX ADMIN — ONDANSETRON 4 MG: 2 INJECTION INTRAMUSCULAR; INTRAVENOUS at 13:12

## 2023-06-03 RX ADMIN — PROTAMINE SULFATE 10 MG: 10 INJECTION, SOLUTION INTRAVENOUS at 12:20

## 2023-06-03 RX ADMIN — OXYCODONE HYDROCHLORIDE 5 MG: 5 TABLET ORAL at 20:55

## 2023-06-03 RX ADMIN — SODIUM CHLORIDE: 0.9 INJECTION, SOLUTION INTRAVENOUS at 09:26

## 2023-06-03 RX ADMIN — ROCURONIUM BROMIDE 30 MG: 10 INJECTION, SOLUTION INTRAVENOUS at 09:37

## 2023-06-03 RX ADMIN — NOREPINEPHRINE BITARTRATE 10 MCG/MIN: 1 SOLUTION INTRAVENOUS at 21:09

## 2023-06-03 RX ADMIN — SUGAMMADEX 200 MG: 100 INJECTION, SOLUTION INTRAVENOUS at 13:08

## 2023-06-03 RX ADMIN — FENTANYL CITRATE 25 MCG: 50 INJECTION, SOLUTION INTRAMUSCULAR; INTRAVENOUS at 10:48

## 2023-06-03 RX ADMIN — HEPARIN SODIUM 5000 UNITS: 1000 INJECTION INTRAVENOUS; SUBCUTANEOUS at 10:56

## 2023-06-03 RX ADMIN — PROTAMINE SULFATE 10 MG: 10 INJECTION, SOLUTION INTRAVENOUS at 12:19

## 2023-06-03 RX ADMIN — EPHEDRINE SULFATE 5 MG: 50 INJECTION INTRAVENOUS at 09:46

## 2023-06-03 RX ADMIN — CHLORHEXIDINE GLUCONATE 15 ML: 1.2 SOLUTION ORAL at 22:00

## 2023-06-03 RX ADMIN — PIPERACILLIN AND TAZOBACTAM 2.25 G: 2; .25 INJECTION, POWDER, FOR SOLUTION INTRAVENOUS at 22:12

## 2023-06-03 RX ADMIN — PIPERACILLIN AND TAZOBACTAM 2.25 G: 2; .25 INJECTION, POWDER, FOR SOLUTION INTRAVENOUS at 08:19

## 2023-06-03 RX ADMIN — Medication 2.5 MG: at 16:55

## 2023-06-03 RX ADMIN — VASOPRESSIN 0.04 UNITS/MIN: 20 INJECTION, SOLUTION INTRAMUSCULAR; SUBCUTANEOUS at 18:44

## 2023-06-03 RX ADMIN — MIDODRINE HYDROCHLORIDE 10 MG: 5 TABLET ORAL at 05:33

## 2023-06-03 RX ADMIN — ASPIRIN 81 MG: 81 TABLET, COATED ORAL at 08:06

## 2023-06-03 RX ADMIN — ROCURONIUM BROMIDE 10 MG: 10 INJECTION, SOLUTION INTRAVENOUS at 10:08

## 2023-06-03 RX ADMIN — FENTANYL CITRATE 25 MCG: 50 INJECTION, SOLUTION INTRAMUSCULAR; INTRAVENOUS at 09:34

## 2023-06-03 RX ADMIN — PROPOFOL 90 MG: 10 INJECTION, EMULSION INTRAVENOUS at 09:36

## 2023-06-03 RX ADMIN — ACETAMINOPHEN 975 MG: 325 TABLET, FILM COATED ORAL at 05:03

## 2023-06-03 RX ADMIN — LIDOCAINE HYDROCHLORIDE 50 MG: 10 INJECTION, SOLUTION EPIDURAL; INFILTRATION; INTRACAUDAL; PERINEURAL at 09:36

## 2023-06-03 RX ADMIN — EPHEDRINE SULFATE 10 MG: 50 INJECTION INTRAVENOUS at 12:36

## 2023-06-03 NOTE — OP NOTE
OPERATIVE REPORT  PATIENT NAME: Aleida Wagner    :  1955  MRN: [id number]  Pt Location: [facility] OR ROOM 07    SURGERY DATE: 6/3/2023    Surgeon(s) and Role:     Iain Rider MD - Primary     * Shelley Ndiaye MD - [address] Cecilia Peters DO - Fellow    Preop Diagnosis:  * No pre-op diagnosis entered *    * No Diagnosis Codes entered *    Procedure(s):  Left - LEFT UPPER EXTREMITY A-V GRAFT EXPLANT  LEFT BRACHIAL ANGIOPATCH  APPLICATION OF  VAC    Specimen(s):  ID Type Source Tests Collected by Time Destination   A : left arm wound  Wound Wound ANAEROBIC CULTURE AND GRAM STAIN, STAT GRAM STAIN (Canceled), Alyssa Rider MD 6/3/2023 1014        Estimated Blood Loss:   300 mL    Drains:  * No LDAs found *    Anesthesia Type:   General    Operative Indications:  * No pre-op diagnosis entered *    78-year-old male with PMH LUE AVG ( MQ), ESRD c RIJ TDC, HTN, DM, CKD, CAD c MI/PCI, CVA presenting with concern of left upper extremity AV graft wound dehiscence and graft infection  Discussed risk and benefits of intervention  Operative Findings:    Left upper extremity venous exposure grossly dehisced with necrosis tracking down to the PTFE graft  The graft was easily exposed in the wound and poorly incorporated in the surrounding tissues  The anastomosis was readily visible in the wound  Left upper extremity brachial exposure skin incision well-healed  However upon opening the incision the proximal portion  Left upper extremity brachial to axillary PTFE graft excised and completion  The graft was easily removed and poorly incorporated along the tunnel  Left venous anastomosis taken down and primary repair axillary vein  Left brachial anastomosis taken down with basilic vein patch repair  There was a biphasic Doppler signal distal to repair as well as signal in the radial artery at conclusion      Patient had significant blood loss in the operating room from inflammatory tissues and diffuse oozing  Required 1 unit of blood as well as FFP and DDAVP  Complications:   None    Procedure and Technique:    Patient was seen and examined in the preoperative holding room  The correct patient surgical site indication was confirmed with the preoperative nursing staff as well as anesthesia team   The patient was taken to the operating room and placed supine on the operating room table  The anesthesiology team induced general anesthesia with endotracheal intubation  The left upper extremity was then prepped and draped in normal sterile fashion with Betadine  Preoperative antibiotics were administered  A timeout was performed confirming again the correct patient procedure surgical site and surgical team members  Attention was turned to the left upper extremity venous exposure  The staples were removed and the wound explored  The tissues were poorly incorporated incorporated and easily fell apart revealing the venous anastomosis of the brachial axillary PTFE graft  Anastomosis was clearly visible  Wound cultures were obtained  There is no gross purulence however the wound was open and draining with necrotic fat and turbid fluid  Proximal distal control was obtained on axillary vein  The PTFE graft was ligated with 0 silk suture at the level of the venous anastomosis  An 11 blade scalpel was used to release the anastomosis and it was taken off in its entirety from the axillary vein  Then 5-0 Prolene was used to repair the venotomy  The vein was severely stenosed after the repair however there did not appear to be flow in it based on Doppler evaluation  There was significant bleeding from the inflammatory tissues in the surgical site  The wound was packed with combination of Surgicel and Gelfoam thrombin  Next attention was turned to the brachial exposure  The previous skin staples were removed and a 15 blade scalpel was used to reopen the previous excision  The tissue fell apart easily revealing the brachial artery anastomosis of the PTFE graft  There was no tushar pus or abscess  Proximal distal control was obtained of the brachial artery with Silastic loops  Patient was heparinized with 5000 units of heparin  After adequate time the brachial artery was clamped proximally distally with vascular profunda clamps  An 11 blade scalpel was then used to release the arterial anastomosis  The PTFE graft was then removed in its entirety from the field and sent for cultures  Then attention was turned to the brachial artery  The arteriotomy was friable with poor integrity  The decision was made to harvest vein for a patch angioplasty repair  A sterile ultrasound probe was then introduced to the field  A portion of what appeared to be a basilic vein short segment that was patent was then marked  A 15 blade scalpel was then used to make a skin incision overlying the marked vein  A combination of electrocautery dissection and sharp dissection was then used to isolate the vein  Proximal distal control were obtained  The vein was ligated with clips and silk ties for an approximately 2 cm segment  This was passed off the field and prepared for a vein patch  The vein was then split along its longitudinal axis and a valve removed  Then the vein patch was reintroduced to the brachial artery site  A 6-0 Prolene was used to anastomose the vein to the brachial artery in a running fashion  The patch site was flushed prior to completion  After the anastomosis was complete the clamps were removed  A Doppler was then used to evaluate the brachial artery repair  There is a biphasic signal in the proximal portion to the repair as well as the distal portion of the paired brachial artery  There is also a biphasic signal in the radial artery  The patch angioplasty appear to be hemostatic  The wound was then packed with Surgicel and Gelfoam thrombin      There continued to be significant blood loss due to diffuse oozing of the surgical sites  In coordination with the anesthesiology team 1 unit of PRBC was transfused as well as FFP and DDAVP  The patient received 30 mg of protamine  Each surgical site was diligently inspected for hemostasis and pressure held with a combination of hemostatic products for prolonged period of time to allow adequate hemostasis  The wounds were then copiously irrigated with antibiotic solution  The vein harvest site was then closed with 2-0 nylons in vertical mattress manner  The axillary vein was then covered with approximated subcutaneous tissue using 2-0 Vicryl  The brachial artery was then covered with soft tissue using 2-0 Vicryl  A medium black sponge was then cut to size for both wounds  A wound VAC was then placed in a bridging manner and connected to the wound VAC suction canister  The patient was then awoken from anesthesia and tolerated seizure well  Dr Juan David Mendez Dr  Was present for all steps of the procedure  All sharps and counts were correct x2      Patient Disposition:  PACU     SIGNATURE: Maame Mccall DO  DATE: Lilly 3, 2023  TIME: 3:49 PM

## 2023-06-03 NOTE — PROCEDURES
Central Line    Date/Time: 6/2/2023 8:46 PM    Performed by: Chantel Harris DO  Authorized by: Chantel Harris DO    Patient location:  ICU  Other Assisting Provider: No    Consent:     Consent obtained:  Verbal    Consent given by:  Patient    Risks discussed:  Arterial puncture, bleeding, infection, incorrect placement, nerve damage and pneumothorax    Alternatives discussed:  Referral, observation, alternative treatment, delayed treatment and no treatment  Universal protocol:     Procedure explained and questions answered to patient or proxy's satisfaction: yes      Relevant documents present and verified: yes      Test results available and properly labeled: yes      Radiology Images displayed and confirmed  If images not available, report reviewed: yes      Required blood products, implants, devices, and special equipment available: yes      Site/side marked: yes      Immediately prior to procedure, a time out was called: yes      Patient identity confirmed:  Verbally with patient, arm band, provided demographic data and hospital-assigned identification number  Pre-procedure details:     Hand hygiene: Hand hygiene performed prior to insertion      Sterile barrier technique: All elements of maximal sterile technique followed      Skin preparation:  2% chlorhexidine    Skin preparation agent: Skin preparation agent completely dried prior to procedure    Indications:     Central line indications: medications requiring central line, hemodynamic monitoring and no peripheral vascular access    Anesthesia (see MAR for exact dosages):      Anesthesia method:  Local infiltration    Local anesthetic:  Lidocaine 1% w/o epi  Procedure details:     Location:  Left femoral    Vessel type: vein      Laterality:  Left    Approach: percutaneous technique used      Patient position:  Flat    Catheter type:  Triple lumen 20cm    Landmarks identified: yes      Ultrasound guidance: yes      Ultrasound image availability:  Images available in PACS and still images obtained    Sterile ultrasound techniques: Sterile gel and sterile probe covers were used      Number of attempts:  3    Successful placement: yes    Post-procedure details:     Post-procedure:  Line sutured and dressing applied    Assessment:  Blood return through all ports and free fluid flow    Post-procedure complications: none      Patient tolerance of procedure:   Tolerated well, no immediate complications

## 2023-06-03 NOTE — CONSULTS
Consultation - Nephrology   Debby Bishop 70 y o  male MRN: [id number]  Unit/Bed#: LUNA [facility]ORE Encounter: [id number]      Assessment/Plan:  1  End-stage renal disease maintenance hemodialysis currently Tuesday Thursday Saturday, outpatient clinic [facility]  2  Left upper arm wound dehiscence where AV graft placed on 5/19  Status post graft explant  Currently with right chest wall permacath  3  Anemia of chronic kidney disease currently not on ALICE therapy  4  CKD associated bone disorder, continue with calcitriol with dialysis sessions  5  Chronic hypotension, continue with midodrine 10 mg 3 times daily  6  Coronary artery disease with recent PCI and drug-eluting stent placement  Plan:  · Patient seen in PACU  Patient with systolic blood pressures in the 70s  Discussed with anesthesia  Patient to be started on phenylephrine  Would resume midodrine 10 mg 3 times daily when able to tolerate oral medications  We will hold on hemodialysis today  Volume status appears stable  Potassium slightly low at 3 1  Avoid aggressive supplementation given patient's ESRD status  Continue to monitor hemoglobin closely recently 9 0  Previously not on ALICE therapy  History of Present Illness   Physician Requesting Consult: Margery Lombard, MD  Reason for Consult / Principal Problem: End-stage renal disease  HPI: Simona Blakely is a 70y o  year old male who presents with wound dehiscence of left upper arm AV graft  Patient is a 21-year-old male who presented to the hospital after being seen in the outpatient office  He was advised to follow-up given apparent wound dehiscence  Denied any fever chills  Denied any drainage or significant erythema  Patient taken to the OR today for wound exploration washout and debridement and graft explant  Seen in the PACU  Complaining of significant pain of the left arm  Does not appear short of breath  Denies any chest pain or pressure  No reported abdominal pain  Systolic blood pressure was noted to be in the 70s  History obtained from chart review and the patient    Review of Systems   Constitutional: Positive for fatigue  Respiratory: Negative for chest tightness and shortness of breath  Cardiovascular: Negative for chest pain  Gastrointestinal: Negative for abdominal pain  Neurological: Negative for dizziness and light-headedness         Pertinent findings of a 10 point review of systems noted above otherwise all others negative    Historical Information   Patient Active Problem List   Diagnosis   • Acute on chronic anemia   • Coronary artery disease involving native coronary artery   • Hypertension   • Diabetes with neurologic complications (HCC)   • Elevated serum alkaline phosphatase level   • ESRD (end stage renal disease) on dialysis ([place] [address] )   • Intestinal malabsorption   • Irritable bowel syndrome with diarrhea   • Mixed hyperlipidemia   • Nicotine dependence   • Organic impotence   • Pernicious anemia   • Type 2 diabetes mellitus ([place] [address] )   • Encounter for extracorporeal dialysis ([place] [address] )   • AV fistula thrombosis, initial encounter ([place] [address] )   • Arteriovenous fistula ([place] [address] )   • Right intertrochanteric femur fracture   • Thrombocytopenia ([place] [address] )   • Dependence on renal dialysis ([place] [address] )   • Vitamin D deficiency, unspecified   • End-stage renal disease on hemodialysis ([place] [address] )   • Chronic kidney disease   • Chronic deep vein thrombosis (DVT) of internal jugular vein (HCC)   • S/P angioplasty with stent   • Hypotension   • Infection of AV graft for dialysis [facility])     Past Medical History:   Diagnosis Date   • 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    With Cerebral Infarction:  Last Assessed:  October 20, 2016   • Chronic kidney disease 2012    on dialysis    • COVID 05/2022   • Diabetes mellitus ([place] [address] )    • Dialysis patient [facility])     T/TH/Sat   • GERD (gastroesophageal reflux disease)    • Hyperlipidemia    • Hypertension    • Labyrinthitis 09/10/2011   • Myocardial infarction ([place] [address] ) "2014    x3 others were in 2009 & 2010   • Sleep disturbances 09/20/2010   • Stroke [facility]) 2007    x1, RLE deficit- uses walker   • Type 2 diabetes, uncontrolled, with renal manifestation 05/03/2017     Past Surgical History:   Procedure Laterality Date   • AV FISTULA PLACEMENT     • CARDIAC CATHETERIZATION Left 02/03/2023    Procedure: Cardiac Left Heart Cath;  Surgeon: Susie De La Cruz MD;  Location: [place] [address] CATH LAB; Service: Cardiology   • CARDIAC CATHETERIZATION N/A 02/08/2023    Procedure: Cardiac catheterization with complex PCI with Dr Rosalva Taylor, patient is a renal dialysis patient;  Surgeon: Tawny Posey MD;  Location: [facility] CARDIAC CATH LAB; Service: Cardiology   • CARDIAC SURGERY  2010    stents x3   • COLONOSCOPY N/A 12/12/2016    Procedure: COLONOSCOPY;  Surgeon: Abel Palacios MD;  Location: [facility] GI LAB; Service:    • COLONOSCOPY N/A 04/03/2017    Procedure:  COLONOSCOPY;  Surgeon: Abel Palacios MD;  Location: [facility] GI LAB; Service:    • HARDWARE REMOVAL Right 04/04/2022    Procedure: REMOVAL HARDWARE HIP;  Surgeon: Gabby Puga MD;  Location: [facility] MAIN OR;  Service: Orthopedics   • IR AV FISTULA/GRAFT DECLOT  04/29/2021   • IR AV FISTULA/GRAFT DECLOT  03/25/2022   • IR AV FISTULA/GRAFT DECLOT  09/21/2022   • IR AV FISTULAGRAM/GRAFTOGRAM  03/28/2022   • IR AV FISTULAGRAM/GRAFTOGRAM  07/28/2022   • IR TUNNELED CENTRAL LINE REMOVAL  12/07/2021   • IR TUNNELED DIALYSIS CATHETER PLACEMENT  05/24/2021   • PORTACATH PLACEMENT      per pt \"port in chest\"   • WA ARTERIOVENOUS ANASTOMOSIS OPEN DIRECT Left 07/22/2021    Procedure: CREATION FISTULA ARTERIOVENOUS (AV);   Surgeon: Yimi Galindo MD;  Location: [address] [place] [address];  Service: Vascular   • WA ARTERIOVENOUS ANASTOMOSIS OPEN DIRECT Left 3/21/2023    Procedure: left brachiobasilic fistula,  AVG Graft;  Surgeon: Liana Smith MD;  Location: [facility] MAIN OR;  Service: Vascular   • WA ARTERIOVENOUS ANASTOMOSIS OPEN DIRECT Left 5/19/2023    Procedure: " CREATION of LEFT FISTULA ARTERIOVENOUS (AV) GRAFT;  Surgeon: Josué Reeder MD;  Location: [facility] MAIN OR;  Service: Vascular   • VT HEMIARTHROPLASTY HIP PARTIAL Right 04/04/2022    Procedure: HEMIARTHROPLASTY HIP (BIPOLAR);   Surgeon: Ervin Novoa MD;  Location: [facility] MA[facility] OR;  Service: Orthopedics     Social History   Social History     Substance and Sexual Activity   Alcohol Use Yes    Comment: rarely - No alcohol use per [employer]     Social History     Substance and Sexual Activity   Drug Use Not Currently   • Types: Marijuana    Comment: occasional brownie for sleep     Social History     Tobacco Use   Smoking Status Every Day   • Packs/day: 0 25   • Years: 30 00   • Total pack years: 7 50   • Types: Cigarettes   Smokeless Tobacco Never     Family History   Problem Relation Age of Onset   • Leukemia Mother    • Crohn's disease Father    • Transient ischemic attack Brother        Meds/Allergies   current meds:   Current Facility-Administered Medications   Medication Dose Route Frequency   • [MAR Hold] acetaminophen (TYLENOL) tablet 975 mg  975 mg Oral Q8H [address]   • [MAR Hold] aspirin (ECOTRIN LOW STRENGTH) EC tablet 81 mg  81 mg Oral Daily   • [MAR Hold] atorvastatin (LIPITOR) tablet 20 mg  20 mg Oral HS   • [MAR Hold] b complex-vitamin C-folic acid (RENAL) capsule 1 capsule  1 capsule Oral Daily   • [MAR Hold] clopidogrel (PLAVIX) tablet 75 mg  75 mg Oral Daily   • [MAR Hold] cyanocobalamin (VITAMIN B-12) tablet 1,000 mcg  1,000 mcg Oral Daily   • desmopressin (DDAVP) 23 2 mcg in sodium chloride 0 9 % 50 mL IVPB  0 3 mcg/kg Intravenous Once   • fentaNYL (SUBLIMAZE) injection 25 mcg  25 mcg Intravenous Q5 Min PRN   • [MAR Hold] heparin (porcine) subcutaneous injection 5,000 Units  5,000 Units Subcutaneous Q8H [address]   • [MAR Hold] HYDROmorphone (DILAUDID) injection 0 5 mg  0 5 mg Intravenous Q4H PRN   • HYDROmorphone (DILAUDID) injection 0 5 mg  0 5 mg Intravenous Q10 Min PRN   • [MAR Hold] midodrine "(PROAMATINE) tablet 10 mg  10 mg Oral TID AC   • [MAR Hold] ondansetron (ZOFRAN) injection 4 mg  4 mg Intravenous Q6H PRN   • ondansetron (ZOFRAN) injection 4 mg  4 mg Intravenous Once PRN   • [MAR Hold] oxyCODONE (ROXICODONE) IR tablet 5 mg  5 mg Oral Q4H PRN   • [MAR Hold] oxyCODONE (ROXICODONE) split tablet 2 5 mg  2 5 mg Oral Q4H PRN   • phenylephrine (ABDOULAYE-SYNEPHRINE) 50 mg (STANDARD CONCENTRATION) in sodium chloride 0 9% 250 mL   mcg/min Intravenous Titrated   • [MAR Hold] piperacillin-tazobactam (ZOSYN) 2 25 g in sodium chloride 0 9 % 50 mL IVPB  2 25 g Intravenous Q12H   • [MAR Hold] vancomycin (VANCOCIN) IVPB (premix in dextrose) 500 mg 100 mL  7 5 mg/kg Intravenous Once per day on Tue Thu Sat       No Known Allergies      Objective   BP (!) 84/47   Pulse 84   Temp (!) 96 3 °F (35 7 °C)   Resp (!) 23   Ht 5' 10\" (1 778 m)   Wt 76 7 kg (169 lb)   SpO2 100%   BMI 24 25 kg/m²     Intake/Output Summary (Last 24 hours) at 6/3/2023 1355  Last data filed at 6/3/2023 1327  Gross per 24 hour   Intake 1350 ml   Output 300 ml   Net 1050 ml       Current Weight: Weight - Scale: 76 7 kg (169 lb)    Physical Exam  Constitutional:       Appearance: He is not ill-appearing  Eyes:      General: No scleral icterus  Cardiovascular:      Rate and Rhythm: Normal rate and regular rhythm  Pulmonary:      Effort: Pulmonary effort is normal       Breath sounds: Normal breath sounds  Abdominal:      General: There is no distension  Palpations: Abdomen is soft  Tenderness: There is no abdominal tenderness  Musculoskeletal:         General: No deformity  Right lower leg: No edema  Left lower leg: No edema  Comments: Left upper arm in Ace wrap  Noted right chest wall permacath in place  Lymphadenopathy:      Cervical: No cervical adenopathy  Skin:     General: Skin is warm and dry  Findings: No rash     Neurological:      Mental Status: He is alert and oriented to person, place, " and time             Lab Results:    Results from last 7 days   Lab Units 06/03/23  0457   HEMATOCRIT % 29 4*   HEMOGLOBIN g/dL 9 0*   PLATELETS Thousands/uL 133*   WBC Thousand/uL 3 95*     Results from last 7 days   Lab Units 06/03/23  0457   BUN mg/dL 22   CALCIUM mg/dL 8 2*   CHLORIDE mmol/L 102   CO2 mmol/L 35*   CREATININE mg/dL 7 76*   POTASSIUM mmol/L 3 1*

## 2023-06-03 NOTE — ASSESSMENT & PLAN NOTE
Lab Results   Component Value Date    CREATININE 6 78 (H) 06/02/2023    CREATININE 8 40 (H) 05/23/2023    CREATININE 6 73 (H) 05/22/2023    EGFR 7 06/02/2023    EGFR 5 05/23/2023    EGFR 7 05/22/2023   Gets usual dialysis TTS at [address] [place]  access: Left-sided AV graft placed 5/19/2023   Currently has permacath    Consult nephrology

## 2023-06-03 NOTE — PROGRESS NOTES
Deuce Lipoma is a 70 y o  male who is currently ordered Vancomycin IV with management by the Pharmacy Consult service  Relevant clinical data and objective / subjective history reviewed  Vancomycin Assessment:  Indication and Goal AUC/Trough: Soft tissue (goal -600, trough >10), -600, trough >10  Micro:   Blood cultures pending  Renal Function:  Renal replacement: HD  Days of Therapy: 1  Current Dose: 1250 mg IV loading dose  Vancomycin Plan:  New Dosin mg IV after dialysis (TTSa)  Next Level: Prior to 3rd inpatient dialysis session (23)  Renal Function Monitoring: Daily BMP and UOP  Pharmacy will continue to follow closely for s/sx of nephrotoxicity, infusion reactions and appropriateness of therapy  BMP and CBC will be ordered per protocol  We will continue to follow the patient’s culture results and clinical progress daily      Sharon Estrada, Pharmacist

## 2023-06-03 NOTE — ANESTHESIA POSTPROCEDURE EVALUATION
Post-Op Assessment Note    CV Status:  Stable  Pain Score: 0    Pain management: adequate     Mental Status:  Awake   Hydration Status:  Stable   PONV Controlled:  None   Airway Patency:  Patent   Two or more mitigation strategies used for obstructive sleep apnea   Post Op Vitals Reviewed: Yes      Staff: CRNA         No notable events documented      BP  95/58   Temp   96 3F   Pulse  94   Resp 20   SpO2 100% RA

## 2023-06-03 NOTE — H&P
[facility]  H&P  Name: Harpreet Man 70 y o  male I MRN: [id number]  [facility] I Date of Admission: 6/2/2023   Date of Service: 6/2/2023 I Hospital Day: 0      Assessment/Plan   * Infection of AV graft for dialysis [facility])  Assessment & Plan  79-year-old male with history of end-stage renal disease and history of failed left radiocephalic and left brachiocephalic AV fistulas  Status post left brachial axillary AVG left upper extremity on 5/19  Coumadin was switched to oral Eliquis last admission due to patient not compliant with lab draws and hard stick     Patient now presented with left upper extremity wound dehiscence of AV graft  ER wound imaging reviewed  Per vascular, based on imaging the  graft is likely exposed to air and this is represent infected prosthetic material with risk of significant hemorrhage and limb loss if left in place    Patient is afebrile without leukocytosis  He  was transferred to [facility] for AV graft explant by vascular  Already started on broad-spectrum IV antibiotic  Continue with IV antibiotic  Follow on blood culture  Pain control  Hold Eliquis for now      End-stage renal disease on hemodialysis [facility])  Assessment & Plan  Lab Results   Component Value Date    CREATININE 6 78 (H) 06/02/2023    CREATININE 8 40 (H) 05/23/2023    CREATININE 6 73 (H) 05/22/2023    EGFR 7 06/02/2023    EGFR 5 05/23/2023    EGFR 7 05/22/2023   Gets usual dialysis TTS at [facility]  access: Left-sided AV graft placed 5/19/2023   Currently has permacath  Consult nephrology    Hypotension  Assessment & Plan  Was placed on midodrine last admission due to hypotension  Monitor    Coronary artery disease involving native coronary artery  Assessment & Plan  Had triple vessel disease by cath in April 2008, s/p PTCA with bare metal stent   s/p MI in 2009, 2010 and acute NSTEMI on 02/02/2011 s/p PCI/KARLOS of distal RCA  S/p cardiac cath in 2/8/2023 drug-eluting stents were placed in the mid and proximal RCA, and  KARLOS was placed in the RCA ostium  Continue aspirin, Plavix and statin  Patient is not on a beta-blocker because of low blood pressure  Outpatient cardiology follow-up    Acute on chronic anemia  Assessment & Plan  Lab Results   Component Value Date    CREATININE 6 78 (H) 06/02/2023    CREATININE 8 40 (H) 05/23/2023    CREATININE 6 73 (H) 05/22/2023    EGFR 7 06/02/2023    EGFR 5 05/23/2023    EGFR 7 05/22/2023     Stable  Monitor       VTE Pharmacologic Prophylaxis: VTE Score: 5 High Risk (Score >/= 5) - Pharmacological DVT Prophylaxis Ordered: apixaban (Eliquis)  Sequential Compression Devices Ordered  Code Status:   Discussion with family: Patient's wife is aware  Anticipated Length of Stay: Patient will be admitted on an inpatient basis with an anticipated length of stay of greater than 2 midnights secondary to Management of AV graft infection  Total Time Spent on Date of Encounter in care of patient: 90 minutes This time was spent on one or more of the following: performing physical exam; counseling and coordination of care; obtaining or reviewing history; documenting in the medical record; reviewing/ordering tests, medications or procedures; communicating with other healthcare professionals and discussing with patient's family/caregivers  Chief Complaint:   Open  wound    History of Present Illness:  Luis Eduardo Frederick is a 70 y o  male with a PMH of end-stage renal disease on hemodialysis, CAD status post stents, anemia of chronic disease, who presents with open wound  Patient is status post left brachial basilic AV graft done on 3/21/2023 however unfortunately was thrombosed  He underwent left brachial axillary AVG of left upper extremity on 5/19/23 and was discharged on Eliquis    Patient went went to see his PCP for postop check and found some of the staples on his graft had fallen, with open wound and some drainage out of it  Does not appear to be bleeding  Patient denies any fevers chills  No bleeding    Patient was evaluated in the emergency room, he is afebrile without leukocytosis  Started on IV Zosyn and vancomycin    Currently comfortable in bed  Mild pain at the wound site  Review of Systems:  Review of Systems   Constitutional: Negative for chills and fever  HENT: Negative for sore throat  Respiratory: Negative for cough, chest tightness and shortness of breath  Cardiovascular: Negative for chest pain, palpitations and leg swelling  Gastrointestinal: Negative for abdominal pain, blood in stool, diarrhea, nausea and vomiting  Musculoskeletal:        Left wound site pain and swelling   Neurological: Negative for dizziness, speech difficulty and headaches  All other systems reviewed and are negative  Past Medical and Surgical History:   Past Medical History:   Diagnosis Date   • 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    With Cerebral Infarction:  Last Assessed:  October 20, 2016   • Chronic kidney disease 2012    on dialysis    • COVID 05/2022   • Diabetes mellitus ([facility] [address] )    • Dialysis patient [facility])     T/TH/Sat   • GERD (gastroesophageal reflux disease)    • Hyperlipidemia    • Hypertension    • Labyrinthitis 09/10/2011   • Myocardial infarction [facility]) 2014    x3 others were in 2009 & 2010   • Sleep disturbances 09/20/2010   • Stroke [facility]) 2007    x1, RLE deficit- uses walker   • Type 2 diabetes, uncontrolled, with renal manifestation 05/03/2017       Past Surgical History:   Procedure Laterality Date   • AV FISTULA PLACEMENT     • CARDIAC CATHETERIZATION Left 02/03/2023    Procedure: Cardiac Left Heart Cath;  Surgeon: Bridget De La Paz MD;  Location: [place] [address] CATH LAB; Service: Cardiology   • CARDIAC CATHETERIZATION N/A 02/08/2023    Procedure: Cardiac catheterization with complex PCI with Dr Peter Bynum, patient is a renal dialysis patient;  Surgeon: Cristina Simon MD;  Location: [facility] CARDIAC CATH LAB;   Service: Cardiology "  • CARDIAC SURGERY  2010    stents x3   • COLONOSCOPY N/A 12/12/2016    Procedure: COLONOSCOPY;  Surgeon: Dontae Patel MD;  Location: [facility] GI LAB; Service:    • COLONOSCOPY N/A 04/03/2017    Procedure:  COLONOSCOPY;  Surgeon: Dontae Patel MD;  Location: [facility] GI LAB; Service:    • HARDWARE REMOVAL Right 04/04/2022    Procedure: REMOVAL HARDWARE HIP;  Surgeon: Laurance Kawasaki, MD;  Location: [facility] MAIN OR;  Service: Orthopedics   • IR AV FISTULA/GRAFT DECLOT  04/29/2021   • IR AV FISTULA/GRAFT DECLOT  03/25/2022   • IR AV FISTULA/GRAFT DECLOT  09/21/2022   • IR AV FISTULAGRAM/GRAFTOGRAM  03/28/2022   • IR AV FISTULAGRAM/GRAFTOGRAM  07/28/2022   • IR TUNNELED CENTRAL LINE REMOVAL  12/07/2021   • IR TUNNELED DIALYSIS CATHETER PLACEMENT  05/24/2021   • PORTACATH PLACEMENT      per pt \"port in chest\"   • RI ARTERIOVENOUS ANASTOMOSIS OPEN DIRECT Left 07/22/2021    Procedure: CREATION FISTULA ARTERIOVENOUS (AV); Surgeon: Thomas Mayer MD;  Location: [address] [place] [address];  Service: Vascular   • RI ARTERIOVENOUS ANASTOMOSIS OPEN DIRECT Left 3/21/2023    Procedure: left brachiobasilic fistula,  AVG Graft;  Surgeon: Alisha Hu MD;  Location: [facility] MAIN [facility];  Service: Vascular   • RI ARTERIOVENOUS ANASTOMOSIS OPEN DIRECT Left 5/19/2023    Procedure: CREATION of LEFT FISTULA ARTERIOVENOUS (AV) GRAFT;  Surgeon: Alisha Hu MD;  [facility];  Service: Vascular   • RI HEMIARTHROPLASTY HIP PARTIAL Right 04/04/2022    Procedure: HEMIARTHROPLASTY HIP (BIPOLAR); Surgeon: Laurance Kawasaki, MD;  Location: [facility] MA[facility];  Service: Orthopedics       Meds/Allergies:  Prior to Admission medications    Medication Sig Start Date End Date Taking?  Authorizing Provider   apixaban (Eliquis) 5 mg Take 1 tablet (5 mg total) by mouth 2 (two) times a day 5/24/23 6/23/23  Louise Mishra MD   ascorbic acid (VITAMIN C) 500 mg tablet TAKE ONE TABLET BY MOUTH EVERY EVENING FOR SUPPLEMENT 1/26/22   Historical Provider, MD " aspirin (ECOTRIN LOW STRENGTH) 81 mg EC tablet Take 81 mg by mouth daily 1/26/22   Historical Provider, MD   atorvastatin (LIPITOR) 20 mg tablet Take 20 mg by mouth daily at bedtime 1/26/22   Historical Provider, MD   B Complex-C-Folic Acid (TRIPHROCAPS) 1 MG CAPS Take 1 capsule by mouth daily 2/20/17   Historical Provider, MD   Cholecalciferol 50 MCG (2000 UT) CAPS Take by mouth daily 3/22/21   Historical Provider, MD [place] Comfort EZ 33G X 4 MM MISC  1/31/22   Historical Provider, MD   Clopidogrel Bisulfate (PLAVIX PO) Take by mouth    Historical Provider, MD   glucose blood test strip Test 2 (two) times a day 7/18/11   Historical Provider, MD   midodrine (PROAMATINE) 10 MG tablet Take 1 tablet (10 mg total) by mouth 3 (three) times a day before meals 5/24/23 6/23/23  Julio Baxter MD   vitamin B-12 (VITAMIN B-12) 1,000 mcg tablet Take 1,000 mcg by mouth daily 1/24/23   Historical Provider, MD   loperamide (IMODIUM) 2 mg capsule Take 2 mg by mouth 4 (four) times a day as needed for diarrhea  Patient not taking: Reported on 6/2/2023 6/2/23  Historical Provider, MD     I have reviewed home medications with patient personally      Allergies: No Known Allergies    Social History:  Marital Status: /Civil Union     Substance Use History:   Social History     Substance and Sexual Activity   Alcohol Use Yes    Comment: rarely - No alcohol use per [employer]     Social History     Tobacco Use   Smoking Status Every Day   • Packs/day: 0 25   • Years: 30 00   • Total pack years: 7 50   • Types: Cigarettes   Smokeless Tobacco Never     Social History     Substance and Sexual Activity   Drug Use Not Currently   • Types: Marijuana    Comment: occasional brownie for sleep       Family History:    Family History   Problem Relation Age of Onset   • Leukemia Mother    • Crohn's disease Father    • Transient ischemic attack Brother      Physical Exam:     Vitals:   Blood Pressure: (!) 83/51 (06/02/23 2104)  Temperature: 97 5 °F (36 4 °C) (06/02/23 2104)    Physical Exam  Vitals reviewed  Constitutional:       Appearance: Normal appearance  He is not ill-appearing  HENT:      Head: Normocephalic and atraumatic  Mouth/Throat:      Mouth: Mucous membranes are moist       Pharynx: No oropharyngeal exudate  Eyes:      General: No scleral icterus  Extraocular Movements: Extraocular movements intact  Cardiovascular:      Rate and Rhythm: Normal rate and regular rhythm  Pulses: Normal pulses  Heart sounds: Normal heart sounds  No murmur heard  Pulmonary:      Effort: Pulmonary effort is normal  No respiratory distress  Breath sounds: Normal breath sounds  No wheezing  Abdominal:      General: Bowel sounds are normal  There is no distension  Palpations: Abdomen is soft  Tenderness: There is no abdominal tenderness  Musculoskeletal:         General: Normal range of motion  Cervical back: Normal range of motion and neck supple  Right lower leg: No edema  Left lower leg: No edema  Comments: Left arm swelling  AV graft is covered with gauze  See media for wound imaging   Skin:     General: Skin is warm and dry  Neurological:      General: No focal deficit present  Mental Status: He is alert and oriented to person, place, and time  Cranial Nerves: No cranial nerve deficit     Psychiatric:         Mood and Affect: Mood normal             Additional Data:     Lab Results:  Results from last 7 days   Lab Units 06/02/23  1826   EOS PCT % 1   HEMATOCRIT % 33 8*   HEMOGLOBIN g/dL 10 9*   LYMPHS PCT % 14   MONOS PCT % 8   NEUTROS PCT % 75   PLATELETS Thousands/uL 169   WBC Thousand/uL 6 55     Results from last 7 days   Lab Units 06/02/23  1826   ANION GAP mmol/L 11   ALBUMIN g/dL 3 3*   ALK PHOS U/L 109*   ALT U/L 7   AST U/L 31   BUN mg/dL 19   CALCIUM mg/dL 8 7   CHLORIDE mmol/L 97   CO2 mmol/L 31   CREATININE mg/dL 6 78*   GLUCOSE RANDOM mg/dL 109 POTASSIUM mmol/L 4 0   SODIUM mmol/L 139   TOTAL BILIRUBIN mg/dL 0 88                 Results from last 7 days   Lab Units 06/02/23  1826   LACTIC ACID mmol/L 1 7       Lines/Drains:  Invasive Devices     Peripheral Intravenous Line  Duration           Peripheral IV 05/21/23 Distal;Right;Upper;Ventral (anterior) Arm 12 days    Peripheral IV 05/22/23 Right;Ventral (anterior) Forearm 11 days    Long-Dwell Peripheral IV (Midline) 06/02/23 Right Brachial <1 day          Line  Duration           Hemodialysis AV Fistula  Left Upper arm -- days    Hemodialysis AV Fistula 05/19/23 Left Upper arm 14 days          Hemodialysis Catheter  Duration           HD Permanent Double Catheter 254 days                    Imaging: Reviewed   No orders to display       EKG and Other Studies Reviewed on Admission:   · Yes     ** Please Note: This note has been constructed using a voice recognition system   **

## 2023-06-03 NOTE — PROGRESS NOTES
"Progress Note - Vascular Surgery   Danay Bishop 70 y o  male MRN: [id number]  Unit/Bed#: [facility] 636-82 Encounter: [id number]      Assessment:  70yo M w/ ESRD on HD via a LUE AV graft placed 5/19/23 admitted for management of a LUE AVF wound dehiscence  Plan:  - OR this AM for LUE wound exploration, washout/debridement, possible graft explantation, and possible wound vac placement  - NPO w/ IVFs at medical team's discretion  - Nephrology consulted for HD TTS, will need alternative HD access  - Medicine primary, appreciate comanagement  - Currently on Zosyn/Vanco for antimicrobial coverage  - Multimodal analgesia  - ASA/statin/Plavix  - OOB / ambulation as tolerated  - Vascular will continue to follow    Subjective/Objective     Subjective:   No acute events overnight  This AM the patient reported only mild LUE pain w/ no fevers/chills, nausea/emesis, or dyspnea  OOB/ambulating  Objective:     Blood pressure (!) 98/49, pulse 61, temperature 97 9 °F (36 6 °C), resp  rate 16, height 5' 10\" (1 778 m), weight 76 7 kg (169 lb), SpO2 95 %  ,Body mass index is 24 25 kg/m²  Gen: Awake, alert, and in no acute distress  Head: Normocephalic, atraumatic  Neck: No obvious JVD, trachea midline  Cardiovascular: Regular rate  Respiratory:  In no acute respiratory distress w/ no use of accessory muscles of respiration  Abd: Soft, no signs of peritonitis  Extremities: LUE AVF surgical site covered with dressing w/ no visible strikethrough  Skin: Warm/dry  Psychiatric: Appropriate mood/affect  No intake or output data in the 24 hours ending 06/03/23 0820    Invasive Devices     Peripheral Intravenous Line  Duration           Long-Dwell Peripheral IV (Midline) 06/02/23 Right Brachial <1 day          Line  Duration           Hemodialysis AV Fistula  Left Upper arm -- days    Hemodialysis AV Fistula 05/19/23 Left Upper arm 14 days          Hemodialysis Catheter  Duration           HD Permanent Double Catheter 254 days          " "      I have personally reviewed pertinent lab results    , CBC:   Lab Results   Component Value Date    HCT 29 4 (L) 06/03/2023    HGB 9 0 (L) 06/03/2023    MCH 35 2 (H) 06/03/2023    MCHC 30 6 (L) 06/03/2023     (H) 06/03/2023    MPV 10 8 06/03/2023    NRBC 0 06/03/2023     (L) 06/03/2023    RBC 2 56 (L) 06/03/2023    RDW 14 7 06/03/2023    WBC 3 95 (L) 06/03/2023   , CMP:   Lab Results   Component Value Date    ALKPHOS 109 (H) 06/02/2023    ALT 7 06/02/2023    AST 31 06/02/2023    BUN 22 06/03/2023    CALCIUM 8 2 (L) 06/03/2023     06/03/2023    CO2 35 (H) 06/03/2023    CREATININE 7 76 (H) 06/03/2023    EGFR 6 06/03/2023    K 3 1 (L) 06/03/2023    SODIUM 138 06/03/2023   , Coagulation: No results found for: \"APTT\", \"INR\", \"PT\", Urinalysis: No results found for: \"BILIRUBINUR\", \"BLOODU\", \"CLARITYU\", \"COLORU\", \"GLUCOSEU\", \"KETONESU\", \"LEUKOCYTESUR\", \"NITRITE\", \"PHUR\", \"PROTEINUA\", \"SPECGRAV\", Amylase: No results found for: \"AMYLASE\", Lipase: No results found for: \"LIPASE\"    "

## 2023-06-03 NOTE — OCCUPATIONAL THERAPY NOTE
Occupational Therapy Cancellation Note       06/03/23 8851   Note Type   Note type Cancelled Session   Cancel Reasons Patient to operating room         Orders received and chart reviewed  Pt currently in OR for LUE wound exploration, washout/debridement, possible graft explantation, and possible wound vac placement  Will continue to follow to be seen for OT evaluation post-op as appropriate/when medically cleared         Cade Mayberry MS, OTR/L

## 2023-06-03 NOTE — PROGRESS NOTES
[facility]  Progress Note: Critical Care  Name: Saúl Avendaño 70 y o  male I MRN: [id number]  Unit/Bed#: OR POOL I Date of Admission: 6/2/2023   Date of Service: 6/3/2023 I Hospital Day: 1    Assessment/Plan     Neuro:   · Diagnosis: History of CVA  · 2006 w/ MRI showing thrombus in R Vertebral artery   · RLE residual deficit - uses walker   · Plan:   · eliquis on hold 2/2 to recent AVG explant   · Continue ASA, plavix, statin   · CAM ICU   · Delirium precautions   · Diagnosis: Pain   · Plan:   · Oxy 2 5 po mod / oxy 5 po severe/ Dilaudid 0 5 IV breakthrough PRN q4h    CV:   · Diagnosis: Septic shock   · History of hypotension  - On midodrine 10 TID chronically   · Post op patient requiring elvi briefly for MAPS<65    · Source: infected LUE AVG s/p OR washout/debridedment and explant   · Plan:   · Continue home midodrine 10 TID   · Continue pressors for MAP > 65   · A/p as below for sepsis   · Diagnosis: CAD, HLD   · 2008 s/p PTCA w/ bare metal stent to circumflex and PDA and POBA of diagonal   · 2009, 2010 MI, 2011 NSTEMI s/p PCI/KARLOS of distal RCA   · 2023 s/p PCI and KARLOS to RCA  · 5/22/23 TTE: LVEF 65%, G1DD, mid-cavity dynamic obstruction at rest with a peak gradient of 12   Mid cavity dyanmic obstruction with valsalva with peak gradient of 30   · Plan:   · Continue statin, ASA and plavix   · HD for volume status       Pulm:  No active issues       GI:   · Diagnosis: bowel regimen   · Plan:   · Michael Senna  · Michael miralax       :   · Diagnosis: ESRD on HD TTS  · 2/2 previously uncontrolled DM   · Gets HD at [address]   · Access site: HD permacath   · Outpatient dry weight 76 5  · Plan:   · Plan for HD tomorrow   · nephro consulted       F/E/N:   · Fluids: none   · Electrolytes: Mg>2, PO4>3, K>4  · Nutrition: po reg       Heme/Onc:   · Diagnosis: Anemia   · History of anemia of CKD  · Was briefly on epogen for 2 months in 2022 but this was discontinued due to graft "thrombosis  · Receive 1 u pRBC in OR    · Plan:   · Check HH post op   · Monitor for signs of bleeding   · Diagnosis: History of graft thrombosis, chronic DVT, R vertebral artery thrombus  · Was previously on warfarin on passed but switched to eliquis due to noncompliance with lab draws and hard sticks   · eliquis held for OR today   · Plan:   · Will get vascular clearance to resume eliquis vs warfarin   · Patient will ultimately need to switch back to coumadin per prior vascular notes       Endo:   · Diagnosis: History of DM   · A1c 4 8%   · Not on any meds at home  · Plan:  · Monitor BG for goal 140-180       ID:   · Diagnosis: AVG site infection   · Noted during TCM visit on 6/2  · Per vascular notes upon consult: \"On review of imaging patient has a significant dehiscence of the venous axillary exposure incision  Graft is very likely exposed to air  This represents clinically infected prosthetic material with the risk of significant hemorrhage and limb loss if left in place  \"  · Patient is POD0 left upper extremity AV graft explant  · Plan:   · Continue zosyn and vanc   · Blood cultures pedning   · Follow up intra op wound cultures   · Vascular following       MSK/Skin:   No active issues  · PT/OT when able   · Frequent repositioning     Disposition: Critical care    ICU Core Measures     A: Assess, Prevent, and Manage Pain · Has pain been assessed? Yes  · Need for changes to pain regimen? Yes   B: Both SAT/SAT  · N/A   C: Choice of Sedation · RASS Goal: 0 Alert and Calm or N/A patient not on sedation  · Need for changes to sedation or analgesia regimen? NA   D: Delirium · CAM-ICU: Negative   E: Early Mobility  · Plan for early mobility? Yes   F: Family Engagement · Plan for family engagement today? 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, recent left brachial axillary AVG LUE 5/19 presented with LUE would dehiscence of AV graft to [facility] ED at the request of his PCP during TCM visit  Vascular was consulted and stated the wound imaging represented clinically infected prosthetic material with the risk of significant hemorrhage and limb loss if left in place  Patient was trasnfered to B  He underwent LUE AV graft wound exploration, washout/debridement, possible graft explantation, and possible wound vac placement today  Patient received 1 U pRBC during procedure  Bina Mejia Post OP, patient was still requiring elvi to maintain MAPS >60  Patient transferred to MICU for septic shock requiring pressors  On admission, patient denied fevers, chills  He was afebrile  No leukocytosis  Lactate WNL  Blood cultures were drawn and are pending  Patient was started on vanc and zosyn  Patient states pain at the LUE AVG site  No other complaints  PMHx is significant for CVA 2007, multiple MI, prior DVT and AVG thrombosis  He is on triple therapy with plavix, ASA, eliquis  Eliquis was held prior to OR today  Additionally patient has RLE deficits from prior CVA  History of type 2 DM, which is now controlled and patient is not on treatment for this          Objective                            Vitals I/O      Most Recent Min/Max in 24hrs   Temp (!) 96 3 °F (35 7 °C) Temp  Min: 96 3 °F (35 7 °C)  Max: 98 °F (36 7 °C)   Pulse 84 Pulse  Min: 61  Max: 90   Resp (!) 23 Resp  Min: 16  Max: 23   BP (!) 84/47 BP  Min: 83/51  Max: 116/59   O2 Sat 100 % SpO2  Min: 95 %  Max: 100 %      Intake/Output Summary (Last 24 hours) at 6/3/2023 1353  Last data filed at 6/3/2023 1327  Gross per 24 hour   Intake 1350 ml   Output 300 ml   Net 1050 ml         Diet NPO; Sips with meds  Diet NPO     Invasive Monitoring Physical exam   Arterial Line  [place] BP    No data recorded   MAP    No data recorded    Physical Exam  Vitals and nursing note reviewed  Constitutional:       General: He is not in acute distress  Appearance: He is well-developed  HENT:      Head: Normocephalic and atraumatic  Eyes:      Conjunctiva/sclera: Conjunctivae normal    Cardiovascular:      Rate and Rhythm: Normal rate and regular rhythm  Heart sounds: No murmur heard  Pulmonary:      Effort: Pulmonary effort is normal  No respiratory distress  Breath sounds: Normal breath sounds  Abdominal:      Palpations: Abdomen is soft  Tenderness: There is no abdominal tenderness  Musculoskeletal:         General: No swelling  Cervical back: Neck supple  Right lower leg: Edema present  Left lower leg: Edema present  Skin:     General: Skin is warm and dry  Capillary Refill: Capillary refill takes less than 2 seconds  Findings: Erythema present  Neurological:      Mental Status: He is alert  Psychiatric:         Mood and Affect: Mood normal               Diagnostic Studies      EKG: will get ECG today  Imaging:  I have personally reviewed pertinent reports     and I have personally reviewed pertinent films in PACS     Medications:  Scheduled PRN   [MAR Hold] acetaminophen, 975 mg, Q8H [address]  [MAR Hold] aspirin, 81 mg, Daily  [MAR Hold] atorvastatin, 20 mg, HS  [MAR Hold] b complex-vitamin C-folic acid, 1 capsule, Daily  [MAR Hold] clopidogrel, 75 mg, Daily  [MAR Hold] vitamin B-12, 1,000 mcg, Daily  desmopressin, 0 3 mcg/kg, Once  [MAR Hold] heparin (porcine), 5,000 Units, Q8H [address]  [MAR Hold] midodrine, 10 mg, TID AC  [MAR Hold] piperacillin-tazobactam, 2 25 g, Q12H  [MAR Hold] vancomycin, 7 5 mg/kg, Once per day on Tue Thu Sat      fentaNYL, 25 mcg, Q5 Min PRN  [MAR Hold] HYDROmorphone, 0 5 mg, Q4H PRN  HYDROmorphone, 0 5 mg, Q10 Min PRN  [MAR Hold] ondansetron, 4 mg, Q6H PRN  ondansetron, 4 mg, Once PRN  [MAR Hold] oxyCODONE, 5 mg, Q4H PRN  [MAR Hold] oxyCODONE, 2 5 mg, Q4H PRN       Continuous          Labs:    CBC    Recent Labs     06/02/23 1826 06/02/23 2217 06/03/23  0457   HCT 33 8*  --  29 4*   HGB 10 9*  --  9 0*    153 133*   WBC 6 55  --  3 95*     BMP    Recent Labs     06/02/23 1826 06/03/23 0457   AGAP 11 1*   BUN 19 22   CALCIUM 8 7 8 2*   CL 97 102   CO2 31 35*   CREATININE 6 78* 7 76*   K 4 0 3 1*   SODIUM 139 138       Coags    No recent results     Additional Electrolytes  No recent results       Blood Gas    No recent results  No recent results LFTs  Recent Labs     06/02/23 1826   ALB 3 3*   ALKPHOS 109*   ALT 7   AST 31   TBILI 0 88       Infectious  No recent results  Glucose  Recent Labs     06/02/23 1826 06/03/23  0457   GLUC 109 97               Critical Care Time Delivered: Upon my evaluation, this patient had a high probability of imminent or life-threatening deterioration due to septoc shock , which required my direct attention, intervention, and personal management  I have personally provided 43 minutes of critical care time, exclusive of procedures, teaching, family meetings, and any prior time recorded by providers other than myself       Tutu Keating, DO

## 2023-06-03 NOTE — PLAN OF CARE
Problem: PAIN - ADULT  Goal: Verbalizes/displays adequate comfort level or baseline comfort level  Description: Interventions:  - Encourage patient to monitor pain and request assistance  - Assess pain using appropriate pain scale  - Administer analgesics based on type and severity of pain and evaluate response  - Implement non-pharmacological measures as appropriate and evaluate response  - Consider cultural and social influences on pain and pain management  - Notify physician/advanced practitioner if interventions unsuccessful or patient reports new pain  Outcome: Progressing     Problem: INFECTION - ADULT  Goal: Absence or prevention of progression during hospitalization  Description: INTERVENTIONS:  - Assess and monitor for signs and symptoms of infection  - Monitor lab/diagnostic results  - Monitor all insertion sites, i e  indwelling lines, tubes, and drains  - Monitor endotracheal if appropriate and nasal secretions for changes in amount and color  - [place] appropriate cooling/warming therapies per order  - Administer medications as ordered  - Instruct and encourage patient and family to use good hand hygiene technique  - Identify and instruct in appropriate isolation precautions for identified infection/condition  Outcome: Progressing  Goal: Absence of fever/infection during neutropenic period  Description: INTERVENTIONS:  - Monitor WBC    Outcome: Progressing

## 2023-06-03 NOTE — QUICK NOTE
Attempted to see patient multiple times this morning, initially he was in PACU and heard will be taken for to dialysis directly from PACU  Later on I was notified that patient is being transferred to MICU due to hypotension needing pressors

## 2023-06-03 NOTE — ASSESSMENT & PLAN NOTE
Had triple vessel disease by cath in April 2008, s/p PTCA with bare metal stent  s/p MI in 2009, 2010 and acute NSTEMI on 02/02/2011 s/p PCI/KARLOS of distal RCA  S/p cardiac cath in 2/8/2023 drug-eluting stents were placed in the mid and proximal RCA, and  KARLOS was placed in the RCA ostium      Continue aspirin, Plavix and statin  Patient is not on a beta-blocker because of low blood pressure  Outpatient cardiology follow-up

## 2023-06-03 NOTE — CONSULTS
Consultation - Vascular Surgery   Marline Bishop 70 y o  male MRN: [id number]  Unit/Bed#: [facility] 157-69 Encounter: [id number]      Assessment:  Monty Miller is a 70 y o  male with a pmhx of HTN, DM, MI, CKD, Stroke, ESRD on dialysis, and GERD who presents to the hospital with LUE wound dehiscence of AV graft placed on 5/19  Plan:  - Patient with dehisced surgical incision site of left upper extremity, graft is most likely exposed to air and will have to undergo debridement with washout and possible explant of graft  - Patient was educated on risks and benefits of the procedure and accepting to move forward  - NPO past midnight, OR in AM 6/3  - Rest of care per primary team, please wait for attending attestation for final recs  _______________________________________________________________  Physician Requesting Consult: Ifeanyi Mei DO    Additional consultants:     Reason for Consult / Principal Problem: LUE surgical incision site infection       HPI: Monty Miller is a 70y o  year old male who presented to the hospital after being seen in OP office  Was advised to follow up with the hospital for possible wound dehiscence  He now presents for further evaluation  Patient states that he was unaware that there was any infection ongoing to his incision site  He denies any fevers, chills or SOB  Denies witnessing any drainage coming from this site  He is currently POD14 from left brachial-axillary AVG       Historical Information   Past Medical History:   Diagnosis Date   • 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    With Cerebral Infarction:  Last Assessed:  October 20, 2016   • Chronic kidney disease 2012    on dialysis    • COVID 05/2022   • Diabetes mellitus ([address] )    • Dialysis patient [facility])     T/TH/Sat   • GERD (gastroesophageal reflux disease)    • Hyperlipidemia    • Hypertension    • Labyrinthitis 09/10/2011   • Myocardial infarction [facility]) 2014    x3 others were in 2009 & 2010   • Sleep disturbances "09/20/2010   • Stroke [facility]) 2007    x1, RLE deficit- uses walker   • Type 2 diabetes, uncontrolled, with renal manifestation 05/03/2017     Past Surgical History:   Procedure Laterality Date   • AV FISTULA PLACEMENT     • CARDIAC CATHETERIZATION Left 02/03/2023    Procedure: Cardiac Left Heart Cath;  Surgeon: Ines Thomas MD;  Location: [place] [address] CATH LAB; Service: Cardiology   • CARDIAC CATHETERIZATION N/A 02/08/2023    Procedure: Cardiac catheterization with complex PCI with Dr Roman Figueroa, patient is a renal dialysis patient;  Surgeon: Sandra Pena MD;  [facility] CARDI[facility] C[facility] LAB; Service: Cardiology   • CARDIAC SURGERY  2010    stents x3   • COLONOSCOPY N/A 12/12/2016    Procedure: COLONOSCOPY;  Surgeon: Kevon Beckman MD;  Location: [place] [address] GI LAB; Service:    • COLONOSCOPY N/A 04/03/2017    Procedure:  COLONOSCOPY;  Surgeon: Kevon Beckman MD;  Location: [place] [address] GI LAB; Service:    • HARDWARE REMOVAL Right 04/04/2022    Procedure: REMOVAL HARDWARE HIP;  Surgeon: Marcel Martin MD;  Location: [facility] MAIN OR;  Service: Orthopedics   • IR AV FISTULA/GRAFT DECLOT  04/29/2021   • IR AV FISTULA/GRAFT DECLOT  03/25/2022   • IR AV FISTULA/GRAFT DECLOT  09/21/2022   • IR AV FISTULAGRAM/GRAFTOGRAM  03/28/2022   • IR AV FISTULAGRAM/GRAFTOGRAM  07/28/2022   • IR TUNNELED CENTRAL LINE REMOVAL  12/07/2021   • IR TUNNELED DIALYSIS CATHETER PLACEMENT  05/24/2021   • PORTACATH PLACEMENT      per pt \"port in chest\"   • AK ARTERIOVENOUS ANASTOMOSIS OPEN DIRECT Left 07/22/2021    Procedure: CREATION FISTULA ARTERIOVENOUS (AV);   Surgeon: Letha Alpers, MD;  Location: [address] [place] [address];  Service: Vascular   • AK ARTERIOVENOUS ANASTOMOSIS OPEN DIRECT Left 3/21/2023    Procedure: left brachiobasilic fistula,  AVG Graft;  Surgeon: Florentin Hough MD;  [facility];  Service: Vascular   • AK ARTERIOVENOUS ANASTOMOSIS OPEN DIRECT Left 5/19/2023    Procedure: CREATION of LEFT FISTULA ARTERIOVENOUS (AV) GRAFT;  Surgeon: " Eunice Stallings MD;  [facility] MA[facility];  Service: Vascular   • MT HEMIARTHROPLASTY HIP PARTIAL Right 04/04/2022    Procedure: HEMIARTHROPLASTY HIP (BIPOLAR); Surgeon: Reese Valladares MD;  [facility];  Service: Orthopedics     Social History   Social History     Substance and Sexual Activity   Alcohol Use Yes    Comment: rarely - No alcohol use per [employer]     Social History     Substance and Sexual Activity   Drug Use Not Currently   • Types: Marijuana    Comment: occasional brownie for sleep     Social History     Tobacco Use   Smoking Status Every Day   • Packs/day: 0 25   • Years: 30 00   • Total pack years: 7 50   • Types: Cigarettes   Smokeless Tobacco Never     Family History: non-contributory}    Meds/Allergies     Home meds:   Prior to Admission medications    Medication Sig Start Date End Date Taking?  Authorizing Provider   apixaban (Eliquis) 5 mg Take 1 tablet (5 mg total) by mouth 2 (two) times a day 5/24/23 6/23/23  Micheal Seaman MD   ascorbic acid (VITAMIN C) 500 mg tablet TAKE ONE TABLET BY MOUTH EVERY EVENING FOR SUPPLEMENT 1/26/22   Historical Provider, MD   aspirin (ECOTRIN LOW STRENGTH) 81 mg EC tablet Take 81 mg by mouth daily 1/26/22   Historical Provider, MD   atorvastatin (LIPITOR) 20 mg tablet Take 20 mg by mouth daily at bedtime 1/26/22   Historical Provider, MD MORALES Complex-C-Folic Acid (TRIPHROCAPS) 1 MG CAPS Take 1 capsule by mouth daily 2/20/17   Historical Provider, MD   Cholecalciferol 50 MCG (2000 UT) CAPS Take by mouth daily 3/22/21   Historical Provider, MD [place] Comfort EZ 33G X 4 MM [facility]  1/31/22   Historical Provider, MD   Clopidogrel Bisulfate (PLAVIX PO) Take by mouth    Historical Provider, MD   glucose blood test strip Test 2 (two) times a day 7/18/11   Historical Provider, MD   midodrine (PROAMATINE) 10 MG tablet Take 1 tablet (10 mg total) by mouth 3 (three) times a day before meals 5/24/23 6/23/23  Micheal Seaman MD   vitamin B-12 (VITAMIN B-12) 1,000 mcg tablet Take 1,000 mcg by mouth daily 1/24/23   Historical Provider, MD   loperamide (IMODIUM) 2 mg capsule Take 2 mg by mouth 4 (four) times a day as needed for diarrhea  Patient not taking: Reported on 6/2/2023 6/2/23  Historical Provider, MD     Scheduled Meds:  Current Facility-Administered Medications   Medication Dose Route Frequency Provider Last Rate   • acetaminophen  975 mg Oral [facility] Titabrandy Yeh, DO     • [START ON 6/3/2023] aspirin  81 mg Oral Daily Titabrandy Yeh, DO     • atorvastatin  20 mg Oral HS Tita Yeh, DO     • [START ON 6/3/2023] b complex-vitamin C-folic acid  1 capsule Oral Daily Tita Yeh, DO     • [START ON 6/3/2023] clopidogrel  75 mg Oral Daily Tita Yeh, DO     • [START ON 6/3/2023] vitamin B-12  1,000 mcg Oral Daily Tita Yeh, DO     • heparin (porcine)  5,000 Units Subcutaneous [facility] Tita Yeh, DO     • HYDROmorphone  0 5 mg Intravenous Q4H PRN Tita Yeh, DO     • [START ON 6/3/2023] midodrine  10 mg Oral TID AC Tita Yeh, DO     • ondansetron  4 mg Intravenous Q6H PRN Tita Yeh, DO     • oxyCODONE  5 mg Oral Q4H PRN Tita Yeh, DO     • piperacillin-tazobactam  2 25 g Intravenous Q12H Tita Yeh, DO     • [START ON 6/3/2023] vancomycin  7 5 mg/kg Intravenous After Dialysis Tita Yeh, DO       Continuous Infusions:   PRN Meds:  •  HYDROmorphone  •  ondansetron  •  oxyCODONE  •  [START ON 6/3/2023] vancomycin    ALLERGIES: No Known Allergies    Review of Systems:  General: negative  Cardiovascular: no chest pain or dyspnea on exertion  Respiratory: negative  Gastrointestinal: no abdominal pain, change in bowel habits, or black or bloody stools  Genitourinary: no dysuria, trouble voiding, or hematuria  Musculoskeletal: negative  Neurological: no TIA or stroke symptoms  Hematological and Lymphatic: negative  Dermatological : negative  Psychological: negative  Ophthalmic: negative  ENT: negative      Objective   Vitals:  Blood pressure 113/53, pulse 81, temperature 97 5 °F (36 4 °C), SpO2 95 %  There is no height or weight on file to calculate BMI  SpO2: SpO2: 95 %, SpO2 Activity:  , SpO2 Device:      I/Os:   I/O     None         No intake or output data in the 24 hours ending 06/02/23 2145     Invasive Lines/Tubes:  Invasive Devices     Peripheral Intravenous Line  Duration           Peripheral IV 05/22/23 Right;Ventral (anterior) Forearm 11 days    Long-Dwell Peripheral IV (Midline) 06/02/23 Right Brachial <1 day          Line  Duration           Hemodialysis AV Fistula  Left Upper arm -- days    Hemodialysis AV Fistula 05/19/23 Left Upper arm 14 days          Hemodialysis Catheter  Duration           HD Permanent Double Catheter 254 days                Physical Exam  General appearance: alert, appears stated age and cooperative  Head: Normocephalic, without obvious abnormality, atraumatic  Eyes: conjunctivae/corneas clear, EOM's intact  Throat: lips, mucosa, and tongue normal; teeth and gums normal  Neck: no adenopathy and no carotid bruit  Lungs: clear to auscultation bilaterally  Chest wall: no tenderness  Heart[de-identified] regular rate and rhythm  Abdomen: soft, non-tender; bowel sounds normal; no masses,  no organomegaly  Extremities: extremities normal, atraumatic, no cyanosis or edema  Skin: Skin color, texture, turgor normal  No rashes or lesions  Neurologic: Grossly normal    Vascular Exam:    On focused evaluation of the left upper extremity revealed open wound with no active drainage appreciated  Exudates were noted along the incisional border and this was debrided with 4x4 gauze  The area was clensed with saline and no graft was appreciated  The area then was packed with betadine soaked gauze and wrapped  Distally there was a palpable thrill noted  Upper extremity strength appeared to be symmetric  Gross sensation was intact  Pulse exam:  Right:  Palpable brachial, Dop Radial/ulnar/ palmar arch       Left: Palpable thrill "distal graft, Dop Radial/Ulner  Wound/Incision:  Images uploaded to chart  Invasive Lines/Tubes:  Invasive Devices     Peripheral Intravenous Line  Duration           Peripheral IV 05/22/23 Right;Ventral (anterior) Forearm 11 days    Long-Dwell Peripheral IV (Midline) 06/02/23 Right Brachial <1 day          Line  Duration           Hemodialysis AV Fistula  Left Upper arm -- days    Hemodialysis AV Fistula 05/19/23 Left Upper arm 14 days          Hemodialysis Catheter  Duration           HD Permanent Double Catheter 254 days                Lab Results and Cultures:   COVID:   Last COVID19 Screening Values     None         CBC:   Results from last 7 days   Lab Units 06/02/23  1826   HEMATOCRIT % 33 8*   HEMOGLOBIN g/dL 10 9*   PLATELETS Thousands/uL 169   WBC Thousand/uL 6 55     BMP/CMP:  Results from last 7 days   Lab Units 06/02/23  1826   BUN mg/dL 19   CALCIUM mg/dL 8 7   CHLORIDE mmol/L 97   CO2 mmol/L 31   CREATININE mg/dL 6 78*   POTASSIUM mmol/L 4 0     Coags:     Lipid panel:     HgbA1c:   Lab Results   Component Value Date    HGBA1C 4 8 01/05/2023    HGBA1C 4 4 06/30/2022    HGBA1C 4 4 03/29/2022    HGBA1C 5 4 04/11/2017    HGBA1C 5 1 10/25/2016       Urinalysis: No results found for: \"BILIRUBINUR\", \"BLOODU\", \"CLARITYU\", \"COLORU\", \"GLUCOSEU\", \"KETONESU\", \"LEUKOCYTESUR\", \"NITRITE\", \"PHUR\", \"PROTEINUA\", \"SPECGRAV\",   Urine Culture: No results found for: \"URINECX\"  Wound Culure:   Lab Results   Component Value Date    WOUNDCULT 1+ Growth of Citrobacter freundii (A) 10/08/2021    WOUNDCULT 1+ Growth of Enterobacter cloacae complex (A) 10/08/2021     Blood Culture:   Lab Results   Component Value Date    BLOODCX No Growth After 5 Days  10/19/2021    BLOODCX No Growth After 5 Days  10/19/2021         Imaging Studies: I have personally reviewed pertinent reports  EKG, Pathology, and Other Studies: I have personally reviewed pertinent reports      VTE Prophylaxis: Sequential compression device " (Venodyne)   No CTA results available for this patient  No CTA results available for this patient  Code Status: Level 1 - Full Code  Advance Directive and Living Will:      Power of :    POLST:      Counseling / Coordination of Care  Counseling/Coordination of Care: Total floor / unit time spent today 25 minutes  Greater than 50% of total time was spent with the patient and / or family counseling and / or coordination of care  A description of the counseling / coordination of care: Treatment plan and diagnosis          Jose Angel Live PA-C  6/2/2023

## 2023-06-03 NOTE — ASSESSMENT & PLAN NOTE
Lab Results   Component Value Date    CREATININE 6 78 (H) 06/02/2023    CREATININE 8 40 (H) 05/23/2023    CREATININE 6 73 (H) 05/22/2023    EGFR 7 06/02/2023    EGFR 5 05/23/2023    EGFR 7 05/22/2023     Stable  Monitor

## 2023-06-03 NOTE — PROGRESS NOTES
Dominique Ramon is a 70 y o  male who is currently ordered Vancomycin IV with management by the Pharmacy Consult service  Relevant clinical data and objective / subjective history reviewed  Vancomycin Assessment:  Indication and Goal AUC/Trough: Soft tissue (goal -600, trough >10), -600, trough >10  Micro:   Blood cultures pending  Renal Function:  Renal replacement: HD  Days of Therapy: 2  Current Dose: Vancomycin 500 mg IV Post-Hd on TThS   Vancomycin Plan:  New Dosing: continue current regimen  Next Level: Prior to 3rd inpatient dialysis session on 06/08/23  Renal Function Monitoring: Daily BMP and [place] will continue to follow closely for s/sx of nephrotoxicity, infusion reactions and appropriateness of therapy  BMP and CBC will be ordered per protocol  We will continue to follow the patient’s culture results and clinical progress daily      Yang Mccain, Pharmacist

## 2023-06-03 NOTE — ASSESSMENT & PLAN NOTE
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     Status post left brachial axillary AVG left upper extremity on 5/19  Coumadin was switched to oral Eliquis last admission due to patient not compliant with lab draws and hard stick     Patient now presented with left upper extremity wound dehiscence of AV graft  ER wound imaging reviewed  Per vascular, based on imaging the  graft is likely exposed to air and this is represent infected prosthetic material with risk of significant hemorrhage and limb loss if left in place    Patient is afebrile without leukocytosis  He  was transferred to [address] [place] [address] for AV graft explant by vascular  Already started on broad-spectrum IV antibiotic  Continue with IV antibiotic  Follow on blood culture  Pain control  Hold Eliquis for now

## 2023-06-03 NOTE — ANESTHESIA PREPROCEDURE EVALUATION
Procedure:  LEFT UPPER EXTREMITY A-V GRAFT EXPLANT, POSS VAC (Left: Arm Upper)    Relevant Problems   CARDIO   (+) Chronic deep vein thrombosis (DVT) of internal jugular vein (HCC)   (+) Coronary artery disease involving native coronary artery   (+) Hypertension   (+) Mixed hyperlipidemia      ENDO   (+) Type 2 diabetes mellitus (HCC)      /RENAL   (+) Chronic kidney disease   (+) Dependence on renal dialysis (HCC)   (+) ESRD (end stage renal disease) on dialysis (HCC)   (+) End-stage renal disease on hemodialysis (HCC)      HEMATOLOGY   (+) Acute on chronic anemia   (+) Pernicious anemia   (+) Thrombocytopenia (HCC)      Daily smoker  Hypokalemia    TTE May 2023:  •  Left Ventricle: Left ventricular cavity size is normal  Wall thickness is severely increased  There is mild concentric hypertrophy  The left ventricular ejection fraction is 65%  Systolic function is normal  Wall motion is normal  Diastolic function is mildly abnormal, consistent with grade I (abnormal) relaxation  There is a mid-cavity dynamic obstruction at rest with a peak gradient of 13 0 mmHg  There is a mid-cavity dynamic obstruction with valsalva with a peak gradient of 30 0 mmHg  •  Right Ventricle: Wall thickness is increased (9mm)  •  Left Atrium: The atrium is moderately dilated (42-48 mL/m2)  Physical Exam    Airway    Mallampati score: II  TM Distance: >3 FB  Neck ROM: full     Dental   No notable dental hx     Cardiovascular  Cardiovascular exam normal    Pulmonary  Pulmonary exam normal     Other Findings        Anesthesia Plan  ASA Score- 3     Anesthesia Type- general with ASA Monitors  Additional Monitors:   Airway Plan: ETT  Plan Factors-    Chart reviewed  EKG reviewed  Existing labs reviewed  Patient summary reviewed  Obstructive sleep apnea risk education given perioperatively  Induction- intravenous  Postoperative Plan- Plan for postoperative opioid use   Planned trial extubation    Informed Consent- Anesthetic plan and risks discussed with patient  I personally reviewed this patient with the CRNA  Discussed and agreed on the Anesthesia Plan with the CRNA  Venu Nicole

## 2023-06-04 LAB
ABO GROUP BLD BPU: NORMAL
ALBUMIN SERPL BCP-MCNC: 2.1 G/DL (ref 3.5–5)
ALP SERPL-CCNC: 94 U/L (ref 46–116)
ALT SERPL W P-5'-P-CCNC: 13 U/L (ref 12–78)
ANION GAP SERPL CALCULATED.3IONS-SCNC: 5 MMOL/L (ref 4–13)
AST SERPL W P-5'-P-CCNC: 11 U/L (ref 5–45)
ATRIAL RATE: 54 BPM
BASOPHILS # BLD AUTO: 0.04 THOUSANDS/ÂΜL (ref 0–0.1)
BASOPHILS NFR BLD AUTO: 1 % (ref 0–1)
BILIRUB SERPL-MCNC: 1.12 MG/DL (ref 0.2–1)
BPU ID: NORMAL
BUN SERPL-MCNC: 31 MG/DL (ref 5–25)
CA-I BLD-SCNC: 0.94 MMOL/L (ref 1.12–1.32)
CALCIUM ALBUM COR SERPL-MCNC: 8.8 MG/DL (ref 8.3–10.1)
CALCIUM SERPL-MCNC: 7.3 MG/DL (ref 8.3–10.1)
CHLORIDE SERPL-SCNC: 104 MMOL/L (ref 96–108)
CO2 SERPL-SCNC: 26 MMOL/L (ref 21–32)
CREAT SERPL-MCNC: 8.57 MG/DL (ref 0.6–1.3)
EOSINOPHIL # BLD AUTO: 0.01 THOUSAND/ÂΜL (ref 0–0.61)
EOSINOPHIL NFR BLD AUTO: 0 % (ref 0–6)
ERYTHROCYTE [DISTWIDTH] IN BLOOD BY AUTOMATED COUNT: 23.5 % (ref 11.6–15.1)
GFR SERPL CREATININE-BSD FRML MDRD: 5 ML/MIN/1.73SQ M
GLUCOSE SERPL-MCNC: 112 MG/DL (ref 65–140)
GLUCOSE SERPL-MCNC: 123 MG/DL (ref 65–140)
GLUCOSE SERPL-MCNC: 143 MG/DL (ref 65–140)
GLUCOSE SERPL-MCNC: 165 MG/DL (ref 65–140)
GLUCOSE SERPL-MCNC: 208 MG/DL (ref 65–140)
HCT VFR BLD AUTO: 24.6 % (ref 36.5–49.3)
HGB BLD-MCNC: 8 G/DL (ref 12–17)
IMM GRANULOCYTES # BLD AUTO: 0.07 THOUSAND/UL (ref 0–0.2)
IMM GRANULOCYTES NFR BLD AUTO: 1 % (ref 0–2)
LYMPHOCYTES # BLD AUTO: 0.56 THOUSANDS/ÂΜL (ref 0.6–4.47)
LYMPHOCYTES NFR BLD AUTO: 9 % (ref 14–44)
MAGNESIUM SERPL-MCNC: 1.9 MG/DL (ref 1.6–2.6)
MCH RBC QN AUTO: 32.3 PG (ref 26.8–34.3)
MCHC RBC AUTO-ENTMCNC: 32.5 G/DL (ref 31.4–37.4)
MCV RBC AUTO: 99 FL (ref 82–98)
MONOCYTES # BLD AUTO: 0.43 THOUSAND/ÂΜL (ref 0.17–1.22)
MONOCYTES NFR BLD AUTO: 7 % (ref 4–12)
NEUTROPHILS # BLD AUTO: 5.42 THOUSANDS/ÂΜL (ref 1.85–7.62)
NEUTS SEG NFR BLD AUTO: 82 % (ref 43–75)
NRBC BLD AUTO-RTO: 0 /100 WBCS
P AXIS: 59 DEGREES
PHOSPHATE SERPL-MCNC: 4.8 MG/DL (ref 2.3–4.1)
PLATELET # BLD AUTO: 107 THOUSANDS/UL (ref 149–390)
PMV BLD AUTO: 11.1 FL (ref 8.9–12.7)
POTASSIUM SERPL-SCNC: 4.6 MMOL/L (ref 3.5–5.3)
PR INTERVAL: 204 MS
PROT SERPL-MCNC: 4.8 G/DL (ref 6.4–8.4)
QRS AXIS: -5 DEGREES
QRSD INTERVAL: 100 MS
QT INTERVAL: 604 MS
QTC INTERVAL: 573 MS
RBC # BLD AUTO: 2.48 MILLION/UL (ref 3.88–5.62)
SODIUM SERPL-SCNC: 135 MMOL/L (ref 135–147)
T WAVE AXIS: 74 DEGREES
UNIT DISPENSE STATUS: NORMAL
UNIT PRODUCT CODE: NORMAL
UNIT PRODUCT VOLUME: 280 ML
UNIT RH: NORMAL
VENTRICULAR RATE: 54 BPM
WBC # BLD AUTO: 6.53 THOUSAND/UL (ref 4.31–10.16)

## 2023-06-04 PROCEDURE — 80053 COMPREHEN METABOLIC PANEL: CPT | Performed by: STUDENT IN AN ORGANIZED HEALTH CARE EDUCATION/TRAINING PROGRAM

## 2023-06-04 PROCEDURE — 84100 ASSAY OF PHOSPHORUS: CPT

## 2023-06-04 PROCEDURE — 99291 CRITICAL CARE FIRST HOUR: CPT | Performed by: INTERNAL MEDICINE

## 2023-06-04 PROCEDURE — 82948 REAGENT STRIP/BLOOD GLUCOSE: CPT

## 2023-06-04 PROCEDURE — 85025 COMPLETE CBC W/AUTO DIFF WBC: CPT | Performed by: STUDENT IN AN ORGANIZED HEALTH CARE EDUCATION/TRAINING PROGRAM

## 2023-06-04 PROCEDURE — 99232 SBSQ HOSP IP/OBS MODERATE 35: CPT | Performed by: INTERNAL MEDICINE

## 2023-06-04 PROCEDURE — 83735 ASSAY OF MAGNESIUM: CPT

## 2023-06-04 PROCEDURE — 82330 ASSAY OF CALCIUM: CPT

## 2023-06-04 PROCEDURE — 5A1D70Z PERFORMANCE OF URINARY FILTRATION, INTERMITTENT, LESS THAN 6 HOURS PER DAY: ICD-10-PCS | Performed by: SURGERY

## 2023-06-04 PROCEDURE — 99024 POSTOP FOLLOW-UP VISIT: CPT | Performed by: SURGERY

## 2023-06-04 PROCEDURE — P9016 RBC LEUKOCYTES REDUCED: HCPCS

## 2023-06-04 PROCEDURE — 99222 1ST HOSP IP/OBS MODERATE 55: CPT | Performed by: INTERNAL MEDICINE

## 2023-06-04 PROCEDURE — 93010 ELECTROCARDIOGRAM REPORT: CPT | Performed by: INTERNAL MEDICINE

## 2023-06-04 PROCEDURE — 30233N1 TRANSFUSION OF NONAUTOLOGOUS RED BLOOD CELLS INTO PERIPHERAL VEIN, PERCUTANEOUS APPROACH: ICD-10-PCS

## 2023-06-04 PROCEDURE — 93005 ELECTROCARDIOGRAM TRACING: CPT

## 2023-06-04 RX ORDER — LANOLIN ALCOHOL/MO/W.PET/CERES
3 CREAM (GRAM) TOPICAL
Status: DISCONTINUED | OUTPATIENT
Start: 2023-06-04 | End: 2023-06-15 | Stop reason: HOSPADM

## 2023-06-04 RX ORDER — ACETAMINOPHEN 325 MG/1
975 TABLET ORAL EVERY 8 HOURS PRN
Status: DISCONTINUED | OUTPATIENT
Start: 2023-06-04 | End: 2023-06-15 | Stop reason: HOSPADM

## 2023-06-04 RX ORDER — INSULIN LISPRO 100 [IU]/ML
1-6 INJECTION, SOLUTION INTRAVENOUS; SUBCUTANEOUS
Status: DISCONTINUED | OUTPATIENT
Start: 2023-06-04 | End: 2023-06-15 | Stop reason: HOSPADM

## 2023-06-04 RX ADMIN — CHLORHEXIDINE GLUCONATE 15 ML: 1.2 SOLUTION ORAL at 09:47

## 2023-06-04 RX ADMIN — HEPARIN SODIUM 5000 UNITS: 5000 INJECTION INTRAVENOUS; SUBCUTANEOUS at 06:39

## 2023-06-04 RX ADMIN — OXYCODONE HYDROCHLORIDE 5 MG: 5 TABLET ORAL at 21:20

## 2023-06-04 RX ADMIN — HYDROMORPHONE HYDROCHLORIDE 0.5 MG: 1 INJECTION, SOLUTION INTRAMUSCULAR; INTRAVENOUS; SUBCUTANEOUS at 22:49

## 2023-06-04 RX ADMIN — CHLORHEXIDINE GLUCONATE 15 ML: 1.2 SOLUTION ORAL at 21:20

## 2023-06-04 RX ADMIN — MIDODRINE HYDROCHLORIDE 10 MG: 5 TABLET ORAL at 16:08

## 2023-06-04 RX ADMIN — ASPIRIN 81 MG: 81 TABLET, COATED ORAL at 09:47

## 2023-06-04 RX ADMIN — HYDROMORPHONE HYDROCHLORIDE 0.5 MG: 1 INJECTION, SOLUTION INTRAMUSCULAR; INTRAVENOUS; SUBCUTANEOUS at 18:40

## 2023-06-04 RX ADMIN — INSULIN LISPRO 1 UNITS: 100 INJECTION, SOLUTION INTRAVENOUS; SUBCUTANEOUS at 12:08

## 2023-06-04 RX ADMIN — ATORVASTATIN CALCIUM 20 MG: 20 TABLET, FILM COATED ORAL at 21:20

## 2023-06-04 RX ADMIN — OXYCODONE HYDROCHLORIDE 5 MG: 5 TABLET ORAL at 02:13

## 2023-06-04 RX ADMIN — OXYCODONE HYDROCHLORIDE 5 MG: 5 TABLET ORAL at 08:00

## 2023-06-04 RX ADMIN — HYDROMORPHONE HYDROCHLORIDE 0.5 MG: 1 INJECTION, SOLUTION INTRAMUSCULAR; INTRAVENOUS; SUBCUTANEOUS at 10:20

## 2023-06-04 RX ADMIN — OXYCODONE HYDROCHLORIDE 5 MG: 5 TABLET ORAL at 16:08

## 2023-06-04 RX ADMIN — MIDODRINE HYDROCHLORIDE 10 MG: 5 TABLET ORAL at 06:39

## 2023-06-04 RX ADMIN — HEPARIN SODIUM 5000 UNITS: 5000 INJECTION INTRAVENOUS; SUBCUTANEOUS at 15:28

## 2023-06-04 RX ADMIN — OXYCODONE HYDROCHLORIDE 5 MG: 5 TABLET ORAL at 12:06

## 2023-06-04 RX ADMIN — HYDROMORPHONE HYDROCHLORIDE 0.5 MG: 1 INJECTION, SOLUTION INTRAMUSCULAR; INTRAVENOUS; SUBCUTANEOUS at 03:08

## 2023-06-04 RX ADMIN — MIDODRINE HYDROCHLORIDE 10 MG: 5 TABLET ORAL at 11:27

## 2023-06-04 RX ADMIN — CEFEPIME 1000 MG: 1 INJECTION, POWDER, FOR SOLUTION INTRAMUSCULAR; INTRAVENOUS at 11:27

## 2023-06-04 RX ADMIN — CYANOCOBALAMIN TAB 500 MCG 1000 MCG: 500 TAB at 08:07

## 2023-06-04 RX ADMIN — MELATONIN 3 MG: at 21:20

## 2023-06-04 RX ADMIN — VASOPRESSIN 0.04 UNITS/MIN: 20 INJECTION, SOLUTION INTRAMUSCULAR; SUBCUTANEOUS at 01:37

## 2023-06-04 RX ADMIN — NOREPINEPHRINE BITARTRATE 7 MCG/MIN: 1 SOLUTION INTRAVENOUS at 12:20

## 2023-06-04 RX ADMIN — CLOPIDOGREL BISULFATE 75 MG: 75 TABLET ORAL at 09:47

## 2023-06-04 RX ADMIN — NEPHROCAP 1 CAPSULE: 1 CAP ORAL at 11:25

## 2023-06-04 RX ADMIN — HEPARIN SODIUM 5000 UNITS: 5000 INJECTION INTRAVENOUS; SUBCUTANEOUS at 21:20

## 2023-06-04 NOTE — QUICK NOTE
"Post Op Check Note - Vascular Surgery   Iván Bishop 70 y o  male MRN: [id number]  Unit/Bed#: MICU 11 Encounter: [id number]    ASSESSMENT:  Janneth Baer is a 70 y o  male who is status post LUE AVG explant, brachial Angiopatch and Vac application  Subjective: Patient was seen and evaluated at bedside  Arm was wrapped however dorsal aspect was saturated in blood  Dressing was removed and Vac was without any leaks  Surgical incision site appears to have some oozing, this was cleaned and rewrapped  Patient reported moderate pain during dressing change however denies any other complaints at this time  Physical Exam:  GEN: NAD  CV: RRR  Lung: Normal effort  Ab: Soft, NT/ND  Neuro: A+Ox3  Incisions: Vac was clean dry and without leak  Dorsal incision site with some sanguinous drainage which was cleaned and re-dressed with 4 x 4 gauze, abd pad, krillex, and ace wrap  Blood pressure 160/63, pulse 60, temperature 97 5 °F (36 4 °C), temperature source Oral, resp  rate 15, height 5' 10\" (1 778 m), weight 77 kg (169 lb 12 1 oz), SpO2 100 %  ,Body mass index is 24 36 kg/m²        Intake/Output Summary (Last 24 hours) at 6/3/2023 2112  Last data filed at 6/3/2023 1815  Gross per 24 hour   Intake 1773 97 ml   Output 450 ml   Net 1323 97 ml       Invasive Devices     Central Venous Catheter Line  Duration           CVC Central Lines 06/03/23 Triple 20cm <1 day          Line  Duration           Hemodialysis AV Fistula  Left Upper arm -- days    Hemodialysis AV Fistula 05/19/23 Left Upper arm 15 days          Hemodialysis Catheter  Duration           HD Permanent Double Catheter 255 days                VTE Pharmacologic Prophylaxis: Sequential compression device (Venodyne)             "

## 2023-06-04 NOTE — PROGRESS NOTES
Nicole Rojas is a 70 y o  male who is currently ordered Vancomycin IV with management by the Pharmacy Consult service  Relevant clinical data and objective / subjective history reviewed  Vancomycin Assessment:  1  Indication and Goal AUC/Trough: Soft tissue (goal -600, trough >10), -600, trough >10  2  Micro:   Blood cultures pending  3  Renal Function:  · Renal replacement:Pt is on inconsistent  HD  4  Days of Therapy: 3  5  Current Dose: Vancomycin 500 mg IV Post-Hd on TThS  Vancomycin Plan:  1  New Dosing: Pt missed dialysis on Saturday  Due to inconsistency in dialysis will changing the vanco to 750 mg IV PRN dosing level <15 and will collect the random tomorrow on 06/05/23  2  Renal Function Monitoring: Daily BMP and [place] will continue to follow closely for s/sx of nephrotoxicity, infusion reactions and appropriateness of therapy  BMP and CBC will be ordered per protocol  We will continue to follow the patient’s culture results and clinical progress daily      Tana Muhammad, Pharmacist

## 2023-06-04 NOTE — PROGRESS NOTES
[facility]  Progress Note: Critical Care  Name: Deepthi Stephens 70 y o  male I MRN: [id number]  Unit/Bed#: MICU 6 I Date of Admission: 6/2/2023   Date of Service: 6/4/2023 I Hospital Day: 2    Assessment/Plan   Neuro:   • Diagnosis: History of CVA  ? 2006 w/ MRI showing thrombus in R Vertebral artery   ? RLE residual deficit - uses walker   ? Plan:   - eliquis on hold 2/2 to recent AVG explant   - Continue ASA, plavix, statin   - CAM ICU   - Delirium precautions   • Diagnosis: Pain   ? Plan:   ? Oxy 2 5 po mod / oxy 5 po severe/ Dilaudid 0 5 IV breakthrough PRN q4h     CV:   • Diagnosis: Septic shock   ? History of hypotension  - On midodrine 10 TID chronically   ? Post op patient requiring elvi, switched to levo, able to wean from 15 to 5 overnight  ? Source: infected LUE AVG s/p OR washout/debridedment and explant   ? Plan:   - Continue home midodrine 10 TID   - Continue pressors for MAP > 65   - A/p as below for sepsis   • Diagnosis: CAD, HLD   ? 2008 s/p PTCA w/ bare metal stent to circumflex and PDA and POBA of diagonal   ? 2009, 2010 MI, 2011 NSTEMI s/p PCI/KARLOS of distal RCA   ? 2023 s/p PCI and KARLOS to RCA  ? 5/22/23 TTE: LVEF 65%, G1DD, mid-cavity dynamic obstruction at rest with a peak gradient of 12  Mid cavity dyanmic obstruction with valsalva with peak gradient of 30   ? Plan:   - Continue statin, ASA and plavix   - HD today for volume status         Pulm:  No active issues         GI:   • Diagnosis: bowel regimen   ? Plan:   - Michael Senna  - Michael miralax         :   • Diagnosis: ESRD on HD TTS  ? 2/2 previously uncontrolled DM   ? Gets HD at [address]   ? Access site: HD permacath   ? Outpatient dry weight 76 5  ? Plan:   - Plan for HD tomorrow   - nephro consulted         F/E/N:   • Fluids: none   • Electrolytes: goal Mg>2, PO4>3, K>4  • Nutrition: po reg         Heme/Onc:   • Diagnosis: Anemia   ? History of anemia of CKD  ?  Was briefly on epogen for 2 "months in 2022 but this was discontinued due to graft thrombosis  ? Receive 1 u pRBC in OR    ? Plan:   - HH post op - no improvement after 1 unit  - Additional 1 unit ordered with repeat CBC for AM  • Diagnosis: History of graft thrombosis, chronic DVT, R vertebral artery thrombus  ? Was previously on warfarin on passed but switched to eliquis due to noncompliance with lab draws and hard sticks   ? eliquis held for OR today   ? Plan:   - Will get vascular clearance to resume eliquis vs warfarin   - Patient will ultimately need to switch back to coumadin per prior vascular notes         Endo:   • Diagnosis: History of DM   ? A1c 4 8%   ? Not on any meds at home  ? Plan:  - Monitor BG for goal 140-180         ID:   • Diagnosis: AVG site infection   ? Noted during TCM visit on 6/2  ? Per vascular notes upon consult: \"On review of imaging patient has a significant dehiscence of the venous axillary exposure incision   Graft is very likely exposed to air   This represents clinically infected prosthetic material with the risk of significant hemorrhage and limb loss if left in place  \"  ? Patient is POD1 left upper extremity AV graft explant  ? Plan:   - Continue zosyn and vanc   - Blood cultures pedning   - Follow up intra op wound cultures   - Vascular following         MSK/Skin:   No active issues  • PT/OT when able   • Frequent repositioning     Disposition: Critical care    ICU Core Measures     A: Assess, Prevent, and Manage Pain · Has pain been assessed? Yes  · Need for changes to pain regimen? No   B: Both SAT/SAT  · N/A   C: Choice of Sedation · RASS Goal: 0 Alert and Calm or N/A patient not on sedation  · Need for changes to sedation or analgesia regimen? NA   D: Delirium · CAM-ICU: Negative   E: Early Mobility  · Plan for early mobility? Yes   F: Family Engagement · Plan for family engagement today? Yes     Antibiotic Review: Awaiting culture results       Review of Invasive Devices:    Central access plan: " Medications requiring central line      Prophylaxis:  VTE VTE covered by:  heparin (porcine), Subcutaneous, 5,000 Units at 06/03/23 0503       Stress Ulcer  not ordered        Subjective   HPI/24hr events: Patient is a 76 y/o M presenting with septic shock 2/2 AV fistula infection/dehisence  Vascular surgery performed explant 6/3, patient admitted to MICU for hypotension and pressor requirement  Patient received 1u prbc and 1 FFP intraop, additional 1 unit prbc given overnight  No active bleeding  Levo weaned  Patient states left arm is sore but overall he feels ok  ROS otherwise negative except as above              Objective                            Vitals I/O      Most Recent Min/Max in 24hrs   Temp 97 5 °F (36 4 °C) Temp  Min: 96 3 °F (35 7 °C)  Max: 98 6 °F (37 °C)   Pulse 56 Pulse  Min: 52  Max: 110   Resp 18 Resp  Min: 8  Max: 27   /53 BP  Min: 67/53  Max: 160/63   O2 Sat 100 % SpO2  Min: 95 %  Max: 100 %      Intake/Output Summary (Last 24 hours) at 6/4/2023 5498  Last data filed at 6/3/2023 2316  Gross per 24 hour   Intake 2081 58 ml   Output 450 ml   Net 1631 58 ml         Diet Renal; Potassium 2 GM; No; No     Invasive Monitoring Physical exam    General: NAD, non-toxic  HEENT: NC/AT, PERRL  Neck: No JVD, trachea midline  CV: RRR, no m/r/g, 2+ radial pulses b/l  Pulm: effort WNL, CTAB, no wheezes/rales/rhonchi  GI: soft, NT/ND, no rebound/guarding  : deferred  MSK: no peripheral edema  Neuro: AAOx3, CN II-XII grossly intact, no focal motor or sensory deficits  Skin: wound wac in place, c/d/i, no sig hematoma, compartments soft, extremity well perfused          Diagnostic Studies    EKG: no recent tracing  Imaging: no recent imaging       Medications:  Scheduled PRN   acetaminophen, 975 mg, Q8H SRINI  aspirin, 81 mg, Daily  atorvastatin, 20 mg, HS  b complex-vitamin C-folic acid, 1 capsule, Daily  chlorhexidine, 15 mL, Q12H [facility]  clopidogrel, 75 mg, Daily  vitamin B-12, 1,000 mcg, Daily  heparin (porcine), 5,000 Units, Q8H [address]  midodrine, 10 mg, TID AC  piperacillin-tazobactam, 2 25 g, Q12H  senna, 1 tablet, HS  vancomycin, 7 5 mg/kg, Once per day on Tue Thu Sat      HYDROmorphone, 0 5 mg, Q4H PRN  ondansetron, 4 mg, Q6H PRN  oxyCODONE, 5 mg, Q4H PRN  oxyCODONE, 2 5 mg, Q4H PRN       Continuous    norepinephrine, 1-30 mcg/min, Last Rate: 5 mcg/min (06/03/23 2210)  vasopressin, 0 04 Units/min, Last Rate: 0 04 Units/min (06/04/23 0137)         Labs:    CBC    Recent Labs     06/03/23  0457 06/03/23  1618 06/03/23  2212   HCT 29 4* 24 2* 22 8*   HGB 9 0* 7 7* 7 6*   * 125*  --    WBC 3 95* 8 62  --      BMP    Recent Labs     06/03/23  0457 06/03/23  1618   AGAP 1* 4   BUN 22 26*   CALCIUM 8 2* 7 9*    103   CO2 35* 29   CREATININE 7 76* 7 74*   K 3 1* 3 2*   SODIUM 138 136       Coags    No recent results     Additional Electrolytes  Recent Labs     06/03/23  1618   MG 1 8   PHOS 3 1          Blood Gas    No recent results  No recent results LFTs  Recent Labs     06/02/23  1826   ALB 3 3*   ALKPHOS 109*   ALT 7   AST 31   TBILI 0 88       Infectious  No recent results  Glucose  Recent Labs     06/02/23  1826 06/03/23  0457 06/03/23  1618   GLUC 109 97 186*               Asya Arnold MD

## 2023-06-04 NOTE — CASE MANAGEMENT
Case Management Assessment & Discharge Planning Note    Patient name Rika Figueroa  Location MICU 11/MICU 11 MRN [id number]  : 1955 Date 2023       Current Admission Date: 2023  Current Admission Diagnosis:Infection of AV graft for dialysis [facility])   Patient Active Problem List    Diagnosis Date Noted   • Infection of [facility] graft for [facility] ([facility] ) 2023   • Hypotension 2023   • S/P angioplasty with stent 2023   • Chronic deep vein thrombosis (DVT) of internal jugular vein ([place] [address] ) 2022   • End-stage renal disease on hemodialysis ([place] [address] ) 2022   • Chronic kidney disease 2022   • Right intertrochanteric femur fracture 2022   • Thrombocytopenia ([place] [address] ) 2022   • Arteriovenous fistula ([place] [address] ) 2021   • AV fistula thrombosis, initial encounter ([place] [address] ) 2021   • Encounter for extracorporeal dialysis ([place] [address] ) 2019   • Intestinal malabsorption 2017   • Pernicious anemia 2017   • Irritable bowel syndrome with diarrhea 2017   • Elevated serum alkaline phosphatase level 2017   • Acute on chronic anemia 2017   • Coronary artery disease involving native coronary artery 10/20/2016   • Hypertension 10/20/2016   • Dependence on renal dialysis ([place] [address] ) 2016   • Vitamin D deficiency, unspecified 2013   • Diabetes with neurologic complications ([place] [address] )    • Mixed hyperlipidemia 10/04/2012   • ESRD (end stage renal disease) on dialysis ([place] [address] ) 2012   • Nicotine dependence 2012   • Organic impotence 2012   • Type 2 diabetes mellitus ([facility] ) 2012      LOS (days): 2  Geometric Mean LOS (GMLOS) (days):   Days to GMLOS:     OBJECTIVE:  PATIENT READMITTED TO HOSPITAL  Risk of Unplanned Readmission Score: 23 37         Current admission status: Inpatient       Preferred Pharmacy:   [facility], [address] [place] [address] -  [address]  [address] [place] [address],[address] [facility] [address] [address]  Phone: [phone] Fax: [phone]    Primary Care Provider: Efren Gacria MD    Primary Insurance: MEDICARE  Secondary Insurance: AARP    ASSESSMENT:  Active Health Care Proxies     YUNIER Zimmerman [facility] Representative - Spouse   Primary Phone: [phone] (Mobile)  Home Phone: [phone]                         Readmission Root Cause  30 Day Readmission: No (Transfer from [facility])    Patient Information  Admitted from[de-identified] Home  Mental Status: Alert  During Assessment patient was accompanied by: Not accompanied during assessment  Assessment information provided by[de-identified] Patient  Primary Caregiver: Self  Support Systems: Spouse/significant other  Home entry access options   Select all that apply : Stairs  Number of steps to enter home : 3  Do the steps have railings?: Yes  Type of Current Residence: [address] [place] home  Upon entering residence, is there a bedroom on the main floor (no further steps)?: Yes  Upon entering residence, is there a bathroom on the main floor (no further steps)?: Yes (full bath & bedroom on 1st floor )  Living Arrangements: Lives w/ Spouse/significant other    Activities of Daily Living Prior to Admission  Functional Status: Independent  Completes ADLs independently?: Yes  Ambulates independently?: Yes  Does patient use assisted devices?: Yes  Assisted Devices (DME) used: Straight Nadege Sermons, Shower Chair, Other (Comment) (handicap rails for toilet)  Does patient currently own DME?: Yes  What DME does the patient currently own?: Straight Nadege Sermons, Shower Chair, Other (Comment) (handicap rails for toilet)  Does patient have a history of Outpatient Therapy (PT/OT)?: No  Does the patient have a history of Short-Term Rehab?: Yes ([facility])  Does patient have a history of HHC?: Yes  Does patient currently have [address]?: No         Patient Information Continued  Income Source: Pension/intermediate  Does patient have prescription coverage?: Yes  Food insecurity resource given?: N/A  Does patient receive dialysis treatments?: Yes ([facility], drives self)  Does patient have a history of substance abuse?: No  Does patient have a history of Mental Health Diagnosis?: No         Means of Transportation  Means of Transport to Appts[de-identified] Drives Self        DISCHARGE DETAILS:    Discharge planning discussed with[de-identified] patient  Freedom of Choice: Yes     CM contacted family/caregiver?: No- see comments  Were Treatment Team discharge recommendations reviewed with patient/caregiver?: Yes  Did patient/caregiver verbalize understanding of patient care needs?: Yes  Were patient/caregiver advised of the risks associated with not following Treatment Team discharge recommendations?: Yes    Contacts  Patient Contacts: Allyson Small  (wife)  Relationship to Patient[de-identified] Family  Contact Method: Phone  Phone Number: [phone]  Reason/Outcome: Emergency Contact                              Transport at Discharge : Family

## 2023-06-04 NOTE — PROGRESS NOTES
NEPHROLOGY PROGRESS NOTE   Rey Huizar 70 y o  male MRN: [id number]  Unit/Bed#: MICU 11 Encounter: [id number]  Reason for Consult: ESRD    Assessment/Plan:  1  End-stage renal disease maintenance hemodialysis currently Tuesday Thursday Saturday, outpatient clinic [facility]  2  Left upper arm wound dehiscence where AV graft placed on 5/19  Status post graft explant  Currently with right chest wall permacath  3  Hypotension, resume oral midodrine, wean Levophed and vasopressin as tolerated  4  Anemia of chronic kidney disease currently not on ALICE therapy  5  CKD associated bone disorder, continue with calcitriol with dialysis sessions  6  Chronic hypotension, resume midodrine 10 mg 3 times daily  7  Coronary artery disease with recent PCI and drug-eluting stent placement  PLAN:  · Overall volume status appears stable  · Electrolytes acceptable  · We will continue to hold hemodialysis  · Evaluate for dialysis tomorrow  · Wean hemodynamic support as tolerated, continue with oral midodrine  SUBJECTIVE:  Seen and examined  Patient appears more comfortable than yesterday  Pain seems to be under better control  Does not appear short of breath  No active chest pain or pressure  Review of Systems    OBJECTIVE:  Current Weight: Weight - Scale: 78 7 kg (173 lb 8 oz)  Vitals:    06/04/23 0545 06/04/23 0600 06/04/23 0615 06/04/23 0759   BP: (!) 134/47 138/58 130/58 93/59   Pulse: (!) 44 (!) 42 (!) 48 (!) 42   Resp: (!) 26 (!) 28 (!) 25 22   Temp:    (!) 97 4 °F (36 3 °C)   TempSrc:    Oral   SpO2: 100% 100% 100% 100%   Weight:  78 7 kg (173 lb 8 oz)     Height:           Intake/Output Summary (Last 24 hours) at 6/4/2023 0835  Last data filed at 6/4/2023 0813  Gross per 24 hour   Intake 2766 42 ml   Output 450 ml   Net 2316 42 ml       Physical Exam  Constitutional:       Appearance: He is not ill-appearing  Cardiovascular:      Rate and Rhythm: Normal rate and regular rhythm     Pulmonary:      Effort: Pulmonary effort is normal       Breath sounds: Normal breath sounds  Abdominal:      General: There is no distension  Palpations: Abdomen is soft  Musculoskeletal:      Right lower leg: No edema  Left lower leg: No edema  Skin:     General: Skin is warm and dry  Neurological:      Mental Status: He is alert and oriented to person, place, and time           Medications:    Current Facility-Administered Medications:   •  acetaminophen (TYLENOL) tablet 975 mg, 975 mg, Oral, Q8H [facility], Nito Saxena MD, 975 mg at 06/03/23 0503  •  aspirin (ECOTRIN LOW STRENGTH) EC tablet 81 mg, 81 mg, Oral, Daily, Nito Saxena MD, 81 mg at 06/03/23 4116  •  atorvastatin (LIPITOR) tablet 20 mg, 20 mg, Oral, HS, Nito Saxena MD, 20 mg at 06/03/23 2109  •  b complex-vitamin C-folic acid (RENAL) capsule 1 capsule, 1 capsule, Oral, Daily, Nito Saxena MD  •  chlorhexidine (PERIDEX) 0 12 % oral rinse 15 mL, 15 mL, Mouth/Throat, Q12H [facility], Raegan Ansari DO, 15 mL at 06/03/23 2200  •  clopidogrel (PLAVIX) tablet 75 mg, 75 mg, Oral, Daily, Nito Saxena MD, 75 mg at 06/03/23 8748  •  cyanocobalamin (VITAMIN B-12) tablet 1,000 mcg, 1,000 mcg, Oral, Daily, Nito Saxena MD, 1,000 mcg at 06/04/23 5352  •  heparin (porcine) subcutaneous injection 5,000 Units, 5,000 Units, Subcutaneous, Q8H [facility], 5,000 Units at 06/04/23 0639 **AND** [COMPLETED] Platelet count, , , Once, Gui Barry DO  •  HYDROmorphone (DILAUDID) injection 0 5 mg, 0 5 mg, Intravenous, Q4H PRN, Nito Saxena MD, 0 5 mg at 06/04/23 0308  •  midodrine (PROAMATINE) tablet 10 mg, 10 mg, Oral, TID AC, Nito Saxena MD, 10 mg at 06/04/23 4481  •  norepinephrine (LEVOPHED) 4 mg (STANDARD CONCENTRATION) IV in sodium chloride 0 9% 250 mL, 1-30 mcg/min, Intravenous, Titrated, Jasmit DO Coty, Last Rate: 11 3 mL/hr at 06/04/23 0813, 3 mcg/min at 06/04/23 0813  •  ondansetron (ZOFRAN) injection 4 mg, 4 mg, Intravenous, Q6H PRN, Nito Saxena MD  •  oxyCODONE (ROXICODONE) IR tablet 5 mg, 5 mg, Oral, Q4H PRN, Merced Queen MD, 5 mg at 06/04/23 0800  •  oxyCODONE (ROXICODONE) split tablet 2 5 mg, 2 5 mg, Oral, Q4H PRN, Merced Queen MD, 2 5 mg at 06/03/23 1655  •  piperacillin-tazobactam (ZOSYN) 2 25 g in sodium chloride 0 9 % 50 mL IVPB, 2 25 g, Intravenous, Q12H, Merced Queen MD, Stopped at 06/03/23 2242  •  senna (SENOKOT) tablet 8 6 mg, 1 tablet, Oral, HS, Fridait Coty, DO  •  vancomycin (VANCOCIN) IVPB (premix in dextrose) 500 mg 100 mL, 7 5 mg/kg, Intravenous, Once per day on Tue Thu Sat, Merced Queen MD  •  vasopressin (PITRESSIN) 20 Units in sodium chloride 0 9 % 100 mL infusion, 0 04 Units/min, Intravenous, Continuous, Jonelle Glez MD, Last Rate: 12 mL/hr at 06/04/23 0813, 0 04 Units/min at 06/04/23 0813    Laboratory Results:  Results from last 7 days   Lab Units 06/04/23  0651 06/03/23  2212 06/03/23  1618 06/03/23  0457 06/02/23  2217 06/02/23  1826   BUN mg/dL 31*  --  26* 22  --  19   CALCIUM mg/dL 7 3*  --  7 9* 8 2*  --  8 7   CHLORIDE mmol/L 104  --  103 102  --  97   CO2 mmol/L 26  --  29 35*  --  31   CREATININE mg/dL 8 57*  --  7 74* 7 76*  --  6 78*   HEMATOCRIT %  --  22 8* 24 2* 29 4*  --  33 8*   HEMOGLOBIN g/dL  --  7 6* 7 7* 9 0*  --  10 9*   POTASSIUM mmol/L 4 6  --  3 2* 3 1*  --  4 0   MAGNESIUM mg/dL  --   --  1 8  --   --   --    PHOSPHORUS mg/dL  --   --  3 1  --   --   --    PLATELETS Thousands/uL  --   --  125* 133* 153 169   WBC Thousand/uL  --   --  8 62 3 95*  --  6 55

## 2023-06-04 NOTE — CONSULTS
Consultation - Infectious Disease   Romeo Bishop 70 y o  male MRN: [id number]  Unit/Bed#: MICU 11 Encounter: [id number]      IMPRESSION & RECOMMENDATIONS:   Impression/Recommendations:  LUE AVG infection  With axillary wound dehiscence and exposed graft  Status post explantation 6/3  OR cultures pending  Clinically stable without sepsis  Rec:  · Continue vancomycin for now -dosing by pharmacy  · Change Zosyn to cefepime  · Follow-up OR cultures and tailor antibiotics as indicated  · Given complete explantation of graft, anticipate short course of antibiotics  · LWC/VAC per vascular surgery    ESRD on HD  Via permcath  Rec:  · Dose adjust antibiotics for HD    DM  Chronic hypotension  On midodrine    The above impression and plan was discussed in detail with the patient  Antibiotics:  Zosyn #2  Vancomycin #2  POD #1    Thank you for this consultation  We will follow along with you  HISTORY OF PRESENT ILLNESS:  Reason for Consult: AVG infection    HPI: Deuce Cotter is a 70 y o  male with ESRD on HD via a PermCath  On 3/21/2023 he underwent left upper extremity brachial basilic AV graft placement  This was complicated by thrombosis  He then underwent left brachial axillary AV graft 5/19/2023  He was seen in the ED on 5/28 with bleeding from fistula and surgical wound  He ultimately developed wound dehiscence  He was admitted to the hospital 6/2 and was taken to the OR for explantation of his AV graft  He has been on broad-spectrum antibiotics  His postoperative course was complicated by acute on chronic hypotension  We are asked to comment on further evaluation and management  In performing this consult, I have reviewed prior admission and outpatient visit records in detail  REVIEW OF SYSTEMS:  Mild left hand pain  A complete system-based review of systems is otherwise negative      PAST MEDICAL HISTORY:  Past Medical History:   Diagnosis Date   • 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 "   With Cerebral Infarction:  Last Assessed:  October 20, 2016   • Chronic kidney disease 2012    on dialysis    • COVID 05/2022   • Diabetes mellitus ([address] )    • Dialysis patient [facility])     T/TH/Sat   • GERD (gastroesophageal reflux disease)    • Hyperlipidemia    • Hypertension    • Labyrinthitis 09/10/2011   • Myocardial infarction [facility]) 2014    x3 others were in 2009 & 2010   • Sleep disturbances 09/20/2010   • Stroke [facility]) 2007    x1, RLE deficit- uses walker   • Type 2 diabetes, uncontrolled, with renal manifestation 05/03/2017     Past Surgical History:   Procedure Laterality Date   • AV FISTULA PLACEMENT     • CARDIAC CATHETERIZATION Left 02/03/2023    Procedure: Cardiac Left Heart Cath;  Surgeon: Mart Shay MD;  Location: [place] [address] CATH LAB; Service: Cardiology   • CARDIAC CATHETERIZATION N/A 02/08/2023    Procedure: Cardiac catheterization with complex PCI with Dr Lexy Black, patient is a renal dialysis patient;  Surgeon: Gabriela Land MD;  [facility] CARDIAC CATH LAB; Service: Cardiology   • CARDIAC SURGERY  2010    stents x3   • COLONOSCOPY N/A 12/12/2016    Procedure: COLONOSCOPY;  Surgeon: Flakita Strong MD;  Location: [facility] GI LAB; Service:    • COLONOSCOPY N/A 04/03/2017    Procedure:  COLONOSCOPY;  Surgeon: Flakita Strong MD;  Location: [facility] GI LAB;   Service:    • HARDWARE REMOVAL Right 04/04/2022    Procedure: REMOVAL HARDWARE HIP;  Surgeon: Nicolás Noe MD;  Location: [facility] MAIN OR;  Service: Orthopedics   • IR AV FISTULA/GRAFT DECLOT  04/29/2021   • IR AV FISTULA/GRAFT DECLOT  03/25/2022   • IR AV FISTULA/GRAFT DECLOT  09/21/2022   • IR AV FISTULAGRAM/GRAFTOGRAM  03/28/2022   • IR AV FISTULAGRAM/GRAFTOGRAM  07/28/2022   • IR TUNNELED CENTRAL LINE REMOVAL  12/07/2021   • IR TUNNELED DIALYSIS CATHETER PLACEMENT  05/24/2021   • PORTACATH PLACEMENT      per pt \"port in chest\"   • ND ARTERIOVENOUS ANASTOMOSIS OPEN DIRECT Left 07/22/2021    Procedure: CREATION FISTULA ARTERIOVENOUS (AV); " Surgeon: Gunnar Saldivar MD;  Location: [facility];  Service: Vascular   • IA ARTERIOVENOUS ANASTOMOSIS OPEN DIRECT Left 3/21/2023    Procedure: left brachiobasilic fistula,  AVG Graft;  Surgeon: Namita Hadley MD;  [facility]: [facility];  Service: Vascular   • IA ARTERIOVENOUS ANASTOMOSIS OPEN DIRECT Left 2023    Procedure: CREATION of LEFT FISTULA ARTERIOVENOUS (AV) GRAFT;  Surgeon: Namita Hadley MD;  [facility]: [facility];  Service: Vascular   • IA HEMIARTHROPLASTY HIP PARTIAL Right 2022    Procedure: HEMIARTHROPLASTY HIP (BIPOLAR); Surgeon: Vitaliy Abrams MD;  Location: [facility];  Service: Orthopedics       FAMILY HISTORY:  Non-contributory    SOCIAL HISTORY:  Social History     Substance and Sexual Activity   Alcohol Use Yes    Comment: rarely - No alcohol use per [employer]     Social History     Substance and Sexual Activity   Drug Use Not Currently   • Types: Marijuana    Comment: occasional brownie for sleep     Social History     Tobacco Use   Smoking Status Every Day   • Packs/day: 0 25   • Years: 30 00   • Total pack years: 7 50   • Types: Cigarettes   Smokeless Tobacco Never       ALLERGIES:  No Known Allergies    MEDICATIONS:  All current active medications have been reviewed      PHYSICAL EXAM:  Vitals:  Temp:  [96 3 °F (35 7 °C)-98 6 °F (37 °C)] 97 4 °F (36 3 °C)  HR:  [] 42  Resp:  [8-32] 22  BP: ()/(35-82) 93/59  SpO2:  [99 %-100 %] 100 %  Temp (24hrs), Av 4 °F (36 3 °C), Min:96 3 °F (35 7 °C), Max:98 6 °F (37 °C)  Current: Temperature: (!) 97 4 °F (36 3 °C)     Physical Exam:  General:  Well-nourished, well-developed, in no acute distress  Eyes:  Conjunctive clear with no hemorrhages or effusions  Oropharynx:  No ulcers, no lesions  Neck:  Supple, no lymphadenopathy  Lungs:  Normal respiratory excursion, no accessory muscle use  Cardiac:  Regular rate and rhythm, extremities well perfused  Abdomen:  Soft, non-tender, non-distended  Extremities: No peripheral cyanosis, clubbing, or edema, VAC to left biceps area with ACE overlying  Skin:  No rashes, no ulcers  Neurological:  Moves all four extremities spontaneously, sensation grossly intact    LABS, IMAGING, & OTHER STUDIES:  Lab Results:  I have personally reviewed pertinent labs  Results from last 7 days   Lab Units 06/04/23  0651 06/03/23  1618 06/03/23  0457 06/02/23  1826   ALK PHOS U/L 94  --   --  109*   ALT U/L 13  --   --  7   AST U/L 11  --   --  31   BUN mg/dL 31* 26* 22 19   CALCIUM mg/dL 7 3* 7 9* 8 2* 8 7   CHLORIDE mmol/L 104 103 102 97   CO2 mmol/L 26 29 35* 31   CREATININE mg/dL 8 57* 7 74* 7 76* 6 78*   EGFR ml/min/1 73sq m 5 6 6 7   POTASSIUM mmol/L 4 6 3 2* 3 1* 4 0     Results from last 7 days   Lab Units 06/04/23  0651 06/03/23  2212 06/03/23  1618 06/03/23  0457   HEMOGLOBIN g/dL 8 0* 7 6* 7 7* 9 0*   PLATELETS Thousands/uL 107*  --  125* 133*   WBC Thousand/uL 6 53  --  8 62 3 95*     Results from last 7 days   Lab Units 06/03/23  1014 06/02/23  1840 06/02/23  1826   BLOOD CULTURE   --  No Growth at 24 hrs  No Growth at 24 hrs  GRAM STAIN RESULT  No Polys or Bacteria seen  --   --        Imaging Studies:   I have personally reviewed pertinent imaging study reports and images in PACS  EKG, Pathology, and Other Studies:   I have personally reviewed pertinent reports

## 2023-06-05 ENCOUNTER — APPOINTMENT (INPATIENT)
Dept: DIALYSIS | Facility: HOSPITAL | Age: 68
DRG: 907 | End: 2023-06-05
Payer: MEDICARE

## 2023-06-05 LAB
ABO GROUP BLD BPU: NORMAL
ABO GROUP BLD BPU: NORMAL
ALBUMIN SERPL BCP-MCNC: 2 G/DL (ref 3.5–5)
ALP SERPL-CCNC: 105 U/L (ref 46–116)
ALT SERPL W P-5'-P-CCNC: 11 U/L (ref 12–78)
ANION GAP SERPL CALCULATED.3IONS-SCNC: 3 MMOL/L (ref 4–13)
AST SERPL W P-5'-P-CCNC: 12 U/L (ref 5–45)
BACTERIA BLD CULT: NORMAL
BACTERIA BLD CULT: NORMAL
BASOPHILS # BLD AUTO: 0.08 THOUSANDS/ÂΜL (ref 0–0.1)
BASOPHILS NFR BLD AUTO: 1 % (ref 0–1)
BILIRUB SERPL-MCNC: 0.67 MG/DL (ref 0.2–1)
BPU ID: NORMAL
BPU ID: NORMAL
BUN SERPL-MCNC: 35 MG/DL (ref 5–25)
CALCIUM ALBUM COR SERPL-MCNC: 9.2 MG/DL (ref 8.3–10.1)
CALCIUM SERPL-MCNC: 7.6 MG/DL (ref 8.3–10.1)
CHLORIDE SERPL-SCNC: 106 MMOL/L (ref 96–108)
CO2 SERPL-SCNC: 28 MMOL/L (ref 21–32)
CREAT SERPL-MCNC: 9.92 MG/DL (ref 0.6–1.3)
CROSSMATCH: NORMAL
CROSSMATCH: NORMAL
EOSINOPHIL # BLD AUTO: 0.17 THOUSAND/ÂΜL (ref 0–0.61)
EOSINOPHIL NFR BLD AUTO: 2 % (ref 0–6)
ERYTHROCYTE [DISTWIDTH] IN BLOOD BY AUTOMATED COUNT: 23.1 % (ref 11.6–15.1)
GFR SERPL CREATININE-BSD FRML MDRD: 4 ML/MIN/1.73SQ M
GLUCOSE SERPL-MCNC: 123 MG/DL (ref 65–140)
GLUCOSE SERPL-MCNC: 134 MG/DL (ref 65–140)
GLUCOSE SERPL-MCNC: 156 MG/DL (ref 65–140)
GLUCOSE SERPL-MCNC: 167 MG/DL (ref 65–140)
GLUCOSE SERPL-MCNC: 172 MG/DL (ref 65–140)
HCT VFR BLD AUTO: 22.9 % (ref 36.5–49.3)
HGB BLD-MCNC: 7.8 G/DL (ref 12–17)
IMM GRANULOCYTES # BLD AUTO: 0.1 THOUSAND/UL (ref 0–0.2)
IMM GRANULOCYTES NFR BLD AUTO: 1 % (ref 0–2)
LYMPHOCYTES # BLD AUTO: 0.72 THOUSANDS/ÂΜL (ref 0.6–4.47)
LYMPHOCYTES NFR BLD AUTO: 8 % (ref 14–44)
MAGNESIUM SERPL-MCNC: 2 MG/DL (ref 1.6–2.6)
MCH RBC QN AUTO: 33.9 PG (ref 26.8–34.3)
MCHC RBC AUTO-ENTMCNC: 34.1 G/DL (ref 31.4–37.4)
MCV RBC AUTO: 100 FL (ref 82–98)
MONOCYTES # BLD AUTO: 0.61 THOUSAND/ÂΜL (ref 0.17–1.22)
MONOCYTES NFR BLD AUTO: 7 % (ref 4–12)
MRSA NOSE QL CULT: NORMAL
NEUTROPHILS # BLD AUTO: 6.87 THOUSANDS/ÂΜL (ref 1.85–7.62)
NEUTS SEG NFR BLD AUTO: 81 % (ref 43–75)
NRBC BLD AUTO-RTO: 0 /100 WBCS
PHOSPHATE SERPL-MCNC: 3.1 MG/DL (ref 2.3–4.1)
PLATELET # BLD AUTO: 140 THOUSANDS/UL (ref 149–390)
PMV BLD AUTO: 10.6 FL (ref 8.9–12.7)
POTASSIUM SERPL-SCNC: 3.7 MMOL/L (ref 3.5–5.3)
PROT SERPL-MCNC: 4.6 G/DL (ref 6.4–8.4)
RBC # BLD AUTO: 2.3 MILLION/UL (ref 3.88–5.62)
SODIUM SERPL-SCNC: 137 MMOL/L (ref 135–147)
UNIT DISPENSE STATUS: NORMAL
UNIT DISPENSE STATUS: NORMAL
UNIT PRODUCT CODE: NORMAL
UNIT PRODUCT CODE: NORMAL
UNIT PRODUCT VOLUME: 350 ML
UNIT PRODUCT VOLUME: 350 ML
UNIT RH: NORMAL
UNIT RH: NORMAL
VANCOMYCIN SERPL-MCNC: 11.8 UG/ML (ref 10–20)
WBC # BLD AUTO: 8.55 THOUSAND/UL (ref 4.31–10.16)

## 2023-06-05 PROCEDURE — 97167 OT EVAL HIGH COMPLEX 60 MIN: CPT

## 2023-06-05 PROCEDURE — 97163 PT EVAL HIGH COMPLEX 45 MIN: CPT

## 2023-06-05 PROCEDURE — 82948 REAGENT STRIP/BLOOD GLUCOSE: CPT

## 2023-06-05 PROCEDURE — 99232 SBSQ HOSP IP/OBS MODERATE 35: CPT | Performed by: INTERNAL MEDICINE

## 2023-06-05 PROCEDURE — 80202 ASSAY OF VANCOMYCIN: CPT | Performed by: INTERNAL MEDICINE

## 2023-06-05 PROCEDURE — 99024 POSTOP FOLLOW-UP VISIT: CPT | Performed by: SURGERY

## 2023-06-05 PROCEDURE — 85025 COMPLETE CBC W/AUTO DIFF WBC: CPT | Performed by: STUDENT IN AN ORGANIZED HEALTH CARE EDUCATION/TRAINING PROGRAM

## 2023-06-05 PROCEDURE — 84100 ASSAY OF PHOSPHORUS: CPT | Performed by: STUDENT IN AN ORGANIZED HEALTH CARE EDUCATION/TRAINING PROGRAM

## 2023-06-05 PROCEDURE — 80053 COMPREHEN METABOLIC PANEL: CPT | Performed by: STUDENT IN AN ORGANIZED HEALTH CARE EDUCATION/TRAINING PROGRAM

## 2023-06-05 PROCEDURE — 83735 ASSAY OF MAGNESIUM: CPT | Performed by: STUDENT IN AN ORGANIZED HEALTH CARE EDUCATION/TRAINING PROGRAM

## 2023-06-05 PROCEDURE — 99233 SBSQ HOSP IP/OBS HIGH 50: CPT | Performed by: INTERNAL MEDICINE

## 2023-06-05 RX ORDER — POLYETHYLENE GLYCOL 3350 17 G/17G
17 POWDER, FOR SOLUTION ORAL DAILY
Status: DISCONTINUED | OUTPATIENT
Start: 2023-06-05 | End: 2023-06-10

## 2023-06-05 RX ORDER — POLYETHYLENE GLYCOL 3350 17 G/17G
17 POWDER, FOR SOLUTION ORAL DAILY PRN
Status: DISCONTINUED | OUTPATIENT
Start: 2023-06-05 | End: 2023-06-05

## 2023-06-05 RX ADMIN — HEPARIN SODIUM 5000 UNITS: 5000 INJECTION INTRAVENOUS; SUBCUTANEOUS at 05:48

## 2023-06-05 RX ADMIN — HYDROMORPHONE HYDROCHLORIDE 0.5 MG: 1 INJECTION, SOLUTION INTRAMUSCULAR; INTRAVENOUS; SUBCUTANEOUS at 17:26

## 2023-06-05 RX ADMIN — HYDROMORPHONE HYDROCHLORIDE 0.5 MG: 1 INJECTION, SOLUTION INTRAMUSCULAR; INTRAVENOUS; SUBCUTANEOUS at 09:34

## 2023-06-05 RX ADMIN — INSULIN LISPRO 1 UNITS: 100 INJECTION, SOLUTION INTRAVENOUS; SUBCUTANEOUS at 16:29

## 2023-06-05 RX ADMIN — OXYCODONE HYDROCHLORIDE 5 MG: 5 TABLET ORAL at 01:18

## 2023-06-05 RX ADMIN — NEPHROCAP 1 CAPSULE: 1 CAP ORAL at 08:12

## 2023-06-05 RX ADMIN — HYDROMORPHONE HYDROCHLORIDE 0.5 MG: 1 INJECTION, SOLUTION INTRAMUSCULAR; INTRAVENOUS; SUBCUTANEOUS at 02:57

## 2023-06-05 RX ADMIN — MIDODRINE HYDROCHLORIDE 10 MG: 5 TABLET ORAL at 11:29

## 2023-06-05 RX ADMIN — HYDROMORPHONE HYDROCHLORIDE 0.5 MG: 1 INJECTION, SOLUTION INTRAMUSCULAR; INTRAVENOUS; SUBCUTANEOUS at 21:55

## 2023-06-05 RX ADMIN — CLOPIDOGREL BISULFATE 75 MG: 75 TABLET ORAL at 08:12

## 2023-06-05 RX ADMIN — INSULIN LISPRO 1 UNITS: 100 INJECTION, SOLUTION INTRAVENOUS; SUBCUTANEOUS at 08:13

## 2023-06-05 RX ADMIN — NOREPINEPHRINE BITARTRATE 8 MCG/MIN: 1 SOLUTION INTRAVENOUS at 03:22

## 2023-06-05 RX ADMIN — ATORVASTATIN CALCIUM 20 MG: 20 TABLET, FILM COATED ORAL at 21:19

## 2023-06-05 RX ADMIN — HEPARIN SODIUM 5000 UNITS: 5000 INJECTION INTRAVENOUS; SUBCUTANEOUS at 21:21

## 2023-06-05 RX ADMIN — CYANOCOBALAMIN TAB 500 MCG 1000 MCG: 500 TAB at 08:12

## 2023-06-05 RX ADMIN — NOREPINEPHRINE BITARTRATE 7 MCG/MIN: 1 SOLUTION INTRAVENOUS at 15:58

## 2023-06-05 RX ADMIN — OXYCODONE HYDROCHLORIDE 5 MG: 5 TABLET ORAL at 07:15

## 2023-06-05 RX ADMIN — MIDODRINE HYDROCHLORIDE 10 MG: 5 TABLET ORAL at 16:29

## 2023-06-05 RX ADMIN — HEPARIN SODIUM 5000 UNITS: 5000 INJECTION INTRAVENOUS; SUBCUTANEOUS at 14:03

## 2023-06-05 RX ADMIN — CHLORHEXIDINE GLUCONATE 15 ML: 1.2 SOLUTION ORAL at 08:12

## 2023-06-05 RX ADMIN — CEFEPIME 1000 MG: 1 INJECTION, POWDER, FOR SOLUTION INTRAMUSCULAR; INTRAVENOUS at 10:20

## 2023-06-05 RX ADMIN — OXYCODONE HYDROCHLORIDE 5 MG: 5 TABLET ORAL at 21:20

## 2023-06-05 RX ADMIN — ASPIRIN 81 MG: 81 TABLET, COATED ORAL at 08:12

## 2023-06-05 RX ADMIN — CHLORHEXIDINE GLUCONATE 15 ML: 1.2 SOLUTION ORAL at 20:25

## 2023-06-05 RX ADMIN — VANCOMYCIN HYDROCHLORIDE 750 MG: 750 INJECTION, SOLUTION INTRAVENOUS at 17:30

## 2023-06-05 RX ADMIN — MIDODRINE HYDROCHLORIDE 10 MG: 5 TABLET ORAL at 07:15

## 2023-06-05 RX ADMIN — MELATONIN 3 MG: at 21:19

## 2023-06-05 RX ADMIN — ONDANSETRON 4 MG: 2 INJECTION INTRAMUSCULAR; INTRAVENOUS at 04:56

## 2023-06-05 NOTE — PLAN OF CARE
Problem: PHYSICAL THERAPY ADULT  Goal: Performs mobility at highest level of function for planned discharge setting  See evaluation for individualized goals  Description: Treatment/Interventions: OT, Spoke to nursing, Spoke to case management, Gait training, Bed mobility, Patient/family training, Endurance training, LE strengthening/ROM, Functional transfer training          See flowsheet documentation for full assessment, interventions and recommendations  Note: Prognosis: Good  Problem List: Decreased strength, Decreased range of motion, Decreased endurance, Impaired balance, Decreased mobility, Decreased coordination, Decreased cognition, Impaired judgement, Decreased safety awareness, Pain  Assessment: Pt is 70 y o  male seen for PT evaluation s/p admit to [place] [address] on 6/2/2023 w/ Infection of AV graft for dialysis ([facility] )  PT consulted to assess pt's functional mobility and d/c needs  Order placed for PT eval and tx, w/ up w/ A order  Comorbidities affecting pt's physical performance at time of assessment include:  has a past medical history of Cerebral thrombosis, Chronic kidney disease, COVID, Diabetes mellitus ([place] [address] ), Dialysis patient ([place] [address] ), GERD (gastroesophageal reflux disease), Hyperlipidemia, Hypertension, Labyrinthitis, Myocardial infarction [facility]), Sleep disturbances, Stroke ([place] [address] ), and Type 2 diabetes, uncontrolled, with renal manifestation  PTA, pt was ambulates community distances and elevations and lives in multi-level home  Personal factors affecting pt at time of IE include: inaccessible home environment, decreased cognition, unable to perform physical activity, limited insight into impairments, inability to perform IADLs and inability to perform ADLs   Please find objective findings from PT assessment regarding body systems outlined above with impairments and limitations including weakness, decreased ROM, impaired balance, decreased endurance, impaired coordination, gait deviations, pain, decreased activity tolerance, decreased functional mobility tolerance, decreased safety awareness, impaired judgement, fall risk and decreased cognition  Pt required increased A to complete bed mobility with increased time  Required A for transfers with deficits in balance  Ambulated with mildly unsteady gait  Single step instruction to improve chair approach  The following objective measures performed on IE also reveal limitations: The patient's AM-PAC Basic Mobility Inpatient Short Form Raw Score is 15, Standardized Score is 36 97  A standardized score less than 42 9 suggests the patient may benefit from discharge to post-acute rehabilitation services although anticipate pt may progress to home with increased A  Please also refer to the recommendation of the Physical Therapist for safe discharge planning  Pt's clinical presentation is currently unstable/unpredictable seen in pt's presentation of critical care monitoring  Pt to benefit from continued PT tx to address deficits as defined above and maximize level of functional independent mobility and consistency  From PT/mobility standpoint, recommendation at time of d/c would be home with home health rehabilitation pending progress in order to facilitate return to PLOF  Barriers to Discharge: Decreased caregiver support, Inaccessible home environment     PT Discharge Recommendation: Post acute rehabilitation services    See flowsheet documentation for full assessment

## 2023-06-05 NOTE — PLAN OF CARE
"  Problem: OCCUPATIONAL THERAPY ADULT  Goal: Performs self-care activities at highest level of function for planned discharge setting  See evaluation for individualized goals  Description: Treatment Interventions: ADL retraining, Functional transfer training, UE strengthening/ROM, Endurance training, Cognitive reorientation, Patient/family training, Equipment evaluation/education, Compensatory technique education, Continued evaluation, Energy conservation, Activityengagement          See flowsheet documentation for full assessment, interventions and recommendations  Note: Limitation: Decreased ADL status, Decreased Safe judgement during ADL, Decreased UE strength, Decreased endurance, Decreased self-care trans, Decreased high-level ADLs  Prognosis: Fair  Assessment: Pt is a 71 y/o male seen for OT eval s/p adm to [facility] w/ open LUE wound  Of note, pt was s/p brachial basilic AV graft 1/25- was thrombosed; underwent L brachial axillary AVG of LUE 5/19 and D/C'd; saw PCP for checkup and staples from graft had fallen out w/ notable drainage coming out of wound  Pt is dx'd w/ infection of AV graft for dialysis  Pt is s/p \"Left - LEFT UPPER EXTREMITY A-V GRAFT EXPLANT  LEFT BRACHIAL ANGIOPATCH  APPLICATION OF  VAC\" performed on 6/3  Pt  has a past medical history of Cerebral thrombosis (04/23/2008), Chronic kidney disease (2012), COVID (05/2022), Diabetes mellitus ([place] [address] ), Dialysis patient [facility]), GERD (gastroesophageal reflux disease), Hyperlipidemia, Hypertension, Labyrinthitis (09/10/2011), Myocardial infarction ([place] [address] ) (2014), Sleep disturbances (09/20/2010), Stroke ([place] [address] ) (2007), and Type 2 diabetes, uncontrolled, with renal manifestation (05/03/2017)  Pt with active OT orders and up with assistance  orders  Pt lives with his spouse in 2 SH, 3 PHILLY, 1st floor setup  Pt was I w/  ADLS and IADLS, drove, & required use of DME PTA including RW   Pt is currently demonstrating the following occupational deficits: Min A UB ADLS, " Mod A LB ADLS, Min A bed mobility, transfers and functional mobility w/ HHA and VC for safety  These deficits that are impacting pt's baseline areas of occupation are a result of the following impairments: endurance, activity tolerance, functional mobility, forward functional reach, balance, functional standing tolerance, unsupportive home environment, decreased I w/ ADLS/IADLS, strength and decreased insight into deficits  The following Occupational Performance Areas to address include: bathing/shower, toilet hygiene, dressing, functional mobility, community mobility, clothing management and household maintenance  Recommend home OT upon D/C   Pt to continue to benefit from acute immediate OT services to address the following goals 2-3x/week to  w/in 10-14 days:     OT Discharge Recommendation: Home with home health rehabilitation   Sebastian Araya MS, OTR/L

## 2023-06-05 NOTE — PROGRESS NOTES
Progress Note - Critical Care   Simona Blakely 70 y o  male MRN: [id number]  Unit/Bed#: MICU 11 Encounter: [id number]    SUBJECTIVE:  Patient reports feeling better, but notes diminished appetite, no bowel movement in 2 days, and infrequent urination  His LUE is painful distal to graft explantation site  Denies chills, SOB, palpitations, angina or dysuria      HPI/24HR Event:  No events overnight  Current Facility-Administered Medications:   •  acetaminophen (TYLENOL) tablet 975 mg, 975 mg, Oral, Q8H PRN, Ana Kruse MD  •  aspirin (ECOTRIN LOW STRENGTH) EC tablet 81 mg, 81 mg, Oral, Daily, Kennedy Valerio MD, 81 mg at 06/05/23 4850  •  atorvastatin (LIPITOR) tablet 20 mg, 20 mg, Oral, HS, Kennedy Valerio MD, 20 mg at 06/04/23 2120  •  b complex-vitamin C-folic acid (RENAL) capsule 1 capsule, 1 capsule, Oral, Daily, Kennedy Valerio MD, 1 capsule at 06/05/23 2450  •  chlorhexidine (PERIDEX) 0 12 % oral rinse 15 mL, 15 mL, Mouth/Throat, Q12H [address], Jasmit Coty, DO, 15 mL at 06/05/23 0082  •  clopidogrel (PLAVIX) tablet 75 mg, 75 mg, Oral, Daily, Kennedy Valerio MD, 75 mg at 06/05/23 9765  •  cyanocobalamin (VITAMIN B-12) tablet 1,000 mcg, 1,000 mcg, Oral, Daily, Kennedy Valerio MD, 1,000 mcg at 06/05/23 9588  •  heparin (porcine) subcutaneous injection 5,000 Units, 5,000 Units, Subcutaneous, Q8H [address], 5,000 Units at 06/05/23 1403 **AND** [COMPLETED] Platelet count, , , Once, Norm Al, DO  •  HYDROmorphone (DILAUDID) injection 0 5 mg, 0 5 mg, Intravenous, Q4H PRN, Kennedy Valerio MD, 0 5 mg at 06/05/23 0934  •  insulin lispro (HumaLOG) 100 units/mL subcutaneous injection 1-6 Units, 1-6 Units, Subcutaneous, 4x Daily (AC & HS), 1 Units at 06/05/23 0813 **AND** Fingerstick Glucose (POCT), , , 4x Daily AC and at bedtime, Ana Kruse MD  •  melatonin tablet 3 mg, 3 mg, Oral, HS, Erlin Oh, DO, 3 mg at 06/04/23 2120  •  midodrine (PROAMATINE) tablet 10 mg, 10 mg, Oral, TID AC, Kennedy Valerio MD, 10 mg at 06/05/23 1129  •  norepinephrine (LEVOPHED) 4 mg (STANDARD CONCENTRATION) IV in sodium chloride 0 9% 250 mL, 1-30 mcg/min, Intravenous, Titrated, Maricarmen Talley MD, Last Rate: 15 mL/hr at 06/05/23 1020, 4 mcg/min at 06/05/23 1020  •  ondansetron (ZOFRAN) injection 4 mg, 4 mg, Intravenous, Q6H PRN, Cheko Dubois MD, 4 mg at 06/05/23 0456  •  oxyCODONE (ROXICODONE) IR tablet 5 mg, 5 mg, Oral, Q4H PRN, Cheko Dubois MD, 5 mg at 06/05/23 0715  •  oxyCODONE (ROXICODONE) split tablet 2 5 mg, 2 5 mg, Oral, Q4H PRN, Cheko Dubois MD, 2 5 mg at 06/03/23 1655  •  polyethylene glycol (MIRALAX) packet 17 g, 17 g, Oral, Daily, Candy Suazo MD  •  senna (SENOKOT) tablet 8 6 mg, 1 tablet, Oral, HS, Raegan Ansari DO  •  vancomycin (VANCOCIN) IVPB (premix in dextrose) 750 mg 150 mL, 10 mg/kg, Intravenous, Daily PRN, Chandrika Mian MD  •  vasopressin (PITRESSIN) 20 Units in sodium chloride 0 9 % 100 mL infusion, 0 04 Units/min, Intravenous, Continuous, Chandrika Main MD, Stopped at 06/04/23 0945        ROS  Review of Systems   Constitutional: Negative for activity change, appetite change, chills and fever  HENT: Negative  Eyes: Negative  Respiratory: Negative for cough, chest tightness and shortness of breath  Cardiovascular: Negative for chest pain and palpitations  Gastrointestinal: Negative for abdominal pain and vomiting  Endocrine: Negative  Genitourinary: Positive for decreased urine volume  Negative for dysuria, hematuria and urgency  Musculoskeletal: Positive for myalgias  Negative for arthralgias and back pain  Skin: Positive for color change (chronic venous stasis changes on limbs) and wound  Neurological: Negative for seizures, syncope and speech difficulty  Hematological: Bruises/bleeds easily  Psychiatric/Behavioral: Negative  All other systems reviewed and are negative  Invasive lines and devices:   Invasive Devices     Central Venous Catheter Line Duration           CVC Central Lines 23 Triple 20cm 1 day          Line  Duration           Hemodialysis AV Fistula  Left Upper arm -- days    Hemodialysis AV Fistula 23 Left Upper arm 16 days          Hemodialysis Catheter  Duration           HD Permanent Double Catheter 256 days                 Physical exam  General:  Resting comfortably in bed in no acute distress  Neuro: GCS 15 (Eye 4, Verbal 5, Motor 6)  HENT: Normocephalic and atraumatic, PERRL  Heart: RRR, pulses intact  Lungs: Bilateral breath sounds present  Abdomen: Bowel sounds present, soft  Skin:  Warm, dry skin/Incision site clean dry and intact, cutaneous changes consistent with chronic venous stasis on distal aspects of limbs, poor wound healing-multiple puncture wounds near right antecubital fossa from attempted venous access     Vitals  Temperature:   Temp (24hrs), Av 6 °F (36 4 °C), Min:97 4 °F (36 3 °C), Max:97 8 °F (36 6 °C)    Current: Temperature: 97 5 °F (36 4 °C)    Vitals:    23 0530 23 0545 23 0600 23 0720   BP: (!) 104/40 (!) 126/49 (!) 118/48 (!) 125/43   BP Location:    Right arm   Pulse: 56 (!) 50 62 68   Resp: 16 (!) 11 13 13   Temp:  97 5 °F (36 4 °C)     TempSrc:  Oral     SpO2: 100% 100% 100% 99%   Weight:       Height:                 Labs:   Results from last 7 days   Lab Units 23  0651 23  2212 23  1618 23  0457 23  2217 23  1826   EOS PCT % 0  --   --  3  --  1   HEMATOCRIT % 24 6* 22 8* 24 2* 29 4*  --  33 8*   HEMOGLOBIN g/dL 8 0* 7 6* 7 7* 9 0*  --  10 9*   MONOS PCT % 7  --   --  7  --  8   NEUTROS PCT % 82*  --   --  69  --  75   PLATELETS Thousands/uL 107*  --  125* 133*   < > 169   WBC Thousand/uL 6 53  --  8 62 3 95*  --  6 55    < > = values in this interval not displayed        Results from last 7 days   Lab Units 23  0651 23  1618 23  0457 23  1826   ALK PHOS U/L 94  --   --  109*   ALT U/L 13  --   --  7   AST U/L 11 --   --  31   BUN mg/dL 31* 26* 22 19   CALCIUM mg/dL 7 3* 7 9* 8 2* 8 7   CHLORIDE mmol/L 104 103 102 97   CO2 mmol/L 26 29 35* 31   CREATININE mg/dL 8 57* 7 74* 7 76* 6 78*   POTASSIUM mmol/L 4 6 3 2* 3 1* 4 0   SODIUM mmol/L 135 136 138 139     Results from last 7 days   Lab Units 06/04/23  0651 06/03/23  1618   MAGNESIUM mg/dL 1 9 1 8     Results from last 7 days   Lab Units 06/04/23  0651 06/03/23  1618   PHOSPHORUS mg/dL 4 8* 3 1          Results from last 7 days   Lab Units 06/02/23  1826   LACTIC ACID mmol/L 1 7       Other Labs    Intake and Outputs:  I/O       06/03 0701  06/04 0700 06/04 0701  06/05 0700 06/05 0701  06/06 0700    P  O  440 520     I V  (mL/kg) 1120 4 (14 2) 480 (6 1)     Blood 950      IV Piggyback 350 50     Total Intake(mL/kg) 2860 4 (36 3) 1050 (13 3)     Drains 150 0     Stool  0     Blood 300      Total Output 450 0     Net +2410 4 +1050            Unmeasured Stool Occurrence  1 x           Assessment & Plan:   Neuro:   • Diagnosis: History of CVA  ? 2006 w/ MRI showing thrombus in R Vertebral artery   ? RLE residual deficit - uses walker   ? Plan:   - Eliquis has been on hold since 6/2 d/t AVG explant surgery  DC Eliquis since graft is no longer present  - Continue ASA, plavix, subQ heparin, statin   - CAM ICU   - Delirium precautions   • Diagnosis: Pain  ? Plan:   - Oxy 2 5 po mod / oxy 5 po severe/ Dilaudid 0 5 IV breakthrough PRN q4h     CV:   • Diagnosis: Septic shock   · History of hypotension  - On midodrine 10 TID chronically   · Post-op patient required elvi, switched to levo at 15 and weaned to 5   · Source: infected LUE AVG s/p OR washout/debridedment and explant   ? Plan:   ? Patient currently on 4mcg/min levophed   Wean to 0 as tolerated  - Continue home midodrine 10mg TID   - Continue pressors for MAP > 65   - A/p as below for sepsis   • Diagnosis: CAD, HLD   ? 2008 s/p PTCA w/ bare metal stent to circumflex and PDA and POBA of diagonal   ? 2009, 2010 MI, 2011 NSTEMI s/p "PCI/KARLOS of distal RCA   ? 2023 s/p PCI and KARLOS to RCA  ? 5/22/23 TTE: LVEF 65%, G1DD, mid-cavity dynamic obstruction at rest with a peak gradient of 12  Mid cavity dyanmic obstruction with valsalva with peak gradient of 30   ? Plan:   - Continue statin, ASA and plavix   - HD as per Nephrology recommendation     Pulm:  No active issues      GI:   • Diagnosis: bowel regimen   ? Plan:   - Qdaily Miralax 17g  - Qdaily Sennakot 8 6mg     :   • Diagnosis: ESRD on HD T,Th,S  ? 2/2 previously uncontrolled DM   ? Gets HD at [address]   ? Access site: HD permacath   ? Outpatient dry weight 76 5kg  current weight 78 8kg  ? Plan:   - HD today  - Nephro consulted    F/E/N:   • Fluids: none   • Electrolytes: goal Mg>2, K>4  • Nutrition: renal diet, <2g/day potassium       Heme/Onc:   • Diagnosis: Anemia   ? History of anemia of CKD  ? Was briefly on epogen for 2 months in 2022 but this was discontinued due to graft thrombosis  ? Receive 1 u pRBC in OR    ? Plan:   - HH post op - no improvement after 1 unit  - Additional 1 unit ordered with repeat CBC for AM  • Diagnosis: History of graft thrombosis, chronic DVT, R vertebral artery thrombus  ? Was previously on warfarin on passed but switched to eliquis due to noncompliance with lab draws and hard sticks    ? Eliquis initially held prior to surgery  Originally ordered to maintain graft patency  Graft has been explanted and anti-coagulation is no longer needed  ? Plan:   - Per vascular surgery, anti-coagulation is no longer needed as graft patency is not pertinent  Endo:   • Diagnosis: History of DM   ? A1c 4 8%   ? Not on any meds at home  ? Plan:  - Monitor BG for goal 140-180      ID:   • Diagnosis: AVG site infection   ? Noted during TCM visit on 6/2  ? Per vascular notes upon consult: \"On review of imaging patient has a significant dehiscence of the venous axillary exposure incision  Graft is very likely exposed to air    This represents clinically infected " "prosthetic material with the risk of significant hemorrhage and limb loss if left in place  \"  ? Patient is POD1 left upper extremity AV graft explant  ? Plan:   - Continue vancomycin and cefepime   - Blood cultures negative  - Wound cultures negative  - Vascular following      MSK/Skin:   No active issues  • PT/OT when able   • Frequent repositioning     Disposition: Critical care        Non-Invasive/Invasive Ventilation Settings:  Respiratory    Lab Data (Last 4 hours)    None         O2/Vent Data (Last 4 hours)    None              SpO2: SpO2: 100 %    Imaging:   No orders to display     I have personally reviewed pertinent reports  Micro:  Lab Results   Component Value Date    BLOODCX No Growth at 48 hrs  06/02/2023    BLOODCX No Growth at 48 hrs  06/02/2023    BLOODCX No Growth After 5 Days  10/19/2021    BLOODCX No Growth After 5 Days   10/19/2021    WOUNDCULT No growth 06/03/2023    WOUNDCULT 1+ Growth of Citrobacter freundii (A) 10/08/2021    WOUNDCULT 1+ Growth of Enterobacter cloacae complex (A) 10/08/2021         Allergies: No Known Allergies      Medications:   Scheduled Meds:  Current Facility-Administered Medications   Medication Dose Route Frequency Provider Last Rate   • acetaminophen  975 mg Oral Q8H PRN Nathaniel Peraza MD     • aspirin  81 mg Oral Daily Bo Arredondo MD     • atorvastatin  20 mg Oral HS Bo Arredondo MD     • b complex-vitamin C-folic acid  1 capsule Oral Daily Bo Arredondo MD     • cefepime  1,000 mg Intravenous Q24H Madi Ac MD Stopped (06/04/23 1200)   • chlorhexidine  15 mL Mouth/Throat Q12H [address] Jasmit DO Coty     • clopidogrel  75 mg Oral Daily Bo Arredondo MD     • vitamin B-12  1,000 mcg Oral Daily Bo Arredondo MD     • heparin (porcine)  5,000 Units Subcutaneous [facility] Bo Arredondo MD     • HYDROmorphone  0 5 mg Intravenous Q4H PRN Bo Arredondo MD     • insulin lispro  1-6 Units Subcutaneous 4x Daily (AC & HS) Nathaniel Peraza MD     • " "melatonin  3 mg Oral HS Leo Bryant, DO     • midodrine  10 mg Oral TID AC Kristy Perales MD     • norepinephrine  1-30 mcg/min Intravenous Titrated Tracey Zhang MD 5 mcg/min (06/05/23 0720)   • ondansetron  4 mg Intravenous Q6H PRN Kristy Perales MD     • oxyCODONE  5 mg Oral Q4H PRN Kristy Perales MD     • oxyCODONE  2 5 mg Oral Q4H PRN Kristy Perales MD     • senna  1 tablet Oral HS Raegan Ansari, DO     • vancomycin  10 mg/kg Intravenous Daily PRN Jessika Fierro MD     • vasopressin  0 04 Units/min Intravenous Continuous Jessika Fierro MD Stopped (06/04/23 0945)     Continuous Infusions:norepinephrine, 1-30 mcg/min, Last Rate: 5 mcg/min (06/05/23 0720)  vasopressin, 0 04 Units/min, Last Rate: Stopped (06/04/23 0945)      PRN Meds:  acetaminophen, 975 mg, Q8H PRN  HYDROmorphone, 0 5 mg, Q4H PRN  ondansetron, 4 mg, Q6H PRN  oxyCODONE, 5 mg, Q4H PRN  oxyCODONE, 2 5 mg, Q4H PRN  vancomycin, 10 mg/kg, Daily PRN        Counseling / Coordination of Care  Total Critical Care time spent 60 minutes excluding procedures, teaching and family updates  Code Status: Level 1 - Full Code     Portions of the record may have been created with voice recognition software  Occasional wrong word or \"sound a like\" substitutions may have occurred due to the inherent limitations of voice recognition software  Read the chart carefully and recognize, using context, where substitutions have occurred       Kori Ventura MD  Internal Medicine Resident, PGY1  7:52 AM    "

## 2023-06-05 NOTE — HEMODIALYSIS
Post-Dialysis RN Treatment Note    Blood Pressure:  Pre 142/58 mm/Hg  Post 134/59 mmHg   EDW  TBD kg    Weight:  Pre & Post unable to weigh in a reclining chair    Mode of weight measurement: N/A   Volume Removed  500 ml    Treatment duration 120 minutes    NS given  No    Treatment shortened?  No   Medications given during Rx None Reported   Estimated Kt/V  None Reported   Access type: Permacath/TDC   Access Issues: No    Report called to primary nurse   Yes Murphy Vivas RN

## 2023-06-05 NOTE — PROGRESS NOTES
NEPHROLOGY PROGRESS NOTE   Eliana Jones 70 y o  male MRN: [id number]  Unit/Bed#: MICU 11 Encounter: [id number]        ASSESSMENT & PLAN    1   End-stage kidney disease on hemodialysis Tuesday Thursday Saturday at [facility]  • His last dialysis treatment was on Thursday  • We will plan on a 2-hour dialysis treatment today he remains on low-dose norepinephrine  • We will attempt 1 L off today as well  • We will plan on a regular treatment 3 hours tomorrow    2  Left upper arm wound dehiscence status post graft explant  • Dialysis through tunneled dialysis catheter  • Appreciate infectious disease    3  Hypotension  • Now on midodrine 10 mg 3 times daily  • On norepinephrine  • We will see how he tolerates treatment today    4  Anemia of chronic kidney disease  • Hemoglobin remained stable at 8  • We will check outpatient records regarding ALICE therapy    5  CKD bone mineral disease  • Phosphorus slightly elevated  • Continue low phosphorus diet  • Currently not on any binders    6  Coronary artery disease with recent PCI and drug-eluting stent  • Continue cardiovascular risk reduction measures      DISCUSSION:    Hemodialysis for 2 hours today with 1 L UF  Discussed with ICU team and they were in agreement    SUBJECTIVE:    Patient was seen today sitting up resting comfortably  No chest pain or shortness of breath  Tunneled dialysis catheter in place  No acute current issues    12 point review of systems was otherwise negative besides what is mentioned above      Medications:    Current Facility-Administered Medications:   •  acetaminophen (TYLENOL) tablet 975 mg, 975 mg, Oral, Q8H PRN, Sue Bateman MD  •  aspirin (ECOTRIN LOW STRENGTH) EC tablet 81 mg, 81 mg, Oral, Daily, Vineet Gautam MD, 81 mg at 06/05/23 2504  •  atorvastatin (LIPITOR) tablet 20 mg, 20 mg, Oral, HS, Vineet Gautam MD, 20 mg at 06/04/23 2120  •  b complex-vitamin C-folic acid (RENAL) capsule 1 capsule, 1 capsule, Oral, Daily, Bo Arredondo MD, 1 capsule at 06/05/23 2097  •  chlorhexidine (PERIDEX) 0 12 % oral rinse 15 mL, 15 mL, Mouth/Throat, Q12H [address], Fridait DO Coty, 15 mL at 06/05/23 4349  •  clopidogrel (PLAVIX) tablet 75 mg, 75 mg, Oral, Daily, Bo Arredondo MD, 75 mg at 06/05/23 8459  •  cyanocobalamin (VITAMIN B-12) tablet 1,000 mcg, 1,000 mcg, Oral, Daily, Bo Arredondo MD, 1,000 mcg at 06/05/23 0363  •  heparin (porcine) subcutaneous injection 5,000 Units, 5,000 Units, Subcutaneous, Q8H [address], 5,000 Units at 06/05/23 0548 **AND** [COMPLETED] Platelet count, , , Once, Royal Ritchie DO  •  HYDROmorphone (DILAUDID) injection 0 5 mg, 0 5 mg, Intravenous, Q4H PRN, Bo Arredondo MD, 0 5 mg at 06/05/23 0934  •  insulin lispro (HumaLOG) 100 units/mL subcutaneous injection 1-6 Units, 1-6 Units, Subcutaneous, 4x Daily (AC & HS), 1 Units at 06/05/23 0813 **AND** Fingerstick Glucose (POCT), , , 4x Daily AC and at bedtime, Nathaniel Peraza MD  •  melatonin tablet 3 mg, 3 mg, Oral, HS, Tadeolouie Curry, DO, 3 mg at 06/04/23 2120  •  midodrine (PROAMATINE) tablet 10 mg, 10 mg, Oral, TID AC, Bo Arredondo MD, 10 mg at 06/05/23 1129  •  norepinephrine (LEVOPHED) 4 mg (STANDARD CONCENTRATION) IV in sodium chloride 0 9% 250 mL, 1-30 mcg/min, Intravenous, Titrated, Marco Powell MD, Last Rate: 15 mL/hr at 06/05/23 1020, 4 mcg/min at 06/05/23 1020  •  ondansetron (ZOFRAN) injection 4 mg, 4 mg, Intravenous, Q6H PRN, Bo Arredondo MD, 4 mg at 06/05/23 0456  •  oxyCODONE (ROXICODONE) IR tablet 5 mg, 5 mg, Oral, Q4H PRN, Bo Arredondo MD, 5 mg at 06/05/23 0715  •  oxyCODONE (ROXICODONE) split tablet 2 5 mg, 2 5 mg, Oral, Q4H PRN, Bo Arredondo MD, 2 5 mg at 06/03/23 1655  •  polyethylene glycol (MIRALAX) packet 17 g, 17 g, Oral, Daily, [address], MD  •  senna (SENOKOT) tablet 8 6 mg, 1 tablet, Oral, HS, Fridait Coty,   •  vancomycin (VANCOCIN) IVPB (premix in dextrose) 750 mg 150 mL, 10 mg/kg, Intravenous, Daily PRN, Christel Orf Trupti Mas MD  •  vasopressin (PITRESSIN) 20 Units in sodium chloride 0 9 % 100 mL infusion, 0 04 Units/min, Intravenous, Continuous, Cristina Torrse MD, Stopped at 06/04/23 0945    OBJECTIVE:    Vitals:    06/05/23 0900 06/05/23 1000 06/05/23 1100 06/05/23 1200   BP: (!) 122/43 130/57 132/65 133/54   BP Location:       Pulse: 58 (!) 50 (!) 54 56   Resp: 13 (!) 7 (!) 8 13   Temp:       TempSrc:       SpO2: 100% 100% 100% 99%   Weight:       Height:            Temp:  [97 5 °F (36 4 °C)-97 8 °F (36 6 °C)] 97 6 °F (36 4 °C)  HR:  [42-70] 56  Resp:  [6-34] 13  BP: ()/(18-65) 133/54  SpO2:  [98 %-100 %] 99 %     Body mass index is 24 89 kg/m²  Weight (last 2 days)     Date/Time Weight    06/04/23 0600 78 7 (173 5)    06/03/23 1802 77 (169 75)          I/O last 3 completed shifts: In: 2136 4 [P O :760; I V :926 4; Blood:350; IV Piggyback:100]  Out: 0     I/O this shift:  In: 173 9 [I V :123 9; IV Piggyback:50]  Out: 50 [Drains:50]      Physical exam:    General: no acute distress, cooperative  Eyes: conjunctivae pink, anicteric sclerae  ENT: lips and mucous membranes moist, no exudates, normal external ears  Neck: ROM intact, positive JVD  Chest: No respiratory distress, no accessory muscle use  CVS: normal rate, non pericardial friction rub  Abdomen: soft, non-tender, non-distended, normoactive bowel sounds  Extremities: Positive edema  Skin: no rash  Neuro: awake, alert, oriented, grossly intact  Psych:  Pleasant affect    Invasive Devices:      Lab Results:   Results from last 7 days   Lab Units 06/04/23  0651 06/03/23  2212 06/03/23  1618 06/03/23  0457 06/02/23  2217 06/02/23  1840 06/02/23  1826   ALK PHOS U/L 94  --   --   --   --   --  109*   ALT U/L 13  --   --   --   --   --  7   AST U/L 11  --   --   --   --   --  31   BLOOD CULTURE   --   --   --   --   --  No Growth at 48 hrs  No Growth at 48 hrs     BUN mg/dL 31*  --  26* 22  --   --  19   CALCIUM mg/dL 7 3*  --  7 9* 8 2*  --   -- " 8 7   CHLORIDE mmol/L 104  --  103 102  --   --  97   CO2 mmol/L 26  --  29 35*  --   --  31   CREATININE mg/dL 8 57*  --  7 74* 7 76*  --   --  6 78*   HEMATOCRIT % 24 6* 22 8* 24 2* 29 4*  --   --  33 8*   HEMOGLOBIN g/dL 8 0* 7 6* 7 7* 9 0*  --   --  10 9*   POTASSIUM mmol/L 4 6  --  3 2* 3 1*  --   --  4 0   MAGNESIUM mg/dL 1 9  --  1 8  --   --   --   --    PHOSPHORUS mg/dL 4 8*  --  3 1  --   --   --   --    PLATELETS Thousands/uL 107*  --  125* 133* 153  --  169   WBC Thousand/uL 6 53  --  8 62 3 95*  --   --  6 55         Portions of the record may have been created with voice recognition software  Occasional wrong word or \"sound a like\" substitutions may have occurred due to the inherent limitations of voice recognition software  Read the chart carefully and recognize, using context, where substitutions have occurred  If you have any questions, please contact the dictating provider      "

## 2023-06-05 NOTE — PROGRESS NOTES
Progress Note - Vascular Surgery   Jr Bobo 70 y o  male [id number]  Unit/Bed#:MICU 11 Encounter: [id number]      Assessment:    70 y o  with PMH LUE AVG (5/19 MQ), ESRD c RIJ TDC, HTN, DM, CKD, CAD c MI/PCI, CVA presenting with LUE AVG Site dehiscence, LUE AVG graft infection     Vascular surgery consulted for LUE AVG graft infection  6/3: LUE AVG explant c Brachial Vein patch angioplasty    Plan:  - Cont NV Chks to E  - Diet as tolerated  - OOB as tolerated  - Continues to require levophed  - Hgb pending this morning from 8  - 2 PRBC 1 FFP this admission   - VAC to [facility]; will change today 6/5  - Abx: Vanco, Cefepime   - WBC pending from 6 5  Afebrile  - ID recs   - 6/2 BCX: NGTD   - 6/3 Wcx: NGTD  - VTEppx: SQH  - Cont Aspirin, Statin  - Appreciated MICU support  - HD per Nephro via Right IJ TDC      Subjective:  No acute events overnight  Pain is controlled  Patient is tolerating diet  Still requiring levophed, weaning per ICU team  Palpable radial pulses  Patient offer no new complaints or concerns  I/Os:  I/O last 3 completed shifts: In: 2136 4 [P O :760; I V :926 4; Blood:350; IV Piggyback:100]  Out: 0   No intake/output data recorded  Review of Systems   All other systems reviewed and are negative  Vitals:    06/05/23 0600   BP: (!) 118/48   Pulse: 62   Resp: 13   Temp:    SpO2: 100%        Physical Exam  Vitals and nursing note reviewed  Constitutional:       General: He is not in acute distress  Appearance: He is well-developed  He is not toxic-appearing or diaphoretic  HENT:      Head: Normocephalic and atraumatic  Eyes:      Extraocular Movements: Extraocular movements intact  Cardiovascular:      Rate and Rhythm: Normal rate  Heart sounds: No murmur heard  Comments: LUE Radial artery palpable  Pulmonary:      Effort: Pulmonary effort is normal  No respiratory distress  Breath sounds: Normal breath sounds     Abdominal:      General: There is no "distension  Palpations: Abdomen is soft  Tenderness: There is no abdominal tenderness  There is no guarding  Musculoskeletal:         General: No swelling  Cervical back: Neck supple  Right lower leg: No edema  Left lower leg: No edema  Skin:     General: Skin is warm and dry  Capillary Refill: Capillary refill takes less than 2 seconds  Neurological:      General: No focal deficit present  Mental Status: He is alert  Mental status is at baseline  Comments: Left upper extremity motor and sensory intact    Psychiatric:         Mood and Affect: Mood normal          Imaging:  I have personally reviewed pertinent reports    , CBC with diff:   No results found for: \"ADJUSTEDWBC\", \"HCT\", \"HGB\", \"MCH\", \"MCHC\", \"MCV\", \"MPV\", \"NRBC\", \"PLT\", \"RBC\", \"RDW\", \"WBC\", BMP/CMP:   No results found for: \"ALKPHOS\", \"ALT\", \"ANIONGAP\", \"AST\", \"BILITOT\", \"BUN\", \"CALCIUM\", \"CL\", \"CO2\", \"CREATININE\", \"EGFR\", \"GLUCOSE\", \"K\", \"PROT\", \"SODIUM\"      Results Reviewed     None           Patient Active Problem List   Diagnosis   • Acute on chronic anemia   • Coronary artery disease involving native coronary artery   • Hypertension   • Diabetes with neurologic complications (HCC)   • Elevated serum alkaline phosphatase level   • ESRD (end stage renal disease) on dialysis ([facility] )   • Intestinal malabsorption   • Irritable bowel syndrome with diarrhea   • Mixed hyperlipidemia   • Nicotine dependence   • Organic impotence   • Pernicious anemia   • Type 2 diabetes mellitus ([facility] )   • [facility]   • AV fistula thrombosis, initial encounter ([facility] )   • Arteriovenous fistula ([facility] )   • Right intertrochanteric femur fracture   • Thrombocytopenia ([facility] )   • Dependence on renal dialysis ([facility] )   • Vitamin D deficiency, unspecified   • End-stage renal disease on hemodialysis ([facility] )   • Chronic kidney disease   • Chronic deep vein thrombosis (DVT) of internal jugular vein (HCC)   • S/P " "angioplasty with stent   • Hypotension   • Infection of AV graft for dialysis [facility])       Past Surgical History:   Procedure Laterality Date   • AV FISTULA PLACEMENT     • CARDIAC CATHETERIZATION Left 02/03/2023    Procedure: Cardiac Left Heart Cath;  Surgeon: Ida Reyes MD;  Location: [place] [address] CATH LAB; Service: Cardiology   • CARDIAC CATHETERIZATION N/A 02/08/2023    Procedure: Cardiac catheterization with complex PCI with Dr Estefania Conti, patient is a renal dialysis patient;  Surgeon: Maite John MD;  [facility] CARDIAC CATH LAB; Service: Cardiology   • CARDIAC SURGERY  2010    stents x3   • COLONOSCOPY N/A 12/12/2016    Procedure: COLONOSCOPY;  Surgeon: Rhona Rutherford MD;  Location: [facility] GI LAB; Service:    • COLONOSCOPY N/A 04/03/2017    Procedure:  COLONOSCOPY;  Surgeon: Rhona Rutherford MD;  Location: [facility] GI LAB; Service:    • HARDWARE REMOVAL Right 04/04/2022    Procedure: REMOVAL HARDWARE HIP;  Surgeon: Vitaliy Abrams MD;  Location: [facility] MAIN OR;  Service: Orthopedics   • IR AV FISTULA/GRAFT DECLOT  04/29/2021   • IR AV FISTULA/GRAFT DECLOT  03/25/2022   • IR AV FISTULA/GRAFT DECLOT  09/21/2022   • IR AV FISTULAGRAM/GRAFTOGRAM  03/28/2022   • IR AV FISTULAGRAM/GRAFTOGRAM  07/28/2022   • IR TUNNELED CENTRAL LINE REMOVAL  12/07/2021   • IR TUNNELED DIALYSIS CATHETER PLACEMENT  05/24/2021   • PORTACATH PLACEMENT      per pt \"port in chest\"   • HI ARTERIOVENOUS ANASTOMOSIS OPEN DIRECT Left 07/22/2021    Procedure: CREATION FISTULA ARTERIOVENOUS (AV);   Surgeon: Gunnar Saldivar MD;  Location: [address] [place] [address];  Service: Vascular   • HI ARTERIOVENOUS ANASTOMOSIS OPEN DIRECT Left 3/21/2023    Procedure: left brachiobasilic fistula,  AVG Graft;  Surgeon: Namita Hadley MD;  [facility] MAIN [facility];  Service: Vascular   • HI ARTERIOVENOUS ANASTOMOSIS OPEN DIRECT Left 5/19/2023    Procedure: CREATION of LEFT FISTULA ARTERIOVENOUS (AV) GRAFT;  Surgeon: Namita Hadley MD;  Location: [facility] MAIN OR;  " Service: Vascular   • DC HEMIARTHROPLASTY HIP PARTIAL Right 04/04/2022    Procedure: HEMIARTHROPLASTY HIP (BIPOLAR); Surgeon: Kyaw Prieto MD;  Location: [facility] MAIN OR;  Service: Orthopedics       Family History   Problem Relation Age of Onset   • Leukemia Mother    • Crohn's disease Father    • Transient ischemic attack Brother        Social History     Socioeconomic History   • Marital status: /Civil Union     Spouse name: Bina Montgomery   • Number of children: 2   • Years of education: 15   • Highest education level: Some college, no degree   Occupational History   • Not on file   Tobacco Use   • Smoking status: Every Day     Packs/day: 0 25     Years: 30 00     Total pack years: 7 50     Types: Cigarettes   • Smokeless tobacco: Never   Vaping Use   • Vaping Use: Never used   Substance and Sexual Activity   • Alcohol use: Yes     Comment: rarely - No alcohol use per [employer]   • Drug use: Not Currently     Types: Marijuana     Comment: occasional brownie for sleep   • Sexual activity: Not on file   Other Topics Concern   • Not on file   Social History Narrative    Daily caffeinated coffee consumption     Social Determinants of Health     Financial Resource Strain: Not on file   Food Insecurity: No Food Insecurity (4/4/2022)    Hunger Vital Sign    • Worried About Running Out of Food in the Last Year: Never true    • Ran Out of Food in the Last Year: Never true   Transportation Needs: No Transportation Needs (4/4/2022)    PRAPARE - Transportation    • Lack of Transportation (Medical): No    • Lack of Transportation (Non-Medical):  No   Physical Activity: Not on file   Stress: Not on file   Social Connections: Not on file   Intimate Partner Violence: Not on file   Housing Stability: Low Risk  (4/4/2022)    Housing Stability Vital Sign    • Unable to Pay for Housing in the Last Year: No    • Number of Places Lived in the Last Year: 1    • Unstable Housing in the Last Year: No       No Known Allergies

## 2023-06-05 NOTE — PROGRESS NOTES
" Pastoral Care Progress Note    2023  Patient: Evita Quiroga : 1955  Admission Date & Time: 2023  MRN: [id number] CSN: [id number]          Introduced self/spiritual care to Pt, asked if he had any needs that I could help with  He thanked me for being available, and said \"I'm good  \"  Malcolm Ornelas remains available    "

## 2023-06-05 NOTE — OCCUPATIONAL THERAPY NOTE
Occupational Therapy Evaluation     Patient Name: Yesica Yeboah  IGWNA'FENG Date: 6/5/2023  Problem List  Principal Problem:    Infection of AV graft for dialysis [facility])  Active Problems:    Acute on chronic anemia    Coronary artery disease involving native coronary artery    End-stage renal disease on hemodialysis ([facility] )    Hypotension    Past Medical History  Past Medical History:   Diagnosis Date    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    With Cerebral Infarction:  Last Assessed:  October 20, 2016    Chronic kidney disease 2012    on dialysis     COVID 05/2022    Diabetes mellitus ([place] [address] )     Dialysis patient [facility])     T/TH/Sat    GERD (gastroesophageal reflux disease)     Hyperlipidemia     Hypertension     Labyrinthitis 09/10/2011    Myocardial infarction [facility]) 2014    x3 others were in 2009 & 2010    Sleep disturbances 09/20/2010    Stroke ([place] [address] ) 2007    x1, RLE deficit- uses walker    Type 2 diabetes, uncontrolled, with renal manifestation 05/03/2017     Past Surgical History  Past Surgical History:   Procedure Laterality Date    AV FISTULA PLACEMENT      CARDIAC CATHETERIZATION Left 02/03/2023    Procedure: Cardiac Left Heart Cath;  Surgeon: Florin Oliver MD;  Location: [place] [address] CATH LAB; Service: Cardiology    CARDIAC CATHETERIZATION N/A 02/08/2023    Procedure: Cardiac catheterization with complex PCI with Dr Ryley Gunn, patient is a renal dialysis patient;  Surgeon: Rao Goodman MD;  Location:  CARDIAC CATH LAB; Service: Cardiology    CARDIAC SURGERY  2010    stents x3    COLONOSCOPY N/A 12/12/2016    Procedure: COLONOSCOPY;  Surgeon: Sienna Webb MD;  Location: [facility] GI LAB; Service:     COLONOSCOPY N/A 04/03/2017    Procedure:  COLONOSCOPY;  Surgeon: Sienna Webb MD;  Location: [facility] GI LAB;   Service:     HARDWARE REMOVAL Right 04/04/2022    Procedure: REMOVAL HARDWARE HIP;  Surgeon: Michael Medina MD;  Location:  MAIN OR;  Service: Orthopedics    IR AV FISTULA/GRAFT DECLOT  04/29/2021 "   IR AV FISTULA/GRAFT DECLOT  03/25/2022    IR AV FISTULA/GRAFT DECLOT  09/21/2022    IR AV FISTULAGRAM/GRAFTOGRAM  03/28/2022    IR AV FISTULAGRAM/GRAFTOGRAM  07/28/2022    IR TUNNELED CENTRAL LINE REMOVAL  12/07/2021    IR TUNNELED DIALYSIS CATHETER PLACEMENT  05/24/2021    PORTACATH PLACEMENT      per pt \"port in chest\"    CO ARTERIOVENOUS ANASTOMOSIS OPEN DIRECT Left 07/22/2021    Procedure: CREATION FISTULA ARTERIOVENOUS (AV); Surgeon: Simon Branch MD;  Location: [address] [place] [address];  Service: Vascular    CO ARTERIOVENOUS ANASTOMOSIS OPEN DIRECT Left 3/21/2023    Procedure: left brachiobasilic fistula,  AVG Graft;  Surgeon: Daniel Rogers MD;  [facility];  Service: Vascular    CO ARTERIOVENOUS ANASTOMOSIS OPEN DIRECT Left 5/19/2023    Procedure: CREATION of LEFT FISTULA ARTERIOVENOUS (AV) GRAFT;  Surgeon: Daniel Rogers MD;  [facility];  Service: Vascular    CO HEMIARTHROPLASTY HIP PARTIAL Right 04/04/2022    Procedure: HEMIARTHROPLASTY HIP (BIPOLAR); Surgeon: Ji Hutchison MD;  Location: [facility];  Service: Orthopedics             06/05/23 1125   OT Last Visit   OT Visit Date 06/05/23   Note Type   Note type Evaluation   Pain Assessment   Pain Assessment Tool 0-10   Pain Score No Pain   Restrictions/Precautions   Weight Bearing Precautions Per Order No   Other Precautions Chair Alarm;Multiple lines;Telemetry; Fall Risk;Pain; Impulsive  (wound vac LUE)   Home Living   Type of Home House   Home Layout Two level; Able to live on main level with bedroom/bathroom; Bed/bath upstairs  (3 PHILLY)   Bathroom Shower/Tub Walk-in shower   Bathroom Toilet Raised   Bathroom Equipment Grab bars in shower;Grab bars around toilet; Shower chair   Home Equipment Walker;Cane   Additional Comments Pt resides in 2 SH, 3 PHILLY, 1st floor setup   Prior Function   Level of [place] Independent with ADLs; Independent with functional mobility; Independent with IADLS   Lives With Spouse   Receives Help " From Family   IADLs Independent with driving; Independent with meal prep; Independent with medication management   Falls in the last 6 months 0   Vocational Retired   Comments (+)    Lifestyle   Autonomy I w/ ADLS/IADLS, transfers and functional mobility PTA   Reciprocal Relationships Pt lives w/ his spouse who works full time   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; worked in insurance   Intrinsic Gratification L[place] go [place] the river w/ his dog and fish   ADL   [address] 5  Supervision/Setup   Grooming Assistance 5  [address] [place] [address] [address]  [address] 3  Moderate Assistance   [address] 3  Moderate Assistance   [address] [facility]; Impulsive;Verbal cueing;Supervision/safety; Increased time to complete   Bed Mobility   Supine to Sit 4  Minimal assistance   Additional items Assist x 1;HOB elevated; Increased time required;LE management;Verbal cues   Sit to Supine Unable to assess   Additional Comments Pt sat EOB w/ Fair sitting balance/trunk control   Transfers   Sit to Stand 4  Minimal assistance   Additional items Assist x 1; Increased time required;Verbal cues   Stand to Sit 4  Minimal assistance   Additional items Assist x 1; Increased time required;Verbal cues   Additional Comments HHA used; SBA 2nd for line management   Functional Mobility   Functional Mobility 4  Minimal assistance   Additional Comments Pt took few small steps from EOB to chair w/ Min A x1; SBA 2nd for line management; VC for safety 2/2 mild impulsivity     Additional items Hand hold assistance   Balance   Static Sitting Fair +   Dynamic Sitting Fair   Static Standing Fair -   Dynamic Standing Poor +   Ambulatory Poor +   Activity Tolerance   Activity Tolerance Patient limited by fatigue   Medical Staff Made Aware PT, Brittney Trejo   Nurse Made "Aware yes   RUE Assessment   RUE Assessment WFL   LUE Assessment   LUE Assessment WFL   Hand Function   Gross Motor Coordination Functional   Fine Motor Coordination Functional   Sensation   Light Touch   (increased numbness in feet s/p initial procedure)   Vision-Basic Assessment   Current Vision Wears glasses all the time   Cognition   Overall Cognitive Status WFL   Arousal/Participation Responsive; Cooperative   Attention Attends with cues to redirect   Orientation Level Oriented X4   Memory Decreased recall of precautions   Following Commands Follows one step commands without difficulty   Comments Pt is pleasant and cooperative; demonstrates mild impulsivity; needs occasional safety cues   Assessment   Limitation Decreased ADL status; Decreased Safe judgement during ADL;Decreased UE strength;Decreased endurance;Decreased self-care trans;Decreased high-level ADLs   Prognosis Fair   Assessment Pt is a 69 y/o male seen for OT eval s/p adm to [facility] w/ open LUE wound  Of note, pt was s/p brachial basilic AV graft 1/64- was thrombosed; underwent L brachial axillary AVG of LUE 5/19 and D/C'd; saw PCP for checkup and staples from graft had fallen out w/ notable drainage coming out of wound  Pt is dx'd w/ infection of AV graft for dialysis  Pt is s/p \"Left - LEFT UPPER EXTREMITY A-V GRAFT EXPLANT  LEFT BRACHIAL ANGIOPATCH  APPLICATION OF  VAC\" performed on 6/3  Pt  has a past medical history of Cerebral thrombosis (04/23/2008), Chronic kidney disease (2012), COVID (05/2022), Diabetes mellitus ([facility] ), Dialysis patient [facility]), GERD (gastroesophageal reflux disease), Hyperlipidemia, Hypertension, Labyrinthitis (09/10/2011), [facility] (2014), Sleep disturbances (09/20/2010), Stroke ([facility] ) (2007), and Type 2 diabetes, uncontrolled, with renal manifestation (05/03/2017)  Pt with active OT orders and up with assistance  orders  Pt lives with his spouse in 2 , [address] [facility], 1st floor setup   Pt was I w/  ADLS and IADLS, " drove, & required use of DME PTA including RW  Pt is currently demonstrating the following occupational deficits: Min A UB ADLS, Mod A LB ADLS, Min A bed mobility, transfers and functional mobility w/ HHA and VC for safety  These deficits that are impacting pt's baseline areas of occupation are a result of the following impairments: endurance, activity tolerance, functional mobility, forward functional reach, balance, functional standing tolerance, unsupportive home environment, decreased I w/ ADLS/IADLS, strength and decreased insight into deficits  The following Occupational Performance Areas to address include: bathing/shower, toilet hygiene, dressing, functional mobility, community mobility, clothing management and household maintenance  Recommend home OT upon D/C  Pt to continue to benefit from acute immediate OT services to address the following goals 2-3x/week to  w/in 10-14 days:   Goals   Patient Goals to go home   LTG Time Frame 10-   Long Term Goal #1 see below listed goals   Plan   Treatment Interventions ADL retraining;Functional transfer training;UE strengthening/ROM; Endurance training;Cognitive reorientation;Patient/family training;Equipment evaluation/education; Compensatory technique education;Continued evaluation; Energy conservation; Activityengagement   Goal Expiration Date 23   OT Frequency 2-3x/wk   Recommendation   OT Discharge Recommendation Home with home health rehabilitation   Additional Comments  The patient's raw score on the AM-PAC Daily Activity Inpatient Short Form is 18  A raw score of less than 19 suggests the patient may benefit from discharge to post-acute rehabilitation services  Please refer to the recommendation of the Occupational Therapist for safe discharge planning   Additional Comments 2 Pt seen as a co-session due to the patient's co-morbidities, clinically unstable presentation, and present impairments which are a regression from the patient's baseline  AM-PAC Daily Activity Inpatient   Lower Body Dressing 2   Bathing 2   Toileting 3   Upper Body Dressing 3   Grooming 4   Eating 4   Daily Activity Raw Score 18   Daily Activity Standardized Score (Calc for Raw Score >=11) 38 66   AM-PAC Applied Cognition Inpatient   Following a Speech/Presentation 3   Understanding Ordinary Conversation 4   Taking Medications 4   Remembering Where Things Are Placed or Put Away 4   Remembering List of 4-5 Errands 4   Taking Care of Complicated Tasks 3   Applied Cognition Raw Score 22   Applied Cognition Standardized Score 47 83   End of Consult   Education Provided Yes   Patient Position at End of Consult Bedside chair;Bed/Chair alarm activated; All needs within reach   Nurse Communication Nurse aware of consult        GOALS    1) Pt will improve activity tolerance to G for 30 min txment sessions for increase engagement in functional tasks  2) Pt will complete UB/LB dressing/self care w/ mod I using adaptive device and DME as needed  3) Pt will complete bathing w/ Mod I w/ use of AE and DME as needed  4) Pt will complete toileting w/ mod I w/ G hygiene/thoroughness using DME as needed  5) Pt will improve functional transfers to Mod I on/off all surfaces using DME as needed w/ G balance/safety   6) Pt will improve functional mobility during ADL/IADL/leisure tasks to Mod I using DME as needed w/ G balance/safety   7) Pt will participate in simulated IADL management task to increase independence to Mod I w/ G safety and endurance  8) Pt will be attentive 100% of the time during ongoing cognitive assessment w/ G participation to assist w/ safe d/c planning/recommendations  9) Pt will demonstrate G carryover of pt/caregiver education and training as appropriate w/o cues w/ good tolerance to increase safety during functional tasks  10) Pt will demonstrate 100% carryover of energy conservation techniques t/o functional I/ADL/leisure tasks w/o cues s/p skilled education to increase endurance during functional tasks  11) Pt will improve all bed mobility to Mod I and sit EOB w/ G balance as prerequisite for further engagement in OOB functional tasks     Estiven Boss MS, OTR/L

## 2023-06-05 NOTE — PROGRESS NOTES
Daxa Rosenbaum is a 70 y o  male who is currently ordered Vancomycin IV with management by the Pharmacy Consult service  Relevant clinical data and objective / subjective history reviewed  Vancomycin Assessment:  Indication and Goal AUC/Trough: Soft tissue (goal -600, trough >10), -600, trough >10  Clinical Status: stable but remains critically ill  Micro:     Renal Function:  SCr: HD  CrCl: HD  Renal replacement: HD  Days of Therapy: 4  Current Dose: 750 mg IV daily PRN for level < 15  Vancomycin Plan:  New Dosing: continue current dose 750 mg IV daily PRN for level < 15, could not obtain vancomycin level this AM but has not received a dose since 6/2 and likely subtherapeutic, going for HD today, will redose x1 post-HD and check level in AM before HD session tomorrow  Estimated AUC: pulse dosing  Estimated Trough: pulse dosing  Next Level: 6/6 with AM labs   Renal Function Monitoring: Daily BMP and [place] will continue to follow closely for s/sx of nephrotoxicity, infusion reactions and appropriateness of therapy  BMP and CBC will be ordered per protocol  We will continue to follow the patient’s culture results and clinical progress daily      Annabelle Oliveros, Pharmacist

## 2023-06-05 NOTE — PHYSICAL THERAPY NOTE
Physical Therapy Evaluation     Patient's Name: Harpreet Her    Admitting Diagnosis  Abnormal surgical wound [T81  9XXA]    Problem List  Patient Active Problem List   Diagnosis    Acute on chronic anemia    Coronary artery disease involving native coronary artery    Hypertension    Diabetes with neurologic complications (HCC)    Elevated serum alkaline phosphatase level    ESRD (end stage renal disease) on dialysis ([facility] )    Intestinal malabsorption    Irritable bowel syndrome with diarrhea    Mixed hyperlipidemia    Nicotine dependence    Organic impotence    Pernicious anemia    Type 2 diabetes mellitus ([facility] )    Encounter for extracorporeal dialysis ([place] [address] )    AV fistula thrombosis, initial encounter ([place] [address] )    Arteriovenous fistula ([place] [address] )    Right intertrochanteric femur fracture    Thrombocytopenia ([facility]  )    Dependence on renal dialysis ([place] [address] )    Vitamin D deficiency, unspecified    End-stage renal disease on hemodialysis ([place] [address] )    Chronic kidney disease    Chronic deep vein thrombosis (DVT) of internal jugular vein (HCC)    S/P angioplasty with stent    Hypotension    Infection of AV graft for dialysis [facility])       Past Medical History  Past Medical History:   Diagnosis Date    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    With Cerebral Infarction:  Last Assessed:  October 20, 2016    Chronic kidney disease 2012    on dialysis     COVID 05/2022    Diabetes mellitus ([facility] )     Dialysis patient [facility])     T/TH/Sat    GERD (gastroesophageal reflux disease)     Hyperlipidemia     Hypertension     Labyrinthitis 09/10/2011    Myocardial infarction [facility]) 2014    x3 others were in 2009 & 2010    Sleep disturbances 09/20/2010    Stroke ([facility] ) 2007    x1, RLE deficit- uses walker    Type 2 diabetes, uncontrolled, with renal manifestation 05/03/2017       Past Surgical History  Past Surgical History:   Procedure Laterality Date    AV FISTULA PLACEMENT      AV FISTULA REPAIR Left 6/3/2023    Procedure: LEFT UPPER EXTREMITY A-V GRAFT EXPLANT, "LEFT BRACHIAL ANGIOPATCH,  APPLICATION OF  VAC;  Surgeon: Jania Rider MD;  Location: [facility];  Service: Vascular    CARDIAC CATHETERIZATION Left 02/03/2023    Procedure: Cardiac Left Heart Cath;  Surgeon: Ralph Hammond MD;  Location: [place] [address] CATH LAB; Service: Cardiology    CARDIAC CATHETERIZATION N/A 02/08/2023    Procedure: Cardiac catheterization with complex PCI with Dr Kasey Penn, patient is a renal dialysis patient;  Surgeon: Stewart Gordon MD;  Location:  CARDIAC CATH LAB; Service: Cardiology    CARDIAC SURGERY  2010    stents x3    COLONOSCOPY N/A 12/12/2016    Procedure: COLONOSCOPY;  Surgeon: Santiago Rosenthal MD;  Location: [facility] GI LAB; Service:     COLONOSCOPY N/A 04/03/2017    Procedure:  COLONOSCOPY;  Surgeon: Santiago Rosenthal MD;  Location: [facility] GI LAB; Service:     HARDWARE REMOVAL Right 04/04/2022    Procedure: REMOVAL HARDWARE HIP;  Surgeon: Perlita Frank MD;  Location: [facility];  Service: Orthopedics    IR AV FISTULA/GRAFT DECLOT  04/29/2021    IR AV FISTULA/GRAFT DECLOT  03/25/2022    IR AV FISTULA/GRAFT DECLOT  09/21/2022    IR AV FISTULAGRAM/GRAFTOGRAM  03/28/2022    IR AV FISTULAGRAM/GRAFTOGRAM  07/28/2022    IR TUNNELED CENTRAL LINE REMOVAL  12/07/2021    IR TUNNELED DIALYSIS CATHETER PLACEMENT  05/24/2021    PORTACATH PLACEMENT      per pt \"port in chest\"    MA ARTERIOVENOUS ANASTOMOSIS OPEN DIRECT Left 07/22/2021    Procedure: CREATION FISTULA ARTERIOVENOUS (AV);   Surgeon: Nita Christian MD;  Location: [address] [place] [address];  Service: Vascular    MA ARTERIOVENOUS ANASTOMOSIS OPEN DIRECT Left 3/21/2023    Procedure: left brachiobasilic fistula,  AVG Graft;  Surgeon: Sami Dumas MD;  [facility]: [facility];  Service: Vascular    MA ARTERIOVENOUS ANASTOMOSIS OPEN DIRECT Left 5/19/2023    Procedure: CREATION of LEFT FISTULA ARTERIOVENOUS (AV) GRAFT;  Surgeon: Sami Dumas MD;  [facility]: [facility];  Service: Vascular    MA HEMIARTHROPLASTY HIP PARTIAL Right " 04/04/2022    Procedure: HEMIARTHROPLASTY HIP (BIPOLAR); Surgeon: Tara Patel MD;  Location: [facility];  Service: Orthopedics          06/05/23 1126   PT Last Visit   PT Visit Date 06/05/23   Note Type   Note type Evaluation   Pain Assessment   Pain Assessment Tool 0-10   Pain Score No Pain   Restrictions/Precautions   Weight Bearing Precautions Per Order No   Other Precautions Bed Alarm; Chair Alarm;Cognitive; Impulsive; Fall Risk;Pain;Multiple lines;Telemetry;O2   Home Living   Type of [address] Two level; Able to live on main level with bedroom/bathroom   Bathroom Shower/Tub Walk-in shower   Bathroom Toilet Raised   Bathroom Equipment Grab bars in [facility] Walker;Cane   Prior Function   Level of [place] Independent with functional mobility   Lives With Spouse   Receives Help From [facility] in the last 6 months 0   Vocational Retired   General   Family/Caregiver Present Yes   Cognition   Orientation Level Oriented X4   Subjective   Subjective Pt willing and agreeable to PT session   RLE Assessment   RLE Assessment WFL   LLE Assessment   LLE Assessment WFL   Coordination   Movements are Fluid and Coordinated 0   Coordination and Movement Description slow and guarded   Bed Mobility   Supine to Sit 4  Minimal assistance   Additional items Assist x 1;HOB elevated   Sit to Supine Unable to assess   Transfers   Sit to Stand 4  Minimal assistance   Additional items Assist x 1; Increased time required   Stand to Sit 4  Minimal assistance   Additional items Assist x 1; Increased time required   Ambulation/Elevation   Gait pattern Shuffling;Decreased foot clearance; Improper Weight shift   Gait Assistance 4  Minimal assist   Additional items Assist x 1   Assistive Device Other (Comment)  (HHA)   Distance 4'   Balance   Static Sitting Fair +   Dynamic Sitting Fair   Static Standing Fair -   Dynamic Standing Poor +   Ambulatory Poor +   Endurance Deficit   Endurance Deficit Yes Endurance Deficit Description limited by fatigue, weakness, pain,   Activity Tolerance   Activity Tolerance Patient limited by fatigue;Patient limited by pain   Medical Staff Made Aware OT for D/C planning   Nurse Made Aware yes, nsg gave clearance to work with pt   Assessment   Prognosis Good   Problem List Decreased strength;Decreased range of motion;Decreased endurance; Impaired balance;Decreased mobility; Decreased coordination;Decreased cognition; Impaired judgement;Decreased safety awareness;Pain   Assessment Pt is 70 y o  male seen for PT evaluation s/p admit to [facility] on 6/2/2023 w/ Infection of AV graft for dialysis ([facility] )  PT consulted to assess pt's functional mobility and d/c needs  Order placed for PT eval and tx, w/ up w/ A order  Comorbidities affecting pt's physical performance at time of assessment include:  has a past medical history of Cerebral thrombosis, Chronic kidney disease, COVID, Diabetes mellitus ([place] [address] ), Dialysis patient ([place] [address] ), GERD (gastroesophageal reflux disease), Hyperlipidemia, Hypertension, Labyrinthitis, Myocardial infarction [facility]), Sleep disturbances, Stroke ([place] [address] ), and Type 2 diabetes, uncontrolled, with renal manifestation  PTA, pt was ambulates community distances and elevations and lives in multi-level home  Personal factors affecting pt at time of IE include: inaccessible home environment, decreased cognition, unable to perform physical activity, limited insight into impairments, inability to perform IADLs and inability to perform ADLs  Please find objective findings from PT assessment regarding body systems outlined above with impairments and limitations including weakness, decreased ROM, impaired balance, decreased endurance, impaired coordination, gait deviations, pain, decreased activity tolerance, decreased functional mobility tolerance, decreased safety awareness, impaired judgement, fall risk and decreased cognition   Pt required increased A to complete bed mobility with increased time  Required A for transfers with deficits in balance  Ambulated with mildly unsteady gait  Single step instruction to improve chair approach  The following objective measures performed on IE also reveal limitations: The patient's AM-PAC Basic Mobility Inpatient Short Form Raw Score is 15, Standardized Score is 36 97  A standardized score less than 42 9 suggests the patient may benefit from discharge to post-acute rehabilitation services although anticipate pt may progress to home with increased A  Please also refer to the recommendation of the Physical Therapist for safe discharge planning  Pt's clinical presentation is currently unstable/unpredictable seen in pt's presentation of critical care monitoring  Pt to benefit from continued PT tx to address deficits as defined above and maximize level of functional independent mobility and consistency  From PT/mobility standpoint, recommendation at time of d/c would be home with home health rehabilitation pending progress in order to facilitate return to PLOF  Barriers to Discharge Decreased caregiver support; Inaccessible home environment   Goals   Patient Goals To go home   STG Expiration Date 06/17/23   Short Term Goal #1 1  Complete bed mobility and transfers I to decrease need for caregiver in home  2  Ambulate 300' I to complete household and community mobility without A  3  Improve dynamic balance to good to decrease need for UE support during ambulation  4  Be educated & demonstate 12 steps to be able to enter home without A  Plan   Treatment/Interventions OT; Spoke to nursing;Spoke to case management;Gait training;Bed mobility; Patient/family training; Endurance training;LE strengthening/ROM; Functional transfer training   PT Frequency 3-5x/wk   Recommendation   PT Discharge Recommendation Post acute rehabilitation services   AM-PAC Basic Mobility Inpatient   Turning in Flat Bed Without Bedrails 3   Lying on Back to Sitting on Edge of Flat Bed Without Bedrails 3   Moving Bed to Chair 3   Standing Up From Chair Using Arms 2   Walk in Room 2   Climb 3-5 Stairs With Railing 2   Basic Mobility Inpatient Raw Score 15   Basic Mobility Standardized Score 36 97   Highest Level Of Mobility   -HLM Goal 4: Move to chair/commode   -HLM Achieved 4: Move to chair/commode         Lesli Martínez, PT

## 2023-06-05 NOTE — PROGRESS NOTES
Progress Note - Infectious Disease   Enrigue Stagers Dario 70 y o  male MRN: [id number]  [facility]: [id number]      Impression/Recommendations:  LUE AVG infection  With axillary wound dehiscence and exposed graft  Status post explantation with native vein patch repair 6/3  OR cultures no growth  Consider indolent skin casey  Clinically stable without sepsis  Rec:  • Continue vancomycin for now - dosing by pharmacy  • Discontinue cefepime  • Follow-up OR cultures and tailor antibiotics as indicated  • Given complete explantation of graft, anticipate short course of IV antibiotics  • LWC/VAC per vascular surgery     ESRD on HD  Via permcath  Rec:  • Dose adjust antibiotics for HD     DM      Chronic hypotension  On midodrine     Antibiotics:  Zosyn #3  Vancomycin #3  POD #2    Subjective:  Patient seen on AM rounds  Patient sleeping and not awakened  24 Hour Events:  No documented fevers, chills, sweats, nausea, vomiting, or diarrhea  Objective:  Vitals:  Temp:  [97 5 °F (36 4 °C)-97 8 °F (36 6 °C)] 97 6 °F (36 4 °C)  HR:  [42-70] 50  Resp:  [6-34] 7  BP: ()/(18-65) 130/57  SpO2:  [98 %-100 %] 100 %  Temp (24hrs), Av 7 °F (36 5 °C), Min:97 5 °F (36 4 °C), Max:97 8 °F (36 6 °C)  Current: Temperature: 97 6 °F (36 4 °C)    Physical Exam:   General:  No acute distress  Psychiatric:  Awake and alert  Pulmonary:  Normal respiratory excursion without accessory muscle use  Abdomen:  Soft, nontender  Extremities:  VAC to LUE, no cellulitis  Skin:  No rashes    Lab Results:  I have personally reviewed pertinent labs    Results from last 7 days   Lab Units 23  0651 23  1618 23  0457 23  1826   ALK PHOS U/L 94  --   --  109*   ALT U/L 13  --   --  7   AST U/L 11  --   --  31   BUN mg/dL 31* 26* 22 19   CALCIUM mg/dL 7 3* 7 9* 8 2* 8 7   CHLORIDE mmol/L 104 103 102 97   CO2 mmol/L 26 29 35* 31   CREATININE mg/dL 8 57* 7 74* 7 76* 6 78*   EGFR ml/min/1 73sq m 5 6 6 7 POTASSIUM mmol/L 4 6 3 2* 3 1* 4 0     Results from last 7 days   Lab Units 06/04/23  0651 06/03/23  2212 06/03/23  1618 06/03/23  0457   HEMOGLOBIN g/dL 8 0* 7 6* 7 7* 9 0*   PLATELETS Thousands/uL 107*  --  125* 133*   WBC Thousand/uL 6 53  --  8 62 3 95*     Results from last 7 days   Lab Units 06/03/23  1807 06/03/23  1014 06/02/23  1840 06/02/23  1826   BLOOD CULTURE   --   --  No Growth at 48 hrs  No Growth at 48 hrs  GRAM STAIN RESULT   --  No Polys or Bacteria seen  --   --    MRSA CULTURE ONLY  No Methicillin Resistant Staphlyococcus aureus (MRSA) isolated  --   --   --    WOUND CULTURE   --  No growth  --   --        Imaging Studies:   I have personally reviewed pertinent imaging study reports and images in PACS  EKG, Pathology, and Other Studies:   I have personally reviewed pertinent reports

## 2023-06-05 NOTE — PLAN OF CARE
Target UF Goal  1 5  L as tolerated  Patient dialyzing for 2 hours hours on 2 K bath for serum K of  4 6  per protocol  Treatment plan reviewed with Nephrology    - Dr Bambi Yanez     Problem: METABOLIC, FLUID AND ELECTROLYTES - ADULT  Goal: Electrolytes maintained within normal limits  Description: INTERVENTIONS:  - Monitor labs and assess patient for signs and symptoms of electrolyte imbalances  - Administer electrolyte replacement as ordered  - Monitor response to electrolyte replacements, including repeat lab results as appropriate  - Instruct patient on fluid and nutrition as appropriate  Outcome: Progressing  Goal: Fluid balance maintained  Description: INTERVENTIONS:  - Monitor labs   - Monitor I/O and WT  - Instruct patient on fluid and nutrition as appropriate  - Assess for signs & symptoms of volume excess or deficit  Outcome: Progressing

## 2023-06-06 ENCOUNTER — APPOINTMENT (INPATIENT)
Dept: DIALYSIS | Facility: HOSPITAL | Age: 68
DRG: 907 | End: 2023-06-06
Attending: INTERNAL MEDICINE
Payer: MEDICARE

## 2023-06-06 LAB
ABO GROUP BLD BPU: NORMAL
ALBUMIN SERPL BCP-MCNC: 2 G/DL (ref 3.5–5)
ALP SERPL-CCNC: 120 U/L (ref 46–116)
ALT SERPL W P-5'-P-CCNC: 11 U/L (ref 12–78)
ANION GAP SERPL CALCULATED.3IONS-SCNC: 3 MMOL/L (ref 4–13)
AST SERPL W P-5'-P-CCNC: 14 U/L (ref 5–45)
BASOPHILS # BLD AUTO: 0.05 THOUSANDS/ÂΜL (ref 0–0.1)
BASOPHILS NFR BLD AUTO: 1 % (ref 0–1)
BILIRUB SERPL-MCNC: 0.69 MG/DL (ref 0.2–1)
BPU ID: NORMAL
BUN SERPL-MCNC: 21 MG/DL (ref 5–25)
CALCIUM ALBUM COR SERPL-MCNC: 9.1 MG/DL (ref 8.3–10.1)
CALCIUM SERPL-MCNC: 7.5 MG/DL (ref 8.3–10.1)
CHLORIDE SERPL-SCNC: 103 MMOL/L (ref 96–108)
CO2 SERPL-SCNC: 29 MMOL/L (ref 21–32)
CREAT SERPL-MCNC: 7.11 MG/DL (ref 0.6–1.3)
CROSSMATCH: NORMAL
EOSINOPHIL # BLD AUTO: 0.11 THOUSAND/ÂΜL (ref 0–0.61)
EOSINOPHIL NFR BLD AUTO: 2 % (ref 0–6)
ERYTHROCYTE [DISTWIDTH] IN BLOOD BY AUTOMATED COUNT: 22.1 % (ref 11.6–15.1)
GFR SERPL CREATININE-BSD FRML MDRD: 7 ML/MIN/1.73SQ M
GLUCOSE SERPL-MCNC: 101 MG/DL (ref 65–140)
GLUCOSE SERPL-MCNC: 116 MG/DL (ref 65–140)
GLUCOSE SERPL-MCNC: 119 MG/DL (ref 65–140)
GLUCOSE SERPL-MCNC: 121 MG/DL (ref 65–140)
GLUCOSE SERPL-MCNC: 69 MG/DL (ref 65–140)
GLUCOSE SERPL-MCNC: 96 MG/DL (ref 65–140)
GLUCOSE SERPL-MCNC: 97 MG/DL (ref 65–140)
HCT VFR BLD AUTO: 23 % (ref 36.5–49.3)
HGB BLD-MCNC: 7.3 G/DL (ref 12–17)
IMM GRANULOCYTES # BLD AUTO: 0.05 THOUSAND/UL (ref 0–0.2)
IMM GRANULOCYTES NFR BLD AUTO: 1 % (ref 0–2)
LYMPHOCYTES # BLD AUTO: 0.55 THOUSANDS/ÂΜL (ref 0.6–4.47)
LYMPHOCYTES NFR BLD AUTO: 11 % (ref 14–44)
MAGNESIUM SERPL-MCNC: 1.9 MG/DL (ref 1.6–2.6)
MCH RBC QN AUTO: 32.9 PG (ref 26.8–34.3)
MCHC RBC AUTO-ENTMCNC: 31.7 G/DL (ref 31.4–37.4)
MCV RBC AUTO: 104 FL (ref 82–98)
MONOCYTES # BLD AUTO: 0.44 THOUSAND/ÂΜL (ref 0.17–1.22)
MONOCYTES NFR BLD AUTO: 9 % (ref 4–12)
NEUTROPHILS # BLD AUTO: 3.97 THOUSANDS/ÂΜL (ref 1.85–7.62)
NEUTS SEG NFR BLD AUTO: 76 % (ref 43–75)
NRBC BLD AUTO-RTO: 0 /100 WBCS
PHOSPHATE SERPL-MCNC: 2.9 MG/DL (ref 2.3–4.1)
PLATELET # BLD AUTO: 110 THOUSANDS/UL (ref 149–390)
PMV BLD AUTO: 10.6 FL (ref 8.9–12.7)
POTASSIUM SERPL-SCNC: 3.5 MMOL/L (ref 3.5–5.3)
PROT SERPL-MCNC: 4.8 G/DL (ref 6.4–8.4)
RBC # BLD AUTO: 2.22 MILLION/UL (ref 3.88–5.62)
SODIUM SERPL-SCNC: 135 MMOL/L (ref 135–147)
UNIT DISPENSE STATUS: NORMAL
UNIT PRODUCT CODE: NORMAL
UNIT PRODUCT VOLUME: 350 ML
UNIT RH: NORMAL
VANCOMYCIN TROUGH SERPL-MCNC: 23.9 UG/ML (ref 10–20)
WBC # BLD AUTO: 5.17 THOUSAND/UL (ref 4.31–10.16)

## 2023-06-06 PROCEDURE — 80202 ASSAY OF VANCOMYCIN: CPT | Performed by: INTERNAL MEDICINE

## 2023-06-06 PROCEDURE — 99233 SBSQ HOSP IP/OBS HIGH 50: CPT | Performed by: INTERNAL MEDICINE

## 2023-06-06 PROCEDURE — 90935 HEMODIALYSIS ONE EVALUATION: CPT | Performed by: INTERNAL MEDICINE

## 2023-06-06 PROCEDURE — 84100 ASSAY OF PHOSPHORUS: CPT

## 2023-06-06 PROCEDURE — 82948 REAGENT STRIP/BLOOD GLUCOSE: CPT

## 2023-06-06 PROCEDURE — 80053 COMPREHEN METABOLIC PANEL: CPT

## 2023-06-06 PROCEDURE — 85025 COMPLETE CBC W/AUTO DIFF WBC: CPT

## 2023-06-06 PROCEDURE — 83735 ASSAY OF MAGNESIUM: CPT

## 2023-06-06 RX ORDER — GABAPENTIN 100 MG/1
100 CAPSULE ORAL
Status: DISCONTINUED | OUTPATIENT
Start: 2023-06-06 | End: 2023-06-15 | Stop reason: HOSPADM

## 2023-06-06 RX ORDER — ALBUMIN (HUMAN) 12.5 G/50ML
12.5 SOLUTION INTRAVENOUS ONCE
Status: COMPLETED | OUTPATIENT
Start: 2023-06-06 | End: 2023-06-06

## 2023-06-06 RX ORDER — GABAPENTIN 100 MG/1
100 CAPSULE ORAL
Status: DISCONTINUED | OUTPATIENT
Start: 2023-06-06 | End: 2023-06-06

## 2023-06-06 RX ADMIN — HYDROMORPHONE HYDROCHLORIDE 0.5 MG: 1 INJECTION, SOLUTION INTRAMUSCULAR; INTRAVENOUS; SUBCUTANEOUS at 03:04

## 2023-06-06 RX ADMIN — HEPARIN SODIUM 5000 UNITS: 5000 INJECTION INTRAVENOUS; SUBCUTANEOUS at 21:00

## 2023-06-06 RX ADMIN — NOREPINEPHRINE BITARTRATE 5 MCG/MIN: 1 SOLUTION INTRAVENOUS at 02:32

## 2023-06-06 RX ADMIN — OXYCODONE HYDROCHLORIDE 5 MG: 5 TABLET ORAL at 21:06

## 2023-06-06 RX ADMIN — ATORVASTATIN CALCIUM 20 MG: 20 TABLET, FILM COATED ORAL at 21:00

## 2023-06-06 RX ADMIN — MELATONIN 3 MG: at 21:00

## 2023-06-06 RX ADMIN — ALBUMIN (HUMAN) 12.5 G: 0.25 INJECTION, SOLUTION INTRAVENOUS at 14:47

## 2023-06-06 RX ADMIN — OXYCODONE HYDROCHLORIDE 5 MG: 5 TABLET ORAL at 10:35

## 2023-06-06 RX ADMIN — CHLORHEXIDINE GLUCONATE 15 ML: 1.2 SOLUTION ORAL at 21:04

## 2023-06-06 RX ADMIN — MIDODRINE HYDROCHLORIDE 10 MG: 5 TABLET ORAL at 06:03

## 2023-06-06 RX ADMIN — VANCOMYCIN HYDROCHLORIDE 750 MG: 750 INJECTION, SOLUTION INTRAVENOUS at 15:27

## 2023-06-06 RX ADMIN — CLOPIDOGREL BISULFATE 75 MG: 75 TABLET ORAL at 08:15

## 2023-06-06 RX ADMIN — NEPHROCAP 1 CAPSULE: 1 CAP ORAL at 08:16

## 2023-06-06 RX ADMIN — MIDODRINE HYDROCHLORIDE 10 MG: 5 TABLET ORAL at 15:28

## 2023-06-06 RX ADMIN — OXYCODONE HYDROCHLORIDE 5 MG: 5 TABLET ORAL at 02:28

## 2023-06-06 RX ADMIN — HYDROMORPHONE HYDROCHLORIDE 0.5 MG: 1 INJECTION, SOLUTION INTRAMUSCULAR; INTRAVENOUS; SUBCUTANEOUS at 12:52

## 2023-06-06 RX ADMIN — HEPARIN SODIUM 5000 UNITS: 5000 INJECTION INTRAVENOUS; SUBCUTANEOUS at 06:03

## 2023-06-06 RX ADMIN — CHLORHEXIDINE GLUCONATE 15 ML: 1.2 SOLUTION ORAL at 08:15

## 2023-06-06 RX ADMIN — ONDANSETRON 4 MG: 2 INJECTION INTRAMUSCULAR; INTRAVENOUS at 20:53

## 2023-06-06 RX ADMIN — ASPIRIN 81 MG: 81 TABLET, COATED ORAL at 08:15

## 2023-06-06 RX ADMIN — CYANOCOBALAMIN TAB 500 MCG 1000 MCG: 500 TAB at 08:15

## 2023-06-06 RX ADMIN — OXYCODONE HYDROCHLORIDE 5 MG: 5 TABLET ORAL at 06:49

## 2023-06-06 RX ADMIN — HEPARIN SODIUM 5000 UNITS: 5000 INJECTION INTRAVENOUS; SUBCUTANEOUS at 13:00

## 2023-06-06 RX ADMIN — HYDROMORPHONE HYDROCHLORIDE 0.5 MG: 1 INJECTION, SOLUTION INTRAMUSCULAR; INTRAVENOUS; SUBCUTANEOUS at 17:25

## 2023-06-06 RX ADMIN — MIDODRINE HYDROCHLORIDE 10 MG: 5 TABLET ORAL at 10:35

## 2023-06-06 RX ADMIN — GABAPENTIN 100 MG: 100 CAPSULE ORAL at 17:22

## 2023-06-06 RX ADMIN — Medication 2.5 MG: at 14:47

## 2023-06-06 NOTE — CASE MANAGEMENT
Case Management Discharge Planning Note    Patient name Rich Colon  Location MICU 11/MICU 11 MRN [id number]  : 1955 Date 2023       Current Admission Date: 2023  Current Admission Diagnosis:Infection of AV graft for dialysis [facility])   Patient Active Problem List    Diagnosis Date Noted   • In[facility] graft for [facility] ([facility] ) 2023   • Hypotension 2023   • S/P angioplasty with stent 2023   • Chronic deep vein thrombosis (DVT) of internal jugular vein ([place] [address] ) 2022   • End-stage renal disease on hemodialysis ([place] [address] ) 2022   • Chronic kidney disease 2022   • Right intertrochanteric femur fracture 2022   • Thrombocytopenia ([place] [address] ) 2022   • Arteriovenous fistula ([place] [address] ) 2021   • AV fistula thrombosis, initial encounter ([place] [address] ) 2021   • Encounter for extracorporeal dialysis ([place] [address] ) 2019   • Intestinal malabsorption 2017   • Pernicious anemia 2017   • Irritable bowel syndrome with diarrhea 2017   • Elevated serum alkaline phosphatase level 2017   • Acute on chronic anemia 2017   • Coronary artery disease involving native coronary artery 10/20/2016   • Hypertension 10/20/2016   • Dependence on renal dialysis ([place] [address] ) 2016   • Vitamin D deficiency, unspecified 2013   • Diabetes with neurologic complications ([place] [address] )    • Mixed hyperlipidemia 10/04/2012   • ESRD (end stage renal disease) on dialysis ([place] [address] ) 2012   • Nicotine dependence 2012   • Organic impotence 2012   • Type 2 diabetes mellitus ([facility] ) 2012      LOS (days): 4  Geometric Mean LOS (GMLOS) (days): 6 80  Days to GMLOS:2 9     OBJECTIVE:  Risk of Unplanned Readmission Score: 21 8         Current admission status: Inpatient   Preferred Pharmacy:   [facility] [address] [facility], [address]  [facility],[address] [place] [id number]  Phone: [phone] Fax: [phone]    Primary Care Provider: Farzana Huffman Jessica Medina MD    Primary Insurance: MEDICARE  Secondary Insurance: AARP    DISCHARGE DETAILS:    Discharge planning discussed with[de-identified] pt and wife  Freedom of Choice: Yes  Comments - Freedom of Choice: cm discussed str rec  CM contacted family/caregiver?: Yes  Were Treatment Team discharge recommendations reviewed with patient/caregiver?: Yes  Did patient/caregiver verbalize understanding of patient care needs?: Yes  Were patient/caregiver advised of the risks associated with not following Treatment Team discharge recommendations?: Yes  Contacts  Patient Contacts: Oral Castro  (wife)  Relationship to Patient[de-identified] Family  Contact Method: Phone  Phone Number: [phone]  Reason/Outcome: Discharge Planning  Treatment Team Recommendation: Short Term Rehab    CM informed pt of STR rec, pt was in agreement  Pt gave CM permission to send out a blanket referral, he asked CM to update wife  Ashutosh Cantu was in agreement with STR rec, she does not want  sent back to [facility] rehab  Ashutosh Pepe gave CM permission to send a blanket referral in [facility]       CM uploaded wound vac form to STR referral

## 2023-06-06 NOTE — PLAN OF CARE
HD tx plan: 3 5 hrs removing up to 2L, maintaining MAP >65  4K bath for serum K 3 5 on 6/6  Using R permacath for hd due to excised L arm access       Problem: METABOLIC, FLUID AND ELECTROLYTES - ADULT  Goal: Electrolytes maintained within normal limits  Description: INTERVENTIONS:  - Monitor labs and assess patient for signs and symptoms of electrolyte imbalances  - Administer electrolyte replacement as ordered  - Monitor response to electrolyte replacements, including repeat lab results as appropriate  - Instruct patient on fluid and nutrition as appropriate  Outcome: Progressing     Problem: METABOLIC, FLUID AND ELECTROLYTES - ADULT  Goal: Fluid balance maintained  Description: INTERVENTIONS:  - Monitor labs   - Monitor I/O and WT  - Instruct patient on fluid and nutrition as appropriate  - Assess for signs & symptoms of volume excess or deficit  Outcome: Progressing

## 2023-06-06 NOTE — PLAN OF CARE
Problem: PAIN - ADULT  Goal: Verbalizes/displays adequate comfort level or baseline comfort level  Description: Interventions:  - Encourage patient to monitor pain and request assistance  - Assess pain using appropriate pain scale  - Administer analgesics based on type and severity of pain and evaluate response  - Implement non-pharmacological measures as appropriate and evaluate response  - Consider cultural and social influences on pain and pain management  - Notify physician/advanced practitioner if interventions unsuccessful or patient reports new pain  Outcome: Progressing     Problem: INFECTION - ADULT  Goal: Absence or prevention of progression during hospitalization  Description: INTERVENTIONS:  - Assess and monitor for signs and symptoms of infection  - Monitor lab/diagnostic results  - Monitor all insertion sites, i e  indwelling lines, tubes, and drains  - Monitor endotracheal if appropriate and nasal secretions for changes in amount and color  - Benson appropriate cooling/warming therapies per order  - Administer medications as ordered  - Instruct and encourage patient and family to use good hand hygiene technique  - Identify and instruct in appropriate isolation precautions for identified infection/condition  Outcome: Progressing  Goal: Absence of fever/infection during neutropenic period  Description: INTERVENTIONS:  - Monitor WBC    Outcome: Progressing     Problem: SAFETY ADULT  Goal: Patient will remain free of falls  Description: INTERVENTIONS:  - Educate patient/family on patient safety including physical limitations  - Instruct patient to call for assistance with activity   - Consult OT/PT to assist with strengthening/mobility   - Keep Call bell within reach  - Keep bed low and locked with side rails adjusted as appropriate  - Keep care items and personal belongings within reach  - Initiate and maintain comfort rounds  - Make Fall Risk Sign visible to staff  - Offer Toileting every 2 Hours, in advance of need  - Initiate/Maintain bed alarm  - Obtain necessary fall risk management equipment:   - Apply yellow socks and bracelet for high fall risk patients  - Consider moving patient to room near nurses station  Outcome: Progressing  Goal: Maintain or return to baseline ADL function  Description: INTERVENTIONS:  -  Assess patient's ability to carry out ADLs; assess patient's baseline for ADL function and identify physical deficits which impact ability to perform ADLs (bathing, care of mouth/teeth, toileting, grooming, dressing, etc )  - Assess/evaluate cause of self-care deficits   - Assess range of motion  - Assess patient's mobility; develop plan if impaired  - Assess patient's need for assistive devices and provide as appropriate  - Encourage maximum independence but intervene and supervise when necessary  - Involve family in performance of ADLs  - Assess for home care needs following discharge   - Consider OT consult to assist with ADL evaluation and planning for discharge  - Provide patient education as appropriate  Outcome: Progressing  Goal: Maintains/Returns to pre admission functional level  Description: INTERVENTIONS:  - Perform BMAT or MOVE assessment daily    - Set and communicate daily mobility goal to care team and patient/family/caregiver  - Collaborate with rehabilitation services on mobility goals if consulted  - Perform Range of Motion 5 times a day  - Reposition patient every 2 hours    - Dangle patient 1 times a day  - Stand patient 1 times a day  - Ambulate patient 1 times a day  - Out of bed to chair 1 times a day   - Out of bed for meals 1 times a day  - Out of bed for toileting  - Record patient progress and toleration of activity level   Outcome: Progressing     Problem: DISCHARGE PLANNING  Goal: Discharge to home or other facility with appropriate resources  Description: INTERVENTIONS:  - Identify barriers to discharge w/patient and caregiver  - Arrange for needed discharge resources and transportation as appropriate  - Identify discharge learning needs (meds, wound care, etc )  - Arrange for interpretive services to assist at discharge as needed  - Refer to Case Management Department for coordinating discharge planning if the patient needs post-hospital services based on physician/advanced practitioner order or complex needs related to functional status, cognitive ability, or social support system  Outcome: Progressing     Problem: Knowledge Deficit  Goal: Patient/family/caregiver demonstrates understanding of disease process, treatment plan, medications, and discharge instructions  Description: Complete learning assessment and assess knowledge base    Interventions:  - Provide teaching at level of understanding  - Provide teaching via preferred learning methods  Outcome: Progressing     Problem: Prexisting or High Potential for Compromised Skin Integrity  Goal: Skin integrity is maintained or improved  Description: INTERVENTIONS:  - Identify patients at risk for skin breakdown  - Assess and monitor skin integrity  - Assess and monitor nutrition and hydration status  - Monitor labs   - Assess for incontinence   - Turn and reposition patient  - Assist with mobility/ambulation  - Relieve pressure over bony prominences  - Avoid friction and shearing  - Provide appropriate hygiene as needed including keeping skin clean and dry  - Evaluate need for skin moisturizer/barrier cream  - Collaborate with interdisciplinary team   - Patient/family teaching  - Consider wound care consult   Outcome: Progressing     Problem: MOBILITY - ADULT  Goal: Maintain or return to baseline ADL function  Description: INTERVENTIONS:  -  Assess patient's ability to carry out ADLs; assess patient's baseline for ADL function and identify physical deficits which impact ability to perform ADLs (bathing, care of mouth/teeth, toileting, grooming, dressing, etc )  - Assess/evaluate cause of self-care deficits   - Assess range of motion  - Assess patient's mobility; develop plan if impaired  - Assess patient's need for assistive devices and provide as appropriate  - Encourage maximum independence but intervene and supervise when necessary  - Involve family in performance of ADLs  - Assess for home care needs following discharge   - Consider OT consult to assist with ADL evaluation and planning for discharge  - Provide patient education as appropriate  Outcome: Progressing  Goal: Maintains/Returns to pre admission functional level  Description: INTERVENTIONS:  - Perform BMAT or MOVE assessment daily    - Set and communicate daily mobility goal to care team and patient/family/caregiver  - Collaborate with rehabilitation services on mobility goals if consulted  - Perform Range of Motion 5 times a day  - Reposition patient every 2 hours    - Dangle patient 1 times a day  - Stand patient 1 times a day  - Ambulate patient 1 times a day  - Out of bed to chair 1 times a day   - Out of bed for meals 1 times a day  - Out of bed for toileting  - Record patient progress and toleration of activity level   Outcome: Progressing     Problem: METABOLIC, FLUID AND ELECTROLYTES - ADULT  Goal: Electrolytes maintained within normal limits  Description: INTERVENTIONS:  - Monitor labs and assess patient for signs and symptoms of electrolyte imbalances  - Administer electrolyte replacement as ordered  - Monitor response to electrolyte replacements, including repeat lab results as appropriate  - Instruct patient on fluid and nutrition as appropriate  Outcome: Progressing  Goal: Fluid balance maintained  Description: INTERVENTIONS:  - Monitor labs   - Monitor I/O and WT  - Instruct patient on fluid and nutrition as appropriate  - Assess for signs & symptoms of volume excess or deficit  Outcome: Progressing

## 2023-06-06 NOTE — PROGRESS NOTES
Progress Note - Critical Care   Eliana Jones 70 y o  male MRN: [id number]  Unit/Bed#: MICU 11 Encounter: [id number]    SUBJECTIVE:  Patient continues to report diminished appetite  Reported bowel movement yesterday  His LUE is painful distal to graft explantation site  His R foot is also painful, but he notes that this is chronic sequelae from a previous stroke  Denies chills, SOB, palpitations, or angina  HPI/24HR Event:  No events overnight    ROS  Review of Systems   Constitutional: Negative for activity change, appetite change, chills and fever  HENT: Negative  Eyes: Negative  Respiratory: Negative for cough, chest tightness and shortness of breath  Cardiovascular: Negative for chest pain and palpitations  Gastrointestinal: Negative for abdominal pain and vomiting  Endocrine: Negative  Genitourinary: Positive for decreased urine volume (patient is on HD and does not produce urine)  Musculoskeletal: Positive for myalgias  Negative for arthralgias and back pain  Skin: Positive for color change (chronic venous stasis changes on limbs) and surgical wound on RUE  Neurological: Negative for seizures, syncope and speech difficulty  Hematological: Bruises/bleeds easily  Psychiatric/Behavioral: Negative  All other systems reviewed and are negative  Invasive lines and devices: Invasive Devices     Central Venous Catheter Line  Duration           CVC Central Lines 06/03/23 Triple 20cm 2 days          Line  Duration           Hemodialysis AV Fistula  Left Upper arm -- days    Hemodialysis AV Fistula 05/19/23 Left Upper arm 17 days          Hemodialysis Catheter  Duration           HD Permanent Double Catheter 257 days                 Physical exam  General:  Resting comfortably in bed in no acute distress  Neuro: GCS 15 (Eye 4, Verbal 5, Motor 6)  HENT: Normocephalic and atraumatic, PERRL  Heart: RRR, pulses intact  Lungs: Bilateral breath sounds present  Abdomen:   Bowel sounds present, soft  Skin:  Warm, dry skin  Incision site clean dry and intact, cutaneous changes consistent with chronic venous stasis on distal aspects of limbs, poor wound healing-multiple puncture wounds near right antecubital fossa from attempted venous access  2+ pitting edema in L hand  Vitals  Temperature:   Temp (24hrs), Av 1 °F (36 7 °C), Min:98 °F (36 7 °C), Max:98 1 °F (36 7 °C)    Current: Temperature: 98 1 °F (36 7 °C)    Vitals:    23 0500 23 0532 23 0600 23 0700   BP: 123/50  147/57 (!) 125/43   BP Location:       Pulse: 66  68 72   Resp: (!) 31  (!) 29 (!) 33   Temp:       TempSrc:       SpO2: 100%  99% 100%   Weight:  80 kg (176 lb 5 9 oz)     Height:                 Labs:   Results from last 7 days   Lab Units 23  0523  1447 23  0651   EOS PCT % 2 2 0   HEMATOCRIT % 23 0* 22 9* 24 6*   HEMOGLOBIN g/dL 7 3* 7 8* 8 0*   MONOS PCT % 9 7 7   NEUTROS PCT % 76* 81* 82*   PLATELETS Thousands/uL 110* 140* 107*   WBC Thousand/uL 5 17 8 55 6 53      Results from last 7 days   Lab Units 23  1447 23  0651   ALK PHOS U/L 120* 105 94   ALT U/L 11* 11* 13   AST U/L 14 12 11   BUN mg/dL 21 35* 31*   CALCIUM mg/dL 7 5* 7 6* 7 3*   CHLORIDE mmol/L 103 106 104   CO2 mmol/L 29 28 26   CREATININE mg/dL 7 11* 9 92* 8 57*   POTASSIUM mmol/L 3 5 3 7 4 6   SODIUM mmol/L 135 137 135     Results from last 7 days   Lab Units 23  0523  1447 23  0651   MAGNESIUM mg/dL 1 9 2 0 1 9     Results from last 7 days   Lab Units 23  0523  1447 23  0651   PHOSPHORUS mg/dL 2 9 3 1 4 8*          Results from last 7 days   Lab Units 23  1826   LACTIC ACID mmol/L 1 7       Other Labs    Intake and Outputs:  I/O        0701   0700  0701   0700    P  O  520 240    I V  (mL/kg) 480 (6 1) 1076 1 (13 5)    IV Piggyback 50 200    Total Intake(mL/kg) 1050 (13 3) 1516 1 (19)    Urine (mL/kg/hr)  0 (0)    Drains 0 50    Other  500    Stool 0     Total Output 0 550    Net +1050 +966 1          Unmeasured Stool Occurrence 1 x         Assessment & Plan:   Neuro:   • Diagnosis: History of CVA  ? 2006 w/ MRI showing thrombus in R Vertebral artery   ? RLE residual deficit - uses walker   ? Plan:   - Continue ASA, plavix, subQ heparin, statin   - CAM ICU   - Delirium precautions   • Diagnosis: Pain  ? Plan:   - Oxy 2 5 po mod / oxy 5 po severe/ Dilaudid 0 5 IV breakthrough PRN q4h     CV:   • Diagnosis: Septic shock   ? History of hypotension  - On midodrine 10 TID chronically   ? Post-op patient required elvi, switched to levo at 15 and weaned to 5   ? Source: infected LUE AVG s/p OR washout/debridedment and explant   ? Plan:   - Patient currently on 5mcg/min levophed  Wean to 0 as tolerated  - Continue home midodrine 10mg TID   - Continue pressors for MAP > 65   - A/p as below for sepsis   • Diagnosis: CAD, HLD   ? 2008 s/p PTCA w/ bare metal stent to circumflex and PDA and POBA of diagonal   ? 2009, 2010 MI, 2011 NSTEMI s/p PCI/KARLOS of distal RCA   ? 2023 s/p PCI and KARLOS to RCA  ? 5/22/23 TTE: LVEF 65%, G1DD, mid-cavity dynamic obstruction at rest with a peak gradient of 12  Mid cavity dyanmic obstruction with valsalva with peak gradient of 30   ? Plan:   - Continue statin, ASA, and plavix   - HD as per Nephrology recommendation     Pulm:  No active issues      GI:   • Diagnosis: bowel regimen   ? Plan:   - Qdaily Miralax 17g  - Qdaily Sennakot 8 6mg     :   • Diagnosis: ESRD on HD T,Th,S  ? 2/2 previously uncontrolled DM   ? Gets HD at [address]   ? Access site: HD permacath   ? Outpatient dry weight 76 5kg  current weight 80 0kg  ? Plan:   - HD today  3 hour session planned  - Nephro consulted     F/E/N:   • Fluids: none   • Electrolytes: goal Mg>2, K>4  • Nutrition: renal diet, <2g/day potassium       Heme/Onc:   • Diagnosis: Anemia   ? History of anemia of CKD  ?  Was briefly on epogen for 2 months in 2022 but this "was discontinued due to graft thrombosis  ? Received 1 u pRBC in OR    ? Plan:   - Hemoglobin decreased to 7 3 today  Repeat CBC in AM   - Transfuse 1 unit pRBC if symptomatic or hemoglobin<7   • Diagnosis: History of graft thrombosis, chronic DVT, R vertebral artery thrombus  ? Was previously on warfarin on passed but switched to eliquis due to noncompliance with lab draws and hard sticks    ? Eliquis initially held prior to surgery  Originally ordered to maintain graft patency  Graft has been explanted and anti-coagulation is no longer needed  ? Plan:   - Per vascular surgery, anti-coagulation is no longer needed as graft patency is not pertinent  Endo:   • Diagnosis: History of DM   ? A1c 4 8%   ? Not on any meds at home  ? Plan:  - Monitor BG 4x/daily for goal 140-180  Meal correction insulin ordered  ID:   • Diagnosis: AVG site infection   ? Noted during TCM visit on 6/2  ? Per vascular notes upon consult: \"On review of imaging patient has a significant dehiscence of the venous axillary exposure incision  Graft is very likely exposed to air  This represents clinically infected prosthetic material with the risk of significant hemorrhage and limb loss if left in place  \"  ? Patient is POD1 left upper extremity AV graft explant  ?  Plan:   - Continue vancomycin with pharmacy to adjust dosing   - Cefepime discontinued on 6/5/23  - Blood cultures negative  - Wound cultures negative  - Vascular following  - ID following      MSK/Skin:   No active issues  • PT/OT when able   • Frequent repositioning      Disposition: Critical care    Micro:  Lab Results   Component Value Date    BLOODCX No Growth at 72 hrs  06/02/2023    BLOODCX No Growth at 72 hrs  06/02/2023    WOUNDCULT No growth 06/03/2023       Allergies: No Known Allergies      Medications:   Scheduled Meds:  Current Facility-Administered Medications   Medication Dose Route Frequency Provider Last Rate   • acetaminophen  975 mg Oral Q8H PRN Saud Musa " "Avelino Fuentes MD     • aspirin  81 mg Oral Daily Kirstin Hancock MD     • atorvastatin  20 mg Oral HS Kirstin Hancock MD     • b complex-vitamin C-folic acid  1 capsule Oral Daily Kirstin Hancock MD     • chlorhexidine  15 mL Mouth/Throat Q12H [facility] Raegan Starks DO     • clopidogrel  75 mg Oral Daily Kirstin Hancock MD     • vitamin B-12  1,000 mcg Oral Daily Kirstin Hancock MD     • heparin (porcine)  5,000 Units Subcutaneous [facility] Kirstin Hancock MD     • HYDROmorphone  0 5 mg Intravenous Q4H PRN Kirstin Hancock MD     • insulin lispro  1-6 Units Subcutaneous 4x Daily (AC & HS) Evelia Tolentino MD     • melatonin  3 mg Oral HS Moises Cardoza DO     • midodrine  10 mg Oral TID AC Kirstin Hancock MD     • norepinephrine  1-30 mcg/min Intravenous Titrated Gonzalez Childers MD 5 mcg/min (06/06/23 1048)   • ondansetron  4 mg Intravenous Q6H PRN Kirstin Hancock MD     • oxyCODONE  5 mg Oral Q4H PRN Kirstin Hancock MD     • oxyCODONE  2 5 mg Oral Q4H PRN Kirstin Hancock MD     • polyethylene glycol  17 g Oral Daily Aminah Bach MD     • senna  1 tablet Oral HS Raegan Starks DO     • vancomycin  10 mg/kg Intravenous Daily PRN Hugh Yu MD     • vasopressin  0 04 Units/min Intravenous Continuous Hugh Yu MD Stopped (06/04/23 0945)     Continuous Infusions:norepinephrine, 1-30 mcg/min, Last Rate: 5 mcg/min (06/06/23 0307)  vasopressin, 0 04 Units/min, Last Rate: Stopped (06/04/23 0945)      PRN Meds:  acetaminophen, 975 mg, Q8H PRN  HYDROmorphone, 0 5 mg, Q4H PRN  ondansetron, 4 mg, Q6H PRN  oxyCODONE, 5 mg, Q4H PRN  oxyCODONE, 2 5 mg, Q4H PRN  vancomycin, 10 mg/kg, Daily PRN      Counseling / Coordination of Care  Total Critical Care time spent 60 minutes excluding procedures, teaching and family updates  Code Status: Level 1 - Full Code     Portions of the record may have been created with voice recognition software    Occasional wrong word or \"sound a like\" substitutions may have " occurred due to the inherent limitations of voice recognition software  Read the chart carefully and recognize, using context, where substitutions have occurred       Lupillo Dubois, Medical Student  7:47 AM

## 2023-06-06 NOTE — QUICK NOTE
ETHAN WELSH   Change Note    Date: 6/5/2023    Location of wound: Left upper extremity x 2     Dimensions of Proximal wound: 7 cm x 3 cm x 2 cm    Description of wound: Proximal left upper arm wound, pink beefy wound base    Sponges removed:  1 Black Sponges  0 White Sponges    Sponges placed:  1 Black Sponges  0 White Sponges    VAC settings:  125 mmHg  Continuous         Dimensions of distal wound: 4 cm x 1 cm x 1 cm    Description of wound: distall left upper arm wound, pink beefy wound base    Sponges removed:  1 Black Sponges  0 White Sponges    Sponges placed:  1 Black Sponges  0 White Sponges    VAC settings:  125 mmHg  Continuous      Astrid Gleason PA-C

## 2023-06-06 NOTE — PROGRESS NOTES
Progress Note - Critical Care   Michelle Crow 70 y o  male MRN: [id number]  [facility]: [id number]    SUBJECTIVE:  Patient continues to report diminished appetite  Reported bowel movement yesterday  His LUE is painful distal to graft explantation site  His R foot is also painful, but he notes that this is chronic sequelae from a previous stroke  Denies chills, SOB, palpitations, or angina  HPI/24HR Event:  No events overnight    ROS  Review of Systems   Constitutional: Negative for activity change, appetite change, chills and fever  HENT: Negative  Eyes: Negative  Respiratory: Negative for cough, chest tightness and shortness of breath  Cardiovascular: Negative for chest pain and palpitations  Gastrointestinal: Negative for abdominal pain and vomiting  Endocrine: Negative  Genitourinary: Positive for decreased urine volume (patient is on HD and does not produce urine)  Musculoskeletal: Positive for myalgias  Negative for arthralgias and back pain  Skin: Positive for color change (chronic venous stasis changes on limbs) and surgical wound on RUE  Neurological: Negative for seizures, syncope and speech difficulty  Hematological: Bruises/bleeds easily  Psychiatric/Behavioral: Negative  All other systems reviewed and are negative  Invasive lines and devices: Invasive Devices     Central Venous Catheter Line  Duration           CVC Central Lines 06/03/23 Triple 20cm 2 days          Line  Duration           Hemodialysis AV Fistula  Left Upper arm -- days    Hemodialysis AV Fistula 05/19/23 Left Upper arm 17 days          Hemodialysis Catheter  Duration           HD Permanent Double Catheter 257 days                 Physical exam  General:  Resting comfortably in bed in no acute distress  Neuro: GCS 15 (Eye 4, Verbal 5, Motor 6)  HENT: Normocephalic and atraumatic, PERRL  Heart: RRR, pulses intact  Lungs: Bilateral breath sounds present  Abdomen:   Bowel sounds present, soft  Skin:  Warm, dry skin  Incision site clean dry and intact, cutaneous changes consistent with chronic venous stasis on distal aspects of limbs, poor wound healing-multiple puncture wounds near right antecubital fossa from attempted venous access  2+ pitting edema in L hand  Vitals  Temperature:   Temp (24hrs), Av 9 °F (36 6 °C), Min:97 6 °F (36 4 °C), Max:98 1 °F (36 7 °C)    Current: Temperature: 98 1 °F (36 7 °C)    Vitals:    23 0145 23 0200 23 0215 23 0532   BP: 132/52 131/53 121/50    BP Location:       Pulse: 62 62 62    Resp: 16 14 19    Temp:       TempSrc:       SpO2: 99% 98% 98%    Weight:    80 kg (176 lb 5 9 oz)   Height:                 Labs:   Results from last 7 days   Lab Units 23  0523  1447 23  0651   EOS PCT % 2 2 0   HEMATOCRIT % 23 0* 22 9* 24 6*   HEMOGLOBIN g/dL 7 3* 7 8* 8 0*   MONOS PCT % 9 7 7   NEUTROS PCT % 76* 81* 82*   PLATELETS Thousands/uL 110* 140* 107*   WBC Thousand/uL 5 17 8 55 6 53      Results from last 7 days   Lab Units 23  0523  1447 23  0651   ALK PHOS U/L 120* 105 94   ALT U/L 11* 11* 13   AST U/L 14 12 11   BUN mg/dL 21 35* 31*   CALCIUM mg/dL 7 5* 7 6* 7 3*   CHLORIDE mmol/L 103 106 104   CO2 mmol/L 29 28 26   CREATININE mg/dL 7 11* 9 92* 8 57*   POTASSIUM mmol/L 3 5 3 7 4 6   SODIUM mmol/L 135 137 135     Results from last 7 days   Lab Units 23  0517 23  1447 23  0651   MAGNESIUM mg/dL 1 9 2 0 1 9     Results from last 7 days   Lab Units 23  0517 23  1447 23  0651   PHOSPHORUS mg/dL 2 9 3 1 4 8*          Results from last 7 days   Lab Units 23  1826   LACTIC ACID mmol/L 1 7       Other Labs    Intake and Outputs:  I/O        0701   0700  0701   0700    P  O  520 240    I V  (mL/kg) 480 (6 1) 1076 1 (13 5)    IV Piggyback 50 200    Total Intake(mL/kg) 1050 (13 3) 1516 1 (19)    Urine (mL/kg/hr)  0 (0)    Drains 0 50    Other 500    Stool 0     Total Output 0 550    Net +1050 +966 1          Unmeasured Stool Occurrence 1 x         Assessment & Plan:   Neuro:   • Diagnosis: History of CVA  ? 2006 w/ MRI showing thrombus in R Vertebral artery   ? RLE residual deficit - uses walker   ? Plan:   - Continue ASA, plavix, subQ heparin, statin   - CAM ICU   - Delirium precautions   • Diagnosis: Pain  ? Plan:   - Oxy 2 5 po mod / oxy 5 po severe/ Dilaudid 0 5 IV breakthrough PRN q4h     CV:   • Diagnosis: Septic shock   ? History of hypotension  - On midodrine 10 TID chronically   ? Post-op patient required elvi, switched to levo at 15 and weaned to 5   ? Source: infected LUE AVG s/p OR washout/debridedment and explant   ? Plan:   - Patient currently on 5mcg/min levophed  Wean to 0 as tolerated  - Continue home midodrine 10mg TID   - Continue pressors for MAP > 65   - A/p as below for sepsis   • Diagnosis: CAD, HLD   ? 2008 s/p PTCA w/ bare metal stent to circumflex and PDA and POBA of diagonal   ? 2009, 2010 MI, 2011 NSTEMI s/p PCI/KARLOS of distal RCA   ? 2023 s/p PCI and KARLOS to RCA  ? 5/22/23 TTE: LVEF 65%, G1DD, mid-cavity dynamic obstruction at rest with a peak gradient of 12  Mid cavity dyanmic obstruction with valsalva with peak gradient of 30   ? Plan:   - Continue statin, ASA, and plavix   - HD as per Nephrology recommendation     Pulm:  No active issues      GI:   • Diagnosis: bowel regimen   ? Plan:   - Qdaily Miralax 17g  - Qdaily Sennakot 8 6mg     :   • Diagnosis: ESRD on HD T,Th,S  ? 2/2 previously uncontrolled DM   ? Gets HD at [address]   ? Access site: HD permacath   ? Outpatient dry weight 76 5kg  current weight 80 0kg  ? Plan:   - HD today  3 hour session planned  - Nephro consulted     F/E/N:   • Fluids: none   • Electrolytes: goal Mg>2, K>4  • Nutrition: renal diet, <2g/day potassium       Heme/Onc:   • Diagnosis: Anemia   ? History of anemia of CKD  ?  Was briefly on epogen for 2 months in 2022 but this was discontinued "due to graft thrombosis  ? Received 1 u pRBC in OR    ? Plan:   - Hemoglobin decreased to 7 3 today  Repeat CBC in AM   - Transfuse 1 unit pRBC if symptomatic or hemoglobin<7   • Diagnosis: History of graft thrombosis, chronic DVT, R vertebral artery thrombus  ? Was previously on warfarin on passed but switched to eliquis due to noncompliance with lab draws and hard sticks    ? Eliquis initially held prior to surgery  Originally ordered to maintain graft patency  Graft has been explanted and anti-coagulation is no longer needed  ? Plan:   - Per vascular surgery, anti-coagulation is no longer needed as graft patency is not pertinent  Endo:   • Diagnosis: History of DM   ? A1c 4 8%   ? Not on any meds at home  ? Plan:  - Monitor BG 4x/daily for goal 140-180  Meal correction insulin ordered  ID:   • Diagnosis: AVG site infection   ? Noted during TCM visit on 6/2  ? Per vascular notes upon consult: \"On review of imaging patient has a significant dehiscence of the venous axillary exposure incision  Graft is very likely exposed to air  This represents clinically infected prosthetic material with the risk of significant hemorrhage and limb loss if left in place  \"  ? Patient is POD1 left upper extremity AV graft explant  ?  Plan:   - Continue vancomycin with pharmacy to adjust dosing   - Cefepime discontinued on 6/5/23  - Blood cultures negative  - Wound cultures negative  - Vascular following  - ID following      MSK/Skin:   No active issues  • PT/OT when able   • Frequent repositioning      Disposition: Critical care    Micro:  Lab Results   Component Value Date    BLOODCX No Growth at 72 hrs  06/02/2023    BLOODCX No Growth at 72 hrs  06/02/2023    WOUNDCULT No growth 06/03/2023       Allergies: No Known Allergies      Medications:   Scheduled Meds:  Current Facility-Administered Medications   Medication Dose Route Frequency Provider Last Rate   • acetaminophen  975 mg Oral Q8H PRN Destin Verdin MD     • " "aspirin  81 mg Oral Daily Ofelia Garcia MD     • atorvastatin  20 mg Oral HS Ofelia Garcia MD     • b complex-vitamin C-folic acid  1 capsule Oral Daily Ofelia Garcia MD     • chlorhexidine  15 mL Mouth/Throat Q12H [address] Jasmit Donelle Castleman, DO     • clopidogrel  75 mg Oral Daily Ofelia Garcia MD     • vitamin B-12  1,000 mcg Oral Daily Ofelia Garcia MD     • heparin (porcine)  5,000 Units Subcutaneous [facility] Ofelia Garcia MD     • HYDROmorphone  0 5 mg Intravenous Q4H PRN Ofelia Garcia MD     • insulin lispro  1-6 Units Subcutaneous 4x Daily (AC & HS) Abdirahman Nguyễn MD     • melatonin  3 mg Oral HS Judson Cason DO     • midodrine  10 mg Oral TID AC Ofelia Garcia MD     • norepinephrine  1-30 mcg/min Intravenous Titrated Emmanuelle Jiménez MD 5 mcg/min (06/06/23 0755)   • ondansetron  4 mg Intravenous Q6H PRN Ofelia Garcia MD     • oxyCODONE  5 mg Oral Q4H PRN Ofelia Garcia MD     • oxyCODONE  2 5 mg Oral Q4H PRN Ofelia Garcia MD     • polyethylene glycol  17 g Oral Daily Stewart Johnson MD     • senna  1 tablet Oral HS Jasmit Donelle Castleman, DO     • vancomycin  10 mg/kg Intravenous Daily PRN Sheryl Alba MD     • vasopressin  0 04 Units/min Intravenous Continuous Sheryl Alba MD Stopped (06/04/23 0945)     Continuous Infusions:norepinephrine, 1-30 mcg/min, Last Rate: 5 mcg/min (06/06/23 0307)  vasopressin, 0 04 Units/min, Last Rate: Stopped (06/04/23 0945)      PRN Meds:  acetaminophen, 975 mg, Q8H PRN  HYDROmorphone, 0 5 mg, Q4H PRN  ondansetron, 4 mg, Q6H PRN  oxyCODONE, 5 mg, Q4H PRN  oxyCODONE, 2 5 mg, Q4H PRN  vancomycin, 10 mg/kg, Daily PRN      Counseling / Coordination of Care  Total Critical Care time spent 60 minutes excluding procedures, teaching and family updates  Code Status: Level 1 - Full Code     Portions of the record may have been created with voice recognition software    Occasional wrong word or \"sound a like\" substitutions may have occurred due to the " inherent limitations of voice recognition software  Read the chart carefully and recognize, using context, where substitutions have occurred       Jer Vickers MD  Internal Medicine Resident, PGY1  7:09 AM

## 2023-06-06 NOTE — PROGRESS NOTES
Santosh Mims is a 70 y o  male who is currently ordered Vancomycin IV with management by the Pharmacy Consult service  Relevant clinical data and objective / subjective history reviewed  Vancomycin Assessment:  Indication and Goal AUC/Trough: Soft tissue (goal -600, trough >10), -600, trough >10  Clinical Status: improving  Micro:     Renal Function:  SCr: HD  CrCl: HD  Renal replacement: HD //Sat  Days of Therapy:  of 7  Current Dose: 750 mg IV daily PRN for vanc level < 15  Vancomycin Plan:  New Dosin mg IV three times weekly on  post-HD, level this AM 23 9 after shortened 2 hour HD session on , will restart HD dosing today and recheck level prior to 3rd HD session  Estimated AUC: N/A  Estimated Trough: N/A  Next Level: 6/8 with AM labs, prior to 3rd HD session  Renal Function Monitoring: Daily BMP and [place] will continue to follow closely for s/sx of nephrotoxicity, infusion reactions and appropriateness of therapy  BMP and CBC will be ordered per protocol  We will continue to follow the patient’s culture results and clinical progress daily      Marion Simpson, Pharmacist

## 2023-06-06 NOTE — PROGRESS NOTES
NEPHROLOGY PROGRESS NOTE   Lexis Coppola 70 y o  male MRN: [id number]  Unit/Bed#: [facility] 11 Encounter: [id number]      Hemodialysis procedure note: The patient was seen and examined on hemodialysis today, this morning at approximately 9 AM  His systolic blood pressures remained in the 90s to 100 range  We will attempt 2 L fluid removal with low-dose norepinephrine  Next treatment will be planned for Thursday and over the next 24 to 48 hours we will attempt to wean off norepinephrine    ASSESSMENT & PLAN     1   End-stage kidney disease on hemodialysis Tuesday Thursday Saturday at [address] [place] [address]  • Overall blood pressures continue to be low on dialysis, we will try to wean off norepinephrine in the next 24 to 48 hours     2  Left upper arm wound dehiscence status post graft explant  • Dialysis through tunneled dialysis catheter  • Appreciate infectious disease and vascular surgery     3  Hypotension  • Now on midodrine 10 mg 3 times daily  • On norepinephrine  • Continue to monitor and hopefully blood pressures will improve baseline blood pressures are in the 47D to 997 systolic range     4  Anemia of chronic kidney disease  • Hemoglobin remained stable at 8  • Continue to monitor H&H     5  CKD bone mineral disease  • Phosphorus slightly elevated  • Continue low phosphorus diet  • Currently not on any binders     6  Coronary artery disease with recent PCI and drug-eluting stent  • Continue cardiovascular risk reduction measures    DISCUSSION:    This overall plan of care was discussed with the ICU team and they were in agreement    SUBJECTIVE:    Patient was seen today  Sitting up, asymptomatic, blood pressures remain low no shortness of breath    12 point review of systems was otherwise negative besides what is mentioned above      Medications:    Current Facility-Administered Medications:   •  acetaminophen (TYLENOL) tablet 975 mg, 975 mg, Oral, Q8H PRN, Alba Austin MD  •  aspirin (ECOTRIN LOW STRENGTH) EC tablet 81 mg, 81 mg, Oral, Daily, Cheko Dubois MD, 81 mg at 06/06/23 0815  •  atorvastatin (LIPITOR) tablet 20 mg, 20 mg, Oral, HS, Cheko Dubois MD, 20 mg at 06/05/23 2119  •  b complex-vitamin C-folic acid (RENAL) capsule 1 capsule, 1 capsule, Oral, Daily, Cheko Dubois MD, 1 capsule at 06/06/23 0816  •  chlorhexidine (PERIDEX) 0 12 % oral rinse 15 mL, 15 mL, Mouth/Throat, Q12H [address], Jasmit Coty, DO, 15 mL at 06/06/23 0815  •  clopidogrel (PLAVIX) tablet 75 mg, 75 mg, Oral, Daily, Cheko Dubois MD, 75 mg at 06/06/23 0815  •  cyanocobalamin (VITAMIN B-12) tablet 1,000 mcg, 1,000 mcg, Oral, Daily, Cheko Dubois MD, 1,000 mcg at 06/06/23 4757  •  gabapentin (NEURONTIN) capsule 100 mg, 100 mg, Oral, Once per day on Tue Thu Sat, Het SAHHRZAD Mckenzie DO  •  heparin (porcine) subcutaneous injection 5,000 Units, 5,000 Units, Subcutaneous, Q8H [address], 5,000 Units at 06/06/23 1300 **AND** [COMPLETED] Platelet count, , , Once, Gianluca Saavedra DO  •  HYDROmorphone (DILAUDID) injection 0 5 mg, 0 5 mg, Intravenous, Q4H PRN, Cheko Dubois MD, 0 5 mg at 06/06/23 1252  •  insulin lispro (HumaLOG) 100 units/mL subcutaneous injection 1-6 Units, 1-6 Units, Subcutaneous, 4x Daily (AC & HS), 1 Units at 06/05/23 1629 **AND** Fingerstick Glucose (POCT), , , 4x Daily AC and at bedtime, Maryann Buckley MD  •  melatonin tablet 3 mg, 3 mg, Oral, HS, Benito Astudillo, , 3 mg at 06/05/23 2119  •  midodrine (PROAMATINE) tablet 10 mg, 10 mg, Oral, TID AC, Cheko Dubois MD, 10 mg at 06/06/23 1035  •  norepinephrine (LEVOPHED) 4 mg (STANDARD CONCENTRATION) IV in sodium chloride 0 9% 250 mL, 1-30 mcg/min, Intravenous, Titrated, Maricarmen Talley MD, Last Rate: 9 4 mL/hr at 06/06/23 0800, 2 5 mcg/min at 06/06/23 0800  •  ondansetron (ZOFRAN) injection 4 mg, 4 mg, Intravenous, Q6H PRN, Cheko Dubois MD, 4 mg at 06/05/23 0456  •  oxyCODONE (ROXICODONE) IR tablet 5 mg, 5 mg, Oral, Q4H PRN, Cheko Dubois MD, 5 mg at 06/06/23 1035  • oxyCODONE (ROXICODONE) split tablet 2 5 mg, 2 5 mg, Oral, Q4H PRN, Nito Saxena MD, 2 5 mg at 06/03/23 1655  •  polyethylene glycol (MIRALAX) packet 17 g, 17 g, Oral, Daily, Emilie Laurene Goldmann, MD  •  senna (SENOKOT) tablet 8 6 mg, 1 tablet, Oral, HS, Raegan Ansari,   •  vancomycin (VANCOCIN) IVPB (premix in dextrose) 750 mg 150 mL, 10 mg/kg, Intravenous, Daily PRN, Randee Essex, MD  •  vasopressin (PITRESSIN) 20 Units in sodium chloride 0 9 % 100 mL infusion, 0 04 Units/min, Intravenous, Continuous, Randee Essex, MD, Stopped at 06/04/23 0945    OBJECTIVE:    Vitals:    06/06/23 1130 06/06/23 1145 06/06/23 1200 06/06/23 1228   BP: (!) 103/43 (!) 82/48 (!) 93/49    BP Location:  Right arm     Pulse: 70 56 56    Resp: 20 15 20    Temp:  97 9 °F (36 6 °C)     TempSrc:  Oral     SpO2:  99% 100% 97%   Weight:       Height:            Temp:  [97 9 °F (36 6 °C)-98 1 °F (36 7 °C)] 97 9 °F (36 6 °C)  HR:  [46-94] 56  Resp:  [8-40] 20  BP: ()/(39-70) 93/49  SpO2:  [97 %-100 %] 97 %     Body mass index is 25 31 kg/m²  Weight (last 2 days)     Date/Time Weight    06/06/23 0532 80 (176 37)    06/04/23 0600 78 7 (173 5)          I/O last 3 completed shifts: In: 1987 8 [P O :360; I V :1427 8; IV Piggyback:200]  Out: 550 [Drains:50; Other:500]    I/O this shift:   In: 500 [I V :500]  Out: 1240 [Other:1240]      Physical exam:    General: no acute distress, cooperative  Eyes: conjunctivae pink, anicteric sclerae  ENT: lips and mucous membranes moist, no exudates, normal external ears  Neck: ROM intact, no JVD  Chest: No respiratory distress, no accessory muscle use  CVS: normal rate, non pericardial friction rub  Abdomen: soft, non-tender, non-distended, normoactive bowel sounds  Extremities: no edema of both legs  Skin: no rash  Neuro: awake, alert, oriented, grossly intact  Psych:  Pleasant affect    Invasive Devices:      Lab Results:   Results from last 7 days   Lab Units 06/06/23  0519 "06/05/23  1447 06/04/23  0651 06/03/23  2212 06/03/23  1618 06/03/23  0457 06/02/23 2217 06/02/23  1840 06/02/23  1826   ALK PHOS U/L 120* 105 94  --   --   --   --   --  109*   ALT U/L 11* 11* 13  --   --   --   --   --  7   AST U/L 14 12 11  --   --   --   --   --  31   BLOOD CULTURE   --   --   --   --   --   --   --  No Growth at 72 hrs  No Growth at 72 hrs  BUN mg/dL 21 35* 31*  --  26* 22  --   --  19   CALCIUM mg/dL 7 5* 7 6* 7 3*  --  7 9* 8 2*  --   --  8 7   CHLORIDE mmol/L 103 106 104  --  103 102  --   --  97   CO2 mmol/L 29 28 26  --  29 35*  --   --  31   CREATININE mg/dL 7 11* 9 92* 8 57*  --  7 74* 7 76*  --   --  6 78*   HEMATOCRIT % 23 0* 22 9* 24 6* 22 8* 24 2* 29 4*  --   --  33 8*   HEMOGLOBIN g/dL 7 3* 7 8* 8 0* 7 6* 7 7* 9 0*  --   --  10 9*   POTASSIUM mmol/L 3 5 3 7 4 6  --  3 2* 3 1*  --   --  4 0   MAGNESIUM mg/dL 1 9 2 0 1 9  --  1 8  --   --   --   --    PHOSPHORUS mg/dL 2 9 3 1 4 8*  --  3 1  --   --   --   --    PLATELETS Thousands/uL 110* 140* 107*  --  125* 133*   < >  --  169   WBC Thousand/uL 5 17 8 55 6 53  --  8 62 3 95*  --   --  6 55    < > = values in this interval not displayed  Portions of the record may have been created with voice recognition software  Occasional wrong word or \"sound a like\" substitutions may have occurred due to the inherent limitations of voice recognition software  Read the chart carefully and recognize, using context, where substitutions have occurred  If you have any questions, please contact the dictating provider      "

## 2023-06-06 NOTE — HEMODIALYSIS
Post-Dialysis RN Treatment Note    Blood Pressure:  Pre 105/45 mm/Hg  Post 82/48 mmHg   EDW  75 5 kg    Weight:  Pre 80 kg   Post 78 8 kg   Mode of weight measurement: Bed Scale   Volume Removed  740 ml    Treatment duration 180 minutes    NS given  No    Treatment shortened? Yes, describe: running even second half of tx, ok to end at 3 hrs per Dr Kenrick Devlin   Medications given during Rx Midodrene 10 mg, Oxycodone 5mg, Levophed @ 2 5mcg/min   Estimated Kt/V  Not Applicable   Access type: Permacath/TDC   Access Issues: No    Report called to primary nurse   Yes Carmelo Parekh RN    Hypotension and MAP<65 during tx, uf goal dropped to running even  Pt asymptomatic, says he runs low bp at outpatient dialysis  Tx ended after 3 hrs due to running even

## 2023-06-06 NOTE — PROGRESS NOTES
Progress Note - Infectious Disease   Valentin Castillo 70 y o  male MRN: [id number]  Unit/Bed#: [facility] 11 Encounter: [id number]      Impression/Recommendations:  CHARLES COBIAN infection     With axillary wound dehiscence and exposed graft   Status post explantation with native vein patch repair 6/3   OR cultures no growth  Consider indolent skin casey   Clinically stable without sepsis  Rec:  • Continue vancomycin for now - dosing by pharmacy  • Follow-up OR cultures and tailor antibiotics as indicated  • Given complete explantation of graft, anticipate 7 day course of of IV antibiotics through   • LWC/VAC per vascular surgery     Acute on chronic hypotension  Likely multifactorial   Blood cultures negative  At this point infection above is controlled  Rec:  · Midodrine per outpatient regiment  · Supportive care as per the primary service    ESRD on HD     Via permcath  Rec:  • Dose adjust antibiotics for HD     DM      Polymyalgia  Left thumb, right wrist pain  Consider gout although no history and exam shows no signs of inflammation or swelling  Rec:  · Serial exams  · Check uric acid in AM     Antibiotics:  Vancomycin #4  POD #3    Subjective:  Patient seen on AM rounds  Doesn't feel well today  Complaints of left thumb, right ankle pain  Denies history of gout or neuropathy  24 Hour Events:  No documented fevers, chills, sweats, nausea, vomiting, or diarrhea  Remains on low-dose pressors      Objective:  Vitals:  Temp:  [98 °F (36 7 °C)-98 1 °F (36 7 °C)] 98 °F (36 7 °C)  HR:  [46-94] 58  Resp:  [8-40] 16  BP: ()/(39-70) 84/46  SpO2:  [98 %-100 %] 99 %  Temp (24hrs), Av °F (36 7 °C), Min:98 °F (36 7 °C), Max:98 1 °F (36 7 °C)  Current: Temperature: 98 °F (36 7 °C)    Physical Exam:   General:  No acute distress  Psychiatric:  Awake and alert  Pulmonary:  Normal respiratory excursion without accessory muscle use  Abdomen:  Soft, nontender  Extremities:  Left wrist and right ankle cool without erythema or swelling  Skin:  No rashes    Lab Results:  I have personally reviewed pertinent labs  Results from last 7 days   Lab Units 06/06/23  0517 06/05/23  1447 06/04/23  0651   ALK PHOS U/L 120* 105 94   ALT U/L 11* 11* 13   AST U/L 14 12 11   BUN mg/dL 21 35* 31*   CALCIUM mg/dL 7 5* 7 6* 7 3*   CHLORIDE mmol/L 103 106 104   CO2 mmol/L 29 28 26   CREATININE mg/dL 7 11* 9 92* 8 57*   EGFR ml/min/1 73sq m 7 4 5   POTASSIUM mmol/L 3 5 3 7 4 6     Results from last 7 days   Lab Units 06/06/23  0517 06/05/23  1447 06/04/23  0651   HEMOGLOBIN g/dL 7 3* 7 8* 8 0*   PLATELETS Thousands/uL 110* 140* 107*   WBC Thousand/uL 5 17 8 55 6 53     Results from last 7 days   Lab Units 06/03/23  1807 06/03/23  1014 06/02/23  1840 06/02/23  1826   BLOOD CULTURE   --   --  No Growth at 72 hrs  No Growth at 72 hrs  GRAM STAIN RESULT   --  No Polys or Bacteria seen  --   --    MRSA CULTURE ONLY  No Methicillin Resistant Staphlyococcus aureus (MRSA) isolated  --   --   --    WOUND CULTURE   --  No growth  --   --        Imaging Studies:   I have personally reviewed pertinent imaging study reports and images in PACS  EKG, Pathology, and Other Studies:   I have personally reviewed pertinent reports

## 2023-06-07 ENCOUNTER — TELEPHONE (OUTPATIENT)
Dept: VASCULAR SURGERY | Facility: CLINIC | Age: 68
End: 2023-06-07

## 2023-06-07 PROBLEM — R26.2 AMBULATORY DYSFUNCTION: Status: ACTIVE | Noted: 2023-06-07

## 2023-06-07 LAB
ALBUMIN SERPL BCP-MCNC: 2.1 G/DL (ref 3.5–5)
ALP SERPL-CCNC: 123 U/L (ref 46–116)
ALT SERPL W P-5'-P-CCNC: 10 U/L (ref 12–78)
ANION GAP SERPL CALCULATED.3IONS-SCNC: 2 MMOL/L (ref 4–13)
AST SERPL W P-5'-P-CCNC: 17 U/L (ref 5–45)
BACTERIA SPEC ANAEROBE CULT: ABNORMAL
BACTERIA WND AEROBE CULT: ABNORMAL
BASOPHILS # BLD AUTO: 0.04 THOUSANDS/ÂΜL (ref 0–0.1)
BASOPHILS NFR BLD AUTO: 1 % (ref 0–1)
BILIRUB SERPL-MCNC: 0.62 MG/DL (ref 0.2–1)
BUN SERPL-MCNC: 9 MG/DL (ref 5–25)
CALCIUM ALBUM COR SERPL-MCNC: 9.3 MG/DL (ref 8.3–10.1)
CALCIUM SERPL-MCNC: 7.8 MG/DL (ref 8.3–10.1)
CHLORIDE SERPL-SCNC: 103 MMOL/L (ref 96–108)
CO2 SERPL-SCNC: 31 MMOL/L (ref 21–32)
CREAT SERPL-MCNC: 4.43 MG/DL (ref 0.6–1.3)
EOSINOPHIL # BLD AUTO: 0.07 THOUSAND/ÂΜL (ref 0–0.61)
EOSINOPHIL NFR BLD AUTO: 2 % (ref 0–6)
ERYTHROCYTE [DISTWIDTH] IN BLOOD BY AUTOMATED COUNT: 21.4 % (ref 11.6–15.1)
GFR SERPL CREATININE-BSD FRML MDRD: 12 ML/MIN/1.73SQ M
GLUCOSE SERPL-MCNC: 114 MG/DL (ref 65–140)
GLUCOSE SERPL-MCNC: 81 MG/DL (ref 65–140)
GLUCOSE SERPL-MCNC: 87 MG/DL (ref 65–140)
GLUCOSE SERPL-MCNC: 88 MG/DL (ref 65–140)
GLUCOSE SERPL-MCNC: 89 MG/DL (ref 65–140)
GLUCOSE SERPL-MCNC: 95 MG/DL (ref 65–140)
GLUCOSE SERPL-MCNC: 95 MG/DL (ref 65–140)
GRAM STN SPEC: ABNORMAL
HCT VFR BLD AUTO: 21.5 % (ref 36.5–49.3)
HGB BLD-MCNC: 7.2 G/DL (ref 12–17)
IMM GRANULOCYTES # BLD AUTO: 0.05 THOUSAND/UL (ref 0–0.2)
IMM GRANULOCYTES NFR BLD AUTO: 1 % (ref 0–2)
LYMPHOCYTES # BLD AUTO: 0.77 THOUSANDS/ÂΜL (ref 0.6–4.47)
LYMPHOCYTES NFR BLD AUTO: 21 % (ref 14–44)
MAGNESIUM SERPL-MCNC: 1.9 MG/DL (ref 1.6–2.6)
MCH RBC QN AUTO: 34 PG (ref 26.8–34.3)
MCHC RBC AUTO-ENTMCNC: 33.5 G/DL (ref 31.4–37.4)
MCV RBC AUTO: 101 FL (ref 82–98)
MONOCYTES # BLD AUTO: 0.3 THOUSAND/ÂΜL (ref 0.17–1.22)
MONOCYTES NFR BLD AUTO: 8 % (ref 4–12)
NEUTROPHILS # BLD AUTO: 2.37 THOUSANDS/ÂΜL (ref 1.85–7.62)
NEUTS SEG NFR BLD AUTO: 67 % (ref 43–75)
NRBC BLD AUTO-RTO: 0 /100 WBCS
PHOSPHATE SERPL-MCNC: 2.9 MG/DL (ref 2.3–4.1)
PLATELET # BLD AUTO: 102 THOUSANDS/UL (ref 149–390)
PMV BLD AUTO: 10.9 FL (ref 8.9–12.7)
POTASSIUM SERPL-SCNC: 3.6 MMOL/L (ref 3.5–5.3)
PROT SERPL-MCNC: 4.8 G/DL (ref 6.4–8.4)
RBC # BLD AUTO: 2.12 MILLION/UL (ref 3.88–5.62)
SODIUM SERPL-SCNC: 136 MMOL/L (ref 135–147)
URATE SERPL-MCNC: 3.7 MG/DL (ref 3.5–8.5)
WBC # BLD AUTO: 3.6 THOUSAND/UL (ref 4.31–10.16)

## 2023-06-07 PROCEDURE — 85025 COMPLETE CBC W/AUTO DIFF WBC: CPT

## 2023-06-07 PROCEDURE — NC001 PR NO CHARGE: Performed by: INTERNAL MEDICINE

## 2023-06-07 PROCEDURE — 99232 SBSQ HOSP IP/OBS MODERATE 35: CPT | Performed by: INTERNAL MEDICINE

## 2023-06-07 PROCEDURE — 83735 ASSAY OF MAGNESIUM: CPT

## 2023-06-07 PROCEDURE — 82948 REAGENT STRIP/BLOOD GLUCOSE: CPT

## 2023-06-07 PROCEDURE — 97605 NEG PRS WND THER DME<=50SQCM: CPT | Performed by: PHYSICIAN ASSISTANT

## 2023-06-07 PROCEDURE — 80053 COMPREHEN METABOLIC PANEL: CPT

## 2023-06-07 PROCEDURE — 84100 ASSAY OF PHOSPHORUS: CPT

## 2023-06-07 PROCEDURE — 84550 ASSAY OF BLOOD/URIC ACID: CPT | Performed by: INTERNAL MEDICINE

## 2023-06-07 PROCEDURE — 99233 SBSQ HOSP IP/OBS HIGH 50: CPT | Performed by: INTERNAL MEDICINE

## 2023-06-07 RX ORDER — CEFAZOLIN SODIUM 2 G/50ML
2000 SOLUTION INTRAVENOUS
Status: DISCONTINUED | OUTPATIENT
Start: 2023-06-08 | End: 2023-06-13

## 2023-06-07 RX ORDER — CEFAZOLIN SODIUM 1 G/50ML
1000 SOLUTION INTRAVENOUS EVERY 24 HOURS
Status: DISCONTINUED | OUTPATIENT
Start: 2023-06-07 | End: 2023-06-07

## 2023-06-07 RX ADMIN — HYDROMORPHONE HYDROCHLORIDE 0.5 MG: 1 INJECTION, SOLUTION INTRAMUSCULAR; INTRAVENOUS; SUBCUTANEOUS at 04:45

## 2023-06-07 RX ADMIN — MIDODRINE HYDROCHLORIDE 10 MG: 5 TABLET ORAL at 06:10

## 2023-06-07 RX ADMIN — ACETAMINOPHEN 975 MG: 325 TABLET ORAL at 16:55

## 2023-06-07 RX ADMIN — HEPARIN SODIUM 5000 UNITS: 5000 INJECTION INTRAVENOUS; SUBCUTANEOUS at 05:10

## 2023-06-07 RX ADMIN — HEPARIN SODIUM 5000 UNITS: 5000 INJECTION INTRAVENOUS; SUBCUTANEOUS at 13:29

## 2023-06-07 RX ADMIN — MELATONIN 3 MG: at 21:09

## 2023-06-07 RX ADMIN — HYDROMORPHONE HYDROCHLORIDE 0.5 MG: 1 INJECTION, SOLUTION INTRAMUSCULAR; INTRAVENOUS; SUBCUTANEOUS at 08:56

## 2023-06-07 RX ADMIN — MIDODRINE HYDROCHLORIDE 10 MG: 5 TABLET ORAL at 12:38

## 2023-06-07 RX ADMIN — MIDODRINE HYDROCHLORIDE 10 MG: 5 TABLET ORAL at 15:44

## 2023-06-07 RX ADMIN — CHLORHEXIDINE GLUCONATE 15 ML: 1.2 SOLUTION ORAL at 21:10

## 2023-06-07 RX ADMIN — ATORVASTATIN CALCIUM 20 MG: 20 TABLET, FILM COATED ORAL at 21:09

## 2023-06-07 RX ADMIN — ASPIRIN 81 MG: 81 TABLET, COATED ORAL at 08:55

## 2023-06-07 RX ADMIN — CLOPIDOGREL BISULFATE 75 MG: 75 TABLET ORAL at 08:55

## 2023-06-07 RX ADMIN — NEPHROCAP 1 CAPSULE: 1 CAP ORAL at 08:57

## 2023-06-07 RX ADMIN — ONDANSETRON 4 MG: 2 INJECTION INTRAMUSCULAR; INTRAVENOUS at 16:09

## 2023-06-07 RX ADMIN — CEFAZOLIN SODIUM 1000 MG: 1 SOLUTION INTRAVENOUS at 08:55

## 2023-06-07 RX ADMIN — HYDROMORPHONE HYDROCHLORIDE 0.5 MG: 1 INJECTION, SOLUTION INTRAMUSCULAR; INTRAVENOUS; SUBCUTANEOUS at 12:14

## 2023-06-07 RX ADMIN — OXYCODONE HYDROCHLORIDE 5 MG: 5 TABLET ORAL at 12:38

## 2023-06-07 RX ADMIN — OXYCODONE HYDROCHLORIDE 5 MG: 5 TABLET ORAL at 05:09

## 2023-06-07 RX ADMIN — OXYCODONE HYDROCHLORIDE 5 MG: 5 TABLET ORAL at 21:09

## 2023-06-07 RX ADMIN — HEPARIN SODIUM 5000 UNITS: 5000 INJECTION INTRAVENOUS; SUBCUTANEOUS at 21:09

## 2023-06-07 RX ADMIN — HYDROMORPHONE HYDROCHLORIDE 0.5 MG: 1 INJECTION, SOLUTION INTRAMUSCULAR; INTRAVENOUS; SUBCUTANEOUS at 15:44

## 2023-06-07 RX ADMIN — SENNOSIDES 8.6 MG: 8.6 TABLET, FILM COATED ORAL at 21:09

## 2023-06-07 RX ADMIN — CYANOCOBALAMIN TAB 500 MCG 1000 MCG: 500 TAB at 08:55

## 2023-06-07 RX ADMIN — ONDANSETRON 4 MG: 2 INJECTION INTRAMUSCULAR; INTRAVENOUS at 21:31

## 2023-06-07 RX ADMIN — CHLORHEXIDINE GLUCONATE 15 ML: 1.2 SOLUTION ORAL at 08:55

## 2023-06-07 NOTE — PROGRESS NOTES
Progress Note - Critical Care   Abbott Her 70 y o  male MRN: [id number]  Unit/Bed#: MICU 11 Encounter: [id number]    SUBJECTIVE:  Patient reports feeling better, but notes continued diminished appetite and episode of diarrhea yesterday  His LUE and RLE are less painful today following yesterday's dialysis session  Denies chills, SOB, palpitations, angina or syncope  HPI/24HR Event:  No events overnight  ROS  Constitutional: Negative for activity change, appetite change, chills and fever  HENT: Negative  Eyes: Negative  Respiratory: Negative for cough, chest tightness and shortness of breath  Cardiovascular: Negative for chest pain and palpitations  Gastrointestinal: Negative for abdominal pain and vomiting  Endocrine: Negative  Genitourinary: Positive for decreased urine volume (patient is on HD and does not produce urine)  Musculoskeletal: Positive for myalgias  Negative for arthralgias and back pain  Skin: Positive for color change (chronic venous stasis changes on limbs) and surgical wound on RUE  Neurological: Negative for seizures, syncope and speech difficulty  Hematological: Bruises/bleeds easily  Psychiatric/Behavioral: Negative  All other systems reviewed and are negative  Invasive lines and devices: Invasive Devices     Central Venous Catheter Line  Duration           CVC Central Lines 06/03/23 Triple 20cm 3 days          Line  Duration           Hemodialysis AV Fistula  Left Upper arm -- days    Hemodialysis AV Fistula 05/19/23 Left Upper arm 18 days          Hemodialysis Catheter  Duration           HD Permanent Double Catheter 258 days                   Physical exam  General:  Resting comfortably in bed in no acute distress  Neuro: GCS 15 (Eye 4, Verbal 5, Motor 6)  HENT: Normocephalic and atraumatic, PERRL  Heart: RRR, pulses intact  Lungs: Bilateral breath sounds present  Abdomen: Bowel sounds present, soft  Skin:  Warm, dry skin   Incision site clean dry and intact, cutaneous changes consistent with chronic venous stasis on distal aspects of limbs, poor wound healing-multiple puncture wounds near right antecubital fossa from attempted venous access  1+ pitting edema in L hand  Vitals  Temperature:   Temp (24hrs), Av °F (36 7 °C), Min:97 9 °F (36 6 °C), Max:98 1 °F (36 7 °C)    Current: Temperature: 98 1 °F (36 7 °C)    Vitals:    23 0200 23 0300 23 0400 23 0600   BP: (!) 85/42 (!) 83/41 (!) 75/42 (!) 78/42   BP Location:   Right arm    Pulse: (!) 54 60 56 56   Resp:  (!) 11 12 13   Temp:       TempSrc:   Oral    SpO2: 99% 99% 99% 99%   Weight:    77 4 kg (170 lb 10 2 oz)   Height:                 Labs:   Results from last 7 days   Lab Units 23  0523  1447   EOS PCT % 2 2 2   HEMATOCRIT % 21 5* 23 0* 22 9*   HEMOGLOBIN g/dL 7 2* 7 3* 7 8*   MONOS PCT % 8 9 7   NEUTROS PCT % 67 76* 81*   PLATELETS Thousands/uL 102* 110* 140*   WBC Thousand/uL 3 60* 5 17 8 55      Results from last 7 days   Lab Units 23  04423  0517 23  1447   ALK PHOS U/L 123* 120* 105   ALT U/L 10* 11* 11*   AST U/L 17 14 12   BUN mg/dL 9 21 35*   CALCIUM mg/dL 7 8* 7 5* 7 6*   CHLORIDE mmol/L 103 103 106   CO2 mmol/L 31 29 28   CREATININE mg/dL 4 43* 7 11* 9 92*   POTASSIUM mmol/L 3 6 3 5 3 7   SODIUM mmol/L 136 135 137     Results from last 7 days   Lab Units 23  04423  0517 23  1447   MAGNESIUM mg/dL 1 9 1 9 2 0     Results from last 7 days   Lab Units 23  04423  0517 23  1447   PHOSPHORUS mg/dL 2 9 2 9 3 1          Results from last 7 days   Lab Units 23  1826   LACTIC ACID mmol/L 1 7       Other Labs    Intake and Outputs:  I/O        0701   0700  07 07 0701   0700    P  O  240      I V  (mL/kg) 1076 1 (13 5) 500 (6 5)     IV Piggyback 200 50     Total Intake(mL/kg) 1516 1 (19) 550 (7 1)     Urine (mL/kg/hr) 0 (0) 0 (0)     Drains 50 0     Other 500 1240     Stool       Total Output 550 1240     Net +966 1 -690                  Imaging Studies      Assessment & Plan:   Neuro:   • Diagnosis: History of CVA  ? 2006 w/ MRI showing thrombus in R Vertebral artery   ? RLE residual deficit - uses walker  ? Plan:   - Continue ASA, plavix, subQ heparin, statin   - CAM ICU   - Delirium precautions   • Diagnosis: Pain  ? Plan:   · Oxy 2 5 po mod / oxy 5 po severe/ Dilaudid 0 5 IV breakthrough PRN q4h   - Gabapentin 100mg Tu, Th, Sat (Dialysis days)     CV:   • Diagnosis: Septic shock   ? History of hypotension  - On midodrine 10 TID chronically   ? Post-op patient required elvi, switched to levo at 15 and weaned to 5   ? Source: infected LUE AVG s/p OR washout/debridedment and explant   ? Plan:   - Patient weaned off of levophed  BPs chronically low, but patient is asymptomatic   - Continue home midodrine 10mg TID   - Continue pressors for MAP > 65   - A/p as below for sepsis   • Diagnosis: CAD, HLD   ? 2008 s/p PTCA w/ bare metal stent to circumflex and PDA and POBA of diagonal   ? 2009, 2010 MI, 2011 NSTEMI s/p PCI/KARLOS of distal RCA   ? 2023 s/p PCI and KARLOS to RCA  ? 5/22/23 TTE: LVEF 65%, G1DD, mid-cavity dynamic obstruction at rest with a peak gradient of 12  Mid cavity dyanmic obstruction with valsalva with peak gradient of 30   ? Plan:   - Continue statin, ASA, and plavix   - HD as per Nephrology recommendation     Pulm:  No active issues      GI:   • Diagnosis: bowel regimen   ? Plan:   - Miralax 17g -Change to PRN QD  - Sennakot 8 6mg - Change to PRN QD     :   • Diagnosis: ESRD on HD T,Th,S  ? 2/2 previously uncontrolled DM   ? Gets HD at [address]   ? Access site: HD permacat   ? Outpatient dry weight 76 5kg  current weight 77 4kg  ?  Plan:   - HD on 6/8/2023  3 hour session planned  - Nephrology consulted     F/E/N:   • Fluids: none   • Electrolytes: goal Mg>2, K>4  • Nutrition: renal diet, <2g/day potassium       Heme/Onc: "  • Diagnosis: Anemia   ? History of anemia of CKD  ? Was briefly on epogen for 2 months in 2022 but this was discontinued due to graft thrombosis  ? Received 1 u pRBC in OR    ? Plan:   - Hemoglobin decreased to 7 2 today  Repeat CBC in AM   - Transfuse 1 unit pRBC if symptomatic or hemoglobin<7   • Diagnosis: History of graft thrombosis, chronic DVT, R vertebral artery thrombus  ? Was previously on warfarin on passed but switched to eliquis due to noncompliance with lab draws and hard sticks    ? Eliquis initially held prior to surgery  Originally ordered to maintain graft patency  Graft has been explanted and anti-coagulation is no longer needed  ? Plan:   - Per vascular surgery, anti-coagulation is no longer needed as graft patency is not pertinent  Endo:   • Diagnosis: History of DM   ? A1c 4 8%   ? Not on any meds at home  ? Plan:  - Monitor BG 4x/daily for goal 140-180  Meal correction insulin ordered  ID:   • Diagnosis: AVG site infection   ? Noted during TCM visit on 6/2  ? Per vascular notes upon consult: \"On review of imaging patient has a significant dehiscence of the venous axillary exposure incision  Graft is very likely exposed to air  This represents clinically infected prosthetic material with the risk of significant hemorrhage and limb loss if left in place  \"  ? Patient is POD3 left upper extremity AV graft explant  ? Plan:   - Continue vancomycin with pharmacy to adjust dosing  Day 6 of 7   - Cefepime discontinued on 6/5/23  - Blood cultures negative  - Wound cultures negative  - Vascular following and managing wound VAC  - ID following      MSK/Skin:   No active issues  • Consult PT/OT  • Frequent repositioning     Disposition: Anticipate transfer out of ICU to inpatient unit  Micro:  Lab Results   Component Value Date    BLOODCX No Growth After 4 Days  06/02/2023    BLOODCX No Growth After 4 Days   06/02/2023    WOUNDCULT Growth in Broth culture only Klebsiella variicola (A) " 06/03/2023    WOUNDCULT Growth in Broth culture only Escherichia coli (A) 06/03/2023       Allergies: No Known Allergies      Medications:   Scheduled Meds:  Current Facility-Administered Medications   Medication Dose Route Frequency Provider Last Rate   • acetaminophen  975 mg Oral Q8H PRN Nathaniel Peraza MD     • aspirin  81 mg Oral Daily Bo Arredondo MD     • atorvastatin  20 mg Oral HS Bo Arredondo MD     • b complex-vitamin C-folic acid  1 capsule Oral Daily Bo Arredondo MD     • chlorhexidine  15 mL Mouth/Throat Q12H [address] [place] [address], DO     • clopidogrel  75 mg Oral Daily Bo Arredondo MD     • vitamin B-12  1,000 mcg Oral Daily Bo Arredondo MD     • gabapentin  100 mg Oral Once per day on Tue Thu Sat Bekah Winters DO     • heparin (porcine)  5,000 Units Subcutaneous [facility] Bo Arredondo MD     • HYDROmorphone  0 5 mg Intravenous Q4H PRN Bo Arredondo MD     • insulin lispro  1-6 Units Subcutaneous 4x Daily (AC & HS) Nathaniel Peraza MD     • melatonin  3 mg Oral HS Tadeo Curry, DO     • midodrine  10 mg Oral TID AC Bo Arredondo MD     • norepinephrine  1-30 mcg/min Intravenous Titrated Marco Powell MD Stopped (06/06/23 1354)   • ondansetron  4 mg Intravenous Q6H PRN Bo Arredondo MD     • oxyCODONE  5 mg Oral Q4H PRN Bo Arredondo MD     • oxyCODONE  2 5 mg Oral Q4H PRN Bo Arredondo MD     • polyethylene glycol  17 g Oral Daily Narcisa Guerrero MD     • senna  1 tablet Oral HS Raegan Ansari, DO     • vancomycin  10 mg/kg Intravenous Once per day on Tue Thu Sat Madi Ac  mg (06/06/23 1527)   • vasopressin  0 04 Units/min Intravenous Continuous Aram Pena MD Stopped (06/04/23 0945)     Continuous Infusions:norepinephrine, 1-30 mcg/min, Last Rate: Stopped (06/06/23 1354)  vasopressin, 0 04 Units/min, Last Rate: Stopped (06/04/23 0945)      PRN Meds:  acetaminophen, 975 mg, Q8H PRN  HYDROmorphone, 0 5 mg, Q4H PRN  ondansetron, 4 mg, Q6H "PRN  oxyCODONE, 5 mg, Q4H PRN  oxyCODONE, 2 5 mg, Q4H PRN        Counseling / Coordination of Care  Total Critical Care time spent 50 minutes excluding procedures, teaching and family updates  Code Status: Level 1 - Full Code     Portions of the record may have been created with voice recognition software  Occasional wrong word or \"sound a like\" substitutions may have occurred due to the inherent limitations of voice recognition software  Read the chart carefully and recognize, using context, where substitutions have occurred       Pennie Royal, Medical Student  7:49 AM    "

## 2023-06-07 NOTE — PLAN OF CARE
Problem: PAIN - ADULT  Goal: Verbalizes/displays adequate comfort level or baseline comfort level  Description: Interventions:  - Encourage patient to monitor pain and request assistance  - Assess pain using appropriate pain scale  - Administer analgesics based on type and severity of pain and evaluate response  - Implement non-pharmacological measures as appropriate and evaluate response  - Consider cultural and social influences on pain and pain management  - Notify physician/advanced practitioner if interventions unsuccessful or patient reports new pain  Outcome: Progressing     Problem: INFECTION - ADULT  Goal: Absence or prevention of progression during hospitalization  Description: INTERVENTIONS:  - Assess and monitor for signs and symptoms of infection  - Monitor lab/diagnostic results  - Monitor all insertion sites, i e  indwelling lines, tubes, and drains  - Monitor endotracheal if appropriate and nasal secretions for changes in amount and color  - [place] appropriate cooling/warming therapies per order  - Administer medications as ordered  - Instruct and encourage patient and family to use good hand hygiene technique  - Identify and instruct in appropriate isolation precautions for identified infection/condition  Outcome: Progressing  Goal: Absence of fever/infection during neutropenic period  Description: INTERVENTIONS:  - Monitor WBC    Outcome: Progressing     Problem: SAFETY ADULT  Goal: Patient will remain free of falls  Description: INTERVENTIONS:  - Educate patient/family on patient safety including physical limitations  - Instruct patient to call for assistance with activity   - Consult OT/PT to assist with strengthening/mobility   - Keep Call bell within reach  - Keep bed low and locked with side rails adjusted as appropriate  - Keep care items and personal belongings within reach  - Initiate and maintain comfort rounds  - Make Fall Risk Sign visible to staff  - Offer Toileting every 2 Hours, in advance of need  - Initiate/Maintain bed alarm  - Obtain necessary fall risk management equipment: bed alarm  - Apply yellow socks and bracelet for high fall risk patients  - Consider moving patient to room near nurses station  Outcome: Progressing  Goal: Maintain or return to baseline ADL function  Description: INTERVENTIONS:  -  Assess patient's ability to carry out ADLs; assess patient's baseline for ADL function and identify physical deficits which impact ability to perform ADLs (bathing, care of mouth/teeth, toileting, grooming, dressing, etc )  - Assess/evaluate cause of self-care deficits   - Assess range of motion  - Assess patient's mobility; develop plan if impaired  - Assess patient's need for assistive devices and provide as appropriate  - Encourage maximum independence but intervene and supervise when necessary  - Involve family in performance of ADLs  - Assess for home care needs following discharge   - Consider OT consult to assist with ADL evaluation and planning for discharge  - Provide patient education as appropriate  Outcome: Progressing  Goal: Maintains/Returns to pre admission functional level  Description: INTERVENTIONS:  - Perform BMAT or MOVE assessment daily    - Set and communicate daily mobility goal to care team and patient/family/caregiver  - Collaborate with rehabilitation services on mobility goals if consulted  - Perform Range of Motion 5 times a day  - Reposition patient every 2 hours    - Dangle patient 1 times a day  - Stand patient 1 times a day  - Ambulate patient 1 times a day  - Out of bed to chair 1 times a day   - Out of bed for meals 1 times a day  - Out of bed for toileting  - Record patient progress and toleration of activity level   Outcome: Progressing     Problem: DISCHARGE PLANNING  Goal: Discharge to home or other facility with appropriate resources  Description: INTERVENTIONS:  - Identify barriers to discharge w/patient and caregiver  - Arrange for needed discharge resources and transportation as appropriate  - Identify discharge learning needs (meds, wound care, etc )  - Arrange for interpretive services to assist at discharge as needed  - Refer to Case Management Department for coordinating discharge planning if the patient needs post-hospital services based on physician/advanced practitioner order or complex needs related to functional status, cognitive ability, or social support system  Outcome: Progressing     Problem: Knowledge Deficit  Goal: Patient/family/caregiver demonstrates understanding of disease process, treatment plan, medications, and discharge instructions  Description: Complete learning assessment and assess knowledge base    Interventions:  - Provide teaching at level of understanding  - Provide teaching via preferred learning methods  Outcome: Progressing     Problem: Prexisting or High Potential for Compromised Skin Integrity  Goal: Skin integrity is maintained or improved  Description: INTERVENTIONS:  - Identify patients at risk for skin breakdown  - Assess and monitor skin integrity  - Assess and monitor nutrition and hydration status  - Monitor labs   - Assess for incontinence   - Turn and reposition patient  - Assist with mobility/ambulation  - Relieve pressure over bony prominences  - Avoid friction and shearing  - Provide appropriate hygiene as needed including keeping skin clean and dry  - Evaluate need for skin moisturizer/barrier cream  - Collaborate with interdisciplinary team   - Patient/family teaching  - Consider wound care consult   Outcome: Progressing     Problem: MOBILITY - ADULT  Goal: Maintain or return to baseline ADL function  Description: INTERVENTIONS:  -  Assess patient's ability to carry out ADLs; assess patient's baseline for ADL function and identify physical deficits which impact ability to perform ADLs (bathing, care of mouth/teeth, toileting, grooming, dressing, etc )  - Assess/evaluate cause of self-care deficits - Assess range of motion  - Assess patient's mobility; develop plan if impaired  - Assess patient's need for assistive devices and provide as appropriate  - Encourage maximum independence but intervene and supervise when necessary  - Involve family in performance of ADLs  - Assess for home care needs following discharge   - Consider OT consult to assist with ADL evaluation and planning for discharge  - Provide patient education as appropriate  Outcome: Progressing  Goal: Maintains/Returns to pre admission functional level  Description: INTERVENTIONS:  - Perform BMAT or MOVE assessment daily    - Set and communicate daily mobility goal to care team and patient/family/caregiver  - Collaborate with rehabilitation services on mobility goals if consulted  - Perform Range of Motion 5 times a day  - Reposition patient every 2 hours    - Dangle patient 1 times a day  - Stand patient 1 times a day  - Ambulate patient 1 times a day  - Out of bed to chair 1 times a day   - Out of bed for meals 1 times a day  - Out of bed for toileting  - Record patient progress and toleration of activity level   Outcome: Progressing     Problem: METABOLIC, FLUID AND ELECTROLYTES - ADULT  Goal: Electrolytes maintained within normal limits  Description: INTERVENTIONS:  - Monitor labs and assess patient for signs and symptoms of electrolyte imbalances  - Administer electrolyte replacement as ordered  - Monitor response to electrolyte replacements, including repeat lab results as appropriate  - Instruct patient on fluid and nutrition as appropriate  Outcome: Progressing  Goal: Fluid balance maintained  Description: INTERVENTIONS:  - Monitor labs   - Monitor I/O and WT  - Instruct patient on fluid and nutrition as appropriate  - Assess for signs & symptoms of volume excess or deficit  Outcome: Progressing

## 2023-06-07 NOTE — CONSULTS
Vancomycin IV Pharmacy-to-Dose Consultation    Daxa Rosenbaum is a 70 y o  male who was receiving Vancomycin IV with management by the Pharmacy Consult service for treatment of Soft tissue (goal -600, trough >10)    The patient’s Vancomycin therapy has been discontinued  Thank you for allowing us to take part in this patient's care  Pharmacy will sign-off now; please call or re-consult if there are any questions          Anali Berg, PharmD  PGY1 Pharmacy Resident

## 2023-06-07 NOTE — PROGRESS NOTES
NEPHROLOGY PROGRESS NOTE   Eliana Jones 70 y o  male MRN: [id number]  Unit/Bed#: MICU 11 Encounter: [id number]        ASSESSMENT & PLAN       1   End-stage kidney disease on hemodialysis Tuesday Thursday Saturday at [facility]  • Now off norepinephrine, blood pressure systolic in the 59N  • Maps are stable at around 65  • Using tunneled dialysis catheter we will plan on next treatment to be tomorrow     2   Left upper arm wound dehiscence status post graft explant  • Dialysis through tunneled dialysis catheter  • Appreciate infectious disease and vascular surgery  • Ongoing antibiotic treatment     3   Hypotension  • Now on midodrine 10 mg 3 times daily  • Off norepinephrine  • Will consider increasing midodrine to 15 mg 3 times daily     4   Anemia of chronic kidney disease  • Hemoglobin now trending downward  • Will start Epogen 4000 units with each treatment  • On Mircera as an outpatient     5   CKD bone mineral disease  • Phosphorus slightly elevated  • Continue low phosphorus diet  • Currently not on any binders     6   Coronary artery disease with recent PCI and drug-eluting stent  • Continue cardiovascular risk reduction measures     DISCUSSION:    The plan of care was discussed with medical critical care team and we were in agreement to start Epogen and for next dialysis treatment to be tomorrow    SUBJECTIVE:    The patient was seen today sitting up resting comfortably was able to get out of bed yesterday blood pressures are stable he is asymptomatic no fevers or chills    12 point review of systems was otherwise negative besides what is mentioned above      Medications:    Current Facility-Administered Medications:   •  acetaminophen (TYLENOL) tablet 975 mg, 975 mg, Oral, Q8H PRN, Sue Bateman MD  •  aspirin (ECOTRIN LOW STRENGTH) EC tablet 81 mg, 81 mg, Oral, Daily, Vineet Gautam MD, 81 mg at 06/07/23 0855  •  atorvastatin (LIPITOR) tablet 20 mg, 20 mg, Oral, HS, Vineet Gautam MD, 20 mg at 06/06/23 2100  •  b complex-vitamin C-folic acid (RENAL) capsule 1 capsule, 1 capsule, Oral, Daily, Vineet Gautam MD, 1 capsule at 06/07/23 0857  •  [START ON 6/8/2023] ceFAZolin (ANCEF) IVPB (premix in dextrose) 2,000 mg 50 mL, 2,000 mg, Intravenous, After Dialysis, Saskia Rosenbaum MD  •  chlorhexidine (PERIDEX) 0 12 % oral rinse 15 mL, 15 mL, Mouth/Throat, Q12H [facility], Raegan Ansari, DO, 15 mL at 06/07/23 2124  •  clopidogrel (PLAVIX) tablet 75 mg, 75 mg, Oral, Daily, Vineet Gautam MD, 75 mg at 06/07/23 8144  •  cyanocobalamin (VITAMIN B-12) tablet 1,000 mcg, 1,000 mcg, Oral, Daily, Vineet Gautam MD, 1,000 mcg at 06/07/23 0855  •  [START ON 6/8/2023] epoetin jah (EPOGEN,PROCRIT) injection 4,000 Units, 4,000 Units, Intravenous, Once per day on Tue Thu Sat, Justin Gabriel DO  •  gabapentin (NEURONTIN) capsule 100 mg, 100 mg, Oral, Once per day on Tue Thu Sat, Rafiq Mckenzie DO, 100 mg at 06/06/23 1722  •  heparin (porcine) subcutaneous injection 5,000 Units, 5,000 Units, Subcutaneous, Q8H [facility], 5,000 Units at 06/07/23 0510 **AND** [COMPLETED] Platelet count, , , Once, Lizandro Mejia DO  •  HYDROmorphone (DILAUDID) injection 0 5 mg, 0 5 mg, Intravenous, Q4H PRN, Vineet Gautam MD, 0 5 mg at 06/07/23 0856  •  insulin lispro (HumaLOG) 100 units/mL subcutaneous injection 1-6 Units, 1-6 Units, Subcutaneous, 4x Daily (AC & HS), 1 Units at 06/05/23 1629 **AND** Fingerstick Glucose (POCT), , , 4x Daily AC and at bedtime, Sue Bateman MD  •  melatonin tablet 3 mg, 3 mg, Oral, HS, Phyllis Lim, , 3 mg at 06/06/23 2100  •  midodrine (PROAMATINE) tablet 10 mg, 10 mg, Oral, TID AC, Vineet Gautam MD, 10 mg at 06/07/23 9832  •  norepinephrine (LEVOPHED) 4 mg (STANDARD CONCENTRATION) IV in sodium chloride 0 9% 250 mL, 1-30 mcg/min, Intravenous, Titrated, Demetrice Mackay MD, Stopped at 06/06/23 1354  •  ondansetron (ZOFRAN) injection 4 mg, 4 mg, Intravenous, Q6H PRN, Vineet Gautam MD, 4 mg at 06/06/23 3675  • oxyCODONE (ROXICODONE) IR tablet 5 mg, 5 mg, Oral, Q4H PRN, Cheko Dubois MD, 5 mg at 06/07/23 2521  •  oxyCODONE (ROXICODONE) split tablet 2 5 mg, 2 5 mg, Oral, Q4H PRN, Cheko Dubois MD, 2 5 mg at 06/06/23 1447  •  polyethylene glycol (MIRALAX) packet 17 g, 17 g, Oral, Daily, Candy Suazo MD  •  senna (SENOKOT) tablet 8 6 mg, 1 tablet, Oral, HS, Raegan Ansari DO  •  vasopressin (PITRESSIN) 20 Units in sodium chloride 0 9 % 100 mL infusion, 0 04 Units/min, Intravenous, Continuous, Chandrika Main MD, Stopped at 06/04/23 0945    OBJECTIVE:    Vitals:    06/07/23 0400 06/07/23 0600 06/07/23 0800 06/07/23 1000   BP: (!) 75/42 (!) 78/42 (!) 95/44 (!) 74/42   BP Location: Right arm  Right arm    Pulse: 56 56 (!) 54 (!) 54   Resp: 12 13 14 14   Temp:   98 °F (36 7 °C)    TempSrc: Oral  Oral    SpO2: 99% 99% 99% 98%   Weight:  77 4 kg (170 lb 10 2 oz)     Height:            Temp:  [98 °F (36 7 °C)-98 1 °F (36 7 °C)] 98 °F (36 7 °C)  HR:  [54-70] 54  Resp:  [11-30] 14  BP: ()/(39-54) 74/42  SpO2:  [97 %-99 %] 98 %     Body mass index is 24 48 kg/m²  Weight (last 2 days)     Date/Time Weight    06/07/23 0600 77 4 (170 64)    06/06/23 0532 80 (176 37)          I/O last 3 completed shifts: In: 1005 1 [I V :805 1; IV Piggyback:200]  Out: 1240 [Other:1240]    No intake/output data recorded        Physical exam:    General: no acute distress, cooperative  Eyes: conjunctivae pink, anicteric sclerae  ENT: lips and mucous membranes moist, no exudates, normal external ears  Neck: ROM intact, no JVD  Chest: No respiratory distress, no accessory muscle use  CVS: normal rate, non pericardial friction rub  Abdomen: soft, non-tender, non-distended, normoactive bowel sounds  Extremities: no edema of both legs  Skin: no rash  Neuro: awake, alert, oriented, grossly intact  Psych:  Pleasant affect    Invasive Devices:      Lab Results:   Results from last 7 days   Lab Units 06/07/23  0449 06/06/23  0748 "06/05/23  1447 06/04/23  0651 06/03/23  2212 06/03/23  1618 06/02/23  2217 06/02/23  1840 06/02/23  1826   ALK PHOS U/L 123* 120* 105 94  --   --   --   --  109*   ALT U/L 10* 11* 11* 13  --   --   --   --  7   AST U/L 17 14 12 11  --   --   --   --  31   BLOOD CULTURE   --   --   --   --   --   --   --  No Growth After 4 Days  No Growth After 4 Days  BUN mg/dL 9 21 35* 31*  --  26*   < >  --  19   CALCIUM mg/dL 7 8* 7 5* 7 6* 7 3*  --  7 9*   < >  --  8 7   CHLORIDE mmol/L 103 103 106 104  --  103   < >  --  97   CO2 mmol/L 31 29 28 26  --  29   < >  --  31   CREATININE mg/dL 4 43* 7 11* 9 92* 8 57*  --  7 74*   < >  --  6 78*   HEMATOCRIT % 21 5* 23 0* 22 9* 24 6* 22 8* 24 2*   < >  --  33 8*   HEMOGLOBIN g/dL 7 2* 7 3* 7 8* 8 0* 7 6* 7 7*   < >  --  10 9*   POTASSIUM mmol/L 3 6 3 5 3 7 4 6  --  3 2*   < >  --  4 0   MAGNESIUM mg/dL 1 9 1 9 2 0 1 9  --  1 8  --   --   --    PHOSPHORUS mg/dL 2 9 2 9 3 1 4 8*  --  3 1  --   --   --    PLATELETS Thousands/uL 102* 110* 140* 107*  --  125*   < >  --  169   WBC Thousand/uL 3 60* 5 17 8 55 6 53  --  8 62   < >  --  6 55    < > = values in this interval not displayed  Portions of the record may have been created with voice recognition software  Occasional wrong word or \"sound a like\" substitutions may have occurred due to the inherent limitations of voice recognition software  Read the chart carefully and recognize, using context, where substitutions have occurred  If you have any questions, please contact the dictating provider      "

## 2023-06-07 NOTE — ASSESSMENT & PLAN NOTE
Lab Results   Component Value Date    CREATININE 4 43 (H) 06/07/2023    CREATININE 7 11 (H) 06/06/2023    CREATININE 9 92 (H) 06/05/2023    EGFR 12 06/07/2023    EGFR 7 06/06/2023    EGFR 4 06/05/2023   History of anemia of CKD  Was briefly on epogen for 2 months in 2022 but this was discontinued due to graft thrombosis  Graft patency is no longer a concern  Received 1 u pRBC in OR    · Hemoglobin decreased to 7 2 today   Repeat CBC in AM   · Transfuse 1 unit pRBC if symptomatic or hemoglobin<7  · Nephrology recommendation to start epogen

## 2023-06-07 NOTE — ASSESSMENT & PLAN NOTE
With axillary wound dehiscence and exposed graft  Status post explantation with native vein patch repair 6/3  OR cultures with E  Coli, Klebsiella from broth only  Clinically stable without sepsis      • Change antibiotics to cefazolin to continue for 7 more days through 6/14 - can be dosed with HD  • LWC/VAC per vascular surgery  · Cefazolin per ID recommendations  · POD #4 (Status post 3 days GNR coverage, status post 5 days vancomycin coverage)  · Oxy 2 5 po mod / oxy 5 po severe/ Dilaudid 0 5 IV breakthrough PRN q4h   · Blood cultures negative  · Wound cultures negative  · Vascular following and managing wound VAC  · ID following

## 2023-06-07 NOTE — DISCHARGE SUMMARY
[facility]  Discharge- Deuce Lipoma 1955, 70 y o  male MRN: [id number]  [id number]  Primary Care Provider: Amparo Leonard MD   Date and time admitted to hospital: 6/2/2023  8:46 PM    * Infection of AV graft for dialysis [facility])  Assessment & Plan  With axillary wound dehiscence and exposed graft  Status post explantation with native vein patch repair 6/3  OR cultures with E  Coli, Klebsiella from broth only  Clinically stable without sepsis  • Change antibiotics to cefazolin to continue for 7 more days through 6/14 - can be dosed with HD  • LWC/VAC per vascular surgery  · Cefazolin per ID recommendations  · POD #4 (Status post 3 days GNR coverage, status post 5 days vancomycin coverage)  · Oxy 2 5 po mod / oxy 5 po severe/ Dilaudid 0 5 IV breakthrough PRN q4h   · Blood cultures negative  · Wound cultures negative  · Vascular following and managing wound VAC  · ID following      Ambulatory dysfunction  Assessment & Plan  Consult PT/OT to assist in recondition patient  Encourage ambulation as tolerated    Hypotension  Assessment & Plan  Patient is chronically hypotensive, but asymptomatic  ESRD so renal hypoperfusion is not a concern  Likely multifactorial   Blood cultures negative  At this point, infection  is controlled  Now off pressors  • Midodrine 10mg TID per outpatient regiment   • Supportive care as per the primary service    End-stage renal disease on hemodialysis [facility])  Assessment & Plan  Lab Results   Component Value Date    CREATININE 4 43 (H) 06/07/2023    CREATININE 7 11 (H) 06/06/2023    CREATININE 9 92 (H) 06/05/2023    EGFR 12 06/07/2023    EGFR 7 06/06/2023    EGFR 4 06/05/2023     HD via permcat   Outpatient dry weight 76 5kg  current weight 77 4kg    • Dose adjust antibiotics for HD  • HD on 6/8/2023  3 hour session planned  • Nephrology consulted  • Electrolytes: goal Mg>2, K>4  • Nutrition: renal diet, <2g/day potassium      Arteriovenous fistula (HCC)  Assessment & Plan  History of graft thrombosis, chronic DVT, R vertebral artery thrombus  Was previously on warfarin on passed but switched to eliquis due to noncompliance with lab draws and hard sticks  Eliquis initially held prior to surgery  Originally ordered to maintain graft patency  Graft has been explanted and anti-coagulation is no longer needed  · Per vascular surgery, anti-coagulation is no longer needed as graft patency is not pertinent following explantation       Acute on chronic anemia  Assessment & Plan  Lab Results   Component Value Date    CREATININE 4 43 (H) 06/07/2023    CREATININE 7 11 (H) 06/06/2023    CREATININE 9 92 (H) 06/05/2023    EGFR 12 06/07/2023    EGFR 7 06/06/2023    EGFR 4 06/05/2023   History of anemia of CKD  Was briefly on epogen for 2 months in 2022 but this was discontinued due to graft thrombosis  Graft patency is no longer a concern  Received 1 u pRBC in OR    · Hemoglobin decreased to 7 2 today  Repeat CBC in AM   · Transfuse 1 unit pRBC if symptomatic or hemoglobin<7  · Nephrology recommendation to start epogen    Diabetes with neurologic complications [facility])  Assessment & Plan  Lab Results   Component Value Date    HGBA1C 4 8 01/05/2023       Recent Labs     06/07/23  0610 06/07/23  0625 06/07/23  0743 06/07/23  1048   POCGLU 87 88 95 89       Blood Sugar Average: Last 72 hrs:  (P) 319 [id number]   · Does not require medication at home  · Monitor BG 4x/daily for goal 140-180  Meal correction insulin ordered  · Gabapentin 100mg Tu, Th, Sat (Dialysis days)      Coronary artery disease involving native coronary artery  Assessment & Plan  · 2008 s/p PTCA w/ bare metal stent to circumflex and PDA and POBA of diagonal   · 2009, 2010 MI, 2011 NSTEMI s/p PCI/KARLOS of distal RCA   · 2023 s/p PCI and KARLOS to RCA  · 5/22/23 TTE: LVEF 65%, G1DD, mid-cavity dynamic obstruction at rest with a peak gradient of 12   Mid cavity dyanmic obstruction with valsalva with peak gradient of 30     Plan:   · Continue statin, ASA, and plavix   · HD as per Nephrology recommendation    ESRD (end stage renal disease) on dialysis [facility])  Assessment & Plan  Lab Results   Component Value Date    CREATININE 4 43 (H) 06/07/2023    CREATININE 7 11 (H) 06/06/2023    CREATININE 9 92 (H) 06/05/2023    EGFR 12 06/07/2023    EGFR 7 06/06/2023    EGFR 4 06/05/2023       • Electrolytes: goal Mg>2, K>4  • Nutrition: renal diet, <2g/day potassium        Code Status: Level 1 - Full Code  POA:    POLST:      Reason for ICU admission:   Hemodynamic instability following explantation of infected AV fistula graft  Active problems:   Principal Problem:    Infection of AV graft for dialysis [facility])  Active Problems:    Acute on chronic anemia    Arteriovenous fistula (HCC)    End-stage renal disease on hemodialysis (HCC)    Hypotension    Ambulatory dysfunction    Coronary artery disease involving native coronary artery    Diabetes with neurologic complications ([facility] [address] )  Resolved Problems:    * No resolved hospital problems  *      Consultants:   Infectious Disease  Vascular Surgery  Nephrology  Physical Therapy    History of Present Illness:   75year old male with recent AV fistula placement complicated by infection and dehiscence  Explant performed today  Patient arrives to MICU in septic shock  Summary of clinical course:   75 y o M w/ ESRD, hx of failed grafts  Most recent graft was placed on 5/19, but his PCP noted infection and dehiscence at a visit on 6/2  He was admitted as inpatient on 6/2 and the graft was explanted on 6/3  Patient received 1 U pRBC during procedure  Post OP, patient was still required pressors to maintain MAPS >60 and was transferred to MICU  MAPs are chronically low, but patient is asymptomatic so pressors are discontinued and patient is appropriate to transfer to internal medicine service   Patient has anemia of chronic disease with "hemoglobin persistently in low 7s  Recent or scheduled procedures:   5/19 HD fistula graft implantation LUE  6/3  Explantation of infected LUE HD fistula graft  HD Tu,Th, Sat    Outstanding/pending diagnostics:   No pending diagnostics    Cultures:   Lab Results   Component Value Date/Time    BLOODCX No Growth After 4 Days  06/02/2023 06:40 PM    BLOODCX No Growth After 4 Days  06/02/2023 06:26 PM    WOUNDCULT Growth in Broth culture only Klebsiella variicola (A) 06/03/2023 10:14 AM    WOUNDCULT Growth in Broth culture only Escherichia coli (A) 06/03/2023 10:14 AM    WOUNDCULT (A) 06/03/2023 10:14 AM     Growth in Broth culture only Staphylococcus coagulase negative          Mobilization Plan:   PT/OT consult  Encourage ambulation as tolerated    Nutrition Plan:   • IV Fluids: none   • Electrolytes: goal Mg>2, K>4  • Nutrition: renal diet, <2g/day potassium      Invasive Devices Review  Invasive Devices     Central Venous Catheter Line  Duration           CVC Central Lines 06/03/23 Triple 20cm 3 days          Line  Duration           Hemodialysis AV Fistula  Left Upper arm -- days    Hemodialysis AV Fistula 05/19/23 Left Upper arm 18 days          Hemodialysis Catheter  Duration           HD Permanent Double Catheter 258 days                Rationale for remaining devices: DVT prophylaxis    VTE Pharmacologic Prophylaxis: Heparin  VTE Mechanical Prophylaxis: sequential compression device    Discharge Plan:   Patient should be ready for discharge to home when deemed appropriate  Initial Physical Therapy Recommendations: Ambulation as tolerated  Spoke with Ace Re ([facility])  regarding transfer  Please contact critical care via [employer] with any questions or concerns  Portions of the record may have been created with voice recognition software  Occasional wrong word or \"sound a like\" substitutions may have occurred due to the inherent limitations of voice recognition software    Read the " chart carefully and recognize, using context, where substitutions have occurred    [facility]  12:37 PM

## 2023-06-07 NOTE — TELEPHONE ENCOUNTER
This is a reminder; patient is due for office visit once discharged from hospital. Please call patient and schedule the following. Patient's appointment(s) are due now.     Dopplers  [] Abdominal Aorta Iliac (AOIL)  [] Carotid (CV)   [] Celiac and/or Mesenteric  [] Endovascular Aortic Repair (EVAR)   [] Hemodialysis Access (HD)   [] Lower Limb Arterial (FACUNDO)  [] Lower Limb Venous (LEV)  [] Lower Limb Venous Duplex with Reflux (LEVDR)  [] Renal Artery  [] Upper Limb Arterial (UEA)    [] Upper Limb Venous (UEV)              [] VENICE and Waveform analysis     Advanced Imaging   [] CTA head/neck    [] CTA abdomen    [] CTA abdomen & pelvis    [] CT abdomen with/ without contrast  [] CT abdomen with contrast  [] CT abdomen without contrast    [] CT abdomen & pelvis with/ without contrast  [] CT abdomen & pelvis with contrast  [] CT abdomen & pelvis without contrast    Office Visit   [] New patient, patient last seen over 3 years ago  [] Risk factor modification (RFM)   [x] Follow up Left Ax-Ax Loop AV graft, possibel right AV Graft 5/19/23 SLB/MQ   [] Lost to follow up (LTFU)

## 2023-06-07 NOTE — PROGRESS NOTES
Progress Note - Infectious Disease   Santosh Mims 70 y o  male MRN: [id number]  Unit/Bed#: MICU 11 Encounter: [id number]      Impression/Recommendations:  LUE AVG infection     With axillary wound dehiscence and exposed graft   Status post explantation with native vein patch repair 6/3   OR cultures with E  Coli, Klebsiella from broth only   Clinically stable without sepsis  Rec:  • Change antibiotics to cefazolin to continue for 7 more days through  - can be dosed with HD  • LWC/VAC per vascular surgery     Acute on chronic hypotension  Likely multifactorial   Blood cultures negative  At this point infection above is controlled  Now off pressors  Rec:  • Midodrine per outpatient regiment  • Supportive care as per the primary service     ESRD on HD     Via permcath  Rec:  • Dose adjust antibiotics for HD     DM      Antibiotics:  Vancomycin #5  POD #4  (Status post 3 days GNR coverage)    Subjective:  Patient seen on AM rounds  LUE pain, intermittent right foot shooting pain  24 Hour Events:  No documented fevers, chills, sweats, nausea, vomiting, or diarrhea  Objective:  Vitals:  Temp:  [97 9 °F (36 6 °C)-98 1 °F (36 7 °C)] 98 °F (36 7 °C)  HR:  [54-70] 54  Resp:  [11-30] 14  BP: ()/(39-54) 74/42  SpO2:  [97 %-100 %] 98 %  Temp (24hrs), Av °F (36 7 °C), Min:97 9 °F (36 6 °C), Max:98 1 °F (36 7 °C)  Current: Temperature: 98 °F (36 7 °C)    Physical Exam:   General:  No acute distress  Psychiatric:  Awake and alert  Pulmonary:  Normal respiratory excursion without accessory muscle use  Abdomen:  Soft, nontender  Extremities:  VAC to LUE, no cellulitis  Skin:  No rashes    Lab Results:  I have personally reviewed pertinent labs    Results from last 7 days   Lab Units 23  0449 23  0517 23  1447   ALK PHOS U/L 123* 120* 105   ALT U/L 10* 11* 11*   AST U/L 17 14 12   BUN mg/dL 9 21 35*   CALCIUM mg/dL 7 8* 7 5* 7 6*   CHLORIDE mmol/L 103 103 106   CO2 mmol/L 31 29 28 CREATININE mg/dL 4 43* 7 11* 9 92*   EGFR ml/min/1 73sq m 12 7 4   POTASSIUM mmol/L 3 6 3 5 3 7     Results from last 7 days   Lab Units 06/07/23  0449 06/06/23  0517 06/05/23  1447   HEMOGLOBIN g/dL 7 2* 7 3* 7 8*   PLATELETS Thousands/uL 102* 110* 140*   WBC Thousand/uL 3 60* 5 17 8 55     Results from last 7 days   Lab Units 06/03/23  1807 06/03/23  1014 06/02/23  1840 06/02/23  1826   BLOOD CULTURE   --   --  No Growth After 4 Days  No Growth After 4 Days  GRAM STAIN RESULT   --  No Polys or Bacteria seen  --   --    MRSA CULTURE ONLY  No Methicillin Resistant Staphlyococcus aureus (MRSA) isolated  --   --   --    WOUND CULTURE   --  Growth in Broth culture only Klebsiella variicola*  Growth in Broth culture only Escherichia coli*  --   --        Imaging Studies:   I have personally reviewed pertinent imaging study reports and images in PACS  EKG, Pathology, and Other Studies:   I have personally reviewed pertinent reports

## 2023-06-07 NOTE — ASSESSMENT & PLAN NOTE
Patient is chronically hypotensive, but asymptomatic  ESRD so renal hypoperfusion is not a concern  Likely multifactorial   Blood cultures negative  At this point, infection  is controlled  Now off pressors      • Midodrine 10mg TID per outpatient regiment   • Supportive care as per the primary service

## 2023-06-07 NOTE — ASSESSMENT & PLAN NOTE
Lab Results   Component Value Date    CREATININE 4 43 (H) 06/07/2023    CREATININE 7 11 (H) 06/06/2023    CREATININE 9 92 (H) 06/05/2023    EGFR 12 06/07/2023    EGFR 7 06/06/2023    EGFR 4 06/05/2023     HD via [facility]   Outpatient dry weight 76 5kg  current weight 77 4kg    • Dose adjust antibiotics for HD  • HD on 6/8/2023  3 hour session planned  • Nephrology consulted  • Electrolytes: goal Mg>2, K>4  • Nutrition: renal diet, <2g/day potassium

## 2023-06-07 NOTE — ASSESSMENT & PLAN NOTE
· 2008 s/p PTCA w/ bare metal stent to circumflex and PDA and POBA of diagonal   · 2009, 2010 MI, 2011 NSTEMI s/p PCI/KARLOS of distal RCA   · 2023 s/p PCI and KARLOS to RCA  · 5/22/23 TTE: LVEF 65%, G1DD, mid-cavity dynamic obstruction at rest with a peak gradient of 12   Mid cavity dyanmic obstruction with valsalva with peak gradient of 30     Plan:   · Continue statin, ASA, and plavix   · HD as per Nephrology recommendation

## 2023-06-07 NOTE — ASSESSMENT & PLAN NOTE
Lab Results   Component Value Date    CREATININE 4 43 (H) 06/07/2023    CREATININE 7 11 (H) 06/06/2023    CREATININE 9 92 (H) 06/05/2023    EGFR 12 06/07/2023    EGFR 7 06/06/2023    EGFR 4 06/05/2023       • Electrolytes: goal Mg>2, K>4  • Nutrition: renal diet, <2g/day potassium

## 2023-06-07 NOTE — ASSESSMENT & PLAN NOTE
History of graft thrombosis, chronic DVT, R vertebral artery thrombus  Was previously on warfarin on passed but switched to eliquis due to noncompliance with lab draws and hard sticks  Eliquis initially held prior to surgery  Originally ordered to maintain graft patency  Graft has been explanted and anti-coagulation is no longer needed      · Per vascular surgery, anti-coagulation is no longer needed as graft patency is not pertinent following explantation

## 2023-06-07 NOTE — ASSESSMENT & PLAN NOTE
Lab Results   Component Value Date    HGBA1C 4 8 01/05/2023       Recent Labs     06/07/23  0610 06/07/23  0625 06/07/23  0743 06/07/23  1048   POCGLU 87 88 95 89       Blood Sugar Average: Last 72 hrs:  (P) 487 [id number]   · Does not require medication at home  · Monitor BG 4x/daily for goal 140-180  Meal correction insulin ordered    · Gabapentin 100mg Tu, Th, Sat (Dialysis days)

## 2023-06-07 NOTE — PROGRESS NOTES
[address] [facility]  Transfer- Kenrick Ruiz 1955, 70 y o  male MRN: [id number]  Unit/Bed#: MICU 11 Encounter: [id number]  Primary Care Provider: Yoli Nielsen MD   Date and time admitted to hospital: 6/2/2023  8:46 PM    No new Assessment & Plan notes have been filed under this hospital service since the last note was generated  Service: Critical Care/ICU    Code Status: Level 1 - Full Code  POA:    POLST:      Reason for ICU admission:   Hemodynamic instability following explantation of infected AV fistula graft  Active problems:   Principal Problem:    Infection of AV graft for dialysis [facility])  Active Problems:    Acute on chronic anemia    Arteriovenous fistula (HCC)    End-stage renal disease on hemodialysis (HCC)    Hypotension    Ambulatory dysfunction    Coronary artery disease involving native coronary artery    Diabetes with neurologic complications ([facility] [address] )  Resolved Problems:    * No resolved hospital problems  *      Consultants:   Infectious Disease  Vascular Surgery  Nephrology  Physical Therapy    History of Present Illness:   75year old male with recent AV fistula placement complicated by infection and dehiscence  Explant performed today  Patient arrives to MICU in septic shock  Summary of clinical course:   75 y o M w/ ESRD, hx of failed grafts  Most recent graft was placed on 5/19, but his PCP noted infection and dehiscence at a visit on 6/2  He was admitted as inpatient on 6/2 and the graft was explanted on 6/3  Patient received 1 U pRBC during procedure  Post OP, patient was still required pressors to maintain MAPS >60 and was transferred to MICU  MAPs are chronically low, but patient is asymptomatic so pressors are discontinued and patient is appropriate to transfer to internal medicine service  Patient has anemia of chronic disease with hemoglobin persistently in low 7s      Recent or scheduled procedures:   5/19 HD fistula graft implantation "LUE  6/3  Explantation of infected LUE HD fistula graft  HD Tu,Th, Sat    Outstanding/pending diagnostics:   No pending diagnostics    Cultures:   Lab Results   Component Value Date/Time    BLOODCX No Growth After 4 Days  06/02/2023 06:40 PM    BLOODCX No Growth After 4 Days  06/02/2023 06:26 PM    WOUNDCULT Growth in Broth culture only Klebsiella variicola (A) 06/03/2023 10:14 AM    WOUNDCULT Growth in Broth culture only Escherichia coli (A) 06/03/2023 10:14 AM    WOUNDCULT (A) 06/03/2023 10:14 AM     Growth in Broth culture only Staphylococcus coagulase negative          Mobilization Plan:   PT/OT consult  Encourage ambulation as tolerated    Nutrition Plan:   • IV Fluids: none   • Electrolytes: goal Mg>2, K>4  • Nutrition: renal diet, <2g/day potassium      Invasive Devices Review  Invasive Devices     Central Venous Catheter Line  Duration           CVC Central Lines 06/03/23 Triple 20cm 4 days          Line  Duration           Hemodialysis AV Fistula  Left Upper arm -- days    Hemodialysis AV Fistula 05/19/23 Left Upper arm 19 days          Hemodialysis Catheter  Duration           HD Permanent Double Catheter 258 days                Rationale for remaining devices: DVT prophylaxis    VTE Pharmacologic Prophylaxis: Heparin  VTE Mechanical Prophylaxis: sequential compression device    Discharge Plan:   Patient should be ready for discharge to home when deemed appropriate  Initial Physical Therapy Recommendations: Ambulation as tolerated  Spoke with Kiar[facility])  regarding transfer  Please contact critical care via [employer] with any questions or concerns  Portions of the record may have been created with voice recognition software  Occasional wrong word or \"sound a like\" substitutions may have occurred due to the inherent limitations of voice recognition software    Read the chart carefully and recognize, using context, where substitutions have occurred    [address] Alan " Student  4:13 PM

## 2023-06-07 NOTE — TELEPHONE ENCOUNTER
Patient's appt from 6/7/23 was cancelled due to patient being in house.   We will call once he is discharged

## 2023-06-07 NOTE — PROCEDURES
Vascular Surgery  Sebastian Bishop 70 y o  male MRN: [id number]  Unit/Bed#: MICU 11 Encounter: [id number]    V  A C  Procedure Note    Date: 6/7/2023    Time: 12:40    Location of wound: L upper arm    Sponges removed:  3 Black Sponges-- 1 from each wound (2)  1 bridge  0 White Sponges    Dimensions of wound:  Proximal wound: 6cm x 2cm x 2cm  Distal wound: 4 5 cm x 0 7cm x 1 cm    Description of wound:   Proximal wound:    Distal wound:        Sponges placed:  3 Black Sponges-- 1 to each wound (2)  1 bridge  0 White Sponges    VAC settings:  125 mmHg  Continuous    Pt tolerated procedure well- premedicated w/ Dilaudid 0 5mg IV     VAC sticker placed to wound dressing, per protocol      Ankit Mcguire PA-C  6/7/2023

## 2023-06-08 ENCOUNTER — DOCUMENTATION (OUTPATIENT)
Dept: VASCULAR SURGERY | Facility: CLINIC | Age: 68
End: 2023-06-08

## 2023-06-08 ENCOUNTER — APPOINTMENT (INPATIENT)
Dept: DIALYSIS | Facility: HOSPITAL | Age: 68
DRG: 907 | End: 2023-06-08
Attending: INTERNAL MEDICINE
Payer: MEDICARE

## 2023-06-08 LAB
ANION GAP SERPL CALCULATED.3IONS-SCNC: 2 MMOL/L (ref 4–13)
BACTERIA BLD CULT: NORMAL
BACTERIA BLD CULT: NORMAL
BUN SERPL-MCNC: 12 MG/DL (ref 5–25)
CA-I BLD-SCNC: 1.03 MMOL/L (ref 1.12–1.32)
CALCIUM SERPL-MCNC: 7.9 MG/DL (ref 8.3–10.1)
CHLORIDE SERPL-SCNC: 103 MMOL/L (ref 96–108)
CO2 SERPL-SCNC: 31 MMOL/L (ref 21–32)
CREAT SERPL-MCNC: 6.35 MG/DL (ref 0.6–1.3)
ERYTHROCYTE [DISTWIDTH] IN BLOOD BY AUTOMATED COUNT: 21.2 % (ref 11.6–15.1)
GFR SERPL CREATININE-BSD FRML MDRD: 8 ML/MIN/1.73SQ M
GLUCOSE SERPL-MCNC: 106 MG/DL (ref 65–140)
GLUCOSE SERPL-MCNC: 115 MG/DL (ref 65–140)
GLUCOSE SERPL-MCNC: 81 MG/DL (ref 65–140)
HCT VFR BLD AUTO: 24.3 % (ref 36.5–49.3)
HGB BLD-MCNC: 7.4 G/DL (ref 12–17)
MAGNESIUM SERPL-MCNC: 1.9 MG/DL (ref 1.6–2.6)
MCH RBC QN AUTO: 32.5 PG (ref 26.8–34.3)
MCHC RBC AUTO-ENTMCNC: 30.5 G/DL (ref 31.4–37.4)
MCV RBC AUTO: 107 FL (ref 82–98)
PHOSPHATE SERPL-MCNC: 3.5 MG/DL (ref 2.3–4.1)
PLATELET # BLD AUTO: 100 THOUSANDS/UL (ref 149–390)
PMV BLD AUTO: 10.9 FL (ref 8.9–12.7)
POTASSIUM SERPL-SCNC: 3.7 MMOL/L (ref 3.5–5.3)
RBC # BLD AUTO: 2.28 MILLION/UL (ref 3.88–5.62)
SODIUM SERPL-SCNC: 136 MMOL/L (ref 135–147)
WBC # BLD AUTO: 3.68 THOUSAND/UL (ref 4.31–10.16)

## 2023-06-08 PROCEDURE — 85027 COMPLETE CBC AUTOMATED: CPT | Performed by: INTERNAL MEDICINE

## 2023-06-08 PROCEDURE — 83735 ASSAY OF MAGNESIUM: CPT | Performed by: INTERNAL MEDICINE

## 2023-06-08 PROCEDURE — 80048 BASIC METABOLIC PNL TOTAL CA: CPT | Performed by: INTERNAL MEDICINE

## 2023-06-08 PROCEDURE — 99232 SBSQ HOSP IP/OBS MODERATE 35: CPT | Performed by: PHYSICIAN ASSISTANT

## 2023-06-08 PROCEDURE — 82330 ASSAY OF CALCIUM: CPT | Performed by: INTERNAL MEDICINE

## 2023-06-08 PROCEDURE — 90935 HEMODIALYSIS ONE EVALUATION: CPT | Performed by: INTERNAL MEDICINE

## 2023-06-08 PROCEDURE — 99232 SBSQ HOSP IP/OBS MODERATE 35: CPT | Performed by: INTERNAL MEDICINE

## 2023-06-08 PROCEDURE — 84100 ASSAY OF PHOSPHORUS: CPT | Performed by: INTERNAL MEDICINE

## 2023-06-08 PROCEDURE — 82948 REAGENT STRIP/BLOOD GLUCOSE: CPT

## 2023-06-08 RX ORDER — CALCITRIOL 0.25 UG/1
0.75 CAPSULE, LIQUID FILLED ORAL 3 TIMES WEEKLY
Status: DISCONTINUED | OUTPATIENT
Start: 2023-06-08 | End: 2023-06-15 | Stop reason: HOSPADM

## 2023-06-08 RX ORDER — VANCOMYCIN HYDROCHLORIDE 500 MG/100ML
500 INJECTION, SOLUTION INTRAVENOUS
Status: DISCONTINUED | OUTPATIENT
Start: 2023-06-10 | End: 2023-06-10

## 2023-06-08 RX ADMIN — CLOPIDOGREL BISULFATE 75 MG: 75 TABLET ORAL at 11:51

## 2023-06-08 RX ADMIN — HEPARIN SODIUM 5000 UNITS: 5000 INJECTION INTRAVENOUS; SUBCUTANEOUS at 22:10

## 2023-06-08 RX ADMIN — CALCITRIOL CAPSULES 0.25 MCG 0.75 MCG: 0.25 CAPSULE ORAL at 11:51

## 2023-06-08 RX ADMIN — ATORVASTATIN CALCIUM 20 MG: 20 TABLET, FILM COATED ORAL at 22:10

## 2023-06-08 RX ADMIN — POLYETHYLENE GLYCOL 3350 17 G: 17 POWDER, FOR SOLUTION ORAL at 11:51

## 2023-06-08 RX ADMIN — CHLORHEXIDINE GLUCONATE 15 ML: 1.2 SOLUTION ORAL at 20:11

## 2023-06-08 RX ADMIN — ASPIRIN 81 MG: 81 TABLET, COATED ORAL at 11:51

## 2023-06-08 RX ADMIN — OXYCODONE HYDROCHLORIDE 5 MG: 5 TABLET ORAL at 02:28

## 2023-06-08 RX ADMIN — HEPARIN SODIUM 5000 UNITS: 5000 INJECTION INTRAVENOUS; SUBCUTANEOUS at 06:31

## 2023-06-08 RX ADMIN — HYDROMORPHONE HYDROCHLORIDE 0.5 MG: 1 INJECTION, SOLUTION INTRAMUSCULAR; INTRAVENOUS; SUBCUTANEOUS at 17:13

## 2023-06-08 RX ADMIN — HEPARIN SODIUM 5000 UNITS: 5000 INJECTION INTRAVENOUS; SUBCUTANEOUS at 15:30

## 2023-06-08 RX ADMIN — Medication 2.5 MG: at 20:10

## 2023-06-08 RX ADMIN — CYANOCOBALAMIN TAB 500 MCG 1000 MCG: 500 TAB at 11:51

## 2023-06-08 RX ADMIN — CEFAZOLIN SODIUM 2000 MG: 2 SOLUTION INTRAVENOUS at 10:49

## 2023-06-08 RX ADMIN — HYDROMORPHONE HYDROCHLORIDE 0.5 MG: 1 INJECTION, SOLUTION INTRAMUSCULAR; INTRAVENOUS; SUBCUTANEOUS at 00:18

## 2023-06-08 RX ADMIN — HYDROMORPHONE HYDROCHLORIDE 0.5 MG: 1 INJECTION, SOLUTION INTRAMUSCULAR; INTRAVENOUS; SUBCUTANEOUS at 22:11

## 2023-06-08 RX ADMIN — OXYCODONE HYDROCHLORIDE 5 MG: 5 TABLET ORAL at 15:30

## 2023-06-08 RX ADMIN — VANCOMYCIN HYDROCHLORIDE 2000 MG: 10 INJECTION, POWDER, LYOPHILIZED, FOR SOLUTION INTRAVENOUS at 11:52

## 2023-06-08 RX ADMIN — EPOETIN ALFA 4000 UNITS: 4000 SOLUTION INTRAVENOUS; SUBCUTANEOUS at 10:06

## 2023-06-08 RX ADMIN — HYDROMORPHONE HYDROCHLORIDE 0.5 MG: 1 INJECTION, SOLUTION INTRAMUSCULAR; INTRAVENOUS; SUBCUTANEOUS at 04:38

## 2023-06-08 RX ADMIN — MIDODRINE HYDROCHLORIDE 10 MG: 5 TABLET ORAL at 15:30

## 2023-06-08 RX ADMIN — GABAPENTIN 100 MG: 100 CAPSULE ORAL at 17:13

## 2023-06-08 RX ADMIN — MELATONIN 3 MG: at 22:10

## 2023-06-08 RX ADMIN — NEPHROCAP 1 CAPSULE: 1 CAP ORAL at 11:51

## 2023-06-08 RX ADMIN — MIDODRINE HYDROCHLORIDE 10 MG: 5 TABLET ORAL at 06:31

## 2023-06-08 RX ADMIN — MIDODRINE HYDROCHLORIDE 10 MG: 5 TABLET ORAL at 11:52

## 2023-06-08 RX ADMIN — OXYCODONE HYDROCHLORIDE 5 MG: 5 TABLET ORAL at 11:51

## 2023-06-08 NOTE — ASSESSMENT & PLAN NOTE
· Stable, hgb 7 4, has been in the 7s this admission   · Continue B12 supplementation and Epogen with dialysis   · Likely in setting of ESRD, underlying infection   · Trend CBC

## 2023-06-08 NOTE — ASSESSMENT & PLAN NOTE
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  · Status post left brachial axillary AVG left upper extremity on 5/19  · Coumadin was switched to oral Eliquis last admission due to patient not compliant with lab draws and hard stick   · Patient now presented with left upper extremity wound dehiscence of AV graft with infected prosthetic material  · Vascular following,  · Graft was explanted on 6/3, required pressor support post operatively and remained in MICU for observation, he was transferred to med/surg level of care on 6/8  Wound VAC in place     · Currently with PermCath in place for HD   · Continue IV Cefazolin and Vancomycin through 6/13 dosed after HD to complete 10 days post op antibiotics  · Blood cultures negative  · Currently on ASA, plavix  · Eliquis d/c as graft is now explanted

## 2023-06-08 NOTE — OCCUPATIONAL THERAPY NOTE
Occupational Therapy Cancel Note         Patient Name: Eliana Jones  HMYEE'X Date: 6/8/2023 06/08/23 1328   OT Last Visit   OT Visit Date 06/08/23   Note Type   Note type Cancelled Session   Cancel Reasons Patient off floor/hemodialysis     OT orders received  Chart reviewed  Attempted to see pt for OT tx session  This AM, pt off the floor  In the PM, pt with increased fatigue  Will continue to follow and see pt as appropriate and able       Hermila Vargas MS, OTR/L

## 2023-06-08 NOTE — PHYSICAL THERAPY NOTE
Physical Therapy Cancellation Note         06/08/23 1329   PT Last Visit   PT Visit Date 06/08/23   Note Type   Note Type Cancelled Session   Cancel Reasons Patient off floor/hemodialysis  (+ refusal 2* fatigue)       PT orders received, pt chart reviewed  Attempted to see pt in AM however off floor at HD  Attempted to see pt in afternoon however pt politely declining 2* excessive fatigue  PT to continue to follow and see pt as appropriate and able  Thank you       Doyle Alatorre, PT, DPT

## 2023-06-08 NOTE — ASSESSMENT & PLAN NOTE
· Maintained on midodrine 10 mg TID as an outpateint, continue here  · Was noted to be persistently hypotension requiring pressor support in the ICU after graft explant  · Pressures have stabilized, transferred to med/surg level of care on 6/8  · Continue management with dialysis   · Trend pressures closely

## 2023-06-08 NOTE — PROGRESS NOTES
[facility]  Progress Note  Name: Jeimy Quiñonez  MRN: [id number]  Unit/Bed#: PPHP 076-42 I Date of Admission: 6/2/2023   Date of Service: 6/8/2023 I Hospital Day: 6  DOS: 6/8/2023  Assessment/Plan   * Infection of AV graft for dialysis [facility])  Assessment & Plan  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  · Status post left brachial axillary AVG left upper extremity on 5/19  · Coumadin was switched to oral Eliquis last admission due to patient not compliant with lab draws and hard stick   · Patient now presented with left upper extremity wound dehiscence of AV graft with infected prosthetic material  · Vascular following,  · Graft was explanted on 6/3, required pressor support post operatively and remained in MICU for observation, he was transferred to med/surg level of care on 6/8  Wound VAC in place     · Currently with PermCath in place for HD   · Continue IV Cefazolin and Vancomycin through 6/13 dosed after HD to complete 10 days post op antibiotics  · Blood cultures negative  · Currently on ASA, plavix  · Eliquis d/c as graft is now explanted       Ambulatory dysfunction  Assessment & Plan  · PT/OT recommending STR, CM following for placement    Hypotension  Assessment & Plan  · Maintained on midodrine 10 mg TID as an outpateint, continue here  · Was noted to be persistently hypotension requiring pressor support in the ICU after graft explant  · Pressures have stabilized, transferred to med/surg level of care on 6/8  · Continue management with dialysis   · Trend pressures closely     End-stage renal disease on hemodialysis [facility])  Assessment & Plan  Lab Results   Component Value Date    CREATININE 6 35 (H) 06/08/2023    CREATININE 4 43 (H) 06/07/2023    CREATININE 7 11 (H) 06/06/2023    EGFR 8 06/08/2023    EGFR 12 06/07/2023    EGFR 7 06/06/2023   · Pt maintained on dialysis TTS at [facility]  · Access: Left-sided AV graft placed 5/19/2023, explanted by vascular   Currently has permacath  · Nephrology following for HD management    Diabetes with neurologic complications [facility])  Assessment & Plan  Lab Results   Component Value Date    HGBA1C 4 8 01/05/2023       Recent Labs     06/07/23  0743 06/07/23  1048 06/07/23  1610 06/07/23  2112   POCGLU 95 89 114 95       Blood Sugar Average: Last 72 hrs:  (P) 110 25   · Continue SSI coverage  · QID glucose checks   · Consistent carb diet  · Monitor and adjust regimen as needed    Coronary artery disease involving native coronary artery  Assessment & Plan  · Had triple vessel disease by cath in April 2008, s/p PTCA with bare metal stent  s/p MI in 2009, 2010 and acute NSTEMI on 02/02/2011 s/p PCI/KARLOS of distal RCA  · S/p cardiac cath in 2/8/2023 drug-eluting stents were placed in the mid and proximal RCA, and  KARLOS was placed in the RCA ostium  · Continue aspirin, Plavix and statin  · Patient is not on a beta-blocker because of low blood pressure  · Outpatient cardiology follow-up    Acute on chronic anemia  Assessment & Plan  · Stable, hgb 7 4, has been in the 7s this admission   · Continue B12 supplementation and Epogen with dialysis   · Likely in setting of ESRD, underlying infection   · Trend CBC         VTE Pharmacologic Prophylaxis: VTE Score: 5 High Risk (Score >/= 5) - Pharmacological DVT Prophylaxis Ordered: heparin  Sequential Compression Devices Ordered  Patient Centered Rounds: I evaluated the patient without nursing staff present due to speaking to nurse outside patient's room   Discussions with Specialists or Other Care Team Provider: Discussed with vascular, RN, CM and reviewed previous notes     Education and Discussions with Family / Patient: Updated  (wife) via phone      Total Time Spent on Date of Encounter in care of patient: 35 minutes This time was spent on one or more of the following: performing physical exam; counseling and coordination of care; obtaining or reviewing history; documenting in the medical record; reviewing/ordering tests, medications or procedures; communicating with other healthcare professionals and discussing with patient's family/caregivers  Current Length of Stay: 6 day(s)  Current Patient Status: Inpatient   Certification Statement: The patient will continue to require additional inpatient hospital stay due to IV abx, monitoring blood pressures, PT/OT  Discharge Plan: Anticipate discharge in 48-72 hrs to rehab facility  Code Status: Level 1 - Full Code    Subjective:   Pt reports that he is doing okay today, states he is having pain in the LUE where graft was explanted  He does not have any additional complaints at this time  Wife reports that the patient has not had good experiences in STR in the past      Objective:     Vitals:   Temp (24hrs), Av 6 °F (37 °C), Min:97 9 °F (36 6 °C), Max:99 7 °F (37 6 °C)    Temp:  [97 9 °F (36 6 °C)-99 7 °F (37 6 °C)] 99 7 °F (37 6 °C)  HR:  [53-80] 76  Resp:  [16-22] 17  BP: ()/() 90/52  SpO2:  [98 %-100 %] 98 %  Body mass index is 24 42 kg/m²  Input and Output Summary (last 24 hours): Intake/Output Summary (Last 24 hours) at 2023 1622  Last data filed at 2023 1501  Gross per 24 hour   Intake 1350 ml   Output 2510 ml   Net -1160 ml       Physical Exam:   Physical Exam  Vitals reviewed  Constitutional:       General: He is not in acute distress  Appearance: He is not toxic-appearing  Comments: Pt is in no acute distress lying in his hospital bed resting comfortably   HENT:      Head: Normocephalic and atraumatic  Cardiovascular:      Rate and Rhythm: Normal rate and regular rhythm  Pulmonary:      Effort: No respiratory distress  Breath sounds: Normal breath sounds  No wheezing  Abdominal:      General: There is no distension  Palpations: Abdomen is soft  Tenderness:  There is no abdominal tenderness  Skin:     General: Skin is warm and dry  Comments: VAC to CHARLES   Neurological:      Mental Status: He is alert  Psychiatric:         Mood and Affect: Mood normal           Additional Data:     Labs:  Results from last 7 days   Lab Units 06/08/23  0443 06/07/23  0449   EOS PCT %  --  2   HEMATOCRIT % 24 3* 21 5*   HEMOGLOBIN g/dL 7 4* 7 2*   LYMPHS PCT %  --  21   MONOS PCT %  --  8   NEUTROS PCT %  --  67   PLATELETS Thousands/uL 100* 102*   WBC Thousand/uL 3 68* 3 60*     Results from last 7 days   Lab Units 06/08/23  0443 06/07/23  0449   ANION GAP mmol/L 2* 2*   ALBUMIN g/dL  --  2 1*   ALK PHOS U/L  --  123*   ALT U/L  --  10*   AST U/L  --  17   BUN mg/dL 12 9   CALCIUM mg/dL 7 9* 7 8*   CHLORIDE mmol/L 103 103   CO2 mmol/L 31 31   CREATININE mg/dL 6 35* 4 43*   GLUCOSE RANDOM mg/dL 106 81   POTASSIUM mmol/L 3 7 3 6   SODIUM mmol/L 136 136   TOTAL BILIRUBIN mg/dL  --  0 62         Results from last 7 days   Lab Units 06/07/23  2112 06/07/23  1610 06/07/23  1048 06/07/23  0743 06/07/23  0625 06/07/23  0610 06/06/23  2145 06/06/23  2107 06/06/23  1539 06/06/23  1124 06/06/23  0741 06/06/23  0614   POC GLUCOSE mg/dl 95 114 89 95 88 87 97 69 96 101 121 116         Results from last 7 days   Lab Units 06/02/23  1826   LACTIC ACID mmol/L 1 7       Lines/Drains:  Invasive Devices     Central Venous Catheter Line  Duration           CVC Central Lines 06/03/23 Triple 20cm 5 days          Hemodialysis Catheter  Duration           HD Permanent Double Catheter 259 days    Hemodialysis Catheter Subclavian <1 day                Central Line:  Goal for removal: Will discontinue when peripheral access obtained  Imaging: No pertinent imaging reviewed  Recent Cultures (last 7 days):   Results from last 7 days   Lab Units 06/03/23  1014 06/02/23  1840 06/02/23  1826   BLOOD CULTURE   --  No Growth After 5 Days  No Growth After 5 Days     GRAM STAIN RESULT  No Polys or Bacteria seen  --   -- WOUND CULTURE  Growth in Broth culture only Klebsiella variicola*  Growth in Broth culture only Escherichia coli*  Growth in Broth culture only Staphylococcus coagulase negative*  --   --        Last 24 Hours Medication List:   Current Facility-Administered Medications   Medication Dose Route Frequency Provider Last Rate   • acetaminophen  975 mg Oral Q8H PRN Tegan Catalan MD     • aspirin  81 mg Oral Daily Tegan Catalan MD     • atorvastatin  20 mg Oral HS Tegan Catalan MD     • b complex-vitamin C-folic acid  1 capsule Oral Daily Tegan Catalan MD     • calcitriol  0 75 mcg Oral Once per day on Mon Wed Fri Dar Kennedy MD     • cefazolin  2,000 mg Intravenous After Dialysis Tegan Catalan MD 2,000 mg (06/08/23 1049)   • chlorhexidine  15 mL Mouth/Throat Q12H [facility] Familia Cespedes MD     • clopidogrel  75 mg Oral Daily Tegan Catalan MD     • vitamin B-12  1,000 mcg Oral Daily Tegan Catalan MD     • epoetin jah  4,000 Units Intravenous After Dialysis Tegan Catalan MD     • gabapentin  100 mg Oral Once per day on Tue Thu Sat Tegan Catalan MD     • heparin (porcine)  5,000 Units Subcutaneous Q8H [address] Tegan Catalan MD     • HYDROmorphone  0 5 mg Intravenous Q4H PRN Tegan Catalan MD     • insulin lispro  1-6 Units Subcutaneous 4x Daily (AC & HS) Tegan Catalan MD     • melatonin  3 mg Oral HS Tegan Catalan MD     • midodrine  10 mg Oral TID AC Tegan Catalan MD     • ondansetron  4 mg Intravenous Q6H PRN Tegan Catalan MD     • oxyCODONE  5 mg Oral Q4H PRN Tegan Catalan MD     • oxyCODONE  2 5 mg Oral Q4H PRN Tegan Catalan MD     • polyethylene glycol  17 g Oral Daily Tegan Catalan MD     • senna  1 tablet Oral HS Tegan Catalan MD     • [START ON 6/10/2023] vancomycin  500 mg Intravenous After Dialysis Jese Shields MD Today, Patient Was Seen By: Abdias Pham PA-C    **Please Note: This note may have been constructed using a voice recognition system  **

## 2023-06-08 NOTE — PLAN OF CARE
Post-Dialysis RN Treatment Note    Blood Pressure:  Pre 105/74 mm/Hg  Post 81/50 mmHg   EDW  75 5kg    Weight:  Pre 77 6 kg   Post 75 6 kg   Mode of weight measurement: Bed Scale   Volume Removed  2000 ml    Treatment duration 210 minutes    NS given  No    Treatment shortened?  No   Medications given during Rx Epogen, Ancef   Estimated Kt/V  1 2   Access type: Permacath/TDC   Access Issues: No    Report called to primary nurse   Yes/  Fidelia Haddad RN          2000  ml as tolerated, 4K bath, 3 5hrs  Problem: METABOLIC, FLUID AND ELECTROLYTES - ADULT  Goal: Electrolytes maintained within normal limits  Description: INTERVENTIONS:  - Monitor labs and assess patient for signs and symptoms of electrolyte imbalances  - Administer electrolyte replacement as ordered  - Monitor response to electrolyte replacements, including repeat lab results as appropriate  - Instruct patient on fluid and nutrition as appropriate  Outcome: Progressing  Goal: Fluid balance maintained  Description: INTERVENTIONS:  - Monitor labs   - Monitor I/O and WT  - Instruct patient on fluid and nutrition as appropriate  - Assess for signs & symptoms of volume excess or deficit  Outcome: Progressing

## 2023-06-08 NOTE — PROGRESS NOTES
Progress Note - Infectious Disease   Rich Colon 70 y o  male MRN: [id number]  Unit/Bed#: [facility] 911-01 Encounter: [id number]      Impression/Recommendations:  LUE AVG infection     With axillary wound dehiscence and exposed graft   Status post explantation with native vein patch repair 6/3   OR cultures with E  Coli, Klebsiella, Corynebacterium, Coag neg Staph from broth only   Clinically stable without sepsis  Rec:  • Continue cefazolin and vancomycin through  (dosed after HD) to complete 10 days postop antibiotics  • LWC/VAC per vascular surgery     Acute on chronic hypotension  Likely multifactorial   Blood cultures negative   At this point infection above is controlled  Now off pressors  Rec:  • Midodrine per outpatient regiment  • Supportive care as per the primary service     ESRD on HD     Via permcath  Rec:  • Dose adjust antibiotics for HD     DM      The patient is stable from an ID standpoint  Antibiotics:  Vancomycin #6  Cefazolin #2 (GNR #4)  POD #5    Subjective:  Patient seen on AM rounds  Offers no complaints  24 Hour Events:  No documented fevers, chills, sweats, nausea, vomiting, or diarrhea  Objective:  Vitals:  Temp:  [97 9 °F (36 6 °C)-98 6 °F (37 °C)] 97 9 °F (36 6 °C)  HR:  [50-80] 80  Resp:  [10-22] 16  BP: ()/() 138/111  SpO2:  [98 %-100 %] 99 %  Temp (24hrs), Av 4 °F (36 9 °C), Min:97 9 °F (36 6 °C), Max:98 6 °F (37 °C)  Current: Temperature: 97 9 °F (36 6 °C)    Physical Exam:   General:  No acute distress  Psychiatric:  Awake and alert  Pulmonary:  Normal respiratory excursion without accessory muscle use  Abdomen:  Soft, nontender  Extremities:  VAC to LUE  Skin:  No rashes    Lab Results:  I have personally reviewed pertinent labs    Results from last 7 days   Lab Units 23  0443 23  0449 23  0517 23  1447   ALK PHOS U/L  --  123* 120* 105   ALT U/L  --  10* 11* 11*   AST U/L  --  17 14 12   BUN mg/dL 12 9 21 35*   CALCIUM mg/dL 7  9* 7 8* 7 5* 7 6*   CHLORIDE mmol/L 103 103 103 106   CO2 mmol/L 31 31 29 28   CREATININE mg/dL 6 35* 4 43* 7 11* 9 92*   EGFR ml/min/1 73sq m 8 12 7 4   POTASSIUM mmol/L 3 7 3 6 3 5 3 7     Results from last 7 days   Lab Units 06/08/23  0443 06/07/23  0449 06/06/23  0517   HEMOGLOBIN g/dL 7 4* 7 2* 7 3*   PLATELETS Thousands/uL 100* 102* 110*   WBC Thousand/uL 3 68* 3 60* 5 17     Results from last 7 days   Lab Units 06/03/23  1807 06/03/23  1014 06/02/23  1840 06/02/23  1826   BLOOD CULTURE   --   --  No Growth After 5 Days  No Growth After 5 Days  GRAM STAIN RESULT   --  No Polys or Bacteria seen  --   --    MRSA CULTURE ONLY  No Methicillin Resistant Staphlyococcus aureus (MRSA) isolated  --   --   --    WOUND CULTURE   --  Growth in Broth culture only Klebsiella variicola*  Growth in Broth culture only Escherichia coli*  Growth in Broth culture only Staphylococcus coagulase negative*  --   --        Imaging Studies:   I have personally reviewed pertinent imaging study reports and images in PACS  EKG, Pathology, and Other Studies:   I have personally reviewed pertinent reports

## 2023-06-08 NOTE — PROGRESS NOTES
Vascular Nurse Navigator Post Op Education    Met with patient at bedside to introduce myself as Vascular Nurse Navigator and explained my role  Patient is appropriate and accepting to education  Patient was educated with Review of written materials provided, Teachback, Explanation, Demonstration and Question & Answer on expectations of post op care and recovery on LUE AVG explant c Brachial Vein patch angioplasty with wound vac placement  Patient is a smoker  (1/4 ppd x 30 yrs), as such Smoking effects on the lungs, tobacco triggers and Smoking cessation was reviewed  Education provided to patient on infection prevention, activity limitations, when to call the office, importance of follow up, and incisional care  Discharge instruction handout provided to patient to review

## 2023-06-08 NOTE — ASSESSMENT & PLAN NOTE
Lab Results   Component Value Date    HGBA1C 4 8 01/05/2023       Recent Labs     06/07/23  0743 06/07/23  1048 06/07/23  1610 06/07/23  2112   POCGLU 95 89 114 95       Blood Sugar Average: Last 72 hrs:  (P) 110 25   · Continue SSI coverage  · QID glucose checks   · Consistent carb diet  · Monitor and adjust regimen as needed

## 2023-06-08 NOTE — ASSESSMENT & PLAN NOTE
· Had triple vessel disease by cath in April 2008, s/p PTCA with bare metal stent  s/p MI in 2009, 2010 and acute NSTEMI on 02/02/2011 s/p PCI/KARLOS of distal RCA  · S/p cardiac cath in 2/8/2023 drug-eluting stents were placed in the mid and proximal RCA, and  KARLOS was placed in the RCA ostium      · Continue aspirin, Plavix and statin  · Patient is not on a beta-blocker because of low blood pressure  · Outpatient cardiology follow-up

## 2023-06-08 NOTE — PROGRESS NOTES
NEPHROLOGY PROGRESS NOTE   Kenrick Ruiz 70 y o  male MRN: [id number]  Unit/Bed#: [facility] 911-01 Encounter: [id number]    ASSESSMENT & PLAN:  End-stage renal disease on dialysis  -Outpatient unit: [address]  -Schedule: TTS   -Access: right IJ PermCath  -Plan:Patient was seen and examined on hemodialysis treatment, tolerating HD  Reviewed vitals, dialysis prescription and discussed with hemodialysis nurse  Blood pressure has been low despite midodrine  Planning for ultrafiltration to target weight  HD prescription:   Time: 3 5 hours  Sodium: 138   Blood flow (ml/min): 400   Dialyzer: F 180 Potassium: 4 Dialysate flow(ml/min): 500   Access: permcath Bicarbonate: 35 Ultrafiltration goal (kg): 2 0   Medications on HD: epogen     Volume status  -Clinically appears euvolemic, estimated dry weight 75 5 kg    CKD/MBD  -Secondary hyperparathyroidism of renal origin -continue calcitriol 0 75 mcg with dialysis days     Started during the hospital stay on 6/8  Was not on it during the hospital stay  -Phosphorus is at goal, not on any binders    Blood pressure hypotension/  -Continue midodrine 10 mg 3 times daily  Avoid hypotension  Previously required vasopressors    Electrolytes:   -Stable    Anemia due to ESRD  -Goal hemoglobin:  10-11 grams/deciliter  -Current hemoglobin: Below goal at 7 4 g/dl  -Plan: Continue Epogen 4000 units with HD treatment  He is on Mircera as outpatient    Left upper arm wound dehiscence where  AV graft was placed on 5/19, now status post graft explant  Currently with right IJ PermCath  Has wound VAC over the left arm, continue management per primary    Continue antibiotic per ID recommendations, noted plan to continue through 6/13     Above plan regarding hemodialysis treatment today was discussed with primary team and agreed with the plan    SUBJECTIVE:  No new complaints, tolerating HD    OBJECTIVE:  Current Weight: Weight - Scale: 77 4 kg (170 lb 10 2 oz)  Vitals:    06/08/23 1030 BP:    Pulse: 80   Resp: 16   Temp:    SpO2:        Intake/Output Summary (Last 24 hours) at 6/8/2023 1047  Last data filed at 6/8/2023 0730  Gross per 24 hour   Intake 200 ml   Output 10 ml   Net 190 ml       Physical Exam  General:  Ill looking, awake  Eyes: Conjunctivae pink,  Sclera anicteric  ENT: lips and mucous membranes moist  Neck: supple   Chest: Clear to Auscultation both lungs,  no crackles, ronchus or wheezing  CVS: S1 & S2 present, normal rate, regular rhythm, no murmur  Abdomen: soft, non-tender, non-distended, Bowel sounds normoactive  Extremities: no edema of  legs    Wound VAC left arm   Skin: no rash  Neuro: awake, alert, oriented x 3   Psych: Mood and affect appropriate     Medications:    Current Facility-Administered Medications:   •  acetaminophen (TYLENOL) tablet 975 mg, 975 mg, Oral, Q8H PRN, Arvind Kimble MD, 975 mg at 06/07/23 1655  •  aspirin (ECOTRIN LOW STRENGTH) EC tablet 81 mg, 81 mg, Oral, Daily, Arvind Kimble MD, 81 mg at 06/07/23 0855  •  atorvastatin (LIPITOR) tablet 20 mg, 20 mg, Oral, HS, Arvind Kimble MD, 20 mg at 06/07/23 2109  •  b complex-vitamin C-folic acid (RENAL) capsule 1 capsule, 1 capsule, Oral, Daily, Arvind Kimble MD, 1 capsule at 06/07/23 0857  •  ceFAZolin (ANCEF) IVPB (premix in dextrose) 2,000 mg 50 mL, 2,000 mg, Intravenous, After Dialysis, Arvind Kimble MD  •  chlorhexidine (PERIDEX) 0 12 % oral rinse 15 mL, 15 mL, Mouth/Throat, Q12H [address], Arvind Kimble MD, 15 mL at 06/07/23 2110  •  clopidogrel (PLAVIX) tablet 75 mg, 75 mg, Oral, Daily, Arvind Kimble MD, 75 mg at 06/07/23 0855  •  cyanocobalamin (VITAMIN B-12) tablet 1,000 mcg, 1,000 mcg, Oral, Daily, Arvind Kimble MD, 1,000 mcg at 06/07/23 0855  •  epoetin jah (EPOGEN,PROCRIT) injection 4,000 Units, 4,000 Units, Intravenous, After Dialysis, Arvind Kimble MD, 4,000 Units at 06/08/23 1006  •  gabapentin (NEURONTIN) capsule 100 mg, 100 mg, Oral, Once per day on Tue Thu Sat, Terrence Gong MD, 100 mg at 06/06/23 1722  •  heparin (porcine) subcutaneous injection 5,000 Units, 5,000 Units, Subcutaneous, Q8H [address], 5,000 Units at 06/08/23 0631 **AND** [COMPLETED] Platelet count, , , Once, Kody Puente,   •  HYDROmorphone (DILAUDID) injection 0 5 mg, 0 5 mg, Intravenous, Q4H PRN, Terrence Gong MD, 0 5 mg at 06/08/23 0438  •  insulin lispro (HumaLOG) 100 units/mL subcutaneous injection 1-6 Units, 1-6 Units, Subcutaneous, 4x Daily (AC & HS), 1 Units at 06/05/23 1629 **AND** Fingerstick Glucose (POCT), , , 4x Daily AC and at bedtime, Terrence Gong MD  •  melatonin tablet 3 mg, 3 mg, Oral, HS, Terrence Gong MD, 3 mg at 06/07/23 2109  •  midodrine (PROAMATINE) tablet 10 mg, 10 mg, Oral, TID AC, Terrence Gong MD, 10 mg at 06/08/23 0631  •  ondansetron (ZOFRAN) injection 4 mg, 4 mg, Intravenous, Q6H PRN, Terrence Gong MD, 4 mg at 06/07/23 2131  •  oxyCODONE (ROXICODONE) IR tablet 5 mg, 5 mg, Oral, Q4H PRN, Terrence Gong MD, 5 mg at 06/08/23 0228  •  oxyCODONE (ROXICODONE) split tablet 2 5 mg, 2 5 mg, Oral, Q4H PRN, Terrence Gong MD, 2 5 mg at 06/06/23 1447  •  polyethylene glycol (MIRALAX) packet 17 g, 17 g, Oral, Daily, Terrence Gong MD  •  senna (SENOKOT) tablet 8 6 mg, 1 tablet, Oral, HS, Terrence Gong MD, 8 6 mg at 06/07/23 2109  •  vancomycin (VANCOCIN) 2,000 mg in sodium chloride 0 9 % 500 mL IVPB, 25 mg/kg, Intravenous, Once, Hannah Obrien MD  •  vancomycin (VANCOCIN) IVPB (premix in dextrose) 750 mg 150 mL, 750 mg, Intravenous, After Dialysis, Hannah Obrien MD    Invasive Devices:        Lab Results:   Results from last 7 days   Lab Units 06/08/23  0443 06/07/23  0449 06/06/23  0517 06/05/23  1447   ALK PHOS U/L  --  123* 120* 105   ALT U/L  --  10* 11* 11*   AST U/L  --  17 14 12   BUN mg/dL 12 9 21 35*   CALCIUM mg/dL 7 9* 7 8* "7 5* 7 6*   CHLORIDE mmol/L 103 103 103 106   CO2 mmol/L 31 31 29 28   CREATININE mg/dL 6 35* 4 43* 7 11* 9 92*   HEMATOCRIT % 24 3* 21 5* 23 0* 22 9*   HEMOGLOBIN g/dL 7 4* 7 2* 7 3* 7 8*   POTASSIUM mmol/L 3 7 3 6 3 5 3 7   MAGNESIUM mg/dL 1 9 1 9 1 9 2 0   PHOSPHORUS mg/dL 3 5 2 9 2 9 3 1   PLATELETS Thousands/uL 100* 102* 110* 140*   WBC Thousand/uL 3 68* 3 60* 5 17 8 55       Previous work up:         Portions of the record may have been created with voice recognition software  Occasional wrong word or \"sound a like\" substitutions may have occurred due to the inherent limitations of voice recognition software  Read the chart carefully and recognize, using context, where substitutions have occurred  If you have any questions, please contact the dictating provider    "

## 2023-06-08 NOTE — ASSESSMENT & PLAN NOTE
Lab Results   Component Value Date    CREATININE 6 35 (H) 06/08/2023    CREATININE 4 43 (H) 06/07/2023    CREATININE 7 11 (H) 06/06/2023    EGFR 8 06/08/2023    EGFR 12 06/07/2023    EGFR 7 06/06/2023   · Pt maintained on dialysis TTS at [address] [facility] [place]  · Access: Left-sided AV graft placed 5/19/2023, explanted by vascular   Currently has permacath    · Nephrology following for HD management

## 2023-06-08 NOTE — PROGRESS NOTES
Valentin Castillo is a 70 y o  male who is currently ordered Vancomycin IV with management by the Pharmacy Consult service  Relevant clinical data and objective / subjective history reviewed  Vancomycin Assessment:  Indication and Goal AUC/Trough: Soft tissue (goal -600, trough >10), -600, trough >10  Clinical Status: stable without sepsis     Micro:     Renal Function:  SCr: 6 35 mg/dL  CrCl: 11 3 mL/min  Renal replacement: on dialysis Tues, Thur & Sat  Had dialysis on Thur 6/8  Days of Therapy: 1 (had last 750 mg vanco 6/6 at 1527 prior to this consult)  Current Dose: 500 mg every Tues, Thur & Sat  (doses at 7 5 mg/kg rounded)                              Vancomycin Plan:  New Dosing: vancomycin 2 gm load then 500 mg Romelia Alamin & Sat at 1500  Estimated AUC: none                   Estimated Trough: none  Next Level: Sat 6/10 at 0600  Renal Function Monitoring: Daily BMP and [place] will continue to follow closely for s/sx of nephrotoxicity, infusion reactions and appropriateness of therapy  BMP and CBC will be ordered per protocol  We will continue to follow the patient’s culture results and clinical progress daily      Yordy Maya, Pharmacist

## 2023-06-09 LAB
ERYTHROCYTE [DISTWIDTH] IN BLOOD BY AUTOMATED COUNT: 21.1 % (ref 11.6–15.1)
GLUCOSE SERPL-MCNC: 101 MG/DL (ref 65–140)
GLUCOSE SERPL-MCNC: 84 MG/DL (ref 65–140)
GLUCOSE SERPL-MCNC: 93 MG/DL (ref 65–140)
GLUCOSE SERPL-MCNC: 96 MG/DL (ref 65–140)
HCT VFR BLD AUTO: 25.6 % (ref 36.5–49.3)
HGB BLD-MCNC: 7.9 G/DL (ref 12–17)
MCH RBC QN AUTO: 33.1 PG (ref 26.8–34.3)
MCHC RBC AUTO-ENTMCNC: 30.9 G/DL (ref 31.4–37.4)
MCV RBC AUTO: 107 FL (ref 82–98)
PLATELET # BLD AUTO: 115 THOUSANDS/UL (ref 149–390)
PMV BLD AUTO: 11.4 FL (ref 8.9–12.7)
RBC # BLD AUTO: 2.39 MILLION/UL (ref 3.88–5.62)
WBC # BLD AUTO: 4.79 THOUSAND/UL (ref 4.31–10.16)

## 2023-06-09 PROCEDURE — 99232 SBSQ HOSP IP/OBS MODERATE 35: CPT | Performed by: INTERNAL MEDICINE

## 2023-06-09 PROCEDURE — 97116 GAIT TRAINING THERAPY: CPT

## 2023-06-09 PROCEDURE — 99232 SBSQ HOSP IP/OBS MODERATE 35: CPT | Performed by: PHYSICIAN ASSISTANT

## 2023-06-09 PROCEDURE — NC001 PR NO CHARGE: Performed by: SURGERY

## 2023-06-09 PROCEDURE — 82948 REAGENT STRIP/BLOOD GLUCOSE: CPT

## 2023-06-09 PROCEDURE — 97530 THERAPEUTIC ACTIVITIES: CPT

## 2023-06-09 PROCEDURE — 85027 COMPLETE CBC AUTOMATED: CPT | Performed by: PHYSICIAN ASSISTANT

## 2023-06-09 RX ORDER — SACCHAROMYCES BOULARDII 250 MG
250 CAPSULE ORAL 2 TIMES DAILY
Status: DISCONTINUED | OUTPATIENT
Start: 2023-06-09 | End: 2023-06-15 | Stop reason: HOSPADM

## 2023-06-09 RX ADMIN — ATORVASTATIN CALCIUM 20 MG: 20 TABLET, FILM COATED ORAL at 22:16

## 2023-06-09 RX ADMIN — OXYCODONE HYDROCHLORIDE 5 MG: 5 TABLET ORAL at 12:02

## 2023-06-09 RX ADMIN — CLOPIDOGREL BISULFATE 75 MG: 75 TABLET ORAL at 08:31

## 2023-06-09 RX ADMIN — OXYCODONE HYDROCHLORIDE 5 MG: 5 TABLET ORAL at 16:15

## 2023-06-09 RX ADMIN — Medication 250 MG: at 10:08

## 2023-06-09 RX ADMIN — CYANOCOBALAMIN TAB 500 MCG 1000 MCG: 500 TAB at 08:32

## 2023-06-09 RX ADMIN — Medication 2.5 MG: at 04:56

## 2023-06-09 RX ADMIN — MIDODRINE HYDROCHLORIDE 10 MG: 5 TABLET ORAL at 16:15

## 2023-06-09 RX ADMIN — Medication 2.5 MG: at 20:26

## 2023-06-09 RX ADMIN — Medication 250 MG: at 17:19

## 2023-06-09 RX ADMIN — MELATONIN 3 MG: at 22:16

## 2023-06-09 RX ADMIN — NEPHROCAP 1 CAPSULE: 1 CAP ORAL at 08:32

## 2023-06-09 RX ADMIN — Medication 2.5 MG: at 00:47

## 2023-06-09 RX ADMIN — HEPARIN SODIUM 5000 UNITS: 5000 INJECTION INTRAVENOUS; SUBCUTANEOUS at 22:16

## 2023-06-09 RX ADMIN — ONDANSETRON 4 MG: 2 INJECTION INTRAMUSCULAR; INTRAVENOUS at 00:40

## 2023-06-09 RX ADMIN — HYDROMORPHONE HYDROCHLORIDE 0.5 MG: 1 INJECTION, SOLUTION INTRAMUSCULAR; INTRAVENOUS; SUBCUTANEOUS at 06:38

## 2023-06-09 RX ADMIN — MIDODRINE HYDROCHLORIDE 10 MG: 5 TABLET ORAL at 12:02

## 2023-06-09 RX ADMIN — HEPARIN SODIUM 5000 UNITS: 5000 INJECTION INTRAVENOUS; SUBCUTANEOUS at 05:03

## 2023-06-09 RX ADMIN — HEPARIN SODIUM 5000 UNITS: 5000 INJECTION INTRAVENOUS; SUBCUTANEOUS at 13:21

## 2023-06-09 RX ADMIN — CHLORHEXIDINE GLUCONATE 15 ML: 1.2 SOLUTION ORAL at 08:32

## 2023-06-09 RX ADMIN — MIDODRINE HYDROCHLORIDE 10 MG: 5 TABLET ORAL at 05:03

## 2023-06-09 RX ADMIN — CHLORHEXIDINE GLUCONATE 15 ML: 1.2 SOLUTION ORAL at 20:27

## 2023-06-09 RX ADMIN — ASPIRIN 81 MG: 81 TABLET, COATED ORAL at 08:32

## 2023-06-09 NOTE — PROGRESS NOTES
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  · Status post left brachial axillary AVG left upper extremity on 5/19  · Coumadin was switched to oral Eliquis last admission due to patient not compliant with lab draws and hard stick for patency   · Patient now presented with left upper extremity wound dehiscence of AV graft with infected prosthetic material  · Vascular following,  · Graft was explanted on 6/3, required pressor support post operatively and remained in MICU for observation, he was transferred to med/surg level of care on 6/8  Wound VAC applied     · Currently with PermCath in place for HD   · Continue IV Cefazolin and Vancomycin through 6/13 dosed after HD to complete 10 days post op antibiotics  · Blood cultures negative  · Currently on ASA, plavix  · Eliquis d/c as graft is now explanted       Hypotension  Assessment & Plan  · Maintained on midodrine 10 mg TID as an outpateint, continue here  · Was noted to be persistently hypotension requiring pressor support in the ICU after graft explant  · Pressures have stabilized, transferred to med/surg level of care on 6/8  · Continue management with dialysis   · Trend pressures closely     Ambulatory dysfunction  Assessment & Plan  · PT/OT recommending STR, CM following for placement    End-stage renal disease on hemodialysis [facility])  Assessment & Plan  Lab Results   Component Value Date    CREATININE 6 35 (H) 06/08/2023    CREATININE 4 43 (H) 06/07/2023    CREATININE 7 11 (H) 06/06/2023    EGFR 8 06/08/2023    EGFR 12 06/07/2023    EGFR 7 06/06/2023   · Pt maintained on dialysis TTS at [facility]  · Access: Left-sided AV graft placed 5/19/2023, explanted by vascular   Currently has permacath  · Nephrology following for HD management    Diabetes with neurologic complications [facility])  Assessment & Plan  Lab Results   Component Value Date    HGBA1C 4 8 01/05/2023       Recent Labs     06/07/23  2112 06/08/23  1622 06/08/23  2112 06/09/23  0808   POCGLU 95 115 81 84       Blood Sugar Average: Last 72 hrs:  (P) 33 [id number]   · Continue SSI coverage  · QID glucose checks   · Consistent carb diet  · Monitor and adjust regimen as needed    Coronary artery disease involving native coronary artery  Assessment & Plan  · Had triple vessel disease by cath in April 2008, s/p PTCA with bare metal stent  s/p MI in 2009, 2010 and acute NSTEMI on 02/02/2011 s/p PCI/KARLOS of distal RCA  · S/p cardiac cath in 2/8/2023 drug-eluting stents were placed in the mid and proximal RCA, and  KARLOS was placed in the RCA ostium  · Continue aspirin, Plavix and statin  · Patient is not on a beta-blocker because of low blood pressure  · Outpatient cardiology follow-up    Acute on chronic anemia  Assessment & Plan  · Stable, hgb 7 9, has been in the 7s this admission   · Continue B12 supplementation and Epogen with dialysis   · Likely in setting of ESRD, underlying infection   · Trend CBC       VTE Pharmacologic Prophylaxis: VTE Score: 5 High Risk (Score >/= 5) - Pharmacological DVT Prophylaxis Ordered: heparin  Sequential Compression Devices Ordered  Patient Centered Rounds: I evaluated the patient without nursing staff present due to speaking to nurse outside patient's room   Discussions with Specialists or Other Care Team Provider: Discussed with vascular, RN, CM and reviewed previous notes     Education and Discussions with Family / Patient:  Will update patient's wife Tommy Alvarado over the phone per patient request       Total Time Spent on Date of Encounter in care of patient: 35 minutes This time was spent on one or more of the following: performing physical exam; counseling and coordination of care; obtaining or reviewing history; documenting in the medical record; reviewing/ordering tests, medications or procedures; communicating with other healthcare professionals and discussing with patient's family/caregivers  Current Length of Stay: 7 day(s)  Current Patient Status: Inpatient   Certification Statement: The patient will continue to require additional inpatient hospital stay due to ongoing vascular treatment, monitoring blood pressures, PT/OT, placement  Discharge Plan: Anticipate discharge in 24-48 hrs to rehab facility  Code Status: Level 1 - Full Code    Subjective:   Pt reports that he is doing fairly well today  States that the pain in his left arm is improved after getting a pain pill this AM  Reports decreased appetite and loose stool today  States that he has IBS and has intermittent diarrhea and constipation  Objective:     Vitals:   Temp (24hrs), Av 6 °F (37 °C), Min:98 °F (36 7 °C), Max:99 7 °F (37 6 °C)    Temp:  [98 °F (36 7 °C)-99 7 °F (37 6 °C)] 98 3 °F (36 8 °C)  HR:  [57-80] 57  Resp:  [16-18] 18  BP: ()/() 105/49  SpO2:  [96 %-98 %] 96 %  Body mass index is 23 96 kg/m²  Input and Output Summary (last 24 hours): Intake/Output Summary (Last 24 hours) at 2023 0832  Last data filed at 2023 8529  Gross per 24 hour   Intake 1150 ml   Output 2500 ml   Net -1350 ml       Physical Exam:   Physical Exam  Vitals reviewed  Constitutional:       General: He is not in acute distress  Appearance: He is not toxic-appearing  Comments: Pt is in no acute distress lying in his hospital bed resting comfortably   LUE with wound VAC   HENT:      Head: Normocephalic and atraumatic  Cardiovascular:      Rate and Rhythm: Normal rate and regular rhythm  Pulmonary:      Effort: No respiratory distress  Breath sounds: Normal breath sounds  No wheezing     Abdominal: General: There is no distension  Palpations: Abdomen is soft  Tenderness: There is no abdominal tenderness  Skin:     General: Skin is warm and dry  Neurological:      Mental Status: He is alert  Psychiatric:         Mood and Affect: Mood normal           Additional Data:     Labs:  Results from last 7 days   Lab Units 06/09/23  0502 06/08/23  0443 06/07/23  0449   EOS PCT %  --   --  2   HEMATOCRIT % 25 6*   < > 21 5*   HEMOGLOBIN g/dL 7 9*   < > 7 2*   LYMPHS PCT %  --   --  21   MONOS PCT %  --   --  8   NEUTROS PCT %  --   --  67   PLATELETS Thousands/uL 115*   < > 102*   WBC Thousand/uL 4 79   < > 3 60*    < > = values in this interval not displayed  Results from last 7 days   Lab Units 06/08/23 0443 06/07/23  0449   ANION GAP mmol/L 2* 2*   ALBUMIN g/dL  --  2 1*   ALK PHOS U/L  --  123*   ALT U/L  --  10*   AST U/L  --  17   BUN mg/dL 12 9   CALCIUM mg/dL 7 9* 7 8*   CHLORIDE mmol/L 103 103   CO2 mmol/L 31 31   CREATININE mg/dL 6 35* 4 43*   GLUCOSE RANDOM mg/dL 106 81   POTASSIUM mmol/L 3 7 3 6   SODIUM mmol/L 136 136   TOTAL BILIRUBIN mg/dL  --  0 62         Results from last 7 days   Lab Units 06/09/23  0808 06/08/23  2112 06/08/23  1622 06/07/23  2112 06/07/23  1610 06/07/23  1048 06/07/23  0743 06/07/23  0625 06/07/23  0610 06/06/23  2145 06/06/23  2107 06/06/23  1539   POC GLUCOSE mg/dl 84 81 115 95 114 89 95 88 87 97 69 96         Results from last 7 days   Lab Units 06/02/23  1826   LACTIC ACID mmol/L 1 7       Lines/Drains:  Invasive Devices     Central Venous Catheter Line  Duration           CVC Central Lines 06/03/23 Triple 20cm 5 days          Hemodialysis Catheter  Duration           HD Permanent Double Catheter 260 days    Hemodialysis Catheter Subclavian <1 day                Central Line:  Goal for removal: Will discontinue when peripheral access obtained  Imaging: No pertinent imaging reviewed      Recent Cultures (last 7 days):   Results from last 7 days   Lab Units 06/03/23  1014 06/02/23  1840 06/02/23  1826   BLOOD CULTURE   --  No Growth After 5 Days  No Growth After 5 Days     GRAM STAIN RESULT  No Polys or Bacteria seen  --   --    WOUND CULTURE  Growth in Broth culture only Klebsiella variicola*  Growth in Broth culture only Escherichia coli*  Growth in Broth culture only Staphylococcus coagulase negative*  --   --        Last 24 Hours Medication List:   Current Facility-Administered Medications   Medication Dose Route Frequency Provider Last Rate   • acetaminophen  975 mg Oral Q8H PRN Aileen Lamb MD     • aspirin  81 mg Oral Daily Aileen Lamb MD     • atorvastatin  20 mg Oral HS Aileen Lamb MD     • b complex-vitamin C-folic acid  1 capsule Oral Daily Aileen Lamb MD     • calcitriol  0 75 mcg Oral Once per day on Mon Wed Fri Agustín Griffith MD     • cefazolin  2,000 mg Intravenous After Dialysis Aileen Lamb MD 2,000 mg (06/08/23 1049)   • chlorhexidine  15 mL Mouth/Throat Q12H [facility] Litakarla Morales MD     • clopidogrel  75 mg Oral Daily Aileen Lamb MD     • vitamin B-12  1,000 mcg Oral Daily Aileen Lamb MD     • epoetin jah  4,000 Units Intravenous After Dialysis Aileen Lamb MD     • gabapentin  100 mg Oral Once per day on Tue Thu Sat Aileen Lamb MD     • heparin (porcine)  5,000 Units Subcutaneous Q8H [facility] Aileen Lamb MD     • HYDROmorphone  0 5 mg Intravenous Q4H PRN Aileen Lamb MD     • insulin lispro  1-6 Units Subcutaneous 4x Daily (AC & HS) Aileen Lamb MD     • melatonin  3 mg Oral HS Aileen Lamb MD     • midodrine  10 mg Oral TID AC Aileen Lamb MD     • ondansetron  4 mg Intravenous Q6H PRN Aileen Lamb MD     • oxyCODONE  5 mg Oral Q4H PRN Aileen Lamb MD     • oxyCODONE  2 5 mg Oral Q4H PRN Aileen Lamb MD     • polyethylene glycol  17 g Oral Daily Love Dixon MD     • senna  1 tablet Oral HS Love Dixon MD     • [START ON 6/10/2023] vancomycin  500 mg Intravenous After Dialysis Reta Crews MD          Today, Patient Was Seen By: Arianna Herman PA-C    **Please Note: This note may have been constructed using a voice recognition system  **

## 2023-06-09 NOTE — PROCEDURES
Vascular Surgery  Bedside V A C  Procedure Note      Location of wound: Left arm    Dressings and Foam removed:  2 Black Foam  0 White Foam    Dimensions of wound:  Proximal wound 7 cm x 2 cm x 2 cm   Distal wound: 4 5 cm x 0 7cm x 1 cm    Description of wound: On inspection of the wound today, there is healthy granulation tissue, there is an area of concern in the superior aspect of the proximal wound  The wound extends down to muscle  Approximately 80% of the wound base is now pink and healthy and there is granulation tissue  The periwound skin remains clean and intact and unremarkable  VAC dressing application:  The periwound skin was cleaned and dried  0  dressings, 2 black foam, 0 white foam were cut to size of the wound and placed into the wounds  The dressings were then covered with VAC drape  Additional VAC drape and black foam was used to create a bridge to the patient's arm and a base for the track pad  The track pad was then placed over the base of black foam  The VAC was then set to 125 mmHg low Continuous suction  The patient tolerated the procedure well and there were no complications  The patient did not require any excisional debridement during today's dressing change  VAC settings:  125 mmHg  Continuous    Wound Images:  Distal Wound:      Proximal Wound: Additional Notes: The [facility] sticker placed over the dressing per protocol  The next [facility] dressing change will be planned for 6/12  This dressing change took greater than 20 minutes to complete      Greta Valverde MD  6/9/2023 4:51 PM

## 2023-06-09 NOTE — PHYSICAL THERAPY NOTE
PHYSICAL THERAPY NOTE          Patient Name: An Resendiz  [id number] Date: 6/9/2023 06/09/23 1128   PT Last Visit   PT Visit Date 06/09/23   Note Type   Note Type Treatment   Pain Assessment   Pain Assessment Tool 0-10   Pain Score 4   Pain Location/Orientation Orientation: Left; Location: Arm   Patient's Stated Pain Goal No pain   Hospital Pain Intervention(s) Repositioned; Ambulation/increased activity   Restrictions/Precautions   Weight Bearing Precautions Per Order No   Other Precautions Chair Alarm; Bed Alarm;Pain; Fall Risk;Telemetry;Limb alert;Multiple lines   General   Chart Reviewed Yes   Response to Previous Treatment Patient with no complaints from previous session  Family/Caregiver Present No   Cognition   Overall Cognitive Status WFL   Arousal/Participation Responsive; Cooperative   Attention Attends with cues to redirect   Orientation Level Oriented X4   Memory Decreased recall of precautions   Following Commands Follows all commands and directions without difficulty   Subjective   Subjective Patient pleasant and cooperative throughout therapy today  Patient received supine in bed   Bed Mobility   Supine to Sit 4  Minimal assistance   Additional items Assist x 1;HOB elevated; Bedrails; Increased time required;LE management;Verbal cues   Sit to Supine 4  Minimal assistance   Additional items Assist x 1; Increased time required;Verbal cues;LE management;HOB elevated; Bedrails   Transfers   Sit to Stand 4  Minimal assistance  (CGA progressed to supervision with several trials)   Additional items Assist x 1; Increased time required;Verbal cues   Stand to Sit 4  Minimal assistance  (CGA progressed to supervision after several trials)   Additional items Assist x 1;Verbal cues; Increased time required   Toilet transfer 3  Moderate assistance   Additional items Standard toilet;Assist x 1  (increased support due to low toilet) Additional Comments transfers w RW   Ambulation/Elevation   Gait pattern Forward Flexion; Wide VAIBHAV; Decreased R stance;Shuffling; Short stride; Excessively slow;Decreased heel strike;Decreased toe off   Gait Assistance 4  Minimal assist  (CGA)   Additional items Assist x 1;Verbal cues; Tactile cues   Assistive Device Rolling walker   Distance 40' x2  (w seated rest break approx 2 min)   Stair Management Assistance Not tested   Balance   Static Sitting Fair +   Dynamic Sitting Fair   Static Standing Fair   Dynamic Standing Fair -   Ambulatory Fair -   Endurance Deficit   Endurance Deficit Yes   Endurance Deficit Description generalized weakness, decreased exercise tolerance   Activity Tolerance   Activity Tolerance Patient limited by fatigue;Patient limited by pain   Nurse Made Aware RN cleared and updated   Assessment   Prognosis Good   Problem List Decreased strength;Decreased range of motion;Decreased endurance; Impaired balance;Decreased mobility; Decreased coordination;Decreased cognition; Impaired judgement;Decreased safety awareness;Pain   Assessment Patient was received supine in bed   Patient was agreeable to therapy services today  PT session focused on gait and transfers today in order to improve functional mobility and independence  Patient completed all functional mobility as described above  Patient required reduced assistance with all mobility today  Patient required increased assistance with toilet transfer due to decreased seat height  Patient educated about safety in the hospital with ambulation  Patient deferred sitting in bedside recliner after therapy due to increase in pain  Patient will benefit from continued PT services while in hospital in order to address remaining limitations  The patients AM-PAC Basic Mobility Inpatient Short From Raw Score is 17   A Raw score of 17 suggests that the patient may benefit from discharge to home    Despite AMPA scoring, PT recommends d/c to post acute rehab due to decreased activity tolerance, increased pain, and decreased safety with functional mobility  Please also refer to the recommendation of the Physical Therapist for safe discharge planning  Barriers to Discharge Decreased caregiver support; Inaccessible home environment   Goals   Patient Goals to get out of the hospital   PT Treatment Day 1   Plan   Treatment/Interventions ADL retraining;Functional transfer training;LE strengthening/ROM; Elevations; Therapeutic exercise; Endurance training;Bed mobility;Gait training   Progress Progressing toward goals   PT Frequency 3-5x/wk   Recommendation   PT Discharge Recommendation Post acute rehabilitation services   AM-PAC Basic Mobility Inpatient   Turning in Flat Bed Without Bedrails 3   Lying on Back to Sitting on Edge of Flat Bed Without Bedrails 3   Moving Bed to Chair 3   Standing Up From Chair Using Arms 3   Walk in Room 3   Climb 3-5 Stairs With Railing 2   Basic Mobility Inpatient Raw Score 17   Basic Mobility Standardized Score 39 67   Highest Level Of Mobility   JH-HLM Goal 5: Stand one or more mins   JH-HLM Achieved 7: Walk 25 feet or more   Education   Education Provided Mobility training   Patient Demonstrates acceptance/verbal understanding   End of Consult   Patient Position at End of Consult Supine;Bed/Chair alarm activated; All needs within reach       Sudarshan Flowers PT, DPT none

## 2023-06-09 NOTE — PLAN OF CARE
Problem: PAIN - ADULT  Goal: Verbalizes/displays adequate comfort level or baseline comfort level  Description: Interventions:  - Encourage patient to monitor pain and request assistance  - Assess pain using appropriate pain scale  - Administer analgesics based on type and severity of pain and evaluate response  - Implement non-pharmacological measures as appropriate and evaluate response  - Consider cultural and social influences on pain and pain management  - Notify physician/advanced practitioner if interventions unsuccessful or patient reports new pain  Outcome: Progressing     Problem: INFECTION - ADULT  Goal: Absence or prevention of progression during hospitalization  Description: INTERVENTIONS:  - Assess and monitor for signs and symptoms of infection  - Monitor lab/diagnostic results  - Monitor all insertion sites, i e  indwelling lines, tubes, and drains  - Monitor endotracheal if appropriate and nasal secretions for changes in amount and color  - [place] appropriate cooling/warming therapies per order  - Administer medications as ordered  - Instruct and encourage patient and family to use good hand hygiene technique  - Identify and instruct in appropriate isolation precautions for identified infection/condition  Outcome: Progressing  Goal: Absence of fever/infection during neutropenic period  Description: INTERVENTIONS:  - Monitor WBC    Outcome: Progressing     Problem: SAFETY ADULT  Goal: Patient will remain free of falls  Description: INTERVENTIONS:  - Educate patient/family on patient safety including physical limitations  - Instruct patient to call for assistance with activity   - Consult OT/PT to assist with strengthening/mobility   - Keep Call bell within reach  - Keep bed low and locked with side rails adjusted as appropriate  - Keep care items and personal belongings within reach  - Initiate and maintain comfort rounds  - Make Fall Risk Sign visible to staff  - Offer Toileting every  Hours, in advance of need  - Initiate/Maintain alarm  - Obtain necessary fall risk management equipment:   - Apply yellow socks and bracelet for high fall risk patients  - Consider moving patient to room near nurses station  Outcome: Progressing  Goal: Maintain or return to baseline ADL function  Description: INTERVENTIONS:  -  Assess patient's ability to carry out ADLs; assess patient's baseline for ADL function and identify physical deficits which impact ability to perform ADLs (bathing, care of mouth/teeth, toileting, grooming, dressing, etc )  - Assess/evaluate cause of self-care deficits   - Assess range of motion  - Assess patient's mobility; develop plan if impaired  - Assess patient's need for assistive devices and provide as appropriate  - Encourage maximum independence but intervene and supervise when necessary  - Involve family in performance of ADLs  - Assess for home care needs following discharge   - Consider OT consult to assist with ADL evaluation and planning for discharge  - Provide patient education as appropriate  Outcome: Progressing  Goal: Maintains/Returns to pre admission functional level  Description: INTERVENTIONS:  - Perform BMAT or MOVE assessment daily    - Set and communicate daily mobility goal to care team and patient/family/caregiver  - Collaborate with rehabilitation services on mobility goals if consulted  - Perform Range of Motion times a day  - Reposition patient every  hours    - Dangle patient  times a day  - Stand patient times a day  - Ambulate patient  times a day  - Out of bed to chair  times a day   - Out of bed for meals times a day  - Out of bed for toileting  - Record patient progress and toleration of activity level   Outcome: Progressing     Problem: DISCHARGE PLANNING  Goal: Discharge to home or other facility with appropriate resources  Description: INTERVENTIONS:  - Identify barriers to discharge w/patient and caregiver  - Arrange for needed discharge resources and transportation as appropriate  - Identify discharge learning needs (meds, wound care, etc )  - Arrange for interpretive services to assist at discharge as needed  - Refer to Case Management Department for coordinating discharge planning if the patient needs post-hospital services based on physician/advanced practitioner order or complex needs related to functional status, cognitive ability, or social support system  Outcome: Progressing     Problem: Knowledge Deficit  Goal: Patient/family/caregiver demonstrates understanding of disease process, treatment plan, medications, and discharge instructions  Description: Complete learning assessment and assess knowledge base    Interventions:  - Provide teaching at level of understanding  - Provide teaching via preferred learning methods  Outcome: Progressing     Problem: Prexisting or High Potential for Compromised Skin Integrity  Goal: Skin integrity is maintained or improved  Description: INTERVENTIONS:  - Identify patients at risk for skin breakdown  - Assess and monitor skin integrity  - Assess and monitor nutrition and hydration status  - Monitor labs   - Assess for incontinence   - Turn and reposition patient  - Assist with mobility/ambulation  - Relieve pressure over bony prominences  - Avoid friction and shearing  - Provide appropriate hygiene as needed including keeping skin clean and dry  - Evaluate need for skin moisturizer/barrier cream  - Collaborate with interdisciplinary team   - Patient/family teaching  - Consider wound care consult   Outcome: Progressing     Problem: MOBILITY - ADULT  Goal: Maintain or return to baseline ADL function  Description: INTERVENTIONS:  -  Assess patient's ability to carry out ADLs; assess patient's baseline for ADL function and identify physical deficits which impact ability to perform ADLs (bathing, care of mouth/teeth, toileting, grooming, dressing, etc )  - Assess/evaluate cause of self-care deficits   - Assess range of motion  - Assess patient's mobility; develop plan if impaired  - Assess patient's need for assistive devices and provide as appropriate  - Encourage maximum independence but intervene and supervise when necessary  - Involve family in performance of ADLs  - Assess for home care needs following discharge   - Consider OT consult to assist with ADL evaluation and planning for discharge  - Provide patient education as appropriate  Outcome: Progressing  Goal: Maintains/Returns to pre admission functional level  Description: INTERVENTIONS:  - Perform BMAT or MOVE assessment daily    - Set and communicate daily mobility goal to care team and patient/family/caregiver  - Collaborate with rehabilitation services on mobility goals if consulted  - Perform Range of Motion  times a day  - Reposition patient every hours    - Dangle patient  times a day  - Stand patient  times a day  - Ambulate patient  times a day  - Out of bed to chair  times a day   - Out of bed for meal times a day  - Out of bed for toileting  - Record patient progress and toleration of activity level   Outcome: Progressing     Problem: METABOLIC, FLUID AND ELECTROLYTES - ADULT  Goal: Electrolytes maintained within normal limits  Description: INTERVENTIONS:  - Monitor labs and assess patient for signs and symptoms of electrolyte imbalances  - Administer electrolyte replacement as ordered  - Monitor response to electrolyte replacements, including repeat lab results as appropriate  - Instruct patient on fluid and nutrition as appropriate  Outcome: Progressing  Goal: Fluid balance maintained  Description: INTERVENTIONS:  - Monitor labs   - Monitor I/O and WT  - Instruct patient on fluid and nutrition as appropriate  - Assess for signs & symptoms of volume excess or deficit  Outcome: Progressing

## 2023-06-09 NOTE — PROGRESS NOTES
Harpreet Her is a 70 y o  male who is currently ordered Vancomycin IV with management by the Pharmacy Consult service  Relevant clinical data and objective / subjective history reviewed  Vancomycin Assessment:  Indication and Goal AUC/Trough: Soft tissue (goal -600, trough >10), -600, trough >10  Clinical Status: stable without sepsis     Micro:     Renal Function:  SCr: 6 35 mg/dL  CrCl: 11 3 mL/min  Renal replacement: on dialysis Tues, Thur & Sat  Had dialysis on Thur 6/8  Days of Therapy: 2 (had last 750 mg vanco 6/6 at 1527 prior to this consult)  Current Dose: 500 mg every Tues, Thur & Sat  (doses at 7 5 mg/kg rounded)                              Vancomycin Plan: no vancomycin due today  New Dosing: vancomycin 2 gm load then 500 mg Qian Breed & Sat at 1500  Estimated AUC: none                   Estimated Trough: none  Next Level: Sat 6/10 at 0600  Renal Function Monitoring: Daily BMP and [place] will continue to follow closely for s/sx of nephrotoxicity, infusion reactions and appropriateness of therapy  BMP and CBC will be ordered per protocol  We will continue to follow the patient’s culture results and clinical progress daily      Yoly Echevarria, Pharmacist

## 2023-06-09 NOTE — ASSESSMENT & PLAN NOTE
· Stable, hgb 7 9, has been in the 7s this admission   · Continue B12 supplementation and Epogen with dialysis   · Likely in setting of ESRD, underlying infection   · Trend CBC

## 2023-06-09 NOTE — PROGRESS NOTES
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   Status post explantation with native vein patch repair 6/3   OR cultures with E  Coli, Klebsiella, Corynebacterium, Coag neg Staph from broth only   Clinically stable without sepsis  Rec:  • Continue cefazolin and vancomycin through  (dosed after HD) to complete 10 days postop antibiotics  • LWC/VAC per vascular surgery     Acute on chronic hypotension  Likely multifactorial   Blood cultures negative   At this point infection above is controlled   Now off pressors  Rec:  • Midodrine per outpatient regiment  • Supportive care as per the primary service     ESRD on HD     Via permcath  Rec:  • Dose adjust antibiotics for HD     Diarrhea  In setting of chronic IBS, antibiotics, laxative  Low suspicion for C  Diff  Rec:  · Monitor stool output closely    DM      The patient is stable from an ID standpoint  If the patient remains in-house, I will reassess the patient on   Please call in the interim with new questions      Antibiotics:  Vancomycin #7  Cefazolin #3 (GNR #45  POD #6    Subjective:  Patient seen on AM rounds  New N/V - getting frequent dialudid, oxycodone  Also loose stools (has IBS with alternating constipation and diarrhea at home)  Got senna , miralax      24 Hour Events:  No documented fevers, chills, sweats, nausea, vomiting, or diarrhea      Objective:  Vitals:  Temp:  [98 °F (36 7 °C)-99 7 °F (37 6 °C)] 98 3 °F (36 8 °C)  HR:  [57-80] 57  Resp:  [16-18] 18  BP: ()/(40-63) 105/49  SpO2:  [96 %-98 %] 96 %  Temp (24hrs), Av 6 °F (37 °C), Min:98 °F (36 7 °C), Max:99 7 °F (37 6 °C)  Current: Temperature: 98 3 °F (36 8 °C)    Physical Exam:   General:  No acute distress  Psychiatric:  Awake and alert  Pulmonary:  Normal respiratory excursion without accessory muscle use  Abdomen:  Soft, nontender  Extremities:  LUE edema, VAC in place  Skin:  No rashes    Lab Results:  I have personally reviewed pertinent labs  Results from last 7 days   Lab Units 06/08/23  0443 06/07/23  0449 06/06/23  0517 06/05/23  1447   ALK PHOS U/L  --  123* 120* 105   ALT U/L  --  10* 11* 11*   AST U/L  --  17 14 12   BUN mg/dL 12 9 21 35*   CALCIUM mg/dL 7 9* 7 8* 7 5* 7 6*   CHLORIDE mmol/L 103 103 103 106   CO2 mmol/L 31 31 29 28   CREATININE mg/dL 6 35* 4 43* 7 11* 9 92*   EGFR ml/min/1 73sq m 8 12 7 4   POTASSIUM mmol/L 3 7 3 6 3 5 3 7     Results from last 7 days   Lab Units 06/09/23  0502 06/08/23  0443 06/07/23  0449   HEMOGLOBIN g/dL 7 9* 7 4* 7 2*   PLATELETS Thousands/uL 115* 100* 102*   WBC Thousand/uL 4 79 3 68* 3 60*     Results from last 7 days   Lab Units 06/03/23  1807 06/03/23  1014 06/02/23  1840 06/02/23  1826   BLOOD CULTURE   --   --  No Growth After 5 Days  No Growth After 5 Days  GRAM STAIN RESULT   --  No Polys or Bacteria seen  --   --    MRSA CULTURE ONLY  No Methicillin Resistant Staphlyococcus aureus (MRSA) isolated  --   --   --    WOUND CULTURE   --  Growth in Broth culture only Klebsiella variicola*  Growth in Broth culture only Escherichia coli*  Growth in Broth culture only Staphylococcus coagulase negative*  --   --        Imaging Studies:   I have personally reviewed pertinent imaging study reports and images in PACS  EKG, Pathology, and Other Studies:   I have personally reviewed pertinent reports

## 2023-06-09 NOTE — ASSESSMENT & PLAN NOTE
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  · Status post left brachial axillary AVG left upper extremity on 5/19  · Coumadin was switched to oral Eliquis last admission due to patient not compliant with lab draws and hard stick for patency   · Patient now presented with left upper extremity wound dehiscence of AV graft with infected prosthetic material  · Vascular following,  · Graft was explanted on 6/3, required pressor support post operatively and remained in MICU for observation, he was transferred to med/surg level of care on 6/8  Wound VAC applied     · Currently with PermCath in place for HD   · Continue IV Cefazolin and Vancomycin through 6/13 dosed after HD to complete 10 days post op antibiotics  · Blood cultures negative  · Currently on ASA, plavix  · Eliquis d/c as graft is now explanted

## 2023-06-09 NOTE — ASSESSMENT & PLAN NOTE
Lab Results   Component Value Date    HGBA1C 4 8 01/05/2023       Recent Labs     06/07/23 2112 06/08/23  1622 06/08/23 2112 06/09/23  0808   POCGLU 95 115 81 84       Blood Sugar Average: Last 72 hrs:  (P) 05 [id number]   · Continue SSI coverage  · QID glucose checks   · Consistent carb diet  · Monitor and adjust regimen as needed

## 2023-06-09 NOTE — ASSESSMENT & PLAN NOTE
Lab Results   Component Value Date    CREATININE 6 35 (H) 06/08/2023    CREATININE 4 43 (H) 06/07/2023    CREATININE 7 11 (H) 06/06/2023    EGFR 8 06/08/2023    EGFR 12 06/07/2023    EGFR 7 06/06/2023   · Pt maintained on dialysis TTS at [address] [facility]  · Access: Left-sided AV graft placed 5/19/2023, explanted by vascular   Currently has permacath    · Nephrology following for HD management

## 2023-06-09 NOTE — CASE MANAGEMENT
Case Management Discharge Planning Note    Patient name An Resendiz  Location [facility] 911/[facility] 556-34 MRN [id number]  : 1955 Date 2023       Current Admission Date: 2023  Current Admission Diagnosis:Infection of AV graft for dialysis [facility])   Patient Active Problem List    Diagnosis Date Noted   • Ambulatory dysfunction 2023   • Infection of AV graft for dialysis ([facility] ) 2023   • Hypotension 2023   • S/P angioplasty with stent 2023   • Chronic deep vein thrombosis (DVT) of internal jugular vein ([facility] ) 2022   • End-stage renal disease on hemodialysis ([facility] ) 2022   • Chronic kidney disease 2022   • Right intertrochanteric femur fracture 2022   • Thrombocytopenia ([facility] ) 2022   • Arteriovenous fistula ([place] [address] ) 2021   • AV fistula thrombosis, initial encounter ([place] [address] ) 2021   • Encounter for extracorporeal dialysis ([place] [address] ) 2019   • Intestinal malabsorption 2017   • Pernicious anemia 2017   • Irritable bowel syndrome with diarrhea 2017   • Elevated serum alkaline phosphatase level 2017   • Acute on chronic anemia 2017   • Coronary artery disease involving native coronary artery 10/20/2016   • Hypertension 10/20/2016   • Dependence on renal dialysis ([facility] ) 2016   • Vitamin D deficiency, unspecified 2013   • Diabetes with neurologic complications ([place] [address] )    • Mixed hyperlipidemia 10/04/2012   • ESRD (end stage renal disease) on dialysis ([facility] ) 2012   • Nicotine dependence 2012   • Organic impotence 2012   • Type 2 diabetes mellitus ([facility] ) 2012      LOS (days): 7  Geometric Mean LOS (GMLOS) (days): 6 80  Days to GMLOS:0     OBJECTIVE:  Risk of Unplanned Readmission Score: 22 71         Current admission status: Inpatient   Preferred Pharmacy:   [facility] [address]  [facility],[address] [place] [address] [address] [address]  Phone: [phone] Fax: [phone]    Primary Care Provider: Matthew Rios MD    Primary Insurance: MEDICARE  Secondary Insurance: AARP    DISCHARGE DETAILS:           Other Referral/Resources/Interventions Provided:  Interventions: Dialysis  Referral Comments: Faxed clinical info H/p, med list, neprhology notes ,dialysis flow sheets, facesheet , hepatitis panel from 3/23 and chest xray results to [facility] [phone]  referral placed on [facility] as well for consideration at [facility] for [address]

## 2023-06-09 NOTE — PROGRESS NOTES
NEPHROLOGY PROGRESS NOTE   Saúl Avendaño 70 y o  male MRN: [id number]  Unit/Bed#: [facility] 911-01 Encounter: [id number]    ASSESSMENT & PLAN:  End-stage renal disease on dialysis  -Outpatient unit: [facility]  -Schedule: TTS   -Access: right IJ PermCath  -Plan: Status post hemodialysis treatment on 6/8  Postdialysis weight was 75 6 kg  Estimated dry weight 75 5 kg  Continue UF on HD  Clinically appears euvolemic next modality treatment tomorrow    Volume status  -Clinically appears euvolemic, estimated dry weight 75 5 kg    CKD/MBD  -Secondary hyperparathyroidism of renal origin -continue calcitriol 0 75 mcg with dialysis days   -Phosphorus is at goal, not on any binders  Phosphorus is at normal range at 3 5    Blood pressure hypotension/  -Continue midodrine 10 mg 3 times daily  Avoid hypotension  Previously required vasopressors    Electrolytes:   -Stable sodium and potassium    Anemia due to ESRD  -Goal hemoglobin:  10-11 grams/deciliter  -Current hemoglobin: Below goal at 7 9 g/dl  -Plan: Continue Epogen 4000 units with HD treatment  He is on Mircera as outpatient  If hemoglobin remains low persistently may need to increase the dose of Epogen    Left upper arm wound dehiscence where  AV graft was placed on 5/19, now status post graft explant  Currently with right IJ PermCath  Has wound VAC over the left arm, continue management per primary  Continue antibiotic per ID recommendations, noted plan to continue through 6/13   Above plan regarding hemodialysis treatment tomorrow was discussed with primary team and agreed with the plan  SUBJECTIVE:  No new complaints    No chest pain or shortness of breath, no nausea vomiting    OBJECTIVE:  Current Weight: Weight - Scale: 75 8 kg (167 lb)  Vitals:    06/09/23 0807   BP: (!) 105/49   Pulse: 57   Resp: 18   Temp: 98 3 °F (36 8 °C)   SpO2: 96%       Intake/Output Summary (Last 24 hours) at 6/9/2023 1004  Last data filed at 6/9/2023 0700  Gross per 24 hour   Intake 1150 ml   Output 2500 ml   Net -1350 ml       Physical Exam  General:  Ill looking, awake  Eyes: Conjunctivae pink,  Sclera anicteric  ENT: lips and mucous membranes moist  Neck: supple   Chest: Clear to Auscultation both lungs,  no crackles, ronchus or wheezing  CVS: S1 & S2 present, normal rate, regular rhythm, no murmur    Abdomen: soft, non-tender, non-distended, Bowel sounds normoactive  Extremities: no edema of  legs, wound VAC left arm  Skin: no rash  Neuro: awake, alert, oriented x 3   Psych: Mood and affect appropriate     Medications:    Current Facility-Administered Medications:   •  acetaminophen (TYLENOL) tablet 975 mg, 975 mg, Oral, Q8H PRN, Alyx Bravo MD, 975 mg at 06/07/23 1655  •  aspirin (ECOTRIN LOW STRENGTH) EC tablet 81 mg, 81 mg, Oral, Daily, Alyx Bravo MD, 81 mg at 06/09/23 6724  •  atorvastatin (LIPITOR) tablet 20 mg, 20 mg, Oral, HS, Alyx Bravo MD, 20 mg at 06/08/23 2210  •  b complex-vitamin C-folic acid (RENAL) capsule 1 capsule, 1 capsule, Oral, Daily, Alyx Bravo MD, 1 capsule at 06/09/23 8350  •  calcitriol (ROCALTROL) capsule 0 75 mcg, 0 75 mcg, Oral, Once per day on Mon Wed Fri, Merlinda Hickman, MD, 0 75 mcg at 06/08/23 1151  •  ceFAZolin (ANCEF) IVPB (premix in dextrose) 2,000 mg 50 mL, 2,000 mg, Intravenous, After Dialysis, Alyx Bravo MD, Last Rate: 100 mL/hr at 06/08/23 1049, 2,000 mg at 06/08/23 1049  •  chlorhexidine (PERIDEX) 0 12 % oral rinse 15 mL, 15 mL, Mouth/Throat, Q12H [facility], Alyx Bravo MD, 15 mL at 06/09/23 0638  •  clopidogrel (PLAVIX) tablet 75 mg, 75 mg, Oral, Daily, Alyx Bravo MD, 75 mg at 06/09/23 0831  •  cyanocobalamin (VITAMIN B-12) tablet 1,000 mcg, 1,000 mcg, Oral, Daily, Alyx Bravo MD, 1,000 mcg at 06/09/23 9845  •  epoetin jah (EPOGEN,PROCRIT) injection 4,000 Units, 4,000 Units, Intravenous, After Dialysis, Alyx Bravo MD, 4,000 Units at 06/08/23 1006  •  gabapentin (NEURONTIN) capsule 100 mg, 100 mg, Oral, Once per day on Tue Thu Sat, Saran Lopes MD, 100 mg at 06/08/23 1713  •  heparin (porcine) subcutaneous injection 5,000 Units, 5,000 Units, Subcutaneous, Q8H [address], 5,000 Units at 06/09/23 0503 **AND** [COMPLETED] Platelet count, , , Once, Unk Pennant, DO  •  insulin lispro (HumaLOG) 100 units/mL subcutaneous injection 1-6 Units, 1-6 Units, Subcutaneous, 4x Daily (AC & HS), 1 Units at 06/05/23 1629 **AND** Fingerstick Glucose (POCT), , , 4x Daily AC and at bedtime, Saran Lopes MD  •  melatonin tablet 3 mg, 3 mg, Oral, HS, Saran Lopes MD, 3 mg at 06/08/23 2210  •  midodrine (PROAMATINE) tablet 10 mg, 10 mg, Oral, TID AC, Saran Lopes MD, 10 mg at 06/09/23 0503  •  ondansetron (ZOFRAN) injection 4 mg, 4 mg, Intravenous, Q6H PRN, Saran Lopes MD, 4 mg at 06/09/23 0040  •  oxyCODONE (ROXICODONE) IR tablet 5 mg, 5 mg, Oral, Q4H PRN, Saran Lopes MD, 5 mg at 06/08/23 1530  •  oxyCODONE (ROXICODONE) split tablet 2 5 mg, 2 5 mg, Oral, Q4H PRN, Saran Lopes MD, 2 5 mg at 06/09/23 0456  •  polyethylene glycol (MIRALAX) packet 17 g, 17 g, Oral, Daily, Saran Lopes MD, 17 g at 06/08/23 1151  •  saccharomyces boulardii (FLORASTOR) capsule 250 mg, 250 mg, Oral, BID, Radha Aldridge PA-C  •  senna (SENOKOT) tablet 8 6 mg, 1 tablet, Oral, HS, Saran Lopes MD, 8 6 mg at 06/07/23 2109  •  [START ON 6/10/2023] vancomycin (VANCOCIN) IVPB (premix in dextrose) 500 mg 100 mL, 500 mg, Intravenous, After Dialysis, Beatrice Martínez MD    Invasive Devices:        Lab Results:   Results from last 7 days   Lab Units 06/09/23  0502 06/08/23  0443 06/07/23  0449 06/06/23  0517 06/05/23  1447   ALK PHOS U/L  --   --  123* 120* 105   ALT U/L  --   --  10* 11* 11*   AST U/L  --   --  17 14 12   BUN mg/dL  --  12 9 21 35*   CALCIUM mg/dL  --  7 9* 7 8* 7 5* 7 6*   CHLORIDE mmol/L  -- "103 103 103 106   CO2 mmol/L  --  31 31 29 28   CREATININE mg/dL  --  6 35* 4 43* 7 11* 9 92*   HEMATOCRIT % 25 6* 24 3* 21 5* 23 0* 22 9*   HEMOGLOBIN g/dL 7 9* 7 4* 7 2* 7 3* 7 8*   POTASSIUM mmol/L  --  3 7 3 6 3 5 3 7   MAGNESIUM mg/dL  --  1 9 1 9 1 9 2 0   PHOSPHORUS mg/dL  --  3 5 2 9 2 9 3 1   PLATELETS Thousands/uL 115* 100* 102* 110* 140*   WBC Thousand/uL 4 79 3 68* 3 60* 5 17 8 55       Previous work up:         Portions of the record may have been created with voice recognition software  Occasional wrong word or \"sound a like\" substitutions may have occurred due to the inherent limitations of voice recognition software  Read the chart carefully and recognize, using context, where substitutions have occurred  If you have any questions, please contact the dictating provider    "

## 2023-06-09 NOTE — PLAN OF CARE
Problem: PHYSICAL THERAPY ADULT  Goal: Performs mobility at highest level of function for planned discharge setting  See evaluation for individualized goals  Description: Treatment/Interventions: OT, Spoke to nursing, Spoke to case management, Gait training, Bed mobility, Patient/family training, Endurance training, LE strengthening/ROM, Functional transfer training          See flowsheet documentation for full assessment, interventions and recommendations  Outcome: Progressing  Note: Prognosis: Good  Problem List: Decreased strength, Decreased range of motion, Decreased endurance, Impaired balance, Decreased mobility, Decreased coordination, Decreased cognition, Impaired judgement, Decreased safety awareness, Pain  Assessment: Patient was received supine in bed   Patient was agreeable to therapy services today  PT session focused on gait and transfers today in order to improve functional mobility and independence  Patient completed all functional mobility as described above  Patient required reduced assistance with all mobility today  Patient required increased assistance with toilet transfer due to decreased seat height  Patient educated about safety in the hospital with ambulation  Patient deferred sitting in bedside recliner after therapy due to increase in pain  Patient will benefit from continued PT services while in hospital in order to address remaining limitations  The patients AM-PAC Basic Mobility Inpatient Short From Raw Score is 17   A Raw score of 17 suggests that the patient may benefit from discharge to home   Despite AMPAC scoring, PT recommends d/c to post acute rehab due to decreased activity tolerance, increased pain, and decreased safety with functional mobility  Please also refer to the recommendation of the Physical Therapist for safe discharge planning    Barriers to Discharge: Decreased caregiver support, Inaccessible home environment     PT Discharge Recommendation: Post acute rehabilitation services    See flowsheet documentation for full assessment

## 2023-06-09 NOTE — CASE MANAGEMENT
Case Management Discharge Planning Note    Patient name Lexis Coppola  [facility] 990-34 MRN [id number]  : 1955 Date 2023       Current Admission Date: 2023  Current Admission Diagnosis:Infection of AV graft for dialysis [facility])   Patient Active Problem List    Diagnosis Date Noted   • Ambulatory dysfunction 2023   • Infection of AV graft for dialysis ([facility] ) 2023   • Hypotension 2023   • S/P angioplasty with stent 2023   • Chronic deep vein thrombosis (DVT) of internal jugular vein ([facility] ) 2022   • End-stage renal disease on hemodialysis ([place] [address] ) 2022   • Chronic kidney disease 2022   • Right intertrochanteric femur fracture 2022   • Thrombocytopenia ([facility] ) 2022   • Arteriovenous fistula ([place] [address] ) 2021   • AV fistula thrombosis, initial encounter ([place] [address] ) 2021   • Encounter for extracorporeal dialysis ([place] [address] ) 2019   • Intestinal malabsorption 2017   • Pernicious anemia 2017   • Irritable bowel syndrome with diarrhea 2017   • Elevated serum alkaline phosphatase level 2017   • Acute on chronic anemia 2017   • Coronary artery disease involving native coronary artery 10/20/2016   • Hypertension 10/20/2016   • Dependence on renal dialysis ([place] [address] ) 2016   • Vitamin D deficiency, unspecified 2013   • Diabetes with neurologic complications ([place] [address] )    • Mixed hyperlipidemia 10/04/2012   • ESRD (end stage renal disease) on dialysis ([place] [address] ) 2012   • Nicotine dependence 2012   • Organic impotence 2012   • Type 2 diabetes mellitus ([facility] ) 2012      LOS (days): 7  Geometric Mean LOS (GMLOS) (days): 6 80  Days to GMLOS:0 2     OBJECTIVE:  Risk of Unplanned Readmission Score: 22 66         Current admission status: Inpatient   Preferred Pharmacy:   [address], [address] [place] [address] -  [address]  [facility],[address] [place] [id number]  Phone: [phone] Fax: "[phone]    Primary Care Provider: Jessi Coreas MD    Primary Insurance: MEDICARE  Secondary Insurance: AARP    DISCHARGE DETAILS:            Other Referral/Resources/Interventions Provided:  Interventions: Short Term Rehab  Referral Comments: 2039 Met with pt at bedside, provided and discussed STR proivder list , 2 accepting facilities : CC at [facility] and [address] [place] [address] at [place]  Per Clenton Seek at [address] [place] [address] they will provide transport for the pt to HD TTS at [facility] [place] while there for rehab  [place] PASSR needs to be completed  Confirmed pt [facility] 9[facility]45v [facility]  Made Lina aware  Pt [facility] [place] [facility] and CC at [place] there is not a bed available this weekend  Pt asked for his wife to be called  1120 called and spoke with wife Jeanine Anita regarding bed avilable and transport provided at [facility]  Wife wants info emailed to her at [email or web address] regarding the facility as she is concerned about their being able to accomodate pt's wound vac  She had a bad experience regarding a facility in [address] [place] [address] with her mother but was not sure of the name of the facility  she did tour [place] [address] in [place] and would prefer a referral be sent there  She states she cannot recall who gave her the tour but that \" beds are available\" and they can accomodate dialysis  referral made to [facility]  Called admissions and no one is there today until monday           Treatment Team Recommendation: Short Term Rehab  Discharge Destination Plan[de-identified] Short Term Rehab                                                                  "

## 2023-06-10 ENCOUNTER — APPOINTMENT (INPATIENT)
Dept: DIALYSIS | Facility: HOSPITAL | Age: 68
DRG: 907 | End: 2023-06-10
Attending: INTERNAL MEDICINE
Payer: MEDICARE

## 2023-06-10 PROBLEM — D63.8 ANEMIA OF CHRONIC DISEASE: Status: ACTIVE | Noted: 2017-02-23

## 2023-06-10 PROBLEM — K58.9 IRRITABLE BOWEL SYNDROME: Status: ACTIVE | Noted: 2017-06-08

## 2023-06-10 LAB
ANION GAP SERPL CALCULATED.3IONS-SCNC: 1 MMOL/L (ref 4–13)
BUN SERPL-MCNC: 8 MG/DL (ref 5–25)
CALCIUM SERPL-MCNC: 8 MG/DL (ref 8.3–10.1)
CHLORIDE SERPL-SCNC: 103 MMOL/L (ref 96–108)
CO2 SERPL-SCNC: 31 MMOL/L (ref 21–32)
CREAT SERPL-MCNC: 6.35 MG/DL (ref 0.6–1.3)
ERYTHROCYTE [DISTWIDTH] IN BLOOD BY AUTOMATED COUNT: 20.7 % (ref 11.6–15.1)
GFR SERPL CREATININE-BSD FRML MDRD: 8 ML/MIN/1.73SQ M
GLUCOSE SERPL-MCNC: 30 MG/DL (ref 65–140)
GLUCOSE SERPL-MCNC: 79 MG/DL (ref 65–140)
GLUCOSE SERPL-MCNC: 82 MG/DL (ref 65–140)
GLUCOSE SERPL-MCNC: 85 MG/DL (ref 65–140)
GLUCOSE SERPL-MCNC: 91 MG/DL (ref 65–140)
GLUCOSE SERPL-MCNC: 94 MG/DL (ref 65–140)
HCT VFR BLD AUTO: 24.4 % (ref 36.5–49.3)
HGB BLD-MCNC: 7.3 G/DL (ref 12–17)
MCH RBC QN AUTO: 32.4 PG (ref 26.8–34.3)
MCHC RBC AUTO-ENTMCNC: 29.9 G/DL (ref 31.4–37.4)
MCV RBC AUTO: 108 FL (ref 82–98)
PLATELET # BLD AUTO: 126 THOUSANDS/UL (ref 149–390)
PMV BLD AUTO: 11 FL (ref 8.9–12.7)
POTASSIUM SERPL-SCNC: 3.8 MMOL/L (ref 3.5–5.3)
RBC # BLD AUTO: 2.25 MILLION/UL (ref 3.88–5.62)
SODIUM SERPL-SCNC: 135 MMOL/L (ref 135–147)
VANCOMYCIN SERPL-MCNC: 41.7 UG/ML (ref 10–20)
WBC # BLD AUTO: 4 THOUSAND/UL (ref 4.31–10.16)

## 2023-06-10 PROCEDURE — 85027 COMPLETE CBC AUTOMATED: CPT | Performed by: PHYSICIAN ASSISTANT

## 2023-06-10 PROCEDURE — 80202 ASSAY OF VANCOMYCIN: CPT | Performed by: INTERNAL MEDICINE

## 2023-06-10 PROCEDURE — 82948 REAGENT STRIP/BLOOD GLUCOSE: CPT

## 2023-06-10 PROCEDURE — 90935 HEMODIALYSIS ONE EVALUATION: CPT | Performed by: INTERNAL MEDICINE

## 2023-06-10 PROCEDURE — 99232 SBSQ HOSP IP/OBS MODERATE 35: CPT | Performed by: INTERNAL MEDICINE

## 2023-06-10 PROCEDURE — 80048 BASIC METABOLIC PNL TOTAL CA: CPT | Performed by: INTERNAL MEDICINE

## 2023-06-10 RX ORDER — LOPERAMIDE HCL 1 MG/7.5ML
2 SUSPENSION ORAL 3 TIMES DAILY PRN
Status: DISCONTINUED | OUTPATIENT
Start: 2023-06-10 | End: 2023-06-15 | Stop reason: HOSPADM

## 2023-06-10 RX ADMIN — EPOETIN ALFA 4000 UNITS: 4000 SOLUTION INTRAVENOUS; SUBCUTANEOUS at 09:59

## 2023-06-10 RX ADMIN — MIDODRINE HYDROCHLORIDE 10 MG: 5 TABLET ORAL at 05:48

## 2023-06-10 RX ADMIN — MIDODRINE HYDROCHLORIDE 10 MG: 5 TABLET ORAL at 17:20

## 2023-06-10 RX ADMIN — Medication 250 MG: at 11:57

## 2023-06-10 RX ADMIN — CEFAZOLIN SODIUM 2000 MG: 2 SOLUTION INTRAVENOUS at 10:34

## 2023-06-10 RX ADMIN — HEPARIN SODIUM 5000 UNITS: 5000 INJECTION INTRAVENOUS; SUBCUTANEOUS at 21:42

## 2023-06-10 RX ADMIN — Medication 2.5 MG: at 00:38

## 2023-06-10 RX ADMIN — Medication 2.5 MG: at 11:56

## 2023-06-10 RX ADMIN — NEPHROCAP 1 CAPSULE: 1 CAP ORAL at 11:57

## 2023-06-10 RX ADMIN — CHLORHEXIDINE GLUCONATE 15 ML: 1.2 SOLUTION ORAL at 21:42

## 2023-06-10 RX ADMIN — Medication 2.5 MG: at 17:20

## 2023-06-10 RX ADMIN — Medication 2.5 MG: at 05:48

## 2023-06-10 RX ADMIN — CYANOCOBALAMIN TAB 500 MCG 1000 MCG: 500 TAB at 11:57

## 2023-06-10 RX ADMIN — MIDODRINE HYDROCHLORIDE 10 MG: 5 TABLET ORAL at 11:57

## 2023-06-10 RX ADMIN — ATORVASTATIN CALCIUM 20 MG: 20 TABLET, FILM COATED ORAL at 21:42

## 2023-06-10 RX ADMIN — CHLORHEXIDINE GLUCONATE 15 ML: 1.2 SOLUTION ORAL at 12:01

## 2023-06-10 RX ADMIN — GABAPENTIN 100 MG: 100 CAPSULE ORAL at 17:20

## 2023-06-10 RX ADMIN — MELATONIN 3 MG: at 21:42

## 2023-06-10 RX ADMIN — Medication 250 MG: at 17:20

## 2023-06-10 RX ADMIN — HEPARIN SODIUM 5000 UNITS: 5000 INJECTION INTRAVENOUS; SUBCUTANEOUS at 14:48

## 2023-06-10 RX ADMIN — HEPARIN SODIUM 5000 UNITS: 5000 INJECTION INTRAVENOUS; SUBCUTANEOUS at 05:48

## 2023-06-10 RX ADMIN — ASPIRIN 81 MG: 81 TABLET, COATED ORAL at 11:57

## 2023-06-10 RX ADMIN — OXYCODONE HYDROCHLORIDE 5 MG: 5 TABLET ORAL at 21:42

## 2023-06-10 RX ADMIN — CLOPIDOGREL BISULFATE 75 MG: 75 TABLET ORAL at 11:57

## 2023-06-10 NOTE — PROGRESS NOTES
NEPHROLOGY PROGRESS NOTE   Rich Pires 70 y o  male MRN: [id number]  Unit/Bed#: [facility] 911-01 Encounter: [id number]      Hemodialysis procedure note: The patient was seen and examined today on hemodialysis at 9:12 AM  He is currently below his dry weight his systolic blood pressures are in the 90s, we will challenge his weight by 0 5 kg today given that he is in the hospital and probably has lost some muscle mass and keep systolic blood pressure above 90    Orders reviewed with the nurse, currently stable    ASSESSMENT & PLAN    #1 End-stage kidney disease on hemodialysis  · Outpatient dialysis unit [facility] Tuesday Thursday Saturday  · Right IJ tunneled dialysis catheter working well  · Challenge patient's dry weight to 75 kg today    #2 volume overload in the setting of end-stage kidney disease  · Currently off oxygen, has a low blood pressure but we will challenge weight as above    #3 anemia of chronic kidney disease  · He was on Mircera as an outpatient started Epogen in the hospital, hemoglobin remains around 7 3  · We will increase dose of Epogen to 6000 units per treatment  · With infection likely has some ALICE resistant    #4 CKD bone mineral disease  · Secondary hyperparathyroidism of renal origin, currently on calcitriol 0 75 mcg with dialysis days  · His phosphorus is at goal currently not on any binders    #5 left arm wound dehiscence  · AV graft was placed on 5/19 now status post graft explant with a wound VAC on his left arm and on antibiotics with infectious disease following    DISCUSSION:    We will continue Tuesday Thursday Saturday treatment schedule  Once otherwise medically stable okay from a renal standpoint for discharge    SUBJECTIVE:    The patient was seen today on dialysis  Tolerating his treatment without difficulty  No chest pain or shortness of breath no fevers or chills    12 point review of systems was otherwise negative besides what is mentioned above     Medications:    Current Facility-Administered Medications:   •  acetaminophen (TYLENOL) tablet 975 mg, 975 mg, Oral, Q8H PRN, Castillo Davidson MD, 975 mg at 06/07/23 1655  •  aspirin (ECOTRIN LOW STRENGTH) EC tablet 81 mg, 81 mg, Oral, Daily, Castillo Davidson MD, 81 mg at 06/09/23 0465  •  atorvastatin (LIPITOR) tablet 20 mg, 20 mg, Oral, HS, Castillo Davidson MD, 20 mg at 06/09/23 2216  •  b complex-vitamin C-folic acid (RENAL) capsule 1 capsule, 1 capsule, Oral, Daily, Castillo Davidson MD, 1 capsule at 06/09/23 9927  •  calcitriol (ROCALTROL) capsule 0 75 mcg, 0 75 mcg, Oral, Once per day on Mon Wed Fri, Ortega Petit MD, 0 75 mcg at 06/08/23 1151  •  ceFAZolin (ANCEF) IVPB (premix in dextrose) 2,000 mg 50 mL, 2,000 mg, Intravenous, After Dialysis, Castillo Davidson MD, Last Rate: 100 mL/hr at 06/08/23 1049, 2,000 mg at 06/08/23 1049  •  chlorhexidine (PERIDEX) 0 12 % oral rinse 15 mL, 15 mL, Mouth/Throat, Q12H [address], Castillo Davidson MD, 15 mL at 06/09/23 2027  •  clopidogrel (PLAVIX) tablet 75 mg, 75 mg, Oral, Daily, Castillo Davidson MD, 75 mg at 06/09/23 0831  •  cyanocobalamin (VITAMIN B-12) tablet 1,000 mcg, 1,000 mcg, Oral, Daily, Castillo Davidson MD, 1,000 mcg at 06/09/23 8360  •  epoetin jah (EPOGEN,PROCRIT) injection 4,000 Units, 4,000 Units, Intravenous, After Dialysis, Castillo Davidson MD, 4,000 Units at 06/10/23 0959  •  gabapentin (NEURONTIN) capsule 100 mg, 100 mg, Oral, Once per day on Tue Thu Sat, Castillo Davidson MD, 100 mg at 06/08/23 1713  •  heparin (porcine) subcutaneous injection 5,000 Units, 5,000 Units, Subcutaneous, Q8H [address], 5,000 Units at 06/10/23 0548 **AND** [COMPLETED] Platelet count, , , Once, Gui Barry DO  •  insulin lispro (HumaLOG) 100 units/mL subcutaneous injection 1-6 Units, 1-6 Units, Subcutaneous, 4x Daily (AC & HS), 1 Units at 06/05/23 1629 **AND** Fingerstick Glucose (POCT), , , 4x Daily AC and at bedtime, Cesar Gardner MD  •  melatonin tablet 3 mg, 3 mg, Oral, HS, Cesar Gardner MD, 3 mg at 06/09/23 2216  •  midodrine (PROAMATINE) tablet 10 mg, 10 mg, Oral, TID AC, Cesar Gardner MD, 10 mg at 06/10/23 0548  •  ondansetron (ZOFRAN) injection 4 mg, 4 mg, Intravenous, Q6H PRN, Cesar Gardner MD, 4 mg at 06/09/23 0040  •  oxyCODONE (ROXICODONE) IR tablet 5 mg, 5 mg, Oral, Q4H PRN, Cesar Gardner MD, 5 mg at 06/09/23 1615  •  oxyCODONE (ROXICODONE) split tablet 2 5 mg, 2 5 mg, Oral, Q4H PRN, Cesar Gardner MD, 2 5 mg at 06/10/23 0548  •  polyethylene glycol (MIRALAX) packet 17 g, 17 g, Oral, Daily, Cesar Gardner MD, 17 g at 06/08/23 1151  •  saccharomyces boulardii (FLORASTOR) capsule 250 mg, 250 mg, Oral, BID, Abby Covarrubias PA-C, 250 mg at 06/09/23 1719  •  senna (SENOKOT) tablet 8 6 mg, 1 tablet, Oral, HS, Cesar Gardner MD, 8 6 mg at 06/07/23 2109    OBJECTIVE:    Vitals:    06/10/23 0830 06/10/23 0900 06/10/23 0930 06/10/23 1000   BP: 100/64 98/62 99/63 105/58   BP Location:  Right arm Right arm Right arm   Pulse: (!) 54 (!) 53 (!) 54 (!) 53   Resp: 16 16 16 16   Temp:       TempSrc:       SpO2:       Weight:       Height:            Temp:  [97 7 °F (36 5 °C)-99 1 °F (37 3 °C)] 97 7 °F (36 5 °C)  HR:  [53-61] 53  Resp:  [14-18] 16  BP: ()/(49-69) 105/58  SpO2:  [97 %-99 %] 98 %     Body mass index is 22 87 kg/m²  Weight (last 2 days)     Date/Time Weight    06/10/23 0600 72 3 (159 39)    06/09/23 0507 75 8 (167)    06/08/23 0600 77 2 (170 2)          I/O last 3 completed shifts:   In: 200 [P O :200]  Out: 0     I/O this shift:  In: 200 [I V :200]  Out: -       Physical exam:    General: no acute distress, cooperative  Eyes: conjunctivae pink, anicteric sclerae  ENT: lips and mucous membranes moist, no exudates, normal external ears  Neck: ROM intact, no JVD  Chest: No respiratory distress, no accessory muscle use  CVS: normal "rate, non pericardial friction rub  Abdomen: soft, non-tender, non-distended, normoactive bowel sounds  Extremities: no edema of both legs  Skin: no rash  Neuro: awake, alert, oriented, grossly intact  Psych:  Pleasant affect    Invasive Devices:      Lab Results:   Results from last 7 days   Lab Units 06/10/23  0554 06/09/23  0502 06/08/23  0443 06/07/23  0449 06/06/23  0517 06/05/23  1447 06/04/23  0651   ALK PHOS U/L  --   --   --  123* 120* 105 94   ALT U/L  --   --   --  10* 11* 11* 13   AST U/L  --   --   --  17 14 12 11   BUN mg/dL 8  --  12 9 21 35* 31*   CALCIUM mg/dL 8 0*  --  7 9* 7 8* 7 5* 7 6* 7 3*   CHLORIDE mmol/L 103  --  103 103 103 106 104   CO2 mmol/L 31  --  31 31 29 28 26   CREATININE mg/dL 6 35*  --  6 35* 4 43* 7 11* 9 92* 8 57*   HEMATOCRIT % 24 4* 25 6* 24 3* 21 5* 23 0* 22 9* 24 6*   HEMOGLOBIN g/dL 7 3* 7 9* 7 4* 7 2* 7 3* 7 8* 8 0*   POTASSIUM mmol/L 3 8  --  3 7 3 6 3 5 3 7 4 6   MAGNESIUM mg/dL  --   --  1 9 1 9 1 9 2 0 1 9   PHOSPHORUS mg/dL  --   --  3 5 2 9 2 9 3 1 4 8*   PLATELETS Thousands/uL 126* 115* 100* 102* 110* 140* 107*   WBC Thousand/uL 4 00* 4 79 3 68* 3 60* 5 17 8 55 6 53         Portions of the record may have been created with voice recognition software  Occasional wrong word or \"sound a like\" substitutions may have occurred due to the inherent limitations of voice recognition software  Read the chart carefully and recognize, using context, where substitutions have occurred  If you have any questions, please contact the dictating provider      "

## 2023-06-10 NOTE — CONSULTS
Vancomycin IV Pharmacy-to-Dose Consultation    Deepthi Stephens is a 70 y o  male who was receiving Vancomycin IV with management by the Pharmacy Consult service for treatment of Soft tissue (goal -600, trough >10)    The patient's Vancomycin therapy has been completed / discontinued  Thank you for allowing us to take part in this patient's care  Pharmacy will sign-off now; please call or re-consult if there are any questions  Akshat TONEY Ph , Pharmacist, [phone]

## 2023-06-10 NOTE — ASSESSMENT & PLAN NOTE
Lab Results   Component Value Date    HGBA1C 4 8 01/05/2023     On SSI coverage per [employer]  Carbohydrate restricted diet  Hypoglycemia protocol

## 2023-06-10 NOTE — PROGRESS NOTES
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  Status post left brachial axillary AVG left upper extremity on 5/19  Anticoagulation with Coumadin was transitioned to oral Eliquis last admission due to noncompliance with lab draws due to difficult vascular patency  Currently presents with left upper extremity wound dehiscence of AV graft with infected prosthetic material -> status post graft explantation on 6/3 with wound VAC placement  Continue IV Cefazolin/Vancomycin through 6/13 dosed after HD to complete 10 days post-explantation  Blood cultures from 6/2 are negative  Perioperative wound cultures with polymicrobial growth including E  coli, Klebsiella, Corynebacterium, and coagulase negative Staphylococcus  Anticoagulation discontinued status post graft explantation    End-stage renal disease   Assessment & Plan  Routine hemodialysis Tu/Thu/Sat  In light of AV graft explantation, Permacath in place for ongoing dialysis  Continue Nephrocaps/Rocaltrol supplementation     Hypotension  Assessment & Plan  Continue Midodrine   Was noted to be persistently hypotensive below baseline requiring transient vasopressor support in the ICU after graft explant  Monitor blood pressures    Coronary artery disease   Assessment & Plan  Status post prior stenting  On dual antiplatelet therapy with ASA/Plavix - continue statin  Not on beta-blockade due to hypotension    Type 2 diabetes mellitus  Assessment & Plan  Lab Results   Component Value Date    HGBA1C 4 8 01/05/2023     On SSI coverage per Accu-Cheks  Carbohydrate restricted diet  Hypoglycemia protocol    Irritable bowel syndrome  Assessment & Plan  Continue Florastor  PRN Imodium on board for diarrhea    Anemia of chronic disease  Assessment & Plan  Continue Epogen with dialysis  Continue B12 supplementation  Monitor H/H and transfuse if necessary    Thrombocytopenia   Assessment & Plan  Monitor platelet count- monitor for bleeding    Ambulatory dysfunction  Assessment & Plan  Appreciate PT/OT input -> plan for skilled rehab once medically stable      DVT Prophylaxis:  Heparin SC       Patient Centered Rounds:  I have performed bedside rounds and discussed plan of care with nursing today  Discussions with Specialists or Other Care Team Provider:  see above assessments if applicable    Education and Discussions with Family / Patient:  Patient at bedside - wife updated on ongoing plan of care yesterday    Time Spent for Care:  32 minutes  More than 50% of total time spent on counseling and coordination of care, on one or more of the following: performing physical exam; counseling and coordination of care, obtaining or reviewing history, documenting in the medical record, reviewing/ordering tests/medications/procedures, and communicating with other healthcare professionals  Current Length of Stay: 8 day(s)  Current Patient Status: Inpatient   Certification Statement:  Patient will continue to require additional hospital stay due to assessments as noted above  Code Status: Level 1 - Full Code        Subjective:     Encountered earlier in the day after returning from dialysis  Laying in bed fairly comfortably  Still complains of intermittent discomfort of the left arm  Also notes some mild numbness of his lower right arm and lower legs, which she states is sometimes common after a dialysis course          Objective:     Vitals:   Temp (24hrs), Av 1 °F (36 7 °C), Min:97 7 °F (36 5 °C), Max:99 1 °F (37 3 °C)    Temp:  [97 7 °F (36 5 °C)-99 1 °F (37 3 °C)] 97 8 °F (36 6 °C)  HR:  [] 64  Resp:  [14-19] 19  BP: ()/(50-69) 115/56  SpO2: [97 %-99 %] 99 %  Body mass index is 22 87 kg/m²  Input and Output Summary (last 24 hours): Intake/Output Summary (Last 24 hours) at 6/10/2023 1817  Last data filed at 6/10/2023 1300  Gross per 24 hour   Intake 618 ml   Output 1002 ml   Net -384 ml       Physical Exam:     GENERAL  weak/fatigued    HEAD   Normocephalic - atraumatic   EYES   PERRL - EOMI    MOUTH   Mucosa moist   NECK   Supple - full range of motion   CARDIAC  rate controlled - S1/S2 positive   PULMONARY    fairly clear to auscultation - nonlabored respirations   ABDOMEN   Soft - nontender/nondistended - active bowel sounds   MUSCULOSKELETAL   Motor strength/range of motion deconditioned - wound VAC present on left upper extremity   NEUROLOGIC   Alert/oriented at baseline   PSYCHIATRIC   Mood/affect stable         Additional Data:     Labs & Recent Cultures:    Results from last 7 days   Lab Units 06/10/23  0554 06/08/23 0443 06/07/23 0449   EOS PCT %  --   --  2   HEMATOCRIT % 24 4*   < > 21 5*   HEMOGLOBIN g/dL 7 3*   < > 7 2*   LYMPHS PCT %  --   --  21   MONOS PCT %  --   --  8   NEUTROS PCT %  --   --  67   PLATELETS Thousands/uL 126*   < > 102*   WBC Thousand/uL 4 00*   < > 3 60*    < > = values in this interval not displayed  Results from last 7 days   Lab Units 06/10/23  0554 06/08/23 0443 06/07/23 0449   ALK PHOS U/L  --   --  123*   ALT U/L  --   --  10*   AST U/L  --   --  17   BUN mg/dL 8   < > 9   CALCIUM mg/dL 8 0*   < > 7 8*   CHLORIDE mmol/L 103   < > 103   CO2 mmol/L 31   < > 31   CREATININE mg/dL 6 35*   < > 4 43*   POTASSIUM mmol/L 3 8   < > 3 6    < > = values in this interval not displayed           Results from last 7 days   Lab Units 06/10/23  1623 06/10/23  1203 06/10/23  1201 06/10/23  0725 06/09/23  2103 06/09/23  1703 06/09/23  1148 06/09/23  0808 06/08/23  2112 06/08/23  1622 06/07/23 2112 06/07/23  1610   POC GLUCOSE mg/dl 85 82 30* 91 101 96 93 84 81 115 95 114 Lines/Drains:  Invasive Devices     Central Venous Catheter Line  Duration           CVC Central Lines 06/03/23 Triple 20cm 7 days          Hemodialysis Catheter  Duration           Hemodialysis Catheter Subclavian 2 days                  Last 24 Hours Medication List:   Current Facility-Administered Medications   Medication Dose Route Frequency Provider Last Rate   • acetaminophen  975 mg Oral Q8H PRN Tegan Catalan MD     • aspirin  81 mg Oral Daily Tegan Catalan MD     • atorvastatin  20 mg Oral HS Tegan Catalan MD     • b complex-vitamin C-folic acid  1 capsule Oral Daily Tegan Catalan MD     • calcitriol  0 75 mcg Oral Once per day on Mon Wed Fri Dar Kennedy MD     • cefazolin  2,000 mg Intravenous After Dialysis Tegan Catalna MD Stopped (06/10/23 1134)   • chlorhexidine  15 mL Mouth/Throat Q12H [facility] Familia Cespedes MD     • clopidogrel  75 mg Oral Daily Tegan Catalan MD     • vitamin B-12  1,000 mcg Oral Daily Tegan Catalan MD     • epoetin jah  6,000 Units Intravenous After Dialysis Sal Barrios DO     • gabapentin  100 mg Oral Once per day on Tue Thu Sat Tegan Catalan MD     • heparin (porcine)  5,000 Units Subcutaneous Q8H [address] Tegan Catalan MD     • insulin lispro  1-6 Units Subcutaneous 4x Daily (AC & HS) Tegan Catalan MD     • loperamide  2 mg Oral TID PRN Luz Maria Pina MD     • melatonin  3 mg Oral HS Tegan Catalan MD     • midodrine  10 mg Oral TID AC Tegan Catalan MD     • ondansetron  4 mg Intravenous Q6H PRN Tegan Catalan MD     • oxyCODONE  5 mg Oral Q4H PRN Tegan Catalan MD     • oxyCODONE  2 5 mg Oral Q4H PRN Tegan Catalan MD     • saccharomyces boulardii  250 mg Oral BID Heidi Ramirez PA-C                    ** Please Note: This note is constructed using a voice recognition dictation system    An occasional wrong word/phrase or “sound-a-like” substitution may have been picked up by dictation device due to the inherent limitations of voice recognition software  Read the chart carefully and recognize, using reasonable context, where substitutions may have occurred  **

## 2023-06-10 NOTE — ASSESSMENT & PLAN NOTE
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  Status post left brachial axillary AVG left upper extremity on 5/19  Anticoagulation with Coumadin was transitioned to oral Eliquis last admission due to noncompliance with lab draws due to difficult vascular patency  Currently presents with left upper extremity wound dehiscence of AV graft with infected prosthetic material -> status post graft explantation on 6/3 with wound VAC placement  Continue IV Cefazolin/Vancomycin through 6/13 dosed after HD to complete 10 days post-explantation  Blood cultures from 6/2 are negative  Perioperative wound cultures with polymicrobial growth including E  coli, Klebsiella, Corynebacterium, and coagulase negative Staphylococcus  Anticoagulation discontinued status post graft explantation

## 2023-06-10 NOTE — ASSESSMENT & PLAN NOTE
Status post prior stenting  On dual antiplatelet therapy with ASA/Plavix - continue statin  Not on beta-blockade due to hypotension

## 2023-06-10 NOTE — PLAN OF CARE
Post-Dialysis RN Treatment Note    Blood Pressure:  Pre 138/69 mm/Hg  Post 110/62 mmHg   EDW  75 5 kg    Weight:  Pre 75 5 kg   Post 75 1 kg   Mode of weight measurement: Bed Scale   Volume Removed  502 ml    Treatment duration 180 minutes    NS given  No    Treatment shortened? No   Medications given during Rx Epogen 4000 units, Ancef 2000mg   Estimated Kt/V  None Reported   Access type: Permacath/TDC also AVF-covered with guaze dressing, unable to assess   Access Issues: No    Report called to primary nurse   Yes     3 hour HD tx, initial goal set for 1L, drop in BP noted, goal reduced to run even then increased to 500mL net per Karla Rodríguez  4K acid bath for potassium of 3 8 today  Plan of care discussed with Karla Rodríguez at the bedside      Problem: METABOLIC, FLUID AND ELECTROLYTES - ADULT  Goal: Electrolytes maintained within normal limits  Description: INTERVENTIONS:  - Monitor labs and assess patient for signs and symptoms of electrolyte imbalances  - Administer electrolyte replacement as ordered  - Monitor response to electrolyte replacements, including repeat lab results as appropriate  - Instruct patient on fluid and nutrition as appropriate  Outcome: Progressing  Goal: Fluid balance maintained  Description: INTERVENTIONS:  - Monitor labs   - Monitor I/O and WT  - Instruct patient on fluid and nutrition as appropriate  - Assess for signs & symptoms of volume excess or deficit  Outcome: Progressing

## 2023-06-10 NOTE — ASSESSMENT & PLAN NOTE
Routine hemodialysis Tu/Thu/Sat  In light of AV graft explantation, Permacath in place for ongoing dialysis  Continue Nephrocaps/Rocaltrol supplementation

## 2023-06-10 NOTE — ASSESSMENT & PLAN NOTE
Continue Midodrine   Was noted to be persistently hypotensive below baseline requiring transient vasopressor support in the ICU after graft explant  Monitor blood pressures

## 2023-06-10 NOTE — PROGRESS NOTES
Nicole Rojas is a 70 y o  male who is currently ordered Vancomycin IV with management by the Pharmacy Consult service  Relevant clinical data and objective / subjective history reviewed  Vancomycin Assessment:  Indication and Goal AUC/Trough: Soft tissue (goal -600, trough >10), -600, trough >10  Clinical Status: stable without sepsis     Micro:     Renal Function:  SCr: 6 35 mg/dL  CrCl: 11 2 mL/min  Renal replacement: on dialysis Tues, Thur & Sat  Had dialysis on   Days of Therapy: 3   Current Dose: 500 mg every [place] & Sat  (doses at 7 5 mg/kg rounded)   Vancomycin Plan: no vancomycin due today due to random trough of 41 7 today at 0554  New Dosin mg to start Tues after dialysis based on random trough result and clinical review  Estimated AUC: none                   Estimated Trough: none  Next Level:  at 0600  Renal Function Monitoring: Daily BMP and [place] will continue to follow closely for s/sx of nephrotoxicity, infusion reactions and appropriateness of therapy  BMP and CBC will be ordered per protocol  We will continue to follow the patient’s culture results and clinical progress daily      Nathan Galaviz, Pharmacist

## 2023-06-11 LAB
ANION GAP SERPL CALCULATED.3IONS-SCNC: 4 MMOL/L (ref 4–13)
BASOPHILS # BLD AUTO: 0.02 THOUSANDS/ÂΜL (ref 0–0.1)
BASOPHILS NFR BLD AUTO: 1 % (ref 0–1)
BUN SERPL-MCNC: 5 MG/DL (ref 5–25)
CALCIUM SERPL-MCNC: 8.1 MG/DL (ref 8.3–10.1)
CHLORIDE SERPL-SCNC: 104 MMOL/L (ref 96–108)
CO2 SERPL-SCNC: 30 MMOL/L (ref 21–32)
CREAT SERPL-MCNC: 4.57 MG/DL (ref 0.6–1.3)
EOSINOPHIL # BLD AUTO: 0.09 THOUSAND/ÂΜL (ref 0–0.61)
EOSINOPHIL NFR BLD AUTO: 3 % (ref 0–6)
ERYTHROCYTE [DISTWIDTH] IN BLOOD BY AUTOMATED COUNT: 20.6 % (ref 11.6–15.1)
GFR SERPL CREATININE-BSD FRML MDRD: 12 ML/MIN/1.73SQ M
GLUCOSE SERPL-MCNC: 105 MG/DL (ref 65–140)
GLUCOSE SERPL-MCNC: 69 MG/DL (ref 65–140)
GLUCOSE SERPL-MCNC: 84 MG/DL (ref 65–140)
GLUCOSE SERPL-MCNC: 86 MG/DL (ref 65–140)
GLUCOSE SERPL-MCNC: 98 MG/DL (ref 65–140)
HCT VFR BLD AUTO: 25.5 % (ref 36.5–49.3)
HGB BLD-MCNC: 7.8 G/DL (ref 12–17)
IMM GRANULOCYTES # BLD AUTO: 0.03 THOUSAND/UL (ref 0–0.2)
IMM GRANULOCYTES NFR BLD AUTO: 1 % (ref 0–2)
LYMPHOCYTES # BLD AUTO: 0.7 THOUSANDS/ÂΜL (ref 0.6–4.47)
LYMPHOCYTES NFR BLD AUTO: 20 % (ref 14–44)
MCH RBC QN AUTO: 33.1 PG (ref 26.8–34.3)
MCHC RBC AUTO-ENTMCNC: 30.6 G/DL (ref 31.4–37.4)
MCV RBC AUTO: 108 FL (ref 82–98)
MONOCYTES # BLD AUTO: 0.32 THOUSAND/ÂΜL (ref 0.17–1.22)
MONOCYTES NFR BLD AUTO: 9 % (ref 4–12)
NEUTROPHILS # BLD AUTO: 2.4 THOUSANDS/ÂΜL (ref 1.85–7.62)
NEUTS SEG NFR BLD AUTO: 66 % (ref 43–75)
NRBC BLD AUTO-RTO: 0 /100 WBCS
PLATELET # BLD AUTO: 132 THOUSANDS/UL (ref 149–390)
PMV BLD AUTO: 11.2 FL (ref 8.9–12.7)
POTASSIUM SERPL-SCNC: 4 MMOL/L (ref 3.5–5.3)
RBC # BLD AUTO: 2.36 MILLION/UL (ref 3.88–5.62)
SODIUM SERPL-SCNC: 138 MMOL/L (ref 135–147)
WBC # BLD AUTO: 3.56 THOUSAND/UL (ref 4.31–10.16)

## 2023-06-11 PROCEDURE — 82948 REAGENT STRIP/BLOOD GLUCOSE: CPT

## 2023-06-11 PROCEDURE — 99232 SBSQ HOSP IP/OBS MODERATE 35: CPT | Performed by: INTERNAL MEDICINE

## 2023-06-11 PROCEDURE — 85025 COMPLETE CBC W/AUTO DIFF WBC: CPT | Performed by: INTERNAL MEDICINE

## 2023-06-11 PROCEDURE — 80048 BASIC METABOLIC PNL TOTAL CA: CPT | Performed by: INTERNAL MEDICINE

## 2023-06-11 RX ADMIN — CYANOCOBALAMIN TAB 500 MCG 1000 MCG: 500 TAB at 09:06

## 2023-06-11 RX ADMIN — Medication 250 MG: at 09:06

## 2023-06-11 RX ADMIN — HEPARIN SODIUM 5000 UNITS: 5000 INJECTION INTRAVENOUS; SUBCUTANEOUS at 15:05

## 2023-06-11 RX ADMIN — Medication 2.5 MG: at 10:44

## 2023-06-11 RX ADMIN — ATORVASTATIN CALCIUM 20 MG: 20 TABLET, FILM COATED ORAL at 22:14

## 2023-06-11 RX ADMIN — CHLORHEXIDINE GLUCONATE 15 ML: 1.2 SOLUTION ORAL at 22:14

## 2023-06-11 RX ADMIN — HEPARIN SODIUM 5000 UNITS: 5000 INJECTION INTRAVENOUS; SUBCUTANEOUS at 22:14

## 2023-06-11 RX ADMIN — OXYCODONE HYDROCHLORIDE 5 MG: 5 TABLET ORAL at 15:07

## 2023-06-11 RX ADMIN — Medication 2.5 MG: at 01:37

## 2023-06-11 RX ADMIN — HEPARIN SODIUM 5000 UNITS: 5000 INJECTION INTRAVENOUS; SUBCUTANEOUS at 06:20

## 2023-06-11 RX ADMIN — MIDODRINE HYDROCHLORIDE 10 MG: 5 TABLET ORAL at 10:45

## 2023-06-11 RX ADMIN — MIDODRINE HYDROCHLORIDE 10 MG: 5 TABLET ORAL at 06:20

## 2023-06-11 RX ADMIN — CLOPIDOGREL BISULFATE 75 MG: 75 TABLET ORAL at 09:06

## 2023-06-11 RX ADMIN — Medication 250 MG: at 17:17

## 2023-06-11 RX ADMIN — OXYCODONE HYDROCHLORIDE 5 MG: 5 TABLET ORAL at 20:24

## 2023-06-11 RX ADMIN — NEPHROCAP 1 CAPSULE: 1 CAP ORAL at 09:06

## 2023-06-11 RX ADMIN — MIDODRINE HYDROCHLORIDE 10 MG: 5 TABLET ORAL at 17:17

## 2023-06-11 RX ADMIN — MELATONIN 3 MG: at 22:14

## 2023-06-11 RX ADMIN — OXYCODONE HYDROCHLORIDE 5 MG: 5 TABLET ORAL at 06:19

## 2023-06-11 RX ADMIN — CHLORHEXIDINE GLUCONATE 15 ML: 1.2 SOLUTION ORAL at 09:06

## 2023-06-11 NOTE — PROGRESS NOTES
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  Status post left brachial axillary AVG left upper extremity on 5/19  Anticoagulation with Coumadin was transitioned to oral Eliquis last admission due to noncompliance with lab draws due to difficult vascular patency  Currently presents with left upper extremity wound dehiscence of AV graft with infected prosthetic material -> status post graft explantation on 6/3 with wound VAC placement  Continue IV Ancef/Vancomycin through 6/13 dosed after HD to complete 10 days post-explantation  Blood cultures from 6/2 are negative  Perioperative wound cultures with polymicrobial growth including E  coli, Klebsiella, Corynebacterium, and coagulase negative Staphylococcus  Anticoagulation discontinued status post graft explantation    End-stage renal disease   Assessment & Plan  Routine hemodialysis Tu/Thu/Sat  In light of AV graft explantation, Permacath in place for ongoing dialysis  Continue Nephrocaps/Rocaltrol supplementation     Hypotension  Assessment & Plan  Continue Midodrine   Was noted to be persistently hypotensive below baseline requiring transient vasopressor support in the ICU after graft explant  Monitor blood pressures    Coronary artery disease   Assessment & Plan  Status post prior stenting  On dual antiplatelet therapy with ASA/Plavix - continue statin  Not on beta-blockade due to hypotension    Type 2 diabetes mellitus  Assessment & Plan  Lab Results   Component Value Date    HGBA1C 4 8 01/05/2023     On SSI coverage per Accu-Cheks  Carbohydrate restricted diet  Hypoglycemia protocol    Irritable bowel syndrome  Assessment & Plan  Continue Florastor  PRN Imodium on board for diarrhea    Anemia of chronic disease  Assessment & Plan  Continue Epogen with dialysis  Continue B12 supplementation  Monitor H/H and transfuse if necessary    Thrombocytopenia   Assessment & Plan  Monitor platelet count - monitor for bleeding    Ambulatory dysfunction  Assessment & Plan  Appreciate PT/OT input -> plan for skilled rehab once medically stable      DVT Prophylaxis:  Heparin SC       Patient Centered Rounds:  I have performed bedside rounds and discussed plan of care with nursing today  Discussions with Specialists or Other Care Team Provider:  see above assessments if applicable    Education and Discussions with Family / Patient:  Patient at bedside - discussed with wife, Oral Bales, over the phone today    Time Spent for Care:  32 minutes  More than 50% of total time spent on counseling and coordination of care, on one or more of the following: performing physical exam; counseling and coordination of care, obtaining or reviewing history, documenting in the medical record, reviewing/ordering tests/medications/procedures, and communicating with other healthcare professionals  Current Length of Stay: 9 day(s)  Current Patient Status: Inpatient   Certification Statement:  Patient will continue to require additional hospital stay due to assessments as noted above  Code Status: Level 1 - Full Code        Subjective:     Seen and examined earlier today  No new complaints this time  Left upper extremity pain/discomfort persists, however, improved today  Objective:     Vitals:   Temp (24hrs), Av 2 °F (36 8 °C), Min:97 7 °F (36 5 °C), Max:99 1 °F (37 3 °C)    Temp:  [97 7 °F (36 5 °C)-99 1 °F (37 3 °C)] 97 7 °F (36 5 °C)  HR:  [53-66] 53  Resp:  [17-19] 18  BP: ()/(46-56) 99/46  SpO2:  [95 %-99 %] 99 %  Body mass index is 22 87 kg/m²       Input and Output Summary (last 24 hours):     No intake or output data in the 24 hours ending 06/11/23 1442    Physical Exam:     GENERAL  remains weak/fatigued    HEAD   Normocephalic - atraumatic   EYES   PERRL - EOMI    MOUTH   Mucosa moist   NECK   Supple - full range of motion   CARDIAC  rate controlled - S1/S2 positive   PULMONARY  clear bilateral breath sounds - nonlabored respirations   ABDOMEN   Soft - nontender/nondistended - active bowel sounds   MUSCULOSKELETAL   Motor strength/range of motion deconditioned - wound VAC present connected to left upper extremity graft explantation site   NEUROLOGIC   Alert/oriented at baseline   PSYCHIATRIC   Mood/affect stable         Additional Data:     Labs & Recent Cultures:    Results from last 7 days   Lab Units 06/11/23  0631   EOS PCT % 3   HEMATOCRIT % 25 5*   HEMOGLOBIN g/dL 7 8*   LYMPHS PCT % 20   MONOS PCT % 9   NEUTROS PCT % 66   PLATELETS Thousands/uL 132*   WBC Thousand/uL 3 56*     Results from last 7 days   Lab Units 06/11/23  0631 06/08/23  0443 06/07/23  0449   ALK PHOS U/L  --   --  123*   ALT U/L  --   --  10*   AST U/L  --   --  17   BUN mg/dL 5   < > 9   CALCIUM mg/dL 8 1*   < > 7 8*   CHLORIDE mmol/L 104   < > 103   CO2 mmol/L 30   < > 31   CREATININE mg/dL 4 57*   < > 4 43*   POTASSIUM mmol/L 4 0   < > 3 6    < > = values in this interval not displayed           Results from last 7 days   Lab Units 06/11/23  1123 06/11/23  0816 06/10/23  2047 06/10/23  1623 06/10/23  1203 06/10/23  1201 06/10/23  0725 06/09/23  2103 06/09/23  1703 06/09/23  1148 06/09/23  0808 06/08/23  2112   POC GLUCOSE mg/dl 86 84 94 85 82 30* 91 101 96 93 84 81                         Lines/Drains:  Invasive Devices     Central Venous Catheter Line  Duration           CVC Central Lines 06/03/23 Triple 20cm 7 days          Hemodialysis Catheter  Duration           Hemodialysis Catheter Subclavian 3 days                  Last 24 Hours Medication List:   Current Facility-Administered Medications   Medication Dose Route Frequency Provider Last Rate   • acetaminophen  975 mg Oral Q8H PRN Mahsa Lbelanc MD     • aspirin  81 mg Oral Daily Mahsa Leblanc MD     • atorvastatin  20 mg Oral HS Mahsa Leblanc MD     • b complex-vitamin C-folic acid  1 capsule Oral Daily Mahsa Leblanc MD     • calcitriol  0 75 mcg Oral Once per day on Mon Wed Fri Jose Oswald MD     • cefazolin  2,000 mg Intravenous After Dialysis Mahsa Leblanc MD Stopped (06/10/23 1134)   • chlorhexidine  15 mL Mouth/Throat Q12H [facility] Janae Lazo MD     • clopidogrel  75 mg Oral Daily Mahsa Leblanc MD     • vitamin B-12  1,000 mcg Oral Daily Mahsa Leblanc MD     • epoetin jah  6,000 Units Intravenous After Dialysis Abhijeet Robles DO     • gabapentin  100 mg Oral Once per day on Tue Thu Sat Mahsa Leblanc MD     • heparin (porcine)  5,000 Units Subcutaneous Q8H [address] Mahsa Leblanc MD     • insulin lispro  1-6 Units Subcutaneous 4x Daily (AC & HS) Mahsa Leblanc MD     • loperamide  2 mg Oral TID PRN Ander Ganser, MD     • melatonin  3 mg Oral HS Mahsa Leblanc MD     • midodrine  10 mg Oral TID AC Mahsa Leblanc MD     • ondansetron  4 mg Intravenous Q6H PRN Mahsa Leblanc MD     • oxyCODONE  5 mg Oral Q4H PRN Mahsa Leblanc MD     • oxyCODONE  2 5 mg Oral Q4H PRN Mahsa Leblanc MD     • saccharomyces boulardii  250 mg Oral BID Abraham Zamora PA-C                    ** Please Note: This note is constructed using a voice recognition dictation system  An occasional wrong word/phrase or “sound-a-like” substitution may have been picked up by dictation device due to the inherent limitations of voice recognition software  Read the chart carefully and recognize, using reasonable context, where substitutions may have occurred  **

## 2023-06-11 NOTE — PROGRESS NOTES
NEPHROLOGY PROGRESS NOTE   Janneth Baer 70 y o  male MRN: [id number]  Unit/Bed#: [facility] 911-01 Encounter: [id number]        ASSESSMENT & PLAN    #1 End-stage kidney disease on hemodialysis  • Outpatient dialysis unit [address] Tuesday Thursday Saturday  • Right IJ tunneled dialysis catheter working well  • Challenge patient's dry weight to 75 kg today  • Systolic blood pressures have remained in the 90s on treatment     #2 volume overload in the setting of end-stage kidney disease  • His volume status has remained stable and he tolerates fluid removal despite his low blood pressure     #3 anemia of chronic kidney disease  • He was on Mircera as an outpatient started Epogen in the hospital  • With the Epogen his hemoglobin is improving now up to 7 8     #4 CKD bone mineral disease  • Secondary hyperparathyroidism of renal origin, currently on calcitriol 0 75 mcg with dialysis days we will continue  • His phosphorus is at goal currently not on any binders, continue to monitor     #5 left arm wound dehiscence  • AV graft was placed on 5/19 now status post graft explant with a wound VAC on his left arm and on antibiotics with infectious disease following    DISCUSSION:    Stable for discharge from a renal standpoint when otherwise medically stable    SUBJECTIVE:    Patient was seen today  Denies any acute complaints  Tolerating p o  intake  No lightheadedness blurry vision chest pain or shortness of breath    12 point review of systems was otherwise negative besides what is mentioned above      Medications:    Current Facility-Administered Medications:   •  acetaminophen (TYLENOL) tablet 975 mg, 975 mg, Oral, Q8H PRN, Susan Fan MD, 975 mg at 06/07/23 1655  •  aspirin (ECOTRIN LOW STRENGTH) EC tablet 81 mg, 81 mg, Oral, Daily, Susan Fan MD, 81 mg at 06/10/23 1157  •  atorvastatin (LIPITOR) tablet 20 mg, 20 mg, Oral, HS, Susan Fan MD, 20 mg at 06/10/23 2142  •  b complex-vitamin C-folic acid (RENAL) capsule 1 capsule, 1 capsule, Oral, Daily, Tata Zelaya MD, 1 capsule at 06/11/23 0906  •  calcitriol (ROCALTROL) capsule 0 75 mcg, 0 75 mcg, Oral, Once per day on Mon Wed Fri, Tiago Maxwell MD, 0 75 mcg at 06/08/23 1151  •  ceFAZolin (ANCEF) IVPB (premix in dextrose) 2,000 mg 50 mL, 2,000 mg, Intravenous, After Dialysis, Tata Zelaya MD, Stopped at 06/10/23 1134  •  chlorhexidine (PERIDEX) 0 12 % oral rinse 15 mL, 15 mL, Mouth/Throat, Q12H [address], Tata Zelaya MD, 15 mL at 06/11/23 0550  •  clopidogrel (PLAVIX) tablet 75 mg, 75 mg, Oral, Daily, Tata Zelaya MD, 75 mg at 06/11/23 4946  •  cyanocobalamin (VITAMIN B-12) tablet 1,000 mcg, 1,000 mcg, Oral, Daily, Tata Zelaya MD, 1,000 mcg at 06/11/23 0082  •  epoetin jah (EPOGEN,PROCRIT) injection 6,000 Units, 6,000 Units, Intravenous, After Dialysis, Dexter Contreras DO  •  gabapentin (NEURONTIN) capsule 100 mg, 100 mg, Oral, Once per day on Tue Thu Sat, Tata Zelaya MD, 100 mg at 06/10/23 1720  •  heparin (porcine) subcutaneous injection 5,000 Units, 5,000 Units, Subcutaneous, Q8H [address], 5,000 Units at 06/11/23 0620 **AND** [COMPLETED] Platelet count, , , Once, Otoniel Adkins DO  •  insulin lispro (HumaLOG) 100 units/mL subcutaneous injection 1-6 Units, 1-6 Units, Subcutaneous, 4x Daily (AC & HS), 1 Units at 06/05/23 1629 **AND** Fingerstick Glucose (POCT), , , 4x Daily AC and at bedtime, Tata Zelaya MD  •  loperamide (IMODIUM) oral liquid 2 mg, 2 mg, Oral, TID PRN, Rufino Inman MD  •  melatonin tablet 3 mg, 3 mg, Oral, HS, Tata Zelaya MD, 3 mg at 06/10/23 2142  •  midodrine (PROAMATINE) tablet 10 mg, 10 mg, Oral, TID AC, Tata Zelaya MD, 10 mg at 06/11/23 1045  •  ondansetron (ZOFRAN) injection 4 mg, 4 mg, Intravenous, Q6H PRN, Tata Zelaya MD, 4 mg at 06/09/23 0040  •  oxyCODONE (ROXICODONE) IR tablet 5 mg, 5 mg, Oral, Q4H PRN, Tata Zelaya MD, 5 mg at 06/11/23 7556  •  oxyCODONE (ROXICODONE) split tablet 2 5 mg, 2 5 mg, Oral, Q4H PRN, Tata Zelaya MD, 2 5 mg at 06/11/23 1044  •  saccharomyces boulardii (FLORASTOR) capsule 250 mg, 250 mg, Oral, BID, OCTAVIO Silva-ANITHA, 250 mg at 06/11/23 0906    OBJECTIVE:    Vitals:    06/10/23 1520 06/10/23 2256 06/11/23 0723 06/11/23 0908   BP: 115/56 94/51 (!) 93/49 (!) 99/46   BP Location:       Pulse: 64 66 56 (!) 53   Resp: 19 17 18    Temp: 97 8 °F (36 6 °C) 99 1 °F (37 3 °C) 97 7 °F (36 5 °C)    TempSrc:   Oral    SpO2: 99% 96% 95% 99%   Weight:       Height:            Temp:  [97 7 °F (36 5 °C)-99 1 °F (37 3 °C)] 97 7 °F (36 5 °C)  HR:  [53-66] 53  Resp:  [17-19] 18  BP: ()/(46-56) 99/46  SpO2:  [95 %-99 %] 99 %     Body mass index is 22 87 kg/m²  Weight (last 2 days)     Date/Time Weight    06/10/23 0600 72 3 (159 39)    06/09/23 0507 75 8 (167)          I/O last 3 completed shifts: In: 618 [P O :118; I V :500]  Out: 1002 [Other:1002]    No intake/output data recorded        Physical exam:    General: no acute distress, cooperative  Eyes: conjunctivae pink, anicteric sclerae  ENT: lips and mucous membranes moist, no exudates, normal external ears  Neck: ROM intact, no JVD  Chest: No respiratory distress, no accessory muscle use  CVS: normal rate, non pericardial friction rub  Abdomen: soft, non-tender, non-distended, normoactive bowel sounds  Extremities: no edema of both legs, left arm appropriately dressed  Skin: no rash  Neuro: awake, alert, oriented, grossly intact  Psych:  Pleasant affect    Invasive Devices:      Lab Results:   Results from last 7 days   Lab Units 06/11/23  0631 06/10/23  0554 06/09/23  0502 06/08/23  0443 06/07/23  0449 06/06/23  0517 06/05/23  1447   ALK PHOS U/L  --   --   --   --  123* 120* 105   ALT U/L  --   --   --   --  10* 11* 11*   AST U/L  --   --   --   --  17 14 12   BUN mg/dL 5 8  --  12 9 21 35*   CALCIUM mg/dL 8 1* 8 0*  -- "7 9* 7 8* 7 5* 7 6*   CHLORIDE mmol/L 104 103  --  103 103 103 106   CO2 mmol/L 30 31  --  31 31 29 28   CREATININE mg/dL 4 57* 6 35*  --  6 35* 4 43* 7 11* 9 92*   HEMATOCRIT % 25 5* 24 4* 25 6* 24 3* 21 5* 23 0* 22 9*   HEMOGLOBIN g/dL 7 8* 7 3* 7 9* 7 4* 7 2* 7 3* 7 8*   POTASSIUM mmol/L 4 0 3 8  --  3 7 3 6 3 5 3 7   MAGNESIUM mg/dL  --   --   --  1 9 1 9 1 9 2 0   PHOSPHORUS mg/dL  --   --   --  3 5 2 9 2 9 3 1   PLATELETS Thousands/uL 132* 126* 115* 100* 102* 110* 140*   WBC Thousand/uL 3 56* 4 00* 4 79 3 68* 3 60* 5 17 8 55         Portions of the record may have been created with voice recognition software  Occasional wrong word or \"sound a like\" substitutions may have occurred due to the inherent limitations of voice recognition software  Read the chart carefully and recognize, using context, where substitutions have occurred  If you have any questions, please contact the dictating provider      "

## 2023-06-11 NOTE — ASSESSMENT & PLAN NOTE
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  Status post left brachial axillary AVG left upper extremity on 5/19  Anticoagulation with Coumadin was transitioned to oral Eliquis last admission due to noncompliance with lab draws due to difficult vascular patency  Currently presents with left upper extremity wound dehiscence of AV graft with infected prosthetic material -> status post graft explantation on 6/3 with wound VAC placement  Continue IV Ancef/Vancomycin through tomorrow dosed after HD to complete 10 days post-explantation  Blood cultures from 6/2 are negative  Perioperative wound cultures with polymicrobial growth including E  coli, Klebsiella, Corynebacterium, and coagulase negative Staphylococcus  Wound VAC exchanges/management per vascular surgery  Anticoagulation discontinued status post graft explantation

## 2023-06-12 LAB
ANION GAP SERPL CALCULATED.3IONS-SCNC: 5 MMOL/L (ref 4–13)
BASOPHILS # BLD AUTO: 0.04 THOUSANDS/ÂΜL (ref 0–0.1)
BASOPHILS NFR BLD AUTO: 1 % (ref 0–1)
BUN SERPL-MCNC: 9 MG/DL (ref 5–25)
CALCIUM SERPL-MCNC: 8.2 MG/DL (ref 8.3–10.1)
CHLORIDE SERPL-SCNC: 104 MMOL/L (ref 96–108)
CO2 SERPL-SCNC: 28 MMOL/L (ref 21–32)
CREAT SERPL-MCNC: 6.43 MG/DL (ref 0.6–1.3)
EOSINOPHIL # BLD AUTO: 0.13 THOUSAND/ÂΜL (ref 0–0.61)
EOSINOPHIL NFR BLD AUTO: 4 % (ref 0–6)
ERYTHROCYTE [DISTWIDTH] IN BLOOD BY AUTOMATED COUNT: 19.7 % (ref 11.6–15.1)
GFR SERPL CREATININE-BSD FRML MDRD: 8 ML/MIN/1.73SQ M
GLUCOSE SERPL-MCNC: 103 MG/DL (ref 65–140)
GLUCOSE SERPL-MCNC: 111 MG/DL (ref 65–140)
GLUCOSE SERPL-MCNC: 225 MG/DL (ref 65–140)
GLUCOSE SERPL-MCNC: 478 MG/DL (ref 65–140)
GLUCOSE SERPL-MCNC: 58 MG/DL (ref 65–140)
GLUCOSE SERPL-MCNC: 80 MG/DL (ref 65–140)
HCT VFR BLD AUTO: 26.5 % (ref 36.5–49.3)
HGB BLD-MCNC: 8.4 G/DL (ref 12–17)
IMM GRANULOCYTES # BLD AUTO: 0.02 THOUSAND/UL (ref 0–0.2)
IMM GRANULOCYTES NFR BLD AUTO: 1 % (ref 0–2)
LYMPHOCYTES # BLD AUTO: 0.43 THOUSANDS/ÂΜL (ref 0.6–4.47)
LYMPHOCYTES NFR BLD AUTO: 13 % (ref 14–44)
MCH RBC QN AUTO: 34 PG (ref 26.8–34.3)
MCHC RBC AUTO-ENTMCNC: 31.7 G/DL (ref 31.4–37.4)
MCV RBC AUTO: 107 FL (ref 82–98)
MONOCYTES # BLD AUTO: 0.29 THOUSAND/ÂΜL (ref 0.17–1.22)
MONOCYTES NFR BLD AUTO: 9 % (ref 4–12)
NEUTROPHILS # BLD AUTO: 2.48 THOUSANDS/ÂΜL (ref 1.85–7.62)
NEUTS SEG NFR BLD AUTO: 72 % (ref 43–75)
NRBC BLD AUTO-RTO: 0 /100 WBCS
PLATELET # BLD AUTO: 140 THOUSANDS/UL (ref 149–390)
PMV BLD AUTO: 11.2 FL (ref 8.9–12.7)
POTASSIUM SERPL-SCNC: 4.1 MMOL/L (ref 3.5–5.3)
RBC # BLD AUTO: 2.47 MILLION/UL (ref 3.88–5.62)
SODIUM SERPL-SCNC: 137 MMOL/L (ref 135–147)
WBC # BLD AUTO: 3.39 THOUSAND/UL (ref 4.31–10.16)

## 2023-06-12 PROCEDURE — 80048 BASIC METABOLIC PNL TOTAL CA: CPT | Performed by: INTERNAL MEDICINE

## 2023-06-12 PROCEDURE — 82948 REAGENT STRIP/BLOOD GLUCOSE: CPT

## 2023-06-12 PROCEDURE — 85025 COMPLETE CBC W/AUTO DIFF WBC: CPT | Performed by: INTERNAL MEDICINE

## 2023-06-12 PROCEDURE — 97116 GAIT TRAINING THERAPY: CPT

## 2023-06-12 PROCEDURE — 99232 SBSQ HOSP IP/OBS MODERATE 35: CPT | Performed by: INTERNAL MEDICINE

## 2023-06-12 PROCEDURE — 97530 THERAPEUTIC ACTIVITIES: CPT

## 2023-06-12 PROCEDURE — NC001 PR NO CHARGE: Performed by: SURGERY

## 2023-06-12 RX ORDER — ONDANSETRON 4 MG/1
4 TABLET, ORALLY DISINTEGRATING ORAL EVERY 6 HOURS PRN
Status: DISCONTINUED | OUTPATIENT
Start: 2023-06-12 | End: 2023-06-15 | Stop reason: HOSPADM

## 2023-06-12 RX ADMIN — CYANOCOBALAMIN TAB 500 MCG 1000 MCG: 500 TAB at 08:26

## 2023-06-12 RX ADMIN — INSULIN LISPRO 6 UNITS: 100 INJECTION, SOLUTION INTRAVENOUS; SUBCUTANEOUS at 10:55

## 2023-06-12 RX ADMIN — OXYCODONE HYDROCHLORIDE 5 MG: 5 TABLET ORAL at 09:07

## 2023-06-12 RX ADMIN — OXYCODONE HYDROCHLORIDE 5 MG: 5 TABLET ORAL at 21:01

## 2023-06-12 RX ADMIN — HEPARIN SODIUM 5000 UNITS: 5000 INJECTION INTRAVENOUS; SUBCUTANEOUS at 05:18

## 2023-06-12 RX ADMIN — ATORVASTATIN CALCIUM 20 MG: 20 TABLET, FILM COATED ORAL at 21:01

## 2023-06-12 RX ADMIN — CHLORHEXIDINE GLUCONATE 15 ML: 1.2 SOLUTION ORAL at 21:01

## 2023-06-12 RX ADMIN — HEPARIN SODIUM 5000 UNITS: 5000 INJECTION INTRAVENOUS; SUBCUTANEOUS at 21:01

## 2023-06-12 RX ADMIN — CALCITRIOL CAPSULES 0.25 MCG 0.75 MCG: 0.25 CAPSULE ORAL at 08:25

## 2023-06-12 RX ADMIN — ASPIRIN 81 MG: 81 TABLET, COATED ORAL at 08:26

## 2023-06-12 RX ADMIN — CHLORHEXIDINE GLUCONATE 15 ML: 1.2 SOLUTION ORAL at 08:26

## 2023-06-12 RX ADMIN — HEPARIN SODIUM 5000 UNITS: 5000 INJECTION INTRAVENOUS; SUBCUTANEOUS at 13:00

## 2023-06-12 RX ADMIN — MIDODRINE HYDROCHLORIDE 10 MG: 5 TABLET ORAL at 15:09

## 2023-06-12 RX ADMIN — MIDODRINE HYDROCHLORIDE 10 MG: 5 TABLET ORAL at 10:54

## 2023-06-12 RX ADMIN — Medication 250 MG: at 16:04

## 2023-06-12 RX ADMIN — OXYCODONE HYDROCHLORIDE 5 MG: 5 TABLET ORAL at 16:19

## 2023-06-12 RX ADMIN — OXYCODONE HYDROCHLORIDE 5 MG: 5 TABLET ORAL at 00:24

## 2023-06-12 RX ADMIN — Medication 16 G: at 21:11

## 2023-06-12 RX ADMIN — OXYCODONE HYDROCHLORIDE 5 MG: 5 TABLET ORAL at 05:18

## 2023-06-12 RX ADMIN — MIDODRINE HYDROCHLORIDE 10 MG: 5 TABLET ORAL at 05:18

## 2023-06-12 RX ADMIN — MELATONIN 3 MG: at 21:01

## 2023-06-12 RX ADMIN — OXYCODONE HYDROCHLORIDE 5 MG: 5 TABLET ORAL at 12:23

## 2023-06-12 RX ADMIN — CLOPIDOGREL BISULFATE 75 MG: 75 TABLET ORAL at 08:26

## 2023-06-12 RX ADMIN — NEPHROCAP 1 CAPSULE: 1 CAP ORAL at 08:26

## 2023-06-12 RX ADMIN — Medication 250 MG: at 08:26

## 2023-06-12 RX ADMIN — INSULIN LISPRO 2 UNITS: 100 INJECTION, SOLUTION INTRAVENOUS; SUBCUTANEOUS at 15:10

## 2023-06-12 NOTE — PROGRESS NOTES
[address] [facility] NOTE   Teto Oropeza 70 y o  male MRN: [id number]  Unit/Bed#: [facility] 911-01 Encounter: [id number]  Reason for Consult: ESRD on HD     ASSESSMENT and PLAN:    1  ESRD on HD  - Schedule: TTS at [address] [place] [address] HD: 06/0 10  - Next HD: 06/13  - Electrolytes and volume status stable  - Access: Right chest wall permacath    2  BP/Volume   - Chronic hypotension on midodrine 10 mg 3 times daily  - We will tailor ultrafiltration to achieve estimated dry weight on dialysis    3  Anemia   - Goal Hb 10-11, currently below goal  - Currently on Epogen 6000 units with dialysis, switch to Mircera as outpatient    4  Electrolytes   - Electrolytes stable for inter-dialytic period     5  Bone Mineral Disease   - Continue calcitriol 0 75 mcg on dialysis days  - Not on binders due to serum phosphate being at goal    6  Left arm wound dehiscence  - AV graft was placed on 05/19 status post graft explant with a wound VAC  - Currently on IV Ancef, last dose 06/13    Discussed with internal medicine team   After discussion, we agreed that electrolytes and volume status is stable today and next hemodialysis session can be performed tomorrow per schedule  SUBJECTIVE / 24H INTERVAL HISTORY:  Patient seen and examined this morning  He denies dyspnea  He denies leg swelling  REVIEW OF SYSTEMS:  Review of Systems   Constitutional: Negative for chills and fever  HENT: Negative for ear pain and sore throat  Eyes: Negative for pain and visual disturbance  Respiratory: Negative for cough and shortness of breath  Cardiovascular: Negative for chest pain and palpitations  Gastrointestinal: Negative for abdominal pain and vomiting  Genitourinary: Negative for dysuria and hematuria  Musculoskeletal: Negative for arthralgias and back pain  Skin: Negative for color change and rash  Neurological: Negative for seizures and syncope     All other systems reviewed and are negative  OBJECTIVE:  Current Weight: Weight - Scale: 75 7 kg (166 lb 14 2 oz)  Vitals:    06/11/23 1553 06/11/23 2047 06/12/23 0537 06/12/23 0644   BP: 114/54 113/62  113/51   BP Location:    Right arm   Pulse: (!) 54 (!) 54  66   Resp: 22 18  16   Temp: 97 9 °F (36 6 °C) 98 1 °F (36 7 °C)  98 °F (36 7 °C)   TempSrc:    Oral   SpO2: 99% 97%  97%   Weight:   75 7 kg (166 lb 14 2 oz)    Height:           Intake/Output Summary (Last 24 hours) at 6/12/2023 1121  Last data filed at 6/12/2023 0850  Gross per 24 hour   Intake 840 ml   Output 25 ml   Net 815 ml     Physical Exam  Vitals and nursing note reviewed  Constitutional:       General: He is not in acute distress  Appearance: He is well-developed  HENT:      Head: Normocephalic and atraumatic  Eyes:      Conjunctiva/sclera: Conjunctivae normal    Cardiovascular:      Rate and Rhythm: Normal rate and regular rhythm  Pulses: Normal pulses  Heart sounds: Normal heart sounds  No murmur heard  Comments: Right chest wall permacath with healthy exit site and intact dressing  Pulmonary:      Effort: Pulmonary effort is normal  No respiratory distress  Breath sounds: Normal breath sounds  Abdominal:      Palpations: Abdomen is soft  Tenderness: There is no abdominal tenderness  Musculoskeletal:         General: No swelling  Cervical back: Neck supple  Right lower leg: No edema  Left lower leg: No edema  Skin:     General: Skin is warm and dry  Capillary Refill: Capillary refill takes less than 2 seconds  Neurological:      Mental Status: He is alert     Psychiatric:         Mood and Affect: Mood normal        Medications:    Current Facility-Administered Medications:   •  acetaminophen (TYLENOL) tablet 975 mg, 975 mg, Oral, Q8H PRN, Valentín Khan MD, 975 mg at 06/07/23 1655  •  aspirin (ECOTRIN LOW STRENGTH) EC tablet 81 mg, 81 mg, Oral, Daily, Valentín Khan MD, 81 mg at 06/12/23 0826  • atorvastatin (LIPITOR) tablet 20 mg, 20 mg, Oral, HS, Tata Zelaya MD, 20 mg at 06/11/23 2214  •  b complex-vitamin C-folic acid (RENAL) capsule 1 capsule, 1 capsule, Oral, Daily, Tata Zelaya MD, 1 capsule at 06/12/23 9555  •  calcitriol (ROCALTROL) capsule 0 75 mcg, 0 75 mcg, Oral, Once per day on Mon Wed Fri, Tiago Maxwell MD, 0 75 mcg at 06/12/23 0825  •  ceFAZolin (ANCEF) IVPB (premix in dextrose) 2,000 mg 50 mL, 2,000 mg, Intravenous, After Dialysis, Tata Zelaya MD, Stopped at 06/10/23 1134  •  chlorhexidine (PERIDEX) 0 12 % oral rinse 15 mL, 15 mL, Mouth/Throat, Q12H [address], Tata Zelaya MD, 15 mL at 06/12/23 9973  •  clopidogrel (PLAVIX) tablet 75 mg, 75 mg, Oral, Daily, Tata Zelaya MD, 75 mg at 06/12/23 8533  •  cyanocobalamin (VITAMIN B-12) tablet 1,000 mcg, 1,000 mcg, Oral, Daily, Tata Zelaya MD, 1,000 mcg at 06/12/23 7855  •  epoetin jah (EPOGEN,PROCRIT) injection 6,000 Units, 6,000 Units, Intravenous, After Dialysis, Dexter Contreras,   •  gabapentin (NEURONTIN) capsule 100 mg, 100 mg, Oral, Once per day on Tue Thu Sat, Tata Zelaya MD, 100 mg at 06/10/23 1720  •  heparin (porcine) subcutaneous injection 5,000 Units, 5,000 Units, Subcutaneous, Q8H [address], 5,000 Units at 06/12/23 0518 **AND** [COMPLETED] Platelet count, , , Once, Otoniel Adkins, DO  •  insulin lispro (HumaLOG) 100 units/mL subcutaneous injection 1-6 Units, 1-6 Units, Subcutaneous, 4x Daily (AC & HS), 6 Units at 06/12/23 1055 **AND** Fingerstick Glucose (POCT), , , 4x Daily AC and at bedtime, Tata Zelaya MD  •  loperamide (IMODIUM) oral liquid 2 mg, 2 mg, Oral, TID PRN, Rufino Inman MD  •  melatonin tablet 3 mg, 3 mg, Oral, HS, Tata Zelaya MD, 3 mg at 06/11/23 2214  •  midodrine (PROAMATINE) tablet 10 mg, 10 mg, Oral, TID AC, Tata Zelaya MD, 10 mg at 06/12/23 1054  •  ondansetron (ZOFRAN) injection 4 mg, 4 mg, Intravenous, Q6H PRN, "Matt Richter MD, 4 mg at 06/09/23 0040  •  oxyCODONE (ROXICODONE) IR tablet 5 mg, 5 mg, Oral, Q4H PRN, Matt Richter MD, 5 mg at 06/12/23 5136  •  oxyCODONE (ROXICODONE) split tablet 2 5 mg, 2 5 mg, Oral, Q4H PRN, Matt Richter MD, 2 5 mg at 06/11/23 1044  •  saccharomyces boulardii (FLORASTOR) capsule 250 mg, 250 mg, Oral, BID, Coreen Calloway PA-C, 250 mg at 06/12/23 8789    Laboratory Results:  Results from last 7 days   Lab Units 06/12/23  0525 06/11/23  0631 06/10/23  0554 06/09/23  0502 06/08/23  0443 06/07/23  0449 06/06/23  0517 06/05/23  1447   BUN mg/dL 9 5 8  --  12 9 21 35*   CALCIUM mg/dL 8 2* 8 1* 8 0*  --  7 9* 7 8* 7 5* 7 6*   CHLORIDE mmol/L 104 104 103  --  103 103 103 106   CO2 mmol/L 28 30 31  --  31 31 29 28   CREATININE mg/dL 6 43* 4 57* 6 35*  --  6 35* 4 43* 7 11* 9 92*   HEMATOCRIT % 26 5* 25 5* 24 4* 25 6* 24 3* 21 5* 23 0* 22 9*   HEMOGLOBIN g/dL 8 4* 7 8* 7 3* 7 9* 7 4* 7 2* 7 3* 7 8*   POTASSIUM mmol/L 4 1 4 0 3 8  --  3 7 3 6 3 5 3 7   MAGNESIUM mg/dL  --   --   --   --  1 9 1 9 1 9 2 0   PHOSPHORUS mg/dL  --   --   --   --  3 5 2 9 2 9 3 1   PLATELETS Thousands/uL 140* 132* 126* 115* 100* 102* 110* 140*   WBC Thousand/uL 3 39* 3 56* 4 00* 4 79 3 68* 3 60* 5 17 8 55       Portions of the record may have been created with voice recognition software  Occasional wrong word or \"sound a like\" substitutions may have occurred due to the inherent limitations of voice recognition software  Read the chart carefully and recognize, using context, where substitutions have occurred  If you have any questions, please contact the dictating provider      "

## 2023-06-12 NOTE — PROGRESS NOTES
Progress Note - Infectious Disease   Rich Colon 70 y o  male MRN: [id number]  Unit/Bed#: [facility] 911-01 Encounter: [id number]      Impression/Recommendations:  LUE AVG infection     With axillary wound dehiscence and exposed graft   Status post explantation with native vein patch repair 6/3   OR cultures with E  Coli, Klebsiella, Corynebacterium, Coag neg Staph from broth only   Clinically stable without sepsis  Rec:  • Continue cefazolin and vancomycin through  (dosed after HD) to complete 10 days postop antibiotics  • LWC/VAC per vascular surgery     Acute on chronic hypotension  Likely multifactorial   Blood cultures negative   At this point infection above is controlled   Now off pressors  Rec:  • Midodrine per outpatient regiment  • Supportive care as per the primary service     ESRD on HD     Via permcath  On //S  Rec:  • Dose adjust antibiotics for HD     Diarrhea  In setting of chronic IBS, antibiotics, laxatives  Low suspicion for C  Diff      DM      The patient is stable from an ID standpoint      Antibiotics:  Vancomycin #10  Cefazolin #6 (GNR #8)  POD #9    Subjective:  Patient seen on AM rounds  Denies fevers, chills, sweats, nausea, vomiting, or diarrhea  24 Hour Events:  No documented fevers, chills, sweats, nausea, vomiting, or diarrhea  Objective:  Vitals:  Temp:  [97 9 °F (36 6 °C)-98 1 °F (36 7 °C)] 98 °F (36 7 °C)  HR:  [54-66] 66  Resp:  [16-22] 16  BP: (113-114)/(51-62) 113/51  SpO2:  [97 %-99 %] 97 %  Temp (24hrs), Av °F (36 7 °C), Min:97 9 °F (36 6 °C), Max:98 1 °F (36 7 °C)  Current: Temperature: 98 °F (36 7 °C)    Physical Exam:   General:  No acute distress  Psychiatric:  Awake and alert  Pulmonary:  Normal respiratory excursion without accessory muscle use  Abdomen:  Soft, nontender  Extremities:  Nonpitting edema LUE with VAC  Skin:  No rashes    Lab Results:  I have personally reviewed pertinent labs    Results from last 7 days   Lab Units 23  0155 06/11/23  0631 06/10/23  0554 06/08/23  0443 06/07/23  0449 06/06/23  0517 06/05/23  1447   ALK PHOS U/L  --   --   --   --  123* 120* 105   ALT U/L  --   --   --   --  10* 11* 11*   AST U/L  --   --   --   --  17 14 12   BUN mg/dL 9 5 8   < > 9 21 35*   CALCIUM mg/dL 8 2* 8 1* 8 0*   < > 7 8* 7 5* 7 6*   CHLORIDE mmol/L 104 104 103   < > 103 103 106   CO2 mmol/L 28 30 31   < > 31 29 28   CREATININE mg/dL 6 43* 4 57* 6 35*   < > 4 43* 7 11* 9 92*   EGFR ml/min/1 73sq m 8 12 8   < > 12 7 4   POTASSIUM mmol/L 4 1 4 0 3 8   < > 3 6 3 5 3 7    < > = values in this interval not displayed  Results from last 7 days   Lab Units 06/12/23  0525 06/11/23  0631 06/10/23  0554   HEMOGLOBIN g/dL 8 4* 7 8* 7 3*   PLATELETS Thousands/uL 140* 132* 126*   WBC Thousand/uL 3 39* 3 56* 4 00*           Imaging Studies:   I have personally reviewed pertinent imaging study reports and images in PACS  EKG, Pathology, and Other Studies:   I have personally reviewed pertinent reports

## 2023-06-12 NOTE — PROCEDURES
Vascular Surgery  Bedside V A C  Procedure Note      Location of wound: Two wounds on left arm    Dressings and Foam removed:  2 Black Foam  0 White Foam    Dimensions of wound:   Proximal: 5 cm x 1 cm x 1 cm   Distal: 4cm x 1cm x 0 5cm    Description of wound: On inspection of the wound today, there is healthy granulation tissue  The wound extends down to muscle  Approximately 90% of the wound base is now pink and healthy and there is granulation tissue  The periwound skin remains clean and intact and unremarkable  VAC dressing application:  The periwound skin was cleaned and dried  0  dressings, 2 black foam, 0 white foam were cut to size of the wound and placed into the wounds, one black sponge in each wound  The dressings were then covered with VAC drape  Additional VAC drape and black foam was used to create a bridge to the patient's arm and a base for the track pad  The track pad was then placed over the base of black foam  The VAC was then set to 125 mmHg low Continuous suction  The patient tolerated the procedure well and there were no complications  The patient did not require any excisional debridement during today's dressing change  VAC settings:  125 mmHg  Continuous    Wound Images: Additional Notes: The [facility] sticker placed over the dressing per protocol  The next [facility] dressing change will be planned for 6/14  This dressing change took greater than 15 minutes to complete      Bindu Monroy MD  6/12/2023 1:14 PM

## 2023-06-12 NOTE — PLAN OF CARE
Problem: PHYSICAL THERAPY ADULT  Goal: Performs mobility at highest level of function for planned discharge setting  See evaluation for individualized goals  Description: Treatment/Interventions: OT, Spoke to nursing, Spoke to case management, Gait training, Bed mobility, Patient/family training, Endurance training, LE strengthening/ROM, Functional transfer training          See flowsheet documentation for full assessment, interventions and recommendations  Outcome: Progressing  Note: Prognosis: Good  Problem List: Decreased strength, Decreased range of motion, Decreased endurance, Impaired balance, Decreased mobility, Decreased coordination, Decreased cognition, Impaired judgement, Decreased safety awareness, Pain  Assessment: Pt seen for PT treatment session this date  Therapy session focused on bed mobility, functional transfers, gait training and endurance training in order to improve overall mobility and independence  Pt requires assist of 1 for all mobility performed this date  Pt requiring min A for bed mobility, functional transfers, and ambulation with RW  Increased A required to complete STS from lower surface toilet- recommend BSC use  With longer distance ambulation in hallway pt with R sided [place]/ limp  Pt making steady progress toward goals  Pt was left supine in bed at the end of PT session with all needs in reach  Pt would benefit from continued PT services while in hospital to address remaining limitations  PT to continue to follow pt and recommends STR  The patient's AM-PAC Basic Mobility Inpatient Short Form Raw Score is 16  A Raw score of less than or equal to 16 suggests the patient may benefit from discharge to post-acute rehabilitation services  Please also refer to the recommendation of the Physical Therapist for safe discharge planning    Barriers to Discharge: Decreased caregiver support, Inaccessible home environment     PT Discharge Recommendation: Post acute rehabilitation services    See flowsheet documentation for full assessment

## 2023-06-12 NOTE — DISCHARGE INSTR - OTHER ORDERS
[facility] wound care:  Upon admission to facility, Remove wet- dry dressing,  assess wounds, & replace [employer] VAC  Black foam to each wound w/ black foam bridge (3)  125mmHg continuous suction  To be changed every 2-3 days by licensed / trained clinician, currently on a Monday, Wednesday, Friday schedule    Keep dressing clean, dry, and intact  NO showers/ NO tub baths    Cleanse b/l sacro-buttocks with soap and water, pat dry, and apply ruba antifungal cream BID

## 2023-06-12 NOTE — PROGRESS NOTES
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  Status post left brachial axillary AVG left upper extremity on 5/19  Anticoagulation with Coumadin was transitioned to oral Eliquis last admission due to noncompliance with lab draws due to difficult vascular patency  Currently presents with left upper extremity wound dehiscence of AV graft with infected prosthetic material -> status post graft explantation on 6/3 with wound VAC placement  Continue IV Ancef/Vancomycin through tomorrow dosed after HD to complete 10 days post-explantation  Blood cultures from 6/2 are negative  Perioperative wound cultures with polymicrobial growth including E  coli, Klebsiella, Corynebacterium, and coagulase negative Staphylococcus  Wound VAC exchanges/management per vascular surgery  Anticoagulation discontinued status post graft explantation    End-stage renal disease   Assessment & Plan  Routine hemodialysis Tu/Thu/Sat  In light of AV graft explantation, Permacath in place for ongoing dialysis  Continue Nephrocaps/Rocaltrol supplementation     Hypotension  Assessment & Plan  Continue Midodrine   Was noted to be persistently hypotensive below baseline requiring transient vasopressor support in the ICU after graft explant  Monitor blood pressures    Coronary artery disease   Assessment & Plan  Status post prior stenting  On dual antiplatelet therapy with ASA/Plavix - continue statin  Not on beta-blockade due to hypotension    Type 2 diabetes mellitus  Assessment & Plan  Lab Results   Component Value Date    HGBA1C 4 8 01/05/2023     On SSI coverage per Accu-Cheks  Carbohydrate restricted diet  Hypoglycemia protocol    Irritable bowel syndrome  Assessment & Plan  Continue Florastor  PRN Imodium on board for diarrhea    Anemia of chronic disease  Assessment & Plan  Continue Epogen with dialysis  Continue B12 supplementation  Monitor H/H and transfuse if necessary    Thrombocytopenia   Assessment & Plan  Monitor platelet count - monitor for bleeding    Ambulatory dysfunction  Assessment & Plan  Appreciate PT/OT input -> plan for skilled rehab once medically stable      DVT Prophylaxis:  Heparin SC       Patient Centered Rounds:  I have performed bedside rounds and discussed plan of care with nursing today  Discussions with Specialists or Other Care Team Provider:  see above assessments if applicable    Education and Discussions with Family / Patient:  Patient at bedside - discussed in detail with wife, Oral Bales, over the phone yesterday    Time Spent for Care:  32 minutes  More than 50% of total time spent on counseling and coordination of care, on one or more of the following: performing physical exam; counseling and coordination of care, obtaining or reviewing history, documenting in the medical record, reviewing/ordering tests/medications/procedures, and communicating with other healthcare professionals  Current Length of Stay: 10 day(s)  Current Patient Status: Inpatient   Certification Statement:  Patient will continue to require additional hospital stay due to assessments as noted above  Code Status: Level 1 - Full Code        Subjective:     Encountered earlier in the day  Remains in relatively pleasant spirits today  States his left arm discomfort has somewhat improved today  Objective:     Vitals:   Temp (24hrs), Av °F (36 7 °C), Min:97 8 °F (36 6 °C), Max:98 1 °F (36 7 °C)    Temp:  [97 8 °F (36 6 °C)-98 1 °F (36 7 °C)] 97 8 °F (36 6 °C)  HR:  [54-69] 69  Resp:  [16-18] 16  BP: ()/(51-63) 97/63  SpO2:  [97 %] 97 %  Body mass index is 23 95 kg/m²       Input and Output Summary (last 24 hours): Intake/Output Summary (Last 24 hours) at 6/12/2023 1814  Last data filed at 6/12/2023 1501  Gross per 24 hour   Intake 240 ml   Output 50 ml   Net 190 ml       Physical Exam:     GENERAL  improved weakness/fatigue today - no immediate distress at rest   HEAD   Normocephalic - atraumatic   EYES   PERRL - EOMI    MOUTH   Mucosa moist   NECK   Supple - full range of motion   CARDIAC  rate controlled - S1/S2 positive   PULMONARY    Clear breath sounds bilaterally - nonlabored respirations   ABDOMEN   Soft - nontender/nondistended - active bowel sounds   MUSCULOSKELETAL   Motor strength/range of motion remains deconditioned - LUE wound status post AV graft explant intact connected to VAC   NEUROLOGIC   Alert/oriented at baseline   SKIN   Chronic wrinkles/blemishes    PSYCHIATRIC   Mood/affect stable         Additional Data:     Labs & Recent Cultures:    Results from last 7 days   Lab Units 06/12/23  0525   EOS PCT % 4   HEMATOCRIT % 26 5*   HEMOGLOBIN g/dL 8 4*   LYMPHS PCT % 13*   MONOS PCT % 9   NEUTROS PCT % 72   PLATELETS Thousands/uL 140*   WBC Thousand/uL 3 39*     Results from last 7 days   Lab Units 06/12/23  0525 06/08/23  0443 06/07/23  0449   ALK PHOS U/L  --   --  123*   ALT U/L  --   --  10*   AST U/L  --   --  17   BUN mg/dL 9   < > 9   CALCIUM mg/dL 8 2*   < > 7 8*   CHLORIDE mmol/L 104   < > 103   CO2 mmol/L 28   < > 31   CREATININE mg/dL 6 43*   < > 4 43*   POTASSIUM mmol/L 4 1   < > 3 6    < > = values in this interval not displayed           Results from last 7 days   Lab Units 06/12/23  1508 06/12/23  1053 06/12/23  0648 06/11/23  2046 06/11/23  1700 06/11/23  1123 06/11/23  0816 06/10/23  2047 06/10/23  1623 06/10/23  1203 06/10/23  1201 06/10/23  0725   POC GLUCOSE mg/dl 225* 478* 111 98 105 86 84 94 85 82 30* 91                         Lines/Drains:  Invasive Devices     Hemodialysis Catheter  Duration           Hemodialysis Catheter Subclavian 4 days Last 24 Hours Medication List:   Current Facility-Administered Medications   Medication Dose Route Frequency Provider Last Rate   • acetaminophen  975 mg Oral Q8H PRN Alyx Bravo MD     • aspirin  81 mg Oral Daily Alyx Bravo MD     • atorvastatin  20 mg Oral HS Alyx Bravo MD     • b complex-vitamin C-folic acid  1 capsule Oral Daily Alyx Bravo MD     • calcitriol  0 75 mcg Oral Once per day on Mon Wed Fri Merlinda Hickman, MD     • cefazolin  2,000 mg Intravenous After Dialysis Alyx Bravo MD Stopped (06/10/23 1134)   • chlorhexidine  15 mL Mouth/Throat Q12H [facility] Bob Desai MD     • clopidogrel  75 mg Oral Daily Alyx Bravo MD     • vitamin B-12  1,000 mcg Oral Daily Alyx Bravo MD     • epoetin jah  6,000 Units Intravenous After Dialysis Valsulma Riley DO     • gabapentin  100 mg Oral Once per day on Tue Thu Sat Alyx Bravo MD     • heparin (porcine)  5,000 Units Subcutaneous Q8H [address] Alyx Bravo MD     • insulin lispro  1-6 Units Subcutaneous 4x Daily (AC & HS) Alyx Bravo MD     • loperamide  2 mg Oral TID PRN Alyse Cool MD     • melatonin  3 mg Oral HS Alyx Bravo MD     • midodrine  10 mg Oral TID AC Alyx Bravo MD     • ondansetron  4 mg Intravenous Q6H PRN Alyx Bravo MD     • oxyCODONE  5 mg Oral Q4H PRN Alyx Bravo MD     • oxyCODONE  2 5 mg Oral Q4H PRN Alyx Bravo MD     • saccharomyces boulardii  250 mg Oral BID Kim Amaya PA-C                        ** Please Note: This note is constructed using a voice recognition dictation system  An occasional wrong word/phrase or “sound-a-like” substitution may have been picked up by dictation device due to the inherent limitations of voice recognition software    Read the chart carefully and recognize, using reasonable context, where substitutions may have occurred  **

## 2023-06-12 NOTE — PLAN OF CARE
Problem: PAIN - ADULT  Goal: Verbalizes/displays adequate comfort level or baseline comfort level  Description: Interventions:  - Encourage patient to monitor pain and request assistance  - Assess pain using appropriate pain scale  - Administer analgesics based on type and severity of pain and evaluate response  - Implement non-pharmacological measures as appropriate and evaluate response  - Consider cultural and social influences on pain and pain management  - Notify physician/advanced practitioner if interventions unsuccessful or patient reports new pain  Outcome: Progressing     Problem: INFECTION - ADULT  Goal: Absence or prevention of progression during hospitalization  Description: INTERVENTIONS:  - Assess and monitor for signs and symptoms of infection  - Monitor lab/diagnostic results  - Monitor all insertion sites, i e  indwelling lines, tubes, and drains  - Monitor endotracheal if appropriate and nasal secretions for changes in amount and color  - [place] appropriate cooling/warming therapies per order  - Administer medications as ordered  - Instruct and encourage patient and family to use good hand hygiene technique  - Identify and instruct in appropriate isolation precautions for identified infection/condition  Outcome: Progressing  Goal: Absence of fever/infection during neutropenic period  Description: INTERVENTIONS:  - Monitor WBC    Outcome: Progressing     Problem: SAFETY ADULT  Goal: Patient will remain free of falls  Description: INTERVENTIONS:  - Educate patient/family on patient safety including physical limitations  - Instruct patient to call for assistance with activity   - Consult OT/PT to assist with strengthening/mobility   - Keep Call bell within reach  - Keep bed low and locked with side rails adjusted as appropriate  - Keep care items and personal belongings within reach  - Initiate and maintain comfort rounds  - Make Fall Risk Sign visible to staff  - Offer Toileting every 3 Hours, in advance of need  - Initiate/Maintain 3alarm  - Obtain necessary fall risk management equipment: 3  - Apply yellow socks and bracelet for high fall risk patients  - Consider moving patient to room near nurses station  Outcome: Progressing  Goal: Maintain or return to baseline ADL function  Description: INTERVENTIONS:  -  Assess patient's ability to carry out ADLs; assess patient's baseline for ADL function and identify physical deficits which impact ability to perform ADLs (bathing, care of mouth/teeth, toileting, grooming, dressing, etc )  - Assess/evaluate cause of self-care deficits   - Assess range of motion  - Assess patient's mobility; develop plan if impaired  - Assess patient's need for assistive devices and provide as appropriate  - Encourage maximum independence but intervene and supervise when necessary  - Involve family in performance of ADLs  - Assess for home care needs following discharge   - Consider OT consult to assist with ADL evaluation and planning for discharge  - Provide patient education as appropriate  Outcome: Progressing  Goal: Maintains/Returns to pre admission functional level  Description: INTERVENTIONS:  - Perform BMAT or MOVE assessment daily    - Set and communicate daily mobility goal to care team and patient/family/caregiver  - Collaborate with rehabilitation services on mobility goals if consulted  - Perform Range of Motion 3 times a day  - Reposition patient every 3 hours    - Dangle patient 3 times a day  - Stand patient 3 times a day  - Ambulate patient 3 times a day  - Out of bed to chair 3 times a day   - Out of bed for meals 3 times a day  - Out of bed for toileting  - Record patient progress and toleration of activity level   Outcome: Progressing     Problem: DISCHARGE PLANNING  Goal: Discharge to home or other facility with appropriate resources  Description: INTERVENTIONS:  - Identify barriers to discharge w/patient and caregiver  - Arrange for needed discharge resources and transportation as appropriate  - Identify discharge learning needs (meds, wound care, etc )  - Arrange for interpretive services to assist at discharge as needed  - Refer to Case Management Department for coordinating discharge planning if the patient needs post-hospital services based on physician/advanced practitioner order or complex needs related to functional status, cognitive ability, or social support system  Outcome: Progressing     Problem: Knowledge Deficit  Goal: Patient/family/caregiver demonstrates understanding of disease process, treatment plan, medications, and discharge instructions  Description: Complete learning assessment and assess knowledge base    Interventions:  - Provide teaching at level of understanding  - Provide teaching via preferred learning methods  Outcome: Progressing     Problem: Prexisting or High Potential for Compromised Skin Integrity  Goal: Skin integrity is maintained or improved  Description: INTERVENTIONS:  - Identify patients at risk for skin breakdown  - Assess and monitor skin integrity  - Assess and monitor nutrition and hydration status  - Monitor labs   - Assess for incontinence   - Turn and reposition patient  - Assist with mobility/ambulation  - Relieve pressure over bony prominences  - Avoid friction and shearing  - Provide appropriate hygiene as needed including keeping skin clean and dry  - Evaluate need for skin moisturizer/barrier cream  - Collaborate with interdisciplinary team   - Patient/family teaching  - Consider wound care consult   Outcome: Progressing     Problem: MOBILITY - ADULT  Goal: Maintain or return to baseline ADL function  Description: INTERVENTIONS:  -  Assess patient's ability to carry out ADLs; assess patient's baseline for ADL function and identify physical deficits which impact ability to perform ADLs (bathing, care of mouth/teeth, toileting, grooming, dressing, etc )  - Assess/evaluate cause of self-care deficits   - Assess range of motion  - Assess patient's mobility; develop plan if impaired  - Assess patient's need for assistive devices and provide as appropriate  - Encourage maximum independence but intervene and supervise when necessary  - Involve family in performance of ADLs  - Assess for home care needs following discharge   - Consider OT consult to assist with ADL evaluation and planning for discharge  - Provide patient education as appropriate  Outcome: Progressing  Goal: Maintains/Returns to pre admission functional level  Description: INTERVENTIONS:  - Perform BMAT or MOVE assessment daily    - Set and communicate daily mobility goal to care team and patient/family/caregiver  - Collaborate with rehabilitation services on mobility goals if consulted  - Perform Range of Motion 3 times a day  - Reposition patient every 3 hours    - Dangle patient 3 times a day  - Stand patient 3 times a day  - Ambulate patient 3 times a day  - Out of bed to chair 3 times a day   - Out of bed for meals 3 times a day  - Out of bed for toileting  - Record patient progress and toleration of activity level   Outcome: Progressing     Problem: METABOLIC, FLUID AND ELECTROLYTES - ADULT  Goal: Electrolytes maintained within normal limits  Description: INTERVENTIONS:  - Monitor labs and assess patient for signs and symptoms of electrolyte imbalances  - Administer electrolyte replacement as ordered  - Monitor response to electrolyte replacements, including repeat lab results as appropriate  - Instruct patient on fluid and nutrition as appropriate  Outcome: Progressing  Goal: Fluid balance maintained  Description: INTERVENTIONS:  - Monitor labs   - Monitor I/O and WT  - Instruct patient on fluid and nutrition as appropriate  - Assess for signs & symptoms of volume excess or deficit  Outcome: Progressing

## 2023-06-12 NOTE — DISCHARGE INSTR - AVS FIRST PAGE
DISCHARGE INSTRUCTIONS  NEGATIVE PRESSURE WOUND THERAPY/WOUND VAC    ACTIVITY:   Limit your physical activity with the limb with the Wound VAC therapy  Walking up steps and normal activities may be resumed as you feel ready  Avoid strenuous activity such as vigorous exercise  Avoid heavy lifting (do not lift more than 15 pounds) while you have the Wound VAC in place  You should not drive a car while you have the Wound VAC in place  Your provider will instruct you when it is safe to drive  You may ride in a car  DIET:  Resume your normal diet  Good nutrition is important for healing of your incision  If you are discharged on narcotics for pain control, continue taking your stool softeners until you are having regular bowel movements  INCISION:  Wound VAC will stay in place 24 hours a day  A licensed professional [facility]) will come to your home a few times a week to change the bandage and check the machine  You may need to have the bandage/sponge changed more often if there is a lot of drainage  You may NOT shower or bathe while wound VAC is in place  Do NOT remove dressing unless your provider instructs you to  DO NOT put any powders, creams, ointments, or lotions around the dressing of the Wound VAC  SWELLING: Most patients have noticeable swelling after leg surgery  This usually improves within a few weeks  If swelling is present, elevate the affected limb whenever possible  FOLLOW UP APPOINTMENTS:  Making and keeping follow up appointments and ultrasound tests are important to your recovery  If you have difficulty making it to or keeping your follow up appointments, call the office      If you have increased pain, fever >101 5, nausea, vomiting, increased drainage, redness, warmth, swelling, change in color of drainage/pus, or a bad smell at your Wound VAC site, new coldness/numbness of your arm or leg, or become dizzy or confused please call us immediately and GO directly to the ER  Appt w/ TESSY Duran: 6/29/2023 at [facility]  *Please remember to bring VAC supplies (Foam kit, cannister, machine, etc) to appt for wound assessment & dressing change*    PLEASE CALL THE OFFICE IF YOU HAVE ANY QUESTIONS  [phone]  [phone] [address] [facility] , [address], [place], [address]  [address], [address], [place], [address] [facility]  [address]   [address] [facility], [facility], [address] [facility]  [address] [facility] [address] [facility], [facility], [address]    [address], [address], [address]la [address]  [address], [address] [facility], [address], [address] [facility], [address] [facility]  One [facility], [address] [facility], [facility],[address], Susan Castillo [address]  [address] [facility], [place], [address]

## 2023-06-12 NOTE — PHYSICAL THERAPY NOTE
PHYSICAL THERAPY NOTE          Patient Name: Daxa Rosenbaum  JYFOR'Q Date: 6/12/2023 06/12/23 1103   PT Last Visit   PT Visit Date 06/12/23   Note Type   Note Type Treatment   Pain Assessment   Pain Assessment Tool 0-10   Pain Score 5   Pain Location/Orientation Orientation: Left; Location: [facility]   Hospital Pain Intervention(s) Repositioned; Ambulation/increased activity; Emotional support   Restrictions/Precautions   Weight Bearing Precautions Per Order No   Other Precautions Chair Alarm; Bed Alarm;Multiple lines; Fall Risk;Pain  (wound vac)   General   Chart Reviewed Yes   Response to Previous Treatment Patient with no complaints from previous session  Family/Caregiver Present No   Cognition   Overall Cognitive Status WFL   Arousal/Participation Responsive; Cooperative   Attention Attends with cues to redirect   Orientation Level Oriented X4   Memory Decreased recall of precautions   Following Commands Follows all commands and directions without difficulty   Comments pt is pleasant and cooperative to participate in therapy session   Bed Mobility   Supine to Sit 4  Minimal assistance   Additional items Assist x 1;HOB elevated; Bedrails; Increased time required;Verbal cues   Sit to Supine 4  Minimal assistance   Additional items Assist x 1; Increased time required;Verbal cues   Additional Comments pt supine in bed upon arrival  Pt returned to supine in bed with all needs wihtin reach   Transfers   Sit to Stand 4  Minimal assistance   Additional items Assist x 1; Increased time required;Verbal cues   Stand to Sit 4  Minimal assistance   Additional items Assist x 1; Increased time required;Verbal cues   Additional Comments transfers with RW   Ambulation/Elevation   Gait pattern Excessively slow; Short stride; Foward flexed   Gait Assistance 4  Minimal assist   Additional items Assist x 1;Verbal cues; Tactile cues   Assistive Device Rolling walker   Distance 15ft, 10ft, 2 x 40ft   Stair Management Assistance Not tested   Balance   Static Sitting Fair +   Dynamic Sitting Fair   Static Standing Fair -   Dynamic Standing Fair -   Ambulatory Fair -   Endurance Deficit   Endurance Deficit Yes   Activity Tolerance   Activity Tolerance Patient tolerated treatment well   Nurse Made Aware RN cleared pt to participate in therapy session   Assessment   Prognosis Good   Problem List Decreased strength;Decreased range of motion;Decreased endurance; Impaired balance;Decreased mobility; Decreased coordination;Decreased cognition; Impaired judgement;Decreased safety awareness;Pain   Assessment Pt seen for PT treatment session this date  Therapy session focused on bed mobility, functional transfers, gait training and endurance training in order to improve overall mobility and independence  Pt requires assist of 1 for all mobility performed this date  Pt requiring min A for bed mobility, functional transfers, and ambulation with RW  Increased A required to complete STS from lower surface toilet- recommend BSC use  With longer distance ambulation in hallway pt with R sided [place]/ limp  Pt making steady progress toward goals  Pt was left supine in bed at the end of PT session with all needs in reach  Pt would benefit from continued PT services while in hospital to address remaining limitations  PT to continue to follow pt and recommends STR  The patient's AM-PAC Basic Mobility Inpatient Short Form Raw Score is 16  A Raw score of less than or equal to 16 suggests the patient may benefit from discharge to post-acute rehabilitation services  Please also refer to the recommendation of the Physical Therapist for safe discharge planning  Barriers to Discharge Decreased caregiver support; Inaccessible home environment   Goals   Patient Goals to go to rehab   STG Expiration Date 06/17/23   PT Treatment Day 2   Plan   Treatment/Interventions Functional transfer training;LE strengthening/ROM; Therapeutic exercise; Endurance training;Patient/family training;Equipment eval/education; Bed mobility;Gait training;Spoke to nursing;Spoke to case management   Progress Progressing toward goals   PT Frequency 3-5x/wk   Recommendation   PT Discharge Recommendation Post acute rehabilitation services   AM-PAC Basic Mobility Inpatient   Turning in Flat Bed Without Bedrails 3   Lying on Back to Sitting on Edge of Flat Bed Without Bedrails 3   Moving Bed to Chair 3   Standing Up From Chair Using Arms 3   Walk in Room 3   Climb 3-5 Stairs With Railing 1   Basic Mobility Inpatient Raw Score 16   Basic Mobility Standardized Score 38 32   Highest Level Of Mobility   -HLM Goal 5: Stand one or more mins   JH-HLM Achieved 7: Walk 25 feet or more   Education   Education Provided Mobility training;Assistive device   Patient Demonstrates acceptance/verbal understanding   End of Consult   Patient Position at End of Consult Supine; All needs within reach     Ashlie Span, PT, DPT

## 2023-06-12 NOTE — PLAN OF CARE
Problem: PAIN - ADULT  Goal: Verbalizes/displays adequate comfort level or baseline comfort level  Description: Interventions:  - Encourage patient to monitor pain and request assistance  - Assess pain using appropriate pain scale  - Administer analgesics based on type and severity of pain and evaluate response  - Implement non-pharmacological measures as appropriate and evaluate response  - Consider cultural and social influences on pain and pain management  - Notify physician/advanced practitioner if interventions unsuccessful or patient reports new pain  Outcome: Progressing     Problem: INFECTION - ADULT  Goal: Absence or prevention of progression during hospitalization  Description: INTERVENTIONS:  - Assess and monitor for signs and symptoms of infection  - Monitor lab/diagnostic results  - Monitor all insertion sites, i e  indwelling lines, tubes, and drains  - Monitor endotracheal if appropriate and nasal secretions for changes in amount and color  - [place] appropriate cooling/warming therapies per order  - Administer medications as ordered  - Instruct and encourage patient and family to use good hand hygiene technique  - Identify and instruct in appropriate isolation precautions for identified infection/condition  Outcome: Progressing  Goal: Absence of fever/infection during neutropenic period  Description: INTERVENTIONS:  - Monitor WBC    Outcome: Progressing     Problem: SAFETY ADULT  Goal: Patient will remain free of falls  Description: INTERVENTIONS:  - Educate patient/family on patient safety including physical limitations  - Instruct patient to call for assistance with activity   - Consult OT/PT to assist with strengthening/mobility   - Keep Call bell within reach  - Keep bed low and locked with side rails adjusted as appropriate  - Keep care items and personal belongings within reach  - Initiate and maintain comfort rounds  - Make Fall Risk Sign visible to staff  - Offer Toileting every  Hours, in advance of need  - Initiate/Maintain alarm  - Obtain necessary fall risk management equipment:   - Apply yellow socks and bracelet for high fall risk patients  - Consider moving patient to room near nurses station  Outcome: Progressing  Goal: Maintain or return to baseline ADL function  Description: INTERVENTIONS:  -  Assess patient's ability to carry out ADLs; assess patient's baseline for ADL function and identify physical deficits which impact ability to perform ADLs (bathing, care of mouth/teeth, toileting, grooming, dressing, etc )  - Assess/evaluate cause of self-care deficits   - Assess range of motion  - Assess patient's mobility; develop plan if impaired  - Assess patient's need for assistive devices and provide as appropriate  - Encourage maximum independence but intervene and supervise when necessary  - Involve family in performance of ADLs  - Assess for home care needs following discharge   - Consider OT consult to assist with ADL evaluation and planning for discharge  - Provide patient education as appropriate  Outcome: Progressing  Goal: Maintains/Returns to pre admission functional level  Description: INTERVENTIONS:  - Perform BMAT or MOVE assessment daily    - Set and communicate daily mobility goal to care team and patient/family/caregiver  - Collaborate with rehabilitation services on mobility goals if consulted  - Perform Range of Motion  times a day  - Reposition patient every  hours    - Dangle patient  times a day  - Stand patient  times a day  - Ambulate patient  times a day  - Out of bed to chair  times a day   - Out of bed for meals  times a day  - Out of bed for toileting  - Record patient progress and toleration of activity level   Outcome: Progressing     Problem: DISCHARGE PLANNING  Goal: Discharge to home or other facility with appropriate resources  Description: INTERVENTIONS:  - Identify barriers to discharge w/patient and caregiver  - Arrange for needed discharge resources and transportation as appropriate  - Identify discharge learning needs (meds, wound care, etc )  - Arrange for interpretive services to assist at discharge as needed  - Refer to Case Management Department for coordinating discharge planning if the patient needs post-hospital services based on physician/advanced practitioner order or complex needs related to functional status, cognitive ability, or social support system  Outcome: Progressing     Problem: Knowledge Deficit  Goal: Patient/family/caregiver demonstrates understanding of disease process, treatment plan, medications, and discharge instructions  Description: Complete learning assessment and assess knowledge base    Interventions:  - Provide teaching at level of understanding  - Provide teaching via preferred learning methods  Outcome: Progressing

## 2023-06-12 NOTE — CASE MANAGEMENT
Case Management Discharge Planning Note    Patient name Jr Vargasop  Location [facility] 911/[facility] 305-97 MRN [id number]  : 1955 Date 2023       Current Admission Date: 2023  Current Admission Diagnosis:Infection of dialysis AV graft   Patient Active Problem List    Diagnosis Date Noted   • Ambulatory dysfunction 2023   • Infection of dialysis AV graft 2023   • Hypotension 2023   • S/P angioplasty with stent 2023   • Chronic deep vein thrombosis (DVT) [facility] internal jug[facility] ) 2022   • End-stage renal disease  2022   • Chronic kidney disease 2022   • Right intertrochanteric femur fracture 2022   • Thrombocytopenia  2022   • Arteriovenous fistula ([facility] ) 2021   • AV fistula thrombosis, initial encounter ([place] [address] ) 2021   • Encounter for extracorporeal dialysis ([place] [address] ) 2019   • Intestinal malabsorption 2017   • Pernicious anemia 2017   • Irritable bowel syndrome 2017   • Elevated serum alkaline phosphatase level 2017   • Anemia of chronic disease 2017   • Coronary artery disease  10/20/2016   • Hypertension 10/20/2016   • Dependence on renal dialysis ([facility] ) 2016   • Vitamin D deficiency, unspecified 2013   • Mixed hyperlipidemia 10/04/2012   • ESRD (end stage renal disease) on dialysis ([facility] ) 2012   • Nicotine dependence 2012   • Organic impotence 2012   • Type 2 diabetes mellitus 2012      LOS (days): 10  Geometric Mean LOS (GMLOS) (days): 6 80  Days to GMLOS:-2 8     OBJECTIVE:  Risk of Unplanned Readmission Score: 21 01         Current admission status: Inpatient   Preferred Pharmacy:   [facility], [address] [place] [address] - [address]  [address] [place] [address],[address] [place] [address]  Phone: [phone] Fax: [phone]    Primary Care Provider: Mitul Schwarz MD    Primary Insurance: MEDICARE  Secondary Insurance: AARP    DISCHARGE DETAILS:          Additional Comments: Per chart, pt's wife interested in [facility] for pt's STR  CM reached out to [facility] via Irina MICHELLE to follow

## 2023-06-13 ENCOUNTER — APPOINTMENT (INPATIENT)
Dept: DIALYSIS | Facility: HOSPITAL | Age: 68
DRG: 907 | End: 2023-06-13
Attending: INTERNAL MEDICINE
Payer: MEDICARE

## 2023-06-13 LAB
ANION GAP SERPL CALCULATED.3IONS-SCNC: 4 MMOL/L (ref 4–13)
BASOPHILS # BLD AUTO: 0.05 THOUSANDS/ÂΜL (ref 0–0.1)
BASOPHILS NFR BLD AUTO: 1 % (ref 0–1)
BUN SERPL-MCNC: 10 MG/DL (ref 5–25)
CALCIUM SERPL-MCNC: 8 MG/DL (ref 8.3–10.1)
CHLORIDE SERPL-SCNC: 101 MMOL/L (ref 96–108)
CO2 SERPL-SCNC: 30 MMOL/L (ref 21–32)
CREAT SERPL-MCNC: 8.25 MG/DL (ref 0.6–1.3)
EOSINOPHIL # BLD AUTO: 0.19 THOUSAND/ÂΜL (ref 0–0.61)
EOSINOPHIL NFR BLD AUTO: 4 % (ref 0–6)
ERYTHROCYTE [DISTWIDTH] IN BLOOD BY AUTOMATED COUNT: 19.4 % (ref 11.6–15.1)
GFR SERPL CREATININE-BSD FRML MDRD: 5 ML/MIN/1.73SQ M
GLUCOSE SERPL-MCNC: 155 MG/DL (ref 65–140)
GLUCOSE SERPL-MCNC: 85 MG/DL (ref 65–140)
GLUCOSE SERPL-MCNC: 92 MG/DL (ref 65–140)
GLUCOSE SERPL-MCNC: 93 MG/DL (ref 65–140)
HBV CORE AB SER QL: NORMAL
HBV CORE IGM SER QL: NORMAL
HBV SURFACE AB SER-ACNC: 81 MIU/ML
HBV SURFACE AG SER QL: NORMAL
HCT VFR BLD AUTO: 27.8 % (ref 36.5–49.3)
HCV AB SER QL: NORMAL
HGB BLD-MCNC: 8.5 G/DL (ref 12–17)
IMM GRANULOCYTES # BLD AUTO: 0.03 THOUSAND/UL (ref 0–0.2)
IMM GRANULOCYTES NFR BLD AUTO: 1 % (ref 0–2)
LYMPHOCYTES # BLD AUTO: 0.62 THOUSANDS/ÂΜL (ref 0.6–4.47)
LYMPHOCYTES NFR BLD AUTO: 13 % (ref 14–44)
MCH RBC QN AUTO: 33.3 PG (ref 26.8–34.3)
MCHC RBC AUTO-ENTMCNC: 30.6 G/DL (ref 31.4–37.4)
MCV RBC AUTO: 109 FL (ref 82–98)
MONOCYTES # BLD AUTO: 0.38 THOUSAND/ÂΜL (ref 0.17–1.22)
MONOCYTES NFR BLD AUTO: 8 % (ref 4–12)
NEUTROPHILS # BLD AUTO: 3.5 THOUSANDS/ÂΜL (ref 1.85–7.62)
NEUTS SEG NFR BLD AUTO: 73 % (ref 43–75)
NRBC BLD AUTO-RTO: 0 /100 WBCS
PLATELET # BLD AUTO: 149 THOUSANDS/UL (ref 149–390)
PMV BLD AUTO: 11 FL (ref 8.9–12.7)
POTASSIUM SERPL-SCNC: 4 MMOL/L (ref 3.5–5.3)
RBC # BLD AUTO: 2.55 MILLION/UL (ref 3.88–5.62)
SODIUM SERPL-SCNC: 135 MMOL/L (ref 135–147)
WBC # BLD AUTO: 4.77 THOUSAND/UL (ref 4.31–10.16)

## 2023-06-13 PROCEDURE — 86705 HEP B CORE ANTIBODY IGM: CPT | Performed by: INTERNAL MEDICINE

## 2023-06-13 PROCEDURE — 87340 HEPATITIS B SURFACE AG IA: CPT | Performed by: INTERNAL MEDICINE

## 2023-06-13 PROCEDURE — 99233 SBSQ HOSP IP/OBS HIGH 50: CPT | Performed by: INTERNAL MEDICINE

## 2023-06-13 PROCEDURE — 90935 HEMODIALYSIS ONE EVALUATION: CPT | Performed by: INTERNAL MEDICINE

## 2023-06-13 PROCEDURE — 80048 BASIC METABOLIC PNL TOTAL CA: CPT | Performed by: INTERNAL MEDICINE

## 2023-06-13 PROCEDURE — 99232 SBSQ HOSP IP/OBS MODERATE 35: CPT | Performed by: INTERNAL MEDICINE

## 2023-06-13 PROCEDURE — 82948 REAGENT STRIP/BLOOD GLUCOSE: CPT

## 2023-06-13 PROCEDURE — 86706 HEP B SURFACE ANTIBODY: CPT | Performed by: INTERNAL MEDICINE

## 2023-06-13 PROCEDURE — 85025 COMPLETE CBC W/AUTO DIFF WBC: CPT | Performed by: INTERNAL MEDICINE

## 2023-06-13 PROCEDURE — 86803 HEPATITIS C AB TEST: CPT | Performed by: INTERNAL MEDICINE

## 2023-06-13 PROCEDURE — 86704 HEP B CORE ANTIBODY TOTAL: CPT | Performed by: INTERNAL MEDICINE

## 2023-06-13 RX ADMIN — MIDODRINE HYDROCHLORIDE 10 MG: 5 TABLET ORAL at 05:47

## 2023-06-13 RX ADMIN — MIDODRINE HYDROCHLORIDE 10 MG: 5 TABLET ORAL at 10:30

## 2023-06-13 RX ADMIN — EPOETIN ALFA 6000 UNITS: 3000 SOLUTION INTRAVENOUS; SUBCUTANEOUS at 09:41

## 2023-06-13 RX ADMIN — NEPHROCAP 1 CAPSULE: 1 CAP ORAL at 07:00

## 2023-06-13 RX ADMIN — HEPARIN SODIUM 5000 UNITS: 5000 INJECTION INTRAVENOUS; SUBCUTANEOUS at 05:47

## 2023-06-13 RX ADMIN — MELATONIN 3 MG: at 22:05

## 2023-06-13 RX ADMIN — Medication 250 MG: at 17:08

## 2023-06-13 RX ADMIN — ASPIRIN 81 MG: 81 TABLET, COATED ORAL at 07:00

## 2023-06-13 RX ADMIN — CHLORHEXIDINE GLUCONATE 15 ML: 1.2 SOLUTION ORAL at 07:00

## 2023-06-13 RX ADMIN — Medication 250 MG: at 07:00

## 2023-06-13 RX ADMIN — ONDANSETRON 4 MG: 4 TABLET, ORALLY DISINTEGRATING ORAL at 20:55

## 2023-06-13 RX ADMIN — OXYCODONE HYDROCHLORIDE 5 MG: 5 TABLET ORAL at 22:05

## 2023-06-13 RX ADMIN — OXYCODONE HYDROCHLORIDE 5 MG: 5 TABLET ORAL at 12:53

## 2023-06-13 RX ADMIN — INSULIN LISPRO 1 UNITS: 100 INJECTION, SOLUTION INTRAVENOUS; SUBCUTANEOUS at 22:06

## 2023-06-13 RX ADMIN — MIDODRINE HYDROCHLORIDE 10 MG: 5 TABLET ORAL at 17:08

## 2023-06-13 RX ADMIN — CYANOCOBALAMIN TAB 500 MCG 1000 MCG: 500 TAB at 07:00

## 2023-06-13 RX ADMIN — CHLORHEXIDINE GLUCONATE 15 ML: 1.2 SOLUTION ORAL at 22:05

## 2023-06-13 RX ADMIN — HEPARIN SODIUM 5000 UNITS: 5000 INJECTION INTRAVENOUS; SUBCUTANEOUS at 22:05

## 2023-06-13 RX ADMIN — HEPARIN SODIUM 5000 UNITS: 5000 INJECTION INTRAVENOUS; SUBCUTANEOUS at 12:55

## 2023-06-13 RX ADMIN — ATORVASTATIN CALCIUM 20 MG: 20 TABLET, FILM COATED ORAL at 22:05

## 2023-06-13 RX ADMIN — GABAPENTIN 100 MG: 100 CAPSULE ORAL at 17:08

## 2023-06-13 RX ADMIN — OXYCODONE HYDROCHLORIDE 5 MG: 5 TABLET ORAL at 17:58

## 2023-06-13 RX ADMIN — CEFAZOLIN SODIUM 2000 MG: 2 SOLUTION INTRAVENOUS at 11:25

## 2023-06-13 RX ADMIN — CLOPIDOGREL BISULFATE 75 MG: 75 TABLET ORAL at 07:00

## 2023-06-13 RX ADMIN — OXYCODONE HYDROCHLORIDE 5 MG: 5 TABLET ORAL at 01:36

## 2023-06-13 RX ADMIN — OXYCODONE HYDROCHLORIDE 5 MG: 5 TABLET ORAL at 05:46

## 2023-06-13 NOTE — PLAN OF CARE
Problem: PAIN - ADULT  Goal: Verbalizes/displays adequate comfort level or baseline comfort level  Description: Interventions:  - Encourage patient to monitor pain and request assistance  - Assess pain using appropriate pain scale  - Administer analgesics based on type and severity of pain and evaluate response  - Implement non-pharmacological measures as appropriate and evaluate response  - Consider cultural and social influences on pain and pain management  - Notify physician/advanced practitioner if interventions unsuccessful or patient reports new pain  Outcome: Progressing     Problem: INFECTION - ADULT  Goal: Absence or prevention of progression during hospitalization  Description: INTERVENTIONS:  - Assess and monitor for signs and symptoms of infection  - Monitor lab/diagnostic results  - Monitor all insertion sites, i e  indwelling lines, tubes, and drains  - Monitor endotracheal if appropriate and nasal secretions for changes in amount and color  - [place] appropriate cooling/warming therapies per order  - Administer medications as ordered  - Instruct and encourage patient and family to use good hand hygiene technique  - Identify and instruct in appropriate isolation precautions for identified infection/condition  Outcome: Progressing  Goal: Absence of fever/infection during neutropenic period  Description: INTERVENTIONS:  - Monitor WBC    Outcome: Progressing     Problem: SAFETY ADULT  Goal: Patient will remain free of falls  Description: INTERVENTIONS:  - Educate patient/family on patient safety including physical limitations  - Instruct patient to call for assistance with activity   - Consult OT/PT to assist with strengthening/mobility   - Keep Call bell within reach  - Keep bed low and locked with side rails adjusted as appropriate  - Keep care items and personal belongings within reach  - Initiate and maintain comfort rounds  - Make Fall Risk Sign visible to staff  - Offer Toileting every 3 Hours, in advance of need  - Initiate/Maintain 3alarm  - Obtain necessary fall risk management equipment: 3  - Apply yellow socks and bracelet for high fall risk patients  - Consider moving patient to room near nurses station  Outcome: Progressing  Goal: Maintain or return to baseline ADL function  Description: INTERVENTIONS:  -  Assess patient's ability to carry out ADLs; assess patient's baseline for ADL function and identify physical deficits which impact ability to perform ADLs (bathing, care of mouth/teeth, toileting, grooming, dressing, etc )  - Assess/evaluate cause of self-care deficits   - Assess range of motion  - Assess patient's mobility; develop plan if impaired  - Assess patient's need for assistive devices and provide as appropriate  - Encourage maximum independence but intervene and supervise when necessary  - Involve family in performance of ADLs  - Assess for home care needs following discharge   - Consider OT consult to assist with ADL evaluation and planning for discharge  - Provide patient education as appropriate  Outcome: Progressing  Goal: Maintains/Returns to pre admission functional level  Description: INTERVENTIONS:  - Perform BMAT or MOVE assessment daily    - Set and communicate daily mobility goal to care team and patient/family/caregiver  - Collaborate with rehabilitation services on mobility goals if consulted  - Perform Range of Motion 3 times a day  - Reposition patient every 3 hours    - Dangle patient 3 times a day  - Stand patient 3 times a day  - Ambulate patient 33 times a day  - Out of bed to chair 3 times a day   - Out of bed for meals 3 times a day  - Out of bed for toileting  - Record patient progress and toleration of activity level   Outcome: Progressing     Problem: DISCHARGE PLANNING  Goal: Discharge to home or other facility with appropriate resources  Description: INTERVENTIONS:  - Identify barriers to discharge w/patient and caregiver  - Arrange for needed discharge resources and transportation as appropriate  - Identify discharge learning needs (meds, wound care, etc )  - Arrange for interpretive services to assist at discharge as needed  - Refer to Case Management Department for coordinating discharge planning if the patient needs post-hospital services based on physician/advanced practitioner order or complex needs related to functional status, cognitive ability, or social support system  Outcome: Progressing     Problem: Knowledge Deficit  Goal: Patient/family/caregiver demonstrates understanding of disease process, treatment plan, medications, and discharge instructions  Description: Complete learning assessment and assess knowledge base    Interventions:  - Provide teaching at level of understanding  - Provide teaching via preferred learning methods  Outcome: Progressing     Problem: Prexisting or High Potential for Compromised Skin Integrity  Goal: Skin integrity is maintained or improved  Description: INTERVENTIONS:  - Identify patients at risk for skin breakdown  - Assess and monitor skin integrity  - Assess and monitor nutrition and hydration status  - Monitor labs   - Assess for incontinence   - Turn and reposition patient  - Assist with mobility/ambulation  - Relieve pressure over bony prominences  - Avoid friction and shearing  - Provide appropriate hygiene as needed including keeping skin clean and dry  - Evaluate need for skin moisturizer/barrier cream  - Collaborate with interdisciplinary team   - Patient/family teaching  - Consider wound care consult   Outcome: Progressing     Problem: MOBILITY - ADULT  Goal: Maintain or return to baseline ADL function  Description: INTERVENTIONS:  -  Assess patient's ability to carry out ADLs; assess patient's baseline for ADL function and identify physical deficits which impact ability to perform ADLs (bathing, care of mouth/teeth, toileting, grooming, dressing, etc )  - Assess/evaluate cause of self-care deficits   - Assess range of motion  - Assess patient's mobility; develop plan if impaired  - Assess patient's need for assistive devices and provide as appropriate  - Encourage maximum independence but intervene and supervise when necessary  - Involve family in performance of ADLs  - Assess for home care needs following discharge   - Consider OT consult to assist with ADL evaluation and planning for discharge  - Provide patient education as appropriate  Outcome: Progressing  Goal: Maintains/Returns to pre admission functional level  Description: INTERVENTIONS:  - Perform BMAT or MOVE assessment daily    - Set and communicate daily mobility goal to care team and patient/family/caregiver  - Collaborate with rehabilitation services on mobility goals if consulted  - Perform Range of Motion 3 times a day  - Reposition patient every 3 hours    - Dangle patient 3 times a day  - Stand patient 3 times a day  - Ambulate patient 3 times a day  - Out of bed to chair 3 times a day   - Out of bed for meals 3 times a day  - Out of bed for toileting  - Record patient progress and toleration of activity level   Outcome: Progressing     Problem: METABOLIC, FLUID AND ELECTROLYTES - ADULT  Goal: Electrolytes maintained within normal limits  Description: INTERVENTIONS:  - Monitor labs and assess patient for signs and symptoms of electrolyte imbalances  - Administer electrolyte replacement as ordered  - Monitor response to electrolyte replacements, including repeat lab results as appropriate  - Instruct patient on fluid and nutrition as appropriate  Outcome: Progressing  Goal: Fluid balance maintained  Description: INTERVENTIONS:  - Monitor labs   - Monitor I/O and WT  - Instruct patient on fluid and nutrition as appropriate  - Assess for signs & symptoms of volume excess or deficit  Outcome: Progressing     Problem: Nutrition/Hydration-ADULT  Goal: Nutrient/Hydration intake appropriate for improving, restoring or maintaining nutritional needs  Description: Monitor and assess patient's nutrition/hydration status for malnutrition  Collaborate with interdisciplinary team and initiate plan and interventions as ordered  Monitor patient's weight and dietary intake as ordered or per policy  Utilize nutrition screening tool and intervene as necessary  Determine patient's food preferences and provide high-protein, high-caloric foods as appropriate       INTERVENTIONS:  - Monitor oral intake, urinary output, labs, and treatment plans  - Assess nutrition and hydration status and recommend course of action  - Evaluate amount of meals eaten  - Assist patient with eating if necessary   - Allow adequate time for meals  - Recommend/ encourage appropriate diets, oral nutritional supplements, and vitamin/mineral supplements  - Order, calculate, and assess calorie counts as needed  - Recommend, monitor, and adjust tube feedings and TPN/PPN based on assessed needs  - Assess need for intravenous fluids  - Provide specific nutrition/hydration education as appropriate  - Include patient/family/caregiver in decisions related to nutrition  Outcome: Progressing

## 2023-06-13 NOTE — PLAN OF CARE
Post-Dialysis RN Treatment Note    Blood Pressure:  Pre 118/60 mm/Hg  Post 120/80 mmHg   EDW  75 5 kg    Weight:  Pre 74 7 kg   Post 74 1 kg   Mode of weight measurement: Standing Scale   Volume Removed  500 ml    Treatment duration 210 minutes    NS given  No    Treatment shortened? No   Medications given during Rx Epogen 6,000u IV, Midodrine 10mg PO and Ancef 2g IV   Estimated Kt/V  1 55   Access type: Permacath/TDC   Access Issues: No    Report called to primary nurse   Yes, to Rosa Vidales  RN      Started patient on hemodialysis treatment with UF goal of 1L net as tolerated x 3 5 hours x 3K bath for serum k+ of 4 1 on 6/12/23        Problem: METABOLIC, FLUID AND ELECTROLYTES - ADULT  Goal: Electrolytes maintained within normal limits  Description: INTERVENTIONS:  - Monitor labs and assess patient for signs and symptoms of electrolyte imbalances  - Administer electrolyte replacement as ordered  - Monitor response to electrolyte replacements, including repeat lab results as appropriate  - Instruct patient on fluid and nutrition as appropriate  Outcome: Progressing  Goal: Fluid balance maintained  Description: INTERVENTIONS:  - Monitor labs   - Monitor I/O and WT  - Instruct patient on fluid and nutrition as appropriate  - Assess for signs & symptoms of volume excess or deficit  Outcome: Progressing

## 2023-06-13 NOTE — PROGRESS NOTES
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  Status post left brachial axillary AVG left upper extremity on 5/19  Anticoagulation with Coumadin was transitioned to oral Eliquis last admission due to noncompliance with lab draws due to difficult vascular patency  Currently presents with left upper extremity wound dehiscence of AV graft with infected prosthetic material -> status post graft explantation on 6/3 with wound VAC placement  Patient received a 10-day course of antibiotic therapy  Note infectious disease input  Blood cultures from 6/2 are negative  Perioperative wound cultures with polymicrobial growth including E  coli, Klebsiella, Corynebacterium, and coagulase negative Staphylococcus  Wound VAC exchanges/management per vascular surgery  Anticoagulation discontinued status post graft explantation    Ambulatory dysfunction  Assessment & Plan  Appreciate PT/OT input -> plan for skilled rehab once medically stable    Hypotension  Assessment & Plan  Continue Midodrine   Was noted to be persistently hypotensive below baseline requiring transient vasopressor support in the ICU after graft explant  Monitor blood pressures    End-stage renal disease   Assessment & Plan  Routine hemodialysis Tu/Thu/Sat  Note AV graft explantation  [facility]  Discussed with nephrology  Patient with low volume currently  Limited volume removal with dialysis today      Thrombocytopenia   Assessment & Plan  Monitor platelet count - monitor for bleeding    Irritable bowel syndrome  Assessment & Plan  Continue Florastor  PRN Imodium on board for diarrhea    Coronary artery disease   Assessment & Plan  Status post prior stenting  Currently on dual antiplatelet therapy with aspirin Plavix in addition to statin therapy  Anemia of chronic disease  Assessment & Plan  Continue Epogen with dialysis  Continue B12 supplementation  Monitor H/H and transfuse if necessary             VTE Pharmacologic Prophylaxis:   Pharmacologic: Heparin  Mechanical VTE Prophylaxis in Place: No    Patient Centered Rounds: I have performed bedside rounds with nursing staff today  Time Spent for Care: 54  More than 50% of total time spent on counseling and coordination of care as described above  Current Length of Stay: 11 day(s)    Current Patient Status: Inpatient       Code Status: Level 1 - Full Code      Subjective:   nad    Objective:     Vitals:   Temp (24hrs), Av 8 °F (36 6 °C), Min:97 5 °F (36 4 °C), Max:98 1 °F (36 7 °C)    Temp:  [97 5 °F (36 4 °C)-98 1 °F (36 7 °C)] 97 7 °F (36 5 °C)  HR:  [56-72] 60  Resp:  [15-18] 16  BP: ()/(44-76) 100/53  SpO2:  [97 %-99 %] 99 %  Body mass index is 23 63 kg/m²  Input and Output Summary (last 24 hours): Intake/Output Summary (Last 24 hours) at 2023 0959  Last data filed at 2023 0825  Gross per 24 hour   Intake 320 ml   Output 25 ml   Net 295 ml       Physical Exam:     Physical Exam  Constitutional:       Appearance: Normal appearance  HENT:      Mouth/Throat:      Mouth: Mucous membranes are dry  Eyes:      Pupils: Pupils are equal, round, and reactive to light  Cardiovascular:      Rate and Rhythm: Normal rate and regular rhythm  Heart sounds: No murmur heard  Pulmonary:      Effort: Pulmonary effort is normal       Breath sounds: Normal breath sounds  Abdominal:      General: Abdomen is flat  Palpations: Abdomen is soft  Musculoskeletal:         General: No swelling  Normal range of motion  Cervical back: Normal range of motion  Skin:     General: Skin is warm and dry     Neurological: General: No focal deficit present  Mental Status: He is alert and oriented to person, place, and time  Additional Data:     Labs:    Results from last 7 days   Lab Units 06/13/23  0816   EOS PCT % 4   HEMATOCRIT % 27 8*   HEMOGLOBIN g/dL 8 5*   LYMPHS PCT % 13*   MONOS PCT % 8   NEUTROS PCT % 73   PLATELETS Thousands/uL 149   WBC Thousand/uL 4 77     Results from last 7 days   Lab Units 06/13/23  0816 06/08/23  0443 06/07/23  0449   ALK PHOS U/L  --   --  123*   ALT U/L  --   --  10*   AST U/L  --   --  17   BUN mg/dL 10   < > 9   CALCIUM mg/dL 8 0*   < > 7 8*   CHLORIDE mmol/L 101   < > 103   CO2 mmol/L 30   < > 31   CREATININE mg/dL 8 25*   < > 4 43*   POTASSIUM mmol/L 4 0   < > 3 6    < > = values in this interval not displayed  * I Have Reviewed All Lab Data Listed Above  * Additional Pertinent Lab Tests Reviewed:  All Labs Within Last 24 Hours Reviewed      Recent Cultures (last 7 days):           Last 24 Hours Medication List:   Current Facility-Administered Medications   Medication Dose Route Frequency Provider Last Rate   • acetaminophen  975 mg Oral Q8H PRN Khalif Ashley MD     • aspirin  81 mg Oral Daily Khalif Ashley MD     • atorvastatin  20 mg Oral HS Khalif Ashley MD     • b complex-vitamin C-folic acid  1 capsule Oral Daily Khalif Ashley MD     • calcitriol  0 75 mcg Oral Once per day on Mon Wed Fri Erick Aldrich MD     • cefazolin  2,000 mg Intravenous After Dialysis Khalif Ashley MD Stopped (06/10/23 9005)   • chlorhexidine  15 mL Mouth/Throat Q12H [facility] Charly Stone MD     • clopidogrel  75 mg Oral Daily Khalif Ashley MD     • vitamin B-12  1,000 mcg Oral Daily Khalif Ashley MD     • epoetin jah  6,000 Units Intravenous After Dialysis Kristy Smith DO     • gabapentin  100 mg Oral Once per day on Tue Thu Sat Khalif Ashley MD     • heparin (porcine)  5,000 Units Subcutaneous [facility] [address] Masha Irby MD     • insulin lispro  1-6 Units Subcutaneous 4x Daily (AC & HS) Masha Irby MD     • loperamide  2 mg Oral TID PRN Noris Earl MD     • melatonin  3 mg Oral HS Masha Irby MD     • midodrine  10 mg Oral TID AC Masha Irby MD     • ondansetron  4 mg Oral Q6H PRN Noris Earl MD     • oxyCODONE  5 mg Oral Q4H PRN Masha Irby MD     • oxyCODONE  2 5 mg Oral Q4H PRN Masha Irby MD     • saccharomyces boulardii  250 mg Oral BID Louie Sheets PA-C          Today, Patient Was Seen By: Nando Andre DO    ** Please Note: Dictation voice to text software may have been used in the creation of this document   **

## 2023-06-13 NOTE — PROGRESS NOTES
Progress Note - Infectious Disease   Rika Figueroa 70 y o  male MRN: [id number]  Unit/Bed#: [facility] 911-01 Encounter: [id number]      Impression/Recommendations:  CHARLES NICOLEAURE infection     With axillary wound dehiscence and exposed graft   Status post complete explantation with native vein patch repair /3   OR cultures with E  Coli, Klebsiella, Corynebacterium, Coag neg Staph from broth only   Clinically stable without sepsis  Rec:  • Discontinue cefazolin and vancomycin today to complete 10 days postop antibiotics  • LWC/VAC per vascular surgery     Acute on chronic hypotension  Likely multifactorial   Blood cultures negative   At this point infection above is controlled   Now off pressors  Rec:  • Midodrine per outpatient regiment  • Supportive care as per the primary service     ESRD on HD     Via permcath  On T/T/S      Diarrhea  In setting of chronic IBS, antibiotics, laxatives   Low suspicion for C  Diff      DM      The patient is stable from an ID standpoint  We will sign off for now  Please call with new questions      Antibiotics:  Vancomycin #11  Cefazolin #7 (GNR #9)  POD #10    Subjective:  Patient seen on AM rounds  Denies fevers, chills, sweats, nausea, vomiting, or diarrhea  24 Hour Events:  No documented fevers, chills, sweats, nausea, vomiting, or diarrhea  Objective:  Vitals:  Temp:  [97 5 °F (36 4 °C)-98 1 °F (36 7 °C)] 97 7 °F (36 5 °C)  HR:  [56-85] 85  Resp:  [15-18] 16  BP: ()/(44-76) 96/62  SpO2:  [97 %-99 %] 99 %  Temp (24hrs), Av 8 °F (36 6 °C), Min:97 5 °F (36 4 °C), Max:98 1 °F (36 7 °C)  Current: Temperature: 97 7 °F (36 5 °C)    Physical Exam:   General:  No acute distress  Psychiatric:  Awake and alert  Pulmonary:  Normal respiratory excursion without accessory muscle use  Abdomen:  Soft, nontender  Extremities:  No edema  Skin:  No rashes    Lab Results:  I have personally reviewed pertinent labs    Results from last 7 days   Lab Units 23  0816 23  4985 06/11/23  0631 06/08/23  0443 06/07/23  0449   ALK PHOS U/L  --   --   --   --  123*   ALT U/L  --   --   --   --  10*   AST U/L  --   --   --   --  17   BUN mg/dL 10 9 5   < > 9   CALCIUM mg/dL 8 0* 8 2* 8 1*   < > 7 8*   CHLORIDE mmol/L 101 104 104   < > 103   CO2 mmol/L 30 28 30   < > 31   CREATININE mg/dL 8 25* 6 43* 4 57*   < > 4 43*   EGFR ml/min/1 73sq m 5 8 12   < > 12   POTASSIUM mmol/L 4 0 4 1 4 0   < > 3 6    < > = values in this interval not displayed  Results from last 7 days   Lab Units 06/13/23  0816 06/12/23  0525 06/11/23  0631   HEMOGLOBIN g/dL 8 5* 8 4* 7 8*   PLATELETS Thousands/uL 149 140* 132*   WBC Thousand/uL 4 77 3 39* 3 56*           Imaging Studies:   I have personally reviewed pertinent imaging study reports and images in PACS  EKG, Pathology, and Other Studies:   I have personally reviewed pertinent reports

## 2023-06-13 NOTE — CASE MANAGEMENT
Case Management Discharge Planning Note    Patient name Teto Oropeza  [facility] MRN [id number]  : 1955 Date 2023       Current Admission Date: 2023  Current Admission Diagnosis:Infection of dialysis AV graft   Patient Active Problem List    Diagnosis Date Noted   • Ambulatory dysfunction 2023   • Infection of dialysis AV graft 2023   • Hypotension 2023   • S/P angioplasty with stent 2023   • Chronic deep vein thrombosis (DVT) of internal jugular vein ([facility] ) 2022   • End-stage renal disease  2022   • Chronic kidney disease 2022   • Right intertrochanteric femur fracture 2022   • Thrombocytopenia  2022   • Arteriovenous fistula ([facility] ) 2021   • AV fistula thrombosis, initial encounter ([place] [address] ) 2021   • Encounter for extracorporeal dialysis ([place] [address] ) 2019   • Intestinal malabsorption 2017   • Pernicious anemia 2017   • Irritable bowel syndrome 2017   • Elevated serum alkaline phosphatase level 2017   • Anemia of chronic disease 2017   • Coronary artery disease  10/20/2016   • Hypertension 10/20/2016   • Dependence on renal dialysis ([facility] ) 2016   • Vitamin D deficiency, unspecified 2013   • Mixed hyperlipidemia 10/04/2012   • ESRD (end stage renal disease) on dialysis ([facility] ) 2012   • Nicotine dependence 2012   • Organic impotence 2012   • Type 2 diabetes mellitus 2012      LOS (days): 11  Geometric Mean LOS (GMLOS) (days): 6 80  Days to GMLOS:-3 8     OBJECTIVE:  Risk of Unplanned Readmission Score: 21 36         Current admission status: Inpatient   Preferred Pharmacy:   [address]  [address] [place] [address],[address] [place] [id number]  Phone: [phone] Fax: [phone]    Primary Care Provider: Clarence Donohue MD    Primary Insurance: MEDICARE  Secondary Insurance: AARP    DISCHARGE DETAILS:    Darwyn Sandhoff, [place], [phone] they can accept pt    They need the following for [facility]:    HD flowsheets - faxed to [address] [facility] and antibody-Dr Brandee Zhou notified and will order    Notified wife and pt that [facility] accepted

## 2023-06-13 NOTE — ASSESSMENT & PLAN NOTE
Status post prior stenting  Currently on dual antiplatelet therapy with aspirin Plavix in addition to statin therapy

## 2023-06-13 NOTE — PROGRESS NOTES
[address] [facility] NOTE   Rich Colon 70 y o  male MRN: [id number]  Unit/Bed#: [facility] 911-01 Encounter: [id number]  Reason for Consult: ESRD on HD     ASSESSMENT and PLAN:    1  ESRD on HD  He is on TTS schedule at [address] [place] [address]  He was seen and examined on hemodialysis today  Current access is right chest wall permacath  Dialysis prescription is as below:    Na+ 138 mEq/L   Na+ Modeling No   K+ KPP (Potassium per Protocol)   Bicarb 35 mEq/L   Dialyzer F180   BFR-As tolerated to a maximum of: 400ml/min   DFR Other   Other Comments 500 mL/min   Duration of Treatment 3 5 Hours   Dialysate Temperature (C) 36   Dry Weight: 75 5 kg       2  BP/Volume   He has chronic hypotension on midodrine 10 mg 3 times daily  His standing weight is currently below his dry weight of 75 5 kg  Therefore we will limit ultrafiltration to 500 cc today  3  Anemia   Goal hemoglobin is 10-1 is currently at goal   His hemoglobin today is 8 5  We will continue IV Epogen 6000 units with dialysis  4  Electrolytes   We will use 3K and 35 bicarbonate bath on dialysis today  5  Bone Mineral Disease   Continue calcitriol 0 75 mcg on dialysis days  He is not on phosphate binders  6  Left arm wound dehiscence  AV graft was placed on 05/19 status post graft explant with a wound VAC in place  He is currently on IV Ancef, last dose is today per infectious disease  Discussed with internal medicine team   After discussion, we agreed to limit ultrafiltration on dialysis today as patient is under his dry weight  SUBJECTIVE / 24H INTERVAL HISTORY:  Patient was seen and examined on hemodialysis this morning  He denied dyspnea  He denied leg swelling  REVIEW OF SYSTEMS:  Review of Systems   Constitutional: Negative for chills and fever  HENT: Negative for ear pain and sore throat  Eyes: Negative for pain and visual disturbance  Respiratory: Negative for cough and shortness of breath      Cardiovascular: Negative for chest pain and palpitations  Gastrointestinal: Negative for abdominal pain and vomiting  Genitourinary: Negative for dysuria and hematuria  Musculoskeletal: Negative for arthralgias and back pain  Skin: Negative for color change and rash  Neurological: Negative for seizures and syncope  All other systems reviewed and are negative  OBJECTIVE:  Current Weight: Weight - Scale: 74 7 kg (164 lb 10 9 oz)  Vitals:    06/13/23 0815 06/13/23 0825 06/13/23 0830 06/13/23 0900   BP: 118/60 117/69 106/76 91/54   BP Location: Right arm      Pulse: 60 60 60 60   Resp: 16 15 16 15   Temp: 97 7 °F (36 5 °C)      TempSrc: Oral      SpO2:       Weight:       Height:           Intake/Output Summary (Last 24 hours) at 6/13/2023 0924  Last data filed at 6/13/2023 0825  Gross per 24 hour   Intake 320 ml   Output 25 ml   Net 295 ml     Physical Exam  Vitals and nursing note reviewed  Constitutional:       General: He is not in acute distress  Appearance: He is well-developed  HENT:      Head: Normocephalic and atraumatic  Eyes:      Conjunctiva/sclera: Conjunctivae normal    Cardiovascular:      Rate and Rhythm: Normal rate and regular rhythm  Heart sounds: No murmur heard  Comments: Right chest wall permacath currently in use for hemodialysis  Left upper extremity AV graft explanted and wound site with wound VAC in place  Pulmonary:      Effort: Pulmonary effort is normal  No respiratory distress  Breath sounds: Normal breath sounds  Abdominal:      Palpations: Abdomen is soft  Tenderness: There is no abdominal tenderness  Musculoskeletal:         General: No swelling  Cervical back: Neck supple  Right lower leg: No edema  Left lower leg: No edema  Skin:     General: Skin is warm and dry  Capillary Refill: Capillary refill takes less than 2 seconds  Neurological:      Mental Status: He is alert     Psychiatric:         Mood and Affect: Mood normal  Medications:    Current Facility-Administered Medications:   •  acetaminophen (TYLENOL) tablet 975 mg, 975 mg, Oral, Q8H PRN, Tegan Catalan MD, 975 mg at 06/07/23 1655  •  aspirin (ECOTRIN LOW STRENGTH) EC tablet 81 mg, 81 mg, Oral, Daily, Tegan Catalan MD, 81 mg at 06/13/23 0700  •  atorvastatin (LIPITOR) tablet 20 mg, 20 mg, Oral, HS, Tegan Catalan MD, 20 mg at 06/12/23 2101  •  b complex-vitamin C-folic acid (RENAL) capsule 1 capsule, 1 capsule, Oral, Daily, Tegan Catalan MD, 1 capsule at 06/13/23 0700  •  calcitriol (ROCALTROL) capsule 0 75 mcg, 0 75 mcg, Oral, Once per day on Mon Wed Fri, Dar Kennedy MD, 0 75 mcg at 06/12/23 0825  •  ceFAZolin (ANCEF) IVPB (premix in dextrose) 2,000 mg 50 mL, 2,000 mg, Intravenous, After Dialysis, Tegan Catalan MD, Stopped at 06/10/23 1134  •  chlorhexidine (PERIDEX) 0 12 % oral rinse 15 mL, 15 mL, Mouth/Throat, Q12H [address], Tegan Catalan MD, 15 mL at 06/13/23 0700  •  clopidogrel (PLAVIX) tablet 75 mg, 75 mg, Oral, Daily, Tegan Catalan MD, 75 mg at 06/13/23 0700  •  cyanocobalamin (VITAMIN B-12) tablet 1,000 mcg, 1,000 mcg, Oral, Daily, Tegan Catalan MD, 1,000 mcg at 06/13/23 0700  •  epoetin jah (EPOGEN,PROCRIT) injection 6,000 Units, 6,000 Units, Intravenous, After Dialysis, Sal Barrios DO  •  gabapentin (NEURONTIN) capsule 100 mg, 100 mg, Oral, Once per day on Tue Thu Sat, Tegan Catalan MD, 100 mg at 06/10/23 1720  •  heparin (porcine) subcutaneous injection 5,000 Units, 5,000 Units, Subcutaneous, Q8H [address], 5,000 Units at 06/13/23 0547 **AND** [COMPLETED] Platelet count, , , Once, Qing Rivera, DO  •  insulin lispro (HumaLOG) 100 units/mL subcutaneous injection 1-6 Units, 1-6 Units, Subcutaneous, 4x Daily (AC & HS), 2 Units at 06/12/23 1510 **AND** Fingerstick Glucose (POCT), , , 4x Daily AC and at bedtime, Tegan Catalan MD  •  loperamide (IMODIUM) oral liquid 2 mg, 2 "mg, Oral, TID PRN, Kenji Thompson MD  •  melatonin tablet 3 mg, 3 mg, Oral, HS, Katrina Alex MD, 3 mg at 06/12/23 2101  •  midodrine (PROAMATINE) tablet 10 mg, 10 mg, Oral, TID AC, Katrina Alex MD, 10 mg at 06/13/23 0547  •  ondansetron (ZOFRAN-ODT) dispersible tablet 4 mg, 4 mg, Oral, Q6H PRN, Kenji Thompson MD  •  oxyCODONE (ROXICODONE) IR tablet 5 mg, 5 mg, Oral, Q4H PRN, Katrina Alex MD, 5 mg at 06/13/23 0546  •  oxyCODONE (ROXICODONE) split tablet 2 5 mg, 2 5 mg, Oral, Q4H PRN, Katrina Alex MD, 2 5 mg at 06/11/23 1044  •  saccharomyces boulardii (FLORASTOR) capsule 250 mg, 250 mg, Oral, BID, Aroldo Heredia PA-C, 250 mg at 06/13/23 0700    Laboratory Results:  Results from last 7 days   Lab Units 06/13/23  0816 06/12/23  0525 06/11/23  0631 06/10/23  0554 06/09/23  0502 06/08/23  0443 06/07/23  0449   BUN mg/dL 10 9 5 8  --  12 9   CALCIUM mg/dL 8 0* 8 2* 8 1* 8 0*  --  7 9* 7 8*   CHLORIDE mmol/L 101 104 104 103  --  103 103   CO2 mmol/L 30 28 30 31  --  31 31   CREATININE mg/dL 8 25* 6 43* 4 57* 6 35*  --  6 35* 4 43*   HEMATOCRIT % 27 8* 26 5* 25 5* 24 4* 25 6* 24 3* 21 5*   HEMOGLOBIN g/dL 8 5* 8 4* 7 8* 7 3* 7 9* 7 4* 7 2*   POTASSIUM mmol/L 4 0 4 1 4 0 3 8  --  3 7 3 6   MAGNESIUM mg/dL  --   --   --   --   --  1 9 1 9   PHOSPHORUS mg/dL  --   --   --   --   --  3 5 2 9   PLATELETS Thousands/uL 149 140* 132* 126* 115* 100* 102*   WBC Thousand/uL 4 77 3 39* 3 56* 4 00* 4 79 3 68* 3 60*     Portions of the record may have been created with voice recognition software  Occasional wrong word or \"sound a like\" substitutions may have occurred due to the inherent limitations of voice recognition software  Read the chart carefully and recognize, using context, where substitutions have occurred  If you have any questions, please contact the dictating provider      "

## 2023-06-13 NOTE — PLAN OF CARE
Problem: PAIN - ADULT  Goal: Verbalizes/displays adequate comfort level or baseline comfort level  Description: Interventions:  - Encourage patient to monitor pain and request assistance  - Assess pain using appropriate pain scale  - Administer analgesics based on type and severity of pain and evaluate response  - Implement non-pharmacological measures as appropriate and evaluate response  - Consider cultural and social influences on pain and pain management  - Notify physician/advanced practitioner if interventions unsuccessful or patient reports new pain  Outcome: Progressing     Problem: INFECTION - ADULT  Goal: Absence or prevention of progression during hospitalization  Description: INTERVENTIONS:  - Assess and monitor for signs and symptoms of infection  - Monitor lab/diagnostic results  - Monitor all insertion sites, i e  indwelling lines, tubes, and drains  - Monitor endotracheal if appropriate and nasal secretions for changes in amount and color  - [place] appropriate cooling/warming therapies per order  - Administer medications as ordered  - Instruct and encourage patient and family to use good hand hygiene technique  - Identify and instruct in appropriate isolation precautions for identified infection/condition  Outcome: Progressing  Goal: Absence of fever/infection during neutropenic period  Description: INTERVENTIONS:  - Monitor WBC    Outcome: Progressing     Problem: SAFETY ADULT  Goal: Patient will remain free of falls  Description: INTERVENTIONS:  - Educate patient/family on patient safety including physical limitations  - Instruct patient to call for assistance with activity   - Consult OT/PT to assist with strengthening/mobility   - Keep Call bell within reach  - Keep bed low and locked with side rails adjusted as appropriate  - Keep care items and personal belongings within reach  - Initiate and maintain comfort rounds  - Make Fall Risk Sign visible to staff  - Offer Toileting every  Hours, in advance of need  - Initiate/Maintain alarm  - Obtain necessary fall risk management equipment:   - Apply yellow socks and bracelet for high fall risk patients  - Consider moving patient to room near nurses station  Outcome: Progressing  Goal: Maintain or return to baseline ADL function  Description: INTERVENTIONS:  -  Assess patient's ability to carry out ADLs; assess patient's baseline for ADL function and identify physical deficits which impact ability to perform ADLs (bathing, care of mouth/teeth, toileting, grooming, dressing, etc )  - Assess/evaluate cause of self-care deficits   - Assess range of motion  - Assess patient's mobility; develop plan if impaired  - Assess patient's need for assistive devices and provide as appropriate  - Encourage maximum independence but intervene and supervise when necessary  - Involve family in performance of ADLs  - Assess for home care needs following discharge   - Consider OT consult to assist with ADL evaluation and planning for discharge  - Provide patient education as appropriate  Outcome: Progressing  Goal: Maintains/Returns to pre admission functional level  Description: INTERVENTIONS:  - Perform BMAT or MOVE assessment daily    - Set and communicate daily mobility goal to care team and patient/family/caregiver  - Collaborate with rehabilitation services on mobility goals if consulted  - Perform Range of Motion  times a day  - Reposition patient every  hours    - Dangle patient  times a day  - Stand patient  times a day  - Ambulate patient times a day  - Out of bed to chair  times a day   - Out of bed for meals  times a day  - Out of bed for toileting  - Record patient progress and toleration of activity level   Outcome: Progressing     Problem: DISCHARGE PLANNING  Goal: Discharge to home or other facility with appropriate resources  Description: INTERVENTIONS:  - Identify barriers to discharge w/patient and caregiver  - Arrange for needed discharge resources and transportation as appropriate  - Identify discharge learning needs (meds, wound care, etc )  - Arrange for interpretive services to assist at discharge as needed  - Refer to Case Management Department for coordinating discharge planning if the patient needs post-hospital services based on physician/advanced practitioner order or complex needs related to functional status, cognitive ability, or social support system  Outcome: Progressing

## 2023-06-13 NOTE — ASSESSMENT & PLAN NOTE
Routine hemodialysis Tu/Thu/Sat  Note AV graft explantation  Dialysis through PermCath currently  Discussed with nephrology  Patient with low volume currently  Limited volume removal with dialysis today

## 2023-06-14 LAB
ANION GAP SERPL CALCULATED.3IONS-SCNC: 4 MMOL/L (ref 4–13)
BASOPHILS # BLD AUTO: 0.05 THOUSANDS/ÂΜL (ref 0–0.1)
BASOPHILS NFR BLD AUTO: 1 % (ref 0–1)
BUN SERPL-MCNC: 4 MG/DL (ref 5–25)
CALCIUM SERPL-MCNC: 8.2 MG/DL (ref 8.3–10.1)
CHLORIDE SERPL-SCNC: 104 MMOL/L (ref 96–108)
CO2 SERPL-SCNC: 29 MMOL/L (ref 21–32)
CREAT SERPL-MCNC: 4.81 MG/DL (ref 0.6–1.3)
EOSINOPHIL # BLD AUTO: 0.14 THOUSAND/ÂΜL (ref 0–0.61)
EOSINOPHIL NFR BLD AUTO: 4 % (ref 0–6)
ERYTHROCYTE [DISTWIDTH] IN BLOOD BY AUTOMATED COUNT: 19 % (ref 11.6–15.1)
GFR SERPL CREATININE-BSD FRML MDRD: 11 ML/MIN/1.73SQ M
GLUCOSE SERPL-MCNC: 122 MG/DL (ref 65–140)
GLUCOSE SERPL-MCNC: 130 MG/DL (ref 65–140)
GLUCOSE SERPL-MCNC: 170 MG/DL (ref 65–140)
GLUCOSE SERPL-MCNC: 389 MG/DL (ref 65–140)
GLUCOSE SERPL-MCNC: 94 MG/DL (ref 65–140)
HCT VFR BLD AUTO: 29.1 % (ref 36.5–49.3)
HGB BLD-MCNC: 8.7 G/DL (ref 12–17)
IMM GRANULOCYTES # BLD AUTO: 0.03 THOUSAND/UL (ref 0–0.2)
IMM GRANULOCYTES NFR BLD AUTO: 1 % (ref 0–2)
LYMPHOCYTES # BLD AUTO: 0.58 THOUSANDS/ÂΜL (ref 0.6–4.47)
LYMPHOCYTES NFR BLD AUTO: 16 % (ref 14–44)
MCH RBC QN AUTO: 32.6 PG (ref 26.8–34.3)
MCHC RBC AUTO-ENTMCNC: 29.9 G/DL (ref 31.4–37.4)
MCV RBC AUTO: 109 FL (ref 82–98)
MONOCYTES # BLD AUTO: 0.33 THOUSAND/ÂΜL (ref 0.17–1.22)
MONOCYTES NFR BLD AUTO: 9 % (ref 4–12)
NEUTROPHILS # BLD AUTO: 2.5 THOUSANDS/ÂΜL (ref 1.85–7.62)
NEUTS SEG NFR BLD AUTO: 69 % (ref 43–75)
NRBC BLD AUTO-RTO: 0 /100 WBCS
PLATELET # BLD AUTO: 129 THOUSANDS/UL (ref 149–390)
PMV BLD AUTO: 10.7 FL (ref 8.9–12.7)
POTASSIUM SERPL-SCNC: 4.2 MMOL/L (ref 3.5–5.3)
RBC # BLD AUTO: 2.67 MILLION/UL (ref 3.88–5.62)
SODIUM SERPL-SCNC: 137 MMOL/L (ref 135–147)
WBC # BLD AUTO: 3.63 THOUSAND/UL (ref 4.31–10.16)

## 2023-06-14 PROCEDURE — 82948 REAGENT STRIP/BLOOD GLUCOSE: CPT

## 2023-06-14 PROCEDURE — 80048 BASIC METABOLIC PNL TOTAL CA: CPT | Performed by: INTERNAL MEDICINE

## 2023-06-14 PROCEDURE — 97116 GAIT TRAINING THERAPY: CPT

## 2023-06-14 PROCEDURE — 85025 COMPLETE CBC W/AUTO DIFF WBC: CPT | Performed by: INTERNAL MEDICINE

## 2023-06-14 PROCEDURE — 99233 SBSQ HOSP IP/OBS HIGH 50: CPT | Performed by: INTERNAL MEDICINE

## 2023-06-14 PROCEDURE — 99232 SBSQ HOSP IP/OBS MODERATE 35: CPT | Performed by: INTERNAL MEDICINE

## 2023-06-14 PROCEDURE — NC001 PR NO CHARGE: Performed by: SURGERY

## 2023-06-14 RX ADMIN — LOPERAMIDE HCL 2 MG: 1 SOLUTION ORAL at 07:37

## 2023-06-14 RX ADMIN — OXYCODONE HYDROCHLORIDE 5 MG: 5 TABLET ORAL at 11:12

## 2023-06-14 RX ADMIN — HEPARIN SODIUM 5000 UNITS: 5000 INJECTION INTRAVENOUS; SUBCUTANEOUS at 22:23

## 2023-06-14 RX ADMIN — HEPARIN SODIUM 5000 UNITS: 5000 INJECTION INTRAVENOUS; SUBCUTANEOUS at 12:29

## 2023-06-14 RX ADMIN — Medication 250 MG: at 07:35

## 2023-06-14 RX ADMIN — MIDODRINE HYDROCHLORIDE 10 MG: 5 TABLET ORAL at 11:12

## 2023-06-14 RX ADMIN — CHLORHEXIDINE GLUCONATE 15 ML: 1.2 SOLUTION ORAL at 07:34

## 2023-06-14 RX ADMIN — HEPARIN SODIUM 5000 UNITS: 5000 INJECTION INTRAVENOUS; SUBCUTANEOUS at 05:48

## 2023-06-14 RX ADMIN — CYANOCOBALAMIN TAB 500 MCG 1000 MCG: 500 TAB at 07:36

## 2023-06-14 RX ADMIN — Medication 250 MG: at 16:00

## 2023-06-14 RX ADMIN — OXYCODONE HYDROCHLORIDE 5 MG: 5 TABLET ORAL at 15:58

## 2023-06-14 RX ADMIN — OXYCODONE HYDROCHLORIDE 5 MG: 5 TABLET ORAL at 19:49

## 2023-06-14 RX ADMIN — CLOPIDOGREL BISULFATE 75 MG: 75 TABLET ORAL at 07:36

## 2023-06-14 RX ADMIN — NEPHROCAP 1 CAPSULE: 1 CAP ORAL at 07:35

## 2023-06-14 RX ADMIN — OXYCODONE HYDROCHLORIDE 5 MG: 5 TABLET ORAL at 03:35

## 2023-06-14 RX ADMIN — INSULIN LISPRO 1 UNITS: 100 INJECTION, SOLUTION INTRAVENOUS; SUBCUTANEOUS at 22:23

## 2023-06-14 RX ADMIN — CALCITRIOL CAPSULES 0.25 MCG 0.75 MCG: 0.25 CAPSULE ORAL at 07:35

## 2023-06-14 RX ADMIN — ASPIRIN 81 MG: 81 TABLET, COATED ORAL at 07:35

## 2023-06-14 RX ADMIN — ATORVASTATIN CALCIUM 20 MG: 20 TABLET, FILM COATED ORAL at 22:24

## 2023-06-14 RX ADMIN — CHLORHEXIDINE GLUCONATE 15 ML: 1.2 SOLUTION ORAL at 19:49

## 2023-06-14 RX ADMIN — MIDODRINE HYDROCHLORIDE 10 MG: 5 TABLET ORAL at 15:58

## 2023-06-14 RX ADMIN — INSULIN LISPRO 6 UNITS: 100 INJECTION, SOLUTION INTRAVENOUS; SUBCUTANEOUS at 15:59

## 2023-06-14 RX ADMIN — LOPERAMIDE HCL 2 MG: 1 SOLUTION ORAL at 12:29

## 2023-06-14 RX ADMIN — MIDODRINE HYDROCHLORIDE 10 MG: 5 TABLET ORAL at 05:48

## 2023-06-14 RX ADMIN — LOPERAMIDE HCL 2 MG: 1 SOLUTION ORAL at 19:49

## 2023-06-14 RX ADMIN — MELATONIN 3 MG: at 22:24

## 2023-06-14 RX ADMIN — OXYCODONE HYDROCHLORIDE 5 MG: 5 TABLET ORAL at 07:36

## 2023-06-14 NOTE — PROCEDURES
Acute Care Surgery  Bedside V A C  Procedure Note    A timeout was performed with the patient's nurse, prior to beginning the dressing change  The nurse remained present to confirm the correct dressing counts on removal of the VAC dressing and was debriefed at the completion of the dressing change  Location of wound: Left arm, Left arm/axilla    Dressings and Foam removed:  0  Dressings  2 Black Foam  0 White Foam    Dimensions of wound:   Proximal: 5 cm x 1 cm x 1 cm   Distal: 4cm x 0 5cm x 0 5cm    Description of wound: On inspection of the wound today, there is no signs of active infection  Would appears healthy   The arm/axilla wound extends down to muscle  The distal arm wound extends down to subcutaneous tissue  Approximately 90% of the wound bases are now pink and healthy and there is granulation tissue  The periwound skin remains clean and intact and unremarkable  VAC dressing application:  The periwound skin was cleaned and dried  0  dressings, 1 black foam, 0 white foam were cut to size of the wound and placed into each of the wounds for a total of 2 black sponges  The dressings were then covered with VAC drape  Additional VAC drape and black foam was used to create a bridge to the patient's arm and a base for the track pad  The track pad was then placed over the base of black foam  The VAC was then set to 125 mmHg low Continuous suction  The patient tolerated the procedure well and there were no complications  The patient did not require any excisional debridement during today's dressing change  VAC settings:  125 mmHg  Continuous    Wound Images:  Distal arm:    Proximal arm/axilla: Additional Notes: The next [facility] dressing change will be planned for 6/16  The [facility] paperwork was completed and give to the case management staff to arrange home VAC therapy  This dressing change took greater than 15 minutes to complete      Yoandy Coy MD  6/14/2023 10:12 AM

## 2023-06-14 NOTE — CASE MANAGEMENT
Case Management Discharge Planning Note    Patient name Rich Colon  Location [facility] 911/[facility] 475-05 MRN [id number]  : 1955 Date 2023       Current Admission Date: 2023  Current Admission Diagnosis:Infection of dialysis AV graft   Patient Active Problem List    Diagnosis Date Noted   • Ambulatory dysfunction 2023   • Infection of dialysis AV graft 2023   • Hypotension 2023   • S/P angioplasty with stent 2023   • Chronic deep vein thrombosis (DVT) of internal jugular vein ([facility] ) 2022   • End-stage renal disease  2022   • Chronic kidney disease 2022   • Right intertrochanteric femur fracture 2022   • Thrombocytopenia  2022   • Arteriovenous fistula ([facility] ) 2021   • AV fistula thrombosis, initial encounter ([place] [address] ) 2021   • Encounter for extracorporeal dialysis ([place] [address] ) 2019   • Intestinal malabsorption 2017   • Pernicious anemia 2017   • Irritable bowel syndrome 2017   • Elevated serum alkaline phosphatase level 2017   • Anemia of chronic disease 2017   • Coronary artery disease  10/20/2016   • Hypertension 10/20/2016   • Dependence on renal dialysis ([facility] ) 2016   • Vitamin D deficiency, unspecified 2013   • Mixed hyperlipidemia 10/04/2012   • ESRD (end stage renal disease) on dialysis ([place] [address] ) 2012   • Nicotine dependence 2012   • Organic impotence 2012   • Type 2 diabetes mellitus 2012      LOS (days): 12  Geometric Mean LOS (GMLOS) (days): 6 80  Days to GMLOS:-4 8     OBJECTIVE:  Risk of Unplanned Readmission Score: 21 39         Current admission status: Inpatient   Preferred Pharmacy:   [address]  [address] [place] [address],[address] [place] [address]  Phone: [phone] Fax: [phone]    Primary Care Provider: Catina Cast MD    Primary Insurance: MEDICARE  Secondary Insurance: AARP    DISCHARGE DETAILS:    [facility], [facility] requesting vital signs  Uploaded in [address] and faxed to [facility] via [address], pt stable for DC, will need wound vac  Await decision on whether bed available today    [facility] can accept tomorrow, will order wound vac today    Verified with Director MW can DC tomorrow after dialysis  TCT dialysis center, pt will receive dialysis in AM and can be DC'd after 1    Transport request via [address] [address]    Notified pt of DC tomorrow    TCT wife notified of DC tomorrow

## 2023-06-14 NOTE — PLAN OF CARE
Problem: PHYSICAL THERAPY ADULT  Goal: Performs mobility at highest level of function for planned discharge setting  See evaluation for individualized goals  Description: Treatment/Interventions: OT, Spoke to nursing, Spoke to case management, Gait training, Bed mobility, Patient/family training, Endurance training, LE strengthening/ROM, Functional transfer training          See flowsheet documentation for full assessment, interventions and recommendations  Outcome: Progressing  Note: Prognosis: Good  Problem List: Decreased strength, Decreased range of motion, Decreased endurance, Impaired balance, Decreased mobility, Decreased coordination, Decreased cognition, Impaired judgement, Decreased safety awareness, Pain  Assessment: Pt seen for PT treatment session this date  Therapy session focused on bed mobility, functional transfers, gait training and endurance training in order to improve overall mobility and independence  Pt requires assist of 1 for all mobility performed this date  Pt ambulated increased distance this date, 1x standing rest break required 2* generalized fatigue + RLE pain  Pt making progress toward goals  Pt was left supine in bed at the end of PT session with all needs in reach  Pt would benefit from continued PT services while in hospital to address remaining limitations  PT to continue to follow pt and recommends rehab  The patient's AM-PAC Basic Mobility Inpatient Short Form Raw Score is 16  A Raw score of less than or equal to 16 suggests the patient may benefit from discharge to post-acute rehabilitation services  Please also refer to the recommendation of the Physical Therapist for safe discharge planning  Barriers to Discharge: Decreased caregiver support, Inaccessible home environment     PT Discharge Recommendation: Post acute rehabilitation services    See flowsheet documentation for full assessment

## 2023-06-14 NOTE — PROGRESS NOTES
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  Status post left brachial axillary AVG left upper extremity on 5/19  Anticoagulation with Coumadin was transitioned to oral Eliquis last admission due to noncompliance with lab draws due to difficult vascular patency  Currently presents with left upper extremity wound dehiscence of AV graft with infected prosthetic material -> status post graft explantation on 6/3 with wound VAC placement  S/p IV Ancef/Vancomycin as per ID dosed after HD to complete 10 days post-explantation  Blood cultures from 6/2 are negative  Perioperative wound cultures with polymicrobial growth including E  coli, Klebsiella, Corynebacterium, and coagulase negative Staphylococcus  Wound VAC exchanges/management per vascular surgery  Discussed with nephrology  Plan for dialysis tomorrow  Ambulatory dysfunction  Assessment & Plan  Appreciate PT/OT input -> plan for skilled rehab once medically stable    Hypotension  Assessment & Plan  Continue Midodrine   Was noted to be persistently hypotensive below baseline requiring transient vasopressor support in the ICU after graft explant  Monitor blood pressures    End-stage renal disease   Assessment & Plan  Routine hemodialysis Tu/Thu/Sat  Note AV graft explantation  Dialysis through [facility] currently  Discussed with nephrology  Patient with low volume currently  Limited volume removal with dialysis today      Thrombocytopenia   Assessment & Plan  Monitor platelet count - monitor for bleeding    Type 2 diabetes mellitus  Assessment & Plan  Lab Results   Component Value Date    HGBA1C 4 8 01/05/2023     On SSI coverage per Accu-Cheks  Carbohydrate restricted diet  Hypoglycemia protocol    Irritable bowel syndrome  Assessment & Plan  Continue Florastor  PRN Imodium on board for diarrhea    Coronary artery disease   Assessment & Plan  Status post prior stenting  Currently on dual antiplatelet therapy with aspirin Plavix in addition to statin therapy  Anemia of chronic disease  Assessment & Plan  Continue Epogen with dialysis  Continue B12 supplementation  Monitor H/H and transfuse if necessary           VTE Pharmacologic Prophylaxis:   Pharmacologic: Heparin  Mechanical VTE Prophylaxis in Place: No    Patient Centered Rounds: I have performed bedside rounds with nursing staff today  Time Spent for Care: 54  More than 50% of total time spent on counseling and coordination of care as described above  Current Length of Stay: 12 day(s)    Current Patient Status: Inpatient       Code Status: Level 1 - Full Code      Subjective:   nad    Objective:     Vitals:   Temp (24hrs), Av 4 °F (36 3 °C), Min:96 3 °F (35 7 °C), Max:98 5 °F (36 9 °C)    Temp:  [96 3 °F (35 7 °C)-98 5 °F (36 9 °C)] 98 °F (36 7 °C)  HR:  [60-85] 64  Resp:  [15-20] 16  BP: ()/(47-80) 105/50  SpO2:  [97 %-99 %] 97 %  Body mass index is 24 04 kg/m²  Input and Output Summary (last 24 hours): Intake/Output Summary (Last 24 hours) at 2023 0903  Last data filed at 2023 0745  Gross per 24 hour   Intake 420 ml   Output 1000 ml   Net -580 ml       Physical Exam:     Physical Exam  Cardiovascular:      Rate and Rhythm: Normal rate and regular rhythm  Pulmonary:      Effort: Pulmonary effort is normal       Breath sounds: Normal breath sounds  Abdominal:      Palpations: Abdomen is soft  Musculoskeletal:         General: No swelling  Normal range of motion  Neurological:      General: No focal deficit present  Mental Status: He is alert and oriented to person, place, and time     Psychiatric:         Mood and Affect: Mood normal          Additional Data:     Labs:    Results from last 7 days   Lab Units 06/14/23  0556   EOS PCT % 4   HEMATOCRIT % 29 1*   HEMOGLOBIN g/dL 8 7*   LYMPHS PCT % 16   MONOS PCT % 9   NEUTROS PCT % 69   PLATELETS Thousands/uL 129*   WBC Thousand/uL 3 63*     Results from last 7 days   Lab Units 06/14/23  0556   BUN mg/dL 4*   CALCIUM mg/dL 8 2*   CHLORIDE mmol/L 104   CO2 mmol/L 29   CREATININE mg/dL 4 81*   POTASSIUM mmol/L 4 2           * I Have Reviewed All Lab Data Listed Above  * Additional Pertinent Lab Tests Reviewed:  All Labs Within Last 24 Hours Reviewed      Recent Cultures (last 7 days):           Last 24 Hours Medication List:   Current Facility-Administered Medications   Medication Dose Route Frequency Provider Last Rate   • acetaminophen  975 mg Oral Q8H PRN Tegan Catalan MD     • aspirin  81 mg Oral Daily Tegan Catalan MD     • atorvastatin  20 mg Oral HS Tegan Catalan MD     • b complex-vitamin C-folic acid  1 capsule Oral Daily Tegan Catalan MD     • calcitriol  0 75 mcg Oral Once per day on Mon Wed Fri Dar Kennedy MD     • chlorhexidine  15 mL Mouth/Throat Q12H [address] Tegan Catalan MD     • clopidogrel  75 mg Oral Daily Tegan Catalan MD     • vitamin B-12  1,000 mcg Oral Daily Tegan Catalan MD     • epoetin jah  6,000 Units Intravenous After Dialysis Sal Barrios DO     • gabapentin  100 mg Oral Once per day on Tue Thu Sat Tegan Catalan MD     • heparin (porcine)  5,000 Units Subcutaneous Q8H [address] Tegan Catalan MD     • insulin lispro  1-6 Units Subcutaneous 4x Daily (AC & HS) Tegan Catalan MD     • loperamide  2 mg Oral TID PRN Luz Maria Pina MD     • melatonin  3 mg Oral HS Tegan Catalan MD     • midodrine  10 mg Oral TID AC Tegan Catalan MD     • ondansetron  4 mg Oral Q6H PRN Luz Maria Pina MD     • oxyCODONE  5 mg Oral Q4H PRN Tegan Catalan MD     • oxyCODONE  2 5 mg Oral Q4H PRN Vicente Campos MD     • saccharomyces boulardii  250 mg Oral BID Mark Anthony Fajardo PA-C          Today, Patient Was Seen By: Sarah Lincoln DO    ** Please Note: Dictation voice to text software may have been used in the creation of this document   **

## 2023-06-14 NOTE — PHYSICAL THERAPY NOTE
PHYSICAL THERAPY NOTE          Patient Name: Saúl ALAS Date: 6/14/2023 06/14/23 1415   PT Last Visit   PT Visit Date 06/14/23   Note Type   Note Type Treatment   Pain Assessment   Pain Assessment Tool 0-10   Pain Score 3   Pain Location/Orientation Orientation: Left; Location: [facility]   Hospital Pain Intervention(s) Repositioned; Ambulation/increased activity; Emotional support   Restrictions/Precautions   Weight Bearing Precautions Per Order No   Other Precautions Chair Alarm; Bed Alarm;Multiple lines; Fall Risk;Pain  (wound vac)   General   Chart Reviewed Yes   Response to Previous Treatment Patient with no complaints from previous session  Family/Caregiver Present No   Cognition   Overall Cognitive Status WFL   Arousal/Participation Responsive; Cooperative   Attention Attends with cues to redirect   Orientation Level Oriented X4   Memory Decreased recall of precautions   Following Commands Follows all commands and directions without difficulty   Comments pt pleasant and cooperative to participate in therapy session   Bed Mobility   Supine to Sit 5  Supervision   Additional items HOB elevated; Bedrails; Increased time required   Sit to Supine 4  Minimal assistance   Additional items Assist x 1; Increased time required;Verbal cues   Additional Comments pt supine in bed upon arrival  Pt returned to supine in bed with all needs wihtin reach   Transfers   Sit to Stand 4  Minimal assistance  (CGA)   Additional items Assist x 1; Increased time required;Verbal cues   Stand to Sit 4  Minimal assistance  (CGA)   Additional items Assist x 1; Increased time required;Verbal cues   Additional Comments transfers with RW   Ambulation/Elevation   Gait pattern Excessively slow; Short stride; Foward flexed;Decreased foot clearance; Inconsistent michael; Improper Weight shift;Decreased R stance   Gait Assistance 4  Minimal assist   Additional items Assist x 1;Verbal cues   Assistive Device Rolling walker   Distance 2 x 60ft   Stair Management Assistance Not tested   Balance   Static Sitting Fair +   Dynamic Sitting Fair   Static Standing Fair -   Dynamic Standing Poor +   Ambulatory Poor +   Activity Tolerance   Activity Tolerance Patient tolerated treatment well   Nurse Made Aware RN cleared pt to participate in therapy session   Assessment   Prognosis Good   Problem List Decreased strength;Decreased range of motion;Decreased endurance; Impaired balance;Decreased mobility; Decreased coordination;Decreased cognition; Impaired judgement;Decreased safety awareness;Pain   Assessment Pt seen for PT treatment session this date  Therapy session focused on bed mobility, functional transfers, gait training and endurance training in order to improve overall mobility and independence  Pt requires assist of 1 for all mobility performed this date  Pt ambulated increased distance this date, 1x standing rest break required 2* generalized fatigue + RLE pain  Continues to require min A for functional transfers + ambulation with use of RW  Pt making progress toward goals  Pt was left supine in bed at the end of PT session with all needs in reach  Pt would benefit from continued PT services while in hospital to address remaining limitations  PT to continue to follow pt and recommends rehab  The patient's AM-PAC Basic Mobility Inpatient Short Form Raw Score is 16  A Raw score of less than or equal to 16 suggests the patient may benefit from discharge to post-acute rehabilitation services  Please also refer to the recommendation of the Physical Therapist for safe discharge planning  Barriers to Discharge Decreased caregiver support; Inaccessible home environment   Goals   Patient Goals to go to rehab   STG Expiration Date 06/17/23   PT Treatment Day 3   Plan   Treatment/Interventions Functional transfer training;LE strengthening/ROM; Endurance training;Patient/family training;Equipment eval/education; Bed mobility;Gait training;Spoke to case management;Spoke to nursing;OT   Progress Progressing toward goals   PT Frequency 3-5x/wk   Recommendation   PT Discharge Recommendation Post acute rehabilitation services   AM-PAC Basic Mobility Inpatient   Turning in Flat Bed Without Bedrails 3   Lying on Back to Sitting on Edge of Flat Bed Without Bedrails 3   Moving Bed to Chair 3   Standing Up From Chair Using Arms 3   Walk in Room 3   Climb 3-5 Stairs With Railing 1   Basic Mobility Inpatient Raw Score 16   Basic Mobility Standardized Score 38 32   Highest Level Of Mobility   -HLM Goal 5: Stand one or more mins   JH-HLM Achieved 7: Walk 25 feet or more   Education   Education Provided Mobility training;Assistive device   Patient Demonstrates acceptance/verbal understanding   End of Consult   Patient Position at End of Consult Supine; All needs within reach     Ann Marie Baxter, PT, DPT

## 2023-06-14 NOTE — PLAN OF CARE
Problem: SAFETY ADULT  Goal: Patient will remain free of falls  Description: INTERVENTIONS:  - Educate patient/family on patient safety including physical limitations  - Instruct patient to call for assistance with activity   - Consult OT/PT to assist with strengthening/mobility   - Keep Call bell within reach  - Keep bed low and locked with side rails adjusted as appropriate  - Keep care items and personal belongings within reach  - Initiate and maintain comfort rounds  - Make Fall Risk Sign visible to staff  - Offer Toileting every  Hours, in advance of need  - Initiate/Maintain alarm  - Obtain necessary fall risk management equipment:   - Apply yellow socks and bracelet for high fall risk patients  - Consider moving patient to room near nurses station  Outcome: Progressing  Goal: Maintain or return to baseline ADL function  Description: INTERVENTIONS:  -  Assess patient's ability to carry out ADLs; assess patient's baseline for ADL function and identify physical deficits which impact ability to perform ADLs (bathing, care of mouth/teeth, toileting, grooming, dressing, etc )  - Assess/evaluate cause of self-care deficits   - Assess range of motion  - Assess patient's mobility; develop plan if impaired  - Assess patient's need for assistive devices and provide as appropriate  - Encourage maximum independence but intervene and supervise when necessary  - Involve family in performance of ADLs  - Assess for home care needs following discharge   - Consider OT consult to assist with ADL evaluation and planning for discharge  - Provide patient education as appropriate  Outcome: Progressing  Goal: Maintains/Returns to pre admission functional level  Description: INTERVENTIONS:  - Perform BMAT or MOVE assessment daily    - Set and communicate daily mobility goal to care team and patient/family/caregiver     - Collaborate with rehabilitation services on mobility goals if consulted  - Perform Range of Motion  times a day   - Reposition patient every  hours  - Dangle patient  times a day  - Stand patient  times a day  - Ambulate patient  times a day  - Out of bed to chair  times a day   - Out of bed for meals  times a day  - Out of bed for toileting  - Record patient progress and toleration of activity level   Outcome: Progressing     Problem: DISCHARGE PLANNING  Goal: Discharge to home or other facility with appropriate resources  Description: INTERVENTIONS:  - Identify barriers to discharge w/patient and caregiver  - Arrange for needed discharge resources and transportation as appropriate  - Identify discharge learning needs (meds, wound care, etc )  - Arrange for interpretive services to assist at discharge as needed  - Refer to Case Management Department for coordinating discharge planning if the patient needs post-hospital services based on physician/advanced practitioner order or complex needs related to functional status, cognitive ability, or social support system  Outcome: Progressing     Problem: Knowledge Deficit  Goal: Patient/family/caregiver demonstrates understanding of disease process, treatment plan, medications, and discharge instructions  Description: Complete learning assessment and assess knowledge base    Interventions:  - Provide teaching at level of understanding  - Provide teaching via preferred learning methods  Outcome: Progressing

## 2023-06-14 NOTE — PLAN OF CARE
Problem: PAIN - ADULT  Goal: Verbalizes/displays adequate comfort level or baseline comfort level  Description: Interventions:  - Encourage patient to monitor pain and request assistance  - Assess pain using appropriate pain scale  - Administer analgesics based on type and severity of pain and evaluate response  - Implement non-pharmacological measures as appropriate and evaluate response  - Consider cultural and social influences on pain and pain management  - Notify physician/advanced practitioner if interventions unsuccessful or patient reports new pain  Outcome: Progressing     Problem: INFECTION - ADULT  Goal: Absence or prevention of progression during hospitalization  Description: INTERVENTIONS:  - Assess and monitor for signs and symptoms of infection  - Monitor lab/diagnostic results  - Monitor all insertion sites, i e  indwelling lines, tubes, and drains  - Monitor endotracheal if appropriate and nasal secretions for changes in amount and color  - [place] appropriate cooling/warming therapies per order  - Administer medications as ordered  - Instruct and encourage patient and family to use good hand hygiene technique  - Identify and instruct in appropriate isolation precautions for identified infection/condition  Outcome: Progressing  Goal: Absence of fever/infection during neutropenic period  Description: INTERVENTIONS:  - Monitor WBC    Outcome: Progressing     Problem: SAFETY ADULT  Goal: Patient will remain free of falls  Description: INTERVENTIONS:  - Educate patient/family on patient safety including physical limitations  - Instruct patient to call for assistance with activity   - Consult OT/PT to assist with strengthening/mobility   - Keep Call bell within reach  - Keep bed low and locked with side rails adjusted as appropriate  - Keep care items and personal belongings within reach  - Initiate and maintain comfort rounds  - Make Fall Risk Sign visible to staff  - Offer Toileting every 3 Hours, in advance of need  - Initiate/Maintain 3alarm  - Obtain necessary fall risk management equipment: 3  - Apply yellow socks and bracelet for high fall risk patients  - Consider moving patient to room near nurses station  Outcome: Progressing  Goal: Maintain or return to baseline ADL function  Description: INTERVENTIONS:  -  Assess patient's ability to carry out ADLs; assess patient's baseline for ADL function and identify physical deficits which impact ability to perform ADLs (bathing, care of mouth/teeth, toileting, grooming, dressing, etc )  - Assess/evaluate cause of self-care deficits   - Assess range of motion  - Assess patient's mobility; develop plan if impaired  - Assess patient's need for assistive devices and provide as appropriate  - Encourage maximum independence but intervene and supervise when necessary  - Involve family in performance of ADLs  - Assess for home care needs following discharge   - Consider OT consult to assist with ADL evaluation and planning for discharge  - Provide patient education as appropriate  Outcome: Progressing  Goal: Maintains/Returns to pre admission functional level  Description: INTERVENTIONS:  - Perform BMAT or MOVE assessment daily    - Set and communicate daily mobility goal to care team and patient/family/caregiver  - Collaborate with rehabilitation services on mobility goals if consulted  - Perform Range of Motion 3 times a day  - Reposition patient every 3 hours    - Dangle patient 3 times a day  - Stand patient 3 times a day  - Ambulate patient 3 times a day  - Out of bed to chair 3 times a day   - Out of bed for meals 3 times a day  - Out of bed for toileting  - Record patient progress and toleration of activity level   Outcome: Progressing     Problem: DISCHARGE PLANNING  Goal: Discharge to home or other facility with appropriate resources  Description: INTERVENTIONS:  - Identify barriers to discharge w/patient and caregiver  - Arrange for needed discharge resources and transportation as appropriate  - Identify discharge learning needs (meds, wound care, etc )  - Arrange for interpretive services to assist at discharge as needed  - Refer to Case Management Department for coordinating discharge planning if the patient needs post-hospital services based on physician/advanced practitioner order or complex needs related to functional status, cognitive ability, or social support system  Outcome: Progressing     Problem: Knowledge Deficit  Goal: Patient/family/caregiver demonstrates understanding of disease process, treatment plan, medications, and discharge instructions  Description: Complete learning assessment and assess knowledge base    Interventions:  - Provide teaching at level of understanding  - Provide teaching via preferred learning methods  Outcome: Progressing     Problem: Prexisting or High Potential for Compromised Skin Integrity  Goal: Skin integrity is maintained or improved  Description: INTERVENTIONS:  - Identify patients at risk for skin breakdown  - Assess and monitor skin integrity  - Assess and monitor nutrition and hydration status  - Monitor labs   - Assess for incontinence   - Turn and reposition patient  - Assist with mobility/ambulation  - Relieve pressure over bony prominences  - Avoid friction and shearing  - Provide appropriate hygiene as needed including keeping skin clean and dry  - Evaluate need for skin moisturizer/barrier cream  - Collaborate with interdisciplinary team   - Patient/family teaching  - Consider wound care consult   Outcome: Progressing     Problem: MOBILITY - ADULT  Goal: Maintain or return to baseline ADL function  Description: INTERVENTIONS:  -  Assess patient's ability to carry out ADLs; assess patient's baseline for ADL function and identify physical deficits which impact ability to perform ADLs (bathing, care of mouth/teeth, toileting, grooming, dressing, etc )  - Assess/evaluate cause of self-care deficits   - Assess range of motion  - Assess patient's mobility; develop plan if impaired  - Assess patient's need for assistive devices and provide as appropriate  - Encourage maximum independence but intervene and supervise when necessary  - Involve family in performance of ADLs  - Assess for home care needs following discharge   - Consider OT consult to assist with ADL evaluation and planning for discharge  - Provide patient education as appropriate  Outcome: Progressing  Goal: Maintains/Returns to pre admission functional level  Description: INTERVENTIONS:  - Perform BMAT or MOVE assessment daily    - Set and communicate daily mobility goal to care team and patient/family/caregiver  - Collaborate with rehabilitation services on mobility goals if consulted  - Perform Range of Motion 3 times a day  - Reposition patient every 3 hours    - Dangle patient 3 times a day  - Stand patient 3 times a day  - Ambulate patient 3 times a day  - Out of bed to chair 3 times a day   - Out of bed for meals 3 times a day  - Out of bed for toileting  - Record patient progress and toleration of activity level   Outcome: Progressing     Problem: METABOLIC, FLUID AND ELECTROLYTES - ADULT  Goal: Electrolytes maintained within normal limits  Description: INTERVENTIONS:  - Monitor labs and assess patient for signs and symptoms of electrolyte imbalances  - Administer electrolyte replacement as ordered  - Monitor response to electrolyte replacements, including repeat lab results as appropriate  - Instruct patient on fluid and nutrition as appropriate  Outcome: Progressing  Goal: Fluid balance maintained  Description: INTERVENTIONS:  - Monitor labs   - Monitor I/O and WT  - Instruct patient on fluid and nutrition as appropriate  - Assess for signs & symptoms of volume excess or deficit  Outcome: Progressing     Problem: Nutrition/Hydration-ADULT  Goal: Nutrient/Hydration intake appropriate for improving, restoring or maintaining nutritional needs  Description: Monitor and assess patient's nutrition/hydration status for malnutrition  Collaborate with interdisciplinary team and initiate plan and interventions as ordered  Monitor patient's weight and dietary intake as ordered or per policy  Utilize nutrition screening tool and intervene as necessary  Determine patient's food preferences and provide high-protein, high-caloric foods as appropriate       INTERVENTIONS:  - Monitor oral intake, urinary output, labs, and treatment plans  - Assess nutrition and hydration status and recommend course of action  - Evaluate amount of meals eaten  - Assist patient with eating if necessary   - Allow adequate time for meals  - Recommend/ encourage appropriate diets, oral nutritional supplements, and vitamin/mineral supplements  - Order, calculate, and assess calorie counts as needed  - Recommend, monitor, and adjust tube feedings and TPN/PPN based on assessed needs  - Assess need for intravenous fluids  - Provide specific nutrition/hydration education as appropriate  - Include patient/family/caregiver in decisions related to nutrition  Outcome: Progressing

## 2023-06-14 NOTE — PROGRESS NOTES
[address] [facility] NOTE   Nicole Sender 70 y o  male MRN: [id number]  Unit/Bed#: [facility] 911-01 Encounter: [id number]  Reason for Consult: ESRD on HD     ASSESSMENT and PLAN:    1  ESRD on HD  He is on TTS schedule at [address] [place] [address]  Last hemodialysis session was yesterday  Electrolytes and volume status stable today  Next hemodialysis session will be tomorrow  2  BP/Volume   He has chronic hypotension on midodrine 10 mg 3 times daily  We will tailor ultrafiltration to achieve estimated dry weight on dialysis days  3  Anemia   Goal hemoglobin is 10-11, currently below goal   We will continue IV Epogen with dialysis  4  Electrolytes   Electrolytes are stable for inter-dialytic period  5  Bone Mineral Disease   Continue calcitriol 0 75 mcg on dialysis days  He is not on phosphate binders  6  Left arm wound dehiscence  AV graft was placed on 05/19 status post graft explant with a wound VAC in place  He has completed antibiotics per infectious disease  Discussed with internal medicine team   After discussion, we agreed that patient is stable for interdialytic period and next hemodialysis session will be tomorrow  SUBJECTIVE / 24H INTERVAL HISTORY:  He underwent hemodialysis yesterday  We limited ultrafiltration due to him being below his dry weight  This morning denies dyspnea  REVIEW OF SYSTEMS:  Review of Systems   Constitutional: Negative for chills and fever  HENT: Negative for ear pain and sore throat  Eyes: Negative for pain and visual disturbance  Respiratory: Negative for cough and shortness of breath  Cardiovascular: Negative for chest pain and palpitations  Gastrointestinal: Negative for abdominal pain and vomiting  Genitourinary: Negative for dysuria and hematuria  Musculoskeletal: Negative for arthralgias and back pain  Skin: Negative for color change and rash  Neurological: Negative for seizures and syncope     All other systems reviewed and are negative  OBJECTIVE:  Current Weight: Weight - Scale: 76 kg (167 lb 8 8 oz)  Vitals:    06/13/23 2056 06/13/23 2141 06/14/23 0600 06/14/23 0743   BP: 104/54 101/51  105/50   BP Location:    Right arm   Pulse: 62 61  64   Resp:  20  16   Temp:  98 5 °F (36 9 °C)  98 °F (36 7 °C)   TempSrc:    Oral   SpO2: 98% 98%  97%   Weight:   76 kg (167 lb 8 8 oz)    Height:           Intake/Output Summary (Last 24 hours) at 6/14/2023 0857  Last data filed at 6/14/2023 0745  Gross per 24 hour   Intake 420 ml   Output 1000 ml   Net -580 ml     Physical Exam  Vitals and nursing note reviewed  Constitutional:       General: He is not in acute distress  Appearance: He is well-developed  HENT:      Head: Normocephalic and atraumatic  Eyes:      Conjunctiva/sclera: Conjunctivae normal    Cardiovascular:      Rate and Rhythm: Normal rate and regular rhythm  Heart sounds: No murmur heard  Comments: Right chest wall permacath with healthy exit site and intact dressing    Pulmonary:      Effort: Pulmonary effort is normal  No respiratory distress  Breath sounds: Normal breath sounds  Abdominal:      Palpations: Abdomen is soft  Tenderness: There is no abdominal tenderness  Musculoskeletal:         General: No swelling  Cervical back: Neck supple  Right lower leg: No edema  Left lower leg: No edema  Skin:     General: Skin is warm and dry  Capillary Refill: Capillary refill takes less than 2 seconds  Neurological:      Mental Status: He is alert     Psychiatric:         Mood and Affect: Mood normal        Medications:    Current Facility-Administered Medications:   •  acetaminophen (TYLENOL) tablet 975 mg, 975 mg, Oral, Q8H PRN, Evelia Myles MD, 975 mg at 06/07/23 1655  •  aspirin (ECOTRIN LOW STRENGTH) EC tablet 81 mg, 81 mg, Oral, Daily, Evelia Myles MD, 81 mg at 06/14/23 0735  •  atorvastatin (LIPITOR) tablet 20 mg, 20 mg, Oral, HS, Fadia Monteiro, MD, 20 mg at 06/13/23 2205  •  b complex-vitamin C-folic acid (RENAL) capsule 1 capsule, 1 capsule, Oral, Daily, Tejinder Lopez MD, 1 capsule at 06/14/23 7826  •  calcitriol (ROCALTROL) capsule 0 75 mcg, 0 75 mcg, Oral, Once per day on Mon Wed Fri, Maddie Pierre MD, 0 75 mcg at 06/14/23 3473  •  chlorhexidine (PERIDEX) 0 12 % oral rinse 15 mL, 15 mL, Mouth/Throat, Q12H [facility], Tejinder Lopez MD, 15 mL at 06/14/23 0734  •  clopidogrel (PLAVIX) tablet 75 mg, 75 mg, Oral, Daily, Tejinder Lopez MD, 75 mg at 06/14/23 0736  •  cyanocobalamin (VITAMIN B-12) tablet 1,000 mcg, 1,000 mcg, Oral, Daily, Tejinder Lopez MD, 1,000 mcg at 06/14/23 0736  •  epoetin jah (EPOGEN,PROCRIT) injection 6,000 Units, 6,000 Units, Intravenous, After Dialysis, [place] Pillion, DO, 6,000 Units at 06/13/23 0941  •  gabapentin (NEURONTIN) capsule 100 mg, 100 mg, Oral, Once per day on Tue Thu Sat, Tejinder Lopez MD, 100 mg at 06/13/23 1708  •  heparin (porcine) subcutaneous injection 5,000 Units, 5,000 Units, Subcutaneous, Q8H [facility], 5,000 Units at 06/14/23 0548 **AND** [COMPLETED] Platelet count, , , Once, Sharameya Rico, DO  •  insulin lispro (HumaLOG) 100 units/mL subcutaneous injection 1-6 Units, 1-6 Units, Subcutaneous, 4x Daily (AC & HS), 1 Units at 06/13/23 2206 **AND** Fingerstick Glucose (POCT), , , 4x Daily AC and at bedtime, Tejinder Lopez MD  •  loperamide (IMODIUM) oral liquid 2 mg, 2 mg, Oral, TID PRN, Dayan Cruz MD, 2 mg at 06/14/23 0737  •  melatonin tablet 3 mg, 3 mg, Oral, HS, Tejinder Lopez MD, 3 mg at 06/13/23 2205  •  midodrine (PROAMATINE) tablet 10 mg, 10 mg, Oral, TID AC, Tejinder Lopez MD, 10 mg at 06/14/23 0548  •  ondansetron (ZOFRAN-ODT) dispersible tablet 4 mg, 4 mg, Oral, Q6H PRN, Dayan Cruz MD, 4 mg at 06/13/23 2055  •  oxyCODONE (ROXICODONE) IR tablet 5 mg, 5 mg, Oral, Q4H PRN, Tejinder Lopez MD, 5 mg at 06/14/23 0736  •  oxyCODONE "(ROXICODONE) split tablet 2 5 mg, 2 5 mg, Oral, Q4H PRN, Mahsa Leblanc MD, 2 5 mg at 06/11/23 1044  •  saccharomyces boulardii (FLORASTOR) capsule 250 mg, 250 mg, Oral, BID, Abraham Zamora PA-C, 250 mg at 06/14/23 7196    Laboratory Results:  Results from last 7 days   Lab Units 06/14/23  0556 06/13/23  0816 06/12/23  0525 06/11/23  0631 06/10/23  0554 06/09/23  0502 06/08/23  0443   BUN mg/dL 4* 10 9 5 8  --  12   CALCIUM mg/dL 8 2* 8 0* 8 2* 8 1* 8 0*  --  7 9*   CHLORIDE mmol/L 104 101 104 104 103  --  103   CO2 mmol/L 29 30 28 30 31  --  31   CREATININE mg/dL 4 81* 8 25* 6 43* 4 57* 6 35*  --  6 35*   HEMATOCRIT % 29 1* 27 8* 26 5* 25 5* 24 4* 25 6* 24 3*   HEMOGLOBIN g/dL 8 7* 8 5* 8 4* 7 8* 7 3* 7 9* 7 4*   POTASSIUM mmol/L 4 2 4 0 4 1 4 0 3 8  --  3 7   MAGNESIUM mg/dL  --   --   --   --   --   --  1 9   PHOSPHORUS mg/dL  --   --   --   --   --   --  3 5   PLATELETS Thousands/uL 129* 149 140* 132* 126* 115* 100*   WBC Thousand/uL 3 63* 4 77 3 39* 3 56* 4 00* 4 79 3 68*     Portions of the record may have been created with voice recognition software  Occasional wrong word or \"sound a like\" substitutions may have occurred due to the inherent limitations of voice recognition software  Read the chart carefully and recognize, using context, where substitutions have occurred  If you have any questions, please contact the dictating provider      "

## 2023-06-15 ENCOUNTER — APPOINTMENT (INPATIENT)
Dept: DIALYSIS | Facility: HOSPITAL | Age: 68
DRG: 907 | End: 2023-06-15
Attending: INTERNAL MEDICINE
Payer: MEDICARE

## 2023-06-15 VITALS
OXYGEN SATURATION: 94 % | SYSTOLIC BLOOD PRESSURE: 102 MMHG | WEIGHT: 166.01 LBS | TEMPERATURE: 98.1 F | RESPIRATION RATE: 16 BRPM | DIASTOLIC BLOOD PRESSURE: 46 MMHG | BODY MASS INDEX: 23.77 KG/M2 | HEART RATE: 60 BPM | HEIGHT: 70 IN

## 2023-06-15 LAB
ANION GAP SERPL CALCULATED.3IONS-SCNC: 6 MMOL/L (ref 4–13)
BASOPHILS # BLD AUTO: 0.06 THOUSANDS/ÂΜL (ref 0–0.1)
BASOPHILS NFR BLD AUTO: 2 % (ref 0–1)
BUN SERPL-MCNC: 6 MG/DL (ref 5–25)
CALCIUM SERPL-MCNC: 8.1 MG/DL (ref 8.3–10.1)
CHLORIDE SERPL-SCNC: 101 MMOL/L (ref 96–108)
CO2 SERPL-SCNC: 29 MMOL/L (ref 21–32)
CREAT SERPL-MCNC: 6.8 MG/DL (ref 0.6–1.3)
EOSINOPHIL # BLD AUTO: 0.13 THOUSAND/ÂΜL (ref 0–0.61)
EOSINOPHIL NFR BLD AUTO: 4 % (ref 0–6)
ERYTHROCYTE [DISTWIDTH] IN BLOOD BY AUTOMATED COUNT: 18.5 % (ref 11.6–15.1)
GFR SERPL CREATININE-BSD FRML MDRD: 7 ML/MIN/1.73SQ M
GLUCOSE SERPL-MCNC: 121 MG/DL (ref 65–140)
GLUCOSE SERPL-MCNC: 150 MG/DL (ref 65–140)
GLUCOSE SERPL-MCNC: 80 MG/DL (ref 65–140)
GLUCOSE SERPL-MCNC: 83 MG/DL (ref 65–140)
HCT VFR BLD AUTO: 28.5 % (ref 36.5–49.3)
HGB BLD-MCNC: 9.1 G/DL (ref 12–17)
IMM GRANULOCYTES # BLD AUTO: 0.01 THOUSAND/UL (ref 0–0.2)
IMM GRANULOCYTES NFR BLD AUTO: 0 % (ref 0–2)
LYMPHOCYTES # BLD AUTO: 0.85 THOUSANDS/ÂΜL (ref 0.6–4.47)
LYMPHOCYTES NFR BLD AUTO: 25 % (ref 14–44)
MCH RBC QN AUTO: 34 PG (ref 26.8–34.3)
MCHC RBC AUTO-ENTMCNC: 31.9 G/DL (ref 31.4–37.4)
MCV RBC AUTO: 106 FL (ref 82–98)
MONOCYTES # BLD AUTO: 0.29 THOUSAND/ÂΜL (ref 0.17–1.22)
MONOCYTES NFR BLD AUTO: 8 % (ref 4–12)
NEUTROPHILS # BLD AUTO: 2.13 THOUSANDS/ÂΜL (ref 1.85–7.62)
NEUTS SEG NFR BLD AUTO: 61 % (ref 43–75)
NRBC BLD AUTO-RTO: 0 /100 WBCS
PLATELET # BLD AUTO: 146 THOUSANDS/UL (ref 149–390)
PMV BLD AUTO: 10.8 FL (ref 8.9–12.7)
POTASSIUM SERPL-SCNC: 4 MMOL/L (ref 3.5–5.3)
RBC # BLD AUTO: 2.68 MILLION/UL (ref 3.88–5.62)
SODIUM SERPL-SCNC: 136 MMOL/L (ref 135–147)
WBC # BLD AUTO: 3.47 THOUSAND/UL (ref 4.31–10.16)

## 2023-06-15 PROCEDURE — 80048 BASIC METABOLIC PNL TOTAL CA: CPT | Performed by: INTERNAL MEDICINE

## 2023-06-15 PROCEDURE — 90935 HEMODIALYSIS ONE EVALUATION: CPT | Performed by: INTERNAL MEDICINE

## 2023-06-15 PROCEDURE — 99221 1ST HOSP IP/OBS SF/LOW 40: CPT

## 2023-06-15 PROCEDURE — 85025 COMPLETE CBC W/AUTO DIFF WBC: CPT | Performed by: INTERNAL MEDICINE

## 2023-06-15 PROCEDURE — 99239 HOSP IP/OBS DSCHRG MGMT >30: CPT | Performed by: INTERNAL MEDICINE

## 2023-06-15 PROCEDURE — 82948 REAGENT STRIP/BLOOD GLUCOSE: CPT

## 2023-06-15 RX ORDER — HYDROMORPHONE HCL/PF 1 MG/ML
0.5 SYRINGE (ML) INJECTION ONCE
Status: DISCONTINUED | OUTPATIENT
Start: 2023-06-15 | End: 2023-06-15 | Stop reason: HOSPADM

## 2023-06-15 RX ORDER — OXYCODONE HYDROCHLORIDE 5 MG/1
5 TABLET ORAL EVERY 4 HOURS PRN
Qty: 15 TABLET | Refills: 0 | Status: SHIPPED | OUTPATIENT
Start: 2023-06-15

## 2023-06-15 RX ORDER — MICONAZOLE NITRATE 20 MG/G
CREAM TOPICAL 2 TIMES DAILY
Status: DISCONTINUED | OUTPATIENT
Start: 2023-06-15 | End: 2023-06-15 | Stop reason: HOSPADM

## 2023-06-15 RX ORDER — GABAPENTIN 100 MG/1
100 CAPSULE ORAL 3 TIMES WEEKLY
Refills: 0
Start: 2023-06-16

## 2023-06-15 RX ORDER — ACETAMINOPHEN 325 MG/1
975 TABLET ORAL EVERY 8 HOURS PRN
Refills: 0
Start: 2023-06-15

## 2023-06-15 RX ORDER — CALCITRIOL 0.25 UG/1
0.75 CAPSULE, LIQUID FILLED ORAL 3 TIMES WEEKLY
Refills: 0
Start: 2023-06-16

## 2023-06-15 RX ADMIN — OXYCODONE HYDROCHLORIDE 5 MG: 5 TABLET ORAL at 10:26

## 2023-06-15 RX ADMIN — CHLORHEXIDINE GLUCONATE 15 ML: 1.2 SOLUTION ORAL at 12:18

## 2023-06-15 RX ADMIN — HEPARIN SODIUM 5000 UNITS: 5000 INJECTION INTRAVENOUS; SUBCUTANEOUS at 14:58

## 2023-06-15 RX ADMIN — HEPARIN SODIUM 5000 UNITS: 5000 INJECTION INTRAVENOUS; SUBCUTANEOUS at 04:50

## 2023-06-15 RX ADMIN — EPOETIN ALFA 6000 UNITS: 3000 SOLUTION INTRAVENOUS; SUBCUTANEOUS at 10:21

## 2023-06-15 RX ADMIN — Medication 250 MG: at 12:18

## 2023-06-15 RX ADMIN — CYANOCOBALAMIN TAB 500 MCG 1000 MCG: 500 TAB at 12:18

## 2023-06-15 RX ADMIN — NEPHROCAP 1 CAPSULE: 1 CAP ORAL at 12:18

## 2023-06-15 RX ADMIN — ASPIRIN 81 MG: 81 TABLET, COATED ORAL at 12:18

## 2023-06-15 RX ADMIN — ACETAMINOPHEN 975 MG: 325 TABLET ORAL at 12:22

## 2023-06-15 RX ADMIN — OXYCODONE HYDROCHLORIDE 5 MG: 5 TABLET ORAL at 04:50

## 2023-06-15 RX ADMIN — CLOPIDOGREL BISULFATE 75 MG: 75 TABLET ORAL at 12:18

## 2023-06-15 RX ADMIN — MIDODRINE HYDROCHLORIDE 10 MG: 5 TABLET ORAL at 12:18

## 2023-06-15 RX ADMIN — LOPERAMIDE HCL 2 MG: 1 SOLUTION ORAL at 12:20

## 2023-06-15 RX ADMIN — OXYCODONE HYDROCHLORIDE 5 MG: 5 TABLET ORAL at 00:44

## 2023-06-15 RX ADMIN — OXYCODONE HYDROCHLORIDE 5 MG: 5 TABLET ORAL at 15:37

## 2023-06-15 RX ADMIN — MIDODRINE HYDROCHLORIDE 10 MG: 5 TABLET ORAL at 04:49

## 2023-06-15 RX ADMIN — LOPERAMIDE HCL 2 MG: 1 SOLUTION ORAL at 04:49

## 2023-06-15 RX ADMIN — OXYCODONE HYDROCHLORIDE 5 MG: 5 TABLET ORAL at 14:55

## 2023-06-15 NOTE — CASE MANAGEMENT
Case Management Discharge Planning Note    Patient name Monty Miller  Location [facility] 911/[facility] 424-89 MRN [id number]  : 1955 Date 6/15/2023       Current Admission Date: 2023  Current Admission Diagnosis:Infection of dialysis AV graft   Patient Active Problem List    Diagnosis Date Noted   • Ambulatory dysfunction 2023   • Infection of dialysis AV graft 2023   • Hypotension 2023   • S/P angioplasty with stent 2023   • Chronic deep vein thrombosis (DVT) of internal jugular vein ([facility] ) 2022   • End-stage renal disease  2022   • Chronic kidney disease 2022   • Right intertrochanteric femur fracture 2022   • Thrombocytopenia  2022   • Arteriovenous fistula ([facility] ) 2021   • AV fistula thrombosis, initial encounter ([place] [address] ) 2021   • Encounter for extracorporeal dialysis ([place] [address] ) 2019   • Intestinal malabsorption 2017   • Pernicious anemia 2017   • Irritable bowel syndrome 2017   • Elevated serum alkaline phosphatase level 2017   • Anemia of chronic disease 2017   • Coronary artery disease  10/20/2016   • Hypertension 10/20/2016   • Dependence on renal dialysis ([facility] ) 2016   • Vitamin D deficiency, unspecified 2013   • Mixed hyperlipidemia 10/04/2012   • ESRD (end stage renal disease) on dialysis ([facility] ) 2012   • Nicotine dependence 2012   • Organic impotence 2012   • Type 2 diabetes mellitus 2012      LOS (days): 13  Geometric Mean LOS (GMLOS) (days): 6 80  Days to GMLOS:-5 8     OBJECTIVE:  Risk of Unplanned Readmission Score: 21 69         Current admission status: Inpatient   Preferred Pharmacy:   [address]  [address] [place] [address],[address] [place] [id number]  Phone: [phone] Fax: [phone]    Primary Care Provider: Tawny Keen MD    Primary Insurance: MEDICARE  [facility]    DISCHARGE DETAILS:    DC today  Transport 4PM    Wife notified telephonically    VM left for [place] at [facility] with DC time  Faxed notification of transport time to facility  Message placed in [address]    [facility]    COVID test not required if pt asymptomatic    TCT [place], confirmed he is aware of transport time

## 2023-06-15 NOTE — PROGRESS NOTES
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  Status post left brachial axillary AVG left upper extremity on 5/19  Anticoagulation with Coumadin was transitioned to oral Eliquis last admission due to noncompliance with lab draws due to difficult vascular patency  Currently presents with left upper extremity wound dehiscence of AV graft with infected prosthetic material -> status post graft explantation on 6/3 with wound VAC placement  Status post Ancef and vancomycin  Blood cultures from 6/2 are negative  Perioperative wound cultures with polymicrobial growth including E  coli, Klebsiella, Corynebacterium, and coagulase negative Staphylococcus  Wound VAC exchanges/management per vascular surgery  Patient okay for discharge from vascular perspective  Discussed with vascular surgery  Anticoagulation discontinued status post graft explantation    Ambulatory dysfunction  Assessment & Plan  Appreciate PT/OT input -> plan for skilled rehab once medically stable    Hypotension  Assessment & Plan  Continue Midodrine   Was noted to be persistently hypotensive below baseline requiring transient vasopressor support in the ICU after graft explant  Monitor blood pressures    End-stage renal disease   Assessment & Plan  Routine hemodialysis Tu/Thu/Sat  Note AV graft explantation  Dialysis through PermCath currently  Discussed with nephrology  Patient with low volume currently  Limited volume removal with dialysis today      Thrombocytopenia   Assessment & Plan  Monitor platelet count - monitor for bleeding    Type 2 diabetes mellitus  Assessment & Plan  Lab Results   Component Value Date HGBA1C 4 8 2023     On SSI coverage per Accu-Cheks  Carbohydrate restricted diet  Hypoglycemia protocol    Irritable bowel syndrome  Assessment & Plan  Continue Florastor  PRN Imodium on board for diarrhea    Coronary artery disease   Assessment & Plan  Status post prior stenting  Currently on dual antiplatelet therapy with aspirin Plavix in addition to statin therapy  Anemia of chronic disease  Assessment & Plan  Continue Epogen with dialysis  Continue B12 supplementation  Monitor H/H and transfuse if necessary           VTE Pharmacologic Prophylaxis:   Pharmacologic: Heparin  Mechanical VTE Prophylaxis in Place: No    Patient Centered Rounds: I have performed bedside rounds with nursing staff today  Time Spent for Care: 54  More than 50% of total time spent on counseling and coordination of care as described above  Current Length of Stay: 13 day(s)    Current Patient Status: Inpatient     Code Status: Level 1 - Full Code      Subjective:   Patient comfortable    Objective:     Vitals:   Temp (24hrs), Av 2 °F (36 8 °C), Min:98 °F (36 7 °C), Max:98 4 °F (36 9 °C)    Temp:  [98 °F (36 7 °C)-98 4 °F (36 9 °C)] 98 4 °F (36 9 °C)  HR:  [50-62] 51  Resp:  [16-17] 16  BP: ()/(50-63) 103/53  SpO2:  [94 %-99 %] 94 %  Body mass index is 23 82 kg/m²  Input and Output Summary (last 24 hours):        Intake/Output Summary (Last 24 hours) at 6/15/2023 0956  Last data filed at 6/15/2023 0843  Gross per 24 hour   Intake 400 ml   Output 0 ml   Net 400 ml       Physical Exam:     Physical Exam    Additional Data:     Labs:    Results from last 7 days   Lab Units 06/15/23  0743   EOS PCT % 4   HEMATOCRIT % 28 5*   HEMOGLOBIN g/dL 9 1*   LYMPHS PCT % 25   MONOS PCT % 8   NEUTROS PCT % 61   PLATELETS Thousands/uL 146*   WBC Thousand/uL 3 47*     Results from last 7 days   Lab Units 06/15/23  0743   BUN mg/dL 6   CALCIUM mg/dL 8 1*   CHLORIDE mmol/L 101   CO2 mmol/L 29   CREATININE mg/dL 6 80* POTASSIUM mmol/L 4 0           * I Have Reviewed All Lab Data Listed Above  * Additional Pertinent Lab Tests Reviewed: All Labs Within Last 24 Hours Reviewed      Recent Cultures (last 7 days):           Last 24 Hours Medication List:   Current Facility-Administered Medications   Medication Dose Route Frequency Provider Last Rate   • acetaminophen  975 mg Oral Q8H PRN Evelia Myles MD     • aspirin  81 mg Oral Daily Evelia Myles MD     • atorvastatin  20 mg Oral HS Evelia Myles MD     • b complex-vitamin C-folic acid  1 capsule Oral Daily Evelia Myles MD     • calcitriol  0 75 mcg Oral Once per day on Mon Wed Fri Shree Gregory MD     • chlorhexidine  15 mL Mouth/Throat Q12H [address] Evelia Myles MD     • clopidogrel  75 mg Oral Daily Evelia Myles MD     • vitamin B-12  1,000 mcg Oral Daily Evelia Myles MD     • epoetin jah  6,000 Units Intravenous After Dialysis Serapio Epley, DO     • gabapentin  100 mg Oral Once per day on Tue Thu Sat Evelia Myles MD     • heparin (porcine)  5,000 Units Subcutaneous Q8H [address] Evelia Myles MD     • insulin lispro  1-6 Units Subcutaneous 4x Daily (AC & HS) Evelia Myles MD     • loperamide  2 mg Oral TID PRN Ramirez Mohamud MD     • melatonin  3 mg Oral HS Evelia Myles MD     • midodrine  10 mg Oral TID AC Evelia Myles MD     • ondansetron  4 mg Oral Q6H PRN Ramirez Mohamud MD     • oxyCODONE  5 mg Oral Q4H PRN Evelia Myles MD     • oxyCODONE  2 5 mg Oral Q4H PRN Evelia Myles MD     • saccharomyces boulardii  250 mg Oral BID Lisa Martinez PA-C          Today, Patient Was Seen By: Bebeto Bolton DO    ** Please Note: Dictation voice to text software may have been used in the creation of this document   **

## 2023-06-15 NOTE — DISCHARGE SUMMARY
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  Status post left brachial axillary AVG left upper extremity on 5/19  Anticoagulation with Coumadin was transitioned to oral Eliquis last admission due to noncompliance with lab draws due to difficult vascular patency  Currently presents with left upper extremity wound dehiscence of AV graft with infected prosthetic material -> status post graft explantation on 6/3 with wound VAC placement  Continue IV Ancef/Vancomycin through tomorrow dosed after HD to complete 10 days post-explantation  Blood cultures from 6/2 are negative  Perioperative wound cultures with polymicrobial growth including E  coli, Klebsiella, Corynebacterium, and coagulase negative Staphylococcus  Wound VAC exchanges/management per vascular surgery  Instructions left for chart for wound care  Anticoagulation discontinued status post graft explantation    Ambulatory dysfunction  Assessment & Plan  Appreciate PT/OT input -> plan for skilled rehab once medically stable    Hypotension  Assessment & Plan  Continue Midodrine       End-stage renal disease   Assessment & Plan  Routine hemodialysis Tu/Thu/Sat  Note AV graft explantation  [facility]  Discussed with nephrology  Patient with low volume currently  Limited volume removal with dialysis today      Thrombocytopenia   Assessment & Plan  Monitor platelet count - monitor for bleeding    Type 2 diabetes mellitus  Assessment & Plan  Lab Results   Component Value Date    HGBA1C 4 8 01/05/2023     On SSI coverage per Accu-Cheks  Carbohydrate restricted diet  Hypoglycemia protocol    Irritable bowel syndrome  Assessment & Plan  Continue Florastor  PRN Imodium on board for diarrhea    Coronary artery disease   Assessment & Plan  Status post prior stenting  Currently on dual antiplatelet therapy with aspirin Plavix in addition to statin therapy  Anemia of chronic disease  Assessment & Plan  Continue Epogen with dialysis  Continue B12 supplementation  Monitor H/H and transfuse if necessary              Medical Problems     Resolved Problems  Date Reviewed: 6/12/2023   None         Admission Date:   Admission Orders (From admission, onward)     Ordered        06/02/23 2053  Inpatient Admission  Once                        Admitting Diagnosis: Abnormal surgical wound [T81  9XXA]    HPI: This a very pleasant 60-year-old male with possible history end-stage renal disease on hemodialysis, coronary artery disease history of anemia of chronic disease who presents to the hospital status post left brachial basilic AV graft which was performed on March 21, 2023  Unfortunately the graft had thrombosed patient subsequently underwent a left brachial axillary AVG in the left upper extremity on May 19 and subsequent was discharged on Eliquis  Patient was sent to be seen by his PCP for postop check and was found to have staples on his graft that had fallen out  Patient was noted to have an open wound with associated drainage on presentation  He subsequently was sent to the emergency room for empiric intravenous antibiotic therapy  Procedures Performed:   Orders Placed This Encounter   Procedures   • Central Line       Summary of Hospital Course: He was admitted with infection of the AV graft  Patient was monitored in the ICU for infection  He is status post graft explant with vascular surgery  He was followed by nephrology who has been following for dialysis treatments  He now has a PermCath in place  Patient will require rehab    Plan will be for discharge for continued therapy  · Infected AV graft  Patient received a full course of cefazolin/vancomycin  He completed a 10-day course of antibiotic therapy  Patient was cleared by vascular surgery for discharge  Wound instructions forvac  placed in chart  He requires close follow-up as outpatient with wound care  · Acute on chronic hypotension  Patient currently on midodrine therapy will continue at discharge  · End-stage renal disease  Patient has PermCath now  Patient receives Tuesday Thursday Saturday dialysis treatments  · Diarrhea- stable now  Other symptoms  Patient has noted chronic IBS  · Diabetes continue with home meds              Condition at Discharge: fair         Discharge instructions/Information to patient and family:   See after visit summary for information provided to patient and family  Provisions for Follow-Up Care:  See after visit summary for information related to follow-up care and any pertinent home health orders  PCP: Latosha Izaguirre MD    Disposition: Home    Planned Readmission: No    Discharge Statement   I spent 85 minutes discharging the patient  This time was spent on the day of discharge  I had direct contact with the patient on the day of discharge  Additional documentation is required if more than 30 minutes were spent on discharge  Discharge Medications:  See after visit summary for reconciled discharge medications provided to patient and family

## 2023-06-15 NOTE — CONSULTS
Consultation - Martin Ivan Dario 70 y o  male MRN: [id number]  Unit/Bed#: [facility] 911-01 Encounter: [id number]    Assessment:  Cutaneous candidiasis  Type 2 DM without long term use of insulin       Plan:  • Mild cutaneous candidiasis rash to the b/l sacro-buttocks  No skin loss or pressure injury appreciated  o Patient on IV ABT per ID/primary service for AV graft site infection, contributing factor    o Will recommend KATY antifungal cream  • No s/s of infection present  •  A1C results reviewed with the patient today  • Wound vac to L axilla as per surgery   • Will recommend preventative nursing skin care measures  • Pressure relief  • Patient verbalized understanding of plan of care  Wound care will sign off at this time, please re-consult if needed  History of Present Illness:  Patient is a 70year old male who presents to the hospital with infection of dialysis AV graft site  Patient has a history of ESRD on HD, DM, CAD, hypotension, anemia, GERD, and MI  Wound care consulted for potential pressure injury to the sacrum  Patient seen in bed, alert and oriented x 4, cooperative and agreeable for the assessment  Patient reports his buttocks has been sore for a few days and is experiencing mild itching  Patient states he tries to change his position in bed as much as possible  Patient reports he is discharging today from the hospital for rehab  Patient reports the nurses are applying cream to his bottom  Per SLIM note patient has been on IV vancomycin per ID for a total of 10 days  Denies fever, chills, or increased pain related to the wound  Subjective:    Review of Systems   Constitutional: Negative for chills and fever  HENT: Negative for congestion and sneezing  Respiratory: Negative for cough and shortness of breath  Musculoskeletal: Positive for gait problem  Skin: Positive for rash  Psychiatric/Behavioral: Negative for agitation         Historical Information   Past Medical History:   Diagnosis Date   • 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    With Cerebral Infarction:  Last Assessed:  October 20, 2016   • Chronic kidney disease 2012    on dialysis    • COVID 05/2022   • Diabetes mellitus ([address] )    • Dialysis patient [facility])     T/TH/Sat   • GERD (gastroesophageal reflux disease)    • Hyperlipidemia    • Hypertension    • Labyrinthitis 09/10/2011   • Myocardial infarction [facility]) 2014    x3 others were in 2009 & 2010   • Sleep disturbances 09/20/2010   • Stroke [facility]) 2007    x1, RLE deficit- uses walker   • Type 2 diabetes, uncontrolled, with renal manifestation 05/03/2017     Past Surgical History:   Procedure Laterality Date   • AV FISTULA PLACEMENT     • AV FISTULA REPAIR Left 6/3/2023    Procedure: LEFT UPPER EXTREMITY A-V GRAFT EXPLANT, LEFT BRACHIAL ANGIOPATCH,  APPLICATION OF  VAC;  Surgeon: Jd Rider MD;  [facility];  Service: Vascular   • CARDIAC CATHETERIZATION Left 02/03/2023    Procedure: Cardiac Left Heart Cath;  Surgeon: Francis Devlin MD;  Location: [place] [address] CATH LAB; Service: Cardiology   • CARDIAC CATHETERIZATION N/A 02/08/2023    Procedure: Cardiac catheterization with complex PCI with Dr Alvaro Melchor, patient is a renal dialysis patient;  Surgeon: Jessica Valencia MD;  Location: [facility] CARDI[facility] CATH [facility]; Service: Cardiology   • CARDIAC SURGERY  2010    stents x3   • COLONOSCOPY N/A 12/12/2016    Procedure: COLONOSCOPY;  Surgeon: Sims Councilman, MD;  Location: [facility] GI LAB; Service:    • COLONOSCOPY N/A 04/03/2017    Procedure:  COLONOSCOPY;  Surgeon: Sims Councilman, MD;  Location: [facility] GI LAB;   Service:    • HARDWARE REMOVAL Right 04/04/2022    Procedure: REMOVAL HARDWARE HIP;  Surgeon: Erika Fry MD;  [facility];  Service: Orthopedics   • IR AV FISTULA/GRAFT DECLOT  04/29/2021   • IR AV FISTULA/GRAFT DECLOT  03/25/2022   • IR AV FISTULA/GRAFT DECLOT  09/21/2022   • IR AV FISTULAGRAM/GRAFTOGRAM  03/28/2022   • IR AV FISTULAGRAM/GRAFTOGRAM "07/28/2022   • IR TUNNELED CENTRAL LINE REMOVAL  12/07/2021   • IR TUNNELED DIALYSIS CATHETER PLACEMENT  05/24/2021   • PORTACATH PLACEMENT      per pt \"port in chest\"   • [place] ARTERIOVENOUS ANASTOMOSIS OPEN DIRECT Left 07/22/2021    Procedure: CREATION FISTULA ARTERIOVENOUS (AV); Surgeon: Radha Cota MD;  Location: [address] [place] [address];  Service: Vascular   • [place] ARTERIOVENOUS ANASTOMOSIS OPEN DIRECT Left 3/21/2023    Procedure: left brachiobasilic fistula,  AVG Graft;  Surgeon: Merle Ley MD;  [facility];  Service: Vascular   • [place] ARTERIOVENOUS ANASTOMOSIS OPEN DIRECT Left 5/19/2023    Procedure: CREATION of LEFT FISTULA ARTERIOVENOUS (AV) GRAFT;  Surgeon: Merle Ley MD;  [facility];  Service: Vascular   • [place] HEMIARTHROPLASTY HIP PARTIAL Right 04/04/2022    Procedure: HEMIARTHROPLASTY HIP (BIPOLAR);   Surgeon: Karen Nova MD;  [facility];  Service: Orthopedics     Social History   Social History     Substance and Sexual Activity   Alcohol Use Yes    Comment: rarely - No alcohol use per [employer]     Social History     Substance and Sexual Activity   Drug Use Not Currently   • Types: Marijuana    Comment: occasional brownie for sleep     E-Cigarette/Vaping   • E-Cigarette Use Never User      E-Cigarette/Vaping Substances   • Nicotine No    • THC No    • CBD No    • Flavoring No    • Other No    • Unknown No      Social History     Tobacco Use   Smoking Status Every Day   • Packs/day: 0 25   • Years: 30 00   • Total pack years: 7 50   • Types: Cigarettes   Smokeless Tobacco Never     Family History:   Family History   Problem Relation Age of Onset   • Leukemia Mother    • Crohn's disease Father    • Transient ischemic attack Brother        Meds/Allergies   current meds:   Current Facility-Administered Medications   Medication Dose Route Frequency   • acetaminophen (TYLENOL) tablet 975 mg  975 mg Oral Q8H PRN   • aspirin (ECOTRIN LOW STRENGTH) EC tablet 81 mg  " "81 mg Oral Daily   • atorvastatin (LIPITOR) tablet 20 mg  20 mg Oral HS   • b complex-vitamin C-folic acid (RENAL) capsule 1 capsule  1 capsule Oral Daily   • calcitriol (ROCALTROL) capsule 0 75 mcg  0 75 mcg Oral Once per day on Mon Wed Fri   • chlorhexidine (PERIDEX) 0 12 % oral rinse 15 mL  15 mL Mouth/Throat Q12H [facility]   • clopidogrel (PLAVIX) tablet 75 mg  75 mg Oral Daily   • cyanocobalamin (VITAMIN B-12) tablet 1,000 mcg  1,000 mcg Oral Daily   • epoetin jah (EPOGEN,PROCRIT) injection 6,000 Units  6,000 Units Intravenous After Dialysis   • gabapentin (NEURONTIN) capsule 100 mg  100 mg Oral Once per day on Tue Thu Sat   • heparin (porcine) subcutaneous injection 5,000 Units  5,000 Units Subcutaneous Q8H [facility]   • HYDROmorphone (DILAUDID) injection 0 5 mg  0 5 mg Intravenous Once   • insulin lispro (HumaLOG) 100 units/mL subcutaneous injection 1-6 Units  1-6 Units Subcutaneous 4x Daily (AC & HS)   • loperamide (IMODIUM) oral liquid 2 mg  2 mg Oral TID PRN   • melatonin tablet 3 mg  3 mg Oral HS   • midodrine (PROAMATINE) tablet 10 mg  10 mg Oral TID AC   • moisture barrier miconazole 2% cream (aka KATY MOISTURE BARRIER ANTIFUNGAL CREAM)   Topical BID   • ondansetron (ZOFRAN-ODT) dispersible tablet 4 mg  4 mg Oral Q6H PRN   • oxyCODONE (ROXICODONE) IR tablet 5 mg  5 mg Oral Q4H PRN   • oxyCODONE (ROXICODONE) split tablet 2 5 mg  2 5 mg Oral Q4H PRN   • saccharomyces boulardii (FLORASTOR) capsule 250 mg  250 mg Oral BID     No Known Allergies    Objective   Vitals: Blood pressure 107/65, pulse 60, temperature 98 1 °F (36 7 °C), temperature source Oral, resp  rate 16, height 5' 10\" (1 778 m), weight 75 3 kg (166 lb 0 1 oz), SpO2 94 %  Physical Exam  Constitutional:       General: He is awake  He is not in acute distress  Appearance: He is not diaphoretic  HENT:      Head: Normocephalic and atraumatic        Right Ear: External ear normal       Left Ear: External ear normal    Eyes:      " "Conjunctiva/sclera: Conjunctivae normal    Pulmonary:      Effort: Pulmonary effort is normal  No respiratory distress  Genitourinary:     Comments: Continent per patient self report  Musculoskeletal:      Comments: Moderate assist of one with turning for the assessment  Skin:     General: Skin is warm and dry  Findings: Rash present  No wound  Comments: 1  B/l sacro-buttocks are intact with blanchable pink/hyperpigmented dry peeling skin  Scar tissue present  No open aspect, redness or drainage present  Skin is non-tender with palpation  2  B/l heels are dry and intact without redness or wounds  No induration, fluctuance, odor, warmth/temperature differences, redness, or purulence noted to the above mentioned skin areas assessed  Patient tolerated assessment well- denies pain and no s/s of non-verbal pain or discomfort observed during the encounter  Neurological:      Mental Status: He is alert and oriented to person, place, and time  Gait: Gait abnormal    Psychiatric:         Mood and Affect: Mood normal          Behavior: Behavior normal  Behavior is cooperative  Lab, Imaging and other studies: I have personally reviewed pertinent reports  Code Status: Level 1 - Full Code      Counseling / Coordination of Care  Total time spent today:    Total time (face-to-face and non-face-to-face) spent on today's visit was 15 minutes  This includes preparation for the visits (H&P on 6/2/23, and SLIM note on 6/14/23 ) performance of a medically appropriate history and examination, and orders for medications/treatments or testing  Discussed assessment findings, and plan of care/recommendations with patients TESSY Tran, MAYRA      Portions of the record may have been created with voice recognition software  Occasional wrong word or \"sound a like\" substitutions may have occurred due to the inherent limitations of voice recognition software    Read the " chart carefully and recognize, using context, where substitutions have occurred

## 2023-06-15 NOTE — PROGRESS NOTES
[address] [facility] NOTE   Teto Oropeza 70 y o  male MRN: [id number]  Unit/Bed#: [facility] 911-01 Encounter: [id number]  Reason for Consult: ESRD on HD     ASSESSMENT and PLAN:    1  ESRD on HD  He is on TTS schedule at [address] [place] [address]  His access is right chest wall permacath  Left arm AV graft has been explanted  He was seen and examined on hemodialysis  Hemodialysis prescription is as below:  Na+ 138 mEq/L   Na+ Modeling No   K+ KPP (Potassium per Protocol)   Bicarb 35 mEq/L   Dialyzer F180   BFR-As tolerated to a maximum of: 400ml/min   DFR Other   Other Comments 500 mL/min   Duration of Treatment 3 5 Hours   Dialysate Temperature (C) 36   Dry Weight: 75 5 kg     2  BP/Volume   He has chronic hypotension on midodrine 10 mg 3 times daily  He is currently below his estimated dry weight of 75 5 kg  We will limit ultrafiltration on dialysis to 200 to 400 cc today  3  Anemia   Goal hemoglobin is 10-11, most recently 9 1  We will continue IV Epogen with dialysis  4  Electrolytes   We will use 3K/35 bicarbonate bath on dialysis today  5  Bone Mineral Disease   Continue calcitriol 0 75 mcg on dialysis days  He is not on phosphate binders  6  Left arm wound dehiscence at site of AV graft  AV graft was placed on 05/19 status post graft explant with a wound VAC in place  He has completed antibiotics per infectious disease  Discussed with internal medicine team   After discussion, we agreed that he is currently below his dry weight we will do limited ultrafiltration on dialysis as he does not have any signs of volume overload  SUBJECTIVE / 24H INTERVAL HISTORY:  Patient was seen and examined on hemodialysis this morning  He denied dyspnea  REVIEW OF SYSTEMS:  Review of Systems   Constitutional: Negative for chills and fever  HENT: Negative for ear pain and sore throat  Eyes: Negative for pain and visual disturbance  Respiratory: Negative for cough and shortness of breath  Cardiovascular: Negative for chest pain and palpitations  Gastrointestinal: Negative for abdominal pain and vomiting  Genitourinary: Negative for dysuria and hematuria  Musculoskeletal: Negative for arthralgias and back pain  Skin: Negative for color change and rash  Neurological: Negative for seizures and syncope  All other systems reviewed and are negative  OBJECTIVE:  Current Weight: Weight - Scale: 75 3 kg (166 lb 0 1 oz)  Vitals:    06/15/23 0959 06/15/23 1030 06/15/23 1100 06/15/23 1110   BP: 114/50 110/56 (!) 98/46 107/65   BP Location:       Pulse: 56 59 58 60   Resp: 16 16 16 16   Temp:    98 1 °F (36 7 °C)   TempSrc:    Oral   SpO2:       Weight:       Height:           Intake/Output Summary (Last 24 hours) at 6/15/2023 1124  Last data filed at 6/15/2023 1110  Gross per 24 hour   Intake 700 ml   Output 704 ml   Net -4 ml     Physical Exam  Vitals and nursing note reviewed  Constitutional:       General: He is not in acute distress  Appearance: He is well-developed  HENT:      Head: Normocephalic and atraumatic  Eyes:      Conjunctiva/sclera: Conjunctivae normal    Cardiovascular:      Rate and Rhythm: Normal rate and regular rhythm  Pulses: Normal pulses  Heart sounds: Normal heart sounds  No murmur heard  Comments: Right chest wall permacath currently in use for hemodialysis  Pulmonary:      Effort: Pulmonary effort is normal  No respiratory distress  Breath sounds: Normal breath sounds  Abdominal:      Palpations: Abdomen is soft  Tenderness: There is no abdominal tenderness  Musculoskeletal:         General: No swelling  Cervical back: Neck supple  Right lower leg: No edema  Left lower leg: No edema  Skin:     General: Skin is warm and dry  Capillary Refill: Capillary refill takes less than 2 seconds  Neurological:      Mental Status: He is alert     Psychiatric:         Mood and Affect: Mood normal  Medications:    Current Facility-Administered Medications:   •  acetaminophen (TYLENOL) tablet 975 mg, 975 mg, Oral, Q8H PRN, Esha Matt MD, 975 mg at 06/07/23 1655  •  aspirin (ECOTRIN LOW STRENGTH) EC tablet 81 mg, 81 mg, Oral, Daily, Esha Matt MD, 81 mg at 06/14/23 0735  •  atorvastatin (LIPITOR) tablet 20 mg, 20 mg, Oral, HS, Esha Matt MD, 20 mg at 06/14/23 2224  •  b complex-vitamin C-folic acid (RENAL) capsule 1 capsule, 1 capsule, Oral, Daily, Esha Matt MD, 1 capsule at 06/14/23 0308  •  calcitriol (ROCALTROL) capsule 0 75 mcg, 0 75 mcg, Oral, Once per day on Mon Wed Fri, Juanpablo Valenzuela MD, 0 75 mcg at 06/14/23 0735  •  chlorhexidine (PERIDEX) 0 12 % oral rinse 15 mL, 15 mL, Mouth/Throat, Q12H [facility], Esha Matt MD, 15 mL at 06/14/23 1949  •  clopidogrel (PLAVIX) tablet 75 mg, 75 mg, Oral, Daily, Esha Matt MD, 75 mg at 06/14/23 0736  •  cyanocobalamin (VITAMIN B-12) tablet 1,000 mcg, 1,000 mcg, Oral, Daily, Esha Matt MD, 1,000 mcg at 06/14/23 0736  •  epoetin jah (EPOGEN,PROCRIT) injection 6,000 Units, 6,000 Units, Intravenous, After Dialysis, Eric Ortega DO, 6,000 Units at 06/15/23 1021  •  gabapentin (NEURONTIN) capsule 100 mg, 100 mg, Oral, Once per day on Tue Thu Sat, Esha Matt MD, 100 mg at 06/13/23 1708  •  heparin (porcine) subcutaneous injection 5,000 Units, 5,000 Units, Subcutaneous, Q8H [facility], 5,000 Units at 06/15/23 0450 **AND** [COMPLETED] Platelet count, , , Once, Maria R Lancaster DO  •  insulin lispro (HumaLOG) 100 units/mL subcutaneous injection 1-6 Units, 1-6 Units, Subcutaneous, 4x Daily (AC & HS), 1 Units at 06/14/23 2223 **AND** Fingerstick Glucose (POCT), , , 4x Daily AC and at bedtime, Esha Matt MD  •  loperamide (IMODIUM) oral liquid 2 mg, 2 mg, Oral, TID PRN, Damon Ocampo MD, 2 mg at 06/15/23 5878  •  melatonin tablet 3 mg, 3 mg, Oral, HS, Fadia Wattsma [place], "MD, 3 mg at 06/14/23 2224  •  midodrine (PROAMATINE) tablet 10 mg, 10 mg, Oral, TID AC, Catina Johnson MD, 10 mg at 06/15/23 0449  •  ondansetron (ZOFRAN-ODT) dispersible tablet 4 mg, 4 mg, Oral, Q6H PRN, Ericka Mauricio MD, 4 mg at 06/13/23 2055  •  oxyCODONE (ROXICODONE) IR tablet 5 mg, 5 mg, Oral, Q4H PRN, Catina Johnson MD, 5 mg at 06/15/23 1026  •  oxyCODONE (ROXICODONE) split tablet 2 5 mg, 2 5 mg, Oral, Q4H PRN, Catina Johnson MD, 2 5 mg at 06/11/23 1044  •  saccharomyces boulardii (FLORASTOR) capsule 250 mg, 250 mg, Oral, BID, Mesha Davalos PA-C, 250 mg at 06/14/23 1600    Laboratory Results:  Results from last 7 days   Lab Units 06/15/23  0743 06/14/23  0556 06/13/23  0816 06/12/23  0525 06/11/23  0631 06/10/23  0554 06/09/23  0502   BUN mg/dL 6 4* 10 9 5 8  --    CALCIUM mg/dL 8 1* 8 2* 8 0* 8 2* 8 1* 8 0*  --    CHLORIDE mmol/L 101 104 101 104 104 103  --    CO2 mmol/L 29 29 30 28 30 31  --    CREATININE mg/dL 6 80* 4 81* 8 25* 6 43* 4 57* 6 35*  --    HEMATOCRIT % 28 5* 29 1* 27 8* 26 5* 25 5* 24 4* 25 6*   HEMOGLOBIN g/dL 9 1* 8 7* 8 5* 8 4* 7 8* 7 3* 7 9*   POTASSIUM mmol/L 4 0 4 2 4 0 4 1 4 0 3 8  --    PLATELETS Thousands/uL 146* 129* 149 140* 132* 126* 115*   WBC Thousand/uL 3 47* 3 63* 4 77 3 39* 3 56* 4 00* 4 79     Portions of the record may have been created with voice recognition software  Occasional wrong word or \"sound a like\" substitutions may have occurred due to the inherent limitations of voice recognition software  Read the chart carefully and recognize, using context, where substitutions have occurred  If you have any questions, please contact the dictating provider      "

## 2023-06-15 NOTE — RESTORATIVE TECHNICIAN NOTE
Restorative Technician Note      Patient Name: Evita Quiroga     Restorative Tech Visit Date: 06/15/23  Note Type: Mobility  Patient Position Upon Consult: Supine  Activity Performed: Repositioned  Assistive Device: Other (Comment) (Ax1 to roll for ADLs)  Patient Position at End of Consult: Supine;  All needs within [facility]

## 2023-06-15 NOTE — PLAN OF CARE
Post-Dialysis RN Treatment Note    Blood Pressure:  Pre 100/62 mm/Hg  Post 107/65 mmHg   EDW  75 5 kg    Weight:  Pre 74 6 kg   Post 74 2 kg   Mode of weight measurement: Bed Scale   Volume Removed  0 ml (goal was decreased during tx d/t hypotension, patient asymptomatic)   Treatment duration 180 minutes    NS given  Yes, 200 ml     Treatment shortened? No   Medications given during Rx Epogen 6000 units, Oxycodone 5 mg   Estimated Kt/V  1 12   Access type: Permacath/TDC   Access Issues: No    Report called to primary nurse   Yes Doctor Elsy 91 for 3 hour HD treatment with 0 5 kg UF as tolerated      Problem: METABOLIC, FLUID AND ELECTROLYTES - ADULT  Goal: Electrolytes maintained within normal limits  Description: INTERVENTIONS:  - Monitor labs and assess patient for signs and symptoms of electrolyte imbalances  - Administer electrolyte replacement as ordered  - Monitor response to electrolyte replacements, including repeat lab results as appropriate  - Instruct patient on fluid and nutrition as appropriate  Outcome: Progressing  Goal: Fluid balance maintained  Description: INTERVENTIONS:  - Monitor labs   - Monitor I/O and WT  - Instruct patient on fluid and nutrition as appropriate  - Assess for signs & symptoms of volume excess or deficit  Outcome: Progressing

## 2023-06-16 ENCOUNTER — TRANSITIONAL CARE MANAGEMENT (OUTPATIENT)
Dept: FAMILY MEDICINE CLINIC | Facility: CLINIC | Age: 68
End: 2023-06-16

## 2023-06-19 ENCOUNTER — TELEPHONE (OUTPATIENT)
Dept: VASCULAR SURGERY | Facility: CLINIC | Age: 68
End: 2023-06-19

## 2023-06-19 NOTE — TELEPHONE ENCOUNTER
Vascular Nurse Navigator Post Op Call    Procedure: Left - LEFT UPPER EXTREMITY A-V GRAFT EXPLANT  LEFT BRACHIAL ANGIOPATCH  APPLICATION OF  VAC     Date of Procedure: 6/3/23    Surgeon:    Fidelina Rider MD - Primary     * Marcelo Mcgarry MD - [address] Peters DO - Fellow    Discharge Date: 6/15/23    Discharge Disposition: Other [place] at   [address]    Arm Weakness?: No    Arm Swelling?: No    Arm Numbness?: No    Chest Pain?: No    Shortness of Breath?: No    Orthopnea?: No    Anticoagulation pt was discharged on post op?: Aspirin and Clopidogrel (Plavix)    Statin pt was discharged on post op?:  Lipitor (atorvastatin)    Bleeding?: No    Uncontrolled Pain?: No    Incision Concerns?: No    Fever or Chills?: No      Reviewed discharge instructions and incision care with patient  NEXT OFFICE VISIT SCHEDULED:  6/29/23 at 1 pm with TESSY Olea at [facility] Confirmed?: Yes, facility to arrange transportation for appointment      Any further questions/concerns? Spoke with [employer], nurse at [facility], in regards to above  She stated that patient's wound vac is intact with a scant amount of serosanguinous drainage  She stated the wound vac is changed on MWF  Reviewed discharge medications - Aspirin and Plavix  All questions answered  No concerns expressed at this time

## 2023-06-29 ENCOUNTER — OFFICE VISIT (OUTPATIENT)
Dept: VASCULAR SURGERY | Facility: CLINIC | Age: 68
End: 2023-06-29

## 2023-06-29 VITALS
HEIGHT: 70 IN | DIASTOLIC BLOOD PRESSURE: 70 MMHG | WEIGHT: 166 LBS | SYSTOLIC BLOOD PRESSURE: 110 MMHG | HEART RATE: 82 BPM | BODY MASS INDEX: 23.77 KG/M2 | TEMPERATURE: 99 F

## 2023-06-29 DIAGNOSIS — T82.7XXA INFECTION OF AV GRAFT FOR DIALYSIS (HCC): Primary | ICD-10-CM

## 2023-06-29 DIAGNOSIS — R60.0 LOCALIZED EDEMA: ICD-10-CM

## 2023-06-29 DIAGNOSIS — I77.0 ARTERIOVENOUS FISTULA (HCC): ICD-10-CM

## 2023-06-29 PROCEDURE — 99024 POSTOP FOLLOW-UP VISIT: CPT | Performed by: NURSE PRACTITIONER

## 2023-06-29 RX ORDER — ONDANSETRON 4 MG/1
4 TABLET, FILM COATED ORAL EVERY 8 HOURS PRN
COMMUNITY

## 2023-06-29 NOTE — PATIENT INSTRUCTIONS
Wound measures 4cmx 0 7cm x 0 7cm   Continue wound VAC to left axilla   Change every other day  Elevate left upper extremity to help manage swelling  Sutures removed from left upper extremity  Follow up in 2-3 weeks to recheck healing  Right upper extremity vein mapping in 4 to 6 weeks evaluate for options for creation of new access

## 2023-06-29 NOTE — ASSESSMENT & PLAN NOTE
62-year-old male with HTN, HLD, DM, CAD, chronic IJ DVT, IBS, ESRD, multiple prior dialysis access, s/p left brachial axillary AV graft by Dr Tania Zaman 5/52/9420 complicated by incisional dehiscence, graft exposure and graft infection s/p LUE AVG explant with brachial vein patch by Dr Melanie Rolon 6/3/23  Patient presents for post operative visit      -Wound measures 4cmx 0 7cm x 0 7cm mixed red granular and yellow fibrinous tissue   See clinical image   -Continue wound VAC to left axilla wound  -Sutures removed from left upper extremity  -Elevate left upper extremity to help manage swelling  -Follow up in 2-3 weeks to recheck healing  -Right upper extremity vein mapping in 4 to 6 weeks evaluate for options for creation of new access

## 2023-07-17 ENCOUNTER — OFFICE VISIT (OUTPATIENT)
Dept: VASCULAR SURGERY | Facility: CLINIC | Age: 68
End: 2023-07-17

## 2023-07-17 VITALS — HEART RATE: 87 BPM | SYSTOLIC BLOOD PRESSURE: 102 MMHG | TEMPERATURE: 97.4 F | DIASTOLIC BLOOD PRESSURE: 64 MMHG

## 2023-07-17 DIAGNOSIS — N18.6 ESRD (END STAGE RENAL DISEASE) ON DIALYSIS (HCC): ICD-10-CM

## 2023-07-17 DIAGNOSIS — N18.6 TYPE 2 DIABETES MELLITUS WITH CHRONIC KIDNEY DISEASE ON CHRONIC DIALYSIS, UNSPECIFIED WHETHER LONG TERM INSULIN USE (HCC): ICD-10-CM

## 2023-07-17 DIAGNOSIS — T82.7XXA INFECTION OF AV GRAFT FOR DIALYSIS (HCC): Primary | ICD-10-CM

## 2023-07-17 DIAGNOSIS — Z99.2 TYPE 2 DIABETES MELLITUS WITH CHRONIC KIDNEY DISEASE ON CHRONIC DIALYSIS, UNSPECIFIED WHETHER LONG TERM INSULIN USE (HCC): ICD-10-CM

## 2023-07-17 DIAGNOSIS — Z99.2 ESRD (END STAGE RENAL DISEASE) ON DIALYSIS (HCC): ICD-10-CM

## 2023-07-17 DIAGNOSIS — E11.22 TYPE 2 DIABETES MELLITUS WITH CHRONIC KIDNEY DISEASE ON CHRONIC DIALYSIS, UNSPECIFIED WHETHER LONG TERM INSULIN USE (HCC): ICD-10-CM

## 2023-07-17 DIAGNOSIS — F17.200 NICOTINE DEPENDENCE, UNCOMPLICATED, UNSPECIFIED NICOTINE PRODUCT TYPE: ICD-10-CM

## 2023-07-17 PROCEDURE — 99024 POSTOP FOLLOW-UP VISIT: CPT | Performed by: PHYSICIAN ASSISTANT

## 2023-07-17 NOTE — PROGRESS NOTES
Assessment/Plan:    S/P LEFT brachial axillary AV graft by Dr. Tamera Kellerling 7/77/7267 complicated by incisional dehiscence, graft exposure and graft infection s/p LUE AVG explant with brachial vein patch by Dr Myla Bruce 6/3/23. LEFT axillary wound VAC    Post-op follow up     -Reports L hand numbness since 5/19 surgery which worsened after 6/3/23  -Hand function intact  -Still at [facility] with Wound VAC  -No other complaints; no extremity pain; no fevers or chills      L axilla   -Wound 2 cm x 1 cm x 0.3 cm  -Continues to heal and very superficial  -Granular and fibrinous tissue  -No heat, drainage, odor, necrosis    -LUE with moderate edema; multiple healed incisional scars  -Non-palp radial pulse  -Hand warm with motor function intact demonstrating opening/closing hand and thumb touching each digit. A/P Wound check after AV graft explant  -L surgical site to the axilla area is healing as expected  -Incision is now very superficial  -Discontinue wound VAC    Plan:  -Wash surgical area with soap and water at least once daily   -Apply wet-to-dry dressings to left axillary area  -Avoid friction to the left armpit area with protective bandage    -Appointment scheduled for vein mapping 8/1/2023 at [facility] 1 PM  -Arrange for vascular follow-up after duplex with surgeon  -Call to be seen sooner, if any changes        Skin dryness  -Skin is very dry  -Recommend good moisturizer such as Cetaphil or Eucerin to the extremities but avoid any open areas/axillary      CC: Dr. Davis Acosta Doctor      Subjective:      Patient ID: Brian Davison is a 70 y.o. male. Patient presents s/p LUE AVG explant, L brachial angiopatch and application of Vac by Dr. Myla Bruce Doctor on 6/3/23. Pt denies fever/chills. He reports mild pain in LUE. Pt removed wound vac at facility and wound is healing with minimal drainage. Pt is taking ASA81, Atorvastatin and Plavix. He smokes 1/4ppd.      HPI   Melly Bonion Reginoon  62-year-old male with HTN, HLD, DM, CAD, PPM, chronic IJ DVT, ESRD for many years, multiple prior dialysis access is s/p left brachial axillary AV graft by Dr. Tone Bello 1/77/9073 complicated by incisional dehiscence, graft exposure and graft infection s/p LUE AVG explant with brachial vein patch by Dr Jd Keita 6/3/23. Patient was discharged to [facility] rehabilitation. He has moderate left upper extremity swelling. Continued intermittent numbness and tingling of the left hand, unchanged. Patient had wound VAC post-operatively. Patient completed 10 days of postoperative antibiotics. 7/17/23:  Patient returns for vascular follow-up and site check. He came in without a wound VAC. Patient continues to complain of left hand numbness and tingling. He does have intact left hand function. He also has moderate left upper extremity edema. He has no new complaints. No chest pain or shortness of breath. No fevers or chills. The incision continues to heal and is very superficial at this point. Patient came in by transport from NH. Wife met him at the office today for visit. Instructions for wound care were discussed with patient and his wife. The following portions of the patient's history were reviewed and updated as appropriate: allergies, current medications, past family history, past medical history, past social history, past surgical history and problem list.    Review of Systems   Skin: Positive for wound (L axilla ). All other systems reviewed and are negative.         Objective:    /64 (BP Location: Right arm, Patient Position: Sitting, Cuff Size: Standard)   Pulse 87   Temp (!) 97.4 °F (36.3 °C) (Tympanic)        Physical Exam      Comes into the office in a wheelchair  Multiple excoriations over the upper and lower extremities  Chest veins  Right chest indwelling catheter in place    RRR S1 S2 2/6 sm  Lungs overall clear; Resp non-labored    LUE exam as above      I have reviewed and made appropriate changes to the review of systems input by the medical assistant. Vitals:    07/17/23 1134   BP: 102/64   BP Location: Right arm   Patient Position: Sitting   Cuff Size: Standard   Pulse: 87   Temp: (!) 97.4 °F (36.3 °C)   TempSrc: Tympanic       Patient Active Problem List   Diagnosis   • Anemia of chronic disease   • Coronary artery disease    • Hypertension   • Elevated serum alkaline phosphatase level   • ESRD (end stage renal disease) on dialysis ([facility])   • Intestinal malabsorption   • Irritable bowel syndrome   • Mixed hyperlipidemia   • Nicotine dependence   • Organic impotence   • Pernicious anemia   • Type 2 diabetes mellitus   • Encounter for extracorporeal dialysis ([address])   • AV fistula thrombosis, initial encounter ([address])   • Arteriovenous fistula (HCC)   • Right intertrochanteric femur fracture   • Thrombocytopenia    • Dependence on renal dialysis ([facility])   • Vitamin D deficiency, unspecified   • End-stage renal disease    • Chronic kidney disease   • Chronic deep vein thrombosis (DVT) of internal jugular vein ([facility])   • S/P angioplasty with stent   • Hypotension   • Infection of dialysis AV graft   • Ambulatory dysfunction       Past Surgical History:   Procedure Laterality Date   • AV FISTULA PLACEMENT     • AV FISTULA REPAIR Left 6/3/2023    Procedure: LEFT UPPER EXTREMITY A-V GRAFT EXPLANT, LEFT BRACHIAL ANGIOPATCH,  APPLICATION OF  VAC;  Surgeon: Kelly Rider MD;  [facility];  Service: Vascular   • CARDIAC CATHETERIZATION Left 02/03/2023    Procedure: Cardiac Left Heart Cath;  Surgeon: Pennie Bliss MD;  Location: [address] [place] [address] CATH LAB; Service: Cardiology   • CARDIAC CATHETERIZATION N/A 02/08/2023    Procedure: Cardiac catheterization with complex PCI with Dr. Raffy Weber, patient is a renal dialysis patient;  Surgeon: Mady Kenny MD;  Location: [facility] CARDIAC CATH LAB;   Service: Cardiology   • CARDIAC SURGERY  2010    stents x3   • COLONOSCOPY N/A 12/12/2016    Procedure: COLONOSCOPY;  Surgeon: Xochitl Lewis MD; Location: [address] [place] [address] GI LAB; Service:    • COLONOSCOPY N/A 04/03/2017    Procedure:  COLONOSCOPY;  Surgeon: Dominick Child MD;  Location: [address] [place] [address] GI LAB; Service:    • HARDWARE REMOVAL Right 04/04/2022    Procedure: REMOVAL HARDWARE HIP;  Surgeon: Ingrid Schrader MD;  Location: [facility];  Service: Orthopedics   • IR AV FISTULA/GRAFT DECLOT  04/29/2021   • IR AV FISTULA/GRAFT DECLOT  03/25/2022   • IR AV FISTULA/GRAFT DECLOT  09/21/2022   • IR AV FISTULAGRAM/GRAFTOGRAM  03/28/2022   • IR AV FISTULAGRAM/GRAFTOGRAM  07/28/2022   • IR TUNNELED CENTRAL LINE REMOVAL  12/07/2021   • IR TUNNELED DIALYSIS CATHETER PLACEMENT  05/24/2021   • PORTACATH PLACEMENT      per pt "port in chest"   • IA ARTERIOVENOUS ANASTOMOSIS OPEN DIRECT Left 07/22/2021    Procedure: CREATION FISTULA ARTERIOVENOUS (AV); Surgeon: Severa Coca, MD;  Location: [facility];  Service: Vascular   • IA ARTERIOVENOUS ANASTOMOSIS OPEN DIRECT Left 3/21/2023    Procedure: left brachiobasilic fistula,  AVG Graft;  Surgeon: Shantanu Bryan MD;  Location: [facility];  Service: Vascular   • IA ARTERIOVENOUS ANASTOMOSIS OPEN DIRECT Left 5/19/2023    Procedure: CREATION of LEFT FISTULA ARTERIOVENOUS (AV) GRAFT;  Surgeon: Shantanu Bryan MD;  [facility];  Service: Vascular   • IA HEMIARTHROPLASTY HIP PARTIAL Right 04/04/2022    Procedure: HEMIARTHROPLASTY HIP (BIPOLAR);   Surgeon: Ingrid Schrader MD;  Location[facility];  Service: Orthopedics       Family History   Problem Relation Age of Onset   • Leukemia Mother    • Crohn's disease Father    • Transient ischemic attack Brother        Social History     Socioeconomic History   • Marital status: /Civil Union     Spouse name: Lion Prabhakar   • Number of children: 2   • Years of education: 15   • Highest education level: Some college, no degree   Occupational History   • Not on file   Tobacco Use   • Smoking status: Every Day     Packs/day: 0.25     Years: 30.00     Total pack years: 7.50     Types: Cigarettes   • Smokeless tobacco: Never   • Tobacco comments:     Currently not smoking - in facility   Vaping Use   • Vaping Use: Never used   Substance and Sexual Activity   • Alcohol use: Yes     Comment: rarely - No alcohol use per [employer]   • Drug use: Not Currently     Types: Marijuana     Comment: occasional brownie for sleep   • Sexual activity: Not on file   Other Topics Concern   • Not on file   Social History Narrative    Daily caffeinated coffee consumption     Social Determinants of Health     Financial Resource Strain: Not on file   Food Insecurity: No Food Insecurity (4/4/2022)    Hunger Vital Sign    • Worried About Running Out of Food in the Last Year: Never true    • Ran Out of Food in the Last Year: Never true   Transportation Needs: No Transportation Needs (4/4/2022)    PRAPARE - Transportation    • Lack of Transportation (Medical): No    • Lack of Transportation (Non-Medical):  No   Physical Activity: Not on file   Stress: Not on file   Social Connections: Not on file   Intimate Partner Violence: Not on file   Housing Stability: Low Risk  (4/4/2022)    Housing Stability Vital Sign    • Unable to Pay for Housing in the Last Year: No    • Number of Places Lived in the Last Year: 1    • Unstable Housing in the Last Year: No       No Known Allergies      Current Outpatient Medications:   •  acetaminophen (TYLENOL) 325 mg tablet, Take 3 tablets (975 mg total) by mouth every 8 (eight) hours as needed for mild pain, Disp: , Rfl: 0  •  ascorbic acid (VITAMIN C) 500 mg tablet, TAKE ONE TABLET BY MOUTH EVERY EVENING FOR SUPPLEMENT, Disp: , Rfl:   •  aspirin (ECOTRIN LOW STRENGTH) 81 mg EC tablet, Take 81 mg by mouth daily, Disp: , Rfl:   •  atorvastatin (LIPITOR) 20 mg tablet, Take 20 mg by mouth daily at bedtime, Disp: , Rfl:   •  calcitriol (ROCALTROL) 0.25 mcg capsule, Take 3 capsules (0.75 mcg total) by mouth 3 (three) times a week, Disp: , Rfl: 0  •  Cholecalciferol 50 MCG (2000 UT) CAPS, Take by mouth daily, Disp: , Rfl:   •  [place] Choice Comfort EZ 33G X 4 MM MISC, , Disp: , Rfl:   •  Clopidogrel Bisulfate (PLAVIX PO), Take by mouth, Disp: , Rfl:   •  gabapentin (NEURONTIN) 100 mg capsule, Take 1 capsule (100 mg total) by mouth 3 (three) times a week On Tuesday Thursday Saturday, Disp: , Rfl: 0  •  ondansetron (ZOFRAN) 4 mg tablet, Take 4 mg by mouth every 8 (eight) hours as needed for nausea or vomiting, Disp: , Rfl:   •  oxyCODONE (ROXICODONE) 5 immediate release tablet, Take 1 tablet (5 mg total) by mouth every 4 (four) hours as needed for severe pain for up to 15 doses Max Daily Amount: 30 mg, Disp: 15 tablet, Rfl: 0  •  vitamin B-12 (VITAMIN B-12) 1,000 mcg tablet, Take 1,000 mcg by mouth daily, Disp: , Rfl:   •  midodrine (PROAMATINE) 10 MG tablet, Take 1 tablet (10 mg total) by mouth 3 (three) times a day before meals, Disp: 90 tablet, Rfl: 0

## 2023-07-17 NOTE — ASSESSMENT & PLAN NOTE
· Patient presents with thrombosis of the AV fistula that he noted this morning when he went to the dialysis center  Patient states that he missed dialysis on Friday and Monday because he had worsening chronic diarrhea and was not feeling well  When you showed up for dialysis today his entire fistula did not have a palpable thrill and was hard to palpation  He was referred to several centers that could not evaluate and treat him today day 4 he was referred to the ED  · He has had the AV fistula for about 10 years and has had thrombosis in the past however not this extensive  ·   IR  Fistulogram/thrombectomy was successful  Patient had HD today with no issues  Plan:  · Continue warfarin 2 mg q h s  Per IR recommendations  ·   Continue HD via AV fistula  - CT 7/15/23- Retained secretions in upper trachea. Trace bilateral pleural effusions.  - on zosyn

## 2023-07-17 NOTE — LETTER
July 17, 2023     Shyanne Rider, MD  [address] [facility]  3rd Floor, [facility]  [address]    Patient: Johann Hanson   YOB: 1955   Date of Visit: 7/17/2023       Dear  Doctor: Thank you for referring Alejandro Wu to me for evaluation. Below are my notes for this consultation. If you have questions, please do not hesitate to call me. I look forward to following your patient along with you. Sincerely,        Parth Mccall PA-C        CC: No Recipients    Simona Allison  7/17/2023 12:28 PM  Cosign Needed  Assessment/Plan:    S/P LEFT brachial axillary AV graft by Dr. Yonatan Ibrahim 2/78/0892 complicated by incisional dehiscence, graft exposure and graft infection s/p LUE AVG explant with brachial vein patch by Dr Antoinette Martínez 6/3/23. LEFT axillary wound VAC    Post-op follow up     -Reports L hand numbness since 5/19 surgery which worsened after 6/3/23  -Hand function intact  -Still at [facility] with Wound VAC  -No other complaints; no extremity pain; no fevers or chills      L axilla   -Wound 2 cm x 1 cm x 0.3 cm  -Continues to heal and very superficial  -Granular and fibrinous tissue  -No heat, drainage, odor, necrosis    -LUE with moderate edema; multiple healed incisional scars  -Non-palp radial pulse  -Hand warm with motor function intact demonstrating opening/closing hand and thumb touching each digit.       A/P Wound check after AV graft explant  -L surgical site to the axilla area is healing as expected  -Incision is now very superficial  -Discontinue wound VAC    Plan:  -Wash surgical area with soap and water at least once daily   -Apply wet-to-dry dressings to left axillary area  -Avoid friction to the left armpit area with protective bandage    -Appointment scheduled for vein mapping 8/1/2023 at [facility] 1 PM  -Arrange for vascular follow-up after duplex with surgeon  -Call to be seen sooner, if any changes        Skin dryness  -Skin is very dry  -Recommend good moisturizer such as Cetaphil or Eucerin to the extremities but avoid any open areas/axillary      CC: Dr. Debra Joseph Doctor      Subjective:     Patient ID: Charla Buenrostro is a 70 y.o. male. Patient presents s/p LUE AVG explant, L brachial angiopatch and application of Vac by Dr. Josselin Feliz Doctor on 6/3/23. Pt denies fever/chills. He reports mild pain in LUE. Pt removed wound vac at facility and wound is healing with minimal drainage. Pt is taking ASA81, Atorvastatin and Plavix. He smokes 1/4ppd. HPI   Regi Lacyon  25-year-old male with HTN, HLD, DM, CAD, PPM, chronic IJ DVT, ESRD for many years, multiple prior dialysis access is s/p left brachial axillary AV graft by Dr. Olen Goodell 6/12/2153 complicated by incisional dehiscence, graft exposure and graft infection s/p LUE AVG explant with brachial vein patch by Dr Josselin Feliz 6/3/23. Patient was discharged to [facility] rehabilitation. He has moderate left upper extremity swelling. Continued intermittent numbness and tingling of the left hand, unchanged. Patient had wound VAC post-operatively. Patient completed 10 days of postoperative antibiotics. 7/17/23:  Patient returns for vascular follow-up and site check. He came in without a wound VAC. Patient continues to complain of left hand numbness and tingling. He does have intact left hand function. He also has moderate left upper extremity edema. He has no new complaints. No chest pain or shortness of breath. No fevers or chills. The incision continues to heal and is very superficial at this point. Patient came in by transport from NH. Wife met him at the office today for visit. Instructions for wound care were discussed with patient and his wife.     The following portions of the patient's history were reviewed and updated as appropriate: allergies, current medications, past family history, past medical history, past social history, past surgical history and problem list.    Review of Systems   Skin: Positive for wound (L axilla ). All other systems reviewed and are negative. Objective:    /64 (BP Location: Right arm, Patient Position: Sitting, Cuff Size: Standard)   Pulse 87   Temp (!) 97.4 °F (36.3 °C) (Tympanic)       Physical Exam     Comes into the office in a wheelchair  Multiple excoriations over the upper and lower extremities  Chest veins  Right chest indwelling catheter in place    RRR S1 S2 2/6 sm  Lungs overall clear; Resp non-labored    LUE exam as above      I have reviewed and made appropriate changes to the review of systems input by the medical assistant.     Vitals:    07/17/23 1134   BP: 102/64   BP Location: Right arm   Patient Position: Sitting   Cuff Size: Standard   Pulse: 87   Temp: (!) 97.4 °F (36.3 °C)   TempSrc: Tympanic       Patient Active Problem List   Diagnosis   • Anemia of chronic disease   • Coronary artery disease    • Hypertension   • Elevated serum alkaline phosphatase level   • ESRD (end stage renal disease) on dialysis ([facility])   • Intestinal malabsorption   • Irritable bowel syndrome   • Mixed hyperlipidemia   • Nicotine dependence   • Organic impotence   • Pernicious anemia   • Type 2 diabetes mellitus   • Encounter for extracorporeal dialysis ([address])   • AV fistula thrombosis, initial encounter ([address])   • Arteriovenous fistula (HCC)   • Right intertrochanteric femur fracture   • Thrombocytopenia    • Dependence on renal dialysis ([address])   • Vitamin D deficiency, unspecified   • End-stage renal disease    • Chronic kidney disease   • Chronic deep vein thrombosis (DVT) of internal jugular vein (HCC)   • S/P angioplasty with stent   • Hypotension   • Infection of dialysis AV graft   • Ambulatory dysfunction       Past Surgical History:   Procedure Laterality Date   • AV FISTULA PLACEMENT     • AV FISTULA REPAIR Left 6/3/2023    Procedure: LEFT UPPER EXTREMITY A-V GRAFT EXPLANT, LEFT BRACHIAL ANGIOPATCH,  APPLICATION OF  VAC;  Surgeon: Lorena Rider MD;  [facility];  Service: Vascular   • CARDIAC CATHETERIZATION Left 02/03/2023    Procedure: Cardiac Left Heart Cath;  Surgeon: Marcellus Antony MD;  Location: [address] [place] [address] CATH LAB; Service: Cardiology   • CARDIAC CATHETERIZATION N/A 02/08/2023    Procedure: Cardiac catheterization with complex PCI with Dr. Agustín Akins, patient is a renal dialysis patient;  Surgeon: Yue Diane MD;  [facility] CARD[facility] CATH [facility]; Service: Cardiology   • CARDIAC SURGERY  2010    stents x3   • COLONOSCOPY N/A 12/12/2016    Procedure: COLONOSCOPY;  Surgeon: Marcia Deleon MD;  Location: [address] [place] [address] GI LAB; Service:    • COLONOSCOPY N/A 04/03/2017    Procedure:  COLONOSCOPY;  Surgeon: Marcia Deleon MD;  Location: [address] [place] [address] GI LAB; Service:    • HARDWARE REMOVAL Right 04/04/2022    Procedure: REMOVAL HARDWARE HIP;  Surgeon: Dimple Bass MD;  [facility];  Service: Orthopedics   • IR AV FISTULA/GRAFT DECLOT  04/29/2021   • IR AV FISTULA/GRAFT DECLOT  03/25/2022   • IR AV FISTULA/GRAFT DECLOT  09/21/2022   • IR AV FISTULAGRAM/GRAFTOGRAM  03/28/2022   • IR AV FISTULAGRAM/GRAFTOGRAM  07/28/2022   • IR TUNNELED CENTRAL LINE REMOVAL  12/07/2021   • IR TUNNELED DIALYSIS CATHETER PLACEMENT  05/24/2021   • PORTACATH PLACEMENT      per pt "port in chest"   • NV ARTERIOVENOUS ANASTOMOSIS OPEN DIRECT Left 07/22/2021    Procedure: CREATION FISTULA ARTERIOVENOUS (AV); Surgeon: Hank Montana MD;  Location: [facility]  Service: Vascular   • NV ARTERIOVENOUS ANASTOMOSIS OPEN DIRECT Left 3/21/2023    Procedure: left brachiobasilic fistula,  AVG Graft;  Surgeon: Yana Hart MD;  [facility];  Service: Vascular   • NV ARTERIOVENOUS ANASTOMOSIS OPEN DIRECT Left 5/19/2023    Procedure: CREATION of LEFT FISTULA ARTERIOVENOUS (AV) GRAFT;  Surgeon: Yana Hart MD;  [facility];  Service: Vascular   • NV HEMIARTHROPLASTY HIP PARTIAL Right 04/04/2022    Procedure: HEMIARTHROPLASTY HIP (BIPOLAR);   Surgeon: Susanne Sanon Amando Calzada MD;  [facility];  Service: Orthopedics       Family History   Problem Relation Age of Onset   • Leukemia Mother    • Crohn's disease Father    • Transient ischemic attack Brother        Social History     Socioeconomic History   • Marital status: /Civil Union     Spouse name: Rodolfo Cornell   • Number of children: 2   • Years of education: 15   • Highest education level: Some college, no degree   Occupational History   • Not on file   Tobacco Use   • Smoking status: Every Day     Packs/day: 0.25     Years: 30.00     Total pack years: 7.50     Types: Cigarettes   • Smokeless tobacco: Never   • Tobacco comments:     Currently not smoking - in facility   Vaping Use   • Vaping Use: Never used   Substance and Sexual Activity   • Alcohol use: Yes     Comment: rarely - No alcohol use per [employer]   • Drug use: Not Currently     Types: Marijuana     Comment: occasional brownie for sleep   • Sexual activity: Not on file   Other Topics Concern   • Not on file   Social History Narrative    Daily caffeinated coffee consumption     Social Determinants of Health     Financial Resource Strain: Not on file   Food Insecurity: No Food Insecurity (4/4/2022)    Hunger Vital Sign    • Worried About Running Out of Food in the Last Year: Never true    • Ran Out of Food in the Last Year: Never true   Transportation Needs: No Transportation Needs (4/4/2022)    PRAPARE - Transportation    • Lack of Transportation (Medical): No    • Lack of Transportation (Non-Medical):  No   Physical Activity: Not on file   Stress: Not on file   Social Connections: Not on file   Intimate Partner Violence: Not on file   Housing Stability: Low Risk  (4/4/2022)    Housing Stability Vital Sign    • Unable to Pay for Housing in the Last Year: No    • Number of Places Lived in the Last Year: 1    • Unstable Housing in the Last Year: No       No Known Allergies      Current Outpatient Medications:   •  acetaminophen (TYLENOL) 325 mg tablet, Take 3 tablets (975 mg total) by mouth every 8 (eight) hours as needed for mild pain, Disp: , Rfl: 0  •  ascorbic acid (VITAMIN C) 500 mg tablet, TAKE ONE TABLET BY MOUTH EVERY EVENING FOR SUPPLEMENT, Disp: , Rfl:   •  aspirin (ECOTRIN LOW STRENGTH) 81 mg EC tablet, Take 81 mg by mouth daily, Disp: , Rfl:   •  atorvastatin (LIPITOR) 20 mg tablet, Take 20 mg by mouth daily at bedtime, Disp: , Rfl:   •  calcitriol (ROCALTROL) 0.25 mcg capsule, Take 3 capsules (0.75 mcg total) by mouth 3 (three) times a week, Disp: , Rfl: 0  •  Cholecalciferol 50 MCG (2000 UT) CAPS, Take by mouth daily, Disp: , Rfl:   •  [place] Choice Comfort EZ 33G X 4 MM MISC, , Disp: , Rfl:   •  Clopidogrel Bisulfate (PLAVIX PO), Take by mouth, Disp: , Rfl:   •  gabapentin (NEURONTIN) 100 mg capsule, Take 1 capsule (100 mg total) by mouth 3 (three) times a week On Tuesday Thursday Saturday, Disp: , Rfl: 0  •  ondansetron (ZOFRAN) 4 mg tablet, Take 4 mg by mouth every 8 (eight) hours as needed for nausea or vomiting, Disp: , Rfl:   •  oxyCODONE (ROXICODONE) 5 immediate release tablet, Take 1 tablet (5 mg total) by mouth every 4 (four) hours as needed for severe pain for up to 15 doses Max Daily Amount: 30 mg, Disp: 15 tablet, Rfl: 0  •  vitamin B-12 (VITAMIN B-12) 1,000 mcg tablet, Take 1,000 mcg by mouth daily, Disp: , Rfl:   •  midodrine (PROAMATINE) 10 MG tablet, Take 1 tablet (10 mg total) by mouth 3 (three) times a day before meals, Disp: 90 tablet, Rfl: 0

## 2023-07-17 NOTE — PATIENT INSTRUCTIONS
S/P AV graft explant  -Discontinue wound VAC    -Wash surgical area with soap and water at least once daily   -Apply wet-to-dry dressings to left axillary (armpit) area  -Avoid friction to the left armpit area with protective bandage    -Appointment for vascular duplex for vein mapping 8/1/2023 at [facility] 1 PM    -Arrange for vascular follow-up after duplex with surgeon        Skin dryness  -Recommend seeing applying good moisturizer such as Cetaphil or Eucerin to the extremities but avoid any open areas/axillary (armpit area)

## 2023-08-01 ENCOUNTER — HOSPITAL ENCOUNTER (OUTPATIENT)
Dept: NON INVASIVE DIAGNOSTICS | Facility: CLINIC | Age: 68
Discharge: HOME | End: 2023-08-01
Payer: MEDICARE

## 2023-08-01 DIAGNOSIS — R60.0 LOCALIZED EDEMA: ICD-10-CM

## 2023-08-01 DIAGNOSIS — I77.0 ARTERIOVENOUS FISTULA (HCC): ICD-10-CM

## 2023-08-01 DIAGNOSIS — N18.6 CKD (CHRONIC KIDNEY DISEASE) REQUIRING CHRONIC DIALYSIS (HCC): ICD-10-CM

## 2023-08-01 DIAGNOSIS — Z99.2 CKD (CHRONIC KIDNEY DISEASE) REQUIRING CHRONIC DIALYSIS (HCC): ICD-10-CM

## 2023-08-01 DIAGNOSIS — N17.9 ACUTE KIDNEY FAILURE (HCC): ICD-10-CM

## 2023-08-01 PROCEDURE — 93985 DUP-SCAN HEMO COMPL BI STD: CPT

## 2023-08-01 PROCEDURE — 93990 DOPPLER FLOW TESTING: CPT | Performed by: SURGERY

## 2023-08-23 ENCOUNTER — TELEPHONE (OUTPATIENT)
Dept: FAMILY MEDICINE CLINIC | Facility: CLINIC | Age: 68
End: 2023-08-23

## 2023-08-23 ENCOUNTER — TELEPHONE (OUTPATIENT)
Dept: VASCULAR SURGERY | Facility: CLINIC | Age: 68
End: 2023-08-23

## 2023-08-23 ENCOUNTER — OFFICE VISIT (OUTPATIENT)
Dept: VASCULAR SURGERY | Facility: CLINIC | Age: 68
End: 2023-08-23
Payer: MEDICARE

## 2023-08-23 VITALS
RESPIRATION RATE: 20 BRPM | SYSTOLIC BLOOD PRESSURE: 110 MMHG | DIASTOLIC BLOOD PRESSURE: 70 MMHG | HEART RATE: 84 BPM | OXYGEN SATURATION: 97 %

## 2023-08-23 DIAGNOSIS — N18.6 ESRD (END STAGE RENAL DISEASE) ON DIALYSIS (HCC): ICD-10-CM

## 2023-08-23 DIAGNOSIS — Z99.2 ESRD (END STAGE RENAL DISEASE) ON DIALYSIS (HCC): ICD-10-CM

## 2023-08-23 DIAGNOSIS — N18.6 ESRD (END STAGE RENAL DISEASE) (HCC): Primary | ICD-10-CM

## 2023-08-23 PROCEDURE — 99214 OFFICE O/P EST MOD 30 MIN: CPT | Performed by: SURGERY

## 2023-08-23 RX ORDER — CHLORHEXIDINE GLUCONATE 0.12 MG/ML
15 RINSE ORAL ONCE
OUTPATIENT
Start: 2023-08-23 | End: 2023-08-23

## 2023-08-23 NOTE — ASSESSMENT & PLAN NOTE
Lab Results   Component Value Date    EGFR 7 06/15/2023    EGFR 11 06/14/2023    EGFR 5 06/13/2023    CREATININE 6.80 (H) 06/15/2023    CREATININE 4.81 (H) 06/14/2023    CREATININE 8.25 (H) 06/13/2023     S/P LEFT brachial axillary AV graft 4/37/1576 complicated by incisional dehiscence and graft infection s/p LUE AVG explant with brachial vein patch  6/3/23. Patient returning to the office for discussion of dialysis access. He is currently receiving dialysis via a RIJ tunneled dialysis catheter. He has residual numbness/neuropathy from his LUE procedures, he has good  strength but reports burning pain in his hand at times. He had a vein mapping of the RUE done which shows a borderline adequate basilic vein. I discussed with the patient I think we have exhausted LUE access and need to use his right arm now. We discussed the vein mapping results and plan would be for a POC US the day of surgery to evaluate his veins. If they appear adequate I would plan for a fistula (may require two stage). I did warn this may delay when he can get his catheter out but given the extent of the infection he had in his left arm I am interested in using autologous vein as this would decrease his risk. If graft is required, I may use artegraft in him rather than PTFE again to hopefully decrease his risk of infection. We discussed risks such as bleeding, infection, steal syndrome, nerve injury, graft failure, inability to mature the fistula.     Patient understands and is agreeable to surgery

## 2023-08-23 NOTE — TELEPHONE ENCOUNTER
Krissy Kumar called from [facility] rehab , patient is being discharged today and she cant find a vna that will come to [place] . that is in [place] he needs ot ,nurse aide and physical therapy . She asked if you had any connections or any other ideas . She called community vna and 4 others with no luck .  If you have any ideas whom could take him on please call her personal line [phone]

## 2023-08-23 NOTE — TELEPHONE ENCOUNTER
REMINDER: Under Reason For Call, comments MUST be formatted as:   (Surgeon's Initials) / (Procedure)      Special Instructions / FYI: [facility]    Procedure: Right upper extremity AV fistula, possible graft    Level: 3 - Route clearance(s) to [facility] Clearance Pool     Allergies: Patient has no known allergies. Instructions Given: NO Bowel Prep General Instructions     Dialysis: Yes. Where: [facility] // Days: Tuesday, Thursday, and Saturday    Return Visit Required Prior to Procedure: No.    Consent: I certify that patient has signed, printed, timed, and dated their surgery consent. I certify that the patient's LEGAL NAME and DATE OF BIRTH are written in the upper left corner on BOTH sides of the consent. I certify that BOTH sides of the completed surgery consent have been scanned into the patient's Epic chart by myself on 8/23/2023. Yes, I have LABELED the consent in Epic as Consent for Vascular Procedure. For Surgical Clearances     Levels   1-3   ROUTE this encounter to [facility] Clearance Pool (AND)   [facility] Surgery Coordinator Pool     Level   4   ROUTE this encounter to [facility] Surgery Coordinator Pool       HYDRATION CLEARANCES   ONLY ROUTE TO  [facility] Clearance Pool     Patient does not require any pre operative clearance. Yes, I have ROUTED this encounter to [facility] Surgery Coordinator and/or [facility] Clearance Pool.

## 2023-08-23 NOTE — PROGRESS NOTES
Assessment/Plan:    ESRD (end stage renal disease) on dialysis [facility])  Lab Results   Component Value Date    EGFR 7 06/15/2023    EGFR 11 06/14/2023    EGFR 5 06/13/2023    CREATININE 6.80 (H) 06/15/2023    CREATININE 4.81 (H) 06/14/2023    CREATININE 8.25 (H) 06/13/2023     S/P LEFT brachial axillary AV graft 4/27/5827 complicated by incisional dehiscence and graft infection s/p LUE AVG explant with brachial vein patch  6/3/23. Patient returning to the office for discussion of dialysis access. He is currently receiving dialysis via a RIJ tunneled dialysis catheter. He has residual numbness/neuropathy from his LUE procedures, he has good  strength but reports burning pain in his hand at times. He had a vein mapping of the RUE done which shows a borderline adequate basilic vein. I discussed with the patient I think we have exhausted LUE access and need to use his right arm now. We discussed the vein mapping results and plan would be for a POC US the day of surgery to evaluate his veins. If they appear adequate I would plan for a fistula (may require two stage). I did warn this may delay when he can get his catheter out but given the extent of the infection he had in his left arm I am interested in using autologous vein as this would decrease his risk. If graft is required, I may use artegraft in him rather than PTFE again to hopefully decrease his risk of infection. We discussed risks such as bleeding, infection, steal syndrome, nerve injury, graft failure, inability to mature the fistula. Patient understands and is agreeable to surgery       Diagnoses and all orders for this visit:    ESRD (end stage renal disease) ([address])  -     Case request operating room: CREATION FISTULA ARTERIOVENOUS (AV) possible graft; Standing  -     Type and screen; Future  -     Basic metabolic panel; Future  -     CBC and Platelet; Future  -     Protime-INR;  Future  -     Case request operating room: CREATION FISTULA ARTERIOVENOUS (AV) possible graft    ESRD (end stage renal disease) on dialysis ([address])    Other orders  -     Diet NPO; Sips with meds; Standing  -     Void on call to OR; Standing  -     Insert peripheral IV; Standing  -     Nursing Communication CHG bath, have staff wash entire body (neck down) per pre op bathing protocol. Routine, evening prior to, and day of surgery.; Standing  -     Nursing Communication Swab both nares with Povidone-Iodine solution, EXCLUDE if patient has shellfish/Iodine allergy, and replace with nasal alcohol swabstick. Routine, day of surgery, on call to OR.; Standing  -     chlorhexidine (PERIDEX) 0.12 % oral rinse 15 mL  -     Place sequential compression device; Standing          Subjective:      Patient ID: Evan Wise is a 70 y.o. male. Patient is s/p LUE AVG explant; Lyt brachial angio patch and vac placement on 6/3/23 by Dr. Rider and LUE AVG creation on 5/19/23 by Dr. Baron Mata. Pt states that he has swelling in the LUE and when he touches anything his hand "feels like it's on fire". HPI    The following portions of the patient's history were reviewed and updated as appropriate: allergies, current medications, past family history, past medical history, past social history, past surgical history and problem list.    I have spent a total time of 35 minutes on 08/23/23 in caring for this patient including Diagnostic results, Prognosis, Risks and benefits of tx options, Instructions for management, Patient and family education, Importance of tx compliance, Risk factor reductions, Impressions, Counseling / Coordination of care, Documenting in the medical record, Reviewing / ordering tests, medicine, procedures   and Obtaining or reviewing history  . Review of Systems   Constitutional: Positive for unexpected weight change (lost 30 lbs in 2 months). HENT: Negative. Eyes: Negative. Respiratory: Negative. Cardiovascular: Negative.     Gastrointestinal: Positive for nausea and vomiting. Endocrine: Negative. Genitourinary: Negative. Musculoskeletal: Negative. Skin: Negative. Allergic/Immunologic: Negative. Neurological: Positive for numbness. Hematological: Negative. Psychiatric/Behavioral: Negative. Objective:      /70 (BP Location: Right arm, Patient Position: Sitting)   Pulse 84   Resp 20   SpO2 97%          Physical Exam  Constitutional:       Appearance: Normal appearance. HENT:      Head: Normocephalic and atraumatic. Eyes:      Extraocular Movements: Extraocular movements intact. Pupils: Pupils are equal, round, and reactive to light. Cardiovascular:      Rate and Rhythm: Normal rate and regular rhythm. Pulses:           Radial pulses are 1+ on the right side and 2+ on the left side. Comments: 2+ right brachial pulse  Pulmonary:      Effort: Pulmonary effort is normal.      Breath sounds: Normal breath sounds. Abdominal:      Palpations: Abdomen is soft. Tenderness: There is no abdominal tenderness. Musculoskeletal:         General: Normal range of motion. Cervical back: Normal range of motion. Comments: Numbness in left hand, good  strength, 5/5   Neurological:      General: No focal deficit present. Mental Status: He is oriented to person, place, and time.    Psychiatric:         Mood and Affect: Mood normal.       EVLT Operative Scheduling Information:    Hospital:  [address]    Physician:  Ashley Lopez    Surgery: Right upper extremity AV fistula, possible graft    Urgency:  Standard    Level:  Level 3: Outpatients to be scheduled for elective surgery than can be delayed up to 4 weeks without reasonable expectation of detriment to patient    Case Length:  Normal    Post-op Bed:  Outpatient    OR Table:  Standard    Equipment Needs:  None    Medication Instructions:  Aspirin:   Continue (do not hold)  Plavix:  Continue (do not hold)    Hydration:  No    Contrast Allergy:  No

## 2023-08-25 ENCOUNTER — TRANSITIONAL CARE MANAGEMENT (OUTPATIENT)
Dept: FAMILY MEDICINE CLINIC | Facility: CLINIC | Age: 68
End: 2023-08-25

## 2023-08-28 ENCOUNTER — OFFICE VISIT (OUTPATIENT)
Dept: FAMILY MEDICINE CLINIC | Facility: CLINIC | Age: 68
End: 2023-08-28
Payer: MEDICARE

## 2023-08-28 VITALS
SYSTOLIC BLOOD PRESSURE: 90 MMHG | BODY MASS INDEX: 20.98 KG/M2 | TEMPERATURE: 98.6 F | HEART RATE: 86 BPM | RESPIRATION RATE: 16 BRPM | WEIGHT: 146.2 LBS | DIASTOLIC BLOOD PRESSURE: 70 MMHG

## 2023-08-28 DIAGNOSIS — N18.6 ESRD (END STAGE RENAL DISEASE) ON DIALYSIS (HCC): Primary | ICD-10-CM

## 2023-08-28 DIAGNOSIS — Z99.2 ESRD (END STAGE RENAL DISEASE) ON DIALYSIS (HCC): Primary | ICD-10-CM

## 2023-08-28 DIAGNOSIS — B35.1 ONYCHOMYCOSIS: ICD-10-CM

## 2023-08-28 DIAGNOSIS — R26.2 AMBULATORY DYSFUNCTION: ICD-10-CM

## 2023-08-28 DIAGNOSIS — F41.9 ANXIETY DISORDER, UNSPECIFIED TYPE: ICD-10-CM

## 2023-08-28 DIAGNOSIS — Z01.818 PREOP EXAMINATION: ICD-10-CM

## 2023-08-28 PROBLEM — I82.891 CHRONIC EMBOLISM AND THROMBOSIS OF OTHER SPECIFIED VEINS: Status: ACTIVE | Noted: 2023-06-15

## 2023-08-28 PROBLEM — N25.81 SECONDARY HYPERPARATHYROIDISM OF RENAL ORIGIN (HCC): Status: ACTIVE | Noted: 2023-08-17

## 2023-08-28 PROBLEM — E46 UNSPECIFIED PROTEIN-CALORIE MALNUTRITION (HCC): Status: ACTIVE | Noted: 2023-08-17

## 2023-08-28 PROCEDURE — 99495 TRANSJ CARE MGMT MOD F2F 14D: CPT | Performed by: FAMILY MEDICINE

## 2023-08-28 RX ORDER — DOCUSATE SODIUM 100 MG/1
100 CAPSULE, LIQUID FILLED ORAL DAILY
COMMUNITY
Start: 2023-08-18

## 2023-08-28 RX ORDER — CLOPIDOGREL BISULFATE 75 MG/1
75 TABLET ORAL DAILY
COMMUNITY
Start: 2023-08-18

## 2023-08-28 RX ORDER — HYDROXYZINE HYDROCHLORIDE 25 MG/1
TABLET, FILM COATED ORAL
COMMUNITY
Start: 2023-08-18

## 2023-08-29 ENCOUNTER — PREP FOR PROCEDURE (OUTPATIENT)
Dept: VASCULAR SURGERY | Facility: CLINIC | Age: 68
End: 2023-08-29

## 2023-08-29 DIAGNOSIS — N18.6 ESRD (END STAGE RENAL DISEASE) (HCC): Primary | ICD-10-CM

## 2023-08-29 NOTE — TELEPHONE ENCOUNTER
Verified patient's insurance   CONFIRMED - Patient's insurance is Medicare  Is patient requesting a call when authorization has been obtained? Patient did not request a call. Surgery Date: 9-8-23  Primary Surgeon: Naren Bryan // Agnes Reid (NPI: [id number])  Assisting Surgeon: Not Applicable (N/A)  Facility: [facility] (Tax: [id number] / NPI: [id number])  Inpatient / Outpatient: Outpatient  Level: 3    Clearance Received: No clearance ordered. Consent Received: Yes, scanned into Epic on 8-23-23. Medication Hold / Last Dose: No Hold on ASA or Plavix  IR Notified: Not Applicable (N/A)  Rep. Notified: Not Applicable (N/A)  Equipment Needs: Not Applicable (N/A)  Vas Lab Requested: Not Applicable (N/A)  Patient Contacted: 8-29-23    Diagnosis: N18.6  Procedure/ CPT Code(s): (AV) Arteriovenous Fistula // CPT: 47705    For varicose vein related procedures:   Last LEVDR: Not Applicable (N/A)  CEAP Classification: Not Applicable (N/A)  VCSS: Not Applicable (N/A)    Post Operative Date/ Time: TBD     *Please review medication hold(s), PATs, and check H&P with patient. *  PATIENT WAS MAILED SURGERY/SHOWERING/DISCHARGE/COVID INSTRUCTIONS AFTER REVIEWING WITH THEM VIA PHONE CALL.      Spoke to patients wife to schedule surgery patient will have blood work done prior to surgery

## 2023-08-30 ENCOUNTER — OFFICE VISIT (OUTPATIENT)
Age: 68
End: 2023-08-30

## 2023-08-30 ENCOUNTER — TELEPHONE (OUTPATIENT)
Age: 68
End: 2023-08-30

## 2023-08-30 ENCOUNTER — APPOINTMENT (OUTPATIENT)
Dept: LAB | Facility: CLINIC | Age: 68
End: 2023-08-30
Payer: MEDICARE

## 2023-08-30 VITALS
HEIGHT: 70 IN | BODY MASS INDEX: 20.9 KG/M2 | DIASTOLIC BLOOD PRESSURE: 59 MMHG | SYSTOLIC BLOOD PRESSURE: 99 MMHG | HEART RATE: 83 BPM | WEIGHT: 146 LBS

## 2023-08-30 DIAGNOSIS — L85.3 XEROSIS CUTIS: Primary | ICD-10-CM

## 2023-08-30 DIAGNOSIS — E11.628 TYPE 2 DIABETES MELLITUS WITH OTHER SKIN COMPLICATION, WITHOUT LONG-TERM CURRENT USE OF INSULIN (HCC): ICD-10-CM

## 2023-08-30 DIAGNOSIS — N18.6 ESRD (END STAGE RENAL DISEASE) (HCC): ICD-10-CM

## 2023-08-30 DIAGNOSIS — R29.898 WEAKNESS OF RIGHT FOOT: ICD-10-CM

## 2023-08-30 DIAGNOSIS — M21.6X1 EQUINUS DEFORMITY OF RIGHT FOOT: ICD-10-CM

## 2023-08-30 DIAGNOSIS — B35.1 ONYCHOMYCOSIS: ICD-10-CM

## 2023-08-30 LAB
ANION GAP SERPL CALCULATED.3IONS-SCNC: 11 MMOL/L
BUN SERPL-MCNC: 9 MG/DL (ref 5–25)
CALCIUM SERPL-MCNC: 8.8 MG/DL (ref 8.4–10.2)
CHLORIDE SERPL-SCNC: 96 MMOL/L (ref 96–108)
CO2 SERPL-SCNC: 31 MMOL/L (ref 21–32)
CREAT SERPL-MCNC: 5 MG/DL (ref 0.6–1.3)
ERYTHROCYTE [DISTWIDTH] IN BLOOD BY AUTOMATED COUNT: 16.9 % (ref 11.6–15.1)
GFR SERPL CREATININE-BSD FRML MDRD: 10 ML/MIN/1.73SQ M
GLUCOSE P FAST SERPL-MCNC: 92 MG/DL (ref 65–99)
HCT VFR BLD AUTO: 40.6 % (ref 36.5–49.3)
HGB BLD-MCNC: 13.1 G/DL (ref 12–17)
INR PPP: 1.04 (ref 0.84–1.19)
MCH RBC QN AUTO: 31.3 PG (ref 26.8–34.3)
MCHC RBC AUTO-ENTMCNC: 32.3 G/DL (ref 31.4–37.4)
MCV RBC AUTO: 97 FL (ref 82–98)
PLATELET # BLD AUTO: 127 THOUSANDS/UL (ref 149–390)
PMV BLD AUTO: 11.7 FL (ref 8.9–12.7)
POTASSIUM SERPL-SCNC: 3.9 MMOL/L (ref 3.5–5.3)
PROTHROMBIN TIME: 13.8 SECONDS (ref 11.6–14.5)
RBC # BLD AUTO: 4.18 MILLION/UL (ref 3.88–5.62)
SODIUM SERPL-SCNC: 138 MMOL/L (ref 135–147)
WBC # BLD AUTO: 4.85 THOUSAND/UL (ref 4.31–10.16)

## 2023-08-30 PROCEDURE — 86900 BLOOD TYPING SEROLOGIC ABO: CPT | Performed by: SURGERY

## 2023-08-30 PROCEDURE — 85027 COMPLETE CBC AUTOMATED: CPT

## 2023-08-30 PROCEDURE — 86901 BLOOD TYPING SEROLOGIC RH(D): CPT | Performed by: SURGERY

## 2023-08-30 PROCEDURE — 36415 COLL VENOUS BLD VENIPUNCTURE: CPT

## 2023-08-30 PROCEDURE — 85610 PROTHROMBIN TIME: CPT

## 2023-08-30 PROCEDURE — 86850 RBC ANTIBODY SCREEN: CPT | Performed by: SURGERY

## 2023-08-30 PROCEDURE — 80048 BASIC METABOLIC PNL TOTAL CA: CPT

## 2023-08-30 RX ORDER — AMMONIUM LACTATE 12 G/100G
LOTION TOPICAL 2 TIMES DAILY PRN
Qty: 396 G | Refills: 0 | Status: SHIPPED | OUTPATIENT
Start: 2023-08-30

## 2023-08-30 NOTE — PROGRESS NOTES
This patient was seen on 8/30/23. My role is Foot , Ankle, and Wound Specialist    ASSESSMENT     Diagnoses and all orders for this visit:    Xerosis cutis  -     ammonium lactate (LAC-HYDRIN) 12 % lotion; Apply topically 2 (two) times a day as needed for dry skin    Onychomycosis  -     Ambulatory Referral to Podiatry    Type 2 diabetes mellitus with other skin complication, without long-term current use of insulin (HCC)  -     ammonium lactate (LAC-HYDRIN) 12 % lotion; Apply topically 2 (two) times a day as needed for dry skin  -     Diabetic Shoe  -     Diabetic Shoe Inserts    Equinus deformity of right foot    Weakness of right foot         Problem List Items Addressed This Visit        Endocrine    Type 2 diabetes mellitus    Relevant Medications    ammonium lactate (LAC-HYDRIN) 12 % lotion    Other Relevant Orders    Diabetic Shoe    Diabetic Shoe Inserts   Other Visit Diagnoses     Xerosis cutis    -  Primary    Relevant Medications    ammonium lactate (LAC-HYDRIN) 12 % lotion    Onychomycosis        Relevant Medications    ammonium lactate (LAC-HYDRIN) 12 % lotion    Equinus deformity of right foot        Weakness of right foot              PLAN   -At-risk diabetic foot examination performed.  -I educated patient on proper foot care including wearing diabetic sneakers, refraining from walking barefoot, and self-examining feet every day. -Rx diabetic shoes with molded insoles  -Rx ammonium lactate  -Debrided each mycotic toenail reducing nails in thickness and removing devitalized tissue and subungual debris. This was performed utilizing a large nail nipper.  -Diabetic Risk category 0  -RTC in 3-months     SUBJECTIVE    Chief Complaint:  Here for at-risk diabetic foot care     Patient ID: Bertha Arechiga is a 70 y.o. male. 8/30/23: Ed is a 70yo male who presents today for at-risk diabetic foot care.  He states that he does not see another podiatrist. He states that his bilateral toenails are elongated and too difficult to cut at home and occasionally cause him pain as he walks. He denies numbness and tingling in his feet and denies any history of ulceration. He has never worn diabetic sneakers. The following portions of the patient's history were reviewed and updated as appropriate: allergies, current medications, past family history, past medical history, past social history, past surgical history and problem list.    Review of Systems   Constitutional: Negative. Respiratory: Negative. Cardiovascular: Negative. Gastrointestinal: Negative. Genitourinary: Negative. Musculoskeletal: Negative. Skin: Positive for color change. Dry skin         OBJECTIVE      BP 99/59   Pulse 83   Ht 5' 10" (1.778 m) Comment: verbal  Wt 66.2 kg (146 lb) Comment: patient unable to stand on scale stated gets weighed every other day/verbal  BMI 20.95 kg/m²        Physical Exam  Constitutional:       Appearance: Normal appearance. HENT:      Head: Normocephalic and atraumatic. Eyes:      General:         Right eye: No discharge. Left eye: No discharge. Cardiovascular:      Rate and Rhythm: Normal rate and regular rhythm. Pulses: no weak pulses          Dorsalis pedis pulses are 2+ on the right side and 2+ on the left side. Posterior tibial pulses are 2+ on the right side and 2+ on the left side. Pulmonary:      Effort: Pulmonary effort is normal.      Breath sounds: Normal breath sounds. Feet:      Right foot:      Skin integrity: Dry skin present. No warmth. Left foot:      Skin integrity: Dry skin present. No warmth. Skin:     General: Skin is warm. Capillary Refill: Capillary refill takes less than 2 seconds. Neurological:      Mental Status: He is alert and oriented to person, place, and time. Sensory: Sensation is intact. No sensory deficit.    Psychiatric:         Mood and Affect: Mood normal.           Patient's shoes and socks removed. Right Foot/Ankle   Right Foot Inspection  Skin Exam: skin intact, dry skin and abnormal color. No warmth and no pre-ulcer. Toe Exam: right toe deformity. No swelling, no tenderness and erythema    Sensory   Vibration: intact  Proprioception: intact  Monofilament testing: intact    Vascular  Capillary refills: < 3 seconds  The right DP pulse is 2+. The right PT pulse is 2+. Right Toe  - Comprehensive Exam  Ecchymosis: none  Arch: Drop foot to the right foot. Hammertoes: absent  Claw Toes: absent  Swelling: none   Tenderness: none         Left Foot/Ankle  Left Foot Inspection  Skin Exam: skin intact, dry skin and abnormal color. No warmth and no pre-ulcer. Toe Exam: ROM and strength within normal limits. No swelling, no tenderness, no erythema and no left toe deformity. Sensory   Vibration: intact  Proprioception: intact  Monofilament testing: intact    Vascular  Capillary refills: < 3 seconds  The left DP pulse is 2+. The left PT pulse is 2+.      Left Toe  - Comprehensive Exam  Ecchymosis: none  Arch: normal  Hammertoes: absent  Claw toes: absent  Swelling: none   Tenderness: none             Assign Risk Category  Deformity present  No loss of protective sensation  No weak pulses  Risk: 0

## 2023-08-30 NOTE — TELEPHONE ENCOUNTER
Authorization requirements reviewed. Please refer to Loco Almonte / Kam Gil number [id number] for case updates.

## 2023-08-31 ENCOUNTER — LAB REQUISITION (OUTPATIENT)
Dept: LAB | Facility: HOSPITAL | Age: 68
End: 2023-08-31
Payer: MEDICARE

## 2023-08-31 DIAGNOSIS — N18.6 END STAGE RENAL DISEASE (HCC): ICD-10-CM

## 2023-08-31 NOTE — PROGRESS NOTES
Chief Complaint   Patient presents with   • Transition of Care Management     [facility] to [facility] rehab for blood poisoning ,     TCM Call     Date and time call was made  8/25/2023  4:33 PM    Hospital care reviewed  Records reviewed    Patient was hospitialized at  Other (comment)    Comment  [facility]    Date of Admission  05/19/23    Date of discharge  08/23/23    Diagnosis  fistula , surgery, sepis    Disposition  Home    Were the patients medications reviewed and updated  Yes    Current Symptoms  None      TCM Call     Post hospital issues  None    Should patient be enrolled in anticoag monitoring? Yes    Scheduled for follow up? No    Did you obtain your prescribed medications  Yes    Do you need help managing your prescriptions or medications  No    Is transportation to your appointment needed  Yes    Specify why  I did not go over medications with this pt    I have advised the patient to call PCP with any new or worsening symptoms  Eli garcia 8/23/23    Living Arrangements  Spouse or Significiant other    Are you recieving any outpatient services  No    Are you recieving home care services  Yes    Types of home care services  Nurse visit; Home PT    Are you using any community resources  No    Current waiver services  No    Have you fallen in the last 12 months  No    Interperter language line needed  No           Patient ID: Sandy Yen is a 70 y.o. male. HPI  Pt is seeing  for f/u recent hospital and rehab stays after infection of AV graft  -  Feeling back to baseline  -  Needs R arm access for dialysis  -  Under vascular, nephrologist care      The following portions of the patient's history were reviewed and updated as appropriate: allergies, current medications, past family history, past medical history, past social history, past surgical history and problem list.    Review of Systems   Constitutional: Positive for fatigue. Negative for activity change and appetite change. Respiratory: Negative. Cardiovascular: Negative. Gastrointestinal: Negative. Genitourinary: Negative. Musculoskeletal: Positive for gait problem. Skin: Negative. Psychiatric/Behavioral: Negative. Current Outpatient Medications   Medication Sig Dispense Refill   • acetaminophen (TYLENOL) 325 mg tablet Take 3 tablets (975 mg total) by mouth every 8 (eight) hours as needed for mild pain  0   • ascorbic acid (VITAMIN C) 500 mg tablet TAKE ONE TABLET BY MOUTH EVERY EVENING FOR SUPPLEMENT     • aspirin (ECOTRIN LOW STRENGTH) 81 mg EC tablet Take 81 mg by mouth daily     • atorvastatin (LIPITOR) 20 mg tablet Take 20 mg by mouth daily at bedtime     • clopidogrel (PLAVIX) 75 mg tablet Take 75 mg by mouth daily     • docusate sodium (COLACE) 100 mg capsule Take 100 mg by mouth daily     • gabapentin (NEURONTIN) 100 mg capsule Take 1 capsule (100 mg total) by mouth 3 (three) times a week On Tuesday Thursday Saturday  0   • hydrOXYzine HCL (ATARAX) 25 mg tablet TAKE 1 TABLET BY MOUTH EVERY 8 HOURS AS NEEDED FOR ITCHING     • oxyCODONE (ROXICODONE) 5 immediate release tablet Take 1 tablet (5 mg total) by mouth every 4 (four) hours as needed for severe pain for up to 15 doses Max Daily Amount: 30 mg 15 tablet 0   • vitamin B-12 (VITAMIN B-12) 1,000 mcg tablet Take 1,000 mcg by mouth daily     • ammonium lactate (LAC-HYDRIN) 12 % lotion Apply topically 2 (two) times a day as needed for dry skin 396 g 0   • calcitriol (ROCALTROL) 0.25 mcg capsule Take 3 capsules (0.75 mcg total) by mouth 3 (three) times a week  0   • Cholecalciferol 50 MCG (2000 UT) CAPS Take by mouth daily     • [place] Choice Comfort EZ 33G X 4 MM MISC      • midodrine (PROAMATINE) 10 MG tablet Take 1 tablet (10 mg total) by mouth 3 (three) times a day before meals 90 tablet 0     No current facility-administered medications for this visit.        Objective:    BP 90/70 (BP Location: Left arm, Patient Position: Sitting, Cuff Size: Large) Pulse 86   Temp 98.6 °F (37 °C)   Resp 16   Wt 66.3 kg (146 lb 3.2 oz)   BMI 20.98 kg/m²        Physical Exam  Constitutional:       General: He is not in acute distress. Appearance: He is ill-appearing (chronically ill ). Cardiovascular:      Rate and Rhythm: Normal rate and regular rhythm. Heart sounds: No murmur heard. Pulmonary:      Effort: Pulmonary effort is normal. No respiratory distress. Breath sounds: No wheezing, rhonchi or rales. Abdominal:      Palpations: Abdomen is soft. Tenderness: There is no abdominal tenderness. Musculoskeletal:      Right lower leg: No edema. Left lower leg: No edema. Neurological:      General: No focal deficit present. Mental Status: He is alert and oriented to person, place, and time. Gait: Gait abnormal (using a walker ). Psychiatric:         Mood and Affect: Mood normal.           Labs in chart were reviewed. Assessment/Plan:         Diagnoses and all orders for this visit:    ESRD (end stage renal disease) on dialysis [facility])   f/u with nephrologist   Anxiety disorder, unspecified type  stable  Ambulatory dysfunction    Onychomycosis  -     Ambulatory Referral to Podiatry; Future    Preop examination    Pt is cleared for vascular surgery ( creation of AV fistula, possible graft)   Other orders  -     clopidogrel (PLAVIX) 75 mg tablet; Take 75 mg by mouth daily  -     docusate sodium (COLACE) 100 mg capsule;  Take 100 mg by mouth daily  -     hydrOXYzine HCL (ATARAX) 25 mg tablet; TAKE 1 TABLET BY MOUTH EVERY 8 HOURS AS NEEDED FOR ITCHING        rto in 3 m                     Ana Jarrett MD

## 2023-09-08 ENCOUNTER — TELEPHONE (OUTPATIENT)
Dept: FAMILY MEDICINE CLINIC | Facility: CLINIC | Age: 68
End: 2023-09-08

## 2023-09-08 NOTE — TELEPHONE ENCOUNTER
Pts wife John Gentile called stating Patient is not eating and is not taking any medication. Patient is weak and family would like to look in to hospice. I did inform that Dr. Jude Cortés is on vacation and wont be back until 09/18. I informed wife that the Patient would need to be seen in order to discuss hospice care and to schedule an appointment upon Drs return. I did state that if the Patients symptoms worsen take Patient to the ER.

## 2023-09-12 ENCOUNTER — APPOINTMENT (OUTPATIENT)
Dept: RADIOLOGY | Facility: HOSPITAL | Age: 68
DRG: 314 | End: 2023-09-12
Payer: MEDICARE

## 2023-09-12 ENCOUNTER — HOSPITAL ENCOUNTER (INPATIENT)
Facility: HOSPITAL | Age: 68
LOS: 1 days | Discharge: HOME HEALTH | DRG: 314 | End: 2023-09-14
Attending: EMERGENCY MEDICINE | Admitting: INTERNAL MEDICINE
Payer: MEDICARE

## 2023-09-12 DIAGNOSIS — T82.7XXA INFECTION OF AV GRAFT FOR DIALYSIS (HCC): ICD-10-CM

## 2023-09-12 DIAGNOSIS — I45.5 SINUS PAUSE: ICD-10-CM

## 2023-09-12 DIAGNOSIS — E86.0 DEHYDRATION: ICD-10-CM

## 2023-09-12 DIAGNOSIS — I95.9 HYPOTENSION: Primary | ICD-10-CM

## 2023-09-12 DIAGNOSIS — Z99.2 ESRD (END STAGE RENAL DISEASE) ON DIALYSIS (HCC): ICD-10-CM

## 2023-09-12 DIAGNOSIS — N18.6 END-STAGE RENAL DISEASE ON HEMODIALYSIS (HCC): ICD-10-CM

## 2023-09-12 DIAGNOSIS — Z99.2 END-STAGE RENAL DISEASE ON HEMODIALYSIS (HCC): ICD-10-CM

## 2023-09-12 DIAGNOSIS — N18.6 ESRD (END STAGE RENAL DISEASE) ON DIALYSIS (HCC): ICD-10-CM

## 2023-09-12 DIAGNOSIS — R00.1 BRADYCARDIA: ICD-10-CM

## 2023-09-12 DIAGNOSIS — R53.1 GENERALIZED WEAKNESS: ICD-10-CM

## 2023-09-12 LAB
2HR DELTA HS TROPONIN: 118 NG/L
4HR DELTA HS TROPONIN: 217 NG/L
ALBUMIN SERPL BCP-MCNC: 2 G/DL (ref 3.5–5)
ALP SERPL-CCNC: 73 U/L (ref 34–104)
ALT SERPL W P-5'-P-CCNC: 3 U/L (ref 7–52)
ANION GAP SERPL CALCULATED.3IONS-SCNC: 10 MMOL/L
AST SERPL W P-5'-P-CCNC: 6 U/L (ref 13–39)
BASOPHILS # BLD MANUAL: 0 THOUSAND/UL (ref 0–0.1)
BASOPHILS NFR MAR MANUAL: 0 % (ref 0–1)
BILIRUB SERPL-MCNC: 1.02 MG/DL (ref 0.2–1)
BUN SERPL-MCNC: 11 MG/DL (ref 5–25)
CALCIUM ALBUM COR SERPL-MCNC: 8.9 MG/DL (ref 8.3–10.1)
CALCIUM SERPL-MCNC: 7.3 MG/DL (ref 8.4–10.2)
CARDIAC TROPONIN I PNL SERPL HS: 135 NG/L
CARDIAC TROPONIN I PNL SERPL HS: 17 NG/L
CARDIAC TROPONIN I PNL SERPL HS: 234 NG/L
CHLORIDE SERPL-SCNC: 100 MMOL/L (ref 96–108)
CO2 SERPL-SCNC: 26 MMOL/L (ref 21–32)
CREAT SERPL-MCNC: 4.58 MG/DL (ref 0.6–1.3)
EOSINOPHIL # BLD MANUAL: 0 THOUSAND/UL (ref 0–0.4)
EOSINOPHIL NFR BLD MANUAL: 0 % (ref 0–6)
ERYTHROCYTE [DISTWIDTH] IN BLOOD BY AUTOMATED COUNT: 16.9 % (ref 11.6–15.1)
GFR SERPL CREATININE-BSD FRML MDRD: 12 ML/MIN/1.73SQ M
GLUCOSE SERPL-MCNC: 142 MG/DL (ref 65–140)
HCT VFR BLD AUTO: 27.5 % (ref 36.5–49.3)
HGB BLD-MCNC: 8.8 G/DL (ref 12–17)
LACTATE SERPL-SCNC: 1.4 MMOL/L (ref 0.5–2)
LACTATE SERPL-SCNC: 4.7 MMOL/L (ref 0.5–2)
LYMPHOCYTES # BLD AUTO: 0 % (ref 14–44)
LYMPHOCYTES # BLD AUTO: 0 THOUSAND/UL (ref 0.6–4.47)
MCH RBC QN AUTO: 31.2 PG (ref 26.8–34.3)
MCHC RBC AUTO-ENTMCNC: 32 G/DL (ref 31.4–37.4)
MCV RBC AUTO: 98 FL (ref 82–98)
METAMYELOCYTES NFR BLD MANUAL: 1 % (ref 0–1)
MICROCYTES BLD QL AUTO: PRESENT
MONOCYTES # BLD AUTO: 0.07 THOUSAND/UL (ref 0–1.22)
MONOCYTES NFR BLD: 1 % (ref 4–12)
NEUTROPHILS # BLD MANUAL: 7.05 THOUSAND/UL (ref 1.85–7.62)
NEUTS BAND NFR BLD MANUAL: 8 % (ref 0–8)
NEUTS SEG NFR BLD AUTO: 90 % (ref 43–75)
PLATELET # BLD AUTO: 69 THOUSANDS/UL (ref 149–390)
PLATELET BLD QL SMEAR: ABNORMAL
PMV BLD AUTO: 10.8 FL (ref 8.9–12.7)
POTASSIUM SERPL-SCNC: 3.1 MMOL/L (ref 3.5–5.3)
PROT SERPL-MCNC: 4.3 G/DL (ref 6.4–8.4)
RBC # BLD AUTO: 2.82 MILLION/UL (ref 3.88–5.62)
SODIUM SERPL-SCNC: 136 MMOL/L (ref 135–147)
WBC # BLD AUTO: 7.19 THOUSAND/UL (ref 4.31–10.16)

## 2023-09-12 PROCEDURE — 80053 COMPREHEN METABOLIC PANEL: CPT | Performed by: EMERGENCY MEDICINE

## 2023-09-12 PROCEDURE — 87040 BLOOD CULTURE FOR BACTERIA: CPT | Performed by: EMERGENCY MEDICINE

## 2023-09-12 PROCEDURE — 71045 X-RAY EXAM CHEST 1 VIEW: CPT

## 2023-09-12 PROCEDURE — 96361 HYDRATE IV INFUSION ADD-ON: CPT

## 2023-09-12 PROCEDURE — 96365 THER/PROPH/DIAG IV INF INIT: CPT

## 2023-09-12 PROCEDURE — 99223 1ST HOSP IP/OBS HIGH 75: CPT | Performed by: INTERNAL MEDICINE

## 2023-09-12 PROCEDURE — 87081 CULTURE SCREEN ONLY: CPT | Performed by: INTERNAL MEDICINE

## 2023-09-12 PROCEDURE — 96366 THER/PROPH/DIAG IV INF ADDON: CPT

## 2023-09-12 PROCEDURE — 36415 COLL VENOUS BLD VENIPUNCTURE: CPT | Performed by: EMERGENCY MEDICINE

## 2023-09-12 PROCEDURE — 85027 COMPLETE CBC AUTOMATED: CPT | Performed by: EMERGENCY MEDICINE

## 2023-09-12 PROCEDURE — 84484 ASSAY OF TROPONIN QUANT: CPT | Performed by: EMERGENCY MEDICINE

## 2023-09-12 PROCEDURE — 99291 CRITICAL CARE FIRST HOUR: CPT | Performed by: EMERGENCY MEDICINE

## 2023-09-12 PROCEDURE — 99285 EMERGENCY DEPT VISIT HI MDM: CPT

## 2023-09-12 PROCEDURE — 83605 ASSAY OF LACTIC ACID: CPT | Performed by: EMERGENCY MEDICINE

## 2023-09-12 PROCEDURE — 85007 BL SMEAR W/DIFF WBC COUNT: CPT | Performed by: EMERGENCY MEDICINE

## 2023-09-12 PROCEDURE — 93005 ELECTROCARDIOGRAM TRACING: CPT

## 2023-09-12 RX ORDER — ALBUMIN (HUMAN) 12.5 G/50ML
25 SOLUTION INTRAVENOUS EVERY 8 HOURS
Status: DISCONTINUED | OUTPATIENT
Start: 2023-09-12 | End: 2023-09-13

## 2023-09-12 RX ORDER — ALBUMIN (HUMAN) 12.5 G/50ML
25 SOLUTION INTRAVENOUS ONCE
Status: COMPLETED | OUTPATIENT
Start: 2023-09-12 | End: 2023-09-12

## 2023-09-12 RX ORDER — SODIUM CHLORIDE 9 MG/ML
100 INJECTION, SOLUTION INTRAVENOUS CONTINUOUS
Status: DISCONTINUED | OUTPATIENT
Start: 2023-09-12 | End: 2023-09-13

## 2023-09-12 RX ORDER — CALCITRIOL 0.25 UG/1
0.75 CAPSULE, LIQUID FILLED ORAL 3 TIMES WEEKLY
Status: DISCONTINUED | OUTPATIENT
Start: 2023-09-13 | End: 2023-09-14 | Stop reason: HOSPADM

## 2023-09-12 RX ORDER — DOCUSATE SODIUM 100 MG/1
100 CAPSULE, LIQUID FILLED ORAL DAILY
Status: DISCONTINUED | OUTPATIENT
Start: 2023-09-13 | End: 2023-09-14 | Stop reason: HOSPADM

## 2023-09-12 RX ORDER — GABAPENTIN 100 MG/1
100 CAPSULE ORAL 3 TIMES WEEKLY
Status: DISCONTINUED | OUTPATIENT
Start: 2023-09-13 | End: 2023-09-14 | Stop reason: HOSPADM

## 2023-09-12 RX ORDER — OXYCODONE HYDROCHLORIDE 5 MG/1
5 TABLET ORAL EVERY 4 HOURS PRN
Status: DISCONTINUED | OUTPATIENT
Start: 2023-09-12 | End: 2023-09-13

## 2023-09-12 RX ORDER — ATORVASTATIN CALCIUM 20 MG/1
20 TABLET, FILM COATED ORAL
Status: DISCONTINUED | OUTPATIENT
Start: 2023-09-12 | End: 2023-09-14 | Stop reason: HOSPADM

## 2023-09-12 RX ORDER — ASPIRIN 81 MG/1
81 TABLET, CHEWABLE ORAL DAILY
Status: DISCONTINUED | OUTPATIENT
Start: 2023-09-13 | End: 2023-09-14 | Stop reason: HOSPADM

## 2023-09-12 RX ORDER — ONDANSETRON 2 MG/ML
4 INJECTION INTRAMUSCULAR; INTRAVENOUS EVERY 6 HOURS PRN
Status: DISCONTINUED | OUTPATIENT
Start: 2023-09-12 | End: 2023-09-14 | Stop reason: HOSPADM

## 2023-09-12 RX ORDER — SODIUM CHLORIDE 9 MG/ML
200 INJECTION, SOLUTION INTRAVENOUS CONTINUOUS
Status: DISCONTINUED | OUTPATIENT
Start: 2023-09-12 | End: 2023-09-12

## 2023-09-12 RX ORDER — HYDROXYZINE HYDROCHLORIDE 25 MG/1
25 TABLET, FILM COATED ORAL EVERY 8 HOURS PRN
Status: DISCONTINUED | OUTPATIENT
Start: 2023-09-12 | End: 2023-09-14 | Stop reason: HOSPADM

## 2023-09-12 RX ORDER — MIDODRINE HYDROCHLORIDE 5 MG/1
10 TABLET ORAL
Status: DISCONTINUED | OUTPATIENT
Start: 2023-09-12 | End: 2023-09-14 | Stop reason: HOSPADM

## 2023-09-12 RX ORDER — HEPARIN SODIUM 5000 [USP'U]/ML
5000 INJECTION, SOLUTION INTRAVENOUS; SUBCUTANEOUS EVERY 8 HOURS SCHEDULED
Status: DISCONTINUED | OUTPATIENT
Start: 2023-09-12 | End: 2023-09-14 | Stop reason: HOSPADM

## 2023-09-12 RX ORDER — CLOPIDOGREL BISULFATE 75 MG/1
75 TABLET ORAL DAILY
Status: DISCONTINUED | OUTPATIENT
Start: 2023-09-13 | End: 2023-09-14 | Stop reason: HOSPADM

## 2023-09-12 RX ORDER — SODIUM CHLORIDE 9 MG/ML
75 INJECTION, SOLUTION INTRAVENOUS CONTINUOUS
Status: DISCONTINUED | OUTPATIENT
Start: 2023-09-12 | End: 2023-09-12

## 2023-09-12 RX ORDER — ACETAMINOPHEN 325 MG/1
650 TABLET ORAL EVERY 6 HOURS PRN
Status: DISCONTINUED | OUTPATIENT
Start: 2023-09-12 | End: 2023-09-14 | Stop reason: HOSPADM

## 2023-09-12 RX ADMIN — SODIUM CHLORIDE 500 ML: 0.9 INJECTION, SOLUTION INTRAVENOUS at 15:12

## 2023-09-12 RX ADMIN — SODIUM CHLORIDE 200 ML/HR: 0.9 INJECTION, SOLUTION INTRAVENOUS at 17:45

## 2023-09-12 RX ADMIN — SODIUM CHLORIDE 100 ML/HR: 0.9 INJECTION, SOLUTION INTRAVENOUS at 18:19

## 2023-09-12 RX ADMIN — ALBUMIN (HUMAN) 25 G: 0.25 INJECTION, SOLUTION INTRAVENOUS at 16:13

## 2023-09-12 RX ADMIN — ALBUMIN (HUMAN) 25 G: 0.25 INJECTION, SOLUTION INTRAVENOUS at 15:16

## 2023-09-12 RX ADMIN — ATORVASTATIN CALCIUM 20 MG: 20 TABLET, FILM COATED ORAL at 21:44

## 2023-09-12 RX ADMIN — SODIUM CHLORIDE 500 ML: 0.9 INJECTION, SOLUTION INTRAVENOUS at 15:59

## 2023-09-12 RX ADMIN — MIDODRINE HYDROCHLORIDE 10 MG: 5 TABLET ORAL at 17:44

## 2023-09-12 RX ADMIN — ALBUMIN (HUMAN) 25 G: 0.25 INJECTION, SOLUTION INTRAVENOUS at 18:21

## 2023-09-12 RX ADMIN — OXYCODONE HYDROCHLORIDE 5 MG: 5 TABLET ORAL at 21:44

## 2023-09-12 RX ADMIN — SODIUM CHLORIDE 1000 ML: 0.9 INJECTION, SOLUTION INTRAVENOUS at 16:51

## 2023-09-12 RX ADMIN — HEPARIN SODIUM 5000 UNITS: 5000 INJECTION INTRAVENOUS; SUBCUTANEOUS at 21:45

## 2023-09-12 NOTE — ASSESSMENT & PLAN NOTE
· Presented with post HD hypotension  · We will consult nephrology if patient requires hospital stay into Thursday which would be his next HD session

## 2023-09-12 NOTE — QUICK NOTE
Progress Note - Triage Assessment   Santosh Salguero 70 y.o. male MRN: [id number]    Date Called: 09/12/23  Room#: ED CT2  Time Evaluated: 4729  Person requesting evaluation: Dr. Jasmin Bower to ED to evaluate patient for possible admission to Critical Care Service, chart reviewed and patient evaluated. Patient presents s/p dialysis with hypotension -systolic in 94'W. He reports poor oral intake the past 4-5 days. Prior to start of HD this AM, patient had SBP in 80's. He did have volume removed as well. Patient also reports he did not take his Midodrine today (regimen consists of 10 TID). He was given 2L of NS and albumin with improvement in BP to systolic 74F. He was also given his midodrine at time of eval. He is hemodynamically stable and ok to admit to general medicine team.    Recommendations discussed with ED attending Dr. Marshall Officer         Triage Assessment:     Patient appropriate to be admitted to med-surg level of care.     If any questions or concerns please call the critical care team at ext [phone]

## 2023-09-12 NOTE — ASSESSMENT & PLAN NOTE
· Presented to the ER with hypotension following dialysis today  · Required 2 L IV fluid bolus and IV albumin in the ER to bring blood pressure to 90s over 60s  · Patient mentating fine and blood pressure soft but stable    · We will continue IV albumin 25 g every 8 hours  · Continue IV fluid 100 cc overnight  · Continue midodrine 10 mg 3 times daily with meals  · Monitor vital signs every 4 hours

## 2023-09-12 NOTE — ED PROVIDER NOTES
History  Chief Complaint   Patient presents with   • Hypotension     Pt received dialysis this am and feels dizzy and has low bp 53/35  Pt c/o left hand numbness. • Weakness - Generalized     69 yo male on dialysis c/o dizziness. Just left dialysis he says - did not make it home, came right to ER. No chest pain or sob. No belly or back pain. C/o hand numbness. No fever or cough. He did have some vomiting and diarrhea a few days ago but that resolved. Wife says he was very wiped out and fatigued after dialysis 3 days ago and he hasn't been eating or drinking much. History provided by:  Patient and spouse   used: No        Prior to Admission Medications   Prescriptions Last Dose Informant Patient Reported? Taking?    Cholecalciferol 50 MCG (2000 UT) CAPS  Self, Outside Facility (Specify) Yes No   Sig: Take by mouth daily   [place] Choice Comfort EZ 33G X 4 MM MISC  Self, Outside Facility (Specify) Yes No   acetaminophen (TYLENOL) 325 mg tablet  Outside Facility (Specify) No No   Sig: Take 3 tablets (975 mg total) by mouth every 8 (eight) hours as needed for mild pain   ammonium lactate (LAC-HYDRIN) 12 % lotion   No No   Sig: Apply topically 2 (two) times a day as needed for dry skin   ascorbic acid (VITAMIN C) 500 mg tablet  Self, Outside Facility (Specify) Yes No   Sig: TAKE ONE TABLET BY MOUTH EVERY EVENING FOR SUPPLEMENT   aspirin (ECOTRIN LOW STRENGTH) 81 mg EC tablet  Self, Outside Facility (Specify) Yes No   Sig: Take 81 mg by mouth daily   atorvastatin (LIPITOR) 20 mg tablet  Self, Outside Facility (Specify) Yes No   Sig: Take 20 mg by mouth daily at bedtime   calcitriol (ROCALTROL) 0.25 mcg capsule  Outside Facility (Specify) No No   Sig: Take 3 capsules (0.75 mcg total) by mouth 3 (three) times a week   clopidogrel (PLAVIX) 75 mg tablet   Yes No   Sig: Take 75 mg by mouth daily   docusate sodium (COLACE) 100 mg capsule   Yes No   Sig: Take 100 mg by mouth daily   gabapentin (NEURONTIN) 100 mg capsule  Outside Facility (Specify) No No   Sig: Take 1 capsule (100 mg total) by mouth 3 (three) times a week On Tuesday Thursday Saturday   hydrOXYzine HCL (ATARAX) 25 mg tablet   Yes No   Sig: TAKE 1 TABLET BY MOUTH EVERY 8 HOURS AS NEEDED FOR ITCHING   midodrine (PROAMATINE) 10 MG tablet   No No   Sig: Take 1 tablet (10 mg total) by mouth 3 (three) times a day before meals   oxyCODONE (ROXICODONE) 5 immediate release tablet  Outside Facility (Specify) No No   Sig: Take 1 tablet (5 mg total) by mouth every 4 (four) hours as needed for severe pain for up to 15 doses Max Daily Amount: 30 mg   vitamin B-12 (VITAMIN B-12) 1,000 mcg tablet  Outside Facility (Specify) Yes No   Sig: Take 1,000 mcg by mouth daily      Facility-Administered Medications: None       Past Medical History:   Diagnosis Date   • 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    With Cerebral Infarction:  Last Assessed:  October 20, 2016   • Chronic kidney disease 2012    on dialysis    • COVID 05/2022   • COVID-19 04/2022 4/2022-while in NH   • Diabetes mellitus ([address])    • Dialysis patient [facility])     T/TH/Sat   • Difficulty walking    • GERD (gastroesophageal reflux disease)    • Hyperlipidemia    • Hypertension    • Labyrinthitis 09/10/2011   • Myocardial infarction [facility]) 2014    x3 others were in 2009 & 2010   • Sleep disturbances 09/20/2010   • Stroke [facility]) 2007    x1, RLE deficit- uses walker   • Type 2 diabetes, uncontrolled, with renal manifestation 05/03/2017       Past Surgical History:   Procedure Laterality Date   • AV FISTULA PLACEMENT     • AV FISTULA REPAIR Left 6/3/2023    Procedure: LEFT UPPER EXTREMITY A-V GRAFT EXPLANT, LEFT BRACHIAL ANGIOPATCH,  APPLICATION OF  VAC;  Surgeon: Stacy Rider MD;  [facility];  Service: Vascular   • CARDIAC CATHETERIZATION Left 02/03/2023    Procedure: Cardiac Left Heart Cath;  Surgeon: Renard Fuentes MD;  Location: [address] [place] [address] CATH LAB;   Service: Cardiology   • CARDIAC CATHETERIZATION N/A 02/08/2023    Procedure: Cardiac catheterization with complex PCI with Dr. Natalie Fletcher, patient is a renal dialysis patient;  Surgeon: Juliano Barlow MD;  Location: [facility]; Service: Cardiology   • CARDIAC SURGERY  2010    stents x3   • COLONOSCOPY N/A 12/12/2016    Procedure: COLONOSCOPY;  Surgeon: Lise Aldana MD;  Location: [address] [place] [address] GI LAB; Service:    • COLONOSCOPY N/A 04/03/2017    Procedure:  COLONOSCOPY;  Surgeon: Lise Aldana MD;  Location: [address] [place] [address] GI LAB; Service:    • HARDWARE REMOVAL Right 04/04/2022    Procedure: REMOVAL HARDWARE HIP;  Surgeon: Mary Ann Barnhart MD;  [facility];  Service: Orthopedics   • IR AV FISTULA/GRAFT DECLOT  04/29/2021   • IR AV FISTULA/GRAFT DECLOT  03/25/2022   • IR AV FISTULA/GRAFT DECLOT  09/21/2022   • IR AV FISTULAGRAM/GRAFTOGRAM  03/28/2022   • IR AV FISTULAGRAM/GRAFTOGRAM  07/28/2022   • IR TUNNELED CENTRAL LINE REMOVAL  12/07/2021   • IR TUNNELED DIALYSIS CATHETER PLACEMENT  05/24/2021   • PORTACATH PLACEMENT      per pt "port in chest"   • [place] ARTERIOVENOUS ANASTOMOSIS OPEN DIRECT Left 07/22/2021    Procedure: CREATION FISTULA ARTERIOVENOUS (AV); Surgeon: Maurisio Madsen MD;  Location: [facility];  Service: Vascular   • [place] ARTERIOVENOUS ANASTOMOSIS OPEN DIRECT Left 3/21/2023    Procedure: left brachiobasilic fistula,  AVG Graft;  Surgeon: Jagruti Batista MD;  [facility];  Service: Vascular   • [place] ARTERIOVENOUS ANASTOMOSIS OPEN DIRECT Left 5/19/2023    Procedure: CREATION of LEFT FISTULA ARTERIOVENOUS (AV) GRAFT;  Surgeon: Jagruti Batista MD;  [facility];  Service: Vascular   • W[place] HEMIARTHROPLASTY HIP PARTIAL Right 04/04/2022    Procedure: HEMIARTHROPLASTY HIP (BIPOLAR);   Surgeon: Mary Ann Barnhart MD;  [facility];  Service: Orthopedics       Family History   Problem Relation Age of Onset   • Leukemia Mother    • Crohn's disease Father    • Heart disease Father    • Transient ischemic attack Brother      I have reviewed and agree with the history as documented. E-Cigarette/Vaping   • E-Cigarette Use Never User      E-Cigarette/Vaping Substances   • Nicotine No    • THC No    • CBD No    • Flavoring No    • Other No    • Unknown No      Social History     Tobacco Use   • Smoking status: Every Day     Packs/day: 1.00     Years: 30.00     Total pack years: 30.00     Types: Cigarettes   • Smokeless tobacco: Never   • Tobacco comments:     Currently not smoking - in facility   Vaping Use   • Vaping Use: Never used   Substance Use Topics   • Alcohol use: Yes     Alcohol/week: 1.0 standard drink of alcohol     Types: 1 Shots of liquor per week     Comment: 1 shot daily   • Drug use: Yes     Types: Marijuana     Comment: gummies       Review of Systems    Physical Exam  Physical Exam  Vitals and nursing note reviewed. Constitutional:       General: He is not in acute distress. Appearance: He is well-developed. He is ill-appearing. He is not diaphoretic. HENT:      Head: Normocephalic and atraumatic. Mouth/Throat:      Mouth: Mucous membranes are dry. Eyes:      General: No scleral icterus. Conjunctiva/sclera: Conjunctivae normal.   Cardiovascular:      Rate and Rhythm: Normal rate and regular rhythm. Heart sounds: Normal heart sounds. No murmur heard. Pulmonary:      Effort: Pulmonary effort is normal. No respiratory distress. Breath sounds: Normal breath sounds. Comments: Dialysis port right upper chest  Chest:      Chest wall: No tenderness. Abdominal:      General: Bowel sounds are normal. There is no distension. Palpations: Abdomen is soft. Tenderness: There is no abdominal tenderness. Musculoskeletal:         General: No tenderness or deformity. Normal range of motion. Cervical back: Normal range of motion and neck supple. Right lower leg: No edema. Left lower leg: No edema. Skin:     General: Skin is warm and dry. Coloration: Skin is pale. Findings: No erythema or rash. Neurological:      General: No focal deficit present. Mental Status: He is alert and oriented to person, place, and time. Cranial Nerves: No cranial nerve deficit.    Psychiatric:         Mood and Affect: Mood normal.         Behavior: Behavior normal.         Vital Signs  ED Triage Vitals   Temperature Pulse Respirations Blood Pressure SpO2   09/12/23 1515 09/12/23 1437 09/12/23 1437 09/12/23 1437 09/12/23 1437   97.6 °F (36.4 °C) 89 18 (!) 53/35 94 %      Temp Source Heart Rate Source Patient Position - Orthostatic VS BP Location FiO2 (%)   09/12/23 1515 09/12/23 1500 09/12/23 1437 09/12/23 1437 --   Oral Monitor Lying Right arm       Pain Score       --                  Vitals:    09/12/23 1830 09/12/23 1845 09/12/23 1900 09/12/23 1915   BP: 97/55 96/51 93/55 (!) 89/50   Pulse: 68 58 63 58   Patient Position - Orthostatic VS: Sitting Sitting Sitting Sitting         Visual Acuity      ED Medications  Medications   midodrine (PROAMATINE) tablet 10 mg (10 mg Oral Given 9/12/23 1744)   albumin human (FLEXBUMIN) 25 % injection 25 g (0 g Intravenous Stopped 9/12/23 1929)   acetaminophen (TYLENOL) tablet 650 mg (has no administration in time range)   ondansetron (ZOFRAN) injection 4 mg (has no administration in time range)   heparin (porcine) subcutaneous injection 5,000 Units (has no administration in time range)   aspirin chewable tablet 81 mg (has no administration in time range)   atorvastatin (LIPITOR) tablet 20 mg (has no administration in time range)   calcitriol (ROCALTROL) capsule 0.75 mcg (has no administration in time range)   clopidogrel (PLAVIX) tablet 75 mg (has no administration in time range)   docusate sodium (COLACE) capsule 100 mg (has no administration in time range)   gabapentin (NEURONTIN) capsule 100 mg (has no administration in time range)   hydrOXYzine HCL (ATARAX) tablet 25 mg (has no administration in time range) cyanocobalamin (VITAMIN B-12) tablet 1,000 mcg (has no administration in time range)   sodium chloride 0.9 % infusion (100 mL/hr Intravenous New Bag 9/12/23 1819)   oxyCODONE (ROXICODONE) IR tablet 5 mg (has no administration in time range)   sodium chloride 0.9 % bolus 500 mL (0 mL Intravenous Stopped 9/12/23 1541)   albumin human (FLEXBUMIN) 25 % injection 25 g (0 g Intravenous Stopped 9/12/23 1557)   sodium chloride 0.9 % bolus 500 mL (0 mL Intravenous Stopped 9/12/23 1615)   albumin human (FLEXBUMIN) 25 % injection 25 g (0 g Intravenous Stopped 9/12/23 1651)   sodium chloride 0.9 % bolus 1,000 mL (0 mL Intravenous Stopped 9/12/23 1751)       Diagnostic Studies  Results Reviewed     Procedure Component Value Units Date/Time    HS Troponin I 4hr [[id number]]     Lab Status: No result Specimen: Blood     HS Troponin I 2hr [[id number]]     Lab Status: No result Specimen: Blood     HS Troponin 0hr (reflex protocol) [[id number]]  (Normal) Collected: 09/12/23 1650    Lab Status: Final result Specimen: Blood from Arm, Right Updated: 09/12/23 1721     hs TnI 0hr 17 ng/L     Lactic acid, plasma (w/reflex if result > 2.0) [[id number]]  (Abnormal) Collected: 09/12/23 1650    Lab Status: Final result Specimen: Blood from Arm, Right Updated: 09/12/23 1720     LACTIC ACID 4.7 mmol/L     Narrative:      Result may be elevated if tourniquet was used during collection.     Lactic acid 2 Hours [[id number]]     Lab Status: No result Specimen: Blood     RBC Morphology Reflex Test [[id number]] Collected: 09/12/23 1524    Lab Status: Final result Specimen: Blood from Hand, Right Updated: 09/12/23 1703    CBC and differential [[id number]]  (Abnormal) Collected: 09/12/23 1524    Lab Status: Final result Specimen: Blood from Hand, Right Updated: 09/12/23 1703     WBC 7.19 Thousand/uL      RBC 2.82 Million/uL      Hemoglobin 8.8 g/dL      Hematocrit 27.5 %      MCV 98 fL      MCH 31.2 pg      MCHC 32.0 g/dL      RDW 16.9 %      MPV 10.8 fL Platelets 69 Thousands/uL     Manual Differential(PHLEBS Do Not Order) [[id number]]  (Abnormal) Collected: 09/12/23 1524    Lab Status: Final result Specimen: Blood from Hand, Right Updated: 09/12/23 1703     Segmented % 90 %      Bands % 8 %      Lymphocytes % 0 %      Monocytes % 1 %      Eosinophils, % 0 %      Basophils % 0 %      Metamyelocytes% 1 %      Absolute Neutrophils 7.05 Thousand/uL      Lymphocytes Absolute 0.00 Thousand/uL      Monocytes Absolute 0.07 Thousand/uL      Eosinophils Absolute 0.00 Thousand/uL      Basophils Absolute 0.00 Thousand/uL      Total Counted --     Platelet Estimate Decreased     Microcytes Present    Blood culture #2 [[id number]] Collected: 09/12/23 1650    Lab Status: In process Specimen: Blood from Hand, Right Updated: 09/12/23 1658    Blood culture #1 [[id number]] Collected: 09/12/23 1650    Lab Status:  In process Specimen: Blood from Arm, Right Updated: 09/12/23 1657    Comprehensive metabolic panel [[id number]]  (Abnormal) Collected: 09/12/23 1524    Lab Status: Final result Specimen: Blood from Hand, Right Updated: 09/12/23 1546     Sodium 136 mmol/L      Potassium 3.1 mmol/L      Chloride 100 mmol/L      CO2 26 mmol/L      ANION GAP 10 mmol/L      BUN 11 mg/dL      Creatinine 4.58 mg/dL      Glucose 142 mg/dL      Calcium 7.3 mg/dL      Corrected Calcium 8.9 mg/dL      AST 6 U/L      ALT 3 U/L      Alkaline Phosphatase 73 U/L      Total Protein 4.3 g/dL      Albumin 2.0 g/dL      Total Bilirubin 1.02 mg/dL      eGFR 12 ml/min/1.73sq m     Narrative:      [place] guidelines for Chronic Kidney Disease (CKD):   •  Stage 1 with normal or high GFR (GFR > 90 mL/min/1.73 square meters)  •  Stage 2 Mild CKD (GFR = 60-89 mL/min/1.73 square meters)  •  Stage 3A Moderate CKD (GFR = 45-59 mL/min/1.73 square meters)  •  Stage 3B Moderate CKD (GFR = 30-44 mL/min/1.73 square meters)  •  Stage 4 Severe CKD (GFR = 15-29 mL/min/1.73 square meters)  •  Stage 5 End Stage CKD (GFR <15 mL/min/1.73 square meters)  Note: GFR calculation is accurate only with a steady state creatinine                 XR chest 1 view portable    (Results Pending)              Procedures  ECG 12 Lead Documentation Only    Date/Time: 9/12/2023 4:16 PM    Performed by: Kimberly Dominguez MD  Authorized by: Kimberly Dominguez MD    Indications / Diagnosis:  Hypotension post dialysis  ECG reviewed by me, the ED Provider: yes    Patient location:  ED  Previous ECG:     Previous ECG:  Unavailable  Interpretation:     Interpretation: abnormal    Rate:     ECG rate:  72    ECG rate assessment: normal    Rhythm:     Rhythm: sinus rhythm    Ectopy:     Ectopy: none    QRS:     QRS axis:  Normal  Conduction:     Conduction: abnormal      Abnormal conduction: complete RBBB    ST segments:     ST segments:  Normal  T waves:     T waves: normal      CriticalCare Time    Date/Time: 9/12/2023 6:30 PM    Performed by: Kimberly Dominguez MD  Authorized by: Kimberly Dominguez MD    Critical care provider statement:     Critical care time (minutes):  60    Critical care was necessary to treat or prevent imminent or life-threatening deterioration of the following conditions:  Circulatory failure and shock    Critical care was time spent personally by me on the following activities:  Obtaining history from patient or surrogate, discussions with consultants, development of treatment plan with patient or surrogate, evaluation of patient's response to treatment, examination of patient, interpretation of cardiac output measurements, ordering and performing treatments and interventions, ordering and review of laboratory studies, re-evaluation of patient's condition and review of old charts    I assumed direction of critical care for this patient from another provider in my specialty: no               ED Course                               SBIRT 20yo+    Flowsheet Row Most Recent Value   Initial Alcohol Screen: US AUDIT-C     1.  How often do you have a drink containing alcohol? 0 Filed at: 09/12/2023 1447   2. How many drinks containing alcohol do you have on a typical day you are drinking? 0 Filed at: 09/12/2023 1447   3a. Male UNDER 65: How often do you have five or more drinks on one occasion? 0 Filed at: 09/12/2023 1447   3b. FEMALE Any Age, or MALE 65+: How often do you have 4 or more drinks on one occassion? 0 Filed at: 09/12/2023 1447   Audit-C Score 0 Filed at: 09/12/2023 1447   KIZZY: How many times in the past year have you. .. Used an illegal drug or used a prescription medication for non-medical reasons? Never Filed at: 09/12/2023 1447                    Medical Decision Making  I have been in contact with Dr. Quinton Kenny from nephrology since pt. Arrival.  He advised try boluses of saline and albumin and monitor response. 1545 - after 500 cc NS and 25 gm of Albumin 25% BP still only 75/47. Discussed with nephrology again and advised more saline and albumin. Pt. Does appear very dehydrated. Prior records reviewed. Dialysis note from earlier today says his BP was low there but came up. He didn't take his midodrine yet today. 1715 - SBP 88 after albumin and 500 more cc saline. 1730 - SBP 98. Lactic 4.7. Pt. Has gotten 30 cc/kg of IVF. I have no reason to suspect sepsis and he doesn't have any SIRS criteria. I suspect this is all dehydration/hypovolumemia. He also missed his midodrine doses today so will give that. Will admit. Discussed with ICU and [facility]. Amount and/or Complexity of Data Reviewed  Labs: ordered. Radiology: ordered. Risk  Prescription drug management. Decision regarding hospitalization.           Disposition  Final diagnoses:   Hypotension   Dehydration     Time reflects when diagnosis was documented in both MDM as applicable and the Disposition within this note     Time User Action Codes Description Comment    8/67/5348  3:53 PM Jim Ordonez Add [Q92.3] Hypotension     7/20/9905  9:75 PM Ana Rana Harrow Add [E86.0] Dehydration       ED Disposition     ED Disposition   Admit    Condition   Stable    Date/Time   Tue Sep 12, 2023  5:35 PM    Comment   Case was discussed with **ICU, [facility]* and the patient's admission status was agreed to be Admission Status: observation status          Follow-up Information    None         Patient's Medications   Discharge Prescriptions    No medications on file       No discharge procedures on file.     PDMP Review       Value Time User    PDMP Reviewed  Yes 5/24/2023  5:40 PM Sylvester Stevens MD          ED Provider  Electronically Signed by           Tatyana Kasper MD  95/82/37 [id number]

## 2023-09-12 NOTE — H&P
[address] [facility]  H&P  Name: Jovanna Alcantara 70 y.o. male I MRN: [id number]  Unit/Bed#: ED CT2 I Date of Admission: 9/12/2023   Date of Service: 9/12/2023 I Hospital Day: 0      Assessment/Plan   * Hypotension  Assessment & Plan  · Presented to the ER with hypotension following dialysis today  · Required 2 L IV fluid bolus and IV albumin in the ER to bring blood pressure to 90s over 60s  · Patient mentating fine and blood pressure soft but stable    · We will continue IV albumin 25 g every 8 hours  · Continue IV fluid 100 cc overnight  · Continue midodrine 10 mg 3 times daily with meals  · Monitor vital signs every 4 hours    Ambulatory dysfunction  Assessment & Plan  · PT/OT evaluation  · Orthostatic vital signs    End-stage renal disease   Assessment & Plan  · Presented with post HD hypotension  · We will consult nephrology if patient requires hospital stay into Thursday which would be his next HD session    Coronary artery disease   Assessment & Plan  · No acute concerns from the standpoint  · Continue home aspirin, statin, Plavix      VTE Pharmacologic Prophylaxis:   Moderate Risk (Score 3-4) - Pharmacological DVT Prophylaxis Ordered: heparin. Code Status: Level 1 - Full Code   Discussion with family: Updated  (wife) at bedside. Anticipated Length of Stay: Patient will be admitted on an observation basis with an anticipated length of stay of less than 2 midnights secondary to hypotension. Total Time Spent on Date of Encounter in care of patient: 60 mins. This time was spent on one or more of the following: performing physical exam; counseling and coordination of care; obtaining or reviewing history; documenting in the medical record; reviewing/ordering tests, medications or procedures; communicating with other healthcare professionals and discussing with patient's family/caregivers.     Chief Complaint: dizziness/lightneadedness    History of Present Illness:  Peg Drafts Xavier Hernandez is a 70 y.o. male with a PMH of ESRD on HD, hypertension, hyperlipidemia, hypertension, GERD, CAD, CVA who presents with difficulty ambulating. Patient had gone to his routinely scheduled HD today. Afterward he was unable to get into his house from the car. He describes lightheadedness and dizziness but denies any associated diaphoresis, nausea, vision changes, chest pain, or palpitations. He was found to have an extremely low blood pressure in the emergency room with systolics in the 64Q and 95G. He required 2 L fluid bolus and IV albumin. Blood pressure is improved to the systolic 96X and 36P. Mentation is appropriate. Was deemed stable for the floor by ICU evaluation. Patient pleasant and conversive at the time of my evaluation. He is feeling okay, much better than before after fluid. Wife at bedside. Will be admitted for overnight observation and continued monitoring. REVIEW OF SYSTEMS  General Denies fevers or chills. Denies generalized weakness or fatigue. HEENT Denies hearing or vision changes. Cardiovascular Denies chest pain. Denies LE swelling. Denies palpitations. Denies dyspnea on exertion. Respiratory Denies cough. Denies difficulty breathing. Denies shortness of breath. Genitourinary Denies hematuria. Denies dysuria. Denies difficulty voiding. Denies incontinence. Gastrointestinal Denies nausea, vomiting, or diarrhea. Denies hematochezia, melena, or hematemesis. Musculoskeletal Denies arthralgias or myalgias. Denies joint swelling. Psychiatric  Denies changes in mood. Denies anxiety or depression. Neurologic Denies headache. Positive for lightheadedness/dizziness. Denies numbness/tingling. Denies weakness. Endocrine Denies weight loss or weight gain. Denies excessive thirst, sweating, urination.        Past Medical and Surgical History:   Past Medical History:   Diagnosis Date   • 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    With Cerebral Infarction:  Last Assessed: October 20, 2016   • Chronic kidney disease 2012    on dialysis    • COVID 05/2022   • COVID-19 04/2022 4/2022-while in NH   • Diabetes mellitus [facility])    • Dialysis patient [facility])     T/TH/Sat   • Difficulty walking    • GERD (gastroesophageal reflux disease)    • Hyperlipidemia    • Hypertension    • Labyrinthitis 09/10/2011   • Myocardial infarction [facility]) 2014    x3 others were in 2009 & 2010   • Sleep disturbances 09/20/2010   • Stroke [facility]) 2007    x1, RLE deficit- uses walker   • Type 2 diabetes, uncontrolled, with renal manifestation 05/03/2017       Past Surgical History:   Procedure Laterality Date   • AV FISTULA PLACEMENT     • AV FISTULA REPAIR Left 6/3/2023    Procedure: LEFT UPPER EXTREMITY A-V GRAFT EXPLANT, LEFT BRACHIAL ANGIOPATCH,  APPLICATION OF  VAC;  Surgeon: Mirian Rider MD;  Location:  MAIN OR;  Service: Vascular   • CARDIAC CATHETERIZATION Left 02/03/2023    Procedure: Cardiac Left Heart Cath;  Surgeon: Javier Layne MD;  Location: [address] [place] [address] CATH LAB; Service: Cardiology   • CARDIAC CATHETERIZATION N/A 02/08/2023    Procedure: Cardiac catheterization with complex PCI with Dr. Theodore Owens, patient is a renal dialysis patient;  Surgeon: Selena Mercado MD;  Location:  CARDIAC CATH LAB; Service: Cardiology   • CARDIAC SURGERY  2010    stents x3   • COLONOSCOPY N/A 12/12/2016    Procedure: COLONOSCOPY;  Surgeon: Mitchell Painting MD;  Location: [address] [place] [address] GI LAB; Service:    • COLONOSCOPY N/A 04/03/2017    Procedure:  COLONOSCOPY;  Surgeon: Mitchell Painting MD;  Location: [address] [place] [address] GI LAB;   Service:    • HARDWARE REMOVAL Right 04/04/2022    Procedure: REMOVAL HARDWARE HIP;  Surgeon: Scott Madrigal MD;  Location:  MAIN OR;  Service: Orthopedics   • IR AV FISTULA/GRAFT DECLOT  04/29/2021   • IR AV FISTULA/GRAFT DECLOT  03/25/2022   • IR AV FISTULA/GRAFT DECLOT  09/21/2022   • IR AV FISTULAGRAM/GRAFTOGRAM  03/28/2022   • IR AV FISTULAGRAM/GRAFTOGRAM  07/28/2022   • IR TUNNELED CENTRAL LINE REMOVAL  12/07/2021   • IR TUNNELED DIALYSIS CATHETER PLACEMENT  05/24/2021   • PORTACATH PLACEMENT      per pt "port in chest"   • VT ARTERIOVENOUS ANASTOMOSIS OPEN DIRECT Left 07/22/2021    Procedure: CREATION FISTULA ARTERIOVENOUS (AV); Surgeon: Dahlia White MD;  Location: [facility];  Service: Vascular   • VT ARTERIOVENOUS ANASTOMOSIS OPEN DIRECT Left 3/21/2023    Procedure: left brachiobasilic fistula,  AVG Graft;  Surgeon: Masha Valencia MD;  Location: [facility] MA[facility];  Service: Vascular   • VT ARTERIOVENOUS ANASTOMOSIS OPEN DIRECT Left 5/19/2023    Procedure: CREATION of LEFT FISTULA ARTERIOVENOUS (AV) GRAFT;  Surgeon: Masha Valencia MD;  Location: [facility];  Service: Vascular   • VT HEMIARTHROPLASTY HIP PARTIAL Right 04/04/2022    Procedure: HEMIARTHROPLASTY HIP (BIPOLAR); Surgeon: Akiko Mills MD;  Location: [facility];  Service: Orthopedics       Meds/Allergies:  Prior to Admission medications    Medication Sig Start Date End Date Taking?  Authorizing Provider   acetaminophen (TYLENOL) 325 mg tablet Take 3 tablets (975 mg total) by mouth every 8 (eight) hours as needed for mild pain 6/15/23   Cliff Stuart DO   ammonium lactate (LAC-HYDRIN) 12 % lotion Apply topically 2 (two) times a day as needed for dry skin 8/30/23   Kevin Talley DPM   ascorbic acid (VITAMIN C) 500 mg tablet TAKE ONE TABLET BY MOUTH EVERY EVENING FOR SUPPLEMENT 1/26/22   Historical Provider, MD   aspirin (ECOTRIN LOW STRENGTH) 81 mg EC tablet Take 81 mg by mouth daily 1/26/22   Historical Provider, MD   atorvastatin (LIPITOR) 20 mg tablet Take 20 mg by mouth daily at bedtime 1/26/22   Historical Provider, MD   calcitriol (ROCALTROL) 0.25 mcg capsule Take 3 capsules (0.75 mcg total) by mouth 3 (three) times a week 6/16/23   Cliff Stuart DO   Cholecalciferol 50 MCG (2000 UT) CAPS Take by mouth daily 3/22/21   Historical Provider, MD Spencer Mcneal Comfort EZ 33G X 4 MM MISC  1/31/22   Historical Provider, MD clopidogrel (PLAVIX) 75 mg tablet Take 75 mg by mouth daily 8/18/23   Historical Provider, MD   docusate sodium (COLACE) 100 mg capsule Take 100 mg by mouth daily 8/18/23   Historical Provider, MD   gabapentin (NEURONTIN) 100 mg capsule Take 1 capsule (100 mg total) by mouth 3 (three) times a week On Tuesday Thursday Saturday 6/16/23   Cliff Stuart DO   hydrOXYzine HCL (ATARAX) 25 mg tablet TAKE 1 TABLET BY MOUTH EVERY 8 HOURS AS NEEDED FOR ITCHING 8/18/23   Historical Provider, MD   midodrine (PROAMATINE) 10 MG tablet Take 1 tablet (10 mg total) by mouth 3 (three) times a day before meals 5/24/23 8/31/23  Sachin Samaniego MD   oxyCODONE (ROXICODONE) 5 immediate release tablet Take 1 tablet (5 mg total) by mouth every 4 (four) hours as needed for severe pain for up to 15 doses Max Daily Amount: 30 mg 6/15/23   Cliff Stuart DO   vitamin B-12 (VITAMIN B-12) 1,000 mcg tablet Take 1,000 mcg by mouth daily 1/24/23   Historical Provider, MD     I have reviewed home medications using recent Epic encounter.     Allergies: No Known Allergies    Social History:  Marital Status: /Civil Union   Occupation: does not work  Patient Pre-hospital Living Situation: Home  Patient Pre-hospital Level of Mobility: unable to be assessed at time of evaluation  Patient Pre-hospital Diet Restrictions: none  Substance Use History:   Social History     Substance and Sexual Activity   Alcohol Use Yes   • Alcohol/week: 1.0 standard drink of alcohol   • Types: 1 Shots of liquor per week    Comment: 1 shot daily     Social History     Tobacco Use   Smoking Status Every Day   • Packs/day: 1.00   • Years: 30.00   • Total pack years: 30.00   • Types: Cigarettes   Smokeless Tobacco Never   Tobacco Comments    Currently not smoking - in facility     Social History     Substance and Sexual Activity   Drug Use Yes   • Types: Marijuana    Comment: gummies       Family History:  Family History   Problem Relation Age of Onset   • Leukemia Mother    • Crohn's disease Father    • Heart disease Father    • Transient ischemic attack Brother        Physical Exam:     Vitals:   Blood Pressure: (!) 85/50 (09/12/23 1800)  Pulse: 74 (09/12/23 1800)  Temperature: 97.6 °F (36.4 °C) (09/12/23 1515)  Temp Source: Oral (09/12/23 1515)  Respirations: 20 (09/12/23 1800)  SpO2: 95 % (09/12/23 1800)    PHYSICAL EXAM:    Vitals signs reviewed  Constitutional   Awake and cooperative. NAD. Head/Neck   Normocephalic. Atraumatic. HEENT   No scleral icterus. EOMI. Heart   Regular rate and rhythm. No murmers. Lungs   Clear to auscultation bilaterally. Respirations unlaboured. Abdomen   Soft. Nontender. Nondistended. Skin   Skin color normal. No rashes. Extremities   No deformities. No peripheral edema. Neuro   Alert and oriented. No new deficits. Psych   Mood stable. Affect normal.         Additional Data:     Lab Results:  Results from last 7 days   Lab Units 09/12/23  1524   WBC Thousand/uL 7.19   HEMOGLOBIN g/dL 8.8*   HEMATOCRIT % 27.5*   PLATELETS Thousands/uL 69*   BANDS PCT % 8   LYMPHO PCT % 0*   MONO PCT % 1*   EOS PCT % 0     Results from last 7 days   Lab Units 09/12/23  1524   SODIUM mmol/L 136   POTASSIUM mmol/L 3.1*   CHLORIDE mmol/L 100   CO2 mmol/L 26   BUN mg/dL 11   CREATININE mg/dL 4.58*   ANION GAP mmol/L 10   CALCIUM mg/dL 7.3*   ALBUMIN g/dL 2.0*   TOTAL BILIRUBIN mg/dL 1.02*   ALK PHOS U/L 73   ALT U/L 3*   AST U/L 6*   GLUCOSE RANDOM mg/dL 142*                 Results from last 7 days   Lab Units 09/12/23  1650   LACTIC ACID mmol/L 4.7*       Lines/Drains:  Invasive Devices     Peripheral Intravenous Line  Duration           Peripheral IV 09/12/23 Right Hand <1 day          Hemodialysis Catheter  Duration           HD Permanent Double Catheter 356 days    Hemodialysis Catheter Internal jugular 356 days                    Imaging: No pertinent imaging reviewed.   XR chest 1 view portable    (Results Pending)       EKG and Other Studies Reviewed on Admission:   · EKG: Sinus bradycardia heart rate 55    ** Please Note: This note has been constructed using a voice recognition system.  **

## 2023-09-13 PROBLEM — R00.1 BRADYCARDIA: Status: ACTIVE | Noted: 2023-09-13

## 2023-09-13 LAB
ANION GAP SERPL CALCULATED.3IONS-SCNC: 8 MMOL/L
BASOPHILS # BLD AUTO: 0.01 THOUSANDS/ÂΜL (ref 0–0.1)
BASOPHILS NFR BLD AUTO: 0 % (ref 0–1)
BUN SERPL-MCNC: 15 MG/DL (ref 5–25)
CALCIUM SERPL-MCNC: 8 MG/DL (ref 8.4–10.2)
CARDIAC TROPONIN I PNL SERPL HS: 319 NG/L (ref 8–18)
CHLORIDE SERPL-SCNC: 101 MMOL/L (ref 96–108)
CO2 SERPL-SCNC: 28 MMOL/L (ref 21–32)
CREAT SERPL-MCNC: 5.03 MG/DL (ref 0.6–1.3)
EOSINOPHIL # BLD AUTO: 0.03 THOUSAND/ÂΜL (ref 0–0.61)
EOSINOPHIL NFR BLD AUTO: 1 % (ref 0–6)
ERYTHROCYTE [DISTWIDTH] IN BLOOD BY AUTOMATED COUNT: 17.2 % (ref 11.6–15.1)
GFR SERPL CREATININE-BSD FRML MDRD: 10 ML/MIN/1.73SQ M
GLUCOSE P FAST SERPL-MCNC: 93 MG/DL (ref 65–99)
GLUCOSE SERPL-MCNC: 93 MG/DL (ref 65–140)
HCT VFR BLD AUTO: 27.1 % (ref 36.5–49.3)
HGB BLD-MCNC: 8.4 G/DL (ref 12–17)
IMM GRANULOCYTES # BLD AUTO: 0.04 THOUSAND/UL (ref 0–0.2)
IMM GRANULOCYTES NFR BLD AUTO: 1 % (ref 0–2)
LYMPHOCYTES # BLD AUTO: 0.43 THOUSANDS/ÂΜL (ref 0.6–4.47)
LYMPHOCYTES NFR BLD AUTO: 9 % (ref 14–44)
MAGNESIUM SERPL-MCNC: 1.8 MG/DL (ref 1.9–2.7)
MCH RBC QN AUTO: 30.3 PG (ref 26.8–34.3)
MCHC RBC AUTO-ENTMCNC: 31 G/DL (ref 31.4–37.4)
MCV RBC AUTO: 98 FL (ref 82–98)
MONOCYTES # BLD AUTO: 0.23 THOUSAND/ÂΜL (ref 0.17–1.22)
MONOCYTES NFR BLD AUTO: 5 % (ref 4–12)
NEUTROPHILS # BLD AUTO: 4.21 THOUSANDS/ÂΜL (ref 1.85–7.62)
NEUTS SEG NFR BLD AUTO: 84 % (ref 43–75)
NRBC BLD AUTO-RTO: 0 /100 WBCS
PHOSPHATE SERPL-MCNC: 1.7 MG/DL (ref 2.3–4.1)
PLATELET # BLD AUTO: 72 THOUSANDS/UL (ref 149–390)
PMV BLD AUTO: 10.4 FL (ref 8.9–12.7)
POTASSIUM SERPL-SCNC: 3.1 MMOL/L (ref 3.5–5.3)
RBC # BLD AUTO: 2.77 MILLION/UL (ref 3.88–5.62)
SODIUM SERPL-SCNC: 137 MMOL/L (ref 135–147)
WBC # BLD AUTO: 4.95 THOUSAND/UL (ref 4.31–10.16)

## 2023-09-13 PROCEDURE — 80048 BASIC METABOLIC PNL TOTAL CA: CPT | Performed by: NURSE PRACTITIONER

## 2023-09-13 PROCEDURE — 99232 SBSQ HOSP IP/OBS MODERATE 35: CPT | Performed by: NURSE PRACTITIONER

## 2023-09-13 PROCEDURE — 84484 ASSAY OF TROPONIN QUANT: CPT | Performed by: NURSE PRACTITIONER

## 2023-09-13 PROCEDURE — 97110 THERAPEUTIC EXERCISES: CPT

## 2023-09-13 PROCEDURE — 85025 COMPLETE CBC W/AUTO DIFF WBC: CPT | Performed by: NURSE PRACTITIONER

## 2023-09-13 PROCEDURE — 84100 ASSAY OF PHOSPHORUS: CPT | Performed by: NURSE PRACTITIONER

## 2023-09-13 PROCEDURE — 83735 ASSAY OF MAGNESIUM: CPT | Performed by: NURSE PRACTITIONER

## 2023-09-13 PROCEDURE — 97167 OT EVAL HIGH COMPLEX 60 MIN: CPT

## 2023-09-13 PROCEDURE — 97163 PT EVAL HIGH COMPLEX 45 MIN: CPT

## 2023-09-13 PROCEDURE — 99215 OFFICE O/P EST HI 40 MIN: CPT | Performed by: INTERNAL MEDICINE

## 2023-09-13 PROCEDURE — 99222 1ST HOSP IP/OBS MODERATE 55: CPT | Performed by: INTERNAL MEDICINE

## 2023-09-13 RX ORDER — POTASSIUM CHLORIDE 20 MEQ/1
40 TABLET, EXTENDED RELEASE ORAL ONCE
Status: COMPLETED | OUTPATIENT
Start: 2023-09-13 | End: 2023-09-13

## 2023-09-13 RX ADMIN — MIDODRINE HYDROCHLORIDE 10 MG: 5 TABLET ORAL at 07:25

## 2023-09-13 RX ADMIN — HEPARIN SODIUM 5000 UNITS: 5000 INJECTION INTRAVENOUS; SUBCUTANEOUS at 22:03

## 2023-09-13 RX ADMIN — DOCUSATE SODIUM 100 MG: 100 CAPSULE, LIQUID FILLED ORAL at 08:03

## 2023-09-13 RX ADMIN — ASPIRIN 81 MG CHEWABLE TABLET 81 MG: 81 TABLET CHEWABLE at 08:03

## 2023-09-13 RX ADMIN — CYANOCOBALAMIN TAB 500 MCG 1000 MCG: 500 TAB at 08:04

## 2023-09-13 RX ADMIN — MIDODRINE HYDROCHLORIDE 10 MG: 5 TABLET ORAL at 16:44

## 2023-09-13 RX ADMIN — POTASSIUM CHLORIDE 40 MEQ: 1500 TABLET, EXTENDED RELEASE ORAL at 12:43

## 2023-09-13 RX ADMIN — MIDODRINE HYDROCHLORIDE 10 MG: 5 TABLET ORAL at 12:03

## 2023-09-13 RX ADMIN — ALBUMIN (HUMAN) 25 G: 0.25 INJECTION, SOLUTION INTRAVENOUS at 09:23

## 2023-09-13 RX ADMIN — CLOPIDOGREL BISULFATE 75 MG: 75 TABLET ORAL at 08:03

## 2023-09-13 RX ADMIN — HEPARIN SODIUM 5000 UNITS: 5000 INJECTION INTRAVENOUS; SUBCUTANEOUS at 05:16

## 2023-09-13 RX ADMIN — GABAPENTIN 100 MG: 100 CAPSULE ORAL at 08:03

## 2023-09-13 RX ADMIN — CALCITRIOL CAPSULES 0.25 MCG 0.75 MCG: 0.25 CAPSULE ORAL at 08:03

## 2023-09-13 RX ADMIN — ATORVASTATIN CALCIUM 20 MG: 20 TABLET, FILM COATED ORAL at 22:03

## 2023-09-13 RX ADMIN — ONDANSETRON 4 MG: 2 INJECTION INTRAMUSCULAR; INTRAVENOUS at 07:25

## 2023-09-13 RX ADMIN — OXYCODONE HYDROCHLORIDE 5 MG: 5 TABLET ORAL at 12:51

## 2023-09-13 RX ADMIN — ALBUMIN (HUMAN) 25 G: 0.25 INJECTION, SOLUTION INTRAVENOUS at 02:09

## 2023-09-13 RX ADMIN — OXYCODONE HYDROCHLORIDE 5 MG: 5 TABLET ORAL at 03:03

## 2023-09-13 NOTE — QUICK NOTE
Patient was evaluated earlier this evening by critical care before being admitted to [address] telemetry. Called again by the emergency room regarding his persistent hypotension and new bradycardia. Patient was found resting comfortably in the stretcher with no signs or symptoms of distress noted. His systolic blood pressures are in the 90s. He is alert and oriented x4 denies any chest pain, shortness of breath, palpitations, nausea, or vomiting. It was noted during exam, the patient continues to have intermittent bradycardia with heart rates 42-45. He is maintaining his blood pressure and remains asymptomatic. He denies any dizziness, blurry vision, or headache. His only complaint is that he feels tired. He states he does follow with cardiology as an outpatient but has not been there in a while. Twelve-lead EKG done at the bedside to confirm that patient is in sinus bradycardia. There was mild ectopy noted but no heart block. Case discussed in detail with Pascual GROSS who was in agreement to still take patient to [address] telemetry. ER physician and ER RN made aware.

## 2023-09-13 NOTE — PHYSICAL THERAPY NOTE
PHYSICAL THERAPY EVALUATION/TREATMENT     09/13/23 0910   PT Last Visit   PT Visit Date 09/13/23   Note Type   Note type Evaluation   Pain Assessment   Pain Assessment Tool 0-10   Pain Score No Pain   Restrictions/Precautions   Other Precautions Chair Alarm; Bed Alarm; Fall Risk  (orthostatic hypotension, HD T, TH, SAT)   Home Living   Type of [address] [place] [address]  One level  (3 PHILLY)   Home Equipment   (rollator)   Prior Function   Level of [place] Independent with ADLs; Independent with functional mobility   Lives With Spouse   Receives Help From [facility]   Additional Pertinent History Pt admitted wtih orthostatic hypotension. BP has been stable today. Cognition   Overall Cognitive Status WFL   Arousal/Participation Cooperative   Following Commands Follows one step commands without difficulty   Comments flat affect   RLE Assessment   RLE Assessment   (ROM WFL, MMT 3-/5 - 3+/5)   LLE Assessment   LLE Assessment   (ROM WFL, MMT 4+/5)   Bed Mobility   Supine to Sit 3  Moderate assistance   Additional items Increased time required   Transfers   Sit to Stand 4  Minimal assistance   Stand to Sit 4  Minimal assistance   Stand pivot 4  Minimal assistance   Additional items   (with walker)   Additional Comments BP supine 115/56 HR 76 bpm, BP sitting 125/58 HR 80 bpm   Ambulation/Elevation   Gait pattern   (decreased stance R LE due to old CVA per pt)   Gait Assistance 4  Minimal assist   Assistive Device Rolling walker   Distance 4 feet  (Pt declined additional walking due to fatigue)   Balance   Static Sitting Fair +   Dynamic Sitting Fair   Static Standing Fair -   Activity Tolerance   Activity Tolerance Patient limited by fatigue   Assessment   Prognosis Good   Problem List Decreased strength;Decreased endurance; Impaired balance;Decreased mobility   Assessment Patient is an 70y.o. year old male seen for Physical Therapy evaluation. Patient admitted with Hypotension.   Comorbidities affecting patient's physical performance include: ESRD on HD, CVA, anxiety, hypotension, ambulatory dysfunction. Personal factors affecting patient at time of initial evaluation include: ambulating with assistive device, stairs to enter home, inability to ambulate household distances and inability to perform ADLS. Prior to admission, patient was independent with functional mobility with rollator. Please find objective findings from Physical Therapy assessment regarding body systems outlined above with impairments and limitations including weakness, impaired balance, gait deviations, decreased activity tolerance, decreased functional mobility tolerance and fall risk. The Barthel Index was used as a functional outcome tool presenting with a score of Barthel Index Score: 50 today indicating marked limitations of functional mobility and ADLS. Patient's clinical presentation is currently unstable/unpredictable as seen in patient's presentation of increased fall risk, new onset of impairment of functional mobility, decreased endurance and new onset of weakness. Pt would benefit from continued Physical Therapy treatment to address deficits as defined above and maximize level of functional mobility. As demonstrated by objective findings, the assigned level of complexity for this evaluation is high. The patient's -[facility] Basic Mobility Inpatient Short Form Raw Score is 15. A Raw score of less than or equal to 16 suggests the patient may benefit from discharge to post-acute rehabilitation services, however hopeful that as pt's medical status improves so will his function so recommend home PT upon DC.    Goals   Patient Goals "get my strength back"   STG Expiration Date 09/20/23   Short Term Goal #1 independent bed mobility, independent tranfers, no falls, supervision ambualtion with a walker 50 feet, min assist up and down 3 steps so pt can enter and exit his home   LTG Expiration Date 09/27/23   Long Term Goal #1 independent ambulation with a walker 75 feet, supervision up and down 3 steps with a rail so pt can get in and out of his home. Plan   Treatment/Interventions ADL retraining;Functional transfer training;LE strengthening/ROM; Therapeutic exercise;Gait training;Bed mobility; Equipment eval/education;Patient/family training   PT Frequency Other (Comment)  (5x/week)   Recommendation   PT Discharge Recommendation Home with home health rehabilitation   [facility] Mobility Inpatient   Turning in Flat Bed Without Bedrails 3   Lying on Back to Sitting on Edge of Flat Bed Without Bedrails 2   Moving Bed to Chair 3   Standing Up From Chair Using Arms 3   Walk in Room 2   Climb 3-5 Stairs With Railing 2   Basic Mobility Inpatient Raw Score 15   Basic Mobility Standardized Score 36.97   Highest Level Of Mobility   -HLM Goal 4: Move to chair/commode   JH-HLM Achieved 4: Move to chair/commode   Barthel Index   Feeding 5   Bathing 0   Grooming Score 0   Dressing Score 5   Bladder Score 10   Bowels Score 10   Toilet Use Score 5   Transfers (Bed/Chair) Score 10   Mobility (Level Surface) Score 0   Stairs Score 5   Barthel Index Score 50   Additional Treatment Session   Start Time 0900   End Time 0910   Treatment Assessment S:  "can I sit up  for a while?" O:  Pt declined additional walking but agreed to LE exercises. x 10 each ankle pumps, LAQ, seated marching, seated hip abd/add. A:  Pt encouraged to ambulate in room with walker with staff. P:  Hopeful that as pt is medically stabilized his function and activity tolerance will also improve so will encourage activity with staff as tolerated with continued PT   End of Consult   Patient Position at End of Consult Bed/Chair alarm activated; All needs within reach; Bedside chair   Licensure   [place] License Number  Lou PT [id number]

## 2023-09-13 NOTE — ASSESSMENT & PLAN NOTE
· Presented with post HD hypotension  · Continue midodrine   · Appreciate nephrology input  · Plan for HD tomorrow

## 2023-09-13 NOTE — PROGRESS NOTES
[id number] [facility]  Progress Note  Name: Julieth Hernández  MRN: [id number]  Unit/Bed#: 913 [facility] 402-01 I Date of Admission: 9/12/2023   Date of Service: 9/13/2023 I Hospital Day: 0    Assessment/Plan   * Hypotension  Assessment & Plan  · Presented to the ER with hypotension following dialysis today  · Required 2 L IV fluid bolus and IV albumin in the ER to bring blood pressure to 90s over 60s  · BP now stable  · Stop IVF and IV Albumin   · Continue midodrine 10 mg 3 times daily with meals  · Monitor vital signs every 4 hours    End-stage renal disease   Assessment & Plan  · Presented with post HD hypotension  · Continue midodrine   · Appreciate nephrology input  · Plan for HD tomorrow     Bradycardia  Assessment & Plan  Noted on EKG and telemetry   · Cardiology following  · Consider pacemaker    Coronary artery disease   Assessment & Plan  · Troponin levels 17 -> 135 -> 234 -> 309  · No chest pain  · EKG with sinus ya  · Less than 3 second pauses noted telemetry   · Consulted cardiology   · Continue home aspirin, statin, Plavix    Ambulatory dysfunction  Assessment & Plan  · PT/OT evaluation - rec home health services          VTE Pharmacologic Prophylaxis:   Moderate Risk (Score 3-4) - Pharmacological DVT Prophylaxis Ordered: heparin. Patient Centered Rounds: I performed bedside rounds with nursing staff today. Discussions with Specialists or Other Care Team Provider: nursing, CM, nephrology, cardiology     Education and Discussions with Family / Patient: Attempted to update  (wife) via phone. Left voicemail. Total Time Spent on Date of Encounter in care of patient: 35 mins.  This time was spent on one or more of the following: performing physical exam; counseling and coordination of care; obtaining or reviewing history; documenting in the medical record; reviewing/ordering tests, medications or procedures; communicating with other healthcare professionals and discussing with patient's family/caregivers. Current Length of Stay: 0 day(s)  Current Patient Status: Inpatient   Certification Statement: The patient will continue to require additional inpatient hospital stay due to bradycardia, HTN, cough, electrolyte monitoring   Discharge Plan: Anticipate discharge in 24-48 hrs to home. Code Status: Level 1 - Full Code    Subjective:   Patient seen and examined at bedside. He reports a moist cough which is new since admission. He denies HA, dizziness, CP, abdominal pain, N/V/D. States he hasn't been eating and drinking well prior to admission. Objective:     Vitals:   Temp (24hrs), Av.7 °F (36.5 °C), Min:97.3 °F (36.3 °C), Max:98 °F (36.7 °C)    Temp:  [97.3 °F (36.3 °C)-98 °F (36.7 °C)] 97.9 °F (36.6 °C)  HR:  [40-80] 40  Resp:  [12-33] 18  BP: ()/(43-97) 127/59  SpO2:  [92 %-99 %] 92 %  Body mass index is 20.97 kg/m². Input and Output Summary (last 24 hours): Intake/Output Summary (Last 24 hours) at 2023 1531  Last data filed at 2023 1929  Gross per 24 hour   Intake 2100 ml   Output --   Net 2100 ml       Physical Exam:   Physical Exam  Vitals and nursing note reviewed. Constitutional:       General: He is not in acute distress. Appearance: He is ill-appearing. He is not toxic-appearing or diaphoretic. HENT:      Head: Normocephalic. Mouth/Throat:      Mouth: Mucous membranes are moist.   Eyes:      Conjunctiva/sclera: Conjunctivae normal.   Cardiovascular:      Rate and Rhythm: Normal rate. Pulmonary:      Effort: Pulmonary effort is normal.      Breath sounds: Normal breath sounds. No wheezing, rhonchi or rales. Comments: Moist, non productive cough   Abdominal:      General: Bowel sounds are normal. There is no distension. Palpations: Abdomen is soft. Tenderness: There is no abdominal tenderness. Musculoskeletal:         General: Normal range of motion. Cervical back: Normal range of motion.       Right lower leg: No edema. Left lower leg: No edema. Skin:     General: Skin is warm and dry. Capillary Refill: Capillary refill takes less than 2 seconds. Comments: Right anterior chest perm cath    Neurological:      Mental Status: He is alert and oriented to person, place, and time. Mental status is at baseline. Psychiatric:         Mood and Affect: Mood normal.         Behavior: Behavior normal.         Thought Content:  Thought content normal.          Additional Data:     Labs:  Results from last 7 days   Lab Units 09/13/23  1008 09/12/23  1524   WBC Thousand/uL 4.95 7.19   HEMOGLOBIN g/dL 8.4* 8.8*   HEMATOCRIT % 27.1* 27.5*   PLATELETS Thousands/uL 72* 69*   BANDS PCT %  --  8   NEUTROS PCT % 84*  --    LYMPHS PCT % 9*  --    LYMPHO PCT %  --  0*   MONOS PCT % 5  --    MONO PCT %  --  1*   EOS PCT % 1 0     Results from last 7 days   Lab Units 09/13/23  1008 09/12/23  1524   SODIUM mmol/L 137 136   POTASSIUM mmol/L 3.1* 3.1*   CHLORIDE mmol/L 101 100   CO2 mmol/L 28 26   BUN mg/dL 15 11   CREATININE mg/dL 5.03* 4.58*   ANION GAP mmol/L 8 10   CALCIUM mg/dL 8.0* 7.3*   ALBUMIN g/dL  --  2.0*   TOTAL BILIRUBIN mg/dL  --  1.02*   ALK PHOS U/L  --  73   ALT U/L  --  3*   AST U/L  --  6*   GLUCOSE RANDOM mg/dL 93 142*                 Results from last 7 days   Lab Units 09/12/23  1935 09/12/23  1650   LACTIC ACID mmol/L 1.4 4.7*       Lines/Drains:  Invasive Devices     Peripheral Intravenous Line  Duration           Peripheral IV 09/12/23 Left  1 day    Peripheral IV 09/12/23 Right Arm <1 day          Hemodialysis Catheter  Duration           HD Permanent Double Catheter 357 days    Hemodialysis Catheter Internal jugular 357 days                  Telemetry:  Telemetry Orders (From admission, onward)             24 Hour Telemetry Monitoring  Continuous x 24 Hours (Telem)        Question:  Reason for 24 Hour Telemetry  Answer:  Arrhythmias requiring acute medical intervention / PPM or ICD malfunction Telemetry Reviewed: Sinus Bradycardia  Indication for Continued Telemetry Use: Arrthymias requiring medical therapy             Imaging: Reviewed radiology reports from this admission including: chest xray    Recent Cultures (last 7 days):   Results from last 7 days   Lab Units 09/12/23  1650   BLOOD CULTURE  Received in Microbiology Lab. Culture in Progress. Received in Microbiology Lab. Culture in Progress. Last 24 Hours Medication List:   Current Facility-Administered Medications   Medication Dose Route Frequency Provider Last Rate   • acetaminophen  650 mg Oral Q6H PRN Klaus Ego Starsinic, DO     • aspirin  81 mg Oral Daily Elmaton Ego Starsinic, DO     • atorvastatin  20 mg Oral HS Elmaton Ego Starsinic, DO     • calcitriol  0.75 mcg Oral Once per day on Mon Wed Fri Klaus Ego Starsinic, DO     • clopidogrel  75 mg Oral Daily Elmaton Ego Starsinic, DO     • vitamin B-12  1,000 mcg Oral Daily Elmaton Ego Starsinic, DO     • docusate sodium  100 mg Oral Daily Elmaton Ego Starsinic, DO     • gabapentin  100 mg Oral Once per day on Mon Wed Fri Klaus Ego Starsinic, DO     • heparin (porcine)  5,000 Units Subcutaneous [facility] [address] Starsinic, DO     • hydrOXYzine HCL  25 mg Oral Q8H PRN Elmaton Ego Starsinic, DO     • midodrine  10 mg Oral TID AC Mona Gallegos MD     • ondansetron  4 mg Intravenous Q6H PRN Klaus Ego Starsinic, DO     • oxyCODONE  2.5 mg Oral Q6H PRN TESSY Carlton          Today, Patient Was Seen By: TESSY Carlton    **Please Note: This note may have been constructed using a voice recognition system. **

## 2023-09-13 NOTE — CONSULTS
Consultation - Cardiology   Paula Bishop 70 y.o. male MRN: [id number]  Unit/Bed#: [address] [place] [address] Encounter: [id number]    Assessment/Plan     Assessment:  1. Hyportension  2. End-stage renal disease on hemodialysis  3. Sinus bradycardia  4. Known coronary artery disease    Plan:  Patient has been admitted to the hospitalist service  1. We will continue to monitor telemetry off AV silas blocking medications. Discussed with patient that he would be at high risk for implantation of pacemaker due to his ongoing dialysis and access issues. 2.  Continue dual antiplatelet therapy with aspirin 81 mg and Plavix 75 mg daily due to recent drug-eluting stents placed in his ostial to mid RCA    3. Continue Primatene 10 mg 3 times daily    4. Encourage patient to stay well-hydrated and eat frequent small meals, question whether Remeron may help patient with his nausea and poor appetite. 5.  Hemodialysis per nephrology      History of Present Illness   Physician Requesting Consult: Triny Ochoa, *  Reason for Consult / Principal Problem: Bradycardic not on beta-blockers, occasional PAC and blocked PAC      HPI: Vanita Forbes is a 70y.o. year old male who presented to the emergency room with profound hypotension with a blood pressure of 53/35 after hemodialysis. He has end-stage kidney disease and receives hemodialysis at the [facility] clinic in [facility]. He did note prior to going to dialysis he did not take his 10 mg of midodrine as prescribed and also has been experiencing frequent episodes of nausea and vomiting so had a poor p.o. intake for 4 to 5 days prior. Overnight, patient was placed on telemetry and noted to be in sinus bradycardia rates as low as 38. At one point, patient did have episodes of what appeared to be blocked PACs. He denies any chest pain, pressure or palpitations. Patient has significant history for coronary artery disease with multivessel stenting.   He had placement of a bare-metal stent to his left circumflex in 2008 and POBA to a diagonal at that time. He also has remote history of distal RCA drug-eluting stent placement. Most recent intervention, patient had placement of 3 stents from the ostial RCA to the mid RCA in February 2023, with Dr. Ariel Mcleod in [facility]. Patient was noted at that time to have residual ostial LAD disease of 50% and mid LAD disease of 50%. Initial high-sensitivity troponin was 17, followed by 135, 234 and 319. Twelve-lead EKG demonstrates sinus rhythm with right bundle branch block which appears to be transient and new. Inpatient consult to Cardiology  Consult performed by: TESSY Chong  Consult ordered by: TESSY Person          Review of Systems   Constitutional: Positive for activity change and fatigue. HENT: Negative for congestion, hearing loss, sinus pain and tinnitus. Eyes: Negative. Negative for photophobia and visual disturbance. Respiratory: Negative. Negative for chest tightness and shortness of breath. Cardiovascular: Negative. Negative for chest pain, palpitations and leg swelling. Gastrointestinal: Negative. Negative for abdominal distention, blood in stool, constipation, diarrhea, nausea and vomiting. Endocrine: Negative. Negative for polydipsia, polyphagia and polyuria. Genitourinary: Negative. Negative for difficulty urinating. Musculoskeletal: Negative. Skin: Negative. Neurological: Positive for dizziness, weakness and light-headedness. Negative for syncope. Hematological: Negative. Psychiatric/Behavioral: Negative.         Historical Information   Past Medical History:   Diagnosis Date   • 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    With Cerebral Infarction:  Last Assessed:  October 20, 2016   • Chronic kidney disease 2012    on dialysis    • COVID 05/2022   • COVID-19 04/2022 4/2022-while in NH   • Diabetes mellitus [facility])    • Dialysis patient [facility])     T/TH/Sat • Difficulty walking    • GERD (gastroesophageal reflux disease)    • Hyperlipidemia    • Hypertension    • Labyrinthitis 09/10/2011   • Myocardial infarction [facility]) 2014    x3 others were in 2009 & 2010   • Sleep disturbances 09/20/2010   • Stroke [facility]) 2007    x1, RLE deficit- uses walker   • Type 2 diabetes, uncontrolled, with renal manifestation 05/03/2017     Past Surgical History:   Procedure Laterality Date   • AV FISTULA PLACEMENT     • AV FISTULA REPAIR Left 6/3/2023    Procedure: LEFT UPPER EXTREMITY A-V GRAFT EXPLANT, LEFT BRACHIAL ANGIOPATCH,  APPLICATION OF  VAC;  Surgeon: Bk Rider MD;  [facility];  Service: Vascular   • CARDIAC CATHETERIZATION Left 02/03/2023    Procedure: Cardiac Left Heart Cath;  Surgeon: Ernesto Hardwick MD;  Location: [address] [place] [address] CATH LAB; Service: Cardiology   • CARDIAC CATHETERIZATION N/A 02/08/2023    Procedure: Cardiac catheterization with complex PCI with Dr. Najma Pérez, patient is a renal dialysis patient;  Surgeon: Daphnie Freeman MD;  Location:  CARDIAC CATH LAB; Service: Cardiology   • CARDIAC SURGERY  2010    stents x3   • COLONOSCOPY N/A 12/12/2016    Procedure: COLONOSCOPY;  Surgeon: Ba Hoffman MD;  Location: [address] [place] [address] [facility]; Service:    • COLONOSCOPY N/A 04/03/2017    Procedure:  COLONOSCOPY;  Surgeon: Ba Hoffman MD;  Location: [address] [place] [address] [facility] LAB;   Service:    • HARDWARE REMOVAL Right 04/04/2022    Procedure: REMOVAL HARDWARE HIP;  Surgeon: Elver Matt MD;  [facility]: [facility];  Service: Orthopedics   • IR AV FISTULA/GRAFT DECLOT  04/29/2021   • IR AV FISTULA/GRAFT DECLOT  03/25/2022   • IR AV FISTULA/GRAFT DECLOT  09/21/2022   • IR AV FISTULAGRAM/GRAFTOGRAM  03/28/2022   • IR AV FISTULAGRAM/GRAFTOGRAM  07/28/2022   • IR TUNNELED CENTRAL LINE REMOVAL  12/07/2021   • IR TUNNELED DIALYSIS CATHETER PLACEMENT  05/24/2021   • PORTACATH PLACEMENT      per pt "port in chest"   • DE ARTERIOVENOUS ANASTOMOSIS OPEN DIRECT Left 07/22/2021    Procedure: CREATION FISTULA ARTERIOVENOUS (AV); Surgeon: Luciano Abrams MD;  Location: [facility];  Service: Vascular   • IN ARTERIOVENOUS ANASTOMOSIS OPEN DIRECT Left 3/21/2023    Procedure: left brachiobasilic fistula,  AVG Graft;  Surgeon: Alice Garcia MD;  [facility];  Service: Vascular   • IN ARTERIOVENOUS ANASTOMOSIS OPEN DIRECT Left 5/19/2023    Procedure: CREATION of LEFT FISTULA ARTERIOVENOUS (AV) GRAFT;  Surgeon: Alice Garcia MD;  [facility];  Service: Vascular   • IN HEMIARTHROPLASTY HIP PARTIAL Right 04/04/2022    Procedure: HEMIARTHROPLASTY HIP (BIPOLAR);   Surgeon: Ketan Larry MD;  [facility]: [facility];  Service: Orthopedics     Social History     Substance and Sexual Activity   Alcohol Use Yes   • Alcohol/week: 1.0 standard drink of alcohol   • Types: 1 Shots of liquor per week    Comment: 1 shot daily     Social History     Substance and Sexual Activity   Drug Use Yes   • Types: Marijuana    Comment: gummies     E-Cigarette/Vaping   • E-Cigarette Use Never User      E-Cigarette/Vaping Substances   • Nicotine No    • THC No    • CBD No    • Flavoring No    • Other No    • Unknown No      Social History     Tobacco Use   Smoking Status Every Day   • Packs/day: 1.00   • Years: 30.00   • Total pack years: 30.00   • Types: Cigarettes   Smokeless Tobacco Never   Tobacco Comments    Currently not smoking - in facility     Family History:   Family History   Problem Relation Age of Onset   • Leukemia Mother    • Crohn's disease Father    • Heart disease Father    • Transient ischemic attack Brother        Meds/Allergies   all current active meds have been reviewed, current meds:   Current Facility-Administered Medications   Medication Dose Route Frequency   • acetaminophen (TYLENOL) tablet 650 mg  650 mg Oral Q6H PRN   • aspirin chewable tablet 81 mg  81 mg Oral Daily   • atorvastatin (LIPITOR) tablet 20 mg  20 mg Oral HS   • calcitriol (ROCALTROL) capsule 0.75 mcg 0.75 mcg Oral Once per day on Mon Wed Fri   • clopidogrel (PLAVIX) tablet 75 mg  75 mg Oral Daily   • cyanocobalamin (VITAMIN B-12) tablet 1,000 mcg  1,000 mcg Oral Daily   • docusate sodium (COLACE) capsule 100 mg  100 mg Oral Daily   • gabapentin (NEURONTIN) capsule 100 mg  100 mg Oral Once per day on Mon Wed Fri   • heparin (porcine) subcutaneous injection 5,000 Units  5,000 Units Subcutaneous Q8H [address]   • hydrOXYzine HCL (ATARAX) tablet 25 mg  25 mg Oral Q8H PRN   • midodrine (PROAMATINE) tablet 10 mg  10 mg Oral TID AC   • ondansetron (ZOFRAN) injection 4 mg  4 mg Intravenous Q6H PRN   • oxyCODONE (ROXICODONE) IR tablet 5 mg  5 mg Oral Q4H PRN   • potassium chloride (K-DUR,KLOR-CON) CR tablet 40 mEq  40 mEq Oral Once    and PTA meds:   Prior to Admission Medications   Prescriptions Last Dose Informant Patient Reported? Taking?    Cholecalciferol 50 MCG (2000 UT) CAPS 9/11/2023 Self, Outside Facility (Specify) Yes Yes   Sig: Take by mouth daily   [place] Choice Comfort EZ 33G X 4 MM MISC Not Taking Self, Outside Facility (Specify) Yes No   Patient not taking: Reported on 9/12/2023   acetaminophen (TYLENOL) 325 mg tablet 9/11/2023 Outside Facility (Specify) No Yes   Sig: Take 3 tablets (975 mg total) by mouth every 8 (eight) hours as needed for mild pain   ammonium lactate (LAC-HYDRIN) 12 % lotion Past Week  No Yes   Sig: Apply topically 2 (two) times a day as needed for dry skin   ascorbic acid (VITAMIN C) 500 mg tablet 9/11/2023 Self, Outside Facility (Specify) Yes Yes   Sig: TAKE ONE TABLET BY MOUTH EVERY EVENING FOR SUPPLEMENT   aspirin (ECOTRIN LOW STRENGTH) 81 mg EC tablet 9/11/2023 Self, Outside Facility (Specify) Yes Yes   Sig: Take 81 mg by mouth daily   atorvastatin (LIPITOR) 20 mg tablet 9/11/2023 Self, Outside Facility (Specify) Yes Yes   Sig: Take 20 mg by mouth daily at bedtime   calcitriol (ROCALTROL) 0.25 mcg capsule 9/11/2023 Outside Facility (Specify) No Yes   Sig: Take 3 capsules (0.75 mcg total) by mouth 3 (three) times a week   clopidogrel (PLAVIX) 75 mg tablet 9/11/2023  Yes Yes   Sig: Take 75 mg by mouth daily   docusate sodium (COLACE) 100 mg capsule 9/11/2023  Yes Yes   Sig: Take 100 mg by mouth daily   gabapentin (NEURONTIN) 100 mg capsule 9/11/2023 Outside Facility (Specify) No Yes   Sig: Take 1 capsule (100 mg total) by mouth 3 (three) times a week On Tuesday Thursday Saturday   hydrOXYzine HCL (ATARAX) 25 mg tablet 9/11/2023  Yes Yes   Sig: TAKE 1 TABLET BY MOUTH EVERY 8 HOURS AS NEEDED FOR ITCHING   midodrine (PROAMATINE) 10 MG tablet   No No   Sig: Take 1 tablet (10 mg total) by mouth 3 (three) times a day before meals   oxyCODONE (ROXICODONE) 5 immediate release tablet 9/11/2023 Outside Facility (Specify) No Yes   Sig: Take 1 tablet (5 mg total) by mouth every 4 (four) hours as needed for severe pain for up to 15 doses Max Daily Amount: 30 mg   vitamin B-12 (VITAMIN B-12) 1,000 mcg tablet 9/11/2023 Outside Facility (Specify) Yes Yes   Sig: Take 1,000 mcg by mouth daily      Facility-Administered Medications: None     No Known Allergies    Objective   Vitals: Blood pressure 125/58, pulse 65, temperature 98 °F (36.7 °C), resp. rate 18, height 5' 10" (1.778 m), weight 66.3 kg (146 lb 2.6 oz), SpO2 94 %. Orthostatic Blood Pressures    Flowsheet Row Most Recent Value   Blood Pressure 125/58 filed at 09/13/2023 0043   Patient Position - Orthostatic VS Lying filed at 09/12/2023 2045            Intake/Output Summary (Last 24 hours) at 9/13/2023 1117  Last data filed at 9/12/2023 1929  Gross per 24 hour   Intake 2100 ml   Output --   Net 2100 ml       Invasive Devices     Peripheral Intravenous Line  Duration           Peripheral IV 09/12/23 Left  1 day    Peripheral IV 09/12/23 Right Arm <1 day          Hemodialysis Catheter  Duration           HD Permanent Double Catheter 357 days    Hemodialysis Catheter Internal jugular 357 days                Physical Exam  Vitals and nursing note reviewed. Constitutional:       Appearance: He is ill-appearing (Chronically). HENT:      Right Ear: External ear normal.      Left Ear: External ear normal.   Eyes:      General: No scleral icterus. Right eye: No discharge. Left eye: No discharge. Cardiovascular:      Rate and Rhythm: Regular rhythm. Bradycardia present. Pulses: Normal pulses. Pulmonary:      Effort: Pulmonary effort is normal. No accessory muscle usage or respiratory distress. Comments: Coarse breath sounds, moist occasionally productive cough  Abdominal:      General: Bowel sounds are normal. There is no distension. Palpations: Abdomen is soft. Musculoskeletal:      Right lower leg: No edema. Left lower leg: No edema. Skin:     General: Skin is warm and dry. Capillary Refill: Capillary refill takes less than 2 seconds. Neurological:      General: No focal deficit present. Mental Status: He is alert. Mental status is at baseline. Psychiatric:         Mood and Affect: Mood normal.         Lab Results:   I have personally reviewed pertinent lab results.     CBC with diff:   Results from last 7 days   Lab Units 09/13/23  1008   WBC Thousand/uL 4.95   RBC Million/uL 2.77*   HEMOGLOBIN g/dL 8.4*   HEMATOCRIT % 27.1*   MCV fL 98   MCH pg 30.3   MCHC g/dL 31.0*   RDW % 17.2*   MPV fL 10.4   PLATELETS Thousands/uL 72*     CMP:   Results from last 7 days   Lab Units 09/13/23  1008 09/12/23  1524   SODIUM mmol/L 137 136   CHLORIDE mmol/L 101 100   CO2 mmol/L 28 26   BUN mg/dL 15 11   CREATININE mg/dL 5.03* 4.58*   CALCIUM mg/dL 8.0* 7.3*   AST U/L  --  6*   ALT U/L  --  3*   ALK PHOS U/L  --  73   EGFR ml/min/1.73sq m 10 12     HS Troponin:   0   Lab Value Date/Time    HSTNI 319 (H) 09/13/2023 1008    HSTNI0 17 09/12/2023 1650    HSTNI2 135 (H) 09/12/2023 1935    HSTNI4 234 (H) 09/12/2023 2152    HSTNI4 12 05/19/2023 2042    HSTNI4 24 04/03/2022 1400     BNP:   Results from last 7 days   Lab Units 09/13/23  1008   POTASSIUM mmol/L 3.1*   CHLORIDE mmol/L 101   CO2 mmol/L 28   BUN mg/dL 15   CREATININE mg/dL 5.03*   CALCIUM mg/dL 8.0*   EGFR ml/min/1.73sq m 10     Magnesium:   Results from last 7 days   Lab Units 09/13/23  1008   MAGNESIUM mg/dL 1.8*     Lipid Profile:     Imaging: I have personally reviewed pertinent reports.     EKG: Twelve-lead EKG demonstrates sinus bradycardia with right bundle branch block, this appears to be new and transient  VTE Prophylaxis: Sequential compression device Damian Chávez     Code Status: Level 1 - Full Code  Advance Directive and Living Will:      Power of :    POLST:      [address] [facility]  Cardiology

## 2023-09-13 NOTE — ASSESSMENT & PLAN NOTE
· Presented to the ER with hypotension following dialysis today  · Required 2 L IV fluid bolus and IV albumin in the ER to bring blood pressure to 90s over 60s  · BP now stable  · Stop IVF and IV Albumin   · Continue midodrine 10 mg 3 times daily with meals  · Monitor vital signs every 4 hours

## 2023-09-13 NOTE — ASSESSMENT & PLAN NOTE
· Troponin levels 17 -> 135 -> 234 -> 309  · No chest pain  · EKG with sinus ya  · Less than 3 second pauses noted telemetry   · Consulted cardiology   · Continue home aspirin, statin, Plavix

## 2023-09-13 NOTE — PLAN OF CARE
Problem: PHYSICAL THERAPY ADULT  Goal: Performs mobility at highest level of function for planned discharge setting. See evaluation for individualized goals. Description: Treatment/Interventions: ADL retraining, Functional transfer training, LE strengthening/ROM, Therapeutic exercise, Gait training, Bed mobility, Equipment eval/education, Patient/family training          See flowsheet documentation for full assessment, interventions and recommendations. Note: Prognosis: Good  Problem List: Decreased strength, Decreased endurance, Impaired balance, Decreased mobility  Assessment: Patient is an 70y.o. year old male seen for Physical Therapy evaluation. Patient admitted with Hypotension. Comorbidities affecting patient's physical performance include: ESRD on HD, CVA, anxiety, hypotension, ambulatory dysfunction. Personal factors affecting patient at time of initial evaluation include: ambulating with assistive device, stairs to enter home, inability to ambulate household distances and inability to perform ADLS. Prior to admission, patient was independent with functional mobility with rollator. Please find objective findings from Physical Therapy assessment regarding body systems outlined above with impairments and limitations including weakness, impaired balance, gait deviations, decreased activity tolerance, decreased functional mobility tolerance and fall risk. The Barthel Index was used as a functional outcome tool presenting with a score of Barthel Index Score: 50 today indicating marked limitations of functional mobility and ADLS. Patient's clinical presentation is currently unstable/unpredictable as seen in patient's presentation of increased fall risk, new onset of impairment of functional mobility, decreased endurance and new onset of weakness. Pt would benefit from continued Physical Therapy treatment to address deficits as defined above and maximize level of functional mobility.  As demonstrated by objective findings, the assigned level of complexity for this evaluation is high. The patient's AM-PAC Basic Mobility Inpatient Short Form Raw Score is 15. A Raw score of less than or equal to 16 suggests the patient may benefit from discharge to post-acute rehabilitation services, however hopeful that as pt's medical status improves so will his function so recommend home PT upon DC. PT Discharge Recommendation: Home with home health rehabilitation    See flowsheet documentation for full assessment.

## 2023-09-13 NOTE — PLAN OF CARE
Problem: MOBILITY - ADULT  Goal: Maintain or return to baseline ADL function  Description: INTERVENTIONS:  -  Assess patient's ability to carry out ADLs; assess patient's baseline for ADL function and identify physical deficits which impact ability to perform ADLs (bathing, care of mouth/teeth, toileting, grooming, dressing, etc.)  - Assess/evaluate cause of self-care deficits   - Assess range of motion  - Assess patient's mobility; develop plan if impaired  - Assess patient's need for assistive devices and provide as appropriate  - Encourage maximum independence but intervene and supervise when necessary  - Involve family in performance of ADLs  - Assess for home care needs following discharge   - Consider OT consult to assist with ADL evaluation and planning for discharge  - Provide patient education as appropriate  Outcome: Progressing  Goal: Maintains/Returns to pre admission functional level  Description: INTERVENTIONS:  - Perform BMAT or MOVE assessment daily.   - Set and communicate daily mobility goal to care team and patient/family/caregiver. - Collaborate with rehabilitation services on mobility goals if consulted  - Perform Range of Motion 4 times a day. - Reposition patient every 2 hours.   - Dangle patient 3 times a day  - Stand patient 3 times a day  - Ambulate patient 3 times a day  - Out of bed to chair 3 times a day   - Out of bed for meals 3 times a day  - Out of bed for toileting  - Record patient progress and toleration of activity level   Outcome: Progressing     Problem: PAIN - ADULT  Goal: Verbalizes/displays adequate comfort level or baseline comfort level  Description: Interventions:  - Encourage patient to monitor pain and request assistance  - Assess pain using appropriate pain scale  - Administer analgesics based on type and severity of pain and evaluate response  - Implement non-pharmacological measures as appropriate and evaluate response  - Consider cultural and social influences on pain and pain management  - Notify physician/advanced practitioner if interventions unsuccessful or patient reports new pain  Outcome: Progressing     Problem: INFECTION - ADULT  Goal: Absence or prevention of progression during hospitalization  Description: INTERVENTIONS:  - Assess and monitor for signs and symptoms of infection  - Monitor lab/diagnostic results  - Monitor all insertion sites, i.e. indwelling lines, tubes, and drains  - Monitor endotracheal if appropriate and nasal secretions for changes in amount and color  - [place] appropriate cooling/warming therapies per order  - Administer medications as ordered  - Instruct and encourage patient and family to use good hand hygiene technique  - Identify and instruct in appropriate isolation precautions for identified infection/condition  Outcome: Progressing     Problem: DISCHARGE PLANNING  Goal: Discharge to home or other facility with appropriate resources  Description: INTERVENTIONS:  - Identify barriers to discharge w/patient and caregiver  - Arrange for needed discharge resources and transportation as appropriate  - Identify discharge learning needs (meds, wound care, etc.)  - Arrange for interpretive services to assist at discharge as needed  - Refer to Case Management Department for coordinating discharge planning if the patient needs post-hospital services based on physician/advanced practitioner order or complex needs related to functional status, cognitive ability, or social support system  Outcome: Progressing     Problem: Knowledge Deficit  Goal: Patient/family/caregiver demonstrates understanding of disease process, treatment plan, medications, and discharge instructions  Description: Complete learning assessment and assess knowledge base.   Interventions:  - Provide teaching at level of understanding  - Provide teaching via preferred learning methods  Outcome: Progressing

## 2023-09-13 NOTE — CONSULTS
[address] [facility] Dario 70 y.o. male MRN: [id number]  Unit/Bed#: 4 [place] 402-01 Encounter: [id number]    ASSESSMENT and PLAN:  1. ESRD on HD [facility] TTS):   · Electrolytes are stable. · No urgent need for dialysis today. · We will plan for dialysis tomorrow. 2. Access:   · Right IJ PermCath. 3. BP/volume:  · EDW is 65 kg. · He has chronic hypotension and is on midodrine 10 mg TID  · BP is controlled. · Continue midodrine 10 mg TID. · Stop IVF and albumin since hypotension resolved. 4. Anemia:   · Hgb is below goal but stable. 8.8  · It appears that the hemoglobin of 13.1 on 8/30/23 was an outlier reading. · He is not on ALICE in the outpatient setting. 5. Mineral and bone disease:   · He does not appear to be on calcitriol or binders based on his MAR from [facility]. · Continue renal diet. 6. Coronary artery disease  · S/p stent in the past  · Cardiology consulted for abnormal troponins. 7. IBS  8. Ambulatory dysfunction    SUMMARY OF RECOMMENDATIONS:  • Agree with stopping IVF and albumin. • No acute indication for dialysis today. • We will plan for dialysis tomorrow. • Continue midodrine 10 mg TID. The highlighted and/or bolded points in my assessment, plan, and disposition were discussed with the primary team and they agree with those points and the plan. Previous HD records were personally reviewed by me to obtain a baseline creatinine. The images (CXR) were personally reviewed by me in PACS    HISTORY OF PRESENT ILLNESS:  Requesting Physician: Jenny, Anil  Reason for Consult: ESRD on HD    Lachelle Mosquera is a 70 y.o. male who was admitted to [facility] after presenting with dizziness/lightheadedness. A renal consultation is requested today for assistance in the management of ESRD. Lachelle Mosquera is a known ESRD patient who undergoes maintenance hemodialysis at [facility] on a TTS schedule.      Cindy  has a history of hypertension, diabetes mellitus, hyperlipidemia, end-stage renal disease, coronary artery disease s/p stent, cardiomyopathy, cerebrovascular disease, irritable bowel syndrome. He now presents to [facility] after complaining of lightheadedness and dizziness. He was at his dialysis treatment on Tuesday (yesterday) and reports that he started feeling lightheaded during the dialysis treatment. He denied any chest pain or shortness of breath. There was no nausea, vomiting, abdominal pain, diarrhea. Of note, he reports forgetting to take his midodrine that day. After the completion of dialysis, he reports that he continued to feel dizzy and lightheaded prompting him to come to the [facility] ER. In the ER, he was found to have an SBP in the 50s (53/35) on presentation. He was given isotonic saline and albumin with improvement in his blood pressure readings. His blood pressure is up to 110s to 120s over 50s today. We are now being consulted for assistance with management of ESRD on HD. He denies any fever, chills.     PAST MEDICAL HISTORY:  Past Medical History:   Diagnosis Date   • 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    With Cerebral Infarction:  Last Assessed:  October 20, 2016   • Chronic kidney disease 2012    on dialysis    • COVID 05/2022   • COVID-19 04/2022 4/2022-while in [place]   • Diabetes mellitus ([address])    • Dialysis patient [facility])     T/TH/Sat   • Difficulty walking    • GERD (gastroesophageal reflux disease)    • Hyperlipidemia    • Hypertension    • Labyrinthitis 09/10/2011   • Myocardial infarction [facility]) 2014    x3 others were in 2009 & 2010   • Sleep disturbances 09/20/2010   • Stroke [facility]) 2007    x1, RLE deficit- uses walker   • Type 2 diabetes, uncontrolled, with renal manifestation 05/03/2017     PAST SURGICAL HISTORY:  Past Surgical History:   Procedure Laterality Date   • AV FISTULA PLACEMENT     • AV FISTULA REPAIR Left 6/3/2023    Procedure: LEFT UPPER EXTREMITY A-V GRAFT EXPLANT, LEFT BRACHIAL ANGIOPATCH,  APPLICATION OF  VAC;  Surgeon: Flaquita Rider MD;  Location[facility];  Service: Vascular   • CARDIAC CATHETERIZATION Left 02/03/2023    Procedure: Cardiac Left Heart Cath;  Surgeon: Lucas Rodríguez MD;  Location: [address] [place] [address] CATH LAB; Service: Cardiology   • CARDIAC CATHETERIZATION N/A 02/08/2023    Procedure: Cardiac catheterization with complex PCI with Dr. Jose Raul Villarreal, patient is a renal dialysis patient;  Surgeon: Sharon Lovell MD;  Location:  CARDIAC CATH LAB; Service: Cardiology   • CARDIAC SURGERY  2010    stents x3   • COLONOSCOPY N/A 12/12/2016    Procedure: COLONOSCOPY;  Surgeon: Rosana Baker MD;  Location: [address] [place] [address] [facility] GI LAB; Service:    • COLONOSCOPY N/A 04/03/2017    Procedure:  COLONOSCOPY;  Surgeon: Rosana Baker MD;  Location: [address] [place] [address] [facility] GI LAB; Service:    • HARDWARE REMOVAL Right 04/04/2022    Procedure: REMOVAL HARDWARE HIP;  Surgeon: Rona Sparks MD;  [facility];  Service: Orthopedics   • IR AV FISTULA/GRAFT DECLOT  04/29/2021   • IR AV FISTULA/GRAFT DECLOT  03/25/2022   • IR AV FISTULA/GRAFT DECLOT  09/21/2022   • IR AV FISTULAGRAM/GRAFTOGRAM  03/28/2022   • IR AV FISTULAGRAM/GRAFTOGRAM  07/28/2022   • IR TUNNELED CENTRAL LINE REMOVAL  12/07/2021   • IR TUNNELED DIALYSIS CATHETER PLACEMENT  05/24/2021   • PORTACATH PLACEMENT      per pt "port in chest"   • KS ARTERIOVENOUS ANASTOMOSIS OPEN DIRECT Left 07/22/2021    Procedure: CREATION FISTULA ARTERIOVENOUS (AV);   Surgeon: Farzad Shah MD;  Location: [facility] [place];  Service: Vascular   • KS ARTERIOVENOUS ANASTOMOSIS OPEN DIRECT Left 3/21/2023    Procedure: left brachiobasilic fistula,  AVG Graft;  Surgeon: Bebeto Wagner MD;  [facility];  Service: Vascular   • KS ARTERIOVENOUS ANASTOMOSIS OPEN DIRECT Left 5/19/2023    Procedure: CREATION of LEFT FISTULA ARTERIOVENOUS (AV) GRAFT;  Surgeon: Bebeto Wagner MD;  [facility];  Service: Vascular   • KS HEMIARTHROPLASTY HIP PARTIAL Right 04/04/2022    Procedure: HEMIARTHROPLASTY HIP (BIPOLAR);   Surgeon: Lilly Shearer MD;  Location[facility];  Service: Orthopedics     ALLERGIES:  No Known Allergies    SOCIAL HISTORY:  Social History     Substance and Sexual Activity   Alcohol Use Yes   • Alcohol/week: 1.0 standard drink of alcohol   • Types: 1 Shots of liquor per week    Comment: 1 shot daily     Social History     Substance and Sexual Activity   Drug Use Yes   • Types: Marijuana    Comment: gummies     Social History     Tobacco Use   Smoking Status Every Day   • Packs/day: 1.00   • Years: 30.00   • Total pack years: 30.00   • Types: Cigarettes   Smokeless Tobacco Never   Tobacco Comments    Currently not smoking - in facility     FAMILY HISTORY:  Family History   Problem Relation Age of Onset   • Leukemia Mother    • Crohn's disease Father    • Heart disease Father    • Transient ischemic attack Brother      MEDICATIONS:    Current Facility-Administered Medications:   •  acetaminophen (TYLENOL) tablet 650 mg, 650 mg, Oral, Q6H PRN, Hershall Krupa Surjitinic, DO  •  aspirin chewable tablet 81 mg, 81 mg, Oral, Daily, Hershall Krupa Surjitinic, DO, 81 mg at 09/13/23 8379  •  atorvastatin (LIPITOR) tablet 20 mg, 20 mg, Oral, HS, Hershall Krupa Starsinic, DO, 20 mg at 09/12/23 2144  •  calcitriol (ROCALTROL) capsule 0.75 mcg, 0.75 mcg, Oral, Once per day on Mon Wed Fri, Te Eddyinic, DO, 0.75 mcg at 09/13/23 8743  •  clopidogrel (PLAVIX) tablet 75 mg, 75 mg, Oral, Daily, Hershall Krupa Starsinic, DO, 75 mg at 09/13/23 6948  •  cyanocobalamin (VITAMIN B-12) tablet 1,000 mcg, 1,000 mcg, Oral, Daily, Hershall Krupa Starsinic, DO, 1,000 mcg at 09/13/23 3210  •  docusate sodium (COLACE) capsule 100 mg, 100 mg, Oral, Daily, Hershall Krupa Starsinic, DO, 100 mg at 09/13/23 9436  •  gabapentin (NEURONTIN) capsule 100 mg, 100 mg, Oral, Once per day on Mon Wed Fri, Moises Eddyinic, DO, 100 mg at 09/13/23 2899  •  heparin (porcine) subcutaneous injection 5,000 Units, 5,000 Units, Subcutaneous, Q8H [address], Lorrine Buster Starsinic, DO, 5,000 Units at 09/13/23 0580  •  hydrOXYzine HCL (ATARAX) tablet 25 mg, 25 mg, Oral, Q8H PRN, Lorrine Buster Starsinic, DO  •  midodrine (PROAMATINE) tablet 10 mg, 10 mg, Oral, TID AC, Karol Cortez MD, 10 mg at 16/00/06 0725  •  ondansetron [facility]) injection 4 mg, 4 mg, Intravenous, Q6H PRN, Lorrine Buster Starsinic, DO, 4 mg at 09/13/23 0725  •  oxyCODONE (ROXICODONE) IR tablet 5 mg, 5 mg, Oral, Q4H PRN, Lorrine Buster Starsinic, DO, 5 mg at 09/13/23 0303    REVIEW OF SYSTEMS:  All the systems were reviewed and were negative except as documented on the HPI. PHYSICAL EXAM:  Current Weight: Weight - Scale: 66.3 kg (146 lb 2.6 oz)  First Weight: Weight - Scale: 66.3 kg (146 lb 2.6 oz)  Vitals:    09/13/23 0600 09/13/23 0718 09/13/23 0911 09/13/23 0918   BP:  112/56 115/56 125/58   Pulse:  58 61 65   Resp:  18     Temp:  98 °F (36.7 °C)     TempSrc:       SpO2:  93% 97% 94%   Weight: 66.3 kg (146 lb 2.6 oz)      Height:           Intake/Output Summary (Last 24 hours) at 9/13/2023 1017  Last data filed at 9/12/2023 1929  Gross per 24 hour   Intake 2100 ml   Output --   Net 2100 ml     Physical Exam  General: conscious, coherent, cooperative, not in distress. Skin: warm, dry, good turgor. Eyes: pink conjunctivae, no scleral icterus. ENT: moist lips and mucous membranes. Respiratory: equal chest expansion, clear breath sounds. Cardiovascular: distinct heart sounds, normal rate, regular rhythm, no rub  Abdomen: soft, non-tender, non-distended, normoactive bowel sounds  Extremities: no edema. Genitourinary: no bray catheter. Neuro: awake, alert, oriented to time, place and person. Psych: appropriate affect. Invasive Devices: R IJ permcath.       Lab Results:   Results from last 7 days   Lab Units 09/12/23  1524   WBC Thousand/uL 7.19   HEMOGLOBIN g/dL 8.8*   HEMATOCRIT % 27.5*   PLATELETS Thousands/uL 69*   POTASSIUM mmol/L 3.1*   CHLORIDE mmol/L 100   CO2 mmol/L 26   BUN mg/dL 11   CREATININE mg/dL 4.58*   CALCIUM mg/dL 7.3*   ALK PHOS U/L 73   ALT U/L 3*   AST U/L 6*     Lab Results   Component Value Date    CALCIUM 7.3 (L) 09/12/2023    PHOS 3.5 06/08/2023     Other Studies:   Chest x-ray personally reviewed by me in PACS showed no vascular congestion.

## 2023-09-13 NOTE — OCCUPATIONAL THERAPY NOTE
OT EVALUATION       09/13/23 0936   OT Last Visit   OT Visit Date 09/13/23   Note Type   Note type Evaluation   Pain Assessment   Pain Assessment Tool 0-10   Pain Score No Pain   Restrictions/Precautions   Other Precautions Chair Alarm; Bed Alarm; Fall Risk   Home Living   Type of [facility]  One level  (3 PHILLY)   Bathroom Shower/Tub Walk-in shower   Bathroom Toilet Raised   Bathroom Equipment Commode; Shower chair;Grab bars in shower  (uses BSC over toilet)   Home Equipment   (rollator, pt uses at home)   Prior Function   Level of [place] Independent with ADLs; Independent with functional mobility; Needs assistance with IADLS   Lives With Spouse   Receives Help From Family   IADLs Independent with driving   Comments Patient admitted with orthostatic hypotension. ADL   Eating Assistance 7  Independent   Grooming Assistance 5  Supervision/Setup   UB Bathing Assistance 4  Minimal Assistance   LB Bathing Assistance 4  Minimal Assistance   UB Dressing Assistance 4  Minimal Assistance   LB Dressing Assistance 4  Minimal Assistance   Toileting Assistance  4  Minimal Assistance   Transfers   Sit to Stand 4  Minimal assistance   Stand to Sit 4  Minimal assistance   Functional Mobility   Functional Mobility 4  Minimal assistance   Additional Comments few steps with RW, fatigued   Balance   Static Sitting Fair +   Dynamic Sitting Fair   Static Standing Fair -   Dynamic Standing Fair -   Activity Tolerance   Activity Tolerance Patient limited by fatigue   RUE Assessment   RUE Assessment WFL   LUE Assessment   LUE Assessment WFL   Sensation   Light Touch Partial deficits in the LUE  (pt reports L hand numbness)   Cognition   Overall Cognitive Status WFL   Arousal/Participation Cooperative   Attention Within functional limits   Orientation Level Oriented X4   Following Commands Follows multistep commands with increased time or repetition   Assessment   Limitation Decreased ADL status; Decreased UE strength;Decreased Safe judgement during ADL;Decreased endurance;Decreased self-care trans;Decreased high-level ADLs  (decreased balance and mobility)   Prognosis Good   Assessment Patient evaluated by Occupational Therapy. Patient admitted with Hypotension. The patients occupational profile, medical and therapy history includes a extensive additional review of physical, cognitive, or psychosocial history related to current functional performance. Comorbidities affecting functional mobility and ADLS include: CKD, [id number], CVA, diabetes, hypertension and MI. Prior to admission, patient was independent with functional mobility with walker, independent with ADLS and requiring assist for IADLS. The evaluation identifies the following performance deficits: weakness, impaired balance, decreased endurance, increased fall risk, new onset of impairment of functional mobility, decreased ADLS, decreased IADLS, decreased activity tolerance, decreased safety awareness, impaired judgement and decreased strength, that result in activity limitations and/or participation restrictions. This evaluation requires clinical decision making of high complexity, because the patient presents with comorbidites that affect occupational performance and required significant modification of tasks or assistance with consideration of multiple treatment options. The Barthel Index was used as a functional outcome tool presenting with a score of Barthel Index Score: 60, indicating marked limitations of functional mobility and ADLS. The patient's raw score on the -PAC Daily Activity Inpatient Short Form is 20. A raw score of greater than or equal to 19 suggests the patient may benefit from discharge to home. Please refer to the recommendation of the Occupational Therapist for safe discharge planning. Patient will benefit from skilled Occupational Therapy services to address above deficits and facilitate a safe return to prior level of function.    Goals Patient Goals to get stronger   STG Time Frame   (1-7 days)   Short Term Goal  Goals established to promote Patient Goals: to get stronger:  Grooming: supervision standing at sink; Bathing: supervision; Upper Body Dressing supervision; Lower Body Dressing: supervision; Toileting: supervision; Patient will increase ambulatory standard toilet transfer to supervision with rolling walker to increase performance and safety with ADLS and functional mobility; Patient will increase standing tolerance to 3 minutes during ADL task to decrease assistance level and decrease fall risk; Patient will increase bed mobility to supervision in preparation for ADLS and transfers; Patient will increase functional mobility to and from bathroom with rolling walker with supervision to increase performance with ADLS and to use a toilet; Patient will tolerate 5 minutes of UE ROM/strengthening to increase general activity tolerance and performance in ADLS/IADLS; Patient will improve functional activity tolerance to 10 minutes of sustained functional tasks to increase participation in basic self-care and decrease assistance level;   Patient will increase dynamic sitting balance to fair+ to improve the ability to sit at edge of bed or on a chair for ADLS;  Patient will increase dynamic standing balance to fair to improve postural stability and decrease fall risk during standing ADLS and transfers. LTG Time Frame   (8-14 days)   Long Term Goal Grooming: independent standing at sink; Bathing: independent; Upper Body Dressing independent; Lower Body Dressing: independent; Toileting: independent; Patient will increase ambulatory standard toilet transfer to independent with rolling walker to increase performance and safety with ADLS and functional mobility;  Patient will increase standing tolerance to 6 minutes during ADL task to decrease assistance level and decrease fall risk; Patient will increase bed mobility to independent in preparation for ADLS and transfers; Patient will increase functional mobility to and from bathroom with rolling walker independently to increase performance with ADLS and to use a toilet; Patient will tolerate 10 minutes of UE ROM/strengthening to increase general activity tolerance and performance in ADLS/IADLS; Patient will improve functional activity tolerance to 20 minutes of sustained functional tasks to increase participation in basic self-care and decrease assistance level;   Patient will increase dynamic sitting balance to good to improve the ability to sit at edge of bed or on a chair for ADLS;  Patient will increase dynamic standing balance to fair+ to improve postural stability and decrease fall risk during standing ADLS and transfers. Pt will score >/= 24/24 on AM-PAC Daily Activity Inpatient scale to promote safe independence with ADLs and functional mobility; Pt will score >/= 90/100 on Barthel Index in order to decrease caregiver assistance needed and increase ability to perform ADLs and functional mobility. Plan   Treatment Interventions ADL retraining;Functional transfer training;UE strengthening/ROM; Endurance training;Patient/family training;Equipment evaluation/education; Activityengagement; Compensatory technique education   Goal Expiration Date 09/27/23   OT Frequency 3-5x/wk   Recommendation   OT Discharge Recommendation Home with home health rehabilitation   AM-[facility] Daily Activity Inpatient   Lower Body Dressing 3   Bathing 3   Toileting 3   Upper Body Dressing 3   Grooming 4   Eating 4   Daily Activity Raw Score 20   Daily Activity Standardized Score (Calc for Raw Score >=11) 42.03   AM-[facility] Applied Cognition Inpatient   Following a Speech/Presentation 4   Understanding Ordinary Conversation 4   Taking Medications 4   Remembering Where Things Are Placed or Put Away 4   Remembering List of 4-5 Errands 4   Taking Care of Complicated Tasks 4   Applied Cognition Raw Score 24   Applied Cognition Standardized Score 62.21   Barthel Index   Feeding 10   Bathing 0   Grooming Score 5   Dressing Score 5   Bladder Score 10   Bowels Score 10   Toilet Use Score 5   Transfers (Bed/Chair) Score 10   Mobility (Level Surface) Score 0   Stairs Score 5   Barthel Index Score 61   Licensure   [place] License Number  Velasquezchristiana Hull MS OTR/L [id number]

## 2023-09-14 ENCOUNTER — APPOINTMENT (INPATIENT)
Dept: DIALYSIS | Facility: HOSPITAL | Age: 68
DRG: 314 | End: 2023-09-14
Payer: MEDICARE

## 2023-09-14 VITALS
TEMPERATURE: 96.6 F | HEART RATE: 64 BPM | OXYGEN SATURATION: 98 % | HEIGHT: 70 IN | WEIGHT: 152.9 LBS | DIASTOLIC BLOOD PRESSURE: 40 MMHG | SYSTOLIC BLOOD PRESSURE: 112 MMHG | BODY MASS INDEX: 21.89 KG/M2 | RESPIRATION RATE: 14 BRPM

## 2023-09-14 LAB
ANION GAP SERPL CALCULATED.3IONS-SCNC: 6 MMOL/L
BASOPHILS # BLD AUTO: 0.03 THOUSANDS/ÂΜL (ref 0–0.1)
BASOPHILS NFR BLD AUTO: 1 % (ref 0–1)
BUN SERPL-MCNC: 16 MG/DL (ref 5–25)
CALCIUM SERPL-MCNC: 7.6 MG/DL (ref 8.4–10.2)
CARDIAC TROPONIN I PNL SERPL HS: 167 NG/L (ref 8–18)
CHLORIDE SERPL-SCNC: 103 MMOL/L (ref 96–108)
CO2 SERPL-SCNC: 29 MMOL/L (ref 21–32)
CREAT SERPL-MCNC: 4.83 MG/DL (ref 0.6–1.3)
EOSINOPHIL # BLD AUTO: 0.08 THOUSAND/ÂΜL (ref 0–0.61)
EOSINOPHIL NFR BLD AUTO: 2 % (ref 0–6)
ERYTHROCYTE [DISTWIDTH] IN BLOOD BY AUTOMATED COUNT: 17.4 % (ref 11.6–15.1)
GFR SERPL CREATININE-BSD FRML MDRD: 11 ML/MIN/1.73SQ M
GLUCOSE SERPL-MCNC: 82 MG/DL (ref 65–140)
HCT VFR BLD AUTO: 26.1 % (ref 36.5–49.3)
HGB BLD-MCNC: 8.5 G/DL (ref 12–17)
IMM GRANULOCYTES # BLD AUTO: 0.02 THOUSAND/UL (ref 0–0.2)
IMM GRANULOCYTES NFR BLD AUTO: 0 % (ref 0–2)
LYMPHOCYTES # BLD AUTO: 0.63 THOUSANDS/ÂΜL (ref 0.6–4.47)
LYMPHOCYTES NFR BLD AUTO: 12 % (ref 14–44)
MAGNESIUM SERPL-MCNC: 1.8 MG/DL (ref 1.9–2.7)
MCH RBC QN AUTO: 31.8 PG (ref 26.8–34.3)
MCHC RBC AUTO-ENTMCNC: 32.6 G/DL (ref 31.4–37.4)
MCV RBC AUTO: 98 FL (ref 82–98)
MONOCYTES # BLD AUTO: 0.17 THOUSAND/ÂΜL (ref 0.17–1.22)
MONOCYTES NFR BLD AUTO: 3 % (ref 4–12)
NEUTROPHILS # BLD AUTO: 4.17 THOUSANDS/ÂΜL (ref 1.85–7.62)
NEUTS SEG NFR BLD AUTO: 82 % (ref 43–75)
NRBC BLD AUTO-RTO: 0 /100 WBCS
PHOSPHATE SERPL-MCNC: 1.3 MG/DL (ref 2.3–4.1)
PLATELET # BLD AUTO: 78 THOUSANDS/UL (ref 149–390)
PLATELET BLD QL SMEAR: ABNORMAL
PMV BLD AUTO: 10.5 FL (ref 8.9–12.7)
POTASSIUM SERPL-SCNC: 3.7 MMOL/L (ref 3.5–5.3)
RBC # BLD AUTO: 2.67 MILLION/UL (ref 3.88–5.62)
RBC MORPH BLD: NORMAL
SODIUM SERPL-SCNC: 138 MMOL/L (ref 135–147)
WBC # BLD AUTO: 5.1 THOUSAND/UL (ref 4.31–10.16)

## 2023-09-14 PROCEDURE — 85025 COMPLETE CBC W/AUTO DIFF WBC: CPT | Performed by: NURSE PRACTITIONER

## 2023-09-14 PROCEDURE — 83735 ASSAY OF MAGNESIUM: CPT | Performed by: NURSE PRACTITIONER

## 2023-09-14 PROCEDURE — 84100 ASSAY OF PHOSPHORUS: CPT | Performed by: NURSE PRACTITIONER

## 2023-09-14 PROCEDURE — 80048 BASIC METABOLIC PNL TOTAL CA: CPT | Performed by: NURSE PRACTITIONER

## 2023-09-14 PROCEDURE — 90935 HEMODIALYSIS ONE EVALUATION: CPT | Performed by: INTERNAL MEDICINE

## 2023-09-14 PROCEDURE — 99232 SBSQ HOSP IP/OBS MODERATE 35: CPT | Performed by: INTERNAL MEDICINE

## 2023-09-14 PROCEDURE — 99239 HOSP IP/OBS DSCHRG MGMT >30: CPT | Performed by: NURSE PRACTITIONER

## 2023-09-14 PROCEDURE — 5A1D70Z PERFORMANCE OF URINARY FILTRATION, INTERMITTENT, LESS THAN 6 HOURS PER DAY: ICD-10-PCS | Performed by: INTERNAL MEDICINE

## 2023-09-14 PROCEDURE — 84484 ASSAY OF TROPONIN QUANT: CPT | Performed by: NURSE PRACTITIONER

## 2023-09-14 RX ORDER — OXYCODONE HYDROCHLORIDE 5 MG/1
2.5 TABLET ORAL EVERY 8 HOURS PRN
Qty: 5 TABLET | Refills: 0 | Status: SHIPPED | OUTPATIENT
Start: 2023-09-14

## 2023-09-14 RX ADMIN — MIDODRINE HYDROCHLORIDE 10 MG: 5 TABLET ORAL at 06:10

## 2023-09-14 RX ADMIN — DOCUSATE SODIUM 100 MG: 100 CAPSULE, LIQUID FILLED ORAL at 11:48

## 2023-09-14 RX ADMIN — MIDODRINE HYDROCHLORIDE 10 MG: 5 TABLET ORAL at 11:48

## 2023-09-14 RX ADMIN — CYANOCOBALAMIN TAB 500 MCG 1000 MCG: 500 TAB at 11:48

## 2023-09-14 RX ADMIN — ASPIRIN 81 MG CHEWABLE TABLET 81 MG: 81 TABLET CHEWABLE at 11:48

## 2023-09-14 RX ADMIN — POTASSIUM & SODIUM PHOSPHATES POWDER PACK 280-160-250 MG 2 PACKET: 280-160-250 PACK at 11:48

## 2023-09-14 RX ADMIN — HEPARIN SODIUM 5000 UNITS: 5000 INJECTION INTRAVENOUS; SUBCUTANEOUS at 05:08

## 2023-09-14 RX ADMIN — CLOPIDOGREL BISULFATE 75 MG: 75 TABLET ORAL at 11:48

## 2023-09-14 RX ADMIN — Medication 2.5 MG: at 05:07

## 2023-09-14 NOTE — PLAN OF CARE
Problem: MOBILITY - ADULT  Goal: Maintain or return to baseline ADL function  Description: INTERVENTIONS:  -  Assess patient's ability to carry out ADLs; assess patient's baseline for ADL function and identify physical deficits which impact ability to perform ADLs (bathing, care of mouth/teeth, toileting, grooming, dressing, etc.)  - Assess/evaluate cause of self-care deficits   - Assess range of motion  - Assess patient's mobility; develop plan if impaired  - Assess patient's need for assistive devices and provide as appropriate  - Encourage maximum independence but intervene and supervise when necessary  - Involve family in performance of ADLs  - Assess for home care needs following discharge   - Consider OT consult to assist with ADL evaluation and planning for discharge  - Provide patient education as appropriate  Outcome: Progressing     Problem: PAIN - ADULT  Goal: Verbalizes/displays adequate comfort level or baseline comfort level  Description: Interventions:  - Encourage patient to monitor pain and request assistance  - Assess pain using appropriate pain scale  - Administer analgesics based on type and severity of pain and evaluate response  - Implement non-pharmacological measures as appropriate and evaluate response  - Consider cultural and social influences on pain and pain management  - Notify physician/advanced practitioner if interventions unsuccessful or patient reports new pain  Outcome: Progressing     Problem: INFECTION - ADULT  Goal: Absence or prevention of progression during hospitalization  Description: INTERVENTIONS:  - Assess and monitor for signs and symptoms of infection  - Monitor lab/diagnostic results  - Monitor all insertion sites, i.e. indwelling lines, tubes, and drains  - Monitor endotracheal if appropriate and nasal secretions for changes in amount and color  - [place] appropriate cooling/warming therapies per order  - Administer medications as ordered  - Instruct and encourage patient and family to use good hand hygiene technique  - Identify and instruct in appropriate isolation precautions for identified infection/condition  Outcome: Progressing     Problem: SAFETY ADULT  Goal: Maintain or return to baseline ADL function  Description: INTERVENTIONS:  -  Assess patient's ability to carry out ADLs; assess patient's baseline for ADL function and identify physical deficits which impact ability to perform ADLs (bathing, care of mouth/teeth, toileting, grooming, dressing, etc.)  - Assess/evaluate cause of self-care deficits   - Assess range of motion  - Assess patient's mobility; develop plan if impaired  - Assess patient's need for assistive devices and provide as appropriate  - Encourage maximum independence but intervene and supervise when necessary  - Involve family in performance of ADLs  - Assess for home care needs following discharge   - Consider OT consult to assist with ADL evaluation and planning for discharge  - Provide patient education as appropriate  Outcome: Progressing     Problem: Nutrition/Hydration-ADULT  Goal: Nutrient/Hydration intake appropriate for improving, restoring or maintaining nutritional needs  Description: Monitor and assess patient's nutrition/hydration status for malnutrition. Collaborate with interdisciplinary team and initiate plan and interventions as ordered. Monitor patient's weight and dietary intake as ordered or per policy. Utilize nutrition screening tool and intervene as necessary. Determine patient's food preferences and provide high-protein, high-caloric foods as appropriate.      INTERVENTIONS:  - Monitor oral intake, urinary output, labs, and treatment plans  - Assess nutrition and hydration status and recommend course of action  - Evaluate amount of meals eaten  - Assist patient with eating if necessary   - Allow adequate time for meals  - Recommend/ encourage appropriate diets, oral nutritional supplements, and vitamin/mineral supplements  - Order, calculate, and assess calorie counts as needed  - Recommend, monitor, and adjust tube feedings and TPN/PPN based on assessed needs  - Assess need for intravenous fluids  - Provide specific nutrition/hydration education as appropriate  - Include patient/family/caregiver in decisions related to nutrition  Outcome: Progressing     Problem: Prexisting or High Potential for Compromised Skin Integrity  Goal: Skin integrity is maintained or improved  Description: INTERVENTIONS:  - Identify patients at risk for skin breakdown  - Assess and monitor skin integrity  - Assess and monitor nutrition and hydration status  - Monitor labs   - Assess for incontinence   - Turn and reposition patient  - Assist with mobility/ambulation  - Relieve pressure over bony prominences  - Avoid friction and shearing  - Provide appropriate hygiene as needed including keeping skin clean and dry  - Evaluate need for skin moisturizer/barrier cream  - Collaborate with interdisciplinary team   - Patient/family teaching  - Consider wound care consult   Outcome: Progressing

## 2023-09-14 NOTE — PROGRESS NOTES
[address] [place] [address] PROGRESS NOTE   Trevon Guillen 70 y.o. male MRN: [id number]  Unit/Bed#: 913 [facility] 402-01 Encounter: [id number]  Reason for Consult: ESRD on HD    ASSESSMENT and PLAN:  1. ESRD on HD [facility]):   • HD today. 2. Access:   • Right IJ PermCath. • L arm cannot be used for access anymore - may use this arm for IV and BP checks. • R arm should have limb alert.      3. BP/volume:  • EDW is 65 kg. • He has chronic hypotension and is on midodrine 10 mg TID  • BP is at goal today. Continue midodrine 10 mg TID. • 1.5 kg UF on HD today. 4. Anemia:   • Hgb is below goal but stable. 8.5 today. • He is not on ALICE in the outpatient setting.     5. Mineral and bone disease:   • Phos is below goal - 1.3. • Will give Phos replacement. • Not on calcitriol or binders based on his MAR from [facility]. • Liberalize diet. 6. Coronary artery disease  • S/p stent in the past  • Cardiology consulted for abnormal troponins.     7. IBS  8. Ambulatory dysfunction    DISPOSITION:  · HD today. · 1.5 kg UF on HD today. · Give PhosNak after HD today to replace phos. · Liberalize diet. · R arm should have limb alert. SUBJECTIVE / 24H INTERVAL HISTORY:  No CP or SOB. Appetite has been poor. HEMODIALYSIS PROCEDURE NOTE  The patient was seen and examined on hemodialysis. Time: 3.5 hours  Sodium: 137 Blood flow: 350   Dialyzer: F160 Potassium: 4 Dialysate flow: 500   Access: R IJ permcath Bicarbonate: 35 Ultrafiltration goal: 1.5 kg   Medications on HD: none.       OBJECTIVE:  Current Weight: Weight - Scale: 69.4 kg (152 lb 14.4 oz)  Vitals:    09/14/23 0805 09/14/23 0830 09/14/23 0900 09/14/23 0930   BP: 117/60 (!) 130/49 122/73 116/73   BP Location: Left arm      Pulse: (!) 48 (!) 54 (!) 47 61   Resp: 16 14 16 14   Temp: 97.5 °F (36.4 °C)      TempSrc:       SpO2: 96% 97% 96% 96%   Weight:       Height:           Intake/Output Summary (Last 24 hours) at 9/14/2023 3702  Last data filed at 9/14/2023 0805  Gross per 24 hour   Intake 720 ml   Output --   Net 720 ml     General: conscious, cooperative, no distress  Skin: dry  Eyes: pink conjunctivae  ENT: moist mucous membranes  Respiratory: equal chest expansion, clear breath sounds. Cardiovascular: distinct heart sounds, normal rate, regular rhythm, no rub  Abdomen: soft, non tender, non distended, normal bowel sounds  Extremities: no edema. Genitourinary: no bray catheter. Neuro: awake, alert.    Psych: appropriate affect    Medications:  Current Facility-Administered Medications   Medication Dose Route Frequency Provider Last Rate Last Admin   • acetaminophen (TYLENOL) tablet 650 mg  650 mg Oral Q6H PRN Claudette Reil Starsinic, DO       • aspirin chewable tablet 81 mg  81 mg Oral Daily Claudettemoe Ochoa, DO   81 mg at 09/13/23 1903   • atorvastatin (LIPITOR) tablet 20 mg  20 mg Oral HS Claudette Reil Starsinic, DO   20 mg at 09/13/23 2203   • calcitriol (ROCALTROL) capsule 0.75 mcg  0.75 mcg Oral Once per day on Mon Wed Fri Claudette Reil Starsinic, DO   0.75 mcg at 09/13/23 1128   • clopidogrel (PLAVIX) tablet 75 mg  75 mg Oral Daily Claudette Reil Starsinic, DO   75 mg at 09/13/23 5317   • cyanocobalamin (VITAMIN B-12) tablet 1,000 mcg  1,000 mcg Oral Daily Claudettemoe Eddyinic, DO   1,000 mcg at 09/13/23 9276   • docusate sodium (COLACE) capsule 100 mg  100 mg Oral Daily Claudette Reil Starsinic, DO   100 mg at 09/13/23 6781   • gabapentin (NEURONTIN) capsule 100 mg  100 mg Oral Once per day on Mon Wed Fri Claudette Reil Starsinic, DO   100 mg at 09/13/23 0768   • heparin (porcine) subcutaneous injection 5,000 Units  5,000 Units Subcutaneous Counts include 234 beds at the [facility] Claudette Reil Starsinic, DO   5,000 Units at 09/14/23 7261   • hydrOXYzine HCL (ATARAX) tablet 25 mg  25 mg Oral Q8H PRN Claudette Reil Starsinic, DO       • midodrine (PROAMATINE) tablet 10 mg  10 mg Oral TID AC Abbey Matute MD   10 mg at 46/06/14 0610   • ondansetron (ZOFRAN) injection 4 mg  4 mg Intravenous Q6H PRN Farrah Saver Surjitanjali DO   4 mg at 09/13/23 0725   • oxyCODONE (ROXICODONE) split tablet 2.5 mg  2.5 mg Oral Q6H PRN TESSY Jones   2.5 mg at 09/14/23 0507     Laboratory Results:  Results from last 7 days   Lab Units 09/14/23  0829 09/13/23  1008 09/12/23  1524   WBC Thousand/uL 5.10 4.95 7.19   HEMOGLOBIN g/dL 8.5* 8.4* 8.8*   HEMATOCRIT % 26.1* 27.1* 27.5*   PLATELETS Thousands/uL 78* 72* 69*   POTASSIUM mmol/L 3.7 3.1* 3.1*   CHLORIDE mmol/L 103 101 100   CO2 mmol/L 29 28 26   BUN mg/dL 16 15 11   CREATININE mg/dL 4.83* 5.03* 4.58*   CALCIUM mg/dL 7.6* 8.0* 7.3*   MAGNESIUM mg/dL 1.8* 1.8*  --    PHOSPHORUS mg/dL 1.3* 1.7*  --

## 2023-09-14 NOTE — QUICK NOTE
Quick note  4/19/2023 1510    Message sent to office to schedule patient for outpatient visit with 2-week monitor. If heart rate remains low will need pacemaker implantation. 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     [address]  Cardiology

## 2023-09-14 NOTE — ASSESSMENT & PLAN NOTE
· Presented with post HD hypotension  · Right anterior chest wall permcath  · Considered AV fistula   · Appreciate nephrology input  · Tolerated HD today   · BP stable on Midodrine   · Encourage compliance

## 2023-09-14 NOTE — ASSESSMENT & PLAN NOTE
· Troponin levels 17 -> 135 -> 234 -> 309  · No chest pain  · EKG with sinus ya  · Less than 3 second pauses noted telemetry   · Cardiology following  · Continue home aspirin, statin, Plavix  · Stressed compliance with meds

## 2023-09-14 NOTE — ASSESSMENT & PLAN NOTE
· Presented to the ER with hypotension following dialysis   · Required 2 L IV fluid bolus and IV albumin in the ER to bring blood pressure to 90s over 60s  · Was not taking his Midodrine at home   · BP now stable off IVF and IV Albumin   · Continue midodrine 10 mg 3 times daily with meals and encourage compliance   · Outpatient follow-up with PCP and cardiology  · Resume HHS

## 2023-09-14 NOTE — DISCHARGE SUMMARY
[address]  Discharge- Jasmin Robin 1955, 70 y.o. male MRN: [id number]  Unit/Bed#: [address] [place] [address] Encounter: [id number]  Primary Care Provider: Neetu Hendrickson MD   Date and time admitted to hospital: 9/12/2023  2:31 PM    * Hypotension  Assessment & Plan  · Presented to the ER with hypotension following dialysis   · Required 2 L IV fluid bolus and IV albumin in the ER to bring blood pressure to 90s over 60s  · Was not taking his Midodrine at home   · BP now stable off IVF and IV Albumin   · Continue midodrine 10 mg 3 times daily with meals and encourage compliance   · Outpatient follow-up with PCP and cardiology  · Resume HHS    Bradycardia  Assessment & Plan  Noted on EKG and telemetry   · Cardiology following  · OK for discharge home with close outpatient follow-up  · If bradycardia in outpatient setting, consider PPM     End-stage renal disease   Assessment & Plan  · Presented with post HD hypotension  · Right anterior chest wall permcath  · Considered AV fistula   · Appreciate nephrology input  · Tolerated HD today   · BP stable on Midodrine   · Encourage compliance     Coronary artery disease   Assessment & Plan  · Troponin levels 17 -> 135 -> 234 -> 309  · No chest pain  · EKG with sinus ya  · Less than 3 second pauses noted telemetry   · Cardiology following  · Continue home aspirin, statin, Plavix  · Stressed compliance with meds     Ambulatory dysfunction  Assessment & Plan  · PT/OT evaluation - rec home health services       Medical Problems     Resolved Problems  Date Reviewed: 9/14/2023   None       Discharging Physician / Practitioner: TESSY Mckeon  PCP: Neetu Hendrickson MD  Admission Date:   Admission Orders (From admission, onward)     Ordered        09/13/23 1525  Inpatient Admission  Once            09/12/23 1754  Place in Observation  Once                      Discharge Date: 09/14/23    [facility] Stay:  · Nephrology  · Cardiology     Procedures Performed:   · CXR: Small right pleural effusion. Significant Findings / Test Results:   · Bradycardia without significant pauses     Incidental Findings:   · Bradycardia, hypotension    · I reviewed the above mentioned incidental findings with the patient and/or family and they expressed understanding. Test Results Pending at Discharge (will require follow up): · None      Outpatient Tests Requested:  · None     Complications:  None    Reason for Admission: Post HD hypotension     Hospital Course:   Idania Adam is a 70 y.o. male patient with a PMH including ESRD on HD, hypotension on midodrine, HTN, HLD, GERD, CAD, CVA who originally presented to the hospital on 9/12/2023 due to post HD hypotension. He went for routine HD. He was hypotensive in ED and required IVF and IV Albumin. He was non compliant with home Midodrine. He was admitted for further evaluation and treatment. He was found to have mild bradycardia but no significant pauses. He was seen by cardiology and nephrology. He underwent dialysis on day of discharge. He was cleared for discharge home with outpatient cardiology follow-up. If he remains bradycardic, cardiology considering pacemaker insertion. Of note, patient's wife requested Oxycodone refill for patient's pain. Patient given a 10 dose refill and recommended to follow-up with pain management. Please see above list of diagnoses and related plan for additional information. Condition at Discharge: stable    Discharge Day Visit / Exam:   Subjective:  Patient seen and examined at bedside. Resting comfortably on dialysis. Tolerating. No HA, dizziness, lightheadedness, CP, SOB, N/V/D. Desires discharge home. Stressed the importance of medication compliance.    Vitals: Blood Pressure: 99/56 (09/14/23 1100)  Pulse: 63 (09/14/23 1115)  Temperature: (!) 96.6 °F (35.9 °C) (09/14/23 1115)  Temp Source: Tympanic (09/14/23 1115)  Respirations: 16 (09/14/23 1100)  Height: 5' 10" (177.8 cm) (09/12/23 2117)  Weight - Scale: 69.4 kg (152 lb 14.4 oz) (09/14/23 0548)  SpO2: 98 % (09/14/23 1115)  Exam:   Physical Exam  Vitals and nursing note reviewed. Constitutional:       General: He is not in acute distress. Appearance: He is ill-appearing. He is not toxic-appearing or diaphoretic. HENT:      Head: Normocephalic. Mouth/Throat:      Mouth: Mucous membranes are moist.   Eyes:      Conjunctiva/sclera: Conjunctivae normal.   Cardiovascular:      Rate and Rhythm: Normal rate. Pulmonary:      Effort: Pulmonary effort is normal.      Breath sounds: Normal breath sounds. No wheezing, rhonchi or rales. Abdominal:      General: Bowel sounds are normal. There is no distension. Palpations: Abdomen is soft. Tenderness: There is no abdominal tenderness. Musculoskeletal:         General: Normal range of motion. Cervical back: Normal range of motion. Right lower leg: No edema. Left lower leg: No edema. Skin:     General: Skin is warm and dry. Capillary Refill: Capillary refill takes less than 2 seconds. Neurological:      Mental Status: He is alert and oriented to person, place, and time. Mental status is at baseline. Motor: Weakness present. Psychiatric:         Mood and Affect: Mood normal.         Behavior: Behavior normal.         Thought Content: Thought content normal.         Discussion with Family: Updated  (wife) via phone. Discharge instructions/Information to patient and family:   See after visit summary for information provided to patient and family. Provisions for Follow-Up Care:  See after visit summary for information related to follow-up care and any pertinent home health orders.        Disposition:   Home with VNA Services (Reminder: Complete face to face encounter)    Planned Readmission: None     Discharge Statement:  I spent > 30 minutes discharging the patient. This time was spent on the day of discharge. I had direct contact with the patient on the day of discharge. Greater than 50% of the total time was spent examining patient, answering all patient questions, arranging and discussing plan of care with patient as well as directly providing post-discharge instructions. Additional time then spent on discharge activities. Discharge Medications:  See after visit summary for reconciled discharge medications provided to patient and/or family.       **Please Note: This note may have been constructed using a voice recognition system**

## 2023-09-14 NOTE — PROGRESS NOTES
-- Patient: Dae Umana  -- MRN: [id number]  -- Aidin Request ID: [id number]  -- Level of care reserved: Ami Gutierrez  -- Partner Reserved: Visiting Nurse [address], [address] [place] [address] [phone]  -- Clinical needs requested:  -- Geography searched: 10 miles around [facility]  -- Start of Service:  -- Request sent: 3:08pm EDT on 9/14/2023 by Go Churchill  -- Partner reserved: 5:03pm EDT on 9/14/2023 by Go Churchill  -- Choice list shared: 5:02pm EDT on 9/14/2023 by Go Churchill

## 2023-09-14 NOTE — ASSESSMENT & PLAN NOTE
Noted on EKG and telemetry   · Cardiology following  · OK for discharge home with close outpatient follow-up  · If bradycardia in outpatient setting, consider PPM

## 2023-09-14 NOTE — QUICK NOTE
Called to bedside by RN for removal of patient's peripheral EJ line. Patient tolerated removal well with minimal bleeding and no discomfort. Bleeding stopped with applied pressure for 30 seconds. Gauze and tape applied as dressing.      Vega Jean MD  SLW FM PGY2

## 2023-09-14 NOTE — PROGRESS NOTES
Progress Note - Cardiology   [facility]     Michael Bishop 70 y.o. male MRN: [id number]  : 1955  Unit/Bed#: [address] [place] [address] Encounter: [id number]    Assessment and Plan:   1. Hypotension: Chronic    -   Improved back on his midodrine 10 mg 3 times daily    2. End-stage renal disease on hemodialysis: Follows with [facility] in [place] on a  schedule    -   Followed by nephrology    3. Sinus bradycardia: Heart rates 40s to 50s overnight, no pauses noted    -   Continue to monitor telemetry    -   Would be intermediate to high risk fraction with pacemaker implantation due to ongoing dialysis    4. Coronary artery disease: : BMS to left circumflex and POBA to diagonal.  Patient also with remote history of drug-eluting stent placement to distal RCA    -   2023: 3 KARLOS placed to the ostial RCA to mid RCA (Dr. Carolyne Hart at [address])    -   2023: Noted to have residual ostial LAD disease of 50% and mid LAD disease of 50%    -   Continue dual antiplatelet therapy with aspirin and Plavix    -   Not on beta-blocker secondary to bradycardia    -   Continue Lipitor 20 mg daily    Subjective / Objective:   Patient seen and examined. Blood pressures improved now that he is back on his midodrine. Remained bradycardic overnight without pauses on telemetry. Vitals: Blood pressure 114/56, pulse (!) 52, temperature 97.5 °F (36.4 °C), temperature source Oral, resp. rate 17, height 5' 10" (1.778 m), weight 69.4 kg (152 lb 14.4 oz), SpO2 95 %. Vitals:    23 0600 23 0548   Weight: 66.3 kg (146 lb 2.6 oz) 69.4 kg (152 lb 14.4 oz)     Body mass index is 21.94 kg/m².   BP Readings from Last 3 Encounters:   23 114/56   23 99/59   23 90/70     Orthostatic Blood Pressures    Flowsheet Row Most Recent Value   Blood Pressure 114/56 filed at 2023 9126   Patient Position - Orthostatic VS Lying filed at 2023 2045        I/O       09/12 0701  09/13 0700 09/13 0701  09/14 0700 09/14 0701  09/15 0700    P. O.  500     I.V. (mL/kg)  20 (0.3)     IV Piggyback 2100      Total Intake(mL/kg) 2100 (31.7) 520 (7.5)     Net +2100 +520                Invasive Devices     Peripheral Intravenous Line  Duration           Peripheral IV 09/12/23 Left  2 days    Peripheral IV 09/12/23 Right Arm 1 day          Hemodialysis Catheter  Duration           Hemodialysis Catheter Internal jugular 358 days    HD Permanent Double Catheter 357 days                  Intake/Output Summary (Last 24 hours) at 9/14/2023 0757  Last data filed at 9/13/2023 1801  Gross per 24 hour   Intake 520 ml   Output --   Net 520 ml         Physical Exam:   Physical Exam  Vitals and nursing note reviewed. Constitutional:       Appearance: Normal appearance. He is normal weight. He is ill-appearing (Chronically). HENT:      Right Ear: External ear normal.      Left Ear: External ear normal.      Nose: Nose normal.   Eyes:      General: No scleral icterus. Right eye: No discharge. Left eye: No discharge. Cardiovascular:      Rate and Rhythm: Regular rhythm. Bradycardia present. Pulses: Normal pulses. Pulmonary:      Effort: No accessory muscle usage or respiratory distress. Comments: Occasional moist cough  Abdominal:      General: Bowel sounds are normal.      Palpations: Abdomen is soft. Musculoskeletal:      Right lower leg: No edema. Left lower leg: No edema. Skin:     General: Skin is warm. Capillary Refill: Capillary refill takes less than 2 seconds. Neurological:      General: No focal deficit present. Mental Status: He is alert. Mental status is at baseline.    Psychiatric:         Mood and Affect: Mood normal.                 Medications/ Allergies:     Current Facility-Administered Medications   Medication Dose Route Frequency Provider Last Rate   • acetaminophen  650 mg Oral Q6H PRN Caitlin Ochoa, DO     • aspirin  81 mg Oral Daily [facility], DO     • atorvastatin  20 mg Oral HS [facility], DO     • calcitriol  0.75 mcg Oral Once per day on Mon Wed Fri [facility], DO     • clopidogrel  75 mg Oral Daily [facility], DO     • vitamin B-12  1,000 mcg Oral Daily [facility], DO     • docusate sodium  100 mg Oral Daily [facility], DO     • gabapentin  100 mg Oral Once per day on Mon Wed Fri [facility], DO     • heparin (porcine)  5,000 Units Subcutaneous [facility] [address] [facility], DO     • hydrOXYzine HCL  25 mg Oral Q8H PRN [facility], DO     • midodrine  10 mg Oral TID AC Emelia Marsh MD     • ondansetron  4 mg Intravenous Q6H PRN [facility], DO     • oxyCODONE  2.5 mg Oral Q6H PRN TESSY Cortes       acetaminophen, 650 mg, Q6H PRN  hydrOXYzine HCL, 25 mg, Q8H PRN  ondansetron, 4 mg, Q6H PRN  oxyCODONE, 2.5 mg, Q6H PRN      No Known Allergies    VTE Pharmacologic Prophylaxis:   Sequential compression device (Venodyne)     Labs:   Troponins:  Results from last 7 days   Lab Units 09/13/23  1008 09/12/23  2152 09/12/23  1935   HS TNI RAND ng/L 319*  --   --    HSTNI D2 ng/L  --   --  118*   HSTNI D4 ng/L  --  217*  --      CBC with diff:  Results from last 7 days   Lab Units 09/13/23  1008 09/12/23  1524   WBC Thousand/uL 4.95 7.19   HEMOGLOBIN g/dL 8.4* 8.8*   HEMATOCRIT % 27.1* 27.5*   MCV fL 98 98   PLATELETS Thousands/uL 72* 69*   RBC Million/uL 2.77* 2.82*   MCH pg 30.3 31.2   MCHC g/dL 31.0* 32.0   RDW % 17.2* 16.9*   MPV fL 10.4 10.8   NRBC AUTO /100 WBCs 0  --      CMP:  Results from last 7 days   Lab Units 09/13/23  1008 09/12/23  1524   SODIUM mmol/L 137 136   POTASSIUM mmol/L 3.1* 3.1*   CHLORIDE mmol/L 101 100   CO2 mmol/L 28 26   ANION GAP mmol/L 8 10   BUN mg/dL 15 11   CREATININE mg/dL 5.03* 4.58*   GLUCOSE FASTING mg/dL 93  --    CALCIUM mg/dL 8.0* 7.3*   AST U/L  --  6* ALT U/L  --  3*   ALK PHOS U/L  --  73   TOTAL PROTEIN g/dL  --  4.3*   ALBUMIN g/dL  --  2.0*   TOTAL BILIRUBIN mg/dL  --  1.02*   EGFR ml/min/1.73sq m 10 12     Magnesium:  Results from last 7 days   Lab Units 09/13/23  1008   MAGNESIUM mg/dL 1.8*       Imaging & Testing   I have personally reviewed pertinent reports. XR chest 1 view portable    Result Date: 9/13/2023  Narrative: CHEST INDICATION:   hypotension. Weakness COMPARISON: 03/28/2023 EXAM PERFORMED/VIEWS:  XR CHEST PORTABLE Images: 2 FINDINGS: Cardiomediastinal silhouette appears unremarkable. Right-sided dialysis catheter unchanged. Small right pleural effusion. No infiltrates. No evidence of heart failure. Osseous structures appear within normal limits for patient age. Impression: Small right pleural effusion. Workstation performed: [id number]        EKG / Monitor: Personally reviewed. Sinus bradycardia without any pauses or ectopy noted on monitor overnight           [facility]  Cardiology      "This note was completed in part utilizing [employer] direct voice recognition software. Grammatical errors, random word insertion, spelling mistakes, and incomplete sentences may be an occasional consequence of the system secondary to software limitations, ambient noise and hardware issues. Please read the chart carefully and recognize, using context, where substitutions have occurred.   If you have any questions or concerns about the context, text or information contained within the body of this dictation, please contact myself, the provider, for further clarification."

## 2023-09-14 NOTE — PLAN OF CARE
1000 ml as tolerated, 3.5 hrs, 4K bath  Problem: METABOLIC, FLUID AND ELECTROLYTES - ADULT  Goal: Electrolytes maintained within normal limits  Description: INTERVENTIONS:  - Monitor labs and assess patient for signs and symptoms of electrolyte imbalances  - Administer electrolyte replacement as ordered  - Monitor response to electrolyte replacements, including repeat lab results as appropriate  - Instruct patient on fluid and nutrition as appropriate  Outcome: Progressing  Goal: Fluid balance maintained  Description: INTERVENTIONS:  - Monitor labs   - Monitor I/O and WT  - Instruct patient on fluid and nutrition as appropriate  - Assess for signs & symptoms of volume excess or deficit  Outcome: Progressing

## 2023-09-14 NOTE — CASE MANAGEMENT
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 06/15/2023   • Ambulatory dysfunction 2023   • Infection of dialysis AV graft 2023   • Hypotension 2023   • S/P angioplasty with stent 2023   • Chronic deep vein thrombosis (DVT) of internal jugular vein ([address]) 2022   • End-stage renal disease  2022   • Chronic kidney disease 2022   • Right intertrochanteric femur fracture 2022   • Thrombocytopenia  2022   • Arteriovenous fistula ([address]) 2021   • AV fistula thrombosis, initial encounter ([address]) 2021   • Encounter for extracorporeal dialysis ([address]) 2019   • Intestinal malabsorption 2017   • Pernicious anemia 2017   • Irritable bowel syndrome 2017   • Elevated serum alkaline phosphatase level 2017   • Anemia of chronic disease 2017   • Coronary artery disease  10/20/2016   • Hypertension 10/20/2016   • Dependence on renal dialysis ([address]) 2016   • Vitamin D deficiency, unspecified 2013   • Mixed hyperlipidemia 10/04/2012   • ESRD (end stage renal disease) on dialysis ([address]) 2012   • Nicotine dependence 2012   • Organic impotence 2012   • Type 2 diabetes mellitus 2012      LOS (days): 1  Geometric Mean LOS (GMLOS) (days): 4.90  Days to GMLOS:3.9     OBJECTIVE:    Risk of Unplanned Readmission Score: 28.62       Current admission status: Inpatient  Referral Reason: Other (Discharge planning)    Preferred Pharmacy:   [facility] [address] [place] [address] [address] [address]  Phone: [phone] Fax: [phone]    Primary Care Provider: Chela Maya MD    Primary Insurance: MEDICARE  Secondary Insurance: AARP    ASSESSMENT:  Bryan Proxies     [id number] [facility] - Spouse   Primary Phone: [phone] [facility]  Home Phone: [phone]                Obs Notice Signed: 09/12/23    Readmission Root Cause  30 Day Readmission: No    Patient Information  Admitted from[de-identified] Home  Mental Status: Alert  During Assessment patient was accompanied by: Not accompanied during assessment  Assessment information provided by[de-identified] Patient  Primary Caregiver: Spouse  Caregiver's Name[de-identified] Rossy Charlton (spouse)  Caregiver's Relationship to Patient[de-identified] Family Member  Caregiver's Telephone Number[de-identified] [phone]  Support Systems: Spouse/significant other  [place] of Residence: [address] [place] [address] do you live in?: Hermila entry access options.  Select all that apply.: Stairs  Number of steps to enter home.: 3  Type of Current Residence: 2 story home  Upon entering residence, is there a bedroom on the main floor (no further steps)?: Yes  Upon entering residence, is there a bathroom on the main floor (no further steps)?: Yes  Living Arrangements: Lives w/ Spouse/significant other    Activities of Daily Living Prior to Admission  Functional Status: Independent  Completes ADLs independently?: Yes  Ambulates independently?: Yes  Does patient use assisted devices?: Yes  Assisted Devices (DME) used: Bedside Commode, Shower Chair, Rollator  Does patient currently own DME?: Yes  What DME does the patient currently own?: Bedside Commode, Rollator, Shower Chair  Does the patient have a history of Short-Term Rehab?: Yes ([facility], [facility])  Does patient have a history of HHC?: Yes ([facility])  Does patient currently have [facility]?: Yes    Current Home Health Care  Type of Current Home Care Services: Nurse visit, [address][de-identified] [facility] Provider[de-identified] PCP    Patient Information Continued  Income Source: Pension/care home  Does patient have prescription coverage?: Yes  Does patient receive dialysis treatments?: Yes ([facility], pays someone to transport)    Means of Transportation  Means of Transport to Appts[de-identified] Family transport  In the past 12 months, has lack of transportation kept you from medical appointments or from getting medications?: No  In the past 12 months, has lack of transportation kept you from meetings, work, or from getting things needed for daily living?: No  Was application for public transport provided?: N/A      DISCHARGE DETAILS:    Discharge planning discussed with[de-identified] Patient  Freedom of Choice: Yes     Comments - Freedom of Choice: CHERIE spoke with patient at bedside to introduce role of CM, conduct assessment and discuss discharge planning. Patient lives with his wife, is independent with ADLs and family provides most transport. Patient is ESRD on HD and goes to [facility] on a TTS schedule. His wife works during the day and patient pays someone to transport him to and from HD. CHERIE called [employer] and spoke with Hardik Shay to notify clinic that patient will be discharged today and will resume outpatient HD on Saturday. Patient stated that he has [facility] but was unable to remember the agency name. CHERIE spoke with KELLE of [facility] and confirmed that he is currently receiving services from them. CHERIE placed a referral for [facility] in [address] [facility] and acceptance is pending. Patient's wife will transport him home at discharge.        CM contacted family/caregiver?: No- see comments (Patient did not want SW to contact his wife at this time because she is working)  Were Treatment Team discharge recommendations reviewed with patient/caregiver?: Yes  Did patient/caregiver verbalize understanding of patient care needs?: Yes  Were patient/caregiver advised of the risks associated with not following Treatment Team discharge recommendations?: Yes    Contacts  Patient Contacts: Rachid Desir  (wife)  Relationship to Patient[de-identified] Family  Contact Method: Phone  Phone Number: [phone]    [facility]         Is the patient interested in [address] at discharge?: Yes  [facility] requested[de-identified] Nursing, Occupational Therapy, Physical [address] Name[de-identified] [facility] External Referral Reason (only applicable if external HHA name selected): Services not provided in network or near patient location  [facility] Provider[de-identified] PCP  Home Health Services Needed[de-identified] Evaluate Functional Status and Safety, Gait/ADL Training, Strengthening/Theraputic Exercises to Improve Function  Homebound Criteria Met[de-identified] Requires the Assistance of Another Person for Safe Ambulation or to Leave the Home, Uses an Assist Device (i.e. cane, walker, etc)  Supporting Clincal Findings[de-identified] Limited Endurance, Fatigues Easliy in [employer]    DME Referral Provided  Referral made for DME?: No    Other Referral/Resources/Interventions Provided:  Interventions: [facility]    Would you like to participate in our [address] service program?  : No - Declined    Treatment Team Recommendation: Home with [address] [facility]  Discharge Destination Plan[de-identified] Home with [address] [facility] [address] at Discharge : Family      IMM Given (Date):: 09/14/23

## 2023-09-14 NOTE — PLAN OF CARE
Post-Dialysis RN Treatment Note    Blood Pressure:  Pre 117/60 mm/Hg  Post *** mmHg   EDW  65.0 kg    Weight:  Pre 69.4 kg   Post 67.9 kg   Mode of weight measurement: Bed Scale   Volume Removed  1500 ml    Treatment duration 210 minutes    NS given  No    Treatment shortened?  No   Medications given during Rx Not Applicable   Estimated Kt/V  Not Applicable   Access type: Permacath/TDC   Access Issues: No    Report called to primary nurse   Yes  / Jayla Corbin RN      1000-1500ml as tolerated, 3.5 hrs, 4K bath  Problem: METABOLIC, FLUID AND ELECTROLYTES - ADULT  Goal: Electrolytes maintained within normal limits  Description: INTERVENTIONS:  - Monitor labs and assess patient for signs and symptoms of electrolyte imbalances  - Administer electrolyte replacement as ordered  - Monitor response to electrolyte replacements, including repeat lab results as appropriate  - Instruct patient on fluid and nutrition as appropriate  Outcome: Progressing  Goal: Fluid balance maintained  Description: INTERVENTIONS:  - Monitor labs   - Monitor I/O and WT  - Instruct patient on fluid and nutrition as appropriate  - Assess for signs & symptoms of volume excess or deficit  Outcome: Progressing

## 2023-09-14 NOTE — DISCHARGE INSTR - AVS FIRST PAGE
Hypotension:  Take Midodrine 10 mg three times per day    Bradycardia:  Follow-up with cardiology for heart rate check  Consider need for pacemaker insertion if bradycardia continues

## 2023-09-15 ENCOUNTER — PATIENT OUTREACH (OUTPATIENT)
Dept: FAMILY MEDICINE CLINIC | Facility: CLINIC | Age: 68
End: 2023-09-15

## 2023-09-15 ENCOUNTER — TRANSITIONAL CARE MANAGEMENT (OUTPATIENT)
Dept: FAMILY MEDICINE CLINIC | Facility: CLINIC | Age: 68
End: 2023-09-15

## 2023-09-15 DIAGNOSIS — Z71.89 COMPLEX CARE COORDINATION: Primary | ICD-10-CM

## 2023-09-15 LAB
ATRIAL RATE: 43 BPM
ATRIAL RATE: 72 BPM
P AXIS: -14 DEGREES
P AXIS: 57 DEGREES
PR INTERVAL: 172 MS
PR INTERVAL: 182 MS
QRS AXIS: 26 DEGREES
QRS AXIS: 27 DEGREES
QRSD INTERVAL: 110 MS
QRSD INTERVAL: 146 MS
QT INTERVAL: 488 MS
QT INTERVAL: 594 MS
QTC INTERVAL: 501 MS
QTC INTERVAL: 534 MS
T WAVE AXIS: 53 DEGREES
T WAVE AXIS: 54 DEGREES
VENTRICULAR RATE: 43 BPM
VENTRICULAR RATE: 72 BPM

## 2023-09-15 PROCEDURE — 93010 ELECTROCARDIOGRAM REPORT: CPT | Performed by: INTERNAL MEDICINE

## 2023-09-15 NOTE — PROGRESS NOTES
HRR referral received via IB    Chart reviewed. Patient recently admitted to IP at [facility] with hypotension post hemodialysis 9/12-9/14. Patient was discharged home on 9/14 with his wife and KURT with [facility]. This RNCM called patient and introduced self and explained the role of the [facility] and CCM services. Patient says he is feeling well since home. He denies any CP, chest tightness, SOB, lightheadedness, dizziness or swelling. He says he has all of his medications including his Midodrine. He says he manages his own meds and is taking them as directed. He declined reviewing medications or AVS. He only gave one word answers and said he was fine. Patient with ESRD and on HD at [facility]. Patient states he will be going back to the clinic tomorrow and has transportation. Patient declines CCM services and follow up.

## 2023-09-16 LAB — MRSA NOSE QL CULT: NORMAL

## 2023-09-17 LAB
BACTERIA BLD CULT: NORMAL
BACTERIA BLD CULT: NORMAL

## 2023-09-20 ENCOUNTER — TELEPHONE (OUTPATIENT)
Age: 68
End: 2023-09-20

## 2023-09-20 ENCOUNTER — HOSPITAL ENCOUNTER (EMERGENCY)
Facility: HOSPITAL | Age: 68
Discharge: HOME | End: 2023-09-20
Attending: EMERGENCY MEDICINE
Payer: MEDICARE

## 2023-09-20 VITALS
BODY MASS INDEX: 22.71 KG/M2 | SYSTOLIC BLOOD PRESSURE: 110 MMHG | WEIGHT: 158.29 LBS | RESPIRATION RATE: 16 BRPM | OXYGEN SATURATION: 100 % | HEART RATE: 70 BPM | TEMPERATURE: 97 F | DIASTOLIC BLOOD PRESSURE: 66 MMHG
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
## 2023-09-20 DIAGNOSIS — T14.8XXA ABRASION: ICD-10-CM

## 2023-09-20 LAB
ALBUMIN SERPL BCP-MCNC: 3.3 G/DL (ref 3.5–5)
ALP SERPL-CCNC: 77 U/L (ref 34–104)
ALT SERPL W P-5'-P-CCNC: 3 U/L (ref 7–52)
ANION GAP SERPL CALCULATED.3IONS-SCNC: 14 MMOL/L
AST SERPL W P-5'-P-CCNC: 17 U/L (ref 13–39)
ATRIAL RATE: 62 BPM
BILIRUB SERPL-MCNC: 0.77 MG/DL (ref 0.2–1)
BUN SERPL-MCNC: 22 MG/DL (ref 5–25)
CALCIUM ALBUM COR SERPL-MCNC: 10 MG/DL (ref 8.3–10.1)
CALCIUM SERPL-MCNC: 9.4 MG/DL (ref 8.4–10.2)
CHLORIDE SERPL-SCNC: 99 MMOL/L (ref 96–108)
CO2 SERPL-SCNC: 22 MMOL/L (ref 21–32)
CREAT SERPL-MCNC: 6.91 MG/DL (ref 0.6–1.3)
GFR SERPL CREATININE-BSD FRML MDRD: 7 ML/MIN/1.73SQ M
GLUCOSE SERPL-MCNC: 112 MG/DL (ref 65–140)
GLUCOSE SERPL-MCNC: 115 MG/DL (ref 65–140)
MAGNESIUM SERPL-MCNC: 2.3 MG/DL (ref 1.9–2.7)
P AXIS: 41 DEGREES
POTASSIUM SERPL-SCNC: 4.3 MMOL/L (ref 3.5–5.3)
PR INTERVAL: 178 MS
PROT SERPL-MCNC: 6.9 G/DL (ref 6.4–8.4)
QRS AXIS: 32 DEGREES
QRSD INTERVAL: 128 MS
QT INTERVAL: 496 MS
QTC INTERVAL: 503 MS
SODIUM SERPL-SCNC: 135 MMOL/L (ref 135–147)
T WAVE AXIS: 14 DEGREES
VENTRICULAR RATE: 62 BPM

## 2023-09-20 PROCEDURE — 93005 ELECTROCARDIOGRAM TRACING: CPT

## 2023-09-20 PROCEDURE — 99284 EMERGENCY DEPT VISIT MOD MDM: CPT | Performed by: EMERGENCY MEDICINE

## 2023-09-20 PROCEDURE — 83735 ASSAY OF MAGNESIUM: CPT | Performed by: EMERGENCY MEDICINE

## 2023-09-20 PROCEDURE — 80053 COMPREHEN METABOLIC PANEL: CPT | Performed by: EMERGENCY MEDICINE

## 2023-09-20 PROCEDURE — 97163 PT EVAL HIGH COMPLEX 45 MIN: CPT

## 2023-09-20 PROCEDURE — 82948 REAGENT STRIP/BLOOD GLUCOSE: CPT

## 2023-09-20 PROCEDURE — 36415 COLL VENOUS BLD VENIPUNCTURE: CPT | Performed by: EMERGENCY MEDICINE

## 2023-09-20 PROCEDURE — 99284 EMERGENCY DEPT VISIT MOD MDM: CPT

## 2023-09-20 PROCEDURE — 97167 OT EVAL HIGH COMPLEX 60 MIN: CPT

## 2023-09-20 PROCEDURE — 93010 ELECTROCARDIOGRAM REPORT: CPT | Performed by: INTERNAL MEDICINE

## 2023-09-20 RX ORDER — OXYCODONE HYDROCHLORIDE 5 MG/1
5 TABLET ORAL EVERY 6 HOURS PRN
Qty: 12 TABLET | Refills: 0 | Status: SHIPPED | OUTPATIENT
Start: 2023-09-20 | End: 2023-09-23

## 2023-09-20 RX ORDER — GINSENG 100 MG
1 CAPSULE ORAL 2 TIMES DAILY
Qty: 28 G | Refills: 0 | Status: SHIPPED | OUTPATIENT
Start: 2023-09-20

## 2023-09-20 RX ORDER — GINSENG 100 MG
1 CAPSULE ORAL ONCE
Status: COMPLETED | OUTPATIENT
Start: 2023-09-20 | End: 2023-09-20

## 2023-09-20 RX ADMIN — BACITRACIN 1 SMALL APPLICATION: 500 OINTMENT TOPICAL at 12:37

## 2023-09-20 NOTE — PHYSICAL THERAPY NOTE
PT EVALUATION       09/20/23 1237   PT Last Visit   PT Visit Date 09/20/23   Note Type   Note type Evaluation   Pain Assessment   Pain Assessment Tool 0-10   Pain Score 9   Pain Location/Orientation Location: Buttocks  (Reports he thinks he has a bed sore)   Effect of Pain on Daily Activities limits mobility   Patient's Stated Pain Goal No pain   Hospital Pain Intervention(s) Repositioned; Ambulation/increased activity   Restrictions/Precautions   Weight Bearing Precautions Per Order No   Other Precautions Fall Risk;Pain   Home Living   Type of [address] [place] [address] Center  Two level;Stairs to enter with rails; Performs ADLs on one level; Able to live on main level with bedroom/bathroom  (3 PHILLY)   Bathroom Toilet Raised   Bathroom Equipment Grab bars in shower; Shower chair;Commode   Home Equipment Walker;Grab bars   Prior Function   Level of [place] Independent with ADLs; Independent with functional mobility; Needs assistance with IADLS   Lives With Spouse   Receives Help From Family   IADLs Family/Friend/Other provides transportation; Family/Friend/Other provides meals   Falls in the last 6 months 1 to 4   Vocational Retired   Comments Pt reports using RW for mobility at home   General   Additional Pertinent History Pt is a 70year-old male who presents to the ED today (9/20/23) due to fall at home. Family/Caregiver Present No   Cognition   Overall Cognitive Status WFL  (Generally oriented)   Attention Attends with cues to redirect   Orientation Level Oriented X4  (Knows Sept, 2023)   Following Commands Follows multistep commands with increased time or repetition   Subjective   Subjective "my butt hurts. I think I have a bed sore"   RLE Assessment   RLE Assessment   (Hip 3- to 3/5 ; Knee/ankle 3+/5)   LLE Assessment   LLE Assessment   (Grossly 4/5)   Bed Mobility   Supine to Sit 4  Minimal assistance   Additional items Assist x 1;Verbal cues; Increased time required   Sit to Supine 5  Supervision   Additional items Assist x 1;Verbal cues; Increased time required   Transfers   Sit to Stand 5  Supervision   Additional items Assist x 1;Verbal cues   Stand to Sit 5  Supervision   Additional items Assist x 1;Verbal cues   Ambulation/Elevation   Gait pattern Foward flexed; Short stride; Step through pattern;Narrow VAIBHAV   Gait Assistance 4  Minimal assist  (CG to close supervision)   Additional items Assist x 1;Verbal cues   Assistive Device Rolling walker   Distance 25 feet with change of direction within room   Stair Management Assistance Not tested   Balance   Static Sitting Fair +   Dynamic Sitting Fair   Static Standing Fair   Dynamic Standing Fair -   Ambulatory Fair -   Activity Tolerance   Activity Tolerance Patient tolerated treatment well;Patient limited by fatigue  (Limited by dizziness)   Medical Staff Made Aware care coordinated with OT Sarah   Assessment   Prognosis Good   Problem List Decreased strength;Decreased endurance; Impaired balance;Decreased mobility; Impaired judgement;Decreased safety awareness;Pain   Assessment Patient seen for Physical Therapy evaluation. Patient admitted with dizziness, fall. Comorbidities affecting patient's physical performance include: anemia, CAD, cardiac disease, CKD, CVA, diabetes, DVT, ESRD on hemodialysis, hypertension and hyperlipidemia. Personal factors affecting patient at time of initial evaluation include: lives in 2 story house, ambulating with assistive device, stairs to enter home, inability to navigate community distances, inability to navigate level surfaces without external assistance, limited home support and inability to perform IADLS . Prior to admission, patient was independent with functional mobility with walker, independent with ADLS, requiring assist for IADLS, living with spouse in a 2 level home with 3 steps to enter and ambulating household distance.   Please find objective findings from Physical Therapy assessment regarding body systems outlined above with impairments and limitations including weakness, impaired balance, decreased endurance, gait deviations, pain, decreased activity tolerance, decreased functional mobility tolerance, decreased safety awareness, fall risk and decreased skin integrity. The Barthel Index was used as a functional outcome tool presenting with a score of Barthel Index Score: 55 today indicating marked limitations of functional mobility and ADLS. Patient's clinical presentation is currently unstable/unpredictable as seen in patient's presentation of changing level of pain, increased fall risk and decreased endurance. Pt would benefit from continued Physical Therapy treatment to address deficits as defined above and maximize level of functional mobility. As demonstrated by objective findings, the assigned level of complexity for this evaluation is high. The patient's -[facility] Basic Mobility Inpatient Short Form Raw Score is 17. A Raw score of greater than 16 suggests the patient may benefit from discharge to home. Please also refer to the recommendation of the Physical Therapist for safe discharge planning. Goals   Patient Goals Pt states he wants to return home   STG Expiration Date 09/27/23   Short Term Goal #1 Pt will perform bed mobility with supervision ; Pt will perform bed <> chair Mod I with RW ; Pt will ambulate x 100 feet with supervision + RW : Standing balance will improve to Fair to decrease risk of falls ; Pt will negotiate x 3 steps with supervision + RW ; AMPAC score will improve >18/24 to demonstrate improved functional independence   LTG Expiration Date 10/04/23   Long Term Goal #1 Pt will perform bed mobility IND ; Pt will ambulate x 250 feet Mod I + RW : Standing balance will improve to Fair+ to decrease risk of falls ;  Pt will negotiate x 3 steps Mod I + RW ; AMPAC score will improve >20/24 to demonstrate improved functional independence   Plan   Treatment/Interventions ADL retraining;Functional transfer training;LE strengthening/ROM; Endurance training; Therapeutic exercise;Gait training;Bed mobility;Spoke to nursing;OT   PT Frequency Other (Comment)  (5x/week)   Recommendation   PT Discharge Recommendation Home with home health rehabilitation   AM-PAC Basic Mobility Inpatient   Turning in Flat Bed Without Bedrails 3   Lying on Back to Sitting on Edge of Flat Bed Without Bedrails 3   Moving Bed to Chair 3   Standing Up From Chair Using Arms 3   Walk in Room 3   Climb 3-5 Stairs With Railing 2   Basic Mobility Inpatient Raw Score 17   Basic Mobility Standardized Score 39.67   Highest Level Of Mobility   -HLM Goal 5: Stand one or more mins   -HLM Achieved 7: Walk 25 feet or more   Barthel Index   Feeding 10   Bathing 0   Grooming Score 5   Dressing Score 5   Bladder Score 10   Bowels Score 10   Toilet Use Score 5   Transfers (Bed/Chair) Score 10   Mobility (Level Surface) Score 0   Stairs Score 0   Barthel Index Score 55   End of Consult   Patient Position at End of Consult Supine; All needs within reach   [facility] Insurance Number  Jinny [place] [id number]

## 2023-09-20 NOTE — TELEPHONE ENCOUNTER
Wife called requesting Hospice for  Kv[facility]. She would like some kind of referral for patient to Hospice if possible or how would she go about initiating such.

## 2023-09-20 NOTE — OCCUPATIONAL THERAPY NOTE
Occupational Therapy Evaluation     Patient Name: Erma Mondragon  WSWMO'U Date: 9/20/2023  Problem List  Active Problems: There are no active Hospital Problems. Past Medical History  Past Medical History:   Diagnosis Date    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    With Cerebral Infarction:  Last Assessed:  October 20, 2016    Chronic kidney disease 2012    on dialysis     COVID 05/2022    COVID-19 04/2022 4/2022-while in NH    Diabetes mellitus [facility])     Dialysis patient [facility])     T/TH/Sat    Difficulty walking     GERD (gastroesophageal reflux disease)     Hyperlipidemia     Hypertension     Labyrinthitis 09/10/2011    Myocardial infarction [facility]) 2014    x3 others were in 2009 & 2010    Sleep disturbances 09/20/2010    Stroke ([address]) 2007    x1, RLE deficit- uses walker    Type 2 diabetes, uncontrolled, with renal manifestation 05/03/2017     Past Surgical History  Past Surgical History:   Procedure Laterality Date    AV FISTULA PLACEMENT      AV FISTULA REPAIR Left 6/3/2023    Procedure: LEFT UPPER EXTREMITY A-V GRAFT EXPLANT, LEFT BRACHIAL ANGIOPATCH,  APPLICATION OF  VAC;  Surgeon: Kimberli Rider MD;  [facility];  Service: Vascular    CARDIAC CATHETERIZATION Left 02/03/2023    Procedure: Cardiac Left Heart Cath;  Surgeon: Alber Alejandro MD;  Location: [address] [place] [address] CATH LAB; Service: Cardiology    CARDIAC CATHETERIZATION N/A 02/08/2023    Procedure: Cardiac catheterization with complex PCI with Dr. Fiordaliza Amador, patient is a renal dialysis patient;  Surgeon: Chema Tobin MD;  Location: [facility] CARDI[facility] C[facility]; Service: Cardiology    CARDIAC SURGERY  2010    stents x3    COLONOSCOPY N/A 12/12/2016    Procedure: COLONOSCOPY;  Surgeon: Daniella Guajardo MD;  Location: [address] [place] [address] GI LAB; Service:     COLONOSCOPY N/A 04/03/2017    Procedure:  COLONOSCOPY;  Surgeon: Daniella Guajardo MD;  Location: [address] [place] [address] GI LAB;   Service:     HARDWARE REMOVAL Right 04/04/2022    Procedure: REMOVAL HARDWARE HIP;  Surgeon: Francisco Boswell Pippa Acevedo MD;  [facility];  Service: Orthopedics    IR AV FISTULA/GRAFT DECLOT  04/29/2021    IR AV FISTULA/GRAFT DECLOT  03/25/2022    IR AV FISTULA/GRAFT DECLOT  09/21/2022    IR AV FISTULAGRAM/GRAFTOGRAM  03/28/2022    IR AV FISTULAGRAM/GRAFTOGRAM  07/28/2022    IR TUNNELED CENTRAL LINE REMOVAL  12/07/2021    IR TUNNELED DIALYSIS CATHETER PLACEMENT  05/24/2021    PORTACATH PLACEMENT      per pt "port in chest"    DC ARTERIOVENOUS ANASTOMOSIS OPEN DIRECT Left 07/22/2021    Procedure: CREATION FISTULA ARTERIOVENOUS (AV); Surgeon: Ashleigh Estrella MD;  Location: [facility];  Service: Vascular    DC ARTERIOVENOUS ANASTOMOSIS OPEN DIRECT Left 3/21/2023    Procedure: left brachiobasilic fistula,  AVG Graft;  Surgeon: Parminder Kim MD;  [facility];  Service: Vascular    DC ARTERIOVENOUS ANASTOMOSIS OPEN DIRECT Left 5/19/2023    Procedure: CREATION of LEFT FISTULA ARTERIOVENOUS (AV) GRAFT;  Surgeon: Parminder Kim MD;  [facility];  Service: Vascular    DC HEMIARTHROPLASTY HIP PARTIAL Right 04/04/2022    Procedure: HEMIARTHROPLASTY HIP (BIPOLAR); Surgeon: Petrona Khan MD;  [facility];  Service: Orthopedics         09/20/23 1227   OT Last Visit   OT Visit Date 09/20/23  (Wednesday)   Note Type   Note type Evaluation   Pain Assessment   Pain Assessment Tool 0-10   Pain Score 9   Pain Location/Orientation Location: Buttocks   Effect of Pain on Daily Activities limits pace and activity tolerance during ADLs   Patient's Stated Pain Goal No pain   Hospital Pain Intervention(s) Repositioned; Ambulation/increased activity; Emotional support  (requrested pain meds post eval; MD aware)   Restrictions/Precautions   Weight Bearing Precautions Per Order No   Other Precautions Multiple lines; Fall Risk;Pain  (recommend bed / chair alarm)   Home Living   Type of Home House; Other (Comment)  (3 PHILLY)   Home Layout Stairs to enter with rails; Performs ADLs on one level; Able to live on main level with bedroom/bathroom; Two level  (3 PHILLY)   Bathroom Shower/Tub Walk-in shower   Bathroom Toilet Raised   Bathroom Equipment Grab bars in shower; Shower chair;Commode   Bathroom Accessibility Accessible   Home Equipment Walker;Grab bars  (RW and rollator)   Additional Comments Pt reports living w/ wife (works during the day teaching in [place]) and their dog, Shadow   Prior Function   Level of [place] Independent with ADLs; Independent with functional mobility; Needs assistance with IADLS   Lives With Spouse; Other (Comment)  (works as  in [place])   [address] Help From Family; Other (Comment)  (transportation to / from HD since recent DC on 9/14/23)   IADLs Family/Friend/Other provides transportation; Independent with meal prep; Independent with medication management   Falls in the last 6 months 1 to 4   Vocational Retired   Comments Pt reports use of RW vs rollator for functional mobility and I w/ ADLs. 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 Personal and environmental factors impacting performance includes difficulty completing IADLs, recent admission(s), fall history, home alone during the day; supports/ strengths include independent at baseline, supportive wife/ family, access to DME. Family/Caregiver Present No   Additional General Comments Pt admitted following 2 fall from home.  Recent DC home on 9/14/23 Subjective   Subjective "I think I have a bed sore"   ADL   Where Assessed   (edge of stretcher)   Eating Assistance 6  Modified independent  (demo UE strength / coordination to feed self)   Eating Deficit Setup   Grooming Assistance 5  Supervision/Setup   Grooming Deficit Setup;Supervision/safety  (seated at edge of stretcher)   UB Bathing Assistance Unable to assess  (anticipate mod I seated based on fxal obs skills, clinical judgement)   LB Bathing Assistance Unable to assess   UB Dressing Assistance 6  Modified independent   UB Dressing Deficit Setup; Increased time to complete   LB Dressing Assistance 5  Supervision/Setup   LB Dressing Deficit Setup;Supervision/safety; Increased time to complete;Verbal cueing  (seated)   Toileting Assistance  Unable to assess  (denied need to void)   Additional Comments BP 99/68 upon initial sit to stand; 100/68 following functional mobility using RW approx 20'   Bed Mobility   Supine to Sit 4  Minimal assistance   Additional items Assist x 1; Increased time required  (trunk mgmt to complete approach to pt's L to edge of stretcher)   Sit to Supine 5  Supervision   Additional items Assist x 1; Increased time required; Bedrails   Additional Comments Pt supine head of stretcher elevated upon arrival and post eval w/ needs met   Transfers   Sit to Stand 5  Supervision   Additional items Assist x 1;Verbal cues  (pulling up on RW)   Stand to Sit 5  Supervision   Additional items Assist x 1; Increased time required;Verbal cues   Additional Comments Pt performed sit <> stand 2 X from edge of stretcher in ED. Functional Mobility   Functional Mobility 4  Minimal assistance   Additional Comments min A (CG/steadying) using RW w/ in room. Pt reports generalized weakness and dizziness standing.  Pt stated "I feel light"   Additional items Rolling walker   Balance   Static Sitting Fair +   Dynamic Sitting Fair   Static Standing [facility] -  (using RW)   Activity Tolerance Activity Tolerance Patient limited by fatigue;Patient limited by pain   Medical Staff Made Aware care coordination w/ PT, Nahid Whyte due to decreased activity tolerance, regression from baseline. Spoke w/ MD   Nurse Made Aware spoke w/ RN   RUE Assessment   RUE Assessment WFL   RUE Strength   RUE Overall Strength Within Functional Limits - able to perform ADL tasks with strength   LUE Assessment   LUE Assessment WFL   LUE Strength   LUE Overall Strength Within Functional Limits - able to perform ADL tasks with strength   Hand Function   Gross Motor Coordination Functional   Fine Motor Coordination Functional   Vision-Basic Assessment   Current Vision Wears glasses all the time   Cognition   Overall Cognitive Status   (alert, generally oriented, able to follow directions / provide social history)   Arousal/Participation Alert; Cooperative   Attention Attends with cues to redirect   Orientation Level Oriented X4  (generally oriented to date; Wed, Septth 19th 2023)   Memory   (appears [place])   Following Commands Follows multistep commands without difficulty   Comments Identified pt by full name and birthdate. Assessment   Limitation Decreased ADL status; Decreased endurance;Decreased self-care trans;Decreased high-level ADLs   Assessment Pt is a 70yo male admitted to [address] [place] [address] Drive on 9/20/23 following a fall from home. Diagnosed w/ R  UE skin tear / abrasion Pt recently admitted w/ orthostatic hypotension and DC home 9/14/23. Pt reports living w/ wife who works during the day in [address] [place] [address] w/ first floor set- up. Pt reports use of RW vs rollator and I w/ ADLs. Pt added that he has transportation to / from  Tues, Thurs, Sat. Upon eval, pt alert and generally oriented. Able to follow directions and participate in conversation. Pt required S to complete grooming, mod I UBD, S LBD, min A bed mobility supine to sit, S sit to supine, min A using RW functional mobility w/ in room.  Pt completing ADLs at / near baseline level of I w/ decreased endurance, activity tolerance, and generalized weakness. Recommend DC home w/ HHOT when medically stable and increased assistance /support w/ IADLs. Will continue to follow 2-3X / week in acute care to maximize functional independence. Goals   Patient Goals Pt stated that he would like to go home   Plan   Treatment Interventions ADL retraining;Functional transfer training;UE strengthening/ROM; Endurance training;Patient/family training;Equipment evaluation/education;Continued evaluation; Energy conservation; Activityengagement   Goal Expiration Date 09/27/23   OT Treatment Day 0  (Wednesday 9/20/23)   OT Frequency 2-3x/wk   Recommendation   OT Discharge Recommendation Home with home health rehabilitation   Equipment Recommended   (pt reports having access to DME)   AM-[facility] Daily Activity Inpatient   Lower Body Dressing 3   Bathing 3   Toileting 3   Upper Body Dressing 4   Grooming 4   Eating 4   Daily Activity Raw Score 21   Daily Activity Standardized Score (Calc for Raw Score >=11) 44.27   AM-PAC Applied Cognition Inpatient   Following a Speech/Presentation 4   Understanding Ordinary Conversation 4   Taking Medications 4   Remembering Where Things Are Placed or Put Away 4   Remembering List of 4-5 Errands 3   Taking Care of Complicated Tasks 3   Applied Cognition Raw Score 22   Applied Cognition Standardized Score 47.83   Barthel Index   Feeding 10   Bathing 0   Grooming Score 5   Dressing Score 5   Bladder Score 10   Bowels Score 10   Toilet Use Score 5   Transfers (Bed/Chair) Score 10   Mobility (Level Surface) Score 0   Stairs Score 0   Barthel Index Score 55   End of Consult   Education Provided Yes  (role of OT)   Patient Position at End of Consult All needs within reach   Nurse Communication Nurse aware of consult   Licensure   [address] [place] [address] Number  Neyda Turk, OTR/L [phone]       The patient's raw score on the AM-PAC Daily Activity Inpatient Short Form is 21.  A raw score of greater than or equal to 19 suggests the patient may benefit from discharge to home. Please refer to the recommendation of the Occupational Therapist for safe discharge planning.     Pt goals to be met by 9/27/23 to max I w/ ADLs and improve engagement to return home includes:    -Pt will demonstrate improved activity and sitting tolerance OOB In chair for all meals    -Pt will complete LBD w/ mod I to max I and minimize burden of care    -Pt will consistently complete functional transfers to bed, chair, and toilet using LRAD, DME as needed    -Pt will consistently engage in functional mobility using LRAD household distances w/ mod I    -Pt will demonstrate good attention and understanding EC tech to max I w/ ADLs and improve engagement    Prince Jackson OTR/L  [id number]  [id number]

## 2023-09-20 NOTE — ED NOTES
Dr. Rach Shafer spoke to the patient and wife regarding the lab results. Patient is discharge. Wife will bring patient home.      Juana Heard RN  09/20/23 [id number]

## 2023-09-20 NOTE — PLAN OF CARE
Problem: OCCUPATIONAL THERAPY ADULT  Goal: Performs self-care activities at highest level of function for planned discharge setting. See evaluation for individualized goals. Description: Treatment Interventions: ADL retraining, Functional transfer training, UE strengthening/ROM, Endurance training, Patient/family training, Equipment evaluation/education, Continued evaluation, Energy conservation, Activityengagement  Equipment Recommended:  (pt reports having access to DME)       See flowsheet documentation for full assessment, interventions and recommendations. Note: Limitation: Decreased ADL status, Decreased endurance, Decreased self-care trans, Decreased high-level ADLs     Assessment: Pt is a 68yo male admitted to [facility] on 9/20/23 following a fall from home. Diagnosed w/ R  UE skin tear / abrasion Pt recently admitted w/ orthostatic hypotension and DC home 9/14/23. Pt reports living w/ wife who works during the day in [address] [place] [address] [place] w/ first floor set- up. Pt reports use of RW vs rollator and I w/ ADLs. Pt added that he has transportation to / from  Tues, Thurs, Sat. Upon eval, pt alert and generally oriented. Able to follow directions and participate in conversation. Pt required S to complete grooming, mod I UBD, S LBD, min A bed mobility supine to sit, S sit to supine, min A using RW functional mobility w/ in room. Pt completing ADLs at / near baseline level of I w/ decreased endurance, activity tolerance, and generalized weakness. Recommend DC home w/ HHOT when medically stable and increased assistance /support w/ IADLs. Will continue to follow 2-3X / week in acute care to maximize functional independence.      OT Discharge Recommendation: Home with home health rehabilitation

## 2023-09-20 NOTE — PLAN OF CARE
Problem: PHYSICAL THERAPY ADULT  Goal: Performs mobility at highest level of function for planned discharge setting. See evaluation for individualized goals. Description: Treatment/Interventions: ADL retraining, Functional transfer training, LE strengthening/ROM, Endurance training, Therapeutic exercise, Gait training, Bed mobility, Spoke to nursing, OT          See flowsheet documentation for full assessment, interventions and recommendations. Note: Prognosis: Good  Problem List: Decreased strength, Decreased endurance, Impaired balance, Decreased mobility, Impaired judgement, Decreased safety awareness, Pain  Assessment: Patient seen for Physical Therapy evaluation. Patient admitted with dizziness, fall. Comorbidities affecting patient's physical performance include: anemia, CAD, cardiac disease, CKD, CVA, diabetes, DVT, ESRD on hemodialysis, hypertension and hyperlipidemia. Personal factors affecting patient at time of initial evaluation include: lives in 2 story house, ambulating with assistive device, stairs to enter home, inability to navigate community distances, inability to navigate level surfaces without external assistance, limited home support and inability to perform IADLS . Prior to admission, patient was independent with functional mobility with walker, independent with ADLS, requiring assist for IADLS, living with spouse in a 2 level home with 3 steps to enter and ambulating household distance. Please find objective findings from Physical Therapy assessment regarding body systems outlined above with impairments and limitations including weakness, impaired balance, decreased endurance, gait deviations, pain, decreased activity tolerance, decreased functional mobility tolerance, decreased safety awareness, fall risk and decreased skin integrity.   The Barthel Index was used as a functional outcome tool presenting with a score of Barthel Index Score: 55 today indicating marked limitations of functional mobility and ADLS. Patient's clinical presentation is currently unstable/unpredictable as seen in patient's presentation of changing level of pain, increased fall risk and decreased endurance. Pt would benefit from continued Physical Therapy treatment to address deficits as defined above and maximize level of functional mobility. As demonstrated by objective findings, the assigned level of complexity for this evaluation is high. The patient's AM-PAC Basic Mobility Inpatient Short Form Raw Score is 17. A Raw score of greater than 16 suggests the patient may benefit from discharge to home. Please also refer to the recommendation of the Physical Therapist for safe discharge planning. PT Discharge Recommendation: Home with home health rehabilitation    See flowsheet documentation for full assessment.

## 2023-09-20 NOTE — ED PROVIDER NOTES
He 4year-old male history  Chief Complaint   Patient presents with   • Fall     Patient brought in by EMS due to fall because he was trying to pull his pants up when he fell then was able to get up, then proceeded to his chair to have a cigarette when he fell again. Denies LOC. Skin tear on the right elbow. 58-year-old male history of end-stage kidney disease currently on dialysis missed his dialysis yesterday because he was not feeling too well. Presents after he fell he tripped on his pants which were pulled down twice had a abrasion to his right elbow with a skin tear. He could not get up off the ground so he called BLS who brought him into the ED. He denies hitting his head no loss of consciousness currently mentating appropriately no neck pain back pain no other injuries or complaints. Does have some generalized weakness. Most recently had an admission for generalized weakness hypotension and bradycardia discharged without any intervention. History provided by:  Patient   used: No        Prior to Admission Medications   Prescriptions Last Dose Informant Patient Reported? Taking?    Cholecalciferol 50 MCG (2000 UT) CAPS  Self, Outside Facility (Specify) Yes No   Sig: Take by mouth daily   [place] Choice Comfort EZ 33G X 4 MM MISC  Self, Outside Facility (Specify) Yes No   Patient not taking: Reported on 9/12/2023   acetaminophen (TYLENOL) 325 mg tablet  Outside Facility (Specify) No No   Sig: Take 3 tablets (975 mg total) by mouth every 8 (eight) hours as needed for mild pain   ammonium lactate (LAC-HYDRIN) 12 % lotion   No No   Sig: Apply topically 2 (two) times a day as needed for dry skin   ascorbic acid (VITAMIN C) 500 mg tablet  Self, Outside Facility (Specify) Yes No   Sig: TAKE ONE TABLET BY MOUTH EVERY EVENING FOR SUPPLEMENT   aspirin (ECOTRIN LOW STRENGTH) 81 mg EC tablet  Self, Outside Facility (Specify) Yes No   Sig: Take 81 mg by mouth daily   atorvastatin (LIPITOR) 20 mg tablet  Self, Outside Facility (Specify) Yes No   Sig: Take 20 mg by mouth daily at bedtime   calcitriol (ROCALTROL) 0.25 mcg capsule  Outside Facility (Specify) No No   Sig: Take 3 capsules (0.75 mcg total) by mouth 3 (three) times a week   clopidogrel (PLAVIX) 75 mg tablet   Yes No   Sig: Take 75 mg by mouth daily   docusate sodium (COLACE) 100 mg capsule   Yes No   Sig: Take 100 mg by mouth daily   gabapentin (NEURONTIN) 100 mg capsule  Outside Facility (Specify) No No   Sig: Take 1 capsule (100 mg total) by mouth 3 (three) times a week On Tuesday Thursday Saturday   hydrOXYzine HCL (ATARAX) 25 mg tablet   Yes No   Sig: TAKE 1 TABLET BY MOUTH EVERY 8 HOURS AS NEEDED FOR ITCHING   midodrine (PROAMATINE) 10 MG tablet   No No   Sig: Take 1 tablet (10 mg total) by mouth 3 (three) times a day before meals   oxyCODONE (ROXICODONE) 5 immediate release tablet   No No   Sig: Take 0.5 tablets (2.5 mg total) by mouth every 8 (eight) hours as needed for severe pain for up to 10 doses Max Daily Amount: 7.5 mg   vitamin B-12 (VITAMIN B-12) 1,000 mcg tablet  Outside Facility (Specify) Yes No   Sig: Take 1,000 mcg by mouth daily      Facility-Administered Medications: None       Past Medical History:   Diagnosis Date   • 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    With Cerebral Infarction:  Last Assessed:  October 20, 2016   • Chronic kidney disease 2012    on dialysis    • COVID 05/2022   • COVID-19 04/2022 4/2022-while in NH   • Diabetes mellitus ([address])    • Dialysis patient [facility])     T/TH/Sat   • Difficulty walking    • GERD (gastroesophageal reflux disease)    • Hyperlipidemia    • Hypertension    • Labyrinthitis 09/10/2011   • Myocardial infarction [facility]) 2014    x3 others were in 2009 & 2010   • Sleep disturbances 09/20/2010   • Stroke [facility]) 2007    x1, RLE deficit- uses walker   • Type 2 diabetes, uncontrolled, with renal manifestation 05/03/2017       Past Surgical History:   Procedure Laterality Date   • AV FISTULA PLACEMENT     • AV FISTULA REPAIR Left 6/3/2023    Procedure: LEFT UPPER EXTREMITY A-V GRAFT EXPLANT, LEFT BRACHIAL ANGIOPATCH,  APPLICATION OF  VAC;  Surgeon: Violet Rider MD;  [facility];  Service: Vascular   • CARDIAC CATHETERIZATION Left 02/03/2023    Procedure: Cardiac Left Heart Cath;  Surgeon: Angelina Silva MD;  Location: [address] [place] [address] CATH LAB; Service: Cardiology   • CARDIAC CATHETERIZATION N/A 02/08/2023    Procedure: Cardiac catheterization with complex PCI with Dr. Edda Pope, patient is a renal dialysis patient;  Surgeon: Melissa Mccoy MD;  Location:  CARDIAC CATH LAB; Service: Cardiology   • CARDIAC SURGERY  2010    stents x3   • COLONOSCOPY N/A 12/12/2016    Procedure: COLONOSCOPY;  Surgeon: Kelly Gonzales MD;  Location: [address] [place] [address] GI LAB; Service:    • COLONOSCOPY N/A 04/03/2017    Procedure:  COLONOSCOPY;  Surgeon: Kelly Gonzales MD;  Location: [address] [place] [address] GI LAB; Service:    • HARDWARE REMOVAL Right 04/04/2022    Procedure: REMOVAL HARDWARE HIP;  Surgeon: Carlos Villalta MD;  Location:  MAIN OR;  Service: Orthopedics   • IR AV FISTULA/GRAFT DECLOT  04/29/2021   • IR AV FISTULA/GRAFT DECLOT  03/25/2022   • IR AV FISTULA/GRAFT DECLOT  09/21/2022   • IR AV FISTULAGRAM/GRAFTOGRAM  03/28/2022   • IR AV FISTULAGRAM/GRAFTOGRAM  07/28/2022   • IR TUNNELED CENTRAL LINE REMOVAL  12/07/2021   • IR TUNNELED DIALYSIS CATHETER PLACEMENT  05/24/2021   • PORTACATH PLACEMENT      per pt "port in chest"   • [place] ARTERIOVENOUS ANASTOMOSIS OPEN DIRECT Left 07/22/2021    Procedure: CREATION FISTULA ARTERIOVENOUS (AV);   Surgeon: Elder Mac MD;  Location: [facility];  Service: Vascular   • [place] ARTERIOVENOUS ANASTOMOSIS OPEN DIRECT Left 3/21/2023    Procedure: left brachiobasilic fistula,  AVG Graft;  Surgeon: Renay Figueroa MD;  Location:  MAIN OR;  Service: Vascular   • [place] ARTERIOVENOUS ANASTOMOSIS OPEN DIRECT Left 5/19/2023    Procedure: CREATION of LEFT FISTULA ARTERIOVENOUS (AV) GRAFT; Surgeon: Parminder Kim MD;  [facility];  Service: Vascular   • [place] HEMIARTHROPLASTY HIP PARTIAL Right 04/04/2022    Procedure: HEMIARTHROPLASTY HIP (BIPOLAR); Surgeon: Petrona Khan MD;  [facility];  Service: Orthopedics       Family History   Problem Relation Age of Onset   • Leukemia Mother    • Crohn's disease Father    • Heart disease Father    • Transient ischemic attack Brother      I have reviewed and agree with the history as documented. E-Cigarette/Vaping   • E-Cigarette Use Never User      E-Cigarette/Vaping Substances   • Nicotine No    • THC No    • CBD No    • Flavoring No    • Other No    • Unknown No      Social History     Tobacco Use   • Smoking status: Every Day     Packs/day: 1.00     Years: 30.00     Total pack years: 30.00     Types: Cigarettes   • Smokeless tobacco: Never   • Tobacco comments:     Currently not smoking - in facility   Vaping Use   • Vaping Use: Never used   Substance Use Topics   • Alcohol use: Yes     Alcohol/week: 1.0 standard drink of alcohol     Types: 1 Shots of liquor per week     Comment: 1 shot daily   • Drug use: Yes     Types: Marijuana     Comment: gummies       Review of Systems   Constitutional: Negative. HENT: Negative. Eyes: Negative. Respiratory: Negative. Cardiovascular: Negative. Gastrointestinal: Negative. Endocrine: Negative. Genitourinary: Negative. Musculoskeletal: Negative. Skin: Positive for wound. Allergic/Immunologic: Negative. Neurological: Positive for weakness. Hematological: Negative. Psychiatric/Behavioral: Negative. All other systems reviewed and are negative. Physical Exam  Physical Exam  Vitals and nursing note reviewed. Constitutional:       Appearance: Normal appearance. HENT:      Head: Normocephalic and atraumatic.       Nose: Nose normal.      Mouth/Throat:      Mouth: Mucous membranes are moist.   Eyes:      Extraocular Movements: Extraocular movements intact. Pupils: Pupils are equal, round, and reactive to light. Cardiovascular:      Rate and Rhythm: Normal rate and regular rhythm. Pulmonary:      Effort: Pulmonary effort is normal.      Breath sounds: Normal breath sounds. Abdominal:      General: Abdomen is flat. Bowel sounds are normal.      Palpations: Abdomen is soft. Musculoskeletal:         General: Normal range of motion. Cervical back: Normal range of motion and neck supple. Comments: Patient able to move his right elbow flexion extension intact small skin tear noted. Skin:     General: Skin is warm. Capillary Refill: Capillary refill takes less than 2 seconds. Neurological:      General: No focal deficit present. Mental Status: He is alert and oriented to person, place, and time. Mental status is at baseline. Cranial Nerves: No cranial nerve deficit. Sensory: No sensory deficit. Motor: No weakness. Coordination: Coordination normal.      Gait: Gait normal.      Deep Tendon Reflexes: Reflexes normal.   Psychiatric:         Mood and Affect: Mood normal.         Thought Content:  Thought content normal.         Vital Signs  ED Triage Vitals   Temperature Pulse Respirations Blood Pressure SpO2   09/20/23 1200 09/20/23 1200 09/20/23 1200 09/20/23 1200 09/20/23 1204   (!) 97.3 °F (36.3 °C) 76 16 105/67 100 %      Temp Source Heart Rate Source Patient Position - Orthostatic VS BP Location FiO2 (%)   09/20/23 1200 09/20/23 1200 09/20/23 1200 09/20/23 1200 --   Temporal Monitor Lying Left arm       Pain Score       09/20/23 1227       9           Vitals:    09/20/23 1200 09/20/23 1300 09/20/23 1411   BP: 105/67  110/66   Pulse: 76 68 70   Patient Position - Orthostatic VS: Lying  Lying         Visual Acuity  Visual Acuity    Flowsheet Row Most Recent Value   L Pupil Size (mm) 3   R Pupil Size (mm) 3          ED Medications  Medications   bacitracin topical ointment 1 small application (1 small application Topical Given 9/20/23 1237)       Diagnostic Studies  Results Reviewed     Procedure Component Value Units Date/Time    Comprehensive metabolic panel [[id number]]  (Abnormal) Collected: 09/20/23 1308    Lab Status: Final result Specimen: Blood from Arm, Right Updated: 09/20/23 1334     Sodium 135 mmol/L      Potassium 4.3 mmol/L      Chloride 99 mmol/L      CO2 22 mmol/L      ANION GAP 14 mmol/L      BUN 22 mg/dL      Creatinine 6.91 mg/dL      Glucose 115 mg/dL      Calcium 9.4 mg/dL      Corrected Calcium 10.0 mg/dL      AST 17 U/L      ALT 3 U/L      Alkaline Phosphatase 77 U/L      Total Protein 6.9 g/dL      Albumin 3.3 g/dL      Total Bilirubin 0.77 mg/dL      eGFR 7 ml/min/1.73sq m     Narrative:      [place] guidelines for Chronic Kidney Disease (CKD):   •  Stage 1 with normal or high GFR (GFR > 90 mL/min/1.73 square meters)  •  Stage 2 Mild CKD (GFR = 60-89 mL/min/1.73 square meters)  •  Stage 3A Moderate CKD (GFR = 45-59 mL/min/1.73 square meters)  •  Stage 3B Moderate CKD (GFR = 30-44 mL/min/1.73 square meters)  •  Stage 4 Severe CKD (GFR = 15-29 mL/min/1.73 square meters)  •  Stage 5 End Stage CKD (GFR <15 mL/min/1.73 square meters)  Note: GFR calculation is accurate only with a steady state creatinine    Magnesium [[id number]]  (Normal) Collected: 09/20/23 1308    Lab Status: Final result Specimen: Blood from Arm, Right Updated: 09/20/23 1334     Magnesium 2.3 mg/dL     Fingerstick Glucose (POCT) [[id number]]  (Normal) Collected: 09/20/23 1243    Lab Status: Final result Updated: 09/20/23 1244     POC Glucose 112 mg/dl                  No orders to display              Procedures  Procedures         ED Course                               SBIRT 20yo+    Flowsheet Row Most Recent Value   Initial Alcohol Screen: US AUDIT-C     1. How often do you have a drink containing alcohol? 0 Filed at: 09/20/2023 1206   2.  How many drinks containing alcohol do you have on a typical day you are drinking? 0 Filed at: 09/20/2023 1207   3a. Male UNDER 65: How often do you have five or more drinks on one occasion? 0 Filed at: 09/20/2023 1207   Audit-C Score 0 Filed at: 09/20/2023 1207   KIZZY: How many times in the past year have you. .. Used an illegal drug or used a prescription medication for non-medical reasons? Never Filed at: 09/20/2023 1207                    Medical Decision Making  Patient evaluated in the ED with labs we could obtain a CMP no CBC despite multiple attempts to get blood. The CMP she was showed normal potassium no other electrolyte abnormalities he was not fluid overloaded no emergent need for dialysis. I encouraged him to keep his appointment in the morning. His wife was at bedside physical therapy and Occupational Therapy evaluated him they recommended home physical therapy patient was comfortable going back home. Discharged. Abrasion: acute illness or injury  Fall, initial encounter: acute illness or injury  Amount and/or Complexity of Data Reviewed  External Data Reviewed: notes. Labs: ordered. Decision-making details documented in ED Course. Risk  OTC drugs. Prescription drug management. Disposition  Final diagnoses:   Fall, initial encounter   Abrasion     Time reflects when diagnosis was documented in both MDM as applicable and the Disposition within this note     Time User Action Codes Description Comment    9/20/2023  1:54 PM Jerica Schroeder [V82. IWLX] [facility], initial encounter     9/20/2023  1:54 PM Jerica Schroeder [T14. [address] Abrasion       ED Disposition     ED Disposition   Discharge    Condition   Stable    Date/Time   Wed Sep 20, 2023  1:54 PM    Comment   [facility] discharge to home/self care.                Follow-up Information     Follow up With Specialties Details Why Contact Info Additional Information    Neetu Hendrickson MD Family Medicine Schedule an appointment as soon as possible for a visit   [address] [facility] [address] [facility] Emergency Department Emergency Medicine  If symptoms worsen [address] [address] 6[address] [facility] Emergency Department, [address], [place], [address]          Discharge Medication List as of 9/20/2023  2:09 PM      START taking these medications    Details   bacitracin topical ointment 500 units/g topical ointment Apply 1 large application topically 2 (two) times a day, Starting Wed 9/20/2023, Normal         CONTINUE these medications which have NOT CHANGED    Details   acetaminophen (TYLENOL) 325 mg tablet Take 3 tablets (975 mg total) by mouth every 8 (eight) hours as needed for mild pain, Starting Thu 6/15/2023, No Print      ammonium lactate (LAC-HYDRIN) 12 % lotion Apply topically 2 (two) times a day as needed for dry skin, Starting Wed 8/30/2023, Normal      ascorbic acid (VITAMIN C) 500 mg tablet TAKE ONE TABLET BY MOUTH EVERY EVENING FOR SUPPLEMENT, Historical Med      aspirin (ECOTRIN LOW STRENGTH) 81 mg EC tablet Take 81 mg by mouth daily, Starting Wed 1/26/2022, Historical Med      atorvastatin (LIPITOR) 20 mg tablet Take 20 mg by mouth daily at bedtime, Starting Wed 1/26/2022, Historical Med      calcitriol (ROCALTROL) 0.25 mcg capsule Take 3 capsules (0.75 mcg total) by mouth 3 (three) times a week, Starting Fri 6/16/2023, No Print      Cholecalciferol 50 MCG (2000 UT) CAPS Take by mouth daily, Starting Mon 3/22/2021, Historical Med      [place] Choice Comfort EZ 33G X 4 MM MISC Historical Med      clopidogrel (PLAVIX) 75 mg tablet Take 75 mg by mouth daily, Starting Fri 8/18/2023, Historical Med      docusate sodium (COLACE) 100 mg capsule Take 100 mg by mouth daily, Starting Fri 8/18/2023, Historical Med      gabapentin (NEURONTIN) 100 mg capsule Take 1 capsule (100 mg total) by mouth 3 (three) times a week On Tuesday Thursday Saturday, Starting Fri 6/16/2023, No Print hydrOXYzine HCL (ATARAX) 25 mg tablet TAKE 1 TABLET BY MOUTH EVERY 8 HOURS AS NEEDED FOR ITCHING, Historical Med      midodrine (PROAMATINE) 10 MG tablet Take 1 tablet (10 mg total) by mouth 3 (three) times a day before meals, Starting Wed 5/24/2023, Until Thu 8/31/2023, Normal      oxyCODONE (ROXICODONE) 5 immediate release tablet Take 0.5 tablets (2.5 mg total) by mouth every 8 (eight) hours as needed for severe pain for up to 10 doses Max Daily Amount: 7.5 mg, Starting Thu 9/14/2023, Normal      vitamin B-12 (VITAMIN B-12) 1,000 mcg tablet Take 1,000 mcg by mouth daily, Starting Tue 1/24/2023, Historical Med             No discharge procedures on file.     PDMP Review       Value Time User    PDMP Reviewed  Yes 5/24/2023  5:40 PM Brianne Phillips MD          ED Provider  Electronically Signed by           Jeniffer Ordonez DO  09/20/23 4126

## 2023-09-20 NOTE — ED NOTES
Patient is a hard stick due to ecchymosis in bilateral arms and skin tear to the right lower arm and elbow. Left arm had a previous fistula and no palpable vein.      Joseluis Pereira RN  09/20/23 1844

## 2023-09-22 DIAGNOSIS — I25.10 CORONARY ARTERY DISEASE INVOLVING NATIVE CORONARY ARTERY OF NATIVE HEART WITHOUT ANGINA PECTORIS: ICD-10-CM

## 2023-09-22 DIAGNOSIS — Z99.2 ESRD (END STAGE RENAL DISEASE) ON DIALYSIS (HCC): ICD-10-CM

## 2023-09-22 DIAGNOSIS — R26.2 AMBULATORY DYSFUNCTION: ICD-10-CM

## 2023-09-22 DIAGNOSIS — K90.9 INTESTINAL MALABSORPTION, UNSPECIFIED TYPE: Primary | ICD-10-CM

## 2023-09-22 DIAGNOSIS — N18.6 ESRD (END STAGE RENAL DISEASE) ON DIALYSIS (HCC): ICD-10-CM

## 2023-09-22 NOTE — TELEPHONE ENCOUNTER
Hospice order faxed and recieved by them. PTs wife is aware and stated they are not sure if they want hospice they are looking into palliative care as well.

## 2023-09-25 ENCOUNTER — HOSPITAL ENCOUNTER (INPATIENT)
Facility: HOSPITAL | Age: 68
LOS: 5 days | Discharge: NURSING, REHAB OR LONG-TERM CARE | DRG: 947 | End: 2023-10-01
Attending: EMERGENCY MEDICINE | Admitting: STUDENT IN AN ORGANIZED HEALTH CARE EDUCATION/TRAINING PROGRAM
Payer: MEDICARE

## 2023-09-25 DIAGNOSIS — R53.1 GENERALIZED WEAKNESS: Primary | ICD-10-CM

## 2023-09-25 DIAGNOSIS — Z99.2 ESRD (END STAGE RENAL DISEASE) ON DIALYSIS (HCC): ICD-10-CM

## 2023-09-25 DIAGNOSIS — N18.6 ESRD (END STAGE RENAL DISEASE) ON DIALYSIS (HCC): ICD-10-CM

## 2023-09-25 DIAGNOSIS — R26.2 UNABLE TO WALK: ICD-10-CM

## 2023-09-25 DIAGNOSIS — T82.7XXA INFECTION OF AV GRAFT FOR DIALYSIS (HCC): ICD-10-CM

## 2023-09-25 PROBLEM — R11.0 NAUSEA: Status: ACTIVE | Noted: 2023-09-25

## 2023-09-25 PROBLEM — G89.29 CHRONIC PAIN: Status: ACTIVE | Noted: 2023-09-25

## 2023-09-25 PROBLEM — Z86.73 HISTORY OF CVA (CEREBROVASCULAR ACCIDENT): Status: ACTIVE | Noted: 2023-09-25

## 2023-09-25 PROBLEM — T14.8XXA OPEN WOUND: Status: ACTIVE | Noted: 2023-09-25

## 2023-09-25 PROBLEM — R11.2 NAUSEA & VOMITING: Status: ACTIVE | Noted: 2023-09-25

## 2023-09-25 LAB
ALBUMIN SERPL BCP-MCNC: 3 G/DL (ref 3.5–5)
ALP SERPL-CCNC: 72 U/L (ref 34–104)
ALT SERPL W P-5'-P-CCNC: 5 U/L (ref 7–52)
ANION GAP SERPL CALCULATED.3IONS-SCNC: 8 MMOL/L
AST SERPL W P-5'-P-CCNC: 19 U/L (ref 13–39)
ATRIAL RATE: 63 BPM
BASOPHILS # BLD AUTO: 0.07 THOUSANDS/ÂΜL (ref 0–0.1)
BASOPHILS NFR BLD AUTO: 2 % (ref 0–1)
BILIRUB SERPL-MCNC: 0.67 MG/DL (ref 0.2–1)
BUN SERPL-MCNC: 33 MG/DL (ref 5–25)
CALCIUM ALBUM COR SERPL-MCNC: 9.6 MG/DL (ref 8.3–10.1)
CALCIUM SERPL-MCNC: 8.8 MG/DL (ref 8.4–10.2)
CHLORIDE SERPL-SCNC: 98 MMOL/L (ref 96–108)
CO2 SERPL-SCNC: 29 MMOL/L (ref 21–32)
CREAT SERPL-MCNC: 8.16 MG/DL (ref 0.6–1.3)
EOSINOPHIL # BLD AUTO: 0.04 THOUSAND/ÂΜL (ref 0–0.61)
EOSINOPHIL NFR BLD AUTO: 1 % (ref 0–6)
ERYTHROCYTE [DISTWIDTH] IN BLOOD BY AUTOMATED COUNT: 17.3 % (ref 11.6–15.1)
GFR SERPL CREATININE-BSD FRML MDRD: 6 ML/MIN/1.73SQ M
GLUCOSE SERPL-MCNC: 95 MG/DL (ref 65–140)
HCT VFR BLD AUTO: 30.4 % (ref 36.5–49.3)
HGB BLD-MCNC: 9.7 G/DL (ref 12–17)
IMM GRANULOCYTES # BLD AUTO: 0.01 THOUSAND/UL (ref 0–0.2)
IMM GRANULOCYTES NFR BLD AUTO: 0 % (ref 0–2)
LYMPHOCYTES # BLD AUTO: 0.66 THOUSANDS/ÂΜL (ref 0.6–4.47)
LYMPHOCYTES NFR BLD AUTO: 14 % (ref 14–44)
MCH RBC QN AUTO: 31.4 PG (ref 26.8–34.3)
MCHC RBC AUTO-ENTMCNC: 31.9 G/DL (ref 31.4–37.4)
MCV RBC AUTO: 98 FL (ref 82–98)
MONOCYTES # BLD AUTO: 0.18 THOUSAND/ÂΜL (ref 0.17–1.22)
MONOCYTES NFR BLD AUTO: 4 % (ref 4–12)
NEUTROPHILS # BLD AUTO: 3.8 THOUSANDS/ÂΜL (ref 1.85–7.62)
NEUTS SEG NFR BLD AUTO: 79 % (ref 43–75)
NRBC BLD AUTO-RTO: 0 /100 WBCS
P AXIS: 46 DEGREES
PLATELET # BLD AUTO: 127 THOUSANDS/UL (ref 149–390)
PMV BLD AUTO: 10.5 FL (ref 8.9–12.7)
POTASSIUM SERPL-SCNC: 4.6 MMOL/L (ref 3.5–5.3)
PR INTERVAL: 180 MS
PROT SERPL-MCNC: 6 G/DL (ref 6.4–8.4)
QRS AXIS: 11 DEGREES
QRSD INTERVAL: 126 MS
QT INTERVAL: 478 MS
QTC INTERVAL: 489 MS
RBC # BLD AUTO: 3.09 MILLION/UL (ref 3.88–5.62)
SARS-COV-2 RNA RESP QL NAA+PROBE: NEGATIVE
SODIUM SERPL-SCNC: 135 MMOL/L (ref 135–147)
T WAVE AXIS: 29 DEGREES
VENTRICULAR RATE: 63 BPM
WBC # BLD AUTO: 4.76 THOUSAND/UL (ref 4.31–10.16)

## 2023-09-25 PROCEDURE — 84443 ASSAY THYROID STIM HORMONE: CPT | Performed by: STUDENT IN AN ORGANIZED HEALTH CARE EDUCATION/TRAINING PROGRAM

## 2023-09-25 PROCEDURE — 36415 COLL VENOUS BLD VENIPUNCTURE: CPT | Performed by: EMERGENCY MEDICINE

## 2023-09-25 PROCEDURE — 82306 VITAMIN D 25 HYDROXY: CPT | Performed by: STUDENT IN AN ORGANIZED HEALTH CARE EDUCATION/TRAINING PROGRAM

## 2023-09-25 PROCEDURE — 80053 COMPREHEN METABOLIC PANEL: CPT | Performed by: EMERGENCY MEDICINE

## 2023-09-25 PROCEDURE — 93005 ELECTROCARDIOGRAM TRACING: CPT

## 2023-09-25 PROCEDURE — 99222 1ST HOSP IP/OBS MODERATE 55: CPT | Performed by: NURSE PRACTITIONER

## 2023-09-25 PROCEDURE — 99285 EMERGENCY DEPT VISIT HI MDM: CPT

## 2023-09-25 PROCEDURE — 87081 CULTURE SCREEN ONLY: CPT | Performed by: INTERNAL MEDICINE

## 2023-09-25 PROCEDURE — 87635 SARS-COV-2 COVID-19 AMP PRB: CPT

## 2023-09-25 PROCEDURE — 93010 ELECTROCARDIOGRAM REPORT: CPT | Performed by: INTERNAL MEDICINE

## 2023-09-25 PROCEDURE — 85025 COMPLETE CBC W/AUTO DIFF WBC: CPT | Performed by: EMERGENCY MEDICINE

## 2023-09-25 PROCEDURE — 99285 EMERGENCY DEPT VISIT HI MDM: CPT | Performed by: EMERGENCY MEDICINE

## 2023-09-25 RX ORDER — ATORVASTATIN CALCIUM 20 MG/1
20 TABLET, FILM COATED ORAL
Status: DISCONTINUED | OUTPATIENT
Start: 2023-09-25 | End: 2023-10-01 | Stop reason: HOSPADM

## 2023-09-25 RX ORDER — HYDROXYZINE HYDROCHLORIDE 25 MG/1
25 TABLET, FILM COATED ORAL EVERY 8 HOURS PRN
Status: DISCONTINUED | OUTPATIENT
Start: 2023-09-25 | End: 2023-10-01 | Stop reason: HOSPADM

## 2023-09-25 RX ORDER — CLOPIDOGREL BISULFATE 75 MG/1
75 TABLET ORAL DAILY
Status: DISCONTINUED | OUTPATIENT
Start: 2023-09-26 | End: 2023-10-01 | Stop reason: HOSPADM

## 2023-09-25 RX ORDER — NICOTINE 21 MG/24HR
1 PATCH, TRANSDERMAL 24 HOURS TRANSDERMAL DAILY
Status: DISCONTINUED | OUTPATIENT
Start: 2023-09-26 | End: 2023-10-01 | Stop reason: HOSPADM

## 2023-09-25 RX ORDER — ASCORBIC ACID 500 MG
500 TABLET ORAL
Status: DISCONTINUED | OUTPATIENT
Start: 2023-09-26 | End: 2023-10-01 | Stop reason: HOSPADM

## 2023-09-25 RX ORDER — MELATONIN
2000 DAILY
Status: DISCONTINUED | OUTPATIENT
Start: 2023-09-26 | End: 2023-10-01 | Stop reason: HOSPADM

## 2023-09-25 RX ORDER — DOCUSATE SODIUM 100 MG/1
100 CAPSULE, LIQUID FILLED ORAL 2 TIMES DAILY
Status: DISCONTINUED | OUTPATIENT
Start: 2023-09-25 | End: 2023-10-01 | Stop reason: HOSPADM

## 2023-09-25 RX ORDER — MIDODRINE HYDROCHLORIDE 5 MG/1
10 TABLET ORAL
Status: DISCONTINUED | OUTPATIENT
Start: 2023-09-26 | End: 2023-10-01 | Stop reason: HOSPADM

## 2023-09-25 RX ORDER — GABAPENTIN 100 MG/1
100 CAPSULE ORAL 3 TIMES WEEKLY
Status: DISCONTINUED | OUTPATIENT
Start: 2023-09-26 | End: 2023-10-01 | Stop reason: HOSPADM

## 2023-09-25 RX ORDER — HEPARIN SODIUM 5000 [USP'U]/ML
5000 INJECTION, SOLUTION INTRAVENOUS; SUBCUTANEOUS EVERY 8 HOURS SCHEDULED
Status: DISCONTINUED | OUTPATIENT
Start: 2023-09-26 | End: 2023-10-01 | Stop reason: HOSPADM

## 2023-09-25 RX ORDER — ACETAMINOPHEN 325 MG/1
650 TABLET ORAL EVERY 6 HOURS PRN
Status: DISCONTINUED | OUTPATIENT
Start: 2023-09-25 | End: 2023-10-01 | Stop reason: HOSPADM

## 2023-09-25 RX ORDER — ONDANSETRON 2 MG/ML
4 INJECTION INTRAMUSCULAR; INTRAVENOUS EVERY 6 HOURS PRN
Status: DISCONTINUED | OUTPATIENT
Start: 2023-09-25 | End: 2023-10-01 | Stop reason: HOSPADM

## 2023-09-25 RX ADMIN — ATORVASTATIN CALCIUM 20 MG: 20 TABLET, FILM COATED ORAL at 21:38

## 2023-09-25 RX ADMIN — DOCUSATE SODIUM 100 MG: 100 CAPSULE, LIQUID FILLED ORAL at 21:39

## 2023-09-25 NOTE — LETTER
To: Morales Montalvo  From: [place], [address]    Dialysis information for [facility]. Pt is ready for discharge whenever bed available.     Thank you, Camillo Canavan

## 2023-09-25 NOTE — ED PROVIDER NOTES
History  Chief Complaint   Patient presents with   • Weakness - Generalized     Patient brought in by squad for feeling dizziness and weakness for the last week that has worsen today to the point were patient said he cant walk. 69 yo male with ESRD on dialysis T-R-Sa from home brought in by ambulance. Says he is too weak all over and can't walk at home. He missed his last dialysis on Saturday. His wife is at bedside and she says he needs to be admitted to get dialysis tomorrow and she needs help with getting wheelchair and transportation to and from dialysis. He was seen here 5 days ago s/p multiple falls. PT/OT saw him and arranged for home therapy. Wife says he is not safe to go home right now. Pt. C/o feeling lousy and pain in lower back where he has a wound. + recent vomiting. No chest pain or sob. No fever or cough. History provided by:  Patient and spouse   used: No        Prior to Admission Medications   Prescriptions Last Dose Informant Patient Reported? Taking?    Cholecalciferol 50 MCG (2000 UT) CAPS  Self, Outside Facility (Specify) Yes No   Sig: Take by mouth daily   Swengel Choice Comfort EZ 33G X 4 MM MISC  Self, Outside Facility (Specify) Yes No   Patient not taking: Reported on 9/12/2023   acetaminophen (TYLENOL) 325 mg tablet  Outside Facility (Specify) No No   Sig: Take 3 tablets (975 mg total) by mouth every 8 (eight) hours as needed for mild pain   ammonium lactate (LAC-HYDRIN) 12 % lotion   No No   Sig: Apply topically 2 (two) times a day as needed for dry skin   ascorbic acid (VITAMIN C) 500 mg tablet  Self, Outside Facility (Specify) Yes No   Sig: TAKE ONE TABLET BY MOUTH EVERY EVENING FOR SUPPLEMENT   aspirin (ECOTRIN LOW STRENGTH) 81 mg EC tablet  Self, Outside Facility (Specify) Yes No   Sig: Take 81 mg by mouth daily   atorvastatin (LIPITOR) 20 mg tablet  Self, Outside Facility (Specify) Yes No   Sig: Take 20 mg by mouth daily at bedtime   bacitracin topical ointment 500 units/g topical ointment   No No   Sig: Apply 1 large application topically 2 (two) times a day   calcitriol (ROCALTROL) 0.25 mcg capsule  Outside Facility (Specify) No No   Sig: Take 3 capsules (0.75 mcg total) by mouth 3 (three) times a week   clopidogrel (PLAVIX) 75 mg tablet   Yes No   Sig: Take 75 mg by mouth daily   docusate sodium (COLACE) 100 mg capsule   Yes No   Sig: Take 100 mg by mouth daily   gabapentin (NEURONTIN) 100 mg capsule  Outside Facility (Specify) No No   Sig: Take 1 capsule (100 mg total) by mouth 3 (three) times a week On Tuesday Thursday Saturday   hydrOXYzine HCL (ATARAX) 25 mg tablet   Yes No   Sig: TAKE 1 TABLET BY MOUTH EVERY 8 HOURS AS NEEDED FOR ITCHING   midodrine (PROAMATINE) 10 MG tablet   No No   Sig: Take 1 tablet (10 mg total) by mouth 3 (three) times a day before meals   oxyCODONE (ROXICODONE) 5 immediate release tablet   No No   Sig: Take 0.5 tablets (2.5 mg total) by mouth every 8 (eight) hours as needed for severe pain for up to 10 doses Max Daily Amount: 7.5 mg   vitamin B-12 (VITAMIN B-12) 1,000 mcg tablet  Outside Facility (Specify) Yes No   Sig: Take 1,000 mcg by mouth daily      Facility-Administered Medications: None       Past Medical History:   Diagnosis Date   • 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    With Cerebral Infarction:  Last Assessed:  October 20, 2016   • Chronic kidney disease 2012    on dialysis    • COVID 05/2022   • COVID-19 04/2022 4/2022-while in NH   • Diabetes mellitus ([address] )    • Dialysis patient [facility])     T/TH/Sat   • Difficulty walking    • GERD (gastroesophageal reflux disease)    • Hyperlipidemia    • Hypertension    • Labyrinthitis 09/10/2011   • Myocardial infarction [facility]) 2014    x3 others were in 2009 & 2010   • Sleep disturbances 09/20/2010   • Stroke [facility]) 2007    x1, RLE deficit- uses walker   • Type 2 diabetes, uncontrolled, with renal manifestation 05/03/2017       Past Surgical History:   Procedure Laterality Date   • AV FISTULA PLACEMENT     • AV FISTULA REPAIR Left 6/3/2023    Procedure: LEFT UPPER EXTREMITY A-V GRAFT EXPLANT, LEFT BRACHIAL ANGIOPATCH,  APPLICATION OF  VAC;  Surgeon: Flaquita Rider MD;  [facility];  Service: Vascular   • CARDIAC CATHETERIZATION Left 02/03/2023    Procedure: Cardiac Left Heart Cath;  Surgeon: Lucas Rodríguez MD;  Location: [address] [place] [address] CATH LAB; Service: Cardiology   • CARDIAC CATHETERIZATION N/A 02/08/2023    Procedure: Cardiac catheterization with complex PCI with Dr. Jose Raul Villarreal, patient is a renal dialysis patient;  Surgeon: Sharon Lovell MD;  Location:  CARDIAC CATH LAB; Service: Cardiology   • CARDIAC SURGERY  2010    stents x3   • COLONOSCOPY N/A 12/12/2016    Procedure: COLONOSCOPY;  Surgeon: Rosana Baker MD;  Location: [address] [place] [address] GI LAB; Service:    • COLONOSCOPY N/A 04/03/2017    Procedure:  COLONOSCOPY;  Surgeon: Rosana Baker MD;  Location: [address] [place] [address] GI LAB; Service:    • HARDWARE REMOVAL Right 04/04/2022    Procedure: REMOVAL HARDWARE HIP;  Surgeon: Rona Sparks MD;  [facility];  Service: Orthopedics   • IR AV FISTULA/GRAFT DECLOT  04/29/2021   • IR AV FISTULA/GRAFT DECLOT  03/25/2022   • IR AV FISTULA/GRAFT DECLOT  09/21/2022   • IR AV FISTULAGRAM/GRAFTOGRAM  03/28/2022   • IR AV FISTULAGRAM/GRAFTOGRAM  07/28/2022   • IR TUNNELED CENTRAL LINE REMOVAL  12/07/2021   • IR TUNNELED DIALYSIS CATHETER PLACEMENT  05/24/2021   • PORTACATH PLACEMENT      per pt "port in chest"   • SC ARTERIOVENOUS ANASTOMOSIS OPEN DIRECT Left 07/22/2021    Procedure: CREATION FISTULA ARTERIOVENOUS (AV);   Surgeon: Farzad Shah MD;  Location: [facility];  Service: Vascular   • SC ARTERIOVENOUS ANASTOMOSIS OPEN DIRECT Left 3/21/2023    Procedure: left brachiobasilic fistula,  AVG Graft;  Surgeon: Bebeto Wagner MD;  Location:  MAIN OR;  Service: Vascular   • SC ARTERIOVENOUS ANASTOMOSIS OPEN DIRECT Left 5/19/2023    Procedure: CREATION of LEFT FISTULA ARTERIOVENOUS (AV) GRAFT;  Surgeon: Kian Alcala MD;  [facility];  Service: Vascular   • KS HEMIARTHROPLASTY HIP PARTIAL Right 04/04/2022    Procedure: HEMIARTHROPLASTY HIP (BIPOLAR); Surgeon: Rian Fernandez MD;  Location: [facility];  Service: Orthopedics       Family History   Problem Relation Age of Onset   • Leukemia Mother    • Crohn's disease Father    • Heart disease Father    • Transient ischemic attack Brother      I have reviewed and agree with the history as documented. E-Cigarette/Vaping   • E-Cigarette Use Never User      E-Cigarette/Vaping Substances   • Nicotine No    • THC No    • CBD No    • Flavoring No    • Other No    • Unknown No      Social History     Tobacco Use   • Smoking status: Every Day     Packs/day: 1.00     Years: 30.00     Total pack years: 30.00     Types: Cigarettes   • Smokeless tobacco: Never   • Tobacco comments:     Currently not smoking - in facility   Vaping Use   • Vaping Use: Never used   Substance Use Topics   • Alcohol use: Yes     Alcohol/week: 1.0 standard drink of alcohol     Types: 1 Shots of liquor per week     Comment: 1 shot daily   • Drug use: Yes     Types: Marijuana     Comment: gummies       Review of Systems   Constitutional: Negative for fever. Respiratory: Negative for cough and shortness of breath. Cardiovascular: Negative for chest pain. Gastrointestinal: Positive for vomiting. Negative for diarrhea. Musculoskeletal: Positive for gait problem. Skin: Positive for wound. Neurological: Positive for weakness. All other systems reviewed and are negative. Physical Exam  Physical Exam  Vitals and nursing note reviewed. Constitutional:       General: He is not in acute distress. Appearance: He is well-developed. He is ill-appearing. He is not diaphoretic. HENT:      Head: Normocephalic and atraumatic. Mouth/Throat:      Mouth: Mucous membranes are dry. Eyes:      General: No scleral icterus.      Conjunctiva/sclera: Conjunctivae normal.   Cardiovascular:      Rate and Rhythm: Normal rate and regular rhythm. Heart sounds: Normal heart sounds. No murmur heard. Pulmonary:      Effort: Pulmonary effort is normal. No respiratory distress. Breath sounds: Normal breath sounds. Chest:      Chest wall: No tenderness. Abdominal:      General: Bowel sounds are normal. There is no distension. Palpations: Abdomen is soft. Tenderness: There is no abdominal tenderness. Musculoskeletal:         General: No tenderness or deformity. Normal range of motion. Cervical back: Normal range of motion and neck supple. Skin:     General: Skin is warm and dry. Coloration: Skin is not pale. Findings: Bruising present. No erythema or rash. Neurological:      General: No focal deficit present. Mental Status: He is alert and oriented to person, place, and time. Cranial Nerves: No cranial nerve deficit.    Psychiatric:         Mood and Affect: Mood normal.         Behavior: Behavior normal.         Vital Signs  ED Triage Vitals   Temperature Pulse Respirations Blood Pressure SpO2   09/25/23 1758 09/25/23 1754 09/25/23 1754 09/25/23 1754 09/25/23 1754   97.8 °F (36.6 °C) 65 19 143/67 95 %      Temp src Heart Rate Source Patient Position - Orthostatic VS BP Location FiO2 (%)   -- 09/25/23 1754 09/25/23 1754 09/25/23 1754 --    Monitor Lying Left arm       Pain Score       --                  Vitals:    09/25/23 1754   BP: 143/67   Pulse: 65   Patient Position - Orthostatic VS: Lying         Visual Acuity      ED Medications  Medications - No data to display    Diagnostic Studies  Results Reviewed     Procedure Component Value Units Date/Time    Comprehensive metabolic panel [[id number]]  (Abnormal) Collected: 09/25/23 1902    Lab Status: Final result Specimen: Blood from Line, Venous Updated: 09/25/23 1930     Sodium 135 mmol/L      Potassium 4.6 mmol/L      Chloride 98 mmol/L      CO2 29 mmol/L      ANION GAP 8 mmol/L      BUN 33 mg/dL      Creatinine 8.16 mg/dL      Glucose 95 mg/dL      Calcium 8.8 mg/dL      Corrected Calcium 9.6 mg/dL      AST 19 U/L      ALT 5 U/L      Alkaline Phosphatase 72 U/L      Total Protein 6.0 g/dL      Albumin 3.0 g/dL      Total Bilirubin 0.67 mg/dL      eGFR 6 ml/min/1.73sq m     Narrative:      National Kidney Disease Foundation guidelines for Chronic Kidney Disease (CKD):   •  Stage 1 with normal or high GFR (GFR > 90 mL/min/1.73 square meters)  •  Stage 2 Mild CKD (GFR = 60-89 mL/min/1.73 square meters)  •  Stage 3A Moderate CKD (GFR = 45-59 mL/min/1.73 square meters)  •  Stage 3B Moderate CKD (GFR = 30-44 mL/min/1.73 square meters)  •  Stage 4 Severe CKD (GFR = 15-29 mL/min/1.73 square meters)  •  Stage 5 End Stage CKD (GFR <15 mL/min/1.73 square meters)  Note: GFR calculation is accurate only with a steady state creatinine    CBC and differential [[id number]]  (Abnormal) Collected: 09/25/23 1902    Lab Status: Final result Specimen: Blood from Line, Venous Updated: 09/25/23 1907     WBC 4.76 Thousand/uL      RBC 3.09 Million/uL      Hemoglobin 9.7 g/dL      Hematocrit 30.4 %      MCV 98 fL      MCH 31.4 pg      MCHC 31.9 g/dL      RDW 17.3 %      MPV 10.5 fL      Platelets 268 Thousands/uL      nRBC 0 /100 WBCs      Neutrophils Relative 79 %      Immat GRANS % 0 %      Lymphocytes Relative 14 %      Monocytes Relative 4 %      Eosinophils Relative 1 %      Basophils Relative 2 %      Neutrophils Absolute 3.80 Thousands/µL      Immature Grans Absolute 0.01 Thousand/uL      Lymphocytes Absolute 0.66 Thousands/µL      Monocytes Absolute 0.18 Thousand/µL      Eosinophils Absolute 0.04 Thousand/µL      Basophils Absolute 0.07 Thousands/µL                  No orders to display              Procedures  Procedures         ED Course                                             Medical Decision Making  Prior records reviewed.   Last seen here 5 days ago post fall, had PT/OT eval.  Prior to that admitted for severe hypotension. Will admit tonight for dialysis tomorrow and case management, may need wound care for decubitus ulcer as well. Amount and/or Complexity of Data Reviewed  Labs: ordered. Risk  Decision regarding hospitalization. Disposition  Final diagnoses:   Generalized weakness   Unable to walk   ESRD (end stage renal disease) on dialysis [facility])     Time reflects when diagnosis was documented in both MDM as applicable and the Disposition within this note     Time User Action Codes Description Comment    5/07/5244  4:99 PM Tatyana Kasper A Add [Q77.1] Generalized weakness     5/81/3558  6:18 PM Tatyana Kasper A Add [B06.8] Unable to walk     7/45/7543  0:56 PM Kristen Perez Add [N57.6,  Z99.2] ESRD (end stage renal disease) on dialysis [facility])       ED Disposition     ED Disposition   Admit    Condition   Stable    Date/Time   Mon Sep 25, 2023  7:49 PM    Comment   Case was discussed with **SLIM* and the patient's admission status was agreed to be Admission Status: observation status            Follow-up Information    None         Patient's Medications   Discharge Prescriptions    No medications on file       No discharge procedures on file.     PDMP Review       Value Time User    PDMP Reviewed  Yes 5/24/2023  5:40 PM Sylvester Stevens MD          ED Provider  Electronically Signed by           Tatyana Kasper MD  23/05/68 [id number]

## 2023-09-25 NOTE — LETTER
To : [place]  From : [facility], [facility]    Referral for [facility]. Ready for discharge when bed available.       Thank you

## 2023-09-26 ENCOUNTER — APPOINTMENT (OUTPATIENT)
Dept: DIALYSIS | Facility: HOSPITAL | Age: 68
DRG: 947 | End: 2023-09-26
Payer: MEDICARE

## 2023-09-26 PROBLEM — R00.1 BRADYCARDIA: Status: RESOLVED | Noted: 2023-09-13 | Resolved: 2023-09-26

## 2023-09-26 LAB
25(OH)D3 SERPL-MCNC: 78.4 NG/ML (ref 30–100)
ATRIAL RATE: 66 BPM
P AXIS: 54 DEGREES
PR INTERVAL: 182 MS
QRS AXIS: 4 DEGREES
QRSD INTERVAL: 128 MS
QT INTERVAL: 496 MS
QTC INTERVAL: 519 MS
T WAVE AXIS: 28 DEGREES
TSH SERPL DL<=0.05 MIU/L-ACNC: 2.99 UIU/ML (ref 0.45–4.5)
VENTRICULAR RATE: 66 BPM

## 2023-09-26 PROCEDURE — 5A1D70Z PERFORMANCE OF URINARY FILTRATION, INTERMITTENT, LESS THAN 6 HOURS PER DAY: ICD-10-PCS | Performed by: INTERNAL MEDICINE

## 2023-09-26 PROCEDURE — G0257 UNSCHED DIALYSIS ESRD PT HOS: HCPCS

## 2023-09-26 PROCEDURE — 99497 ADVNCD CARE PLAN 30 MIN: CPT | Performed by: STUDENT IN AN ORGANIZED HEALTH CARE EDUCATION/TRAINING PROGRAM

## 2023-09-26 PROCEDURE — 97163 PT EVAL HIGH COMPLEX 45 MIN: CPT

## 2023-09-26 PROCEDURE — 93010 ELECTROCARDIOGRAM REPORT: CPT | Performed by: INTERNAL MEDICINE

## 2023-09-26 PROCEDURE — 97110 THERAPEUTIC EXERCISES: CPT

## 2023-09-26 PROCEDURE — 99232 SBSQ HOSP IP/OBS MODERATE 35: CPT | Performed by: STUDENT IN AN ORGANIZED HEALTH CARE EDUCATION/TRAINING PROGRAM

## 2023-09-26 PROCEDURE — 99215 OFFICE O/P EST HI 40 MIN: CPT | Performed by: INTERNAL MEDICINE

## 2023-09-26 PROCEDURE — 90935 HEMODIALYSIS ONE EVALUATION: CPT | Performed by: INTERNAL MEDICINE

## 2023-09-26 RX ADMIN — Medication 2.5 MG: at 15:32

## 2023-09-26 RX ADMIN — MIDODRINE HYDROCHLORIDE 10 MG: 5 TABLET ORAL at 16:50

## 2023-09-26 RX ADMIN — ATORVASTATIN CALCIUM 20 MG: 20 TABLET, FILM COATED ORAL at 21:55

## 2023-09-26 RX ADMIN — CYANOCOBALAMIN TAB 500 MCG 1000 MCG: 500 TAB at 12:11

## 2023-09-26 RX ADMIN — MIDODRINE HYDROCHLORIDE 10 MG: 5 TABLET ORAL at 07:18

## 2023-09-26 RX ADMIN — DOCUSATE SODIUM 100 MG: 100 CAPSULE, LIQUID FILLED ORAL at 12:11

## 2023-09-26 RX ADMIN — GABAPENTIN 100 MG: 100 CAPSULE ORAL at 12:12

## 2023-09-26 RX ADMIN — DOCUSATE SODIUM 100 MG: 100 CAPSULE, LIQUID FILLED ORAL at 17:44

## 2023-09-26 RX ADMIN — HEPARIN SODIUM 5000 UNITS: 5000 INJECTION INTRAVENOUS; SUBCUTANEOUS at 05:37

## 2023-09-26 RX ADMIN — HEPARIN SODIUM 5000 UNITS: 5000 INJECTION INTRAVENOUS; SUBCUTANEOUS at 21:55

## 2023-09-26 RX ADMIN — OXYCODONE HYDROCHLORIDE AND ACETAMINOPHEN 500 MG: 500 TABLET ORAL at 17:44

## 2023-09-26 RX ADMIN — CLOPIDOGREL BISULFATE 75 MG: 75 TABLET ORAL at 12:11

## 2023-09-26 RX ADMIN — HEPARIN SODIUM 5000 UNITS: 5000 INJECTION INTRAVENOUS; SUBCUTANEOUS at 15:13

## 2023-09-26 RX ADMIN — HYDROXYZINE HYDROCHLORIDE 25 MG: 25 TABLET ORAL at 21:55

## 2023-09-26 RX ADMIN — MIDODRINE HYDROCHLORIDE 10 MG: 5 TABLET ORAL at 12:10

## 2023-09-26 RX ADMIN — ASPIRIN 81 MG: 81 TABLET, COATED ORAL at 12:11

## 2023-09-26 RX ADMIN — Medication 2000 UNITS: at 12:11

## 2023-09-26 NOTE — ASSESSMENT & PLAN NOTE
Patient presents with worsening generalized weakness, dizziness when getting up, poor appetite, nausea vomiting. Denies SOB, fever. Patient is on hemodialysis Tuesday Thursday Saturday for ESRD. Missed dialysis on Saturday due to weakness and was supposed to go to dialysis today but could not make it due to weakness. · Of note, patient was hospitalized between 9/12-9/14 for hypotension following dialysis. Noted to be bradycardic on EKG and telemetry. Patient was seen by cardiology, recommended close outpatient follow-up and consider PPM if persistent bradycardia. · WBC normal, no bands, patient afebrile. On room air, satting well. · Patient does not make urine. · EKG sinus rhythm with PAC, right bundle branch block, rate 63. · Denies chest pain  · Reports chronic pain in left upper extremity and having pain in sacrum due to open wound.   · Suspect due to physical deconditioning  · Check orthostatic vital signs  · Consult nephrology for dialysis   · PT OT  · Fall precautions

## 2023-09-26 NOTE — PROGRESS NOTES
[address]  Progress Note  Name: Herb Damico  MRN: [id number]  Unit/Bed#: 4 [address] 420-01 I Date of Admission: 9/25/2023   Date of Service: 9/26/2023 I Hospital Day: 0    Assessment/Plan   * Generalized weakness  Assessment & Plan  Patient presents with worsening generalized weakness, dizziness when getting up, poor appetite, nausea vomiting. Denies SOB, fever. Patient is on hemodialysis Tuesday Thursday Saturday for ESRD. Missed dialysis on Saturday due to weakness and was supposed to go to dialysis today but could not make it due to weakness. · Of note, patient was hospitalized between 9/12-9/14 for hypotension following dialysis. Noted to be bradycardic on EKG and telemetry. Patient was seen by cardiology, recommended close outpatient follow-up and consider PPM if persistent bradycardia. · TSH/vitamin D-pending  · WBC normal, no bands, patient afebrile. On room air, satting well. · Patient does not make urine. · EKG sinus rhythm with PAC, right bundle branch block, rate 63. · Denies chest pain  · Reports chronic pain in left upper extremity and having pain in sacrum due to open wound. · Suspect due to physical deconditioning  · Check orthostatic vital signs  · nephrology for dialysis   · PT/ OT  · Fall precautions    Nausea & vomiting  Assessment & Plan  Unresolved,     Nausea vomiting, threw up mucous since Saturday. Poor appetite for a while per wife. · Denies abdominal pain  · No BM for 5 days but denies feeling constipated  · Start stool softener  · Antiemetic as needed  · Monitor symptoms    Chronic pain  Assessment & Plan  Chronic pain left upper extremity due to issues with AV fistula in the past.  · Continue low-dose oxycodone as needed and gabapentin    Open wound  Assessment & Plan  Superficial sacral wound on exam.  No evidence of infection. · Reports pain at site.   · Consult wound care  · Offloading    History of CVA (cerebrovascular accident)  Assessment & Plan  · With resultant right leg weakness. · Continue aspirin Plavix Lipitor    Ambulatory dysfunction  Assessment & Plan  Uses walker for over a year. · History of CVA with right lower extremity weakness. · Reports feeling dizzy when getting up. · Check orthostatic vital signs   · PT/OT  · Wife requesting wheelchair. · Defer to     Hypotension  Assessment & Plan  Takes midodrine 10 mg p.o. twice daily at home instead of 3 times daily. · Continue midodrine 10 mg p.o. 3 times daily  · Check orthostatic vital signs    Chronic deep vein thrombosis (DVT) of internal jugular vein (HCC)  Assessment & Plan  · Continue aspirin, Plavix  · Completed course of Eliquis 1/23 by primary provider    Thrombocytopenia   Assessment & Plan  Improving from prior admission    · Platelet count 447  · Stable from baseline  · Monitor while on heparin subcu    Type 2 diabetes mellitus  Assessment & Plan  Lab Results   Component Value Date    HGBA1C 4.8 01/05/2023       No results for input(s): "POCGLU" in the last 72 hours. Blood Sugar Average: Last 72 hrs:  · Diet controlled. · Update A1c    Nicotine dependence  Assessment & Plan  · NRT patch, smoking cessation    ESRD (end stage renal disease) on dialysis [facility])  Assessment & Plan  On Hemodialysis TTS    · Missed session on Saturday and HD today  · Access right upper chest dialysis catheter  · Potassium normal.  · Not taking calcitriol currently at home  · Daily weight, intake output  · Renal diet  · Discussed with nephrology recs: HD today    Coronary artery disease   Assessment & Plan  s/p PCI with stents x3. On aspirin Plavix Lipitor at home. continue.   · Asymptomatic    Anemia of chronic disease  Assessment & Plan  Baseline hemoglobin appears to be around 8-9s  · Hemoglobin improved  · Continue B12  · Monitor CBC    Bradycardia-resolved as of 9/26/2023  Assessment & Plan  · EKG today as above  · Telemetry       VTE Pharmacologic Prophylaxis: Pharmacologic: Heparin  Mechanical VTE Prophylaxis in Place: Yes    Patient Centered Rounds:  I performed bedside rounds with nursing staff today. Discussions with Specialists or Other Care Team Provider:     Education and Discussions with Family / Patient: Patient    Time Spent for Care:  45 minutes This time was spent on one or more of the following: performing physical exam; counseling and coordination of care; obtaining or reviewing history; documenting in the medical record; reviewing/ordering tests, medications or procedures; communicating with other healthcare professionals and discussing with patient's family/caregivers. Current Length of Stay: 0 day(s)    Current Patient Status: Inpatient   Certification Statement: Clinical course and specialist recommendations    Discharge Plan: Anticipate discharge in 24-48 hrs to discharge location to be determined pending rehab evaluations. Code Status: Level 1 - Full Code      Subjective:   Patient seen and examined at bedside reported chronic pain, he was experiencing nausea and vomiting since last night yellow in nature. And when he gets symptomatic it happens pretty quickly. Patient reports living at home and ambulating with a walker. Objective:     Vitals:   Temp (24hrs), Av.6 °F (36.4 °C), Min:97.2 °F (36.2 °C), Max:97.9 °F (36.6 °C)    Temp:  [97.2 °F (36.2 °C)-97.9 °F (36.6 °C)] 97.9 °F (36.6 °C)  HR:  [54-82] 63  Resp:  [16-20] 18  BP: (102-143)/(58-77) 117/63  SpO2:  [95 %-98 %] 98 %  Body mass index is 20.7 kg/m². Input and Output Summary (last 24 hours): Intake/Output Summary (Last 24 hours) at 2023 1635  Last data filed at 2023 1230  Gross per 24 hour   Intake 500 ml   Output 2500 ml   Net -2000 ml       Physical Exam:     Physical Exam  Vitals reviewed. Constitutional:       General: He is not in acute distress. Appearance: Normal appearance. HENT:      Head: Atraumatic. Nose: No congestion.    Eyes: General: No scleral icterus. Pupils: Pupils are equal, round, and reactive to light. Cardiovascular:      Rate and Rhythm: Normal rate. Heart sounds: No murmur heard. Pulmonary:      Effort: No respiratory distress. Breath sounds: No wheezing, rhonchi or rales. Chest:      Comments: Port Right sided chest wall  Abdominal:      Palpations: Abdomen is soft. Tenderness: There is no abdominal tenderness. There is no guarding. Musculoskeletal:         General: No swelling or deformity. Normal range of motion. Cervical back: No tenderness. Right lower leg: No edema. Left lower leg: No edema. Feet:      Right foot:      Skin integrity: No callus. Skin:     General: Skin is warm. Capillary Refill: Capillary refill takes less than 2 seconds. Findings: Ecchymosis (BL forearms) present. No lesion or rash. Neurological:      Mental Status: He is oriented to person, place, and time. Cranial Nerves: No cranial nerve deficit. Coordination: Coordination normal.   Psychiatric:         Mood and Affect: Mood normal.         Thought Content: Thought content normal.         Additional Data:     Labs:    Results from last 7 days   Lab Units 09/25/23  1902   WBC Thousand/uL 4.76   HEMOGLOBIN g/dL 9.7*   HEMATOCRIT % 30.4*   PLATELETS Thousands/uL 127*   NEUTROS PCT % 79*   LYMPHS PCT % 14   MONOS PCT % 4   EOS PCT % 1     Results from last 7 days   Lab Units 09/25/23  1902   POTASSIUM mmol/L 4.6   CHLORIDE mmol/L 98   CO2 mmol/L 29   BUN mg/dL 33*   CREATININE mg/dL 8.16*   CALCIUM mg/dL 8.8   ALK PHOS U/L 72   ALT U/L 5*   AST U/L 19           * I Have Reviewed All Lab Data Listed Above.   * Additional Pertinent Lab Tests Reviewed: No correct    Imaging:  Imaging Reports Reviewed Today Include: CXR    Recent Cultures (last 7 days):           Last 24 Hours Medication List:   Current Facility-Administered Medications   Medication Dose Route Frequency Provider Last Rate   • acetaminophen  650 mg Oral Q6H PRN TESSY Nolasco     • ascorbic acid  500 mg Oral After Dinner TESSY Bowling     • aspirin  81 mg Oral Daily TESSY Nolasco     • atorvastatin  20 mg Oral HS TESSY Nolasco     • cholecalciferol  2,000 Units Oral Daily TESSY Nolasco     • clopidogrel  75 mg Oral Daily TESSY Nolasco     • vitamin B-12  1,000 mcg Oral Daily TESSY Nolasco     • docusate sodium  100 mg Oral BID TESSY Nolasco     • gabapentin  100 mg Oral Once per day on Tue Thu Sat TESSY Nolasco     • heparin (porcine)  5,000 Units Subcutaneous Q8H [facility] TESSY Nolasco     • hydrOXYzine HCL  25 mg Oral Q8H PRN TESSY Nolasco     • midodrine  10 mg Oral TID AC TESSY Nolasco     • nicotine  1 patch Transdermal Daily TESSY Nolasco     • ondansetron  4 mg Intravenous Q6H PRN TESSY Nolasco     • oxyCODONE  2.5 mg Oral Q8H PRN TESSY Nolasco            Today, Patient Was Seen By: Mateo Leach MD    ** Please Note: "This note has been constructed using a voice recognition system. Therefore there may be syntax, spelling, and/or grammatical errors.  Please call if you have any questions. "**

## 2023-09-26 NOTE — ASSESSMENT & PLAN NOTE
· Superficial sacral wound on exam.  No evidence of infection. · Reports pain at site.   · Consult wound care  · Offloading

## 2023-09-26 NOTE — ASSESSMENT & PLAN NOTE
Lab Results   Component Value Date    HGBA1C 4.8 01/05/2023       No results for input(s): "POCGLU" in the last 72 hours. Blood Sugar Average: Last 72 hrs:  · Diet controlled.   · Update A1c

## 2023-09-26 NOTE — ASSESSMENT & PLAN NOTE
Patient presents with worsening generalized weakness, dizziness when getting up, poor appetite, nausea vomiting. Denies SOB, fever. Patient is on hemodialysis Tuesday Thursday Saturday for ESRD. Missed dialysis on Saturday due to weakness and was supposed to go to dialysis today but could not make it due to weakness. · Of note, patient was hospitalized between 9/12-9/14 for hypotension following dialysis. Noted to be bradycardic on EKG and telemetry. Patient was seen by cardiology, recommended close outpatient follow-up and consider PPM if persistent bradycardia. · TSH/vitamin D-pending  · WBC normal, no bands, patient afebrile. On room air, satting well. · Patient does not make urine. · EKG sinus rhythm with PAC, right bundle branch block, rate 63. · Denies chest pain  · Reports chronic pain in left upper extremity and having pain in sacrum due to open wound.   · Suspect due to physical deconditioning  · Check orthostatic vital signs  · nephrology for dialysis   · PT/ OT  · Fall precautions

## 2023-09-26 NOTE — ASSESSMENT & PLAN NOTE
· Status post PCI with stents x3. On aspirin Plavix Lipitor at home. We will continue.   · Denies chest pain

## 2023-09-26 NOTE — ASSESSMENT & PLAN NOTE
· Uses walker for over a year. · History of CVA with right lower extremity weakness. · Reports feeling dizzy when getting up. · Check orthostatic vital signs   · PT OT  · Wife requesting wheelchair.     · Defer to

## 2023-09-26 NOTE — ASSESSMENT & PLAN NOTE
· Chronic pain left upper extremity due to issues with AV fistula in the past.  · Continue low-dose oxycodone as needed and gabapentin

## 2023-09-26 NOTE — ACP (ADVANCE CARE PLANNING)
Advanced Care Planning Progress Note    Serious Illness Conversation    1. What is your understanding now of where you are with your illness? Prognostic Understanding: appropriate understanding of prognosis  I understand my medical conditions     2. How much information about what is likely to be ahead with your illness would you like to have? Information: patient wants to be fully informed  Would like to be informed     3. What did you (clinician) communicate to the patient? Prognostic Communication: Uncertain - It can be difficult to predict what will happen with your illness. I hope you will continue to live well for a long time but I’m worried that you could get sick quickly, and I think it is important to prepare for that possibility. 4. If your health situation worsens, what are your most important goals? Goals: have my medical decisions respected     5. What are the biggest fears and worries about the future and your health? I have no concerns regarding my medical conditions in my future     6. What abilities are so critical to your life that you cannot imagine living without them? Being chronic pain     7. What gives you strength as you think about the future with your illness? My family gives a hope. 8. If you become sicker, how much are you willing to go through for the possibility of gaining more time? Be in the hospital: Yes Have a feeding tube: Yes   Be in the ICU: Yes    Be on a ventilator: Yes    Be on dialysis: Yes    Currently on HD  9. How much does your proxy and family know about your priorities and wishes? Discussion Discussion: extensive discussion with family about goals and wishes  My wife is aware and knows my wishes     Tommi Crigler heard you say that not being uncomfortable is really important to you. Keeping that in mind, and what we know about your illness, I recommend that we follow-up with PCP.  This will help us make sure that your treatment plans reflect what’s important to you.     How does this plan sound to you? I will do everything I can to help you through this.   Patient verbalized understanding of the plan     I have spent 35 minutes speaking with my patient on advanced care planning today or during this visit     Advanced directives  Five Wishes: Patient has Five WIshes in chart         Gene Barrera MD

## 2023-09-26 NOTE — CONSULTS
[facility] Dario 70 y.o. male MRN: [id number]  Unit/Bed#: 4 [place] 420-01 Encounter: [id number]    ASSESSMENT and PLAN:  Valdemar Giraldo is a 70 y.o. male who was admitted to [address] [place] [address] after presenting with generalized weakness, dizziness, poor appetite, nausea and vomiting. A renal consultation is requested today for assistance in the management of ESRD on HD. 1. ESRD on HD  - Schedule: TTS at [facility]  - He missed his dialysis session on Saturday and also missed a make-up session yesterday  - HD today  - Access right chest wall permacath    2. BPVolume   - Medications: He has history of hypotension and takes midodrine 10 mg 3 times daily, continue midodrine 10 mg 3 times daily with hold parameters of systolic blood pressure more than 140  - UF: We will tailor ultrafiltration to achieve estimated dry weight of 65 kg, we will not challenge with ultrafiltration due to ongoing generalized weakness, nausea and vomiting    3. Anemia of CKD  - Goal Hb 10-11, currently slightly below goal  - He is not on ALICE as outpatient  - Continue to monitor hemoglobin while hospitalized  - Transfuse to keep hemoglobin more than 7    4. Electrolytes   - We will use 2K and 35 bicarbonate bath on dialysis today    5. Bone Mineral Disease   - He has history of low phosphate and is not on phosphate binders  - Continue to observe off phosphate binders  - Check serum phosphorus level with next lab draw    6. Generalized weakness  - This has been suspected due to physical deconditioning  - Work-up and management per primary team    Discussed with internal medicine team.  After discussion, we agreed to resume hemodialysis today with limited ultrafiltration due to ongoing generalized weakness and absence of signs of volume overload on examination.     HISTORY OF PRESENT ILLNESS:  Requesting Physician: Eric Street MD  Reason for Consult: ESRD on HD    Valdemar Giraldo is a 70 y.o. male who was admitted to [address] [place] [address] after presenting with generalized weakness, dizziness, poor appetite, nausea and vomiting. A renal consultation is requested today for assistance in the management of ESRD on HD. Due to mentioned symptoms patient missed his dialysis on Saturday and was rescheduled for Monday but was not able to go. Reports of constipation. Ambulates with a walker since her hip fracture over a year ago. Also developed sacral wound. Complains of chronic pain in left arm due to issues with previous AV graft.     Frequency 3X Week   Treatment Days TueThuSat   Dialyzer 160NRe Optiflux   Treatment Time (Total Minutes) 210 min   Blood Flow Rate (mL/min) 400 mL/min   Dialysate Flow Rate Autoflow 1.5    Estimated Dry Weight 65 kg   Dialysate Concentrate 3.0 K, 2.5 Ca, 1.0 Mg, 100 Dextrose ()   Sodium (mEq/L) 137 mEq/L   Bicarb Machine Setting (mEq/L) 35 mEq/L   Dialysis Access Hemodialysis-CV Catheter-Tunneled, Chest, Right Jugular   Access Placed on September 21, 2022          PAST MEDICAL HISTORY:  Past Medical History:   Diagnosis Date   • 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    With Cerebral Infarction:  Last Assessed:  October 20, 2016   • Chronic kidney disease 2012    on dialysis    • COVID 05/2022   • COVID-19 04/2022 4/2022-while in NH   • Diabetes mellitus ([address])    • Dialysis patient [facility])     T/TH/Sat   • Difficulty walking    • GERD (gastroesophageal reflux disease)    • Hyperlipidemia    • Hypertension    • Labyrinthitis 09/10/2011   • Myocardial infarction [facility]) 2014    x3 others were in 2009 & 2010   • Sleep disturbances 09/20/2010   • Stroke [facility]) 2007    x1, RLE deficit- uses walker   • Type 2 diabetes, uncontrolled, with renal manifestation 05/03/2017       PAST SURGICAL HISTORY:  Past Surgical History:   Procedure Laterality Date   • AV FISTULA PLACEMENT     • AV FISTULA REPAIR Left 6/3/2023    Procedure: LEFT UPPER EXTREMITY A-V GRAFT EXPLANT, LEFT BRACHIAL SKYLER [facility],  APPLICATION OF  VAC;  Surgeon: Mei Rider MD;  [facility]: [facility] MAIN [facility];  Service: Vascular   • CARDIAC CATHETERIZATION Left 02/03/2023    Procedure: Cardiac Left Heart Cath;  Surgeon: Marilee Conti MD;  Location: [address] [place] [address] CATH LAB; Service: Cardiology   • CARDIAC CATHETERIZATION N/A 02/08/2023    Procedure: Cardiac catheterization with complex PCI with Dr. Bella Christy, patient is a renal dialysis patient;  Surgeon: Blaire Tavera MD;  Location:  CARDIAC CATH LAB; Service: Cardiology   • CARDIAC SURGERY  2010    stents x3   • COLONOSCOPY N/A 12/12/2016    Procedure: COLONOSCOPY;  Surgeon: Emily Alejandro MD;  Location: [address] [place] [address] GI LAB; Service:    • COLONOSCOPY N/A 04/03/2017    Procedure:  COLONOSCOPY;  Surgeon: Emily Alejandro MD;  Location: [address] [place] [address] GI LAB; Service:    • HARDWARE REMOVAL Right 04/04/2022    Procedure: REMOVAL HARDWARE HIP;  Surgeon: Carri Caro MD;  Location: [facility];  Service: Orthopedics   • IR AV FISTULA/GRAFT DECLOT  04/29/2021   • IR AV FISTULA/GRAFT DECLOT  03/25/2022   • IR AV FISTULA/GRAFT DECLOT  09/21/2022   • IR AV FISTULAGRAM/GRAFTOGRAM  03/28/2022   • IR AV FISTULAGRAM/GRAFTOGRAM  07/28/2022   • IR TUNNELED CENTRAL LINE REMOVAL  12/07/2021   • IR TUNNELED DIALYSIS CATHETER PLACEMENT  05/24/2021   • PORTACATH PLACEMENT      per pt "port in chest"   • LA ARTERIOVENOUS ANASTOMOSIS OPEN DIRECT Left 07/22/2021    Procedure: CREATION FISTULA ARTERIOVENOUS (AV);   Surgeon: Karen Rose MD;  Location: [facility];  Service: Vascular   • LA ARTERIOVENOUS ANASTOMOSIS OPEN DIRECT Left 3/21/2023    Procedure: left brachiobasilic fistula,  AVG Graft;  Surgeon: Tanika Leblanc MD;  [facility];  Service: Vascular   • LA ARTERIOVENOUS ANASTOMOSIS OPEN DIRECT Left 5/19/2023    Procedure: CREATION of LEFT FISTULA ARTERIOVENOUS (AV) GRAFT;  Surgeon: Tanika Leblanc MD;  [facility];  Service: Vascular   • LA HEMIARTHROPLASTY HIP PARTIAL Right 04/04/2022    Procedure: HEMIARTHROPLASTY HIP (BIPOLAR);   Surgeon: Sbele Multani MD;  Location: [facility];  Service: Orthopedics       ALLERGIES:  No Known Allergies    SOCIAL HISTORY:  Social History     Substance and Sexual Activity   Alcohol Use Yes   • Alcohol/week: 1.0 standard drink of alcohol   • Types: 1 Shots of liquor per week    Comment: 1 shot daily     Social History     Substance and Sexual Activity   Drug Use Yes   • Types: Marijuana    Comment: gummies     Social History     Tobacco Use   Smoking Status Every Day   • Packs/day: 1.00   • Years: 30.00   • Total pack years: 30.00   • Types: Cigarettes   Smokeless Tobacco Never   Tobacco Comments    Currently not smoking - in facility       FAMILY HISTORY:  Family History   Problem Relation Age of Onset   • Leukemia Mother    • Crohn's disease Father    • Heart disease Father    • Transient ischemic attack Brother        MEDICATIONS:    Current Facility-Administered Medications:   •  acetaminophen (TYLENOL) tablet 650 mg, 650 mg, Oral, Q6H PRN, TESSY Nolasco  •  ascorbic acid (VITAMIN C) tablet 500 mg, 500 mg, Oral, After Dinner, TESSY Nolasco  •  aspirin (ECOTRIN LOW STRENGTH) EC tablet 81 mg, 81 mg, Oral, Daily, TESSY Nolasco  •  atorvastatin (LIPITOR) tablet 20 mg, 20 mg, Oral, HS, TESSY Nolasco, 20 mg at 09/25/23 2138  •  cholecalciferol (VITAMIN D3) tablet 2,000 Units, 2,000 Units, Oral, Daily, TESSY Nolasco  •  clopidogrel (PLAVIX) tablet 75 mg, 75 mg, Oral, Daily, TESSY Nolasco  •  cyanocobalamin (VITAMIN B-12) tablet 1,000 mcg, 1,000 mcg, Oral, Daily, TESSY Nolasco  •  docusate sodium (COLACE) capsule 100 mg, 100 mg, Oral, BID, TESSY Nolasco, 100 mg at 09/25/23 2139  •  gabapentin (NEURONTIN) capsule 100 mg, 100 mg, Oral, Once per day on Tue Thu Sat, TESSY Nolasco  •  heparin (porcine) subcutaneous injection 5,000 Units, 5,000 Units, Subcutaneous, Q8H [address], Ezekiel Tenorio, TESSY, 5,000 Units at 09/26/23 0537  •  hydrOXYzine HCL (ATARAX) tablet 25 mg, 25 mg, Oral, Q8H PRN, TESSY Nolasco  •  midodrine (PROAMATINE) tablet 10 mg, 10 mg, Oral, TID AC, TESSY Nolasco, 10 mg at 09/26/23 1406  •  nicotine (NICODERM CQ) 21 mg/24 hr TD 24 hr patch 1 patch, 1 patch, Transdermal, Daily, TESSY Nolasco  •  ondansetron (ZOFRAN) injection 4 mg, 4 mg, Intravenous, Q6H PRN, TESSY Nolasco  •  oxyCODONE (ROXICODONE) split tablet 2.5 mg, 2.5 mg, Oral, Q8H PRN, TESSY Nolasco    REVIEW OF SYSTEMS:  Review of Systems   Constitutional: Positive for fatigue. Negative for chills and fever. HENT: Negative for ear pain and sore throat. Eyes: Negative for pain and visual disturbance. Respiratory: Negative for cough and shortness of breath. Cardiovascular: Negative for chest pain and palpitations. Gastrointestinal: Positive for constipation. Negative for abdominal pain and vomiting. Genitourinary: Negative for dysuria and hematuria. Musculoskeletal: Negative for arthralgias and back pain. Skin: Negative for color change and rash. Neurological: Negative for seizures and syncope. All other systems reviewed and are negative. PHYSICAL EXAM:  Current Weight: Weight - Scale: 65.5 kg (144 lb 4.8 oz)  First Weight: Weight - Scale: 65 kg (143 lb 3.2 oz)  Vitals:    09/25/23 2225 09/26/23 0253 09/26/23 0544 09/26/23 0717   BP: 114/60 124/64  124/61   BP Location:       Pulse: 61 64  62   Resp: 20 16  18   Temp: (!) 97.3 °F (36.3 °C) 97.5 °F (36.4 °C)  (!) 97.2 °F (36.2 °C)   SpO2: 98% 96%  96%   Weight:   65.5 kg (144 lb 4.8 oz)    Height:         No intake or output data in the 24 hours ending 09/26/23 8111  Physical Exam  Constitutional:       Appearance: Normal appearance. HENT:      Head: Normocephalic and atraumatic. Mouth/Throat:      Mouth: Mucous membranes are moist.      Pharynx: Oropharynx is clear. Cardiovascular:      Rate and Rhythm: Normal rate and regular rhythm. Comments: Right chest wall permacath  Pulmonary:      Effort: Pulmonary effort is normal.      Breath sounds: Normal breath sounds. Abdominal:      General: Bowel sounds are normal.      Palpations: Abdomen is soft. Musculoskeletal:         General: Normal range of motion. Skin:     General: Skin is warm and dry. Neurological:      General: No focal deficit present. Mental Status: He is alert and oriented to person, place, and time. Mental status is at baseline. Psychiatric:         Mood and Affect: Mood normal.         Behavior: Behavior normal.         Thought Content: Thought content normal.         Judgment: Judgment normal.       Lab Results:   Results from last 7 days   Lab Units 09/25/23  1902 09/20/23  1308   WBC Thousand/uL 4.76  --    HEMOGLOBIN g/dL 9.7*  --    HEMATOCRIT % 30.4*  --    PLATELETS Thousands/uL 127*  --    POTASSIUM mmol/L 4.6 4.3   CHLORIDE mmol/L 98 99   CO2 mmol/L 29 22   BUN mg/dL 33* 22   CREATININE mg/dL 8.16* 6.91*   CALCIUM mg/dL 8.8 9.4   MAGNESIUM mg/dL  --  2.3   ALK PHOS U/L 72 77   ALT U/L 5* 3*   AST U/L 19 17     Portions of the record may have been created with voice recognition software. Occasional wrong word or "sound a like" substitutions may have occurred due to the inherent limitations of voice recognition software. Read the chart carefully and recognize, using context, where substitutions have occurred. If you have any questions, please contact the dictating provider.

## 2023-09-26 NOTE — PROCEDURES
HEMODIALYSIS PROCEDURE NOTE  The patient was seen and examined on hemodialysis at 8:45 am.   Time: 3.5 hours  Sodium: 138 Blood flow: 400   Dialyzer: F160 Potassium: 3 K  Dialysate flow: 500   Access: r tdc Bicarbonate: 35 Ultrafiltration goal: 2 L   Medications on HD: None

## 2023-09-26 NOTE — OCCUPATIONAL THERAPY NOTE
Occupational Therapy Cancellation     Patient Name: Terri PRICETHW'F Date: 9/26/2023  Problem List  Principal Problem:    Generalized weakness  Active Problems:    Anemia of chronic disease    Coronary artery disease     ESRD (end stage renal disease) on dialysis ([facility])    Nicotine dependence    Type 2 diabetes mellitus    Thrombocytopenia     Chronic deep vein thrombosis (DVT) of internal jugular vein ([facility])    Hypotension    Ambulatory dysfunction    Bradycardia    History of CVA (cerebrovascular accident)    Open wound    Chronic pain    Nausea & vomiting    Past Medical History  Past Medical History:   Diagnosis Date    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    With Cerebral Infarction:  Last Assessed:  October 20, 2016    Chronic kidney disease 2012    on dialysis     COVID 05/2022    COVID-19 04/2022 4/2022-while in NH    Diabetes mellitus ([address] )     Dialysis patient [facility])     T/TH/Sat    Difficulty walking     GERD (gastroesophageal reflux disease)     Hyperlipidemia     Hypertension     Labyrinthitis 09/10/2011    Myocardial infarction [facility]) 2014    x3 others were in 2009 & 2010    Sleep disturbances 09/20/2010    Stroke ([address]) 2007    x1, RLE deficit- uses walker    Type 2 diabetes, uncontrolled, with renal manifestation 05/03/2017     Past Surgical History  Past Surgical History:   Procedure Laterality Date    AV FISTULA PLACEMENT      AV FISTULA REPAIR Left 6/3/2023    Procedure: LEFT UPPER EXTREMITY A-V GRAFT EXPLANT, LEFT BRACHIAL Lexa Kranthi;  Surgeon: Lilibeth Rider MD;  [facility];  Service: Vascular    CARDIAC CATHETERIZATION Left 02/03/2023    Procedure: Cardiac Left Heart Cath;  Surgeon: Abby Gardner MD;  Location: [address] [place] [address] CATH LAB;   Service: Cardiology    CARDIAC CATHETERIZATION N/A 02/08/2023    Procedure: Cardiac catheterization with complex PCI with Dr. Ivan Hyde, patient is a renal dialysis patient;  Surgeon: Jyotsna Brown MD;  [facility] C[facility] C[facility];  Service: Cardiology    CARDIAC SURGERY  2010    stents x3    COLONOSCOPY N/A 12/12/2016    Procedure: COLONOSCOPY;  Surgeon: Juany Galindo MD;  Location: [facility] GI LAB; Service:     COLONOSCOPY N/A 04/03/2017    Procedure:  COLONOSCOPY;  Surgeon: Juany Galindo MD;  Location: [facility] GI LAB; Service:     HARDWARE REMOVAL Right 04/04/2022    Procedure: REMOVAL HARDWARE HIP;  Surgeon: Stephie Huang MD;  [facility];  Service: Orthopedics    IR AV FISTULA/GRAFT DECLOT  04/29/2021    IR AV FISTULA/GRAFT DECLOT  03/25/2022    IR AV FISTULA/GRAFT DECLOT  09/21/2022    IR AV FISTULAGRAM/GRAFTOGRAM  03/28/2022    IR AV FISTULAGRAM/GRAFTOGRAM  07/28/2022    IR TUNNELED CENTRAL LINE REMOVAL  12/07/2021    IR TUNNELED DIALYSIS CATHETER PLACEMENT  05/24/2021    PORTACATH PLACEMENT      per pt "port in chest"    HI ARTERIOVENOUS ANASTOMOSIS OPEN DIRECT Left 07/22/2021    Procedure: CREATION FISTULA ARTERIOVENOUS (AV); Surgeon: Juan Carlos Roland MD;  Location: [facility];  Service: Vascular    HI ARTERIOVENOUS ANASTOMOSIS OPEN DIRECT Left 3/21/2023    Procedure: left brachiobasilic fistula,  AVG Graft;  Surgeon: Blake Matute MD;  [facility];  Service: Vascular    HI ARTERIOVENOUS ANASTOMOSIS OPEN DIRECT Left 5/19/2023    Procedure: CREATION of LEFT FISTULA ARTERIOVENOUS (AV) GRAFT;  Surgeon: Blake Matute MD;  [facility];  Service: Vascular    HI HEMIARTHROPLASTY HIP PARTIAL Right 04/04/2022    Procedure: HEMIARTHROPLASTY HIP (BIPOLAR); Surgeon: Stephie Huang MD;  [facility];  Service: Orthopedics        09/26/23 1020   Note Type   Note type Cancelled Session  (Tuesday 9/26/23)   Cancel Reasons Patient off floor/hemodialysis  (HD at bedside)   Additional Comments OT orders received and chart review completed. HD at bedside.  Will cancel OT eval and continue to follow as appropriate / schedule allows to complete eval.     Shen Motley OTFENG/ELAYNE  [id number]  [id number]

## 2023-09-26 NOTE — ASSESSMENT & PLAN NOTE
Chronic pain left upper extremity due to issues with AV fistula in the past.  · Continue low-dose oxycodone as needed and gabapentin

## 2023-09-26 NOTE — ASSESSMENT & PLAN NOTE
· On Hemodialysis Tuesday Thursday Saturday. · Missed session on Saturday and today.   · Access right upper chest dialysis catheter  · Potassium normal.  · Not taking calcitriol currently at home  · Consult nephrology for dialysis tomorrow  · Daily weight, intake output  · Renal diet

## 2023-09-26 NOTE — PLAN OF CARE
Post-Dialysis RN Treatment Note    Blood Pressure:  Pre: 120/65  mm/Hg  Post: 128/75 mmHg   EDW: 65.0 kg    Weight:  Pre: 67.5 kg   Post: 65.2 kg   Mode of weight measurement: Bed Scale   Volume Removed: 2300 ml    Treatment duration: 210 minutes    NS given  No    Treatment shortened?  No   Medications given during Rx None Reported   Estimated Kt/V  Not Applicable   Access type: Permacath/TDC   Access Issues: No    Report called to primary nurse   Lavon, Aaron    Problem: METABOLIC, FLUID AND ELECTROLYTES - ADULT  Goal: Electrolytes maintained within normal limits  Description: INTERVENTIONS:  - Monitor labs and assess patient for signs and symptoms of electrolyte imbalances  - Administer electrolyte replacement as ordered  - Monitor response to electrolyte replacements, including repeat lab results as appropriate  - Instruct patient on fluid and nutrition as appropriate  Outcome: Progressing  Goal: Fluid balance maintained  Description: INTERVENTIONS:  - Monitor labs   - Monitor I/O and WT  - Instruct patient on fluid and nutrition as appropriate  - Assess for signs & symptoms of volume excess or deficit  Outcome: Progressing     Problem: HEMATOLOGIC - ADULT  Goal: Maintains hematologic stability  Description: INTERVENTIONS  - Assess for signs and symptoms of bleeding or hemorrhage  - Monitor labs  - Administer supportive blood products/factors as ordered and appropriate  Outcome: Progressing

## 2023-09-26 NOTE — ASSESSMENT & PLAN NOTE
· Nausea vomiting, threw up mucous since Saturday. Poor appetite for a while per wife.   · Denies abdominal pain  · No BM for 5 days but denies feeling constipated  · Start stool softener  · Antiemetic as needed  · Monitor symptoms

## 2023-09-26 NOTE — PLAN OF CARE
Problem: MOBILITY - ADULT  Goal: Maintain or return to baseline ADL function  Description: INTERVENTIONS:  -  Assess patient's ability to carry out ADLs; assess patient's baseline for ADL function and identify physical deficits which impact ability to perform ADLs (bathing, care of mouth/teeth, toileting, grooming, dressing, etc.)  - Assess/evaluate cause of self-care deficits   - Assess range of motion  - Assess patient's mobility; develop plan if impaired  - Assess patient's need for assistive devices and provide as appropriate  - Encourage maximum independence but intervene and supervise when necessary  - Involve family in performance of ADLs  - Assess for home care needs following discharge   - Consider OT consult to assist with ADL evaluation and planning for discharge  - Provide patient education as appropriate  Outcome: Progressing  Goal: Maintains/Returns to pre admission functional level  Description: INTERVENTIONS:  - Perform BMAT or MOVE assessment daily.   - Set and communicate daily mobility goal to care team and patient/family/caregiver. - Collaborate with rehabilitation services on mobility goals if consulted  - Perform Range of Motion 4 times a day. - Reposition patient every 2 hours.   - Dangle patient 3 times a day  - Stand patient 3 times a day  - Ambulate patient 3 times a day  - Out of bed to chair 3 times a day   - Out of bed for meals 3 times a day  - Out of bed for toileting  - Record patient progress and toleration of activity level   Outcome: Progressing     Problem: PAIN - ADULT  Goal: Verbalizes/displays adequate comfort level or baseline comfort level  Description: Interventions:  - Encourage patient to monitor pain and request assistance  - Assess pain using appropriate pain scale  - Administer analgesics based on type and severity of pain and evaluate response  - Implement non-pharmacological measures as appropriate and evaluate response  - Consider cultural and social influences on pain and pain management  - Notify physician/advanced practitioner if interventions unsuccessful or patient reports new pain  Outcome: Progressing     Problem: INFECTION - ADULT  Goal: Absence or prevention of progression during hospitalization  Description: INTERVENTIONS:  - Assess and monitor for signs and symptoms of infection  - Monitor lab/diagnostic results  - Monitor all insertion sites, i.e. indwelling lines, tubes, and drains  - Monitor endotracheal if appropriate and nasal secretions for changes in amount and color  - [place] appropriate cooling/warming therapies per order  - Administer medications as ordered  - Instruct and encourage patient and family to use good hand hygiene technique  - Identify and instruct in appropriate isolation precautions for identified infection/condition  Outcome: Progressing     Problem: SAFETY ADULT  Goal: Maintain or return to baseline ADL function  Description: INTERVENTIONS:  -  Assess patient's ability to carry out ADLs; assess patient's baseline for ADL function and identify physical deficits which impact ability to perform ADLs (bathing, care of mouth/teeth, toileting, grooming, dressing, etc.)  - Assess/evaluate cause of self-care deficits   - Assess range of motion  - Assess patient's mobility; develop plan if impaired  - Assess patient's need for assistive devices and provide as appropriate  - Encourage maximum independence but intervene and supervise when necessary  - Involve family in performance of ADLs  - Assess for home care needs following discharge   - Consider OT consult to assist with ADL evaluation and planning for discharge  - Provide patient education as appropriate  Outcome: Progressing  Goal: Maintains/Returns to pre admission functional level  Description: INTERVENTIONS:  - Perform BMAT or MOVE assessment daily.   - Set and communicate daily mobility goal to care team and patient/family/caregiver.    - Collaborate with rehabilitation services on mobility goals if consulted  - Perform Range of Motion 4 times a day. - Reposition patient every 2 hours.   - Dangle patient 3 times a day  - Stand patient 3 times a day  - Ambulate patient 3 times a day  - Out of bed to chair 3 times a day   - Out of bed for meals 3 times a day  - Out of bed for toileting  - Record patient progress and toleration of activity level   Outcome: Progressing  Goal: Patient will remain free of falls  Description: INTERVENTIONS:  - Educate patient/family on patient safety including physical limitations  - Instruct patient to call for assistance with activity   - Consult OT/PT to assist with strengthening/mobility   - Keep Call bell within reach  - Keep bed low and locked with side rails adjusted as appropriate  - Keep care items and personal belongings within reach  - Initiate and maintain comfort rounds  - Make Fall Risk Sign visible to staff  - Offer Toileting every 2 Hours, in advance of need  - Initiate/Maintain bed alarm  - Obtain necessary fall risk management equipment: yellow socks  - Apply yellow socks and bracelet for high fall risk patients  - Consider moving patient to room near nurses station  Outcome: Progressing     Problem: DISCHARGE PLANNING  Goal: Discharge to home or other facility with appropriate resources  Description: INTERVENTIONS:  - Identify barriers to discharge w/patient and caregiver  - Arrange for needed discharge resources and transportation as appropriate  - Identify discharge learning needs (meds, wound care, etc.)  - Arrange for interpretive services to assist at discharge as needed  - Refer to Case Management Department for coordinating discharge planning if the patient needs post-hospital services based on physician/advanced practitioner order or complex needs related to functional status, cognitive ability, or social support system  Outcome: Progressing     Problem: Knowledge Deficit  Goal: Patient/family/caregiver demonstrates understanding of disease process, treatment plan, medications, and discharge instructions  Description: Complete learning assessment and assess knowledge base.   Interventions:  - Provide teaching at level of understanding  - Provide teaching via preferred learning methods  Outcome: Progressing

## 2023-09-26 NOTE — ASSESSMENT & PLAN NOTE
Improving from prior admission    · Platelet count 733  · Stable from baseline  · Monitor while on heparin subcu

## 2023-09-26 NOTE — ASSESSMENT & PLAN NOTE
On Hemodialysis TTS    · Missed session on Saturday and HD today  · Access right upper chest dialysis catheter  · Potassium normal.  · Not taking calcitriol currently at home  · Daily weight, intake output  · Renal diet  · Discussed with nephrology recs: HD today

## 2023-09-26 NOTE — ASSESSMENT & PLAN NOTE
Superficial sacral wound on exam.  No evidence of infection. · Reports pain at site.   · Consult wound care  · Offloading

## 2023-09-26 NOTE — ASSESSMENT & PLAN NOTE
Uses walker for over a year. · History of CVA with right lower extremity weakness. · Reports feeling dizzy when getting up. · Check orthostatic vital signs   · PT/OT  · Wife requesting wheelchair.     · Defer to

## 2023-09-26 NOTE — ASSESSMENT & PLAN NOTE
Unresolved,     Nausea vomiting, threw up mucous since Saturday. Poor appetite for a while per wife.   · Denies abdominal pain  · No BM for 5 days but denies feeling constipated  · Start stool softener  · Antiemetic as needed  · Monitor symptoms

## 2023-09-26 NOTE — PLAN OF CARE
Problem: MOBILITY - ADULT  Goal: Maintain or return to baseline ADL function  Description: INTERVENTIONS:  -  Assess patient's ability to carry out ADLs; assess patient's baseline for ADL function and identify physical deficits which impact ability to perform ADLs (bathing, care of mouth/teeth, toileting, grooming, dressing, etc.)  - Assess/evaluate cause of self-care deficits   - Assess range of motion  - Assess patient's mobility; develop plan if impaired  - Assess patient's need for assistive devices and provide as appropriate  - Encourage maximum independence but intervene and supervise when necessary  - Involve family in performance of ADLs  - Assess for home care needs following discharge   - Consider OT consult to assist with ADL evaluation and planning for discharge  - Provide patient education as appropriate  Outcome: Progressing  Goal: Maintains/Returns to pre admission functional level  Description: INTERVENTIONS:  - Perform BMAT or MOVE assessment daily.   - Set and communicate daily mobility goal to care team and patient/family/caregiver. - Collaborate with rehabilitation services on mobility goals if consulted  - Perform Range of Motion 4 times a day. - Reposition patient every 2 hours.   - Dangle patient 3 times a day  - Stand patient 3 times a day  - Ambulate patient 3 times a day  - Out of bed to chair 3 times a day   - Out of bed for meals 3 times a day  - Out of bed for toileting  - Record patient progress and toleration of activity level   Outcome: Progressing     Problem: PAIN - ADULT  Goal: Verbalizes/displays adequate comfort level or baseline comfort level  Description: Interventions:  - Encourage patient to monitor pain and request assistance  - Assess pain using appropriate pain scale  - Administer analgesics based on type and severity of pain and evaluate response  - Implement non-pharmacological measures as appropriate and evaluate response  - Consider cultural and social influences on pain and pain management  - Notify physician/advanced practitioner if interventions unsuccessful or patient reports new pain  Outcome: Progressing     Problem: INFECTION - ADULT  Goal: Absence or prevention of progression during hospitalization  Description: INTERVENTIONS:  - Assess and monitor for signs and symptoms of infection  - Monitor lab/diagnostic results  - Monitor all insertion sites, i.e. indwelling lines, tubes, and drains  - Monitor endotracheal if appropriate and nasal secretions for changes in amount and color  - [place] appropriate cooling/warming therapies per order  - Administer medications as ordered  - Instruct and encourage patient and family to use good hand hygiene technique  - Identify and instruct in appropriate isolation precautions for identified infection/condition  Outcome: Progressing     Problem: SAFETY ADULT  Goal: Maintain or return to baseline ADL function  Description: INTERVENTIONS:  -  Assess patient's ability to carry out ADLs; assess patient's baseline for ADL function and identify physical deficits which impact ability to perform ADLs (bathing, care of mouth/teeth, toileting, grooming, dressing, etc.)  - Assess/evaluate cause of self-care deficits   - Assess range of motion  - Assess patient's mobility; develop plan if impaired  - Assess patient's need for assistive devices and provide as appropriate  - Encourage maximum independence but intervene and supervise when necessary  - Involve family in performance of ADLs  - Assess for home care needs following discharge   - Consider OT consult to assist with ADL evaluation and planning for discharge  - Provide patient education as appropriate  Outcome: Progressing  Goal: Maintains/Returns to pre admission functional level  Description: INTERVENTIONS:  - Perform BMAT or MOVE assessment daily.   - Set and communicate daily mobility goal to care team and patient/family/caregiver.    - Collaborate with rehabilitation services on mobility goals if consulted  - Perform Range of Motion 4 times a day. - Reposition patient every 2 hours.   - Dangle patient 3 times a day  - Stand patient 3 times a day  - Ambulate patient 3 times a day  - Out of bed to chair 3 times a day   - Out of bed for meals 3 times a day  - Out of bed for toileting  - Record patient progress and toleration of activity level   Outcome: Progressing  Goal: Patient will remain free of falls  Description: INTERVENTIONS:  - Educate patient/family on patient safety including physical limitations  - Instruct patient to call for assistance with activity   - Consult OT/PT to assist with strengthening/mobility   - Keep Call bell within reach  - Keep bed low and locked with side rails adjusted as appropriate  - Keep care items and personal belongings within reach  - Initiate and maintain comfort rounds  - Make Fall Risk Sign visible to staff  - Offer Toileting every 2 Hours, in advance of need  - Initiate/Maintain bed alarm  - Obtain necessary fall risk management equipment: yellow socks  - Apply yellow socks and bracelet for high fall risk patients  - Consider moving patient to room near nurses station  Outcome: Progressing     Problem: DISCHARGE PLANNING  Goal: Discharge to home or other facility with appropriate resources  Description: INTERVENTIONS:  - Identify barriers to discharge w/patient and caregiver  - Arrange for needed discharge resources and transportation as appropriate  - Identify discharge learning needs (meds, wound care, etc.)  - Arrange for interpretive services to assist at discharge as needed  - Refer to Case Management Department for coordinating discharge planning if the patient needs post-hospital services based on physician/advanced practitioner order or complex needs related to functional status, cognitive ability, or social support system  Outcome: Progressing     Problem: Knowledge Deficit  Goal: Patient/family/caregiver demonstrates understanding of disease process, treatment plan, medications, and discharge instructions  Description: Complete learning assessment and assess knowledge base.   Interventions:  - Provide teaching at level of understanding  - Provide teaching via preferred learning methods  Outcome: Progressing     Problem: METABOLIC, FLUID AND ELECTROLYTES - ADULT  Goal: Electrolytes maintained within normal limits  Description: INTERVENTIONS:  - Monitor labs and assess patient for signs and symptoms of electrolyte imbalances  - Administer electrolyte replacement as ordered  - Monitor response to electrolyte replacements, including repeat lab results as appropriate  - Instruct patient on fluid and nutrition as appropriate  Outcome: Progressing  Goal: Fluid balance maintained  Description: INTERVENTIONS:  - Monitor labs   - Monitor I/O and WT  - Instruct patient on fluid and nutrition as appropriate  - Assess for signs & symptoms of volume excess or deficit  Outcome: Progressing     Problem: HEMATOLOGIC - ADULT  Goal: Maintains hematologic stability  Description: INTERVENTIONS  - Assess for signs and symptoms of bleeding or hemorrhage  - Monitor labs  - Administer supportive blood products/factors as ordered and appropriate  Outcome: Progressing     Problem: Prexisting or High Potential for Compromised Skin Integrity  Goal: Skin integrity is maintained or improved  Description: INTERVENTIONS:  - Identify patients at risk for skin breakdown  - Assess and monitor skin integrity  - Assess and monitor nutrition and hydration status  - Monitor labs   - Assess for incontinence   - Turn and reposition patient  - Assist with mobility/ambulation  - Relieve pressure over bony prominences  - Avoid friction and shearing  - Provide appropriate hygiene as needed including keeping skin clean and dry  - Evaluate need for skin moisturizer/barrier cream  - Collaborate with interdisciplinary team   - Patient/family teaching  - Consider wound care consult   Outcome: Progressing     Problem: Nutrition/Hydration-ADULT  Goal: Nutrient/Hydration intake appropriate for improving, restoring or maintaining nutritional needs  Description: Monitor and assess patient's nutrition/hydration status for malnutrition. Collaborate with interdisciplinary team and initiate plan and interventions as ordered. Monitor patient's weight and dietary intake as ordered or per policy. Utilize nutrition screening tool and intervene as necessary. Determine patient's food preferences and provide high-protein, high-caloric foods as appropriate.      INTERVENTIONS:  - Monitor oral intake, urinary output, labs, and treatment plans  - Assess nutrition and hydration status and recommend course of action  - Evaluate amount of meals eaten  - Assist patient with eating if necessary   - Allow adequate time for meals  - Recommend/ encourage appropriate diets, oral nutritional supplements, and vitamin/mineral supplements  - Order, calculate, and assess calorie counts as needed  - Recommend, monitor, and adjust tube feedings and TPN/PPN based on assessed needs  - Assess need for intravenous fluids  - Provide specific nutrition/hydration education as appropriate  - Include patient/family/caregiver in decisions related to nutrition  Outcome: Progressing

## 2023-09-26 NOTE — H&P
[id number] [facility]  [facility]&P  Name: Hermila Ragland 70 y.o. male I MRN: [id number]  Unit/Bed#: 4 [place] 420-01 I Date of Admission: 9/25/2023   Date of Service: 9/25/2023 I Hospital Day: 0      Assessment/Plan   * Generalized weakness  Assessment & Plan  Patient presents with worsening generalized weakness, dizziness when getting up, poor appetite, nausea vomiting. Denies SOB, fever. Patient is on hemodialysis Tuesday Thursday Saturday for ESRD. Missed dialysis on Saturday due to weakness and was supposed to go to dialysis today but could not make it due to weakness. · Of note, patient was hospitalized between 9/12-9/14 for hypotension following dialysis. Noted to be bradycardic on EKG and telemetry. Patient was seen by cardiology, recommended close outpatient follow-up and consider PPM if persistent bradycardia. · WBC normal, no bands, patient afebrile. On room air, satting well. · Patient does not make urine. · EKG sinus rhythm with PAC, right bundle branch block, rate 63. · Denies chest pain  · Reports chronic pain in left upper extremity and having pain in sacrum due to open wound. · Suspect due to physical deconditioning  · Check orthostatic vital signs  · Consult nephrology for dialysis   · PT OT  · Fall precautions    Nausea & vomiting  Assessment & Plan  · Nausea vomiting, threw up mucous since Saturday. Poor appetite for a while per wife. · Denies abdominal pain  · No BM for 5 days but denies feeling constipated  · Start stool softener  · Antiemetic as needed  · Monitor symptoms    Open wound  Assessment & Plan  · Superficial sacral wound on exam.  No evidence of infection. · Reports pain at site. · Consult wound care  · Offloading    Ambulatory dysfunction  Assessment & Plan  · Uses walker for over a year. · History of CVA with right lower extremity weakness. · Reports feeling dizzy when getting up. · Check orthostatic vital signs   · PT OT  · Wife requesting wheelchair. · Defer to     Hypotension  Assessment & Plan  · Takes midodrine 10 mg p.o. twice daily at home instead of 3 times daily. · Supine /67  · Continue midodrine 10 mg p.o. 3 times daily  · Check orthostatic vital signs    Chronic pain  Assessment & Plan  · Chronic pain left upper extremity due to issues with AV fistula in the past.  · Continue low-dose oxycodone as needed and gabapentin    History of CVA (cerebrovascular accident)  Assessment & Plan  · With resultant right leg weakness. · Continue aspirin Plavix Lipitor    Bradycardia  Assessment & Plan  · EKG today as above  · Telemetry    Chronic deep vein thrombosis (DVT) of internal jugular vein (HCC)  Assessment & Plan  · Continue aspirin Plavix    Thrombocytopenia   Assessment & Plan  · Platelet count 500  · Stable from baseline  · Monitor while on heparin subcu    Type 2 diabetes mellitus  Assessment & Plan  Lab Results   Component Value Date    HGBA1C 4.8 01/05/2023       No results for input(s): "POCGLU" in the last 72 hours. Blood Sugar Average: Last 72 hrs:  · Diet controlled. · Update A1c    Nicotine dependence  Assessment & Plan  · Nicotine patch, smoking cessation    ESRD (end stage renal disease) on dialysis [facility])  Assessment & Plan  · On Hemodialysis Tuesday Thursday Saturday. · Missed session on Saturday and today. · Access right upper chest dialysis catheter  · Potassium normal.  · Not taking calcitriol currently at home  · Consult nephrology for dialysis tomorrow  · Daily weight, intake output  · Renal diet    Coronary artery disease   Assessment & Plan  · Status post PCI with stents x3. On aspirin Plavix Lipitor at home. We will continue.   · Denies chest pain    Anemia of chronic disease  Assessment & Plan  · Baseline hemoglobin appears to be around 8-9s  · Hemoglobin stable  · Continue B12  · Monitor CBC       VTE Prophylaxis: Heparin  / reason for no mechanical VTE prophylaxis on heparin subcu   Code Status: Full code  POLST: POLST form is not discussed and not completed at this time. Anticipated Length of Stay:  Patient will be admitted on an Observation basis with an anticipated length of stay of  < 2 midnights. Justification for Hospital Stay: Generalized weakness    Total Time for Visit, including Counseling / Coordination of Care: 45 minutes. Greater than 50% of this total time spent on direct patient counseling and coordination of care. Chief Complaint:   Worsening generalized weakness, dizziness when getting up, poor appetite, nausea vomiting. Missed dialysis on Saturday and today. History of Present Illness:    Valdemar Giraldo is a 70 y.o. male with PMH of ESRD on HD, hypertension, CAD, type 2 diabetes, anemia, thrombocytopenia, CVA, nicotine abuse who presents with worsening generalized weakness, dizziness when getting up, poor appetite, nausea vomiting. Denies SOB, fever. Patient is on hemodialysis Tuesday Thursday Saturday for ESRD. Missed dialysis on Saturday due to weakness and was supposed to go to dialysis today but could not make it due to weakness. Patient having worsening generalized weakness for about 1 week, complaining of dizziness when getting up for about 1 week, started with nausea vomiting on Saturday, threw up mucus only, poor appetite not eating much for a while per wife. Patient denies dizziness when laying down. Patient denies chest pain, headache, SOB. Reports no BM for 5 days but denies feeling constipated. Patient does not make any urine. Patient denies abdominal pain. Patient ambulates with walker ever since his hip fracture over a year ago per wife. Newly developed sacral wound per wife. Patient having pain in the area. Patient has chronic pain in left arm due to issues with AV fistula in the past per wife. On low-dose oxycodone at home currently. Information obtained from wife and patient.          Review of Systems:    Review of Systems   Constitutional: Positive for activity change, appetite change and fatigue. Gastrointestinal: Positive for nausea and vomiting. Threw up mucus only. No BM for 5 days. Genitourinary:        Does not make urine   Musculoskeletal:        Chronic left upper extremity pain and new onset sacral pain from open wound. Skin: Positive for wound. Sacral wound   Neurological: Positive for dizziness. All other systems reviewed and are negative. Past Medical and Surgical History:     Past Medical History:   Diagnosis Date   • 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    With Cerebral Infarction:  Last Assessed:  October 20, 2016   • Chronic kidney disease 2012    on dialysis    • COVID 05/2022   • COVID-19 04/2022 4/2022-while in NH   • Diabetes mellitus ([address])    • Dialysis patient [facility])     T/TH/Sat   • Difficulty walking    • GERD (gastroesophageal reflux disease)    • Hyperlipidemia    • Hypertension    • Labyrinthitis 09/10/2011   • Myocardial infarction [facility]) 2014    x3 others were in 2009 & 2010   • Sleep disturbances 09/20/2010   • Stroke [facility] 2007    x1, RLE deficit- uses walker   • Type 2 diabetes, uncontrolled, with renal manifestation 05/03/2017       Past Surgical History:   Procedure Laterality Date   • AV FISTULA PLACEMENT     • AV FISTULA REPAIR Left 6/3/2023    Procedure: LEFT UPPER EXTREMITY A-V GRAFT EXPLANT, LEFT BRACHIAL ANGIOPATCH,  APPLICATION OF  VAC;  Surgeon: Emi Rider MD;  [facility] MAIN [facility];  Service: Vascular   • CARDIAC CATHETERIZATION Left 02/03/2023    Procedure: Cardiac Left Heart Cath;  Surgeon: Caprice Alba MD;  Location: [address] [place] [address] CATH LAB; Service: Cardiology   • CARDIAC CATHETERIZATION N/A 02/08/2023    Procedure: Cardiac catheterization with complex PCI with Dr. Darek Hurtado, patient is a renal dialysis patient;  Surgeon: Gregg Velez MD;  Location: [facility] CARDIAC CATH LAB;   Service: Cardiology   • CARDIAC SURGERY  2010    stents x3   • COLONOSCOPY N/A 12/12/2016    Procedure: COLONOSCOPY; Surgeon: Stacie Hardin MD;  Location: [facility] GI LAB; Service:    • COLONOSCOPY N/A 04/03/2017    Procedure:  COLONOSCOPY;  Surgeon: Stacie Hardin MD;  Location: [address] [place] [address] GI LAB; Service:    • HARDWARE REMOVAL Right 04/04/2022    Procedure: REMOVAL HARDWARE HIP;  Surgeon: Rodrigo Mayen MD;  [facility];  Service: Orthopedics   • IR AV FISTULA/GRAFT DECLOT  04/29/2021   • IR AV FISTULA/GRAFT DECLOT  03/25/2022   • IR AV FISTULA/GRAFT DECLOT  09/21/2022   • IR AV FISTULAGRAM/GRAFTOGRAM  03/28/2022   • IR AV FISTULAGRAM/GRAFTOGRAM  07/28/2022   • IR TUNNELED CENTRAL LINE REMOVAL  12/07/2021   • IR TUNNELED DIALYSIS CATHETER PLACEMENT  05/24/2021   • PORTACATH PLACEMENT      per pt "port in chest"   • [place] ARTERIOVENOUS ANASTOMOSIS OPEN DIRECT Left 07/22/2021    Procedure: CREATION FISTULA ARTERIOVENOUS (AV); Surgeon: Varsha Wood MD;  Location: [facility];  Service: Vascular   • [place] ARTERIOVENOUS ANASTOMOSIS OPEN DIRECT Left 3/21/2023    Procedure: left brachiobasilic fistula,  AVG Graft;  Surgeon: Emmanuelle Mack MD;  [facility];  Service: Vascular   • [place] ARTERIOVENOUS ANASTOMOSIS OPEN DIRECT Left 5/19/2023    Procedure: CREATION of LEFT FISTULA ARTERIOVENOUS (AV) GRAFT;  Surgeon: Emmanuelle Mack MD;  [facility];  Service: Vascular   • WY HEMIARTHROPLASTY HIP PARTIAL Right 04/04/2022    Procedure: HEMIARTHROPLASTY HIP (BIPOLAR); Surgeon: Rodrigo Mayen MD;  [facility];  Service: Orthopedics       Meds/Allergies:    Prior to Admission medications    Medication Sig Start Date End Date Taking?  Authorizing Provider   ascorbic acid (VITAMIN C) 500 mg tablet TAKE ONE TABLET BY MOUTH EVERY EVENING FOR SUPPLEMENT 1/26/22  Yes Historical Provider, MD   aspirin (ECOTRIN LOW STRENGTH) 81 mg EC tablet Take 81 mg by mouth daily 1/26/22  Yes Historical Provider, MD   atorvastatin (LIPITOR) 20 mg tablet Take 20 mg by mouth daily at bedtime 1/26/22  Yes Historical Provider, MD   Cholecalciferol 50 MCG (2000 UT) CAPS Take by mouth daily 3/22/21  Yes Historical Provider, MD   clopidogrel (PLAVIX) 75 mg tablet Take 75 mg by mouth daily 8/18/23  Yes Historical Provider, MD   midodrine (PROAMATINE) 10 MG tablet Take 1 tablet (10 mg total) by mouth 3 (three) times a day before meals  Patient taking differently: Take 10 mg by mouth 3 (three) times a day before meals Takes BID 5/24/23 9/25/23 Yes Ibrahima Castillo MD   oxyCODONE (ROXICODONE) 5 immediate release tablet Take 0.5 tablets (2.5 mg total) by mouth every 8 (eight) hours as needed for severe pain for up to 10 doses Max Daily Amount: 7.5 mg 9/14/23  Yes TESSY Mcdowell   vitamin B-12 (VITAMIN B-12) 1,000 mcg tablet Take 1,000 mcg by mouth daily 1/24/23  Yes Historical Provider, MD   acetaminophen (TYLENOL) 325 mg tablet Take 3 tablets (975 mg total) by mouth every 8 (eight) hours as needed for mild pain 6/15/23   Cliff Stuart DO   ammonium lactate (LAC-HYDRIN) 12 % lotion Apply topically 2 (two) times a day as needed for dry skin 8/30/23   Javier Mayorga DPM   bacitracin topical ointment 500 units/g topical ointment Apply 1 large application topically 2 (two) times a day 9/20/23   Jerica Schroeder DO   calcitriol (ROCALTROL) 0.25 mcg capsule Take 3 capsules (0.75 mcg total) by mouth 3 (three) times a week  Patient not taking: Reported on 9/25/2023 6/16/23   Cliff Stuart DO   [place] Choice Comfort EZ 33G X 4 MM MISC  1/31/22   Historical Provider, MD   docusate sodium (COLACE) 100 mg capsule Take 100 mg by mouth daily 8/18/23   Historical Provider, MD   gabapentin (NEURONTIN) 100 mg capsule Take 1 capsule (100 mg total) by mouth 3 (three) times a week On Tuesday Thursday Saturday 6/16/23   Cliff Stuart DO   hydrOXYzine HCL (ATARAX) 25 mg tablet TAKE 1 TABLET BY MOUTH EVERY 8 HOURS AS NEEDED FOR ITCHING 8/18/23   Historical Provider, MD     I have reviewed home medications with patient family member. Allergies: No Known Allergies    Social History:     Marital Status: /Civil Union   Occupation: Retired  Patient Pre-hospital Living Situation: Wife  Patient Pre-hospital Level of Mobility: Walker  Patient Pre-hospital Diet Restrictions: Renal diet  Substance Use History:   Social History     Substance and Sexual Activity   Alcohol Use Yes   • Alcohol/week: 1.0 standard drink of alcohol   • Types: 1 Shots of liquor per week    Comment: 1 shot daily     Social History     Tobacco Use   Smoking Status Every Day   • Packs/day: 1.00   • Years: 30.00   • Total pack years: 30.00   • Types: Cigarettes   Smokeless Tobacco Never   Tobacco Comments    Currently not smoking - in facility     Social History     Substance and Sexual Activity   Drug Use Yes   • Types: Marijuana    Comment: gummies       Family History:    non-contributory    Physical Exam:     Vitals:   Blood Pressure: 143/67 (09/25/23 1754)  Pulse: 65 (09/25/23 1754)  Temperature: 97.8 °F (36.6 °C) (09/25/23 1758)  Respirations: 19 (09/25/23 1754)  SpO2: 95 % (09/25/23 1754)    Physical Exam  Vitals and nursing note reviewed. Constitutional:       Appearance: He is well-developed. Comments: Patient appears weak   HENT:      Head: Normocephalic and atraumatic. Neck:      Thyroid: No thyromegaly. Vascular: No JVD. Trachea: No tracheal deviation. Cardiovascular:      Rate and Rhythm: Normal rate and regular rhythm. Heart sounds: Normal heart sounds. Comments: Right chest dialysis catheter in situ. Pulmonary:      Effort: Pulmonary effort is normal. No respiratory distress. Breath sounds: Normal breath sounds. No wheezing or rales. Abdominal:      General: Bowel sounds are normal. There is no distension. Palpations: Abdomen is soft. Tenderness: There is no abdominal tenderness. There is no guarding. Musculoskeletal:         General: Deformity present. Cervical back: Neck supple.       Comments: Right lower extremity deformity from old stroke. Skin:     General: Skin is warm and dry. Comments: Ecchymosis to both arms and left side of the neck   Neurological:      Mental Status: He is alert and oriented to person, place, and time. Comments: Right lower extremity weakness from old stroke. Patient resting, eyes closed, follows commands, oriented to place and person. Psychiatric:         Mood and Affect: Mood normal.         Judgment: Judgment normal.         Additional Data:     Lab Results: I have personally reviewed pertinent reports. Results from last 7 days   Lab Units 09/25/23  1902   WBC Thousand/uL 4.76   HEMOGLOBIN g/dL 9.7*   HEMATOCRIT % 30.4*   PLATELETS Thousands/uL 127*   NEUTROS PCT % 79*   LYMPHS PCT % 14   MONOS PCT % 4   EOS PCT % 1     Results from last 7 days   Lab Units 09/25/23  1902   POTASSIUM mmol/L 4.6   CHLORIDE mmol/L 98   CO2 mmol/L 29   BUN mg/dL 33*   CREATININE mg/dL 8.16*   CALCIUM mg/dL 8.8   ALK PHOS U/L 72   ALT U/L 5*   AST U/L 19           Imaging: I have personally reviewed pertinent reports. XR chest 1 view portable    Result Date: 9/13/2023  Narrative: CHEST INDICATION:   hypotension. Weakness COMPARISON: 03/28/2023 EXAM PERFORMED/VIEWS:  XR CHEST PORTABLE Images: 2 FINDINGS: Cardiomediastinal silhouette appears unremarkable. Right-sided dialysis catheter unchanged. Small right pleural effusion. No infiltrates. No evidence of heart failure. Osseous structures appear within normal limits for patient age. Impression: Small right pleural effusion. Workstation performed: [id number]       EKG, Pathology, and Other Studies Reviewed on Admission:   · EKG: As above    Allscripts Records Reviewed: Yes     ** Please Note: Dragon 360 Dictation voice to text software may have been used in the creation of this document.  **

## 2023-09-26 NOTE — ASSESSMENT & PLAN NOTE
Takes midodrine 10 mg p.o. twice daily at home instead of 3 times daily.   · Continue midodrine 10 mg p.o. 3 times daily  · Check orthostatic vital signs

## 2023-09-26 NOTE — PHYSICAL THERAPY NOTE
PHYSICAL THERAPY EVALUATION/TREATMENT     09/26/23 1515   PT Last Visit   PT Visit Date 09/26/23   Note Type   Note type Evaluation   Pain Assessment   Pain Assessment Tool 0-10   Pain Score 8  (Abdominal area)   Restrictions/Precautions   Other Precautions Chair Alarm; Bed Alarm; Fall Risk;Pain   Home Living   Type of [address] Two level; Able to live on main level with bedroom/bathroom;Stairs to enter with rails  (3 stairs to enter)   [address] chair   Home Equipment Walker   Additional Comments Patient states ambulating with a roller walker independently in the home for functional household distances prior to admission   Prior Function   Level of [place] Independent with ADLs; Independent with functional mobility   Lives With Spouse   Receives Help From Family;Friend(s)   Falls in the last 6 months 0   Comments Patient states being independent throughout the day in his own home using a rolling walker as his wife is out of the house from 6:30 AM until 4 PM.  Patient also states having a friend who assist when he is leaving the house, assisting with transportation and stair climbing   General   Additional Pertinent History Chart reviewed, patient admitted with generalized weakness, end-stage renal disease. Patient is typically independent and functional in his own home with a roller walker and now presents is requiring moderate assist for bed mobility transfers and short distance gait.   Patient will benefit from postacute rehab services to recover function   Family/Caregiver Present No   Cognition   Overall Cognitive Status WFL   Arousal/Participation Cooperative   Orientation Level Oriented X4   Following Commands Follows multistep commands with increased time or repetition   Comments Patient distracted at times with abdominal pain and weakness   Subjective   Subjective Patient reports being significantly weak with resulting gait dysfunction and inability to care for self as prior to admission   RLE Assessment   RLE Assessment   (Range of motion within functional limits, strength 3+/5)   LLE Assessment   LLE Assessment   (Range of motion within functional limits, strength 3+/5)   Coordination   Movements are Fluid and Coordinated 0   Coordination and Movement Description Decreased coordination with bed mobility, transfers and gait activity   Bed Mobility   Supine to Sit 3  Moderate assistance   Additional items Assist x 1   Sit to Supine 3  Moderate assistance   Additional items Assist x 1   Additional Comments Patient with sacral wound area pain with bed mobility at times   Transfers   Sit to Stand 3  Moderate assistance   Additional items Assist x 1   Stand to Sit 3  Moderate assistance   Additional items Assist x 1   Ambulation/Elevation   Gait Assistance 3  Moderate assist   Additional items Assist x 1;Verbal cues; Tactile cues   Assistive Device Rolling walker   Distance 5 feet at bedside with shortened stride length and narrow base of support. Patient impulsive at times to return to sitting due to generalized weakness and fear of falling   Balance   Static Sitting Fair   Dynamic Sitting Fair -   Static Standing Poor +   Dynamic Standing Poor   Ambulatory Poor   Activity Tolerance   Activity Tolerance Patient limited by fatigue;Patient limited by pain;Treatment limited secondary to medical complications (Comment)   Nurse Made Aware Yes   Assessment   Prognosis Good   Problem List Decreased strength;Decreased endurance;Decreased range of motion; Impaired balance;Decreased mobility; Decreased coordination;Pain   Assessment Patient seen for Physical Therapy evaluation. Patient admitted with Generalized weakness. Comorbidities affecting patient's physical performance include: .   Personal factors affecting patient at time of initial evaluation include: lives in two story house, ambulating with assistive device, inability to ambulate household distances, inability to navigate community distances, inability to navigate level surfaces without external assistance, inability to perform dynamic tasks in community, limited home support, inability to perform physical activity, inability to perform ADLS and inability to perform IADLS . Prior to admission, patient was independent with functional mobility with walker, independent with ADLS, requiring assist for IADLS, living in a multi-level home, ambulating household distance and home with family assist.  Please find objective findings from Physical Therapy assessment regarding body systems outlined above with impairments and limitations including weakness, decreased ROM, impaired balance, decreased endurance, impaired coordination, gait deviations, pain, decreased activity tolerance, decreased functional mobility tolerance, fall risk and decreased skin integrity. The Barthel Index was used as a functional outcome tool presenting with a score of Barthel Index Score: 45 today indicating marked limitations of functional mobility and ADLS. Patient's clinical presentation is currently unstable/unpredictable as seen in patient's presentation of vital sign response, changing level of pain, increased fall risk, new onset of impairment of functional mobility, decreased endurance and new onset of weakness. Pt would benefit from continued Physical Therapy treatment to address deficits as defined above and maximize level of functional mobility. As demonstrated by objective findings, the assigned level of complexity for this evaluation is high. The patient's -[facility] Basic Mobility Inpatient Short Form Raw Score is 12. A Raw score of less than or equal to 16 suggests the patient may benefit from discharge to post-acute rehabilitation services. Please also refer to the recommendation of the Physical Therapist for safe discharge planning.    Goals   Patient Goals To get stronger and be able to return home   STG Expiration Date 10/03/23   Short Term Goal #1 Transfers and gait with roller walker with min assist   Short Term Goal #2 Gait and endurance to 50 feet   LTG Expiration Date 10/10/23   Long Term Goal #1 Strength bilateral lower extremities 4 -/5   Long Term Goal #2 Independent transfers and gait with roller walker for functional household distances as prior to admission   Plan   Treatment/Interventions ADL retraining;Functional transfer training;LE strengthening/ROM; Therapeutic exercise; Endurance training;Patient/family training;Equipment eval/education; Bed mobility;Gait training; Compensatory technique education   PT Frequency Other (Comment)  (5 times per week)   Recommendation   PT Discharge Recommendation Post acute rehabilitation services   AM-PAC Basic Mobility Inpatient   Turning in Flat Bed Without Bedrails 3   Lying on Back to Sitting on Edge of Flat Bed Without Bedrails 2   Moving Bed to Chair 2   Standing Up From Chair Using Arms 2   Walk in Room 2   Climb 3-5 Stairs With Railing 1   Basic Mobility Inpatient Raw Score 12   Basic Mobility Standardized Score 32.23   Highest Level Of Mobility   -[facility] Goal 4: Move to chair/commode   -HL Achieved 6: Walk 10 steps or more   Barthel Index   Feeding 10   Bathing 0   Grooming Score 0   Dressing Score 5   Bladder Score 10   Bowels Score 10   Toilet Use Score 5   Transfers (Bed/Chair) Score 5   Mobility (Level Surface) Score 0   Stairs Score 0   Barthel Index Score 45   Additional Treatment Session   Start Time 1500   End Time 1515   Treatment Assessment S: Patient with 8/10 abdominal pain at end of session and nurse made aware for medication O: Bilateral lower extremity exercise completed as listed below A: Patient will benefit from increasing functional mobility with clinical staff and continued physical therapy with progression as tolerated to regain independent function as prior to admission.   Patient is a good candidate and agreeable to postacute rehab services for recovery of function prior to return home   Exercises Heelslides Supine;5 reps;Bilateral   Glute Sets Supine;5 reps   Hip Flexion Sitting;5 reps;Bilateral  (Alternating)   Hip Abduction Sitting;5 reps;Bilateral  (Alternating)   Knee AROM Long Arc Quad Sitting;5 reps;Bilateral  (Alternating)   Ankle Pumps Sitting;10 reps;Bilateral   Balance training  Sidestepping completed for balance and coordination   Licensure   [place] License Number  Priyank Mcguire, PT 4 0  [id number]

## 2023-09-26 NOTE — ASSESSMENT & PLAN NOTE
· Takes midodrine 10 mg p.o. twice daily at home instead of 3 times daily.   · Supine /67  · Continue midodrine 10 mg p.o. 3 times daily  · Check orthostatic vital signs

## 2023-09-27 LAB
ALBUMIN SERPL BCP-MCNC: 2.9 G/DL (ref 3.5–5)
ALP SERPL-CCNC: 70 U/L (ref 34–104)
ALT SERPL W P-5'-P-CCNC: 4 U/L (ref 7–52)
ANION GAP SERPL CALCULATED.3IONS-SCNC: 11 MMOL/L
AST SERPL W P-5'-P-CCNC: 12 U/L (ref 13–39)
BILIRUB SERPL-MCNC: 0.67 MG/DL (ref 0.2–1)
BUN SERPL-MCNC: 20 MG/DL (ref 5–25)
CALCIUM ALBUM COR SERPL-MCNC: 9.4 MG/DL (ref 8.3–10.1)
CALCIUM SERPL-MCNC: 8.5 MG/DL (ref 8.4–10.2)
CHLORIDE SERPL-SCNC: 100 MMOL/L (ref 96–108)
CO2 SERPL-SCNC: 26 MMOL/L (ref 21–32)
CREAT SERPL-MCNC: 5.74 MG/DL (ref 0.6–1.3)
GFR SERPL CREATININE-BSD FRML MDRD: 9 ML/MIN/1.73SQ M
GLUCOSE SERPL-MCNC: 119 MG/DL (ref 65–140)
GLUCOSE SERPL-MCNC: 55 MG/DL (ref 65–140)
GLUCOSE SERPL-MCNC: 62 MG/DL (ref 65–140)
MRSA NOSE QL CULT: NORMAL
POTASSIUM SERPL-SCNC: 4 MMOL/L (ref 3.5–5.3)
PROT SERPL-MCNC: 5.7 G/DL (ref 6.4–8.4)
SODIUM SERPL-SCNC: 137 MMOL/L (ref 135–147)

## 2023-09-27 PROCEDURE — 80053 COMPREHEN METABOLIC PANEL: CPT | Performed by: STUDENT IN AN ORGANIZED HEALTH CARE EDUCATION/TRAINING PROGRAM

## 2023-09-27 PROCEDURE — 99232 SBSQ HOSP IP/OBS MODERATE 35: CPT | Performed by: NURSE PRACTITIONER

## 2023-09-27 PROCEDURE — 82948 REAGENT STRIP/BLOOD GLUCOSE: CPT

## 2023-09-27 RX ADMIN — HYDROXYZINE HYDROCHLORIDE 25 MG: 25 TABLET ORAL at 22:26

## 2023-09-27 RX ADMIN — ATORVASTATIN CALCIUM 20 MG: 20 TABLET, FILM COATED ORAL at 22:26

## 2023-09-27 RX ADMIN — HEPARIN SODIUM 5000 UNITS: 5000 INJECTION INTRAVENOUS; SUBCUTANEOUS at 22:26

## 2023-09-27 RX ADMIN — ASPIRIN 81 MG: 81 TABLET, COATED ORAL at 08:34

## 2023-09-27 RX ADMIN — Medication 2000 UNITS: at 08:34

## 2023-09-27 RX ADMIN — CLOPIDOGREL BISULFATE 75 MG: 75 TABLET ORAL at 08:34

## 2023-09-27 RX ADMIN — MIDODRINE HYDROCHLORIDE 10 MG: 5 TABLET ORAL at 06:12

## 2023-09-27 RX ADMIN — DOCUSATE SODIUM 100 MG: 100 CAPSULE, LIQUID FILLED ORAL at 08:34

## 2023-09-27 RX ADMIN — Medication 2.5 MG: at 19:05

## 2023-09-27 RX ADMIN — CYANOCOBALAMIN TAB 500 MCG 1000 MCG: 500 TAB at 08:34

## 2023-09-27 RX ADMIN — HEPARIN SODIUM 5000 UNITS: 5000 INJECTION INTRAVENOUS; SUBCUTANEOUS at 05:17

## 2023-09-27 NOTE — CASE MANAGEMENT
Case Management Discharge Planning Note    Patient name Lachelle Mosquera  Location [address] [place] [address]-* MRN [id number]  : 1955 Date 2023       Current Admission Date: 2023  Current Admission Diagnosis:Generalized weakness   Patient Active Problem List    Diagnosis Date Noted   • History of CVA (cerebrovascular accident) 2023   • Generalized weakness 2023   • Open wound 2023   • Chronic pain 2023   • Nausea & vomiting 2023   • Anxiety disorder, unspecified 2023   • Secondary hyperparathyroidism of renal origin ([address]) 2023   • Unspecified protein-calorie malnutrition ([address]) 2023   • 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 06/15/2023   • Ambulatory dysfunction 2023   • Infection of dialysis AV graft 2023   • Hypotension 2023   • S/P angioplasty with stent 2023   • Chronic deep vein thrombosis (DVT) of internal jugular vein ([address]) 2022   • End-stage renal disease  2022   • Chronic kidney disease 2022   • Right intertrochanteric femur fracture 2022   • Thrombocytopenia  2022   • Arteriovenous fistula ([address]) 2021   • AV fistula thrombosis, initial encounter ([address]) 2021   • Encounter for extracorporeal dialysis ([address]) 2019   • Intestinal malabsorption 2017   • Pernicious anemia 2017   • Irritable bowel syndrome 2017   • Elevated serum alkaline phosphatase level 2017   • Anemia of chronic disease 2017   • Coronary artery disease  10/20/2016   • Hypertension 10/20/2016   • Dependence on renal dialysis ([address]) 2016   • Vitamin D deficiency, unspecified 2013   • Mixed hyperlipidemia 10/04/2012   • ESRD (end stage renal disease) on dialysis ([address]) 2012   • Nicotine dependence 2012   • Organic impotence 2012   • Type 2 diabetes mellitus 2012      LOS (days): 1  Geometric Mean LOS (GMLOS) (days): 3.50  Days to GMLOS:2.4     OBJECTIVE:  Risk of Unplanned Readmission Score: 26.48     Current admission status: Inpatient   Preferred Pharmacy:   [facility] [address] [place] [id number]  Phone: [phone] Fax: [phone]    Primary Care Provider: Christiano Euceda MD    Primary Insurance: MEDICARE  Secondary Insurance: AARP    DISCHARGE DETAILS:    CM contacted family/caregiver?: Yes (message left)    Contacts  Patient Contacts: Leonardo Car  (wife)  Relationship to Patient[de-identified] Family  Contact Method: Phone  Phone Number: [phone]    Other Referral/Resources/Interventions Provided:  Interventions: Short Term Rehab  Referral Comments: STR placement is being recommended. Per CRNP pt and wife are open to rehab placement and expressed preference for facility with on-site dialysis services. SW placed call to wife to discuss plan. Message left. Rehab referral process was initiated. SW will follow up with pt and wife tomorrow.     Treatment Team Recommendation: Short Term Rehab  Discharge Destination Plan[de-identified] Short Term Rehab

## 2023-09-27 NOTE — ASSESSMENT & PLAN NOTE
On Hemodialysis TTS    · Missed session on Saturday and HD today  · Access right upper chest dialysis catheter  · Potassium normal.  · Not taking calcitriol currently at home  · Daily weight, intake output  · Renal diet  · Discussed with nephrology recs: HD tomorrow

## 2023-09-27 NOTE — PROGRESS NOTES
[address]  Progress Note  Name: Jennifer Pop  MRN: [id number]  Unit/Bed#: 4 [place] [address]01 I Date of Admission: 9/25/2023   Date of Service: 9/27/2023 I Hospital Day: 1    Assessment/Plan   * Generalized weakness  Assessment & Plan  Patient presents with worsening generalized weakness, dizziness when getting up, poor appetite, nausea vomiting. Denies SOB, fever. Patient is on hemodialysis Tuesday Thursday Saturday for ESRD. Missed dialysis on Saturday due to weakness and was supposed to go to dialysis today but could not make it due to weakness. · Of note, patient was hospitalized between 9/12-9/14 for hypotension following dialysis. Noted to be bradycardic on EKG and telemetry. Patient was seen by cardiology, recommended close outpatient follow-up and consider PPM if persistent bradycardia. · TSH/vitamin D-pending  · WBC normal, no bands, patient afebrile. On room air, satting well. · Patient does not make urine. · EKG sinus rhythm with PAC, right bundle branch block, rate 63. · Denies chest pain  · Reports chronic pain in left upper extremity and having pain in sacrum due to open wound. · Suspect due to physical deconditioning  · Check orthostatic vital signs  · nephrology for dialysis   · PT/ OT  · Fall precautions    Nausea & vomiting  Assessment & Plan  Unresolved,     Nausea vomiting, threw up mucous since Saturday. Poor appetite for a while per wife. · Denies abdominal pain  · No BM for 5 days but denies feeling constipated  · Start stool softener  · Antiemetic as needed  · Monitor symptoms    Chronic pain  Assessment & Plan  Chronic pain left upper extremity due to issues with AV fistula in the past.  · Continue low-dose oxycodone as needed and gabapentin    Open wound  Assessment & Plan  Superficial sacral wound on exam.  No evidence of infection. · Reports pain at site.   · Consult wound care  · Offloading    History of CVA (cerebrovascular accident)  Assessment & Plan  · With resultant right leg weakness. · Continue aspirin Plavix Lipitor    Ambulatory dysfunction  Assessment & Plan  Uses walker for over a year. · History of CVA with right lower extremity weakness. · Reports feeling dizzy when getting up. · Check orthostatic vital signs   · PT/OT  · Wife requesting wheelchair. · Defer to     Hypotension  Assessment & Plan  Takes midodrine 10 mg p.o. twice daily at home instead of 3 times daily. · Continue midodrine 10 mg p.o. 3 times daily  · Check orthostatic vital signs    Chronic deep vein thrombosis (DVT) of internal jugular vein (HCC)  Assessment & Plan  · Continue aspirin, Plavix  · Completed course of Eliquis 1/23 by primary provider    Thrombocytopenia   Assessment & Plan  Improving from prior admission  · Platelet count 894  · Stable from baseline  · Monitor while on heparin subcu    Type 2 diabetes mellitus  Assessment & Plan  Lab Results   Component Value Date    HGBA1C 4.8 01/05/2023       No results for input(s): "POCGLU" in the last 72 hours. Blood Sugar Average: Last 72 hrs:  · Diet controlled. · Update A1c    Nicotine dependence  Assessment & Plan  · NRT patch, smoking cessation    ESRD (end stage renal disease) on dialysis [facility])  Assessment & Plan  On Hemodialysis TTS    · Missed session on Saturday and HD today  · Access right upper chest dialysis catheter  · Potassium normal.  · Not taking calcitriol currently at home  · Daily weight, intake output  · Renal diet  · Discussed with nephrology recs: HD tomorrow     Coronary artery disease   Assessment & Plan  s/p PCI with stents x3. On aspirin Plavix Lipitor at home. continue.   · Asymptomatic    Anemia of chronic disease  Assessment & Plan  Baseline hemoglobin appears to be around 8-9s  · Hemoglobin improved  · Continue B12  · Monitor CBC           VTE Pharmacologic Prophylaxis:   Pharmacologic: Heparin  Mechanical VTE Prophylaxis in Place: Yes    Patient Centered Rounds: I have performed bedside rounds with nursing staff today. Discussions with Specialists or Other Care Team Provider: Yes  Education and Discussions with Family / Patient:Yes  Time Spent for Care: 15 minutes. More than 50% of total time spent on counseling and coordination of care as described above. Current Length of Stay: 1 day(s)  Current Patient Status: Inpatient     Discharge Plan: discharge in 24-48 hours     Code Status: Level 1 - Full Code      Subjective:   Patient laying in bed, reports feeling better. Nausea and vomiting improving. Plan is for dialysis tomorrow. Objective:     Vitals:   Temp (24hrs), Av.9 °F (36.6 °C), Min:97.6 °F (36.4 °C), Max:98.1 °F (36.7 °C)    Temp:  [97.6 °F (36.4 °C)-98.1 °F (36.7 °C)] 97.9 °F (36.6 °C)  HR:  [52-75] 75  Resp:  [16-20] 18  BP: (115-128)/(53-71) 128/71  SpO2:  [95 %-98 %] 98 %  Body mass index is 20.53 kg/m². Input and Output Summary (last 24 hours): Intake/Output Summary (Last 24 hours) at 2023 1612  Last data filed at 2023 0900  Gross per 24 hour   Intake 100 ml   Output --   Net 100 ml        Physical Exam:     Physical Exam    Additional Data:     Labs:    Results from last 7 days   Lab Units 23  1902   WBC Thousand/uL 4.76   HEMOGLOBIN g/dL 9.7*   HEMATOCRIT % 30.4*   PLATELETS Thousands/uL 127*   NEUTROS PCT % 79*     Results from last 7 days   Lab Units 23  0634 23  1902   SODIUM mmol/L 137 135   POTASSIUM mmol/L 4.0 4.6   CHLORIDE mmol/L 100 98   CO2 mmol/L 26 29   BUN mg/dL 20 33*   CREATININE mg/dL 5.74* 8.16*   CALCIUM mg/dL 8.5 8.8   TOTAL BILIRUBIN mg/dL 0.67 0.67   ALK PHOS U/L 70 72   ALT U/L 4* 5*   AST U/L 12* 19             Lab Results   Component Value Date/Time    HGBA1C 4.8 2023 08:23 AM    HGBA1C 4.4 2022 11:13 AM    HGBA1C 5.4 2017 07:09 AM               * I Have Reviewed All Lab Data Listed Above. * Additional Pertinent Lab Tests Reviewed:  [facility] Admission Reviewed    Imaging:     No orders to display     Imaging Reports Reviewed by myself    Cultures:   Blood Culture:   Lab Results   Component Value Date    BLOODCX No Growth After 5 Days. 09/12/2023    BLOODCX No Growth After 5 Days. 09/12/2023    BLOODCX No Growth After 5 Days. 06/02/2023    BLOODCX No Growth After 5 Days. 06/02/2023    BLOODCX No Growth After 5 Days. 10/19/2021    BLOODCX No Growth After 5 Days.  10/19/2021     Urine Culture: No results found for: "URINECX"  Sputum Culture: No components found for: "Vernona Graces"  Wound Culture:   Lab Results   Component Value Date    WOUNDCULT Growth in Broth culture only Klebsiella variicola (A) 06/03/2023    WOUNDCULT Growth in Broth culture only Escherichia coli (A) 06/03/2023    WOUNDCULT (A) 06/03/2023     Growth in Broth culture only Staphylococcus coagulase negative       Last 24 Hours Medication List:   Current Facility-Administered Medications   Medication Dose Route Frequency Provider Last Rate   • acetaminophen  650 mg Oral Q6H PRN TESSY Nolasco     • ascorbic acid  500 mg Oral After Dinner TESSY Bowling     • aspirin  81 mg Oral Daily TESSY Nolasco     • atorvastatin  20 mg Oral HS TESSY Nolasco     • cholecalciferol  2,000 Units Oral Daily TESSY Nolasco     • clopidogrel  75 mg Oral Daily TESSY Nolasco     • vitamin B-12  1,000 mcg Oral Daily TESSY Nolasco     • docusate sodium  100 mg Oral BID TESSY Nolasco     • gabapentin  100 mg Oral Once per day on Tue Thu Sat TESSY Nolasco     • heparin (porcine)  5,000 Units Subcutaneous Q8H [address] TESSY Nolasco     • hydrOXYzine HCL  25 mg Oral Q8H PRN TESSY Nolasco     • midodrine  10 mg Oral TID AC TESSY Nolasco     • nicotine  1 patch Transdermal Daily TESSY Nolasco     • ondansetron  4 mg Intravenous Q6H PRN TESSY Nolasco     • oxyCODONE  2.5 mg Oral Q8H PRN TESSY Nolasco          Today, Patient Was Seen By: TESSY Schneider    ** Please Note: Dragon 360 Dictation voice to text software may have been used in the creation of this document.  **

## 2023-09-27 NOTE — WOUND OSTOMY CARE
Progress Note - Wound   Anza Anjelica Bishop 70 y.o. male MRN: [id number]  Unit/Bed#: [address] [place] [address] Encounter: [id number]      Assessment: This is a 70year old male patient admitted on 9/25/23 with generalized weakness. He has a history of CVA, ambulatory dysfunction, DVT, DM 2, ESRD, nicotine dependence, CAD and anemia. He was AAO x 3 and agreeable to have wound visit done. Patient respositioned with assist of 1 person. He does not void and is continent of bowel    Assessment Findings:  1-Unstageable pressure injury present on admission with yellow slough and pink granulation tissue - orders in place for wound care and for prevention. 2-Skin tears to bilateral forearms with partial thickness wounds - orders in place for wound care. 3-Bilateral heels with blanchable erythema - orders in place for skin care and for prevention. Plan:   Skin care plans:   1-Cleanse sacrobuttocks with foaming cleanser & pat dry. Apply thin layer of Triad paste in a thin layer and cover with Allevyn sacral foam dressing. Change daily & prn soilage/dislodgement. 2-Cleanse skin tears to bilateral forearms with NSS & pat dry. Open small Dermagran square and apply to wounds in a single layer cut to size of wounds. Cover with gauze or ABD, keren and tape. Change every other day and prn soilage/dislodgement. 3-Hydraguard to bilateral heels BID and PRN  4-Float heels on 2 pillows to offload pressure so heels are not in contact with mattress or pillows. 5-Ehob pressure redistribution cushion in chair when out of bed. Limit sitting for 1-2 hours at a time due to sacrobuttock breakdown then offload back in bed turning side to side every 2 hours. 6-Moisturize skin daily with skin nourishing cream.  7-Turn/reposition q2h or when medically stable for pressure re-distribution on skin. Wound 09/12/23 Pressure Injury Sacrum (Active)   Wound Image   09/27/23 1051   Wound Description Slough; Yellow;Granulation tissue;Pink 09/27/23 1057 Pressure Injury Stage Unstageable POA 09/27/23 1055   Afua-wound Assessment Pale;Pink; White 09/27/23 1055   Wound Length (cm) 1.8 cm 09/27/23 1055   Wound Width (cm) 1 cm 09/27/23 1055   Wound Depth (cm) 0.1 cm 09/27/23 1055   Wound Surface Area (cm^2) 1.8 cm^2 09/27/23 1055   Wound Volume (cm^3) 0.18 cm^3 09/27/23 1055   Calculated Wound Volume (cm^3) 0.18 cm^3 09/27/23 1055   Drainage Amount Moderate 09/27/23 1055   Drainage Description Serous; Yellow 09/27/23 1055   Treatments Cleansed 09/27/23 1055   Dressing Triad paste; Allevyn foam dressing 09/27/23 1055   Dressing Changed New 09/27/23 1055   Dressing Status Dry;Clean; Intact 09/27/23 1055       Wound 09/12/23 Skin Tear Elbow Anterior; Left (Active)   Wound Image   09/27/23 1044   Wound Description Granulation tissue;Pink 09/27/23 1044   Afua-wound Assessment Purple 09/27/23 1044   Wound Length (cm) 0.5 cm 09/27/23 1044   Wound Width (cm) 1 cm 09/27/23 1044   Wound Depth (cm) 0.1 cm 09/27/23 1044   Wound Surface Area (cm^2) 0.5 cm^2 09/27/23 1044   Wound Volume (cm^3) 0.05 cm^3 09/27/23 1044   Calculated Wound Volume (cm^3) 0.05 cm^3 09/27/23 1044   Drainage Amount Small 09/27/23 1044   Drainage Description Serosanguineous 09/27/23 1044   Non-staged Wound Description Partial thickness 09/27/23 1044   Treatments Cleansed 09/27/23 1044   Dressing Tia Stone gauze;Gauze;Dry dressing 09/27/23 1044   Dressing Changed New 09/27/23 1044   Dressing Status Clean;Dry; Intact 09/27/23 1044       Wound 09/25/23 Skin Tear Arm Anterior;Proximal;Right; Inferior (Active)   Wound Image   09/27/23 1051   Wound Description Granulation tissue;Pink 09/27/23 1051   Afua-wound Assessment Purple 09/27/23 1051   Wound Length (cm) 1.4 cm 09/27/23 1051   Wound Width (cm) 0.8 cm 09/27/23 1051   Wound Depth (cm) 0.1 cm 09/27/23 1051   Wound Surface Area (cm^2) 1.12 cm^2 09/27/23 1051   Wound Volume (cm^3) 0.112 cm^3 09/27/23 1051   Calculated Wound Volume (cm^3) 0.11 cm^3 09/27/23 1051 Drainage Amount Moderate 09/27/23 1051   Drainage Description Serosanguineous 09/27/23 1051   Non-staged Wound Description Partial thickness 09/27/23 1051   Treatments Cleansed 09/27/23 1051   Dressing Jeneane [place] gauze;Gauze;Dry dressing 09/27/23 1051   Wound packed? No 09/27/23 1051   Dressing Changed New 09/27/23 1051   Dressing Status Clean;Dry; Intact 09/27/23 1051     Discussed assessment findings, and plan of care/recommendations with Roger. Please call or tiger text with questions and concerns. P-500 bed not ordered due to no beds in-house and patient is for discharge tomorrow. Recommendations written as orders.   Sheyla Weaver MSN, RN, [facility]

## 2023-09-27 NOTE — PLAN OF CARE
Problem: MOBILITY - ADULT  Goal: Maintain or return to baseline ADL function  Description: INTERVENTIONS:  -  Assess patient's ability to carry out ADLs; assess patient's baseline for ADL function and identify physical deficits which impact ability to perform ADLs (bathing, care of mouth/teeth, toileting, grooming, dressing, etc.)  - Assess/evaluate cause of self-care deficits   - Assess range of motion  - Assess patient's mobility; develop plan if impaired  - Assess patient's need for assistive devices and provide as appropriate  - Encourage maximum independence but intervene and supervise when necessary  - Involve family in performance of ADLs  - Assess for home care needs following discharge   - Consider OT consult to assist with ADL evaluation and planning for discharge  - Provide patient education as appropriate  Outcome: Progressing  Goal: Maintains/Returns to pre admission functional level  Description: INTERVENTIONS:  - Perform BMAT or MOVE assessment daily.   - Set and communicate daily mobility goal to care team and patient/family/caregiver. - Collaborate with rehabilitation services on mobility goals if consulted  - Perform Range of Motion 4 times a day. - Reposition patient every 2 hours.   - Dangle patient 3 times a day  - Stand patient 3 times a day  - Ambulate patient 3 times a day  - Out of bed to chair 3 times a day   - Out of bed for meals 3 times a day  - Out of bed for toileting  - Record patient progress and toleration of activity level   Outcome: Progressing     Problem: PAIN - ADULT  Goal: Verbalizes/displays adequate comfort level or baseline comfort level  Description: Interventions:  - Encourage patient to monitor pain and request assistance  - Assess pain using appropriate pain scale  - Administer analgesics based on type and severity of pain and evaluate response  - Implement non-pharmacological measures as appropriate and evaluate response  - Consider cultural and social influences on pain and pain management  - Notify physician/advanced practitioner if interventions unsuccessful or patient reports new pain  Outcome: Progressing     Problem: INFECTION - ADULT  Goal: Absence or prevention of progression during hospitalization  Description: INTERVENTIONS:  - Assess and monitor for signs and symptoms of infection  - Monitor lab/diagnostic results  - Monitor all insertion sites, i.e. indwelling lines, tubes, and drains  - Monitor endotracheal if appropriate and nasal secretions for changes in amount and color  - [place] appropriate cooling/warming therapies per order  - Administer medications as ordered  - Instruct and encourage patient and family to use good hand hygiene technique  - Identify and instruct in appropriate isolation precautions for identified infection/condition  Outcome: Progressing     Problem: SAFETY ADULT  Goal: Maintain or return to baseline ADL function  Description: INTERVENTIONS:  -  Assess patient's ability to carry out ADLs; assess patient's baseline for ADL function and identify physical deficits which impact ability to perform ADLs (bathing, care of mouth/teeth, toileting, grooming, dressing, etc.)  - Assess/evaluate cause of self-care deficits   - Assess range of motion  - Assess patient's mobility; develop plan if impaired  - Assess patient's need for assistive devices and provide as appropriate  - Encourage maximum independence but intervene and supervise when necessary  - Involve family in performance of ADLs  - Assess for home care needs following discharge   - Consider OT consult to assist with ADL evaluation and planning for discharge  - Provide patient education as appropriate  Outcome: Progressing  Goal: Maintains/Returns to pre admission functional level  Description: INTERVENTIONS:  - Perform BMAT or MOVE assessment daily.   - Set and communicate daily mobility goal to care team and patient/family/caregiver.    - Collaborate with rehabilitation services on mobility goals if consulted  - Perform Range of Motion 4 times a day. - Reposition patient every 2 hours.   - Dangle patient 3 times a day  - Stand patient 3 times a day  - Ambulate patient 3 times a day  - Out of bed to chair 3 times a day   - Out of bed for meals 3 times a day  - Out of bed for toileting  - Record patient progress and toleration of activity level   Outcome: Progressing  Goal: Patient will remain free of falls  Description: INTERVENTIONS:  - Educate patient/family on patient safety including physical limitations  - Instruct patient to call for assistance with activity   - Consult OT/PT to assist with strengthening/mobility   - Keep Call bell within reach  - Keep bed low and locked with side rails adjusted as appropriate  - Keep care items and personal belongings within reach  - Initiate and maintain comfort rounds  - Make Fall Risk Sign visible to staff  - Offer Toileting every 2 Hours, in advance of need  - Initiate/Maintain bed alarm  - Obtain necessary fall risk management equipment: yellow socks  - Apply yellow socks and bracelet for high fall risk patients  - Consider moving patient to room near nurses station  Outcome: Progressing     Problem: DISCHARGE PLANNING  Goal: Discharge to home or other facility with appropriate resources  Description: INTERVENTIONS:  - Identify barriers to discharge w/patient and caregiver  - Arrange for needed discharge resources and transportation as appropriate  - Identify discharge learning needs (meds, wound care, etc.)  - Arrange for interpretive services to assist at discharge as needed  - Refer to Case Management Department for coordinating discharge planning if the patient needs post-hospital services based on physician/advanced practitioner order or complex needs related to functional status, cognitive ability, or social support system  Outcome: Progressing     Problem: Knowledge Deficit  Goal: Patient/family/caregiver demonstrates understanding of disease process, treatment plan, medications, and discharge instructions  Description: Complete learning assessment and assess knowledge base.   Interventions:  - Provide teaching at level of understanding  - Provide teaching via preferred learning methods  Outcome: Progressing     Problem: METABOLIC, FLUID AND ELECTROLYTES - ADULT  Goal: Electrolytes maintained within normal limits  Description: INTERVENTIONS:  - Monitor labs and assess patient for signs and symptoms of electrolyte imbalances  - Administer electrolyte replacement as ordered  - Monitor response to electrolyte replacements, including repeat lab results as appropriate  - Instruct patient on fluid and nutrition as appropriate  Outcome: Progressing  Goal: Fluid balance maintained  Description: INTERVENTIONS:  - Monitor labs   - Monitor I/O and WT  - Instruct patient on fluid and nutrition as appropriate  - Assess for signs & symptoms of volume excess or deficit  Outcome: Progressing     Problem: HEMATOLOGIC - ADULT  Goal: Maintains hematologic stability  Description: INTERVENTIONS  - Assess for signs and symptoms of bleeding or hemorrhage  - Monitor labs  - Administer supportive blood products/factors as ordered and appropriate  Outcome: Progressing     Problem: Prexisting or High Potential for Compromised Skin Integrity  Goal: Skin integrity is maintained or improved  Description: INTERVENTIONS:  - Identify patients at risk for skin breakdown  - Assess and monitor skin integrity  - Assess and monitor nutrition and hydration status  - Monitor labs   - Assess for incontinence   - Turn and reposition patient  - Assist with mobility/ambulation  - Relieve pressure over bony prominences  - Avoid friction and shearing  - Provide appropriate hygiene as needed including keeping skin clean and dry  - Evaluate need for skin moisturizer/barrier cream  - Collaborate with interdisciplinary team   - Patient/family teaching  - Consider wound care consult   Outcome: Progressing     Problem: Nutrition/Hydration-ADULT  Goal: Nutrient/Hydration intake appropriate for improving, restoring or maintaining nutritional needs  Description: Monitor and assess patient's nutrition/hydration status for malnutrition. Collaborate with interdisciplinary team and initiate plan and interventions as ordered. Monitor patient's weight and dietary intake as ordered or per policy. Utilize nutrition screening tool and intervene as necessary. Determine patient's food preferences and provide high-protein, high-caloric foods as appropriate.      INTERVENTIONS:  - Monitor oral intake, urinary output, labs, and treatment plans  - Assess nutrition and hydration status and recommend course of action  - Evaluate amount of meals eaten  - Assist patient with eating if necessary   - Allow adequate time for meals  - Recommend/ encourage appropriate diets, oral nutritional supplements, and vitamin/mineral supplements  - Order, calculate, and assess calorie counts as needed  - Recommend, monitor, and adjust tube feedings and TPN/PPN based on assessed needs  - Assess need for intravenous fluids  - Provide specific nutrition/hydration education as appropriate  - Include patient/family/caregiver in decisions related to nutrition  Outcome: Progressing

## 2023-09-27 NOTE — ASSESSMENT & PLAN NOTE
Improving from prior admission  · Platelet count 247  · Stable from baseline  · Monitor while on heparin subcu

## 2023-09-27 NOTE — DISCHARGE INSTR - OTHER ORDERS
Plan:   Skin care plans:     1-Cleanse sacrobuttocks with foaming cleanser & pat dry. Apply thin layer of Triad paste in a thin layer and cover with Allevyn sacral foam dressing. Change daily & as needed soilage/dislodgement. 2-Cleanse skin tears to bilateral forearms with wound cleanser & pat dry. Open small Dermagran square and apply to wounds in a single layer cut to size of wounds. Cover with gauze or ABD, keren and tape. Change every other day and as needed for soilage/dislodgement. 3-Hydraguard barrier cream or equivalent to bilateral heels 2x/day and as needed. 4-Float heels on 2 pillows to offload pressure so heels are not in contact with mattress or pillows. 5-Use pressure redistribution cushion in chair when out of bed. Limit sitting for 1-2 hours at a time due to sacrobuttock breakdown then offload back in bed turning side to side every 2 hours. Use cushion when receiving dialysis treatment. 6-Moisturize skin daily with skin nourishing cream.  7-Turn/reposition every 2 hrs in bed for pressure re-distribution on skin. 8-Follow up at [address] [place] [address] - call Central Scheduling for appointment: [phone].

## 2023-09-27 NOTE — NURSING NOTE
Pt extremely hard stick. States that he would like to take a break from everything and just wants to rest. Will attempt blood work again tomorrow with dialysis if pt continues to refuse.

## 2023-09-27 NOTE — OCCUPATIONAL THERAPY NOTE
Occupational Therapy Cancellation     Patient Name: Shania Gr  QRLQN'[id number] Date: 9/27/2023  Problem List  Principal Problem:    Generalized weakness  Active Problems:    Anemia of chronic disease    Coronary artery disease     ESRD (end stage renal disease) on dialysis ([facility])    Nicotine dependence    Type 2 diabetes mellitus    Thrombocytopenia     Chronic deep vein thrombosis (DVT) of internal jugular vein ([facility])    Hypotension    Ambulatory dysfunction    History of CVA (cerebrovascular accident)    Open wound    Chronic pain    Nausea & vomiting    Past Medical History  Past Medical History:   Diagnosis Date    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    With Cerebral Infarction:  Last Assessed:  October 20, 2016    Chronic kidney disease 2012    on dialysis     COVID 05/2022    COVID-19 04/2022 4/2022-while in [place]    Diabetes mellitus ([address] )     Dialysis patient [facility])     T/TH/Sat    Difficulty walking     GERD (gastroesophageal reflux disease)     Hyperlipidemia     Hypertension     Labyrinthitis 09/10/2011    Myocardial infarction [facility]) 2014    x3 others were in 2009 & 2010    Sleep disturbances 09/20/2010    Stroke ([address]) 2007    x1, RLE deficit- uses walker    Type 2 diabetes, uncontrolled, with renal manifestation 05/03/2017     Past Surgical History  Past Surgical History:   Procedure Laterality Date    AV FISTULA PLACEMENT      AV FISTULA REPAIR Left 6/3/2023    Procedure: LEFT UPPER EXTREMITY A-V GRAFT EXPLANT, LEFT BRACHIAL Gwenette Chamber;  Surgeon: Emerita Rider MD;  Location:  MAIN OR;  Service: Vascular    CARDIAC CATHETERIZATION Left 02/03/2023    Procedure: Cardiac Left Heart Cath;  Surgeon: Rola Singh MD;  Location: [address] [place] [address] CATH LAB; Service: Cardiology    CARDIAC CATHETERIZATION N/A 02/08/2023    Procedure: Cardiac catheterization with complex PCI with Dr. Wilberto Daugherty, patient is a renal dialysis patient;  Surgeon: Carmen Valencia MD;  Location: [facility] CARDIAC CATH LAB;   Service: Cardiology    CARDIAC SURGERY  2010    stents x3    COLONOSCOPY N/A 12/12/2016    Procedure: COLONOSCOPY;  Surgeon: Faviola Hogue MD;  Location: [address] [place] [address] GI LAB; Service:     COLONOSCOPY N/A 04/03/2017    Procedure:  COLONOSCOPY;  Surgeon: Faviola Hogue MD;  Location: [address] [place] [address] [facility]; Service:     HARDWARE REMOVAL Right 04/04/2022    Procedure: REMOVAL HARDWARE HIP;  Surgeon: Jose Liao MD;  Location: [facility] MAIN OR;  Service: Orthopedics    IR AV FISTULA/GRAFT DECLOT  04/29/2021    IR AV FISTULA/GRAFT DECLOT  03/25/2022    IR AV FISTULA/GRAFT DECLOT  09/21/2022    IR AV FISTULAGRAM/GRAFTOGRAM  03/28/2022    IR AV FISTULAGRAM/GRAFTOGRAM  07/28/2022    IR TUNNELED CENTRAL LINE REMOVAL  12/07/2021    IR TUNNELED DIALYSIS CATHETER PLACEMENT  05/24/2021    PORTACATH PLACEMENT      per pt "port in chest"    [facility] ARTERIOVENOUS ANASTOMOSIS OPEN DIRECT Left 07/22/2021    Procedure: CREATION FISTULA ARTERIOVENOUS (AV); Surgeon: Dar Hsu MD;  Location: [facility];  Service: Vascular    NH ARTERIOVENOUS ANASTOMOSIS OPEN DIRECT Left 3/21/2023    Procedure: left brachiobasilic fistula,  AVG Graft;  Surgeon: Dc Lobato MD;  Location: [facility];  Service: Vascular    NH ARTERIOVENOUS ANASTOMOSIS OPEN DIRECT Left 5/19/2023    Procedure: CREATION of LEFT FISTULA ARTERIOVENOUS (AV) GRAFT;  Surgeon: Dc Lobato MD;  Location: [facility];  Service: Vascular    NH HEMIARTHROPLASTY HIP PARTIAL Right 04/04/2022    Procedure: HEMIARTHROPLASTY HIP (BIPOLAR); Surgeon: Jose Liao MD;  Location: [facility];  Service: Orthopedics         09/27/23 1345   Note Type   Note type Cancelled Session  (Wed 9/27/23)   Cancel Reasons Refusal   Additional Comments OT orders received and chart review completed. Spoke w/ RN, Alex Garcia. Pt declined participation despite education / encouragement reporting he wants / is trying to sleep. Pt requested do not disturb sign. RN aware.  Will cancel and continue to follow as appropriate / schedule allows     Prince Jackson OTR/ELAYNE  [id number]  [id number]

## 2023-09-27 NOTE — ASSESSMENT & PLAN NOTE
Lab Results   Component Value Date    HGBA1C 4.8 01/05/2023       No results for input(s): "POCGLU" in the last 72 hours. Blood Sugar Average: Last 72 hrs:  · Diet controlled.   · Update A1c Patient/Caregiver provided printed discharge information.

## 2023-09-27 NOTE — QUICK NOTE
Chart reviewed. Labs reviewed. Vitals reviewed. Patient had hemodialysis yesterday. We will resume hemodialysis tomorrow per schedule. Please call with questions.

## 2023-09-28 ENCOUNTER — APPOINTMENT (INPATIENT)
Dept: DIALYSIS | Facility: HOSPITAL | Age: 68
DRG: 947 | End: 2023-09-28
Attending: INTERNAL MEDICINE
Payer: MEDICARE

## 2023-09-28 LAB
ALBUMIN SERPL BCP-MCNC: 3 G/DL (ref 3.5–5)
ALP SERPL-CCNC: 75 U/L (ref 34–104)
ALT SERPL W P-5'-P-CCNC: 4 U/L (ref 7–52)
ANION GAP SERPL CALCULATED.3IONS-SCNC: 10 MMOL/L
AST SERPL W P-5'-P-CCNC: 12 U/L (ref 13–39)
BASOPHILS # BLD AUTO: 0.09 THOUSANDS/ÂΜL (ref 0–0.1)
BASOPHILS NFR BLD AUTO: 1 % (ref 0–1)
BILIRUB SERPL-MCNC: 0.67 MG/DL (ref 0.2–1)
BUN SERPL-MCNC: 27 MG/DL (ref 5–25)
CALCIUM ALBUM COR SERPL-MCNC: 9.5 MG/DL (ref 8.3–10.1)
CALCIUM SERPL-MCNC: 8.7 MG/DL (ref 8.4–10.2)
CHLORIDE SERPL-SCNC: 99 MMOL/L (ref 96–108)
CO2 SERPL-SCNC: 28 MMOL/L (ref 21–32)
CREAT SERPL-MCNC: 7.2 MG/DL (ref 0.6–1.3)
EOSINOPHIL # BLD AUTO: 0.04 THOUSAND/ÂΜL (ref 0–0.61)
EOSINOPHIL NFR BLD AUTO: 1 % (ref 0–6)
ERYTHROCYTE [DISTWIDTH] IN BLOOD BY AUTOMATED COUNT: 16.8 % (ref 11.6–15.1)
GFR SERPL CREATININE-BSD FRML MDRD: 7 ML/MIN/1.73SQ M
GLUCOSE SERPL-MCNC: 93 MG/DL (ref 65–140)
HCT VFR BLD AUTO: 29 % (ref 36.5–49.3)
HGB BLD-MCNC: 9.6 G/DL (ref 12–17)
IMM GRANULOCYTES # BLD AUTO: 0.03 THOUSAND/UL (ref 0–0.2)
IMM GRANULOCYTES NFR BLD AUTO: 0 % (ref 0–2)
LYMPHOCYTES # BLD AUTO: 0.73 THOUSANDS/ÂΜL (ref 0.6–4.47)
LYMPHOCYTES NFR BLD AUTO: 9 % (ref 14–44)
MCH RBC QN AUTO: 32.5 PG (ref 26.8–34.3)
MCHC RBC AUTO-ENTMCNC: 33.1 G/DL (ref 31.4–37.4)
MCV RBC AUTO: 98 FL (ref 82–98)
MONOCYTES # BLD AUTO: 0.37 THOUSAND/ÂΜL (ref 0.17–1.22)
MONOCYTES NFR BLD AUTO: 5 % (ref 4–12)
NEUTROPHILS # BLD AUTO: 6.7 THOUSANDS/ÂΜL (ref 1.85–7.62)
NEUTS SEG NFR BLD AUTO: 84 % (ref 43–75)
NRBC BLD AUTO-RTO: 0 /100 WBCS
PLATELET # BLD AUTO: 130 THOUSANDS/UL (ref 149–390)
PMV BLD AUTO: 10.2 FL (ref 8.9–12.7)
POTASSIUM SERPL-SCNC: 4.2 MMOL/L (ref 3.5–5.3)
PROT SERPL-MCNC: 5.7 G/DL (ref 6.4–8.4)
RBC # BLD AUTO: 2.95 MILLION/UL (ref 3.88–5.62)
SODIUM SERPL-SCNC: 137 MMOL/L (ref 135–147)
WBC # BLD AUTO: 7.96 THOUSAND/UL (ref 4.31–10.16)

## 2023-09-28 PROCEDURE — 97167 OT EVAL HIGH COMPLEX 60 MIN: CPT

## 2023-09-28 PROCEDURE — 85025 COMPLETE CBC W/AUTO DIFF WBC: CPT | Performed by: STUDENT IN AN ORGANIZED HEALTH CARE EDUCATION/TRAINING PROGRAM

## 2023-09-28 PROCEDURE — 99232 SBSQ HOSP IP/OBS MODERATE 35: CPT | Performed by: NURSE PRACTITIONER

## 2023-09-28 PROCEDURE — 99232 SBSQ HOSP IP/OBS MODERATE 35: CPT | Performed by: INTERNAL MEDICINE

## 2023-09-28 PROCEDURE — 5A1D70Z PERFORMANCE OF URINARY FILTRATION, INTERMITTENT, LESS THAN 6 HOURS PER DAY: ICD-10-PCS | Performed by: INTERNAL MEDICINE

## 2023-09-28 PROCEDURE — 80053 COMPREHEN METABOLIC PANEL: CPT | Performed by: STUDENT IN AN ORGANIZED HEALTH CARE EDUCATION/TRAINING PROGRAM

## 2023-09-28 RX ORDER — POLYETHYLENE GLYCOL 3350 17 G/17G
17 POWDER, FOR SOLUTION ORAL ONCE
Status: DISCONTINUED | OUTPATIENT
Start: 2023-09-28 | End: 2023-09-30

## 2023-09-28 RX ADMIN — DOCUSATE SODIUM 100 MG: 100 CAPSULE, LIQUID FILLED ORAL at 08:25

## 2023-09-28 RX ADMIN — CLOPIDOGREL BISULFATE 75 MG: 75 TABLET ORAL at 08:25

## 2023-09-28 RX ADMIN — HEPARIN SODIUM 5000 UNITS: 5000 INJECTION INTRAVENOUS; SUBCUTANEOUS at 17:49

## 2023-09-28 RX ADMIN — ASPIRIN 81 MG: 81 TABLET, COATED ORAL at 08:25

## 2023-09-28 RX ADMIN — CYANOCOBALAMIN TAB 500 MCG 1000 MCG: 500 TAB at 08:25

## 2023-09-28 RX ADMIN — DOCUSATE SODIUM 100 MG: 100 CAPSULE, LIQUID FILLED ORAL at 17:49

## 2023-09-28 RX ADMIN — GABAPENTIN 100 MG: 100 CAPSULE ORAL at 08:25

## 2023-09-28 RX ADMIN — ATORVASTATIN CALCIUM 20 MG: 20 TABLET, FILM COATED ORAL at 21:09

## 2023-09-28 RX ADMIN — MIDODRINE HYDROCHLORIDE 10 MG: 5 TABLET ORAL at 11:52

## 2023-09-28 RX ADMIN — MIDODRINE HYDROCHLORIDE 10 MG: 5 TABLET ORAL at 17:49

## 2023-09-28 RX ADMIN — OXYCODONE HYDROCHLORIDE AND ACETAMINOPHEN 500 MG: 500 TABLET ORAL at 17:49

## 2023-09-28 RX ADMIN — MIDODRINE HYDROCHLORIDE 10 MG: 5 TABLET ORAL at 08:25

## 2023-09-28 RX ADMIN — Medication 2000 UNITS: at 08:25

## 2023-09-28 NOTE — PLAN OF CARE
Problem: OCCUPATIONAL THERAPY ADULT  Goal: Performs self-care activities at highest level of function for planned discharge setting. See evaluation for individualized goals. Description: Treatment Interventions: ADL retraining, Functional transfer training, UE strengthening/ROM, Endurance training, Patient/family training, Equipment evaluation/education, Compensatory technique education, Continued evaluation, Energy conservation, Activityengagement  Equipment Recommended: Bedside commode, Shower/Tub chair with back ($)       See flowsheet documentation for full assessment, interventions and recommendations. Note: Limitation: Decreased ADL status, Decreased UE strength, Decreased endurance, Decreased self-care trans, Decreased high-level ADLs (impaired activity tolerance, standing tolerance, forward functional reach, balance)     Assessment: Pt is a 68yo male [facility] w/ worsening generalized weakness, dizziness poor appetite, nausea / vomiting. Diagnosed w/ generalized weakness. Pt reports living w/ wife in [address] [place] [address] and is able to have first floor set- up. Pt needs assistance w/ IADLs and uses RW/ rollator for functional mobility. Upon eval, pt required S to complete grooming, min A UBD, mod A toileting, mod A bed mobility, mod A toilet transfer, min <> mod A using RW for short distance functional mobility. Pt is completing ADLs below baseline level of I and would benefit from OT in acute care to maximize functional independence. Recommend post acute rehab when medically stable for discharge from acute care.  Will continue to follow     OT Discharge Recommendation: Post acute rehabilitation services

## 2023-09-28 NOTE — ASSESSMENT & PLAN NOTE
Patient presents with worsening generalized weakness, dizziness when getting up, poor appetite, nausea vomiting. Denies SOB, fever. Patient is on hemodialysis Tuesday Thursday Saturday for ESRD. Missed dialysis on Saturday due to weakness and was supposed to go to dialysis today but could not make it due to weakness. · Of note, patient was hospitalized between 9/12-9/14 for hypotension following dialysis. Noted to be bradycardic on EKG and telemetry. Patient was seen by cardiology, recommended close outpatient follow-up and consider PPM if persistent bradycardia. · TSH/vitamin D-pending  · WBC normal, no bands, patient afebrile. On room air, satting well. · Patient does not make urine. · EKG sinus rhythm with PAC, right bundle branch block, rate 63. · Denies chest pain  · Reports chronic pain in left upper extremity and having pain in sacrum due to open wound.   · Suspect due to physical deconditioning  · Check orthostatic vital signs  · nephrology for dialysis   · PT/ OT  · Fall precautions  · Plan is for transfer to rehab

## 2023-09-28 NOTE — PROGRESS NOTES
[id number] [facility]  Progress Note  Name: Demetris Antoine  MRN: [id number]  Unit/Bed#: 4 Paron 420-01 I Date of Admission: 9/25/2023   Date of Service: 9/28/2023 I Hospital Day: 2    Assessment/Plan   * Generalized weakness  Assessment & Plan  Patient presents with worsening generalized weakness, dizziness when getting up, poor appetite, nausea vomiting. Denies SOB, fever. Patient is on hemodialysis Tuesday Thursday Saturday for ESRD. Missed dialysis on Saturday due to weakness and was supposed to go to dialysis today but could not make it due to weakness. · Of note, patient was hospitalized between 9/12-9/14 for hypotension following dialysis. Noted to be bradycardic on EKG and telemetry. Patient was seen by cardiology, recommended close outpatient follow-up and consider PPM if persistent bradycardia. · TSH/vitamin D-pending  · WBC normal, no bands, patient afebrile. On room air, satting well. · Patient does not make urine. · EKG sinus rhythm with PAC, right bundle branch block, rate 63. · Denies chest pain  · Reports chronic pain in left upper extremity and having pain in sacrum due to open wound. · Suspect due to physical deconditioning  · Check orthostatic vital signs  · nephrology for dialysis   · PT/ OT  · Fall precautions  · Plan is for transfer to rehab     Nausea & vomiting  Assessment & Plan  Nausea vomiting, threw up mucous since Saturday. Poor appetite for a while per wife. · Resolved  · Tolerating a diet and has moved bowel per pt    Chronic pain  Assessment & Plan  Chronic pain left upper extremity due to issues with AV fistula in the past.  · Continue low-dose oxycodone as needed and gabapentin    Open wound  Assessment & Plan  Superficial sacral wound on exam.  No evidence of infection. · Reports pain at site. · Consult wound care  · Offloading    History of CVA (cerebrovascular accident)  Assessment & Plan  · With resultant right leg weakness.     · Continue aspirin Plavix Lipitor    Ambulatory dysfunction  Assessment & Plan  Uses walker for over a year. · History of CVA with right lower extremity weakness. · Reports feeling dizzy when getting up. · Check orthostatic vital signs   · PT/OT  · Plan for rehab placement    Hypotension  Assessment & Plan  Takes midodrine 10 mg p.o. twice daily at home instead of 3 times daily. · Continue midodrine 10 mg p.o. 3 times daily  · Check orthostatic vital signs    Chronic deep vein thrombosis (DVT) of internal jugular vein (HCC)  Assessment & Plan  · Continue aspirin, Plavix  · Completed course of Eliquis 1/23 by primary provider    Thrombocytopenia   Assessment & Plan  Improving from prior admission  · Platelet count 074  · Stable from baseline  · Monitor while on heparin subcu    Type 2 diabetes mellitus  Assessment & Plan  Lab Results   Component Value Date    HGBA1C 4.8 01/05/2023       Recent Labs     09/27/23  1857 09/27/23 2030   POCGLU 62* 119       Blood Sugar Average: Last 72 hrs:  · Diet controlled. · Update A1c    Nicotine dependence  Assessment & Plan  · NRT patch, smoking cessation    ESRD (end stage renal disease) on dialysis [facility])  Assessment & Plan  On Hemodialysis TTS  · Missed session on Saturday and HD today  · Access right upper chest dialysis catheter  · Potassium normal.  · Not taking calcitriol currently at home  · Daily weight, intake output  · Renal diet  · Discussed with nephrology recs: HD today    Coronary artery disease   Assessment & Plan  s/p PCI with stents x3. On aspirin Plavix Lipitor at home  · Asymptomatic    Anemia of chronic disease  Assessment & Plan  Baseline hemoglobin appears to be around 8-9s  · Hemoglobin improved  · Continue B12  · Monitor CBC             VTE Pharmacologic Prophylaxis:   Pharmacologic: Heparin  Mechanical VTE Prophylaxis in Place: Yes    Patient Centered Rounds: I have performed bedside rounds with nursing staff today.   Discussions with Specialists or Other Care Team Provider: Yes  Education and Discussions with Family / Patient:Yes  Time Spent for Care: 15 minutes. More than 50% of total time spent on counseling and coordination of care as described above. Current Length of Stay: 2 day(s)  Current Patient Status: Inpatient     Discharge Plan: rehab, pending availability    Code Status: Level 1 - Full Code      Subjective:   Patient laying in bed, reports feeling better than yesterday. He is tolerating a diet, no nausea or vomiting. Objective:     Vitals:   Temp (24hrs), Av.9 °F (36.6 °C), Min:97.6 °F (36.4 °C), Max:98.1 °F (36.7 °C)    Temp:  [97.6 °F (36.4 °C)-98.1 °F (36.7 °C)] 97.9 °F (36.6 °C)  HR:  [55-75] 65  Resp:  [16-18] 16  BP: (109-135)/(55-73) 124/73  SpO2:  [95 %-100 %] 100 %  Body mass index is 20.52 kg/m². Input and Output Summary (last 24 hours): Intake/Output Summary (Last 24 hours) at 2023 1557  Last data filed at 2023 1415  Gross per 24 hour   Intake 200 ml   Output --   Net 200 ml        Physical Exam:     Physical Exam  Vitals and nursing note reviewed. Constitutional:       Appearance: Normal appearance. HENT:      Head: Normocephalic. Nose: Nose normal.   Cardiovascular:      Rate and Rhythm: Normal rate and regular rhythm. Pulses: Normal pulses. Pulmonary:      Effort: Pulmonary effort is normal.      Breath sounds: Normal breath sounds. Abdominal:      General: Abdomen is flat. Bowel sounds are normal.      Palpations: Abdomen is soft. Musculoskeletal:         General: Normal range of motion. Skin:     General: Skin is warm and dry. Neurological:      General: No focal deficit present. Mental Status: He is alert and oriented to person, place, and time.          Additional Data:     Labs:    Results from last 7 days   Lab Units 23  1420 23  1902   WBC Thousand/uL 7.96 4.76   HEMOGLOBIN g/dL 9.6* 9.7*   HEMATOCRIT % 29.0* 30.4*   PLATELETS Thousands/uL 130* 127*   NEUTROS PCT % 84* 79*     Results from last 7 days   Lab Units 09/28/23  1420 09/27/23  0634 09/25/23  1902   SODIUM mmol/L 137 137 135   POTASSIUM mmol/L 4.2 4.0 4.6   CHLORIDE mmol/L 99 100 98   CO2 mmol/L 28 26 29   BUN mg/dL 27* 20 33*   CREATININE mg/dL 7.20* 5.74* 8.16*   CALCIUM mg/dL 8.7 8.5 8.8   TOTAL BILIRUBIN mg/dL 0.67 0.67 0.67   ALK PHOS U/L 75 70 72   ALT U/L 4* 4* 5*   AST U/L 12* 12* 19             Lab Results   Component Value Date/Time    HGBA1C 4.8 01/05/2023 08:23 AM    HGBA1C 4.4 06/30/2022 11:13 AM    HGBA1C 5.4 04/11/2017 07:09 AM     Results from last 7 days   Lab Units 09/27/23  2030 09/27/23  1857   POC GLUCOSE mg/dl 119 62*           * I Have Reviewed All Lab Data Listed Above. * Additional Pertinent Lab Tests Reviewed: All [facility] Admission Reviewed    Imaging:     No orders to display     Imaging Reports Reviewed by myself    Cultures:   Blood Culture:   Lab Results   Component Value Date    BLOODCX No Growth After 5 Days. 09/12/2023    BLOODCX No Growth After 5 Days. 09/12/2023    BLOODCX No Growth After 5 Days. 06/02/2023    BLOODCX No Growth After 5 Days. 06/02/2023    BLOODCX No Growth After 5 Days. 10/19/2021    BLOODCX No Growth After 5 Days.  10/19/2021     Urine Culture: No results found for: "URINECX"  Sputum Culture: No components found for: "Luis Bank"  Wound Culture:   Lab Results   Component Value Date    WOUNDCULT Growth in Broth culture only Klebsiella variicola (A) 06/03/2023    WOUNDCULT Growth in Broth culture only Escherichia coli (A) 06/03/2023    WOUNDCULT (A) 06/03/2023     Growth in Broth culture only Staphylococcus coagulase negative       Last 24 Hours Medication List:   Current Facility-Administered Medications   Medication Dose Route Frequency Provider Last Rate   • acetaminophen  650 mg Oral Q6H PRN TESSY Nolasco     • ascorbic acid  500 mg Oral After Dinner Jeni and TESSY Grant     • aspirin  81 mg Oral Daily TESSY Nolasco     • atorvastatin  20 mg Oral HS TESSY Nolasco     • cholecalciferol  2,000 Units Oral Daily TESSY Nolasco     • clopidogrel  75 mg Oral Daily TESSY Nolasco     • vitamin B-12  1,000 mcg Oral Daily TESSY Nolasco     • docusate sodium  100 mg Oral BID TESSY Nolasco     • gabapentin  100 mg Oral Once per day on Tue Thu Sat TESSY Nolasco     • heparin (porcine)  5,000 Units Subcutaneous Q8H [address] TESSY Nolasco     • hydrOXYzine HCL  25 mg Oral Q8H PRN TESSY Nolasco     • midodrine  10 mg Oral TID AC TESSY Nolasco     • nicotine  1 patch Transdermal Daily TESSY Nolasco     • ondansetron  4 mg Intravenous Q6H PRN TESSY Nolasco     • oxyCODONE  2.5 mg Oral Q8H PRN TESSY Nolasco          Today, Patient Was Seen By: TESSY Boykin    ** Please Note: Dragon 360 Dictation voice to text software may have been used in the creation of this document.  **

## 2023-09-28 NOTE — OCCUPATIONAL THERAPY NOTE
Occupational Therapy Evaluation     Patient Name: Morgan Lacey  WDQWT'K Date: 9/28/2023  Problem List  Principal Problem:    Generalized weakness  Active Problems:    Anemia of chronic disease    Coronary artery disease     ESRD (end stage renal disease) on dialysis (HCC)    Nicotine dependence    Type 2 diabetes mellitus    Thrombocytopenia     Chronic deep vein thrombosis (DVT) of internal jugular vein (HCC)    Hypotension    Ambulatory dysfunction    History of CVA (cerebrovascular accident)    Open wound    Chronic pain    Nausea & vomiting    Past Medical History  Past Medical History:   Diagnosis Date    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    With Cerebral Infarction:  Last Assessed:  October 20, 2016    Chronic kidney disease 2012    on dialysis     COVID 05/2022    COVID-19 04/2022 4/2022-while in NH    Diabetes mellitus ([address] )     Dialysis patient [facility])     T/TH/Sat    Difficulty walking     GERD (gastroesophageal reflux disease)     Hyperlipidemia     Hypertension     Labyrinthitis 09/10/2011    Myocardial infarction [facility]) 2014    x3 others were in 2009 & 2010    Sleep disturbances 09/20/2010    Stroke ([address]) 2007    x1, RLE deficit- uses walker    Type 2 diabetes, uncontrolled, with renal manifestation 05/03/2017     Past Surgical History  Past Surgical History:   Procedure Laterality Date    AV FISTULA PLACEMENT      AV FISTULA REPAIR Left 6/3/2023    Procedure: LEFT UPPER EXTREMITY A-V GRAFT EXPLANT, LEFT BRACHIAL Lyndsey Jeong;  Surgeon: Keo Rider MD;  Location:  MAIN OR;  Service: Vascular    CARDIAC CATHETERIZATION Left 02/03/2023    Procedure: Cardiac Left Heart Cath;  Surgeon: Rekha Wheat MD;  Location: [address] [place] [address] CATH LAB; Service: Cardiology    CARDIAC CATHETERIZATION N/A 02/08/2023    Procedure: Cardiac catheterization with complex PCI with Dr. Fernando Fry, patient is a renal dialysis patient;  Surgeon: Renzo Noriega MD;  Location: [facility] CARDIAC CATH LAB;   Service: Cardiology    CARDIAC SURGERY  2010    stents x3    COLONOSCOPY N/A 12/12/2016    Procedure: COLONOSCOPY;  Surgeon: Maame Sandoval MD;  Location: [address] [place] [address] GI LAB; Service:     COLONOSCOPY N/A 04/03/2017    Procedure:  COLONOSCOPY;  Surgeon: Maame Sandoval MD;  Location: [address] [place] [address] GI LAB; Service:     HARDWARE REMOVAL Right 04/04/2022    Procedure: REMOVAL HARDWARE HIP;  Surgeon: Emelia Razo MD;  Location: [facility] MAIN OR;  Service: Orthopedics    IR AV FISTULA/GRAFT DECLOT  04/29/2021    IR AV FISTULA/GRAFT DECLOT  03/25/2022    IR AV FISTULA/GRAFT DECLOT  09/21/2022    IR AV FISTULAGRAM/GRAFTOGRAM  03/28/2022    IR AV FISTULAGRAM/GRAFTOGRAM  07/28/2022    IR TUNNELED CENTRAL LINE REMOVAL  12/07/2021    IR TUNNELED DIALYSIS CATHETER PLACEMENT  05/24/2021    PORTACATH PLACEMENT      per pt "port in chest"    HI ARTERIOVENOUS ANASTOMOSIS OPEN DIRECT Left 07/22/2021    Procedure: CREATION FISTULA ARTERIOVENOUS (AV); Surgeon: Nehemias Rhoades MD;  Location: [facility];  Service: Vascular    HI ARTERIOVENOUS ANASTOMOSIS OPEN DIRECT Left 3/21/2023    Procedure: left brachiobasilic fistula,  AVG Graft;  Surgeon: Ortiz Rosario MD;  Location: [facility] MAIN OR;  Service: Vascular    HI ARTERIOVENOUS ANASTOMOSIS OPEN DIRECT Left 5/19/2023    Procedure: CREATION of LEFT FISTULA ARTERIOVENOUS (AV) GRAFT;  Surgeon: Ortiz Rosario MD;  Location: [facility] MAIN OR;  Service: Vascular    HI HEMIARTHROPLASTY HIP PARTIAL Right 04/04/2022    Procedure: HEMIARTHROPLASTY HIP (BIPOLAR); Surgeon: Emelia Razo MD;  Location: [facility] MAIN OR;  Service: Orthopedics           09/28/23 1105   OT Last Visit   OT Visit Date 09/28/23  (Thursday)   Note Type   Note type Evaluation   Pain Assessment   Pain Assessment Tool FLACC   Pain Location/Orientation Location: Abdomen; Location: Buttocks  (due to constipation; urge to void)   Effect of Pain on Daily Activities limits pace and activity tolerance   Patient's Stated Pain Goal No pain Hospital Pain Intervention(s) Repositioned; Ambulation/increased activity; Emotional support   Pain Rating: FLACC (Rest) - Face 0   Pain Rating: FLACC (Rest) - Legs 0   Pain Rating: FLACC (Rest) - Activity 0   Pain Rating: FLACC (Rest) - Cry 0   Pain Rating: FLACC (Rest) - Consolability 0   Score: FLACC (Rest) 0   Pain Rating: FLACC (Activity) - Face 1   Pain Rating: FLACC (Activity) - Legs 0   Pain Rating: FLACC (Activity) - Activity 1   Pain Rating: FLACC (Activity) - Cry 2   Pain Rating: FLACC (Activity) - Consolability 1   Score: FLACC (Activity) 5   Restrictions/Precautions   Weight Bearing Precautions Per Order No   Other Precautions Chair Alarm; Bed Alarm; Fall Risk;Pain;Fluid restriction  (ESRD on HD, [place] S[place] on room air, chronic R LE deficits s/p CVA, R hip fracture)   Home Living   Type of [address] [place] [address] Two level; Other (Comment); Performs ADLs on one level; Able to live on main level with bedroom/bathroom  (3 PHILL[address])   Bathroom Shower/Tub Walk-in shower   Bathroom Toilet Raised   Bathroom Equipment Grab bars in shower; Shower chair   Bathroom Accessibility Accessible;Accessible via walker   [place]; Other (Comment);Grab bars  (rollator)   Additional Comments Pt live w/ wife in [address] [place] [address] w/ first floor set- up   Prior Function   Level of [place] Needs assistance with IADLS   Lives With Spouse  (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 Needs assistance w/ IADLs, managing stairs   Lifestyle   Autonomy Pt reports I w/ basic ADLs using RW at baseline. Reciprocal Relationships Supportive wife / family / friends.    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 Personal and environmental factors impacting performance includes difficulty completing IADLs, recent admission(s), fall history, home alone during the day; supports/ strengths include independent at baseline, supportive wife/ family, access to DME. Family/Caregiver Present No   Additional General Comments Recently seen in ED (9/20/23) and DC home and returned on 9/25/23 reporting he is too weak and can't walk at home. Subjective   Subjective "I feel like I am going to go as soon as get up. I have not gone in 5 days. I hope that I am not blocked"   ADL   Where Assessed Edge of bed  (vs OOB In chair post eval w/ needs met, call bell in reach and chair alarm activated)   Eating Deficit Setup; Increased time to complete  (1500ML fluid restriction)   Grooming Assistance 5  Supervision/Setup   Grooming Deficit Setup;Supervision/safety; Increased time to complete  (seated due to decreased activity / standing tolerance)   UB Bathing Assistance Unable to assess  (anticipate S after set- up based on fxal obs skills, clinical judgement)   LB Bathing Assistance Unable to assess  (anticipate min A w/ + time after set- up based on fxal obs skills, clinical judgement)   UB Dressing Assistance 4  Minimal Assistance   UB Dressing Deficit Setup;Verbal cueing;Supervision/safety; Increased time to complete   LB Dressing Assistance Unable to assess  (anticipte mod A based on fxal obs skills, clinical judgement)   Toileting Assistance  3  Moderate Assistance   Toileting Deficit Bedside commode;Perineal hygiene;Setup;Verbal cueing;Supervison/safety; Increased time to complete   Additional Comments on room air O2 sats >90%   Bed Mobility   Supine to Sit 3  Moderate assistance   Additional items Assist x 1;HOB elevated; Bedrails; Increased time required;Verbal cues  (to pt's L)   Sit to Supine Unable to assess   Additional Comments Pt seated OOB In chair post eval w/ needs met, call bell in reach and chair alarm activated   Transfers   Sit to Stand 3  Moderate assistance   Additional items Assist x 1; Increased time required;Verbal cues; Bedrails;Armrests   Stand to Sit 3  Moderate assistance   Additional items Assist x 1; Increased time required;Verbal cues; Bedrails;Armrests   Stand pivot 4  Minimal assistance   Additional items Assist x 1; Increased time required;Verbal cues  (to pt's R using RW)   Toilet transfer 3  Moderate assistance   Additional items Assist x 1; Increased time required;Verbal cues; Commode;Bedrails;Armrests  (mod A sit <> stand to-from commode, min A using RW to complete transfer)   Additional Comments Pt performed sit <> stand 2 X w/ mod A   Functional Mobility   Functional Mobility   (min <> mod A)   Additional Comments short distances using RW.  Chronic R LE weakness s/p CVA, R hip fracture   Additional items Rolling walker   Balance   Static Sitting Fair   Static Standing Poor +   Ambulatory Poor +   Activity Tolerance   Activity Tolerance Patient limited by fatigue;Patient limited by pain   Nurse Made Aware spoke w/ RNMaddy pre / post eval   RUE Assessment   RUE Assessment   (observed during ADLs)   RUE Strength   RUE Overall Strength Within Functional Limits - able to perform ADL tasks with strength   LUE Assessment   LUE Assessment   (observed during ADLs)   LUE Strength   LUE Overall Strength Within Functional Limits - able to perform ADL tasks with strength   Hand Function   Gross Motor Coordination Functional   Cognition   Overall Cognitive Status   (appears WFLs, generally oriented and able to participate in converastion, follow directions)   Arousal/Participation Alert Attention Attends with cues to redirect   Orientation Level Oriented to person;Oriented to place;Oriented to situation   Memory   (appears [place])   Following Commands Follows multistep commands with increased time or repetition   Comments Identified pt by full name and birthdate. Alert, generally orineted. Able to follow directions and communicate wants / needs. Pt repots discomfort due to need but unable to void   Assessment   Limitation Decreased ADL status; Decreased UE strength;Decreased endurance;Decreased self-care trans;Decreased high-level ADLs  (impaired activity tolerance, standing tolerance, forward functional reach, balance)   Assessment Pt is a 70yo male admitted [facility] on [facility]/23 w/ worsening generalized weakness, dizziness poor appetite, nausea / vomiting. Diagnosed w/ generalized weakness. Pt reports living w/ wife in [address] [place] [address] and is able to have first floor set- up. Pt needs assistance w/ IADLs and uses RW/ rollator for functional mobility. Upon eval, pt required S to complete grooming, min A UBD, mod A toileting, mod A bed mobility, mod A toilet transfer, min <> mod A using RW for short distance functional mobility. Pt is completing ADLs below baseline level of I and would benefit from OT in acute care to maximize functional independence. Recommend post acute rehab when medically stable for discharge from acute care. Will continue to follow   Goals   Patient Goals Pt stated that he wants to go to the bathroom   Plan   Treatment Interventions ADL retraining;Functional transfer training;UE strengthening/ROM; Endurance training;Patient/family training;Equipment evaluation/education; Compensatory technique education;Continued evaluation; Energy conservation; Activityengagement   Goal Expiration Date 10/08/23   OT Treatment Day 0  (Thursday 9/28/23)   OT Frequency 3-5x/wk   Recommendation   OT Discharge Recommendation Post acute rehabilitation services   Equipment Recommended Bedside commode; Shower/Tub chair with back ($)   Commode Type Standard   AM-PAC Daily Activity Inpatient   Lower Body Dressing 2   Bathing 2   Toileting 2   Upper Body Dressing 3   Grooming 4   Eating 4   Daily Activity Raw Score 17   Daily Activity Standardized Score (Calc for Raw Score >=11) 37.26   AM-PAC Applied Cognition Inpatient   Following a Speech/Presentation 3   Understanding Ordinary Conversation 4   Taking Medications 3   Remembering Where Things Are Placed or Put Away 4   Remembering List of 4-5 Errands 3   Taking Care of Complicated Tasks 3   Applied Cognition Raw Score 20   Applied Cognition Standardized Score 41.76   Barthel Index   Feeding 5   Bathing 0   Grooming Score 5   Dressing Score 5   Bladder Score 5   Bowels Score 5   Toilet Use Score 5   Transfers (Bed/Chair) Score 10   Mobility (Level Surface) Score 0   Stairs Score 5   Barthel Index Score 45   End of Consult   Patient Position at End of Consult Bed/Chair alarm activated; All needs within reach; Bedside chair   Nurse Communication Nurse aware of consult   Licensure   [address] [place] [address] Number  Prince Jackson, OTR/L [id number]        The patient's raw score on the AM-PAC Daily Activity Inpatient Short Form is 17. A raw score of less than 19 suggests the patient may benefit from discharge to post-acute rehabilitation services. Please refer to the recommendation of the Occupational Therapist for safe discharge planning.     Pt goals to be met by 10/8/23 to max I w/ ADLs and improve engagement to return home to PLOF w/ wife/ dog includes:    -Pt will demonstrate good attention and understanding EC tech to max I w/ ADLs and improve engagement    -Pt will complete functional transfers to bed, chair, and toilet using LRAD, DME as needed w/ S to max I w/ ADLs    -Pt will consistently engage in functional mobility using LRAD to /from bathroom w/ S to max I w/ ADLs to return home alone during the day    -Pt will complete LBD w/ min A to max I w/ ADLs    -Pt will complete UBD w/ mod I    -Pt will demonstrate improved activity and sitting tolerance OOB in chair for all meals    -Pt will demonstrate improved functional standing tolerance for at least 10 minutes using LRAD w/ at least fair balance while engaged in grooming w/ mod I to max I w/ ADLs and bathing to return home    Aishwarya Moore, OTR/L  [id number]  [id number]

## 2023-09-28 NOTE — PROGRESS NOTES
[facility] progress note  Terri Yuen 70 y.o. male MRN: [id number]  Unit/Bed#: [address] [place] [address] Encounter: [id number]    ASSESSMENT and PLAN:  1. ESRD on HD  - Schedule: TTS at [facility]  - He had missed his dialysis session on last Saturday and we did his dialysis on Tuesday  - He is scheduled for hemodialysis session today per schedule  - His access is right chest wall permacath    2. Blood pressure/volume  - He has history of hypotension and takes midodrine 10 mg 3 times daily  - Continue midodrine 10 mg 3 times daily with hold parameters for systolic blood pressure more than 140  - We will tailor ultrafiltration to achieve estimated dry weight of 65 kg  - We will not challenge with ultrafiltration due to ongoing generalized weakness    3. Anemia of CKD  - Goal Hb 10-11, currently slightly below goal  - He is not on ALICE as outpatient  - We will continue to monitor hemoglobin while hospitalized  - Transfuse to keep hemoglobin more than 7    4. Electrolytes  - We will use 3K and 35 bicarbonate bath on dialysis today    5. CKD-MBD  - He is not on phosphate binders due to previous history of low serum phosphate level    6. Generalized weakness  - This has been suspected due to physical deconditioning  - Work-up and management per primary team    Discussed with internal medicine team.  After discussion, we agreed to resume hemodialysis based on outpatient schedule today. Interval history:  Patient continues to have generalized weakness. He is scheduled for hemodialysis later today. He denies dyspnea. He denies leg swelling.     PAST MEDICAL HISTORY:  Past Medical History:   Diagnosis Date   • 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    With Cerebral Infarction:  Last Assessed:  October 20, 2016   • Chronic kidney disease 2012    on dialysis    • COVID 05/2022   • COVID-19 04/2022 4/2022-while in NH   • Diabetes mellitus [facility])    • Dialysis patient [facility])     T/TH/Sat   • Difficulty walking    • GERD (gastroesophageal reflux disease)    • Hyperlipidemia    • Hypertension    • Labyrinthitis 09/10/2011   • Myocardial infarction [facility]) 2014    x3 others were in 2009 & 2010   • Sleep disturbances 09/20/2010   • Stroke [facility]) 2007    x1, RLE deficit- uses walker   • Type 2 diabetes, uncontrolled, with renal manifestation 05/03/2017       PAST SURGICAL HISTORY:  Past Surgical History:   Procedure Laterality Date   • AV FISTULA PLACEMENT     • AV FISTULA REPAIR Left 6/3/2023    Procedure: LEFT UPPER EXTREMITY A-V GRAFT EXPLANT, LEFT BRACHIAL ANGIOPATCH,  APPLICATION OF  VAC;  Surgeon: Doug Rider MD;  Location: [facility];  Service: Vascular   • CARDIAC CATHETERIZATION Left 02/03/2023    Procedure: Cardiac Left Heart Cath;  Surgeon: Jennifer Shore MD;  Location: [address] [place] [address] CATH LAB; Service: Cardiology   • CARDIAC CATHETERIZATION N/A 02/08/2023    Procedure: Cardiac catheterization with complex PCI with Dr. Gerry Spencer, patient is a renal dialysis patient;  Surgeon: Nahomi Beatty MD;  Location:  CARDIAC CATH LAB; Service: Cardiology   • CARDIAC SURGERY  2010    stents x3   • COLONOSCOPY N/A 12/12/2016    Procedure: COLONOSCOPY;  Surgeon: Baljinder Sanderson MD;  Location: [address] [place] [address] GI LAB; Service:    • COLONOSCOPY N/A 04/03/2017    Procedure:  COLONOSCOPY;  Surgeon: Baljinder Sanderson MD;  Location: [address] [place] [address] GI LAB;   Service:    • HARDWARE REMOVAL Right 04/04/2022    Procedure: REMOVAL HARDWARE HIP;  Surgeon: Akiko Mills MD;  Location: [facility];  Service: Orthopedics   • IR AV FISTULA/GRAFT DECLOT  04/29/2021   • IR AV FISTULA/GRAFT DECLOT  03/25/2022   • IR AV FISTULA/GRAFT DECLOT  09/21/2022   • IR AV FISTULAGRAM/GRAFTOGRAM  03/28/2022   • IR AV FISTULAGRAM/GRAFTOGRAM  07/28/2022   • IR TUNNELED CENTRAL LINE REMOVAL  12/07/2021   • IR TUNNELED DIALYSIS CATHETER PLACEMENT  05/24/2021   • PORTACATH PLACEMENT      per pt "port in chest"   • MN ARTERIOVENOUS ANASTOMOSIS OPEN DIRECT Left 07/22/2021    Procedure: CREATION FISTULA ARTERIOVENOUS (AV); Surgeon: Mera Estrella MD;  Location: [facility];  Service: Vascular   • ID ARTERIOVENOUS ANASTOMOSIS OPEN DIRECT Left 3/21/2023    Procedure: left brachiobasilic fistula,  AVG Graft;  Surgeon: Martín Jay MD;  [facility];  Service: Vascular   • ID ARTERIOVENOUS ANASTOMOSIS OPEN DIRECT Left 5/19/2023    Procedure: CREATION of LEFT FISTULA ARTERIOVENOUS (AV) GRAFT;  Surgeon: Martín Jay MD;  [facility];  Service: Vascular   • ID HEMIARTHROPLASTY HIP PARTIAL Right 04/04/2022    Procedure: HEMIARTHROPLASTY HIP (BIPOLAR);   Surgeon: Scott Madrigal MD;  Location[facility];  Service: Orthopedics       ALLERGIES:  No Known Allergies    SOCIAL HISTORY:  Social History     Substance and Sexual Activity   Alcohol Use Yes   • Alcohol/week: 1.0 standard drink of alcohol   • Types: 1 Shots of liquor per week    Comment: 1 shot daily     Social History     Substance and Sexual Activity   Drug Use Yes   • Types: Marijuana    Comment: gummies     Social History     Tobacco Use   Smoking Status Every Day   • Packs/day: 1.00   • Years: 30.00   • Total pack years: 30.00   • Types: Cigarettes   Smokeless Tobacco Never   Tobacco Comments    Currently not smoking - in facility       FAMILY HISTORY:  Family History   Problem Relation Age of Onset   • Leukemia Mother    • Crohn's disease Father    • Heart disease Father    • Transient ischemic attack Brother        MEDICATIONS:    Current Facility-Administered Medications:   •  acetaminophen (TYLENOL) tablet 650 mg, 650 mg, Oral, Q6H PRN, Cuibaldomeron Hinarik, CRNP  •  ascorbic acid (VITAMIN C) tablet 500 mg, 500 mg, Oral, After Dinner, Cuibaldomeron Hinarik, CRNP, 500 mg at 09/26/23 1744  •  aspirin (ECOTRIN LOW STRENGTH) EC tablet 81 mg, 81 mg, Oral, Daily, Cuiyin Yurik, CRNP, 81 mg at 09/27/23 0834  •  atorvastatin (LIPITOR) tablet 20 mg, 20 mg, Oral, HS, Cuiyin Yurik, CRNP, 20 mg at 09/27/23 2226  • cholecalciferol (VITAMIN D3) tablet 2,000 Units, 2,000 Units, Oral, Daily, Ezekiel Tenorio, CRNP, 2,000 Units at 09/27/23 0834  •  clopidogrel (PLAVIX) tablet 75 mg, 75 mg, Oral, Daily, Cuiru Santamariarijesus, CRNP, 75 mg at 09/27/23 2673  •  cyanocobalamin (VITAMIN B-12) tablet 1,000 mcg, 1,000 mcg, Oral, Daily, Curosalind Tenorio, CRNP, 1,000 mcg at 09/27/23 3086  •  docusate sodium (COLACE) capsule 100 mg, 100 mg, Oral, BID, Ezekiel Tenorio, CRNP, 100 mg at 09/27/23 7190  •  gabapentin (NEURONTIN) capsule 100 mg, 100 mg, Oral, Once per day on Tue Thu Sat, Ezekiel Santamariarijesus, CRNP, 100 mg at 09/26/23 1212  •  heparin (porcine) subcutaneous injection 5,000 Units, 5,000 Units, Subcutaneous, Q8H [address], Curosalind Tenorio, CRNP, 5,000 Units at 09/27/23 2226  •  hydrOXYzine HCL (ATARAX) tablet 25 mg, 25 mg, Oral, Q8H PRN, Ezekiel Tenorio, CRNP, 25 mg at 09/27/23 2226  •  midodrine (PROAMATINE) tablet 10 mg, 10 mg, Oral, TID AC, Ezekiel Tenorio, CRNP, 10 mg at 09/27/23 4531  •  nicotine (NICODERM CQ) 21 mg/24 hr TD 24 hr patch 1 patch, 1 patch, Transdermal, Daily, Ezekiel Tenorio, GOODNP  •  ondansetron (ZOFRAN) injection 4 mg, 4 mg, Intravenous, Q6H PRN, Ezekiel Tenorio, GOODNP  •  oxyCODONE (ROXICODONE) split tablet 2.5 mg, 2.5 mg, Oral, Q8H PRN, Ezekiel Santamariarijesus, CRNP, 2.5 mg at 09/27/23 1905    REVIEW OF SYSTEMS:  Review of Systems   Constitutional: Negative for chills and fever. HENT: Negative for ear pain and sore throat. Eyes: Negative for pain and visual disturbance. Respiratory: Negative for cough and shortness of breath. Cardiovascular: Negative for chest pain and palpitations. Gastrointestinal: Negative for abdominal pain and vomiting. Genitourinary: Negative for dysuria and hematuria. Musculoskeletal: Negative for arthralgias and back pain. Skin: Negative for color change and rash. Neurological: Negative for seizures and syncope. All other systems reviewed and are negative.       PHYSICAL EXAM:  Current Weight: Weight - Scale: 64.9 kg (143 lb)  First Weight: Weight - Scale: 65 kg (143 lb 3.2 oz)  Vitals:    09/27/23 2226 09/28/23 0308 09/28/23 0600 09/28/23 0715   BP: 117/55 111/59  123/61   BP Location:    Left arm   Pulse: 55 65  63   Resp: 16 16  16   Temp: 97.9 °F (36.6 °C) 97.6 °F (36.4 °C)  97.7 °F (36.5 °C)   TempSrc:    Oral   SpO2: 99% 95%  97%   Weight:   64.9 kg (143 lb)    Height:           Intake/Output Summary (Last 24 hours) at 9/28/2023 0820  Last data filed at 9/27/2023 0900  Gross per 24 hour   Intake 100 ml   Output --   Net 100 ml     Physical Exam  Constitutional:       Appearance: Normal appearance. HENT:      Head: Normocephalic and atraumatic. Mouth/Throat:      Mouth: Mucous membranes are moist.      Pharynx: Oropharynx is clear. Cardiovascular:      Rate and Rhythm: Normal rate and regular rhythm. Comments: Right chest wall permacath  Pulmonary:      Effort: Pulmonary effort is normal.      Breath sounds: Normal breath sounds. Abdominal:      General: Bowel sounds are normal.      Palpations: Abdomen is soft. Musculoskeletal:         General: Normal range of motion. Right lower leg: No edema. Left lower leg: No edema. Skin:     General: Skin is warm and dry. Neurological:      General: No focal deficit present. Mental Status: He is alert and oriented to person, place, and time. Mental status is at baseline. Psychiatric:         Mood and Affect: Mood normal.         Behavior: Behavior normal.         Thought Content:  Thought content normal.         Judgment: Judgment normal.       Lab Results:   Results from last 7 days   Lab Units 09/27/23  0634 09/25/23  1902   WBC Thousand/uL  --  4.76   HEMOGLOBIN g/dL  --  9.7*   HEMATOCRIT %  --  30.4*   PLATELETS Thousands/uL  --  127*   POTASSIUM mmol/L 4.0 4.6   CHLORIDE mmol/L 100 98   CO2 mmol/L 26 29   BUN mg/dL 20 33*   CREATININE mg/dL 5.74* 8.16*   CALCIUM mg/dL 8.5 8.8   ALK PHOS U/L 70 72   ALT U/L 4* 5*   AST U/L 12* 19     Portions of the record may have been created with voice recognition software. Occasional wrong word or "sound a like" substitutions may have occurred due to the inherent limitations of voice recognition software. Read the chart carefully and recognize, using context, where substitutions have occurred. If you have any questions, please contact the dictating provider.

## 2023-09-28 NOTE — PLAN OF CARE
Serum potassium is 4.0 from 9/27/23 on 3K 2.5Ca bath. Will attempt for UF-500-1 kg as discussed with Dr. Senthil Zepeda  Problem: METABOLIC, FLUID AND ELECTROLYTES - ADULT  Goal: Electrolytes maintained within normal limits  Description: INTERVENTIONS:  - Monitor labs and assess patient for signs and symptoms of electrolyte imbalances  - Administer electrolyte replacement as ordered  - Monitor response to electrolyte replacements, including repeat lab results as appropriate  - Instruct patient on fluid and nutrition as appropriate  Outcome: Progressing  Goal: Fluid balance maintained  Description: INTERVENTIONS:  - Monitor labs   - Monitor I/O and WT  - Instruct patient on fluid and nutrition as appropriate  - Assess for signs & symptoms of volume excess or deficit  Outcome: Progressing   Post-Dialysis RN Treatment Note    Blood Pressure:  Pre 125/64 mm/Hg  Post 134/74 mmHg   EDW  65 kg    Weight:  Pre 62.9 kg   Post 62.3 kg   Mode of weight measurement: Bed Scale   Volume Removed  500 ml    Treatment duration 180 minutes    NS given  No    Treatment shortened?  Yes, describe: as per patient request and Dr. Senthil Zepeda was made aware and agreed on it   Medications given during Rx None Reported   Estimated Kt/V  None Reported   Access type: Permacath/TDC   Access Issues: Yes, describe: needed to reversed line due to arterial pressure    Report called to primary nurse   Yes          Nito Stephens RN

## 2023-09-28 NOTE — DISCHARGE SUMMARY
[id number] [facility]  Discharge- [place] 1955, 70 y.o. male MRN: [id number]  Unit/Bed#: [address] [place] [address] Encounter: [id number]  Primary Care Provider: Jaquan Torres MD   Date and time admitted to hospital: 9/25/2023  5:53 PM    * Generalized weakness  Assessment & Plan  Patient presents with worsening generalized weakness, dizziness when getting up, poor appetite, nausea vomiting. Denies SOB, fever. Patient is on hemodialysis Tuesday Thursday Saturday for ESRD. Missed dialysis on Saturday due to weakness and was supposed to go to dialysis today but could not make it due to weakness. · Of note, patient was hospitalized between 9/12-9/14 for hypotension following dialysis. Noted to be bradycardic on EKG and telemetry. Patient was seen by cardiology, recommended close outpatient follow-up and consider PPM if persistent bradycardia. · TSH/vitamin D-pending  · WBC normal, no bands, patient afebrile. On room air, satting well. · Patient does not make urine. · EKG sinus rhythm with PAC, right bundle branch block, rate 63. · Denies chest pain  · Reports chronic pain in left upper extremity and having pain in sacrum due to open wound. · Suspect due to physical deconditioning  · Check orthostatic vital signs  · nephrology for dialysis   · PT/ OT  · Fall precautions  · Plan is for transfer to rehab     Nausea & vomiting  Assessment & Plan  Nausea vomiting, threw up mucous since Saturday. Poor appetite for a while per wife. · Resolved  · Tolerating a diet and has moved bowel per pt    Chronic pain  Assessment & Plan  Chronic pain left upper extremity due to issues with AV fistula in the past.  · Continue low-dose oxycodone as needed and gabapentin    Open wound  Assessment & Plan  Superficial sacral wound on exam.  No evidence of infection. · Reports pain at site.   · Consult wound care  · Offloading    History of CVA (cerebrovascular accident)  Assessment & Plan  · With resultant right leg weakness. · Continue aspirin Plavix Lipitor    Ambulatory dysfunction  Assessment & Plan  Uses walker for over a year. · History of CVA with right lower extremity weakness. · Reports feeling dizzy when getting up. · Check orthostatic vital signs   · PT/OT  · Plan for rehab placement    Hypotension  Assessment & Plan  Takes midodrine 10 mg p.o. twice daily at home instead of 3 times daily. · Continue midodrine 10 mg p.o. 3 times daily  · Check orthostatic vital signs    Chronic deep vein thrombosis (DVT) of internal jugular vein (HCC)  Assessment & Plan  · Continue aspirin, Plavix  · Completed course of Eliquis 1/23 by primary provider    Thrombocytopenia   Assessment & Plan  Improving from prior admission  · Platelet count 477  · Stable from baseline  · Monitor while on heparin subcu    Type 2 diabetes mellitus  Assessment & Plan  Lab Results   Component Value Date    HGBA1C 4.8 01/05/2023       Recent Labs     09/27/23  1857 09/27/23 2030   POCGLU 62* 119       Blood Sugar Average: Last 72 hrs:  · Diet controlled. · Update A1c    Nicotine dependence  Assessment & Plan  · NRT patch, smoking cessation    ESRD (end stage renal disease) on dialysis [facility])  Assessment & Plan  On Hemodialysis TTS  · Missed session on Saturday and HD today  · Access right upper chest dialysis catheter  · Potassium normal.  · Not taking calcitriol currently at home  · Daily weight, intake output  · Renal diet  · Discussed with nephrology recs: HD today    Coronary artery disease   Assessment & Plan  s/p PCI with stents x3.   On aspirin Plavix Lipitor at home  · Asymptomatic    Anemia of chronic disease  Assessment & Plan  Baseline hemoglobin appears to be around 8-9s  · Hemoglobin improved  · Continue B12  · Monitor CBC      Discharging Physician / Practitioner: TESSY Bush  PCP: Raven Antunez MD  Admission Date: 9/25/2023  Discharge Date: 09/28/23    Reason for Admission: Weakness - Generalized (Patient brought in by squad for feeling dizziness and weakness for the last week that has worsen today to the point were patient said he cant walk.)        Medical Problems     Resolved Problems  Date Reviewed: 9/28/2023          Resolved    Bradycardia 9/26/2023     Resolved by  Eliane Bryan MD          Consultations During Hospital Stay:  IP CONSULT TO NEPHROLOGY    Procedures Performed:     ·     Significant Findings / Test Results:     ·   Results from last 7 days   Lab Units 09/25/23  1902   WBC Thousand/uL 4.76   HEMOGLOBIN g/dL 9.7*   PLATELETS Thousands/uL 127*     Results from last 7 days   Lab Units 09/27/23  0634 09/25/23  1902   SODIUM mmol/L 137 135   POTASSIUM mmol/L 4.0 4.6   CHLORIDE mmol/L 100 98   CO2 mmol/L 26 29   BUN mg/dL 20 33*   CREATININE mg/dL 5.74* 8.16*   CALCIUM mg/dL 8.5 8.8   TOTAL BILIRUBIN mg/dL 0.67 0.67   ALK PHOS U/L 70 72   ALT U/L 4* 5*   AST U/L 12* 19             Lab Results   Component Value Date/Time    HGBA1C 4.8 01/05/2023 08:23 AM    HGBA1C 4.4 06/30/2022 11:13 AM    HGBA1C 5.4 04/11/2017 07:09 AM     Results from last 7 days   Lab Units 09/27/23 2030 09/27/23  1857   POC GLUCOSE mg/dl 119 62*         Blood Culture:   Lab Results   Component Value Date    BLOODCX No Growth After 5 Days. 09/12/2023    BLOODCX No Growth After 5 Days. 09/12/2023    BLOODCX No Growth After 5 Days. 06/02/2023    BLOODCX No Growth After 5 Days. 06/02/2023    BLOODCX No Growth After 5 Days. 10/19/2021    BLOODCX No Growth After 5 Days.  10/19/2021     Urine Culture: No results found for: "URINECX"  Sputum Culture: No components found for: "Judah Kunz"  Wound Culture:   Lab Results   Component Value Date    WOUNDCULT Growth in Broth culture only Klebsiella variicola (A) 06/03/2023    WOUNDCULT Growth in Broth culture only Escherichia coli (A) 06/03/2023    WOUNDCULT (A) 06/03/2023     Growth in Broth culture only Staphylococcus coagulase negative        No orders to display          Incidental Findings: ·     Test Results Pending at Discharge (will require follow up):   ·      Outpatient Tests Requested:  ·     Complications: None    Reason for Admission:   Chief Complaint   Patient presents with   • Weakness - Generalized     Patient brought in by squad for feeling dizziness and weakness for the last week that has worsen today to the point were patient said he cant walk. Hospital Course:     Per HPI: Manuel Bailey is a 70 y.o. male patient with a PMH of chronic kidney disease on dialysis, diabetes, hypertension, hyperlipidemia, CVA who originally presented to the hospital on 9/25/2023 due to generalized weakness. Patient was hospitalized in early September for hypotension after dialysis. Postdischarge patient had generalized weakness. While at home he continued to become weaker and he subsequently missed dialysis due to his generalized weakness. He was then brought into the emergency department. Labs were essentially unremarkable aside from his chronic kidney disease. EKG revealed normal sinus rhythm with right bundle branch block. Suspected etiology of his weakness was physical deconditioning. He was admitted for further management. He had a brief episode of nausea and vomiting which resolved. He worked with physical therapy. He was tolerating a diet and was able to move his bowels. It was decided that he will go to rehab to address his generalized deconditioning. He will continue dialysis with his Tuesday Thursday Saturday schedule. He will follow-up with his primary care provider and nephrology. Hospital Course:    Please see above list of diagnoses and related plan for additional information. Condition at Discharge: good       Discharge Day Visit / Exam:     Subjective:  Patient laying in bed, states he is feeling much better than yesterday. He is amenable to rehab, plan was discussed with wife. Plan is for dialysis today.     Vitals: Blood Pressure: 123/61 (09/28/23 0715)  Pulse: 63 (09/28/23 0715)  Temperature: 97.7 °F (36.5 °C) (09/28/23 0715)  Temp Source: Oral (09/28/23 0715)  Respirations: 16 (09/28/23 0715)  Height: 5' 10" (177.8 cm) (09/25/23 2131)  Weight - Scale: 64.9 kg (143 lb) (09/28/23 0600)  SpO2: 97 % (09/28/23 0715)  Exam:   Physical Exam  Vitals and nursing note reviewed. HENT:      Head: Normocephalic. Nose: Nose normal.   Eyes:      Extraocular Movements: Extraocular movements intact. Cardiovascular:      Rate and Rhythm: Normal rate and regular rhythm. Heart sounds: No murmur heard. Pulmonary:      Effort: Pulmonary effort is normal. No respiratory distress. Breath sounds: Normal breath sounds. Abdominal:      General: Abdomen is flat. Bowel sounds are normal.      Palpations: Abdomen is soft. Musculoskeletal:         General: Normal range of motion. Skin:     General: Skin is warm and dry. Comments: Right chest wall permacath    Neurological:      Mental Status: He is alert. Discharge instructions/Information to patient and family:   See after visit summary for information provided to patient and family. Provisions for Follow-Up Care:  See after visit summary for information related to follow-up care and any pertinent home health orders. Disposition:     Acute Rehab at ***    Planned Readmission: none     Discharge Statement:  I spent 20 minutes discharging the patient. This time was spent on the day of discharge. I had direct contact with the patient on the day of discharge. Greater than 50% of the total time was spent examining patient, answering all patient questions, arranging and discussing plan of care with patient as well as directly providing post-discharge instructions. Additional time then spent on discharge activities. Discharge Medications:  See after visit summary for reconciled discharge medications provided to patient and family.       ** Please Note: This note has been constructed using a voice recognition system **

## 2023-09-28 NOTE — ASSESSMENT & PLAN NOTE
Improving from prior admission  · Platelet count 967  · Stable from baseline  · Monitor while on heparin subcu

## 2023-09-28 NOTE — CASE MANAGEMENT
Case Management Assessment & Discharge Planning Note    Patient name Gregg Murguia  Location [address] [facility] [address] [place] 420-* MRN [id number]  : 1955 Date 2023       Current Admission Date: 2023  Current Admission Diagnosis:Generalized weakness   Patient Active Problem List    Diagnosis Date Noted   • History of CVA (cerebrovascular accident) 2023   • Generalized weakness 2023   • Open wound 2023   • Chronic pain 2023   • Nausea & vomiting 2023   • Anxiety disorder, unspecified 2023   • Secondary hyperparathyroidism of renal origin ([address]) 2023   • Unspecified protein-calorie malnutrition ([address]) 2023   • 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 06/15/2023   • Ambulatory dysfunction 2023   • Infection of dialysis AV graft 2023   • Hypotension 2023   • S/P angioplasty with stent 2023   • Chronic deep vein thrombosis (DVT) of internal jugular vein ([address]) 2022   • End-stage renal disease  2022   • Chronic kidney disease 2022   • Right intertrochanteric femur fracture 2022   • Thrombocytopenia  2022   • Arteriovenous fistula ([address]) 2021   • AV fistula thrombosis, initial encounter ([address]) 2021   • Encounter for extracorporeal dialysis ([address]) 2019   • Intestinal malabsorption 2017   • Pernicious anemia 2017   • Irritable bowel syndrome 2017   • Elevated serum alkaline phosphatase level 2017   • Anemia of chronic disease 2017   • Coronary artery disease  10/20/2016   • Hypertension 10/20/2016   • Dependence on renal dialysis ([address]) 2016   • Vitamin D deficiency, unspecified 2013   • Mixed hyperlipidemia 10/04/2012   • ESRD (end stage renal disease) on dialysis ([address]) 2012   • Nicotine dependence 2012   • Organic impotence 2012   • Type 2 diabetes mellitus 2012      LOS (days): 2  Geometric Mean LOS (GMLOS) (days): 3.50  Days to GMLOS:1.6     OBJECTIVE:  PATIENT READMITTED TO HOSPITAL  Risk of Unplanned Readmission Score: 24.84     Current admission status: Inpatient    Preferred Pharmacy:   [address] [facility] [address] [facility], [address] [place] [id number]  Phone: [phone] Fax: [phone]    Primary Care Provider: Mi Max MD    Primary Insurance: MEDICARE  Secondary Insurance: AARP    ASSESSMENT:  [place] Proxies    There are no active Health Care Proxies on file. Readmission Root Cause  30 Day Readmission: Yes  Who directed you to return to the hospital?: Self  Did you understand whom to contact if you had questions or problems?: Yes  Did you get your prescriptions before you left the hospital?: No  Reason[de-identified] Preference for own pharmacy  Were you able to get your prescriptions filled when you left the hospital?: Yes  Did you take your medications as prescribed?: Yes  Were you able to get to your follow-up appointments?: Yes  During previous admission, was a post-acute recommendation made?: Yes  What post-acute resources were offered?: [facility] ([facility] of [address] [facility] in place)  Patient was readmitted due to: generalized weakness  Action Plan: medical management, STR placement    Patient Information  Admitted from[de-identified] Home  Mental Status: Alert  During Assessment patient was accompanied by: Not accompanied during assessment (pt sleeping)  Assessment information provided by[de-identified] Spouse  Primary Caregiver: Spouse  Caregiver's Name[de-identified] Emanuel Mims Relationship to Patient[de-identified] Family Member (wife)  Caregiver's Telephone Number[de-identified] [phone]  Support Systems: Spouse/significant other, [address] of Residence: [address] [place] [address] Road do you live in?: [place] entry access options.  Select all that apply.: Stairs  Number of steps to enter home.: 3  Type of Current Residence: 2 story home  Upon entering residence, is there a bedroom on the main floor (no further steps)?: Yes  Upon entering residence, is there a bathroom on the main floor (no further steps)?: Yes  In the last 12 months, was there a time when you were not able to pay the mortgage or rent on time?: No  In the last 12 months, how many places have you lived?: 1  In the last 12 months, was there a time when you did not have a steady place to sleep or slept in a shelter (including now)?: No  Homeless/housing insecurity resource given?: N/A  Living Arrangements: Lives w/ Spouse/significant other    Activities of Daily Living Prior to Admission  Functional Status: Independent  Completes ADLs independently?: Yes  Ambulates independently?: Yes  Does patient use assisted devices?: Yes  Assisted Devices (DME) used: [address] Bed, Bedside Commode, Shower Chair  Does patient currently own DME?: Yes  What DME does the patient currently own?: [address] Bed, Bedside Commode, Shower Chair  Does patient have a history of Outpatient Therapy (PT/OT)?: No  Does the patient have a history of Short-Term Rehab?: Yes  Does patient have a history of HHC?: Yes  Does patient currently have [facility]?: Yes    Current Home Health Care  Type of Current Home Care Services: Nurse visit, Home PT, Home OT  [address][de-identified] [facility][de[facility]identified] PCP    Patient Information Continued  Income Source: Pension/prison  Does patient have prescription coverage?: Yes  Within the past 12 months, you worried that your food would run out before you got the money to buy more.: Never true  Within the past 12 months, the food you bought just didn't last and you didn't have money to get more.: Never true  Food insecurity resource given?: N/A  Does patient receive dialysis treatments?: Yes ([facility]-family/friends transport)    Means of Transportation  Means of Transport to [facility][de-identified] Family transport  In the past 12 months, has lack of transportation kept you from medical appointments or from getting medications?: No  In the past 12 months, has lack of transportation kept you from meetings, work, or from getting things needed for daily living?: No  Was application for public transport provided?: N/A      DISCHARGE DETAILS:    Discharge planning discussed with[de-identified] Wife, Amirah Martin of Choice: Yes  Comments - Freedom of Choice: SW spoke with pt's wife over phone to assess needs and discuss plans. SW attempted to visit with pt a few times but pt was sleeping. Wife confirmed plan for STR placement as discussed with CRNP yesterday. Per wife pt had been in [facility] a few months ago and they have on-site dialysis. Wife requested referral be made to [facility] to explore availability. Referral will be made. SW also talked with wife about local facilities that have accepted pt and options for dialysis transportation. One facility, [facility], offered to cover cost of transportation for pt. Wife said she would like to try [facility] before making decision. SW will follow to assist with planning. CM contacted family/caregiver?: Yes    Contacts  Patient Contacts: Chato Lopez  (wife)  Relationship to Patient[de-identified] Family  Contact Method: Phone  Phone Number: [phone]  Reason/Outcome: Discharge Planning    Other Referral/Resources/Interventions Provided:  Interventions: Short Term Rehab  Referral Comments: Referral made to [facility] as requested by wife. SW spoke with admission director at facility. Clinically pt has been accepted however dialysis center acceptance is pending. Requested clinical for dialysis consideration faxed to facility.     Treatment Team Recommendation: Short Term Rehab  Discharge Destination Plan[de-identified] Short Term Rehab  Transport at Discharge :  (pending)

## 2023-09-28 NOTE — ASSESSMENT & PLAN NOTE
Uses walker for over a year. · History of CVA with right lower extremity weakness. · Reports feeling dizzy when getting up.   · Check orthostatic vital signs   · PT/OT  · Plan for rehab placement

## 2023-09-28 NOTE — ASSESSMENT & PLAN NOTE
Lab Results   Component Value Date    HGBA1C 4.8 01/05/2023       Recent Labs     09/27/23 1857 09/27/23 2030   POCGLU 62* 119       Blood Sugar Average: Last 72 hrs:  · Diet controlled.   · Update A1c

## 2023-09-28 NOTE — ASSESSMENT & PLAN NOTE
Nausea vomiting, threw up mucous since Saturday. Poor appetite for a while per wife.   · Resolved  · Tolerating a diet and has moved bowel per pt

## 2023-09-29 LAB
ALBUMIN SERPL BCP-MCNC: 2.9 G/DL (ref 3.5–5)
ALP SERPL-CCNC: 70 U/L (ref 34–104)
ALT SERPL W P-5'-P-CCNC: 3 U/L (ref 7–52)
ANION GAP SERPL CALCULATED.3IONS-SCNC: 11 MMOL/L
AST SERPL W P-5'-P-CCNC: 14 U/L (ref 13–39)
BASOPHILS # BLD AUTO: 0.05 THOUSANDS/ÂΜL (ref 0–0.1)
BASOPHILS NFR BLD AUTO: 1 % (ref 0–1)
BILIRUB SERPL-MCNC: 0.71 MG/DL (ref 0.2–1)
BUN SERPL-MCNC: 13 MG/DL (ref 5–25)
CALCIUM ALBUM COR SERPL-MCNC: 9.4 MG/DL (ref 8.3–10.1)
CALCIUM SERPL-MCNC: 8.5 MG/DL (ref 8.4–10.2)
CHLORIDE SERPL-SCNC: 100 MMOL/L (ref 96–108)
CO2 SERPL-SCNC: 23 MMOL/L (ref 21–32)
CREAT SERPL-MCNC: 4.29 MG/DL (ref 0.6–1.3)
EOSINOPHIL # BLD AUTO: 0.07 THOUSAND/ÂΜL (ref 0–0.61)
EOSINOPHIL NFR BLD AUTO: 2 % (ref 0–6)
ERYTHROCYTE [DISTWIDTH] IN BLOOD BY AUTOMATED COUNT: 16.7 % (ref 11.6–15.1)
GFR SERPL CREATININE-BSD FRML MDRD: 13 ML/MIN/1.73SQ M
GLUCOSE SERPL-MCNC: 63 MG/DL (ref 65–140)
HCT VFR BLD AUTO: 28.1 % (ref 36.5–49.3)
HGB BLD-MCNC: 9 G/DL (ref 12–17)
IMM GRANULOCYTES # BLD AUTO: 0.01 THOUSAND/UL (ref 0–0.2)
IMM GRANULOCYTES NFR BLD AUTO: 0 % (ref 0–2)
LYMPHOCYTES # BLD AUTO: 0.63 THOUSANDS/ÂΜL (ref 0.6–4.47)
LYMPHOCYTES NFR BLD AUTO: 18 % (ref 14–44)
MCH RBC QN AUTO: 31.9 PG (ref 26.8–34.3)
MCHC RBC AUTO-ENTMCNC: 32 G/DL (ref 31.4–37.4)
MCV RBC AUTO: 100 FL (ref 82–98)
MONOCYTES # BLD AUTO: 0.26 THOUSAND/ÂΜL (ref 0.17–1.22)
MONOCYTES NFR BLD AUTO: 7 % (ref 4–12)
NEUTROPHILS # BLD AUTO: 2.54 THOUSANDS/ÂΜL (ref 1.85–7.62)
NEUTS SEG NFR BLD AUTO: 72 % (ref 43–75)
NRBC BLD AUTO-RTO: 0 /100 WBCS
PLATELET # BLD AUTO: 99 THOUSANDS/UL (ref 149–390)
PMV BLD AUTO: 10.2 FL (ref 8.9–12.7)
POTASSIUM SERPL-SCNC: 4 MMOL/L (ref 3.5–5.3)
PROT SERPL-MCNC: 5.7 G/DL (ref 6.4–8.4)
RBC # BLD AUTO: 2.82 MILLION/UL (ref 3.88–5.62)
SODIUM SERPL-SCNC: 134 MMOL/L (ref 135–147)
WBC # BLD AUTO: 3.56 THOUSAND/UL (ref 4.31–10.16)

## 2023-09-29 PROCEDURE — 99232 SBSQ HOSP IP/OBS MODERATE 35: CPT

## 2023-09-29 PROCEDURE — 99232 SBSQ HOSP IP/OBS MODERATE 35: CPT | Performed by: INTERNAL MEDICINE

## 2023-09-29 PROCEDURE — 80053 COMPREHEN METABOLIC PANEL: CPT | Performed by: STUDENT IN AN ORGANIZED HEALTH CARE EDUCATION/TRAINING PROGRAM

## 2023-09-29 PROCEDURE — 85025 COMPLETE CBC W/AUTO DIFF WBC: CPT | Performed by: STUDENT IN AN ORGANIZED HEALTH CARE EDUCATION/TRAINING PROGRAM

## 2023-09-29 PROCEDURE — 97116 GAIT TRAINING THERAPY: CPT

## 2023-09-29 RX ORDER — ALBUMIN (HUMAN) 12.5 G/50ML
12.5 SOLUTION INTRAVENOUS ONCE
Status: COMPLETED | OUTPATIENT
Start: 2023-09-29 | End: 2023-09-29

## 2023-09-29 RX ADMIN — CYANOCOBALAMIN TAB 500 MCG 1000 MCG: 500 TAB at 08:42

## 2023-09-29 RX ADMIN — ASPIRIN 81 MG: 81 TABLET, COATED ORAL at 08:42

## 2023-09-29 RX ADMIN — Medication 2.5 MG: at 09:19

## 2023-09-29 RX ADMIN — MIDODRINE HYDROCHLORIDE 10 MG: 5 TABLET ORAL at 08:41

## 2023-09-29 RX ADMIN — ATORVASTATIN CALCIUM 20 MG: 20 TABLET, FILM COATED ORAL at 21:17

## 2023-09-29 RX ADMIN — MIDODRINE HYDROCHLORIDE 10 MG: 5 TABLET ORAL at 12:30

## 2023-09-29 RX ADMIN — HEPARIN SODIUM 5000 UNITS: 5000 INJECTION INTRAVENOUS; SUBCUTANEOUS at 02:05

## 2023-09-29 RX ADMIN — Medication 2000 UNITS: at 08:41

## 2023-09-29 RX ADMIN — HEPARIN SODIUM 5000 UNITS: 5000 INJECTION INTRAVENOUS; SUBCUTANEOUS at 17:55

## 2023-09-29 RX ADMIN — OXYCODONE HYDROCHLORIDE AND ACETAMINOPHEN 500 MG: 500 TABLET ORAL at 17:55

## 2023-09-29 RX ADMIN — HEPARIN SODIUM 5000 UNITS: 5000 INJECTION INTRAVENOUS; SUBCUTANEOUS at 09:15

## 2023-09-29 RX ADMIN — DOCUSATE SODIUM 100 MG: 100 CAPSULE, LIQUID FILLED ORAL at 08:41

## 2023-09-29 RX ADMIN — DOCUSATE SODIUM 100 MG: 100 CAPSULE, LIQUID FILLED ORAL at 17:55

## 2023-09-29 RX ADMIN — Medication 2.5 MG: at 21:17

## 2023-09-29 RX ADMIN — MIDODRINE HYDROCHLORIDE 10 MG: 5 TABLET ORAL at 16:00

## 2023-09-29 RX ADMIN — CLOPIDOGREL BISULFATE 75 MG: 75 TABLET ORAL at 08:42

## 2023-09-29 RX ADMIN — ALBUMIN (HUMAN) 12.5 G: 0.25 INJECTION, SOLUTION INTRAVENOUS at 12:39

## 2023-09-29 NOTE — PLAN OF CARE
Problem: PHYSICAL THERAPY ADULT  Goal: Performs mobility at highest level of function for planned discharge setting. See evaluation for individualized goals. Description: Treatment/Interventions: ADL retraining, Functional transfer training, LE strengthening/ROM, Therapeutic exercise, Endurance training, Patient/family training, Equipment eval/education, Bed mobility, Gait training, Compensatory technique education, OT          See flowsheet documentation for full assessment, interventions and recommendations. Outcome: Progressing  Note: Prognosis: Good  Problem List: Decreased strength, Decreased endurance, Impaired balance, Decreased mobility, Decreased coordination, Decreased safety awareness  Assessment: Patient agreeable to PT session this morning. Patient is noted with improved transfers, gait balance and gait endurance. Patient still fatigues easily with limited activity. While admitted, patient will continue to benefit from skilled physical therapy services to further increase patient's strength, functional mobility, transfers, and gait with a roller walker. When medically stable for discharge, patient remains appropriate for postacute rehab services. PT Discharge Recommendation: Post acute rehabilitation services    See flowsheet documentation for full assessment.

## 2023-09-29 NOTE — PLAN OF CARE
Problem: MOBILITY - ADULT  Goal: Maintain or return to baseline ADL function  Description: INTERVENTIONS:  -  Assess patient's ability to carry out ADLs; assess patient's baseline for ADL function and identify physical deficits which impact ability to perform ADLs (bathing, care of mouth/teeth, toileting, grooming, dressing, etc.)  - Assess/evaluate cause of self-care deficits   - Assess range of motion  - Assess patient's mobility; develop plan if impaired  - Assess patient's need for assistive devices and provide as appropriate  - Encourage maximum independence but intervene and supervise when necessary  - Involve family in performance of ADLs  - Assess for home care needs following discharge   - Consider OT consult to assist with ADL evaluation and planning for discharge  - Provide patient education as appropriate  Outcome: Progressing     Problem: MOBILITY - ADULT  Goal: Maintains/Returns to pre admission functional level  Description: INTERVENTIONS:  - Perform BMAT or MOVE assessment daily.   - Set and communicate daily mobility goal to care team and patient/family/caregiver. - Collaborate with rehabilitation services on mobility goals if consulted  - Perform Range of Motion 3 times a day. - Reposition patient every 2 hours.   - Dangle patient 3 times a day  - Stand patient 3 times a day  - Ambulate patient 3 times a day  - Out of bed to chair 3 times a day   - Out of bed for meals 3 times a day  - Out of bed for toileting  - Record patient progress and toleration of activity level   Outcome: Progressing     Problem: PAIN - ADULT  Goal: Verbalizes/displays adequate comfort level or baseline comfort level  Description: Interventions:  - Encourage patient to monitor pain and request assistance  - Assess pain using appropriate pain scale  - Administer analgesics based on type and severity of pain and evaluate response  - Implement non-pharmacological measures as appropriate and evaluate response  - Consider cultural and social influences on pain and pain management  - Notify physician/advanced practitioner if interventions unsuccessful or patient reports new pain  Outcome: Progressing     Problem: INFECTION - ADULT  Goal: Absence or prevention of progression during hospitalization  Description: INTERVENTIONS:  - Assess and monitor for signs and symptoms of infection  - Monitor lab/diagnostic results  - Monitor all insertion sites, i.e. indwelling lines, tubes, and drains  - [place] appropriate cooling/warming therapies per order  - Administer medications as ordered  - Instruct and encourage patient and family to use good hand hygiene technique  - Identify and instruct in appropriate isolation precautions for identified infection/condition  Outcome: Progressing     Problem: SAFETY ADULT  Goal: Maintain or return to baseline ADL function  Description: INTERVENTIONS:  -  Assess patient's ability to carry out ADLs; assess patient's baseline for ADL function and identify physical deficits which impact ability to perform ADLs (bathing, care of mouth/teeth, toileting, grooming, dressing, etc.)  - Assess/evaluate cause of self-care deficits   - Assess range of motion  - Assess patient's mobility; develop plan if impaired  - Assess patient's need for assistive devices and provide as appropriate  - Encourage maximum independence but intervene and supervise when necessary  - Involve family in performance of ADLs  - Assess for home care needs following discharge   - Consider OT consult to assist with ADL evaluation and planning for discharge  - Provide patient education as appropriate  Outcome: Progressing     Problem: SAFETY ADULT  Goal: Maintains/Returns to pre admission functional level  Description: INTERVENTIONS:  - Perform BMAT or MOVE assessment daily.   - Set and communicate daily mobility goal to care team and patient/family/caregiver.    - Collaborate with rehabilitation services on mobility goals if consulted  - Perform Range of Motion 3 times a day. - Reposition patient every 2 hours.   - Dangle patient 3 times a day  - Stand patient 3 times a day  - Ambulate patient 3 times a day  - Out of bed to chair 3 times a day   - Out of bed for meals 3 times a day  - Out of bed for toileting  - Record patient progress and toleration of activity level   Outcome: Progressing     Problem: SAFETY ADULT  Goal: Patient will remain free of falls  Description: INTERVENTIONS:  - Educate patient/family on patient safety including physical limitations  - Instruct patient to call for assistance with activity   - Consult OT/PT to assist with strengthening/mobility   - Keep Call bell within reach  - Keep bed low and locked with side rails adjusted as appropriate  - Keep care items and personal belongings within reach  - Initiate and maintain comfort rounds  - Make Fall Risk Sign visible to staff  - Offer Toileting every 2 Hours, in advance of need  - Initiate/Maintain bed alarm  - Obtain necessary fall risk management equipment:   - Apply yellow socks and bracelet for high fall risk patients  - Consider moving patient to room near nurses station  Outcome: Progressing     Problem: DISCHARGE PLANNING  Goal: Discharge to home or other facility with appropriate resources  Description: INTERVENTIONS:  - Identify barriers to discharge w/patient and caregiver  - Arrange for needed discharge resources and transportation as appropriate  - Identify discharge learning needs (meds, wound care, etc.)  - Arrange for interpretive services to assist at discharge as needed  - Refer to Case Management Department for coordinating discharge planning if the patient needs post-hospital services based on physician/advanced practitioner order or complex needs related to functional status, cognitive ability, or social support system  Outcome: Progressing     Problem: Knowledge Deficit  Goal: Patient/family/caregiver demonstrates understanding of disease process, treatment plan, medications, and discharge instructions  Description: Complete learning assessment and assess knowledge base.   Interventions:  - Provide teaching at level of understanding  - Provide teaching via preferred learning methods  Outcome: Progressing     Problem: METABOLIC, FLUID AND ELECTROLYTES - ADULT  Goal: Electrolytes maintained within normal limits  Description: INTERVENTIONS:  - Monitor labs and assess patient for signs and symptoms of electrolyte imbalances  - Administer electrolyte replacement as ordered  - Monitor response to electrolyte replacements, including repeat lab results as appropriate  - Instruct patient on fluid and nutrition as appropriate  Outcome: Progressing     Problem: METABOLIC, FLUID AND ELECTROLYTES - ADULT  Goal: Fluid balance maintained  Description: INTERVENTIONS:  - Monitor labs   - Monitor I/O and WT  - Instruct patient on fluid and nutrition as appropriate  - Assess for signs & symptoms of volume excess or deficit  Outcome: Progressing     Problem: HEMATOLOGIC - ADULT  Goal: Maintains hematologic stability  Description: INTERVENTIONS  - Assess for signs and symptoms of bleeding or hemorrhage  - Monitor labs  - Administer supportive blood products/factors as ordered and appropriate  Outcome: Progressing     Problem: Prexisting or High Potential for Compromised Skin Integrity  Goal: Skin integrity is maintained or improved  Description: INTERVENTIONS:  - Identify patients at risk for skin breakdown  - Assess and monitor skin integrity  - Assess and monitor nutrition and hydration status  - Monitor labs   - Assess for incontinence   - Turn and reposition patient  - Assist with mobility/ambulation  - Relieve pressure over bony prominences  - Avoid friction and shearing  - Provide appropriate hygiene as needed including keeping skin clean and dry  - Evaluate need for skin moisturizer/barrier cream  - Collaborate with interdisciplinary team   - Patient/family teaching  - Consider wound care consult   Outcome: Progressing     Problem: Nutrition/Hydration-ADULT  Goal: Nutrient/Hydration intake appropriate for improving, restoring or maintaining nutritional needs  Description: Monitor and assess patient's nutrition/hydration status for malnutrition. Collaborate with interdisciplinary team and initiate plan and interventions as ordered. Monitor patient's weight and dietary intake as ordered or per policy. Utilize nutrition screening tool and intervene as necessary. Determine patient's food preferences and provide high-protein, high-caloric foods as appropriate.      INTERVENTIONS:  - Monitor oral intake, urinary output, labs, and treatment plans  - Assess nutrition and hydration status and recommend course of action  - Evaluate amount of meals eaten  - Assist patient with eating if necessary   - Allow adequate time for meals  - Recommend/ encourage appropriate diets, oral nutritional supplements, and vitamin/mineral supplements  - Order, calculate, and assess calorie counts as needed  - Assess need for intravenous fluids  - Provide specific nutrition/hydration education as appropriate  - Include patient/family/caregiver in decisions related to nutrition  Outcome: Progressing

## 2023-09-29 NOTE — PROGRESS NOTES
-- Patient: Idania Adam  -- MRN: [id number]  -- A[place] Request ID: [id number]  -- Level of care reserved: [address]  -- Partner Reserved: [address], [facility], [address] [phone]  -- Clinical needs requested:  -- Geography searched: 25 miles around [facility]  -- Start of Service:  -- Request sent: 6:23pm EDT on 9/27/2023 by Pennie Umana  -- Partner reserved: 3:22pm EDT on 9/29/2023 by Pennie Umana  -- Choice list shared: 11:40am EDT on 9/29/2023 by Pennie Umana

## 2023-09-29 NOTE — CASE MANAGEMENT
Case Management Discharge Planning Note    Patient name Amos Herrera  Location [address] [facility] [address]4 [facility] 420-* MRN [id number]  : 1955 Date 2023       Current Admission Date: 2023  Current Admission Diagnosis:Generalized weakness   Patient Active Problem List    Diagnosis Date Noted   • History of CVA (cerebrovascular accident) 2023   • Generalized weakness 2023   • Open wound 2023   • Chronic pain 2023   • Nausea & vomiting 2023   • Anxiety disorder, unspecified 2023   • Secondary hyperparathyroidism of renal origin ([address]) 2023   • Unspecified protein-calorie malnutrition ([address]) 2023   • 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 06/15/2023   • Ambulatory dysfunction 2023   • Infection of dialysis AV graft 2023   • Hypotension 2023   • S/P angioplasty with stent 2023   • Chronic deep vein thrombosis (DVT) of internal jugular vein ([address]) 2022   • End-stage renal disease  2022   • Chronic kidney disease 2022   • Right intertrochanteric femur fracture 2022   • Thrombocytopenia  2022   • Arteriovenous fistula ([address]) 2021   • AV fistula thrombosis, initial encounter ([address]) 2021   • Encounter for extracorporeal dialysis ([address]) 2019   • Intestinal malabsorption 2017   • Pernicious anemia 2017   • Irritable bowel syndrome 2017   • Elevated serum alkaline phosphatase level 2017   • Anemia of chronic disease 2017   • Coronary artery disease  10/20/2016   • Hypertension 10/20/2016   • Dependence on renal dialysis ([address]) 2016   • Vitamin D deficiency, unspecified 2013   • Mixed hyperlipidemia 10/04/2012   • ESRD (end stage renal disease) on dialysis ([address]) 2012   • Nicotine dependence 2012   • Organic impotence 2012   • Type 2 diabetes mellitus 2012      LOS (days): 3  Geometric Mean LOS (GMLOS) (days): 3.50  Days to GMLOS:0.5     OBJECTIVE:  Risk of Unplanned Readmission Score: 25.4     Current admission status: Inpatient   Preferred Pharmacy:   [address], [address] [place] [address] [address] [place] [id number]  Phone: [phone] Fax: [phone]    Primary Care Provider: Elenita Lopez MD    Primary Insurance: MEDICARE  Secondary Insurance: AARP    DISCHARGE DETAILS:    Discharge planning discussed with[de-identified] Patient and wife, Sussy Daniel of Choice: Yes  Comments - Freedom of Choice: SW following to assist with DCP. STR placement is planned. [facility] admissions called SW and accepted pt for admission. SW met with pt and spoke with wife over phone to discuss facility options. Both are requesting placement at [facility] for rehab. Facility will be reserved in Aidin. Per CRNP discharge is now planned for tomorrow after dialysis because dialysis is only done at [facility] on MWF shedule. Pt and wife aware. SW notified Marlena Norwood in admissions at [facility] of discharge timeframe. SW discussed transportation options with wife including stretcher, wheelchair Corean Sevilla and car. Wife considering transporting pt herself by car to avoid transportation charges. She will be available to transport pt after dialysis. CM contacted family/caregiver?: Yes    Contacts  Patient Contacts: Patel Granados  (wife)  Relationship to Patient[de-identified] Family  Contact Method: Phone  Phone Number: [phone]  Reason/Outcome: Discharge Planning    Other Referral/Resources/Interventions Provided:  Interventions: Short Term Rehab  Referral Comments: STR placement plans in place at [facility].     Treatment Team Recommendation: Short Term Rehab  Discharge Destination Plan[de-identified] Short Term Rehab  Transport at Discharge : [address], [address] : [address] [place], [place]  Receiving Facility/Agency Phone Number: [phone]

## 2023-09-29 NOTE — CASE MANAGEMENT
Case Management Discharge Planning Note    Patient name Orvil Sensing  Location [address] [place] 420-* MRN [id number]  : 1955 Date 2023       Current Admission Date: 2023  Current Admission Diagnosis:Generalized weakness   Patient Active Problem List    Diagnosis Date Noted   • History of CVA (cerebrovascular accident) 2023   • Generalized weakness 2023   • Open wound 2023   • Chronic pain 2023   • Nausea & vomiting 2023   • Anxiety disorder, unspecified 2023   • Secondary hyperparathyroidism of renal origin ([address]) 2023   • Unspecified protein-calorie malnutrition ([address]) 2023   • 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 06/15/2023   • Ambulatory dysfunction 2023   • Infection of dialysis AV graft 2023   • Hypotension 2023   • S/P angioplasty with stent 2023   • Chronic deep vein thrombosis (DVT) of internal jugular vein ([address]) 2022   • End-stage renal disease  2022   • Chronic kidney disease 2022   • Right intertrochanteric femur fracture 2022   • Thrombocytopenia  2022   • Arteriovenous fistula ([address]) 2021   • AV fistula thrombosis, initial encounter ([address]) 2021   • Encounter for extracorporeal dialysis ([address]) 2019   • Intestinal malabsorption 2017   • Pernicious anemia 2017   • Irritable bowel syndrome 2017   • Elevated serum alkaline phosphatase level 2017   • Anemia of chronic disease 2017   • Coronary artery disease  10/20/2016   • Hypertension 10/20/2016   • Dependence on renal dialysis ([address]) 2016   • Vitamin D deficiency, unspecified 2013   • Mixed hyperlipidemia 10/04/2012   • ESRD (end stage renal disease) on dialysis ([address]) 2012   • Nicotine dependence 2012   • Organic impotence 2012   • Type 2 diabetes mellitus 2012      LOS (days): 3  Geometric Mean LOS (GMLOS) (days): 3.50  Days to GMLOS:0.8     OBJECTIVE:  Risk of Unplanned Readmission Score: 25.35     Current admission status: Inpatient   Preferred Pharmacy:   [address] [address]  Phone: [phone] Fax: [phone]    Primary Care Provider: Margo Avalos MD    Primary Insurance: MEDICARE  Secondary Insurance: [employer] DETAILS:    Other Referral/Resources/Interventions Provided:  Interventions: Short Term Rehab  Referral Comments: Call placed to [address] [place] [address] to inquire about acceptance. Per Jake Mathur in admissions dialysis company has not approved pt and is requesting additional information, blood pressures during treatment. Requested information faxed to Jake Mathur. SW requested confirmation of admission decision as soon as possible so pt and wife can make choice on facility. Will follow.     Treatment Team Recommendation: Short Term Rehab  Discharge Destination Plan[de-identified] Short Term Rehab  Transport at Discharge :  (pending)

## 2023-09-29 NOTE — PROGRESS NOTES
[address]  Progress Note  Name: Parmjit Phan  MRN: [id number]  Unit/Bed#: 4 [place] 420-01 I Date of Admission: 9/25/2023   Date of Service: 9/29/2023 I Hospital Day: 3    Assessment/Plan   * Generalized weakness  Assessment & Plan  Patient presents with worsening generalized weakness, dizziness when getting up, poor appetite, nausea vomiting. Denies SOB, fever. Patient is on hemodialysis Tuesday Thursday Saturday for ESRD. Missed dialysis on Saturday due to weakness and was supposed to go to dialysis today but could not make it due to weakness. · Of note, patient was hospitalized between 9/12-9/14 for hypotension following dialysis. Noted to be bradycardic on EKG and telemetry. Patient was seen by cardiology, recommended close outpatient follow-up and consider PPM if persistent bradycardia. · TSH/vitamin D-pending  · WBC normal, no bands, patient afebrile. On room air, satting well. · Patient does not make urine. · EKG sinus rhythm with PAC, right bundle branch block, rate 63. · Denies chest pain  · Reports chronic pain in left upper extremity and having pain in sacrum due to open wound. · Suspect due to physical deconditioning  · Check orthostatic vital signs  · nephrology for dialysis   · PT/ OT  · Fall precautions  · Plan is for transfer to rehab     ESRD (end stage renal disease) on dialysis [facility])  Assessment & Plan  On Hemodialysis TTS  · Missed session on Saturday and HD today  · Access right upper chest dialysis catheter  · Potassium normal.  · Not taking calcitriol currently at home  · Daily weight, intake output  · Renal diet  · Discussed with nephrology recs: HD today    Nausea & vomiting  Assessment & Plan  Nausea vomiting, threw up mucous since Saturday. Poor appetite for a while per wife.   · Resolved  · Tolerating a diet and has moved bowel per pt    Chronic pain  Assessment & Plan  Chronic pain left upper extremity due to issues with AV fistula in the past.  · Continue low-dose oxycodone as needed and gabapentin    Open wound  Assessment & Plan  Superficial sacral wound on exam.  No evidence of infection. · Reports pain at site. · Consult wound care  · Offloading    History of CVA (cerebrovascular accident)  Assessment & Plan  · With resultant right leg weakness. · Continue aspirin Plavix Lipitor    Ambulatory dysfunction  Assessment & Plan  Uses walker for over a year. · History of CVA with right lower extremity weakness. · Reports feeling dizzy when getting up. · Check orthostatic vital signs   · PT/OT  · Plan for rehab placement  · CM following    Hypotension  Assessment & Plan  Takes midodrine 10 mg p.o. twice daily at home instead of 3 times daily. · Continue midodrine 10 mg p.o. 3 times daily  · Check orthostatic vital signs    Chronic deep vein thrombosis (DVT) of internal jugular vein (HCC)  Assessment & Plan  · Continue aspirin, Plavix  · Completed course of Eliquis 1/23 by primary provider    Thrombocytopenia   Assessment & Plan  Improving from prior admission  · Platelet count 116  · Stable from baseline  · Monitor while on heparin subcu    Type 2 diabetes mellitus  Assessment & Plan  Lab Results   Component Value Date    HGBA1C 4.8 01/05/2023       Recent Labs     09/27/23  1857 09/27/23 2030   POCGLU 62* 119       Blood Sugar Average: Last 72 hrs:  · Diet controlled. · Update A1c    Nicotine dependence  Assessment & Plan  · NRT patch, smoking cessation    Coronary artery disease   Assessment & Plan  s/p PCI with stents x3. On aspirin Plavix Lipitor at home  · Asymptomatic    Anemia of chronic disease  Assessment & Plan  Baseline hemoglobin appears to be around 8-9s  · Hemoglobin improved  · Continue B12  · Monitor CBC               VTE Pharmacologic Prophylaxis:   Moderate Risk (Score 3-4) - Pharmacological DVT Prophylaxis Ordered: heparin. Patient Centered Rounds: I performed bedside rounds with nursing staff today.   Discussions with Specialists or Other Care Team Provider: Yes    Education and Discussions with Family / Patient: Yes    Total Time Spent on Date of Encounter in care of patient: 35 mins. This time was spent on one or more of the following: performing physical exam; counseling and coordination of care; obtaining or reviewing history; documenting in the medical record; reviewing/ordering tests, medications or procedures; communicating with other healthcare professionals and discussing with patient's family/caregivers. Current Length of Stay: 3 day(s)  Current Patient Status: Inpatient   Discharge Plan: Pending rehab    Code Status: Level 1 - Full Code    Subjective:   Seen and examined sitting up in chair. Reports that he is feeling better than yesterday. Objective:     Vitals:   Temp (24hrs), Av.2 °F (36.8 °C), Min:97.5 °F (36.4 °C), Max:98.6 °F (37 °C)    Temp:  [97.5 °F (36.4 °C)-98.6 °F (37 °C)] 98 °F (36.7 °C)  HR:  [61-86] 76  Resp:  [16-19] 18  BP: ()/(42-75) 118/65  SpO2:  [93 %-100 %] 97 %  Body mass index is 20.3 kg/m². Input and Output Summary (last 24 hours): Intake/Output Summary (Last 24 hours) at 2023 1439  Last data filed at 2023 0001  Gross per 24 hour   Intake 810 ml   Output 1000 ml   Net -190 ml       Physical Exam:   Physical Exam  Vitals and nursing note reviewed. Constitutional:       General: He is not in acute distress. Appearance: He is well-developed. HENT:      Head: Normocephalic and atraumatic. Eyes:      Conjunctiva/sclera: Conjunctivae normal.   Cardiovascular:      Rate and Rhythm: Normal rate and regular rhythm. Heart sounds: No murmur heard. Pulmonary:      Effort: Pulmonary effort is normal. No respiratory distress. Breath sounds: Normal breath sounds. Abdominal:      Palpations: Abdomen is soft. Tenderness: There is no abdominal tenderness. Musculoskeletal:         General: No swelling. Cervical back: Neck supple.    Skin: General: Skin is warm and dry. Capillary Refill: Capillary refill takes less than 2 seconds. Neurological:      Mental Status: He is alert. Psychiatric:         Mood and Affect: Mood normal.          Additional Data:     Labs:  Results from last 7 days   Lab Units 09/29/23  0627   WBC Thousand/uL 3.56*   HEMOGLOBIN g/dL 9.0*   HEMATOCRIT % 28.1*   PLATELETS Thousands/uL 99*   NEUTROS PCT % 72   LYMPHS PCT % 18   MONOS PCT % 7   EOS PCT % 2     Results from last 7 days   Lab Units 09/29/23  0627   SODIUM mmol/L 134*   POTASSIUM mmol/L 4.0   CHLORIDE mmol/L 100   CO2 mmol/L 23   BUN mg/dL 13   CREATININE mg/dL 4.29*   ANION GAP mmol/L 11   CALCIUM mg/dL 8.5   ALBUMIN g/dL 2.9*   TOTAL BILIRUBIN mg/dL 0.71   ALK PHOS U/L 70   ALT U/L 3*   AST U/L 14   GLUCOSE RANDOM mg/dL 63*         Results from last 7 days   Lab Units 09/27/23 2030 09/27/23  1857   POC GLUCOSE mg/dl 119 62*               Lines/Drains:  Invasive Devices     Peripheral Intravenous Line  Duration           Peripheral IV 09/25/23 Right;Upper Arm 3 days          Hemodialysis Catheter  Duration           HD Permanent Double Catheter 373 days                      Imaging: No pertinent imaging reviewed.     Recent Cultures (last 7 days):         Last 24 Hours Medication List:   Current Facility-Administered Medications   Medication Dose Route Frequency Provider Last Rate   • acetaminophen  650 mg Oral Q6H PRN TESSY Nolasco     • ascorbic acid  500 mg Oral After Dinner TESSY Bowling     • aspirin  81 mg Oral Daily TESSY Nolasco     • atorvastatin  20 mg Oral HS TESSY Nolasco     • cholecalciferol  2,000 Units Oral Daily TESSY Nolasco     • clopidogrel  75 mg Oral Daily TESSY Nolasco     • vitamin B-12  1,000 mcg Oral Daily TESSY Nolasco     • docusate sodium  100 mg Oral BID TESSY Nolasco     • gabapentin  100 mg Oral Once per day on Tue Thu Sat TESSY Nolasco     • heparin (porcine)  5,000 Units Subcutaneous Q8H [address] TESSY Nolasco     • hydrOXYzine HCL  25 mg Oral Q8H PRN TESSY Nolasco     • midodrine  10 mg Oral TID AC TESSY Nolasco     • nicotine  1 patch Transdermal Daily TESSY Nolasco     • ondansetron  4 mg Intravenous Q6H PRN TESSY Nolasco     • oxyCODONE  2.5 mg Oral Q8H PRN TESSY Nolasco     • polyethylene glycol  17 g Oral Once TESSY Mcdonald          Today, Patient Was Seen By: Spencer Severance, CRNP    **Please Note: This note may have been constructed using a voice recognition system. **

## 2023-09-29 NOTE — PROGRESS NOTES
NEPHROLOGY PROGRESS NOTE   Santosh Salguero 70 y.o. male MRN: [id number]  Unit/Bed#: [address] [place] [address] Encounter: [id number]    ASSESSMENT & PLAN:   Santosh Salguero is a 70 y.o. male who was admitted to [address] [place] [address] after presenting with generalized weakness, dizziness, poor appetite, nausea and vomiting. End-stage renal disease on dialysis  -Outpatient unit: TTS at [address] [place] [address]  -Access: Right IJ PermCath  -He missed his hemodialysis treatment as outpatient on Saturday 9/23.  -Plan: Last hemodialysis treatment was yesterday. Postdialysis weight was 62.3 kg which is below dry weight. May need to adjust outpatient dry weight. Next modality treatment tomorrow    Volume status  -Clinically euvolemic, estimated dry weight 65 kg, decreased to 62 kg for hemodialysis treatment tomorrow, would need to decrease as outpatient as well    CKD/MBD  -Not on any phosphorus binders as outpatient due to low serum phosphorus. Continue to monitor phosphorus level. Last phosphorus level was 1.3 on 9/14. Blood pressure  -Chronic hypotension: Continue midodrine 10 mg 3 times daily with meals. Blood pressure currently stable, avoid hypotension    Electrolytes:   -Hyponatremia sodium 134, recommend fluid restriction 1.5 L/day    Anemia due to ESRD  -Goal hemoglobin:  10-11 grams/deciliter  -Current hemoglobin: Below goal at 9.0 g/dl  -Plan: Not on ALICE as outpatient as hemoglobin was at goal, continue to monitor during the hospital stay    Generalized weakness, suspected to be due to physical deconditioning. Work-up and management per primary team.  He also has a history of CVA/DVT.     Above plan regarding hemodialysis treatment tomorrow was discussed with primary team and agreed with the plan    SUBJECTIVE:  Patient mentions he is feeling slightly better, no nausea vomiting    OBJECTIVE:  Current Weight: Weight - Scale: 64.2 kg (141 lb 8 oz)  Vitals:    09/29/23 0845   BP: 118/65   Pulse: 76   Resp: 18   Temp: 98 °F (36.7 °C)   SpO2: 99%       Intake/Output Summary (Last 24 hours) at 9/29/2023 0912  Last data filed at 9/29/2023 0001  Gross per 24 hour   Intake 1010 ml   Output 1000 ml   Net 10 ml       Physical Exam  General:  Ill looking, awake. Eyes: Conjunctivae pink,  Sclera anicteric  ENT: lips and mucous membranes moist  Neck: supple   Chest: Clear to Auscultation both lungs,  no crackles, ronchus or wheezing. CVS: S1 & S2 present, normal rate, regular rhythm, no murmur.   Abdomen: soft, non-tender, non-distended, Bowel sounds normoactive  Extremities: no edema of  legs  Skin: no rash  Neuro: awake, alert, oriented x 3   Psych: Mood and affect appropriate     Medications:    Current Facility-Administered Medications:   •  acetaminophen (TYLENOL) tablet 650 mg, 650 mg, Oral, Q6H PRN, GOOD NolascoNP  •  ascorbic acid (VITAMIN C) tablet 500 mg, 500 mg, Oral, After Dinner, GOOD NolascoNP, 500 mg at 09/28/23 1749  •  aspirin (ECOTRIN LOW STRENGTH) EC tablet 81 mg, 81 mg, Oral, Daily, Ezekiel Santamariarijesus, CRNP, 81 mg at 09/29/23 7701  •  atorvastatin (LIPITOR) tablet 20 mg, 20 mg, Oral, HS, Cuiru Santamariarik, CRNP, 20 mg at 09/28/23 2109  •  cholecalciferol (VITAMIN D3) tablet 2,000 Units, 2,000 Units, Oral, Daily, Ezekiel Tenorio CRNP, 2,000 Units at 09/29/23 0841  •  clopidogrel (PLAVIX) tablet 75 mg, 75 mg, Oral, Daily, Malikiru Santamariarijesus, CRNP, 75 mg at 09/29/23 0141  •  cyanocobalamin (VITAMIN B-12) tablet 1,000 mcg, 1,000 mcg, Oral, Daily, Cuiru Tenorio, CRNP, 1,000 mcg at 09/29/23 6861  •  docusate sodium (COLACE) capsule 100 mg, 100 mg, Oral, BID, Ezekiel Tenorio CRNP, 100 mg at 09/29/23 0841  •  gabapentin (NEURONTIN) capsule 100 mg, 100 mg, Oral, Once per day on Tue Thu Sat, TESSY Nolasco, 100 mg at 09/28/23 0825  •  heparin (porcine) subcutaneous injection 5,000 Units, 5,000 Units, Subcutaneous, Q8H [facility], TESSY Nolasco, 5,000 Units at 09/29/23 0205  •  hydrOXYzine HCL (ATARAX) tablet 25 mg, 25 mg, Oral, Q8H PRN, Ezekiel TESSY Tenorio, 25 mg at 09/27/23 2226  •  midodrine (PROAMATINE) tablet 10 mg, 10 mg, Oral, TID AC, TESSY Nolasco, 10 mg at 09/29/23 0841  •  nicotine (NICODERM CQ) 21 mg/24 hr TD 24 hr patch 1 patch, 1 patch, Transdermal, Daily, TESSY Nolasco  •  ondansetron (ZOFRAN) injection 4 mg, 4 mg, Intravenous, Q6H PRN, TESSY Nolasco  •  oxyCODONE (ROXICODONE) split tablet 2.5 mg, 2.5 mg, Oral, Q8H PRN, TESSY Nolasco, 2.5 mg at 09/27/23 1905  •  polyethylene glycol (MIRALAX) packet 17 g, 17 g, Oral, Once, TESSY Truong    Invasive Devices:        Lab Results:   Results from last 7 days   Lab Units 09/29/23  0627 09/28/23  1420 09/27/23  0634 09/25/23  1902   WBC Thousand/uL 3.56* 7.96  --  4.76   HEMOGLOBIN g/dL 9.0* 9.6*  --  9.7*   HEMATOCRIT % 28.1* 29.0*  --  30.4*   PLATELETS Thousands/uL 99* 130*  --  127*   POTASSIUM mmol/L 4.0 4.2 4.0 4.6   CHLORIDE mmol/L 100 99 100 98   CO2 mmol/L 23 28 26 29   BUN mg/dL 13 27* 20 33*   CREATININE mg/dL 4.29* 7.20* 5.74* 8.16*   CALCIUM mg/dL 8.5 8.7 8.5 8.8   ALK PHOS U/L 70 75 70 72   ALT U/L 3* 4* 4* 5*   AST U/L 14 12* 12* 19       Previous work up:         Portions of the record may have been created with voice recognition software. Occasional wrong word or "sound a like" substitutions may have occurred due to the inherent limitations of voice recognition software. Read the chart carefully and recognize, using context, where substitutions have occurred. If you have any questions, please contact the dictating provider.

## 2023-09-29 NOTE — ASSESSMENT & PLAN NOTE
Uses walker for over a year. · History of CVA with right lower extremity weakness. · Reports feeling dizzy when getting up.   · Check orthostatic vital signs   · PT/OT  · Plan for rehab placement  · CM following

## 2023-09-29 NOTE — PHYSICAL THERAPY NOTE
PT TREATMENT     09/29/23 0942   PT Last Visit   PT Visit Date 09/29/23   Note Type   Note Type Treatment   Pain Assessment   Pain Assessment Tool 0-10   Pain Score No Pain   Restrictions/Precautions   Other Precautions Fall Risk;Bed Alarm; Chair Alarm  (Sravani Venegas on room air)   General   Chart Reviewed Yes   Family/Caregiver Present No   Cognition   Overall Cognitive Status WFL   Arousal/Participation Cooperative   Attention Within functional limits   Orientation Level Oriented X4   Following Commands Follows multistep commands without difficulty   Subjective   Subjective "Cold" "better"   Bed Mobility   Supine to Sit 3  Moderate assistance   Additional items Assist x 1;Verbal cues; Increased time required; Bedrails;HOB elevated   Transfers   Sit to Stand 4  Minimal assistance   Additional items Assist x 1;Verbal cues; Increased time required   Stand to Sit 4  Minimal assistance   Additional items Assist x 1;Verbal cues; Increased time required   Ambulation/Elevation   Gait pattern Decreased foot clearance;Knees flexed; Foward flexed; Short stride; Step through pattern;Narrow VAIBHAV; Decreased heel strike   Gait Assistance 4  Minimal assist   Additional items Assist x 1;Verbal cues; Tactile cues   Assistive Device Rolling walker   Distance 4 feet bed to chair; 10 to 12 feet x 2 reps with change in direction; extended rest.  Between all ambulation trials due to patient with fatigue   Balance   Static Sitting Fair +   Static Standing Fair  (With roller walker)   Ambulatory Fair -  (With roller walker)   Endurance Deficit   Endurance Deficit Yes   Endurance Deficit Description Limited standing and gait endurance   Activity Tolerance   Activity Tolerance Patient limited by fatigue;Treatment limited secondary to medical complications (Comment)  (Generalized weakness and deconditioning)   Assessment   Problem List Decreased strength;Decreased endurance; Impaired balance;Decreased mobility; Decreased coordination;Decreased safety awareness   Assessment Patient agreeable to PT session this morning. Patient is noted with improved transfers, gait balance and gait endurance. Patient still fatigues easily with limited activity. While admitted, patient will continue to benefit from skilled physical therapy services to further increase patient's strength, functional mobility, transfers, and gait with a roller walker. When medically stable for discharge, patient remains appropriate for postacute rehab services. The patient's AM-PAC Basic Mobility Inpatient Short Form Raw Score is 15. A Raw score of less than or equal to 16 suggests the patient may benefit from discharge to post-acute rehabilitation services. Please also refer to the recommendation of the Physical Therapist for safe discharge planning. Plan   Treatment/Interventions ADL retraining;Functional transfer training;LE strengthening/ROM; Therapeutic exercise; Endurance training;Patient/family training;Equipment eval/education; Bed mobility;Gait training; Compensatory technique education;OT   PT Frequency Other (Comment)  (5x/wk)   Recommendation   PT Discharge Recommendation Post acute rehabilitation services   AM-PAC Basic Mobility Inpatient   Turning in Flat Bed Without Bedrails 3   Lying on Back to Sitting on Edge of Flat Bed Without Bedrails 2   Moving Bed to Chair 3   Standing Up From Chair Using Arms 3   Walk in Room 3   Climb 3-5 Stairs With Railing 1   Basic Mobility Inpatient Raw Score 15   Basic Mobility Standardized Score 36.97   Highest Level Of Mobility   JH-HLM Goal 4: Move to chair/commode   JH-HLM Achieved 6: Walk 10 steps or more   Education   Education Provided Mobility training;Assistive device   Patient Explanation/teachback used; Reinforcement needed   End of Consult   Patient Position at End of Consult Bed/Chair alarm activated; Bedside chair; All needs within reach   [facility] Insurance Number  [address], [place] [id number]     Portions of the documentation may have been created using voice recognition software. Occasional wrong word or sound alike substitutions may have occurred due to the inherent limitation of the voice recognition software. Read the chart carefully and recognize, using context, where substitutions have occurred.

## 2023-09-29 NOTE — CASE MANAGEMENT
Case Management Discharge Planning Note    Patient name Isaac Jay  Location [address] [place] [address]-* MRN [id number]  : 1955 Date 2023       Current Admission Date: 2023  Current Admission Diagnosis:Generalized weakness   Patient Active Problem List    Diagnosis Date Noted   • History of CVA (cerebrovascular accident) 2023   • Generalized weakness 2023   • Open wound 2023   • Chronic pain 2023   • Nausea & vomiting 2023   • Anxiety disorder, unspecified 2023   • Secondary hyperparathyroidism of renal origin ([address]) 2023   • Unspecified protein-calorie malnutrition ([address]) 2023   • 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 06/15/2023   • Ambulatory dysfunction 2023   • Infection of dialysis AV graft 2023   • Hypotension 2023   • S/P angioplasty with stent 2023   • Chronic deep vein thrombosis (DVT) of internal jugular vein ([address]) 2022   • End-stage renal disease  2022   • Chronic kidney disease 2022   • Right intertrochanteric femur fracture 2022   • Thrombocytopenia  2022   • Arteriovenous fistula ([address]) 2021   • AV fistula thrombosis, initial encounter ([address]) 2021   • Encounter for extracorporeal dialysis ([address]) 2019   • Intestinal malabsorption 2017   • Pernicious anemia 2017   • Irritable bowel syndrome 2017   • Elevated serum alkaline phosphatase level 2017   • Anemia of chronic disease 2017   • Coronary artery disease  10/20/2016   • Hypertension 10/20/2016   • Dependence on renal dialysis ([address]) 2016   • Vitamin D deficiency, unspecified 2013   • Mixed hyperlipidemia 10/04/2012   • ESRD (end stage renal disease) on dialysis ([address]) 2012   • Nicotine dependence 2012   • Organic impotence 2012   • Type 2 diabetes mellitus 2012      LOS (days): 3  Geometric Mean LOS (GMLOS) (days): 3.50  Days to GMLOS:0.7     OBJECTIVE:  Risk of Unplanned Readmission Score: 25.35     Current admission status: Inpatient   Preferred Pharmacy:   [address] [address]  Phone: [phone] Fax: [phone]    Primary Care Provider: Shyam Samuel MD    Primary Insurance: MEDICARE  Secondary Insurance: AARP    DISCHARGE DETAILS:    Discharge planning discussed with[de-identified] Wife, Yen Burns of Choice: Yes  Comments - Freedom of Choice: SW following to assist with DCP. STR placement is planned. SW placed call to pt's wife to review plan, facility options and IMM. Per CRNP pt is medically stable for discharge. SW discussed discharge timeframe and current accepting facilities with wife. Wife said she wants pt to go to [facility] and she said after talking with admissions yesterday thought plans were in place. SW explained to wife that [facility], as of call this morning, has not accepted for admission due to dialysis company clearance. Explained that if not accepted an alternate facility would need to be chosen. Wife said she was going to call [facility]. SW offered support and assistance to wife. Will follow. CM contacted family/caregiver?: Yes    Contacts  Patient Contacts: Shonna Roberson  (wife)  Relationship to Patient[de-identified] Family  Contact Method: Phone  Phone Number: [phone]  Reason/Outcome: Discharge Planning    Other Referral/Resources/Interventions Provided:  Interventions: Short Term Rehab  Referral Comments: Spoke with [facility] admissions earlier today and faxed requested clinical.  Waiting for call back with admission decision. Treatment Team Recommendation: Short Term Rehab  Discharge Destination Plan[de-identified] Short Term Rehab  Transport at Discharge :  (pending)    IMM Given (Date):: 09/29/23  IMM Given to[de-identified] Family (IMM #2 reviewed with wife. Wife verbalized understanding.   Copy will be placed in pt's room for wife.   Copy also placed in scan bin for chart.)

## 2023-09-29 NOTE — ASSESSMENT & PLAN NOTE
Improving from prior admission  · Platelet count 822  · Stable from baseline  · Monitor while on heparin subcu

## 2023-09-30 ENCOUNTER — APPOINTMENT (INPATIENT)
Dept: DIALYSIS | Facility: HOSPITAL | Age: 68
DRG: 947 | End: 2023-09-30
Attending: INTERNAL MEDICINE
Payer: MEDICARE

## 2023-09-30 LAB
ANION GAP SERPL CALCULATED.3IONS-SCNC: 10 MMOL/L
BUN SERPL-MCNC: 16 MG/DL (ref 5–25)
CALCIUM SERPL-MCNC: 8.2 MG/DL (ref 8.4–10.2)
CHLORIDE SERPL-SCNC: 100 MMOL/L (ref 96–108)
CO2 SERPL-SCNC: 25 MMOL/L (ref 21–32)
CREAT SERPL-MCNC: 5.38 MG/DL (ref 0.6–1.3)
ERYTHROCYTE [DISTWIDTH] IN BLOOD BY AUTOMATED COUNT: 16.6 % (ref 11.6–15.1)
GFR SERPL CREATININE-BSD FRML MDRD: 9 ML/MIN/1.73SQ M
GLUCOSE SERPL-MCNC: 66 MG/DL (ref 65–140)
HCT VFR BLD AUTO: 26 % (ref 36.5–49.3)
HGB BLD-MCNC: 8.1 G/DL (ref 12–17)
MCH RBC QN AUTO: 30.9 PG (ref 26.8–34.3)
MCHC RBC AUTO-ENTMCNC: 31.2 G/DL (ref 31.4–37.4)
MCV RBC AUTO: 99 FL (ref 82–98)
PHOSPHATE SERPL-MCNC: 3.1 MG/DL (ref 2.3–4.1)
PLATELET # BLD AUTO: 103 THOUSANDS/UL (ref 149–390)
PMV BLD AUTO: 9.8 FL (ref 8.9–12.7)
POTASSIUM SERPL-SCNC: 4.3 MMOL/L (ref 3.5–5.3)
RBC # BLD AUTO: 2.62 MILLION/UL (ref 3.88–5.62)
SODIUM SERPL-SCNC: 135 MMOL/L (ref 135–147)
WBC # BLD AUTO: 3.11 THOUSAND/UL (ref 4.31–10.16)

## 2023-09-30 PROCEDURE — 99232 SBSQ HOSP IP/OBS MODERATE 35: CPT | Performed by: STUDENT IN AN ORGANIZED HEALTH CARE EDUCATION/TRAINING PROGRAM

## 2023-09-30 PROCEDURE — 99232 SBSQ HOSP IP/OBS MODERATE 35: CPT | Performed by: INTERNAL MEDICINE

## 2023-09-30 PROCEDURE — 80048 BASIC METABOLIC PNL TOTAL CA: CPT | Performed by: INTERNAL MEDICINE

## 2023-09-30 PROCEDURE — 84100 ASSAY OF PHOSPHORUS: CPT | Performed by: INTERNAL MEDICINE

## 2023-09-30 PROCEDURE — 85027 COMPLETE CBC AUTOMATED: CPT

## 2023-09-30 RX ADMIN — OXYCODONE HYDROCHLORIDE AND ACETAMINOPHEN 500 MG: 500 TABLET ORAL at 17:18

## 2023-09-30 RX ADMIN — HEPARIN SODIUM 5000 UNITS: 5000 INJECTION INTRAVENOUS; SUBCUTANEOUS at 17:18

## 2023-09-30 RX ADMIN — DOCUSATE SODIUM 100 MG: 100 CAPSULE, LIQUID FILLED ORAL at 08:24

## 2023-09-30 RX ADMIN — CLOPIDOGREL BISULFATE 75 MG: 75 TABLET ORAL at 08:23

## 2023-09-30 RX ADMIN — ATORVASTATIN CALCIUM 20 MG: 20 TABLET, FILM COATED ORAL at 21:01

## 2023-09-30 RX ADMIN — MIDODRINE HYDROCHLORIDE 10 MG: 5 TABLET ORAL at 11:13

## 2023-09-30 RX ADMIN — ASPIRIN 81 MG: 81 TABLET, COATED ORAL at 08:24

## 2023-09-30 RX ADMIN — ACETAMINOPHEN 650 MG: 325 TABLET ORAL at 21:00

## 2023-09-30 RX ADMIN — CYANOCOBALAMIN TAB 500 MCG 1000 MCG: 500 TAB at 08:23

## 2023-09-30 RX ADMIN — MIDODRINE HYDROCHLORIDE 10 MG: 5 TABLET ORAL at 15:51

## 2023-09-30 RX ADMIN — HEPARIN SODIUM 5000 UNITS: 5000 INJECTION INTRAVENOUS; SUBCUTANEOUS at 01:16

## 2023-09-30 RX ADMIN — GABAPENTIN 100 MG: 100 CAPSULE ORAL at 08:23

## 2023-09-30 RX ADMIN — HEPARIN SODIUM 5000 UNITS: 5000 INJECTION INTRAVENOUS; SUBCUTANEOUS at 11:13

## 2023-09-30 RX ADMIN — Medication 2.5 MG: at 18:20

## 2023-09-30 RX ADMIN — NICOTINE 1 PATCH: 21 PATCH, EXTENDED RELEASE TRANSDERMAL at 08:24

## 2023-09-30 RX ADMIN — MIDODRINE HYDROCHLORIDE 10 MG: 5 TABLET ORAL at 08:23

## 2023-09-30 RX ADMIN — DOCUSATE SODIUM 100 MG: 100 CAPSULE, LIQUID FILLED ORAL at 17:18

## 2023-09-30 RX ADMIN — HYDROXYZINE HYDROCHLORIDE 25 MG: 25 TABLET ORAL at 21:00

## 2023-09-30 RX ADMIN — Medication 2000 UNITS: at 08:23

## 2023-09-30 NOTE — PLAN OF CARE
Serum potassium 4.3 on 3K2. 5CA bath. Will attempt for UF-1 kg as tolerated  Problem: METABOLIC, FLUID AND ELECTROLYTES - ADULT  Goal: Electrolytes maintained within normal limits  Description: INTERVENTIONS:  - Monitor labs and assess patient for signs and symptoms of electrolyte imbalances  - Administer electrolyte replacement as ordered  - Monitor response to electrolyte replacements, including repeat lab results as appropriate  - Instruct patient on fluid and nutrition as appropriate  Outcome: Progressing  Goal: Fluid balance maintained  Description: INTERVENTIONS:  - Monitor labs   - Monitor I/O and WT  - Instruct patient on fluid and nutrition as appropriate  - Assess for signs & symptoms of volume excess or deficit  Outcome: Progressing   Post-Dialysis RN Treatment Note    Blood Pressure:  Pre 110/60 mm/Hg  Post 108/68 mmHg   EDW  62 kg    Weight:  Pre 62.7 kg   Post 61.7 kg   Mode of weight measurement: Bed Scale   Volume Removed  1000 ml    Treatment duration 210 minutes    NS given  No    Treatment shortened?  No   Medications given during Rx midodrine 10 mg   Estimated Kt/V  None Reported   Access type: Permacath/TDC   Access Issues: Yes, needed to reverse line due to arterial pressure   Report called to primary nurse   Yes Suman Barclay RN

## 2023-09-30 NOTE — ASSESSMENT & PLAN NOTE
On Hemodialysis TTS  · Missed session on Saturday and HD 9/30/23  · Access right upper chest dialysis catheter  · Not taking calcitriol currently at home  · Daily weight, intake output  · Renal diet  · Nephrology following, appreciate recommendations

## 2023-09-30 NOTE — PROGRESS NOTES
INTERNAL MEDICINE PROGRESS NOTE     Name: Dae Umana   Age & Sex: 70 y.o. male   MRN: [id number]  Unit/Bed#: [address] [place] [address]   Encounter: [id number]  Hospital Stay Days: 4      ASSESSMENT/PLAN     Principal Problem:    Generalized weakness  Active Problems:    Anemia of chronic disease    Coronary artery disease     ESRD (end stage renal disease) on dialysis (HCC)    Nicotine dependence    Type 2 diabetes mellitus    Thrombocytopenia     Chronic deep vein thrombosis (DVT) of internal jugular vein (HCC)    Hypotension    Ambulatory dysfunction    History of CVA (cerebrovascular accident)    Open wound    Chronic pain    Nausea & vomiting      Nausea & vomiting  Assessment & Plan  Nausea vomiting, threw up mucous since Saturday. Poor appetite for a while per wife. · Resolved  · Tolerating a diet and has moved bowel per pt    Chronic pain  Assessment & Plan  Chronic pain left upper extremity due to issues with AV fistula in the past.  · Continue low-dose oxycodone as needed and gabapentin    Open wound  Assessment & Plan  Superficial sacral wound on exam.  No evidence of infection. · Reports pain at site. · Consult wound care  · Offloading    History of CVA (cerebrovascular accident)  Assessment & Plan  · With resultant right leg weakness. · Continue aspirin Plavix Lipitor    Ambulatory dysfunction  Assessment & Plan  Uses walker for over a year. · History of CVA with right lower extremity weakness. · Reports feeling dizzy when getting up. · Check orthostatic vital signs   · PT/OT  · Plan for rehab placement  · CM following    Hypotension  Assessment & Plan  Takes midodrine 10 mg p.o. twice daily at home instead of 3 times daily.   · Continue midodrine 10 mg p.o. 3 times daily  · Check orthostatic vital signs    Chronic deep vein thrombosis (DVT) of internal jugular vein (HCC)  Assessment & Plan  · Continue Aspirin, Plavix  · Completed course of Eliquis 1/23 by primary provider    Thrombocytopenia Assessment & Plan  Improving from prior admission  · Stable from baseline  · Monitor while on heparin subcu    Type 2 diabetes mellitus  Assessment & Plan  Lab Results   Component Value Date    HGBA1C 4.8 01/05/2023       Recent Labs     09/27/23 1857 09/27/23 2030   POCGLU 62* 119       Blood Sugar Average: Last 72 hrs:  · Diet controlled. · Update A1c    Nicotine dependence  Assessment & Plan  · NRT patch, smoking cessation    ESRD (end stage renal disease) on dialysis [facility])  Assessment & Plan  On Hemodialysis TTS  · Missed session on Saturday and HD today  · Access right upper chest dialysis catheter  · Not taking calcitriol currently at home  · Daily weight, intake output  · Renal diet  · Nephrology following, appreciate recommendations, HD yesterday    Coronary artery disease   Assessment & Plan  s/p PCI with stents x3. On Aspirin Plavix Lipitor at home  · Asymptomatic    Anemia of chronic disease  Assessment & Plan  Baseline hemoglobin appears to be around 8-9s  · Patient currently still at baseline but hemoglobin has been drifting down  · Monitor CBC daily  · No signs of overt bleeding    * Generalized weakness  Assessment & Plan  Patient presents with worsening generalized weakness, dizziness when getting up, poor appetite, nausea vomiting. Denies SOB, fever. Patient is on hemodialysis Tuesday Thursday Saturday for ESRD. Missed dialysis on Saturday due to weakness and was supposed to go to dialysis today but could not make it due to weakness. · Of note, patient was hospitalized between 9/12-9/14 for hypotension following dialysis. Noted to be bradycardic on EKG and telemetry. Patient was seen by cardiology, recommended close outpatient follow-up and consider PPM if persistent bradycardia. · TSH/vitamin D-pending  · WBC normal, no bands, patient afebrile. On room air, satting well. · Patient does not make urine. · EKG sinus rhythm with PAC, right bundle branch block, rate 63.    · Denies chest pain  · Reports chronic pain in left upper extremity and having pain in sacrum due to open wound. · Suspect due to physical deconditioning  · Check orthostatic vital signs  · nephrology for dialysis   · PT/ OT  · Fall precautions  · Plan is for transfer to rehab         VTE Pharmacologic Prophylaxis:   Pharmacologic: Heparin  Mechanical VTE Prophylaxis in Place: Yes    Patient Centered Rounds: I have performed bedside rounds with nursing staff today. Discussions with Specialists or Other Care Team Provider: Appreciate nephrology recommendations    Education and Discussions with Family / Patient: Discussed with patient at bedside    Time Spent for Care: 30 minutes. More than 50% of total time spent on counseling and coordination of care as described above. Current Length of Stay: 4 day(s)    Current Patient Status: Inpatient   Certification Statement: The patient will continue to require additional inpatient hospital stay due to Rehab placement pending    Discharge Plan / Estimated Discharge Date: 48 hrs. Code Status: Level 1 - Full Code    Subjective:     Patient seen and examined at bedside this morning, patient notes weakness after HD yesterday, we discussed continuing with Ensure, increased nutrition, physical activity as tolerated. Addendum; discussed with patient's wife, we will meet tomorrow at 6 AM, discussed how patient is doing after HD today, discharge afterwards to rehab facility. Objective:   Vitals:   Temp (24hrs), Av.7 °F (36.5 °C), Min:97.4 °F (36.3 °C), Max:98 °F (36.7 °C)    Temp:  [97.4 °F (36.3 °C)-98 °F (36.7 °C)] 98 °F (36.7 °C)  HR:  [50-62] 62  Resp:  [18] 18  BP: (115-120)/(55-62) 120/62  SpO2:  [96 %-100 %] 100 %  Body mass index is 20.62 kg/m². Input and Output Summary (last 24 hours):      Intake/Output Summary (Last 24 hours) at 2023 0956  Last data filed at 2023 0201  Gross per 24 hour   Intake 245 ml   Output --   Net 245 ml     Physical Exam: Physical Exam  Constitutional:       General: He is not in acute distress. Appearance: He is ill-appearing. HENT:      Head: Normocephalic and atraumatic. Eyes:      General: No scleral icterus. Conjunctiva/sclera: Conjunctivae normal.   Cardiovascular:      Rate and Rhythm: Normal rate and regular rhythm. Pulses: Normal pulses. Heart sounds: No murmur heard. Pulmonary:      Effort: Pulmonary effort is normal. No respiratory distress. Breath sounds: Normal breath sounds. No wheezing. Comments: HD catheter shows no signs of infection  Abdominal:      General: Bowel sounds are normal. There is no distension. Tenderness: There is no abdominal tenderness. Musculoskeletal:         General: No swelling. Normal range of motion. Skin:     General: Skin is warm and dry. Capillary Refill: Capillary refill takes less than 2 seconds. Coloration: Skin is not jaundiced. Neurological:      General: No focal deficit present. Mental Status: He is alert and oriented to person, place, and time. Mental status is at baseline.    Psychiatric:         Mood and Affect: Mood normal.         Behavior: Behavior normal.         Additional Data:   Basic Laboratory Review:   Results from last 7 days   Lab Units 09/30/23 0456 09/29/23 0627 09/28/23  1420 09/27/23  0634   SODIUM mmol/L 135 134* 137 137   POTASSIUM mmol/L 4.3 4.0 4.2 4.0   CHLORIDE mmol/L 100 100 99 100   CO2 mmol/L 25 23 28 26   BUN mg/dL 16 13 27* 20   CREATININE mg/dL 5.38* 4.29* 7.20* 5.74*   GLUCOSE RANDOM mg/dL 66 63* 93 55*   CALCIUM mg/dL 8.2* 8.5 8.7 8.5   ALBUMIN g/dL  --  2.9* 3.0* 2.9*   ALK PHOS U/L  --  70 75 70   ALT U/L  --  3* 4* 4*   AST U/L  --  14 12* 12*   EGFR ml/min/1.73sq m 9 13 7 9       Results from last 7 days   Lab Units 09/30/23 0456 09/29/23 0627 09/28/23  1420 09/25/23  1902   WBC Thousand/uL 3.11* 3.56* 7.96 4.76   HEMOGLOBIN g/dL 8.1* 9.0* 9.6* 9.7*   HEMATOCRIT % 26.0* 28.1* 29.0* 30.4*   PLATELETS Thousands/uL 103* 99* 130* 127*   NEUTROS PCT %  --  72 84* 79*   LYMPHS PCT %  --  18 9* 14   MONOS PCT %  --  7 5 4   EOS PCT %  --  2 1 1   BASOS PCT %  --  1 1 2*   NEUTROS ABS Thousands/µL  --  2.54 6.70 3.80   LYMPHS ABS Thousands/µL  --  0.63 0.73 0.66   MONOS ABS Thousand/µL  --  0.26 0.37 0.18   EOS ABS Thousand/µL  --  0.07 0.04 0.04   BASOS ABS Thousands/µL  --  0.05 0.09 0.07               Additional Labs:  Results from last 7 days   Lab Units 09/30/23  0456   PHOSPHORUS mg/dL 3.1          Results from last 7 days   Lab Units 09/25/23  1902   TSH 3RD GENERATON uIU/mL 2.992       Recent Cultures (last 7 days):         * I Have Reviewed All Lab Data Listed Above. * Additional Pertinent Lab Tests Reviewed:  All Labs Within Last 24 Hours Reviewed    Imaging:  Prior imaging studies and reports reviewed    Last 24 Hours Medication List:   Current Facility-Administered Medications   Medication Dose Route Frequency Provider Last Rate   • acetaminophen  650 mg Oral Q6H PRN TESSY Nolasco     • ascorbic acid  500 mg Oral After Dinner TESSY Bowling     • aspirin  81 mg Oral Daily TESSY Nolasco     • atorvastatin  20 mg Oral HS TESSY Nolasco     • cholecalciferol  2,000 Units Oral Daily TESSY Nolasco     • clopidogrel  75 mg Oral Daily TESSY Nolasco     • vitamin B-12  1,000 mcg Oral Daily TESSY Nolasco     • docusate sodium  100 mg Oral BID TESSY Nolasco     • gabapentin  100 mg Oral Once per day on Tue Thu Sat TESSY Nolasco     • heparin (porcine)  5,000 Units Subcutaneous Q8H [facility] TESSY Nolasco     • hydrOXYzine HCL  25 mg Oral Q8H PRN TESSY Nolasco     • midodrine  10 mg Oral TID AC TESSY Nolasco     • nicotine  1 patch Transdermal Daily TESSY Nolasco     • ondansetron  4 mg Intravenous Q6H PRN TESSY Nolasco     • oxyCODONE  2.5 mg Oral Q8H PRN TESSY Nolasco       Today, Patient Was Seen By: Sabrina Batista DO

## 2023-09-30 NOTE — ASSESSMENT & PLAN NOTE
Baseline hemoglobin appears to be around 8-9s  · Repeat on day of discharge appropriate/stable  · No signs of overt bleeding

## 2023-09-30 NOTE — PLAN OF CARE
Problem: MOBILITY - ADULT  Goal: Maintain or return to baseline ADL function  Description: INTERVENTIONS:  -  Assess patient's ability to carry out ADLs; assess patient's baseline for ADL function and identify physical deficits which impact ability to perform ADLs (bathing, care of mouth/teeth, toileting, grooming, dressing, etc.)  - Assess/evaluate cause of self-care deficits   - Assess range of motion  - Assess patient's mobility; develop plan if impaired  - Assess patient's need for assistive devices and provide as appropriate  - Encourage maximum independence but intervene and supervise when necessary  - Involve family in performance of ADLs  - Assess for home care needs following discharge   - Consider OT consult to assist with ADL evaluation and planning for discharge  - Provide patient education as appropriate  Outcome: Progressing  Goal: Maintains/Returns to pre admission functional level  Description: INTERVENTIONS:  - Perform BMAT or MOVE assessment daily.   - Set and communicate daily mobility goal to care team and patient/family/caregiver. - Collaborate with rehabilitation services on mobility goals if consulted  - Perform Range of Motion 3 times a day. - Reposition patient every 2 hours.   - Dangle patient 3 times a day  - Stand patient 3 times a day  - Ambulate patient 3 times a day  - Out of bed to chair 3 times a day   - Out of bed for meals 3 times a day  - Out of bed for toileting  - Record patient progress and toleration of activity level   Outcome: Progressing     Problem: PAIN - ADULT  Goal: Verbalizes/displays adequate comfort level or baseline comfort level  Description: Interventions:  - Encourage patient to monitor pain and request assistance  - Assess pain using appropriate pain scale  - Administer analgesics based on type and severity of pain and evaluate response  - Implement non-pharmacological measures as appropriate and evaluate response  - Consider cultural and social influences on pain and pain management  - Notify physician/advanced practitioner if interventions unsuccessful or patient reports new pain  Outcome: Progressing     Problem: INFECTION - ADULT  Goal: Absence or prevention of progression during hospitalization  Description: INTERVENTIONS:  - Assess and monitor for signs and symptoms of infection  - Monitor lab/diagnostic results  - Monitor all insertion sites, i.e. indwelling lines, tubes, and drains  - [place] appropriate cooling/warming therapies per order  - Administer medications as ordered  - Instruct and encourage patient and family to use good hand hygiene technique  - Identify and instruct in appropriate isolation precautions for identified infection/condition  Outcome: Progressing     Problem: SAFETY ADULT  Goal: Maintain or return to baseline ADL function  Description: INTERVENTIONS:  -  Assess patient's ability to carry out ADLs; assess patient's baseline for ADL function and identify physical deficits which impact ability to perform ADLs (bathing, care of mouth/teeth, toileting, grooming, dressing, etc.)  - Assess/evaluate cause of self-care deficits   - Assess range of motion  - Assess patient's mobility; develop plan if impaired  - Assess patient's need for assistive devices and provide as appropriate  - Encourage maximum independence but intervene and supervise when necessary  - Involve family in performance of ADLs  - Assess for home care needs following discharge   - Consider OT consult to assist with ADL evaluation and planning for discharge  - Provide patient education as appropriate  Outcome: Progressing  Goal: Maintains/Returns to pre admission functional level  Description: INTERVENTIONS:  - Perform BMAT or MOVE assessment daily.   - Set and communicate daily mobility goal to care team and patient/family/caregiver. - Collaborate with rehabilitation services on mobility goals if consulted  - Perform Range of Motion 3 times a day.   - Reposition patient every 2 hours.   - Dangle patient 3 times a day  - Stand patient 3 times a day  - Ambulate patient 3 times a day  - Out of bed to chair 3 times a day   - Out of bed for meals 3 times a day  - Out of bed for toileting  - Record patient progress and toleration of activity level   Outcome: Progressing  Goal: Patient will remain free of falls  Description: INTERVENTIONS:  - Educate patient/family on patient safety including physical limitations  - Instruct patient to call for assistance with activity   - Consult OT/PT to assist with strengthening/mobility   - Keep Call bell within reach  - Keep bed low and locked with side rails adjusted as appropriate  - Keep care items and personal belongings within reach  - Initiate and maintain comfort rounds  - Make Fall Risk Sign visible to staff  - Offer Toileting every 2 Hours, in advance of need  - Initiate/Maintain bed alarm  - Obtain necessary fall risk management equipment: bed alarm, yellow socks   - Apply yellow socks and bracelet for high fall risk patients  - Consider moving patient to room near nurses station  Outcome: Progressing     Problem: DISCHARGE PLANNING  Goal: Discharge to home or other facility with appropriate resources  Description: INTERVENTIONS:  - Identify barriers to discharge w/patient and caregiver  - Arrange for needed discharge resources and transportation as appropriate  - Identify discharge learning needs (meds, wound care, etc.)  - Arrange for interpretive services to assist at discharge as needed  - Refer to Case Management Department for coordinating discharge planning if the patient needs post-hospital services based on physician/advanced practitioner order or complex needs related to functional status, cognitive ability, or social support system  Outcome: Progressing     Problem: Knowledge Deficit  Goal: Patient/family/caregiver demonstrates understanding of disease process, treatment plan, medications, and discharge instructions  Description: Complete learning assessment and assess knowledge base.   Interventions:  - Provide teaching at level of understanding  - Provide teaching via preferred learning methods  Outcome: Progressing     Problem: METABOLIC, FLUID AND ELECTROLYTES - ADULT  Goal: Electrolytes maintained within normal limits  Description: INTERVENTIONS:  - Monitor labs and assess patient for signs and symptoms of electrolyte imbalances  - Administer electrolyte replacement as ordered  - Monitor response to electrolyte replacements, including repeat lab results as appropriate  - Instruct patient on fluid and nutrition as appropriate  Outcome: Progressing  Goal: Fluid balance maintained  Description: INTERVENTIONS:  - Monitor labs   - Monitor I/O and WT  - Instruct patient on fluid and nutrition as appropriate  - Assess for signs & symptoms of volume excess or deficit  Outcome: Progressing     Problem: HEMATOLOGIC - ADULT  Goal: Maintains hematologic stability  Description: INTERVENTIONS  - Assess for signs and symptoms of bleeding or hemorrhage  - Monitor labs  - Administer supportive blood products/factors as ordered and appropriate  Outcome: Progressing     Problem: Prexisting or High Potential for Compromised Skin Integrity  Goal: Skin integrity is maintained or improved  Description: INTERVENTIONS:  - Identify patients at risk for skin breakdown  - Assess and monitor skin integrity  - Assess and monitor nutrition and hydration status  - Monitor labs   - Assess for incontinence   - Turn and reposition patient  - Assist with mobility/ambulation  - Relieve pressure over bony prominences  - Avoid friction and shearing  - Provide appropriate hygiene as needed including keeping skin clean and dry  - Evaluate need for skin moisturizer/barrier cream  - Collaborate with interdisciplinary team   - Patient/family teaching  - Consider wound care consult   Outcome: Progressing     Problem: Nutrition/Hydration-ADULT  Goal: Nutrient/Hydration intake appropriate for improving, restoring or maintaining nutritional needs  Description: Monitor and assess patient's nutrition/hydration status for malnutrition. Collaborate with interdisciplinary team and initiate plan and interventions as ordered. Monitor patient's weight and dietary intake as ordered or per policy. Utilize nutrition screening tool and intervene as necessary. Determine patient's food preferences and provide high-protein, high-caloric foods as appropriate.      INTERVENTIONS:  - Monitor oral intake, urinary output, labs, and treatment plans  - Assess nutrition and hydration status and recommend course of action  - Evaluate amount of meals eaten  - Assist patient with eating if necessary   - Allow adequate time for meals  - Recommend/ encourage appropriate diets, oral nutritional supplements, and vitamin/mineral supplements  - Order, calculate, and assess calorie counts as needed  - Assess need for intravenous fluids  - Provide specific nutrition/hydration education as appropriate  - Include patient/family/caregiver in decisions related to nutrition  Outcome: Progressing

## 2023-09-30 NOTE — PLAN OF CARE
Problem: MOBILITY - ADULT  Goal: Maintain or return to baseline ADL function  Description: INTERVENTIONS:  -  Assess patient's ability to carry out ADLs; assess patient's baseline for ADL function and identify physical deficits which impact ability to perform ADLs (bathing, care of mouth/teeth, toileting, grooming, dressing, etc.)  - Assess/evaluate cause of self-care deficits   - Assess range of motion  - Assess patient's mobility; develop plan if impaired  - Assess patient's need for assistive devices and provide as appropriate  - Encourage maximum independence but intervene and supervise when necessary  - Involve family in performance of ADLs  - Assess for home care needs following discharge   - Consider OT consult to assist with ADL evaluation and planning for discharge  - Provide patient education as appropriate  Outcome: Progressing  Goal: Maintains/Returns to pre admission functional level  Description: INTERVENTIONS:  - Perform BMAT or MOVE assessment daily.   - Set and communicate daily mobility goal to care team and patient/family/caregiver. - Collaborate with rehabilitation services on mobility goals if consulted  - Perform Range of Motion 3 times a day. - Reposition patient every 2 hours.   - Dangle patient 3 times a day  - Stand patient 3 times a day  - Ambulate patient 3 times a day  - Out of bed to chair 3 times a day   - Out of bed for meals 3 times a day  - Out of bed for toileting  - Record patient progress and toleration of activity level   Outcome: Progressing     Problem: PAIN - ADULT  Goal: Verbalizes/displays adequate comfort level or baseline comfort level  Description: Interventions:  - Encourage patient to monitor pain and request assistance  - Assess pain using appropriate pain scale  - Administer analgesics based on type and severity of pain and evaluate response  - Implement non-pharmacological measures as appropriate and evaluate response  - Consider cultural and social influences on pain and pain management  - Notify physician/advanced practitioner if interventions unsuccessful or patient reports new pain  Outcome: Progressing     Problem: INFECTION - ADULT  Goal: Absence or prevention of progression during hospitalization  Description: INTERVENTIONS:  - Assess and monitor for signs and symptoms of infection  - Monitor lab/diagnostic results  - Monitor all insertion sites, i.e. indwelling lines, tubes, and drains  - [place] appropriate cooling/warming therapies per order  - Administer medications as ordered  - Instruct and encourage patient and family to use good hand hygiene technique  - Identify and instruct in appropriate isolation precautions for identified infection/condition  Outcome: Progressing     Problem: SAFETY ADULT  Goal: Maintain or return to baseline ADL function  Description: INTERVENTIONS:  -  Assess patient's ability to carry out ADLs; assess patient's baseline for ADL function and identify physical deficits which impact ability to perform ADLs (bathing, care of mouth/teeth, toileting, grooming, dressing, etc.)  - Assess/evaluate cause of self-care deficits   - Assess range of motion  - Assess patient's mobility; develop plan if impaired  - Assess patient's need for assistive devices and provide as appropriate  - Encourage maximum independence but intervene and supervise when necessary  - Involve family in performance of ADLs  - Assess for home care needs following discharge   - Consider OT consult to assist with ADL evaluation and planning for discharge  - Provide patient education as appropriate  Outcome: Progressing  Goal: Maintains/Returns to pre admission functional level  Description: INTERVENTIONS:  - Perform BMAT or MOVE assessment daily.   - Set and communicate daily mobility goal to care team and patient/family/caregiver. - Collaborate with rehabilitation services on mobility goals if consulted  - Perform Range of Motion 3 times a day.   - Reposition patient every 2 hours.   - Dangle patient 3 times a day  - Stand patient 3 times a day  - Ambulate patient 3 times a day  - Out of bed to chair 3 times a day   - Out of bed for meals 3 times a day  - Out of bed for toileting  - Record patient progress and toleration of activity level   Outcome: Progressing  Goal: Patient will remain free of falls  Description: INTERVENTIONS:  - Educate patient/family on patient safety including physical limitations  - Instruct patient to call for assistance with activity   - Consult OT/PT to assist with strengthening/mobility   - Keep Call bell within reach  - Keep bed low and locked with side rails adjusted as appropriate  - Keep care items and personal belongings within reach  - Initiate and maintain comfort rounds  - Make Fall Risk Sign visible to staff  - Offer Toileting every 2 Hours, in advance of need  - Initiate/Maintain bed alarm  - Obtain necessary fall risk management equipment: bracelet/socks  - Apply yellow socks and bracelet for high fall risk patients  - Consider moving patient to room near nurses station  Outcome: Progressing     Problem: DISCHARGE PLANNING  Goal: Discharge to home or other facility with appropriate resources  Description: INTERVENTIONS:  - Identify barriers to discharge w/patient and caregiver  - Arrange for needed discharge resources and transportation as appropriate  - Identify discharge learning needs (meds, wound care, etc.)  - Arrange for interpretive services to assist at discharge as needed  - Refer to Case Management Department for coordinating discharge planning if the patient needs post-hospital services based on physician/advanced practitioner order or complex needs related to functional status, cognitive ability, or social support system  Outcome: Progressing     Problem: Knowledge Deficit  Goal: Patient/family/caregiver demonstrates understanding of disease process, treatment plan, medications, and discharge instructions  Description: Complete learning assessment and assess knowledge base.   Interventions:  - Provide teaching at level of understanding  - Provide teaching via preferred learning methods  Outcome: Progressing     Problem: METABOLIC, FLUID AND ELECTROLYTES - ADULT  Goal: Electrolytes maintained within normal limits  Description: INTERVENTIONS:  - Monitor labs and assess patient for signs and symptoms of electrolyte imbalances  - Administer electrolyte replacement as ordered  - Monitor response to electrolyte replacements, including repeat lab results as appropriate  - Instruct patient on fluid and nutrition as appropriate  Outcome: Progressing  Goal: Fluid balance maintained  Description: INTERVENTIONS:  - Monitor labs   - Monitor I/O and WT  - Instruct patient on fluid and nutrition as appropriate  - Assess for signs & symptoms of volume excess or deficit  Outcome: Progressing     Problem: HEMATOLOGIC - ADULT  Goal: Maintains hematologic stability  Description: INTERVENTIONS  - Assess for signs and symptoms of bleeding or hemorrhage  - Monitor labs  - Administer supportive blood products/factors as ordered and appropriate  Outcome: Progressing     Problem: Prexisting or High Potential for Compromised Skin Integrity  Goal: Skin integrity is maintained or improved  Description: INTERVENTIONS:  - Identify patients at risk for skin breakdown  - Assess and monitor skin integrity  - Assess and monitor nutrition and hydration status  - Monitor labs   - Assess for incontinence   - Turn and reposition patient  - Assist with mobility/ambulation  - Relieve pressure over bony prominences  - Avoid friction and shearing  - Provide appropriate hygiene as needed including keeping skin clean and dry  - Evaluate need for skin moisturizer/barrier cream  - Collaborate with interdisciplinary team   - Patient/family teaching  - Consider wound care consult   Outcome: Progressing     Problem: Nutrition/Hydration-ADULT  Goal: Nutrient/Hydration intake appropriate for improving, restoring or maintaining nutritional needs  Description: Monitor and assess patient's nutrition/hydration status for malnutrition. Collaborate with interdisciplinary team and initiate plan and interventions as ordered. Monitor patient's weight and dietary intake as ordered or per policy. Utilize nutrition screening tool and intervene as necessary. Determine patient's food preferences and provide high-protein, high-caloric foods as appropriate.      INTERVENTIONS:  - Monitor oral intake, urinary output, labs, and treatment plans  - Assess nutrition and hydration status and recommend course of action  - Evaluate amount of meals eaten  - Assist patient with eating if necessary   - Allow adequate time for meals  - Recommend/ encourage appropriate diets, oral nutritional supplements, and vitamin/mineral supplements  - Order, calculate, and assess calorie counts as needed  - Assess need for intravenous fluids  - Provide specific nutrition/hydration education as appropriate  - Include patient/family/caregiver in decisions related to nutrition  Outcome: Progressing

## 2023-09-30 NOTE — PROGRESS NOTES
[facility] progress note  Shania Light 70 y.o. male MRN: [id number]  Unit/Bed#: [address] [place] [address] Encounter: [id number]    ASSESSMENT and PLAN:  1. ESRD on HD  - His schedule is TTS at [facility]  - He had missed his dialysis session last Saturday and we did his dialysis on Tuesday and has been on schedule since then  - His access is right chest wall permacath  - He will be on dialysis today    2. Blood pressure/volume  - He has history of hypotension and takes midodrine 10 mg 3 times daily  - Continue midodrine 10 mg 3 times daily with hold parameters for systolic blood pressure more than 140  - He is currently below his estimated dry weight and we will limit ultrafiltration on dialysis  - Last postdialysis weight was 62.3 kg, EDW 65 kg    3. Anemia of CKD  - Goal Hb 10-11, currently slightly below goal  - Hemoglobin is 8.1 today  - Is not on ALICE as outpatient  - We will continue to monitor hemoglobin while hospitalized  - Transfuse to keep hemoglobin more than 7    4. Electrolytes  - We will use 3K and 35 bicarbonate bath on dialysis today    5. CKD-MBD  - He is not on phosphate binders due to previous history of low serum phosphate level  - Current serum phosphorus level is 3.1, continue to observe off phosphate binders    6. Generalized weakness  - This has been suspected due to physical deconditioning  - Plans noted for transfer to rehab    Discussed with internal medicine team.  After discussion, we agreed that we will limit ultrafiltration on dialysis patient is under his dry weight and we also agreed to monitor hemoglobin because there was a drop today. Interval history:  Patient feels the same. Denies dyspnea. Denies leg swelling. Denies abdominal pain.     PAST MEDICAL HISTORY:  Past Medical History:   Diagnosis Date   • 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    With Cerebral Infarction:  Last Assessed:  October 20, 2016   • Chronic kidney disease 2012    on dialysis    • COVID 05/2022   • COVID-19 04/2022 4/2022-while in [place]   • Diabetes mellitus ([address])    • Dialysis patient [facility])     T/TH/Sat   • Difficulty walking    • GERD (gastroesophageal reflux disease)    • Hyperlipidemia    • Hypertension    • Labyrinthitis 09/10/2011   • Myocardial infarction [facility]) 2014    x3 others were in 2009 & 2010   • Sleep disturbances 09/20/2010   • Stroke [facility]) 2007    x1, RLE deficit- uses walker   • Type 2 diabetes, uncontrolled, with renal manifestation 05/03/2017       PAST SURGICAL HISTORY:  Past Surgical History:   Procedure Laterality Date   • AV FISTULA PLACEMENT     • AV FISTULA REPAIR Left 6/3/2023    Procedure: LEFT UPPER EXTREMITY A-V GRAFT EXPLANT, LEFT BRACHIAL ANGIOPATCH,  APPLICATION OF  VAC;  Surgeon: Reg Rider MD;  [facility];  Service: Vascular   • CARDIAC CATHETERIZATION Left 02/03/2023    Procedure: Cardiac Left Heart Cath;  Surgeon: Mira Chavez MD;  Location: [address] [place] [address] CATH LAB; Service: Cardiology   • CARDIAC CATHETERIZATION N/A 02/08/2023    Procedure: Cardiac catheterization with complex PCI with Dr. Sukumar Palacios, patient is a renal dialysis patient;  Surgeon: Nisha Paige MD;  Location:  CARDIAC CATH LAB; Service: Cardiology   • CARDIAC SURGERY  2010    stents x3   • COLONOSCOPY N/A 12/12/2016    Procedure: COLONOSCOPY;  Surgeon: Lis Kelley MD;  Location: [address] [place] [address] GI LAB; Service:    • COLONOSCOPY N/A 04/03/2017    Procedure:  COLONOSCOPY;  Surgeon: Lis Kelley MD;  Location: [address] [place] [address] GI LAB;   Service:    • HARDWARE REMOVAL Right 04/04/2022    Procedure: REMOVAL HARDWARE HIP;  Surgeon: Rian Fernandez MD;  [facility];  Service: Orthopedics   • IR AV FISTULA/GRAFT DECLOT  04/29/2021   • IR AV FISTULA/GRAFT DECLOT  03/25/2022   • IR AV FISTULA/GRAFT DECLOT  09/21/2022   • IR AV FISTULAGRAM/GRAFTOGRAM  03/28/2022   • IR AV FISTULAGRAM/GRAFTOGRAM  07/28/2022   • IR TUNNELED CENTRAL LINE REMOVAL  12/07/2021   • IR TUNNELED DIALYSIS CATHETER PLACEMENT 05/24/2021   • PORTACATH PLACEMENT      per pt "port in chest"   • NH ARTERIOVENOUS ANASTOMOSIS OPEN DIRECT Left 07/22/2021    Procedure: CREATION FISTULA ARTERIOVENOUS (AV); Surgeon: Dar Hsu MD;  Location: [facility];  Service: Vascular   • NH ARTERIOVENOUS ANASTOMOSIS OPEN DIRECT Left 3/21/2023    Procedure: left brachiobasilic fistula,  AVG Graft;  Surgeon: Dc Lobato MD;  [facility];  Service: Vascular   • NH ARTERIOVENOUS ANASTOMOSIS OPEN DIRECT Left 5/19/2023    Procedure: CREATION of LEFT FISTULA ARTERIOVENOUS (AV) GRAFT;  Surgeon: Dc Lobato MD;  [facility];  Service: Vascular   • NH HEMIARTHROPLASTY HIP PARTIAL Right 04/04/2022    Procedure: HEMIARTHROPLASTY HIP (BIPOLAR);   Surgeon: Jose Liao MD;  [facility];  Service: Orthopedics       ALLERGIES:  No Known Allergies    SOCIAL HISTORY:  Social History     Substance and Sexual Activity   Alcohol Use Yes   • Alcohol/week: 1.0 standard drink of alcohol   • Types: 1 Shots of liquor per week    Comment: 1 shot daily     Social History     Substance and Sexual Activity   Drug Use Yes   • Types: Marijuana    Comment: gummies     Social History     Tobacco Use   Smoking Status Every Day   • Packs/day: 1.00   • Years: 30.00   • Total pack years: 30.00   • Types: Cigarettes   Smokeless Tobacco Never   Tobacco Comments    Currently not smoking - in facility       FAMILY HISTORY:  Family History   Problem Relation Age of Onset   • Leukemia Mother    • Crohn's disease Father    • Heart disease Father    • Transient ischemic attack Brother        MEDICATIONS:    Current Facility-Administered Medications:   •  acetaminophen (TYLENOL) tablet 650 mg, 650 mg, Oral, Q6H PRN, TESSY Nolasco  •  ascorbic acid (VITAMIN C) tablet 500 mg, 500 mg, Oral, After Dinner, TESSY Nolasco, 500 mg at 09/29/23 1755  •  aspirin (ECOTRIN LOW STRENGTH) EC tablet 81 mg, 81 mg, Oral, Daily, TESSY Nolasco, 81 mg at 09/30/23 0824  •  atorvastatin (LIPITOR) tablet 20 mg, 20 mg, Oral, HS, TESSY Nolasco, 20 mg at 09/29/23 2117  •  cholecalciferol (VITAMIN D3) tablet 2,000 Units, 2,000 Units, Oral, Daily, TESSY Nolasco, 2,000 Units at 09/30/23 4534  •  clopidogrel (PLAVIX) tablet 75 mg, 75 mg, Oral, Daily, TESSY Nolasco, 75 mg at 09/30/23 9461  •  cyanocobalamin (VITAMIN B-12) tablet 1,000 mcg, 1,000 mcg, Oral, Daily, TESSY Nolasco, 1,000 mcg at 09/30/23 1473  •  docusate sodium (COLACE) capsule 100 mg, 100 mg, Oral, BID, TESSY Nolasco, 100 mg at 09/30/23 1896  •  gabapentin (NEURONTIN) capsule 100 mg, 100 mg, Oral, Once per day on Tue Thu Sat, TESSY Nolasco, 100 mg at 09/30/23 3610  •  heparin (porcine) subcutaneous injection 5,000 Units, 5,000 Units, Subcutaneous, Q8H [facility], TESSY Nolasco, 5,000 Units at 09/30/23 0116  •  hydrOXYzine HCL (ATARAX) tablet 25 mg, 25 mg, Oral, Q8H PRN, TESSY Nolasco, 25 mg at 09/27/23 2226  •  midodrine (PROAMATINE) tablet 10 mg, 10 mg, Oral, TID AC, TESSY Nolasco, 10 mg at 09/30/23 7308  •  nicotine (NICODERM CQ) 21 mg/24 hr TD 24 hr patch 1 patch, 1 patch, Transdermal, Daily, TESSY Nolasco, 1 patch at 09/30/23 0824  •  ondansetron (ZOFRAN) injection 4 mg, 4 mg, Intravenous, Q6H PRN, TESSY Nolasco  •  oxyCODONE (ROXICODONE) split tablet 2.5 mg, 2.5 mg, Oral, Q8H PRN, GOOD NolascoSERINA, 2.5 mg at 09/29/23 2117    REVIEW OF SYSTEMS:  Review of Systems   Constitutional: Positive for fatigue. Negative for chills and fever. HENT: Negative for ear pain and sore throat. Eyes: Negative for pain and visual disturbance. Respiratory: Negative for cough and shortness of breath. Cardiovascular: Negative for chest pain and palpitations. Gastrointestinal: Negative for abdominal pain and vomiting. Genitourinary: Negative for dysuria and hematuria. Musculoskeletal: Negative for arthralgias and back pain.    Skin: Negative for color change and rash.   Neurological: Negative for seizures and syncope. All other systems reviewed and are negative. PHYSICAL EXAM:  Current Weight: Weight - Scale: 65.2 kg (143 lb 11.8 oz)  First Weight: Weight - Scale: 65 kg (143 lb 3.2 oz)  Vitals:    09/29/23 1805 09/29/23 2126 09/30/23 0600 09/30/23 0821   BP: 115/57 116/55  120/62   BP Location:  Left arm     Pulse: 58 (!) 54  62   Resp: 18 18     Temp: (!) 97.4 °F (36.3 °C) 98 °F (36.7 °C)     TempSrc:  Oral     SpO2: 96% 98%  100%   Weight:   65.2 kg (143 lb 11.8 oz)    Height:           Intake/Output Summary (Last 24 hours) at 9/30/2023 1059  Last data filed at 9/30/2023 0201  Gross per 24 hour   Intake 245 ml   Output --   Net 245 ml     Physical Exam  Constitutional:       Appearance: Normal appearance. He is ill-appearing. HENT:      Head: Normocephalic and atraumatic. Mouth/Throat:      Mouth: Mucous membranes are moist.      Pharynx: Oropharynx is clear. Cardiovascular:      Rate and Rhythm: Normal rate and regular rhythm. Pulses: Normal pulses. Heart sounds: Normal heart sounds. Comments: Right chest wall permacath  Pulmonary:      Effort: Pulmonary effort is normal.      Breath sounds: Normal breath sounds. Abdominal:      General: Bowel sounds are normal.      Palpations: Abdomen is soft. Musculoskeletal:         General: Normal range of motion. Right lower leg: No edema. Left lower leg: No edema. Skin:     General: Skin is warm and dry. Neurological:      General: No focal deficit present. Mental Status: He is alert and oriented to person, place, and time. Mental status is at baseline. Psychiatric:         Mood and Affect: Mood normal.         Behavior: Behavior normal.         Thought Content:  Thought content normal.         Judgment: Judgment normal.       Lab Results:   Results from last 7 days   Lab Units 09/30/23  0456 09/29/23  0627 09/28/23  1420 09/27/23  0634   WBC Thousand/uL 3.11* 3.56* 7.96  -- HEMOGLOBIN g/dL 8.1* 9.0* 9.6*  --    HEMATOCRIT % 26.0* 28.1* 29.0*  --    PLATELETS Thousands/uL 103* 99* 130*  --    POTASSIUM mmol/L 4.3 4.0 4.2 4.0   CHLORIDE mmol/L 100 100 99 100   CO2 mmol/L 25 23 28 26   BUN mg/dL 16 13 27* 20   CREATININE mg/dL 5.38* 4.29* 7.20* 5.74*   CALCIUM mg/dL 8.2* 8.5 8.7 8.5   PHOSPHORUS mg/dL 3.1  --   --   --    ALK PHOS U/L  --  70 75 70   ALT U/L  --  3* 4* 4*   AST U/L  --  14 12* 12*     Portions of the record may have been created with voice recognition software. Occasional wrong word or "sound a like" substitutions may have occurred due to the inherent limitations of voice recognition software. Read the chart carefully and recognize, using context, where substitutions have occurred. If you have any questions, please contact the dictating provider.

## 2023-09-30 NOTE — ASSESSMENT & PLAN NOTE
Lab Results   Component Value Date    HGBA1C 4.8 01/05/2023       Recent Labs     09/27/23 1857 09/27/23 2030   POCGLU 62* 119       Blood Sugar Average: Last 72 hrs:  · Diet controlled

## 2023-10-01 VITALS
BODY MASS INDEX: 20.33 KG/M2 | HEART RATE: 73 BPM | SYSTOLIC BLOOD PRESSURE: 96 MMHG | WEIGHT: 142 LBS | RESPIRATION RATE: 18 BRPM | HEIGHT: 70 IN | OXYGEN SATURATION: 99 % | TEMPERATURE: 97.8 F | DIASTOLIC BLOOD PRESSURE: 74 MMHG

## 2023-10-01 PROBLEM — R11.2 NAUSEA & VOMITING: Status: RESOLVED | Noted: 2023-09-25 | Resolved: 2023-10-01

## 2023-10-01 LAB
ALBUMIN SERPL BCP-MCNC: 2.9 G/DL (ref 3.5–5)
ALP SERPL-CCNC: 64 U/L (ref 34–104)
ALT SERPL W P-5'-P-CCNC: 4 U/L (ref 7–52)
ANION GAP SERPL CALCULATED.3IONS-SCNC: 8 MMOL/L
AST SERPL W P-5'-P-CCNC: 15 U/L (ref 13–39)
BASOPHILS # BLD AUTO: 0.05 THOUSANDS/ÂΜL (ref 0–0.1)
BASOPHILS NFR BLD AUTO: 1 % (ref 0–1)
BILIRUB SERPL-MCNC: 0.61 MG/DL (ref 0.2–1)
BUN SERPL-MCNC: 7 MG/DL (ref 5–25)
CALCIUM ALBUM COR SERPL-MCNC: 9.4 MG/DL (ref 8.3–10.1)
CALCIUM SERPL-MCNC: 8.5 MG/DL (ref 8.4–10.2)
CHLORIDE SERPL-SCNC: 100 MMOL/L (ref 96–108)
CO2 SERPL-SCNC: 27 MMOL/L (ref 21–32)
CREAT SERPL-MCNC: 3.28 MG/DL (ref 0.6–1.3)
EOSINOPHIL # BLD AUTO: 0.09 THOUSAND/ÂΜL (ref 0–0.61)
EOSINOPHIL NFR BLD AUTO: 3 % (ref 0–6)
ERYTHROCYTE [DISTWIDTH] IN BLOOD BY AUTOMATED COUNT: 16.7 % (ref 11.6–15.1)
GFR SERPL CREATININE-BSD FRML MDRD: 18 ML/MIN/1.73SQ M
GLUCOSE SERPL-MCNC: 72 MG/DL (ref 65–140)
HCT VFR BLD AUTO: 29.7 % (ref 36.5–49.3)
HGB BLD-MCNC: 9.5 G/DL (ref 12–17)
IMM GRANULOCYTES # BLD AUTO: 0 THOUSAND/UL (ref 0–0.2)
IMM GRANULOCYTES NFR BLD AUTO: 0 % (ref 0–2)
LYMPHOCYTES # BLD AUTO: 1.28 THOUSANDS/ÂΜL (ref 0.6–4.47)
LYMPHOCYTES NFR BLD AUTO: 35 % (ref 14–44)
MCH RBC QN AUTO: 32.3 PG (ref 26.8–34.3)
MCHC RBC AUTO-ENTMCNC: 32 G/DL (ref 31.4–37.4)
MCV RBC AUTO: 101 FL (ref 82–98)
MONOCYTES # BLD AUTO: 0.46 THOUSAND/ÂΜL (ref 0.17–1.22)
MONOCYTES NFR BLD AUTO: 13 % (ref 4–12)
NEUTROPHILS # BLD AUTO: 1.76 THOUSANDS/ÂΜL (ref 1.85–7.62)
NEUTS SEG NFR BLD AUTO: 48 % (ref 43–75)
NRBC BLD AUTO-RTO: 0 /100 WBCS
PLATELET # BLD AUTO: 105 THOUSANDS/UL (ref 149–390)
PMV BLD AUTO: 10.3 FL (ref 8.9–12.7)
POTASSIUM SERPL-SCNC: 4.2 MMOL/L (ref 3.5–5.3)
PROT SERPL-MCNC: 5.9 G/DL (ref 6.4–8.4)
RBC # BLD AUTO: 2.94 MILLION/UL (ref 3.88–5.62)
SODIUM SERPL-SCNC: 135 MMOL/L (ref 135–147)
WBC # BLD AUTO: 3.64 THOUSAND/UL (ref 4.31–10.16)

## 2023-10-01 PROCEDURE — 85025 COMPLETE CBC W/AUTO DIFF WBC: CPT | Performed by: STUDENT IN AN ORGANIZED HEALTH CARE EDUCATION/TRAINING PROGRAM

## 2023-10-01 PROCEDURE — 99239 HOSP IP/OBS DSCHRG MGMT >30: CPT | Performed by: STUDENT IN AN ORGANIZED HEALTH CARE EDUCATION/TRAINING PROGRAM

## 2023-10-01 PROCEDURE — 80053 COMPREHEN METABOLIC PANEL: CPT | Performed by: STUDENT IN AN ORGANIZED HEALTH CARE EDUCATION/TRAINING PROGRAM

## 2023-10-01 RX ADMIN — Medication 2.5 MG: at 02:35

## 2023-10-01 RX ADMIN — DOCUSATE SODIUM 100 MG: 100 CAPSULE, LIQUID FILLED ORAL at 08:06

## 2023-10-01 RX ADMIN — CLOPIDOGREL BISULFATE 75 MG: 75 TABLET ORAL at 08:06

## 2023-10-01 RX ADMIN — MIDODRINE HYDROCHLORIDE 10 MG: 5 TABLET ORAL at 10:49

## 2023-10-01 RX ADMIN — ONDANSETRON 4 MG: 2 INJECTION INTRAMUSCULAR; INTRAVENOUS at 09:29

## 2023-10-01 RX ADMIN — Medication 2000 UNITS: at 08:06

## 2023-10-01 RX ADMIN — MIDODRINE HYDROCHLORIDE 10 MG: 5 TABLET ORAL at 06:39

## 2023-10-01 RX ADMIN — CYANOCOBALAMIN TAB 500 MCG 1000 MCG: 500 TAB at 08:07

## 2023-10-01 RX ADMIN — ASPIRIN 81 MG: 81 TABLET, COATED ORAL at 08:06

## 2023-10-01 RX ADMIN — HEPARIN SODIUM 5000 UNITS: 5000 INJECTION INTRAVENOUS; SUBCUTANEOUS at 02:35

## 2023-10-01 RX ADMIN — HEPARIN SODIUM 5000 UNITS: 5000 INJECTION INTRAVENOUS; SUBCUTANEOUS at 10:49

## 2023-10-01 NOTE — PLAN OF CARE
Problem: METABOLIC, FLUID AND ELECTROLYTES - ADULT  Goal: Electrolytes maintained within normal limits  Description: INTERVENTIONS:  - Monitor labs and assess patient for signs and symptoms of electrolyte imbalances  - Administer electrolyte replacement as ordered  - Monitor response to electrolyte replacements, including repeat lab results as appropriate  - Instruct patient on fluid and nutrition as appropriate  10/1/2023 1133 by Sade Zhang RN  Outcome: Adequate for Discharge  10/1/2023 1133 by Sade Zhang RN  Outcome: Progressing  10/1/2023 1122 by Sade Zhang RN  Outcome: Progressing     Problem: HEMATOLOGIC - ADULT  Goal: Maintains hematologic stability  Description: INTERVENTIONS  - Assess for signs and symptoms of bleeding or hemorrhage  - Monitor labs  - Administer supportive blood products/factors as ordered and appropriate  10/1/2023 1133 by Sade Zhang RN  Outcome: Adequate for Discharge     Problem: Nutrition/Hydration-ADULT  Goal: Nutrient/Hydration intake appropriate for improving, restoring or maintaining nutritional needs  Description: Monitor and assess patient's nutrition/hydration status for malnutrition. Collaborate with interdisciplinary team and initiate plan and interventions as ordered. Monitor patient's weight and dietary intake as ordered or per policy. Utilize nutrition screening tool and intervene as necessary. Determine patient's food preferences and provide high-protein, high-caloric foods as appropriate.      INTERVENTIONS:  - Monitor oral intake, urinary output, labs, and treatment plans  - Assess nutrition and hydration status and recommend course of action  - Evaluate amount of meals eaten  - Assist patient with eating if necessary   - Allow adequate time for meals  - Recommend/ encourage appropriate diets, oral nutritional supplements, and vitamin/mineral supplements  - Order, calculate, and assess calorie counts as needed  - Assess need for intravenous fluids  - Provide specific nutrition/hydration education as appropriate  - Include patient/family/caregiver in decisions related to nutrition  10/1/2023 1133 by Joey Mcdermott RN  Outcome: Adequate for Discharge  10/1/2023 1133 by Joey Mcdermott RN  Outcome: Progressing  10/1/2023 1122 by Joey Mcdermott RN  Outcome: Progressing

## 2023-10-01 NOTE — NURSING NOTE
Report called to [facility] nurse. IV and Masimo discontinue. Pt discharge with personal belonging.

## 2023-10-01 NOTE — PLAN OF CARE
Problem: INFECTION - ADULT  Goal: Absence or prevention of progression during hospitalization  Description: INTERVENTIONS:  - Assess and monitor for signs and symptoms of infection  - Monitor lab/diagnostic results  - Monitor all insertion sites, i.e. indwelling lines, tubes, and drains  - [place] appropriate cooling/warming therapies per order  - Administer medications as ordered  - Instruct and encourage patient and family to use good hand hygiene technique  - Identify and instruct in appropriate isolation precautions for identified infection/condition  10/1/2023 1133 by Nydia Lemus RN  Outcome: Progressing  10/1/2023 1122 by Nydia Lemus RN  Outcome: Progressing     Problem: HEMATOLOGIC - ADULT  Goal: Maintains hematologic stability  Description: INTERVENTIONS  - Assess for signs and symptoms of bleeding or hemorrhage  - Monitor labs  - Administer supportive blood products/factors as ordered and appropriate  10/1/2023 1133 by Nydia Lemus RN  Outcome: Progressing  10/1/2023 1122 by Nydia Lemus RN  Outcome: Progressing     Problem: Nutrition/Hydration-ADULT  Goal: Nutrient/Hydration intake appropriate for improving, restoring or maintaining nutritional needs  Description: Monitor and assess patient's nutrition/hydration status for malnutrition. Collaborate with interdisciplinary team and initiate plan and interventions as ordered. Monitor patient's weight and dietary intake as ordered or per policy. Utilize nutrition screening tool and intervene as necessary. Determine patient's food preferences and provide high-protein, high-caloric foods as appropriate.      INTERVENTIONS:  - Monitor oral intake, urinary output, labs, and treatment plans  - Assess nutrition and hydration status and recommend course of action  - Evaluate amount of meals eaten  - Assist patient with eating if necessary   - Allow adequate time for meals  - Recommend/ encourage appropriate diets, oral nutritional supplements, and vitamin/mineral supplements  - Order, calculate, and assess calorie counts as needed  - Assess need for intravenous fluids  - Provide specific nutrition/hydration education as appropriate  - Include patient/family/caregiver in decisions related to nutrition  10/1/2023 1133 by Leonides Geronimo RN  Outcome: Progressing  10/1/2023 1122 by Leonides Geronimo, RN  Outcome: Progressing

## 2023-10-01 NOTE — PLAN OF CARE
Problem: INFECTION - ADULT  Goal: Absence or prevention of progression during hospitalization  Description: INTERVENTIONS:  - Assess and monitor for signs and symptoms of infection  - Monitor lab/diagnostic results  - Monitor all insertion sites, i.e. indwelling lines, tubes, and drains  - [place] appropriate cooling/warming therapies per order  - Administer medications as ordered  - Instruct and encourage patient and family to use good hand hygiene technique  - Identify and instruct in appropriate isolation precautions for identified infection/condition  Outcome: Progressing     Problem: METABOLIC, FLUID AND ELECTROLYTES - ADULT  Goal: Electrolytes maintained within normal limits  Description: INTERVENTIONS:  - Monitor labs and assess patient for signs and symptoms of electrolyte imbalances  - Administer electrolyte replacement as ordered  - Monitor response to electrolyte replacements, including repeat lab results as appropriate  - Instruct patient on fluid and nutrition as appropriate  Outcome: Progressing     Problem: METABOLIC, FLUID AND ELECTROLYTES - ADULT  Goal: Fluid balance maintained  Description: INTERVENTIONS:  - Monitor labs   - Monitor I/O and WT  - Instruct patient on fluid and nutrition as appropriate  - Assess for signs & symptoms of volume excess or deficit  Outcome: Progressing

## 2023-10-01 NOTE — PLAN OF CARE
Problem: MOBILITY - ADULT  Goal: Maintain or return to baseline ADL function  Description: INTERVENTIONS:  -  Assess patient's ability to carry out ADLs; assess patient's baseline for ADL function and identify physical deficits which impact ability to perform ADLs (bathing, care of mouth/teeth, toileting, grooming, dressing, etc.)  - Assess/evaluate cause of self-care deficits   - Assess range of motion  - Assess patient's mobility; develop plan if impaired  - Assess patient's need for assistive devices and provide as appropriate  - Encourage maximum independence but intervene and supervise when necessary  - Involve family in performance of ADLs  - Assess for home care needs following discharge   - Consider OT consult to assist with ADL evaluation and planning for discharge  - Provide patient education as appropriate  Outcome: Progressing     Problem: MOBILITY - ADULT  Goal: Maintains/Returns to pre admission functional level  Description: INTERVENTIONS:  - Perform BMAT or MOVE assessment daily.   - Set and communicate daily mobility goal to care team and patient/family/caregiver. - Collaborate with rehabilitation services on mobility goals if consulted  - Perform Range of Motion 3 times a day. - Reposition patient every 2 hours.   - Dangle patient 3 times a day  - Stand patient 3 times a day  - Ambulate patient 3 times a day  - Out of bed to chair 3 times a day   - Out of bed for meals 3 times a day  - Out of bed for toileting  - Record patient progress and toleration of activity level   Outcome: Progressing     Problem: PAIN - ADULT  Goal: Verbalizes/displays adequate comfort level or baseline comfort level  Description: Interventions:  - Encourage patient to monitor pain and request assistance  - Assess pain using appropriate pain scale  - Administer analgesics based on type and severity of pain and evaluate response  - Implement non-pharmacological measures as appropriate and evaluate response  - Consider cultural and social influences on pain and pain management  - Notify physician/advanced practitioner if interventions unsuccessful or patient reports new pain  Outcome: Progressing     Problem: INFECTION - ADULT  Goal: Absence or prevention of progression during hospitalization  Description: INTERVENTIONS:  - Assess and monitor for signs and symptoms of infection  - Monitor lab/diagnostic results  - Monitor all insertion sites, i.e. indwelling lines, tubes, and drains  - [place] appropriate cooling/warming therapies per order  - Administer medications as ordered  - Instruct and encourage patient and family to use good hand hygiene technique  - Identify and instruct in appropriate isolation precautions for identified infection/condition  Outcome: Progressing     Problem: SAFETY ADULT  Goal: Maintain or return to baseline ADL function  Description: INTERVENTIONS:  -  Assess patient's ability to carry out ADLs; assess patient's baseline for ADL function and identify physical deficits which impact ability to perform ADLs (bathing, care of mouth/teeth, toileting, grooming, dressing, etc.)  - Assess/evaluate cause of self-care deficits   - Assess range of motion  - Assess patient's mobility; develop plan if impaired  - Assess patient's need for assistive devices and provide as appropriate  - Encourage maximum independence but intervene and supervise when necessary  - Involve family in performance of ADLs  - Assess for home care needs following discharge   - Consider OT consult to assist with ADL evaluation and planning for discharge  - Provide patient education as appropriate  Outcome: Progressing     Problem: SAFETY ADULT  Goal: Maintains/Returns to pre admission functional level  Description: INTERVENTIONS:  - Perform BMAT or MOVE assessment daily.   - Set and communicate daily mobility goal to care team and patient/family/caregiver.    - Collaborate with rehabilitation services on mobility goals if consulted  - Perform Range of Motion 3 times a day. - Reposition patient every 2 hours.   - Dangle patient 3 times a day  - Stand patient 3 times a day  - Ambulate patient 3 times a day  - Out of bed to chair 3 times a day   - Out of bed for meals 3 times a day  - Out of bed for toileting  - Record patient progress and toleration of activity level   Outcome: Progressing     Problem: SAFETY ADULT  Goal: Patient will remain free of falls  Description: INTERVENTIONS:  - Educate patient/family on patient safety including physical limitations  - Instruct patient to call for assistance with activity   - Consult OT/PT to assist with strengthening/mobility   - Keep Call bell within reach  - Keep bed low and locked with side rails adjusted as appropriate  - Keep care items and personal belongings within reach  - Initiate and maintain comfort rounds  - Make Fall Risk Sign visible to staff  - Offer Toileting every 2 Hours, in advance of need  - Initiate/Maintain bed alarm  - Obtain necessary fall risk management equipment:   - Apply yellow socks and bracelet for high fall risk patients  - Consider moving patient to room near nurses station  Outcome: Progressing     Problem: DISCHARGE PLANNING  Goal: Discharge to home or other facility with appropriate resources  Description: INTERVENTIONS:  - Identify barriers to discharge w/patient and caregiver  - Arrange for needed discharge resources and transportation as appropriate  - Identify discharge learning needs (meds, wound care, etc.)  - Arrange for interpretive services to assist at discharge as needed  - Refer to Case Management Department for coordinating discharge planning if the patient needs post-hospital services based on physician/advanced practitioner order or complex needs related to functional status, cognitive ability, or social support system  Outcome: Progressing     Problem: Knowledge Deficit  Goal: Patient/family/caregiver demonstrates understanding of disease process, treatment plan, medications, and discharge instructions  Description: Complete learning assessment and assess knowledge base.   Interventions:  - Provide teaching at level of understanding  - Provide teaching via preferred learning methods  Outcome: Progressing     Problem: METABOLIC, FLUID AND ELECTROLYTES - ADULT  Goal: Electrolytes maintained within normal limits  Description: INTERVENTIONS:  - Monitor labs and assess patient for signs and symptoms of electrolyte imbalances  - Administer electrolyte replacement as ordered  - Monitor response to electrolyte replacements, including repeat lab results as appropriate  - Instruct patient on fluid and nutrition as appropriate  Outcome: Progressing     Problem: METABOLIC, FLUID AND ELECTROLYTES - ADULT  Goal: Fluid balance maintained  Description: INTERVENTIONS:  - Monitor labs   - Monitor I/O and WT  - Instruct patient on fluid and nutrition as appropriate  - Assess for signs & symptoms of volume excess or deficit  Outcome: Progressing     Problem: HEMATOLOGIC - ADULT  Goal: Maintains hematologic stability  Description: INTERVENTIONS  - Assess for signs and symptoms of bleeding or hemorrhage  - Monitor labs  - Administer supportive blood products/factors as ordered and appropriate  Outcome: Progressing     Problem: Prexisting or High Potential for Compromised Skin Integrity  Goal: Skin integrity is maintained or improved  Description: INTERVENTIONS:  - Identify patients at risk for skin breakdown  - Assess and monitor skin integrity  - Assess and monitor nutrition and hydration status  - Monitor labs   - Assess for incontinence   - Turn and reposition patient  - Assist with mobility/ambulation  - Relieve pressure over bony prominences  - Avoid friction and shearing  - Provide appropriate hygiene as needed including keeping skin clean and dry  - Evaluate need for skin moisturizer/barrier cream  - Collaborate with interdisciplinary team   - Patient/family teaching  - Consider wound care consult   Outcome: Progressing     Problem: Nutrition/Hydration-ADULT  Goal: Nutrient/Hydration intake appropriate for improving, restoring or maintaining nutritional needs  Description: Monitor and assess patient's nutrition/hydration status for malnutrition. Collaborate with interdisciplinary team and initiate plan and interventions as ordered. Monitor patient's weight and dietary intake as ordered or per policy. Utilize nutrition screening tool and intervene as necessary. Determine patient's food preferences and provide high-protein, high-caloric foods as appropriate.      INTERVENTIONS:  - Monitor oral intake, urinary output, labs, and treatment plans  - Assess nutrition and hydration status and recommend course of action  - Evaluate amount of meals eaten  - Assist patient with eating if necessary   - Allow adequate time for meals  - Recommend/ encourage appropriate diets, oral nutritional supplements, and vitamin/mineral supplements  - Order, calculate, and assess calorie counts as needed  - Assess need for intravenous fluids  - Provide specific nutrition/hydration education as appropriate  - Include patient/family/caregiver in decisions related to nutrition  Outcome: Progressing

## 2023-10-01 NOTE — [place] UNIVERSAL TRANSFER FORM
[place] UNIVERSAL TRANSFER FORM  (ALL ITEMS MUST BE COMPLETED)    1. TRANSFER FROM: [address] [place] [address] [address]      TRANSFER TO: [facility]    2. DATE OF TRANSFER: 10/1/2023                        TIME OF TRANSFER: 10/1/2023    3. PATIENT NAME: KYAW Martin      YOB: 1955                             GENDER: male    4. LANGUAGE:   English    5. PHYSICIAN NAME:  Yoeladdy Romainmichael,*                   PHONE: [phone]    6. CODE STATUS: Level 1 - Full Code        Out of Hospital   7. :                                      :  Extended Emergency Contact Information  Primary Emergency Contact: Mary Bishop  Address: [address] [place] [address] of [address] [facility] Phone: [phone]  Mobile Phone: [phone]  Relation: Spouse  Secondary Emergency Contact: Johnna Rodriguez  Relation: Daughter           Health Care Representative/Proxy:  no           Legal Guardian: no             NAME OF:           HEALTH CARE REPRESENTATIVE/PROXY:                                         OR           LEGAL GUARDIAN, IF NOT :                                               PHONE:  (Day)           (Night)                        (Cell)    8. REASON FOR TRANSFER: (Must include brief medical history and recent changes in physical function or cognition.) rehab            V/S: BP 96/74 (BP Location: Left arm)   Pulse 73   Temp 97.8 °F (36.6 °C) (Oral)   Resp 18   Ht 5' 10" (1.778 m)   Wt 64.4 kg (142 lb)   SpO2 99%   BMI 20.37 kg/m²           PAIN: back pain chronic    9. PRIMARY DIAGNOSIS: Generalized weakness      Secondary Diagnosis:         Pacemaker: no      Internal Defib: No          Mental Health Diagnosis (if Applicable):    10. RESTRAINTS: no    11. RESPIRATORY NEEDS: No     12. ISOLATION/PRECAUTION: no    13. ALLERGY: Patient has no known allergies. 14. SENSORY:           15.  SKIN CONDITION: B/L arm skin tear. Stage 2 sacral wound    16. DIET: Renal FR 1500    17. IV ACCESS: no    18. PERSONAL ITEMS SENT WITH PATIENT: yes    19. ATTACHED DOCUMENTS: MUST ATTACH CURRENT MEDICATION INFORMATION     20. AT RISK ALERTS:fall        HARM TO: no    21. WEIGHT BEARING STATUS:         Left Leg: full        Right Leg: full    22. MENTAL STATUS:Alert orient x4    23. FUNCTION:        Walk: walker        Transfer: 1 assist        Toilet: assist        Feed: self    24. IMMUNIZATIONS/SCREENING:     Immunization History   Administered Date(s) Administered   • COVID-19 MODERNA VACC 0.5 ML IM 04/08/2021, 05/06/2021, 01/21/2022   • Hep B, adult 03/01/2013, 04/01/2013, 05/01/2013, 09/02/2013, 02/01/2015   • INFLUENZA 09/01/2022   • Influenza, high dose seasonal 0.7 mL 09/23/2020, 10/11/2021, 10/10/2022   • Influenza, seasonal, injectable 12/04/2012, 11/01/2015   • Influenza, seasonal, injectable, preservative free 10/05/2016, 10/03/2018   • Pneumococcal Conjugate 13-Valent 08/26/2019, 09/01/2022   • Pneumococcal Polysaccharide PPV23 03/08/2013, 11/14/2014, 11/11/2019   • Tdap 10/26/2010, 10/03/2021   • Tuberculin Skin Test-PPD Intradermal 06/16/2023, 06/22/2023       25. BOWEL: no    26. BLADDER: anuric    27.  SENDING FACILITY CONTACT: [facility]                 Title: RN        Unit:4        Phone: [phone] [address] (if known):        Title:        Unit:         Phone:         FORM PREFILLED BY (if applicable)       Title:       Unit:        Phone:         FORM COMPLETED BY Davon Blas, [address]      Phone: [phone]

## 2023-10-01 NOTE — DISCHARGE SUMMARY
INTERNAL MEDICINE  DISCHARGE SUMMARY     Barbara Arreola   70 y.o. male  MRN: [id number]  Room/Bed: [address] [facility] [address]/4 [address]   Encounter: [id number]    Principal Problem:    Generalized weakness  Active Problems:    Anemia of chronic disease    Coronary artery disease     ESRD (end stage renal disease) on dialysis (HCC)    Nicotine dependence    Type 2 diabetes mellitus    Thrombocytopenia     Chronic deep vein thrombosis (DVT) of internal jugular vein (HCC)    Hypotension    Ambulatory dysfunction    History of CVA (cerebrovascular accident)    Open wound    Chronic pain      Chronic pain  Assessment & Plan  Chronic pain left upper extremity due to issues with AV fistula in the past.  · Continue low-dose oxycodone as needed and gabapentin    Open wound  Assessment & Plan  Superficial sacral wound on exam.  No evidence of infection. · Reports pain at site. · Consult wound care  · Offloading    History of CVA (cerebrovascular accident)  Assessment & Plan  · With resultant right leg weakness. · Continue aspirin Plavix Lipitor    Ambulatory dysfunction  Assessment & Plan  Uses walker for over a year. · History of CVA with right lower extremity weakness. · Reports feeling dizzy when getting up. · Check orthostatic vital signs   · PT/OT  · Plan for rehab placement  · CM following    Hypotension  Assessment & Plan  Takes midodrine 10 mg p.o. twice daily at home instead of 3 times daily.   · Continue midodrine 10 mg p.o. 3 times daily  · Check orthostatic vital signs    Chronic deep vein thrombosis (DVT) of internal jugular vein (HCC)  Assessment & Plan  · Continue Aspirin, Plavix  · Completed course of Eliquis 1/23 by primary provider    Thrombocytopenia   Assessment & Plan  Improving from prior admission  · Stable from baseline  · Monitor while on heparin subcu    Type 2 diabetes mellitus  Assessment & Plan  Lab Results   Component Value Date    HGBA1C 4.8 01/05/2023       Recent Labs     09/27/23  1857 09/27/23 2030   POCGLU 62* 119       Blood Sugar Average: Last 72 hrs:  · Diet controlled    Nicotine dependence  Assessment & Plan  · NRT patch, smoking cessation    ESRD (end stage renal disease) on dialysis [facility])  Assessment & Plan  On Hemodialysis TTS  · Missed session on Saturday and HD 9/30/23  · Access right upper chest dialysis catheter  · Not taking calcitriol currently at home  · Daily weight, intake output  · Renal diet  · Nephrology following, appreciate recommendations    Coronary artery disease   Assessment & Plan  s/p PCI with stents x3. On Aspirin Plavix Lipitor at home  · Asymptomatic    Anemia of chronic disease  Assessment & Plan  Baseline hemoglobin appears to be around 8-9s  · Repeat on day of discharge appropriate/stable  · No signs of overt bleeding    * Generalized weakness  Assessment & Plan  Patient presents with worsening generalized weakness, dizziness when getting up, poor appetite, nausea vomiting. Denies SOB, fever. Patient is on hemodialysis Tuesday Thursday Saturday for ESRD. Missed dialysis on Saturday due to weakness and was supposed to go to dialysis today but could not make it due to weakness. · Of note, patient was hospitalized between 9/12-9/14 for hypotension following dialysis. Noted to be bradycardic on EKG and telemetry. Patient was seen by cardiology, recommended close outpatient follow-up and consider PPM if persistent bradycardia. · TSH/vitamin D-pending  · WBC normal, no bands, patient afebrile. On room air, satting well. · Patient does not make urine. · EKG sinus rhythm with PAC, right bundle branch block, rate 63. · Denies chest pain  · Reports chronic pain in left upper extremity and having pain in sacrum due to open wound.   · Suspect due to physical deconditioning  · Check orthostatic vital signs  · nephrology for dialysis   · PT/ OT  · Fall precautions  · Plan is for transfer to rehab     Nausea & vomiting-resolved as of 10/1/2023  Assessment & Plan  Nausea vomiting, threw up mucous since Saturday. Poor appetite for a while per wife. · Resolved  · Tolerating a diet and has moved bowel per pt        [address]     Patient is a 58-year-old male with significant history of ESRD on HD, hypertension, CAD, anemia/thrombocytopenia presenting secondary to generalized weakness. Patient medically optimized prior to discharge, hemodialysis completed 9/30/2023, repeat blood work appropriate. Discussed with patient to follow-up with primary care physician within 7 days for transition of care management, follow-up with nephrology and routine hemodialysis. Patient is medically stable for discharge to rehab to improve physical deconditioning, happy to be going home today. Discharging Physician / Practitioner: Yair Mckeon DO  PCP: Jovani Seymour MD  Admission Date:   Admission Orders (From admission, onward)     Ordered        09/26/23 1627  Inpatient Admission  Once            09/25/23 1950  Place in Observation  Once                      Discharge Date: 10/01/23    Medical Problems     Resolved Problems  Date Reviewed: 9/29/2023          Resolved    Bradycardia 9/26/2023     Resolved by  Chester Duron MD    Nausea & vomiting 10/1/2023     Resolved by  Yair Mckeon DO          Consultations During Hospital Stay:  · Appreciate nephrology recommendations    Condition at Discharge: stable     Discharge Day Visit / Exam:     Subjective: Patient seen and examined at bedside this morning, labs and vitals appropriate, patient agreeable to rehab facility today.     Vitals: Blood Pressure: 122/63 (09/30/23 2105)  Pulse: 74 (09/30/23 2105)  Temperature: 98.1 °F (36.7 °C) (09/30/23 2105)  Temp Source: Oral (09/30/23 2105)  Respirations: 18 (09/30/23 2105)  Height: 5' 10" (177.8 cm) (09/25/23 2131)  Weight - Scale: 64.4 kg (142 lb) (10/01/23 0600)  SpO2: 98 % (09/30/23 6017)  Exam:   Physical Exam  Constitutional:       General: He is not in acute distress. Appearance: He is ill-appearing. HENT:      Head: Normocephalic and atraumatic. Eyes:      General: No scleral icterus. Conjunctiva/sclera: Conjunctivae normal.   Cardiovascular:      Rate and Rhythm: Normal rate and regular rhythm. Pulses: Normal pulses. Heart sounds: No murmur heard. Pulmonary:      Effort: Pulmonary effort is normal. No respiratory distress. Breath sounds: Normal breath sounds. No wheezing or rales. Abdominal:      General: Bowel sounds are normal. There is no distension. Tenderness: There is no abdominal tenderness. Musculoskeletal:         General: No signs of injury. Right lower leg: No edema. Left lower leg: No edema. Skin:     General: Skin is warm and dry. Capillary Refill: Capillary refill takes less than 2 seconds. Coloration: Skin is not jaundiced. Neurological:      General: No focal deficit present. Mental Status: He is alert and oriented to person, place, and time. Mental status is at baseline. Psychiatric:         Mood and Affect: Mood normal.         Behavior: Behavior normal.         Discussion with Family: Discussed with patient's family prior to discharge    Discharge instructions/Information to patient and family:   See after visit summary for information provided to patient and family. Provisions for Follow-Up Care:  See after visit summary for information related to follow-up care and any pertinent home health orders. Disposition:     Return to rehab facility with family    Discharge Statement:  I spent 60 minutes discharging the patient. This time was spent on the day of discharge. I had direct contact with the patient on the day of discharge.  Greater than 50% of the total time was spent examining patient, answering all patient questions, arranging and discussing plan of care with patient as well as directly providing post-discharge instructions. Additional time then spent on discharge activities. Discharge Medications:  See after visit summary for reconciled discharge medications provided to patient and family.       ** Please Note: This note has been constructed using a voice recognition system **

## 2023-10-01 NOTE — CASE MANAGEMENT
Case Management Discharge Planning Note    Patient name Eric Baumann  Location [address] [place] 420-* MRN [id number]  : 1955 Date 10/1/2023       Current Admission Date: 2023  Current Admission Diagnosis:Generalized weakness   Patient Active Problem List    Diagnosis Date Noted   • History of CVA (cerebrovascular accident) 2023   • Generalized weakness 2023   • Open wound 2023   • Chronic pain 2023   • Anxiety disorder, unspecified 2023   • Secondary hyperparathyroidism of renal origin ([facility]) 2023   • Unspecified protein-calorie malnutrition ([facility]) 2023   • 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 06/15/2023   • Ambulatory dysfunction 2023   • Infection of dialysis AV graft 2023   • Hypotension 2023   • S/P angioplasty with stent 2023   • Chronic deep vein thrombosis (DVT) of internal jugular vein ([facility]) 2022   • End-stage renal disease  2022   • Chronic kidney disease 2022   • Right intertrochanteric femur fracture 2022   • Thrombocytopenia  2022   • Arteriovenous fistula ([facility]) 2021   • AV fistula thrombosis, initial encounter ([facility]) 2021   • Encounter for extracorporeal dialysis ([facility]) 2019   • Intestinal malabsorption 2017   • Pernicious anemia 2017   • Irritable bowel syndrome 2017   • Elevated serum alkaline phosphatase level 2017   • Anemia of chronic disease 2017   • Coronary artery disease  10/20/2016   • Hypertension 10/20/2016   • Dependence on renal dialysis ([facility]) 2016   • Vitamin D deficiency, unspecified 2013   • Mixed hyperlipidemia 10/04/2012   • ESRD (end stage renal disease) on dialysis ([facility]) 2012   • Nicotine dependence 2012   • Organic impotence 2012   • Type 2 diabetes mellitus 2012      LOS (days): 5  Geometric Mean LOS (GMLOS) (days): 3.50  Days to GMLOS:-1.3     OBJECTIVE:  Risk of Unplanned Readmission Score: 27.9       Current admission status: Inpatient   Preferred Pharmacy:   [address] [address]  Phone: [phone] Fax: [phone]    Primary Care Provider: Ryan Silva MD    Primary Insurance: MEDICARE  Secondary Insurance: Priyanka Mckeon DETAILS:    Discharge planning discussed with[de-identified] Wife Amirah Martin of Choice: Yes     Comments - Freedom of Choice: CHERIE spoke with Марина Appiah in nursing at [facility] on 9/30 to confirm that facility is expecting patient this weekend. Марина Appiah initially stated that patient was not on her list for weekend admission and then stated that he was expected on Friday 9/29. CHERIE confirmed that per CHERIE Jain's conversation with Tomeka Alba in admissions, patient is anticipated for discharge this weekend. Мраина Appiah stated that she would have someone in admissions contact CHERIE.  CHERIE did not receive a call back and was later notified by attending that patient was not medically cleared for discharge on Saturday. Per attending on Jose Alejandro 10/1, patient is now medically cleared. CHERIE spoke with wife Cira Jackson by phone and she continues to express her preference to transport patient herself. Per nursing, patient is safe for transport in a private car. CHERIE spoke again with Марина Appiah in nursing at [facility] and relayed same. CHERIE notified Марина Appiah that patient's wife plans to transport patient herself and that arrival can be expected around 1300 today. Facility contact information provided to nursing for report.       CM contacted family/caregiver?: Yes  Were Treatment Team discharge recommendations reviewed with patient/caregiver?: Yes  Did patient/caregiver verbalize understanding of patient care needs?: N/A- going to facility  Were patient/caregiver advised of the risks associated with not following Treatment Team discharge recommendations?: Yes    Contacts  Patient Contacts: Carmelo Means  (wife)  Relationship to Patient[de-identified] Family  Contact Method: Phone  Phone Number: [phone]  Reason/Outcome: Emergency Contact, Discharge [address]         Is the patient interested in [facility] at discharge?: No    DME Referral Provided  Referral made for DME?: No    Other Referral/Resources/Interventions Provided:  Interventions: Short Term Rehab    Would you like to participate in our [facility] service program?  : No - Declined    Treatment Team Recommendation: Short Term Rehab  Discharge Destination Plan[de-identified] Short Term Rehab  Transport at Discharge : [address], [address] [facility] : [address] [place] [address]  Receiving Facility/Agency Phone Number: [phone], ask for nurse on 3rd floor

## 2023-10-01 NOTE — QUICK NOTE
Patient has history of ESRD on hemodialysis TTS. He had hemodialysis yesterday with 1 L UF. Labs reviewed. Vital signs reviewed. Both labs and vital signs are stable. Next hemodialysis session will be Tuesday and next nephrology follow-up will be tomorrow. Please call with questions.     Wt Readings from Last 3 Encounters:   10/01/23 64.4 kg (142 lb)   09/20/23 71.8 kg (158 lb 4.6 oz)   09/14/23 69.4 kg (152 lb 14.4 oz)     Temp Readings from Last 3 Encounters:   09/30/23 98.1 °F (36.7 °C) (Oral)   09/20/23 (!) 97 °F (36.1 °C) (Temporal)   09/14/23 (!) 96.6 °F (35.9 °C) (Tympanic)     BP Readings from Last 3 Encounters:   09/30/23 122/63   09/20/23 110/66   09/14/23 (!) 112/40     Pulse Readings from Last 3 Encounters:   09/30/23 74   09/20/23 70   09/14/23 64     Lab Results   Component Value Date    SODIUM 135 10/01/2023    K 4.2 10/01/2023     10/01/2023    CO2 27 10/01/2023    BUN 7 10/01/2023    CREATININE 3.28 (H) 10/01/2023    GLUC 72 10/01/2023    CALCIUM 8.5 10/01/2023

## 2023-10-02 ENCOUNTER — TRANSITIONAL CARE MANAGEMENT (OUTPATIENT)
Dept: FAMILY MEDICINE CLINIC | Facility: CLINIC | Age: 68
End: 2023-10-02

## 2023-10-03 ENCOUNTER — HOSPITAL ENCOUNTER (EMERGENCY)
Facility: HOSPITAL | Age: 68
Discharge: HOME | End: 2023-10-03
Attending: EMERGENCY MEDICINE
Payer: MEDICARE

## 2023-10-03 ENCOUNTER — APPOINTMENT (EMERGENCY)
Dept: RADIOLOGY | Facility: HOSPITAL | Age: 68
End: 2023-10-03
Payer: MEDICARE

## 2023-10-03 VITALS
WEIGHT: 142 LBS | SYSTOLIC BLOOD PRESSURE: 165 MMHG | RESPIRATION RATE: 16 BRPM | BODY MASS INDEX: 20.33 KG/M2 | TEMPERATURE: 97.5 F | HEIGHT: 70 IN | OXYGEN SATURATION: 97 % | HEART RATE: 82 BPM | DIASTOLIC BLOOD PRESSURE: 65 MMHG

## 2023-10-03 DIAGNOSIS — R10.84 GENERALIZED ABDOMINAL PAIN: ICD-10-CM

## 2023-10-03 DIAGNOSIS — K59.00 CONSTIPATION: Primary | ICD-10-CM

## 2023-10-03 LAB
ALBUMIN SERPL BCP-MCNC: 3.3 G/DL (ref 3.5–5)
ALP SERPL-CCNC: 66 U/L (ref 34–104)
ALT SERPL W P-5'-P-CCNC: 7 U/L (ref 7–52)
ANION GAP SERPL CALCULATED.3IONS-SCNC: 9 MMOL/L
AST SERPL W P-5'-P-CCNC: 22 U/L (ref 13–39)
BASOPHILS # BLD AUTO: 0.04 THOUSANDS/ÂΜL (ref 0–0.1)
BASOPHILS NFR BLD AUTO: 1 % (ref 0–1)
BILIRUB SERPL-MCNC: 0.61 MG/DL (ref 0.2–1)
BUN SERPL-MCNC: 14 MG/DL (ref 5–25)
CALCIUM ALBUM COR SERPL-MCNC: 9.3 MG/DL (ref 8.3–10.1)
CALCIUM SERPL-MCNC: 8.7 MG/DL (ref 8.4–10.2)
CHLORIDE SERPL-SCNC: 99 MMOL/L (ref 96–108)
CO2 SERPL-SCNC: 27 MMOL/L (ref 21–32)
CREAT SERPL-MCNC: 4.08 MG/DL (ref 0.6–1.3)
EOSINOPHIL # BLD AUTO: 0.03 THOUSAND/ÂΜL (ref 0–0.61)
EOSINOPHIL NFR BLD AUTO: 1 % (ref 0–6)
ERYTHROCYTE [DISTWIDTH] IN BLOOD BY AUTOMATED COUNT: 16.7 % (ref 11.6–15.1)
GFR SERPL CREATININE-BSD FRML MDRD: 13 ML/MIN/1.73SQ M
GLUCOSE SERPL-MCNC: 103 MG/DL (ref 65–140)
HCT VFR BLD AUTO: 28.6 % (ref 36.5–49.3)
HGB BLD-MCNC: 9.3 G/DL (ref 12–17)
IMM GRANULOCYTES # BLD AUTO: 0.01 THOUSAND/UL (ref 0–0.2)
IMM GRANULOCYTES NFR BLD AUTO: 0 % (ref 0–2)
LYMPHOCYTES # BLD AUTO: 0.5 THOUSANDS/ÂΜL (ref 0.6–4.47)
LYMPHOCYTES NFR BLD AUTO: 17 % (ref 14–44)
MCH RBC QN AUTO: 32.1 PG (ref 26.8–34.3)
MCHC RBC AUTO-ENTMCNC: 32.5 G/DL (ref 31.4–37.4)
MCV RBC AUTO: 99 FL (ref 82–98)
MONOCYTES # BLD AUTO: 0.31 THOUSAND/ÂΜL (ref 0.17–1.22)
MONOCYTES NFR BLD AUTO: 11 % (ref 4–12)
NEUTROPHILS # BLD AUTO: 1.99 THOUSANDS/ÂΜL (ref 1.85–7.62)
NEUTS SEG NFR BLD AUTO: 70 % (ref 43–75)
NRBC BLD AUTO-RTO: 0 /100 WBCS
PLATELET # BLD AUTO: 123 THOUSANDS/UL (ref 149–390)
PMV BLD AUTO: 10.2 FL (ref 8.9–12.7)
POTASSIUM SERPL-SCNC: 4.8 MMOL/L (ref 3.5–5.3)
PROT SERPL-MCNC: 6.5 G/DL (ref 6.4–8.4)
RBC # BLD AUTO: 2.9 MILLION/UL (ref 3.88–5.62)
SODIUM SERPL-SCNC: 135 MMOL/L (ref 135–147)
WBC # BLD AUTO: 2.88 THOUSAND/UL (ref 4.31–10.16)

## 2023-10-03 PROCEDURE — 85025 COMPLETE CBC W/AUTO DIFF WBC: CPT | Performed by: EMERGENCY MEDICINE

## 2023-10-03 PROCEDURE — 74176 CT ABD & PELVIS W/O CONTRAST: CPT

## 2023-10-03 PROCEDURE — 80053 COMPREHEN METABOLIC PANEL: CPT | Performed by: EMERGENCY MEDICINE

## 2023-10-03 PROCEDURE — 99284 EMERGENCY DEPT VISIT MOD MDM: CPT | Performed by: EMERGENCY MEDICINE

## 2023-10-03 PROCEDURE — 36415 COLL VENOUS BLD VENIPUNCTURE: CPT | Performed by: EMERGENCY MEDICINE

## 2023-10-03 PROCEDURE — 99283 EMERGENCY DEPT VISIT LOW MDM: CPT

## 2023-10-03 RX ORDER — DOCUSATE SODIUM 100 MG/1
100 CAPSULE, LIQUID FILLED ORAL DAILY
Qty: 14 CAPSULE | Refills: 0 | Status: SHIPPED | OUTPATIENT
Start: 2023-10-03 | End: 2023-10-17

## 2023-10-03 RX ORDER — POLYETHYLENE GLYCOL 3350 17 G/17G
17 POWDER, FOR SOLUTION ORAL DAILY
Qty: 119 G | Refills: 0 | Status: SHIPPED | OUTPATIENT
Start: 2023-10-03 | End: 2023-10-10

## 2023-10-03 RX ORDER — POLYETHYLENE GLYCOL 3350 17 G/17G
17 POWDER, FOR SOLUTION ORAL ONCE
Status: COMPLETED | OUTPATIENT
Start: 2023-10-03 | End: 2023-10-03

## 2023-10-03 RX ORDER — AMOXICILLIN 250 MG
1 CAPSULE ORAL ONCE
Status: COMPLETED | OUTPATIENT
Start: 2023-10-03 | End: 2023-10-03

## 2023-10-03 RX ADMIN — POLYETHYLENE GLYCOL 3350 17 G: 17 POWDER, FOR SOLUTION ORAL at 17:15

## 2023-10-03 RX ADMIN — DOCUSATE SODIUM AND SENNA 1 TABLET: 50; 8.6 TABLET, FILM COATED ORAL at 17:15

## 2023-10-04 ENCOUNTER — APPOINTMENT (EMERGENCY)
Dept: RADIOLOGY | Facility: HOSPITAL | Age: 68
End: 2023-10-04
Payer: MEDICARE

## 2023-10-04 ENCOUNTER — TRANSITIONAL CARE MANAGEMENT (OUTPATIENT)
Dept: FAMILY MEDICINE CLINIC | Facility: CLINIC | Age: 68
End: 2023-10-04

## 2023-10-04 ENCOUNTER — HOSPITAL ENCOUNTER (OUTPATIENT)
Facility: HOSPITAL | Age: 68
Setting detail: OBSERVATION
Discharge: HOME HEALTH | End: 2023-10-05
Attending: EMERGENCY MEDICINE | Admitting: FAMILY MEDICINE
Payer: MEDICARE

## 2023-10-04 DIAGNOSIS — N18.6 ESRD ON HEMODIALYSIS (HCC): ICD-10-CM

## 2023-10-04 DIAGNOSIS — R10.9 ABDOMINAL PAIN: Primary | ICD-10-CM

## 2023-10-04 DIAGNOSIS — K56.41 FECAL IMPACTION (HCC): ICD-10-CM

## 2023-10-04 DIAGNOSIS — Z99.2 ESRD ON HEMODIALYSIS (HCC): ICD-10-CM

## 2023-10-04 PROBLEM — D72.819 LEUKOPENIA: Status: ACTIVE | Noted: 2023-10-04

## 2023-10-04 PROBLEM — J90 PLEURAL EFFUSION, BILATERAL: Status: ACTIVE | Noted: 2023-10-04

## 2023-10-04 PROBLEM — K59.00 CONSTIPATION: Status: ACTIVE | Noted: 2023-10-04

## 2023-10-04 LAB
ALBUMIN SERPL BCP-MCNC: 3 G/DL (ref 3.5–5)
ALP SERPL-CCNC: 61 U/L (ref 34–104)
ALT SERPL W P-5'-P-CCNC: 5 U/L (ref 7–52)
ANION GAP SERPL CALCULATED.3IONS-SCNC: 11 MMOL/L
AST SERPL W P-5'-P-CCNC: 15 U/L (ref 13–39)
BASOPHILS # BLD AUTO: 0.04 THOUSANDS/ÂΜL (ref 0–0.1)
BASOPHILS NFR BLD AUTO: 1 % (ref 0–1)
BILIRUB SERPL-MCNC: 0.5 MG/DL (ref 0.2–1)
BUN SERPL-MCNC: 20 MG/DL (ref 5–25)
CALCIUM ALBUM COR SERPL-MCNC: 9.3 MG/DL (ref 8.3–10.1)
CALCIUM SERPL-MCNC: 8.5 MG/DL (ref 8.4–10.2)
CHLORIDE SERPL-SCNC: 99 MMOL/L (ref 96–108)
CO2 SERPL-SCNC: 25 MMOL/L (ref 21–32)
CREAT SERPL-MCNC: 5.22 MG/DL (ref 0.6–1.3)
EOSINOPHIL # BLD AUTO: 0.08 THOUSAND/ÂΜL (ref 0–0.61)
EOSINOPHIL NFR BLD AUTO: 3 % (ref 0–6)
ERYTHROCYTE [DISTWIDTH] IN BLOOD BY AUTOMATED COUNT: 16.9 % (ref 11.6–15.1)
GFR SERPL CREATININE-BSD FRML MDRD: 10 ML/MIN/1.73SQ M
GLUCOSE SERPL-MCNC: 83 MG/DL (ref 65–140)
HCT VFR BLD AUTO: 26.7 % (ref 36.5–49.3)
HGB BLD-MCNC: 8.5 G/DL (ref 12–17)
IMM GRANULOCYTES # BLD AUTO: 0.01 THOUSAND/UL (ref 0–0.2)
IMM GRANULOCYTES NFR BLD AUTO: 0 % (ref 0–2)
LACTATE SERPL-SCNC: 0.5 MMOL/L (ref 0.5–2)
LYMPHOCYTES # BLD AUTO: 0.55 THOUSANDS/ÂΜL (ref 0.6–4.47)
LYMPHOCYTES NFR BLD AUTO: 18 % (ref 14–44)
MAGNESIUM SERPL-MCNC: 2 MG/DL (ref 1.9–2.7)
MCH RBC QN AUTO: 31.8 PG (ref 26.8–34.3)
MCHC RBC AUTO-ENTMCNC: 31.8 G/DL (ref 31.4–37.4)
MCV RBC AUTO: 100 FL (ref 82–98)
MONOCYTES # BLD AUTO: 0.29 THOUSAND/ÂΜL (ref 0.17–1.22)
MONOCYTES NFR BLD AUTO: 10 % (ref 4–12)
NEUTROPHILS # BLD AUTO: 2.08 THOUSANDS/ÂΜL (ref 1.85–7.62)
NEUTS SEG NFR BLD AUTO: 68 % (ref 43–75)
NRBC BLD AUTO-RTO: 0 /100 WBCS
PLATELET # BLD AUTO: 146 THOUSANDS/UL (ref 149–390)
PMV BLD AUTO: 9.9 FL (ref 8.9–12.7)
POTASSIUM SERPL-SCNC: 4.4 MMOL/L (ref 3.5–5.3)
PROT SERPL-MCNC: 5.9 G/DL (ref 6.4–8.4)
RBC # BLD AUTO: 2.67 MILLION/UL (ref 3.88–5.62)
SODIUM SERPL-SCNC: 135 MMOL/L (ref 135–147)
WBC # BLD AUTO: 3.05 THOUSAND/UL (ref 4.31–10.16)

## 2023-10-04 PROCEDURE — 85025 COMPLETE CBC W/AUTO DIFF WBC: CPT | Performed by: EMERGENCY MEDICINE

## 2023-10-04 PROCEDURE — 83605 ASSAY OF LACTIC ACID: CPT | Performed by: EMERGENCY MEDICINE

## 2023-10-04 PROCEDURE — 99285 EMERGENCY DEPT VISIT HI MDM: CPT

## 2023-10-04 PROCEDURE — 96374 THER/PROPH/DIAG INJ IV PUSH: CPT

## 2023-10-04 PROCEDURE — 99285 EMERGENCY DEPT VISIT HI MDM: CPT | Performed by: EMERGENCY MEDICINE

## 2023-10-04 PROCEDURE — 96361 HYDRATE IV INFUSION ADD-ON: CPT

## 2023-10-04 PROCEDURE — 83735 ASSAY OF MAGNESIUM: CPT | Performed by: EMERGENCY MEDICINE

## 2023-10-04 PROCEDURE — 82746 ASSAY OF FOLIC ACID SERUM: CPT | Performed by: STUDENT IN AN ORGANIZED HEALTH CARE EDUCATION/TRAINING PROGRAM

## 2023-10-04 PROCEDURE — 87081 CULTURE SCREEN ONLY: CPT | Performed by: NURSE PRACTITIONER

## 2023-10-04 PROCEDURE — 80053 COMPREHEN METABOLIC PANEL: CPT | Performed by: EMERGENCY MEDICINE

## 2023-10-04 PROCEDURE — 74022 RADEX COMPL AQT ABD SERIES: CPT

## 2023-10-04 PROCEDURE — 99222 1ST HOSP IP/OBS MODERATE 55: CPT | Performed by: STUDENT IN AN ORGANIZED HEALTH CARE EDUCATION/TRAINING PROGRAM

## 2023-10-04 PROCEDURE — 36415 COLL VENOUS BLD VENIPUNCTURE: CPT | Performed by: EMERGENCY MEDICINE

## 2023-10-04 RX ORDER — KETOROLAC TROMETHAMINE 30 MG/ML
15 INJECTION, SOLUTION INTRAMUSCULAR; INTRAVENOUS ONCE
Status: COMPLETED | OUTPATIENT
Start: 2023-10-04 | End: 2023-10-04

## 2023-10-04 RX ORDER — DOCUSATE SODIUM 100 MG/1
100 CAPSULE, LIQUID FILLED ORAL 2 TIMES DAILY
Status: DISCONTINUED | OUTPATIENT
Start: 2023-10-04 | End: 2023-10-05 | Stop reason: HOSPADM

## 2023-10-04 RX ORDER — POLYETHYLENE GLYCOL 3350 17 G/17G
17 POWDER, FOR SOLUTION ORAL 2 TIMES DAILY
Status: DISCONTINUED | OUTPATIENT
Start: 2023-10-04 | End: 2023-10-04

## 2023-10-04 RX ORDER — MELATONIN
2000 DAILY
Status: DISCONTINUED | OUTPATIENT
Start: 2023-10-05 | End: 2023-10-05 | Stop reason: HOSPADM

## 2023-10-04 RX ORDER — ATORVASTATIN CALCIUM 20 MG/1
20 TABLET, FILM COATED ORAL
Status: DISCONTINUED | OUTPATIENT
Start: 2023-10-05 | End: 2023-10-05 | Stop reason: HOSPADM

## 2023-10-04 RX ORDER — CLOPIDOGREL BISULFATE 75 MG/1
75 TABLET ORAL DAILY
Status: DISCONTINUED | OUTPATIENT
Start: 2023-10-05 | End: 2023-10-05 | Stop reason: HOSPADM

## 2023-10-04 RX ORDER — POLYETHYLENE GLYCOL 3350 17 G/17G
238 POWDER, FOR SOLUTION ORAL ONCE
Status: DISCONTINUED | OUTPATIENT
Start: 2023-10-04 | End: 2023-10-05

## 2023-10-04 RX ORDER — HYDROMORPHONE HCL IN WATER/PF 6 MG/30 ML
0.2 PATIENT CONTROLLED ANALGESIA SYRINGE INTRAVENOUS EVERY 6 HOURS PRN
Status: DISCONTINUED | OUTPATIENT
Start: 2023-10-04 | End: 2023-10-05

## 2023-10-04 RX ORDER — MIDODRINE HYDROCHLORIDE 5 MG/1
10 TABLET ORAL
Status: DISCONTINUED | OUTPATIENT
Start: 2023-10-05 | End: 2023-10-05 | Stop reason: HOSPADM

## 2023-10-04 RX ORDER — HYDROXYZINE HYDROCHLORIDE 25 MG/1
25 TABLET, FILM COATED ORAL 3 TIMES DAILY PRN
Status: DISCONTINUED | OUTPATIENT
Start: 2023-10-04 | End: 2023-10-05 | Stop reason: HOSPADM

## 2023-10-04 RX ORDER — SENNOSIDES 8.6 MG
2 TABLET ORAL
Status: DISCONTINUED | OUTPATIENT
Start: 2023-10-04 | End: 2023-10-05

## 2023-10-04 RX ORDER — GABAPENTIN 100 MG/1
100 CAPSULE ORAL 3 TIMES WEEKLY
Status: DISCONTINUED | OUTPATIENT
Start: 2023-10-05 | End: 2023-10-05 | Stop reason: HOSPADM

## 2023-10-04 RX ORDER — POLYETHYLENE GLYCOL 3350 17 G/17G
17 POWDER, FOR SOLUTION ORAL DAILY
Status: DISCONTINUED | OUTPATIENT
Start: 2023-10-05 | End: 2023-10-05

## 2023-10-04 RX ORDER — ACETAMINOPHEN 325 MG/1
650 TABLET ORAL EVERY 6 HOURS PRN
Status: DISCONTINUED | OUTPATIENT
Start: 2023-10-04 | End: 2023-10-05 | Stop reason: HOSPADM

## 2023-10-04 RX ORDER — ASCORBIC ACID 500 MG
500 TABLET ORAL DAILY
Status: DISCONTINUED | OUTPATIENT
Start: 2023-10-05 | End: 2023-10-05 | Stop reason: HOSPADM

## 2023-10-04 RX ADMIN — SODIUM CHLORIDE 500 ML: 0.9 INJECTION, SOLUTION INTRAVENOUS at 20:56

## 2023-10-04 RX ADMIN — KETOROLAC TROMETHAMINE 15 MG: 30 INJECTION, SOLUTION INTRAMUSCULAR at 21:48

## 2023-10-04 NOTE — ED NOTES
Pt w/ difficult time tolerating enema. Approx 300mL placed. Placed on bedpan with call malhotra in reach.      Maria Dolores Benedict, RN  10/03/23 2032

## 2023-10-04 NOTE — DISCHARGE INSTRUCTIONS
Use the prescribed stool softeners for the next week. Make sure you are drinking enough fluids. If you have any severe worsening abdominal pain, continued constipation, significant rectal bleeding, return to the emergency department.

## 2023-10-05 ENCOUNTER — APPOINTMENT (OUTPATIENT)
Dept: DIALYSIS | Facility: HOSPITAL | Age: 68
End: 2023-10-05
Payer: MEDICARE

## 2023-10-05 VITALS
RESPIRATION RATE: 18 BRPM | TEMPERATURE: 97.8 F | HEART RATE: 74 BPM | SYSTOLIC BLOOD PRESSURE: 128 MMHG | WEIGHT: 142.2 LBS | BODY MASS INDEX: 19.91 KG/M2 | OXYGEN SATURATION: 95 % | DIASTOLIC BLOOD PRESSURE: 74 MMHG | HEIGHT: 71 IN

## 2023-10-05 LAB — FOLATE SERPL-MCNC: 5.1 NG/ML

## 2023-10-05 PROCEDURE — G0257 UNSCHED DIALYSIS ESRD PT HOS: HCPCS

## 2023-10-05 PROCEDURE — 97530 THERAPEUTIC ACTIVITIES: CPT

## 2023-10-05 PROCEDURE — 99239 HOSP IP/OBS DSCHRG MGMT >30: CPT | Performed by: STUDENT IN AN ORGANIZED HEALTH CARE EDUCATION/TRAINING PROGRAM

## 2023-10-05 PROCEDURE — 99215 OFFICE O/P EST HI 40 MIN: CPT | Performed by: STUDENT IN AN ORGANIZED HEALTH CARE EDUCATION/TRAINING PROGRAM

## 2023-10-05 PROCEDURE — 90662 IIV NO PRSV INCREASED AG IM: CPT | Performed by: NURSE PRACTITIONER

## 2023-10-05 PROCEDURE — 97163 PT EVAL HIGH COMPLEX 45 MIN: CPT

## 2023-10-05 PROCEDURE — G0008 ADMIN INFLUENZA VIRUS VAC: HCPCS | Performed by: NURSE PRACTITIONER

## 2023-10-05 RX ORDER — LANOLIN ALCOHOL/MO/W.PET/CERES
3 CREAM (GRAM) TOPICAL
Status: DISCONTINUED | OUTPATIENT
Start: 2023-10-05 | End: 2023-10-05

## 2023-10-05 RX ORDER — NICOTINE 21 MG/24HR
21 PATCH, TRANSDERMAL 24 HOURS TRANSDERMAL DAILY
Status: DISCONTINUED | OUTPATIENT
Start: 2023-10-05 | End: 2023-10-05 | Stop reason: HOSPADM

## 2023-10-05 RX ORDER — AMOXICILLIN 250 MG
2 CAPSULE ORAL 2 TIMES DAILY
Status: DISCONTINUED | OUTPATIENT
Start: 2023-10-05 | End: 2023-10-05 | Stop reason: HOSPADM

## 2023-10-05 RX ORDER — MIDODRINE HYDROCHLORIDE 5 MG/1
10 TABLET ORAL
Status: DISCONTINUED | OUTPATIENT
Start: 2023-10-05 | End: 2023-10-05 | Stop reason: HOSPADM

## 2023-10-05 RX ORDER — HEPARIN SODIUM 5000 [USP'U]/ML
5000 INJECTION, SOLUTION INTRAVENOUS; SUBCUTANEOUS EVERY 8 HOURS SCHEDULED
Status: DISCONTINUED | OUTPATIENT
Start: 2023-10-05 | End: 2023-10-05 | Stop reason: HOSPADM

## 2023-10-05 RX ORDER — POLYETHYLENE GLYCOL 3350 17 G/17G
17 POWDER, FOR SOLUTION ORAL 2 TIMES DAILY
Status: DISCONTINUED | OUTPATIENT
Start: 2023-10-05 | End: 2023-10-05 | Stop reason: HOSPADM

## 2023-10-05 RX ORDER — LANOLIN ALCOHOL/MO/W.PET/CERES
3 CREAM (GRAM) TOPICAL
Status: DISCONTINUED | OUTPATIENT
Start: 2023-10-05 | End: 2023-10-05 | Stop reason: HOSPADM

## 2023-10-05 RX ADMIN — ASPIRIN 81 MG: 81 TABLET, COATED ORAL at 12:34

## 2023-10-05 RX ADMIN — SENNOSIDES 17.2 MG: 8.6 TABLET, FILM COATED ORAL at 00:24

## 2023-10-05 RX ADMIN — SENNOSIDES AND DOCUSATE SODIUM 2 TABLET: 50; 8.6 TABLET ORAL at 12:34

## 2023-10-05 RX ADMIN — DOCUSATE SODIUM 100 MG: 100 CAPSULE, LIQUID FILLED ORAL at 00:24

## 2023-10-05 RX ADMIN — INFLUENZA A VIRUS A/VICTORIA/4897/2022 IVR-238 (H1N1) ANTIGEN (FORMALDEHYDE INACTIVATED), INFLUENZA A VIRUS A/DARWIN/9/2021 SAN-010 (H3N2) ANTIGEN (FORMALDEHYDE INACTIVATED), INFLUENZA B VIRUS B/PHUKET/3073/2013 ANTIGEN (FORMALDEHYDE INACTIVATED), AND INFLUENZA B VIRUS B/MICHIGAN/01/2021 ANTIGEN (FORMALDEHYDE INACTIVATED) 0.7 ML: 60; 60; 60; 60 INJECTION, SUSPENSION INTRAMUSCULAR at 00:24

## 2023-10-05 RX ADMIN — MIDODRINE HYDROCHLORIDE 10 MG: 5 TABLET ORAL at 12:34

## 2023-10-05 RX ADMIN — MIDODRINE HYDROCHLORIDE 10 MG: 5 TABLET ORAL at 08:52

## 2023-10-05 RX ADMIN — GABAPENTIN 100 MG: 100 CAPSULE ORAL at 12:34

## 2023-10-05 RX ADMIN — OXYCODONE HYDROCHLORIDE AND ACETAMINOPHEN 500 MG: 500 TABLET ORAL at 12:34

## 2023-10-05 RX ADMIN — Medication 2000 UNITS: at 12:34

## 2023-10-05 RX ADMIN — POLYETHYLENE GLYCOL 3350 17 G: 17 POWDER, FOR SOLUTION ORAL at 01:52

## 2023-10-05 RX ADMIN — HYDROMORPHONE HYDROCHLORIDE 0.2 MG: 0.2 INJECTION, SOLUTION INTRAMUSCULAR; INTRAVENOUS; SUBCUTANEOUS at 00:23

## 2023-10-05 RX ADMIN — CLOPIDOGREL BISULFATE 75 MG: 75 TABLET ORAL at 12:34

## 2023-10-05 RX ADMIN — CYANOCOBALAMIN TAB 500 MCG 1000 MCG: 500 TAB at 12:33

## 2023-10-05 RX ADMIN — DOCUSATE SODIUM 100 MG: 100 CAPSULE, LIQUID FILLED ORAL at 12:34

## 2023-10-05 NOTE — NURSING NOTE
Patient Discharged today. IV removed. Pt instructions and discharge education provided, patient v/u. Patient left the unit in good health via wheelchair with PCA.

## 2023-10-05 NOTE — ASSESSMENT & PLAN NOTE
Lab Results   Component Value Date    EGFR 10 10/04/2023    EGFR 13 10/03/2023    EGFR 18 10/01/2023    CREATININE 5.22 (H) 10/04/2023    CREATININE 4.08 (H) 10/03/2023    CREATININE 3.28 (H) 10/01/2023     · Last dialysis was on Monday in rehab. Was on Tuesday Thursday Saturday schedule prior to recent hospitalization.   · Consult nephrology for dialysis  · Daily weight, intake output

## 2023-10-05 NOTE — PLAN OF CARE
Post-Dialysis RN Treatment Note    Blood Pressure:  Pre: 109/63 mm/Hg  Post: 128/74 mmHg   EDW: 65.0 kg    Weight:  Pre: 64.1 kg   Post: 63.0 kg   Mode of weight measurement: Bed Scale   Volume Removed: 1100 ml    Treatment duration: 180 minutes    NS given:  No    Treatment shortened?  Yes, describe: Patient's request   Medications given during Rx: Midodrine   Estimated Kt/V:  Not Applicable   Access type: Permacath/TDC   Access Issues: No    Report called to primary nurse   Yes, Heidi Horton    Problem: METABOLIC, FLUID AND ELECTROLYTES - ADULT  Goal: Electrolytes maintained within normal limits  Description: INTERVENTIONS:  - Monitor labs and assess patient for signs and symptoms of electrolyte imbalances  - Administer electrolyte replacement as ordered  - Monitor response to electrolyte replacements, including repeat lab results as appropriate  - Instruct patient on fluid and nutrition as appropriate  Outcome: Progressing  Goal: Fluid balance maintained  Description: INTERVENTIONS:  - Monitor labs   - Monitor I/O and WT  - Instruct patient on fluid and nutrition as appropriate  - Assess for signs & symptoms of volume excess or deficit  Outcome: Progressing     Problem: HEMATOLOGIC - ADULT  Goal: Maintains hematologic stability  Description: INTERVENTIONS  - Assess for signs and symptoms of bleeding or hemorrhage  - Monitor labs  - Administer supportive blood products/factors as ordered and appropriate  Outcome: Progressing

## 2023-10-05 NOTE — PHYSICAL THERAPY NOTE
PHYSICAL THERAPY EVALUATION/TREATMENT       10/05/23 1317   PT Last Visit   PT Visit Date 10/05/23   Note Type   Note type Evaluation   Pain Assessment   Pain Assessment Tool 0-10   Pain Score No Pain   Patient's Stated Pain Goal No pain   Hospital Pain Intervention(s) Repositioned; Ambulation/increased activity   Multiple Pain Sites No   Restrictions/Precautions   Weight Bearing Precautions Per Order No   Other Precautions Bed Alarm; Chair Alarm; Fall Risk;Pain   Home Living   Type of [address] Two level;Performs ADLs on one level; Able to live on main level with bedroom/bathroom;Stairs to enter with rails; Ramped entrance  (Pt reports 3 PHILLY but having ramp put in today)   [address] chair;Commode;Grab bars in [address] Walker;Cane;Wheelchair-manual;Hospital bed;Grab bars  (Reports wife is obtaining a wheelchair today)   Prior Function   Level of [place] Independent with ADLs; Independent with functional mobility; Needs assistance with IADLS   Lives With Spouse   Receives Help From Family   IADLs Family/Friend/Other provides transportation; Family/Friend/Other provides meals   General   Additional Pertinent History Pt is a 70year-old male who was admitted to the hospital on 10/4/23 due to constipation, recently left STR AMA per chart review   Family/Caregiver Present No   Cognition   Overall Cognitive Status WFL   Arousal/Participation Cooperative   Orientation Level Oriented X4   Following Commands Follows multistep commands with increased time or repetition   Subjective   Subjective "I need to go to the bathroom soon"   RLE Assessment   RLE Assessment   (Hip 3- to 3/5 ; Knee/ankle 3+/5)   LLE Assessment   LLE Assessment   (Grossly 4- to 4/5)   Bed Mobility   Rolling R 5  Supervision   Additional items Assist x 1;Verbal cues; Bedrails; Increased time required   Rolling L 5  Supervision   Additional items Verbal cues; Bedrails; Increased time required   Supine to Sit 5 Supervision   Additional items Assist x 1;Verbal cues; Bedrails;HOB elevated; Increased time required   Sit to Supine 5  Supervision   Additional items Assist x 1;Verbal cues; Bedrails;HOB elevated; Increased time required   Transfers   Sit to Stand 5  Supervision   Additional items Assist x 1;Verbal cues; Increased time required  (bed elevated)   Stand to Sit 5  Supervision   Additional items Assist x 1;Verbal cues; Increased time required  (bed elevated)   Ambulation/Elevation   Gait pattern Foward flexed; Short stride; Step to;Decreased R stance  (decreased gait speed)   Gait Assistance 5  Supervision   Additional items Assist x 1;Verbal cues   Assistive Device Rolling walker   Distance 25 feet with change of direction within room   Stair Management Assistance Not tested  (reports he sita have ramped entrance, will not need to negotiate steps)   Balance   Static Sitting Fair +   Dynamic Sitting Fair   Static Standing Fair   Dynamic Standing [facility] -   Activity Tolerance   Activity Tolerance Patient tolerated treatment well;Patient limited by fatigue   Nurse Made Aware yes CARMELLA Waldron   Assessment   Prognosis Good   Problem List Decreased strength;Decreased endurance; Impaired balance;Decreased mobility; Impaired judgement;Decreased safety awareness;Pain;Decreased skin integrity   Assessment Patient seen for Physical Therapy evaluation. Patient admitted with Constipation. Comorbidities affecting patient's physical performance include: anemia, CAD, cardiac disease, chronic pain, CVA, diabetes, DVT, ESRD on hemodialysis, hypertension and hyperlipidemia. Personal factors affecting patient at time of initial evaluation include: lives in 2 story house, ambulating with assistive device, stairs to enter home, inability to navigate community distances, inability to navigate level surfaces without external assistance, inability to perform IADLS  and inability to live alone.  Prior to admission, patient was independent with functional mobility with walker, independent with ADLS, requiring assist for IADLS, living with spouse in a 2 level home with 3 steps to enter, ambulating household distance and in short term rehab. Please find objective findings from Physical Therapy assessment regarding body systems outlined above with impairments and limitations including weakness, impaired balance, decreased endurance, gait deviations, decreased activity tolerance, decreased functional mobility tolerance and fall risk. The Barthel Index was used as a functional outcome tool presenting with a score of Barthel Index Score: 45 today indicating marked limitations of functional mobility and ADLS. Patient's clinical presentation is currently unstable/unpredictable as seen in patient's presentation of changing level of pain, increased fall risk and decreased endurance. Pt would benefit from continued Physical Therapy treatment to address deficits as defined above and maximize level of functional mobility. As demonstrated by objective findings, the assigned level of complexity for this evaluation is high. The patient's -[facility] Basic Mobility Inpatient Short Form Raw Score is 17. A Raw score of greater than 16 suggests the patient may benefit from discharge to home. Please also refer to the recommendation of the Physical Therapist for safe discharge planning. Goals   Patient Goals to go home and continue therapy   STG Expiration Date 10/12/23   Short Term Goal #1 Pt will perform bed mobility Mod I with rails ; Standing balance will improve to Fair to decrease risk of falls ; BLE strength will improve by 1/2 grade to improve tolerance to functional mobility ;  Pt will ambulate x 100 feet with supervision + RW ; Pt will negotiate x 1 step/curb with Min A + RW ; AMPAC score will improve >19/24 to demonstrate improved functional independence   LTG Expiration Date 10/19/23   Long Term Goal #1 Standing balance will improve to Fair+ to decrease risk of falls ; BLE strength will improve by 1 grade to improve tolerance to functional mobility ; Pt will ambulate x 150 feet Mod I + RW ; Pt will negotiate x 1 step/curb with supervision + RW ; AMPAC score will improve >21/24 to demonstrate improved functional independence   Plan   Treatment/Interventions ADL retraining;Functional transfer training;LE strengthening/ROM; Therapeutic exercise; Endurance training;Bed mobility;Gait training;Spoke to nursing   PT Frequency Other (Comment)  (5x/week)   Recommendation   PT Discharge Recommendation Home with home health rehabilitation  (+ continued family assist)   Additional Comments Per pt wife is obtaining ramp, wheelchair today   AM-PAC Basic Mobility Inpatient   Turning in Flat Bed Without Bedrails 3   Lying on Back to Sitting on Edge of Flat Bed Without Bedrails 3   Moving Bed to Chair 3   Standing Up From Chair Using Arms 3   Walk in Room 3   Climb 3-5 Stairs With Railing 2   Basic Mobility Inpatient Raw Score 17   Basic Mobility Standardized Score 39.67   Highest Level Of Mobility   JH-HLM Goal 5: Stand one or more mins   JH-HLM Achieved 7: Walk 25 feet or more   Barthel Index   Feeding 10   Bathing 0   Grooming Score 5   Dressing Score 5   Bladder Score 5   Bowels Score 5   Toilet Use Score 5   Transfers (Bed/Chair) Score 10   Mobility (Level Surface) Score 0   Stairs Score 0   Barthel Index Score 45   Additional Treatment Session   Start Time 1307   End Time 1317   Treatment Assessment S: "I need to go soon I can't wait for the commode" O/A: Pt agreeable to PT session this afternoon. Able to perform side to side rolling x 2-3 times with supervision using bed rails. Dependent for pericare in supine. Repositioned in bedside chair with all needs within reach. P: pt will benefit from skilled PT to address deficits to maximize IND for safe d/c   End of Consult   Patient Position at End of Consult Supine; All needs within reach;Bed/Chair alarm activated Licensure   [place] License Number  Jinny Padgett [id number]

## 2023-10-05 NOTE — ED NOTES
Patient informed nurse he is a very hard IV placement and he wants someone who can get it for sure. Patient informed will see if an IV can be placed by US or not. Patient was informed of soap suds enema that was ordered and was not happy. Nurse is aware of enema that was attempted yesterday here in the ER, and only 300 mls was tolerated.      Violet Price RN  10/04/23 2016

## 2023-10-05 NOTE — H&P
[address] female stop  H&P  Name: Maximo Gravse 70 y.o. male I MRN: [id number]  Unit/Bed#: [address] [place] [address] Date of Admission: 10/4/2023   Date of Service: 10/5/2023 I Hospital Day: 0      Assessment/Plan   * Constipation  Assessment & Plan  Patient presents with constipation for 5 days. Reports lower abdomen pain. · Bowel regimen  · Soapsuds enema x1  · Monitor BM    Pleural effusion, bilateral  Assessment & Plan  · CT 10/3 showed - Moderate right and small left pleural effusion  · Patient denies SOB  · Will monitor for now       Hypotension  Assessment & Plan  · Continue midodrine    Leukopenia  Assessment & Plan  · Mild, afebrile. · Monitor    History of CVA (cerebrovascular accident)  Assessment & Plan  • With resultant right leg weakness. • Continue aspirin Plavix Lipitor    Chronic deep vein thrombosis (DVT) of internal jugular vein (HCC)  Assessment & Plan  · Continue aspirin Plavix    Type 2 diabetes mellitus  Assessment & Plan  Lab Results   Component Value Date    HGBA1C 4.8 01/05/2023       No results for input(s): "POCGLU" in the last 72 hours. Blood Sugar Average: Last 72 hrs:  · Diet controlled  · Patient with poor appetite. Will order regular diet. Nicotine dependence  Assessment & Plan  ·  Nicotine patch, smoking cessation    ESRD (end stage renal disease) on dialysis [facility])  Assessment & Plan  Lab Results   Component Value Date    EGFR 10 10/04/2023    EGFR 13 10/03/2023    EGFR 18 10/01/2023    CREATININE 5.22 (H) 10/04/2023    CREATININE 4.08 (H) 10/03/2023    CREATININE 3.28 (H) 10/01/2023     · Last dialysis was on Monday in rehab. Was on Tuesday Thursday Saturday schedule prior to recent hospitalization. · Consult nephrology for dialysis  · Daily weight, intake output    Coronary artery disease   Assessment & Plan  • Status post PCI with stents x3. On aspirin Plavix Lipitor at home. We will continue.   • Denies chest pain            VTE Prophylaxis: Heparin  / reason for no mechanical VTE prophylaxis on heparin   Code Status: Full code  POLST: POLST form is not discussed and not completed at this time. Anticipated Length of Stay:  Patient will be admitted on an Observation basis with an anticipated length of stay of  < 2 midnights. Justification for Hospital Stay: Constipation abdominal pain    Total Time for Visit, including Counseling / Coordination of Care: 30 minutes. Greater than 50% of this total time spent on direct patient counseling and coordination of care. Chief Complaint:   Constipation abdominal pain    History of Present Illness:    James Pérez is a 70 y.o. male with PMH of constipation, ESRD on HD, CAD, DVT, CVA, hypotension, nicotine abuse, type 2 diabetes who presents with constipation for 5 days with associated lower abdominal pain nausea vomiting today. Reports vomited once at home. Patient reports rectal pain. Denies chest pain, headache, dizziness, SOB, fever or chills. Patient was recently hospitalized for generalized weakness. Patient was discharged to rehab on 10/1. Patient left AMA on 10/3. Was seen in ED for constipation and abdominal pain,was discharged home after giving enema. Patient had some BM after enema per wife. Patient returned to ED due to persistent abdominal pain/rectal pain. Reports had dialysis on Monday in rehab, was supposed to go today for dialysis but was unable to due to abdominal pain and constipation. Review of Systems:    Review of Systems   Constitutional: Positive for appetite change. Gastrointestinal: Positive for abdominal pain, constipation, nausea and vomiting. All other systems reviewed and are negative.       Past Medical and Surgical History:     Past Medical History:   Diagnosis Date   • 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    With Cerebral Infarction:  Last Assessed:  October 20, 2016   • Chronic kidney disease 2012    on dialysis    • COVID 05/2022   • COVID-19 04/2022 4/2022-while in NH   • Diabetes mellitus ([address])    • Dialysis patient [facility])     T/TH/Sat   • Difficulty walking    • GERD (gastroesophageal reflux disease)    • Hyperlipidemia    • Hypertension    • Labyrinthitis 09/10/2011   • Myocardial infarction [facility]) 2014    x3 others were in 2009 & 2010   • Sleep disturbances 09/20/2010   • Stroke [facility]) 2007    x1, RLE deficit- uses walker   • Type 2 diabetes, uncontrolled, with renal manifestation 05/03/2017       Past Surgical History:   Procedure Laterality Date   • AV FISTULA PLACEMENT     • AV FISTULA REPAIR Left 6/3/2023    Procedure: LEFT UPPER EXTREMITY A-V GRAFT EXPLANT, LEFT BRACHIAL ANGIOPATCH,  APPLICATION OF  VAC;  Surgeon: Priyank Rider MD;  [facility];  Service: Vascular   • CARDIAC CATHETERIZATION Left 02/03/2023    Procedure: Cardiac Left Heart Cath;  Surgeon: Emily Gillis MD;  Location: [address] [place] [address] CATH LAB; Service: Cardiology   • CARDIAC CATHETERIZATION N/A 02/08/2023    Procedure: Cardiac catheterization with complex PCI with Dr. Luis Jacobo, patient is a renal dialysis patient;  Surgeon: Nova Torres MD;  Location: [facility] C[facility]; Service: Cardiology   • CARDIAC SURGERY  2010    stents x3   • COLONOSCOPY N/A 12/12/2016    Procedure: COLONOSCOPY;  Surgeon: Juany Galindo MD;  Location: [address] [place] [address] GI LAB; Service:    • COLONOSCOPY N/A 04/03/2017    Procedure:  COLONOSCOPY;  Surgeon: Juany Galindo MD;  Location: [address] [place] [address] GI LAB;   Service:    • HARDWARE REMOVAL Right 04/04/2022    Procedure: REMOVAL HARDWARE HIP;  Surgeon: Stephie Huang MD;  [facility];  Service: Orthopedics   • IR AV FISTULA/GRAFT DECLOT  04/29/2021   • IR AV FISTULA/GRAFT DECLOT  03/25/2022   • IR AV FISTULA/GRAFT DECLOT  09/21/2022   • IR AV FISTULAGRAM/GRAFTOGRAM  03/28/2022   • IR AV FISTULAGRAM/GRAFTOGRAM  07/28/2022   • IR TUNNELED CENTRAL LINE REMOVAL  12/07/2021   • IR TUNNELED DIALYSIS CATHETER PLACEMENT  05/24/2021   • PORTACATH PLACEMENT      per pt "port in chest"   • NH ARTERIOVENOUS ANASTOMOSIS OPEN DIRECT Left 07/22/2021    Procedure: CREATION FISTULA ARTERIOVENOUS (AV); Surgeon: Maurisio Madsen MD;  Location: [facility];  Service: Vascular   • NH ARTERIOVENOUS ANASTOMOSIS OPEN DIRECT Left 3/21/2023    Procedure: left brachiobasilic fistula,  AVG Graft;  Surgeon: Jagruti Batista MD;  [facility];  Service: Vascular   • NH ARTERIOVENOUS ANASTOMOSIS OPEN DIRECT Left 5/19/2023    Procedure: CREATION of LEFT FISTULA ARTERIOVENOUS (AV) GRAFT;  Surgeon: Jagruti Batista MD;  [facility];  Service: Vascular   • NH HEMIARTHROPLASTY HIP PARTIAL Right 04/04/2022    Procedure: HEMIARTHROPLASTY HIP (BIPOLAR); Surgeon: Mary Ann Barnhart MD;  [facility];  Service: Orthopedics       Meds/Allergies:    Prior to Admission medications    Medication Sig Start Date End Date Taking?  Authorizing Provider   ammonium lactate (LAC-HYDRIN) 12 % lotion Apply topically 2 (two) times a day as needed for dry skin 8/30/23  Yes Kevin Talley DPM   ascorbic acid (VITAMIN C) 500 mg tablet TAKE ONE TABLET BY MOUTH EVERY EVENING FOR SUPPLEMENT 1/26/22  Yes Historical Provider, MD   aspirin (ECOTRIN LOW STRENGTH) 81 mg EC tablet Take 81 mg by mouth daily 1/26/22  Yes Historical Provider, MD   atorvastatin (LIPITOR) 20 mg tablet Take 20 mg by mouth daily at bedtime 1/26/22  Yes Historical Provider, MD   Cholecalciferol 50 MCG (2000 UT) CAPS Take by mouth daily 3/22/21  Yes Historical Provider, MD   clopidogrel (PLAVIX) 75 mg tablet Take 75 mg by mouth daily 8/18/23  Yes Historical Provider, MD   docusate sodium (COLACE) 100 mg capsule Take 1 capsule (100 mg total) by mouth daily for 14 days 10/3/23 10/17/23 Yes Juwan Card MD   gabapentin (NEURONTIN) 100 mg capsule Take 1 capsule (100 mg total) by mouth 3 (three) times a week On Tuesday Thursday Saturday 6/16/23  Yes Cliff Stuart DO   hydrOXYzine HCL (ATARAX) 25 mg tablet TAKE 1 TABLET BY MOUTH EVERY 8 HOURS AS NEEDED FOR ITCHING 8/18/23  Yes Historical Provider, MD   polyethylene glycol (MIRALAX) 17 g packet Take 17 g by mouth daily for 7 days 10/3/23 10/10/23 Yes Chris Cortés MD   vitamin B-12 (VITAMIN B-12) 1,000 mcg tablet Take 1,000 mcg by mouth daily 1/24/23  Yes Historical Provider, MD   acetaminophen (TYLENOL) 325 mg tablet Take 3 tablets (975 mg total) by mouth every 8 (eight) hours as needed for mild pain 6/15/23   Cliff Stuart DO   bacitracin topical ointment 500 units/g topical ointment Apply 1 large application topically 2 (two) times a day 9/20/23   Jerica Schroeder DO   calcitriol (ROCALTROL) 0.25 mcg capsule Take 3 capsules (0.75 mcg total) by mouth 3 (three) times a week  Patient not taking: Reported on 9/25/2023 6/16/23   Cliff Stuart DO   [place] Choice Comfort EZ 33G X 4 MM MISC  1/31/22   Historical Provider, MD   midodrine (PROAMATINE) 10 MG tablet Take 1 tablet (10 mg total) by mouth 3 (three) times a day before meals  Patient taking differently: Take 10 mg by mouth 3 (three) times a day before meals Takes BID 5/24/23 9/25/23  Brittney Mcrae MD   oxyCODONE (ROXICODONE) 5 immediate release tablet Take 0.5 tablets (2.5 mg total) by mouth every 8 (eight) hours as needed for severe pain for up to 10 doses Max Daily Amount: 7.5 mg 9/14/23   TESSY Castro     I have reviewed home medications with patient personally.     Allergies: No Known Allergies    Social History:     Marital Status: /Civil Union   Occupation: Retired  Patient Pre-hospital Living Situation: Wife  Patient Pre-hospital Level of Mobility: Walker with assistance  Patient Pre-hospital Diet Restrictions: Regular diet  Substance Use History:   Social History     Substance and Sexual Activity   Alcohol Use Yes   • Alcohol/week: 1.0 standard drink of alcohol   • Types: 1 Shots of liquor per week    Comment: 1 shot daily     Social History     Tobacco Use   Smoking Status Every Day   • Packs/day: 1.00   • Years: 30.00   • Total pack years: 30.00   • Types: Cigarettes   Smokeless Tobacco Never   Tobacco Comments    Currently not smoking - in facility     Social History     Substance and Sexual Activity   Drug Use Yes   • Types: Marijuana    Comment: gummies       Family History:    non-contributory    Physical Exam:     Vitals:   Blood Pressure: 126/66 (10/05/23 1030)  Pulse: 71 (10/05/23 1030)  Temperature: 97.6 °F (36.4 °C) (10/05/23 0830)  Temp Source: Tympanic (10/05/23 0830)  Respirations: 18 (10/05/23 1030)  Height: 5' 11" (180.3 cm) (10/04/23 2331)  Weight - Scale: 64.5 kg (142 lb 3.2 oz) (10/05/23 0554)  SpO2: 95 % (10/05/23 0249)    Physical Exam  Vitals and nursing note reviewed. Constitutional:       Appearance: He is well-developed. Comments: Patient appears weak and in pain   HENT:      Head: Normocephalic and atraumatic. Neck:      Thyroid: No thyromegaly. Vascular: No JVD. Trachea: No tracheal deviation. Cardiovascular:      Rate and Rhythm: Normal rate and regular rhythm. Heart sounds: Normal heart sounds. Comments: Right chest permacath in situ  Pulmonary:      Effort: Pulmonary effort is normal. No respiratory distress. Breath sounds: Normal breath sounds. No wheezing or rales. Abdominal:      General: Bowel sounds are normal. There is no distension. Palpations: Abdomen is soft. Tenderness: There is abdominal tenderness. There is no guarding. Comments: Lower abdomen tender. Bowel sounds hyperactive. Musculoskeletal:      Cervical back: Neck supple. Right lower leg: No edema. Left lower leg: No edema. Skin:     General: Skin is warm and dry. Comments: + Buttock wound   Neurological:      General: No focal deficit present. Mental Status: He is alert and oriented to person, place, and time.    Psychiatric:         Mood and Affect: Mood normal.         Judgment: Judgment normal.         Additional Data:     Lab Results: I have personally reviewed pertinent reports. Results from last 7 days   Lab Units 10/04/23  2055   WBC Thousand/uL 3.05*   HEMOGLOBIN g/dL 8.5*   HEMATOCRIT % 26.7*   PLATELETS Thousands/uL 146*   NEUTROS PCT % 68   LYMPHS PCT % 18   MONOS PCT % 10   EOS PCT % 3     Results from last 7 days   Lab Units 10/04/23  2055   POTASSIUM mmol/L 4.4   CHLORIDE mmol/L 99   CO2 mmol/L 25   BUN mg/dL 20   CREATININE mg/dL 5.22*   CALCIUM mg/dL 8.5   ALK PHOS U/L 61   ALT U/L 5*   AST U/L 15           Imaging: I have personally reviewed pertinent reports. XR abdomen obstruction series    Result Date: 10/5/2023  Narrative: OBSTRUCTION SERIES INDICATION:   pain. COMPARISON: CT abdomen pelvis on 10/3/2023 EXAM PERFORMED/VIEWS:  XR ABDOMEN OBSTRUCTION SERIES FINDINGS: Mildly dilated small bowel loops noted centrally. Scattered bowel gas in the large bowel. Stool ball in the rectum appears similar to recent CT. No free air beneath the hemidiaphragms. Atherosclerotic vascular calcifications identified. Right-sided dialysis catheter tip overlies the right atrium. Moderate size right pleural effusion and small left effusion appear stable from recent CT. Degenerative arthritic changes of both shoulders. Partially visualized right hip hemiarthroplasty. There are surgical clips in the left axilla. Impression: Stool ball in the rectum similar to recent CT. There are now mildly distended small bowel loops centrally, nonspecific question related to mild ileus. Consider continued follow-up imaging as warranted. Workstation performed: [id number]     CT abdomen pelvis wo contrast    Result Date: 10/3/2023  Narrative: CT ABDOMEN AND PELVIS WITHOUT IV CONTRAST INDICATION:   Suprapubic abdominal pain, constipation. COMPARISON:  None. TECHNIQUE:  CT examination of the abdomen and pelvis was performed without intravenous contrast. Multiplanar 2D reformatted images were created from the source data.  This examination, like all CT scans performed in the [facility], was performed utilizing techniques to minimize radiation dose exposure, including the use of iterative reconstruction and automated exposure control. Radiation dose length product (DLP) for this visit:  461 mGy-cm Enteric contrast was administered. FINDINGS: ABDOMEN LOWER CHEST:  No moderate right and small left pleural effusion LIVER/BILIARY TREE:  Unremarkable. GALLBLADDER:  There are gallstone(s) within the gallbladder, without pericholecystic inflammatory changes. SPLEEN:  Unremarkable. PANCREAS:  Unremarkable. ADRENAL GLANDS:  Unremarkable. KIDNEYS/URETERS: Severely atrophic with multiple bilateral cystic-appearing lesions similar to prior CT examinations. Scattered vascular calcifications or nonobstructing renal calculi. No hydronephrosis. STOMACH AND BOWEL:  There is colonic diverticulosis without evidence of acute diverticulitis. Rectal fecal impaction APPENDIX:  No findings to suggest appendicitis. ABDOMINOPELVIC CAVITY:  No ascites. No pneumoperitoneum. No lymphadenopathy. VESSELS:  Atherosclerotic changes are present. No evidence of aneurysm. PELVIS REPRODUCTIVE ORGANS:  Unremarkable for patient's age. URINARY BLADDER:  Unremarkable. ABDOMINAL WALL/INGUINAL REGIONS:  Unremarkable. OSSEOUS STRUCTURES:  No acute fracture or destructive osseous lesion. Right hip arthroplasty. Chronic superior endplate compression fracture of the T11 vertebral body. Impression: No evidence of acute intra-abdominal or pelvic pathology. Rectal fecal impaction. Moderate right and small left pleural effusion Workstation performed: [id number]     XR chest 1 view portable    Result Date: 9/13/2023  Narrative: CHEST INDICATION:   hypotension. Weakness COMPARISON: 03/28/2023 EXAM PERFORMED/VIEWS:  XR CHEST PORTABLE Images: 2 FINDINGS: Cardiomediastinal silhouette appears unremarkable. Right-sided dialysis catheter unchanged. Small right pleural effusion. No infiltrates.  No evidence of heart failure. Osseous structures appear within normal limits for patient age. Impression: Small right pleural effusion. Workstation performed: [id number]       EKG, Pathology, and Other Studies Reviewed on Admission:   · EKG: na    [employer] Records Reviewed: Yes     ** Please Note: Dragon 360 Dictation voice to text software may have been used in the creation of this document.  **

## 2023-10-05 NOTE — ASSESSMENT & PLAN NOTE
Patient presents with constipation for 5 days. Reports lower abdomen pain.   · Bowel regimen  · Soapsuds enema x1  · Monitor BM

## 2023-10-05 NOTE — ASSESSMENT & PLAN NOTE
• Status post PCI with stents x3. On aspirin Plavix Lipitor at home. We will continue.   • Denies chest pain

## 2023-10-05 NOTE — ASSESSMENT & PLAN NOTE
Lab Results   Component Value Date    HGBA1C 4.8 01/05/2023       No results for input(s): "POCGLU" in the last 72 hours. Blood Sugar Average: Last 72 hrs:  · Diet controlled  · Patient with poor appetite. Will order regular diet.

## 2023-10-05 NOTE — ASSESSMENT & PLAN NOTE
Patient presents with constipation for 5 days. Reports lower abdomen pain. CT abdomen pelvis showed:  No evidence of acute intra-abdominal or pelvic pathology. Rectal fecal impaction.     · Aggressive bowel regimen senna docusate 2 tabs bid miralax bid  · Soapsuds enema x1    Abdominal AM xray: IMPRESSION:  Stool ball in the rectum similar to recent CT. There are now mildly distended small bowel loops centrally, nonspecific question related to mild ileus. Consider continued follow-up imaging as warranted. · Patient had 2 bowel movements, states he's feeling much better  · Evaluated patient this AM and again prior to discharge. Abdomen soft non tender. Pt states he feels well and wants to go home. No longer feels constipated.    · Plan to d/c home  · PT/OT cleared for d/c

## 2023-10-05 NOTE — DISCHARGE SUMMARY
[address]  Discharge- Alexia Iba 1955, 70 y.o. male MRN: [id number]  Unit/Bed#: [address] [place] [address] Encounter: [id number]  Primary Care Provider: Margo Avalos MD   Date and time admitted to hospital: 10/4/2023  7:53 PM    * Constipation  Assessment & Plan  Patient presents with constipation for 5 days. Reports lower abdomen pain. CT abdomen pelvis showed:  No evidence of acute intra-abdominal or pelvic pathology. Rectal fecal impaction.     · Aggressive bowel regimen senna docusate 2 tabs bid miralax bid  · Soapsuds enema x1    Abdominal AM xray: IMPRESSION:  Stool ball in the rectum similar to recent CT. There are now mildly distended small bowel loops centrally, nonspecific question related to mild ileus. Consider continued follow-up imaging as warranted. · Patient had 2 bowel movements, states he's feeling much better  · Evaluated patient this AM and again prior to discharge. Abdomen soft non tender. Pt states he feels well and wants to go home. No longer feels constipated. · Plan to d/c home  · PT/OT cleared for d/c    ESRD (end stage renal disease) on dialysis [facility])  Assessment & Plan  Lab Results   Component Value Date    EGFR 10 10/04/2023    EGFR 13 10/03/2023    EGFR 18 10/01/2023    CREATININE 5.22 (H) 10/04/2023    CREATININE 4.08 (H) 10/03/2023    CREATININE 3.28 (H) 10/01/2023     · Last dialysis was on Monday in rehab. Was on Tuesday Thursday Saturday schedule prior to recent hospitalization. · Consult nephrology for dialysis  · S/P HD today  · Continue outpatient HD sessions tomorrow    Pleural effusion, bilateral  Assessment & Plan  · CT 10/3 showed - Moderate right and small left pleural effusion  · Patient denies SOB  · S/P HD today Continue outpatient HD tomorrow       Leukopenia  Assessment & Plan  · Mild, afebrile. · Monitor    History of CVA (cerebrovascular accident)  Assessment & Plan  • With resultant right leg weakness.     • Continue aspirin Plavix Lipitor    Hypotension  Assessment & Plan  · Continue midodrine    Chronic deep vein thrombosis (DVT) of internal jugular vein (HCC)  Assessment & Plan  · Continue aspirin Plavix    Type 2 diabetes mellitus  Assessment & Plan  Lab Results   Component Value Date    HGBA1C 4.8 01/05/2023       No results for input(s): "POCGLU" in the last 72 hours. Blood Sugar Average: Last 72 hrs:  · Diet controlled  · Patient with poor appetite. Will order regular diet. Nicotine dependence  Assessment & Plan  ·  Nicotine patch, smoking cessation    Coronary artery disease   Assessment & Plan  • Status post PCI with stents x3. On aspirin Plavix Lipitor at home. We will continue. • Denies chest pain      Medical Problems     Resolved Problems  Date Reviewed: 10/5/2023   None       Discharging Physician / Practitioner: Blanche Lee DO  PCP: Krystina Shafer MD  Admission Date:   Admission Orders (From admission, onward)     Ordered        10/04/23 2154  Place in Observation  Once                      Discharge Date: 10/05/23    Consultations During Hospital Stay:  · Nephrology    Procedures Performed:   · none    Significant Findings / Test Results:   CT abd/pelvis showed:  IMPRESSION:  No evidence of acute intra-abdominal or pelvic pathology. Rectal fecal impaction. Moderate right and small left pleural effusion    Abdominal: Xray  IMPRESSION:     Stool ball in the rectum similar to recent CT. There are now mildly distended small bowel loops centrally, nonspecific question related to mild ileus. Consider continued follow-up imaging as warranted. Incidental Findings:   · See above   · I reviewed the above mentioned incidental findings with the patient and/or family and they expressed understanding.     Test Results Pending at Discharge (will require follow up):   · none     Outpatient Tests Requested:  · n/a    Complications:  none    Reason for Admission: Constipation    Hospital Course:   Estee Oh Kristina Kincaid is a 70 y.o. male patient who originally presented to the hospital on 10/4/2023 due to abdominal pain secondary to constipation. CT abdomen showed rectal fecal impaction. Patient received aggressive bowel regiment along with soapsuds enema times once. Abdominal x-ray this morning showed stool ball in the rectum similar to recent CT. Yair Taylor Patient admits to having 2 large bowel movements and feels much better. Requesting to be discharged. PT OT evaluated patient and cleared patient for discharge home. Patient had hemodialysis today patient to resume his regular scheduled dialysis sessions. Please see above list of diagnoses and related plan for additional information. Condition at Discharge: good    Discharge Day Visit / Exam:   Subjective: Patient states that he feels well currently has no acute complaints requesting to go home  Vitals: Blood Pressure: 128/74 (10/05/23 1135)  Pulse: 74 (10/05/23 1135)  Temperature: 97.8 °F (36.6 °C) (10/05/23 1135)  Temp Source: Tympanic (10/05/23 1135)  Respirations: 18 (10/05/23 1135)  Height: 5' 11" (180.3 cm) (10/04/23 2331)  Weight - Scale: 64.5 kg (142 lb 3.2 oz) (10/05/23 0554)  SpO2: 95 % (10/05/23 0249)  Exam:   Physical Exam  Vitals and nursing note reviewed. Constitutional:       General: He is not in acute distress. Appearance: He is well-developed. HENT:      Head: Normocephalic and atraumatic. Eyes:      Conjunctiva/sclera: Conjunctivae normal.   Cardiovascular:      Rate and Rhythm: Normal rate and regular rhythm. Heart sounds: No murmur heard. Pulmonary:      Effort: Pulmonary effort is normal. No respiratory distress. Breath sounds: Normal breath sounds. Abdominal:      Palpations: Abdomen is soft. Tenderness: There is no abdominal tenderness. Musculoskeletal:         General: No swelling. Cervical back: Neck supple. Skin:     General: Skin is warm and dry.       Capillary Refill: Capillary refill takes less than 2 seconds. Neurological:      Mental Status: He is alert. Psychiatric:         Mood and Affect: Mood normal.          Discussion with Family: Patient declined call to . Discharge instructions/Information to patient and family:   See after visit summary for information provided to patient and family. Provisions for Follow-Up Care:  See after visit summary for information related to follow-up care and any pertinent home health orders. Disposition:   Home    Planned Readmission: no     Discharge Statement:  I spent 45 minutes discharging the patient. This time was spent on the day of discharge. I had direct contact with the patient on the day of discharge. Greater than 50% of the total time was spent examining patient, answering all patient questions, arranging and discussing plan of care with patient as well as directly providing post-discharge instructions. Additional time then spent on discharge activities. Discharge Medications:  See after visit summary for reconciled discharge medications provided to patient and/or family.       **Please Note: This note may have been constructed using a voice recognition system**

## 2023-10-05 NOTE — ASSESSMENT & PLAN NOTE
Lab Results   Component Value Date    EGFR 10 10/04/2023    EGFR 13 10/03/2023    EGFR 18 10/01/2023    CREATININE 5.22 (H) 10/04/2023    CREATININE 4.08 (H) 10/03/2023    CREATININE 3.28 (H) 10/01/2023     · Last dialysis was on Monday in rehab. Was on Tuesday Thursday Saturday schedule prior to recent hospitalization.   · Consult nephrology for dialysis  · S/P HD today  · Continue outpatient HD sessions tomorrow

## 2023-10-05 NOTE — ED PROVIDER NOTES
History  Chief Complaint   Patient presents with   • Constipation     States being d/c from rehab today for a fall and has been constipated since last week, kept asking for a stool softener but not given any     HPI  Patient is a 50-year-old male history of diabetes, CAD, baseline difficulty walking, end-stage renal disease on dialysis presenting for evaluation of constipation. Patient states recent stay in rehab, decreased mobility overall, use of oxycodone for pain control, states that he has not had a bowel movement in approximately 5 days. Patient states tenesmus but states he is not able to pass a bowel movement, does complain of some rectal pain and some generalized abdominal pain, nausea without vomiting. Patient denies fevers, chills, complains of some generalized weakness without focality. Prior to Admission Medications   Prescriptions Last Dose Informant Patient Reported? Taking?    Cholecalciferol 50 MCG (2000 UT) CAPS  Self, Outside Facility (Specify) Yes No   Sig: Take by mouth daily   [place] Choice Comfort EZ 33G X 4 MM MISC  Self, Outside Facility (Specify) Yes No   Patient not taking: Reported on 9/12/2023   acetaminophen (TYLENOL) 325 mg tablet  Outside Facility (Specify) No No   Sig: Take 3 tablets (975 mg total) by mouth every 8 (eight) hours as needed for mild pain   ammonium lactate (LAC-HYDRIN) 12 % lotion   No No   Sig: Apply topically 2 (two) times a day as needed for dry skin   ascorbic acid (VITAMIN C) 500 mg tablet  Self, Outside Facility (Specify) Yes No   Sig: TAKE ONE TABLET BY MOUTH EVERY EVENING FOR SUPPLEMENT   aspirin (ECOTRIN LOW STRENGTH) 81 mg EC tablet  Self, Outside Facility (Specify) Yes No   Sig: Take 81 mg by mouth daily   atorvastatin (LIPITOR) 20 mg tablet  Self, Outside Facility (Specify) Yes No   Sig: Take 20 mg by mouth daily at bedtime   bacitracin topical ointment 500 units/g topical ointment   No No   Sig: Apply 1 large application topically 2 (two) times a day clopidogrel (PLAVIX) 75 mg tablet   Yes No   Sig: Take 75 mg by mouth daily   docusate sodium (COLACE) 100 mg capsule   Yes No   Sig: Take 100 mg by mouth daily   docusate sodium (COLACE) 100 mg capsule   No Yes   Sig: Take 1 capsule (100 mg total) by mouth daily for 14 days   gabapentin (NEURONTIN) 100 mg capsule  Outside Facility (Specify) No No   Sig: Take 1 capsule (100 mg total) by mouth 3 (three) times a week On Tuesday Thursday Saturday   hydrOXYzine HCL (ATARAX) 25 mg tablet   Yes No   Sig: TAKE 1 TABLET BY MOUTH EVERY 8 HOURS AS NEEDED FOR ITCHING   midodrine (PROAMATINE) 10 MG tablet   No No   Sig: Take 1 tablet (10 mg total) by mouth 3 (three) times a day before meals   Patient taking differently: Take 10 mg by mouth 3 (three) times a day before meals Takes BID   oxyCODONE (ROXICODONE) 5 immediate release tablet   No No   Sig: Take 0.5 tablets (2.5 mg total) by mouth every 8 (eight) hours as needed for severe pain for up to 10 doses Max Daily Amount: 7.5 mg   vitamin B-12 (VITAMIN B-12) 1,000 mcg tablet  Outside Facility (Specify) Yes No   Sig: Take 1,000 mcg by mouth daily      Facility-Administered Medications: None       Past Medical History:   Diagnosis Date   • 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    With Cerebral Infarction:  Last Assessed:  October 20, 2016   • Chronic kidney disease 2012    on dialysis    • COVID 05/2022   • COVID-19 04/2022 4/2022-while in NH   • Diabetes mellitus ([address])    • Dialysis patient [facility])     T/TH/Sat   • Difficulty walking    • GERD (gastroesophageal reflux disease)    • Hyperlipidemia    • Hypertension    • Labyrinthitis 09/10/2011   • Myocardial infarction [facility]) 2014    x3 others were in 2009 & 2010   • Sleep disturbances 09/20/2010   • Stroke [facility]) 2007    x1, RLE deficit- uses walker   • Type 2 diabetes, uncontrolled, with renal manifestation 05/03/2017       Past Surgical History:   Procedure Laterality Date   • AV FISTULA PLACEMENT     • AV FISTULA REPAIR Left 6/3/2023    Procedure: LEFT UPPER EXTREMITY A-V GRAFT EXPLANT, LEFT BRACHIAL ANGIOPATCH,  APPLICATION OF  VAC;  Surgeon: Dat Rider MD;  [facility];  Service: Vascular   • CARDIAC CATHETERIZATION Left 02/03/2023    Procedure: Cardiac Left Heart Cath;  Surgeon: Sunita Johnson MD;  Location: [address] [place] [address] CATH LAB; Service: Cardiology   • CARDIAC CATHETERIZATION N/A 02/08/2023    Procedure: Cardiac catheterization with complex PCI with Dr. Jodi Dempsey, patient is a renal dialysis patient;  Surgeon: Heidi Gil MD;  Location: [facility]; Service: Cardiology   • CARDIAC SURGERY  2010    stents x3   • COLONOSCOPY N/A 12/12/2016    Procedure: COLONOSCOPY;  Surgeon: Chaya Clement MD;  Location: [address] [place] [address] [facility]; Service:    • COLONOSCOPY N/A 04/03/2017    Procedure:  COLONOSCOPY;  Surgeon: Chaya Clement MD;  Location: [address] [place] [address] [facility]; Service:    • HARDWARE REMOVAL Right 04/04/2022    Procedure: REMOVAL HARDWARE HIP;  Surgeon: Petrona Khan MD;  [facility];  Service: Orthopedics   • IR AV FISTULA/GRAFT DECLOT  04/29/2021   • IR AV FISTULA/GRAFT DECLOT  03/25/2022   • IR AV FISTULA/GRAFT DECLOT  09/21/2022   • IR AV FISTULAGRAM/GRAFTOGRAM  03/28/2022   • IR AV FISTULAGRAM/GRAFTOGRAM  07/28/2022   • IR TUNNELED CENTRAL LINE REMOVAL  12/07/2021   • IR TUNNELED DIALYSIS CATHETER PLACEMENT  05/24/2021   • PORTACATH PLACEMENT      per pt "port in chest"   • WI ARTERIOVENOUS ANASTOMOSIS OPEN DIRECT Left 07/22/2021    Procedure: CREATION FISTULA ARTERIOVENOUS (AV);   Surgeon: Ashleigh Estrella MD;  Location: [facility];  Service: Vascular   • WI ARTERIOVENOUS ANASTOMOSIS OPEN DIRECT Left 3/21/2023    Procedure: left brachiobasilic fistula,  AVG Graft;  Surgeon: Parminder Kim MD;  [facility];  Service: Vascular   • WI ARTERIOVENOUS ANASTOMOSIS OPEN DIRECT Left 5/19/2023    Procedure: CREATION of LEFT FISTULA ARTERIOVENOUS (AV) GRAFT;  Surgeon: Parminder Kim MD; Location: [facility];  Service: Vascular   • OR HEMIARTHROPLASTY HIP PARTIAL Right 04/04/2022    Procedure: HEMIARTHROPLASTY HIP (BIPOLAR); Surgeon: Madeleine Robert MD;  [facility];  Service: Orthopedics       Family History   Problem Relation Age of Onset   • Leukemia Mother    • Crohn's disease Father    • Heart disease Father    • Transient ischemic attack Brother      I have reviewed and agree with the history as documented. E-Cigarette/Vaping   • E-Cigarette Use Never User      E-Cigarette/Vaping Substances   • Nicotine No    • THC No    • CBD No    • Flavoring No    • Other No    • Unknown No      Social History     Tobacco Use   • Smoking status: Every Day     Packs/day: 1.00     Years: 30.00     Total pack years: 30.00     Types: Cigarettes   • Smokeless tobacco: Never   • Tobacco comments:     Currently not smoking - in facility   Vaping Use   • Vaping Use: Never used   Substance Use Topics   • Alcohol use: Yes     Alcohol/week: 1.0 standard drink of alcohol     Types: 1 Shots of liquor per week     Comment: 1 shot daily   • Drug use: Yes     Types: Marijuana     Comment: gummies       Review of Systems   Constitutional: Negative for chills, fatigue and fever. Respiratory: Negative for cough and shortness of breath. Gastrointestinal: Positive for abdominal pain, constipation and nausea. Negative for diarrhea and vomiting. Musculoskeletal: Negative for arthralgias and myalgias. Neurological: Negative for headaches. Psychiatric/Behavioral: Negative for confusion. All other systems reviewed and are negative. Physical Exam  Physical Exam  Vitals and nursing note reviewed. Constitutional:       General: He is not in acute distress. Appearance: Normal appearance. He is not ill-appearing, toxic-appearing or diaphoretic. Comments: Chronically ill-appearing but nontoxic nondistressed   HENT:      Head: Normocephalic and atraumatic.       Right Ear: External ear normal.      Left Ear: External ear normal.   Eyes:      General:         Right eye: No discharge. Left eye: No discharge. Pulmonary:      Effort: No respiratory distress. Abdominal:      General: There is no distension. Comments: Abdomen mildly distended with generalized tenderness, no focality. No rigidity, rebound, guarding. Musculoskeletal:         General: No deformity. Cervical back: Normal range of motion. Skin:     Findings: No lesion or rash. Neurological:      Mental Status: He is alert and oriented to person, place, and time. Mental status is at baseline.    Psychiatric:         Mood and Affect: Mood and affect normal.         Vital Signs  ED Triage Vitals   Temperature Pulse Respirations Blood Pressure SpO2   10/03/23 1528 10/03/23 1528 10/03/23 1528 10/03/23 1528 10/03/23 1528   97.5 °F (36.4 °C) 86 18 98/60 97 %      Temp src Heart Rate Source Patient Position - Orthostatic VS BP Location FiO2 (%)   -- 10/03/23 1930 10/03/23 1930 10/03/23 1930 --    Monitor Lying Right arm       Pain Score       10/03/23 1528       9           Vitals:    10/03/23 1528 10/03/23 1821 10/03/23 1930   BP: 98/60 154/74 165/65   Pulse: 86 86 82   Patient Position - Orthostatic VS:   Lying         Visual Acuity      ED Medications  Medications   senna-docusate sodium (SENOKOT S) 8.6-50 mg per tablet 1 tablet (1 tablet Oral Given 10/3/23 1715)   polyethylene glycol (MIRALAX) packet 17 g (17 g Oral Given 10/3/23 1715)       Diagnostic Studies  Results Reviewed     Procedure Component Value Units Date/Time    Comprehensive metabolic panel [[id number]]  (Abnormal) Collected: 10/03/23 1705    Lab Status: Final result Specimen: Blood from Arm, Left Updated: 10/03/23 1745     Sodium 135 mmol/L      Potassium 4.8 mmol/L      Chloride 99 mmol/L      CO2 27 mmol/L      ANION GAP 9 mmol/L      BUN 14 mg/dL      Creatinine 4.08 mg/dL      Glucose 103 mg/dL      Calcium 8.7 mg/dL      Corrected Calcium 9.3 mg/dL AST 22 U/L      ALT 7 U/L      Alkaline Phosphatase 66 U/L      Total Protein 6.5 g/dL      Albumin 3.3 g/dL      Total Bilirubin 0.61 mg/dL      eGFR 13 ml/min/1.73sq m     Narrative:      [place] guidelines for Chronic Kidney Disease (CKD):   •  Stage 1 with normal or high GFR (GFR > 90 mL/min/1.73 square meters)  •  Stage 2 Mild CKD (GFR = 60-89 mL/min/1.73 square meters)  •  Stage 3A Moderate CKD (GFR = 45-59 mL/min/1.73 square meters)  •  Stage 3B Moderate CKD (GFR = 30-44 mL/min/1.73 square meters)  •  Stage 4 Severe CKD (GFR = 15-29 mL/min/1.73 square meters)  •  Stage 5 End Stage CKD (GFR <15 mL/min/1.73 square meters)  Note: GFR calculation is accurate only with a steady state creatinine    CBC and differential [[id number]]  (Abnormal) Collected: 10/03/23 1705    Lab Status: Final result Specimen: Blood from Arm, Left Updated: 10/03/23 1724     WBC 2.88 Thousand/uL      RBC 2.90 Million/uL      Hemoglobin 9.3 g/dL      Hematocrit 28.6 %      MCV 99 fL      MCH 32.1 pg      MCHC 32.5 g/dL      RDW 16.7 %      MPV 10.2 fL      Platelets 799 Thousands/uL      nRBC 0 /100 WBCs      Neutrophils Relative 70 %      Immat GRANS % 0 %      Lymphocytes Relative 17 %      Monocytes Relative 11 %      Eosinophils Relative 1 %      Basophils Relative 1 %      Neutrophils Absolute 1.99 Thousands/µL      Immature Grans Absolute 0.01 Thousand/uL      Lymphocytes Absolute 0.50 Thousands/µL      Monocytes Absolute 0.31 Thousand/µL      Eosinophils Absolute 0.03 Thousand/µL      Basophils Absolute 0.04 Thousands/µL                  CT abdomen pelvis wo contrast   Final Result by Leonela Saini MD (10/03 2009)      No evidence of acute intra-abdominal or pelvic pathology. Rectal fecal impaction.       Moderate right and small left pleural effusion            Workstation performed: [id number]                    Procedures  Procedures         ED Course                               SBIRT 20yo+ Flowsheet Row Most Recent Value   Initial Alcohol Screen: US AUDIT-C     1. How often do you have a drink containing alcohol? 0 Filed at: 10/03/2023 1528   2. How many drinks containing alcohol do you have on a typical day you are drinking? 0 Filed at: 10/03/2023 1528   3a. Male UNDER 65: How often do you have five or more drinks on one occasion? 0 Filed at: 10/03/2023 1528   3b. FEMALE Any Age, or MALE 65+: How often do you have 4 or more drinks on one occassion? 0 Filed at: 10/03/2023 1528   Audit-C Score 0 Filed at: 10/03/2023 1528   KIZZY: How many times in the past year have you. .. Used an illegal drug or used a prescription medication for non-medical reasons? Never Filed at: 10/03/2023 1528                    Medical Decision Making  I obtained history from the patient. I reviewed external medical documentation. Patient discharged on 9/25 after admission for chronic pain, generalized weakness, hypotension. Patient well-appearing with a grossly benign abdominal examination. Differential includes was not limited to functional constipation, bowel obstruction, decrease stool production secondary to decreased oral intake. I ordered and reviewed labs including CBC, CMP. Patient's renal function at baseline, leukopenic to 2.8 with a hemoglobin of 9.3 from 9.5. I ordered and reviewed a CT abdomen pelvis which demonstrates rectal fecal impaction without additional clinically relevant changes to explain patient's symptoms. I attempted a manual disimpaction but patient was unable to tolerate secondary to discomfort. I ordered a soapsuds enema and signed patient out to Dr. Laura Manuel pending enema. Patient ultimately discharged. Amount and/or Complexity of Data Reviewed  Labs: ordered. Radiology: ordered. Risk  OTC drugs.           Disposition  Final diagnoses:   Constipation   Generalized abdominal pain     Time reflects when diagnosis was documented in both MDM as applicable and the Disposition within this note     Time User Action Codes Description Comment    10/3/2023  8:37 PM Memory Carrier Add [K59.00] Constipation     10/3/2023  8:37 PM Memory Carrier Add [R10.84] Generalized abdominal pain       ED Disposition     ED Disposition   Discharge    Condition   Stable    Date/Time   Tue Oct 3, 2023  8:37 PM    Comment   Emilycliff Lenard Lacyon discharge to home/self care.                Follow-up Information     Follow up With Specialties Details Why Contact Info Additional Information    [address] Emergency Department Emergency Medicine  If symptoms worsen [address] [address]  [address] [facility] Emergency Department, [address] , [facility], [address]          Discharge Medication List as of 10/3/2023  9:13 PM      START taking these medications    Details   polyethylene glycol (MIRALAX) 17 g packet Take 17 g by mouth daily for 7 days, Starting Tue 10/3/2023, Until Tue 10/10/2023, Normal         CONTINUE these medications which have CHANGED    Details   docusate sodium (COLACE) 100 mg capsule Take 1 capsule (100 mg total) by mouth daily for 14 days, Starting Tue 10/3/2023, Until Tue 10/17/2023, Normal         CONTINUE these medications which have NOT CHANGED    Details   acetaminophen (TYLENOL) 325 mg tablet Take 3 tablets (975 mg total) by mouth every 8 (eight) hours as needed for mild pain, Starting u 6/15/2023, No Print      ammonium lactate (LAC-HYDRIN) 12 % lotion Apply topically 2 (two) times a day as needed for dry skin, Starting Wed 8/30/2023, Normal      ascorbic acid (VITAMIN C) 500 mg tablet TAKE ONE TABLET BY MOUTH EVERY EVENING FOR SUPPLEMENT, Historical Med      aspirin (ECOTRIN LOW STRENGTH) 81 mg EC tablet Take 81 mg by mouth daily, Starting Wed 1/26/2022, Historical Med      atorvastatin (LIPITOR) 20 mg tablet Take 20 mg by mouth daily at bedtime, Starting Wed 1/26/2022, Historical Med      bacitracin topical ointment 500 units/g topical ointment Apply 1 large application topically 2 (two) times a day, Starting Wed 9/20/2023, Normal      Cholecalciferol 50 MCG (2000 UT) CAPS Take by mouth daily, Starting Mon 3/22/2021, Historical Med      [place] Choice Comfort EZ 33G X 4 MM MISC Historical Med      clopidogrel (PLAVIX) 75 mg tablet Take 75 mg by mouth daily, Starting Fri 8/18/2023, Historical Med      gabapentin (NEURONTIN) 100 mg capsule Take 1 capsule (100 mg total) by mouth 3 (three) times a week On Tuesday Thursday Saturday, Starting Fri 6/16/2023, No Print      hydrOXYzine HCL (ATARAX) 25 mg tablet TAKE 1 TABLET BY MOUTH EVERY 8 HOURS AS NEEDED FOR ITCHING, Historical Med      midodrine (PROAMATINE) 10 MG tablet Take 1 tablet (10 mg total) by mouth 3 (three) times a day before meals, Starting Wed 5/24/2023, Until Mon 9/25/2023, Normal      oxyCODONE (ROXICODONE) 5 immediate release tablet Take 0.5 tablets (2.5 mg total) by mouth every 8 (eight) hours as needed for severe pain for up to 10 doses Max Daily Amount: 7.5 mg, Starting Thu 9/14/2023, Normal      vitamin B-12 (VITAMIN B-12) 1,000 mcg tablet Take 1,000 mcg by mouth daily, Starting Tue 1/24/2023, Historical Med      calcitriol (ROCALTROL) 0.25 mcg capsule Take 3 capsules (0.75 mcg total) by mouth 3 (three) times a week, Starting Fri 6/16/2023, No Print             No discharge procedures on file.     PDMP Review       Value Time User    PDMP Reviewed  Yes 5/24/2023  5:40 PM Shoaib Nevarez MD          ED Provider  Electronically Signed by           Frida Aggarwal MD  10/05/23 [id number]

## 2023-10-05 NOTE — CASE MANAGEMENT
Case Management Discharge Planning Note    Patient name Gregg Murguia  Location [address] [address] [address] MRN [id number]  : 1955 Date 10/5/2023       Current Admission Date: 10/4/2023  Current Admission Diagnosis:Constipation   Patient Active Problem List    Diagnosis Date Noted   • Constipation 10/04/2023   • Leukopenia 10/04/2023   • Pleural effusion, bilateral 10/04/2023   • History of CVA (cerebrovascular accident) 2023   • Generalized weakness 2023   • Open wound 2023   • Chronic pain 2023   • Anxiety disorder, unspecified 2023   • Secondary hyperparathyroidism of renal origin ([address]) 2023   • Unspecified protein-calorie malnutrition ([address]) 2023   • 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 06/15/2023   • Ambulatory dysfunction 2023   • Infection of dialysis AV graft 2023   • Hypotension 2023   • S/P angioplasty with stent 2023   • Chronic deep vein thrombosis (DVT) of internal jugular vein ([address]) 2022   • End-stage renal disease  2022   • Chronic kidney disease 2022   • Right intertrochanteric femur fracture 2022   • Thrombocytopenia  2022   • Arteriovenous fistula ([address]) 2021   • AV fistula thrombosis, initial encounter ([address]) 2021   • Encounter for extracorporeal dialysis ([address]) 2019   • Intestinal malabsorption 2017   • Pernicious anemia 2017   • Irritable bowel syndrome 2017   • Elevated serum alkaline phosphatase level 2017   • Anemia of chronic disease 2017   • Coronary artery disease  10/20/2016   • Hypertension 10/20/2016   • Dependence on renal dialysis ([address]) 2016   • Vitamin D deficiency, unspecified 2013   • Mixed hyperlipidemia 10/04/2012   • ESRD (end stage renal disease) on dialysis ([address]) 2012   • Nicotine dependence 2012   • Organic impotence 2012   • Type 2 diabetes mellitus 2012      LOS (days): 0  Geometric Mean LOS (GMLOS) (days):   Days to GMLOS:     OBJECTIVE:            Current admission status: Observation   Preferred Pharmacy:   [address] [address] [address]  [address] [address]  Phone: [phone] Fax: [phone]    Primary Care Provider: Benjamin Graf MD    Primary Insurance: MEDICARE  Secondary Insurance: AARP    DISCHARGE DETAILS:    Discharge planning discussed with[de-identified] Patient  Freedom of Choice: Yes  Comments - Freedom of Choice: Home with Home health services from [facility]     Other Referral/Resources/Interventions Provided:  Interventions: [facility]  Referral Comments: CM met with patient at bedside, introduced self and role and discharge planning. Discussed home rehab per therapy recs and patient informed he is all set up with [facility] for home rehab. Patient agreeable for CM to reach out to [facility] for resuming services. CM sent referral via [facility] to [facility] and reserved  via [employer].      Treatment Team Recommendation: Home with [address] [facility]  Discharge Destination Plan[de-identified] Home with [address] [facility] at Discharge : Family

## 2023-10-05 NOTE — TELEPHONE ENCOUNTER
April form the [facility] [place] called in requested a Wound Care Order,Please  Fax  to [phone]    Recommendations, are cleanse with wound cleanser and apply with Medi Honey and cover with foam dressing twice a week.

## 2023-10-05 NOTE — CONSULTS
Consultation - Nephrology   Yana Homer Bishop 70 y.o. male MRN: [id number]  Unit/Bed#: [address] [place] [address] Encounter: [id number]    ASSESSMENT AND PLAN:  70 y.o man with PMH of ESRD on HD TTS at [facility], diabetes, hypertension, GERD, hyperlipidemia, MI p/w abdominal pain and constipation. Nephrology is consulted for management of ESRD    PLAN    #ESRD on HD MMF:  · Dialysis unit/days:[facility]  · Access: PermCath  · patient missed dialysis Wednesday   · plan for HD today and tomorrow   · Renal Diet  · Fluid restriction 1-1.5L/d  · Adjust medications to GFR<10  · Avoid opioids     #Volume status/hypertension:  · Volume: Euvolemic  · Blood pressure: Tendency to hypotension BP 95/52mmhg   · Low-sodium diet    #Intradialytic hypertension   · midodrine 10 mg on HD    #Secondary Hyperparasitoidism   · Low phosphorus diet  · No binders at this time    # Anemia of Kidney Disease  · Current hemoglobin: 8.5  · Treatment:  · Transfuse for hemoglobin less than 7.0 per primary service        #Abdominal pain/constipation  · On bowel regimen  · Management as per primary team      HISTORY OF PRESENT ILLNESS:  Requesting Physician: Reno Fofana DO  Reason for Consult: Management of ESRD    Jacque Ribeiro is a 70 y.o.  male with PMH of ESRD on HD TTS at [facility], diabetes, hypertension, GERD, hyperlipidemia, MI who was admitted to [facility] after presenting with abdominal pain and constipation. A renal consultation is requested today for assistance in the management of ESRD.     PAST MEDICAL HISTORY:  Past Medical History:   Diagnosis Date   • 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    With Cerebral Infarction:  Last Assessed:  October 20, 2016   • Chronic kidney disease 2012    on dialysis    • COVID 05/2022   • COVID-19 04/2022 4/2022-while in [facility]   • Diabetes mellitus [facility])    • Dialysis patient [facility])     T/TH/Sat   • Difficulty walking    • GERD (gastroesophageal reflux disease)    • Hyperlipidemia    • Hypertension    • Labyrinthitis 09/10/2011   • Myocardial infarction [facility]) 2014    x3 others were in 2009 & 2010   • Sleep disturbances 09/20/2010   • Stroke [facility]) 2007    x1, RLE deficit- uses walker   • Type 2 diabetes, uncontrolled, with renal manifestation 05/03/2017       PAST SURGICAL HISTORY:  Past Surgical History:   Procedure Laterality Date   • AV FISTULA PLACEMENT     • AV FISTULA REPAIR Left 6/3/2023    Procedure: LEFT UPPER EXTREMITY A-V GRAFT EXPLANT, LEFT BRACHIAL ANGIOPATCH,  APPLICATION OF  VAC;  Surgeon: Alexia Rider MD;  [facility];  Service: Vascular   • CARDIAC CATHETERIZATION Left 02/03/2023    Procedure: Cardiac Left Heart Cath;  Surgeon: Gagan Hi MD;  Location: [address] [place] [address] CATH LAB; Service: Cardiology   • CARDIAC CATHETERIZATION N/A 02/08/2023    Procedure: Cardiac catheterization with complex PCI with Dr. Estelita Best, patient is a renal dialysis patient;  Surgeon: Jared Champion MD;  Location:  CARDIAC CATH LAB; Service: Cardiology   • CARDIAC SURGERY  2010    stents x3   • COLONOSCOPY N/A 12/12/2016    Procedure: COLONOSCOPY;  Surgeon: Kan Huitron MD;  Location: [address] [place] [address] GI LAB; Service:    • COLONOSCOPY N/A 04/03/2017    Procedure:  COLONOSCOPY;  Surgeon: Kan Huitron MD;  Location: [address] [place] [address] GI LAB; Service:    • HARDWARE REMOVAL Right 04/04/2022    Procedure: REMOVAL HARDWARE HIP;  Surgeon: Madeleine Robert MD;  [facility];  Service: Orthopedics   • IR AV FISTULA/GRAFT DECLOT  04/29/2021   • IR AV FISTULA/GRAFT DECLOT  03/25/2022   • IR AV FISTULA/GRAFT DECLOT  09/21/2022   • IR AV FISTULAGRAM/GRAFTOGRAM  03/28/2022   • IR AV FISTULAGRAM/GRAFTOGRAM  07/28/2022   • IR TUNNELED CENTRAL LINE REMOVAL  12/07/2021   • IR TUNNELED DIALYSIS CATHETER PLACEMENT  05/24/2021   • PORTACATH PLACEMENT      per pt "port in chest"   • ID ARTERIOVENOUS ANASTOMOSIS OPEN DIRECT Left 07/22/2021    Procedure: CREATION FISTULA ARTERIOVENOUS (AV);   Surgeon: Madeleine Cooney MD;  Location: [facility];  Service: Vascular   • DC ARTERIOVENOUS ANASTOMOSIS OPEN DIRECT Left 3/21/2023    Procedure: left brachiobasilic fistula,  AVG Graft;  Surgeon: Acacia Gaspar MD;  [facility];  Service: Vascular   • DC ARTERIOVENOUS ANASTOMOSIS OPEN DIRECT Left 5/19/2023    Procedure: CREATION of LEFT FISTULA ARTERIOVENOUS (AV) GRAFT;  Surgeon: Acacia Gaspar MD;  [facility];  Service: Vascular   • DC HEMIARTHROPLASTY HIP PARTIAL Right 04/04/2022    Procedure: HEMIARTHROPLASTY HIP (BIPOLAR);   Surgeon: Florina Jimenez MD;  [facility];  Service: Orthopedics       ALLERGIES:  No Known Allergies    SOCIAL HISTORY:  Social History     Substance and Sexual Activity   Alcohol Use Yes   • Alcohol/week: 1.0 standard drink of alcohol   • Types: 1 Shots of liquor per week    Comment: 1 shot daily     Social History     Substance and Sexual Activity   Drug Use Yes   • Types: Marijuana    Comment: gummies     Social History     Tobacco Use   Smoking Status Every Day   • Packs/day: 1.00   • Years: 30.00   • Total pack years: 30.00   • Types: Cigarettes   Smokeless Tobacco Never   Tobacco Comments    Currently not smoking - in facility       FAMILY HISTORY:  Family History   Problem Relation Age of Onset   • Leukemia Mother    • Crohn's disease Father    • Heart disease Father    • Transient ischemic attack Brother        MEDICATIONS:    Current Facility-Administered Medications:   •  acetaminophen (TYLENOL) tablet 650 mg, 650 mg, Oral, Q6H PRN, TESSY Nolasco  •  ascorbic acid (VITAMIN C) tablet 500 mg, 500 mg, Oral, Daily, TESSY Nolasco  •  aspirin (ECOTRIN LOW STRENGTH) EC tablet 81 mg, 81 mg, Oral, Daily, TESSY Nolasco  •  atorvastatin (LIPITOR) tablet 20 mg, 20 mg, Oral, HS, TESSY Nolasco  •  cholecalciferol (VITAMIN D3) tablet 2,000 Units, 2,000 Units, Oral, Daily, TESSY Nolasco  •  clopidogrel (PLAVIX) tablet 75 mg, 75 mg, Oral, Daily, TESSY Nolasco  •  cyanocobalamin (VITAMIN B-12) tablet 1,000 mcg, 1,000 mcg, Oral, Daily, Cuiru Santamariarik, CRNP  •  docusate sodium (COLACE) capsule 100 mg, 100 mg, Oral, BID, Cuiyin Yurik, CRNP, 100 mg at 10/05/23 0024  •  gabapentin (NEURONTIN) capsule 100 mg, 100 mg, Oral, Once per day on Tue Thu Sat, Cuiru Santamariarik, CRNP  •  HYDROmorphone HCl (DILAUDID) injection 0.2 mg, 0.2 mg, Intravenous, Q6H PRN, Cuiyin Yurik, CRNP, 0.2 mg at 10/05/23 0023  •  hydrOXYzine HCL (ATARAX) tablet 25 mg, 25 mg, Oral, TID PRN, Cuiru Santamariarik, CRNP  •  melatonin tablet 3 mg, 3 mg, Oral, HS, Cuiyin Yurik, CRNP  •  midodrine (PROAMATINE) tablet 10 mg, 10 mg, Oral, TID AC, Cuiyin Hinarik, CRNP  •  midodrine (PROAMATINE) tablet 10 mg, 10 mg, Oral, Before Dialysis, Beryle Saner, MD  •  polyethylene glycol (MIRALAX) packet 17 g, 17 g, Oral, BID, Cuibaldomeron Yurik, CRNP, 17 g at 10/05/23 0152  •  senna (SENOKOT) tablet 17.2 mg, 2 tablet, Oral, HS, Cuiyin Yurik, CRNP, 17.2 mg at 10/05/23 0024    REVIEW OF SYSTEMS:  Complete 10 point review of systems were obtained and discussed in length with the patient. Complete review of systems were negative / unremarkable except mentioned in the HPI section.      Review of Systems - Psychological ROS: negative  Ophthalmic ROS: negative  ENT ROS: negative  Hematological and Lymphatic ROS: negative  Endocrine ROS: negative  Respiratory ROS: no cough, shortness of breath, or wheezing  Cardiovascular ROS: no chest pain or dyspnea on exertion  Gastrointestinal ROS: positive for - constipation  Genito-Urinary ROS: no dysuria, trouble voiding, or hematuria  Musculoskeletal ROS: negative  Neurological ROS: no TIA or stroke symptoms  Dermatological ROS: negative     PHYSICAL EXAM:  Current Weight: Weight - Scale: 64.5 kg (142 lb 3.2 oz)  First Weight: Weight - Scale: 64.4 kg (142 lb)  Vitals:    10/05/23 0249   BP: 95/52   Pulse: 67   Resp:    Temp: 97.5 °F (36.4 °C)   SpO2: 95%     No intake or output data in the 24 hours ending 10/05/23 0807  Wt Readings from Last 3 Encounters:   10/05/23 64.5 kg (142 lb 3.2 oz)   10/03/23 64.4 kg (142 lb)   10/01/23 64.4 kg (142 lb)     Temp Readings from Last 3 Encounters:   10/05/23 97.5 °F (36.4 °C) (Tympanic)   10/03/23 97.5 °F (36.4 °C)   10/01/23 97.8 °F (36.6 °C) (Oral)     BP Readings from Last 3 Encounters:   10/05/23 95/52   10/03/23 165/65   10/01/23 96/74     Pulse Readings from Last 3 Encounters:   10/05/23 67   10/03/23 82   10/01/23 73        General:  no acute distress at this time  Skin:  No acute rash  Eyes:  No scleral icterus and noninjected  ENT:  mucous membranes moist  Neck:  no carotid bruits  Chest:  Clear to auscultation percussion, good respiratory effort, no use of accessory respiratory muscles  CVS:  Regular rate and rhythm without rub   Abdomen:  soft and nontender   Extremities: no significant lower extremity edema  Neuro:  No gross focality  Psych:  Alert , cooperative   Dialysis access: Right IJ PermCath    Invasive Devices:      Lab Results:   Results from last 7 days   Lab Units 10/04/23  2055 10/03/23  1705 10/01/23  0502 09/30/23  0456 09/29/23  0627 09/28/23  1420   WBC Thousand/uL 3.05* 2.88* 3.64* 3.11* 3.56* 7.96   HEMOGLOBIN g/dL 8.5* 9.3* 9.5* 8.1* 9.0* 9.6*   HEMATOCRIT % 26.7* 28.6* 29.7* 26.0* 28.1* 29.0*   PLATELETS Thousands/uL 146* 123* 105* 103* 99* 130*   POTASSIUM mmol/L 4.4 4.8 4.2 4.3 4.0 4.2   CHLORIDE mmol/L 99 99 100 100 100 99   CO2 mmol/L 25 27 27 25 23 28   BUN mg/dL 20 14 7 16 13 27*   CREATININE mg/dL 5.22* 4.08* 3.28* 5.38* 4.29* 7.20*   CALCIUM mg/dL 8.5 8.7 8.5 8.2* 8.5 8.7   MAGNESIUM mg/dL 2.0  --   --   --   --   --    PHOSPHORUS mg/dL  --   --   --  3.1  --   --        Other Studies:   XR abdomen obstruction series    (Results Pending)       Portions of the record may have been created with voice recognition software. Occasional wrong word or "sound a like" substitutions may have occurred due to the inherent limitations of voice recognition software.  Read the chart carefully and recognize, using context, where substitutions have occurred.

## 2023-10-05 NOTE — ASSESSMENT & PLAN NOTE
· CT 10/3 showed - Moderate right and small left pleural effusion  · Patient denies SOB  · S/P HD today Continue outpatient HD tomorrow

## 2023-10-05 NOTE — ED PROVIDER NOTES
History  Chief Complaint   Patient presents with   • Constipation     Patient c/o constipation. States he has not had a bowel movement since last Wednesday. Seen here yesterday for same. 41-year-old male with past history of ESRD on M/W/F dialysis, diabetes, hypertension, MI, GERD, hyperlipidemia, presents to the ED for evaluation of abdominal pain and constipation over the past week. Patient states that his last bowel movement was 8 days ago. Patient feels like an urgency to go however he is having difficulty having a bowel movement. Patient was seen at our emergency department last night and had a CT scan that showed rectal fecal impaction. Only tolerated half of the enema that was given yesterday. Patient was discharged home on laxatives and stool softeners. Patient states that he did not have any bowel movement since his ED visit yesterday. Patient could not go to dialysis today as he is having difficulty sitting up secondary to his lower abdominal pain. Subsequently patient called EMS and was brought to the ED for further evaluation. History provided by:  Patient  Constipation  Associated symptoms: abdominal pain    Associated symptoms: no back pain, no dysuria, no fever and no vomiting        Prior to Admission Medications   Prescriptions Last Dose Informant Patient Reported? Taking?    Cholecalciferol 50 MCG (2000 UT) CAPS  Self, Outside Facility (Specify) Yes No   Sig: Take by mouth daily   [place] Choice Comfort EZ 33G X 4 MM MISC  Self, Outside Facility (Specify) Yes No   Patient not taking: Reported on 9/12/2023   acetaminophen (TYLENOL) 325 mg tablet  Outside Facility (Specify) No No   Sig: Take 3 tablets (975 mg total) by mouth every 8 (eight) hours as needed for mild pain   ammonium lactate (LAC-HYDRIN) 12 % lotion   No No   Sig: Apply topically 2 (two) times a day as needed for dry skin   ascorbic acid (VITAMIN C) 500 mg tablet  Self, Outside Facility (Specify) Yes No   Sig: TAKE ONE TABLET BY MOUTH EVERY EVENING FOR SUPPLEMENT   aspirin (ECOTRIN LOW STRENGTH) 81 mg EC tablet  Self, Outside Facility (Specify) Yes No   Sig: Take 81 mg by mouth daily   atorvastatin (LIPITOR) 20 mg tablet  Self, Outside Facility (Specify) Yes No   Sig: Take 20 mg by mouth daily at bedtime   bacitracin topical ointment 500 units/g topical ointment   No No   Sig: Apply 1 large application topically 2 (two) times a day   calcitriol (ROCALTROL) 0.25 mcg capsule  Outside Facility (Specify) No No   Sig: Take 3 capsules (0.75 mcg total) by mouth 3 (three) times a week   Patient not taking: Reported on 9/25/2023   clopidogrel (PLAVIX) 75 mg tablet   Yes No   Sig: Take 75 mg by mouth daily   docusate sodium (COLACE) 100 mg capsule   No No   Sig: Take 1 capsule (100 mg total) by mouth daily for 14 days   gabapentin (NEURONTIN) 100 mg capsule  Outside Facility (Specify) No No   Sig: Take 1 capsule (100 mg total) by mouth 3 (three) times a week On Tuesday Thursday Saturday   hydrOXYzine HCL (ATARAX) 25 mg tablet   Yes No   Sig: TAKE 1 TABLET BY MOUTH EVERY 8 HOURS AS NEEDED FOR ITCHING   midodrine (PROAMATINE) 10 MG tablet   No No   Sig: Take 1 tablet (10 mg total) by mouth 3 (three) times a day before meals   Patient taking differently: Take 10 mg by mouth 3 (three) times a day before meals Takes BID   oxyCODONE (ROXICODONE) 5 immediate release tablet   No No   Sig: Take 0.5 tablets (2.5 mg total) by mouth every 8 (eight) hours as needed for severe pain for up to 10 doses Max Daily Amount: 7.5 mg   polyethylene glycol (MIRALAX) 17 g packet   No No   Sig: Take 17 g by mouth daily for 7 days   vitamin B-12 (VITAMIN B-12) 1,000 mcg tablet  Outside Facility (Specify) Yes No   Sig: Take 1,000 mcg by mouth daily      Facility-Administered Medications: None       Past Medical History:   Diagnosis Date   • 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    With Cerebral Infarction:  Last Assessed:  October 20, 2016   • Chronic kidney disease 2012 on dialysis    • COVID 05/2022   • COVID-19 04/2022 4/2022-while in NH   • Diabetes mellitus [facility])    • Dialysis patient [facility])     T/TH/Sat   • Difficulty walking    • GERD (gastroesophageal reflux disease)    • Hyperlipidemia    • Hypertension    • Labyrinthitis 09/10/2011   • Myocardial infarction [facility]) 2014    x3 others were in 2009 & 2010   • Sleep disturbances 09/20/2010   • Stroke [facility]) 2007    x1, RLE deficit- uses walker   • Type 2 diabetes, uncontrolled, with renal manifestation 05/03/2017       Past Surgical History:   Procedure Laterality Date   • AV FISTULA PLACEMENT     • AV FISTULA REPAIR Left 6/3/2023    Procedure: LEFT UPPER EXTREMITY A-V GRAFT EXPLANT, LEFT BRACHIAL ANGIOPATCH,  APPLICATION OF  VAC;  Surgeon: Flaquita Rider MD;  [facility];  Service: Vascular   • CARDIAC CATHETERIZATION Left 02/03/2023    Procedure: Cardiac Left Heart Cath;  Surgeon: Lucas Rodríguez MD;  Location: [address] [place] [address] CATH LAB; Service: Cardiology   • CARDIAC CATHETERIZATION N/A 02/08/2023    Procedure: Cardiac catheterization with complex PCI with Dr. Jose Raul Villarreal, patient is a renal dialysis patient;  Surgeon: Sharon Lovell MD;  Location:  CARDIAC CATH LAB; Service: Cardiology   • CARDIAC SURGERY  2010    stents x3   • COLONOSCOPY N/A 12/12/2016    Procedure: COLONOSCOPY;  Surgeon: Rosana Baker MD;  Location: [address] [place] [address] GI LAB; Service:    • COLONOSCOPY N/A 04/03/2017    Procedure:  COLONOSCOPY;  Surgeon: Rosana Baker MD;  Location: [address] [place] [address] GI LAB;   Service:    • HARDWARE REMOVAL Right 04/04/2022    Procedure: REMOVAL HARDWARE HIP;  Surgeon: Rona Sparks MD;  [facility];  Service: Orthopedics   • IR AV FISTULA/GRAFT DECLOT  04/29/2021   • IR AV FISTULA/GRAFT DECLOT  03/25/2022   • IR AV FISTULA/GRAFT DECLOT  09/21/2022   • IR AV FISTULAGRAM/GRAFTOGRAM  03/28/2022   • IR AV FISTULAGRAM/GRAFTOGRAM  07/28/2022   • IR TUNNELED CENTRAL LINE REMOVAL  12/07/2021   • IR TUNNELED DIALYSIS CATHETER PLACEMENT  05/24/2021   • PORTACATH PLACEMENT      per pt "port in chest"   • IN ARTERIOVENOUS ANASTOMOSIS OPEN DIRECT Left 07/22/2021    Procedure: CREATION FISTULA ARTERIOVENOUS (AV); Surgeon: Brady Mcpherson MD;  Location[facility];  Service: Vascular   • IN ARTERIOVENOUS ANASTOMOSIS OPEN DIRECT Left 3/21/2023    Procedure: left brachiobasilic fistula,  AVG Graft;  Surgeon: Abigail Neely MD;  [facility];  Service: Vascular   • IN ARTERIOVENOUS ANASTOMOSIS OPEN DIRECT Left 5/19/2023    Procedure: CREATION of LEFT FISTULA ARTERIOVENOUS (AV) GRAFT;  Surgeon: Abigail Neely MD;  [facility];  Service: Vascular   • IN HEMIARTHROPLASTY HIP PARTIAL Right 04/04/2022    Procedure: HEMIARTHROPLASTY HIP (BIPOLAR); Surgeon: Pedro Pablo Gomez MD;  [facility];  Service: Orthopedics       Family History   Problem Relation Age of Onset   • Leukemia Mother    • Crohn's disease Father    • Heart disease Father    • Transient ischemic attack Brother      I have reviewed and agree with the history as documented. E-Cigarette/Vaping   • E-Cigarette Use Never User      E-Cigarette/Vaping Substances   • Nicotine No    • THC No    • CBD No    • Flavoring No    • Other No    • Unknown No      Social History     Tobacco Use   • Smoking status: Every Day     Packs/day: 1.00     Years: 30.00     Total pack years: 30.00     Types: Cigarettes   • Smokeless tobacco: Never   • Tobacco comments:     Currently not smoking - in facility   Vaping Use   • Vaping Use: Never used   Substance Use Topics   • Alcohol use: Yes     Alcohol/week: 1.0 standard drink of alcohol     Types: 1 Shots of liquor per week     Comment: 1 shot daily   • Drug use: Yes     Types: Marijuana     Comment: gummies       Review of Systems   Constitutional: Negative for chills and fever. HENT: Negative for ear pain and sore throat. Eyes: Negative for pain and visual disturbance.    Respiratory: Negative for cough and shortness of breath. Cardiovascular: Negative for chest pain and palpitations. Gastrointestinal: Positive for abdominal pain and constipation. Negative for vomiting. Genitourinary: Negative for dysuria and hematuria. Musculoskeletal: Negative for arthralgias and back pain. Skin: Negative for color change and rash. Neurological: Negative for seizures and syncope. All other systems reviewed and are negative. Physical Exam  Physical Exam  Vitals and nursing note reviewed. Constitutional:       General: He is not in acute distress. Appearance: He is well-developed. HENT:      Head: Normocephalic and atraumatic. Eyes:      Conjunctiva/sclera: Conjunctivae normal.   Cardiovascular:      Rate and Rhythm: Normal rate and regular rhythm. Heart sounds: No murmur heard. Pulmonary:      Effort: Pulmonary effort is normal. No respiratory distress. Breath sounds: Normal breath sounds. Abdominal:      General: Abdomen is flat. Bowel sounds are normal. There is no distension. Palpations: Abdomen is soft. Tenderness: There is abdominal tenderness. Comments: Abdomen is soft, nondistended, with bowel sound present to all 4 quadrants. Tenderness to palpation noted across lower abdomen. Musculoskeletal:         General: No swelling. Cervical back: Neck supple. Skin:     General: Skin is warm and dry. Capillary Refill: Capillary refill takes less than 2 seconds. Neurological:      Mental Status: He is alert.    Psychiatric:         Mood and Affect: Mood normal.         Vital Signs  ED Triage Vitals [10/04/23 1958]   Temperature Pulse Respirations Blood Pressure SpO2   98.6 °F (37 °C) 78 20 117/62 93 %      Temp Source Heart Rate Source Patient Position - Orthostatic VS BP Location FiO2 (%)   Oral Monitor -- -- --      Pain Score       --           Vitals:    10/04/23 1958   BP: 117/62   Pulse: 78         Visual Acuity      ED Medications  Medications   sodium chloride 0.9 % bolus 500 mL (500 mL Intravenous New Bag 10/4/23 2056)   ketorolac (TORADOL) injection 15 mg (15 mg Intravenous Given 10/4/23 2148)       Diagnostic Studies  Results Reviewed     Procedure Component Value Units Date/Time    Lactic acid, plasma (w/reflex if result > 2.0) [[id number]] Collected: 10/04/23 2144    Lab Status:  In process Specimen: Blood from Arm, Left Updated: 10/04/23 2146    Comprehensive metabolic panel [[id number]]  (Abnormal) Collected: 10/04/23 2055    Lab Status: Final result Specimen: Blood from Arm, Left Updated: 10/04/23 2123     Sodium 135 mmol/L      Potassium 4.4 mmol/L      Chloride 99 mmol/L      CO2 25 mmol/L      ANION GAP 11 mmol/L      BUN 20 mg/dL      Creatinine 5.22 mg/dL      Glucose 83 mg/dL      Calcium 8.5 mg/dL      Corrected Calcium 9.3 mg/dL      AST 15 U/L      ALT 5 U/L      Alkaline Phosphatase 61 U/L      Total Protein 5.9 g/dL      Albumin 3.0 g/dL      Total Bilirubin 0.50 mg/dL      eGFR 10 ml/min/1.73sq m     Narrative:      [place] guidelines for Chronic Kidney Disease (CKD):   •  Stage 1 with normal or high GFR (GFR > 90 mL/min/1.73 square meters)  •  Stage 2 Mild CKD (GFR = 60-89 mL/min/1.73 square meters)  •  Stage 3A Moderate CKD (GFR = 45-59 mL/min/1.73 square meters)  •  Stage 3B Moderate CKD (GFR = 30-44 mL/min/1.73 square meters)  •  Stage 4 Severe CKD (GFR = 15-29 mL/min/1.73 square meters)  •  Stage 5 End Stage CKD (GFR <15 mL/min/1.73 square meters)  Note: GFR calculation is accurate only with a steady state creatinine    Magnesium [[id number]]  (Normal) Collected: 10/04/23 2055    Lab Status: Final result Specimen: Blood from Arm, Left Updated: 10/04/23 2123     Magnesium 2.0 mg/dL     CBC and differential [[id number]]  (Abnormal) Collected: 10/04/23 2055    Lab Status: Final result Specimen: Blood from Arm, Left Updated: 10/04/23 2101     WBC 3.05 Thousand/uL      RBC 2.67 Million/uL      Hemoglobin 8.5 g/dL Hematocrit 26.7 %       fL      MCH 31.8 pg      MCHC 31.8 g/dL      RDW 16.9 %      MPV 9.9 fL      Platelets 560 Thousands/uL      nRBC 0 /100 WBCs      Neutrophils Relative 68 %      Immat GRANS % 0 %      Lymphocytes Relative 18 %      Monocytes Relative 10 %      Eosinophils Relative 3 %      Basophils Relative 1 %      Neutrophils Absolute 2.08 Thousands/µL      Immature Grans Absolute 0.01 Thousand/uL      Lymphocytes Absolute 0.55 Thousands/µL      Monocytes Absolute 0.29 Thousand/µL      Eosinophils Absolute 0.08 Thousand/µL      Basophils Absolute 0.04 Thousands/µL                  XR abdomen obstruction series    (Results Pending)              Procedures  Procedures         ED Course                               SBIRT 22yo+    Flowsheet Row Most Recent Value   Initial Alcohol Screen: US AUDIT-C     1. How often do you have a drink containing alcohol? 0 Filed at: 10/04/2023 1959   2. How many drinks containing alcohol do you have on a typical day you are drinking? 0 Filed at: 10/04/2023 1959   3b. FEMALE Any Age, or MALE 65+: How often do you have 4 or more drinks on one occassion? 0 Filed at: 10/04/2023 1959   Audit-C Score 0 Filed at: 10/04/2023 1959   KIZZY: How many times in the past year have you. .. Used an illegal drug or used a prescription medication for non-medical reasons? Never Filed at: 10/04/2023 1959                    Medical Decision Making  Obtain blood work, abdominal obstruction series  Of IV fluids, soapsuds enema continue to monitor patient for any worsening symptoms    Patient's lab work was essentially unremarkable. Patient was given soapsuds enema however he did not tolerate the full dose. At this time patient will be admitted for further evaluation and management of his fecal impaction. Patient agrees with admission plans. Amount and/or Complexity of Data Reviewed  Labs: ordered. Decision-making details documented in ED Course. Radiology: ordered.  Decision-making details documented in ED Course. Details: Stool burden noted without any free air under the diaphragm. Orbital radiology reading is pending. Risk  Prescription drug management. Decision regarding hospitalization. Disposition  Final diagnoses:   Abdominal pain   Fecal impaction ([address])   ESRD on hemodialysis ([address])     Time reflects when diagnosis was documented in both MDM as applicable and the Disposition within this note     Time User Action Codes Description Comment    10/4/2023  9:53 PM Hank Ebbing Add [R10.9] Abdominal pain     10/4/2023  9:53 PM Hank Ebbing Add [K59.00] Constipation     10/4/2023  9:53 PM Hank Ebbing Add [N18.6,  Z99.2] ESRD on hemodialysis ([address])     10/4/2023  9:58 PM Filippo Toussaint Remove [K59.00] Constipation     10/4/2023  9:59 PM Eliz Thomasro [N18.6,  Z99.2] ESRD on hemodialysis ([address])     10/4/2023  9:59 PM Hank Ebbing Add [K56.41] Fecal impaction ([address])     10/4/2023  9:59 PM Juan Ramon Quinones [N18.6,  Z99.2] ESRD on hemodialysis [facility])       ED Disposition     ED Disposition   Admit    Condition   Stable    Date/Time   Wed Oct 4, 2023  9:53 PM    Comment   Case was discussed with NP for hospitalist and the patient's admission status was agreed to be Admission Status: observation status to the service of Dr. Linda Callahan. Follow-up Information    None         Patient's Medications   Discharge Prescriptions    No medications on file       No discharge procedures on file.     PDMP Review       Value Time User    PDMP Reviewed  Yes 5/24/2023  5:40 PM Ciera Noe MD          ED Provider  Electronically Signed by           Hank Juarez DO  10/04/23 1583

## 2023-10-05 NOTE — ASSESSMENT & PLAN NOTE
· CT 10/3 showed - Moderate right and small left pleural effusion  · Patient denies SOB  · Will monitor for now

## 2023-10-05 NOTE — PLAN OF CARE
Problem: PHYSICAL THERAPY ADULT  Goal: Performs mobility at highest level of function for planned discharge setting. See evaluation for individualized goals. Description: Treatment/Interventions: ADL retraining, Functional transfer training, LE strengthening/ROM, Therapeutic exercise, Endurance training, Bed mobility, Gait training, Spoke to nursing          See flowsheet documentation for full assessment, interventions and recommendations. Note: Prognosis: Good  Problem List: Decreased strength, Decreased endurance, Impaired balance, Decreased mobility, Impaired judgement, Decreased safety awareness, Pain, Decreased skin integrity  Assessment: Patient seen for Physical Therapy evaluation. Patient admitted with Constipation. Comorbidities affecting patient's physical performance include: anemia, CAD, cardiac disease, chronic pain, CVA, diabetes, DVT, ESRD on hemodialysis, hypertension and hyperlipidemia. Personal factors affecting patient at time of initial evaluation include: lives in 2 story house, ambulating with assistive device, stairs to enter home, inability to navigate community distances, inability to navigate level surfaces without external assistance, inability to perform IADLS  and inability to live alone. Prior to admission, patient was independent with functional mobility with walker, independent with ADLS, requiring assist for IADLS, living with spouse in a 2 level home with 3 steps to enter, ambulating household distance and in short term rehab. Please find objective findings from Physical Therapy assessment regarding body systems outlined above with impairments and limitations including weakness, impaired balance, decreased endurance, gait deviations, decreased activity tolerance, decreased functional mobility tolerance and fall risk.   The Barthel Index was used as a functional outcome tool presenting with a score of Barthel Index Score: 45 today indicating marked limitations of functional mobility and ADLS. Patient's clinical presentation is currently unstable/unpredictable as seen in patient's presentation of changing level of pain, increased fall risk and decreased endurance. Pt would benefit from continued Physical Therapy treatment to address deficits as defined above and maximize level of functional mobility. As demonstrated by objective findings, the assigned level of complexity for this evaluation is high. The patient's AM-PAC Basic Mobility Inpatient Short Form Raw Score is 17. A Raw score of greater than 16 suggests the patient may benefit from discharge to home. Please also refer to the recommendation of the Physical Therapist for safe discharge planning. PT Discharge Recommendation: Home with home health rehabilitation (+ continued family assist)    See flowsheet documentation for full assessment.

## 2023-10-05 NOTE — PLAN OF CARE
Problem: MOBILITY - ADULT  Goal: Maintain or return to baseline ADL function  Description: INTERVENTIONS:  -  Assess patient's ability to carry out ADLs; assess patient's baseline for ADL function and identify physical deficits which impact ability to perform ADLs (bathing, care of mouth/teeth, toileting, grooming, dressing, etc.)  - Assess/evaluate cause of self-care deficits   - Assess range of motion  - Assess patient's mobility; develop plan if impaired  - Assess patient's need for assistive devices and provide as appropriate  - Encourage maximum independence but intervene and supervise when necessary  - Involve family in performance of ADLs  - Assess for home care needs following discharge   - Consider OT consult to assist with ADL evaluation and planning for discharge  - Provide patient education as appropriate  Outcome: Progressing  Goal: Maintains/Returns to pre admission functional level  Description: INTERVENTIONS:  - Perform BMAT or MOVE assessment daily.   - Set and communicate daily mobility goal to care team and patient/family/caregiver.    - Collaborate with rehabilitation services on mobility goals if consulted  - Out of bed for toileting  - Record patient progress and toleration of activity level   Outcome: Progressing     Problem: PAIN - ADULT  Goal: Verbalizes/displays adequate comfort level or baseline comfort level  Description: Interventions:  - Encourage patient to monitor pain and request assistance  - Assess pain using appropriate pain scale  - Administer analgesics based on type and severity of pain and evaluate response  - Implement non-pharmacological measures as appropriate and evaluate response  - Consider cultural and social influences on pain and pain management  - Notify physician/advanced practitioner if interventions unsuccessful or patient reports new pain  Outcome: Progressing     Problem: SAFETY ADULT  Goal: Patient will remain free of falls  Description: INTERVENTIONS:  - Educate patient/family on patient safety including physical limitations  - Instruct patient to call for assistance with activity   - Consult OT/PT to assist with strengthening/mobility   - Keep Call bell within reach  - Keep bed low and locked with side rails adjusted as appropriate  - Keep care items and personal belongings within reach  - Initiate and maintain comfort rounds  - Make Fall Risk Sign visible to staff    - Apply yellow socks and bracelet for high fall risk patients  - Consider moving patient to room near nurses station  Outcome: Progressing     Problem: DISCHARGE PLANNING  Goal: Discharge to home or other facility with appropriate resources  Description: INTERVENTIONS:  - Identify barriers to discharge w/patient and caregiver  - Arrange for needed discharge resources and transportation as appropriate  - Identify discharge learning needs (meds, wound care, etc.)  - Arrange for interpretive services to assist at discharge as needed  - Refer to Case Management Department for coordinating discharge planning if the patient needs post-hospital services based on physician/advanced practitioner order or complex needs related to functional status, cognitive ability, or social support system  Outcome: Progressing     Problem: GASTROINTESTINAL - ADULT  Goal: Maintains or returns to baseline bowel function  Description: INTERVENTIONS:  - Assess bowel function  - Encourage oral fluids to ensure adequate hydration  - Administer IV fluids if ordered to ensure adequate hydration  - Administer ordered medications as needed  - Encourage mobilization and activity  - Consider nutritional services referral to assist patient with adequate nutrition and appropriate food choices  Outcome: Progressing     Problem: SKIN/TISSUE INTEGRITY - ADULT  Goal: Incision(s), wounds(s) or drain site(s) healing without S/S of infection  Description: INTERVENTIONS  - Assess and document dressing, incision, wound bed, drain sites and surrounding tissue  - Provide patient and family education  Outcome: Progressing

## 2023-10-06 LAB — MRSA NOSE QL CULT: NORMAL

## 2023-10-10 ENCOUNTER — OFFICE VISIT (OUTPATIENT)
Dept: FAMILY MEDICINE CLINIC | Facility: CLINIC | Age: 68
End: 2023-10-10
Payer: MEDICARE

## 2023-10-10 VITALS
BODY MASS INDEX: 19.88 KG/M2 | HEART RATE: 78 BPM | SYSTOLIC BLOOD PRESSURE: 92 MMHG | RESPIRATION RATE: 18 BRPM | WEIGHT: 142 LBS | HEIGHT: 71 IN | TEMPERATURE: 98.6 F | DIASTOLIC BLOOD PRESSURE: 60 MMHG

## 2023-10-10 DIAGNOSIS — Z99.2 ESRD (END STAGE RENAL DISEASE) ON DIALYSIS (HCC): Primary | ICD-10-CM

## 2023-10-10 DIAGNOSIS — D51.0 PERNICIOUS ANEMIA: ICD-10-CM

## 2023-10-10 DIAGNOSIS — R53.1 GENERALIZED WEAKNESS: ICD-10-CM

## 2023-10-10 DIAGNOSIS — N18.6 ESRD (END STAGE RENAL DISEASE) ON DIALYSIS (HCC): Primary | ICD-10-CM

## 2023-10-10 DIAGNOSIS — S31.000A WOUND OF SACRAL REGION, INITIAL ENCOUNTER: ICD-10-CM

## 2023-10-10 DIAGNOSIS — T82.7XXA INFECTION OF AV GRAFT FOR DIALYSIS (HCC): ICD-10-CM

## 2023-10-10 DIAGNOSIS — Z99.2 DEPENDENCE ON RENAL DIALYSIS (HCC): ICD-10-CM

## 2023-10-10 DIAGNOSIS — E44.1 MILD PROTEIN-CALORIE MALNUTRITION (HCC): ICD-10-CM

## 2023-10-10 PROCEDURE — 99495 TRANSJ CARE MGMT MOD F2F 14D: CPT | Performed by: FAMILY MEDICINE

## 2023-10-10 RX ORDER — BISACODYL 10 MG
SUPPOSITORY, RECTAL RECTAL
COMMUNITY
Start: 2023-10-01

## 2023-10-10 RX ORDER — GABAPENTIN 100 MG/1
100 CAPSULE ORAL
Qty: 30 CAPSULE | Refills: 3 | Status: SHIPPED | OUTPATIENT
Start: 2023-10-10 | End: 2023-10-11 | Stop reason: SDUPTHER

## 2023-10-10 NOTE — PROGRESS NOTES
Chief Complaint   Patient presents with   • Transition of Care Management   • pressure ulcer     Patient complains of pressure ulcer on backside   • Pain     Patient complains of pain in left arm and hand    • Medication Refill     Wants refill of oxycodone      TCM Call     Date and time call was made  8/25/2023  4:33 PM    Hospital care reviewed  Records reviewed    Patient was hospitialized at  [address] [place] [address] rehab    Date of Admission  09/25/23    Date of discharge  10/01/23    Diagnosis  weakness , constipation    Disposition  Home    Were the patients medications reviewed and updated  No    Current Symptoms  Weakness      TCM Call     Post hospital issues  None    Should patient be enrolled in anticoag monitoring? Yes    Scheduled for follow up? No    Did you obtain your prescribed medications  Yes    Do you need help managing your prescriptions or medications  No    Is transportation to your appointment needed  Yes    Specify why  I started to review medicationa and pt got to tired was unable to complete / requested transportation services    I have advised the patient to call PCP with any new or worsening symptoms  [facility] 10/6/23    Living Arrangements  Spouse or Significiant other    Are you recieving any outpatient services  No    Are you recieving home care services  No    Types of home care services  Nurse visit; Home PT    Are you using any community resources  No    Current waiver services  No    Have you fallen in the last 12 months  Yes    How many times  1-2    Interperter language line needed  No          Patient ID: Idania Adam is a 70 y.o. male.     HPI  Pt is seeing for f/u recent hospital stay for weakness -  had multiple chronic conditions  ESRD on Dialysis, Anemia, Ambulatory dysfunction -  had 2 ER visits after that for fecal impaction -  BM improved     The following portions of the patient's history were reviewed and updated as appropriate: allergies, current medications, past family history, past medical history, past social history, past surgical history and problem list.    Review of Systems   Constitutional:  Positive for fatigue. Negative for activity change and appetite change. Respiratory: Negative. Cardiovascular: Negative. Gastrointestinal: Negative. Genitourinary: Negative. Musculoskeletal:  Positive for arthralgias and gait problem. Skin:  Positive for wound. Psychiatric/Behavioral: Negative.          Current Outpatient Medications   Medication Sig Dispense Refill   • acetaminophen (TYLENOL) 325 mg tablet Take 3 tablets (975 mg total) by mouth every 8 (eight) hours as needed for mild pain  0   • ammonium lactate (LAC-HYDRIN) 12 % lotion Apply topically 2 (two) times a day as needed for dry skin 396 g 0   • ascorbic acid (VITAMIN C) 500 mg tablet TAKE ONE TABLET BY MOUTH EVERY EVENING FOR SUPPLEMENT     • aspirin (ECOTRIN LOW STRENGTH) 81 mg EC tablet Take 81 mg by mouth daily     • atorvastatin (LIPITOR) 20 mg tablet Take 20 mg by mouth daily at bedtime     • bacitracin topical ointment 500 units/g topical ointment Apply 1 large application topically 2 (two) times a day 28 g 0   • bisacodyl (DULCOLAX) 10 mg suppository Insert into the rectum     • Cholecalciferol 50 MCG (2000 UT) CAPS Take by mouth daily     • [place] Choice Comfort EZ 33G X 4 MM MISC      • clopidogrel (PLAVIX) 75 mg tablet Take 75 mg by mouth daily     • docusate sodium (COLACE) 100 mg capsule Take 1 capsule (100 mg total) by mouth daily for 14 days 14 capsule 0   • gabapentin (NEURONTIN) 100 mg capsule Take 1 capsule (100 mg total) by mouth 3 (three) times a week On Tuesday Thursday Saturday  0   • glucagon 1 MG injection Inject 1 mg into a muscle     • oxyCODONE (ROXICODONE) 5 immediate release tablet Take 0.5 tablets (2.5 mg total) by mouth every 8 (eight) hours as needed for severe pain for up to 10 doses Max Daily Amount: 7.5 mg 5 tablet 0   • polyethylene glycol (MIRALAX) 17 g packet Take 17 g by mouth daily for 7 days 119 g 0   • vitamin B-12 (VITAMIN B-12) 1,000 mcg tablet Take 1,000 mcg by mouth daily     • hydrOXYzine HCL (ATARAX) 25 mg tablet TAKE 1 TABLET BY MOUTH EVERY 8 HOURS AS NEEDED FOR ITCHING (Patient not taking: Reported on 10/10/2023)     • midodrine (PROAMATINE) 10 MG tablet Take 1 tablet (10 mg total) by mouth 3 (three) times a day before meals (Patient taking differently: Take 10 mg by mouth 3 (three) times a day before meals Takes BID) 90 tablet 0     No current facility-administered medications for this visit. Objective:    BP 92/60 (BP Location: Right arm, Patient Position: Sitting, Cuff Size: Standard)   Pulse 78   Temp 98.6 °F (37 °C)   Resp 18   Ht 5' 11" (1.803 m)   Wt 64.4 kg (142 lb)   BMI 19.80 kg/m²        Physical Exam  Constitutional:       General: He is not in acute distress. Appearance: He is ill-appearing (chronically ill). Cardiovascular:      Rate and Rhythm: Normal rate and regular rhythm. Pulmonary:      Effort: Pulmonary effort is normal. No respiratory distress. Abdominal:      Palpations: Abdomen is soft. Tenderness: There is no abdominal tenderness. Skin:     Coloration: Skin is pale. Findings: Wound (stage 2 sacral DU 1 cm in d) present. Neurological:      Mental Status: He is alert. Gait: Gait abnormal (in wheelchair  -  able to stand up with help and stad for 1 min). Labs in chart were reviewed. Assessment/Plan:         Diagnoses and all orders for this visit:    ESRD (end stage renal disease) on dialysis ([address])    Infection of AV graft for dialysis ([address])  -    will increase med to once a day - gabapentin (NEURONTIN) 100 mg capsule;  Take 1 capsule (100 mg total) by mouth daily at bedtime    Mild protein-calorie malnutrition (HCC)    Generalized weakness    Pernicious anemia    Dependence on renal dialysis [facility])  F/u with nephrologist   Wound of sacral region, initial encounter   F/u with [facility]  Other orders  -     bisacodyl (DULCOLAX) 10 mg suppository; Insert into the rectum  -     glucagon 1 MG injection;  Inject 1 mg into a muscle          Rto in 1 m                   Abdirahman Blakely MD

## 2023-10-11 RX ORDER — GABAPENTIN 100 MG/1
100 CAPSULE ORAL
Qty: 30 CAPSULE | Refills: 3 | Status: SHIPPED | OUTPATIENT
Start: 2023-10-11

## 2023-10-15 ENCOUNTER — HOSPITAL ENCOUNTER (EMERGENCY)
Facility: HOSPITAL | Age: 68
Discharge: HOME | End: 2023-10-15
Attending: EMERGENCY MEDICINE
Payer: MEDICARE

## 2023-10-15 VITALS
WEIGHT: 142 LBS | OXYGEN SATURATION: 99 % | RESPIRATION RATE: 18 BRPM | DIASTOLIC BLOOD PRESSURE: 56 MMHG | BODY MASS INDEX: 19.8 KG/M2 | TEMPERATURE: 97.5 F | SYSTOLIC BLOOD PRESSURE: 132 MMHG | HEART RATE: 72 BPM

## 2023-10-15 DIAGNOSIS — K59.00 CONSTIPATION: ICD-10-CM

## 2023-10-15 DIAGNOSIS — K04.7 DENTAL ABSCESS: Primary | ICD-10-CM

## 2023-10-15 RX ORDER — POLYETHYLENE GLYCOL 3350 17 G/17G
17 POWDER, FOR SOLUTION ORAL ONCE
Status: COMPLETED | OUTPATIENT
Start: 2023-10-15 | End: 2023-10-15

## 2023-10-15 RX ORDER — CLINDAMYCIN HYDROCHLORIDE 150 MG/1
150 CAPSULE ORAL EVERY 6 HOURS
Qty: 28 CAPSULE | Refills: 0 | Status: SHIPPED | OUTPATIENT
Start: 2023-10-15 | End: 2023-10-22

## 2023-10-15 RX ORDER — CLINDAMYCIN HYDROCHLORIDE 150 MG/1
300 CAPSULE ORAL ONCE
Status: COMPLETED | OUTPATIENT
Start: 2023-10-15 | End: 2023-10-15

## 2023-10-15 RX ORDER — BISACODYL 5 MG/1
5 TABLET, DELAYED RELEASE ORAL ONCE
Status: COMPLETED | OUTPATIENT
Start: 2023-10-15 | End: 2023-10-15

## 2023-10-15 RX ADMIN — BISACODYL 5 MG: 5 TABLET, COATED ORAL at 11:37

## 2023-10-15 RX ADMIN — POLYETHYLENE GLYCOL 3350 17 G: 17 POWDER, FOR SOLUTION ORAL at 11:37

## 2023-10-15 RX ADMIN — CLINDAMYCIN HYDROCHLORIDE 300 MG: 150 CAPSULE ORAL at 11:37

## 2023-10-15 NOTE — ED PROVIDER NOTES
History  Chief Complaint   Patient presents with   • Constipation     States no bowel movement for about 4 days. • Oral Pain     C/o infection to upper lip for a couple of days. Patient states has been having pain and swelling of the upper gum and upper lip area for 2 to 3 days. He can feel a lump in the area. No fever or chills. Patient has a history of poor dentition and states he has not been on any antibiotics recently. Patient has been on painkillers recently. He is also complaining of recurrent constipation. he has not had a good bowel movement in 4 days. No abdominal pain or vomiting. Patient was recently in the hospital with fecal impaction. He has come off his bowel regimen because he thought he was better      Prior to Admission Medications   Prescriptions Last Dose Informant Patient Reported? Taking?    Cholecalciferol 50 MCG (2000 UT) CAPS  Self, Outside Facility (Specify) Yes No   Sig: Take by mouth daily   [place] Choice Comfort EZ 33G X 4 MM MISC  Self, Outside Facility (Specify) Yes No   acetaminophen (TYLENOL) 325 mg tablet  Outside Facility (Specify) No No   Sig: Take 3 tablets (975 mg total) by mouth every 8 (eight) hours as needed for mild pain   ammonium lactate (LAC-HYDRIN) 12 % lotion   No No   Sig: Apply topically 2 (two) times a day as needed for dry skin   ascorbic acid (VITAMIN C) 500 mg tablet  Self, Outside Facility (Specify) Yes No   Sig: TAKE ONE TABLET BY MOUTH EVERY EVENING FOR SUPPLEMENT   aspirin (ECOTRIN LOW STRENGTH) 81 mg EC tablet  Self, Outside Facility (Specify) Yes No   Sig: Take 81 mg by mouth daily   atorvastatin (LIPITOR) 20 mg tablet  Self, Outside Facility (Specify) Yes No   Sig: Take 20 mg by mouth daily at bedtime   bacitracin topical ointment 500 units/g topical ointment   No No   Sig: Apply 1 large application topically 2 (two) times a day   bisacodyl (DULCOLAX) 10 mg suppository Not Taking  Yes No   Sig: Insert into the rectum   Patient not taking: Reported on 10/15/2023   clopidogrel (PLAVIX) 75 mg tablet   Yes No   Sig: Take 75 mg by mouth daily   docusate sodium (COLACE) 100 mg capsule 10/15/2023  No Yes   Sig: Take 1 capsule (100 mg total) by mouth daily for 14 days   gabapentin (NEURONTIN) 100 mg capsule   No No   Sig: Take 1 capsule (100 mg total) by mouth daily at bedtime   glucagon 1 MG injection   Yes No   Sig: Inject 1 mg into a muscle   hydrOXYzine HCL (ATARAX) 25 mg tablet   Yes No   Sig: TAKE 1 TABLET BY MOUTH EVERY 8 HOURS AS NEEDED FOR ITCHING   Patient not taking: Reported on 10/10/2023   midodrine (PROAMATINE) 10 MG tablet   No No   Sig: Take 1 tablet (10 mg total) by mouth 3 (three) times a day before meals   Patient taking differently: Take 10 mg by mouth 3 (three) times a day before meals Takes BID   oxyCODONE (ROXICODONE) 5 immediate release tablet   No No   Sig: Take 0.5 tablets (2.5 mg total) by mouth every 8 (eight) hours as needed for severe pain for up to 10 doses Max Daily Amount: 7.5 mg   polyethylene glycol (MIRALAX) 17 g packet Not Taking  No No   Sig: Take 17 g by mouth daily for 7 days   Patient not taking: Reported on 10/15/2023   vitamin B-12 (VITAMIN B-12) 1,000 mcg tablet  Outside Facility (Specify) Yes No   Sig: Take 1,000 mcg by mouth daily      Facility-Administered Medications: None       Past Medical History:   Diagnosis Date   • 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    With Cerebral Infarction:  Last Assessed:  October 20, 2016   • Chronic kidney disease 2012    on dialysis    • COVID 05/2022   • COVID-19 04/2022 4/2022-while in NH   • Diabetes mellitus ([address])    • Dialysis patient [facility])     T/TH/Sat   • Difficulty walking    • GERD (gastroesophageal reflux disease)    • Hyperlipidemia    • Hypertension    • Labyrinthitis 09/10/2011   • Myocardial infarction [facility]) 2014    x3 others were in 2009 & 2010   • Sleep disturbances 09/20/2010   • Stroke [facility]) 2007    x1, RLE deficit- uses walker   • Type 2 diabetes, uncontrolled, with renal manifestation 05/03/2017       Past Surgical History:   Procedure Laterality Date   • AV FISTULA PLACEMENT     • AV FISTULA REPAIR Left 6/3/2023    Procedure: LEFT UPPER EXTREMITY A-V GRAFT EXPLANT, LEFT BRACHIAL ANGIOPATCH,  APPLICATION OF  VAC;  Surgeon: Marco Antonio Rider MD;  Location:  MAIN OR;  Service: Vascular   • CARDIAC CATHETERIZATION Left 02/03/2023    Procedure: Cardiac Left Heart Cath;  Surgeon: Austin Gowers, MD;  Location: [address] [place] [address] CATH LAB; Service: Cardiology   • CARDIAC CATHETERIZATION N/A 02/08/2023    Procedure: Cardiac catheterization with complex PCI with Dr. Villa Jones, patient is a renal dialysis patient;  Surgeon: Xin Hahn MD;  Location:  CARDIAC CATH LAB; Service: Cardiology   • CARDIAC SURGERY  2010    stents x3   • COLONOSCOPY N/A 12/12/2016    Procedure: COLONOSCOPY;  Surgeon: Pheobe Heimlich, MD;  Location: [address] [place] [address] [facility]; Service:    • COLONOSCOPY N/A 04/03/2017    Procedure:  COLONOSCOPY;  Surgeon: Pheobe Heimlich, MD;  Location: [address] [place] [address] GI LAB; Service:    • HARDWARE REMOVAL Right 04/04/2022    Procedure: REMOVAL HARDWARE HIP;  Surgeon: Seble Multani MD;  [facility]: [facility];  Service: Orthopedics   • IR AV FISTULA/GRAFT DECLOT  04/29/2021   • IR AV FISTULA/GRAFT DECLOT  03/25/2022   • IR AV FISTULA/GRAFT DECLOT  09/21/2022   • IR AV FISTULAGRAM/GRAFTOGRAM  03/28/2022   • IR AV FISTULAGRAM/GRAFTOGRAM  07/28/2022   • IR TUNNELED CENTRAL LINE REMOVAL  12/07/2021   • IR TUNNELED DIALYSIS CATHETER PLACEMENT  05/24/2021   • PORTACATH PLACEMENT      per pt "port in chest"   • [place] ARTERIOVENOUS ANASTOMOSIS OPEN DIRECT Left 07/22/2021    Procedure: CREATION FISTULA ARTERIOVENOUS (AV);   Surgeon: Carlos Pablo MD;  Location: [facility];  Service: Vascular   • [place] ARTERIOVENOUS ANASTOMOSIS OPEN DIRECT Left 3/21/2023    Procedure: left brachiobasilic fistula,  AVG Graft;  Surgeon: Rosario Nguyen MD;  [facility];  Service: Vascular   • [place] ARTERIOVENOUS ANASTOMOSIS OPEN DIRECT Left 5/19/2023    Procedure: CREATION of LEFT FISTULA ARTERIOVENOUS (AV) GRAFT;  Surgeon: Kian Alcala MD;  [facility];  Service: Vascular   • CA HEMIARTHROPLASTY HIP PARTIAL Right 04/04/2022    Procedure: HEMIARTHROPLASTY HIP (BIPOLAR); Surgeon: Rian Fernandez MD;  [facility];  Service: Orthopedics       Family History   Problem Relation Age of Onset   • Leukemia Mother    • Crohn's disease Father    • Heart disease Father    • Transient ischemic attack Brother      I have reviewed and agree with the history as documented. E-Cigarette/Vaping   • E-Cigarette Use Never User      E-Cigarette/Vaping Substances   • Nicotine No    • THC No    • CBD No    • Flavoring No    • Other No    • Unknown No      Social History     Tobacco Use   • Smoking status: Every Day     Packs/day: 1.00     Years: 30.00     Total pack years: 30.00     Types: Cigarettes   • Smokeless tobacco: Never   • Tobacco comments:     Currently not smoking - in facility   Vaping Use   • Vaping Use: Never used   Substance Use Topics   • Alcohol use: Yes     Alcohol/week: 1.0 standard drink of alcohol     Types: 1 Shots of liquor per week     Comment: 1 shot daily   • Drug use: Yes     Types: Marijuana     Comment: gummies       Review of Systems   Constitutional:  Negative for chills and fever. HENT:  Positive for dental problem and mouth sores. Negative for congestion, sore throat and trouble swallowing. Eyes:  Negative for visual disturbance. Respiratory:  Negative for shortness of breath. Cardiovascular:  Negative for chest pain. Gastrointestinal:  Positive for constipation. Negative for abdominal distention, abdominal pain and vomiting. Genitourinary:         On dialysis Tuesday Thursday Saturday   Musculoskeletal:  Positive for back pain. Skin:  Positive for wound. Decubitus   Neurological:  Positive for weakness. Negative for headaches.    Hematological: Bruises/bleeds easily. Psychiatric/Behavioral:  Negative for confusion. All other systems reviewed and are negative. Physical Exam  Physical Exam  Vitals and nursing note reviewed. Constitutional:       Appearance: Normal appearance. HENT:      Head: Normocephalic. Right Ear: External ear normal.      Left Ear: External ear normal.      Nose: Nose normal.      Mouth/Throat:      Pharynx: Oropharynx is clear. Comments: There is a soft odontogenic abscess in the upper gum affecting the mucosal aspect of the upper lip. It is draining spontaneously. Necrotic tooth is noted at the base  Eyes:      Conjunctiva/sclera: Conjunctivae normal.   Cardiovascular:      Rate and Rhythm: Normal rate. Pulses: Normal pulses. Pulmonary:      Effort: Pulmonary effort is normal.   Abdominal:      General: Abdomen is flat. Palpations: Abdomen is soft. Tenderness: There is no abdominal tenderness. Musculoskeletal:         General: Normal range of motion. Cervical back: Normal range of motion. Skin:     General: Skin is warm and dry. Capillary Refill: Capillary refill takes less than 2 seconds. Findings: Bruising present. Neurological:      General: No focal deficit present. Mental Status: He is alert.    Psychiatric:         Mood and Affect: Mood normal.       Vital Signs  ED Triage Vitals [10/15/23 1100]   Temperature Pulse Respirations Blood Pressure SpO2   97.5 °F (36.4 °C) 94 22 111/69 98 %      Temp Source Heart Rate Source Patient Position - Orthostatic VS BP Location FiO2 (%)   Tympanic Monitor Sitting Left arm --      Pain Score       No Pain           Vitals:    10/15/23 1100   BP: 111/69   Pulse: 94   Patient Position - Orthostatic VS: Sitting         Visual Acuity      ED Medications  Medications   polyethylene glycol (MIRALAX) packet 17 g (17 g Oral Given 10/15/23 1137)   bisacodyl (DULCOLAX) EC tablet 5 mg (5 mg Oral Given 10/15/23 1137)   clindamycin (CLEOCIN) capsule 300 mg (300 mg Oral Given 10/15/23 1137)       Diagnostic Studies  Results Reviewed       None                   No orders to display              Procedures  Procedures         ED Course                               SBIRT 20yo+      Flowsheet Row Most Recent Value   Initial Alcohol Screen: US AUDIT-C     1. How often do you have a drink containing alcohol? 5 Filed at: 10/15/2023 1116   2. How many drinks containing alcohol do you have on a typical day you are drinking? 0 Filed at: 10/15/2023 1116   3a. Male UNDER 65: How often do you have five or more drinks on one occasion? 0 Filed at: 10/15/2023 1116   3b. FEMALE Any Age, or MALE 65+: How often do you have 4 or more drinks on one occassion? 0 Filed at: 10/15/2023 1116   Audit-C Score 5 Filed at: 10/15/2023 1116   KIZZY: How many times in the past year have you. .. Used an illegal drug or used a prescription medication for non-medical reasons? Never Filed at: 10/15/2023 1116                      Medical Decision Making  Patient did not understand the effects of narcotics and constipation and the need for continual bowel regimen. Fluid shifts with hemodialysis is likely also affecting him. Risk  OTC drugs. Prescription drug management. Disposition  Final diagnoses:   None     ED Disposition       None          Follow-up Information    None         Patient's Medications   Discharge Prescriptions    No medications on file       No discharge procedures on file.     PDMP Review         Value Time User    PDMP Reviewed  Yes 5/24/2023  5:40 PM Patrizia Pina MD            ED Provider  Electronically Signed by             Siva Junior MD  10/15/23 [id number]

## 2023-10-15 NOTE — ED NOTES
Charity Alvarado got a call from the patient  Trying to make an appointment with a rheumatologist in regards to his gout. Please advise if a referral can be placed in order for him to schedule at that location.     Please place referral into epic if able.     They were advised Dr. aPez is not in the office today.    Patient assisted to toilet for bowel movement. Stool noted on marsha pad.       Guillermo Bang RN  10/15/23 [id number]

## 2023-10-15 NOTE — DISCHARGE INSTRUCTIONS
Mouth rinses as we discussed.     Continue daily bowel regimen we discussed, especially when you are on pain medicine

## 2023-10-15 NOTE — ED NOTES
Pt is lying supine in bed, small brown liquid noted in pad. Rn advised to take pt to sit in toilet. Pt will go to toilet in 10 mins.       Norris Bingham RN  10/15/23 [id number]

## 2023-10-25 ENCOUNTER — TELEPHONE (OUTPATIENT)
Age: 68
End: 2023-10-25

## 2023-11-03 ENCOUNTER — TELEPHONE (OUTPATIENT)
Age: 68
End: 2023-11-03

## 2023-11-03 NOTE — TELEPHONE ENCOUNTER
Received a call from Cristy NINA stating patients wound got larger at 4 cm length and 3 cm width 0.1 depth and he was in his own feces and it was all in his wound.    She stating she is notifying the clinical team.

## 2023-11-13 ENCOUNTER — OFFICE VISIT (OUTPATIENT)
Dept: WOUND CARE | Facility: HOSPITAL | Age: 68
End: 2023-11-13
Payer: MEDICARE

## 2023-11-13 VITALS
DIASTOLIC BLOOD PRESSURE: 78 MMHG | BODY MASS INDEX: 20.04 KG/M2 | SYSTOLIC BLOOD PRESSURE: 123 MMHG | HEIGHT: 70 IN | WEIGHT: 140 LBS | TEMPERATURE: 97.6 F | RESPIRATION RATE: 18 BRPM | HEART RATE: 70 BPM

## 2023-11-13 DIAGNOSIS — E11.22 TYPE 2 DIABETES MELLITUS WITH CHRONIC KIDNEY DISEASE ON CHRONIC DIALYSIS, UNSPECIFIED WHETHER LONG TERM INSULIN USE (HCC): ICD-10-CM

## 2023-11-13 DIAGNOSIS — B36.9 FUNGAL DERMATITIS: ICD-10-CM

## 2023-11-13 DIAGNOSIS — Z99.2 TYPE 2 DIABETES MELLITUS WITH CHRONIC KIDNEY DISEASE ON CHRONIC DIALYSIS, UNSPECIFIED WHETHER LONG TERM INSULIN USE (HCC): ICD-10-CM

## 2023-11-13 DIAGNOSIS — N18.6 TYPE 2 DIABETES MELLITUS WITH CHRONIC KIDNEY DISEASE ON CHRONIC DIALYSIS, UNSPECIFIED WHETHER LONG TERM INSULIN USE (HCC): ICD-10-CM

## 2023-11-13 DIAGNOSIS — L89.150 DECUBITUS ULCER OF SACRAL REGION, UNSTAGEABLE (HCC): Primary | ICD-10-CM

## 2023-11-13 DIAGNOSIS — Z99.3 WHEELCHAIR DEPENDENCE: ICD-10-CM

## 2023-11-13 PROCEDURE — 97597 DBRDMT OPN WND 1ST 20 CM/<: CPT | Performed by: STUDENT IN AN ORGANIZED HEALTH CARE EDUCATION/TRAINING PROGRAM

## 2023-11-13 PROCEDURE — 97598 DBRDMT OPN WND ADDL 20CM/<: CPT | Performed by: STUDENT IN AN ORGANIZED HEALTH CARE EDUCATION/TRAINING PROGRAM

## 2023-11-13 PROCEDURE — 99213 OFFICE O/P EST LOW 20 MIN: CPT | Performed by: STUDENT IN AN ORGANIZED HEALTH CARE EDUCATION/TRAINING PROGRAM

## 2023-11-13 PROCEDURE — 99214 OFFICE O/P EST MOD 30 MIN: CPT | Performed by: STUDENT IN AN ORGANIZED HEALTH CARE EDUCATION/TRAINING PROGRAM

## 2023-11-13 NOTE — PATIENT INSTRUCTIONS
Orders Placed This Encounter   Procedures    Wound miscellaneous orders     Protein: Eat protein with each meal to promote healing. Examples of protein are fish, meat, chicken, nuts, peanut butter, eggs, lentils, edamame or a protein shake. Standing Status:   Future     Standing Expiration Date:   11/13/2024    Wound miscellaneous orders     Wound infection:  If you have signs of infection please call the wound center. If the wound center is closed- please go to the Emergency department. Some signs of infection:  fever, chills, increased redness, red streaks, increase in pain, increased drainage. Drainage with an odor, Change in drainage color: white/milky/green/tan/yellow,  an increase in swelling, chest pain and/or shortness of breath. Standing Status:   Future     Standing Expiration Date:   11/13/2024    Wound off loading     Off-loading Instructions:    Keep weight and pressure off wound at all times. , Use Wheelchair Cushion. (Do not use donut-type devices). Seat lifts or shift position in chair every 15 minutes. , Turn every 2 hours. Avoid position directing pressure to Wound site. Limit side lying to 30 degree tilt. Limit the head of bed elevation to 30 degrees. , Do Not Sit for Long Periods of Time. , and Off Loading Mattress low air loss     Standing Status:   Future     Standing Expiration Date:   11/13/2024    Wound cleansing and dressings     Wash your hands with soap and water. Remove old dressing, discard into plastic bag and place in trash. Cleanse the wound with soap and water prior to applying a clean dressing. Do not use tissue or cotton balls. Do not scrub the wound. Pat dry using gauze. Shower yes   Apply antifungal cream to skin surrounding wound  Apply Maxsorb AG to the sacral wound.   Cover with ABD  Secure with tape  Change dressing daily     Standing Status:   Future     Standing Expiration Date:   11/13/2024    Wound home care     Please continue home care for wound care Standing Status:   Future     Standing Expiration Date:   11/13/2024

## 2023-11-13 NOTE — PROGRESS NOTES
Patient ID: Jasmin Robin is a 70 y.o. male Date of Birth 1955     Chief Complaint  Chief Complaint   Patient presents with    New Patient Visit     Sacral wound       Allergies  Patient has no known allergies. Assessment:     Diagnoses and all orders for this visit:    Decubitus ulcer of sacral region, unstageable ([address])  -     Wound miscellaneous orders; Future  -     Wound miscellaneous orders; Future  -     Wound off loading; Future  -     Wound cleansing and dressings; Future  -     Wound home care; Future    Type 2 diabetes mellitus with chronic kidney disease on chronic dialysis, unspecified whether long term insulin use ([address])    Wheelchair dependence    Other orders  -     Debridement              Debridement   Wound 11/13/23 Pressure Injury Sacrum Mid    [facility] Protocol:  Consent: Verbal consent obtained. Risks and benefits: risks, benefits and alternatives were discussed  Consent given by: patient  Time out: Immediately prior to procedure a "time out" was called to verify the correct patient, procedure, equipment, support staff and site/side marked as required. Patient identity confirmed: verbally with patient    Performed by: physician  Debridement type: selective  Pain control: lidocaine 4%  Post-debridement measurements  Length (cm): 11.5  Width (cm): 4.2  Depth (cm): 0.3  Percent debrided: 80%  Surface Area (cm^2): 48.3  Area debrided (cm^2): 38.6  Volume (cm^3): 14.5  Devitalized tissue debrided: biofilm and exudate  Instrument(s) utilized: curette  Bleeding: small  Hemostasis obtained with: pressure  Procedural pain (0-10): 1  Post-procedural pain: 0   Response to treatment: procedure was tolerated well        Plan: It was a pleasure to see Jasmin Robin for wound care consult today  Selective debridement performed today as above  Start plan of care as noted below. A1C results reviewed with the patient today. No signs or symptoms of infection today.  Patient understands that if any signs of infection start (such as increased redness, drainage, pain, fever, chills, diaphoresis), they should call our office or proceed to the ER or Urgent Care. Patient should continue a high protein diet to facilitate wound healing  Patient is advised to not submerge wound or leave wound open to air. Follow up in 1 weeks  Given the multi-factorial nature of wound care, additional time was taken to review patient's treatment plan with other specialties and most recent pertinent lab work and imaging. All plans of care discussed with patient at bedside who verbalized understanding with treatment plan. Wound 11/13/23 Pressure Injury Sacrum Mid (Active)   Wound Image Images linked 11/13/23 1100   Wound Description Beefy red;Dark edges;Pink;Slough; Yellow; White 11/13/23 1102   Pressure Injury Stage 3 11/13/23 1102   Afua-wound Assessment Erythema 11/13/23 1102   Wound Length (cm) 11.5 cm 11/13/23 1102   Wound Width (cm) 4.2 cm 11/13/23 1102   Wound Depth (cm) 0.1 cm 11/13/23 1102   Wound Surface Area (cm^2) 48.3 cm^2 11/13/23 1102   Wound Volume (cm^3) 4.83 cm^3 11/13/23 1102   Calculated Wound Volume (cm^3) 4.83 cm^3 11/13/23 1102   Drainage Amount Large 11/13/23 1102   Drainage Description Serosanguineous 11/13/23 1102   Non-staged Wound Description Full thickness 11/13/23 1102   Dressing Status Old drainage; Intact 11/13/23 1102       Wound 11/13/23 Pressure Injury Sacrum Mid (Active)   Date First Assessed/Time First Assessed: 11/13/23 1057   Primary Wound Type: Pressure Injury  Location: Sacrum  Wound Location Orientation: Mid       [REMOVED] Wound 09/12/23 Pressure Injury Sacrum (Removed)   Resolved Date: 11/13/23  Date First Assessed/Time First Assessed: 09/12/23 2220   Present on Original Admission: Yes  Primary Wound Type: Pressure Injury  Location: Sacrum  Wound Outcome: (c) Other (Comment)       Subjective:      .    11/13/23: Anu Spann is a pleasant 11-year-old male with a past medical history of ambulatory dysfunction, atrial fibrillation on chronic anticoagulation with Eliquis, reported history of heart failure with preserved ejection fraction here today for initial wound care consult. Latonia Saini is a pleasant 18-year-old male with a past medical history of type 2 diabetes mellitus most recent hemoglobin A1c 4.8% 2 months ago 10 months ago, history of coronary artery disease, history of nicotine dependence, history of protein calorie malnutrition, history of CVA still on DAPT here today for initial wound care consult. Notes that wound started a few months ago, and since then has been in short-term rehabilitation. Wound continued has worsened since discharged home. Wound has been dressed with gauze. He denies any signs of infection today including fever chills diaphoresis. He does have a wheelchair with a gel overlay. Notes that he does have a Roho cushion at home but does not like to use it. Patient currently in a hospital bed. Does not have a low-air-loss mattress to his best knowledge. The following portions of the patient's history were reviewed and updated as appropriate: allergies, current medications, past family history, past medical history, past social history, past surgical history, and problem list.    Review of Systems   Constitutional:  Negative for chills, diaphoresis and fever. Skin:  Positive for wound. All other systems reviewed and are negative. Objective:       Wound 11/13/23 Pressure Injury Sacrum Mid (Active)   Wound Image Images linked 11/13/23 1100   Wound Description Beefy red;Dark edges;Pink;Slough; Yellow; White 11/13/23 1102   Pressure Injury Stage 3 11/13/23 1102   Afua-wound Assessment Erythema 11/13/23 1102   Wound Length (cm) 11.5 cm 11/13/23 1102   Wound Width (cm) 4.2 cm 11/13/23 1102   Wound Depth (cm) 0.1 cm 11/13/23 1102   Wound Surface Area (cm^2) 48.3 cm^2 11/13/23 1102   Wound Volume (cm^3) 4.83 cm^3 11/13/23 1102   Calculated Wound Volume (cm^3) 4.83 cm^3 11/13/23 1102   Drainage Amount Large 11/13/23 1102   Drainage Description Serosanguineous 11/13/23 1102   Non-staged Wound Description Full thickness 11/13/23 1102   Dressing Status Old drainage; Intact 11/13/23 1102       /78   Pulse 70   Temp 97.6 °F (36.4 °C)   Resp 18   Ht 5' 10" (1.778 m)   Wt 63.5 kg (140 lb)   BMI 20.09 kg/m²     Physical Exam  Vitals reviewed. Constitutional:       General: He is not in acute distress. Appearance: Normal appearance. He is not ill-appearing, toxic-appearing or diaphoretic. HENT:      Head: Normocephalic and atraumatic. Eyes:      Extraocular Movements: Extraocular movements intact. Pulmonary:      Effort: Pulmonary effort is normal.   Musculoskeletal:      Cervical back: Neck supple. Skin:     Comments: Sacral wound with significant fibrin deposition. Debridement done no devitalized tissue and fibrin however unable to appreciate healthy wound base, making this pressure unstageable. Blanching erythema surrounding the wound. Neurological:      Mental Status: He is alert. Psychiatric:         Mood and Affect: Mood normal.                 Wound Instructions:  Orders Placed This Encounter   Procedures    Wound miscellaneous orders     Protein: Eat protein with each meal to promote healing. Examples of protein are fish, meat, chicken, nuts, peanut butter, eggs, lentils, edamame or a protein shake. Standing Status:   Future     Standing Expiration Date:   11/13/2024    Wound miscellaneous orders     Wound infection:  If you have signs of infection please call the wound center. If the wound center is closed- please go to the Emergency department. Some signs of infection:  fever, chills, increased redness, red streaks, increase in pain, increased drainage. Drainage with an odor, Change in drainage color: white/milky/green/tan/yellow,  an increase in swelling, chest pain and/or shortness of breath.      Standing Status:   Future Standing Expiration Date:   11/13/2024    Wound off loading     Off-loading Instructions:    Keep weight and pressure off wound at all times. , Use Wheelchair Cushion. (Do not use donut-type devices). Seat lifts or shift position in chair every 15 minutes. , Turn every 2 hours. Avoid position directing pressure to Wound site. Limit side lying to 30 degree tilt. Limit the head of bed elevation to 30 degrees. , Do Not Sit for Long Periods of Time. , and Off Loading Mattress low air loss     Standing Status:   Future     Standing Expiration Date:   11/13/2024    Wound cleansing and dressings     Wash your hands with soap and water. Remove old dressing, discard into plastic bag and place in trash. Cleanse the wound with soap and water prior to applying a clean dressing. Do not use tissue or cotton balls. Do not scrub the wound. Pat dry using gauze. Shower yes   Apply antifungal cream to skin surrounding wound  Apply Maxsorb AG to the sacral wound. Cover with ABD  Secure with tape  Change dressing daily     Standing Status:   Future     Standing Expiration Date:   11/13/2024    Wound home care     Please continue home care for wound care     Standing Status:   Future     Standing Expiration Date:   11/13/2024    Debridement     This order was created via procedure documentation        Diagnosis ICD-10-CM Associated Orders   1. Decubitus ulcer of sacral region, unstageable ([address])  L89.150 Wound miscellaneous orders     Wound miscellaneous orders     Wound off loading     Wound cleansing and dressings     Wound home care      2. Type 2 diabetes mellitus with chronic kidney disease on chronic dialysis, unspecified whether long term insulin use ([facility])  E11.22     N18.6     Z99.2       3. Wheelchair dependence  Z99.3           --  Sheila Stanton MD    "This note has been constructed using a voice recognition system. Therefore there may be syntax, spelling, and/or grammatical errors.  Occasional wrong word or "sound alike" substitutions may have occurred due to the inherent limitations of voice recognition software. Read the chart carefully and recognize, using context, where substitutions have occurred.  Please call if you have any questions."

## 2023-11-14 RX ORDER — CLOTRIMAZOLE AND BETAMETHASONE DIPROPIONATE 10; .64 MG/G; MG/G
CREAM TOPICAL DAILY
Qty: 45 G | Refills: 2 | Status: SHIPPED | OUTPATIENT
Start: 2023-11-14

## 2023-11-18 LAB
DME PARACHUTE DELIVERY DATE ACTUAL: NORMAL
DME PARACHUTE DELIVERY DATE EXPECTED: NORMAL
DME PARACHUTE DELIVERY DATE REQUESTED: NORMAL
DME PARACHUTE ITEM DESCRIPTION: NORMAL
DME PARACHUTE ITEM DESCRIPTION: NORMAL
DME PARACHUTE ORDER STATUS: NORMAL
DME PARACHUTE SUPPLIER NAME: NORMAL
DME PARACHUTE SUPPLIER PHONE: NORMAL

## 2023-11-21 ENCOUNTER — OFFICE VISIT (OUTPATIENT)
Dept: WOUND CARE | Facility: HOSPITAL | Age: 68
End: 2023-11-21
Payer: MEDICARE

## 2023-11-21 VITALS — HEART RATE: 66 BPM | SYSTOLIC BLOOD PRESSURE: 132 MMHG | DIASTOLIC BLOOD PRESSURE: 71 MMHG | TEMPERATURE: 98.1 F

## 2023-11-21 DIAGNOSIS — E11.22 TYPE 2 DIABETES MELLITUS WITH CHRONIC KIDNEY DISEASE ON CHRONIC DIALYSIS, UNSPECIFIED WHETHER LONG TERM INSULIN USE (HCC): ICD-10-CM

## 2023-11-21 DIAGNOSIS — Z99.3 WHEELCHAIR DEPENDENCE: ICD-10-CM

## 2023-11-21 DIAGNOSIS — Z99.2 TYPE 2 DIABETES MELLITUS WITH CHRONIC KIDNEY DISEASE ON CHRONIC DIALYSIS, UNSPECIFIED WHETHER LONG TERM INSULIN USE (HCC): ICD-10-CM

## 2023-11-21 DIAGNOSIS — N18.6 TYPE 2 DIABETES MELLITUS WITH CHRONIC KIDNEY DISEASE ON CHRONIC DIALYSIS, UNSPECIFIED WHETHER LONG TERM INSULIN USE (HCC): ICD-10-CM

## 2023-11-21 DIAGNOSIS — L89.150 DECUBITUS ULCER OF SACRAL REGION, UNSTAGEABLE (HCC): Primary | ICD-10-CM

## 2023-11-21 PROCEDURE — 97597 DBRDMT OPN WND 1ST 20 CM/<: CPT | Performed by: STUDENT IN AN ORGANIZED HEALTH CARE EDUCATION/TRAINING PROGRAM

## 2023-11-21 RX ORDER — LIDOCAINE HYDROCHLORIDE 40 MG/ML
5 SOLUTION TOPICAL ONCE
Status: COMPLETED | OUTPATIENT
Start: 2023-11-21 | End: 2023-11-21

## 2023-11-21 RX ADMIN — LIDOCAINE HYDROCHLORIDE 5 ML: 40 SOLUTION TOPICAL at 09:49

## 2023-11-21 NOTE — LETTER
[address] [address] [address]  [address] [address]  Phone#  [phone]  Fax#  [phone]    Patient:  Dashawn Rangel  YOB: 1955  Phone:  [phone]  Date of Visit:  11/21/2023    Orders Placed This Encounter   Procedures   • Wound miscellaneous orders     Protein: Eat protein with each meal to promote healing. Examples of protein are fish, meat, chicken, nuts, peanut butter, eggs, lentils, edamame or a protein franco    Wound infection:  If you have signs of infection please call the wound center. If the wound center is closed- please go to the Emergency department. Some signs of infection:  fever, chills, increased redness, red streaks, increase in pain, increased drainage. Drainage with an odor, Change in drainage color: white/milky/green/tan/yellow,  an increase in swelling, chest pain and/or shortness of breath. Standing Status:   Future     Standing Expiration Date:   11/21/2024   • Wound off loading     Wound off loading        Off-loading Instructions:     Keep weight and pressure off wound at all times. , Use Wheelchair Cushion. (Do not use donut-type devices). Seat lifts or shift position in chair every 15 minutes. , Turn every 2 hours. Avoid position directing pressure to Wound site. Limit side lying to 30 degree tilt. Limit the head of bed elevation to 30 degrees. , Do Not Sit for Long Periods of Time. , and Off Loading Mattress low air loss     Standing Status:   Future     Standing Expiration Date:   11/21/2024   • Wound cleansing and dressings     Wound cleansing and dressings        Wash your hands with soap and water. Remove old dressing, discard into plastic bag and place in trash. Cleanse the wound with soap and water prior to applying a clean dressing. Do not use tissue or cotton balls. Do not scrub the wound. Pat dry using gauze. Shower yes   Apply antifungal cream to skin surrounding wound  Apply Hydrocolloid to the sacral wound. Secure with tape  Change dressing every other day. This was done today.      Standing Status:   Future     Standing Expiration Date:   11/21/2024   • Wound home care     Wound home care        Please continue home care for wound care     Standing Status:   Future     Standing Expiration Date:   11/21/2024         Electronically signed by Rufina Marquis MD

## 2023-11-21 NOTE — PATIENT INSTRUCTIONS
Orders Placed This Encounter   Procedures    Wound miscellaneous orders     Protein: Eat protein with each meal to promote healing. Examples of protein are fish, meat, chicken, nuts, peanut butter, eggs, lentils, edamame or a protein franco    Wound infection:  If you have signs of infection please call the wound center. If the wound center is closed- please go to the Emergency department. Some signs of infection:  fever, chills, increased redness, red streaks, increase in pain, increased drainage. Drainage with an odor, Change in drainage color: white/milky/green/tan/yellow,  an increase in swelling, chest pain and/or shortness of breath. Standing Status:   Future     Standing Expiration Date:   11/21/2024    Wound off loading     Wound off loading        Off-loading Instructions:     Keep weight and pressure off wound at all times. , Use Wheelchair Cushion. (Do not use donut-type devices). Seat lifts or shift position in chair every 15 minutes. , Turn every 2 hours. Avoid position directing pressure to Wound site. Limit side lying to 30 degree tilt. Limit the head of bed elevation to 30 degrees. , Do Not Sit for Long Periods of Time. , and Off Loading Mattress low air loss     Standing Status:   Future     Standing Expiration Date:   11/21/2024    Wound home care     Wound home care        Please continue home care for wound care     Standing Status:   Future     Standing Expiration Date:   11/21/2024    Wound cleansing and dressings     Wound cleansing and dressings        Wash your hands with soap and water. Remove old dressing, discard into plastic bag and place in trash. Cleanse the wound with soap and water prior to applying a clean dressing. Do not use tissue or cotton balls. Do not scrub the wound. Pat dry using gauze. Shower yes   Discontinue antifungal cream to skin surrounding wound  Apply Hydrocolloid to the sacral wound. Secure with tape  Change dressing every other day.     This was done today.     Standing Status:   Future     Standing Expiration Date:   11/21/2024

## 2023-11-21 NOTE — PROGRESS NOTES
Patient ID: Barbara Arreola is a 70 y.o. male Date of Birth 1955     Chief Complaint  Chief Complaint   Patient presents with    Follow Up Wound Care Visit     Open wound sacrum       Allergies  Patient has no known allergies. Assessment:     Diagnoses and all orders for this visit:    Decubitus ulcer of sacral region, unstageable (HCC)  -     lidocaine (XYLOCAINE) 4 % topical solution 5 mL  -     Wound miscellaneous orders; Future  -     Wound off loading; Future  -     Cancel: Wound cleansing and dressings; Future  -     Wound home care; Future  -     Wound cleansing and dressings; Future  -     Debridement    Type 2 diabetes mellitus with chronic kidney disease on chronic dialysis, unspecified whether long term insulin use (HCC)  -     lidocaine (XYLOCAINE) 4 % topical solution 5 mL  -     Wound miscellaneous orders; Future  -     Wound off loading; Future  -     Cancel: Wound cleansing and dressings; Future  -     Wound home care; Future  -     Wound cleansing and dressings; Future    Wheelchair dependence  -     lidocaine (XYLOCAINE) 4 % topical solution 5 mL  -     Wound miscellaneous orders; Future  -     Wound off loading; Future  -     Cancel: Wound cleansing and dressings; Future  -     Wound home care; Future  -     Wound cleansing and dressings; Future              Debridement   Wound 11/13/23 Pressure Injury Sacrum Mid    [facility] Protocol:  Consent: Verbal consent obtained. Risks and benefits: risks, benefits and alternatives were discussed  Consent given by: patient  Time out: Immediately prior to procedure a "time out" was called to verify the correct patient, procedure, equipment, support staff and site/side marked as required.   Patient identity confirmed: verbally with patient    Performed by: physician  Debridement type: selective  Pain control: lidocaine 4%  Post-debridement measurements  Length (cm): 4.4  Width (cm): 3  Depth (cm): 0.1  Percent debrided: 90%  Surface Area (cm^2): 13.2  Area debrided (cm^2): 11.88  Volume (cm^3): 1.32  Devitalized tissue debrided: biofilm and exudate  Instrument(s) utilized: curette  Bleeding: small  Hemostasis obtained with: pressure  Procedural pain (0-10): 1  Post-procedural pain: 0   Response to treatment: procedure was tolerated well        Plan: It was a pleasure to see Barbara Arreola for wound care follow up today  Selective debridement performed today as above  Wound is improving   Continue plan of care as noted below with change to hydrocolloid dressing. Patient advised that he may notice more irritation in the area and more exudate. Regarding offloading, patient already has a Roho cushion at home. Did have a hospital bed already in the home,  and I ordered a low-air-loss mattress for him at last visit which per [facility] has already been delivered. Patient confirms this as well and is happy with the new mattress. No signs or symptoms of infection today. Patient understands that if any signs of infection start (such as increased redness, drainage, pain, fever, chills, diaphoresis), they should call our office or proceed to the ER or Urgent Care. Patient should continue a high protein diet to facilitate wound healing  Patient is advised to not submerge wound or leave wound open to air. Follow up in 1 weeks  Given the multi-factorial nature of wound care, additional time was taken to review patient's treatment plan with other specialties and most recent pertinent lab work and imaging. All plans of care discussed with patient at bedside who verbalized understanding with treatment plan. Wound 11/13/23 Pressure Injury Sacrum Mid (Active)   Wound Image Images linked 11/21/23 0937   Wound Description Granulation tissue;Pink;Slough; Yellow;Tan;Eschar 11/21/23 0942   Pressure Injury Stage U 11/21/23 0942   Afua-wound Assessment Erythema 11/21/23 0942   Wound Length (cm) 4.4 cm 11/21/23 0942   Wound Width (cm) 3 cm 11/21/23 0942   Wound Depth (cm) 0.1 cm 11/21/23 0942   Wound Surface Area (cm^2) 13.2 cm^2 11/21/23 0942   Wound Volume (cm^3) 1.32 cm^3 11/21/23 0942   Calculated Wound Volume (cm^3) 1.32 cm^3 11/21/23 0942   Change in Wound Size % 72.67 11/21/23 0942   Drainage Amount Moderate 11/21/23 0942   Drainage Description Serosanguineous 11/21/23 0942   Non-staged Wound Description Full thickness 11/21/23 0942   Dressing Status New drainage; Old drainage; Intact (upon arrival) 11/21/23 0942       Wound 11/13/23 Pressure Injury Sacrum Mid (Active)   Date First Assessed/Time First Assessed: 11/13/23 1057   Primary Wound Type: Pressure Injury  Location: Sacrum  Wound Location Orientation: Mid       [REMOVED] Wound 09/12/23 Pressure Injury Sacrum (Removed)   Resolved Date: 11/13/23  Date First Assessed/Time First Assessed: 09/12/23 2220   Present on Original Admission: Yes  Primary Wound Type: Pressure Injury  Location: Sacrum  Wound Outcome: (c) Other (Comment)       Subjective:      .    11/21/23: Patient's wife has been applying wound care as directed. No symptoms of infection. 11/13/23: Ramandeep Ruth is a pleasant 69-year-old male with a past medical history of type 2 diabetes mellitus most recent hemoglobin A1c 4.8% 2 months ago 10 months ago, history of coronary artery disease, history of nicotine dependence, history of protein calorie malnutrition, history of CVA still on DAPT here today for initial wound care consult. Notes that wound started a few months ago, and since then has been in short-term rehabilitation. Wound continued has worsened since discharged home. Wound has been dressed with gauze. He denies any signs of infection today including fever chills diaphoresis. He does have a wheelchair with a gel overlay. Notes that he does have a Roho cushion at home but does not like to use it. Patient currently in a hospital bed. Does not have a low-air-loss mattress to his best knowledge.           The following portions of the patient's history were reviewed and updated as appropriate: allergies, current medications, past family history, past medical history, past social history, past surgical history, and problem list.    Review of Systems   Constitutional:  Negative for chills, diaphoresis and fever. Skin:  Positive for wound. All other systems reviewed and are negative. Objective:       Wound 11/13/23 Pressure Injury Sacrum Mid (Active)   Wound Image Images linked 11/21/23 0937   Wound Description Granulation tissue;Pink;Slough; Yellow;Tan;Eschar 11/21/23 0942   Pressure Injury Stage U 11/21/23 0942   Afua-wound Assessment Erythema 11/21/23 0942   Wound Length (cm) 4.4 cm 11/21/23 0942   Wound Width (cm) 3 cm 11/21/23 0942   Wound Depth (cm) 0.1 cm 11/21/23 0942   Wound Surface Area (cm^2) 13.2 cm^2 11/21/23 0942   Wound Volume (cm^3) 1.32 cm^3 11/21/23 0942   Calculated Wound Volume (cm^3) 1.32 cm^3 11/21/23 0942   Change in Wound Size % 72.67 11/21/23 0942   Drainage Amount Moderate 11/21/23 0942   Drainage Description Serosanguineous 11/21/23 0942   Non-staged Wound Description Full thickness 11/21/23 0942   Dressing Status New drainage; Old drainage; Intact (upon arrival) 11/21/23 0942       /71   Pulse 66   Temp 98.1 °F (36.7 °C) (Temporal)     Physical Exam  Vitals reviewed. Constitutional:       Appearance: Normal appearance. HENT:      Head: Normocephalic and atraumatic. Eyes:      Extraocular Movements: Extraocular movements intact. Pulmonary:      Effort: Pulmonary effort is normal.   Musculoskeletal:      Cervical back: Neck supple. Skin:     Comments: Sacral wound at least more than last exam.  Still with heavy fibrin deposition on the wound bed that makes it difficult to categorize this wound. No signs of infection   Neurological:      Mental Status: He is alert.    Psychiatric:         Mood and Affect: Mood normal.           Wound Instructions:  Orders Placed This Encounter   Procedures    Wound miscellaneous orders     Protein: Eat protein with each meal to promote healing. Examples of protein are fish, meat, chicken, nuts, peanut butter, eggs, lentils, edamame or a protein franco    Wound infection:  If you have signs of infection please call the wound center. If the wound center is closed- please go to the Emergency department. Some signs of infection:  fever, chills, increased redness, red streaks, increase in pain, increased drainage. Drainage with an odor, Change in drainage color: white/milky/green/tan/yellow,  an increase in swelling, chest pain and/or shortness of breath. Standing Status:   Future     Standing Expiration Date:   11/21/2024    Wound off loading     Wound off loading        Off-loading Instructions:     Keep weight and pressure off wound at all times. , Use Wheelchair Cushion. (Do not use donut-type devices). Seat lifts or shift position in chair every 15 minutes. , Turn every 2 hours. Avoid position directing pressure to Wound site. Limit side lying to 30 degree tilt. Limit the head of bed elevation to 30 degrees. , Do Not Sit for Long Periods of Time. , and Off Loading Mattress low air loss     Standing Status:   Future     Standing Expiration Date:   11/21/2024    Wound home care     Wound home care        Please continue home care for wound care     Standing Status:   Future     Standing Expiration Date:   11/21/2024    Wound cleansing and dressings     Wound cleansing and dressings        Wash your hands with soap and water. Remove old dressing, discard into plastic bag and place in trash. Cleanse the wound with soap and water prior to applying a clean dressing. Do not use tissue or cotton balls. Do not scrub the wound. Pat dry using gauze. Shower yes   Discontinue antifungal cream to skin surrounding wound  Apply Hydrocolloid to the sacral wound. Secure with tape  Change dressing every other day. This was done today.      Standing Status:   Future     Standing Expiration Date:   11/21/2024    Debridement     This order was created via procedure documentation        Diagnosis ICD-10-CM Associated Orders   1. Decubitus ulcer of sacral region, unstageable ([facility])  L89.150 lidocaine (XYLOCAINE) 4 % topical solution 5 mL     Wound miscellaneous orders     Wound off loading     Wound home care     Wound cleansing and dressings     Debridement      2. Type 2 diabetes mellitus with chronic kidney disease on chronic dialysis, unspecified whether long term insulin use ([facility])  E11.22 lidocaine (XYLOCAINE) 4 % topical solution 5 mL    N18.6 Wound miscellaneous orders    Z99.2 Wound off loading     Wound home care     Wound cleansing and dressings      3. Wheelchair dependence  Z99.3 lidocaine (XYLOCAINE) 4 % topical solution 5 mL     Wound miscellaneous orders     Wound off loading     Wound home care     Wound cleansing and dressings          --  Tayla Madrigal MD    "This note has been constructed using a voice recognition system. Therefore there may be syntax, spelling, and/or grammatical errors. Occasional wrong word or "sound alike" substitutions may have occurred due to the inherent limitations of voice recognition software. Read the chart carefully and recognize, using context, where substitutions have occurred.  Please call if you have any questions."

## 2023-11-21 NOTE — LETTER
[address] [address] [address] [address]  Phone#  [phone]  Fax#  [phone]    Patient:  Alexia Scherer  YOB: 1955  Phone:  [phone]  Date of Visit:  11/21/2023    Orders Placed This Encounter   Procedures   • Wound miscellaneous orders     Protein: Eat protein with each meal to promote healing. Examples of protein are fish, meat, chicken, nuts, peanut butter, eggs, lentils, edamame or a protein franco    Wound infection:  If you have signs of infection please call the wound center. If the wound center is closed- please go to the Emergency department. Some signs of infection:  fever, chills, increased redness, red streaks, increase in pain, increased drainage. Drainage with an odor, Change in drainage color: white/milky/green/tan/yellow,  an increase in swelling, chest pain and/or shortness of breath. Standing Status:   Future     Standing Expiration Date:   11/21/2024   • Wound off loading     Wound off loading        Off-loading Instructions:     Keep weight and pressure off wound at all times. , Use Wheelchair Cushion. (Do not use donut-type devices). Seat lifts or shift position in chair every 15 minutes. , Turn every 2 hours. Avoid position directing pressure to Wound site. Limit side lying to 30 degree tilt. Limit the head of bed elevation to 30 degrees. , Do Not Sit for Long Periods of Time. , and Off Loading Mattress low air loss     Standing Status:   Future     Standing Expiration Date:   11/21/2024   • Wound home care     Wound home care        Please continue home care for wound care     Standing Status:   Future     Standing Expiration Date:   11/21/2024   • Wound cleansing and dressings     Wound cleansing and dressings        Wash your hands with soap and water. Remove old dressing, discard into plastic bag and place in trash. Cleanse the wound with soap and water prior to applying a clean dressing.  Do not use tissue or cotton balls. Do not scrub the wound. Pat dry using gauze. Shower yes   Discontinue antifungal cream to skin surrounding wound  Apply Hydrocolloid to the sacral wound. Secure with tape  Change dressing every other day. This was done today.      Standing Status:   Future     Standing Expiration Date:   11/21/2024   • Debridement     This order was created via procedure documentation         Electronically signed by Imtiaz Zepeda MD

## 2023-11-24 ENCOUNTER — OFFICE VISIT (OUTPATIENT)
Age: 68
End: 2023-11-24
Payer: MEDICARE

## 2023-11-24 VITALS
SYSTOLIC BLOOD PRESSURE: 132 MMHG | HEART RATE: 66 BPM | WEIGHT: 130 LBS | BODY MASS INDEX: 18.61 KG/M2 | HEIGHT: 70 IN | DIASTOLIC BLOOD PRESSURE: 71 MMHG

## 2023-11-24 DIAGNOSIS — L85.3 XEROSIS CUTIS: ICD-10-CM

## 2023-11-24 DIAGNOSIS — B35.1 ONYCHOMYCOSIS: Primary | ICD-10-CM

## 2023-11-24 DIAGNOSIS — E11.628 TYPE 2 DIABETES MELLITUS WITH OTHER SKIN COMPLICATION, WITHOUT LONG-TERM CURRENT USE OF INSULIN (HCC): ICD-10-CM

## 2023-11-24 PROCEDURE — 11721 DEBRIDE NAIL 6 OR MORE: CPT | Performed by: STUDENT IN AN ORGANIZED HEALTH CARE EDUCATION/TRAINING PROGRAM

## 2023-11-25 NOTE — PROGRESS NOTES
This patient was seen on 11/25/2023. My role is Foot , Ankle, and Wound Specialist    ASSESSMENT     There are no diagnoses linked to this encounter. Problem List Items Addressed This Visit    None      PLAN  -At-risk diabetic foot examination performed.  -I educated patient on proper foot care including wearing white socks, refraining from walking barefoot, and self-examining feet every day. -Recommend wide toe box supportive sneakers  -Debrided each mycotic toenail reducing nails in thickness and removing devitalized tissue and subungual debris. This was performed with a large nail nipper  -Diabetic Risk category 0  -RTC in 4 months    SUBJECTIVE    Chief Complaint:  Here for at-risk diabetic foot care     Patient ID: Gayatri Dutton is a 70 y.o. male. 8/30/23: Ed is a 68yo male who presents today for at-risk diabetic foot care. He states that he does not see another podiatrist. He states that his bilateral toenails are elongated and too difficult to cut at home and occasionally cause him pain as he walks. He denies numbness and tingling in his feet and denies any history of ulceration. He has never worn diabetic sneakers. 11/24/2023: Ed states that he has been doing well since his last visit. He states that his nails are slightly elongated and are beginning to become painful/difficult to cut by himself. He is here to have them cut today. The following portions of the patient's history were reviewed and updated as appropriate: allergies, current medications, past family history, past medical history, past social history, past surgical history and problem list.    Review of Systems   Constitutional: Negative. Respiratory: Negative. Cardiovascular: Negative. Gastrointestinal: Negative. Genitourinary: Negative. Musculoskeletal: Negative. Skin:  Positive for color change.         Dry skin         OBJECTIVE      /71   Pulse 66   Ht 5' 10" (1.778 m)   Wt 59 kg (130 lb) Comment: verbal  BMI 18.65 kg/m²        Physical Exam  Constitutional:       Appearance: Normal appearance. HENT:      Head: Normocephalic and atraumatic. Eyes:      General:         Right eye: No discharge. Left eye: No discharge. Cardiovascular:      Rate and Rhythm: Normal rate and regular rhythm. Pulses:           Dorsalis pedis pulses are 2+ on the right side and 2+ on the left side. Posterior tibial pulses are 2+ on the right side and 2+ on the left side. Pulmonary:      Effort: Pulmonary effort is normal.      Breath sounds: Normal breath sounds. Feet:      Right foot:      Protective Sensation: 10 sites tested. 10 sites sensed. Skin integrity: Dry skin present. No warmth. Toenail Condition: Right toenails are abnormally thick and long. Fungal disease present. Left foot:      Protective Sensation: 10 sites tested. 10 sites sensed. Skin integrity: Dry skin present. No warmth. Toenail Condition: Left toenails are abnormally thick and long. Fungal disease present. Skin:     General: Skin is warm. Capillary Refill: Capillary refill takes less than 2 seconds. Neurological:      Mental Status: He is alert and oriented to person, place, and time. Sensory: Sensation is intact. No sensory deficit.    Psychiatric:         Mood and Affect: Mood normal.

## 2023-11-25 NOTE — PATIENT INSTRUCTIONS
Diabetes and Your Skin   WHAT YOU NEED TO KNOW:   Diabetes can affect every part of your body, including your skin. Diabetes that is not well controlled can damage blood vessels and nerves. Damage to blood vessels can make it hard for blood to flow to tissues and organs. A lack of blood flow to your skin can cause ulcers that are difficult to heal. Skin ulcers are also called sores. People with diabetes can also have more bacterial skin infections than other people. Most skin conditions can be prevented with good blood sugar control. Skin sores can be hard to heal, or become life or limb-threatening, if not treated early. DISCHARGE INSTRUCTIONS:   Call your doctor or care team provider if:   You get a severe burn or cut. You have a sore that is painful, warm to the touch, or has redness around it. Your sore does not get better or seems to get worse. You have a fever. Your blood sugar levels continue to be higher than they should be. You have questions or concerns about your condition or care. Prevent skin sores:   Keep your blood sugars within target range. Your care team provider will tell you what your blood sugar levels should be. High blood sugar levels increase your risk for skin infections and poor wound healing. Keep your skin clean. Do not take hot baths or showers. They can cause your skin to get dry. Do not take a bubble bath if you have dry skin. Use moisturizing soaps. Keep your skin from becoming too dry. Apply moisturizing lotion after baths or showers, especially in cold, dry weather. When you scratch dry, itchy skin, you can cause your skin to be open to infection. Bathe less during cold weather and use lotion to moisturize. Do not put lotion between your toes. Moisture between your toes could lead to skin breakdown. Use a humidifier to keep air in your home from being dry. Keep areas where skin touches skin dry. Use talcum powder in areas such as armpits and groin. You may also need it under your breasts, and between your toes. Moisture in these areas can cause a fungal infection. Treat cuts immediately. Clean minor cuts with soap and water. Cover them with sterile gauze. Follow up with your doctor or diabetes care team providers as directed:  Write down your questions so you remember to ask them during your visits. © Copyright Minor Garcia 2023 Information is for End User's use only and may not be sold, redistributed or otherwise used for commercial purposes. The above information is an  only. It is not intended as medical advice for individual conditions or treatments. Talk to your doctor, nurse or pharmacist before following any medical regimen to see if it is safe and effective for you.

## 2023-12-01 ENCOUNTER — OFFICE VISIT (OUTPATIENT)
Dept: WOUND CARE | Facility: HOSPITAL | Age: 68
End: 2023-12-01
Payer: MEDICARE

## 2023-12-01 VITALS
TEMPERATURE: 97.8 F | HEART RATE: 69 BPM | RESPIRATION RATE: 16 BRPM | DIASTOLIC BLOOD PRESSURE: 78 MMHG | SYSTOLIC BLOOD PRESSURE: 172 MMHG

## 2023-12-01 DIAGNOSIS — N18.6 TYPE 2 DIABETES MELLITUS WITH CHRONIC KIDNEY DISEASE ON CHRONIC DIALYSIS, UNSPECIFIED WHETHER LONG TERM INSULIN USE (HCC): ICD-10-CM

## 2023-12-01 DIAGNOSIS — E11.22 TYPE 2 DIABETES MELLITUS WITH CHRONIC KIDNEY DISEASE ON CHRONIC DIALYSIS, UNSPECIFIED WHETHER LONG TERM INSULIN USE (HCC): ICD-10-CM

## 2023-12-01 DIAGNOSIS — L89.150 DECUBITUS ULCER OF SACRAL REGION, UNSTAGEABLE (HCC): Primary | ICD-10-CM

## 2023-12-01 DIAGNOSIS — Z99.2 TYPE 2 DIABETES MELLITUS WITH CHRONIC KIDNEY DISEASE ON CHRONIC DIALYSIS, UNSPECIFIED WHETHER LONG TERM INSULIN USE (HCC): ICD-10-CM

## 2023-12-01 DIAGNOSIS — Z99.3 WHEELCHAIR DEPENDENCE: ICD-10-CM

## 2023-12-01 PROCEDURE — 97597 DBRDMT OPN WND 1ST 20 CM/<: CPT | Performed by: STUDENT IN AN ORGANIZED HEALTH CARE EDUCATION/TRAINING PROGRAM

## 2023-12-01 RX ORDER — LIDOCAINE HYDROCHLORIDE 40 MG/ML
5 SOLUTION TOPICAL ONCE
Status: COMPLETED | OUTPATIENT
Start: 2023-12-01 | End: 2023-12-01

## 2023-12-01 RX ADMIN — LIDOCAINE HYDROCHLORIDE 5 ML: 40 SOLUTION TOPICAL at 09:13

## 2023-12-01 NOTE — PATIENT INSTRUCTIONS
Orders Placed This Encounter   Procedures    Wound cleansing and dressings     Sacral Wound:    Wash your hands with soap and water. Remove old dressing, discard into plastic bag and place in trash. Cleanse the wound with soap and water prior to applying a clean dressing. Do not use tissue or cotton balls. Do not scrub the wound. Pat dry using gauze. Shower yes   Triad cream to skin around wound. Apply Hydrocolloid to the sacral wound. Change dressing every other day. This was done today. Standing Status:   Future     Standing Expiration Date:   12/1/2024    Wound miscellaneous orders     Protein: Eat protein with each meal to promote healing. Examples of protein are fish, meat, chicken, nuts, peanut butter, eggs, lentils, edamame or a protein franco     Wound infection:  If you have signs of infection please call the wound center. If the wound center is closed- please go to the Emergency department. Some signs of infection:  fever, chills, increased redness, red streaks, increase in pain, increased drainage. Drainage with an odor, Change in drainage color: white/milky/green/tan/yellow,  an increase in swelling, chest pain and/or shortness of breath. Standing Status:   Future     Standing Expiration Date:   12/1/2024    Wound off loading     Off-loading Instructions:     Keep weight and pressure off wound at all times. , Use Wheelchair Cushion. (Do not use donut-type devices). Seat lifts or shift position in chair every 15 minutes. , Turn every 2 hours. Avoid position directing pressure to Wound site. Limit side lying to 30 degree tilt. Limit the head of bed elevation to 30 degrees. , Do Not Sit for Long Periods of Time. , and Off Loading Mattress low air loss     Standing Status:   Future     Standing Expiration Date:   12/1/2024    Wound home care     [facility]:  Please continue home care for wound care     Standing Status:   Future     Standing Expiration Date:   12/1/2024

## 2023-12-01 NOTE — PROGRESS NOTES
Patient ID: Jacque Ribeiro is a 70 y.o. male Date of Birth 1955     Chief Complaint  Chief Complaint   Patient presents with    Follow Up Wound Care Visit     sacral       Allergies  Patient has no known allergies. Assessment:     Diagnoses and all orders for this visit:    Decubitus ulcer of sacral region, unstageable (HCC)  -     lidocaine (XYLOCAINE) 4 % topical solution 5 mL  -     Wound cleansing and dressings; Future  -     Wound miscellaneous orders; Future  -     Wound off loading; Future  -     Wound home care; Future  -     Debridement    Type 2 diabetes mellitus with chronic kidney disease on chronic dialysis, unspecified whether long term insulin use (HCC)  -     lidocaine (XYLOCAINE) 4 % topical solution 5 mL  -     Wound cleansing and dressings; Future  -     Wound miscellaneous orders; Future  -     Wound off loading; Future  -     Wound home care; Future    Wheelchair dependence  -     lidocaine (XYLOCAINE) 4 % topical solution 5 mL  -     Wound cleansing and dressings; Future  -     Wound miscellaneous orders; Future  -     Wound off loading; Future  -     Wound home care; Future              Debridement   Wound 11/13/23 Pressure Injury Sacrum Mid    [place] Protocol:  Consent: Verbal consent obtained. Risks and benefits: risks, benefits and alternatives were discussed  Consent given by: patient  Time out: Immediately prior to procedure a "time out" was called to verify the correct patient, procedure, equipment, support staff and site/side marked as required.   Patient identity confirmed: verbally with patient    Performed by: physician  Debridement type: selective  Pain control: lidocaine 4%  Post-debridement measurements  Length (cm): 4.6  Width (cm): 3  Depth (cm): 0.1  Percent debrided: 60%  Surface Area (cm^2): 13.8  Area debrided (cm^2): 8.28  Volume (cm^3): 1.38  Devitalized tissue debrided: biofilm, exudate and fibrin  Instrument(s) utilized: curette  Bleeding: small  Hemostasis obtained with: pressure  Procedural pain (0-10): 1  Post-procedural pain: 0   Response to treatment: procedure was tolerated well        Plan: It was a pleasure to see Santosh Salguero for wound care follow up nichol  Selective debridement performed today as above  Wound is improving   Continue plan of care as noted below with hydrocolloid. Advised to tell his wife not to use any tape as these will cause skin tears and is not necessary as DuoDERM does have an adhesive border. Patient with some irritation without breakdown to the skin above the rectum. Advised that he should make sure hygiene is taken care of in a timely manner after he stools into the diaper. Continued stool remaining on the skin for long periods of time will eventually cause breakdown and further wounds. A1C results reviewed with the patient today. Regarding offloading, patient is a Roho cushion at home. Hospital bed and low-air-loss mattress ordered for patient which she is using. No signs or symptoms of infection today. Patient understands that if any signs of infection start (such as increased redness, drainage, pain, fever, chills, diaphoresis), they should call our office or proceed to the ER or Urgent Care. Patient should continue a high protein diet to facilitate wound healing  Patient is advised to not submerge wound or leave wound open to air. Follow up in 2 weeks  Given the multi-factorial nature of wound care, additional time was taken to review patient's treatment plan with other specialties and most recent pertinent lab work and imaging. All plans of care discussed with patient at bedside who verbalized understanding with treatment plan. Wound 11/13/23 Pressure Injury Sacrum Mid (Active)   Wound Image Images linked 12/01/23 0910   Wound Description Yellow;Slough; Epithelialization;Pink;Beefy red 12/01/23 0910   Pressure Injury Stage U 12/01/23 0910   Afua-wound Assessment Pink 12/01/23 0910   Wound Length (cm) 4.6 cm 12/01/23 0910   Wound Width (cm) 3 cm 12/01/23 0910   Wound Depth (cm) 0.1 cm 12/01/23 0910   Wound Surface Area (cm^2) 13.8 cm^2 12/01/23 0910   Wound Volume (cm^3) 1.38 cm^3 12/01/23 0910   Calculated Wound Volume (cm^3) 1.38 cm^3 12/01/23 0910   Change in Wound Size % 71.43 12/01/23 0910   Drainage Amount Moderate 12/01/23 0910   Drainage Description Serosanguineous 12/01/23 0910   Non-staged Wound Description Full thickness 12/01/23 0910   Dressing Status Intact (upon arrival) 12/01/23 0910       Wound 11/13/23 Pressure Injury Sacrum Mid (Active)   Date First Assessed/Time First Assessed: 11/13/23 1057   Primary Wound Type: Pressure Injury  Location: Sacrum  Wound Location Orientation: Mid       [REMOVED] Wound 09/12/23 Pressure Injury Sacrum (Removed)   Resolved Date: 11/13/23  Date First Assessed/Time First Assessed: 09/12/23 2220   Present on Original Admission: Yes  Primary Wound Type: Pressure Injury  Location: Sacrum  Wound Outcome: (c) Other (Comment)       Subjective:      .    12/1/23, 11/21/23: Patient's wife has been applying wound care as directed. No symptoms of infection. 11/13/23: Amanuel Ji is a pleasant 62-year-old male with a past medical history of type 2 diabetes mellitus most recent hemoglobin A1c 4.8% 2 months ago 10 months ago, history of coronary artery disease, history of nicotine dependence, history of protein calorie malnutrition, history of CVA still on DAPT here today for initial wound care consult. Notes that wound started a few months ago, and since then has been in short-term rehabilitation. Wound continued has worsened since discharged home. Wound has been dressed with gauze. He denies any signs of infection today including fever chills diaphoresis. He does have a wheelchair with a gel overlay. Notes that he does have a Roho cushion at home but does not like to use it. Patient currently in a hospital bed.   Does not have a low-air-loss mattress to his best knowledge. The following portions of the patient's history were reviewed and updated as appropriate: allergies, current medications, past family history, past medical history, past social history, past surgical history, and problem list.    Review of Systems   Constitutional:  Negative for chills, diaphoresis and fever. Skin:  Positive for wound. All other systems reviewed and are negative. Objective:       Wound 11/13/23 Pressure Injury Sacrum Mid (Active)   Wound Image Images linked 12/01/23 0910   Wound Description Yellow;Slough; Epithelialization;Pink;Beefy red 12/01/23 0910   Pressure Injury Stage U 12/01/23 0910   Afua-wound Assessment Pink 12/01/23 0910   Wound Length (cm) 4.6 cm 12/01/23 0910   Wound Width (cm) 3 cm 12/01/23 0910   Wound Depth (cm) 0.1 cm 12/01/23 0910   Wound Surface Area (cm^2) 13.8 cm^2 12/01/23 0910   Wound Volume (cm^3) 1.38 cm^3 12/01/23 0910   Calculated Wound Volume (cm^3) 1.38 cm^3 12/01/23 0910   Change in Wound Size % 71.43 12/01/23 0910   Drainage Amount Moderate 12/01/23 0910   Drainage Description Serosanguineous 12/01/23 0910   Non-staged Wound Description Full thickness 12/01/23 0910   Dressing Status Intact (upon arrival) 12/01/23 0910       BP (!) 172/78   Pulse 69   Temp 97.8 °F (36.6 °C)   Resp 16     Physical Exam  Vitals reviewed. Constitutional:       Appearance: Normal appearance. HENT:      Head: Normocephalic and atraumatic. Eyes:      Extraocular Movements: Extraocular movements intact. Pulmonary:      Effort: Pulmonary effort is normal.   Musculoskeletal:      Cervical back: Neck supple. Skin:     Comments: Wound of sacrum slightly smaller than last exam.  Still unstageable with heavy fibrin deposition in the wound bed. Periwound without maceration and erythema however the skin above the back is irritated. Patient did present today with copious stool that has been there for more than 12 hours on the buttocks and sacral area.   This was cleaned off today to reveal irritated skin without breakdown   Neurological:      Mental Status: He is alert. Psychiatric:         Mood and Affect: Mood normal.           Wound Instructions:  Orders Placed This Encounter   Procedures    Wound cleansing and dressings     Sacral Wound:    Wash your hands with soap and water. Remove old dressing, discard into plastic bag and place in trash. Cleanse the wound with soap and water prior to applying a clean dressing. Do not use tissue or cotton balls. Do not scrub the wound. Pat dry using gauze. Shower yes   Triad cream to skin around wound. Apply Hydrocolloid to the sacral wound. Change dressing every other day. This was done today. Standing Status:   Future     Standing Expiration Date:   12/1/2024    Wound miscellaneous orders     Protein: Eat protein with each meal to promote healing. Examples of protein are fish, meat, chicken, nuts, peanut butter, eggs, lentils, edamame or a protein franco     Wound infection:  If you have signs of infection please call the wound center. If the wound center is closed- please go to the Emergency department. Some signs of infection:  fever, chills, increased redness, red streaks, increase in pain, increased drainage. Drainage with an odor, Change in drainage color: white/milky/green/tan/yellow,  an increase in swelling, chest pain and/or shortness of breath. Standing Status:   Future     Standing Expiration Date:   12/1/2024    Wound off loading     Off-loading Instructions:     Keep weight and pressure off wound at all times. , Use Wheelchair Cushion. (Do not use donut-type devices). Seat lifts or shift position in chair every 15 minutes. , Turn every 2 hours. Avoid position directing pressure to Wound site. Limit side lying to 30 degree tilt. Limit the head of bed elevation to 30 degrees. , Do Not Sit for Long Periods of Time. , and Off Loading Mattress low air loss     Standing Status:   Future Standing Expiration Date:   12/1/2024    Wound home care     [facility]:  Please continue home care for wound care     Standing Status:   Future     Standing Expiration Date:   12/1/2024    Debridement     This order was created via procedure documentation        Diagnosis ICD-10-CM Associated Orders   1. Decubitus ulcer of sacral region, unstageable ([facility])  L89.150 lidocaine (XYLOCAINE) 4 % topical solution 5 mL     Wound cleansing and dressings     Wound miscellaneous orders     Wound off loading     Wound home care     Debridement      2. Type 2 diabetes mellitus with chronic kidney disease on chronic dialysis, unspecified whether long term insulin use ([facility])  E11.22 lidocaine (XYLOCAINE) 4 % topical solution 5 mL    N18.6 Wound cleansing and dressings    Z99.2 Wound miscellaneous orders     Wound off loading     Wound home care      3. Wheelchair dependence  Z99.3 lidocaine (XYLOCAINE) 4 % topical solution 5 mL     Wound cleansing and dressings     Wound miscellaneous orders     Wound off loading     Wound home care          --  Giana Thomas MD    "This note has been constructed using a voice recognition system. Therefore there may be syntax, spelling, and/or grammatical errors. Occasional wrong word or "sound alike" substitutions may have occurred due to the inherent limitations of voice recognition software. Read the chart carefully and recognize, using context, where substitutions have occurred.  Please call if you have any questions."

## 2023-12-01 NOTE — LETTER
[address] [address] [address] [address]  Phone#  [phone]  Fax#  [phone]    Patient:  Evan Wise  YOB: 1955  Phone:  [phone]  Date of Visit:  12/1/2023    Orders Placed This Encounter   Procedures   • Wound cleansing and dressings     Sacral Wound:    Wash your hands with soap and water. Remove old dressing, discard into plastic bag and place in trash. Cleanse the wound with soap and water prior to applying a clean dressing. Do not use tissue or cotton balls. Do not scrub the wound. Pat dry using gauze. Shower yes   Triad cream to skin around wound. Apply Hydrocolloid to the sacral wound. Change dressing every other day. This was done today. Standing Status:   Future     Standing Expiration Date:   12/1/2024   • Wound miscellaneous orders     Protein: Eat protein with each meal to promote healing. Examples of protein are fish, meat, chicken, nuts, peanut butter, eggs, lentils, edamame or a protein franco     Wound infection:  If you have signs of infection please call the wound center. If the wound center is closed- please go to the Emergency department. Some signs of infection:  fever, chills, increased redness, red streaks, increase in pain, increased drainage. Drainage with an odor, Change in drainage color: white/milky/green/tan/yellow,  an increase in swelling, chest pain and/or shortness of breath. Standing Status:   Future     Standing Expiration Date:   12/1/2024   • Wound off loading     Off-loading Instructions:     Keep weight and pressure off wound at all times. , Use Wheelchair Cushion. (Do not use donut-type devices). Seat lifts or shift position in chair every 15 minutes. , Turn every 2 hours. Avoid position directing pressure to Wound site. Limit side lying to 30 degree tilt. Limit the head of bed elevation to 30 degrees. , Do Not Sit for Long Periods of Time. , and Off Loading Mattress low air loss Standing Status:   Future     Standing Expiration Date:   12/1/2024   • Wound home care     [facility] [address]:  Please continue home care for wound care     Standing Status:   Future     Standing Expiration Date:   12/1/2024   • Debridement     This order was created via procedure documentation         Electronically signed by Bienvenido Means MD

## 2023-12-01 NOTE — LETTER
[address] [address] [address]  [address] [facility] [address]  Phone#  [phone]  Fax#  [phone]    Patient:  Trevon Guillen  YOB: 1955  Phone:  [phone]  Date of Visit:  12/1/2023    Orders Placed This Encounter   Procedures   • Wound cleansing and dressings     Sacral Wound:    Wash your hands with soap and water. Remove old dressing, discard into plastic bag and place in trash. Cleanse the wound with soap and water prior to applying a clean dressing. Do not use tissue or cotton balls. Do not scrub the wound. Pat dry using gauze. Shower yes   Triad cream to skin around wound. Apply Hydrocolloid to the sacral wound. Change dressing every other day. This was done today. Standing Status:   Future     Standing Expiration Date:   12/1/2024   • Wound miscellaneous orders     Protein: Eat protein with each meal to promote healing. Examples of protein are fish, meat, chicken, nuts, peanut butter, eggs, lentils, edamame or a protein franco     Wound infection:  If you have signs of infection please call the wound center. If the wound center is closed- please go to the Emergency department. Some signs of infection:  fever, chills, increased redness, red streaks, increase in pain, increased drainage. Drainage with an odor, Change in drainage color: white/milky/green/tan/yellow,  an increase in swelling, chest pain and/or shortness of breath. Standing Status:   Future     Standing Expiration Date:   12/1/2024   • Wound off loading     Off-loading Instructions:     Keep weight and pressure off wound at all times. , Use Wheelchair Cushion. (Do not use donut-type devices). Seat lifts or shift position in chair every 15 minutes. , Turn every 2 hours. Avoid position directing pressure to Wound site. Limit side lying to 30 degree tilt. Limit the head of bed elevation to 30 degrees. , Do Not Sit for Long Periods of Time. , and Off Loading Mattress low air loss Standing Status:   Future     Standing Expiration Date:   12/1/2024   • Wound home care     [facility] [address]:  Please continue home care for wound care     Standing Status:   Future     Standing Expiration Date:   12/1/2024         Electronically signed by Sam Wray MD

## 2023-12-12 ENCOUNTER — OFFICE VISIT (OUTPATIENT)
Dept: WOUND CARE | Facility: HOSPITAL | Age: 68
End: 2023-12-12
Payer: MEDICARE

## 2023-12-12 VITALS
RESPIRATION RATE: 15 BRPM | SYSTOLIC BLOOD PRESSURE: 116 MMHG | HEART RATE: 81 BPM | TEMPERATURE: 97.5 F | DIASTOLIC BLOOD PRESSURE: 72 MMHG

## 2023-12-12 DIAGNOSIS — N18.6 TYPE 2 DIABETES MELLITUS WITH CHRONIC KIDNEY DISEASE ON CHRONIC DIALYSIS, UNSPECIFIED WHETHER LONG TERM INSULIN USE (HCC): ICD-10-CM

## 2023-12-12 DIAGNOSIS — E11.22 TYPE 2 DIABETES MELLITUS WITH CHRONIC KIDNEY DISEASE ON CHRONIC DIALYSIS, UNSPECIFIED WHETHER LONG TERM INSULIN USE (HCC): ICD-10-CM

## 2023-12-12 DIAGNOSIS — L89.150 DECUBITUS ULCER OF SACRAL REGION, UNSTAGEABLE (HCC): Primary | ICD-10-CM

## 2023-12-12 DIAGNOSIS — L98.491 TRAUMATIC SKIN ULCER, LIMITED TO BREAKDOWN OF SKIN (HCC): ICD-10-CM

## 2023-12-12 DIAGNOSIS — Z99.3 WHEELCHAIR DEPENDENCE: ICD-10-CM

## 2023-12-12 DIAGNOSIS — Z99.2 TYPE 2 DIABETES MELLITUS WITH CHRONIC KIDNEY DISEASE ON CHRONIC DIALYSIS, UNSPECIFIED WHETHER LONG TERM INSULIN USE (HCC): ICD-10-CM

## 2023-12-12 PROCEDURE — 97597 DBRDMT OPN WND 1ST 20 CM/<: CPT | Performed by: STUDENT IN AN ORGANIZED HEALTH CARE EDUCATION/TRAINING PROGRAM

## 2023-12-12 PROCEDURE — 99214 OFFICE O/P EST MOD 30 MIN: CPT | Performed by: STUDENT IN AN ORGANIZED HEALTH CARE EDUCATION/TRAINING PROGRAM

## 2023-12-12 RX ORDER — LIDOCAINE HYDROCHLORIDE 40 MG/ML
5 SOLUTION TOPICAL ONCE
Status: COMPLETED | OUTPATIENT
Start: 2023-12-12 | End: 2023-12-12

## 2023-12-12 RX ADMIN — LIDOCAINE HYDROCHLORIDE 5 ML: 40 SOLUTION TOPICAL at 14:37

## 2023-12-12 NOTE — PATIENT INSTRUCTIONS
Orders Placed This Encounter   Procedures    Wound cleansing and dressings     Sacral Wound:     Wash your hands with soap and water. Remove old dressing, discard into plastic bag and place in trash. Cleanse the wound with soap and water prior to applying a clean dressing. Do not use tissue or cotton balls. Do not scrub the wound. Pat dry using gauze. Shower yes   Triad cream to skin around wound. Apply Hydrocolloid to the sacral wound. Change dressing every other day. This was done today. Please check yourself frequently for urine or feces incontinence. Sitting in urine and/or feces will worsen your wound and hinder healing. Standing Status:   Future     Standing Expiration Date:   12/12/2024    Wound miscellaneous orders     Protein: Eat protein with each meal to promote healing. Examples of protein are fish, meat, chicken, nuts, peanut butter, eggs, lentils, edamame or a protein franco     Wound infection:  If you have signs of infection please call the wound center. If the wound center is closed- please go to the Emergency department. Some signs of infection:  fever, chills, increased redness, red streaks, increase in pain, increased drainage. Drainage with an odor, Change in drainage color: white/milky/green/tan/yellow,  an increase in swelling, chest pain and/or shortness of breath. Standing Status:   Future     Standing Expiration Date:   12/12/2024    Wound off loading     Off-loading Instructions:     Keep weight and pressure off wound at all times. , Use Wheelchair Cushion. (Do not use donut-type devices). Seat lifts or shift position in chair every 15 minutes. , Turn every 2 hours. Avoid position directing pressure to Wound site. Limit side lying to 30 degree tilt. Limit the head of bed elevation to 30 degrees. , Do Not Sit for Long Periods of Time. , and Off Loading Mattress low air loss     Standing Status:   Future     Standing Expiration Date:   12/12/2024    Wound cleansing and dressings     Right Heel Wound:    Wash your hands with soap and water. Remove old dressing, discard into plastic bag and place in trash. Cleanse the wound with soap and water prior to applying a clean dressing. Do not use tissue or cotton balls. Do not scrub the wound. Pat dry using gauze. Shower yes   Apply moisturizer to skin surrounding wound  Apply bordered foam to the heel wound. Change dressing daily. This was done today.      Standing Status:   Future     Standing Expiration Date:   12/12/2024    Wound home care     [facility]:  Please continue home care for wound care     Standing Status:   Future     Standing Expiration Date:   12/12/2024

## 2023-12-12 NOTE — PROGRESS NOTES
Patient ID: Bertha Arechiga is a 70 y.o. male Date of Birth 1955     Chief Complaint  Chief Complaint   Patient presents with    Follow Up Wound Care Visit     sacral       Allergies  Patient has no known allergies. Assessment:     Diagnoses and all orders for this visit:    Decubitus ulcer of sacral region, unstageable (HCC)  -     lidocaine (XYLOCAINE) 4 % topical solution 5 mL  -     Wound cleansing and dressings; Future  -     Wound miscellaneous orders; Future  -     Wound off loading; Future  -     Wound cleansing and dressings; Future  -     Wound home care; Future  -     Debridement    Traumatic skin ulcer, limited to breakdown of skin ([address])  -     Debridement    Type 2 diabetes mellitus with chronic kidney disease on chronic dialysis, unspecified whether long term insulin use (HCC)  -     Wound cleansing and dressings; Future  -     Wound miscellaneous orders; Future  -     Wound off loading; Future  -     Wound cleansing and dressings; Future  -     Wound home care; Future    Wheelchair dependence  -     Wound cleansing and dressings; Future  -     Wound miscellaneous orders; Future  -     Wound off loading; Future  -     Wound cleansing and dressings; Future  -     Wound home care; Future              Debridement   Wound 11/13/23 Pressure Injury Sacrum Mid    [place] Protocol:  Consent: Verbal consent obtained. Risks and benefits: risks, benefits and alternatives were discussed  Consent given by: patient  Time out: Immediately prior to procedure a "time out" was called to verify the correct patient, procedure, equipment, support staff and site/side marked as required.   Patient identity confirmed: verbally with patient    Performed by: physician  Debridement type: selective  Pain control: lidocaine 4%  Post-debridement measurements  Length (cm): 3.4  Width (cm): 1.7  Depth (cm): 0.1  Percent debrided: 90%  Surface Area (cm^2): 5.78  Area debrided (cm^2): 5.2  Volume (cm^3): 0.58  Devitalized tissue debrided: biofilm and exudate  Instrument(s) utilized: curette  Bleeding: small  Hemostasis obtained with: pressure  Procedural pain (0-10): 1  Post-procedural pain: 0   Response to treatment: procedure was tolerated well    Debridement   Wound 12/12/23 Traumatic Heel Right    Universal Protocol:  Consent: Verbal consent obtained. Risks and benefits: risks, benefits and alternatives were discussed  Consent given by: patient  Time out: Immediately prior to procedure a "time out" was called to verify the correct patient, procedure, equipment, support staff and site/side marked as required. Patient identity confirmed: verbally with patient    Performed by: physician  Debridement type: selective  Pain control: lidocaine 4%  Post-debridement measurements  Length (cm): 4  Width (cm): 3  Depth (cm): 0.2  Percent debrided: 90%  Surface Area (cm^2): 12  Area debrided (cm^2): 10.8  Volume (cm^3): 2.4  Devitalized tissue debrided: biofilm and exudate  Instrument(s) utilized: curette  Bleeding: small  Hemostasis obtained with: pressure  Procedural pain (0-10): 1  Post-procedural pain: 0   Response to treatment: procedure was tolerated well        Plan: It was a pleasure to see Kelsi Zuñiga for wound care follow up today  Selective debridement performed today as above  Wound is not improving   Continue plan of care as noted below with hydrocolloid to sacral wound and bordered foam to heal wound. Patient advised again today regarding the need to keep up with hygiene. Wound very wet today of sacrum due to excess amount of fecal matter and urine on the buttocks and wound. Stat that this will make him high risk for failure of wound healing as well as infection of the skin breakdown. No signs or symptoms of infection today.  Patient understands that if any signs of infection start (such as increased redness, drainage, pain, fever, chills, diaphoresis), they should call our office or proceed to the ER or Urgent Care.  Patient should continue a high protein diet to facilitate wound healing  Patient is advised to not submerge wound or leave wound open to air. Follow up in 1 weeks  Given the multi-factorial nature of wound care, additional time was taken to review patient's treatment plan with other specialties and most recent pertinent lab work and imaging. All plans of care discussed with patient at bedside who verbalized understanding with treatment plan. Wound 11/13/23 Pressure Injury Sacrum Mid (Active)   Wound Image Images linked 12/12/23 1434   Wound Description Yellow;Slough;Pink 12/12/23 1434   Afua-wound Assessment Pink 12/12/23 1434   Wound Length (cm) 3.4 cm 12/12/23 1434   Wound Width (cm) 1.7 cm 12/12/23 1434   Wound Depth (cm) 0.1 cm 12/12/23 1434   Wound Surface Area (cm^2) 5.78 cm^2 12/12/23 1434   Wound Volume (cm^3) 0.578 cm^3 12/12/23 1434   Calculated Wound Volume (cm^3) 0.58 cm^3 12/12/23 1434   Change in Wound Size % 87.99 12/12/23 1434   Drainage Amount Moderate 12/12/23 1434   Drainage Description Serosanguineous 12/12/23 1434   Non-staged Wound Description Full thickness 12/12/23 1434   Dressing Status Intact (upon arrival) 12/12/23 1434       Wound 12/12/23 Traumatic Heel Right (Active)   Wound Image Images linked 12/12/23 1432   Wound Description Pink;Yellow; White;Slough; Epithelialization 12/12/23 1432   Afua-wound Assessment Maceration;[place] 12/12/23 1432   Wound Length (cm) 4 cm 12/12/23 1432   Wound Width (cm) 3 cm 12/12/23 1432   Wound Depth (cm) 0.2 cm 12/12/23 1432   Wound Surface Area (cm^2) 12 cm^2 12/12/23 1432   Wound Volume (cm^3) 2.4 cm^3 12/12/23 1432   Calculated Wound Volume (cm^3) 2.4 cm^3 12/12/23 1432   Drainage Amount Moderate 12/12/23 1432   Drainage Description Yellow; Tan 12/12/23 1432   Non-staged Wound Description Full thickness 12/12/23 1432   Dressing Status Intact (upon arrival) 12/12/23 1432       Wound 11/13/23 Pressure Injury Sacrum Mid (Active)   Date First Assessed/Time First Assessed: 11/13/23 1057   Primary Wound Type: Pressure Injury  Location: Sacrum  Wound Location Orientation: Mid       Wound 12/12/23 Traumatic Heel Right (Active)   Date First Assessed/Time First Assessed: 12/12/23 1427   Primary Wound Type: Traumatic  Location: Heel  Wound Location Orientation: Right       [REMOVED] Wound 09/12/23 Pressure Injury Sacrum (Removed)   Resolved Date: 11/13/23  Date First Assessed/Time First Assessed: 09/12/23 2220   Present on Original Admission: Yes  Primary Wound Type: Pressure Injury  Location: Sacrum  Wound Outcome: (c) Other (Comment)       Subjective:      .    12/12/23: Patient's wife has been applying wound dressings. Notes that [facility] is no longer coming to the home. No symptoms of infection. Arrives today with significant amount of fecal and urinary matter over buttocks and over the sacral wound. 12/1/23, 11/21/23: Patient's wife has been applying wound care as directed. No symptoms of infection. 11/13/23: David Aaron is a pleasant 69-year-old male with a past medical history of type 2 diabetes mellitus most recent hemoglobin A1c 4.8% 2 months ago 10 months ago, history of coronary artery disease, history of nicotine dependence, history of protein calorie malnutrition, history of CVA still on DAPT here today for initial wound care consult. Notes that wound started a few months ago, and since then has been in short-term rehabilitation. Wound continued has worsened since discharged home. Wound has been dressed with gauze. He denies any signs of infection today including fever chills diaphoresis. He does have a wheelchair with a gel overlay. Notes that he does have a Roho cushion at home but does not like to use it. Patient currently in a hospital bed. Does not have a low-air-loss mattress to his best knowledge.                    The following portions of the patient's history were reviewed and updated as appropriate: allergies, current medications, past family history, past medical history, past social history, past surgical history, and problem list.    Review of Systems   Constitutional:  Negative for chills, diaphoresis and fever. Skin:  Positive for wound. All other systems reviewed and are negative. Objective:       Wound 11/13/23 Pressure Injury Sacrum Mid (Active)   Wound Image Images linked 12/12/23 1434   Wound Description Yellow;Slough;Pink 12/12/23 1434   Afua-wound Assessment Pink 12/12/23 1434   Wound Length (cm) 3.4 cm 12/12/23 1434   Wound Width (cm) 1.7 cm 12/12/23 1434   Wound Depth (cm) 0.1 cm 12/12/23 1434   Wound Surface Area (cm^2) 5.78 cm^2 12/12/23 1434   Wound Volume (cm^3) 0.578 cm^3 12/12/23 1434   Calculated Wound Volume (cm^3) 0.58 cm^3 12/12/23 1434   Change in Wound Size % 87.99 12/12/23 1434   Drainage Amount Moderate 12/12/23 1434   Drainage Description Serosanguineous 12/12/23 1434   Non-staged Wound Description Full thickness 12/12/23 1434   Dressing Status Intact (upon arrival) 12/12/23 1434       Wound 12/12/23 Traumatic Heel Right (Active)   Wound Image Images linked 12/12/23 1432   Wound Description Pink;Yellow; White;Slough; Epithelialization 12/12/23 1432   Afua-wound Assessment Maceration;[place] 12/12/23 1432   Wound Length (cm) 4 cm 12/12/23 1432   Wound Width (cm) 3 cm 12/12/23 1432   Wound Depth (cm) 0.2 cm 12/12/23 1432   Wound Surface Area (cm^2) 12 cm^2 12/12/23 1432   Wound Volume (cm^3) 2.4 cm^3 12/12/23 1432   Calculated Wound Volume (cm^3) 2.4 cm^3 12/12/23 1432   Drainage Amount Moderate 12/12/23 1432   Drainage Description Yellow; Tan 12/12/23 1432   Non-staged Wound Description Full thickness 12/12/23 1432   Dressing Status Intact (upon arrival) 12/12/23 1432       /72   Pulse 81   Temp 97.5 °F (36.4 °C)   Resp 15     Physical Exam  Vitals reviewed. Constitutional:       Appearance: Normal appearance. HENT:      Head: Normocephalic and atraumatic.    Eyes:      Extraocular Movements: Extraocular movements intact. Pulmonary:      Effort: Pulmonary effort is normal.   Musculoskeletal:      Cervical back: Neck supple. Skin:     Comments: Sacral wound similar in size to last exam.    Patient has new wound of heel. Fibrin deposition in the wound bed. Serosanguineous exudate. No signs of infection. Neurological:      Mental Status: He is alert. Psychiatric:         Mood and Affect: Mood normal.                 Wound Instructions:  Orders Placed This Encounter   Procedures    Wound cleansing and dressings     Sacral Wound:     Wash your hands with soap and water. Remove old dressing, discard into plastic bag and place in trash. Cleanse the wound with soap and water prior to applying a clean dressing. Do not use tissue or cotton balls. Do not scrub the wound. Pat dry using gauze. Shower yes   Triad cream to skin around wound. Apply Hydrocolloid to the sacral wound. Change dressing every other day. This was done today. Please check yourself frequently for urine or feces incontinence. Sitting in urine and/or feces will worsen your wound and hinder healing. Standing Status:   Future     Standing Expiration Date:   12/12/2024    Wound miscellaneous orders     Protein: Eat protein with each meal to promote healing. Examples of protein are fish, meat, chicken, nuts, peanut butter, eggs, lentils, edamame or a protein franco     Wound infection:  If you have signs of infection please call the wound center. If the wound center is closed- please go to the Emergency department. Some signs of infection:  fever, chills, increased redness, red streaks, increase in pain, increased drainage. Drainage with an odor, Change in drainage color: white/milky/green/tan/yellow,  an increase in swelling, chest pain and/or shortness of breath.      Standing Status:   Future     Standing Expiration Date:   12/12/2024    Wound off loading     Off-loading Instructions:     Keep weight and pressure off wound at all times. , Use Wheelchair Cushion. (Do not use donut-type devices). Seat lifts or shift position in chair every 15 minutes. , Turn every 2 hours. Avoid position directing pressure to Wound site. Limit side lying to 30 degree tilt. Limit the head of bed elevation to 30 degrees. , Do Not Sit for Long Periods of Time. , and Off Loading Mattress low air loss     Standing Status:   Future     Standing Expiration Date:   12/12/2024    Wound cleansing and dressings     Right Heel Wound:    Wash your hands with soap and water. Remove old dressing, discard into plastic bag and place in trash. Cleanse the wound with soap and water prior to applying a clean dressing. Do not use tissue or cotton balls. Do not scrub the wound. Pat dry using gauze. Shower yes   Apply moisturizer to skin surrounding wound  Apply bordered foam to the heel wound. Change dressing daily. This was done today. Standing Status:   Future     Standing Expiration Date:   12/12/2024    Wound home care     [facility]:  Please continue home care for wound care     Standing Status:   Future     Standing Expiration Date:   12/12/2024    Debridement     This order was created via procedure documentation    Debridement     This order was created via procedure documentation        Diagnosis ICD-10-CM Associated Orders   1. Decubitus ulcer of sacral region, unstageable ([facility])  L89.150 lidocaine (XYLOCAINE) 4 % topical solution 5 mL     Wound cleansing and dressings     Wound miscellaneous orders     Wound off loading     Wound cleansing and dressings     Wound home care     Debridement      2. Traumatic skin ulcer, limited to breakdown of skin ([address])  L98.491 Debridement      3.  Type 2 diabetes mellitus with chronic kidney disease on chronic dialysis, unspecified whether long term insulin use ([facility])  E11.22 Wound cleansing and dressings    N18.6 Wound miscellaneous orders    Z99.2 Wound off loading     Wound cleansing and dressings     Wound home care      4. Wheelchair dependence  Z99.3 Wound cleansing and dressings     Wound miscellaneous orders     Wound off loading     Wound cleansing and dressings     Wound home care          --  Sheila Stanton MD    "This note has been constructed using a voice recognition system. Therefore there may be syntax, spelling, and/or grammatical errors. Occasional wrong word or "sound alike" substitutions may have occurred due to the inherent limitations of voice recognition software. Read the chart carefully and recognize, using context, where substitutions have occurred.  Please call if you have any questions."

## 2023-12-12 NOTE — LETTER
[address] [address] [address] [facility] [address]  Phone#  [phone]  Fax#  [phone]    Patient:  Santosh Salguero  YOB: 1955  Phone:  [phone]  Date of Visit:  12/12/2023    Orders Placed This Encounter   Procedures   • Wound cleansing and dressings     Sacral Wound:     Wash your hands with soap and water. Remove old dressing, discard into plastic bag and place in trash. Cleanse the wound with soap and water prior to applying a clean dressing. Do not use tissue or cotton balls. Do not scrub the wound. Pat dry using gauze. Shower yes   Triad cream to skin around wound. Apply Hydrocolloid to the sacral wound. Change dressing every other day. This was done today. Please check yourself frequently for urine or feces incontinence. Sitting in urine and/or feces will worsen your wound and hinder healing. Standing Status:   Future     Standing Expiration Date:   12/12/2024   • Wound miscellaneous orders     Protein: Eat protein with each meal to promote healing. Examples of protein are fish, meat, chicken, nuts, peanut butter, eggs, lentils, edamame or a protein franco     Wound infection:  If you have signs of infection please call the wound center. If the wound center is closed- please go to the Emergency department. Some signs of infection:  fever, chills, increased redness, red streaks, increase in pain, increased drainage. Drainage with an odor, Change in drainage color: white/milky/green/tan/yellow,  an increase in swelling, chest pain and/or shortness of breath. Standing Status:   Future     Standing Expiration Date:   12/12/2024   • Wound off loading     Off-loading Instructions:     Keep weight and pressure off wound at all times. , Use Wheelchair Cushion. (Do not use donut-type devices). Seat lifts or shift position in chair every 15 minutes. , Turn every 2 hours. Avoid position directing pressure to Wound site.  Limit side lying to 30 degree tilt. Limit the head of bed elevation to 30 degrees. , Do Not Sit for Long Periods of Time. , and Off Loading Mattress low air loss     Standing Status:   Future     Standing Expiration Date:   12/12/2024   • Wound cleansing and dressings     Right Heel Wound:    Wash your hands with soap and water. Remove old dressing, discard into plastic bag and place in trash. Cleanse the wound with soap and water prior to applying a clean dressing. Do not use tissue or cotton balls. Do not scrub the wound. Pat dry using gauze. Shower yes   Apply moisturizer to skin surrounding wound  Apply bordered foam to the heel wound. Change dressing daily. This was done today.      Standing Status:   Future     Standing Expiration Date:   12/12/2024   • Wound home care     [facility] [address]:  Please continue home care for wound care     Standing Status:   Future     Standing Expiration Date:   12/12/2024         Electronically signed by Chey Stein MD

## 2023-12-21 ENCOUNTER — OFFICE VISIT (OUTPATIENT)
Dept: WOUND CARE | Facility: HOSPITAL | Age: 68
End: 2023-12-21
Payer: MEDICARE

## 2023-12-21 VITALS
HEART RATE: 88 BPM | TEMPERATURE: 97.8 F | SYSTOLIC BLOOD PRESSURE: 125 MMHG | RESPIRATION RATE: 16 BRPM | DIASTOLIC BLOOD PRESSURE: 77 MMHG

## 2023-12-21 DIAGNOSIS — N18.6 TYPE 2 DIABETES MELLITUS WITH CHRONIC KIDNEY DISEASE ON CHRONIC DIALYSIS, UNSPECIFIED WHETHER LONG TERM INSULIN USE (HCC): ICD-10-CM

## 2023-12-21 DIAGNOSIS — L89.150 DECUBITUS ULCER OF SACRAL REGION, UNSTAGEABLE (HCC): ICD-10-CM

## 2023-12-21 DIAGNOSIS — Z99.3 WHEELCHAIR DEPENDENCE: ICD-10-CM

## 2023-12-21 DIAGNOSIS — R46.0 POOR HYGIENE: ICD-10-CM

## 2023-12-21 DIAGNOSIS — L98.491 TRAUMATIC SKIN ULCER, LIMITED TO BREAKDOWN OF SKIN (HCC): Primary | ICD-10-CM

## 2023-12-21 DIAGNOSIS — E11.22 TYPE 2 DIABETES MELLITUS WITH CHRONIC KIDNEY DISEASE ON CHRONIC DIALYSIS, UNSPECIFIED WHETHER LONG TERM INSULIN USE (HCC): ICD-10-CM

## 2023-12-21 DIAGNOSIS — Z99.2 TYPE 2 DIABETES MELLITUS WITH CHRONIC KIDNEY DISEASE ON CHRONIC DIALYSIS, UNSPECIFIED WHETHER LONG TERM INSULIN USE (HCC): ICD-10-CM

## 2023-12-21 PROCEDURE — 97597 DBRDMT OPN WND 1ST 20 CM/<: CPT | Performed by: STUDENT IN AN ORGANIZED HEALTH CARE EDUCATION/TRAINING PROGRAM

## 2023-12-21 RX ORDER — LIDOCAINE HYDROCHLORIDE 40 MG/ML
5 SOLUTION TOPICAL ONCE
Status: COMPLETED | OUTPATIENT
Start: 2023-12-21 | End: 2023-12-21

## 2023-12-21 RX ADMIN — LIDOCAINE HYDROCHLORIDE 5 ML: 40 SOLUTION TOPICAL at 14:19

## 2023-12-21 NOTE — PATIENT INSTRUCTIONS
Orders Placed This Encounter   Procedures    Wound cleansing and dressings     Sacral Wound:     Wash your hands with soap and water.  Remove old dressing, discard into plastic bag and place in trash.  Cleanse the wound with soap and water prior to applying a clean dressing. Do not use tissue or cotton balls. Do not scrub the wound. Pat dry using gauze.  Shower yes   Triad cream to skin around wound.  Apply bordered foam to wound.  (If foam gets soiled from urine or feces, you can apply ABD pad).     Change dressing three times per week.    PLEASE DO NOT USE TEGADERM ON WOUND.     This was done today.     Please check yourself frequently for urine or feces incontinence.  Sitting in urine and/or feces will worsen your wound and hinder healing.     Standing Status:   Future     Standing Expiration Date:   12/21/2024    Wound cleansing and dressings     Right Heel Wound:     Wash your hands with soap and water.  Remove old dressing, discard into plastic bag and place in trash.  Cleanse the wound with soap and water prior to applying a clean dressing. Do not use tissue or cotton balls. Do not scrub the wound. Pat dry using gauze.  Shower yes   Apply moisturizer to skin surrounding wound  Apply bordered foam to the heel wound.       Change dressing three times per week.    PLEASE DO NOT USE TEGADERM TO COVER WOUND.     This was done today.     Standing Status:   Future     Standing Expiration Date:   12/21/2024    Wound miscellaneous orders     Protein: Eat protein with each meal to promote healing.  Examples of protein are fish, meat, chicken, nuts, peanut butter, eggs, lentils, edamame or a protein franco     Wound infection:  If you have signs of infection please call the wound center.  If the wound center is closed- please go to the Emergency department.  Some signs of infection:  fever, chills, increased redness, red streaks, increase in pain, increased drainage.  Drainage with an odor, Change in drainage color:  white/milky/green/tan/yellow,  an increase in swelling, chest pain and/or shortness of breath.     Standing Status:   Future     Standing Expiration Date:   12/21/2024    Wound off loading     Off-loading Instructions:     Keep weight and pressure off wound at all times., Use Wheelchair Cushion.  (Do not use donut-type devices).  Seat lifts or shift position in chair every 15 minutes. , Turn every 2 hours. Avoid position directing pressure to Wound site. Limit side lying to 30 degree tilt. Limit the head of bed elevation to 30 degrees., Do Not Sit for Long Periods of Time., and Off Loading Mattress low air loss     Standing Status:   Future     Standing Expiration Date:   12/21/2024    Wound home care     [facility]:  Please continue home care for wound care     Standing Status:   Future     Standing Expiration Date:   12/21/2024    Wound off loading Traumatic Right Heel     Please order a Prevalon boot from [employer] to be worn on right foot to offload heel wound.     Standing Status:   Future     Standing Expiration Date:   12/21/2024

## 2023-12-21 NOTE — LETTER
[facility] WOUND [facility]  [address] [facility] [address]  Phone#  [phone]  Fax#  [phone]    Patient:  Jeffery Bishop  YOB: 1955  Phone:  [phone]  Date of Visit:  12/21/2023    Orders Placed This Encounter   Procedures   • Wound cleansing and dressings     Sacral Wound:     Wash your hands with soap and water.  Remove old dressing, discard into plastic bag and place in trash.  Cleanse the wound with soap and water prior to applying a clean dressing. Do not use tissue or cotton balls. Do not scrub the wound. Pat dry using gauze.  Shower yes   Triad cream to skin around wound.  Apply bordered foam to wound.  (If foam gets soiled from urine or feces, you can apply ABD pad).     Change dressing three times per week.    PLEASE DO NOT USE TEGADERM ON WOUND.     This was done today.     Please check yourself frequently for urine or feces incontinence.  Sitting in urine and/or feces will worsen your wound and hinder healing.     Standing Status:   Future     Standing Expiration Date:   12/21/2024   • Wound cleansing and dressings     Right Heel Wound:     Wash your hands with soap and water.  Remove old dressing, discard into plastic bag and place in trash.  Cleanse the wound with soap and water prior to applying a clean dressing. Do not use tissue or cotton balls. Do not scrub the wound. Pat dry using gauze.  Shower yes   Apply moisturizer to skin surrounding wound  Apply bordered foam to the heel wound.       Change dressing three times per week.    PLEASE DO NOT USE TEGADERM TO COVER WOUND.     This was done today.     Standing Status:   Future     Standing Expiration Date:   12/21/2024   • Wound miscellaneous orders     Protein: Eat protein with each meal to promote healing.  Examples of protein are fish, meat, chicken, nuts, peanut butter, eggs, lentils, edamame or a protein franco     Wound infection:  If you have signs of infection please call the wound center.   If the wound center is closed- please go to the Emergency department.  Some signs of infection:  fever, chills, increased redness, red streaks, increase in pain, increased drainage.  Drainage with an odor, Change in drainage color: white/milky/green/tan/yellow,  an increase in swelling, chest pain and/or shortness of breath.     Standing Status:   Future     Standing Expiration Date:   12/21/2024   • Wound off loading     Off-loading Instructions:     Keep weight and pressure off wound at all times., Use Wheelchair Cushion.  (Do not use donut-type devices).  Seat lifts or shift position in chair every 15 minutes. , Turn every 2 hours. Avoid position directing pressure to Wound site. Limit side lying to 30 degree tilt. Limit the head of bed elevation to 30 degrees., Do Not Sit for Long Periods of Time., and Off Loading Mattress low air loss     Standing Status:   Future     Standing Expiration Date:   12/21/2024   • Wound home care     [facility]:  Please continue home care for wound care     Standing Status:   Future     Standing Expiration Date:   12/21/2024   • Wound off loading Traumatic Right Heel     Please order a Prevalon boot from [employer] to be worn on right foot to offload heel wound.     Standing Status:   Future     Standing Expiration Date:   12/21/2024         Electronically signed by Lisandra Torrez MD

## 2023-12-21 NOTE — PROGRESS NOTES
Patient ID: Jeffery Bishop is a 68 y.o. male Date of Birth 1955     Chief Complaint  Chief Complaint   Patient presents with    Follow Up Wound Care Visit     sacral       Allergies  Patient has no known allergies.    Assessment:     Diagnoses and all orders for this visit:    Traumatic skin ulcer, limited to breakdown of skin (HCC)  -     lidocaine (XYLOCAINE) 4 % topical solution 5 mL  -     Wound cleansing and dressings; Future  -     Wound cleansing and dressings; Future  -     Wound miscellaneous orders; Future  -     Wound off loading; Future  -     Wound home care; Future  -     Wound off loading Traumatic Right Heel; Future  -     Debridement    Decubitus ulcer of sacral region, unstageable (HCC)  -     lidocaine (XYLOCAINE) 4 % topical solution 5 mL  -     Wound cleansing and dressings; Future  -     Wound cleansing and dressings; Future  -     Wound miscellaneous orders; Future  -     Wound off loading; Future  -     Wound home care; Future  -     Wound off loading Traumatic Right Heel; Future  -     Debridement    Type 2 diabetes mellitus with chronic kidney disease on chronic dialysis, unspecified whether long term insulin use (HCC)  -     lidocaine (XYLOCAINE) 4 % topical solution 5 mL  -     Wound cleansing and dressings; Future  -     Wound cleansing and dressings; Future  -     Wound miscellaneous orders; Future  -     Wound off loading; Future  -     Wound home care; Future  -     Wound off loading Traumatic Right Heel; Future    Wheelchair dependence  -     lidocaine (XYLOCAINE) 4 % topical solution 5 mL  -     Wound cleansing and dressings; Future  -     Wound cleansing and dressings; Future  -     Wound miscellaneous orders; Future  -     Wound off loading; Future  -     Wound home care; Future  -     Wound off loading Traumatic Right Heel; Future    Poor hygiene  -     Wound cleansing and dressings; Future  -     Wound cleansing and dressings; Future  -     Wound miscellaneous orders;  "Future  -     Wound off loading; Future  -     Wound home care; Future  -     Wound off loading Traumatic Right Heel; Future              Debridement   Wound 12/12/23 Traumatic Heel Right    Universal Protocol:  Consent: Verbal consent obtained.  Risks and benefits: risks, benefits and alternatives were discussed  Consent given by: patient  Time out: Immediately prior to procedure a \"time out\" was called to verify the correct patient, procedure, equipment, support staff and site/side marked as required.  Patient identity confirmed: verbally with patient    Debridement Details  Performed by: physician  Debridement type: selective  Pain control: lidocaine 4%      Post-debridement measurements  Length (cm): 3.1  Width (cm): 1.9  Depth (cm): 0.2  Percent debrided: 60%  Surface Area (cm^2): 5.89  Area Debrided (cm^2): 3.53  Volume (cm^3): 1.18    Devitalized tissue debrided: biofilm and exudate  Instrument(s) utilized: curette  Bleeding: small  Hemostasis obtained with: pressure  Procedural pain (0-10): 1  Post-procedural pain: 0   Response to treatment: procedure was tolerated well    Debridement   Wound 11/13/23 Pressure Injury Sacrum Mid    [place] Protocol:  Consent: Verbal consent obtained.  Risks and benefits: risks, benefits and alternatives were discussed  Consent given by: patient  Time out: Immediately prior to procedure a \"time out\" was called to verify the correct patient, procedure, equipment, support staff and site/side marked as required.  Patient identity confirmed: verbally with patient    Debridement Details  Performed by: physician  Debridement type: selective  Pain control: lidocaine 4%      Post-debridement measurements  Length (cm): 3.3  Width (cm): 1.9  Depth (cm): 0.1  Percent debrided: 90%  Surface Area (cm^2): 6.27  Area Debrided (cm^2): 5.64  Volume (cm^3): 0.63    Devitalized tissue debrided: biofilm and exudate  Instrument(s) utilized: curette  Bleeding: small  Hemostasis obtained with: " pressure  Procedural pain (0-10): 1  Post-procedural pain: 0   Response to treatment: procedure was tolerated well        Plan:   It was a pleasure to see Jeffery Bishop for wound care follow up today  Selective debridement performed today as above  Wounds are not improving   Continue plan of care as noted below with change to bordered foam to both wounds  Patient presents again today with significant fecal matter over sacral wound as well as stones and dirt and debris.  Advised again today regarding hygiene and the need to keep wound areas clean and covered otherwise they may become infected.  Regarding offloading, patient is a Roho cushion at home.  Hospital bed and low-air-loss mattress ordered for patient which he is using.    Patient confirms this as well and is happy with the new mattress.  No signs or symptoms of infection today. Patient understands that if any signs of infection start (such as increased redness, drainage, pain, fever, chills, diaphoresis), they should call our office or proceed to the ER or Urgent Care.  Patient should continue a high protein diet to facilitate wound healing  Patient is advised to not submerge wound or leave wound open to air.  Follow up in 2 weeks  Given the multi-factorial nature of wound care, additional time was taken to review patient's treatment plan with other specialties and most recent pertinent lab work and imaging.   All plans of care discussed with patient at bedside who verbalized understanding with treatment plan.    Wound 11/13/23 Pressure Injury Sacrum Mid (Active)   Wound Image Images linked 12/21/23 1416   Wound Description Yellow;Slough;Pink 12/21/23 1416   Afua-wound Assessment Maceration;Purple 12/21/23 1416   Wound Length (cm) 3.3 cm 12/21/23 1416   Wound Width (cm) 1.9 cm 12/21/23 1416   Wound Depth (cm) 0.1 cm 12/21/23 1416   Wound Surface Area (cm^2) 6.27 cm^2 12/21/23 1416   Wound Volume (cm^3) 0.627 cm^3 12/21/23 1416   Calculated Wound Volume  (cm^3) 0.63 cm^3 12/21/23 1416   Change in Wound Size % 86.96 12/21/23 1416   Drainage Amount Moderate 12/21/23 1416   Drainage Description Tan 12/21/23 1416   Dressing Status Intact (upon arrival) 12/21/23 1416       Wound 12/12/23 Traumatic Heel Right (Active)   Wound Image Images linked 12/21/23 1420   Wound Description Yellow;Pink;Granulation tissue;Slough 12/21/23 1420   Afua-wound Assessment Dry;Scaly 12/21/23 1420   Wound Length (cm) 3.1 cm 12/21/23 1420   Wound Width (cm) 1.9 cm 12/21/23 1420   Wound Depth (cm) 0.2 cm 12/21/23 1420   Wound Surface Area (cm^2) 5.89 cm^2 12/21/23 1420   Wound Volume (cm^3) 1.178 cm^3 12/21/23 1420   Calculated Wound Volume (cm^3) 1.18 cm^3 12/21/23 1420   Change in Wound Size % 50.83 12/21/23 1420   Drainage Amount Moderate 12/21/23 1420   Drainage Description Yellow;Tan 12/21/23 1420   Dressing Status Intact (upon arrival) 12/21/23 1420       Wound 11/13/23 Pressure Injury Sacrum Mid (Active)   Date First Assessed/Time First Assessed: 11/13/23 1057   Primary Wound Type: Pressure Injury  Location: Sacrum  Wound Location Orientation: Mid       Wound 12/12/23 Traumatic Heel Right (Active)   Date First Assessed/Time First Assessed: 12/12/23 1427   Primary Wound Type: Traumatic  Location: Heel  Wound Location Orientation: Right       [REMOVED] Wound 09/12/23 Pressure Injury Sacrum (Removed)   Resolved Date: 11/13/23  Date First Assessed/Time First Assessed: 09/12/23 2220   Present on Original Admission: Yes  Primary Wound Type: Pressure Injury  Location: Sacrum  Wound Outcome: (c) Other (Comment)       Subjective:      .    12/21/23: Patient wife applying wound dressing.  Does not consistent with what dressing she is applying.  Telfa was removed from the wound today and she is unsure if this is what she has been applying a regular basis.  Does note that she sometimes runs out of supplies to patient's incontinence and uses other things.  Over the wound    12/12/23: Patient's wife  has been applying wound dressings.  Notes that [facility] is no longer coming to the home.  No symptoms of infection.  Arrives today with significant amount of fecal and urinary matter over buttocks and over the sacral wound.     12/1/23, 11/21/23: Patient's wife has been applying wound care as directed.  No symptoms of infection.     11/13/23: Jeffery is a pleasant 68-year-old male with a past medical history of type 2 diabetes mellitus most recent hemoglobin A1c 4.8% 2 months ago 10 months ago, history of coronary artery disease, history of nicotine dependence, history of protein calorie malnutrition, history of CVA still on DAPT here today for initial wound care consult.  Notes that wound started a few months ago, and since then has been in short-term rehabilitation.  Wound continued has worsened since discharged home.  Wound has been dressed with gauze.  He denies any signs of infection today including fever chills diaphoresis.  He does have a wheelchair with a gel overlay.  Notes that he does have a Roho cushion at home but does not like to use it.  Patient currently in a hospital bed.  Does not have a low-air-loss mattress to his best knowledge.                The following portions of the patient's history were reviewed and updated as appropriate: allergies, current medications, past family history, past medical history, past social history, past surgical history, and problem list.    Review of Systems   Constitutional:  Negative for chills, diaphoresis and fever.   Skin:  Positive for wound.   All other systems reviewed and are negative.        Objective:       Wound 11/13/23 Pressure Injury Sacrum Mid (Active)   Wound Image Images linked 12/21/23 1416   Wound Description Yellow;Slough;Pink 12/21/23 1416   Afua-wound Assessment Maceration;Purple 12/21/23 1416   Wound Length (cm) 3.3 cm 12/21/23 1416   Wound Width (cm) 1.9 cm 12/21/23 1416   Wound Depth (cm) 0.1 cm 12/21/23 1416   Wound Surface Area (cm^2) 6.27 cm^2  12/21/23 1416   Wound Volume (cm^3) 0.627 cm^3 12/21/23 1416   Calculated Wound Volume (cm^3) 0.63 cm^3 12/21/23 1416   Change in Wound Size % 86.96 12/21/23 1416   Drainage Amount Moderate 12/21/23 1416   Drainage Description Tan 12/21/23 1416   Dressing Status Intact (upon arrival) 12/21/23 1416       Wound 12/12/23 Traumatic Heel Right (Active)   Wound Image Images linked 12/21/23 1420   Wound Description Yellow;Pink;Granulation tissue;Slough 12/21/23 1420   Afua-wound Assessment Dry;Scaly 12/21/23 1420   Wound Length (cm) 3.1 cm 12/21/23 1420   Wound Width (cm) 1.9 cm 12/21/23 1420   Wound Depth (cm) 0.2 cm 12/21/23 1420   Wound Surface Area (cm^2) 5.89 cm^2 12/21/23 1420   Wound Volume (cm^3) 1.178 cm^3 12/21/23 1420   Calculated Wound Volume (cm^3) 1.18 cm^3 12/21/23 1420   Change in Wound Size % 50.83 12/21/23 1420   Drainage Amount Moderate 12/21/23 1420   Drainage Description Yellow;Tan 12/21/23 1420   Dressing Status Intact (upon arrival) 12/21/23 1420       /77   Pulse 88   Temp 97.8 °F (36.6 °C)   Resp 16     Physical Exam  Vitals reviewed.   Constitutional:       Appearance: Normal appearance.   HENT:      Head: Normocephalic and atraumatic.   Eyes:      Extraocular Movements: Extraocular movements intact.   Pulmonary:      Effort: Pulmonary effort is normal.   Musculoskeletal:      Cervical back: Neck supple.   Skin:     Comments: Right heel wound similar in size to last exam.  No signs of infection.    Sacral wound similar in size to last exam.  Telfa was removed from the wound area was cleansed as there is significant dirt and debris and ania on the wound periwound and surrounding tissues.  Wound bed itself does not appear to be infected.  Periwound maceration noted.   Neurological:      Mental Status: He is alert.   Psychiatric:         Mood and Affect: Mood normal.                 Wound Instructions:  Orders Placed This Encounter   Procedures    Wound cleansing and dressings     Sacral  Wound:     Wash your hands with soap and water.  Remove old dressing, discard into plastic bag and place in trash.  Cleanse the wound with soap and water prior to applying a clean dressing. Do not use tissue or cotton balls. Do not scrub the wound. Pat dry using gauze.  Shower yes   Triad cream to skin around wound.  Apply bordered foam to wound.  (If foam gets soiled from urine or feces, you can apply ABD pad).     Change dressing three times per week.    PLEASE DO NOT USE TEGADERM ON WOUND.     This was done today.     Please check yourself frequently for urine or feces incontinence.  Sitting in urine and/or feces will worsen your wound and hinder healing.     Standing Status:   Future     Standing Expiration Date:   12/21/2024    Wound cleansing and dressings     Right Heel Wound:     Wash your hands with soap and water.  Remove old dressing, discard into plastic bag and place in trash.  Cleanse the wound with soap and water prior to applying a clean dressing. Do not use tissue or cotton balls. Do not scrub the wound. Pat dry using gauze.  Shower yes   Apply moisturizer to skin surrounding wound  Apply bordered foam to the heel wound.       Change dressing three times per week.    PLEASE DO NOT USE TEGADERM TO COVER WOUND.     This was done today.     Standing Status:   Future     Standing Expiration Date:   12/21/2024    Wound miscellaneous orders     Protein: Eat protein with each meal to promote healing.  Examples of protein are fish, meat, chicken, nuts, peanut butter, eggs, lentils, edamame or a protein franco     Wound infection:  If you have signs of infection please call the wound center.  If the wound center is closed- please go to the Emergency department.  Some signs of infection:  fever, chills, increased redness, red streaks, increase in pain, increased drainage.  Drainage with an odor, Change in drainage color: white/milky/green/tan/yellow,  an increase in swelling, chest pain and/or shortness of  breath.     Standing Status:   Future     Standing Expiration Date:   12/21/2024    Wound off loading     Off-loading Instructions:     Keep weight and pressure off wound at all times., Use Wheelchair Cushion.  (Do not use donut-type devices).  Seat lifts or shift position in chair every 15 minutes. , Turn every 2 hours. Avoid position directing pressure to Wound site. Limit side lying to 30 degree tilt. Limit the head of bed elevation to 30 degrees., Do Not Sit for Long Periods of Time., and Off Loading Mattress low air loss     Standing Status:   Future     Standing Expiration Date:   12/21/2024    Wound home care     [facility]:  Please continue home care for wound care     Standing Status:   Future     Standing Expiration Date:   12/21/2024    Wound off loading Traumatic Right Heel     Please order a Prevalon boot from [employer] to be worn on right foot to offload heel wound.     Standing Status:   Future     Standing Expiration Date:   12/21/2024    Debridement     This order was created via procedure documentation    Debridement     This order was created via procedure documentation        Diagnosis ICD-10-CM Associated Orders   1. Traumatic skin ulcer, limited to breakdown of skin ([facility])  L98.491 lidocaine (XYLOCAINE) 4 % topical solution 5 mL     Wound cleansing and dressings     Wound cleansing and dressings     Wound miscellaneous orders     Wound off loading     Wound home care     Wound off loading Traumatic Right Heel     Debridement      2. Decubitus ulcer of sacral region, unstageable ([facility])  L89.150 lidocaine (XYLOCAINE) 4 % topical solution 5 mL     Wound cleansing and dressings     Wound cleansing and dressings     Wound miscellaneous orders     Wound off loading     Wound home care     Wound off loading Traumatic Right Heel     Debridement      3. Type 2 diabetes mellitus with chronic kidney disease on chronic dialysis, unspecified whether long term insulin use ([facility])  E11.22 lidocaine  "(XYLOCAINE) 4 % topical solution 5 mL    N18.6 Wound cleansing and dressings    Z99.2 Wound cleansing and dressings     Wound miscellaneous orders     Wound off loading     Wound home care     Wound off loading Traumatic Right Heel      4. Wheelchair dependence  Z99.3 lidocaine (XYLOCAINE) 4 % topical solution 5 mL     Wound cleansing and dressings     Wound cleansing and dressings     Wound miscellaneous orders     Wound off loading     Wound home care     Wound off loading Traumatic Right Heel      5. Poor hygiene  R46.0 Wound cleansing and dressings     Wound cleansing and dressings     Wound miscellaneous orders     Wound off loading     Wound home care     Wound off loading Traumatic Right Heel          --  Lisandra Torrez MD    \"This note has been constructed using a voice recognition system. Therefore there may be syntax, spelling, and/or grammatical errors. Occasional wrong word or \"sound alike\" substitutions may have occurred due to the inherent limitations of voice recognition software. Read the chart carefully and recognize, using context, where substitutions have occurred. Please call if you have any questions.\"     "

## 2023-12-28 ENCOUNTER — APPOINTMENT (EMERGENCY)
Dept: RADIOLOGY | Facility: HOSPITAL | Age: 68
End: 2023-12-28
Payer: MEDICARE

## 2023-12-28 ENCOUNTER — HOSPITAL ENCOUNTER (EMERGENCY)
Facility: HOSPITAL | Age: 68
Discharge: HOME | End: 2023-12-28
Attending: EMERGENCY MEDICINE
Payer: MEDICARE

## 2023-12-28 VITALS
DIASTOLIC BLOOD PRESSURE: 59 MMHG | OXYGEN SATURATION: 97 % | RESPIRATION RATE: 20 BRPM | BODY MASS INDEX: 18.65 KG/M2 | SYSTOLIC BLOOD PRESSURE: 120 MMHG | HEART RATE: 60 BPM | TEMPERATURE: 96.3 F | WEIGHT: 130 LBS

## 2023-12-28 DIAGNOSIS — K59.00 CONSTIPATION: Primary | ICD-10-CM

## 2023-12-28 LAB
2HR DELTA HS TROPONIN: -3 NG/L
ALBUMIN SERPL BCP-MCNC: 3 G/DL (ref 3.5–5)
ALP SERPL-CCNC: 88 U/L (ref 34–104)
ALT SERPL W P-5'-P-CCNC: 6 U/L (ref 7–52)
ANION GAP SERPL CALCULATED.3IONS-SCNC: 14 MMOL/L
APTT PPP: 28 SECONDS (ref 23–37)
AST SERPL W P-5'-P-CCNC: 15 U/L (ref 13–39)
BASOPHILS # BLD AUTO: 0.06 THOUSANDS/ÂΜL (ref 0–0.1)
BASOPHILS NFR BLD AUTO: 1 % (ref 0–1)
BILIRUB SERPL-MCNC: 0.34 MG/DL (ref 0.2–1)
BUN SERPL-MCNC: 41 MG/DL (ref 5–25)
CALCIUM ALBUM COR SERPL-MCNC: 9.4 MG/DL (ref 8.3–10.1)
CALCIUM SERPL-MCNC: 8.6 MG/DL (ref 8.4–10.2)
CARDIAC TROPONIN I PNL SERPL HS: 15 NG/L
CARDIAC TROPONIN I PNL SERPL HS: 18 NG/L
CHLORIDE SERPL-SCNC: 94 MMOL/L (ref 96–108)
CO2 SERPL-SCNC: 25 MMOL/L (ref 21–32)
CREAT SERPL-MCNC: 7.37 MG/DL (ref 0.6–1.3)
EOSINOPHIL # BLD AUTO: 0.02 THOUSAND/ÂΜL (ref 0–0.61)
EOSINOPHIL NFR BLD AUTO: 0 % (ref 0–6)
ERYTHROCYTE [DISTWIDTH] IN BLOOD BY AUTOMATED COUNT: 14 % (ref 11.6–15.1)
FLUAV RNA RESP QL NAA+PROBE: NEGATIVE
FLUBV RNA RESP QL NAA+PROBE: NEGATIVE
GFR SERPL CREATININE-BSD FRML MDRD: 6 ML/MIN/1.73SQ M
GLUCOSE SERPL-MCNC: 107 MG/DL (ref 65–140)
HCT VFR BLD AUTO: 37.9 % (ref 36.5–49.3)
HGB BLD-MCNC: 11.6 G/DL (ref 12–17)
IMM GRANULOCYTES # BLD AUTO: 0.02 THOUSAND/UL (ref 0–0.2)
IMM GRANULOCYTES NFR BLD AUTO: 0 % (ref 0–2)
INR PPP: 1.11 (ref 0.84–1.19)
LIPASE SERPL-CCNC: <6 U/L (ref 11–82)
LYMPHOCYTES # BLD AUTO: 0.49 THOUSANDS/ÂΜL (ref 0.6–4.47)
LYMPHOCYTES NFR BLD AUTO: 7 % (ref 14–44)
MCH RBC QN AUTO: 31.8 PG (ref 26.8–34.3)
MCHC RBC AUTO-ENTMCNC: 30.6 G/DL (ref 31.4–37.4)
MCV RBC AUTO: 104 FL (ref 82–98)
MONOCYTES # BLD AUTO: 0.38 THOUSAND/ÂΜL (ref 0.17–1.22)
MONOCYTES NFR BLD AUTO: 5 % (ref 4–12)
NEUTROPHILS # BLD AUTO: 6.6 THOUSANDS/ÂΜL (ref 1.85–7.62)
NEUTS SEG NFR BLD AUTO: 87 % (ref 43–75)
NRBC BLD AUTO-RTO: 0 /100 WBCS
PLATELET # BLD AUTO: 102 THOUSANDS/UL (ref 149–390)
PMV BLD AUTO: 10.2 FL (ref 8.9–12.7)
POTASSIUM SERPL-SCNC: 4.4 MMOL/L (ref 3.5–5.3)
PROT SERPL-MCNC: 6.4 G/DL (ref 6.4–8.4)
PROTHROMBIN TIME: 14.5 SECONDS (ref 11.6–14.5)
RBC # BLD AUTO: 3.65 MILLION/UL (ref 3.88–5.62)
RSV RNA RESP QL NAA+PROBE: NEGATIVE
SARS-COV-2 RNA RESP QL NAA+PROBE: NEGATIVE
SODIUM SERPL-SCNC: 133 MMOL/L (ref 135–147)
WBC # BLD AUTO: 7.57 THOUSAND/UL (ref 4.31–10.16)

## 2023-12-28 PROCEDURE — 96374 THER/PROPH/DIAG INJ IV PUSH: CPT

## 2023-12-28 PROCEDURE — 99284 EMERGENCY DEPT VISIT MOD MDM: CPT | Performed by: EMERGENCY MEDICINE

## 2023-12-28 PROCEDURE — 96361 HYDRATE IV INFUSION ADD-ON: CPT

## 2023-12-28 PROCEDURE — 36415 COLL VENOUS BLD VENIPUNCTURE: CPT | Performed by: EMERGENCY MEDICINE

## 2023-12-28 PROCEDURE — 0241U HB NFCT DS VIR RESP RNA 4 TRGT: CPT | Performed by: EMERGENCY MEDICINE

## 2023-12-28 PROCEDURE — 85025 COMPLETE CBC W/AUTO DIFF WBC: CPT | Performed by: EMERGENCY MEDICINE

## 2023-12-28 PROCEDURE — 83690 ASSAY OF LIPASE: CPT | Performed by: EMERGENCY MEDICINE

## 2023-12-28 PROCEDURE — 74022 RADEX COMPL AQT ABD SERIES: CPT

## 2023-12-28 PROCEDURE — 99283 EMERGENCY DEPT VISIT LOW MDM: CPT

## 2023-12-28 PROCEDURE — 85610 PROTHROMBIN TIME: CPT | Performed by: EMERGENCY MEDICINE

## 2023-12-28 PROCEDURE — 80053 COMPREHEN METABOLIC PANEL: CPT | Performed by: EMERGENCY MEDICINE

## 2023-12-28 PROCEDURE — 84484 ASSAY OF TROPONIN QUANT: CPT | Performed by: EMERGENCY MEDICINE

## 2023-12-28 PROCEDURE — 85730 THROMBOPLASTIN TIME PARTIAL: CPT | Performed by: EMERGENCY MEDICINE

## 2023-12-28 RX ORDER — ONDANSETRON 2 MG/ML
4 INJECTION INTRAMUSCULAR; INTRAVENOUS ONCE
Status: COMPLETED | OUTPATIENT
Start: 2023-12-28 | End: 2023-12-28

## 2023-12-28 RX ADMIN — ONDANSETRON 4 MG: 2 INJECTION INTRAMUSCULAR; INTRAVENOUS at 14:54

## 2023-12-28 RX ADMIN — SODIUM CHLORIDE 500 ML: 0.9 INJECTION, SOLUTION INTRAVENOUS at 14:53

## 2023-12-28 NOTE — ASSESSMENT & PLAN NOTE
Patient called and needs a refill in for physical therapy has appointment on 01/03/2024 can you put one in the system    Patient hypotensive 4/5->4/6--suspect related to postop blood loss anemia  · Status post albumin and midodrine x1 as well as 1 unit PRBC on 04/06  · Cozaar on hold, continue Coreg 3 125 mg b i d  · Blood pressure remains on soft side however stable

## 2023-12-28 NOTE — Clinical Note
Jeffery Bishop was seen and treated in our emergency department on 12/28/2023.                Diagnosis:     Jeffery  may return to work on return date.    He may return on this date: 12/30/2023         If you have any questions or concerns, please don't hesitate to call.      Mitesh Alberto, DO    ______________________________           _______________          _______________  Hospital Representative                              Date                                Time

## 2023-12-29 NOTE — ED PROVIDER NOTES
"History  Chief Complaint   Patient presents with    Vomiting     Patient with chronic issue with vomiting for several months. This morning he was very nauseous and wife states \" vomited a bucket full \" . Patient did not go to dialysis ( Tue, Thurs, Sat ) . Wife states his BP was 177/85 and states this is very high \" he is always low \". BP is 98/60 in triage and she states \" that is normal for him \". Patient with dry heaves in triage     68-year-old male presents to the ED with chronic issue of vomiting for several months this morning he states he vomited quite a bit feels like he might be dehydrated but his main issue right now is that he is constipated.  No fevers chills no diarrhea no other complaints denies any abdominal pain.  Patient is awake and alert states he has had these issues before usually gets some IV fluids and some Zofran and then he continues to go get his dialysis.  Patient does not put any urine out he states that he missed his dialysis Tuesday and today.  I discussed this with renal and they stated that they could fit him in tomorrow for his dialysis.        Prior to Admission Medications   Prescriptions Last Dose Informant Patient Reported? Taking?   Cholecalciferol 50 MCG (2000 UT) CAPS  Self, Outside Facility (Specify) Yes No   Sig: Take by mouth daily   [place] Choice Comfort EZ 33G X 4 MM MISC  Self, Outside Facility (Specify) Yes No   Patient not taking: Reported on 11/13/2023   acetaminophen (TYLENOL) 325 mg tablet  Outside Facility (Specify) No No   Sig: Take 3 tablets (975 mg total) by mouth every 8 (eight) hours as needed for mild pain   ammonium lactate (LAC-HYDRIN) 12 % lotion   No No   Sig: Apply topically 2 (two) times a day as needed for dry skin   ascorbic acid (VITAMIN C) 500 mg tablet  Self, Outside Facility (Specify) Yes No   Sig: TAKE ONE TABLET BY MOUTH EVERY EVENING FOR SUPPLEMENT   aspirin (ECOTRIN LOW STRENGTH) 81 mg EC tablet  Self, Outside Facility (Specify) Yes No   Sig: " Take 81 mg by mouth daily   atorvastatin (LIPITOR) 20 mg tablet  Self, Outside Facility (Specify) Yes No   Sig: Take 20 mg by mouth daily at bedtime   bacitracin topical ointment 500 units/g topical ointment   No No   Sig: Apply 1 large application topically 2 (two) times a day   bisacodyl (DULCOLAX) 10 mg suppository   Yes No   Sig: Insert into the rectum   clopidogrel (PLAVIX) 75 mg tablet   Yes No   Sig: Take 75 mg by mouth daily   Patient not taking: Reported on 11/13/2023   clotrimazole-betamethasone (LOTRISONE) 1-0.05 % cream   No No   Sig: Apply topically in the morning   docusate sodium (COLACE) 100 mg capsule   No No   Sig: Take 1 capsule (100 mg total) by mouth daily for 14 days   gabapentin (NEURONTIN) 100 mg capsule  Self No No   Sig: Take 1 capsule (100 mg total) by mouth daily at bedtime   Patient taking differently: Take 100 mg by mouth 3 (three) times a day   glucagon 1 MG injection   Yes No   Sig: Inject 1 mg into a muscle   Patient not taking: Reported on 11/13/2023   hydrOXYzine HCL (ATARAX) 25 mg tablet   Yes No   Sig: TAKE 1 TABLET BY MOUTH EVERY 8 HOURS AS NEEDED FOR ITCHING   Patient not taking: Reported on 10/10/2023   midodrine (PROAMATINE) 10 MG tablet   No No   Sig: Take 1 tablet (10 mg total) by mouth 3 (three) times a day before meals   Patient taking differently: Take 10 mg by mouth 3 (three) times a day before meals Takes BID   oxyCODONE (ROXICODONE) 5 immediate release tablet   No No   Sig: Take 0.5 tablets (2.5 mg total) by mouth every 8 (eight) hours as needed for severe pain for up to 10 doses Max Daily Amount: 7.5 mg   polyethylene glycol (MIRALAX) 17 g packet   No No   Sig: Take 17 g by mouth daily for 7 days   Patient not taking: Reported on 10/15/2023   vitamin B-12 (VITAMIN B-12) 1,000 mcg tablet  Outside Facility (Specify) Yes No   Sig: Take 1,000 mcg by mouth daily      Facility-Administered Medications: None       Past Medical History:   Diagnosis Date    Cerebral  thrombosis 04/23/2008    With Cerebral Infarction:  Last Assessed:  October 20, 2016    Chronic kidney disease 2012    on dialysis     COVID 05/2022    COVID-19 04/2022 4/2022-while in NH    Diabetes mellitus ([facility])     Dialysis patient ([facility])     T/TH/Sat    Difficulty walking     GERD (gastroesophageal reflux disease)     Hyperlipidemia     Hypertension     Labyrinthitis 09/10/2011    Myocardial infarction ([facility]) 2014    x3 others were in 2009 & 2010    Sleep disturbances 09/20/2010    Stroke ([facility]) 2007    x1, RLE deficit- uses walker    Type 2 diabetes, uncontrolled, with renal manifestation 05/03/2017       Past Surgical History:   Procedure Laterality Date    AV FISTULA PLACEMENT      AV FISTULA REPAIR Left 6/3/2023    Procedure: LEFT UPPER EXTREMITY A-V GRAFT EXPLANT, LEFT BRACHIAL ANGIOPATCH,  APPLICATION OF  VAC;  Surgeon: Neyda Rider MD;  Location:  MAIN OR;  Service: Vascular    CARDIAC CATHETERIZATION Left 02/03/2023    Procedure: Cardiac Left Heart Cath;  Surgeon: Tiffani Gregorio MD;  Location: [facility];  Service: Cardiology    CARDIAC CATHETERIZATION N/A 02/08/2023    Procedure: Cardiac catheterization with complex PCI with Dr. Patricio, patient is a renal dialysis patient;  Surgeon: Marcos Ramriez MD;  Location:  CARDIAC CATH LAB;  Service: Cardiology    CARDIAC SURGERY  2010    stents x3    COLONOSCOPY N/A 12/12/2016    Procedure: COLONOSCOPY;  Surgeon: Radha Moss MD;  Location: [facility] GI LAB;  Service:     COLONOSCOPY N/A 04/03/2017    Procedure:  COLONOSCOPY;  Surgeon: Radha Moss MD;  Location: [facility] GI LAB;  Service:     HARDWARE REMOVAL Right 04/04/2022    Procedure: REMOVAL HARDWARE HIP;  Surgeon: Moises Kent MD;  Location: [facility];  Service: Orthopedics    IR AV FISTULA/GRAFT DECLOT  04/29/2021    IR AV FISTULA/GRAFT DECLOT  03/25/2022    IR AV FISTULA/GRAFT DECLOT  09/21/2022    IR AV FISTULAGRAM/GRAFTOGRAM  03/28/2022    IR AV FISTULAGRAM/GRAFTOGRAM   "07/28/2022    IR TUNNELED CENTRAL LINE REMOVAL  12/07/2021    IR TUNNELED DIALYSIS CATHETER PLACEMENT  05/24/2021    PORTACATH PLACEMENT      per pt \"port in chest\"    OH ARTERIOVENOUS ANASTOMOSIS OPEN DIRECT Left 07/22/2021    Procedure: CREATION FISTULA ARTERIOVENOUS (AV);  Surgeon: Kareem Nye MD;  Location: [facility];  Service: Vascular    OH ARTERIOVENOUS ANASTOMOSIS OPEN DIRECT Left 3/21/2023    Procedure: left brachiobasilic fistula,  AVG Graft;  Surgeon: Catalino Hook MD;  [facility]: [facility];  Service: Vascular    OH ARTERIOVENOUS ANASTOMOSIS OPEN DIRECT Left 5/19/2023    Procedure: CREATION of LEFT FISTULA ARTERIOVENOUS (AV) GRAFT;  Surgeon: Catalino Hook MD;  [facility]: [facility];  Service: Vascular    OH HEMIARTHROPLASTY HIP PARTIAL Right 04/04/2022    Procedure: HEMIARTHROPLASTY HIP (BIPOLAR);  Surgeon: Moises Kent MD;  Location: [facility];  Service: Orthopedics       Family History   Problem Relation Age of Onset    Leukemia Mother     Crohn's disease Father     Heart disease Father     Transient ischemic attack Brother      I have reviewed and agree with the history as documented.    E-Cigarette/Vaping    E-Cigarette Use Never User      E-Cigarette/Vaping Substances    Nicotine No     THC No     CBD No     Flavoring No     Other No     Unknown No      Social History     Tobacco Use    Smoking status: Every Day     Current packs/day: 1.00     Average packs/day: 1 pack/day for 30.0 years (30.0 ttl pk-yrs)     Types: Cigarettes    Smokeless tobacco: Never    Tobacco comments:     Currently not smoking - in facility   Vaping Use    Vaping status: Never Used   Substance Use Topics    Alcohol use: Yes     Alcohol/week: 1.0 standard drink of alcohol     Types: 1 Shots of liquor per week     Comment: 1 shot daily    Drug use: Yes     Types: Marijuana     Comment: gummies       Review of Systems   Constitutional:  Negative for activity change, chills, diaphoresis and fever. "   HENT:  Negative for congestion, ear pain, nosebleeds, sore throat, trouble swallowing and voice change.    Eyes:  Negative for pain, discharge and redness.   Respiratory:  Negative for apnea, cough, choking, shortness of breath, wheezing and stridor.    Cardiovascular:  Negative for chest pain and palpitations.   Gastrointestinal:  Positive for nausea and vomiting. Negative for abdominal distention, abdominal pain, constipation and diarrhea.   Endocrine: Negative for polydipsia.   Genitourinary:  Negative for difficulty urinating, dysuria, flank pain, frequency, hematuria and urgency.   Musculoskeletal:  Negative for back pain, gait problem, joint swelling, myalgias, neck pain and neck stiffness.   Skin:  Negative for pallor and rash.   Neurological:  Negative for dizziness, tremors, syncope, speech difficulty, weakness, numbness and headaches.   Hematological:  Negative for adenopathy.   Psychiatric/Behavioral:  Negative for confusion, hallucinations, self-injury and suicidal ideas. The patient is not nervous/anxious.        Physical Exam  Physical Exam  Vitals and nursing note reviewed.   Constitutional:       General: He is not in acute distress.     Appearance: He is well-developed. He is not diaphoretic.   HENT:      Head: Normocephalic and atraumatic.      Right Ear: External ear normal.      Left Ear: External ear normal.      Nose: Nose normal.   Eyes:      Conjunctiva/sclera: Conjunctivae normal.      Pupils: Pupils are equal, round, and reactive to light.   Cardiovascular:      Rate and Rhythm: Normal rate and regular rhythm.      Heart sounds: Normal heart sounds.   Pulmonary:      Effort: Pulmonary effort is normal.      Breath sounds: Normal breath sounds.   Abdominal:      General: Bowel sounds are normal.      Palpations: Abdomen is soft.      Tenderness: There is abdominal tenderness.      Comments: Diffuse tenderness   Musculoskeletal:         General: Normal range of motion.      Cervical back:  Normal range of motion and neck supple.   Skin:     General: Skin is warm and dry.   Neurological:      Mental Status: He is alert and oriented to person, place, and time.      Deep Tendon Reflexes: Reflexes are normal and symmetric.   Psychiatric:         Behavior: Behavior is cooperative.         Vital Signs  ED Triage Vitals [12/28/23 1306]   Temperature Pulse Respirations Blood Pressure SpO2   (!) 96.3 °F (35.7 °C) 80 20 98/60 94 %      Temp Source Heart Rate Source Patient Position - Orthostatic VS BP Location FiO2 (%)   Tympanic Monitor Sitting Left arm --      Pain Score       --           Vitals:    12/28/23 1306 12/28/23 1715   BP: 98/60 120/59   Pulse: 80 60   Patient Position - Orthostatic VS: Sitting Lying         Visual Acuity      ED Medications  Medications   sodium chloride 0.9 % bolus 500 mL (0 mL Intravenous Stopped 12/28/23 1553)   ondansetron (ZOFRAN) injection 4 mg (4 mg Intravenous Given 12/28/23 1454)       Diagnostic Studies  Results Reviewed       Procedure Component Value Units Date/Time    HS Troponin I 2hr [[id number]]  (Normal) Collected: 12/28/23 1651    Lab Status: Final result Specimen: Blood from Arm, Left Updated: 12/28/23 1719     hs TnI 2hr 15 ng/L      Delta 2hr hsTnI -3 ng/L     FLU/RSV/COVID - if FLU/RSV clinically relevant [[id number]]  (Normal) Collected: 12/28/23 1452    Lab Status: Final result Specimen: Nares from Nose Updated: 12/28/23 1535     SARS-CoV-2 Negative     INFLUENZA A PCR Negative     INFLUENZA B PCR Negative     RSV PCR Negative    Narrative:      FOR PEDIATRIC PATIENTS - copy/paste COVID Guidelines URL to browser: [email or web address]    SARS-CoV-2 assay is a Nucleic Acid Amplification assay intended for the  qualitative detection of nucleic acid from SARS-CoV-2 in nasopharyngeal  swabs. Results are for the presumptive identification of SARS-CoV-2 RNA.    Positive results are indicative of infection with  SARS-CoV-2, the virus  causing COVID-19, but do not rule out bacterial infection or co-infection  with other viruses. Laboratories within the [place] and its  territories are required to report all positive results to the appropriate  public health authorities. Negative results do not preclude SARS-CoV-2  infection and should not be used as the sole basis for treatment or other  patient management decisions. Negative results must be combined with  clinical observations, patient history, and epidemiological information.  This test has not been FDA cleared or approved.    This test has been authorized by FDA under an Emergency Use Authorization  (EUA). This test is only authorized for the duration of time the  declaration that circumstances exist justifying the authorization of the  emergency use of an in vitro diagnostic tests for detection of SARS-CoV-2  virus and/or diagnosis of COVID-19 infection under section 564(b)(1) of  the Act, 21 U.S.C. 360bbb-3(b)(1), unless the authorization is terminated  or revoked sooner. The test has been validated but independent review by FDA  and CLIA is pending.    Test performed using [employer] GeneXpert: This RT-PCR assay targets N2,  a region unique to SARS-CoV-2. A conserved region in the E-gene was chosen  for pan-Sarbecovirus detection which includes SARS-CoV-2.    According to CMS-2020-01-R, this platform meets the definition of high-throughput technology.    Protime-INR [[id number]]  (Normal) Collected: 12/28/23 1519    Lab Status: Final result Specimen: Blood from Arm, Right Updated: 12/28/23 1535     Protime 14.5 seconds      INR 1.11    APTT [[id number]]  (Normal) Collected: 12/28/23 1519    Lab Status: Final result Specimen: Blood from Arm, Right Updated: 12/28/23 1535     PTT 28 seconds     Comprehensive metabolic panel [[id number]]  (Abnormal) Collected: 12/28/23 1451    Lab Status: Final result Specimen: Blood from Arm, Right Updated: 12/28/23 1526     Sodium 133  mmol/L      Potassium 4.4 mmol/L      Chloride 94 mmol/L      CO2 25 mmol/L      ANION GAP 14 mmol/L      BUN 41 mg/dL      Creatinine 7.37 mg/dL      Glucose 107 mg/dL      Calcium 8.6 mg/dL      Corrected Calcium 9.4 mg/dL      AST 15 U/L      ALT 6 U/L      Alkaline Phosphatase 88 U/L      Total Protein 6.4 g/dL      Albumin 3.0 g/dL      Total Bilirubin 0.34 mg/dL      eGFR 6 ml/min/1.73sq m     Narrative:      National Kidney Disease Foundation guidelines for Chronic Kidney Disease (CKD):     Stage 1 with normal or high GFR (GFR > 90 mL/min/1.73 square meters)    Stage 2 Mild CKD (GFR = 60-89 mL/min/1.73 square meters)    Stage 3A Moderate CKD (GFR = 45-59 mL/min/1.73 square meters)    Stage 3B Moderate CKD (GFR = 30-44 mL/min/1.73 square meters)    Stage 4 Severe CKD (GFR = 15-29 mL/min/1.73 square meters)    Stage 5 End Stage CKD (GFR <15 mL/min/1.73 square meters)  Note: GFR calculation is accurate only with a steady state creatinine    Lipase [[id number]]  (Abnormal) Collected: 12/28/23 1451    Lab Status: Final result Specimen: Blood from Arm, Right Updated: 12/28/23 1526     Lipase <6 u/L     HS Troponin 0hr (reflex protocol) [[id number]]  (Normal) Collected: 12/28/23 1451    Lab Status: Final result Specimen: Blood from Arm, Right Updated: 12/28/23 1525     hs TnI 0hr 18 ng/L     CBC and differential [[id number]]  (Abnormal) Collected: 12/28/23 1451    Lab Status: Final result Specimen: Blood from Arm, Right Updated: 12/28/23 1458     WBC 7.57 Thousand/uL      RBC 3.65 Million/uL      Hemoglobin 11.6 g/dL      Hematocrit 37.9 %       fL      MCH 31.8 pg      MCHC 30.6 g/dL      RDW 14.0 %      MPV 10.2 fL      Platelets 102 Thousands/uL      nRBC 0 /100 WBCs      Neutrophils Relative 87 %      Immat GRANS % 0 %      Lymphocytes Relative 7 %      Monocytes Relative 5 %      Eosinophils Relative 0 %      Basophils Relative 1 %      Neutrophils Absolute 6.60 Thousands/µL      Immature Grans Absolute  0.02 Thousand/uL      Lymphocytes Absolute 0.49 Thousands/µL      Monocytes Absolute 0.38 Thousand/µL      Eosinophils Absolute 0.02 Thousand/µL      Basophils Absolute 0.06 Thousands/µL                    XR abdomen obstruction series    (Results Pending)              Procedures  Procedures         ED Course                               SBIRT 22yo+      Flowsheet Row Most Recent Value   Initial Alcohol Screen: US AUDIT-C     1. How often do you have a drink containing alcohol? 6 Filed at: 12/28/2023 1308   2. How many drinks containing alcohol do you have on a typical day you are drinking?  0 Filed at: 12/28/2023 1308   Audit-C Score 6 Filed at: 12/28/2023 1308   KIZZY: How many times in the past year have you...    Used an illegal drug or used a prescription medication for non-medical reasons? Never Filed at: 12/28/2023 1308                      Medical Decision Making  Amount and/or Complexity of Data Reviewed  Labs: ordered.  Radiology: ordered.    Risk  Prescription drug management.             Disposition  Final diagnoses:   Constipation     Time reflects when diagnosis was documented in both MDM as applicable and the Disposition within this note       Time User Action Codes Description Comment    12/28/2023  5:43 PM Mitesh Alberto Add [K59.00] Constipation           ED Disposition       ED Disposition   Discharge    Condition   Stable    Date/Time   Thu Dec 28, 2023 1743    Comment   Jeffery Bishop discharge to home/self care.                   Follow-up Information       Follow up With Specialties Details Why Contact Info    Wanda Le MD Family Medicine Schedule an appointment as soon as possible for a visit  As needed [address] [place] [id number]  [phone]              Discharge Medication List as of 12/28/2023  5:43 PM        CONTINUE these medications which have NOT CHANGED    Details   acetaminophen (TYLENOL) 325 mg tablet Take 3 tablets (975 mg total) by mouth every 8  (eight) hours as needed for mild pain, Starting u 6/15/2023, No Print      ammonium lactate (LAC-HYDRIN) 12 % lotion Apply topically 2 (two) times a day as needed for dry skin, Starting Wed 8/30/2023, Normal      ascorbic acid (VITAMIN C) 500 mg tablet TAKE ONE TABLET BY MOUTH EVERY EVENING FOR SUPPLEMENT, Historical Med      aspirin (ECOTRIN LOW STRENGTH) 81 mg EC tablet Take 81 mg by mouth daily, Starting Wed 1/26/2022, Historical Med      atorvastatin (LIPITOR) 20 mg tablet Take 20 mg by mouth daily at bedtime, Starting Wed 1/26/2022, Historical Med      bacitracin topical ointment 500 units/g topical ointment Apply 1 large application topically 2 (two) times a day, Starting Wed 9/20/2023, Normal      bisacodyl (DULCOLAX) 10 mg suppository Insert into the rectum, Starting Sun 10/1/2023, Historical Med      Cholecalciferol 50 MCG (2000 UT) CAPS Take by mouth daily, Starting Mon 3/22/2021, Historical Med      [place] Choice Comfort EZ 33G X 4 MM MISC Historical Med      clopidogrel (PLAVIX) 75 mg tablet Take 75 mg by mouth daily, Starting Fri 8/18/2023, Historical Med      clotrimazole-betamethasone (LOTRISONE) 1-0.05 % cream Apply topically in the morning, Starting Tue 11/14/2023, Normal      docusate sodium (COLACE) 100 mg capsule Take 1 capsule (100 mg total) by mouth daily for 14 days, Starting Tue 10/3/2023, Until Tue 10/17/2023, Normal      gabapentin (NEURONTIN) 100 mg capsule Take 1 capsule (100 mg total) by mouth daily at bedtime, Starting Wed 10/11/2023, Normal      glucagon 1 MG injection Inject 1 mg into a muscle, Starting Sun 10/1/2023, Historical Med      hydrOXYzine HCL (ATARAX) 25 mg tablet TAKE 1 TABLET BY MOUTH EVERY 8 HOURS AS NEEDED FOR ITCHING, Historical Med      midodrine (PROAMATINE) 10 MG tablet Take 1 tablet (10 mg total) by mouth 3 (three) times a day before meals, Starting Wed 5/24/2023, Until Mon 9/25/2023, Normal      oxyCODONE (ROXICODONE) 5 immediate release tablet Take 0.5 tablets  (2.5 mg total) by mouth every 8 (eight) hours as needed for severe pain for up to 10 doses Max Daily Amount: 7.5 mg, Starting u 9/14/2023, Normal      polyethylene glycol (MIRALAX) 17 g packet Take 17 g by mouth daily for 7 days, Starting Tue 10/3/2023, Until Tue 10/10/2023, Normal      vitamin B-12 (VITAMIN B-12) 1,000 mcg tablet Take 1,000 mcg by mouth daily, Starting Tue 1/24/2023, Historical Med             No discharge procedures on file.    PDMP Review         Value Time User    PDMP Reviewed  Yes 5/24/2023  5:40 PM Mary Stubbs MD            ED Provider  Electronically Signed by             Mitesh Alberto DO  12/28/23 2051

## 2024-01-11 ENCOUNTER — OFFICE VISIT (OUTPATIENT)
Dept: GASTROENTEROLOGY | Facility: CLINIC | Age: 69
End: 2024-01-11
Payer: MEDICARE

## 2024-01-11 ENCOUNTER — TELEPHONE (OUTPATIENT)
Dept: GASTROENTEROLOGY | Facility: CLINIC | Age: 69
End: 2024-01-11

## 2024-01-11 VITALS
DIASTOLIC BLOOD PRESSURE: 56 MMHG | HEIGHT: 70 IN | WEIGHT: 123 LBS | HEART RATE: 64 BPM | BODY MASS INDEX: 17.61 KG/M2 | SYSTOLIC BLOOD PRESSURE: 107 MMHG

## 2024-01-11 DIAGNOSIS — R19.7 DIARRHEA, UNSPECIFIED TYPE: ICD-10-CM

## 2024-01-11 DIAGNOSIS — R11.11 VOMITING WITHOUT NAUSEA, UNSPECIFIED VOMITING TYPE: Primary | ICD-10-CM

## 2024-01-11 DIAGNOSIS — R79.89 ELEVATED LFTS: ICD-10-CM

## 2024-01-11 DIAGNOSIS — R63.4 UNINTENTIONAL WEIGHT LOSS: ICD-10-CM

## 2024-01-11 DIAGNOSIS — D69.6 THROMBOCYTOPENIA (HCC): ICD-10-CM

## 2024-01-11 DIAGNOSIS — R93.89 ABNORMAL MRI: ICD-10-CM

## 2024-01-11 PROCEDURE — 99214 OFFICE O/P EST MOD 30 MIN: CPT | Performed by: NURSE PRACTITIONER

## 2024-01-11 RX ORDER — PANTOPRAZOLE SODIUM 40 MG/1
40 TABLET, DELAYED RELEASE ORAL DAILY
Qty: 30 TABLET | Refills: 3 | Status: SHIPPED | OUTPATIENT
Start: 2024-01-11 | End: 2024-02-10

## 2024-01-11 RX ORDER — ONDANSETRON 8 MG/1
1 TABLET, ORALLY DISINTEGRATING ORAL
COMMUNITY
Start: 2023-12-30

## 2024-01-11 NOTE — TELEPHONE ENCOUNTER
Dr. Figueroa    Our mutual patient is scheduled for procedure: EGD    On: __01__/_  24  _/_ 24   _      With:  __Deanna_______    He/She is taking the following blood thinner:    N/a      Can this be stopped ___n/a___ days prior to the procedure?      Physician Approving clearance: ________________________

## 2024-01-11 NOTE — TELEPHONE ENCOUNTER
Procedure: EGD  Scheduled date of procedure (as of today):01/24/24  Physician performing procedure:Dr. Huerta  Location of procedure: [facility]  Instructions reviewed with patient by: ti  Clearances: Dr. Figueroa cardiac clearance.    Call [facility] to move to  [facility] with Dr. Huerta

## 2024-01-11 NOTE — PROGRESS NOTES
[facility] - Outpatient Follow-up Note  Jeffery Bishop 68 y.o. male MRN: [id number]  Encounter: [id number]          ASSESSMENT AND PLAN:      1. Vomiting without nausea, unspecified vomiting type  Patient reports vomiting without nausea which started approximately in September 2023.  Patient reported that the vomiting started after being discharged from the rehabilitation facility.  Patient reports despite taking Zofran 3 times a day he continues to vomit on a daily basis.  Patient does have a past history of diabetes but his hemoglobin A1c has been within normal limits and he is no longer on diabetic medication.  Patient does have history of diabetic neuropathy.  Patient has missed hemodialysis secondary to nausea and vomiting.  Patient reported he missed 1 episodes this week and review of medical records for week of 12/28 showed he missed several episodes at that time.  This may be contributing towards his nausea and vomiting.  Patient also reports occasional marijuana use but stated he is only been using marijuana for the last 2 months.  Review of medical records show that patient has history of using marijuana and brownies to help him sleep.  Nausea and vomiting may be secondary to GI upper GI etiology, gastroparesis, end-stage renal disease, hyperemesis cannabis syndrome, or neurological disorder.  Patient does have history of strokes.  - EGD; scheduled for EGD.  Prep and procedure explained to patient in detail.  Further recommendations pending results of EGD.  - NM gastric emptying; for further evaluation of gastroparesis  - pantoprazole (PROTONIX) 40 mg tablet; Take 1 tablet (40 mg total) by mouth daily Take prior to breakfast  Dispense: 30 tablet; Refill: 3  -Continue Zofran    2. Unintentional weight loss  Patient has had unintentional weight loss to 4 months of vomiting on a daily basis and decreased oral intake.  Would need to rule out malignancy as underlying contribution  to unintentional weight loss.  Patient did have a recent CT of abdomen and pelvis done in October 2023 which showed rectal fecal impaction.  No evidence of acute intra-abdominal or pelvic pathology.  Patient also had a previous MRI done 5/1/2023 which showed cholelithiasis without evidence of acute cholecystitis.  Intrahepatic and extrahepatic biliary dilation without evidence of choledocholithiasis or biliary structure.  Liver without evidence of significant steatosis or solid or cystic mass.  Atrophic kidneys with normal fluid right cystic lesions and possible proteinaceous hemorrhagic cyst in the medial midpole the left kidney measures 6 mm.    -Patient would not be able to tolerate colonoscopy prep at current time due to ongoing nausea and vomiting.  Will plan scheduling colonoscopy for further evaluation of unintentional weight loss.  - EGD; scheduled for EGD.  Prep and procedure explained to patient in detail.  Further recommendations pending results of EGD.    3. Diarrhea, unspecified type  Patient did not have prior stool studies as  previously ordered on several follow-up appointments. Patient reports diarrhea has since resolved.    4. Thrombocytopenia (HCC)  5. Elevated LFTs  6. Abnormal MRI  MRI done 5/1/2023 which showed cholelithiasis without evidence of acute cholecystitis.  Intrahepatic and extrahepatic biliary dilation without evidence of choledocholithiasis or biliary structure.  Liver without evidence of significant steatosis or solid or cystic mass.   Dilation.  Previously had elevated liver enzymes have since resolved.  Lab work done 12/28/2023 showed liver enzymes within normal limits except AST 6 and decreased.  Albumin 3.0.  Positive history of thrombocytopenia platelets 102.  Previous ordered  EuS which was not done.  -Message sent to advanced endoscopic team to see for further evaluation of abnormal MRI.  EUS order previously placed by Dr Cagle    7.  History of adenomatous colon  polyp  Patient has history of adenomatous colon polyps.  Last colonoscopy done 2017.  Patient is due for surveillance colonoscopy but due to vomiting he would be unable to tolerate prep at colon time.    -After nausea and vomiting has subsided patient should be scheduled for surveillance colonoscopy for history of adenomatous polyps.    8.  Colon cancer screening  Up to date    9.  Constipation  Patient has history of constipation patient is currently on oxycodone for pain which is contributing to his constipation.  Patient reports last bowel movement yesterday.    -Start MiraLAX 1 capful daily and adjust for bowel movements.    Start Senokot 2 tablets daily at bedtime   -Continue Colace    Follow-up 8 weeks after procedure  ______________________________________________________________________    SUBJECTIVE: This is a 68-year-old male who presents for follow-up for vomiting which has been ongoing since September 2023 and started after being discharged from rehab facility.  Patient reports that he vomits every day.  Patient states that nausea is not related to vomiting.Patient reports despite taking Zofran 3 times a day he continues to vomit on a daily basis.  Patient does have a past history of diabetes but his hemoglobin A1c has been within normal limits and he is no longer on diabetic medication.  Patient does have history of diabetic neuropathy and strokes. Patient has missed hemodialysis secondary to nausea and vomiting.  Patient reported he missed 1 episodes this week and review of medical records for week of 12/28 showed he missed several episodes at that time.  This may be contributing towards his nausea and vomiting.  Patient also reports occasional marijuana use but stated he is only been using marijuana for the last 2 months.  Review of medical records show that patient has history of using marijuana and brownies to help him sleep.  Patient denies acid reflux, heartburn, dysphagia, epigastric or abdominal  pain.  Patient denies diarrhea, patient does report intermittent episodes of constipation.  Patient is taking Colace at home.  Patient denies blood in stool, blood from rectal area, or black tarry stool.  Abdomen exam benign no abdominal tenderness or guarding.    Patient has had unintentional weight loss to 4 months of vomiting on a daily basis and decreased oral intake.  Would need to rule out malignancy as underlying contribution to unintentional weight loss.  Patient did have a recent CT of abdomen and pelvis done in October 2023 which showed rectal fecal impaction.  No evidence of acute intra-abdominal or pelvic pathology.  Patient also had a previous MRI done 5/1/2023 which showed cholelithiasis without evidence of acute cholecystitis.  Intrahepatic and extrahepatic biliary dilation without evidence of choledocholithiasis or biliary structure.  Liver without evidence of significant steatosis or solid or cystic mass.  Atrophic kidneys with normal fluid right cystic lesions and possible proteinaceous hemorrhagic cyst in the medial midpole the left kidney measures 6 mm.  Last colonoscopy done 2017 cecum and ileocecal valve normal.  In the distal ascending colon noted ink marker with a scar at previous polypectomy site.  Sigmoid colon few diverticula.  Polyp removed from rectum..  Patient has history of adenomatous colon polyps.  Patient is due for repeat colonoscopy but due to continued vomiting he would not be able to tolerate prep. EGD done 2013 showed gastroduodenitis.  No evidence of upper GI bleeding.  Normal esophagus.       REVIEW OF SYSTEMS IS OTHERWISE NEGATIVE.      Historical Information   Past Medical History:   Diagnosis Date    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    With Cerebral Infarction:  Last Assessed:  October 20, 2016    Chronic kidney disease 2012    on dialysis     COVID 05/2022    COVID-19 04/2022 4/2022-while in [facility]    Diabetes mellitus (HCC)     Dialysis patient (HCC)     T/TH/Sat     "Difficulty walking     GERD (gastroesophageal reflux disease)     Hyperlipidemia     Hypertension     Labyrinthitis 09/10/2011    Myocardial infarction ([facility]) 2014    x3 others were in 2009 & 2010    Sleep disturbances 09/20/2010    Stroke ([facility]) 2007    x1, RLE deficit- uses walker    Type 2 diabetes, uncontrolled, with renal manifestation 05/03/2017     Past Surgical History:   Procedure Laterality Date    AV FISTULA PLACEMENT      AV FISTULA REPAIR Left 6/3/2023    Procedure: LEFT UPPER EXTREMITY A-V GRAFT EXPLANT, LEFT BRACHIAL ANGIOPATCH,  APPLICATION OF  VAC;  Surgeon: Neyda Rider MD;  Location: [facility];  Service: Vascular    CARDIAC CATHETERIZATION Left 02/03/2023    Procedure: Cardiac Left Heart Cath;  Surgeon: Tiffani Gregorio MD;  Location: [facility];  Service: Cardiology    CARDIAC CATHETERIZATION N/A 02/08/2023    Procedure: Cardiac catheterization with complex PCI with Dr. Patricio, patient is a renal dialysis patient;  Surgeon: Marcos Ramirez MD;  Location:  CARDIAC CATH LAB;  Service: Cardiology    CARDIAC SURGERY  2010    stents x3    COLONOSCOPY N/A 12/12/2016    Procedure: COLONOSCOPY;  Surgeon: Radha Moss MD;  Location: [facility] GI LAB;  Service:     COLONOSCOPY N/A 04/03/2017    Procedure:  COLONOSCOPY;  Surgeon: Radha Moss MD;  Location: [facility] GI LAB;  Service:     HARDWARE REMOVAL Right 04/04/2022    Procedure: REMOVAL HARDWARE HIP;  Surgeon: Moises Kent MD;  Location: [facility];  Service: Orthopedics    IR AV FISTULA/GRAFT DECLOT  04/29/2021    IR AV FISTULA/GRAFT DECLOT  03/25/2022    IR AV FISTULA/GRAFT DECLOT  09/21/2022    IR AV FISTULAGRAM/GRAFTOGRAM  03/28/2022    IR AV FISTULAGRAM/GRAFTOGRAM  07/28/2022    IR TUNNELED CENTRAL LINE REMOVAL  12/07/2021    IR TUNNELED DIALYSIS CATHETER PLACEMENT  05/24/2021    PORTACATH PLACEMENT      per pt \"port in chest\"    CO ARTERIOVENOUS ANASTOMOSIS OPEN DIRECT Left 07/22/2021    Procedure: CREATION FISTULA " ARTERIOVENOUS (AV);  Surgeon: Kareem Nye MD;  Location: [facility];  Service: Vascular    [place] ARTERIOVENOUS ANASTOMOSIS OPEN DIRECT Left 3/21/2023    Procedure: left brachiobasilic fistula,  AVG Graft;  Surgeon: Catalino Hook MD;  [facility]: [facility];  Service: Vascular    WY ARTERIOVENOUS ANASTOMOSIS OPEN DIRECT Left 5/19/2023    Procedure: CREATION of LEFT FISTULA ARTERIOVENOUS (AV) GRAFT;  Surgeon: Catalino Hook MD;  [facility];  Service: Vascular    WY HEMIARTHROPLASTY HIP PARTIAL Right 04/04/2022    Procedure: HEMIARTHROPLASTY HIP (BIPOLAR);  Surgeon: Moises Kent MD;  [facility]: [facility];  Service: Orthopedics     Social History   Social History     Substance and Sexual Activity   Alcohol Use Yes    Alcohol/week: 1.0 standard drink of alcohol    Types: 1 Shots of liquor per week    Comment: 1 shot daily     Social History     Substance and Sexual Activity   Drug Use Yes    Types: Marijuana    Comment: gummies     Social History     Tobacco Use   Smoking Status Every Day    Current packs/day: 1.00    Average packs/day: 1 pack/day for 30.0 years (30.0 ttl pk-yrs)    Types: Cigarettes   Smokeless Tobacco Never   Tobacco Comments    Currently not smoking - in facility     Family History   Problem Relation Age of Onset    Leukemia Mother     Crohn's disease Father     Heart disease Father     Transient ischemic attack Brother        Meds/Allergies       Current Outpatient Medications:     acetaminophen (TYLENOL) 325 mg tablet    ammonium lactate (LAC-HYDRIN) 12 % lotion    ascorbic acid (VITAMIN C) 500 mg tablet    aspirin (ECOTRIN LOW STRENGTH) 81 mg EC tablet    atorvastatin (LIPITOR) 20 mg tablet    Cholecalciferol 50 MCG (2000 UT) CAPS    docusate sodium (COLACE) 100 mg capsule    gabapentin (NEURONTIN) 100 mg capsule    midodrine (PROAMATINE) 10 MG tablet    ondansetron (ZOFRAN-ODT) 8 mg disintegrating tablet    oxyCODONE (ROXICODONE) 5 immediate release tablet     "pantoprazole (PROTONIX) 40 mg tablet    vitamin B-12 (VITAMIN B-12) 1,000 mcg tablet    bacitracin topical ointment 500 units/g topical ointment    bisacodyl (DULCOLAX) 10 mg suppository    [place] Choice Comfort EZ 33G X 4 MM MISC    clopidogrel (PLAVIX) 75 mg tablet    clotrimazole-betamethasone (LOTRISONE) 1-0.05 % cream    glucagon 1 MG injection    hydrOXYzine HCL (ATARAX) 25 mg tablet    polyethylene glycol (MIRALAX) 17 g packet    No Known Allergies        Objective     Blood pressure 107/56, pulse 64, height 5' 10\" (1.778 m), weight 55.8 kg (123 lb). Body mass index is 17.65 kg/m².      PHYSICAL EXAM:      General Appearance:   Alert, cooperative, no distress   HEENT:   Normocephalic, atraumatic, anicteric.     Neck:  Supple, symmetrical, trachea midline   Lungs:   Clear to auscultation bilaterally; no rales, rhonchi or wheezing; respirations unlabored    Heart::   Regular rate and rhythm; no murmur, rub, or gallop.   Abdomen:   Soft, non-tender, non-distended; normal bowel sounds; no masses, no organomegaly    Genitalia:   Deferred    Rectal:   Deferred    Extremities:  No cyanosis, clubbing or edema    Pulses:  2+ and symmetric    Skin:  No jaundice, rashes, or lesions    Lymph nodes:  No palpable cervical lymphadenopathy        Lab Results:   No visits with results within 1 Day(s) from this visit.   Latest known visit with results is:   Admission on 12/28/2023, Discharged on 12/28/2023   Component Date Value    WBC 12/28/2023 7.57     RBC 12/28/2023 3.65 (L)     Hemoglobin 12/28/2023 11.6 (L)     Hematocrit 12/28/2023 37.9     MCV 12/28/2023 104 (H)     MCH 12/28/2023 31.8     MCHC 12/28/2023 30.6 (L)     RDW 12/28/2023 14.0     MPV 12/28/2023 10.2     Platelets 12/28/2023 102 (L)     nRBC 12/28/2023 0     Neutrophils Relative 12/28/2023 87 (H)     Immat GRANS % 12/28/2023 0     Lymphocytes Relative 12/28/2023 7 (L)     Monocytes Relative 12/28/2023 5     Eosinophils Relative 12/28/2023 0     Basophils " Relative 12/28/2023 1     Neutrophils Absolute 12/28/2023 6.60     Immature Grans Absolute 12/28/2023 0.02     Lymphocytes Absolute 12/28/2023 0.49 (L)     Monocytes Absolute 12/28/2023 0.38     Eosinophils Absolute 12/28/2023 0.02     Basophils Absolute 12/28/2023 0.06     Protime 12/28/2023 14.5     INR 12/28/2023 1.11     PTT 12/28/2023 28     Sodium 12/28/2023 133 (L)     Potassium 12/28/2023 4.4     Chloride 12/28/2023 94 (L)     CO2 12/28/2023 25     ANION GAP 12/28/2023 14     BUN 12/28/2023 41 (H)     Creatinine 12/28/2023 7.37 (H)     Glucose 12/28/2023 107     Calcium 12/28/2023 8.6     Corrected Calcium 12/28/2023 9.4     AST 12/28/2023 15     ALT 12/28/2023 6 (L)     Alkaline Phosphatase 12/28/2023 88     Total Protein 12/28/2023 6.4     Albumin 12/28/2023 3.0 (L)     Total Bilirubin 12/28/2023 0.34     eGFR 12/28/2023 6     Lipase 12/28/2023 <6 (L)     SARS-CoV-2 12/28/2023 Negative     INFLUENZA A PCR 12/28/2023 Negative     INFLUENZA B PCR 12/28/2023 Negative     RSV PCR 12/28/2023 Negative     hs TnI 0hr 12/28/2023 18     hs TnI 2hr 12/28/2023 15     Delta 2hr hsTnI 12/28/2023 -3          Radiology Results:   XR abdomen obstruction series    Result Date: 12/29/2023  Narrative: OBSTRUCTION SERIES INDICATION:   Constipation, vomiting. COMPARISON: Obstruction series 10/4/2023, CT abdomen pelvis 10/3/2023 EXAM PERFORMED/VIEWS:  XR ABDOMEN OBSTRUCTION SERIES  Supine and erect views were performed. Images: 4 FINDINGS: There is a nonobstructive bowel gas pattern. Mild scattered colonic stool. No free air beneath the hemidiaphragms. No soft tissue masses evident. Vascular calcifications. Calcification overlying the left renal shadow could represent vascular calcification or calculus. Examination of the chest reveals a normal cardiomediastinal silhouette. Lungs are clear. Blunting of the bilateral costophrenic angles may represent trace effusions and/or pleural thickening. Right subclavian central line tip  terminates along the right atrium. Surgical clips left axilla. Degenerative change of the spine with moderately advanced right glenohumeral joint osteoarthritis. Right hip hemiarthroplasty. Mild left hip osteoarthritis. Old right rib fracture.     Impression: Nonobstructive bowel gas pattern. Mild stool burden. Small bilateral pleural effusions. Workstation performed: [id number]

## 2024-01-11 NOTE — PATIENT INSTRUCTIONS
Miralax1 capful in 8 ounces 4 to 8 ounces of noncarbonated beverage may adjust for bowel movements  Start senna 2 tablets daily at bedtime

## 2024-01-12 NOTE — TELEPHONE ENCOUNTER
Spoke with Jy[facility], pt moved to  [facility] due to being on dialysis and in a wheelchair.

## 2024-01-15 ENCOUNTER — TELEPHONE (OUTPATIENT)
Dept: GASTROENTEROLOGY | Facility: CLINIC | Age: 69
End: 2024-01-15

## 2024-01-15 NOTE — TELEPHONE ENCOUNTER
----- Message from TESSY Ramirez sent at 1/11/2024  4:41 PM EST -----  Regarding: EUS.  10 previously had abnormal MRI which showed intra and extrahepatic biliary dilation with no choledocholithiasis.  Dr. Cagle wanted patient to have an EUS done but at time of order patient was at a rehab facility.  Please call and schedule for EUS.  Patient is currently being scheduled for an EGD due to continuous vomiting for the last 4 months and unintentional weight loss.  Patient does have a history of thrombocytopenia last platelets were 102.  Patient also has a history of elevated LFTs per previous workup was negative for underlying liver disease. Thank you

## 2024-01-16 NOTE — TELEPHONE ENCOUNTER
Called Dr. Figueroa's office. They will see if he can come in tomorrow to see their NP to be cleared for procedure.

## 2024-01-17 ENCOUNTER — OFFICE VISIT (OUTPATIENT)
Dept: CARDIOLOGY CLINIC | Facility: CLINIC | Age: 69
End: 2024-01-17
Payer: MEDICARE

## 2024-01-17 VITALS
SYSTOLIC BLOOD PRESSURE: 100 MMHG | BODY MASS INDEX: 17.61 KG/M2 | HEART RATE: 62 BPM | OXYGEN SATURATION: 90 % | HEIGHT: 70 IN | WEIGHT: 123 LBS | DIASTOLIC BLOOD PRESSURE: 60 MMHG

## 2024-01-17 DIAGNOSIS — I25.118 CORONARY ARTERY DISEASE OF NATIVE ARTERY OF NATIVE HEART WITH STABLE ANGINA PECTORIS (HCC): ICD-10-CM

## 2024-01-17 DIAGNOSIS — Z01.810 ENCOUNTER FOR PRE-OPERATIVE CARDIOVASCULAR CLEARANCE: Primary | ICD-10-CM

## 2024-01-17 PROCEDURE — 93000 ELECTROCARDIOGRAM COMPLETE: CPT | Performed by: PHYSICIAN ASSISTANT

## 2024-01-17 PROCEDURE — 99214 OFFICE O/P EST MOD 30 MIN: CPT | Performed by: PHYSICIAN ASSISTANT

## 2024-01-17 RX ORDER — OMEPRAZOLE 40 MG/1
40 CAPSULE, DELAYED RELEASE ORAL DAILY
COMMUNITY
Start: 2023-11-15

## 2024-01-17 RX ORDER — LACTULOSE 10 G/15ML
SOLUTION ORAL
COMMUNITY
Start: 2024-01-12

## 2024-01-17 RX ORDER — GABAPENTIN 300 MG/1
CAPSULE ORAL
COMMUNITY
Start: 2024-01-11

## 2024-01-17 RX ORDER — ASCORBIC ACID 500 MG
500 TABLET ORAL DAILY
COMMUNITY
Start: 2023-10-05

## 2024-01-17 NOTE — PROGRESS NOTES
Progress Note - Cardiology Office  [facility]    Jeffery Bishop 68 y.o. male MRN: [id number]  : 1955  Encounter: [id number]      Assessment:     Preoperative cardiac risk stratification.  Coronary artery disease.  Bradycardia.  Hypotension.  Hyperlipidemia.  Type II diabetes.  ESRD on HD.  CVA.  Right vertebral artery thrombus.  Tobacco abuse.      Discussion Summary and Plan:    Preoperative cardiac risk stratification.  - Patient presents today for preoperative cardiac risk stratification for EGD planned for 2023 for unintentional weight loss over the past 4 months with decreased oral intake and daily vomiting.   - Patient is considered low risk for planned EGD.  - No contraindication from cardiology standpoint to proceed with EGD.  No further cardiac testing indicated at this time.    Coronary artery disease.  - s/p PTCA with bare metal stent to circumflex, PDA and POBA of diagnonal in 2018.   - NSTEMI s/p PCI with KARLOS to distal RCA in 2011.   - s/p PCI with KARLOS x 2 to mid and proximal RCA and KARLOS to RCA ostium in 2023.   - 23 cardiac catheterization: Mid LAD lesion is 50% stenosed. Ost LAD lesion is 50% stenosed. Ost RCA lesion is 70% stenosed. Mid RCA lesion is 90% stenosed. Prox RCA lesion is 70% stenosed. Borderline stenoses of the proximal and mid LAD were assessed by DFR and FFR, and were negative for evidence of flow-limitation (DFR=0.90; FFR= 0.84).  There were 70% ostial, 70% proximal, and 90% mid RCA stenoses.  IVUS interrogation disclose no evidence of significant calcification.  Following pre-dilation, overlapping 4.0x38mm and 4.0x28mm drug-eluting stents were placed in the mid and proximal RCA, and a non-overlapping 4.0x12mm KARLOS was placed in the RCA ostium.  There was 0% residual stenosis and ZEFERINO III flow at all sites. Plan: ASA, clopidogrel, statin therapy, follow-up with Dr. Gregorio.   - Stable. Patient denies experiencing chest pain.  - Continue  aspirin 81 mg daily and Lipitor 20 mg daily.  - Patient documented not taking Plavix 75 mg daily, documented since 1/11/2024.  Patient states that he is not aware of which medications he is taking.  - Not currently on beta-blocker therapy secondary to history of bradycardia.    Bradycardia.  - 1/17/24 EKG: Sinus rhythm, 62 bpm.  RBBB.  - No evidence of bradycardia during today's office visit.  - Patient was last seen in outpatient cardiology office on 4/10/23.  Since last office visit patient was hospitalized in September 2023 for generalized weakness.  During that hospitalization cardiology was consulted due to concern for bradycardia.  He was recommended patient have outpatient 2-week Holter monitor.  It does not appear patient had that test completed.  - Not currently on any AV silas blocker therapy.    Hypotension.  - BP during today's office visit, 100/60.   - Currently on midodrine 10 mg twice daily.   - 5/22/23 TTE: LVEF 65%. Systolic function is normal.  Wall motion is normal. Diastolic function is mildly abnormal, consistent with grade I (abnormal) relaxation.  There is  mid-cavity dynamic obstruction at rest with a peak gradient of 13.0 mmHg. There is mid-cavity dynamic obstruction with valsalva with a peak gradient of 30.0 mmHg.  Left atrium moderately dilated.  Tricuspid valve trace regurgitation.  Pulmonic valve with trace regurgitation.    Hyperlipidemia.  - Continue Lipitor 20 mg daily.  - 2/09/23 lipid panel: Cholesterol 129, triglycerides 103, HDL 57, LDL 51.    Type II diabetes.  - 1/05/23 HgbA1c: 4.8.   - Care per PCP.     ESRD on HD.  - HD access via permacath.    CVA.  - CVA in 2006.   - Currently on aspirin 81 mg daily and Lipitor 20 mg daily.  - Care per PCP.     Right vertebral artery thrombus.   - Documented history of right vertebral artery thrombus noted on 2006 MRI brain.     Tobacco abuse.  - Patient is actively smoking, approximately 0.5 pack/day.  - Discussed with patient  recommendations for complete smoking cessation.      Patient / Caretaker was advised and educated to call our office  immediately if  patient has any new symptoms of chest pain/shortness of breath, near-syncope, syncope, light headedness sustained palpitations  or any other cardiovascular symptoms before their scheduled follow-up appointment.  Office number was provided #[phone].  Please call [phone] if any questions.  Counseling :  A description of the counseling.  Goals and Barriers.  Patient's ability to self care: Yes  Medication side effect reviewed with patient in detail and all their questions answered to their satisfaction.    HPI :     Jeffery Bishop is a 68 y.o. male with PMHx of CAD s/p PTCA with bare metal stent to circumflex, PDA and POBA of diagnonal (4/2018), NSTEMI s/p PCI with KARLOS to distal RCA (2/2011), s/p PCI with KARLOS x 2 to mid and proximal RCA and KARLOS to RCA ostium (2/2023), hypotension, HLD, DMII, ESRD on HD, CVA (2006), right vertebral artery thrombus (2006), who presents for routine outpatient cardiology follow-up preoperative cardiac risk stratification for EGD.     He was last seen in outpatient cardiology office on 4/10/23.  Since last office visit patient was hospitalized in September 2023 for generalized weakness.  During that hospitalization cardiology was consulted due to concern for bradycardia.  He was recommended patient have outpatient 2-week Holter monitor.  It does not appear patient had that test completed.      Patient presents today for preoperative cardiac risk stratification for EGD planned for 1/24/2023 for unintentional weight loss over the past 4 months with decreased oral intake and daily vomiting.  Patient states he is mostly wheelchair-bound.  He denies experiencing chest pain, palpitations, shortness of breath at rest or with exertion, lower extremity edema, orthopnea, lightheadedness, dizziness, syncope, or headache.    Review of Systems    Constitutional:  Positive for appetite change and unexpected weight change. Negative for activity change, chills, diaphoresis, fatigue and fever.   Respiratory:  Negative for cough, chest tightness, shortness of breath and wheezing.    Cardiovascular:  Negative for chest pain, palpitations and leg swelling.   Gastrointestinal:  Positive for nausea and vomiting. Negative for abdominal distention, constipation and diarrhea.   Skin: Negative.    Neurological:  Negative for dizziness, syncope, weakness, light-headedness, numbness and headaches.       Historical Information   Past Medical History:   Diagnosis Date    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    With Cerebral Infarction:  Last Assessed:  October 20, 2016    Chronic kidney disease 2012    on dialysis     COVID 05/2022    COVID-19 04/2022 4/2022-while in [place]    Diabetes mellitus ([facility])     Dialysis patient ([facility])     T/TH/Sat    Difficulty walking     GERD (gastroesophageal reflux disease)     Hyperlipidemia     Hypertension     Labyrinthitis 09/10/2011    Myocardial infarction ([facility]) 2014    x3 others were in 2009 & 2010    Sleep disturbances 09/20/2010    Stroke ([facility]) 2007    x1, RLE deficit- uses walker    Type 2 diabetes, uncontrolled, with renal manifestation 05/03/2017     Past Surgical History:   Procedure Laterality Date    AV FISTULA PLACEMENT      AV FISTULA REPAIR Left 6/3/2023    Procedure: LEFT UPPER EXTREMITY A-V GRAFT EXPLANT, LEFT BRACHIAL ANGIOPATCH,  APPLICATION OF  VAC;  Surgeon: Neyda Rider MD;  Location:  MAIN OR;  Service: Vascular    CARDIAC CATHETERIZATION Left 02/03/2023    Procedure: Cardiac Left Heart Cath;  Surgeon: Tiffani Gregorio MD;  Location: [facility] CARDIAC C[facility] LAB;  Service: Cardiology    CARDIAC CATHETERIZATION N/A 02/08/2023    Procedure: Cardiac catheterization with complex PCI with Dr. Patricio, patient is a renal dialysis patient;  Surgeon: Marcos Ramirez MD;  Location:  CARDIAC CATH LAB;  Service: Cardiology    CARDIAC SURGERY   "2010    stents x3    COLONOSCOPY N/A 12/12/2016    Procedure: COLONOSCOPY;  Surgeon: Radha Moss MD;  Location: [facility] GI LAB;  Service:     COLONOSCOPY N/A 04/03/2017    Procedure:  COLONOSCOPY;  Surgeon: Radha Moss MD;  Location: [facility] GI LAB;  Service:     HARDWARE REMOVAL Right 04/04/2022    Procedure: REMOVAL HARDWARE HIP;  Surgeon: Moises Kent MD;  Location:  MAIN OR;  Service: Orthopedics    IR AV FISTULA/GRAFT DECLOT  04/29/2021    IR AV FISTULA/GRAFT DECLOT  03/25/2022    IR AV FISTULA/GRAFT DECLOT  09/21/2022    IR AV FISTULAGRAM/GRAFTOGRAM  03/28/2022    IR AV FISTULAGRAM/GRAFTOGRAM  07/28/2022    IR TUNNELED CENTRAL LINE REMOVAL  12/07/2021    IR TUNNELED DIALYSIS CATHETER PLACEMENT  05/24/2021    PORTACATH PLACEMENT      per pt \"port in chest\"    DE ARTERIOVENOUS ANASTOMOSIS OPEN DIRECT Left 07/22/2021    Procedure: CREATION FISTULA ARTERIOVENOUS (AV);  Surgeon: Kareem Nye MD;  Location: [facility] MAIN OR;  Service: Vascular    DE ARTERIOVENOUS ANASTOMOSIS OPEN DIRECT Left 3/21/2023    Procedure: left brachiobasilic fistula,  AVG Graft;  Surgeon: Catalino Hook MD;  Location:  MAIN OR;  Service: Vascular    DE ARTERIOVENOUS ANASTOMOSIS OPEN DIRECT Left 5/19/2023    Procedure: CREATION of LEFT FISTULA ARTERIOVENOUS (AV) GRAFT;  Surgeon: Catalino Hook MD;  Location:  MAIN OR;  Service: Vascular    DE HEMIARTHROPLASTY HIP PARTIAL Right 04/04/2022    Procedure: HEMIARTHROPLASTY HIP (BIPOLAR);  Surgeon: Moises Kent MD;  Location:  MAIN OR;  Service: Orthopedics     Social History     Substance and Sexual Activity   Alcohol Use Yes    Alcohol/week: 1.0 standard drink of alcohol    Types: 1 Shots of liquor per week    Comment: 1 shot daily     Social History     Substance and Sexual Activity   Drug Use Yes    Types: Marijuana    Comment: gummies     Social History     Tobacco Use   Smoking Status Every Day    Current packs/day: 1.00    Average packs/day: 1 " pack/day for 30.0 years (30.0 ttl pk-yrs)    Types: Cigarettes   Smokeless Tobacco Never   Tobacco Comments    Currently not smoking - in facility     Family History:   Family History   Problem Relation Age of Onset    Leukemia Mother     Crohn's disease Father     Heart disease Father     Transient ischemic attack Brother        Meds/Allergies     No Known Allergies    Current Outpatient Medications:     acetaminophen (TYLENOL) 325 mg tablet, Take 3 tablets (975 mg total) by mouth every 8 (eight) hours as needed for mild pain, Disp: , Rfl: 0    ammonium lactate (LAC-HYDRIN) 12 % lotion, Apply topically 2 (two) times a day as needed for dry skin, Disp: 396 g, Rfl: 0    ascorbic acid (VITAMIN C) 500 mg tablet, TAKE ONE TABLET BY MOUTH EVERY EVENING FOR SUPPLEMENT, Disp: , Rfl:     ascorbic acid (VITAMIN C) 500 MG tablet, Take 500 mg by mouth daily, Disp: , Rfl:     aspirin (ECOTRIN LOW STRENGTH) 81 mg EC tablet, Take 81 mg by mouth daily, Disp: , Rfl:     atorvastatin (LIPITOR) 20 mg tablet, Take 20 mg by mouth daily at bedtime, Disp: , Rfl:     Cholecalciferol 50 MCG (2000 UT) CAPS, Take by mouth daily, Disp: , Rfl:     gabapentin (NEURONTIN) 100 mg capsule, Take 1 capsule (100 mg total) by mouth daily at bedtime, Disp: 30 capsule, Rfl: 3    gabapentin (NEURONTIN) 300 mg capsule, TAKE ONE CAPSULE BY MOUTH EVERY NIGHT AT BEDTIME FOR 1 WEEK THEN TWICE DAILY FOR 1 WEEK THEN 1 CAPSULE THREE TIMES DAILY, Disp: , Rfl:     lactulose (CHRONULAC) 10 g/15 mL solution, TAKE 15 ML BY MOUTH THREE TIMES DAILY, Disp: , Rfl:     omeprazole (PriLOSEC) 40 MG capsule, Take 40 mg by mouth daily Take before a meal, Disp: , Rfl:     ondansetron (ZOFRAN-ODT) 8 mg disintegrating tablet, Take 1 tablet by mouth, Disp: , Rfl:     oxyCODONE (ROXICODONE) 5 immediate release tablet, Take 0.5 tablets (2.5 mg total) by mouth every 8 (eight) hours as needed for severe pain for up to 10 doses Max Daily Amount: 7.5 mg, Disp: 5 tablet, Rfl: 0     "pantoprazole (PROTONIX) 40 mg tablet, Take 1 tablet (40 mg total) by mouth daily Take prior to breakfast, Disp: 30 tablet, Rfl: 3    vitamin B-12 (VITAMIN B-12) 1,000 mcg tablet, Take 1,000 mcg by mouth daily, Disp: , Rfl:     bacitracin topical ointment 500 units/g topical ointment, Apply 1 large application topically 2 (two) times a day (Patient not taking: Reported on 1/11/2024), Disp: 28 g, Rfl: 0    bisacodyl (DULCOLAX) 10 mg suppository, Insert into the rectum (Patient not taking: Reported on 1/11/2024), Disp: , Rfl:     [place] Choice Comfort EZ 33G X 4 MM MISC, , Disp: , Rfl:     clopidogrel (PLAVIX) 75 mg tablet, Take 75 mg by mouth daily (Patient not taking: Reported on 11/13/2023), Disp: , Rfl:     clotrimazole-betamethasone (LOTRISONE) 1-0.05 % cream, Apply topically in the morning (Patient not taking: Reported on 1/11/2024), Disp: 45 g, Rfl: 2    docusate sodium (COLACE) 100 mg capsule, Take 1 capsule (100 mg total) by mouth daily for 14 days, Disp: 14 capsule, Rfl: 0    glucagon 1 MG injection, Inject 1 mg into a muscle (Patient not taking: Reported on 11/13/2023), Disp: , Rfl:     hydrOXYzine HCL (ATARAX) 25 mg tablet, TAKE 1 TABLET BY MOUTH EVERY 8 HOURS AS NEEDED FOR ITCHING (Patient not taking: Reported on 10/10/2023), Disp: , Rfl:     midodrine (PROAMATINE) 10 MG tablet, Take 1 tablet (10 mg total) by mouth 3 (three) times a day before meals (Patient taking differently: Take 10 mg by mouth 3 (three) times a day before meals Takes BID), Disp: 90 tablet, Rfl: 0    polyethylene glycol (MIRALAX) 17 g packet, Take 17 g by mouth daily for 7 days, Disp: 119 g, Rfl: 0    Vitals: Blood pressure 100/60, pulse 62, height 5' 10\" (1.778 m), weight 55.8 kg (123 lb), SpO2 90%.    Body mass index is 17.65 kg/m².  Wt Readings from Last 3 Encounters:   01/17/24 55.8 kg (123 lb)   01/11/24 55.8 kg (123 lb)   12/28/23 59 kg (130 lb)     Vitals:    01/17/24 1329   Weight: 55.8 kg (123 lb)     BP Readings from Last 3 " Encounters:   24 100/60   24 107/56   23 120/59       Physical Exam:  Physical Exam  Vitals reviewed.   Constitutional:       General: He is not in acute distress.     Appearance: He is ill-appearing.   Cardiovascular:      Rate and Rhythm: Normal rate and regular rhythm.      Pulses: Normal pulses.      Heart sounds: Murmur heard.   Pulmonary:      Effort: Pulmonary effort is normal. No respiratory distress.      Breath sounds: Normal breath sounds.   Abdominal:      General: Abdomen is flat. There is no distension.      Palpations: Abdomen is soft.      Tenderness: There is no abdominal tenderness.   Musculoskeletal:      Right lower leg: No edema.      Left lower leg: No edema.   Skin:     General: Skin is warm and dry.   Neurological:      Mental Status: He is alert and oriented to person, place, and time.       Diagnostic Studies Review Cardio:      EK/17/24 EKG: Sinus rhythm, 62 bpm.  RBBB.    Cardiac testing:   Echo complete   Result date: 23    Left Ventricle Left ventricular cavity size is normal. Wall thickness is severely increased. There is mild concentric hypertrophy. The left ventricular ejection fraction is 65%. Systolic function is normal.  Wall motion is normal. Diastolic function is mildly abnormal, consistent with grade I (abnormal) relaxation.  There is  mid-cavity dynamic obstruction at rest with a peak gradient of 13.0 mmHg. There is mid-cavity dynamic obstruction with valsalva with a peak gradient of 30.0 mmHg.   Right Ventricle Right ventricular cavity size is normal. Systolic function is normal. Wall thickness is increased (9mm).   Left Atrium The atrium is moderately dilated (42-48 mL/m2).   Right Atrium The atrium is normal in size.   Aortic Valve The aortic valve is trileaflet. The leaflets are mildly thickened. The leaflets are mildly calcified. There is no evidence of regurgitation. The aortic valve has no significant stenosis.   Mitral Valve Mitral valve  structure is normal. There is no evidence of regurgitation. There is no evidence of stenosis.   Tricuspid Valve The leaflets are not thickened. There is trace regurgitation. There is no evidence of stenosis.   Pulmonic Valve The leaflets are not thickened. There is trace regurgitation. There is no evidence of stenosis.   Ascending Aorta The aortic root is normal in size. The ascending aorta is normal in size.   Pericardium There is no pericardial effusion.             Results for orders placed during the hospital encounter of 21    NM myocardial perfusion spect (rx stress and/or rest)    [address] [address]  [phone]    Rest/Stress Gated SPECT Myocardial Perfusion Imaging After Regadenoson    Patient: KETURAH VERDUZCO  MR number: [id number]  Account number: [id number]  : 1955  Age: 66 years  Gender: Male  Status: Outpatient  Location: Stress lab  Height: 70 in  Weight: 199 lb  BP: 164/ 79 mmHg    Allergies: NO KNOWN ALLERGIES    Diagnosis: R07.9 - Chest pain, unspecified, Z01.810 - Encounter for preprocedural cardiovascular examination    Primary Physician:  TESSY Bernabe  RN:  PAULINE Soliz  Referring Physician:  Arie Figueroa MD  Group:  ELENI Harper  Report Prepared By::  PAULINE Soliz  Interpreting Physician:  Arie Figueroa MD    INDICATIONS: Evaluation of known coronary artery disease. Pre-operative risk assessment.    HISTORY: The patient is male. a 66 year old Chest pain status: chest pain. Coronary artery disease risk factors: dyslipidemia, hypertension, smoking, family history of premature coronary artery disease, and diabetes mellitus. Medications:  a blood thinner, a calcium channel blocker, an ACE inhibitor/ARB, aspirin, clopidogrel, a lipid lowering agent, and diabetic medications.    PHYSICAL EXAM: Baseline physical exam screening: scant wheezing audible.    REST ECG: Normal sinus rhythm. The ECG showed  rare premature ventricular contractions.    PROCEDURE: The study was performed in the the Stress lab. A regadenoson infusion pharmacologic stress test was performed. Gated SPECT myocardial perfusion imaging was performed after stress and at rest. Systolic blood pressure was 164  mmHg, at the start of the study. Diastolic blood pressure was 79 mmHg, at the start of the study. The heart rate was 76 bpm, at the start of the study. IV double checked.  Regadenoson protocol:  Time HR bpm SBP mmHg DBP mmHg Symptoms Rhythm/conduct  Baseline 09:12 76 164 79 none NSR, rare PVC's  Immediate 09:15 91 149 75 none same as above  1 min 09:16 88 151 75 none NSR  2 min 09:17 85 146 63 none same as above  3 min 09:18 81 155 70 none same as above  4 min 09:19 87 140 68 none same as above  No medications or fluids given.    STRESS SUMMARY: Duration of pharmacologic stress was 3 min. Maximal heart rate during stress was 93 bpm. The heart rate response to stress was normal. There was resting hypertension with an appropriate blood pressure response to stress.  The rate-pressure product for the peak heart rate and blood pressure was 43222. There was no chest pain during stress. The stress test was terminated due to protocol completion. Pre oxygen saturation: 98 %. Peak oxygen saturation: 99 %. No  EKG evidence of Lexiscan induced myocardial ischemia    ISOTOPE ADMINISTRATION:  Resting isotope administration Stress isotope administration  Agent Tetrofosmin Tetrofosmin  Dose 10.2 mCi 32.6 mCi  Date 06/03/2021 06/03/2021    Stress isotope administration  Agent Tetrofosmin  Dose 10.2 mCi  Date 06/03/2021    The radiopharmaceutical was injected at the peak effect of pharmacologic stress.    MYOCARDIAL PERFUSION IMAGING:  There is a small sized mild intensity reversible perfusion defect noted in the inferior and lateral walls  SSS 5, SRS 2, SDS 3    GATED SPECT:  Estimated left ventricular ejection fraction is 44%  TID ratio 1.04    SUMMARY:  -   "Stress results: There was resting hypertension with an appropriate blood pressure response to stress. There was no chest pain during stress.    IMPRESSIONS: Abnormal Lexiscan nuclear stress test  There is a small area of reversible ischemia noted in the inferior and lateral walls  Estimated left ventricular ejection fraction is 44%  TID ratio is 1.04    Prepared and signed by    Arie Figueroa MD  Signed 06/03/2021 13:59:02        Imaging:  Chest X-Ray:   No Chest XR results available for this patient.    CT-scan of the chest:     No CTA results available for this patient.  Lab Review   Lab Results   Component Value Date    WBC 7.57 12/28/2023    HGB 11.6 (L) 12/28/2023    HCT 37.9 12/28/2023     (H) 12/28/2023    RDW 14.0 12/28/2023     (L) 12/28/2023     BMP:  Lab Results   Component Value Date    SODIUM 133 (L) 12/28/2023    K 4.4 12/28/2023    CL 94 (L) 12/28/2023    CO2 25 12/28/2023    BUN 41 (H) 12/28/2023    CREATININE 7.37 (H) 12/28/2023    GLUC 107 12/28/2023    GLUF 93 09/13/2023    CALCIUM 8.6 12/28/2023    CORRECTEDCA 9.4 12/28/2023    EGFR 6 12/28/2023    MG 2.0 10/04/2023     Troponins:    LFT:  Lab Results   Component Value Date    AST 15 12/28/2023    ALT 6 (L) 12/28/2023    ALKPHOS 88 12/28/2023    TP 6.4 12/28/2023    ALB 3.0 (L) 12/28/2023      No components found for: \"TSH3\"  Lab Results   Component Value Date    KHK1PRJHKOSA 2.992 09/25/2023     Lab Results   Component Value Date    HGBA1C 4.8 01/05/2023     Lipid Profile:   Lab Results   Component Value Date    CHOLESTEROL 129 02/09/2023    HDL 57 02/09/2023    LDLCALC 51 02/09/2023    TRIG 103 02/09/2023     Lab Results   Component Value Date    CHOLESTEROL 129 02/09/2023    CHOLESTEROL 123 10/25/2016     Lab Results   Component Value Date    TROPONINI 0.08 (H) 07/26/2021     Lab Results   Component Value Date    [facility] [address] (H) 03/28/2022      Recent Results (from the past 672 hour(s))   CBC and differential    Collection Time: " "12/28/23  2:51 PM   Result Value Ref Range    WBC 7.57 4.31 - 10.16 Thousand/uL    RBC 3.65 (L) 3.88 - 5.62 Million/uL    Hemoglobin 11.6 (L) 12.0 - 17.0 g/dL    Hematocrit 37.9 36.5 - 49.3 %     (H) 82 - 98 fL    MCH 31.8 26.8 - 34.3 pg    MCHC 30.6 (L) 31.4 - 37.4 g/dL    RDW 14.0 11.6 - 15.1 %    MPV 10.2 8.9 - 12.7 fL    Platelets 102 (L) 149 - 390 Thousands/uL    nRBC 0 /100 WBCs    Neutrophils Relative 87 (H) 43 - 75 %    Immat GRANS % 0 0 - 2 %    Lymphocytes Relative 7 (L) 14 - 44 %    Monocytes Relative 5 4 - 12 %    Eosinophils Relative 0 0 - 6 %    Basophils Relative 1 0 - 1 %    Neutrophils Absolute 6.60 1.85 - 7.62 Thousands/µL    Immature Grans Absolute 0.02 0.00 - 0.20 Thousand/uL    Lymphocytes Absolute 0.49 (L) 0.60 - 4.47 Thousands/µL    Monocytes Absolute 0.38 0.17 - 1.22 Thousand/µL    Eosinophils Absolute 0.02 0.00 - 0.61 Thousand/µL    Basophils Absolute 0.06 0.00 - 0.10 Thousands/µL   Comprehensive metabolic panel    Collection Time: 12/28/23  2:51 PM   Result Value Ref Range    Sodium 133 (L) 135 - 147 mmol/L    Potassium 4.4 3.5 - 5.3 mmol/L    Chloride 94 (L) 96 - 108 mmol/L    CO2 25 21 - 32 mmol/L    ANION GAP 14 mmol/L    BUN 41 (H) 5 - 25 mg/dL    Creatinine 7.37 (H) 0.60 - 1.30 mg/dL    Glucose 107 65 - 140 mg/dL    Calcium 8.6 8.4 - 10.2 mg/dL    Corrected Calcium 9.4 8.3 - 10.1 mg/dL    AST 15 13 - 39 U/L    ALT 6 (L) 7 - 52 U/L    Alkaline Phosphatase 88 34 - 104 U/L    Total Protein 6.4 6.4 - 8.4 g/dL    Albumin 3.0 (L) 3.5 - 5.0 g/dL    Total Bilirubin 0.34 0.20 - 1.00 mg/dL    eGFR 6 ml/min/1.73sq m   Lipase    Collection Time: 12/28/23  2:51 PM   Result Value Ref Range    Lipase <6 (L) 11 - 82 u/L   HS Troponin 0hr (reflex protocol)    Collection Time: 12/28/23  2:51 PM   Result Value Ref Range    hs TnI 0hr 18 \"Refer to ACS Flowchart\"- see link ng/L   FLU/RSV/COVID - if FLU/RSV clinically relevant    Collection Time: 12/28/23  2:52 PM    Specimen: Nose; Nares   Result " "Value Ref Range    SARS-CoV-2 Negative Negative    INFLUENZA A PCR Negative Negative    INFLUENZA B PCR Negative Negative    RSV PCR Negative Negative   Protime-INR    Collection Time: 12/28/23  3:19 PM   Result Value Ref Range    Protime 14.5 11.6 - 14.5 seconds    INR 1.11 0.84 - 1.19   APTT    Collection Time: 12/28/23  3:19 PM   Result Value Ref Range    PTT 28 23 - 37 seconds   HS Troponin I 2hr    Collection Time: 12/28/23  4:51 PM   Result Value Ref Range    hs TnI 2hr 15 \"Refer to ACS Flowchart\"- see link ng/L    Delta 2hr hsTnI -3 <20 ng/L             Becky Diane PA-C   "

## 2024-01-17 NOTE — TELEPHONE ENCOUNTER
Scheduled date of EUS(as of today):4/19/24  Physician performing EUS:  Location of EUS: [facility]  Instructions reviewed with patient by: Em/danyell  Clearances: N/A

## 2024-01-18 NOTE — TELEPHONE ENCOUNTER
Clearance received from Dr. Figueroa. Pt is cleared from a cardiac standpoint. No contraindications.

## 2024-01-24 ENCOUNTER — ANESTHESIA (OUTPATIENT)
Dept: PERIOP | Facility: HOSPITAL | Age: 69
End: 2024-01-24

## 2024-01-24 ENCOUNTER — ANESTHESIA EVENT (OUTPATIENT)
Dept: PERIOP | Facility: HOSPITAL | Age: 69
End: 2024-01-24

## 2024-01-24 ENCOUNTER — HOSPITAL ENCOUNTER (OUTPATIENT)
Dept: PERIOP | Facility: HOSPITAL | Age: 69
Setting detail: OUTPATIENT SURGERY
Discharge: HOME | End: 2024-01-24
Payer: MEDICARE

## 2024-01-24 VITALS
HEART RATE: 53 BPM | RESPIRATION RATE: 18 BRPM | OXYGEN SATURATION: 93 % | DIASTOLIC BLOOD PRESSURE: 67 MMHG | TEMPERATURE: 98.4 F | SYSTOLIC BLOOD PRESSURE: 139 MMHG

## 2024-01-24 DIAGNOSIS — R11.11 VOMITING WITHOUT NAUSEA, UNSPECIFIED VOMITING TYPE: ICD-10-CM

## 2024-01-24 PROCEDURE — 88305 TISSUE EXAM BY PATHOLOGIST: CPT | Performed by: PATHOLOGY

## 2024-01-24 PROCEDURE — 43239 EGD BIOPSY SINGLE/MULTIPLE: CPT | Performed by: INTERNAL MEDICINE

## 2024-01-24 RX ORDER — LIDOCAINE HYDROCHLORIDE 10 MG/ML
INJECTION, SOLUTION EPIDURAL; INFILTRATION; INTRACAUDAL; PERINEURAL AS NEEDED
Status: DISCONTINUED | OUTPATIENT
Start: 2024-01-24 | End: 2024-01-24

## 2024-01-24 RX ORDER — PANTOPRAZOLE SODIUM 40 MG/1
40 TABLET, DELAYED RELEASE ORAL DAILY
Qty: 90 TABLET | Refills: 0 | Status: SHIPPED | OUTPATIENT
Start: 2024-01-24 | End: 2024-04-23

## 2024-01-24 RX ORDER — SODIUM CHLORIDE, SODIUM LACTATE, POTASSIUM CHLORIDE, CALCIUM CHLORIDE 600; 310; 30; 20 MG/100ML; MG/100ML; MG/100ML; MG/100ML
125 INJECTION, SOLUTION INTRAVENOUS CONTINUOUS
Status: CANCELLED | OUTPATIENT
Start: 2024-01-24

## 2024-01-24 RX ORDER — PROPOFOL 10 MG/ML
INJECTION, EMULSION INTRAVENOUS AS NEEDED
Status: DISCONTINUED | OUTPATIENT
Start: 2024-01-24 | End: 2024-01-24

## 2024-01-24 RX ORDER — SODIUM CHLORIDE, SODIUM LACTATE, POTASSIUM CHLORIDE, CALCIUM CHLORIDE 600; 310; 30; 20 MG/100ML; MG/100ML; MG/100ML; MG/100ML
INJECTION, SOLUTION INTRAVENOUS CONTINUOUS PRN
Status: DISCONTINUED | OUTPATIENT
Start: 2024-01-24 | End: 2024-01-24

## 2024-01-24 RX ADMIN — PROPOFOL 130 MG: 10 INJECTION, EMULSION INTRAVENOUS at 14:39

## 2024-01-24 RX ADMIN — PROPOFOL 20 MG: 10 INJECTION, EMULSION INTRAVENOUS at 14:42

## 2024-01-24 RX ADMIN — SODIUM CHLORIDE, SODIUM LACTATE, POTASSIUM CHLORIDE, AND CALCIUM CHLORIDE: .6; .31; .03; .02 INJECTION, SOLUTION INTRAVENOUS at 13:32

## 2024-01-24 RX ADMIN — LIDOCAINE HYDROCHLORIDE 50 MG: 10 INJECTION, SOLUTION EPIDURAL; INFILTRATION; INTRACAUDAL; PERINEURAL at 14:39

## 2024-01-24 NOTE — ANESTHESIA PREPROCEDURE EVALUATION
Procedure:  EGD    Relevant Problems   CARDIO   (+) Arteriovenous fistula (HCC)   (+) Chronic deep vein thrombosis (DVT) of internal jugular vein (HCC)   (+) Coronary artery disease    (+) Hypertension   (+) Mixed hyperlipidemia      ENDO   (+) Secondary hyperparathyroidism of renal origin (HCC)   (+) Type 2 diabetes mellitus      /RENAL   (+) Chronic kidney disease   (+) Dependence on renal dialysis (HCC)   (+) ESRD (end stage renal disease) on dialysis (HCC)   (+) End-stage renal disease       HEMATOLOGY   (+) Anemia of chronic disease   (+) Pernicious anemia   (+) Thrombocytopenia       NEURO/PSYCH   (+) Anxiety disorder, unspecified      Other   (+) Nicotine dependence   (+) S/P angioplasty with stent   (+) Unintentional weight loss      Dialyzed yesterday  On midodrine  Cardiology eval noted  Non-ambulatory  Sacral decubitus    Physical Exam    Airway    Mallampati score: II  TM Distance: >3 FB  Neck ROM: full     Dental   Comment: Denies loose teeth     Cardiovascular  Cardiovascular exam normal    Pulmonary  Pulmonary exam normal     Other Findings  Portions of exam deferred due to low yield and/or unknown COVID status      Anesthesia Plan  ASA Score- 4     Anesthesia Type- IV sedation with anesthesia with ASA Monitors.         Additional Monitors:     Airway Plan:            Plan Factors-Exercise tolerance (METS): <4 METS.    Chart reviewed.   Existing labs reviewed. Patient summary reviewed.    Patient is not a current smoker.              Induction- intravenous.    Postoperative Plan-     Informed Consent- Anesthetic plan and risks discussed with patient.  I personally reviewed this patient with the CRNA. Discussed and agreed on the Anesthesia Plan with the CRNA..

## 2024-01-24 NOTE — H&P
History and Physical -  [facility]  Jeffery Bishop 68 y.o. male MRN: [id number]    HPI: Jeffery Bishop is a 68 y.o. year old male who presents for evaluation of nausea and vomiting.  Also reports weight loss.      Review of Systems    Historical Information   Past Medical History:   Diagnosis Date    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    With Cerebral Infarction:  Last Assessed:  October 20, 2016    Chronic kidney disease 2012    on dialysis     COVID 05/2022    COVID-19 04/2022 4/2022-while in [place]    Diabetes mellitus (HCC)     Dialysis patient ([facility])     T/TH/Sat    Difficulty walking     GERD (gastroesophageal reflux disease)     Hyperlipidemia     Hypertension     Labyrinthitis 09/10/2011    Myocardial infarction ([facility]) 2014    x3 others were in 2009 & 2010    Sleep disturbances 09/20/2010    Stroke ([facility]) 2007    x1, RLE deficit- uses walker    Type 2 diabetes, uncontrolled, with renal manifestation 05/03/2017     Past Surgical History:   Procedure Laterality Date    AV FISTULA PLACEMENT      AV FISTULA REPAIR Left 6/3/2023    Procedure: LEFT UPPER EXTREMITY A-V GRAFT EXPLANT, LEFT BRACHIAL ANGIOPATCH,  APPLICATION OF  VAC;  Surgeon: Neyda Rider MD;  Location:  MAIN OR;  Service: Vascular    CARDIAC CATHETERIZATION Left 02/03/2023    Procedure: Cardiac Left Heart Cath;  Surgeon: Tiffani Gregorio MD;  Location: [facility] C[facility];  Service: Cardiology    CARDIAC CATHETERIZATION N/A 02/08/2023    Procedure: Cardiac catheterization with complex PCI with Dr. Patricio, patient is a renal dialysis patient;  Surgeon: Marcos Ramirez MD;  Location:  CARDIAC CATH LAB;  Service: Cardiology    CARDIAC SURGERY  2010    stents x3    COLONOSCOPY N/A 12/12/2016    Procedure: COLONOSCOPY;  Surgeon: Radha Moss MD;  Location: [facility] GI LAB;  Service:     COLONOSCOPY N/A 04/03/2017    Procedure:  COLONOSCOPY;  Surgeon: Radha Moss MD;  Location: [facility] GI LAB;  Service:     HARDWARE REMOVAL Right  "04/04/2022    Procedure: REMOVAL HARDWARE HIP;  Surgeon: Moises Kent MD;  Location: [facility];  Service: Orthopedics    IR AV FISTULA/GRAFT DECLOT  04/29/2021    IR AV FISTULA/GRAFT DECLOT  03/25/2022    IR AV FISTULA/GRAFT DECLOT  09/21/2022    IR AV FISTULAGRAM/GRAFTOGRAM  03/28/2022    IR AV FISTULAGRAM/GRAFTOGRAM  07/28/2022    IR TUNNELED CENTRAL LINE REMOVAL  12/07/2021    IR TUNNELED DIALYSIS CATHETER PLACEMENT  05/24/2021    PORTACATH PLACEMENT      per pt \"port in chest\"    DE ARTERIOVENOUS ANASTOMOSIS OPEN DIRECT Left 07/22/2021    Procedure: CREATION FISTULA ARTERIOVENOUS (AV);  Surgeon: Kareem Nye MD;  Location: [facility];  Service: Vascular    DE ARTERIOVENOUS ANASTOMOSIS OPEN DIRECT Left 3/21/2023    Procedure: left brachiobasilic fistula,  AVG Graft;  Surgeon: Catalino Hook MD;  Location: [facility];  Service: Vascular    DE ARTERIOVENOUS ANASTOMOSIS OPEN DIRECT Left 5/19/2023    Procedure: CREATION of LEFT FISTULA ARTERIOVENOUS (AV) GRAFT;  Surgeon: Catalino Hook MD;  [facility]: [facility];  Service: Vascular    DE HEMIARTHROPLASTY HIP PARTIAL Right 04/04/2022    Procedure: HEMIARTHROPLASTY HIP (BIPOLAR);  Surgeon: Moises Kent MD;  Location: [facility];  Service: Orthopedics     Social History   Social History     Substance and Sexual Activity   Alcohol Use Yes    Alcohol/week: 1.0 standard drink of alcohol    Types: 1 Shots of liquor per week    Comment: 1 shot daily     Social History     Substance and Sexual Activity   Drug Use Yes    Types: Marijuana    Comment: gummies     Social History     Tobacco Use   Smoking Status Every Day    Current packs/day: 1.00    Average packs/day: 1 pack/day for 30.0 years (30.0 ttl pk-yrs)    Types: Cigarettes   Smokeless Tobacco Never   Tobacco Comments    Currently not smoking - in facility     Family History   Problem Relation Age of Onset    Leukemia Mother     Crohn's disease Father     Heart disease Father     " Transient ischemic attack Brother        Meds/Allergies     (Not in a hospital admission)      No Known Allergies    Objective     /70   Pulse (!) 51   Temp 98.4 °F (36.9 °C) (Skin)   Resp 18   SpO2 97%       PHYSICAL EXAM    Gen: NAD  CV: RRR  CHEST: Clear  ABD: soft, NT/ND  EXT: no edema  Neuro: AAO      ASSESSMENT/PLAN:  This is a 68 y.o. year old male here for evaluation of unintentional weight loss, nausea and vomiting.    PLAN:   Procedure: EGD.

## 2024-01-24 NOTE — ANESTHESIA POSTPROCEDURE EVALUATION
Post-Op Assessment Note    CV Status:  Stable  Pain Score: 0    Pain management: adequate       Mental Status:  Sleepy   Hydration Status:  Euvolemic   PONV Controlled:  Controlled   Airway Patency:  Patent     Post Op Vitals Reviewed: Yes    No anethesia notable event occurred.    Staff: Anesthesiologist, CRNA               /60 (01/24/24 1444)    Temp      Pulse (!) 51 (01/24/24 1444)   Resp 12 (01/24/24 1444)    SpO2 100 % (01/24/24 1444)

## 2024-01-24 NOTE — NURSING NOTE
Shift completed without incident. Patient denies pain and stated ready to be discharged. Discharge instructions reviewed with pt and spouse and both expressed understanding. Advised Dr. Huerta did send over Protonix to the pharmacy and provided order for gastric studying test along with telephone number to schedule. Per pt and spouse, no additional questions at this this time. Advised to contact Dr. Huerta with any questions or concerns once home

## 2024-01-29 PROCEDURE — 88305 TISSUE EXAM BY PATHOLOGIST: CPT | Performed by: PATHOLOGY

## 2024-01-31 ENCOUNTER — TELEPHONE (OUTPATIENT)
Dept: GASTROENTEROLOGY | Facility: CLINIC | Age: 69
End: 2024-01-31

## 2024-01-31 ENCOUNTER — TELEPHONE (OUTPATIENT)
Age: 69
End: 2024-01-31

## 2024-01-31 DIAGNOSIS — N18.6 ESRD (END STAGE RENAL DISEASE) ON DIALYSIS (HCC): Primary | ICD-10-CM

## 2024-01-31 DIAGNOSIS — R79.89 ELEVATED LFTS: Primary | ICD-10-CM

## 2024-01-31 DIAGNOSIS — Z99.2 ESRD (END STAGE RENAL DISEASE) ON DIALYSIS (HCC): Primary | ICD-10-CM

## 2024-01-31 NOTE — TELEPHONE ENCOUNTER
I called and spoke to patient and went over recommendations given by Ashleigh SMALLS. He verbalized understanding. Central scheduling number provided to schedule US and gastric emptying study. Ov scheduled with Dr Cagle.

## 2024-01-31 NOTE — TELEPHONE ENCOUNTER
----- Message from TESSY Ramirez sent at 1/31/2024 12:06 PM EST -----  Regarding: US  Please call patient and inform him to obtain ultrasound of abdomen order was placed reason is for elevated liver enzymes and concern for portal hypertension on recent upper endoscopy.  Also schedule patient for follow-up appointment with Dr Cagle after ultrasound is completed.  Thank you

## 2024-01-31 NOTE — RESULT ENCOUNTER NOTE
I messaged staff to have patient obtain ultrasound of abdomen, order placed due to elevated LFT and concern for portal HTN on EGD  and also to set schedule follow-up appointment.  Thank you

## 2024-01-31 NOTE — TELEPHONE ENCOUNTER
Patients GI provider:  TESSY Vargas    Number to return call: Mary [phone]    Reason for call: Pt's wife Mary calling in to reschedule 4/19/2024 procedure with Dr. Gregorio. Patient wife ask if we can please give her a call to reschedule.    Scheduled procedure/appointment date if applicable: Apt/procedure

## 2024-02-01 ENCOUNTER — HOSPITAL ENCOUNTER (OUTPATIENT)
Dept: NON INVASIVE DIAGNOSTICS | Facility: HOSPITAL | Age: 69
Discharge: HOME | End: 2024-02-01
Attending: RADIOLOGY
Payer: MEDICARE

## 2024-02-01 VITALS
OXYGEN SATURATION: 99 % | DIASTOLIC BLOOD PRESSURE: 63 MMHG | SYSTOLIC BLOOD PRESSURE: 126 MMHG | HEART RATE: 53 BPM | RESPIRATION RATE: 13 BRPM

## 2024-02-01 DIAGNOSIS — Z99.2 ESRD (END STAGE RENAL DISEASE) ON DIALYSIS (HCC): ICD-10-CM

## 2024-02-01 DIAGNOSIS — N18.6 ESRD (END STAGE RENAL DISEASE) ON DIALYSIS (HCC): ICD-10-CM

## 2024-02-01 PROCEDURE — 36581 REPLACE TUNNELED CV CATH: CPT

## 2024-02-01 PROCEDURE — 36581 REPLACE TUNNELED CV CATH: CPT | Performed by: RADIOLOGY

## 2024-02-01 PROCEDURE — C1757 CATH, THROMBECTOMY/EMBOLECT: HCPCS

## 2024-02-01 PROCEDURE — 37799 UNLISTED PX VASCULAR SURGERY: CPT | Performed by: RADIOLOGY

## 2024-02-01 PROCEDURE — 77001 FLUOROGUIDE FOR VEIN DEVICE: CPT | Performed by: RADIOLOGY

## 2024-02-01 PROCEDURE — C1769 GUIDE WIRE: HCPCS

## 2024-02-01 PROCEDURE — C1750 CATH, HEMODIALYSIS,LONG-TERM: HCPCS

## 2024-02-01 RX ORDER — CEFAZOLIN SODIUM 1 G/3ML
INJECTION, POWDER, FOR SOLUTION INTRAMUSCULAR; INTRAVENOUS AS NEEDED
Status: COMPLETED | OUTPATIENT
Start: 2024-02-01 | End: 2024-02-01

## 2024-02-01 RX ADMIN — CEFAZOLIN 1000 MG: 1 INJECTION, POWDER, FOR SOLUTION INTRAVENOUS at 13:24

## 2024-02-01 RX ADMIN — Medication 20 ML: at 13:29

## 2024-02-01 NOTE — DISCHARGE INSTRUCTIONS
Perma-cath Placement   WHAT YOU NEED TO KNOW:   A perma-cath is a catheter placed through a vein into or near your right atrium. Your right atrium is the right upper chamber of your heart. A perma-cath is used for dialysis in an emergency or until a long-term device is ready to use. After your procedure, you will have some pain and swelling on your chest and neck. You may have some bruises on your chest and neck. You may also have 2 dressings, one on your chest and one on your neck.   DISCHARGE INSTRUCTIONS:   Call 911 for any of the following:   You feel lightheaded, short of breath, and have chest pain.    Your catheter comes out   Contact Interventional Radiology at [phone] ([facility] PATIENTS: Contact Interventional Radiology at [phone]) ([facility] PATIENTS: Contact Interventional Radiology at [phone]) if:  Blood soaks through your bandage.   You have new swelling in your arm, neck, face, or chest on your right side.  Your catheter gets wet.    Your bruises or pain get worse.   You have a fever or chills.  Persistent nausea or vomiting.   Your incision is red, swollen, or draining pus.   You have questions or concerns about your condition or care.  Self-care:       Resume your normal diet.  Keep your dressings dry. Do not take a shower or swim. You may take a tub bath, but do not get your dressings wet. Water in your wound can cause bacteria to grow and cause an infection. If your dressing gets wet, dry it off and cover it with dry sterile gauze. Call your healthcare provider. Do not use soaps or ointments.  Do not change your dressings. Your healthcare provider or dialysis nurse will change your dressings. Your dressings should stay in place until your healthcare provider removes them. The dressing on your chest will stay as long as you have the catheter in place. The dressing prevents infection.    Do not remove the red and blue caps from the end of your catheter. The caps prevent air from getting  into your catheter.  Follow up with your healthcare provider as directed: Write down your questions so you remember to ask them during your visits.

## 2024-03-04 ENCOUNTER — APPOINTMENT (INPATIENT)
Dept: RADIOLOGY | Facility: HOSPITAL | Age: 69
DRG: 085 | End: 2024-03-04
Payer: MEDICARE

## 2024-03-04 ENCOUNTER — APPOINTMENT (EMERGENCY)
Dept: RADIOLOGY | Facility: HOSPITAL | Age: 69
End: 2024-03-04
Payer: MEDICARE

## 2024-03-04 ENCOUNTER — HOSPITAL ENCOUNTER (INPATIENT)
Facility: HOSPITAL | Age: 69
LOS: 3 days | Discharge: HOME HEALTH | DRG: 085 | End: 2024-03-07
Attending: SURGERY | Admitting: SURGERY
Payer: MEDICARE

## 2024-03-04 ENCOUNTER — HOSPITAL ENCOUNTER (EMERGENCY)
Facility: HOSPITAL | Age: 69
End: 2024-03-04
Attending: EMERGENCY MEDICINE
Payer: MEDICARE

## 2024-03-04 ENCOUNTER — APPOINTMENT (INPATIENT)
Dept: DIALYSIS | Facility: HOSPITAL | Age: 69
DRG: 085 | End: 2024-03-04
Payer: MEDICARE

## 2024-03-04 ENCOUNTER — APPOINTMENT (EMERGENCY)
Dept: RADIOLOGY | Facility: HOSPITAL | Age: 69
DRG: 085 | End: 2024-03-04
Payer: MEDICARE

## 2024-03-04 VITALS
BODY MASS INDEX: 17.43 KG/M2 | HEART RATE: 82 BPM | WEIGHT: 121.47 LBS | RESPIRATION RATE: 12 BRPM | TEMPERATURE: 98.8 F | SYSTOLIC BLOOD PRESSURE: 151 MMHG | OXYGEN SATURATION: 95 % | DIASTOLIC BLOOD PRESSURE: 78 MMHG

## 2024-03-04 DIAGNOSIS — S42.001A RIGHT CLAVICLE FRACTURE: ICD-10-CM

## 2024-03-04 DIAGNOSIS — I61.9 INTRAPARENCHYMAL HEMORRHAGE OF BRAIN (HCC): ICD-10-CM

## 2024-03-04 DIAGNOSIS — E87.5 HYPERKALEMIA: ICD-10-CM

## 2024-03-04 DIAGNOSIS — J90 PLEURAL EFFUSION: ICD-10-CM

## 2024-03-04 DIAGNOSIS — Z51.5 HOSPICE CARE: ICD-10-CM

## 2024-03-04 DIAGNOSIS — Z99.2 STAGE 5 CHRONIC KIDNEY DISEASE ON CHRONIC DIALYSIS (HCC): ICD-10-CM
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
## 2024-03-04 DIAGNOSIS — N18.6 STAGE 5 CHRONIC KIDNEY DISEASE ON CHRONIC DIALYSIS (HCC): ICD-10-CM

## 2024-03-04 DIAGNOSIS — S42.001A CLOSED NONDISPLACED FRACTURE OF RIGHT CLAVICLE, UNSPECIFIED PART OF CLAVICLE, INITIAL ENCOUNTER: ICD-10-CM

## 2024-03-04 DIAGNOSIS — Z79.899 POLYPHARMACY: ICD-10-CM

## 2024-03-04 DIAGNOSIS — R45.851 SUICIDAL IDEATION: ICD-10-CM

## 2024-03-04 DIAGNOSIS — I62.9 INTRACRANIAL HEMORRHAGE (HCC): ICD-10-CM

## 2024-03-04 LAB
ABO GROUP BLD: NORMAL
ALBUMIN SERPL BCP-MCNC: 3 G/DL (ref 3.5–5)
ALP SERPL-CCNC: 69 U/L (ref 34–104)
ALT SERPL W P-5'-P-CCNC: 11 U/L (ref 7–52)
ANION GAP SERPL CALCULATED.3IONS-SCNC: 16 MMOL/L
AST SERPL W P-5'-P-CCNC: 17 U/L (ref 13–39)
ATRIAL RATE: 78 BPM
BASOPHILS # BLD AUTO: 0.05 THOUSANDS/ÂΜL (ref 0–0.1)
BASOPHILS NFR BLD AUTO: 1 % (ref 0–1)
BILIRUB SERPL-MCNC: 0.46 MG/DL (ref 0.2–1)
BLD GP AB SCN SERPL QL: NEGATIVE
BUN SERPL-MCNC: 89 MG/DL (ref 5–25)
CALCIUM ALBUM COR SERPL-MCNC: 9.4 MG/DL (ref 8.3–10.1)
CALCIUM SERPL-MCNC: 8.6 MG/DL (ref 8.4–10.2)
CHLORIDE SERPL-SCNC: 96 MMOL/L (ref 96–108)
CO2 SERPL-SCNC: 26 MMOL/L (ref 21–32)
CREAT SERPL-MCNC: 10.19 MG/DL (ref 0.6–1.3)
EOSINOPHIL # BLD AUTO: 0.27 THOUSAND/ÂΜL (ref 0–0.61)
EOSINOPHIL NFR BLD AUTO: 6 % (ref 0–6)
ERYTHROCYTE [DISTWIDTH] IN BLOOD BY AUTOMATED COUNT: 14.4 % (ref 11.6–15.1)
ETHANOL SERPL-MCNC: <10 MG/DL
FLUAV RNA RESP QL NAA+PROBE: NEGATIVE
FLUBV RNA RESP QL NAA+PROBE: NEGATIVE
GFR SERPL CREATININE-BSD FRML MDRD: 4 ML/MIN/1.73SQ M
GLUCOSE SERPL-MCNC: 118 MG/DL (ref 65–140)
HCT VFR BLD AUTO: 28.6 % (ref 36.5–49.3)
HGB BLD-MCNC: 9.3 G/DL (ref 12–17)
IMM GRANULOCYTES # BLD AUTO: 0.03 THOUSAND/UL (ref 0–0.2)
IMM GRANULOCYTES NFR BLD AUTO: 1 % (ref 0–2)
LIPASE SERPL-CCNC: <6 U/L (ref 11–82)
LYMPHOCYTES # BLD AUTO: 0.47 THOUSANDS/ÂΜL (ref 0.6–4.47)
LYMPHOCYTES NFR BLD AUTO: 10 % (ref 14–44)
MCH RBC QN AUTO: 32.5 PG (ref 26.8–34.3)
MCHC RBC AUTO-ENTMCNC: 32.5 G/DL (ref 31.4–37.4)
MCV RBC AUTO: 100 FL (ref 82–98)
MONOCYTES # BLD AUTO: 0.42 THOUSAND/ÂΜL (ref 0.17–1.22)
MONOCYTES NFR BLD AUTO: 9 % (ref 4–12)
NEUTROPHILS # BLD AUTO: 3.54 THOUSANDS/ÂΜL (ref 1.85–7.62)
NEUTS SEG NFR BLD AUTO: 73 % (ref 43–75)
NRBC BLD AUTO-RTO: 0 /100 WBCS
P AXIS: 46 DEGREES
PLATELET # BLD AUTO: 130 THOUSANDS/UL (ref 149–390)
PMV BLD AUTO: 9.8 FL (ref 8.9–12.7)
POTASSIUM SERPL-SCNC: 7.3 MMOL/L (ref 3.5–5.3)
PR INTERVAL: 178 MS
PROCALCITONIN SERPL-MCNC: 0.9 NG/ML
PROT SERPL-MCNC: 6.1 G/DL (ref 6.4–8.4)
QRS AXIS: -70 DEGREES
QRSD INTERVAL: 130 MS
QT INTERVAL: 442 MS
QTC INTERVAL: 503 MS
RBC # BLD AUTO: 2.86 MILLION/UL (ref 3.88–5.62)
RH BLD: POSITIVE
RSV RNA RESP QL NAA+PROBE: NEGATIVE
SARS-COV-2 RNA RESP QL NAA+PROBE: NEGATIVE
SODIUM SERPL-SCNC: 138 MMOL/L (ref 135–147)
SPECIMEN EXPIRATION DATE: NORMAL
T WAVE AXIS: 46 DEGREES
VENTRICULAR RATE: 78 BPM
WBC # BLD AUTO: 4.78 THOUSAND/UL (ref 4.31–10.16)

## 2024-03-04 PROCEDURE — 96367 TX/PROPH/DG ADDL SEQ IV INF: CPT

## 2024-03-04 PROCEDURE — 84145 PROCALCITONIN (PCT): CPT | Performed by: EMERGENCY MEDICINE

## 2024-03-04 PROCEDURE — 85610 PROTHROMBIN TIME: CPT

## 2024-03-04 PROCEDURE — 0241U HB NFCT DS VIR RESP RNA 4 TRGT: CPT | Performed by: EMERGENCY MEDICINE

## 2024-03-04 PROCEDURE — 82330 ASSAY OF CALCIUM: CPT

## 2024-03-04 PROCEDURE — 71046 X-RAY EXAM CHEST 2 VIEWS: CPT

## 2024-03-04 PROCEDURE — 80048 BASIC METABOLIC PNL TOTAL CA: CPT

## 2024-03-04 PROCEDURE — 83735 ASSAY OF MAGNESIUM: CPT

## 2024-03-04 PROCEDURE — 99223 1ST HOSP IP/OBS HIGH 75: CPT | Performed by: INTERNAL MEDICINE

## 2024-03-04 PROCEDURE — 99223 1ST HOSP IP/OBS HIGH 75: CPT | Performed by: SURGERY

## 2024-03-04 PROCEDURE — 70450 CT HEAD/BRAIN W/O DYE: CPT

## 2024-03-04 PROCEDURE — 73030 X-RAY EXAM OF SHOULDER: CPT

## 2024-03-04 PROCEDURE — 36415 COLL VENOUS BLD VENIPUNCTURE: CPT | Performed by: EMERGENCY MEDICINE

## 2024-03-04 PROCEDURE — 85025 COMPLETE CBC W/AUTO DIFF WBC: CPT | Performed by: EMERGENCY MEDICINE

## 2024-03-04 PROCEDURE — 82077 ASSAY SPEC XCP UR&BREATH IA: CPT | Performed by: EMERGENCY MEDICINE

## 2024-03-04 PROCEDURE — 86850 RBC ANTIBODY SCREEN: CPT

## 2024-03-04 PROCEDURE — 5A1D70Z PERFORMANCE OF URINARY FILTRATION, INTERMITTENT, LESS THAN 6 HOURS PER DAY: ICD-10-PCS | Performed by: INTERNAL MEDICINE

## 2024-03-04 PROCEDURE — 86900 BLOOD TYPING SEROLOGIC ABO: CPT

## 2024-03-04 PROCEDURE — 99285 EMERGENCY DEPT VISIT HI MDM: CPT

## 2024-03-04 PROCEDURE — 93010 ELECTROCARDIOGRAM REPORT: CPT | Performed by: INTERNAL MEDICINE

## 2024-03-04 PROCEDURE — 96375 TX/PRO/DX INJ NEW DRUG ADDON: CPT

## 2024-03-04 PROCEDURE — 99291 CRITICAL CARE FIRST HOUR: CPT | Performed by: EMERGENCY MEDICINE

## 2024-03-04 PROCEDURE — 71045 X-RAY EXAM CHEST 1 VIEW: CPT

## 2024-03-04 PROCEDURE — 72125 CT NECK SPINE W/O DYE: CPT

## 2024-03-04 PROCEDURE — 96365 THER/PROPH/DIAG IV INF INIT: CPT

## 2024-03-04 PROCEDURE — 93005 ELECTROCARDIOGRAM TRACING: CPT

## 2024-03-04 PROCEDURE — 73502 X-RAY EXAM HIP UNI 2-3 VIEWS: CPT

## 2024-03-04 PROCEDURE — 84100 ASSAY OF PHOSPHORUS: CPT

## 2024-03-04 PROCEDURE — 73000 X-RAY EXAM OF COLLAR BONE: CPT

## 2024-03-04 PROCEDURE — 80053 COMPREHEN METABOLIC PANEL: CPT | Performed by: EMERGENCY MEDICINE

## 2024-03-04 PROCEDURE — 86901 BLOOD TYPING SEROLOGIC RH(D): CPT

## 2024-03-04 PROCEDURE — NC001 PR NO CHARGE: Performed by: ORTHOPAEDIC SURGERY

## 2024-03-04 PROCEDURE — 83690 ASSAY OF LIPASE: CPT | Performed by: EMERGENCY MEDICINE

## 2024-03-04 RX ORDER — CLONIDINE HYDROCHLORIDE 0.1 MG/1
0.1 TABLET ORAL EVERY 12 HOURS SCHEDULED
Status: DISCONTINUED | OUTPATIENT
Start: 2024-03-04 | End: 2024-03-04

## 2024-03-04 RX ORDER — LACTULOSE 10 G/15ML
10 SOLUTION ORAL DAILY
Status: DISCONTINUED | OUTPATIENT
Start: 2024-03-05 | End: 2024-03-07 | Stop reason: HOSPADM

## 2024-03-04 RX ORDER — DEXTROSE MONOHYDRATE 25 G/50ML
25 INJECTION, SOLUTION INTRAVENOUS ONCE
Status: COMPLETED | OUTPATIENT
Start: 2024-03-04 | End: 2024-03-04

## 2024-03-04 RX ORDER — METOPROLOL TARTRATE 1 MG/ML
5 INJECTION, SOLUTION INTRAVENOUS EVERY 6 HOURS PRN
Status: DISCONTINUED | OUTPATIENT
Start: 2024-03-04 | End: 2024-03-07 | Stop reason: HOSPADM

## 2024-03-04 RX ORDER — OXYCODONE HYDROCHLORIDE 5 MG/1
5 TABLET ORAL EVERY 4 HOURS PRN
Status: DISCONTINUED | OUTPATIENT
Start: 2024-03-04 | End: 2024-03-05

## 2024-03-04 RX ORDER — INSULIN LISPRO 100 [IU]/ML
1-5 INJECTION, SOLUTION INTRAVENOUS; SUBCUTANEOUS
Status: DISCONTINUED | OUTPATIENT
Start: 2024-03-05 | End: 2024-03-07 | Stop reason: HOSPADM

## 2024-03-04 RX ORDER — GABAPENTIN 100 MG/1
100 CAPSULE ORAL
Status: DISCONTINUED | OUTPATIENT
Start: 2024-03-04 | End: 2024-03-05

## 2024-03-04 RX ORDER — ACETAMINOPHEN 325 MG/1
975 TABLET ORAL EVERY 8 HOURS PRN
Status: DISCONTINUED | OUTPATIENT
Start: 2024-03-04 | End: 2024-03-07 | Stop reason: HOSPADM

## 2024-03-04 RX ORDER — AMMONIUM LACTATE 12 G/100G
LOTION TOPICAL 2 TIMES DAILY PRN
Status: DISCONTINUED | OUTPATIENT
Start: 2024-03-04 | End: 2024-03-07 | Stop reason: HOSPADM

## 2024-03-04 RX ORDER — ASCORBIC ACID 500 MG
500 TABLET ORAL DAILY
Status: DISCONTINUED | OUTPATIENT
Start: 2024-03-05 | End: 2024-03-07 | Stop reason: HOSPADM

## 2024-03-04 RX ORDER — HYDROMORPHONE HCL IN WATER/PF 6 MG/30 ML
0.2 PATIENT CONTROLLED ANALGESIA SYRINGE INTRAVENOUS
Status: DISCONTINUED | OUTPATIENT
Start: 2024-03-04 | End: 2024-03-06

## 2024-03-04 RX ORDER — CALCIUM GLUCONATE 20 MG/ML
2 INJECTION, SOLUTION INTRAVENOUS ONCE
Qty: 100 ML | Refills: 0 | Status: DISCONTINUED | OUTPATIENT
Start: 2024-03-04 | End: 2024-03-04 | Stop reason: HOSPADM

## 2024-03-04 RX ORDER — CALCIUM GLUCONATE 20 MG/ML
1 INJECTION, SOLUTION INTRAVENOUS ONCE
Qty: 50 ML | Refills: 0 | Status: COMPLETED | OUTPATIENT
Start: 2024-03-04 | End: 2024-03-04

## 2024-03-04 RX ORDER — CLONIDINE HYDROCHLORIDE 0.1 MG/1
0.1 TABLET ORAL ONCE
Status: COMPLETED | OUTPATIENT
Start: 2024-03-04 | End: 2024-03-04

## 2024-03-04 RX ORDER — PANTOPRAZOLE SODIUM 40 MG/1
40 TABLET, DELAYED RELEASE ORAL
Status: DISCONTINUED | OUTPATIENT
Start: 2024-03-05 | End: 2024-03-07 | Stop reason: HOSPADM

## 2024-03-04 RX ORDER — LEVETIRACETAM 500 MG/1
500 TABLET ORAL 2 TIMES DAILY
Status: DISCONTINUED | OUTPATIENT
Start: 2024-03-04 | End: 2024-03-07 | Stop reason: HOSPADM

## 2024-03-04 RX ADMIN — CALCIUM GLUCONATE 1 G: 20 INJECTION, SOLUTION INTRAVENOUS at 16:03

## 2024-03-04 RX ADMIN — CLONIDINE HYDROCHLORIDE 0.1 MG: 0.1 TABLET ORAL at 19:28

## 2024-03-04 RX ADMIN — DESMOPRESSIN ACETATE 22.4 MCG: 4 SOLUTION INTRAVENOUS at 15:20

## 2024-03-04 RX ADMIN — DEXTROSE MONOHYDRATE 25 G: 25 INJECTION, SOLUTION INTRAVENOUS at 15:06

## 2024-03-04 RX ADMIN — GABAPENTIN 100 MG: 100 CAPSULE ORAL at 21:33

## 2024-03-04 RX ADMIN — INSULIN HUMAN 8 UNITS: 100 INJECTION, SOLUTION PARENTERAL at 15:10

## 2024-03-04 NOTE — PLAN OF CARE
Target UF Goal  2L   as tolerated. Patient dialyzing for 2 hours on 2 K bath/ 1K last hour for serum K of  7.3  per protocol. Treatment plan reviewed with Nephrology.       Post-Dialysis RN Treatment Note    Blood Pressure:  Pre 164/132 mm/Hg  Post 151/88 mmHg   EDW  61.5 kg    Weight:  Pre 66.33 kg   Post 63.4 kg   Mode of weight measurement: Other Stretcher scale   Volume Removed  1884 ml    Treatment duration 2 hours   NS given  No    Treatment shortened? Yes, 5 minutes early due to catheter frequently alarming due to patient movement.    Medications given during Rx None Reported   Estimated Kt/V  Not Applicable   Access type: Permacath/TDC   Access Issues: Yes, describe: Decreased BFR catheter frequently alarming due to patient movement.     Report called to primary nurse   Yes, Samson Montana RN via [employer], and verbal at bedside.      Problem: METABOLIC, FLUID AND ELECTROLYTES - ADULT  Goal: Electrolytes maintained within normal limits  Description: INTERVENTIONS:  - Monitor labs and assess patient for signs and symptoms of electrolyte imbalances  - Administer electrolyte replacement as ordered  - Monitor response to electrolyte replacements, including repeat lab results as appropriate  - Instruct patient on fluid and nutrition as appropriate  Outcome: Progressing  Goal: Fluid balance maintained  Description: INTERVENTIONS:  - Monitor labs   - Monitor I/O and WT  - Instruct patient on fluid and nutrition as appropriate  - Assess for signs & symptoms of volume excess or deficit  Outcome: Progressing

## 2024-03-04 NOTE — ED PROVIDER NOTES
History  Chief Complaint   Patient presents with    Fall     Pt reports fall off mobility scooter around 1230 today. Pt reports right and left shoulder pain, right hip pain, and right buttocks pain. Pt reports no head injury and not on thinners.     Flu Symptoms     Pt reports congestion and generalized weakness.      Patient presents for evaluation of a fall off of his mobility scooter.  States he was reaching down to  something and lost his balance falling in his right side landing on his right shoulder.  Does not believe he hit his head and denies any loss of consciousness.  Patient is also having right hip pain as well as left shoulder pain.  Patient denies being on any blood thinners.  Also reports some congestion and generalized feeling of unwell for the last couple days.  Patient is end-stage renal disease and generally gets dialysis Tuesday Thursday Saturday however he did not go on Saturday.      History provided by:  Patient   used: No    Fall  Flu Symptoms      Prior to Admission Medications   Prescriptions Last Dose Informant Patient Reported? Taking?   Cholecalciferol 50 MCG (2000 UT) CAPS  Self, Outside Facility (Specify) Yes No   Sig: Take by mouth daily   [place] Choice Comfort EZ 33G X 4 MM MISC  Self, Outside Facility (Specify) Yes No   Patient not taking: Reported on 11/13/2023   acetaminophen (TYLENOL) 325 mg tablet  Outside Facility (Specify), Self No No   Sig: Take 3 tablets (975 mg total) by mouth every 8 (eight) hours as needed for mild pain   ammonium lactate (LAC-HYDRIN) 12 % lotion  Self, Outside Facility (Specify) No No   Sig: Apply topically 2 (two) times a day as needed for dry skin   ascorbic acid (VITAMIN C) 500 MG tablet   Yes No   Sig: Take 500 mg by mouth daily   ascorbic acid (VITAMIN C) 500 mg tablet  Self, Outside Facility (Specify) Yes No   Sig: TAKE ONE TABLET BY MOUTH EVERY EVENING FOR SUPPLEMENT   aspirin (ECOTRIN LOW STRENGTH) 81 mg EC tablet   Self, Outside Facility (Specify) Yes No   Sig: Take 81 mg by mouth daily   atorvastatin (LIPITOR) 20 mg tablet  Self, Outside Facility (Specify) Yes No   Sig: Take 20 mg by mouth daily at bedtime   bacitracin topical ointment 500 units/g topical ointment  Self, Outside Facility (Specify) No No   Sig: Apply 1 large application topically 2 (two) times a day   Patient not taking: Reported on 1/11/2024   bisacodyl (DULCOLAX) 10 mg suppository  Self, Outside Facility (Specify) Yes No   Sig: Insert into the rectum   Patient not taking: Reported on 1/11/2024   clopidogrel (PLAVIX) 75 mg tablet  Self, Outside Facility (Specify) Yes No   Sig: Take 75 mg by mouth daily   Patient not taking: Reported on 11/13/2023   clotrimazole-betamethasone (LOTRISONE) 1-0.05 % cream  Self, Outside Facility (Specify) No No   Sig: Apply topically in the morning   Patient not taking: Reported on 1/11/2024   docusate sodium (COLACE) 100 mg capsule  Self, Outside Facility (Specify) No No   Sig: Take 1 capsule (100 mg total) by mouth daily for 14 days   gabapentin (NEURONTIN) 100 mg capsule  Self, Outside Facility (Specify) No No   Sig: Take 1 capsule (100 mg total) by mouth daily at bedtime   gabapentin (NEURONTIN) 300 mg capsule   Yes No   Sig: TAKE ONE CAPSULE BY MOUTH EVERY NIGHT AT BEDTIME FOR 1 WEEK THEN TWICE DAILY FOR 1 WEEK THEN 1 CAPSULE THREE TIMES DAILY   glucagon 1 MG injection  Self, Outside Facility (Specify) Yes No   Sig: Inject 1 mg into a muscle   Patient not taking: Reported on 11/13/2023   hydrOXYzine HCL (ATARAX) 25 mg tablet  Self, Outside Facility (Specify) Yes No   lactulose (CHRONULAC) 10 g/15 mL solution   Yes No   Sig: TAKE 15 ML BY MOUTH THREE TIMES DAILY   midodrine (PROAMATINE) 10 MG tablet   No No   Sig: Take 1 tablet (10 mg total) by mouth 3 (three) times a day before meals   Patient taking differently: Take 10 mg by mouth 3 (three) times a day before meals Takes BID   omeprazole (PriLOSEC) 40 MG capsule   Yes No    Sig: Take 40 mg by mouth daily Take before a meal   ondansetron (ZOFRAN-ODT) 8 mg disintegrating tablet  Self, Outside Facility (Specify) Yes No   Sig: Take 1 tablet by mouth   oxyCODONE (ROXICODONE) 5 immediate release tablet  Self, Outside Facility (Specify) No No   Sig: Take 0.5 tablets (2.5 mg total) by mouth every 8 (eight) hours as needed for severe pain for up to 10 doses Max Daily Amount: 7.5 mg   pantoprazole (PROTONIX) 40 mg tablet   No No   Sig: Take 1 tablet (40 mg total) by mouth daily Take prior to breakfast   pantoprazole (PROTONIX) 40 mg tablet   No No   Sig: Take 1 tablet (40 mg total) by mouth daily   polyethylene glycol (MIRALAX) 17 g packet   No No   Sig: Take 17 g by mouth daily for 7 days   vitamin B-12 (VITAMIN B-12) 1,000 mcg tablet  Outside Facility (Specify), Self Yes No   Sig: Take 1,000 mcg by mouth daily      Facility-Administered Medications: None       Past Medical History:   Diagnosis Date    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    With Cerebral Infarction:  Last Assessed:  October 20, 2016    Chronic kidney disease 2012    on dialysis     COVID 05/2022    COVID-19 04/2022 4/2022-while in NH    Diabetes mellitus ([facility])     Dialysis patient ([facility])     T/TH/Sat    Difficulty walking     GERD (gastroesophageal reflux disease)     Hyperlipidemia     Hypertension     Labyrinthitis 09/10/2011    Myocardial infarction ([facility]) 2014    x3 others were in 2009 & 2010    Sleep disturbances 09/20/2010    Stroke ([facility]) 2007    x1, RLE deficit- uses walker    Type 2 diabetes, uncontrolled, with renal manifestation 05/03/2017       Past Surgical History:   Procedure Laterality Date    AV FISTULA PLACEMENT      AV FISTULA REPAIR Left 6/3/2023    Procedure: LEFT UPPER EXTREMITY A-V GRAFT EXPLANT, LEFT BRACHIAL ANGIOPATCH,  APPLICATION OF  VAC;  Surgeon: Neyda Rider MD;  Location: [facility];  Service: Vascular    CARDIAC CATHETERIZATION Left 02/03/2023    Procedure: Cardiac Left Heart Cath;  Surgeon:  "Tiffani Gregorio MD;  Location: [facility];  Service: Cardiology    CARDIAC CATHETERIZATION N/A 02/08/2023    Procedure: Cardiac catheterization with complex PCI with Dr. Patricio, patient is a renal dialysis patient;  Surgeon: Marcos Ramirez MD;  Location:  CARDIAC CATH LAB;  Service: Cardiology    CARDIAC SURGERY  2010    stents x3    COLONOSCOPY N/A 12/12/2016    Procedure: COLONOSCOPY;  Surgeon: Radha Moss MD;  Location: [facility] GI LAB;  Service:     COLONOSCOPY N/A 04/03/2017    Procedure:  COLONOSCOPY;  Surgeon: Radha Moss MD;  Location: [facility] GI LAB;  Service:     HARDWARE REMOVAL Right 04/04/2022    Procedure: REMOVAL HARDWARE HIP;  Surgeon: Moises Kent MD;  Location: [facility];  Service: Orthopedics    IR AV FISTULA/GRAFT DECLOT  04/29/2021    IR AV FISTULA/GRAFT DECLOT  03/25/2022    IR AV FISTULA/GRAFT DECLOT  09/21/2022    IR AV FISTULAGRAM/GRAFTOGRAM  03/28/2022    IR AV FISTULAGRAM/GRAFTOGRAM  07/28/2022    IR TUNNELED CENTRAL LINE REMOVAL  12/07/2021    IR TUNNELED DIALYSIS CATHETER CHECK/CHANGE/REPOSITION/ANGIOPLASTY  2/1/2024    IR TUNNELED DIALYSIS CATHETER PLACEMENT  05/24/2021    PORTACATH PLACEMENT      per pt \"port in chest\"    MT ARTERIOVENOUS ANASTOMOSIS OPEN DIRECT Left 07/22/2021    Procedure: CREATION FISTULA ARTERIOVENOUS (AV);  Surgeon: Kareem Nye MD;  Location: [facility];  Service: Vascular    MT ARTERIOVENOUS ANASTOMOSIS OPEN DIRECT Left 3/21/2023    Procedure: left brachiobasilic fistula,  AVG Graft;  Surgeon: Catalino Hook MD;  Location:  MAIN OR;  Service: Vascular    MT ARTERIOVENOUS ANASTOMOSIS OPEN DIRECT Left 5/19/2023    Procedure: CREATION of LEFT FISTULA ARTERIOVENOUS (AV) GRAFT;  Surgeon: Catalino Hook MD;  Location:  MAIN OR;  Service: Vascular    MT HEMIARTHROPLASTY HIP PARTIAL Right 04/04/2022    Procedure: HEMIARTHROPLASTY HIP (BIPOLAR);  Surgeon: Moises Kent MD;  Location:  MAIN OR;  Service: Orthopedics "       Family History   Problem Relation Age of Onset    Leukemia Mother     Crohn's disease Father     Heart disease Father     Transient ischemic attack Brother      I have reviewed and agree with the history as documented.    E-Cigarette/Vaping    E-Cigarette Use Never User      E-Cigarette/Vaping Substances    Nicotine No     THC No     CBD No     Flavoring No     Other No     Unknown No      Social History     Tobacco Use    Smoking status: Every Day     Current packs/day: 1.00     Average packs/day: 1 pack/day for 30.0 years (30.0 ttl pk-yrs)     Types: Cigarettes    Smokeless tobacco: Never    Tobacco comments:     Currently not smoking - in facility   Vaping Use    Vaping status: Never Used   Substance Use Topics    Alcohol use: Yes     Alcohol/week: 1.0 standard drink of alcohol     Types: 1 Shots of liquor per week     Comment: 1 shot daily    Drug use: Yes     Types: Marijuana     Comment: gummies       Review of Systems   All other systems reviewed and are negative.      Physical Exam  Physical Exam  Vitals and nursing note reviewed.   Constitutional:       General: He is not in acute distress.  Eyes:      General: No scleral icterus.     Extraocular Movements: Extraocular movements intact.      Conjunctiva/sclera: Conjunctivae normal.      Pupils: Pupils are equal, round, and reactive to light.   Cardiovascular:      Rate and Rhythm: Normal rate and regular rhythm.   Pulmonary:      Effort: Pulmonary effort is normal. No respiratory distress.      Breath sounds: Normal breath sounds.   Abdominal:      Tenderness: There is no abdominal tenderness.   Musculoskeletal:         General: Tenderness present. Normal range of motion.        Arms:         Legs:    Neurological:      General: No focal deficit present.      Mental Status: He is alert and oriented to person, place, and time.         Vital Signs  ED Triage Vitals [03/04/24 1312]   Temperature Pulse Respirations Blood Pressure SpO2   98.8 °F (37.1 °C)  89 18 149/65 94 %      Temp Source Heart Rate Source Patient Position - Orthostatic VS BP Location FiO2 (%)   Temporal Monitor -- -- --      Pain Score       --           Vitals:    03/04/24 1312 03/04/24 1600   BP: 149/65 151/78   Pulse: 89 82         Visual Acuity      ED Medications  Medications   dextrose 50 % IV solution 25 g (25 g Intravenous Given 3/4/24 1506)   insulin regular (HumuLIN R,NovoLIN R) injection 8 Units (8 Units Intravenous Given 3/4/24 1510)   calcium gluconate 1 g in sodium chloride 0.9% 50 mL (premix) (1 g Intravenous New Bag 3/4/24 1603)   desmopressin (DDAVP) 22.4 mcg in sodium chloride 0.9 % 50 mL IVPB (0 mcg Intravenous Stopped 3/4/24 1602)       Diagnostic Studies  Results Reviewed       Procedure Component Value Units Date/Time    FLU/RSV/COVID - if FLU/RSV clinically relevant [[id number]]  (Normal) Collected: 03/04/24 1343    Lab Status: Final result Specimen: Nares from Nose Updated: 03/04/24 1501     SARS-CoV-2 Negative     INFLUENZA A PCR Negative     INFLUENZA B PCR Negative     RSV PCR Negative    Narrative:      FOR PEDIATRIC PATIENTS - copy/paste COVID Guidelines URL to browser: [email or web address]    SARS-CoV-2 assay is a Nucleic Acid Amplification assay intended for the  qualitative detection of nucleic acid from SARS-CoV-2 in nasopharyngeal  swabs. Results are for the presumptive identification of SARS-CoV-2 RNA.    Positive results are indicative of infection with SARS-CoV-2, the virus  causing COVID-19, but do not rule out bacterial infection or co-infection  with other viruses. Laboratories within the [place] and its  territories are required to report all positive results to the appropriate  public health authorities. Negative results do not preclude SARS-CoV-2  infection and should not be used as the sole basis for treatment or other  patient management decisions. Negative results must be combined with  clinical  observations, patient history, and epidemiological information.  This test has not been FDA cleared or approved.    This test has been authorized by FDA under an Emergency Use Authorization  (EUA). This test is only authorized for the duration of time the  declaration that circumstances exist justifying the authorization of the  emergency use of an in vitro diagnostic tests for detection of SARS-CoV-2  virus and/or diagnosis of COVID-19 infection under section 564(b)(1) of  the Act, 21 U.S.C. 360bbb-3(b)(1), unless the authorization is terminated  or revoked sooner. The test has been validated but independent review by FDA  and CLIA is pending.    Test performed using [employer] GeneXpert: This RT-PCR assay targets N2,  a region unique to SARS-CoV-2. A conserved region in the E-gene was chosen  for pan-Sarbecovirus detection which includes SARS-CoV-2.    According to CMS-2020-01-R, this platform meets the definition of high-throughput technology.    Procalcitonin [[id number]]  (Abnormal) Collected: 03/04/24 1343    Lab Status: Final result Specimen: Blood from Arm, Right Updated: 03/04/24 1426     Procalcitonin 0.90 ng/ml     Comprehensive metabolic panel [[id number]]  (Abnormal) Collected: 03/04/24 1343    Lab Status: Final result Specimen: Blood from Arm, Right Updated: 03/04/24 1419     Sodium 138 mmol/L      Potassium 7.3 mmol/L      Chloride 96 mmol/L      CO2 26 mmol/L      ANION GAP 16 mmol/L      BUN 89 mg/dL      Creatinine 10.19 mg/dL      Glucose 118 mg/dL      Calcium 8.6 mg/dL      Corrected Calcium 9.4 mg/dL      AST 17 U/L      ALT 11 U/L      Alkaline Phosphatase 69 U/L      Total Protein 6.1 g/dL      Albumin 3.0 g/dL      Total Bilirubin 0.46 mg/dL      eGFR 4 ml/min/1.73sq m     Narrative:      National Kidney Disease Foundation guidelines for Chronic Kidney Disease (CKD):     Stage 1 with normal or high GFR (GFR > 90 mL/min/1.73 square meters)    Stage 2 Mild CKD (GFR = 60-89 mL/min/1.73 square  meters)    Stage 3A Moderate CKD (GFR = 45-59 mL/min/1.73 square meters)    Stage 3B Moderate CKD (GFR = 30-44 mL/min/1.73 square meters)    Stage 4 Severe CKD (GFR = 15-29 mL/min/1.73 square meters)    Stage 5 End Stage CKD (GFR <15 mL/min/1.73 square meters)  Note: GFR calculation is accurate only with a steady state creatinine    Lipase [[id number]]  (Abnormal) Collected: 03/04/24 1343    Lab Status: Final result Specimen: Blood from Arm, Right Updated: 03/04/24 1417     Lipase <6 u/L     Ethanol [[id number]]  (Normal) Collected: 03/04/24 1343    Lab Status: Final result Specimen: Blood from Arm, Right Updated: 03/04/24 1416     Ethanol Lvl <10 mg/dL     CBC and differential [[id number]]  (Abnormal) Collected: 03/04/24 1343    Lab Status: Final result Specimen: Blood from Arm, Right Updated: 03/04/24 1353     WBC 4.78 Thousand/uL      RBC 2.86 Million/uL      Hemoglobin 9.3 g/dL      Hematocrit 28.6 %       fL      MCH 32.5 pg      MCHC 32.5 g/dL      RDW 14.4 %      MPV 9.8 fL      Platelets 130 Thousands/uL      nRBC 0 /100 WBCs      Neutrophils Relative 73 %      Immat GRANS % 1 %      Lymphocytes Relative 10 %      Monocytes Relative 9 %      Eosinophils Relative 6 %      Basophils Relative 1 %      Neutrophils Absolute 3.54 Thousands/µL      Immature Grans Absolute 0.03 Thousand/uL      Lymphocytes Absolute 0.47 Thousands/µL      Monocytes Absolute 0.42 Thousand/µL      Eosinophils Absolute 0.27 Thousand/µL      Basophils Absolute 0.05 Thousands/µL                    XR shoulder 2+ views RIGHT   Final Result by Dallin Gonzalez MD (03/04 1531)   Minimally displaced distal right clavicular fracture.      The study was marked in EPIC for immediate notification.         Workstation performed: [id number]         XR shoulder 2+ views LEFT   Final Result by Dallin Gonzalez MD (03/04 1531)      No acute osseous abnormality.         Workstation performed: [id number]         XR hip/pelv 2-3 vws right   Final Result by  Dallin Gonzalez MD (03/04 1533)      Hip hemiarthroplasty without evidence of a hardware complication or failure.            Workstation performed: [id number]         XR chest 1 view   Final Result by Elza Trevino MD (03/04 1529)      Acute mildly displaced distal right clavicle fracture.      Large right and trace left pleural effusion.      The study was marked in EPIC for immediate notification.            Workstation performed: [id number]         CT head without contrast   Final Result by Tez Gill MD (03/04 1443)      Multifocal parenchymal hemorrhage with surrounding edema though no significant mass effect or midline shift. Underlying lesions including metastasis or vascular malformations not excluded.      I personally discussed this study with KISHORE CHRISTIANSON on 3/4/2024 2:35 PM.                        Workstation performed: [id number]         CT cervical spine without contrast   Final Result by Tez Gill MD (03/04 1441)      1.  No cervical spine fracture or traumatic malalignment.      2.  Large right pleural effusion partially imaged.      The study was marked in EPIC for immediate notification.            Workstation performed: [id number]                    Procedures  ECG 12 Lead Documentation Only    Date/Time: 3/4/2024 1:39 PM    Performed by: Kishore Christianson DO  Authorized by: Kishore Christianson DO    ECG reviewed by me, the ED Provider: yes    Patient location:  ED  Interpretation:     Interpretation: abnormal    Rate:     ECG rate:  78    ECG rate assessment: normal    Rhythm:     Rhythm: sinus rhythm    Ectopy:     Ectopy: none    Conduction:     Conduction: abnormal      Abnormal conduction: complete RBBB, LAFB and bifascicular block    ST segments:     ST segments:  Normal  CriticalCare Time    Date/Time: 3/6/2024 12:18 AM    Performed by: Kishore Christianson DO  Authorized by: Kishore Christianson DO    Critical care provider statement:     Critical care time (minutes):  45    Critical care  time was exclusive of:  Separately billable procedures and treating other patients    Critical care was necessary to treat or prevent imminent or life-threatening deterioration of the following conditions:  Trauma and metabolic crisis    Critical care was time spent personally by me on the following activities:  Blood draw for specimens, obtaining history from patient or surrogate, development of treatment plan with patient or surrogate, discussions with consultants, evaluation of patient's response to treatment, examination of patient, review of old charts, re-evaluation of patient's condition, ordering and review of radiographic studies, ordering and review of laboratory studies, ordering and performing treatments and interventions and interpretation of cardiac output measurements           ED Course                               SBIRT 22yo+      Flowsheet Row Most Recent Value   Initial Alcohol Screen: US AUDIT-C     1. How often do you have a drink containing alcohol? 6 Filed at: 03/04/2024 1316   2. How many drinks containing alcohol do you have on a typical day you are drinking?  1 Filed at: 03/04/2024 1316   3a. Male UNDER 65: How often do you have five or more drinks on one occasion? 0 Filed at: 03/04/2024 1316   3b. FEMALE Any Age, or MALE 65+: How often do you have 4 or more drinks on one occassion? 0 Filed at: 03/04/2024 1316   Audit-C Score 7 Filed at: 03/04/2024 1316   KIZZY: How many times in the past year have you...    Used an illegal drug or used a prescription medication for non-medical reasons? Never Filed at: 03/04/2024 1316                      Medical Decision Making  Pulse ox 95% on room air indicating adequate oxygenation.  Xray R shoulder: Clavicle fracture no shoulder dislocation or humeral fracture as read by me  Xray L Shoulder: No fracture or dislocation as read by me  CXR: Right sided pleural effusion as read by me  Xray R hip/Pelvis: No fracture or dislocation as read by me    CT scan  of the head showed intraparenchymal hemorrhages.  Trauma surgery on-call with Dr. Lopez contacted and case discussed and accepted for transfer under trauma service.  Patient also noted to be hyperkalemia and have a pleural effusion likely secondary to missed dialysis.  He was given IV calcium glucose and insulin.  Nephrology on-call Dr. Meade contacted and case discussed.      Amount and/or Complexity of Data Reviewed  Labs: ordered.  Radiology: ordered and independent interpretation performed.  ECG/medicine tests: ordered and independent interpretation performed.    Risk  OTC drugs.  Prescription drug management.             Disposition  Final diagnoses:   Fall, initial encounter   Intracranial hemorrhage (HCC)   Hyperkalemia   Pleural effusion   Right clavicle fracture     Time reflects when diagnosis was documented in both MDM as applicable and the Disposition within this note       Time User Action Codes Description Comment    3/4/2024  2:42 PM Yovany Armenta [W19.XXXA] Fall, initial encounter     3/4/2024  2:42 PM Yovany Armenta [I62.9] Intracranial hemorrhage (HCC)     3/4/2024  2:42 PM Yovany Armenta [E87.5] Hyperkalemia     3/4/2024  2:46 PM Yovany Armenta [J90] Pleural effusion     3/4/2024  4:02 PM Yovany Armenta [S42.001A] Right clavicle fracture           ED Disposition       ED Disposition   Transfer to Another Facility-In Network    Condition   --    Date/Time   Mon Mar 4, 2024 1441    Comment   Wilfridojoseph Bishop should be transferred out to [facility].               MD Documentation      Flowsheet Row Most Recent Value   Patient Condition The patient has been stabilized such that within reasonable medical probability, no material deterioration of the patient condition or the condition of the unborn child(heather) is likely to result from the transfer   Reason for Transfer Level of Care needed not available at this facility   Benefits of Transfer Specialized equipment and/or services  available at the receiving facility (Include comment)________________________   Risks of Transfer Potential deterioration of medical condition, Loss of IV, Possible worsening of condition or death during transfer, Increased discomfort during transfer, Potential for delay in receiving treatment   Accepting Physician Dr. Lopez   Accepting Facility Name, City & State  [facility]   Sending MD Dr. Armenta   Provider Certification General risk, such as traffic hazards, adverse weather conditions, rough terrain or turbulence, possible failure of equipment (including vehicle or aircraft), or consequences of actions of persons outside the control of the transport personnel          RN Documentation      Flowsheet Row Most Recent Value   Accepting Facility Name, City & State  [facility]   Report Given to Bri          Follow-up Information    None         Discharge Medication List as of 3/4/2024  4:20 PM        CONTINUE these medications which have NOT CHANGED    Details   acetaminophen (TYLENOL) 325 mg tablet Take 3 tablets (975 mg total) by mouth every 8 (eight) hours as needed for mild pain, Starting Thu 6/15/2023, No Print      ammonium lactate (LAC-HYDRIN) 12 % lotion Apply topically 2 (two) times a day as needed for dry skin, Starting Wed 8/30/2023, Normal      !! ascorbic acid (VITAMIN C) 500 mg tablet TAKE ONE TABLET BY MOUTH EVERY EVENING FOR SUPPLEMENT, Historical Med      !! ascorbic acid (VITAMIN C) 500 MG tablet Take 500 mg by mouth daily, Starting Thu 10/5/2023, Historical Med      aspirin (ECOTRIN LOW STRENGTH) 81 mg EC tablet Take 81 mg by mouth daily, Starting Wed 1/26/2022, Historical Med      atorvastatin (LIPITOR) 20 mg tablet Take 20 mg by mouth daily at bedtime, Starting Wed 1/26/2022, Historical Med      bacitracin topical ointment 500 units/g topical ointment Apply 1 large application topically 2 (two) times a day, Starting Wed 9/20/2023, Normal      bisacodyl (DULCOLAX) 10 mg suppository Insert into the rectum,  Starting Sun 10/1/2023, Historical Med      Cholecalciferol 50 MCG (2000 UT) CAPS Take by mouth daily, Starting Mon 3/22/2021, Historical Med      [place] Choice Comfort EZ 33G X 4 MM MISC Historical Med      clopidogrel (PLAVIX) 75 mg tablet Take 75 mg by mouth daily, Starting Fri 8/18/2023, Historical Med      clotrimazole-betamethasone (LOTRISONE) 1-0.05 % cream Apply topically in the morning, Starting Tue 11/14/2023, Normal      docusate sodium (COLACE) 100 mg capsule Take 1 capsule (100 mg total) by mouth daily for 14 days, Starting Tue 10/3/2023, Until Mon 1/15/2024, Normal      !! gabapentin (NEURONTIN) 100 mg capsule Take 1 capsule (100 mg total) by mouth daily at bedtime, Starting Wed 10/11/2023, Normal      !! gabapentin (NEURONTIN) 300 mg capsule TAKE ONE CAPSULE BY MOUTH EVERY NIGHT AT BEDTIME FOR 1 WEEK THEN TWICE DAILY FOR 1 WEEK THEN 1 CAPSULE THREE TIMES DAILY, Historical Med      glucagon 1 MG injection Inject 1 mg into a muscle, Starting Sun 10/1/2023, Historical Med      hydrOXYzine HCL (ATARAX) 25 mg tablet Historical Med      lactulose (CHRONULAC) 10 g/15 mL solution TAKE 15 ML BY MOUTH THREE TIMES DAILY, Historical Med      midodrine (PROAMATINE) 10 MG tablet Take 1 tablet (10 mg total) by mouth 3 (three) times a day before meals, Starting Wed 5/24/2023, Until Wed 1/24/2024, Normal      omeprazole (PriLOSEC) 40 MG capsule Take 40 mg by mouth daily Take before a meal, Starting Wed 11/15/2023, Historical Med      ondansetron (ZOFRAN-ODT) 8 mg disintegrating tablet Take 1 tablet by mouth, Starting Sat 12/30/2023, Historical Med      oxyCODONE (ROXICODONE) 5 immediate release tablet Take 0.5 tablets (2.5 mg total) by mouth every 8 (eight) hours as needed for severe pain for up to 10 doses Max Daily Amount: 7.5 mg, Starting u 9/14/2023, Normal      pantoprazole (PROTONIX) 40 mg tablet Take 1 tablet (40 mg total) by mouth daily, Starting Wed 1/24/2024, Until Tue 4/23/2024, Normal      polyethylene  glycol (MIRALAX) 17 g packet Take 17 g by mouth daily for 7 days, Starting Tue 10/3/2023, Until Mon 1/15/2024, Normal      vitamin B-12 (VITAMIN B-12) 1,000 mcg tablet Take 1,000 mcg by mouth daily, Starting Tue 1/24/2023, Historical Med       !! - Potential duplicate medications found. Please discuss with provider.          No discharge procedures on file.    PDMP Review         Value Time User    PDMP Reviewed  Yes 5/24/2023  5:40 PM Mary Stubbs MD            ED Provider  Electronically Signed by             Yovany Armenta DO  03/06/24 0019

## 2024-03-04 NOTE — EMTALA/ACUTE CARE TRANSFER
Critical access hospital EMERGENCY DEPARTMENT  [address] [facility] [address]  Dept: [phone]      EMTALA TRANSFER CONSENT    NAME Jeffery Bishop                                         1955                              MRN [id number]    I have been informed of my rights regarding examination, treatment, and transfer   by Dr. Yovany Armenta DO    Benefits: Specialized equipment and/or services available at the receiving facility (Include comment)________________________    Risks: Potential deterioration of medical condition, Loss of IV, Possible worsening of condition or death during transfer, Increased discomfort during transfer, Potential for delay in receiving treatment      Consent for Transfer:  I acknowledge that my medical condition has been evaluated and explained to me by the emergency department physician or other qualified medical person and/or my attending physician, who has recommended that I be transferred to the service of  Accepting Physician: Dr. Lopez at Accepting Facility Name, City & State : [facility]. The above potential benefits of such transfer, the potential risks associated with such transfer, and the probable risks of not being transferred have been explained to me, and I fully understand them.  The doctor has explained that, in my case, the benefits of transfer outweigh the risks.  I agree to be transferred.    I authorize the performance of emergency medical procedures and treatments upon me in both transit and upon arrival at the receiving facility.  Additionally, I authorize the release of any and all medical records to the receiving facility and request they be transported with me, if possible.  I understand that the safest mode of transportation during a medical emergency is an ambulance and that the Hospital advocates the use of this mode of transport. Risks of traveling to the receiving facility by car, including absence of medical control, life sustaining  equipment, such as oxygen, and medical personnel has been explained to me and I fully understand them.    (RODERICK CORRECT BOX BELOW)  [  ]  I consent to the stated transfer and to be transported by ambulance/helicopter.  [  ]  I consent to the stated transfer, but refuse transportation by ambulance and accept full responsibility for my transportation by car.  I understand the risks of non-ambulance transfers and I exonerate the Hospital and its staff from any deterioration in my condition that results from this refusal.    X___________________________________________    DATE  24  TIME________  Signature of patient or legally responsible individual signing on patient behalf           RELATIONSHIP TO PATIENT_________________________          Provider Certification    NAME Jeffery Bishpo                                        [facility] 1955                              MRN [id number]    A medical screening exam was performed on the above named patient.  Based on the examination:    Condition Necessitating Transfer The primary encounter diagnosis was Fall, initial encounter. Diagnoses of Intracranial hemorrhage (HCC), Hyperkalemia, and Pleural effusion were also pertinent to this visit.    Patient Condition: The patient has been stabilized such that within reasonable medical probability, no material deterioration of the patient condition or the condition of the unborn child(heather) is likely to result from the transfer    Reason for Transfer: Level of Care needed not available at this facility    Transfer Requirements: Facility B   Space available and qualified personnel available for treatment as acknowledged by    Agreed to accept transfer and to provide appropriate medical treatment as acknowledged by       Dr. Lopez  Appropriate medical records of the examination and treatment of the patient are provided at the time of transfer   STAFF INITIAL WHEN COMPLETED _______  Transfer will be performed by qualified  personnel from    and appropriate transfer equipment as required, including the use of necessary and appropriate life support measures.    Provider Certification: I have examined the patient and explained the following risks and benefits of being transferred/refusing transfer to the patient/family:  General risk, such as traffic hazards, adverse weather conditions, rough terrain or turbulence, possible failure of equipment (including vehicle or aircraft), or consequences of actions of persons outside the control of the transport personnel      Based on these reasonable risks and benefits to the patient and/or the unborn child(heather), and based upon the information available at the time of the patient’s examination, I certify that the medical benefits reasonably to be expected from the provision of appropriate medical treatments at another medical facility outweigh the increasing risks, if any, to the individual’s medical condition, and in the case of labor to the unborn child, from effecting the transfer.    X____________________________________________ DATE 03/04/24        TIME_______      ORIGINAL - SEND TO MEDICAL RECORDS   COPY - SEND WITH PATIENT DURING TRANSFER

## 2024-03-04 NOTE — CONSULTS
Consultation - Nephrology   Jeffery Bishop 68 y.o. male MRN: [id number]  Unit/Bed#: ED 05 Encounter: [id number]    ASSESSMENT AND PLAN:  Patient is 68-year-old male with significant medical issues of ESRD on HD on TTS schedule, hypertension, hyperlipidemia, CAD, status post PCI, presented with episode of fall and found to have intracranial hemorrhage.  We are consulted for dialysis management.    ESRD on HD on TTS schedule at [facility]  -Missed dialysis on Saturday.  -Noted to have severe hyperkalemia.  Will do emergent dialysis today  -Plan for dialysis again tomorrow to keep patient on TTS schedule.  -Outpatient dry weight  61.5 kg.  -Discussed with primary surgery team regarding CT head findings.  Given no significant mass effect or no midline shift, will plan to do short session of hemodialysis as below.    Access, currently has permacath    Severe life-threatening hyperkalemia, serum potassium 7.3.  Status post dextrose, insulin in ER.  Plan for dialysis today with 2K bath for 1 hour and 1K bath for second hour.    CKD anemia, remains on Mircera 30 mcg every 4 weeks as outpatient.  -Hemoglobin slightly lower, continue to monitor closely    CKD BMD, continue to monitor phosphorus, PTH    Chronic hypotension, as per outpatient dialysis unit records, patient is on midodrine 10 mg 3 times daily  -BP higher in the ER upon arrival, hold off midodrine for now    Multifocal intracranial parenchymal hemorrhage with surrounding edema, no mass effect or no midline shift.  Further management as per primary team.    Discussed above plan in detail with primary team regarding plan for hemodialysis and they agree with above recommendations.  As per my discussion with surgery team, CT head finding shows multifocal parenchymal hemorrhage with mild surrounding edema but given no significant mass effect or midline shift, they are okay to proceed with hemodialysis.    HISTORY OF PRESENT ILLNESS:  Requesting Physician: Da  DO Jessica  Reason for Consult: ESRD on HD    Jeffery Bishop is a 68 y.o. year old male who was admitted to [facility] after presenting with episode of fall. A renal consultation is requested today for assistance in the management of ESRD.  Old medical records including prior lab values were reviewed from [facility] records.  Outpatient dialysis unit records reviewed.  Patient presented after episode of fall and found to have intracranial hemorrhage.  Patient initially presented to [facility] and eventually transferred to [facility] for trauma team evaluation.  At the time of my encounter remains on room air.  Denies any chest pain or any worsening shortness of breath.  Denies any nausea or vomiting.    PAST MEDICAL HISTORY:  Past Medical History:   Diagnosis Date    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    With Cerebral Infarction:  Last Assessed:  October 20, 2016    Chronic kidney disease 2012    on dialysis     COVID 05/2022    COVID-19 04/2022 4/2022-while in [place]    Diabetes mellitus ([facility])     Dialysis patient ([facility])     T/TH/Sat    Difficulty walking     GERD (gastroesophageal reflux disease)     Hyperlipidemia     Hypertension     Labyrinthitis 09/10/2011    Myocardial infarction ([facility]) 2014    x3 others were in 2009 & 2010    Sleep disturbances 09/20/2010    Stroke ([facility]) 2007    x1, RLE deficit- uses walker    Type 2 diabetes, uncontrolled, with renal manifestation 05/03/2017       PAST SURGICAL HISTORY:  Past Surgical History:   Procedure Laterality Date    AV FISTULA PLACEMENT      AV FISTULA REPAIR Left 6/3/2023    Procedure: LEFT UPPER EXTREMITY A-V GRAFT EXPLANT, LEFT BRACHIAL ANGIOPATCH,  APPLICATION OF  VAC;  Surgeon: Neyda Rider MD;  Location: [facility];  Service: Vascular    CARDIAC CATHETERIZATION Left 02/03/2023    Procedure: Cardiac Left Heart Cath;  Surgeon: Tiffani Gregorio MD;  Location: [facility] CARD[facility] C[facility] LAB;  Service: Cardiology    CARDIAC CATHETERIZATION N/A 02/08/2023     "Procedure: Cardiac catheterization with complex PCI with Dr. Patricio, patient is a renal dialysis patient;  Surgeon: Marcos Ramirez MD;  Location:  CARDIAC CATH LAB;  Service: Cardiology    CARDIAC SURGERY  2010    stents x3    COLONOSCOPY N/A 12/12/2016    Procedure: COLONOSCOPY;  Surgeon: Radha Moss MD;  Location: [facility] GI LAB;  Service:     COLONOSCOPY N/A 04/03/2017    Procedure:  COLONOSCOPY;  Surgeon: Radha Moss MD;  Location: [facility] GI LAB;  Service:     HARDWARE REMOVAL Right 04/04/2022    Procedure: REMOVAL HARDWARE HIP;  Surgeon: Moises Kent MD;  Location: [facility];  Service: Orthopedics    IR AV FISTULA/GRAFT DECLOT  04/29/2021    IR AV FISTULA/GRAFT DECLOT  03/25/2022    IR AV FISTULA/GRAFT DECLOT  09/21/2022    IR AV FISTULAGRAM/GRAFTOGRAM  03/28/2022    IR AV FISTULAGRAM/GRAFTOGRAM  07/28/2022    IR TUNNELED CENTRAL LINE REMOVAL  12/07/2021    IR TUNNELED DIALYSIS CATHETER CHECK/CHANGE/REPOSITION/ANGIOPLASTY  2/1/2024    IR TUNNELED DIALYSIS CATHETER PLACEMENT  05/24/2021    PORTACATH PLACEMENT      per pt \"port in chest\"    IN ARTERIOVENOUS ANASTOMOSIS OPEN DIRECT Left 07/22/2021    Procedure: CREATION FISTULA ARTERIOVENOUS (AV);  Surgeon: Kareem Nye MD;  Location: [facility];  Service: Vascular    IN ARTERIOVENOUS ANASTOMOSIS OPEN DIRECT Left 3/21/2023    Procedure: left brachiobasilic fistula,  AVG Graft;  Surgeon: Catalino Hook MD;  Location:  MAIN OR;  Service: Vascular    IN ARTERIOVENOUS ANASTOMOSIS OPEN DIRECT Left 5/19/2023    Procedure: CREATION of LEFT FISTULA ARTERIOVENOUS (AV) GRAFT;  Surgeon: Catalino Hook MD;  Location:  MAIN OR;  Service: Vascular    IN HEMIARTHROPLASTY HIP PARTIAL Right 04/04/2022    Procedure: HEMIARTHROPLASTY HIP (BIPOLAR);  Surgeon: Moises Kent MD;  Location: [facility];  Service: Orthopedics       ALLERGIES:  No Known Allergies    SOCIAL HISTORY:  Social History     Substance and Sexual Activity   Alcohol Use " Yes    Alcohol/week: 1.0 standard drink of alcohol    Types: 1 Shots of liquor per week    Comment: 1 shot daily     Social History     Substance and Sexual Activity   Drug Use Yes    Types: Marijuana    Comment: gummies     Social History     Tobacco Use   Smoking Status Every Day    Current packs/day: 1.00    Average packs/day: 1 pack/day for 30.0 years (30.0 ttl pk-yrs)    Types: Cigarettes   Smokeless Tobacco Never   Tobacco Comments    Currently not smoking - in facility       FAMILY HISTORY:  Family History   Problem Relation Age of Onset    Leukemia Mother     Crohn's disease Father     Heart disease Father     Transient ischemic attack Brother        MEDICATIONS:  No current facility-administered medications for this encounter.    Current Outpatient Medications:     acetaminophen (TYLENOL) 325 mg tablet, Take 3 tablets (975 mg total) by mouth every 8 (eight) hours as needed for mild pain, Disp: , Rfl: 0    ammonium lactate (LAC-HYDRIN) 12 % lotion, Apply topically 2 (two) times a day as needed for dry skin, Disp: 396 g, Rfl: 0    ascorbic acid (VITAMIN C) 500 mg tablet, TAKE ONE TABLET BY MOUTH EVERY EVENING FOR SUPPLEMENT, Disp: , Rfl:     ascorbic acid (VITAMIN C) 500 MG tablet, Take 500 mg by mouth daily, Disp: , Rfl:     aspirin (ECOTRIN LOW STRENGTH) 81 mg EC tablet, Take 81 mg by mouth daily, Disp: , Rfl:     atorvastatin (LIPITOR) 20 mg tablet, Take 20 mg by mouth daily at bedtime, Disp: , Rfl:     bacitracin topical ointment 500 units/g topical ointment, Apply 1 large application topically 2 (two) times a day (Patient not taking: Reported on 1/11/2024), Disp: 28 g, Rfl: 0    bisacodyl (DULCOLAX) 10 mg suppository, Insert into the rectum (Patient not taking: Reported on 1/11/2024), Disp: , Rfl:     Cholecalciferol 50 MCG (2000 UT) CAPS, Take by mouth daily, Disp: , Rfl:     [place] Choice Comfort EZ 33G X 4 MM MISC, , Disp: , Rfl:     clopidogrel (PLAVIX) 75 mg tablet, Take 75 mg by mouth daily  (Patient not taking: Reported on 11/13/2023), Disp: , Rfl:     clotrimazole-betamethasone (LOTRISONE) 1-0.05 % cream, Apply topically in the morning (Patient not taking: Reported on 1/11/2024), Disp: 45 g, Rfl: 2    docusate sodium (COLACE) 100 mg capsule, Take 1 capsule (100 mg total) by mouth daily for 14 days, Disp: 14 capsule, Rfl: 0    gabapentin (NEURONTIN) 100 mg capsule, Take 1 capsule (100 mg total) by mouth daily at bedtime, Disp: 30 capsule, Rfl: 3    gabapentin (NEURONTIN) 300 mg capsule, TAKE ONE CAPSULE BY MOUTH EVERY NIGHT AT BEDTIME FOR 1 WEEK THEN TWICE DAILY FOR 1 WEEK THEN 1 CAPSULE THREE TIMES DAILY, Disp: , Rfl:     glucagon 1 MG injection, Inject 1 mg into a muscle (Patient not taking: Reported on 11/13/2023), Disp: , Rfl:     hydrOXYzine HCL (ATARAX) 25 mg tablet, , Disp: , Rfl:     lactulose (CHRONULAC) 10 g/15 mL solution, TAKE 15 ML BY MOUTH THREE TIMES DAILY, Disp: , Rfl:     midodrine (PROAMATINE) 10 MG tablet, Take 1 tablet (10 mg total) by mouth 3 (three) times a day before meals (Patient taking differently: Take 10 mg by mouth 3 (three) times a day before meals Takes BID), Disp: 90 tablet, Rfl: 0    omeprazole (PriLOSEC) 40 MG capsule, Take 40 mg by mouth daily Take before a meal, Disp: , Rfl:     ondansetron (ZOFRAN-ODT) 8 mg disintegrating tablet, Take 1 tablet by mouth, Disp: , Rfl:     oxyCODONE (ROXICODONE) 5 immediate release tablet, Take 0.5 tablets (2.5 mg total) by mouth every 8 (eight) hours as needed for severe pain for up to 10 doses Max Daily Amount: 7.5 mg, Disp: 5 tablet, Rfl: 0    pantoprazole (PROTONIX) 40 mg tablet, Take 1 tablet (40 mg total) by mouth daily Take prior to breakfast, Disp: 30 tablet, Rfl: 3    pantoprazole (PROTONIX) 40 mg tablet, Take 1 tablet (40 mg total) by mouth daily, Disp: 90 tablet, Rfl: 0    polyethylene glycol (MIRALAX) 17 g packet, Take 17 g by mouth daily for 7 days, Disp: 119 g, Rfl: 0    vitamin B-12 (VITAMIN B-12) 1,000 mcg tablet, Take  "1,000 mcg by mouth daily, Disp: , Rfl:     REVIEW OF SYSTEMS:  More than 10 point review of systems were obtained and discussed in length with the patient. Review of systems were negative / unremarkable except mentioned in the HPI section.     PHYSICAL EXAM:  Current Weight:    First Weight:    Vitals:    03/04/24 1657   BP: 162/72   Resp: 16   SpO2: 92%     No intake or output data in the 24 hours ending 03/04/24 1740  Wt Readings from Last 3 Encounters:   03/04/24 55.1 kg (121 lb 7.6 oz)   01/17/24 55.8 kg (123 lb)   01/11/24 55.8 kg (123 lb)     Temp Readings from Last 3 Encounters:   03/04/24 98.8 °F (37.1 °C) (Temporal)   01/24/24 98.4 °F (36.9 °C) (Temporal)   12/28/23 (!) 96.3 °F (35.7 °C) (Tympanic)     BP Readings from Last 3 Encounters:   03/04/24 162/72   03/04/24 151/78   02/01/24 126/63     Pulse Readings from Last 3 Encounters:   03/04/24 82   02/01/24 (!) 53   01/24/24 (!) 53        Physical Examination:  Eyes: Mild conjunctival pallor present  ENT: External examination of ear and nose unremarkable  Neck: No obvious lymphadenopathy appreciated  Respiratory: Bilateral air entry present  CVS: No significant edema in legs  GI: Soft, nondistended  CNS: Active, alert, follows commands  Skin: No new rash  Musculoskeletal: No obvious new gross deformity noted    Invasive Devices:      Lab Results:   Results from last 7 days   Lab Units 03/04/24  1343   WBC Thousand/uL 4.78   HEMOGLOBIN g/dL 9.3*   HEMATOCRIT % 28.6*   PLATELETS Thousands/uL 130*   POTASSIUM mmol/L 7.3*   CHLORIDE mmol/L 96   CO2 mmol/L 26   BUN mg/dL 89*   CREATININE mg/dL 10.19*   CALCIUM mg/dL 8.6       Other Studies:   XR chest pa & lateral    (Results Pending)   XR chest pa & lateral    (Results Pending)   Chest X images personally reviewed which shows trace pleural effusion, right clavicular fracture.    Portions of the record may have been created with voice recognition software. Occasional wrong word or \"sound a like\" substitutions " may have occurred due to the inherent limitations of voice recognition software. Read the chart carefully and recognize, using context, where substitutions have occurred.
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
## 2024-03-04 NOTE — H&P
H&P - Trauma   Jeffery Bishop 68 y.o. male MRN: [id number]  Unit/Bed#: ED 05 Encounter: [id number]    Trauma Alert: Evaluation; trauma team notified at 5:00 PM via text   Model of Arrival: Ambulance    Trauma Team: Attending To, Residents Michelle, and Fellow Joey  Consultants:     Orthopedics: routine consult; Epic consult order placed;      Neurosurgery: routine consult; Epic consult order placed;      Other: {routine consult; Epic consult order placed;     Assessment/Plan   Active Problems / Assessment:   67 yo male who presents as a trauma transfer to [facility] for further management of his multiple intraparenchymal hemorrhages, hyperkalemia, right clavicular fracture, and bilateral pleural effusions. Patient needing urgent dialysis and will proceed with HOT protocol for intraparenchymal bleeds. Was reversed with DDAVP. Will hold AP/AC at this point in time.       Plan:   Orthopaedic consult for management of right clavicle fracture  Neurosurgery consult for management of intraparenchymal hemorrhage  Palliative consult for further discussion regarding hospice per patient  Geriatric consult for recommendations regarding polypharmacy  Hold DVT prophylaxis at this point in time  Admit to trauma surgery  HOT protocol  Keppra prophylaxis  Will repeat electrolytes at 12:00 midnight  Nephrology for management of dialysis  Will proceed with dialysis today    History of Present Illness     Chief Complaint: Fall  Mechanism:Fall     HPI:    Jeffery Bishop is a 68 y.o. male who presents as a trauma transfer. Patient presented to [facility] after a fall. Patient states he was moving from his motorized scooter to a wheelchair when he fell. The patient denies having loss of consciousness, denies blacking out. Endorses head strike. Patient denies having nausea, vomiting, fevers, chills, chest pain, shortness of breath. He is tolerating PO intake. He does not void. Patient is on hemodialysis via his right port on a Tuesday, Thursday,  Saturday schedule. Patient is currently on aspirin and plavix. He was reversed with DDAVP. He denies taking anticoagulation. PMHX is pertinent for CKD on dialysis as stated above, COVID, T2DM, GERD, HLD, HTN, MI, Stroke with right sided deficits. PSHX includes coronary stents, AV fistula that is no longer usable. Has 0.5 PPD history of smoking. Patient presented to [facility] afebrile with stable vitals. He was hyperkalemic at [place] to 7.1. See above for assessment and plan.    3/4 CT head: Multifocal parenchymal hemorrhage with surrounding edema. No mass effect, no midline shift.     3/4 CT c-spine: Negative for acute traumatic findings    3/4 CXRL Large right pleural effusion and trace left pleural effusion.     3/4 Right shoudler xray: Nondisplaced right clavicular fracture.      Review of Systems   Constitutional:  Negative for activity change, appetite change, chills, fatigue and fever.   HENT:  Negative for congestion.    Eyes:  Negative for visual disturbance.   Respiratory:  Negative for chest tightness and shortness of breath.    Cardiovascular:  Negative for chest pain and palpitations.   Gastrointestinal:  Negative for abdominal pain, nausea and vomiting.   Genitourinary:  Negative for flank pain.   Musculoskeletal:  Negative for back pain.   Skin:  Negative for wound.   Neurological:  Negative for dizziness and headaches.   Hematological:  Bruises/bleeds easily.   Psychiatric/Behavioral:  Negative for agitation and confusion.      12-point, complete review of systems was reviewed and negative except as stated above.     Historical Information     Past Medical History:   Diagnosis Date    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    With Cerebral Infarction:  Last Assessed:  October 20, 2016    Chronic kidney disease 2012    on dialysis     COVID 05/2022    COVID-19 04/2022 4/2022-while in [place]    Diabetes mellitus (HCC)     Dialysis patient (HCC)     T/TH/Sat    Difficulty walking     GERD (gastroesophageal reflux  "disease)     Hyperlipidemia     Hypertension     Labyrinthitis 09/10/2011    Myocardial infarction (HCC) 2014    x3 others were in 2009 & 2010    Sleep disturbances 09/20/2010    Stroke (HCC) 2007    x1, RLE deficit- uses walker    Type 2 diabetes, uncontrolled, with renal manifestation 05/03/2017     Past Surgical History:   Procedure Laterality Date    AV FISTULA PLACEMENT      AV FISTULA REPAIR Left 6/3/2023    Procedure: LEFT UPPER EXTREMITY A-V GRAFT EXPLANT, LEFT BRACHIAL ANGIOPATCH,  APPLICATION OF  VAC;  Surgeon: Neyda Rider MD;  Location: [facility];  Service: Vascular    CARDIAC CATHETERIZATION Left 02/03/2023    Procedure: Cardiac Left Heart Cath;  Surgeon: Tiffani Gregorio MD;  Location: [facility] C[facility];  Service: Cardiology    CARDIAC CATHETERIZATION N/A 02/08/2023    Procedure: Cardiac catheterization with complex PCI with Dr. Patricio, patient is a renal dialysis patient;  Surgeon: Marcos Ramirez MD;  Location:  CARDIAC CATH LAB;  Service: Cardiology    CARDIAC SURGERY  2010    stents x3    COLONOSCOPY N/A 12/12/2016    Procedure: COLONOSCOPY;  Surgeon: Radha Moss MD;  Location: [facility] GI LAB;  Service:     COLONOSCOPY N/A 04/03/2017    Procedure:  COLONOSCOPY;  Surgeon: Radha Moss MD;  Location: [facility] GI LAB;  Service:     HARDWARE REMOVAL Right 04/04/2022    Procedure: REMOVAL HARDWARE HIP;  Surgeon: Moises Kent MD;  Location:  MAIN OR;  Service: Orthopedics    IR AV FISTULA/GRAFT DECLOT  04/29/2021    IR AV FISTULA/GRAFT DECLOT  03/25/2022    IR AV FISTULA/GRAFT DECLOT  09/21/2022    IR AV FISTULAGRAM/GRAFTOGRAM  03/28/2022    IR AV FISTULAGRAM/GRAFTOGRAM  07/28/2022    IR TUNNELED CENTRAL LINE REMOVAL  12/07/2021    IR TUNNELED DIALYSIS CATHETER CHECK/CHANGE/REPOSITION/ANGIOPLASTY  2/1/2024    IR TUNNELED DIALYSIS CATHETER PLACEMENT  05/24/2021    PORTACATH PLACEMENT      per pt \"port in chest\"    FL ARTERIOVENOUS ANASTOMOSIS OPEN DIRECT Left 07/22/2021    Procedure: " CREATION FISTULA ARTERIOVENOUS (AV);  Surgeon: Kareem Nye MD;  Location: [facility];  Service: Vascular    HI ARTERIOVENOUS ANASTOMOSIS OPEN DIRECT Left 3/21/2023    Procedure: left brachiobasilic fistula,  AVG Graft;  Surgeon: Catalino Hook MD;  [facility]: [facility];  Service: Vascular    HI ARTERIOVENOUS ANASTOMOSIS OPEN DIRECT Left 5/19/2023    Procedure: CREATION of LEFT FISTULA ARTERIOVENOUS (AV) GRAFT;  Surgeon: Catalino Hook MD;  [facility]: [facility];  Service: Vascular    HI HEMIARTHROPLASTY HIP PARTIAL Right 04/04/2022    Procedure: HEMIARTHROPLASTY HIP (BIPOLAR);  Surgeon: Moises Kent MD;  Location: [facility];  Service: Orthopedics        Social History     Tobacco Use    Smoking status: Every Day     Current packs/day: 1.00     Average packs/day: 1 pack/day for 30.0 years (30.0 ttl pk-yrs)     Types: Cigarettes    Smokeless tobacco: Never    Tobacco comments:     Currently not smoking - in facility   Vaping Use    Vaping status: Never Used   Substance Use Topics    Alcohol use: Yes     Alcohol/week: 1.0 standard drink of alcohol     Types: 1 Shots of liquor per week     Comment: 1 shot daily    Drug use: Yes     Types: Marijuana     Comment: gummies     Immunization History   Administered Date(s) Administered    COVID-19 MODERNA VACC 0.5 ML IM 04/08/2021, 05/06/2021, 01/21/2022    Hep B, adult 03/01/2013, 04/01/2013, 05/01/2013, 09/02/2013, 02/01/2015    INFLUENZA 09/01/2022, 10/19/2023    Influenza, high dose seasonal 0.7 mL 09/23/2020, 10/11/2021, 10/10/2022, 10/05/2023    Influenza, seasonal, injectable 12/04/2012, 11/01/2015    Influenza, seasonal, injectable, preservative free 10/05/2016, 10/03/2018    Pneumococcal Conjugate 13-Valent 08/26/2019, 09/01/2022    Pneumococcal Polysaccharide PPV23 03/08/2013, 11/14/2014, 11/11/2019    Tdap 10/26/2010, 10/03/2021    Tuberculin Skin Test-PPD Intradermal 06/16/2023, 06/22/2023     Last Tetanus: N/a  Family History:  Non-contributory    Did you order a geriatric consult if the score was 2 or greater?: yes     Meds/Allergies   all current active meds have been reviewed  Allergies have not been reviewed;  No Known Allergies    Objective   Initial Vitals:   Respirations: 16 (03/04/24 1657)  Blood Pressure: 162/72 (03/04/24 1657)    Primary Survey:   Airway:        Status: patent;        Pre-hospital Interventions: none        Hospital Interventions: none  Breathing:        Pre-hospital Interventions: none       Effort: normal       Right breath sounds: normal       Left breath sounds: normal  Circulation:        Rhythm: regular       Rate: regular   Right Pulses Left Pulses    R radial: 2+         L radial: 2+           Disability:        GCS: Eye: 4; Verbal: 5 Motor: 6 Total: 15       Right Pupil: round;  reactive         Left Pupil:  round;  reactive      R Motor Strength L Motor Strength    R : 4/5  R dorsiflex: 4/5  R plantarflex: 4/5 L : 5/5  L dorsiflex: 5/5  L plantarflex: 5/5        Sensory:  No sensory deficit  Exposure:       Completed: Yes      Secondary Survey:  Physical Exam  HENT:      Head: Normocephalic.      Nose: Nose normal.      Mouth/Throat:      Mouth: Mucous membranes are moist.      Pharynx: Oropharynx is clear.   Eyes:      Extraocular Movements: Extraocular movements intact.      Pupils: Pupils are equal, round, and reactive to light.   Cardiovascular:      Rate and Rhythm: Normal rate.      Pulses: Normal pulses.   Pulmonary:      Effort: Pulmonary effort is normal.   Abdominal:      General: Abdomen is flat.      Palpations: Abdomen is soft.   Musculoskeletal:         General: No swelling or tenderness.      Cervical back: Normal range of motion.      Comments: Significant motor weakness in RUE and RLE compared to the left side. Patient states he has had these deficits 2/2 his stroke.    Skin:     General: Skin is warm.   Neurological:      General: No focal deficit present.      Mental Status:  He is alert and oriented to person, place, and time.   Psychiatric:         Mood and Affect: Mood normal.         Behavior: Behavior normal.         Invasive Devices       Peripheral Intravenous Line  Duration             Peripheral IV 03/04/24 Right;Ventral (anterior) Forearm <1 day              Hemodialysis Catheter  Duration             HD Permanent Double Catheter 32 days                  Lab Results: I have personally reviewed all pertinent laboratory/test results 03/04/24 and in the preceding 24 hours.  Recent Labs     03/04/24  1343   WBC 4.78   HGB 9.3*   HCT 28.6*   *   SODIUM 138   K 7.3*   CL 96   CO2 26   BUN 89*   CREATININE 10.19*   GLUC 118   AST 17   ALT 11   ALB 3.0*   TBILI 0.46   ALKPHOS 69       Imaging Results: I have personally reviewed pertinent images saved in PACS. CT scan findings (and other pertinent positive findings on images) were discussed with radiology. My interpretation of the images/reports are as follows:  Chest Xray(s): positive for acute findings: see above   FAST exam(s): N/A   CT Scan(s): positive for acute findings: see above   Additional Xray(s): positive for acute findings: see above     Other Studies: N/a    Code Status: Prior  Advance Directive and Living Will:      Power of :    POLST:    I have spent 30 minutes with Patient and family today in which greater than 50% of this time was spent in counseling/coordination of care regarding Diagnostic results and Prognosis.

## 2024-03-05 ENCOUNTER — APPOINTMENT (INPATIENT)
Dept: DIALYSIS | Facility: HOSPITAL | Age: 69
DRG: 085 | End: 2024-03-05
Attending: INTERNAL MEDICINE
Payer: MEDICARE

## 2024-03-05 ENCOUNTER — APPOINTMENT (INPATIENT)
Dept: RADIOLOGY | Facility: HOSPITAL | Age: 69
DRG: 085 | End: 2024-03-05
Payer: MEDICARE

## 2024-03-05 PROBLEM — Z71.89 GOALS OF CARE, COUNSELING/DISCUSSION: Status: ACTIVE | Noted: 2024-03-05

## 2024-03-05 PROBLEM — S42.001A CLOSED NONDISPLACED FRACTURE OF RIGHT CLAVICLE: Status: ACTIVE | Noted: 2024-03-05

## 2024-03-05 PROBLEM — Z51.5 PALLIATIVE CARE BY SPECIALIST: Status: ACTIVE | Noted: 2024-03-05

## 2024-03-05 PROBLEM — S06.33AA INTRAPARENCHYMAL HEMATOMA OF BRAIN DUE TO TRAUMA (HCC): Status: ACTIVE | Noted: 2024-03-05

## 2024-03-05 LAB
ALBUMIN SERPL BCP-MCNC: 3.3 G/DL (ref 3.5–5)
ALP SERPL-CCNC: 80 U/L (ref 34–104)
ALT SERPL W P-5'-P-CCNC: 13 U/L (ref 7–52)
ANION GAP SERPL CALCULATED.3IONS-SCNC: 13 MMOL/L
ANION GAP SERPL CALCULATED.3IONS-SCNC: 17 MMOL/L
AST SERPL W P-5'-P-CCNC: 20 U/L (ref 13–39)
ATRIAL RATE: 80 BPM
BASOPHILS # BLD AUTO: 0.04 THOUSANDS/ÂΜL (ref 0–0.1)
BASOPHILS NFR BLD AUTO: 1 % (ref 0–1)
BILIRUB SERPL-MCNC: 0.57 MG/DL (ref 0.2–1)
BUN SERPL-MCNC: 50 MG/DL (ref 5–25)
BUN SERPL-MCNC: 54 MG/DL (ref 5–25)
CA-I BLD-SCNC: 1.01 MMOL/L (ref 1.12–1.32)
CA-I BLD-SCNC: 1.05 MMOL/L (ref 1.12–1.32)
CALCIUM ALBUM COR SERPL-MCNC: 9.3 MG/DL (ref 8.3–10.1)
CALCIUM SERPL-MCNC: 8.3 MG/DL (ref 8.4–10.2)
CALCIUM SERPL-MCNC: 8.7 MG/DL (ref 8.4–10.2)
CHLORIDE SERPL-SCNC: 96 MMOL/L (ref 96–108)
CHLORIDE SERPL-SCNC: 96 MMOL/L (ref 96–108)
CO2 SERPL-SCNC: 27 MMOL/L (ref 21–32)
CO2 SERPL-SCNC: 28 MMOL/L (ref 21–32)
CREAT SERPL-MCNC: 6.68 MG/DL (ref 0.6–1.3)
CREAT SERPL-MCNC: 6.93 MG/DL (ref 0.6–1.3)
EOSINOPHIL # BLD AUTO: 0.17 THOUSAND/ÂΜL (ref 0–0.61)
EOSINOPHIL NFR BLD AUTO: 3 % (ref 0–6)
ERYTHROCYTE [DISTWIDTH] IN BLOOD BY AUTOMATED COUNT: 14.6 % (ref 11.6–15.1)
GFR SERPL CREATININE-BSD FRML MDRD: 7 ML/MIN/1.73SQ M
GFR SERPL CREATININE-BSD FRML MDRD: 7 ML/MIN/1.73SQ M
GLUCOSE SERPL-MCNC: 66 MG/DL (ref 65–140)
GLUCOSE SERPL-MCNC: 77 MG/DL (ref 65–140)
GLUCOSE SERPL-MCNC: 77 MG/DL (ref 65–140)
GLUCOSE SERPL-MCNC: 79 MG/DL (ref 65–140)
GLUCOSE SERPL-MCNC: 83 MG/DL (ref 65–140)
GLUCOSE SERPL-MCNC: 88 MG/DL (ref 65–140)
GLUCOSE SERPL-MCNC: 95 MG/DL (ref 65–140)
HCT VFR BLD AUTO: 30 % (ref 36.5–49.3)
HGB BLD-MCNC: 9.4 G/DL (ref 12–17)
IMM GRANULOCYTES # BLD AUTO: 0.03 THOUSAND/UL (ref 0–0.2)
IMM GRANULOCYTES NFR BLD AUTO: 1 % (ref 0–2)
INR PPP: 1.3 (ref 0.84–1.19)
INR PPP: 1.31 (ref 0.84–1.19)
LYMPHOCYTES # BLD AUTO: 0.3 THOUSANDS/ÂΜL (ref 0.6–4.47)
LYMPHOCYTES NFR BLD AUTO: 5 % (ref 14–44)
MAGNESIUM SERPL-MCNC: 1.9 MG/DL (ref 1.9–2.7)
MAGNESIUM SERPL-MCNC: 2 MG/DL (ref 1.9–2.7)
MCH RBC QN AUTO: 32 PG (ref 26.8–34.3)
MCHC RBC AUTO-ENTMCNC: 31.3 G/DL (ref 31.4–37.4)
MCV RBC AUTO: 102 FL (ref 82–98)
MONOCYTES # BLD AUTO: 0.34 THOUSAND/ÂΜL (ref 0.17–1.22)
MONOCYTES NFR BLD AUTO: 6 % (ref 4–12)
NEUTROPHILS # BLD AUTO: 4.94 THOUSANDS/ÂΜL (ref 1.85–7.62)
NEUTS SEG NFR BLD AUTO: 84 % (ref 43–75)
NRBC BLD AUTO-RTO: 0 /100 WBCS
P AXIS: 74 DEGREES
PHOSPHATE SERPL-MCNC: 7.9 MG/DL (ref 2.3–4.1)
PHOSPHATE SERPL-MCNC: 8.8 MG/DL (ref 2.3–4.1)
PLATELET # BLD AUTO: 129 THOUSANDS/UL (ref 149–390)
PMV BLD AUTO: 9.8 FL (ref 8.9–12.7)
POTASSIUM SERPL-SCNC: 5.6 MMOL/L (ref 3.5–5.3)
POTASSIUM SERPL-SCNC: 6 MMOL/L (ref 3.5–5.3)
PR INTERVAL: 164 MS
PROT SERPL-MCNC: 6.6 G/DL (ref 6.4–8.4)
PROTHROMBIN TIME: 16 SECONDS (ref 11.6–14.5)
PROTHROMBIN TIME: 16.2 SECONDS (ref 11.6–14.5)
QRS AXIS: -70 DEGREES
QRSD INTERVAL: 126 MS
QT INTERVAL: 436 MS
QTC INTERVAL: 502 MS
RBC # BLD AUTO: 2.94 MILLION/UL (ref 3.88–5.62)
SODIUM SERPL-SCNC: 137 MMOL/L (ref 135–147)
SODIUM SERPL-SCNC: 140 MMOL/L (ref 135–147)
T WAVE AXIS: 67 DEGREES
VENTRICULAR RATE: 80 BPM
WBC # BLD AUTO: 5.82 THOUSAND/UL (ref 4.31–10.16)

## 2024-03-05 PROCEDURE — 99223 1ST HOSP IP/OBS HIGH 75: CPT | Performed by: NURSE PRACTITIONER

## 2024-03-05 PROCEDURE — 82948 REAGENT STRIP/BLOOD GLUCOSE: CPT

## 2024-03-05 PROCEDURE — 93005 ELECTROCARDIOGRAM TRACING: CPT

## 2024-03-05 PROCEDURE — 85025 COMPLETE CBC W/AUTO DIFF WBC: CPT

## 2024-03-05 PROCEDURE — 36415 COLL VENOUS BLD VENIPUNCTURE: CPT

## 2024-03-05 PROCEDURE — 85610 PROTHROMBIN TIME: CPT

## 2024-03-05 PROCEDURE — 94760 N-INVAS EAR/PLS OXIMETRY 1: CPT

## 2024-03-05 PROCEDURE — 93010 ELECTROCARDIOGRAM REPORT: CPT | Performed by: INTERNAL MEDICINE

## 2024-03-05 PROCEDURE — 99232 SBSQ HOSP IP/OBS MODERATE 35: CPT | Performed by: SURGERY

## 2024-03-05 PROCEDURE — 82330 ASSAY OF CALCIUM: CPT

## 2024-03-05 PROCEDURE — 71046 X-RAY EXAM CHEST 2 VIEWS: CPT

## 2024-03-05 PROCEDURE — 80053 COMPREHEN METABOLIC PANEL: CPT

## 2024-03-05 PROCEDURE — 84100 ASSAY OF PHOSPHORUS: CPT

## 2024-03-05 PROCEDURE — NC001 PR NO CHARGE: Performed by: ORTHOPAEDIC SURGERY

## 2024-03-05 PROCEDURE — 87081 CULTURE SCREEN ONLY: CPT | Performed by: INTERNAL MEDICINE

## 2024-03-05 PROCEDURE — 83735 ASSAY OF MAGNESIUM: CPT

## 2024-03-05 RX ORDER — CALCIUM GLUCONATE 20 MG/ML
1 INJECTION, SOLUTION INTRAVENOUS ONCE
Status: COMPLETED | OUTPATIENT
Start: 2024-03-05 | End: 2024-03-05

## 2024-03-05 RX ORDER — OXYCODONE HYDROCHLORIDE 10 MG/1
10 TABLET ORAL EVERY 6 HOURS PRN
Status: DISCONTINUED | OUTPATIENT
Start: 2024-03-05 | End: 2024-03-07 | Stop reason: HOSPADM

## 2024-03-05 RX ORDER — SEVELAMER HYDROCHLORIDE 800 MG/1
800 TABLET, FILM COATED ORAL
Status: DISCONTINUED | OUTPATIENT
Start: 2024-03-05 | End: 2024-03-07

## 2024-03-05 RX ORDER — DEXTROSE MONOHYDRATE 25 G/50ML
25 INJECTION, SOLUTION INTRAVENOUS ONCE
Status: COMPLETED | OUTPATIENT
Start: 2024-03-05 | End: 2024-03-05

## 2024-03-05 RX ORDER — DOCUSATE SODIUM 100 MG/1
100 CAPSULE, LIQUID FILLED ORAL DAILY PRN
Status: DISCONTINUED | OUTPATIENT
Start: 2024-03-05 | End: 2024-03-07 | Stop reason: HOSPADM

## 2024-03-05 RX ORDER — SODIUM POLYSTYRENE SULFONATE 4.1 MEQ/G
15 POWDER, FOR SUSPENSION ORAL; RECTAL ONCE
Status: COMPLETED | OUTPATIENT
Start: 2024-03-05 | End: 2024-03-05

## 2024-03-05 RX ORDER — OXYCODONE HYDROCHLORIDE 5 MG/1
5 TABLET ORAL EVERY 6 HOURS PRN
Status: DISCONTINUED | OUTPATIENT
Start: 2024-03-05 | End: 2024-03-07 | Stop reason: HOSPADM

## 2024-03-05 RX ORDER — GABAPENTIN 300 MG/1
300 CAPSULE ORAL 3 TIMES DAILY
Status: DISCONTINUED | OUTPATIENT
Start: 2024-03-05 | End: 2024-03-06

## 2024-03-05 RX ADMIN — GABAPENTIN 300 MG: 300 CAPSULE ORAL at 20:46

## 2024-03-05 RX ADMIN — LEVETIRACETAM 500 MG: 500 TABLET, FILM COATED ORAL at 01:07

## 2024-03-05 RX ADMIN — SEVELAMER HYDROCHLORIDE 800 MG: 800 TABLET ORAL at 13:42

## 2024-03-05 RX ADMIN — INSULIN HUMAN 10 UNITS: 100 INJECTION, SOLUTION PARENTERAL at 01:07

## 2024-03-05 RX ADMIN — LEVETIRACETAM 500 MG: 500 TABLET, FILM COATED ORAL at 16:26

## 2024-03-05 RX ADMIN — SEVELAMER HYDROCHLORIDE 800 MG: 800 TABLET ORAL at 15:30

## 2024-03-05 RX ADMIN — LEVETIRACETAM 500 MG: 500 TABLET, FILM COATED ORAL at 13:42

## 2024-03-05 RX ADMIN — GABAPENTIN 300 MG: 300 CAPSULE ORAL at 15:24

## 2024-03-05 RX ADMIN — HYDROMORPHONE HYDROCHLORIDE 0.2 MG: 0.2 INJECTION, SOLUTION INTRAMUSCULAR; INTRAVENOUS; SUBCUTANEOUS at 20:48

## 2024-03-05 RX ADMIN — SODIUM POLYSTYRENE SULFONATE 15 G: 4.1 POWDER, FOR SUSPENSION ORAL; RECTAL at 01:07

## 2024-03-05 RX ADMIN — DEXTROSE MONOHYDRATE 25 ML: 25 INJECTION, SOLUTION INTRAVENOUS at 01:08

## 2024-03-05 RX ADMIN — OXYCODONE HYDROCHLORIDE 10 MG: 10 TABLET ORAL at 23:44

## 2024-03-05 RX ADMIN — PANTOPRAZOLE SODIUM 40 MG: 40 TABLET, DELAYED RELEASE ORAL at 05:47

## 2024-03-05 RX ADMIN — CALCIUM GLUCONATE 1 G: 20 INJECTION, SOLUTION INTRAVENOUS at 01:08

## 2024-03-05 RX ADMIN — OXYCODONE HYDROCHLORIDE 10 MG: 10 TABLET ORAL at 16:42

## 2024-03-05 NOTE — ASSESSMENT & PLAN NOTE
Lab Results   Component Value Date    EGFR 7 03/05/2024    EGFR 7 03/04/2024    EGFR 4 03/04/2024    CREATININE 6.93 (H) 03/05/2024    CREATININE 6.68 (H) 03/04/2024    CREATININE 10.19 (H) 03/04/2024     Plan:  Nephrology following, input appreciated  Patient on hemodialysis schedule TTS

## 2024-03-05 NOTE — ASSESSMENT & PLAN NOTE
"Patient is engaged with a palliative program in [place] (out of network. Wife can't recall name of agency)  Wilfrido and Mary are pleased with the support they receive from out of hospital palliative team and know that they will help navigate transition to hospice \"when it's time to stop HD\".  "

## 2024-03-05 NOTE — ASSESSMENT & PLAN NOTE
- patient has been on HD for 13 years. He tolerated today's session well and intends to continue at this time.  Lab Results   Component Value Date    EGFR 7 03/05/2024    EGFR 7 03/04/2024    EGFR 4 03/04/2024    CREATININE 6.93 (H) 03/05/2024    CREATININE 6.68 (H) 03/04/2024    CREATININE 10.19 (H) 03/04/2024

## 2024-03-05 NOTE — ASSESSMENT & PLAN NOTE
Code Status: full - Level 1  Decisional apparatus:  Patient is competent on my exam today.  If competence is lost, patient's substitute decision maker would default to spouse, Mary, by [place].  Advance Directive / Living Will / POLST:  none previously completed  Patient wishes to continue medical optimization without limits at this time. Tolerating HD well for the past 13 years. He is engaged with an out of network palliative care team and does not wish to place any limits on his care at this time.

## 2024-03-05 NOTE — ASSESSMENT & PLAN NOTE
Lab Results   Component Value Date    HGBA1C 4.8 01/05/2023       Recent Labs     03/05/24  0138 03/05/24  0703 03/05/24  1351   POCGLU 79 77 77         Blood Sugar Average: Last 72 hrs:  (P) [id number]  Management per primary team

## 2024-03-05 NOTE — ASSESSMENT & PLAN NOTE
Multifocal parenchymal hemorrhage with surrounding edema  Patient presented after he fell trying to transfer from his electric wheelchair to a wheelchair.  Patient denies head strike and LOC.  He states he fell on his right shoulder.  Per chart review patient is on aspirin and Plavix, he was reversed with DDAVP.  Patient also noted to have right clavicle fracture    Imaging:    CT head wo 3/4/2024:Multifocal parenchymal hemorrhage with surrounding edema though no significant mass effect or midline shift. Underlying lesions including metastasis or vascular malformations not excluded.     CT head wo 3/4/24 (repeat):Stable small parenchymal hemorrhages in the right frontal parietal subcortical white matter likely due due to hemorrhagic axonal injury. Stable posttraumatic hemorrhage within the septum pellucidum.     Plan:  Exam: GCS 15, no deficits seen  Continue frequent neurological checks.   Imaging reviewed with patient.  Spoke with patient, MRI ordered to further assess for possible underlying lesion or vascular malformation  MRI coordinated with dialysis.  Plan for MRI brain tomorrow morning at 7 AM with dialysis to follow.  Nephrology also aware.  STAT CT head with any neurological decline including drop GCS of 2pts within 1 hr.  Recommend SBP <160mmHg.  Seizure prophylaxis per trauma team  Hold all antiplatelet and anticoagulation medications.   Medical management pain control per primary team  Mobilize with PT/OT  DVT PPX: SCDs only at this time.      Neurosurgery will continue to follow pending MRI brain, call with any further questions or concerns.

## 2024-03-05 NOTE — ASSESSMENT & PLAN NOTE
Multifocal parenchymal hemorrhage with surrounding edema  Patient presented after he fell trying to transfer from his electric wheelchair to a wheelchair.  Patient denies head strike and LOC.  He states he fell on his right shoulder.  Per chart review patient is on aspirin and Plavix, he was reversed with DDAVP.  Patient also noted to have right clavicle fracture    Imaging:    CT head wo 3/4/2024:Multifocal parenchymal hemorrhage with surrounding edema though no significant mass effect or midline shift. Underlying lesions including metastasis or vascular malformations not excluded.     CT head wo 3/4/24 (repeat):Stable small parenchymal hemorrhages in the right frontal parietal subcortical white matter likely due due to hemorrhagic axonal injury. Stable posttraumatic hemorrhage within the septum pellucidum.    MRI brain w/wo 3/6/24:3 separate areas of intraparenchymal hemorrhage as seen on recent CT scan, unchanged with mild surrounding edema. No underlying ischemia or mass is identified at this time. Recommend short-term follow-up MRI of the brain with contrast as hemorrhage resolves.There is a small amount of intraventricular hemorrhage layering within the occipital horns of the lateral ventricles.Moderate to advanced chronic microangiopathic change within the cerebral hemispheres.Abnormal appearance of the distal right vertebral artery which is hyperintense on T2 weighted imaging and demonstrates mild enhancement after administration of contrast, possibly representing slow flow or occlusion. This can be further evaluated with CT angiography of the head and neck.     Plan:  Exam: GCS 15, with ongoing right-sided weakness since stroke  Continue frequent neurological checks.   Imaging reviewed with patient.  Recommend CTA head and neck to further evaluate right vertebral artery abnormality seen on MRI.  Patient would have to receive contrast again and ultimately need dialysis again  Patient very adamant that he  does not want any further medical management at this time.  Trauma aware, patient states he does not want any further workup or imaging  Recommend palliative care to see patient again and goals of care discussion  STAT CT head with any neurological decline including drop GCS of 2pts within 1 hr.  Recommend SBP <160mmHg.  Seizure prophylaxis per trauma team  Hold all antiplatelet and anticoagulation medications.   Medical management and pain control per primary team  Mobilize with PT/OT  DVT PPX: SCDs and cleared from neurosurgical standpoint for pharmacological DVT prophylaxis    Neurosurgery will sign off, recommend patient follow-up in 2 weeks with repeat CT head, call with any further question or concerns.

## 2024-03-05 NOTE — ASSESSMENT & PLAN NOTE
- Large right pleural effusion and tiny left sided pleural effusion identified on CXR  - Monitor with serial CXR  - Likely atraumatic  - Continue supplemental O2 to maintain saturations > 88% as he is an everyday smoker

## 2024-03-05 NOTE — ASSESSMENT & PLAN NOTE
Right sided weakness as a result of prior CVA  Patient uses wheelchair/scooter primarily to get around.  S/p fall, head strike, and IPH (non-operative management at this time) with close monitoring.   Patient agreeable to STR  upon discharge if indicated. Otherwise wishes for restorative care to return to previous function if possible.

## 2024-03-05 NOTE — CONSULTS
Health system  Consult  Name: Jeffery Bishop 68 y.o. 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  Patient denies head strike and LOC.  He states he fell on his right shoulder.  Per chart review patient is on aspirin and Plavix, he was reversed with DDAVP.  Patient also noted to have right clavicle fracture    Imaging:    CT head wo 3/4/2024:Multifocal parenchymal hemorrhage with surrounding edema though no significant mass effect or midline shift. Underlying lesions including metastasis or vascular malformations not excluded.     CT head wo 3/4/24 (repeat):Stable small parenchymal hemorrhages in the right frontal parietal subcortical white matter likely due due to hemorrhagic axonal injury. Stable posttraumatic hemorrhage within the septum pellucidum.     Plan:  Exam: GCS 15, with ongoing right-sided weakness since stroke  Continue frequent neurological checks.   Imaging reviewed with patient.  Spoke with patient, MRI ordered to further assess for possible underlying lesion or vascular malformation  MRI coordinated with dialysis.  Plan for MRI brain tomorrow morning at 7 AM with dialysis to follow.  Nephrology also aware.  STAT CT head with any neurological decline including drop GCS of 2pts within 1 hr.  Recommend SBP <160mmHg.  Seizure prophylaxis per trauma team  Hold all antiplatelet and anticoagulation medications.   Medical management pain control per primary team  Mobilize with PT/OT  DVT PPX: SCDs only at this time.       Neurosurgery will continue to follow pending MRI brain, call with any further questions or concerns.      Closed nondisplaced fracture of right clavicle  Assessment & Plan  Orthopedics following, input appreciated  Right arm in sling    Type 2 diabetes mellitus  Assessment & Plan  Lab Results   Component Value Date    HGBA1C 4.8 01/05/2023       Recent Labs     03/05/24  0138 03/05/24  0703 03/05/24  1351   POCGLU 79 77 77         Blood Sugar Average: Last 72 hrs:  (P) [id number]  Management per primary team    ESRD (end stage renal disease) on dialysis (HCC)  Assessment & Plan  Lab Results   Component Value Date    EGFR 7 03/05/2024    EGFR 7 03/04/2024    EGFR 4 03/04/2024    CREATININE 6.93 (H) 03/05/2024    CREATININE 6.68 (H) 03/04/2024    CREATININE 10.19 (H) 03/04/2024     Plan:  Nephrology following, input appreciated  Patient on hemodialysis schedule TTS           History of Present Illness     HPI: Jeffery Bishop is a 68 y.o. male with PMH significant for end-stage renal disease on dialysis, diabetes, GERD, hyperlipidemia, hypertension, prior MI, and prior stroke.  Patient originally presented to [facility] after he sustained a fall trying to transfer from his electric wheelchair to another wheelchair.  He states he fell striking his right shoulder.  He denies head strike or LOC but there is ecchymosis noted to his right temporal area.  He was on aspirin and Plavix reportedly and was reversed with DDAVP.  Imaging demonstrated multiple areas of IPH.  He was transferred to [facility] for further workup and evaluation.  He also needed urgent dialysis.    Patient offers no new complaints.  Patient does endorse ongoing bowel incontinence for over a year and generalized weakness.  He denies any headaches dizziness, blurry vision, chest pain, shortness of breath, abdominal pain, nausea, vomiting, diarrhea, no new problems with bowel or bladder, no new weakness or  numbness/tingling.      Review of Systems   Constitutional:  Positive for activity change. Negative for chills and fever.   HENT:  Negative for trouble swallowing.    Eyes:  Negative for visual disturbance.   Respiratory:  Negative for shortness of breath.    Cardiovascular:  Negative for chest pain.   Gastrointestinal:  Negative for abdominal pain, constipation, diarrhea, nausea and vomiting.        Ongoing bowel incontinence   Genitourinary:  Negative for difficulty urinating.   Neurological:  Positive for weakness. Negative for dizziness, speech difficulty, light-headedness, numbness and headaches.   Hematological:  Bruises/bleeds easily.       Historical Information   Past Medical History:   Diagnosis Date    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    With Cerebral Infarction:  Last Assessed:  October 20, 2016    Chronic kidney disease 2012    on dialysis     COVID 05/2022    COVID-19 04/2022 4/2022-while in NH    Diabetes mellitus ([facility])     Dialysis patient ([facility])     T/TH/Sat    Difficulty walking     GERD (gastroesophageal reflux disease)     Hyperlipidemia     Hypertension     Labyrinthitis 09/10/2011    Myocardial infarction ([facility]) 2014    x3 others were in 2009 & 2010    Sleep disturbances 09/20/2010    Stroke ([facility]) 2007    x1, RLE deficit- uses walker    Type 2 diabetes, uncontrolled, with renal manifestation 05/03/2017     Past Surgical History:   Procedure Laterality Date    AV FISTULA PLACEMENT      AV FISTULA REPAIR Left 6/3/2023    Procedure: LEFT UPPER EXTREMITY A-V GRAFT EXPLANT, LEFT BRACHIAL ANGIOPATCH,  APPLICATION OF  VAC;  Surgeon: Neyda Rider MD;  Location:  MAIN OR;  Service: Vascular    CARDIAC CATHETERIZATION Left 02/03/2023    Procedure: Cardiac Left Heart Cath;  Surgeon: Tiffani Gregorio MD;  Location: [facility] CARDIAC CATH LAB;  Service: Cardiology    CARDIAC CATHETERIZATION N/A 02/08/2023    Procedure: Cardiac catheterization with complex PCI with Dr. Patricio, patient is a renal dialysis  "patient;  Surgeon: Marcos Ramirez MD;  Location:  CARDIAC CATH LAB;  Service: Cardiology    CARDIAC SURGERY  2010    stents x3    COLONOSCOPY N/A 12/12/2016    Procedure: COLONOSCOPY;  Surgeon: Radha Moss MD;  Location: [facility] GI LAB;  Service:     COLONOSCOPY N/A 04/03/2017    Procedure:  COLONOSCOPY;  Surgeon: Radha Moss MD;  Location: [facility] GI LAB;  Service:     HARDWARE REMOVAL Right 04/04/2022    Procedure: REMOVAL HARDWARE HIP;  Surgeon: Moises Kent MD;  Location: [facility] MAIN [facility];  Service: Orthopedics    IR AV FISTULA/GRAFT DECLOT  04/29/2021    IR AV FISTULA/GRAFT DECLOT  03/25/2022    IR AV FISTULA/GRAFT DECLOT  09/21/2022    IR AV FISTULAGRAM/GRAFTOGRAM  03/28/2022    IR AV FISTULAGRAM/GRAFTOGRAM  07/28/2022    IR TUNNELED CENTRAL LINE REMOVAL  12/07/2021    IR TUNNELED DIALYSIS CATHETER CHECK/CHANGE/REPOSITION/ANGIOPLASTY  2/1/2024    IR TUNNELED DIALYSIS CATHETER PLACEMENT  05/24/2021    PORTACATH PLACEMENT      per pt \"port in chest\"    PA ARTERIOVENOUS ANASTOMOSIS OPEN DIRECT Left 07/22/2021    Procedure: CREATION FISTULA ARTERIOVENOUS (AV);  Surgeon: Kareem Nye MD;  Location: [facility] MAIN [facility];  Service: Vascular    PA ARTERIOVENOUS ANASTOMOSIS OPEN DIRECT Left 3/21/2023    Procedure: left brachiobasilic fistula,  AVG Graft;  Surgeon: Catalino Hook MD;  Location:  MAIN OR;  Service: Vascular    PA ARTERIOVENOUS ANASTOMOSIS OPEN DIRECT Left 5/19/2023    Procedure: CREATION of LEFT FISTULA ARTERIOVENOUS (AV) GRAFT;  Surgeon: Catalino Hook MD;  Location:  MAIN OR;  Service: Vascular    PA HEMIARTHROPLASTY HIP PARTIAL Right 04/04/2022    Procedure: HEMIARTHROPLASTY HIP (BIPOLAR);  Surgeon: Moises Kent MD;  Location: [facility];  Service: Orthopedics     Social History     Substance and Sexual Activity   Alcohol Use Yes    Alcohol/week: 1.0 standard drink of alcohol    Types: 1 Shots of liquor per week    Comment: 1 shot daily     Social History     Substance " and Sexual Activity   Drug Use Yes    Types: Marijuana    Comment: gummies     Social History     Tobacco Use   Smoking Status Every Day    Current packs/day: 1.00    Average packs/day: 1 pack/day for 30.0 years (30.0 ttl pk-yrs)    Types: Cigarettes   Smokeless Tobacco Never   Tobacco Comments    Currently not smoking - in facility     Family History   Problem Relation Age of Onset    Leukemia Mother     Crohn's disease Father     Heart disease Father     Transient ischemic attack Brother        Meds/Allergies   all current active meds have been reviewed, current meds:   Current Facility-Administered Medications   Medication Dose Route Frequency    acetaminophen (TYLENOL) tablet 975 mg  975 mg Oral Q8H PRN    ammonium lactate (LAC-HYDRIN) 12 % lotion   Topical BID PRN    ascorbic acid (VITAMIN C) tablet 500 mg  500 mg Oral Daily    gabapentin (NEURONTIN) capsule 100 mg  100 mg Oral HS    HYDROmorphone HCl (DILAUDID) injection 0.2 mg  0.2 mg Intravenous Q3H PRN    insulin lispro (HumALOG/ADMELOG) 100 units/mL subcutaneous injection 1-5 Units  1-5 Units Subcutaneous TID AC    lactulose (CHRONULAC) oral solution 10 g  10 g Oral Daily    levETIRAcetam (KEPPRA) tablet 500 mg  500 mg Oral BID    metoprolol (LOPRESSOR) injection 5 mg  5 mg Intravenous Q6H PRN    nicotine (NICODERM CQ) 7 mg/24hr TD 24 hr patch 1 patch  1 patch Transdermal Daily    oxyCODONE (ROXICODONE) IR tablet 5 mg  5 mg Oral Q4H PRN    oxyCODONE (ROXICODONE) split tablet 2.5 mg  2.5 mg Oral Q4H PRN    pantoprazole (PROTONIX) EC tablet 40 mg  40 mg Oral Early Morning    sevelamer (RENAGEL) tablet 800 mg  800 mg Oral TID With Meals   , and PTA meds:   Prior to Admission Medications   Prescriptions Last Dose Informant Patient Reported? Taking?   Cholecalciferol 50 MCG (2000 UT) CAPS  Self, Outside Facility (Specify) Yes No   Sig: Take by mouth daily   [place] Choice Comfort EZ 33G X 4 MM MISC  Self, Outside Facility (Specify) Yes No   Patient not taking:  Reported on 11/13/2023   acetaminophen (TYLENOL) 325 mg tablet  Outside Facility (Specify), Self No No   Sig: Take 3 tablets (975 mg total) by mouth every 8 (eight) hours as needed for mild pain   ammonium lactate (LAC-HYDRIN) 12 % lotion  Self, Outside Facility (Specify) No No   Sig: Apply topically 2 (two) times a day as needed for dry skin   ascorbic acid (VITAMIN C) 500 MG tablet   Yes No   Sig: Take 500 mg by mouth daily   ascorbic acid (VITAMIN C) 500 mg tablet  Self, Outside Facility (Specify) Yes No   Sig: TAKE ONE TABLET BY MOUTH EVERY EVENING FOR SUPPLEMENT   aspirin (ECOTRIN LOW STRENGTH) 81 mg EC tablet  Self, Outside Facility (Specify) Yes No   Sig: Take 81 mg by mouth daily   atorvastatin (LIPITOR) 20 mg tablet  Self, Outside Facility (Specify) Yes No   Sig: Take 20 mg by mouth daily at bedtime   bacitracin topical ointment 500 units/g topical ointment  Self, Outside Facility (Specify) No No   Sig: Apply 1 large application topically 2 (two) times a day   Patient not taking: Reported on 1/11/2024   bisacodyl (DULCOLAX) 10 mg suppository  Self, Outside Facility (Specify) Yes No   Sig: Insert into the rectum   Patient not taking: Reported on 1/11/2024   clopidogrel (PLAVIX) 75 mg tablet  Self, Outside Facility (Specify) Yes No   Sig: Take 75 mg by mouth daily   Patient not taking: Reported on 11/13/2023   clotrimazole-betamethasone (LOTRISONE) 1-0.05 % cream  Self, Outside Facility (Specify) No No   Sig: Apply topically in the morning   Patient not taking: Reported on 1/11/2024   docusate sodium (COLACE) 100 mg capsule  Self, Outside Facility (Specify) No No   Sig: Take 1 capsule (100 mg total) by mouth daily for 14 days   gabapentin (NEURONTIN) 100 mg capsule  Self, Outside Facility (Specify) No No   Sig: Take 1 capsule (100 mg total) by mouth daily at bedtime   gabapentin (NEURONTIN) 300 mg capsule   Yes No   Sig: TAKE ONE CAPSULE BY MOUTH EVERY NIGHT AT BEDTIME FOR 1 WEEK THEN TWICE DAILY FOR 1 WEEK  THEN 1 CAPSULE THREE TIMES DAILY   glucagon 1 MG injection  Self, Outside Facility (Specify) Yes No   Sig: Inject 1 mg into a muscle   Patient not taking: Reported on 11/13/2023   hydrOXYzine HCL (ATARAX) 25 mg tablet  Self, Outside Facility (Specify) Yes No   lactulose (CHRONULAC) 10 g/15 mL solution   Yes No   Sig: TAKE 15 ML BY MOUTH THREE TIMES DAILY   midodrine (PROAMATINE) 10 MG tablet   No No   Sig: Take 1 tablet (10 mg total) by mouth 3 (three) times a day before meals   Patient taking differently: Take 10 mg by mouth 3 (three) times a day before meals Takes BID   omeprazole (PriLOSEC) 40 MG capsule   Yes No   Sig: Take 40 mg by mouth daily Take before a meal   ondansetron (ZOFRAN-ODT) 8 mg disintegrating tablet  Self, Outside Facility (Specify) Yes No   Sig: Take 1 tablet by mouth   oxyCODONE (ROXICODONE) 5 immediate release tablet  Self, Outside Facility (Specify) No No   Sig: Take 0.5 tablets (2.5 mg total) by mouth every 8 (eight) hours as needed for severe pain for up to 10 doses Max Daily Amount: 7.5 mg   pantoprazole (PROTONIX) 40 mg tablet   No No   Sig: Take 1 tablet (40 mg total) by mouth daily Take prior to breakfast   pantoprazole (PROTONIX) 40 mg tablet   No No   Sig: Take 1 tablet (40 mg total) by mouth daily   polyethylene glycol (MIRALAX) 17 g packet   No No   Sig: Take 17 g by mouth daily for 7 days   vitamin B-12 (VITAMIN B-12) 1,000 mcg tablet  Outside Facility (Specify), Self Yes No   Sig: Take 1,000 mcg by mouth daily      Facility-Administered Medications: None     No Known Allergies    Objective   I/O         03/03 0701  03/04 0700 03/04 0701  03/05 0700 03/05 0701  03/06 0700    I.V.  500 200    Total Intake  500 200    Other  2384     Total Output  2384     Net  -1884 +200                   Physical Exam  Vitals reviewed.   Constitutional:       General: He is awake. He is not in acute distress.     Appearance: Normal appearance. He is not ill-appearing.   HENT:      Head:  Normocephalic and atraumatic.   Eyes:      Extraocular Movements: Extraocular movements intact and EOM normal.      Conjunctiva/sclera: Conjunctivae normal.      Pupils: Pupils are equal, round, and reactive to light.   Cardiovascular:      Rate and Rhythm: Normal rate.   Pulmonary:      Effort: Pulmonary effort is normal. No respiratory distress.   Chest:      Chest wall: No tenderness.   Abdominal:      General: There is no distension.      Palpations: Abdomen is soft.      Tenderness: There is no abdominal tenderness.   Musculoskeletal:      Cervical back: Normal range of motion and neck supple.      Comments: ROM WNL except R arm in sling    Skin:     General: Skin is warm and dry.   Neurological:      Mental Status: He is alert and oriented to person, place, and time.      Deep Tendon Reflexes:      Reflex Scores:       Bicep reflexes are 2+ on the right side and 2+ on the left side.       Patellar reflexes are 2+ on the right side and 2+ on the left side.  Psychiatric:         Attention and Perception: Attention and perception normal.         Mood and Affect: Mood and affect normal.         Speech: Speech normal.         Behavior: Behavior normal. Behavior is cooperative.         Thought Content: Thought content normal.         Cognition and Memory: Cognition and memory normal.         Judgment: Judgment normal.       Neurologic Exam     Mental Status   Oriented to person, place, and time.   Follows 2 step commands.   Attention: normal. Concentration: normal.   Speech: speech is normal   Level of consciousness: alert  Knowledge: good. Able to perform simple calculations.   Able to name object. Normal comprehension.     Cranial Nerves     CN III, IV, VI   Pupils are equal, round, and reactive to light.  Extraocular motions are normal.   CN III: no CN III palsy  CN VI: no CN VI palsy  Nystagmus: none   Diplopia: none  Conjugate gaze: present    CN V   Facial sensation intact.     CN VII   Facial expression full,  "symmetric.     CN VIII   CN VIII normal.   Hearing: intact    CN IX, X   CN IX normal.     CN XI   CN XI normal.     CN XII   CN XII normal.     Motor Exam   Muscle bulk: normal  Overall muscle tone: normalR  4/5  RHF 3-4-/5  R DF/PF 2-3/5  LUE 5/5  Able to lift bilateral lower extremities off of bed.    Left hip flexion 4/5  L DF/PF 5/5  Able to bend knees out bilaterally     Sensory Exam   Light touch normal.     Gait, Coordination, and Reflexes     Tremor   Resting tremor: absent  Intention tremor: absent  Action tremor: absent    Reflexes   Right biceps: 2+  Left biceps: 2+  Right patellar: 2+  Left patellar: 2+  Right Jones: absent  Left Jones: absent  Right ankle clonus: absent  Left ankle clonus: absent      Vitals:Blood pressure 129/64, pulse 82, temperature 97.8 °F (36.6 °C), resp. rate 18, SpO2 98%.,There is no height or weight on file to calculate BMI.     Lab Results:   Results from last 7 days   Lab Units 03/05/24 0428 03/04/24  1343   WBC Thousand/uL 5.82 4.78   HEMOGLOBIN g/dL 9.4* 9.3*   HEMATOCRIT % 30.0* 28.6*   PLATELETS Thousands/uL 129* 130*   NEUTROS PCT % 84* 73   MONOS PCT % 6 9   EOS PCT % 3 6     Results from last 7 days   Lab Units 03/05/24 0428 03/04/24 2358 03/04/24  1343   POTASSIUM mmol/L 5.6* 6.0* 7.3*   CHLORIDE mmol/L 96 96 96   CO2 mmol/L 27 28 26   BUN mg/dL 54* 50* 89*   CREATININE mg/dL 6.93* 6.68* 10.19*   CALCIUM mg/dL 8.7 8.3* 8.6   ALK PHOS U/L 80  --  69   ALT U/L 13  --  11   AST U/L 20  --  17     Results from last 7 days   Lab Units 03/05/24 0428 03/04/24  2358   MAGNESIUM mg/dL 2.0 1.9     Results from last 7 days   Lab Units 03/05/24 0428 03/04/24  2358   PHOSPHORUS mg/dL 8.8* 7.9*     Results from last 7 days   Lab Units 03/05/24 0428 03/04/24  2358   INR  1.31* 1.30*     No results found for: \"TROPONINT\"  ABG:No results found for: \"PHART\", \"YDT8QTD\", \"PO2ART\", \"JGT7LCV\", \"L3JCQMDP\", \"BEART\", \"SOURCE\"    Imaging Studies: I have personally reviewed " pertinent reports.   and I have personally reviewed pertinent films in PACS    EKG, Pathology, and Other Studies: I have personally reviewed pertinent reports.      VTE Prophylaxis: Sequential compression device (Venodyne)     Code Status: Level 1 - Full Code  Advance Directive and Living Will:      Power of :    POLST:

## 2024-03-05 NOTE — CONSULTS
Consultation - Geriatric Medicine   Jeffery Bishop 68 y.o. male MRN: [id number]  Unit/Bed#: LEIGH Encounter: [id number]      Assessment/Plan     Ambulatory dysfunction with fall  -Reportedly mechanical fall when self transferring from motorized scooter   -(+) head strike (-) loss of consciousness  -S/p DDVAP for reversal of ASA/Plavix   -injuries as outlined below  -Primarily utilizes wheelchair/motorized scooter for mobilization at baseline  -hx of recurrent falls numerous over past year  -Remains high risk future falls due to age, hx fall, deconditioning/debility and unfamiliar environment   -encourage good body mechanics and assist with all transfers  -keep personal items and call bell close to prevent reaching  -maintain environment free of fall hazards  -encourage appropriate footwear and adequate lighting at all times when out of bed  -Recommend home fall risk assessment and personal fall alert system if returning home  -PT and OT pending    Multifocal parenchymal hemorrhage  -S/p fall as outlined above  -CTH 3/4/24 reports multifocal parenchymal hemorrhage with surrounding edema with no significant mass effect or midline shift  -Repeat CTH 3/4/2024 reports stable small parenchymal hemorrhages in right frontoparietal subcortical white matter and stable posttraumatic hemorrhage in septum pellucidum  -MRI brain to rule out underlying lesion or vascular malformation pending   -AC/AP remain on hold  -Neurochecks per protocol  -Nsx on consult    Right distal clavicle fracture  -S/p fall as above  -Noted on right shoulder XR obtained on admission  -Ortho on consult -suspected to be chronic, no sling or intervention indicated at this time    Acute pain due to trauma  -Recommend pain control per Geriatric pain protocol:  Tylenol 975mg Q8H scheduled  Roxicodone 2.5mg Q4H PRN moderate pain  Roxicodone 5mg Q4H PRN severe pain  Dilaudid 0.2mg Q4H PRN  -Consider adjuncts such as lidocaine patch topically to  appropriate areas  -encourage addition of non-pharmacologic pain treatment including ice and frequent repositioning  -recommend  bowel regimen to prevent and treat constipation due to increased risk with acute pain and opiate pain medications    Severe hyperkalemia  -[K] 7.3 on initial presentation   -Required urgent HD now down to 5.6  -Strict low potassium diet recommended, compliance encouraged  -Continue electrolyte optimization and management per Nephro    ESRD on HD  -o/p HD schedule T/TH/Sa  -current access via permacath  -Maintained on midodrine for hypotension due to autonomic neuropathy  -Renally dose all medications  -Nephro on consult     Underweight  -BMI 17.43  -Recommend nutrition consult for concern of protein calorie malnutrition with diffuse severe subcutaneous fat and muscle wasting   -Encourage well-balanced nutritional intake and healthy lifestyle modifications    Nicotine dependence due to cigarettes  -Encourage cessation, continue daily bedside cessation counseling  -Consider nicotine patch for use during hospitalization  -Outpatient follow-up with PCP for ongoing cessation support    Cognitive screening  -Alert and oriented, denies memory or cognitive concerns  -Reportedly mostly dependent with ADLs at baseline requires assist with IADLs  -No prior cognitive testing on record review  -CTH 3/4/24 images personally viewed, in addition to acute findings at least moderate diffuse chronic microangiopathic changes also appreciated  -TSH WNL 2.992, B12 4/19/2022, no recent recheck, consider recheck with routine labs as is maintained on supplementation daily as outpatient  -Encourage use of sensory assist devices such as corrective lenses at all appropriate times to reduce risk of uncorrected sensory impairment from contributing to isolation, confusion, encephalopathy and more precipitous cognitive decline  -Encourage patient remain physically, socially, cognitively active and engaged to maintain  cognitive acuity    DM-II  -A1c 4.8  -No longer on any diabetic medications  -Complicated by autonomic neuropathy and gastroparesis    CAD  -Maintained on aspirin and Plavix  -S/p cath 2/4/2023 with mid RCA 90% stenosis s/p PCI    Impaired Vision  -recommend use of corrective lenses at all appropriate times  -encourage adequate lighting and encourage use of assistance with ambulation  -keep personal belongings close to person to avoid reaching  -encourage appropriate footwear at all times  -Consider large font for printed material provided to patient    Deconditioning/debility/frailty   -Clinical frailty scale stage VI, moderately frail   -Multifactorial including age, ambulatory dysfunction with recurrent falls, ESRD on HD and multitude of additional chronic medical, manage now with fall and acute intraparenchymal hematoma superimposed in elderly individual with extremely limited physiologic and metabolic reserve  -Encourage well-balanced nutrition and consider nutrition consult as outlined   -Continue optimization of chronic medical conditions and address acute metabolic derangements as arise  -Monitor for and treat any underlying anxiety/mood/depression symptoms as may impact patient response to therapies as well as overall sense of wellbeing and quality of life  -Continue to ensure that treatment and interventions align with patient's wishes and goals of care  -Continue psychosocial supports of patient and caregivers    Delirium precautions  -Patient is high risk of delirium due to age, fall, traumatic injuries, intraparenchymal hematoma, hospitalization  -Initiate delirium precautions  -maintain normal sleep/wake cycle  -ensure that pain is well controlled  -monitor for fecal and urinary retention which may precipitate delirium  -provide frequent reorientation and redirection as indicated and appropriate  -avoid medications which may precipitate or worsen delirium such as tramadol, benzodiazepine,  anticholinergics, and benadryl whenever possible  -encourage hydration and nutrition   -redirect unwanted behaviors as first line    Home medication review    B12 1000 mcg daily  Gabapentin 300 Mg 3 times daily  Lactulose 15 mL 3 times daily  Midodrine 10 Mg 3 times daily  Pantoprazole 40 Mg daily  Vitamin D supplementation daily  Aspirin 81 Mg daily  Plavix 75 Mg daily    Care coordination: Elian Degroot (Trauma AP)    History of Present Illness   Physician Requesting Consult: Da Lopez DO  Reason for Consult / Principal Problem: Fall  Hx and PE limited by: N/A  Additional history obtained from: Chart review and patient evaluation, outside records including those obtained from [facility]     HPI: Jeffery Bishop is a 68 y.o. year old male with ESRD on HD, chronic DVT of internal jugular vein, CAD, history of CVA, secondary hyperparathyroidism, severe protein calorie malnutrition, intestinal malabsorption, vitamin D deficiency, hypothyroidism and ambulatory dysfunction who is admitted to trauma service as a transfer from the [facility] where he presented with complaint of fall off mobility scooter found to have severe hyperkalemia, right clavicle fracture and multifocal parenchymal hemorrhage on admission imaging prompting transfer to [place] for higher level of care, he is being seen in consultation by Geriatrics for high risk of developing delirium during hospitalization.  He seen examined at bedside in dialysis where he is lying resting comfortably, he reports that yesterday he sustained a mechanical fall when he leaned beyond his reach when attempting to transfer from his mobility scooter to his wheelchair because the battery of his scooter ran out before he could get closer. He endorsed head strike without loss of consciousness.  He reports general malaise since that time and denies other additional complaints.  Prior to admission Jeffery reports residing home with his wife and being  mostly independent with ADLs requiring assistance with IADLs.  He mobilizes primarily with use of wheelchair for short distances and his motorized scooter for distances outside of the home.  He denies any significant memory or cognitive concerns beyond age-related forgetfulness.  He does require use of glasses, does not use hearing aids or dentures.    Inpatient consult to Gerontology  Consult performed by: Veronica Ramon DO  Consult ordered by: Graham Martinez MD      Review of Systems   Constitutional: Negative.  Negative for chills and fever.   HENT: Negative.  Negative for dental problem.    Eyes: Negative.    Respiratory: Negative.  Negative for shortness of breath.    Cardiovascular: Negative.    Gastrointestinal: Negative.    Genitourinary: Negative.    Musculoskeletal:  Positive for gait problem. Negative for arthralgias.   Skin: Negative.    Neurological:  Positive for weakness (BLE- severe at baseline) and numbness (distal neuropathy). Negative for dizziness, light-headedness and headaches.   Hematological:  Bruises/bleeds easily.   Psychiatric/Behavioral: Negative.  Negative for sleep disturbance.    All other systems reviewed and are negative.    Historical Information   Past Medical History:   Diagnosis Date    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    With Cerebral Infarction:  Last Assessed:  October 20, 2016    Chronic kidney disease 2012    on dialysis     COVID 05/2022    COVID-19 04/2022 4/2022-while in [place]    Diabetes mellitus ([facility])     Dialysis patient ([facility])     T/TH/Sat    Difficulty walking     GERD (gastroesophageal reflux disease)     Hyperlipidemia     Hypertension     Labyrinthitis 09/10/2011    Myocardial infarction ([facility]) 2014    x3 others were in 2009 & 2010    Sleep disturbances 09/20/2010    Stroke ([facility]) 2007    x1, RLE deficit- uses walker    Type 2 diabetes, uncontrolled, with renal manifestation 05/03/2017     Past Surgical History:   Procedure Laterality Date    AV FISTULA PLACEMENT       "AV FISTULA REPAIR Left 6/3/2023    Procedure: LEFT UPPER EXTREMITY A-V GRAFT EXPLANT, LEFT BRACHIAL ANGIOPATCH,  APPLICATION OF  VAC;  Surgeon: Neyda Rider MD;  Location:  MAIN OR;  Service: Vascular    CARDIAC CATHETERIZATION Left 02/03/2023    Procedure: Cardiac Left Heart Cath;  Surgeon: Tiffani Gregorio MD;  Location: [facility] CARDIAC CATH LAB;  Service: Cardiology    CARDIAC CATHETERIZATION N/A 02/08/2023    Procedure: Cardiac catheterization with complex PCI with Dr. Patricio, patient is a renal dialysis patient;  Surgeon: Marcos Ramirez MD;  Location:  CARDIAC CATH LAB;  Service: Cardiology    CARDIAC SURGERY  2010    stents x3    COLONOSCOPY N/A 12/12/2016    Procedure: COLONOSCOPY;  Surgeon: Radha Moss MD;  Location: [facility] GI LAB;  Service:     COLONOSCOPY N/A 04/03/2017    Procedure:  COLONOSCOPY;  Surgeon: Radha Moss MD;  Location: [facility] GI LAB;  Service:     HARDWARE REMOVAL Right 04/04/2022    Procedure: REMOVAL HARDWARE HIP;  Surgeon: Moises Kent MD;  Location:  MAIN OR;  Service: Orthopedics    IR AV FISTULA/GRAFT DECLOT  04/29/2021    IR AV FISTULA/GRAFT DECLOT  03/25/2022    IR AV FISTULA/GRAFT DECLOT  09/21/2022    IR AV FISTULAGRAM/GRAFTOGRAM  03/28/2022    IR AV FISTULAGRAM/GRAFTOGRAM  07/28/2022    IR TUNNELED CENTRAL LINE REMOVAL  12/07/2021    IR TUNNELED DIALYSIS CATHETER CHECK/CHANGE/REPOSITION/ANGIOPLASTY  2/1/2024    IR TUNNELED DIALYSIS CATHETER PLACEMENT  05/24/2021    PORTACATH PLACEMENT      per pt \"port in chest\"    AR ARTERIOVENOUS ANASTOMOSIS OPEN DIRECT Left 07/22/2021    Procedure: CREATION FISTULA ARTERIOVENOUS (AV);  Surgeon: Kareem Nye MD;  Location: [facility] MAIN OR;  Service: Vascular    AR ARTERIOVENOUS ANASTOMOSIS OPEN DIRECT Left 3/21/2023    Procedure: left brachiobasilic fistula,  AVG Graft;  Surgeon: Catalino Hook MD;  Location:  MAIN OR;  Service: Vascular    AR ARTERIOVENOUS ANASTOMOSIS OPEN DIRECT Left 5/19/2023    Procedure: CREATION of " LEFT FISTULA ARTERIOVENOUS (AV) GRAFT;  Surgeon: Catalino Hook MD;  Location: [facility];  Service: Vascular    PA HEMIARTHROPLASTY HIP PARTIAL Right 04/04/2022    Procedure: HEMIARTHROPLASTY HIP (BIPOLAR);  Surgeon: Moises Kent MD;  Location: [facility];  Service: Orthopedics     Social History   Social History     Substance and Sexual Activity   Alcohol Use Yes    Alcohol/week: 1.0 standard drink of alcohol    Types: 1 Shots of liquor per week    Comment: 1 shot daily     Social History     Substance and Sexual Activity   Drug Use Yes    Types: Marijuana    Comment: gummies     Social History     Tobacco Use   Smoking Status Every Day    Current packs/day: 1.00    Average packs/day: 1 pack/day for 30.0 years (30.0 ttl pk-yrs)    Types: Cigarettes   Smokeless Tobacco Never   Tobacco Comments    Currently not smoking - in facility     Family History:   Family History   Problem Relation Age of Onset    Leukemia Mother     Crohn's disease Father     Heart disease Father     Transient ischemic attack Brother      Meds/Allergies   all current active meds have been reviewed    No Known Allergies    Objective     Intake/Output Summary (Last 24 hours) at 3/5/2024 1137  Last data filed at 3/5/2024 0830  Gross per 24 hour   Intake 700 ml   Output 2384 ml   Net -1684 ml     Invasive Devices       Peripheral Intravenous Line  Duration             Peripheral IV 03/04/24 Right;Ventral (anterior) Forearm <1 day              Hemodialysis Catheter  Duration             HD Permanent Double Catheter 32 days                  Physical Exam  Vitals and nursing note reviewed.   Constitutional:       General: He is not in acute distress.     Comments: Thin very frail, elderly appearing male   HENT:      Head: Normocephalic.      Comments: Right forehead ecchymosis    Temporal areas appear somewhat sunken bilaterally     Nose: Nose normal.      Comments: O2 via NC     Mouth/Throat:      Mouth: Mucous membranes are dry.    Eyes:      General:         Right eye: No discharge.         Left eye: No discharge.      Comments: Very mild scleral icterus bilaterally   Neck:      Comments: Trachea midline, phonation normal  Cardiovascular:      Rate and Rhythm: Normal rate and regular rhythm.   Pulmonary:      Effort: Pulmonary effort is normal. No respiratory distress.   Abdominal:      General: There is no distension.      Palpations: Abdomen is soft.      Comments: Very thin, scaphoid abdomen   Musculoskeletal:      Cervical back: Neck supple.      Right lower leg: Edema present.      Left lower leg: Edema present.      Comments: Diffuse very severe subcutaneous fat and muscle wasting    RUE in sling   Skin:     General: Skin is warm and dry.      Comments: Thin and friable with various areas of excoriation bilateral lower extremities as well scattered ecchymosis on extremities and right neck   Neurological:      Mental Status: He is alert.      Comments: Awake and alert, answers questions appropriately   Psychiatric:      Comments: Pleasant and cooperative       Lab Results:     I have personally reviewed pertinent lab results including the following:    Results from last 7 days   Lab Units 03/05/24  0428 03/04/24  1343   WBC Thousand/uL 5.82 4.78   HEMOGLOBIN g/dL 9.4* 9.3*   HEMATOCRIT % 30.0* 28.6*   PLATELETS Thousands/uL 129* 130*   NEUTROS PCT % 84* 73   MONOS PCT % 6 9   EOS PCT % 3 6     Results from last 7 days   Lab Units 03/05/24  0428 03/04/24  2358 03/04/24  1343   POTASSIUM mmol/L 5.6* 6.0* 7.3*   CHLORIDE mmol/L 96 96 96   CO2 mmol/L 27 28 26   BUN mg/dL 54* 50* 89*   CREATININE mg/dL 6.93* 6.68* 10.19*   CALCIUM mg/dL 8.7 8.3* 8.6   ALK PHOS U/L 80  --  69   ALT U/L 13  --  11   AST U/L 20  --  17     I have personally reviewed the following imaging study reports in PACS:    3/4/24-CT head without contrast, CT C-spine without contrast, right shoulder XR, left shoulder XR, XR hip/pelvis, chest x-ray, repeat CTh without  contrast    Therapies:   PT: pending   OT: pending     VTE Prophylaxis: Sequential compression device (Venodyne)     Code Status: Level 1 - Full Code  Advance Directive and Living Will:      Power of :    POLST:      Family and Social Support: wife    Goals of Care: recovery from acute injuries

## 2024-03-05 NOTE — ASSESSMENT & PLAN NOTE
Lab Results   Component Value Date    EGFR 7 03/05/2024    EGFR 7 03/04/2024    EGFR 4 03/04/2024    CREATININE 6.93 (H) 03/05/2024    CREATININE 6.68 (H) 03/04/2024    CREATININE 10.19 (H) 03/04/2024     Plan:  Nephrology following, input appreciated  Patient on hemodialysis schedule TTS  Post op needs 2 days of dialysis

## 2024-03-05 NOTE — ASSESSMENT & PLAN NOTE
Lab Results   Component Value Date    HGBA1C 4.8 01/05/2023       Recent Labs     03/05/24  0138 03/05/24  0703   POCGLU 79 77       Blood Sugar Average: Last 72 hrs:  (P) 78  Management per primary team

## 2024-03-05 NOTE — ASSESSMENT & PLAN NOTE
Lab Results   Component Value Date    HGBA1C 4.8 01/05/2023       Recent Labs     03/05/24  0138 03/05/24  0703   POCGLU 79 77       Blood Sugar Average: Last 72 hrs:  (P) 78    - SSI

## 2024-03-05 NOTE — DISCHARGE INSTR - AVS FIRST PAGE
Discharge Instructions - Orthopedics  Jeffery Bishop 68 y.o. male MRN: [id number]  Unit/Bed#: Cat Scan    Weight Bearing Status:                                           Weight bearing as tolerated to right upper extremity    DVT prophylaxis:  none    Pain:  Continue analgesics as directed    Dressing Instructions:   Please keep clean, dry and intact until follow up     Appt Instructions:   If you do not have your appointment, please call the clinic at [phone]  Otherwise follow up as scheduled.    Contact the office sooner if you experience any increased numbness/tingling in the extremities.      Neurosurgery discharge instructions following traumatic head bleed:     Do not take any blood thinning medications (ie. No Advil. No motrin. No ibuprofen. No Aleve. No Aspirin. No fishoil. No heparin. No antiplatelet / no anticoagulation medication).  Refrain from activity that increases chance of trauma to head or falls. Recommend you take fall precaution.  No strenuous activity or sports.  Return to hospital Emergency Room if you experience worsening / new headache, nausea/vomiting, speech/vision change, seizure, confusion / mental status change, weakness, or other neurological changes.      Follow-up as scheduled with a repeat CT head without contrast to be completed 2-3 days prior to visit.  Prescription has been entered electronically.  Please call [phone] to schedule.

## 2024-03-05 NOTE — PROGRESS NOTES
Progress Note - Orthopedics   Jeffery Bishop 68 y.o. male MRN: [id number]  Unit/Bed#: Cat Scan      Subjective:    68 y.o.male with right distal clavicle fracture. No acute events, no new complaints. Pain well controlled. K was 7.3 on admission, went for dialysis yesterday. Continues to report no pain at clavicle.    Labs:  0   Lab Value Date/Time    HCT 28.6 (L) 03/04/2024 1343    HCT 37.9 12/28/2023 1451    HCT 26.7 (L) 10/04/2023 2055    HCT 33.2 (L) 04/11/2017 0712    HGB 9.3 (L) 03/04/2024 1343    HGB 11.6 (L) 12/28/2023 1451    HGB 8.5 (L) 10/04/2023 2055    HGB 11.1 (L) 04/11/2017 0712    INR 1.30 (H) 03/04/2024 2358    WBC 4.78 03/04/2024 1343    WBC 7.57 12/28/2023 1451    WBC 3.05 (L) 10/04/2023 2055    WBC 6.2 04/11/2017 0712       Meds:    Current Facility-Administered Medications:     acetaminophen (TYLENOL) tablet 975 mg, 975 mg, Oral, Q8H PRN, Graham Martinez MD    ammonium lactate (LAC-HYDRIN) 12 % lotion, , Topical, BID PRN, Graham Martinez MD    ascorbic acid (VITAMIN C) tablet 500 mg, 500 mg, Oral, Daily, Graham Martinez MD    gabapentin (NEURONTIN) capsule 100 mg, 100 mg, Oral, HS, Graham Martinez MD, 100 mg at 03/04/24 2133    HYDROmorphone HCl (DILAUDID) injection 0.2 mg, 0.2 mg, Intravenous, Q3H PRN, Graham Martinez MD    insulin lispro (HumALOG/ADMELOG) 100 units/mL subcutaneous injection 1-5 Units, 1-5 Units, Subcutaneous, TID AC **AND** Fingerstick Glucose (POCT), , , TID AC, Graham Martinez MD    lactulose (CHRONULAC) oral solution 10 g, 10 g, Oral, Daily, Graham Martinez MD    levETIRAcetam (KEPPRA) tablet 500 mg, 500 mg, Oral, BID, Graham Martinez MD    metoprolol (LOPRESSOR) injection 5 mg, 5 mg, Intravenous, Q6H PRN, Graham Martinez MD    nicotine (NICODERM CQ) 7 mg/24hr TD 24 hr patch 1 patch, 1 patch, Transdermal, Daily, Graham Martinez MD    oxyCODONE (ROXICODONE) IR tablet 5 mg, 5 mg, Oral, Q4H PRN, Graham Martinez MD    oxyCODONE (ROXICODONE) split tablet 2.5 mg, 2.5 mg, Oral, Q4H PRN,  Graham Martinez MD    pantoprazole (PROTONIX) EC tablet 40 mg, 40 mg, Oral, Early Morning, Graham Martinez MD    Current Outpatient Medications:     acetaminophen (TYLENOL) 325 mg tablet, Take 3 tablets (975 mg total) by mouth every 8 (eight) hours as needed for mild pain, Disp: , Rfl: 0    ammonium lactate (LAC-HYDRIN) 12 % lotion, Apply topically 2 (two) times a day as needed for dry skin, Disp: 396 g, Rfl: 0    ascorbic acid (VITAMIN C) 500 mg tablet, TAKE ONE TABLET BY MOUTH EVERY EVENING FOR SUPPLEMENT, Disp: , Rfl:     ascorbic acid (VITAMIN C) 500 MG tablet, Take 500 mg by mouth daily, Disp: , Rfl:     aspirin (ECOTRIN LOW STRENGTH) 81 mg EC tablet, Take 81 mg by mouth daily, Disp: , Rfl:     atorvastatin (LIPITOR) 20 mg tablet, Take 20 mg by mouth daily at bedtime, Disp: , Rfl:     bacitracin topical ointment 500 units/g topical ointment, Apply 1 large application topically 2 (two) times a day (Patient not taking: Reported on 1/11/2024), Disp: 28 g, Rfl: 0    bisacodyl (DULCOLAX) 10 mg suppository, Insert into the rectum (Patient not taking: Reported on 1/11/2024), Disp: , Rfl:     Cholecalciferol 50 MCG (2000 UT) CAPS, Take by mouth daily, Disp: , Rfl:     Luckey Choice Comfort EZ 33G X 4 MM MISC, , Disp: , Rfl:     clopidogrel (PLAVIX) 75 mg tablet, Take 75 mg by mouth daily (Patient not taking: Reported on 11/13/2023), Disp: , Rfl:     clotrimazole-betamethasone (LOTRISONE) 1-0.05 % cream, Apply topically in the morning (Patient not taking: Reported on 1/11/2024), Disp: 45 g, Rfl: 2    docusate sodium (COLACE) 100 mg capsule, Take 1 capsule (100 mg total) by mouth daily for 14 days, Disp: 14 capsule, Rfl: 0    gabapentin (NEURONTIN) 100 mg capsule, Take 1 capsule (100 mg total) by mouth daily at bedtime, Disp: 30 capsule, Rfl: 3    gabapentin (NEURONTIN) 300 mg capsule, TAKE ONE CAPSULE BY MOUTH EVERY NIGHT AT BEDTIME FOR 1 WEEK THEN TWICE DAILY FOR 1 WEEK THEN 1 CAPSULE THREE TIMES DAILY, Disp: , Rfl:      glucagon 1 MG injection, Inject 1 mg into a muscle (Patient not taking: Reported on 11/13/2023), Disp: , Rfl:     hydrOXYzine HCL (ATARAX) 25 mg tablet, , Disp: , Rfl:     lactulose (CHRONULAC) 10 g/15 mL solution, TAKE 15 ML BY MOUTH THREE TIMES DAILY, Disp: , Rfl:     midodrine (PROAMATINE) 10 MG tablet, Take 1 tablet (10 mg total) by mouth 3 (three) times a day before meals (Patient taking differently: Take 10 mg by mouth 3 (three) times a day before meals Takes BID), Disp: 90 tablet, Rfl: 0    omeprazole (PriLOSEC) 40 MG capsule, Take 40 mg by mouth daily Take before a meal, Disp: , Rfl:     ondansetron (ZOFRAN-ODT) 8 mg disintegrating tablet, Take 1 tablet by mouth, Disp: , Rfl:     oxyCODONE (ROXICODONE) 5 immediate release tablet, Take 0.5 tablets (2.5 mg total) by mouth every 8 (eight) hours as needed for severe pain for up to 10 doses Max Daily Amount: 7.5 mg, Disp: 5 tablet, Rfl: 0    pantoprazole (PROTONIX) 40 mg tablet, Take 1 tablet (40 mg total) by mouth daily Take prior to breakfast, Disp: 30 tablet, Rfl: 3    pantoprazole (PROTONIX) 40 mg tablet, Take 1 tablet (40 mg total) by mouth daily, Disp: 90 tablet, Rfl: 0    polyethylene glycol (MIRALAX) 17 g packet, Take 17 g by mouth daily for 7 days, Disp: 119 g, Rfl: 0    vitamin B-12 (VITAMIN B-12) 1,000 mcg tablet, Take 1,000 mcg by mouth daily, Disp: , Rfl:     Blood Culture:   Lab Results   Component Value Date    BLOODCX No Growth After 5 Days. 09/12/2023    BLOODCX No Growth After 5 Days. 09/12/2023       Wound Culture:   Lab Results   Component Value Date    WOUNDCULT Growth in Broth culture only Klebsiella variicola (A) 06/03/2023    WOUNDCULT Growth in Broth culture only Escherichia coli (A) 06/03/2023    WOUNDCULT (A) 06/03/2023     Growth in Broth culture only Staphylococcus coagulase negative       Ins and Outs:  I/O last 24 hours:  In: 500 [I.V.:500]  Out: 2384 [Other:2384]          Physical:  Vitals:    03/05/24 0000   BP: 135/68   Pulse: 78    Resp: 17   Temp:    SpO2: 96%     Musculoskeletal: right Upper Extremity  Skin intact. No erythema or ecchymosis.  HD cath in left shoulder  Sling in place  NTTP  Sensation intact to median/radial/ulnar nerve distribution   Motor intact anterior interosseous nerve/posterior interosseous nerve/median/radial/ulnar nerve distributions at patients baseline  2+ radial pulse  Digits warm and well perfused  Capillary refill < 2 seconds      Assessment:    68 y.o.male with right distal clavicle fracture. Given he reports little to no pain, he can forgo the sling and work on shoulder ROM at this time. No indication for acute orthopedic intervention. Likely chronic fracture.    Plan:  WBAT RUE  No need for sling  PT/OT  Pain control  Rest of care per primary team  Dispo: Ortho will follow    Chelsey Ken MD    Please contact role [facility] Call for any questions or concerns.

## 2024-03-05 NOTE — CONSULTS
"[facility]  Consult Note  Name: Jeffery Bishop I  MRN: [id number]  Unit/Bed#: PPHP 618-01 I Date of Admission: 3/4/2024   Date of Service: 3/5/2024  Hospital Day: 1    Assessment/Plan   Goals of care, counseling/discussion  Assessment & Plan  Code Status: full - Level 1  Decisional apparatus:  Patient is competent on my exam today.  If competence is lost, patient's substitute decision maker would default to spouse, Mary, by [place] 169.  Advance Directive / Living Will / POLST:  none previously completed  Patient wishes to continue medical optimization without limits at this time. Tolerating HD well for the past 13 years. He is engaged with an out of network palliative care team and does not wish to place any limits on his care at this time.    Palliative care by specialist  Assessment & Plan  Patient is engaged with a palliative program in [place] (out of network. Wife can't recall name of agency)  Wilfrido and Mary are pleased with the support they receive from out of hospital palliative team and know that they will help navigate transition to hospice \"when it's time to stop HD\".    Ambulatory dysfunction  Assessment & Plan  Right sided weakness as a result of prior CVA  Patient uses wheelchair/scooter primarily to get around.  S/p fall, head strike, and IPH (non-operative management at this time) with close monitoring.   Patient agreeable to STR  upon discharge if indicated. Otherwise wishes for restorative care to return to previous function if possible.    ESRD (end stage renal disease) on dialysis (HCC)  Assessment & Plan  - patient has been on HD for 13 years. He tolerated today's session well and intends to continue at this time.  Lab Results   Component Value Date    EGFR 7 03/05/2024    EGFR 7 03/04/2024    EGFR 4 03/04/2024    CREATININE 6.93 (H) 03/05/2024    CREATININE 6.68 (H) 03/04/2024    CREATININE 10.19 (H) 03/04/2024                 I have reviewed the " "patient's controlled substance dispensing history in the Prescription Drug Monitoring Program in compliance with the [facility] regulations before prescribing any controlled substances.         We appreciate the invitation to be involved in this patient's care.  We will sign off at this time as goals are well defined and symptoms managed by geriatrics.  Please do not hesitate to reach our on call provider through our clinic answering service at [phone] should you have acute concerns.    Darby Ortiz PA-C  Palliative and Supportive Care  Clinic/Answering Service: [phone]  You can find me on [employer]!       IDENTIFICATION:  Inpatient consult to Palliative Care  Consult performed by: Darby Ortiz PA-C  Consult ordered by: Graham Martinez MD        Physician Requesting Consult: Da Lopez DO  Reason for Consult / Principal Problem: goals of care  Hx and PE limited by: encephalopathy    HISTORY OF PRESENT ILLNESS:       Jeffery Bishop is a 68 y.o. male with ESRD on HD T,Th,S, on ASA and plavix with hx of CVA and MI, presented to [facility] as a trauma transfer from [facility] for fall out of motorized scooter when attempting to transfer to wheelchair. Patient was found  to have multifocal parenchymal hemorrhages with surrounding edema. No neurosurgical intervention recommended but patient was reversed with DDAVP. Nephrology was consulted and patient required emergent HD for hyperkalemia. He was also found to have b/l pleural effusions and a right clavicular fracture (non-operative). Palliative care consulted for goals of care discussion.     During time of encounter patient is quite lucid. Oriented x2. States he has been on HD for 13 years and is quite \"used to it\" and \"accepting\". He admits his functionality and quality of life has diminished since falling and breaking his hip about 1 year ago. However, does not wish to place limits on his care and is hopeful to resume live as \"normally\" as possible. He is accepting " of STR upon d/c if indicated. He does worry recommendations will be made to stop driving his scooter.     Ed is supported by his wife, Mary. They have one son who lives locally and a child who lives in [place].     Review of Systems   Constitutional: Negative for decreased appetite and malaise/fatigue.   Respiratory:  Negative for shortness of breath.    Musculoskeletal:         Denies pain   Gastrointestinal:  Negative for nausea and vomiting.   Genitourinary: Negative.    Neurological:  Positive for weakness.   Psychiatric/Behavioral:  The patient does not have insomnia and is not nervous/anxious.    All other systems reviewed and are negative.      Past Medical History:   Diagnosis Date    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    With Cerebral Infarction:  Last Assessed:  October 20, 2016    Chronic kidney disease 2012    on dialysis     COVID 05/2022    COVID-19 04/2022 4/2022-while in [place]    Diabetes mellitus ([facility])     Dialysis patient ([facility])     T/TH/Sat    Difficulty walking     GERD (gastroesophageal reflux disease)     Hyperlipidemia     Hypertension     Labyrinthitis 09/10/2011    Myocardial infarction ([facility]) 2014    x3 others were in 2009 & 2010    Sleep disturbances 09/20/2010    Stroke ([facility]) 2007    x1, RLE deficit- uses walker    Type 2 diabetes, uncontrolled, with renal manifestation 05/03/2017     Past Surgical History:   Procedure Laterality Date    AV FISTULA PLACEMENT      AV FISTULA REPAIR Left 6/3/2023    Procedure: LEFT UPPER EXTREMITY A-V GRAFT EXPLANT, LEFT BRACHIAL ANGIOPATCH,  APPLICATION OF  VAC;  Surgeon: Neyda Rider MD;  Location:  MAIN OR;  Service: Vascular    CARDIAC CATHETERIZATION Left 02/03/2023    Procedure: Cardiac Left Heart Cath;  Surgeon: Tiffani Gregorio MD;  Location: [facility] CARDIAC CATH LAB;  Service: Cardiology    CARDIAC CATHETERIZATION N/A 02/08/2023    Procedure: Cardiac catheterization with complex PCI with Dr. Patricio, patient is a renal dialysis patient;  Surgeon:  "Marcos Ramirez MD;  Location:  CARDIAC CATH LAB;  Service: Cardiology    CARDIAC SURGERY  2010    stents x3    COLONOSCOPY N/A 12/12/2016    Procedure: COLONOSCOPY;  Surgeon: Radha Moss MD;  Location: [facility] GI LAB;  Service:     COLONOSCOPY N/A 04/03/2017    Procedure:  COLONOSCOPY;  Surgeon: Radha Moss MD;  Location: [facility] GI LAB;  Service:     HARDWARE REMOVAL Right 04/04/2022    Procedure: REMOVAL HARDWARE HIP;  Surgeon: Moises Kent MD;  Location: [facility];  Service: Orthopedics    IR AV FISTULA/GRAFT DECLOT  04/29/2021    IR AV FISTULA/GRAFT DECLOT  03/25/2022    IR AV FISTULA/GRAFT DECLOT  09/21/2022    IR AV FISTULAGRAM/GRAFTOGRAM  03/28/2022    IR AV FISTULAGRAM/GRAFTOGRAM  07/28/2022    IR TUNNELED CENTRAL LINE REMOVAL  12/07/2021    IR TUNNELED DIALYSIS CATHETER CHECK/CHANGE/REPOSITION/ANGIOPLASTY  2/1/2024    IR TUNNELED DIALYSIS CATHETER PLACEMENT  05/24/2021    PORTACATH PLACEMENT      per pt \"port in chest\"    UT ARTERIOVENOUS ANASTOMOSIS OPEN DIRECT Left 07/22/2021    Procedure: CREATION FISTULA ARTERIOVENOUS (AV);  Surgeon: Kareem Nye MD;  Location: [facility];  Service: Vascular    UT ARTERIOVENOUS ANASTOMOSIS OPEN DIRECT Left 3/21/2023    Procedure: left brachiobasilic fistula,  AVG Graft;  Surgeon: Catalino Hook MD;  Location: [facility];  Service: Vascular    UT ARTERIOVENOUS ANASTOMOSIS OPEN DIRECT Left 5/19/2023    Procedure: CREATION of LEFT FISTULA ARTERIOVENOUS (AV) GRAFT;  Surgeon: Catalino Hook MD;  [facility]: [facility];  Service: Vascular    UT HEMIARTHROPLASTY HIP PARTIAL Right 04/04/2022    Procedure: HEMIARTHROPLASTY HIP (BIPOLAR);  Surgeon: Moises Kent MD;  Location: [facility];  Service: Orthopedics     Social History     Socioeconomic History    Marital status: /Civil Union     Spouse name: Mary    Number of children: 2    Years of education: 14    Highest education level: Some college, no degree   Occupational " History    Not on file   Tobacco Use    Smoking status: Every Day     Current packs/day: 1.00     Average packs/day: 1 pack/day for 30.0 years (30.0 ttl pk-yrs)     Types: Cigarettes    Smokeless tobacco: Never    Tobacco comments:     Currently not smoking - in facility   Vaping Use    Vaping status: Never Used   Substance and Sexual Activity    Alcohol use: Yes     Alcohol/week: 1.0 standard drink of alcohol     Types: 1 Shots of liquor per week     Comment: 1 shot daily    Drug use: Yes     Types: Marijuana     Comment: gummies    Sexual activity: Not Currently     Partners: Female     Birth control/protection: None   Other Topics Concern    Not on file   Social History Narrative    Daily caffeinated coffee consumption     Social Determinants of Health     Financial Resource Strain: Not on file   Food Insecurity: No Food Insecurity (9/28/2023)    Hunger Vital Sign     Worried About Running Out of Food in the Last Year: Never true     Ran Out of Food in the Last Year: Never true   Transportation Needs: No Transportation Needs (9/28/2023)    PRAPARE - Transportation     Lack of Transportation (Medical): No     Lack of Transportation (Non-Medical): No   Physical Activity: Not on file   Stress: Not on file   Social Connections: Not on file   Intimate Partner Violence: Not on file   Housing Stability: Low Risk  (9/28/2023)    Housing Stability Vital Sign     Unable to Pay for Housing in the Last Year: No     Number of Places Lived in the Last Year: 1     Unstable Housing in the Last Year: No     Family History   Problem Relation Age of Onset    Leukemia Mother     Crohn's disease Father     Heart disease Father     Transient ischemic attack Brother        MEDICATIONS / ALLERGIES:    all current active meds have been reviewed    No Known Allergies    OBJECTIVE:    Physical Exam  Physical Exam  HENT:      Head:      Comments: Small forehead ecchymosis  Eyes:      Conjunctiva/sclera: Conjunctivae normal.   Abdominal:       Tenderness: There is no guarding.   Skin:     General: Skin is warm and dry.   Neurological:      Mental Status: He is alert and oriented to person, place, and time.   Psychiatric:         Mood and Affect: Mood normal.         Thought Content: Thought content normal.         Judgment: Judgment normal.         Lab Results:  Results from last 7 days   Lab Units 03/05/24  0428 03/04/24  1343   WBC Thousand/uL 5.82 4.78   HEMOGLOBIN g/dL 9.4* 9.3*   HEMATOCRIT % 30.0* 28.6*   PLATELETS Thousands/uL 129* 130*   NEUTROS PCT % 84* 73   MONOS PCT % 6 9   EOS PCT % 3 6     Results from last 7 days   Lab Units 03/05/24  0428 03/04/24  2358 03/04/24  1343   POTASSIUM mmol/L 5.6* 6.0* 7.3*   CHLORIDE mmol/L 96 96 96   CO2 mmol/L 27 28 26   BUN mg/dL 54* 50* 89*   CREATININE mg/dL 6.93* 6.68* 10.19*   CALCIUM mg/dL 8.7 8.3* 8.6   ALK PHOS U/L 80  --  69   ALT U/L 13  --  11   AST U/L 20  --  17         Imaging Studies: Reviewed pertinent studies  EKG, Pathology, and Other Studies: reviewed pertinent studies    Counseling / Coordination of Care    Total floor / unit time spent today 40 minutes. Greater than 50% of total time was spent with the patient and / or family counseling and / or coordination of care. A description of the counseling / coordination of care: time spent assessing patient, communicating with primary team, RN, discussing goals of care, providing support.

## 2024-03-05 NOTE — ASSESSMENT & PLAN NOTE
Lab Results   Component Value Date    EGFR 7 03/05/2024    EGFR 7 03/04/2024    EGFR 4 03/04/2024    CREATININE 6.93 (H) 03/05/2024    CREATININE 6.68 (H) 03/04/2024    CREATININE 10.19 (H) 03/04/2024     - HD through port Tues, Thurs, Sat  - Appreciate Nephrology consult and recommendations  - AV Fistula is not accessible  - Palliative care consult placed due to patient's request for more information about hospice and possible transition to hospice

## 2024-03-05 NOTE — PROGRESS NOTES
[facility]  Progress Note  Name: Jeffery Bishop I  MRN: [id number]  Unit/Bed#: LEIGH I Date of Admission: 3/4/2024   Date of Service: 3/5/2024 I Hospital Day: 1    Assessment/Plan   Closed nondisplaced fracture of right clavicle  Assessment & Plan  - Nondisplaced right clavicle fracture, present on admission.  - Appreciate Orthopedic surgery evaluation, recommendations and interventions as noted.  - Likely chronic  - Maintain WBAT RUE, no sling required   - Monitor right upper extremity neurovascular exam.  - Continue multimodal analgesic regimen.  - Continue DVT prophylaxis.  - PT and OT evaluation and treatment as indicated.  - Outpatient follow up with Orthopedic surgery for re-evaluation.      Pleural effusion, bilateral  Assessment & Plan  - Large right pleural effusion and tiny left sided pleural effusion identified on CXR  - Monitor with serial CXR  - Likely atraumatic  - Continue supplemental O2 to maintain saturations > 88% as he is an everyday smoker    Ambulatory dysfunction  Assessment & Plan  - Status post fall with the below noted injuries.  - Fall precautions.  - Geriatric Medicine consultation for evaluation, medication review and recommendations.  - PT and OT evaluation and treatment as indicated.  - Case Management consultation for disposition planning.      Type 2 diabetes mellitus  Assessment & Plan  Lab Results   Component Value Date    HGBA1C 4.8 01/05/2023       Recent Labs     03/05/24  0138 03/05/24  0703   POCGLU 79 77       Blood Sugar Average: Last 72 hrs:  (P) 78    - SSI    ESRD (end stage renal disease) on dialysis (HCC)  Assessment & Plan  Lab Results   Component Value Date    EGFR 7 03/05/2024    EGFR 7 03/04/2024    EGFR 4 03/04/2024    CREATININE 6.93 (H) 03/05/2024    CREATININE 6.68 (H) 03/04/2024    CREATININE 10.19 (H) 03/04/2024     - HD through port Tues, Thurs, Sat  - Appreciate Nephrology consult and recommendations  - AV Fistula is not  "accessible  - Palliative care consult placed due to patient's request for more information about hospice and possible transition to hospice    * Intraparenchymal hematoma of brain due to trauma (HCC)  Assessment & Plan  - Initial CTH 3/4: \"Multifocal parenchymal hemorrhage with surrounding edema though no significant mass effect or midline shift. Underlying lesions including metastasis or vascular malformations not excluded.\"  - S/p fall while transferring out of his motorized scooter, head strike, no LOC, takes ASA/Plavix  - Neuro exam: GCS 15, non-focal  - Continue neurologic checks: Every 4 hours.  - Reversal agent administered: DDAVP  - Repeat CT scan of the head on 3/5 stable  - Appreciate Neurosurgery evaluation and recommendations.  - Recommending MRI brain to rule out AVM, pending for tomorrow morning. Planning with Nephrology due to plan for use of IV contrast  - Complete 7 day course of Keppra for seizure prophylaxis  - Chemical DVT prophylaxis: Not cleared for chemical prophylaxis by neurosurgery at this time. Continue SCDs bilaterally.   - Hold all anticoagulants and anti platelet medications for 2 weeks and/or until cleared by Neurosurgery to resume.  - PT and OT (including cognitive evaluation) evaluation and treatment as indicated.                 TRAUMA TERTIARY SURVEY NOTE    VTE Prophylaxis:Sequential compression device (Venodyne)      Disposition: Continue SD2 due to intraparenchymal hemorrhage of the brain pending MRI, HD today, and pending consultations.    Code status:  Level 1 - Full Code    Consultants: IP CONSULT TO NEPHROLOGY  IP CONSULT TO NEUROSURGERY  IP CONSULT TO ORTHOPEDIC SURGERY  IP CONSULT TO GERONTOLOGY  IP CONSULT TO PALLIATIVE CARE  IP CONSULT TO CASE MANAGEMENT    Subjective   Transfer from: [facility]    Mechanism of Injury:Fall     Chief Complaint: \"I just want to go home\"    HPI/Last 24 hour events: Patient is resting comfortably in bed as he gets his HD. He denies any pain, " headaches, dizziness, vision changes, right shoulder pain, chest pain, shortness of breath, numbness or tingling.     Objective   Vitals:   Temp:  [97.8 °F (36.6 °C)-98.8 °F (37.1 °C)] 97.8 °F (36.6 °C)  HR:  [65-95] 82  Resp:  [11-22] 18  BP: (101-204)/() 150/88    I/O         03/03 0701 03/04 0700 03/04 0701 03/05 0700 03/05 0701 03/06 0700    I.V.  500     Total Intake  500     Other  2384     Total Output  2384     Net  -1884                     Physical Exam:   GENERAL APPEARANCE: Patient in no acute distress.  HEENT: R temporal; ecchymosis; PERRL, EOMs intact; Mucous membranes moist  NECK / BACK: Nontender, ROM intact  CV: Regular rate and rhythm; no murmur/gallops/rubs appreciated.  CHEST / LUNGS: Minimal rales  ABD: NABS; soft; non-distended; non-tender.  : Does not void  EXT: Right clavicle region nontender; +2 pulses bilaterally upper & lower extremities; no edema.  NEURO: GCS 15; minimal RUE, RLE weakness which is baseline from prior CVA  SKIN: Warm, dry and well perfused; no rash; no jaundice.      Invasive Devices       Peripheral Intravenous Line  Duration             Peripheral IV 03/04/24 Right;Ventral (anterior) Forearm <1 day              Hemodialysis Catheter  Duration             HD Permanent Double Catheter 32 days                     Lab Results: Results: I have personally reviewed all pertinent laboratory/tests results, BMP/CMP:   Lab Results   Component Value Date    SODIUM 140 03/05/2024    K 5.6 (H) 03/05/2024    CL 96 03/05/2024    CO2 27 03/05/2024    BUN 54 (H) 03/05/2024    CREATININE 6.93 (H) 03/05/2024    CALCIUM 8.7 03/05/2024    AST 20 03/05/2024    ALT 13 03/05/2024    ALKPHOS 80 03/05/2024    EGFR 7 03/05/2024   , and CBC:   Lab Results   Component Value Date    WBC 5.82 03/05/2024    HGB 9.4 (L) 03/05/2024    HCT 30.0 (L) 03/05/2024     (H) 03/05/2024     (L) 03/05/2024    RBC 2.94 (L) 03/05/2024    MCH 32.0 03/05/2024    MCHC 31.3 (L) 03/05/2024    RDW  14.6 03/05/2024    MPV 9.8 03/05/2024    NRBC 0 03/05/2024       Imaging Results: I have personally reviewed pertinent reports.    Chest Xray(s): positive for acute findings: B/L Pleural effusions   FAST exam(s): N/A   CT Scan(s): positive for acute findings: Intraparenchymal hemorrhage of the brain   Additional Xray(s): Likely chronic R clavicle fx     Other Studies: MRI brain pending

## 2024-03-05 NOTE — PROGRESS NOTES
NEPHROLOGY PROGRESS NOTE   Jeffery Bishop 68 y.o. male MRN: [id number]  Unit/Bed#: LEIGH Encounter: [id number]  Reason for Consult: ESRD    ASSESSMENT AND PLAN:  Patient is 68-year-old male with significant medical issues of ESRD on HD on TTS schedule, hypertension, hyperlipidemia, CAD, status post PCI, presented with episode of fall and found to have intracranial hemorrhage.  We are consulted for dialysis management.     ESRD on HD on TTS schedule at [facility]  -Patient had emergent dialysis yesterday due to life-threatening hyperkalemia.  Post HD weight 63.4 kg.  -Plan for HD again today to continue TTS schedule.  -Outpatient dry weight  61.5 kg.  -Patient is being scheduled for MRI brain with IV gadolinium as per discussion with neurosurgery.  This needs to be coordinated with dialysis as patient will need dialysis immediately after MRI for 2 consecutive days.  Tentative plan for MRI with IV gadolinium tomorrow a.m. as per neurosurgery.  Please notify nephrology if any change in the plan.     Access, currently has permacath    Initial severe hyperkalemia, improving with dialysis, serum potassium 5.6 today.  2K bath on dialysis today.  Strict low potassium diet recommended.     CKD anemia, remains on Mircera 30 mcg every 4 weeks as outpatient.  -Hemoglobin slightly lower, continue to monitor closely     CKD BMD, hyperphosphatemia, serum phosphorus above goal 8.8.  Will start patient on Renagel 1 tablet 3 times daily with meals.     Chronic hypotension, as per outpatient dialysis unit records, patient is on midodrine 10 mg 3 times daily  -BP acceptable currently.  Monitoring off midodrine.     Multifocal intracranial parenchymal hemorrhage with surrounding edema, no mass effect or no midline shift.   -Repeat CT scan of the head shows stable small parenchymal hemorrhages  -Plan noted for MRI brain with IV gadolinium.    Discussed above plan in detail with neurosurgery team regarding coordinating MRI with IV  gadolinium and dialysis and they agree with above recommendations.      SUBJECTIVE:  Patient seen and examined at bedside.  Denies any nausea or vomiting.    OBJECTIVE:  Current Weight:    Vitals:    03/05/24 1000   BP: 148/87   Pulse: 80   Resp: 18   Temp:    SpO2:        Intake/Output Summary (Last 24 hours) at 3/5/2024 1013  Last data filed at 3/5/2024 0830  Gross per 24 hour   Intake 700 ml   Output 2384 ml   Net -1684 ml     Wt Readings from Last 3 Encounters:   03/04/24 55.1 kg (121 lb 7.6 oz)   01/17/24 55.8 kg (123 lb)   01/11/24 55.8 kg (123 lb)     Temp Readings from Last 3 Encounters:   03/05/24 97.8 °F (36.6 °C) (Oral)   03/04/24 98.8 °F (37.1 °C) (Temporal)   01/24/24 98.4 °F (36.9 °C) (Temporal)     BP Readings from Last 3 Encounters:   03/05/24 148/87   03/04/24 151/78   02/01/24 126/63     Pulse Readings from Last 3 Encounters:   03/05/24 80   03/04/24 82   02/01/24 (!) 53        Physical Examination:  Eyes: Mild conjunctival pallor present  Neck: No obvious lymphadenopathy appreciated  Respiratory: Bilateral air entry present  CVS: No significant edema  GI: Soft, nondistended  CNS: Active, alert  Skin: No new rash  Musculoskeletal: No obvious new gross deformity noted    Medications:    Current Facility-Administered Medications:     acetaminophen (TYLENOL) tablet 975 mg, 975 mg, Oral, Q8H PRN, Graham Martinez MD    ammonium lactate (LAC-HYDRIN) 12 % lotion, , Topical, BID PRN, Graham Martinez MD    ascorbic acid (VITAMIN C) tablet 500 mg, 500 mg, Oral, Daily, Graham Martinez MD    gabapentin (NEURONTIN) capsule 100 mg, 100 mg, Oral, HS, Graham Martinez MD, 100 mg at 03/04/24 2133    HYDROmorphone HCl (DILAUDID) injection 0.2 mg, 0.2 mg, Intravenous, Q3H PRN, Graham Martinez MD    insulin lispro (HumALOG/ADMELOG) 100 units/mL subcutaneous injection 1-5 Units, 1-5 Units, Subcutaneous, TID AC **AND** Fingerstick Glucose (POCT), , , TID AC, Graham Martinez MD    lactulose (CHRONULAC) oral solution 10 g, 10 g,  Oral, Daily, Graham Martinez MD    levETIRAcetam (KEPPRA) tablet 500 mg, 500 mg, Oral, BID, Graham Martinez MD, 500 mg at 03/05/24 0107    metoprolol (LOPRESSOR) injection 5 mg, 5 mg, Intravenous, Q6H PRN, Graham Martinez MD    nicotine (NICODERM CQ) 7 mg/24hr TD 24 hr patch 1 patch, 1 patch, Transdermal, Daily, Graham Martinez MD    oxyCODONE (ROXICODONE) IR tablet 5 mg, 5 mg, Oral, Q4H PRN, Graham Martinez MD    oxyCODONE (ROXICODONE) split tablet 2.5 mg, 2.5 mg, Oral, Q4H PRN, Graham Martinez MD    pantoprazole (PROTONIX) EC tablet 40 mg, 40 mg, Oral, Early Morning, Graham Martinez MD, 40 mg at 03/05/24 0547    Current Outpatient Medications:     acetaminophen (TYLENOL) 325 mg tablet, Take 3 tablets (975 mg total) by mouth every 8 (eight) hours as needed for mild pain, Disp: , Rfl: 0    ammonium lactate (LAC-HYDRIN) 12 % lotion, Apply topically 2 (two) times a day as needed for dry skin, Disp: 396 g, Rfl: 0    ascorbic acid (VITAMIN C) 500 mg tablet, TAKE ONE TABLET BY MOUTH EVERY EVENING FOR SUPPLEMENT, Disp: , Rfl:     ascorbic acid (VITAMIN C) 500 MG tablet, Take 500 mg by mouth daily, Disp: , Rfl:     aspirin (ECOTRIN LOW STRENGTH) 81 mg EC tablet, Take 81 mg by mouth daily, Disp: , Rfl:     atorvastatin (LIPITOR) 20 mg tablet, Take 20 mg by mouth daily at bedtime, Disp: , Rfl:     bacitracin topical ointment 500 units/g topical ointment, Apply 1 large application topically 2 (two) times a day (Patient not taking: Reported on 1/11/2024), Disp: 28 g, Rfl: 0    bisacodyl (DULCOLAX) 10 mg suppository, Insert into the rectum (Patient not taking: Reported on 1/11/2024), Disp: , Rfl:     Cholecalciferol 50 MCG (2000 UT) CAPS, Take by mouth daily, Disp: , Rfl:     [place] Choice Comfort EZ 33G X 4 MM MISC, , Disp: , Rfl:     clopidogrel (PLAVIX) 75 mg tablet, Take 75 mg by mouth daily (Patient not taking: Reported on 11/13/2023), Disp: , Rfl:     clotrimazole-betamethasone (LOTRISONE) 1-0.05 % cream, Apply topically in the  morning (Patient not taking: Reported on 1/11/2024), Disp: 45 g, Rfl: 2    docusate sodium (COLACE) 100 mg capsule, Take 1 capsule (100 mg total) by mouth daily for 14 days, Disp: 14 capsule, Rfl: 0    gabapentin (NEURONTIN) 100 mg capsule, Take 1 capsule (100 mg total) by mouth daily at bedtime, Disp: 30 capsule, Rfl: 3    gabapentin (NEURONTIN) 300 mg capsule, TAKE ONE CAPSULE BY MOUTH EVERY NIGHT AT BEDTIME FOR 1 WEEK THEN TWICE DAILY FOR 1 WEEK THEN 1 CAPSULE THREE TIMES DAILY, Disp: , Rfl:     glucagon 1 MG injection, Inject 1 mg into a muscle (Patient not taking: Reported on 11/13/2023), Disp: , Rfl:     hydrOXYzine HCL (ATARAX) 25 mg tablet, , Disp: , Rfl:     lactulose (CHRONULAC) 10 g/15 mL solution, TAKE 15 ML BY MOUTH THREE TIMES DAILY, Disp: , Rfl:     midodrine (PROAMATINE) 10 MG tablet, Take 1 tablet (10 mg total) by mouth 3 (three) times a day before meals (Patient taking differently: Take 10 mg by mouth 3 (three) times a day before meals Takes BID), Disp: 90 tablet, Rfl: 0    omeprazole (PriLOSEC) 40 MG capsule, Take 40 mg by mouth daily Take before a meal, Disp: , Rfl:     ondansetron (ZOFRAN-ODT) 8 mg disintegrating tablet, Take 1 tablet by mouth, Disp: , Rfl:     oxyCODONE (ROXICODONE) 5 immediate release tablet, Take 0.5 tablets (2.5 mg total) by mouth every 8 (eight) hours as needed for severe pain for up to 10 doses Max Daily Amount: 7.5 mg, Disp: 5 tablet, Rfl: 0    pantoprazole (PROTONIX) 40 mg tablet, Take 1 tablet (40 mg total) by mouth daily Take prior to breakfast, Disp: 30 tablet, Rfl: 3    pantoprazole (PROTONIX) 40 mg tablet, Take 1 tablet (40 mg total) by mouth daily, Disp: 90 tablet, Rfl: 0    polyethylene glycol (MIRALAX) 17 g packet, Take 17 g by mouth daily for 7 days, Disp: 119 g, Rfl: 0    vitamin B-12 (VITAMIN B-12) 1,000 mcg tablet, Take 1,000 mcg by mouth daily, Disp: , Rfl:     Laboratory Results:  Results from last 7 days   Lab Units 03/05/24  0428 03/04/24  3410  "03/04/24  1343   WBC Thousand/uL 5.82  --  4.78   HEMOGLOBIN g/dL 9.4*  --  9.3*   HEMATOCRIT % 30.0*  --  28.6*   PLATELETS Thousands/uL 129*  --  130*   SODIUM mmol/L 140 137 138   POTASSIUM mmol/L 5.6* 6.0* 7.3*   CHLORIDE mmol/L 96 96 96   CO2 mmol/L 27 28 26   BUN mg/dL 54* 50* 89*   CREATININE mg/dL 6.93* 6.68* 10.19*   CALCIUM mg/dL 8.7 8.3* 8.6   MAGNESIUM mg/dL 2.0 1.9  --    PHOSPHORUS mg/dL 8.8* 7.9*  --        XR clavicle right   Final Result by Angeles Henson MD (03/05 0826)      Minimally displaced fracture of the distal third of the clavicle. AC joint is congruent.            Workstation performed: [id number]         CT head wo contrast   Final Result by E. Alec Schoenberger, MD (03/05 0750)      Stable small parenchymal hemorrhages in the right frontal parietal subcortical white matter likely due due to hemorrhagic axonal injury. Stable posttraumatic hemorrhage within the septum pellucidum.                  Workstation performed: [id number]         XR chest pa & lateral   Final Result by Angeles Henson MD (03/05 0823)      Mildly improved hazy opacity in the right mid and upper lung field. Persistent hazy opacity right lower lung field.      Persistent bilateral layered pleural effusions.            Workstation performed: [id number]         XR chest pa & lateral    (Results Pending)   MRI inpatient order    (Results Pending)       Portions of the record may have been created with voice recognition software. Occasional wrong word or \"sound a like\" substitutions may have occurred due to the inherent limitations of voice recognition software. Read the chart carefully and recognize, using context, where substitutions have occurred.    "

## 2024-03-05 NOTE — ASSESSMENT & PLAN NOTE
- Nondisplaced right clavicle fracture, present on admission.  - Appreciate Orthopedic surgery evaluation, recommendations and interventions as noted.  - Likely chronic  - Maintain WBAT RUE, no sling required   - Monitor right upper extremity neurovascular exam.  - Continue multimodal analgesic regimen.  - Continue DVT prophylaxis.  - PT and OT evaluation and treatment as indicated.  - Outpatient follow up with Orthopedic surgery for re-evaluation.

## 2024-03-05 NOTE — HEMODIALYSIS
Post-Dialysis RN Treatment Note    Blood Pressure:  Pre 150/89 mm/Hg  Post 160/86 mmHg   EDW  61.5 kg    Weight:  Pre 63.4 kg   Post 61.5 kg   Mode of weight measurement: Bed Scale   Volume Removed  2000 ml    Treatment duration 240 minutes    NS given  No    Treatment shortened? No   Medications given during Rx None Reported   Estimated Kt/V  1.29   Access type: Permacath/TDC   Access Issues: Yes, Lines reversed   Report called to primary nurse   Yes

## 2024-03-05 NOTE — ASSESSMENT & PLAN NOTE
"- Initial CTH 3/4: \"Multifocal parenchymal hemorrhage with surrounding edema though no significant mass effect or midline shift. Underlying lesions including metastasis or vascular malformations not excluded.\"  - S/p fall while transferring out of his motorized scooter, head strike, no LOC, takes ASA/Plavix  - Neuro exam: GCS 15, non-focal  - Continue neurologic checks: Every 4 hours.  - Reversal agent administered: DDAVP  - Repeat CT scan of the head on 3/5 stable  - Appreciate Neurosurgery evaluation and recommendations.  - Recommending MRI brain to rule out AVM, pending for tomorrow morning. Planning with Nephrology due to plan for use of IV contrast  - Complete 7 day course of Keppra for seizure prophylaxis  - Chemical DVT prophylaxis: Not cleared for chemical prophylaxis by neurosurgery at this time. Continue SCDs bilaterally.   - Hold all anticoagulants and anti platelet medications for 2 weeks and/or until cleared by Neurosurgery to resume.  - PT and OT (including cognitive evaluation) evaluation and treatment as indicated.      "

## 2024-03-05 NOTE — PLAN OF CARE
Plan 4 hrs of HD on a 2 k bath for a serum K of 5.6 today to UF 2 kg as nelida to get to EDW  Problem: METABOLIC, FLUID AND ELECTROLYTES - ADULT  Goal: Electrolytes maintained within normal limits  Description: INTERVENTIONS:  - Monitor labs and assess patient for signs and symptoms of electrolyte imbalances  - Administer electrolyte replacement as ordered  - Monitor response to electrolyte replacements, including repeat lab results as appropriate  - Instruct patient on fluid and nutrition as appropriate  Outcome: Progressing  Goal: Fluid balance maintained  Description: INTERVENTIONS:  - Monitor labs   - Monitor I/O and WT  - Instruct patient on fluid and nutrition as appropriate  - Assess for signs & symptoms of volume excess or deficit  Outcome: Progressing

## 2024-03-06 ENCOUNTER — TELEPHONE (OUTPATIENT)
Dept: NEUROSURGERY | Facility: CLINIC | Age: 69
End: 2024-03-06

## 2024-03-06 ENCOUNTER — APPOINTMENT (OUTPATIENT)
Dept: RADIOLOGY | Facility: HOSPITAL | Age: 69
DRG: 085 | End: 2024-03-06
Payer: MEDICARE

## 2024-03-06 ENCOUNTER — APPOINTMENT (INPATIENT)
Dept: DIALYSIS | Facility: HOSPITAL | Age: 69
DRG: 085 | End: 2024-03-06
Payer: MEDICARE

## 2024-03-06 LAB
ANION GAP SERPL CALCULATED.3IONS-SCNC: 10 MMOL/L
BASOPHILS # BLD AUTO: 0.05 THOUSANDS/ÂΜL (ref 0–0.1)
BASOPHILS NFR BLD AUTO: 1 % (ref 0–1)
BUN SERPL-MCNC: 28 MG/DL (ref 5–25)
CALCIUM SERPL-MCNC: 8.4 MG/DL (ref 8.4–10.2)
CHLORIDE SERPL-SCNC: 99 MMOL/L (ref 96–108)
CO2 SERPL-SCNC: 29 MMOL/L (ref 21–32)
CREAT SERPL-MCNC: 4.33 MG/DL (ref 0.6–1.3)
EOSINOPHIL # BLD AUTO: 0.25 THOUSAND/ÂΜL (ref 0–0.61)
EOSINOPHIL NFR BLD AUTO: 7 % (ref 0–6)
ERYTHROCYTE [DISTWIDTH] IN BLOOD BY AUTOMATED COUNT: 14.1 % (ref 11.6–15.1)
GFR SERPL CREATININE-BSD FRML MDRD: 12 ML/MIN/1.73SQ M
GLUCOSE SERPL-MCNC: 63 MG/DL (ref 65–140)
GLUCOSE SERPL-MCNC: 78 MG/DL (ref 65–140)
GLUCOSE SERPL-MCNC: 79 MG/DL (ref 65–140)
GLUCOSE SERPL-MCNC: 82 MG/DL (ref 65–140)
GLUCOSE SERPL-MCNC: 85 MG/DL (ref 65–140)
HCT VFR BLD AUTO: 26.8 % (ref 36.5–49.3)
HGB BLD-MCNC: 8.7 G/DL (ref 12–17)
IMM GRANULOCYTES # BLD AUTO: 0.02 THOUSAND/UL (ref 0–0.2)
IMM GRANULOCYTES NFR BLD AUTO: 1 % (ref 0–2)
LYMPHOCYTES # BLD AUTO: 0.34 THOUSANDS/ÂΜL (ref 0.6–4.47)
LYMPHOCYTES NFR BLD AUTO: 9 % (ref 14–44)
MCH RBC QN AUTO: 32.7 PG (ref 26.8–34.3)
MCHC RBC AUTO-ENTMCNC: 32.5 G/DL (ref 31.4–37.4)
MCV RBC AUTO: 101 FL (ref 82–98)
MONOCYTES # BLD AUTO: 0.29 THOUSAND/ÂΜL (ref 0.17–1.22)
MONOCYTES NFR BLD AUTO: 8 % (ref 4–12)
NEUTROPHILS # BLD AUTO: 2.86 THOUSANDS/ÂΜL (ref 1.85–7.62)
NEUTS SEG NFR BLD AUTO: 74 % (ref 43–75)
NRBC BLD AUTO-RTO: 0 /100 WBCS
PLATELET # BLD AUTO: 137 THOUSANDS/UL (ref 149–390)
PMV BLD AUTO: 9.7 FL (ref 8.9–12.7)
POTASSIUM SERPL-SCNC: 4.4 MMOL/L (ref 3.5–5.3)
RBC # BLD AUTO: 2.66 MILLION/UL (ref 3.88–5.62)
SODIUM SERPL-SCNC: 138 MMOL/L (ref 135–147)
WBC # BLD AUTO: 3.81 THOUSAND/UL (ref 4.31–10.16)

## 2024-03-06 PROCEDURE — 85025 COMPLETE CBC W/AUTO DIFF WBC: CPT | Performed by: SURGERY

## 2024-03-06 PROCEDURE — 80048 BASIC METABOLIC PNL TOTAL CA: CPT | Performed by: SURGERY

## 2024-03-06 PROCEDURE — 99232 SBSQ HOSP IP/OBS MODERATE 35: CPT | Performed by: SURGERY

## 2024-03-06 PROCEDURE — A9585 GADOBUTROL INJECTION: HCPCS | Performed by: EMERGENCY MEDICINE

## 2024-03-06 PROCEDURE — 70553 MRI BRAIN STEM W/O & W/DYE: CPT

## 2024-03-06 PROCEDURE — 82948 REAGENT STRIP/BLOOD GLUCOSE: CPT

## 2024-03-06 PROCEDURE — 99233 SBSQ HOSP IP/OBS HIGH 50: CPT | Performed by: NURSE PRACTITIONER

## 2024-03-06 RX ORDER — DEXTROSE MONOHYDRATE 25 G/50ML
25 INJECTION, SOLUTION INTRAVENOUS ONCE
Status: COMPLETED | OUTPATIENT
Start: 2024-03-06 | End: 2024-03-06

## 2024-03-06 RX ORDER — GADOBUTROL 604.72 MG/ML
5 INJECTION INTRAVENOUS
Status: COMPLETED | OUTPATIENT
Start: 2024-03-06 | End: 2024-03-06

## 2024-03-06 RX ORDER — FENTANYL CITRATE 50 UG/ML
50 INJECTION, SOLUTION INTRAMUSCULAR; INTRAVENOUS ONCE
Status: COMPLETED | OUTPATIENT
Start: 2024-03-06 | End: 2024-03-06

## 2024-03-06 RX ORDER — HYDROMORPHONE HCL/PF 1 MG/ML
0.5 SYRINGE (ML) INJECTION
Status: DISCONTINUED | OUTPATIENT
Start: 2024-03-06 | End: 2024-03-07 | Stop reason: HOSPADM

## 2024-03-06 RX ORDER — POLYETHYLENE GLYCOL 3350 17 G/17G
17 POWDER, FOR SOLUTION ORAL DAILY
Status: DISCONTINUED | OUTPATIENT
Start: 2024-03-06 | End: 2024-03-07 | Stop reason: HOSPADM

## 2024-03-06 RX ORDER — SENNOSIDES 8.6 MG
1 TABLET ORAL
Status: DISCONTINUED | OUTPATIENT
Start: 2024-03-06 | End: 2024-03-07 | Stop reason: HOSPADM

## 2024-03-06 RX ORDER — HEPARIN SODIUM 5000 [USP'U]/ML
5000 INJECTION, SOLUTION INTRAVENOUS; SUBCUTANEOUS EVERY 8 HOURS SCHEDULED
Status: DISCONTINUED | OUTPATIENT
Start: 2024-03-06 | End: 2024-03-07 | Stop reason: HOSPADM

## 2024-03-06 RX ORDER — GABAPENTIN 300 MG/1
300 CAPSULE ORAL DAILY
Status: DISCONTINUED | OUTPATIENT
Start: 2024-03-06 | End: 2024-03-07 | Stop reason: HOSPADM

## 2024-03-06 RX ADMIN — DEXTROSE MONOHYDRATE 25 ML: 25 INJECTION, SOLUTION INTRAVENOUS at 07:10

## 2024-03-06 RX ADMIN — FENTANYL CITRATE 50 MCG: 50 INJECTION INTRAMUSCULAR; INTRAVENOUS at 18:46

## 2024-03-06 RX ADMIN — FENTANYL CITRATE 50 MCG: 50 INJECTION INTRAMUSCULAR; INTRAVENOUS at 23:12

## 2024-03-06 RX ADMIN — SENNOSIDES 8.6 MG: 8.6 TABLET, FILM COATED ORAL at 23:12

## 2024-03-06 RX ADMIN — HEPARIN SODIUM 5000 UNITS: 5000 INJECTION INTRAVENOUS; SUBCUTANEOUS at 23:12

## 2024-03-06 RX ADMIN — LEVETIRACETAM 500 MG: 500 TABLET, FILM COATED ORAL at 17:53

## 2024-03-06 RX ADMIN — GADOBUTROL 5 ML: 604.72 INJECTION INTRAVENOUS at 07:55

## 2024-03-06 RX ADMIN — HEPARIN SODIUM 5000 UNITS: 5000 INJECTION INTRAVENOUS; SUBCUTANEOUS at 16:19

## 2024-03-06 RX ADMIN — HYDROMORPHONE HYDROCHLORIDE 0.5 MG: 1 INJECTION, SOLUTION INTRAMUSCULAR; INTRAVENOUS; SUBCUTANEOUS at 13:40

## 2024-03-06 NOTE — ASSESSMENT & PLAN NOTE
Lab Results   Component Value Date    HGBA1C 4.8 01/05/2023       Recent Labs     03/05/24  1351 03/05/24  1626 03/05/24 2048 03/06/24  0643   POCGLU 77 88 83 63*         Blood Sugar Average: Last 72 hrs:  (P) [id number]    - SSI

## 2024-03-06 NOTE — TELEPHONE ENCOUNTER
3/7/24 - PT STILL IN HOSPITAL    3/6/24 - PT IN [facility]  3/21/24 2 WK HFU W/CTH    TESSY [facility]  Patient needs 2-week hospital follow-up with AP and CTH

## 2024-03-06 NOTE — ASSESSMENT & PLAN NOTE
Patient's goals remain elusive, suggestive of comfort today but also endorsing consent to HD    Code Status: Full - Level 1  Decisional apparatus:  Patient is competent on my exam today.  If competence is lost, patient's substitute decision maker would default to spouse, Mary, by [place].  Advance Directive / Living Will / POLST:  none previously completed    On initial evaluation, patient endorsed desire to continue medical optimization without limits. Today with increased fatigue, intermittent refusal of HD but ultimately completed the session and endorsed desire to complete tomorrow's session as well given MRI. Concern for mixed picture with fatigue and depression. Patient with passive suicidal ideation.     - Recommend primary care team further assess patient's mental / psychiatric status - consider suicide precautions   - Appreciate Palliative care SW support

## 2024-03-06 NOTE — PROGRESS NOTES
Unable to recheck blood glucose level. Oncoming RN went to MRI to attempt to recheck glucose level after dextrose dose by nightshift RN. MRI scan already in process. Pt to be taken to dialysis after test.

## 2024-03-06 NOTE — CASE MANAGEMENT
Case Management Assessment & Discharge Planning Note    Patient name Jeffery Bishop  Location [facility]-01 MRN [id number]  : 1955 Date 3/6/2024       Current Admission Date: 3/4/2024  Current Admission Diagnosis:Intraparenchymal hematoma of brain due to trauma (HCC)   Patient Active Problem List    Diagnosis Date Noted    Intraparenchymal hematoma of brain due to trauma (HCC) 2024    Closed nondisplaced fracture of right clavicle 2024    Palliative care by specialist 2024    Goals of care, counseling/discussion 2024    Vomiting without nausea 2024    Unintentional weight loss 2024    Diarrhea 2024    Elevated LFTs 2024    Abnormal MRI 2024    Constipation 10/04/2023    Leukopenia 10/04/2023    Pleural effusion, bilateral 10/04/2023    History of CVA (cerebrovascular accident) 2023    Generalized weakness 2023    Open wound 2023    Chronic pain 2023    Anxiety disorder, unspecified 2023    Secondary hyperparathyroidism of renal origin ([facility]) 2023    Unspecified protein-calorie malnutrition ([facility]) 2023    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 06/15/2023    Ambulatory dysfunction 2023    Infection of dialysis AV graft 2023    Hypotension 2023    S/P angioplasty with stent 2023    Chronic deep vein thrombosis (DVT) of internal jugular vein ([facility]) 2022    End-stage renal disease  2022    Chronic kidney disease 2022    Right intertrochanteric femur fracture 2022    Thrombocytopenia  2022    Arteriovenous fistula (HCC) 2021    AV fistula thrombosis, initial encounter ([facility]) 2021    Encounter for extracorporeal dialysis ([facility]) 2019    Intestinal malabsorption 2017    Pernicious anemia 2017    Irritable bowel syndrome 2017    Elevated serum alkaline phosphatase level 2017    Anemia of chronic disease  02/23/2017    Coronary artery disease  10/20/2016    Hypertension 10/20/2016    Dependence on renal dialysis (HCC) 03/08/2016    Vitamin D deficiency, unspecified 02/12/2013    Mixed hyperlipidemia 10/04/2012    ESRD (end stage renal disease) on dialysis (HCC) 09/04/2012    Nicotine dependence 09/04/2012    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    Type 2 diabetes mellitus 09/04/2012      LOS (days): 2  Geometric Mean LOS (GMLOS) (days): 4.7  Days to GMLOS:2.9     OBJECTIVE:    Risk of Unplanned Readmission Score: 45.94         Current admission status: Inpatient       Preferred Pharmacy:   [id number] - [address] [address]  Phone: [phone] Fax: [phone]    Primary Care Provider: Rohit Parkinson MD    Primary Insurance: MEDICARE  Secondary Insurance: AARP    ASSESSMENT:  Active Health Care Proxies    There are no active Health Care Proxies on file.       Advance Directives  Does patient have a Health Care POA?: No  Was patient offered paperwork?: Yes  Does patient currently have a Health Care decision maker?: Yes, please see Health Care Proxy section  Does patient have Advance Directives?: No  Was patient offered paperwork?: Yes  Primary Contact: Mary Bishop (Spouse) [phone]  Readmission Root Cause  30 Day Readmission: No    Patient Information  Admitted from:: Home  Mental Status: Alert  During Assessment patient was accompanied by: Not accompanied during assessment  Assessment information provided by:: Patient  Primary Caregiver: Self  Support Systems: Self, Spouse/significant other, Family members  County of Residence: [place]  [facility] do you live in?: [place]  Home entry access options. Select all that apply.: Stairs  Number of steps to enter home.: 3  Do the steps have railings?: No  Type of Current Residence: [address] [place] [address] home  Upon entering residence, is there a bedroom on the main floor (no further steps)?:  Yes  Upon entering residence, is there a bathroom on the main floor (no further steps)?: Yes  Living Arrangements: Lives w/ Spouse/significant other  Is patient a ?: No    Activities of Daily Living Prior to Admission  Functional Status: Independent  Completes ADLs independently?: Yes  Ambulates independently?: Yes  Does patient use assisted devices?: Yes  Assisted Devices (DME) used: Walker  Does patient currently own DME?: Yes  What DME does the patient currently own?: Walker, Wheelchair, Other (Comment) (Electric scooter)  Does patient have a history of Outpatient Therapy (PT/OT)?: Yes  Does the patient have a history of Short-Term Rehab?: Yes  Does patient have a history of HHC?: Yes  Does patient currently have HHC?: No    Patient Information Continued  Income Source: Pension/[employer]  Does patient have prescription coverage?: Yes  Does patient receive dialysis treatments?: Yes ([facility];  T/TH/SAT;  1015 chair time which friend drives)  Does patient have a history of substance abuse?: No  Does patient have a history of Mental Health Diagnosis?: No    Means of Transportation  Means of Transport to Appts:: Drives Self      Social Determinants of Health (SDOH)      Flowsheet Row Most Recent Value   Housing Stability    In the last 12 months, was there a time when you were not able to pay the mortgage or rent on time? N   In the last 12 months, how many places have you lived? 1   In the last 12 months, was there a time when you did not have a steady place to sleep or slept in a shelter (including now)? N   Transportation Needs    In the past 12 months, has lack of transportation kept you from medical appointments or from getting medications? no   In the past 12 months, has lack of transportation kept you from meetings, work, or from getting things needed for daily living? No   Food Insecurity    Within the past 12 months, you worried that your food would run out before you got the money to buy  more. Never true   Within the past 12 months, the food you bought just didn't last and you didn't have money to get more. Never true   Utilities    In the past 12 months has the electric, gas, oil, or water company threatened to shut off services in your home? No          DISCHARGE DETAILS:    Discharge planning discussed with:: patient  Freedom of Choice: Yes     CM contacted family/caregiver?: Yes  Were Treatment Team discharge recommendations reviewed with patient/caregiver?: Yes     Were patient/caregiver advised of the risks associated with not following Treatment Team discharge recommendations?: Yes    Contacts  Patient Contacts: Mary Bishop  (wife)  Relationship to Patient:: Family  Contact Method: Phone  Phone Number: [phone]  Reason/Outcome: Emergency Contact, Discharge Planning    CM met with pt to discuss the role of CM.  Pt lives with his wife, and Pakistani cattle dog, in a 2 story home which has 3STE and 16 steps inside. Pt reports staying on the 1st floor as he has a bedroom and bathroom (walk-in shower with grab bars and raised toilet).   Pt can drive. Pt is a former . Pt is independent for all ADL/iADLs.   Pt's had 2 falls in the last 6 months. Pt owns a walker, electric scooter, and wheelchair; which he will use any of those three for whichever situation arises.   Pt uses [facility] in [facility]. Pt has no hx of mental health or substance abuse.  Pt is ESRD on HD  Pt goes to [facility] on T/TH/Sat @1015 and pt's friend transports.   Pt's preference is to d/c home, regardless of therapy recommendations.   CM will follow up      CM reviewed d/c planning process including the following: identifying help at home, patient preference for d/c planning needs, Discharge Lounge, [facility] Meds to Bed program, availability of treatment team to discuss questions or concerns patient and/or family may have regarding understanding medications and recognizing signs and symptoms  once discharged.  CM also encouraged patient to follow up with all recommended appointments after discharge. Patient advised of importance for patient and family to participate in managing patient’s medical well being.

## 2024-03-06 NOTE — PROGRESS NOTES
[facility]  Progress Note  Name: Jeffery Bishop I  MRN: [id number]  Unit/Bed#: [facility] 618-01 I Date of Admission: 3/4/2024   Date of Service: 3/6/2024 I Hospital Day: 2    Assessment/Plan   * Intraparenchymal hematoma of brain due to trauma (HCC)  Assessment & Plan  Multifocal parenchymal hemorrhage with surrounding edema  Patient presented after he fell trying to transfer from his electric wheelchair to a wheelchair.  Patient denies head strike and LOC.  He states he fell on his right shoulder.  Per chart review patient is on aspirin and Plavix, he was reversed with DDAVP.  Patient also noted to have right clavicle fracture    Imaging:    CT head wo 3/4/2024:Multifocal parenchymal hemorrhage with surrounding edema though no significant mass effect or midline shift. Underlying lesions including metastasis or vascular malformations not excluded.     CT head wo 3/4/24 (repeat):Stable small parenchymal hemorrhages in the right frontal parietal subcortical white matter likely due due to hemorrhagic axonal injury. Stable posttraumatic hemorrhage within the septum pellucidum.    MRI brain w/wo 3/6/24:3 separate areas of intraparenchymal hemorrhage as seen on recent CT scan, unchanged with mild surrounding edema. No underlying ischemia or mass is identified at this time. Recommend short-term follow-up MRI of the brain with contrast as hemorrhage resolves.There is a small amount of intraventricular hemorrhage layering within the occipital horns of the lateral ventricles.Moderate to advanced chronic microangiopathic change within the cerebral hemispheres.Abnormal appearance of the distal right vertebral artery which is hyperintense on T2 weighted imaging and demonstrates mild enhancement after administration of contrast, possibly representing slow flow or occlusion. This can be further evaluated with CT angiography of the head and neck.     Plan:  Exam: GCS 15, with ongoing right-sided  "weakness since stroke  Continue frequent neurological checks.   Imaging reviewed with patient.  Recommend CTA head and neck to further evaluate right vertebral artery questionable occlusion.  Trauma aware, patient states he does not want any further workup or imaging  Recommend palliative care  STAT CT head with any neurological decline including drop GCS of 2pts within 1 hr.  Recommend SBP <160mmHg.  Seizure prophylaxis per trauma team  Hold all antiplatelet and anticoagulation medications.   Medical management and pain control per primary team  Mobilize with PT/OT  DVT PPX: SCDs and cleared from neurosurgical standpoint for pharmacological DVT prophylaxis    Neurosurgery will sign off, recommend patient follow-up in 2 weeks with repeat CT head, call with any further question or concerns.      Closed nondisplaced fracture of right clavicle  Assessment & Plan  Orthopedics following, input appreciated  Right arm in sling prn    Type 2 diabetes mellitus  Assessment & Plan  Lab Results   Component Value Date    HGBA1C 4.8 01/05/2023       Recent Labs     03/05/24  0138 03/05/24  0703 03/05/24  1351   POCGLU 79 77 77         Blood Sugar Average: Last 72 hrs:  (P) [id number]  Management per primary team    ESRD (end stage renal disease) on dialysis ([facility])  Assessment & Plan  Lab Results   Component Value Date    EGFR 7 03/05/2024    EGFR 7 03/04/2024    EGFR 4 03/04/2024    CREATININE 6.93 (H) 03/05/2024    CREATININE 6.68 (H) 03/04/2024    CREATININE 10.19 (H) 03/04/2024     Plan:  Nephrology following, input appreciated  Patient on hemodialysis schedule TTS  Post op needs 2 days of dialysis             Subjective/Objective     Chief Complaint: \"I want to be left alone\"    Subjective: Patient very tearful during exam stating he would just like to be left alone.  He states he would not want any further workup or medical management at this time after reviewing imaging with him.  He offers no " complaints.    Objective: Patient tearful laying in hospital bed receiving dialysis, NAD.    I/O         03/04 0701 03/05 0700 03/05 0701 03/06 0700 03/06 0701 03/07 0700    P.O.   0    I.V. 500 450 200    Total Intake 500 450 200    Other 2384 2500     Total Output 2384 2500     Net -1884 -2050 +200                   Invasive Devices       Peripheral Intravenous Line  Duration             Peripheral IV 03/04/24 Right;Ventral (anterior) Forearm 1 day              Hemodialysis Catheter  Duration             HD Permanent Double Catheter 33 days                    Physical Exam:  Vitals: Blood pressure 160/82, pulse 80, temperature 98.1 °F (36.7 °C), temperature source Oral, resp. rate 16, SpO2 95%.,There is no height or weight on file to calculate BMI.    Exam limited because patient is very upset and wants to be left alone    General appearance: alert, appears stated age, cooperative and no distress  Head: Normocephalic, without obvious abnormality, atraumatic  Eyes: Tracks appropriately in room  Neck: supple, symmetrical, trachea midline  Lungs: non labored breathing  Heart: regular heart rate  Neurologic:   Mental status: Alert, oriented, thought content appropriate, speech is clear, following commands  Cranial nerves: grossly intact (Cranial nerves II-XII) no gross abnormality seen  Motor: moving all extremities, able to bend knees up bilaterally with ongoing right lower extremity weakness secondary to prior stroke.  Left upper extremity 5/5        Lab Results:  Results from last 7 days   Lab Units 03/06/24  0821 03/05/24 0428 03/04/24  1343   WBC Thousand/uL 3.81* 5.82 4.78   HEMOGLOBIN g/dL 8.7* 9.4* 9.3*   HEMATOCRIT % 26.8* 30.0* 28.6*   PLATELETS Thousands/uL 137* 129* 130*   NEUTROS PCT % 74 84* 73   MONOS PCT % 8 6 9   EOS PCT % 7* 3 6     Results from last 7 days   Lab Units 03/06/24  0821 03/05/24  0428 03/04/24 2358 03/04/24  1343   SODIUM mmol/L 138 140 137 138   POTASSIUM mmol/L 4.4 5.6* 6.0* 7.3*  "  CHLORIDE mmol/L 99 96 96 96   CO2 mmol/L 29 27 28 26   BUN mg/dL 28* 54* 50* 89*   CREATININE mg/dL 4.33* 6.93* 6.68* 10.19*   CALCIUM mg/dL 8.4 8.7 8.3* 8.6   ALK PHOS U/L  --  80  --  69   ALT U/L  --  13  --  11   AST U/L  --  20  --  17     Results from last 7 days   Lab Units 03/05/24  0428 03/04/24  2358   MAGNESIUM mg/dL 2.0 1.9     Results from last 7 days   Lab Units 03/05/24  0428 03/04/24  2358   PHOSPHORUS mg/dL 8.8* 7.9*     Results from last 7 days   Lab Units 03/05/24 0428 03/04/24  2358   INR  1.31* 1.30*     No results found for: \"TROPONINT\"  ABG:No results found for: \"PHART\", \"FQC3KFR\", \"PO2ART\", \"CJM5LLF\", \"Y5KCDNFL\", \"BEART\", \"SOURCE\"    Imaging Studies: I have personally reviewed pertinent reports.   and I have personally reviewed pertinent films in PACS    MRI brain w wo contrast    Result Date: 3/6/2024  Impression: 3 separate areas of intraparenchymal hemorrhage as seen on recent CT scan, unchanged with mild surrounding edema. No underlying ischemia or mass is identified at this time. Recommend short-term follow-up MRI of the brain with contrast as hemorrhage resolves. There is a small amount of intraventricular hemorrhage layering within the occipital horns of the lateral ventricles. Moderate to advanced chronic microangiopathic change within the cerebral hemispheres. Abnormal appearance of the distal right vertebral artery which is hyperintense on T2 weighted imaging and demonstrates mild enhancement after administration of contrast, possibly representing slow flow or occlusion. This can be further evaluated with CT angiography of the head and neck. This examination was marked \"immediate notification\" in Epic in order to begin the standard process by which the radiology reading room liaison alerts the referring practitioner. Workstation performed: [id number]     XR clavicle right    Result Date: 3/5/2024  Impression: Minimally displaced fracture of the distal third of the clavicle. AC " joint is congruent. Workstation performed: [id number]     XR chest pa & lateral    Result Date: 3/5/2024  Impression: Mildly improved hazy opacity in the right mid and upper lung field. Persistent hazy opacity right lower lung field. Persistent bilateral layered pleural effusions. Workstation performed: [id number]     CT head wo contrast    Result Date: 3/5/2024  Impression: Stable small parenchymal hemorrhages in the right frontal parietal subcortical white matter likely due due to hemorrhagic axonal injury. Stable posttraumatic hemorrhage within the septum pellucidum. Workstation performed: [id number]     XR hip/pelv 2-3 vws right    Result Date: 3/4/2024  Impression: Hip hemiarthroplasty without evidence of a hardware complication or failure. Workstation performed: [id number]     XR shoulder 2+ views LEFT    Result Date: 3/4/2024  Impression: No acute osseous abnormality. Workstation performed: [id number]     XR shoulder 2+ views RIGHT    Result Date: 3/4/2024  Impression: Minimally displaced distal right clavicular fracture. The study was marked in EPIC for immediate notification. Workstation performed: [id number]     XR chest 1 view    Result Date: 3/4/2024  Impression: Acute mildly displaced distal right clavicle fracture. Large right and trace left pleural effusion. The study was marked in EPIC for immediate notification. Workstation performed: [id number]     CT head without contrast    Result Date: 3/4/2024  Impression: Multifocal parenchymal hemorrhage with surrounding edema though no significant mass effect or midline shift. Underlying lesions including metastasis or vascular malformations not excluded. I personally discussed this study with KISHORE CHRISTIANSON on 3/4/2024 2:35 PM. Workstation performed: [id number]     CT cervical spine without contrast    Result Date: 3/4/2024  Impression: 1.  No cervical spine fracture or traumatic malalignment. 2.  Large right pleural effusion partially imaged. The study was  marked in EPIC for immediate notification. Workstation performed: [id number]       EKG, Pathology, and Other Studies: I have personally reviewed pertinent reports.      VTE Mechanical Prophylaxis: sequential compression device

## 2024-03-06 NOTE — ASSESSMENT & PLAN NOTE
Right sided weakness as a result of prior CVA  Patient uses wheelchair/scooter primarily to get around.  S/p fall, head strike, and IPH (non-operative management at this time) with close monitoring.

## 2024-03-06 NOTE — PROGRESS NOTES
[facility]  Progress Note  Name: Jeffery Bishop I  MRN: [id number]  Unit/Bed#: [facility] 618-01 I Date of Admission: 3/4/2024   Date of Service: 3/6/2024 I Hospital Day: 2    Assessment/Plan   Palliative care by specialist  Assessment & Plan  - will reeval after dialysis, patient states he wants to stop.    Closed nondisplaced fracture of right clavicle  Assessment & Plan  - Nondisplaced right clavicle fracture, present on admission.  - Appreciate Orthopedic surgery evaluation, recommendations and interventions as noted.  - Likely chronic  - Maintain WBAT RUE, no sling required   - Monitor right upper extremity neurovascular exam.  - Continue multimodal analgesic regimen.  - Continue DVT prophylaxis.  - PT and OT evaluation and treatment as indicated.  - Outpatient follow up with Orthopedic surgery for re-evaluation.      Pleural effusion, bilateral  Assessment & Plan  - Large right pleural effusion and tiny left sided pleural effusion identified on CXR  - Monitor with serial CXR  - Likely atraumatic  - Continue supplemental O2 to maintain saturations > 88% as he is an everyday smoker    Ambulatory dysfunction  Assessment & Plan  - Status post fall with the below noted injuries.  - Fall precautions.  - Geriatric Medicine consultation for evaluation, medication review and recommendations.  - PT and OT evaluation and treatment as indicated.  - Case Management consultation for disposition planning.      Type 2 diabetes mellitus  Assessment & Plan  Lab Results   Component Value Date    HGBA1C 4.8 01/05/2023       Recent Labs     03/05/24  1351 03/05/24  1626 03/05/24  2048 03/06/24  0643   POCGLU 77 88 83 63*         Blood Sugar Average: Last 72 hrs:  (P) 77.86961307937420629    - SSI    ESRD (end stage renal disease) on dialysis (HCC)  Assessment & Plan  Lab Results   Component Value Date    EGFR 7 03/05/2024    EGFR 7 03/04/2024    EGFR 4 03/04/2024    CREATININE 6.93 (H) 03/05/2024  "   CREATININE 6.68 (H) 03/04/2024    CREATININE 10.19 (H) 03/04/2024     - HD through port Tues, Thjosue, Sat  - Appreciate Nephrology consult and recommendations  - AV Fistula is not accessible  - Palliative care consult placed due to patient's request for more information about hospice and possible transition to hospice   - patient is full code    * Intraparenchymal hematoma of brain due to trauma (HCC)  Assessment & Plan  - Initial CTH 3/4: \"Multifocal parenchymal hemorrhage with surrounding edema though no significant mass effect or midline shift. Underlying lesions including metastasis or vascular malformations not excluded.\"  - S/p fall while transferring out of his motorized scooter, head strike, no LOC, takes ASA/Plavix  - Neuro exam: GCS 15, non-focal  - Continue neurologic checks: Every 4 hours.  - Reversal agent administered: DDAVP  - Repeat CT scan of the head on 3/5 stable  - Appreciate Neurosurgery evaluation and recommendations.  - Recommending MRI brain to rule out AVM, pending this morning.  Planning with Nephrology due to plan for use of IV contrast ( will need dialysis)  - Complete 7 day course of Keppra for seizure prophylaxis  - Chemical DVT prophylaxis: Not cleared for chemical prophylaxis by neurosurgery at this time. Continue SCDs bilaterally.   - Hold all anticoagulants and anti platelet medications for 2 weeks and/or until cleared by Neurosurgery to resume.  - PT and OT (including cognitive evaluation) evaluation and treatment as indicated.                 Bowel Regimen: currently on lactulose  VTE Prophylaxis:Sequential compression device (Venodyne)      Disposition: pending placement    Subjective   Chief Complaint: Patient at dialysis. States feels depressed    Subjective: Patient to get MRI today. Patient currently feels depressed and feels sad and is usnure if he wants further dialysis. States will wait for MRI brain before deciding.      Objective   Vitals:   Temp:  [97.8 °F (36.6 " °C)-99.3 °F (37.4 °C)] 98.1 °F (36.7 °C)  HR:  [67-90] 80  Resp:  [16-20] 16  BP: (117-169)/(58-90) 160/82    I/O         03/04 0701  03/05 0700 03/05 0701  03/06 0700 03/06 0701 03/07 0700    I.V. 500 450     Total Intake 500 450     Other 2384 2500     Total Output 2384 2500     Net -1884 -2050                     Physical Exam:   GENERAL APPEARANCE: NAD, currently getting dialysis  NEURO: GCS 15  HEENT: bruising to right side of head  CV: RRR  LUNGS: CTA  GI: soft, nontener  : currently getting dialysis  MSK: moving all 4 extremities  SKIN: old bruising to chest.     Invasive Devices       Peripheral Intravenous Line  Duration             Peripheral IV 03/04/24 Right;Ventral (anterior) Forearm 1 day              Hemodialysis Catheter  Duration             HD Permanent Double Catheter 33 days                          Lab Results: Results: I have personally reviewed all pertinent laboratory/tests results, BMP/CMP:   Lab Results   Component Value Date    SODIUM 138 03/06/2024    K 4.4 03/06/2024    CL 99 03/06/2024    CO2 29 03/06/2024    BUN 28 (H) 03/06/2024    CREATININE 4.33 (H) 03/06/2024    CALCIUM 8.4 03/06/2024    EGFR 12 03/06/2024   , and CBC:   Lab Results   Component Value Date    WBC 3.81 (L) 03/06/2024    HGB 8.7 (L) 03/06/2024    HCT 26.8 (L) 03/06/2024     (H) 03/06/2024     (L) 03/06/2024    RBC 2.66 (L) 03/06/2024    MCH 32.7 03/06/2024    MCHC 32.5 03/06/2024    RDW 14.1 03/06/2024    MPV 9.7 03/06/2024    NRBC 0 03/06/2024     Imaging: I have personally reviewed pertinent reports.     Other Studies:

## 2024-03-06 NOTE — PROGRESS NOTES
Progress Note - Geriatric Medicine   Jeffery Bishop 68 y.o. male MRN: [id number]  Unit/Bed#: [facility] 618-01 Encounter: [id number]      Assessment/Plan:    Ambulatory dysfunction with fall  -Reportedly mechanical fall when self transferring from motorized scooter to wheelchair   -S/p DDVAP for reversal of ASA/Plavix   -injuries as outlined below  -Primarily utilizes wheelchair/motorized scooter for mobilization at baseline  -hx of recurrent falls numerous over past year  -remains high risk future falls, cont fall precautions   -Recommend home fall risk assessment and personal fall alert system if returning home  -PT and OT pending     Multifocal parenchymal hemorrhage  -S/p fall as outlined above  -CTH 3/4/24 reports multifocal parenchymal hemorrhage with surrounding edema with no significant mass effect or midline shift  -Repeat CTH 3/4/2024 reports stable small parenchymal hemorrhages in right frontoparietal subcortical white matter and stable posttraumatic hemorrhage in septum pellucidum  -MRI brain obtained earlier today reports 3 separate areas of intraparenchymal hemorrhage as seen on CT scan with unchanged mild surrounding edema, no underlying ischemia or mass identified but short-term repeat MRI is recommended following resolution of hemorrhage  -AC/AP remain on hold  -Neurochecks per protocol  -Nsx on consult     Right distal clavicle fracture  -S/p fall as above  -Noted on right shoulder XR obtained on admission  -Ortho on consult -suspected to be chronic, no sling or intervention indicated at this time  -continue acute multimodal pain control - palliative adjusting pain regimen      Acute pain due to trauma  -regimen adjustments made by Palliative to optimize pain control, patient currently refusing analgesics stating he does not expect anything to help  -recommend  bowel regimen to prevent and treat constipation due to increased risk with acute pain and opiate pain medications     Severe hyperkalemia  -[K]  7.3 on initial presentation   -Required urgent HD on initial presentation   -normalized at 4.4 this am  -Strict low potassium diet recommended, compliance strongly encouraged  -Continue electrolyte optimization and management per Nephro     ESRD on HD  -o/p HD schedule T/TH/Sa  -current access via permacath  -Maintained on midodrine for hypotension due to autonomic neuropathy  -Renally dose all medications  -Nephro on consult     Goals of care   -follows with out of network Palliative care team in [place]  -inpatient Palliative care team following for shifting goals of care      Underweight  -BMI 17.43  -Recommend nutrition consult for concern of protein calorie malnutrition with diffuse severe subcutaneous fat and muscle wasting   -Encourage well-balanced nutritional intake and healthy lifestyle modifications     Nicotine dependence due to cigarettes  -Encourage cessation, continue daily bedside cessation counseling  -Consider nicotine patch for use during hospitalization  -Outpatient follow-up with PCP for ongoing cessation support     Cognitive screening  -Alert and oriented to self and situation   -Reportedly mostly dependent with ADLs at baseline requires assist with IADLs  -No prior cognitive testing on record review  -[facility] 3/4/24 images personally viewed, in addition to acute findings at least moderate diffuse chronic microangiopathic changes also appreciated  -TSH WNL 2.992, B12 4/19/2022, no recent recheck, consider recheck with routine labs as is maintained on supplementation daily as outpatient  -Encourage use of sensory assist devices such as corrective lenses at all appropriate times to reduce risk of uncorrected sensory impairment from contributing to isolation, confusion, encephalopathy and more precipitous cognitive decline  -Encourage patient remain physically, socially, cognitively active and engaged to maintain cognitive acuity     DM-II  -A1c 4.8  -No longer on any diabetic medications  -Complicated by  autonomic neuropathy and gastroparesis     CAD  -Maintained on aspirin and Plavix  -S/p cath 2/4/2023 with mid RCA 90% stenosis s/p PCI     Impaired Vision  -recommend use of corrective lenses at all appropriate times  -Consider large font for printed material provided to patient     Frailty syndrome in geriatric patient   -Clinical frailty scale stage VI, moderately frail   -Multifactorial including age, ambulatory dysfunction with recurrent falls, ESRD on HD and multitude of additional chronic medical, manage now with fall and acute intraparenchymal hematoma superimposed in elderly individual with extremely limited physiologic and metabolic reserve  -Encourage well-balanced nutrition and consider nutrition consult as outlined   -Continue optimization of chronic medical conditions and address acute metabolic derangements as arise  -Monitor for and treat any underlying anxiety/mood/depression symptoms as may impact patient response to therapies as well as overall sense of wellbeing and quality of life  -Continue to ensure that treatment and interventions align with patient's wishes and goals of care  -Continue psychosocial supports of patient and caregivers     High risk developing delirium   -continue delirium precautions  -encourage normal sleep/wake cycle  -monitor for fecal and urinary retention which may precipitate delirium  -provide frequent reorientation and redirection as indicated and appropriate  -redirect unwanted behaviors as first line     Care coordination: rounded with Rodrigo (RN)    Subjective:     Jeffery is seen and examined at bedside where he is lying resting, he reports pain in his right shoulder but is currently refusing medications as he does not believe any will be effective. He notes that the only thing that helps is sleeping.    Review of Systems   Unable to perform ROS: Other (patient reports pain in his right shoulder and declines to participate further)     Objective:     Vitals: Blood  pressure 155/81, pulse 77, temperature 97.8 °F (36.6 °C), resp. rate 16, SpO2 98%.,There is no height or weight on file to calculate BMI.      Intake/Output Summary (Last 24 hours) at 3/6/2024 1509  Last data filed at 3/6/2024 1304  Gross per 24 hour   Intake 500 ml   Output 1500 ml   Net -1000 ml     Current Medications: Reviewed    Physical Exam:   Physical Exam  Vitals and nursing note reviewed.   Constitutional:       General: He is not in acute distress.     Comments: Thin frail cachetic appearing elderly male   HENT:      Head: Normocephalic.      Comments: Extensive right face ecchymosis      Nose: Nose normal.      Mouth/Throat:      Mouth: Mucous membranes are dry.   Eyes:      General: No scleral icterus.        Right eye: No discharge.         Left eye: No discharge.   Neck:      Comments: Phonation norm   Cardiovascular:      Rate and Rhythm: Normal rate and regular rhythm.   Pulmonary:      Effort: Pulmonary effort is normal. No respiratory distress.   Abdominal:      General: There is no distension.   Musculoskeletal:      Cervical back: Neck supple.      Right lower leg: No edema.      Left lower leg: No edema.      Comments: Severe diffuse subcutaneous fat and muscle wasting    Skin:     General: Skin is dry.      Comments: Thin and friable, extensive ecchymosis scattered over extremities    Neurological:      Mental Status: He is alert.      Comments: Awake and alert    Psychiatric:      Comments: Labile mood and affect         Invasive Devices       Peripheral Intravenous Line  Duration             Peripheral IV 03/04/24 Right;Ventral (anterior) Forearm 2 days              Hemodialysis Catheter  Duration             HD Permanent Double Catheter 34 days                  Lab Results:     I have personally reviewed pertinent lab results including the following:    Results from last 7 days   Lab Units 03/06/24  0821 03/05/24  0428 03/04/24  1343   WBC Thousand/uL 3.81* 5.82 4.78   HEMOGLOBIN g/dL 8.7*  9.4* 9.3*   HEMATOCRIT % 26.8* 30.0* 28.6*   PLATELETS Thousands/uL 137* 129* 130*   NEUTROS PCT % 74 84* 73   MONOS PCT % 8 6 9   EOS PCT % 7* 3 6     Results from last 7 days   Lab Units 03/06/24  0821 03/05/24  0428 03/04/24  2358 03/04/24  1343   POTASSIUM mmol/L 4.4 5.6* 6.0* 7.3*   CHLORIDE mmol/L 99 96 96 96   CO2 mmol/L 29 27 28 26   BUN mg/dL 28* 54* 50* 89*   CREATININE mg/dL 4.33* 6.93* 6.68* 10.19*   CALCIUM mg/dL 8.4 8.7 8.3* 8.6   ALK PHOS U/L  --  80  --  69   ALT U/L  --  13  --  11   AST U/L  --  20  --  17     I have personally reviewed the following imaging study reports in PACS:    3/6/24- MRI brain w/wo contrast

## 2024-03-06 NOTE — ASSESSMENT & PLAN NOTE
"- Initial CTH 3/4: \"Multifocal parenchymal hemorrhage with surrounding edema though no significant mass effect or midline shift. Underlying lesions including metastasis or vascular malformations not excluded.\"  - S/p fall while transferring out of his motorized scooter, head strike, no LOC, takes ASA/Plavix  - Neuro exam: GCS 15, non-focal  - Continue neurologic checks: Every 4 hours.  - Reversal agent administered: DDAVP  - Repeat CT scan of the head on 3/5 stable  - Appreciate Neurosurgery evaluation and recommendations.  - Recommending MRI brain to rule out AVM, pending this morning.  Planning with Nephrology due to plan for use of IV contrast ( will need dialysis)  - Complete 7 day course of Keppra for seizure prophylaxis  - Chemical DVT prophylaxis: Not cleared for chemical prophylaxis by neurosurgery at this time. Continue SCDs bilaterally.   - Hold all anticoagulants and anti platelet medications for 2 weeks and/or until cleared by Neurosurgery to resume.  - PT and OT (including cognitive evaluation) evaluation and treatment as indicated.      "

## 2024-03-06 NOTE — ASSESSMENT & PLAN NOTE
With primary palliative Dx of ESRD and brain hematoma in setting of trauma    Patient is engaged with a palliative program in [place] (out of network. Wife can't recall name of agency). States that he has had a discussion with their hospice team approximately 3 weeks prior. He is considering of transition to comfort care.     Will continue to follow given shifting nature of patient's goals

## 2024-03-06 NOTE — ASSESSMENT & PLAN NOTE
Lab Results   Component Value Date    EGFR 7 03/05/2024    EGFR 7 03/04/2024    EGFR 4 03/04/2024    CREATININE 6.93 (H) 03/05/2024    CREATININE 6.68 (H) 03/04/2024    CREATININE 10.19 (H) 03/04/2024     - HD through [facility], Sat  - Appreciate Nephrology consult and recommendations  - [facility] is not accessible  - Palliative care consult placed due to patient's request for more information about hospice and possible transition to hospice   - patient is full code

## 2024-03-06 NOTE — PLAN OF CARE
Post-Dialysis RN Treatment Note    Blood Pressure:  Pre 143/83 mm/Hg  Post 164/84 mmHg   EDW  61.5 kg    Weight:  Pre 60.8 kg   Post 60.1 kg   Mode of weight measurement: Bed Scale   Volume Removed  1000 ml (UF goal was increased during tx per Dr. Aguilar)   Treatment duration 240 minutes    NS given  No    Treatment shortened? No   Medications given during Rx Not Applicable   Estimated Kt/V  1.38   Access type: Permacath/TDC   Access Issues: No    Report called to primary nurse: Yes MIRELLA SOW RN    Patient receiving extra 4 hour HD treatment post MRI with gadolinium. Even UF. Treatment plan reviewed with Dr. Aguilar and patient.    Problem: METABOLIC, FLUID AND ELECTROLYTES - ADULT  Goal: Electrolytes maintained within normal limits  Description: INTERVENTIONS:  - Monitor labs and assess patient for signs and symptoms of electrolyte imbalances  - Administer electrolyte replacement as ordered  - Monitor response to electrolyte replacements, including repeat lab results as appropriate  - Instruct patient on fluid and nutrition as appropriate  Outcome: Progressing  Goal: Fluid balance maintained  Description: INTERVENTIONS:  - Monitor labs   - Monitor I/O and WT  - Instruct patient on fluid and nutrition as appropriate  - Assess for signs & symptoms of volume excess or deficit  Outcome: Progressing

## 2024-03-06 NOTE — PROGRESS NOTES
NEPHROLOGY PROGRESS NOTE   Jeffery Bishop 68 y.o. male MRN: [id number]  Unit/Bed#: [facility] 618-01 Encounter: [id number]  Reason for Consult: ESRD    ASSESSMENT AND PLAN:  Patient is 68-year-old male with significant medical issues of ESRD on HD on TTS schedule, hypertension, hyperlipidemia, CAD, status post PCI, presented with episode of fall and found to have intracranial hemorrhage.  We are consulted for dialysis management.     ESRD on HD on TTS schedule at [facility]  -HD today since patient had MRI with IV gadolinium.  Plan for HD again tomorrow and maintain TTS schedule.  -Outpatient dry weight  61.5 kg.  Pre HD weight 60.8 [place].  UF removal 0.5 to 1 L as BP tolerated.  -Reduce gabapentin to 300 mg daily in dialysis patients.    I saw and examined patient during hemodialysis treatment at 9:58 AM on 3/6/2024. The patient was receiving hemodialysis for treatment of end stage renal disease. I also reviewed vital signs, intake and output, lab results and recent events, and agree with dialysis order.  Tolerating HD. BP acceptable     Access, currently has permacath     Initial hyperkalemia now resolved with dialysis.     CKD anemia, remains on Mircera 30 mcg every 4 weeks as outpatient.  -Hemoglobin slightly lower, continue to monitor closely  -If hemoglobin dropping further, consider starting Epogen while inpatient.     CKD BMD, hyperphosphatemia, serum phosphorus above goal 8.8.  Continue Renagel 1 tablet 3 times daily with meals.     Chronic hypotension, as per outpatient dialysis unit records, patient is on midodrine 10 mg 3 times daily  -BP acceptable currently.  Monitoring off midodrine.     Multifocal intracranial parenchymal hemorrhage with surrounding edema, no mass effect or no midline shift.   -Repeat CT scan of the head shows stable small parenchymal hemorrhages  -Status post MRI brain with IV gadolinium.  Results to follow    SUBJECTIVE:  Patient seen and examined at bedside.  Denies any chest  pain or any worsening shortness of breath.    OBJECTIVE:  Current Weight:    Vitals:    03/06/24 0928   BP: 156/80   Pulse: 67   Resp: 16   Temp:    SpO2:        Intake/Output Summary (Last 24 hours) at 3/6/2024 0958  Last data filed at 3/6/2024 0833  Gross per 24 hour   Intake 450 ml   Output 2500 ml   Net -2050 ml     Wt Readings from Last 3 Encounters:   03/04/24 55.1 kg (121 lb 7.6 oz)   01/17/24 55.8 kg (123 lb)   01/11/24 55.8 kg (123 lb)     Temp Readings from Last 3 Encounters:   03/06/24 98.1 °F (36.7 °C) (Oral)   03/04/24 98.8 °F (37.1 °C) (Temporal)   01/24/24 98.4 °F (36.9 °C) (Temporal)     BP Readings from Last 3 Encounters:   03/06/24 156/80   03/04/24 151/78   02/01/24 126/63     Pulse Readings from Last 3 Encounters:   03/06/24 67   03/04/24 82   02/01/24 (!) 53        Physical Examination:  Eyes: Conjunctival pallor present  Neck: No obvious lymphadenopathy appreciated  Respiratory: Bilateral air entry present  CVS: No significant edema in legs  GI: Soft, nondistended  CNS: Active, alert, follows commands  Skin: No new rash  Musculoskeletal: No obvious new gross deformity noted    Medications:    Current Facility-Administered Medications:     acetaminophen (TYLENOL) tablet 975 mg, 975 mg, Oral, Q8H PRN, Graham Martinez MD    ammonium lactate (LAC-HYDRIN) 12 % lotion, , Topical, BID PRN, Graham Martinez MD    ascorbic acid (VITAMIN C) tablet 500 mg, 500 mg, Oral, Daily, Graham Martinez MD    docusate sodium (COLACE) capsule 100 mg, 100 mg, Oral, Daily PRN, Zane Wilkinson PA-C    gabapentin (NEURONTIN) capsule 300 mg, 300 mg, Oral, TID, Zane Wilkinson PA-C, 300 mg at 03/05/24 2046    HYDROmorphone HCl (DILAUDID) injection 0.2 mg, 0.2 mg, Intravenous, Q3H PRN, Graham Martinez MD, 0.2 mg at 03/05/24 2048    insulin lispro (HumALOG/ADMELOG) 100 units/mL subcutaneous injection 1-5 Units, 1-5 Units, Subcutaneous, TID AC **AND** Fingerstick Glucose (POCT), , , TID AC, Graham Martinez MD    lactulose  (CHRONULAC) oral solution 10 g, 10 g, Oral, Daily, Graham Martinez MD    levETIRAcetam (KEPPRA) tablet 500 mg, 500 mg, Oral, BID, Graham Martinez MD, 500 mg at 03/05/24 1626    metoprolol (LOPRESSOR) injection 5 mg, 5 mg, Intravenous, Q6H PRN, Graham Martinez MD    nicotine (NICODERM CQ) 7 mg/24hr TD 24 hr patch 1 patch, 1 patch, Transdermal, Daily, Graham Martinez MD    oxyCODONE (ROXICODONE) immediate release tablet 10 mg, 10 mg, Oral, Q6H PRN, Zane Wilkinson PA-C, 10 mg at 03/05/24 2344    oxyCODONE (ROXICODONE) IR tablet 5 mg, 5 mg, Oral, Q6H PRN, Zane Wilkinson PA-C    pantoprazole (PROTONIX) EC tablet 40 mg, 40 mg, Oral, Early Morning, Graham Martinez MD, 40 mg at 03/05/24 0547    sevelamer (RENAGEL) tablet 800 mg, 800 mg, Oral, TID With Meals, Lizandro Aguilar MD, 800 mg at 03/05/24 1530    Laboratory Results:  Results from last 7 days   Lab Units 03/06/24  0821 03/05/24  0428 03/04/24  2358 03/04/24  1343   WBC Thousand/uL 3.81* 5.82  --  4.78   HEMOGLOBIN g/dL 8.7* 9.4*  --  9.3*   HEMATOCRIT % 26.8* 30.0*  --  28.6*   PLATELETS Thousands/uL 137* 129*  --  130*   SODIUM mmol/L 138 140 137 138   POTASSIUM mmol/L 4.4 5.6* 6.0* 7.3*   CHLORIDE mmol/L 99 96 96 96   CO2 mmol/L 29 27 28 26   BUN mg/dL 28* 54* 50* 89*   CREATININE mg/dL 4.33* 6.93* 6.68* 10.19*   CALCIUM mg/dL 8.4 8.7 8.3* 8.6   MAGNESIUM mg/dL  --  2.0 1.9  --    PHOSPHORUS mg/dL  --  8.8* 7.9*  --        XR clavicle right   Final Result by Angeles Henson MD (03/05 0826)      Minimally displaced fracture of the distal third of the clavicle. AC joint is congruent.            Workstation performed: [id number]         CT head wo contrast   Final Result by E. Alec Schoenberger, MD (03/05 0750)      Stable small parenchymal hemorrhages in the right frontal parietal subcortical white matter likely due due to hemorrhagic axonal injury. Stable posttraumatic hemorrhage within the septum pellucidum.                  Workstation performed:  "[id number]         XR chest pa & lateral   Final Result by Angeles Henson MD (03/05 0823)      Mildly improved hazy opacity in the right mid and upper lung field. Persistent hazy opacity right lower lung field.      Persistent bilateral layered pleural effusions.            Workstation performed: [id number]         XR chest pa & lateral    (Results Pending)   MRI brain w wo contrast    (Results Pending)       Portions of the record may have been created with voice recognition software. Occasional wrong word or \"sound a like\" substitutions may have occurred due to the inherent limitations of voice recognition software. Read the chart carefully and recognize, using context, where substitutions have occurred.    "

## 2024-03-06 NOTE — ASSESSMENT & PLAN NOTE
Primary palliative Dx    Patient has been on HD for 13 years. He appears unsure whether or not he would like to continue HD.   He tolerated today's session well and states he would like to complete session tomorrow to remove remaining MRI dye    Lab Results   Component Value Date    EGFR 12 03/06/2024    EGFR 7 03/05/2024    EGFR 7 03/04/2024    CREATININE 4.33 (H) 03/06/2024    CREATININE 6.93 (H) 03/05/2024    CREATININE 6.68 (H) 03/04/2024

## 2024-03-06 NOTE — PLAN OF CARE
Problem: Potential for Falls  Goal: Patient will remain free of falls  Description: INTERVENTIONS:  - Educate patient/family on patient safety including physical limitations  - Instruct patient to call for assistance with activity   - Consult OT/PT to assist with strengthening/mobility   - Keep Call bell within reach  - Keep bed low and locked with side rails adjusted as appropriate  - Keep care items and personal belongings within reach  - Initiate and maintain comfort rounds    - Apply yellow socks and bracelet for high fall risk patients  - Consider moving patient to room near nurses station  Outcome: Progressing     Problem: METABOLIC, FLUID AND ELECTROLYTES - ADULT  Goal: Fluid balance maintained  Description: INTERVENTIONS:  - Monitor labs   - Monitor I/O and WT  - Instruct patient on fluid and nutrition as appropriate  - Assess for signs & symptoms of volume excess or deficit  Outcome: Progressing

## 2024-03-06 NOTE — DISCHARGE INSTR - OTHER ORDERS
Skin Care Plan:  1-Cleanse Sacro-Buttocks with soap and water. Pat dry. Apply Silicone Border Foam (Mepilex) to area. [place] with P for Prevention and change every 3 days or PRN soilage/displacement. Peel back and inspect Q-shift.  2-Turn/reposition q2h or when medically stable for pressure re-distribution on skin .  3-Elevate heels to offload pressure.  4-Moisturize skin daily with skin nourishing cream  5-Ehob cushion in chair when out of bed.  6-Preventative Hydraguard to bilateral heels and posterior ankles BID and PRN.

## 2024-03-06 NOTE — WOUND OSTOMY CARE
Consult Note - Wound   Jeffery Bishop 68 y.o. male MRN: [id number]  Unit/Bed#: [facility] 618-01 Encounter: [id number]        History and Present Illness:  Patient is a 69 yo male that was admitted to [facility] for treatment of Intraparenchymal hematoma of brain due to trauma. Patient has a PMH of CKD, T2DM, GERD, HLD, HTN, MI, Stroke. Patient is a min assist for turning and repositioning. BMI of 17.43- patient is bony in appearance. Patient is continent of bowel and bladder. On assessment, patient is lying on regular mattress.     Wound Care was consulted for sacral wound     Assessment Findings:   B/L heels are dry intact flaky and alejandro with no skin loss or wounds present. Right Posterior Ankle with intact slow to alejandro hyperpigmented area. Recommend preventative Hydraguard Cream and proper offloading/ repositioning.      B/L sacro-buttocks is dry, intact, pink in color, with scar tissue present and blanches. No skin loss or wounds present. Due to bony prominence, will recommend preventative foam dressing.       No induration, fluctuance, odor, warmth/temperature differences, redness, or purulence noted to the above noted wounds and skin areas assessed. New dressings applied per orders listed below. Patient tolerated well- no s/s of non-verbal pain or discomfort observed during the encounter. Bedside nurse aware of plan of care. See flow sheets for more detailed assessment findings.      Orders listed below and wound care will sign off, call or tiger text with questions.     Skin Care Plan:  1-Cleanse Sacro-Buttocks with soap and water. Pat dry. Apply Silicone Border Foam (Mepilex) to area. Sherman with P for Prevention and change every 3 days or PRN soilage/displacement. Peel back and inspect Q-shift.  2-Turn/reposition q2h or when medically stable for pressure re-distribution on skin .  3-Elevate heels to offload pressure.  4-Moisturize skin daily with skin nourishing cream  5-Ehob cushion in chair when out of  bed.  6-Preventative Hydraguard to bilateral heels and posterior ankles BID and PRN.       Wounds:  Wound 11/13/23 Pressure Injury Sacrum Mid (Active)   Wound Image   03/06/24 1333   Wound Description Intact 03/06/24 1333   Afua-wound Assessment Intact 03/06/24 1333   Wound Length (cm) 0 cm 03/06/24 1333   Wound Width (cm) 0 cm 03/06/24 1333   Wound Depth (cm) 0 cm 03/06/24 1333   Wound Surface Area (cm^2) 0 cm^2 03/06/24 1333   Wound Volume (cm^3) 0 cm^3 03/06/24 1333   Calculated Wound Volume (cm^3) 0 cm^3 03/06/24 1333   Change in Wound Size % 100 03/06/24 1333   Non-staged Wound Description Not applicable 03/06/24 1333   Treatments Cleansed;Site care 03/06/24 1333   Dressing Foam, Silicon (eg. Allevyn, etc) 03/06/24 1333   Dressing Changed New 03/06/24 1333   Patient Tolerance Tolerated well 03/06/24 1333   Dressing Status Intact;Dry;Clean 03/06/24 1333       Wound 12/12/23 Traumatic Heel Right (Active)   Wound Image   03/06/24 1334   Wound Description Intact 03/06/24 1334   Afua-wound Assessment Intact 03/06/24 1334   Wound Length (cm) 0 cm 03/06/24 1334   Wound Width (cm) 0 cm 03/06/24 1334   Wound Depth (cm) 0 cm 03/06/24 1334   Wound Surface Area (cm^2) 0 cm^2 03/06/24 1334   Wound Volume (cm^3) 0 cm^3 03/06/24 1334   Calculated Wound Volume (cm^3) 0 cm^3 03/06/24 1334   Change in Wound Size % 100 03/06/24 1334               Lina Dupont RN, BSN, CWOCN

## 2024-03-06 NOTE — PROGRESS NOTES
[facility]  Progress Note  Name: Jeffery Bishop I  MRN: [id number]  Unit/Bed#: [facility] 618-01 I Date of Admission: 3/4/2024   Date of Service: 3/6/2024 I Hospital Day: 2    Assessment/Plan   ESRD (end stage renal disease) on dialysis (HCC)  Assessment & Plan  Primary palliative Dx    Patient has been on HD for 13 years. He appears unsure whether or not he would like to continue HD.   He tolerated today's session well and states he would like to complete session tomorrow to remove remaining MRI dye    Lab Results   Component Value Date    EGFR 12 03/06/2024    EGFR 7 03/05/2024    EGFR 7 03/04/2024    CREATININE 4.33 (H) 03/06/2024    CREATININE 6.93 (H) 03/05/2024    CREATININE 6.68 (H) 03/04/2024       Palliative care by specialist  Assessment & Plan  With primary palliative Dx of ESRD and brain hematoma in setting of trauma    Patient is engaged with a palliative program in [place] (out of network. Wife can't recall name of agency). States that he has had a discussion with their hospice team approximately 3 weeks prior. He is considering of transition to comfort care.     Will continue to follow given shifting nature of patient's goals    Goals of care, counseling/discussion  Assessment & Plan  Patient's goals remain elusive, suggestive of comfort today but also endorsing consent to HD    Code Status: Full - Level 1  Decisional apparatus:  Patient is competent on my exam today.  If competence is lost, patient's substitute decision maker would default to spouse, Mary, by [place].  Advance Directive / Living Will / POLST:  none previously completed    On initial evaluation, patient endorsed desire to continue medical optimization without limits. Today with increased fatigue, intermittent refusal of HD but ultimately completed the session and endorsed desire to complete tomorrow's session as well given MRI. Concern for mixed picture with fatigue and depression. Patient with  passive suicidal ideation.     - Recommend primary care team further assess patient's mental / psychiatric status - consider suicide precautions   - Appreciate Palliative care SW support     Closed nondisplaced fracture of right clavicle  Assessment & Plan  Likely with history of chronic pain in addition to acute pain    - Continue current pain regimen of oral oxycodone 5 to 10 mg every 6 hours as needed  - Adjust IV Dilaudid to 0.5 mg, continued every 3 hours as needed for breakthrough pain    No bowel movement documented since admission, start senna and MiraLAX daily to prevent opioid-induced constipation    Ambulatory dysfunction  Assessment & Plan  Right sided weakness as a result of prior CVA  Patient uses wheelchair/scooter primarily to get around.  S/p fall, head strike, and IPH (non-operative management at this time) with close monitoring.          Interval history:  No acute overnight events    This morning patient endorsed to primary care team and nephrology team that he was unsure whether or not he wanted to do dialysis.  Stated he may want to consider comfort care.  In discussion with nephrology team, nephrologist states that patient agreeable to dialysis to remove diet in setting of recent MRI.    Patient states that he is tired and does not want to continue with all of these treatments.  States that he does not think his life is meaningful.  States that he has had thoughts of hurting himself.  However he has not actually made a plan nor would he actually take actions to do this.    Patient states that he will consider continuing dialysis to remove Dye in the setting of MRI, he would then like to reassess his goals in a day or 2.    Patient endorses ongoing back and neck pain.  States that the current dose of Dilaudid does not help with any pain relief.        MEDICATIONS / ALLERGIES:     all current active meds have been reviewed and current meds:   Current Facility-Administered Medications    Medication Dose Route Frequency    acetaminophen (TYLENOL) tablet 975 mg  975 mg Oral Q8H PRN    ammonium lactate (LAC-HYDRIN) 12 % lotion   Topical BID PRN    ascorbic acid (VITAMIN C) tablet 500 mg  500 mg Oral Daily    docusate sodium (COLACE) capsule 100 mg  100 mg Oral Daily PRN    gabapentin (NEURONTIN) capsule 300 mg  300 mg Oral Daily    heparin (porcine) subcutaneous injection 5,000 Units  5,000 Units Subcutaneous Q8H SRINI    HYDROmorphone (DILAUDID) injection 0.5 mg  0.5 mg Intravenous Q3H PRN    insulin lispro (HumALOG/ADMELOG) 100 units/mL subcutaneous injection 1-5 Units  1-5 Units Subcutaneous TID AC    lactulose (CHRONULAC) oral solution 10 g  10 g Oral Daily    levETIRAcetam (KEPPRA) tablet 500 mg  500 mg Oral BID    metoprolol (LOPRESSOR) injection 5 mg  5 mg Intravenous Q6H PRN    nicotine (NICODERM CQ) 7 mg/24hr TD 24 hr patch 1 patch  1 patch Transdermal Daily    oxyCODONE (ROXICODONE) immediate release tablet 10 mg  10 mg Oral Q6H PRN    oxyCODONE (ROXICODONE) IR tablet 5 mg  5 mg Oral Q6H PRN    pantoprazole (PROTONIX) EC tablet 40 mg  40 mg Oral Early Morning    polyethylene glycol (MIRALAX) packet 17 g  17 g Oral Daily    senna (SENOKOT) tablet 8.6 mg  1 tablet Oral HS    sevelamer (RENAGEL) tablet 800 mg  800 mg Oral TID With Meals       No Known Allergies    OBJECTIVE:    Physical Exam  Physical Exam  Vitals and nursing note reviewed.   Constitutional:       General: He is not in acute distress.     Appearance: He is well-developed. He is ill-appearing.      Comments: Generalized muscle wasting   HENT:      Head: Normocephalic and atraumatic.   Eyes:      Conjunctiva/sclera: Conjunctivae normal.   Pulmonary:      Effort: Pulmonary effort is normal. No respiratory distress.   Abdominal:      General: There is no distension.   Musculoskeletal:         General: No swelling.      Cervical back: Neck supple.   Skin:     General: Skin is warm and dry.   Neurological:      Mental Status: He is  alert.   Psychiatric:      Comments: Depressed mood, somewhat flat/agitated affect.         Lab Results:   Results from last 7 days   Lab Units 03/06/24  0821 03/05/24  0428 03/04/24  1343   WBC Thousand/uL 3.81* 5.82 4.78   HEMOGLOBIN g/dL 8.7* 9.4* 9.3*   HEMATOCRIT % 26.8* 30.0* 28.6*   PLATELETS Thousands/uL 137* 129* 130*   NEUTROS PCT % 74 84* 73   MONOS PCT % 8 6 9   EOS PCT % 7* 3 6     Results from last 7 days   Lab Units 03/06/24  0821 03/05/24  0428 03/04/24 2358 03/04/24  1343   POTASSIUM mmol/L 4.4 5.6* 6.0* 7.3*   CHLORIDE mmol/L 99 96 96 96   CO2 mmol/L 29 27 28 26   BUN mg/dL 28* 54* 50* 89*   CREATININE mg/dL 4.33* 6.93* 6.68* 10.19*   CALCIUM mg/dL 8.4 8.7 8.3* 8.6   ALK PHOS U/L  --  80  --  69   ALT U/L  --  13  --  11   AST U/L  --  20  --  17       Imaging Studies: reviewed pertinent studies   EKG, Pathology, and Other Studies: reviewed pertinent studies    Counseling / Coordination of Care    Total floor / unit time spent today 35 minutes. Greater than 50% of total time was spent with the patient and / or family counseling and / or coordination of care. A description of the counseling / coordination of care: Evaluation of patient's clinical status, symptoms, treatment plan.  Discussion with patient regarding goals of care.  Coordination of care with primary team and nephrology team.

## 2024-03-06 NOTE — ASSESSMENT & PLAN NOTE
Likely with history of chronic pain in addition to acute pain    - Continue current pain regimen of oral oxycodone 5 to 10 mg every 6 hours as needed  - Adjust IV Dilaudid to 0.5 mg, continued every 3 hours as needed for breakthrough pain    No bowel movement documented since admission, start senna and MiraLAX daily to prevent opioid-induced constipation

## 2024-03-07 ENCOUNTER — APPOINTMENT (INPATIENT)
Dept: RADIOLOGY | Facility: HOSPITAL | Age: 69
DRG: 085 | End: 2024-03-07
Payer: MEDICARE

## 2024-03-07 ENCOUNTER — APPOINTMENT (INPATIENT)
Dept: DIALYSIS | Facility: HOSPITAL | Age: 69
DRG: 085 | End: 2024-03-07
Attending: INTERNAL MEDICINE
Payer: MEDICARE

## 2024-03-07 VITALS
RESPIRATION RATE: 16 BRPM | SYSTOLIC BLOOD PRESSURE: 141 MMHG | OXYGEN SATURATION: 95 % | DIASTOLIC BLOOD PRESSURE: 71 MMHG | HEART RATE: 80 BPM | TEMPERATURE: 98.1 F

## 2024-03-07 LAB
ANION GAP SERPL CALCULATED.3IONS-SCNC: 12 MMOL/L
BASOPHILS # BLD AUTO: 0.05 THOUSANDS/ÂΜL (ref 0–0.1)
BASOPHILS NFR BLD AUTO: 1 % (ref 0–1)
BUN SERPL-MCNC: 18 MG/DL (ref 5–25)
CALCIUM SERPL-MCNC: 8.7 MG/DL (ref 8.4–10.2)
CHLORIDE SERPL-SCNC: 98 MMOL/L (ref 96–108)
CO2 SERPL-SCNC: 27 MMOL/L (ref 21–32)
CREAT SERPL-MCNC: 3.07 MG/DL (ref 0.6–1.3)
EOSINOPHIL # BLD AUTO: 0.24 THOUSAND/ÂΜL (ref 0–0.61)
EOSINOPHIL NFR BLD AUTO: 6 % (ref 0–6)
ERYTHROCYTE [DISTWIDTH] IN BLOOD BY AUTOMATED COUNT: 13.6 % (ref 11.6–15.1)
GFR SERPL CREATININE-BSD FRML MDRD: 19 ML/MIN/1.73SQ M
GLUCOSE SERPL-MCNC: 110 MG/DL (ref 65–140)
GLUCOSE SERPL-MCNC: 66 MG/DL (ref 65–140)
GLUCOSE SERPL-MCNC: 79 MG/DL (ref 65–140)
GLUCOSE SERPL-MCNC: 88 MG/DL (ref 65–140)
GLUCOSE SERPL-MCNC: 93 MG/DL (ref 65–140)
HCT VFR BLD AUTO: 31.4 % (ref 36.5–49.3)
HGB BLD-MCNC: 10 G/DL (ref 12–17)
IMM GRANULOCYTES # BLD AUTO: 0.02 THOUSAND/UL (ref 0–0.2)
IMM GRANULOCYTES NFR BLD AUTO: 1 % (ref 0–2)
LYMPHOCYTES # BLD AUTO: 0.49 THOUSANDS/ÂΜL (ref 0.6–4.47)
LYMPHOCYTES NFR BLD AUTO: 13 % (ref 14–44)
MCH RBC QN AUTO: 32.2 PG (ref 26.8–34.3)
MCHC RBC AUTO-ENTMCNC: 31.8 G/DL (ref 31.4–37.4)
MCV RBC AUTO: 101 FL (ref 82–98)
MONOCYTES # BLD AUTO: 0.31 THOUSAND/ÂΜL (ref 0.17–1.22)
MONOCYTES NFR BLD AUTO: 8 % (ref 4–12)
MRSA NOSE QL CULT: NORMAL
NEUTROPHILS # BLD AUTO: 2.69 THOUSANDS/ÂΜL (ref 1.85–7.62)
NEUTS SEG NFR BLD AUTO: 71 % (ref 43–75)
NRBC BLD AUTO-RTO: 0 /100 WBCS
PLATELET # BLD AUTO: 151 THOUSANDS/UL (ref 149–390)
PMV BLD AUTO: 9.8 FL (ref 8.9–12.7)
POTASSIUM SERPL-SCNC: 4.1 MMOL/L (ref 3.5–5.3)
RBC # BLD AUTO: 3.11 MILLION/UL (ref 3.88–5.62)
SODIUM SERPL-SCNC: 137 MMOL/L (ref 135–147)
WBC # BLD AUTO: 3.8 THOUSAND/UL (ref 4.31–10.16)

## 2024-03-07 PROCEDURE — 80048 BASIC METABOLIC PNL TOTAL CA: CPT

## 2024-03-07 PROCEDURE — NC001 PR NO CHARGE: Performed by: STUDENT IN AN ORGANIZED HEALTH CARE EDUCATION/TRAINING PROGRAM

## 2024-03-07 PROCEDURE — 99238 HOSP IP/OBS DSCHRG MGMT 30/<: CPT | Performed by: STUDENT IN AN ORGANIZED HEALTH CARE EDUCATION/TRAINING PROGRAM

## 2024-03-07 PROCEDURE — 71045 X-RAY EXAM CHEST 1 VIEW: CPT

## 2024-03-07 PROCEDURE — 99232 SBSQ HOSP IP/OBS MODERATE 35: CPT | Performed by: INTERNAL MEDICINE

## 2024-03-07 PROCEDURE — 82948 REAGENT STRIP/BLOOD GLUCOSE: CPT

## 2024-03-07 PROCEDURE — 85025 COMPLETE CBC W/AUTO DIFF WBC: CPT

## 2024-03-07 PROCEDURE — 90935 HEMODIALYSIS ONE EVALUATION: CPT | Performed by: INTERNAL MEDICINE

## 2024-03-07 PROCEDURE — 97163 PT EVAL HIGH COMPLEX 45 MIN: CPT

## 2024-03-07 PROCEDURE — 97167 OT EVAL HIGH COMPLEX 60 MIN: CPT

## 2024-03-07 RX ORDER — LEVETIRACETAM 500 MG/1
500 TABLET ORAL 2 TIMES DAILY
Qty: 8 TABLET | Refills: 0 | Status: SHIPPED | OUTPATIENT
Start: 2024-03-11

## 2024-03-07 RX ORDER — SEVELAMER HYDROCHLORIDE 800 MG/1
1600 TABLET, FILM COATED ORAL
Status: DISCONTINUED | OUTPATIENT
Start: 2024-03-07 | End: 2024-03-07 | Stop reason: HOSPADM

## 2024-03-07 RX ORDER — DIPHENHYDRAMINE HCL 25 MG
25 TABLET ORAL ONCE
Status: COMPLETED | OUTPATIENT
Start: 2024-03-07 | End: 2024-03-07

## 2024-03-07 RX ADMIN — LEVETIRACETAM 500 MG: 500 TABLET, FILM COATED ORAL at 08:00

## 2024-03-07 RX ADMIN — HYDROMORPHONE HYDROCHLORIDE 0.5 MG: 1 INJECTION, SOLUTION INTRAMUSCULAR; INTRAVENOUS; SUBCUTANEOUS at 13:36

## 2024-03-07 RX ADMIN — GABAPENTIN 300 MG: 300 CAPSULE ORAL at 08:00

## 2024-03-07 RX ADMIN — SEVELAMER HYDROCHLORIDE 800 MG: 800 TABLET ORAL at 08:00

## 2024-03-07 RX ADMIN — OXYCODONE HYDROCHLORIDE AND ACETAMINOPHEN 500 MG: 500 TABLET ORAL at 08:00

## 2024-03-07 RX ADMIN — HEPARIN SODIUM 5000 UNITS: 5000 INJECTION INTRAVENOUS; SUBCUTANEOUS at 05:36

## 2024-03-07 RX ADMIN — HEPARIN SODIUM 5000 UNITS: 5000 INJECTION INTRAVENOUS; SUBCUTANEOUS at 13:38

## 2024-03-07 RX ADMIN — OXYCODONE HYDROCHLORIDE 10 MG: 10 TABLET ORAL at 05:36

## 2024-03-07 RX ADMIN — DIPHENHYDRAMINE HCL 25 MG: 25 TABLET, COATED ORAL at 14:19

## 2024-03-07 RX ADMIN — OXYCODONE HYDROCHLORIDE 10 MG: 10 TABLET ORAL at 11:51

## 2024-03-07 RX ADMIN — PANTOPRAZOLE SODIUM 40 MG: 40 TABLET, DELAYED RELEASE ORAL at 05:36

## 2024-03-07 NOTE — INCIDENTAL FINDINGS
"The following findings require follow up:  Radiographic finding   Finding: \"MRI brain w/wo 3/6/24:3 separate areas of intraparenchymal hemorrhage as seen on recent CT scan, unchanged with mild surrounding edema. No underlying ischemia or mass is identified at this time. Recommend short-term follow-up MRI of the brain with contrast as hemorrhage resolves.There is a small amount of intraventricular hemorrhage layering within the occipital horns of the lateral ventricles.Moderate to advanced chronic microangiopathic change within the cerebral hemispheres.Abnormal appearance of the distal right vertebral artery which is hyperintense on T2 weighted imaging and demonstrates mild enhancement after administration of contrast, possibly representing slow flow or occlusion. This can be further evaluated with CT angiography of the head and neck. \"   Follow up required: Nsgy   Follow up should be done within 2 week(s)    Patient and his wife are aware of the above findings. The patient opted to not further workup this. They are aware to follow up with neurosurgery for further evaluation.   "

## 2024-03-07 NOTE — PLAN OF CARE
Post-Dialysis RN Treatment Note    Blood Pressure:  Pre 151/95 mm/Hg  Post 156/80 mmHg   EDW  61.5 kg    Weight:  Pre 59.1 kg   Post 59.1 kg   Mode of weight measurement: Bed Scale   Volume Removed  0 ml    Treatment duration 240 minutes    NS given  No    Treatment shortened? No   Medications given during Rx Oxycodone 10mg PO   Estimated Kt/V  1.29   Access type: Permacath/TDC   Access Issues: Yes, describe: lines were reversed d/t sluggish flow at the arterial port.     Report called to primary nurse   Yes, via [employer] to Neli ALVAREZ RN        Started patient on hemodialysis treatment with UF goal to run even per Dr. Diaz x 4 hours x 3K bath for serum k+ of 4.1 today 3/7/24.    Problem: METABOLIC, FLUID AND ELECTROLYTES - ADULT  Goal: Electrolytes maintained within normal limits  Description: INTERVENTIONS:  - Monitor labs and assess patient for signs and symptoms of electrolyte imbalances  - Administer electrolyte replacement as ordered  - Monitor response to electrolyte replacements, including repeat lab results as appropriate  - Instruct patient on fluid and nutrition as appropriate  Outcome: Progressing  Goal: Fluid balance maintained  Description: INTERVENTIONS:  - Monitor labs   - Monitor I/O and WT  - Instruct patient on fluid and nutrition as appropriate  - Assess for signs & symptoms of volume excess or deficit  Outcome: Progressing

## 2024-03-07 NOTE — SOCIAL WORK
The Palliative Provider and SW met with the pt at dialysis.  The pt states he is hopeful he will dc today.  He states his wife has arranged HHC which is able to also provide hospice services.  The pt states 14 years of dialysis is a lot and he is tired.  He states his children are not pleased with his decision and they have an online meeting set up for this evening in order to discuss his wishes further.  He does not anticipate they will be agreeable.      The pt states his DC has not been confirmed, but it is wish to dc today.    I have spent 20 minutes with Patient  today in which greater than 50% of this time was spent in counseling/coordination of care     Palliative  will follow-up as requested by patient, family, and primary team.  Please contact with any specific requests

## 2024-03-07 NOTE — CASE MANAGEMENT
Case Management Discharge Planning Note    Patient name Jeffery Bishop  Location [facility] 618/[facility] 618-01 MRN [id number]  : 1955 Date 3/7/2024       Current Admission Date: 3/4/2024  Current Admission Diagnosis:Intraparenchymal hematoma of brain due to trauma (HCC)   Patient Active Problem List    Diagnosis Date Noted    Intraparenchymal hematoma of brain due to trauma (HCC) 2024    Closed nondisplaced fracture of right clavicle 2024    Palliative care by specialist 2024    Goals of care, counseling/discussion 2024    Vomiting without nausea 2024    Unintentional weight loss 2024    Diarrhea 2024    Elevated LFTs 2024    Abnormal MRI 2024    Constipation 10/04/2023    Leukopenia 10/04/2023    Pleural effusion, bilateral 10/04/2023    History of CVA (cerebrovascular accident) 2023    Generalized weakness 2023    Open wound 2023    Chronic pain 2023    Anxiety disorder, unspecified 2023    Secondary hyperparathyroidism of renal origin ([facility]) 2023    Unspecified protein-calorie malnutrition ([facility]) 2023    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 06/15/2023    Ambulatory dysfunction 2023    Infection of dialysis AV graft 2023    Hypotension 2023    S/P angioplasty with stent 2023    Chronic deep vein thrombosis (DVT) of internal jugular vein ([facility]) 2022    End-stage renal disease  2022    Chronic kidney disease 2022    Right intertrochanteric femur fracture 2022    Thrombocytopenia  2022    Arteriovenous fistula ([facility]) 2021    AV fistula thrombosis, initial encounter ([facility]) 2021    Encounter for extracorporeal dialysis ([facility]) 2019    Intestinal malabsorption 2017    Pernicious anemia 2017    Irritable bowel syndrome 2017    Elevated serum alkaline phosphatase level 2017    Anemia of chronic disease 2017     Coronary artery disease  10/20/2016    Hypertension 10/20/2016    Dependence on renal dialysis (HCC) 03/08/2016    Vitamin D deficiency, unspecified 02/12/2013    Mixed hyperlipidemia 10/04/2012    ESRD (end stage renal disease) on dialysis (HCC) 09/04/2012    Nicotine dependence 09/04/2012    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    Type 2 diabetes mellitus 09/04/2012      LOS (days): 3  Geometric Mean LOS (GMLOS) (days): 4.7  Days to GMLOS:1.8     OBJECTIVE:  Risk of Unplanned Readmission Score: 49.4         Current admission status: Inpatient   Preferred Pharmacy:   "[id number] - [address] [address]  Phone: [phone] Fax: [phone]    PrimaryPt adamant  Care Provider: Rohit Parkinson MD    Primary Insurance: MEDICARE  Secondary Insurance: AARP    DISCHARGE DETAILS:      Requested Home Health Care         Is the patient interested in HHC at discharge?: Yes  Home Health Discipline requested:: Physical Therapy, Occupational Therapy, Nursing, Home Health Aide  [facility] Reason (only applicable if external [facility] name selected): Services not provided in network or near patient location  Home Health Follow-Up Provider:: PCP  Home Health Services Needed:: Strengthening/Theraputic Exercises to Improve Function, Gait/ADL Training, Evaluate Functional Status and Safety  Homebound Criteria Met:: Uses an Assist Device (i.e. cane, walker, etc), Requires the Assistance of Another Person for Safe Ambulation or to Leave the Home  Supporting Clincal Findings:: Limited Endurance, Fatigues Easliy in Short Distances    DME Referral Provided  Referral made for DME?: No    Other Referral/Resources/Interventions Provided:  Interventions: HHC    Treatment Team Recommendation: Short Term Rehab  Discharge Destination Plan:: Home with Home Health Care        Pt adamant he wants to d/c home  today  Pt refusing IP rehab  Pt accepted by [facility]. CM provided pt's wife with a list of local agencies who can provide non skilled aides.   Pt's wife to transport and pt's son will assist getting into the home

## 2024-03-07 NOTE — QUICK NOTE
Spoke with patient and patient's spouse Mary at bedside. Patient would like to go home. Patient was evaluated by psychiatry and told psychiatry that patient is not depressed, just tired fo dialysis for 14 years. Patient has made comments about discontinuing dialysis but ultimately denies wanting to hurt himself or SI. Patient has capacity to make decisions. Patient is frustrated and states if he doesn't go home, he will go to hospice. States he is only making such comments so that he can go home. Patient's spouse states that patient has been tired of dialysis and making these comments since September. Patient has been complaining of dialysis to me since he has been admitted.  CM was available and will set up home health care for patient.  At this time, patient refusing rehab. Urged patient to consider rehab as his spouse is not always available but patient still persistent on going home. Patient's spouse agreeable.

## 2024-03-07 NOTE — ASSESSMENT & PLAN NOTE
Lab Results   Component Value Date    EGFR 19 03/07/2024    EGFR 12 03/06/2024    EGFR 7 03/05/2024    CREATININE 3.07 (H) 03/07/2024    CREATININE 4.33 (H) 03/06/2024    CREATININE 6.93 (H) 03/05/2024     - HD through port Tues, Thurs, Sat  - Appreciate Nephrology consult and recommendations  - AV Fistula is not accessible  - Palliative care consult placed due to patient's request for more information about hospice and possible transition to hospice   - patient is full code  - Patient will get another round of dialysis today s/p contrast load

## 2024-03-07 NOTE — OCCUPATIONAL THERAPY NOTE
Occupational Therapy Evaluation     Patient Name: Jeffery Bishop  Today's Date: 3/7/2024  Problem List  Principal Problem:    Intraparenchymal hematoma of brain due to trauma ([facility])  Active Problems:    ESRD (end stage renal disease) on dialysis ([facility])    Type 2 diabetes mellitus    Ambulatory dysfunction    Pleural effusion, bilateral    Closed nondisplaced fracture of right clavicle    Palliative care by specialist    Goals of care, counseling/discussion    Past Medical History  Past Medical History:   Diagnosis Date    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    With Cerebral Infarction:  Last Assessed:  October 20, 2016    Chronic kidney disease 2012    on dialysis     COVID 05/2022    COVID-19 04/2022 4/2022-while in NH    Diabetes mellitus ([facility])     Dialysis patient ([facility])     T/TH/Sat    Difficulty walking     GERD (gastroesophageal reflux disease)     Hyperlipidemia     Hypertension     Labyrinthitis 09/10/2011    Myocardial infarction ([facility]) 2014    x3 others were in 2009 & 2010    Sleep disturbances 09/20/2010    Stroke ([facility]) 2007    x1, RLE deficit- uses walker    Type 2 diabetes, uncontrolled, with renal manifestation 05/03/2017     Past Surgical History  Past Surgical History:   Procedure Laterality Date    AV FISTULA PLACEMENT      AV FISTULA REPAIR Left 6/3/2023    Procedure: LEFT UPPER EXTREMITY A-V GRAFT EXPLANT, LEFT BRACHIAL ANGIOPATCH,  APPLICATION OF  VAC;  Surgeon: Neyda Rider MD;  Location:  MAIN OR;  Service: Vascular    CARDIAC CATHETERIZATION Left 02/03/2023    Procedure: Cardiac Left Heart Cath;  Surgeon: Tiffani Gregorio MD;  Location: [place] CARDIAC CATH LAB;  Service: Cardiology    CARDIAC CATHETERIZATION N/A 02/08/2023    Procedure: Cardiac catheterization with complex PCI with Dr. Patricio, patient is a renal dialysis patient;  Surgeon: Marcos Ramirez MD;  Location:  CARDIAC CATH LAB;  Service: Cardiology    CARDIAC SURGERY  2010    stents x3    COLONOSCOPY N/A 12/12/2016    Procedure:  "COLONOSCOPY;  Surgeon: Radha Moss MD;  Location: [facility] GI LAB;  Service:     COLONOSCOPY N/A 04/03/2017    Procedure:  COLONOSCOPY;  Surgeon: Radha Moss MD;  Location: [facility] GI LAB;  Service:     HARDWARE REMOVAL Right 04/04/2022    Procedure: REMOVAL HARDWARE HIP;  Surgeon: Moises Kent MD;  Location: [facility];  Service: Orthopedics    IR AV FISTULA/GRAFT DECLOT  04/29/2021    IR AV FISTULA/GRAFT DECLOT  03/25/2022    IR AV FISTULA/GRAFT DECLOT  09/21/2022    IR AV FISTULAGRAM/GRAFTOGRAM  03/28/2022    IR AV FISTULAGRAM/GRAFTOGRAM  07/28/2022    IR TUNNELED CENTRAL LINE REMOVAL  12/07/2021    IR TUNNELED DIALYSIS CATHETER CHECK/CHANGE/REPOSITION/ANGIOPLASTY  2/1/2024    IR TUNNELED DIALYSIS CATHETER PLACEMENT  05/24/2021    PORTACATH PLACEMENT      per pt \"port in chest\"    [place] ARTERIOVENOUS ANASTOMOSIS OPEN DIRECT Left 07/22/2021    Procedure: CREATION FISTULA ARTERIOVENOUS (AV);  Surgeon: Kareem Nye MD;  Location: [facility] OR;  Service: Vascular    [place] ARTERIOVENOUS ANASTOMOSIS OPEN DIRECT Left 3/21/2023    Procedure: left brachiobasilic fistula,  AVG Graft;  Surgeon: Catalino Hook MD;  Location:  MAIN OR;  Service: Vascular    [place] ARTERIOVENOUS ANASTOMOSIS OPEN DIRECT Left 5/19/2023    Procedure: CREATION of LEFT FISTULA ARTERIOVENOUS (AV) GRAFT;  Surgeon: Catalino Hook MD;  Location:  MAIN OR;  Service: Vascular    KY HEMIARTHROPLASTY HIP PARTIAL Right 04/04/2022    Procedure: HEMIARTHROPLASTY HIP (BIPOLAR);  Surgeon: Moises Kent MD;  Location:  MAIN OR;  Service: Orthopedics           03/07/24 0816   Note Type   Note type Evaluation   Pain Assessment   Pain Assessment Tool 0-10   Pain Score No Pain   Restrictions/Precautions   Weight Bearing Precautions Per Order Yes   RUE Weight Bearing Per Order WBAT   Other Precautions Chair Alarm;Bed Alarm;Fall Risk;Telemetry;Multiple lines   Home Living   Type of Home House   Home Layout Two level;Stairs to enter " "without rails  (3STE)   Bathroom Shower/Tub Tub/shower unit   Bathroom Toilet Standard   Home Equipment Walker;Cane;Wheelchair-manual;Electric scooter  (RW at baseline for household distances, electric scooter for community mobility/longer distances)   Prior Function   Level of [place] Independent with ADLs;Independent with functional mobility;Needs assistance with IADLS   Lives With Spouse   Receives Help From Family   IADLs Family/Friend/Other provides meals;Independent with driving;Family/Friend/Other provides medication management   Falls in the last 6 months 1 to 4   Vocational Retired   Lifestyle   Autonomy I with ADLs/functional mobility   Reciprocal Relationships lives with spouse, able to assist if needed   Service to Others retired   Intrinsic Gratification mostly sedentary   Subjective   Subjective \"Where are my legs\"   ADL   Eating Assistance 4  Minimal Assistance   Grooming Assistance 4  Minimal Assistance   UB Bathing Assistance 4  Minimal Assistance   LB Bathing Assistance 3  Moderate Assistance   UB Dressing Assistance 4  Minimal Assistance   LB Dressing Assistance 3  Moderate Assistance   Toileting Assistance  3  Moderate Assistance   Bed Mobility   Supine to Sit 3  Moderate assistance   Additional items Assist x 1;Increased time required   Additional Comments Pt was found supine in bed, left in bedside chair   Transfers   Sit to Stand 3  Moderate assistance   Additional items Assist x 2;Increased time required;Verbal cues   Stand to Sit 3  Moderate assistance   Additional items Assist x 2;Increased time required;Verbal cues   Functional Mobility   Functional Mobility 3  Moderate assistance   Additional Comments x2, HHA 2-3 steps to bedside chair   Balance   Static Sitting Fair -   Static Standing Poor   Ambulatory Poor   Activity Tolerance   Activity Tolerance Patient limited by fatigue   Medical Staff Made Aware PT present   Nurse Made Aware nsg cleared for session   RUE Assessment   RUE " Assessment WFL   LUE Assessment   LUE Assessment WFL   Hand Function   Gross Motor Coordination Functional   Fine Motor Coordination Functional   Cognition   Overall Cognitive Status WFL   Arousal/Participation Alert;Responsive;Arousable;Cooperative   Attention Within functional limits   Orientation Level Oriented X4   Memory Within functional limits   Following Commands Follows one step commands without difficulty   Assessment   Limitation Decreased ADL status;Decreased Safe judgement during ADL;Decreased endurance;Decreased high-level ADLs   Prognosis Fair   Assessment Pt is a 68 y.o male who was admitted to [facility] on 3/4/24 due to multiple intraparenchymal hemorrhages, hyperkalemia, right clavicular fracture, and bilateral pleural effusions,  has a past medical history of Cerebral thrombosis, Chronic kidney disease, COVID, COVID-19, Diabetes mellitus ([facility]), Dialysis patient ([facility]), Difficulty walking, GERD (gastroesophageal reflux disease), Hyperlipidemia, Hypertension, Labyrinthitis, Myocardial infarction ([facility]), Sleep disturbances, Stroke ([facility]), and Type 2 diabetes, uncontrolled, with renal manifestation.  Prior to admit, pt was independent with ADLs/functional mobility, reports driving up until a month ago. Pt uses RW at baseline, history of 1-4 falls. Pt currently lives in a 2 story house with spouse, 3 PHILLY w/o rails. Pt reports a 1st floor setup, no reported bathroom DME. Currently pt requires min A for all UB ADLs, mod A for LB ADLs, and mod Ax1 for sts transfers/functional mobility to bedside chair with HHA. Pt RUE is currently WBAT, limiting use of RUE during ADLs/functional tasks. Pt is currently limited by pain, decreased balance, endurance, strength, and safety awareness, limiting pt's ability to safely engage in functional tasks.  Pt would continue to benefit from acute OT services, addressing below listed goals. Upon d/c, recommend lvl 2 resources.   Goals   LTG Time Frame 10-14   Long Term Goal 1) UB/LB  ADLs with supervision with incorporation of one handed techniques as needed  2) Bed mobility with supervision 3) Demonstrates good carryover of weightbearing restrictions during ADLs/functional tasks within 1 verbal cue 4) Functional mob/transfers to and from all surfaces with fair balance/safety with supervision 5) Increase activity tolerance to 30 min for participation in ADLs/functional tasks 6) Assess DME needs 7) Demonstrate good carryover with compensatory techniques and safety awareness during ADLs/functional tasks 8) Assist with safe d/c recommendations   Plan   Treatment Interventions ADL retraining;Functional transfer training;UE strengthening/ROM;Endurance training;Equipment evaluation/education;Compensatory technique education;Energy conservation;Activityengagement   Goal Expiration Date 03/21/24   OT Frequency 2-3x/wk   Discharge Recommendation   Rehab Resource Intensity Level, OT II (Moderate Resource Intensity)   AM-PAC Daily Activity Inpatient   Lower Body Dressing 2   Bathing 2   Toileting 2   Upper Body Dressing 3   Grooming 3   Eating 3   Daily Activity Raw Score 15   Daily Activity Standardized Score (Calc for Raw Score >=11) 34.69   AM-PAC Applied Cognition Inpatient   Following a Speech/Presentation 3   Understanding Ordinary Conversation 4   Taking Medications 3   Remembering Where Things Are Placed or Put Away 3   Remembering List of 4-5 Errands 3   Taking Care of Complicated Tasks 3   Applied Cognition Raw Score 19   Applied Cognition Standardized Score 39.77   End of Consult   Patient Position at End of Consult Bedside chair;Bed/Chair alarm activated;All needs within reach   Nurse Communication Nurse aware of consult   Juan Dow

## 2024-03-07 NOTE — ASSESSMENT & PLAN NOTE
Lab Results   Component Value Date    HGBA1C 4.8 01/05/2023       Recent Labs     03/06/24  0853 03/06/24  1305 03/06/24  1618 03/07/24  0718   POCGLU 82 85 78 93         Blood Sugar Average: Last 72 hrs:  (P) 80.5    - SSI

## 2024-03-07 NOTE — PLAN OF CARE
Problem: METABOLIC, FLUID AND ELECTROLYTES - ADULT  Goal: Electrolytes maintained within normal limits  Description: INTERVENTIONS:  - Monitor labs and assess patient for signs and symptoms of electrolyte imbalances  - Administer electrolyte replacement as ordered  - Monitor response to electrolyte replacements, including repeat lab results as appropriate  - Instruct patient on fluid and nutrition as appropriate  Outcome: Progressing  Goal: Fluid balance maintained  Description: INTERVENTIONS:  - Monitor labs   - Monitor I/O and WT  - Instruct patient on fluid and nutrition as appropriate  - Assess for signs & symptoms of volume excess or deficit  Outcome: Progressing     Problem: Potential for Falls  Goal: Patient will remain free of falls  Description: INTERVENTIONS:  - Educate patient/family on patient safety including physical limitations  - Instruct patient to call for assistance with activity   - Consult OT/PT to assist with strengthening/mobility   - Keep Call bell within reach  - Keep bed low and locked with side rails adjusted as appropriate  - Keep care items and personal belongings within reach  - Initiate and maintain comfort rounds  - Make Fall Risk Sign visible to staff  - Offer Toileting every 2 Hours, in advance of need  - Initiate/Maintain alarm  - Obtain necessary fall risk management equipment:   - Apply yellow socks and bracelet for high fall risk patients  - Consider moving patient to room near nurses station  Outcome: Progressing     Problem: Prexisting or High Potential for Compromised Skin Integrity  Goal: Skin integrity is maintained or improved  Description: INTERVENTIONS:  - Identify patients at risk for skin breakdown  - Assess and monitor skin integrity  - Assess and monitor nutrition and hydration status  - Monitor labs   - Assess for incontinence   - Turn and reposition patient  - Assist with mobility/ambulation  - Relieve pressure over bony prominences  - Avoid friction and  shearing  - Provide appropriate hygiene as needed including keeping skin clean and dry  - Evaluate need for skin moisturizer/barrier cream  - Collaborate with interdisciplinary team   - Patient/family teaching  - Consider wound care consult   Outcome: Progressing

## 2024-03-07 NOTE — PLAN OF CARE
Problem: OCCUPATIONAL THERAPY ADULT  Goal: Performs self-care activities at highest level of function for planned discharge setting.  See evaluation for individualized goals.  Description: Treatment Interventions: ADL retraining, Functional transfer training, UE strengthening/ROM, Endurance training, Equipment evaluation/education, Compensatory technique education, Energy conservation, Activityengagement          See flowsheet documentation for full assessment, interventions and recommendations.   Note: Limitation: Decreased ADL status, Decreased Safe judgement during ADL, Decreased endurance, Decreased high-level ADLs  Prognosis: Fair  Assessment: Pt is a 68 y.o male who was admitted to [facility] on 3/4/24 due to multiple intraparenchymal hemorrhages, hyperkalemia, right clavicular fracture, and bilateral pleural effusions,  has a past medical history of Cerebral thrombosis, Chronic kidney disease, COVID, COVID-19, Diabetes mellitus ([facility]), Dialysis patient ([facility]), Difficulty walking, GERD (gastroesophageal reflux disease), Hyperlipidemia, Hypertension, Labyrinthitis, Myocardial infarction ([facility]), Sleep disturbances, Stroke ([facility]), and Type 2 diabetes, uncontrolled, with renal manifestation.  Prior to admit, pt was independent with ADLs/functional mobility, reports driving up until a month ago. Pt uses RW at baseline, history of 1-4 falls. Pt currently lives in a 2 story house with spouse, 3 PHILLY w/o rails. Pt reports a 1st floor setup, no reported bathroom DME. Currently pt requires min A for all UB ADLs, mod A for LB ADLs, and mod Ax1 for sts transfers/functional mobility to bedside chair with HHA. Pt RUE is currently WBAT, limiting use of RUE during ADLs/functional tasks. Pt is currently limited by pain, decreased balance, endurance, strength, and safety awareness, limiting pt's ability to safely engage in functional tasks.  Pt would continue to benefit from acute OT services, addressing below listed goals. Upon d/c,  recommend lvl 2 resources.     Rehab Resource Intensity Level, OT: II (Moderate Resource Intensity)

## 2024-03-07 NOTE — PLAN OF CARE
Problem: PHYSICAL THERAPY ADULT  Goal: Performs mobility at highest level of function for planned discharge setting.  See evaluation for individualized goals.  Description: Treatment/Interventions: Functional transfer training, LE strengthening/ROM, Therapeutic exercise, Endurance training, Patient/family training, Equipment eval/education, Bed mobility, Gait training, Spoke to nursing, OT          See flowsheet documentation for full assessment, interventions and recommendations.  Note: Prognosis: Good  Problem List: Decreased strength, Impaired balance, Decreased endurance, Decreased range of motion, Decreased mobility, Pain  Assessment: Pt is 68 y.o. male seen for PT evaluation s/p admit to [facility] on 3/4/2024. Two pt identifiers were used to confirm. Pt presented s/p fall while transferring from electric scooter to .  Pt was admitted with a primary dx of: Intraparenchymal hematoma of brain due to trauma, closed nondisplaced fracture of right clavicle, bilateral pleural effusion, and other active problems including ambulatory dysfunction, type II DM, stage renal disease on dialysis.  Per Ortho patient is WBAT to RU.  PT now consulted for assessment of mobility and d/c needs. Pt with Up in chair orders.  Pts current co morbidities affecting treatment include:  has a past medical history of Cerebral thrombosis, Chronic kidney disease, COVID, COVID-19, Diabetes mellitus (HCC), Dialysis patient (HCC), Difficulty walking, GERD (gastroesophageal reflux disease), Hyperlipidemia, Hypertension, Labyrinthitis, Myocardial infarction (HCC), Sleep disturbances, Stroke (HCC), and Type 2 diabetes, uncontrolled, with renal manifestation. . Pts current clinical presentation is Unstable/ Unpredictable (high complexity) due to Ongoing medical management for primary dx, Decreased activity tolerance compared to baseline, Fall risk, Increased assistance needed from caregiver at current time, Cog status, Continuous  pulse oximetry monitoring   . Upon evaluation, pt currently is requiring Mod Ax1 for bed mobility; Mod Ax2 for transfers and Mod Ax2 for ambulation w/  b/l HHA . Pt presents at PT eval functioning below baseline and currently w/ overall mobility deficits 2* to: BLE weakness, decreased ROM, impaired balance, decreased endurance, gait deviations, decreased activity tolerance compared to baseline, decreased safety awareness, fall risk. Pt currently at a fall risk 2* to impairments listed above.  Based on the aforementioned PT evaluation, pt will continue to benefit from skilled Acute PT interventions to address stated impairments; to maximize functional mobility; for ongoing pt/ family training; and DME needs. At conclusion of PT session pt returned back in chair and chair alarm engaged with phone and call bell within reach. Pt denies any further questions at this time. PT is currently recommending Rehab. PT will continue to follow during hospital stay.  Barriers to Discharge: Inaccessible home environment, Decreased caregiver support     Rehab Resource Intensity Level, PT: II (Moderate Resource Intensity)    See flowsheet documentation for full assessment.

## 2024-03-07 NOTE — PROGRESS NOTES
[facility]  Progress Note  Name: Jeffery Bishop I  MRN: [id number]  Unit/Bed#: PPHP 618-01 I Date of Admission: 3/4/2024   Date of Service: 3/7/2024  Hospital Day: 3    Assessment/Plan   Palliative care by specialist  Assessment & Plan  - will reeval from palliative team  -patient wants to continue with dialysis  - patient stating possible suicidal ideations, will consult psych    Closed nondisplaced fracture of right clavicle  Assessment & Plan  - Nondisplaced right clavicle fracture, present on admission.  - Appreciate Orthopedic surgery evaluation, recommendations and interventions as noted.  - Likely chronic  - Maintain WBAT RUE, no sling required   - Monitor right upper extremity neurovascular exam.  - Continue multimodal analgesic regimen.  - Continue DVT prophylaxis.  - PT and OT evaluation and treatment as indicated.  - Outpatient follow up with Orthopedic surgery for re-evaluation.      Pleural effusion, bilateral  Assessment & Plan  - Large right pleural effusion and tiny left sided pleural effusion identified on CXR  - Monitor with serial CXR  - Likely atraumatic  - Continue supplemental O2 to maintain saturations > 88% as he is an everyday smoker      Ambulatory dysfunction  Assessment & Plan  - Status post fall with the below noted injuries.  - Fall precautions.  - Geriatric Medicine consultation for evaluation, medication review and recommendations.  - PT and OT evaluation and treatment as indicated.  - Case Management consultation for disposition planning.      Type 2 diabetes mellitus  Assessment & Plan  Lab Results   Component Value Date    HGBA1C 4.8 01/05/2023       Recent Labs     03/06/24  0853 03/06/24  1305 03/06/24  1618 03/07/24  0718   POCGLU 82 85 78 93         Blood Sugar Average: Last 72 hrs:  (P) 80.5    - SSI    ESRD (end stage renal disease) on dialysis (HCC)  Assessment & Plan  Lab Results   Component Value Date    EGFR 19 03/07/2024    EGFR  "12 03/06/2024    EGFR 7 03/05/2024    CREATININE 3.07 (H) 03/07/2024    CREATININE 4.33 (H) 03/06/2024    CREATININE 6.93 (H) 03/05/2024     - HD through [place], Thurs, Sat  - Appreciate Nephrology consult and recommendations  - AV Fistula is not accessible  - Palliative care consult placed due to patient's request for more information about hospice and possible transition to hospice   - patient is full code  - Patient will get another round of dialysis today s/p contrast load    * Intraparenchymal hematoma of brain due to trauma (HCC)  Assessment & Plan  - Initial CTH 3/4: \"Multifocal parenchymal hemorrhage with surrounding edema though no significant mass effect or midline shift. Underlying lesions including metastasis or vascular malformations not excluded.\"  - S/p fall while transferring out of his motorized scooter, head strike, no LOC, takes ASA/Plavix  - Neuro exam: GCS 15, non-focal  - Continue neurologic checks: Every 4 hours.  - Reversal agent administered: DDAVP  - Repeat CT scan of the head on 3/5 stable  - Appreciate Neurosurgery evaluation and recommendations.  - MRI shows stable hemorrhage. NSGY signed off.  Planning with Nephrology due to plan for use of IV contrast ( will need dialysis)  - Complete 7 day course of Keppra for seizure prophylaxis  - Chemical DVT prophylaxis: dvt ppx started.   - Hold all anticoagulants and anti platelet medications for 2 weeks and/or until cleared by Neurosurgery to resume.  - PT and OT (including cognitive evaluation) evaluation and treatment as indicated.                 Bowel Regimen: senna  VTE Prophylaxis:Heparin     Disposition: pending dispo, likely home    Subjective   Chief Complaint:  no complaints    Subjective: Patient has no complaints. Patient will get dialysis today. Asked if patient had suicidal ideation but patient denied. Patient states he is tired of scans.      Objective   Vitals:   Temp:  [97.8 °F (36.6 °C)-99.3 °F (37.4 °C)] 97.8 °F (36.6 " °C)  HR:  [62-92] 85  Resp:  [15-18] 16  BP: (127-167)/(62-96) 167/94    I/O         03/05 0701 03/06 0700 03/06 0701 03/07 0700 03/07 0701 03/08 0700    P.O.  480     I.V. 450 500     Total Intake 450 980     Other 2500 1500     Total Output 2500 1500     Net -2050 -520                     Physical Exam:   GENERAL APPEARANCE: NAD  NEURO: GCS 15  HEENT: bruising to right frontal head  CV: RRR  LUNGS: CTA  GI: soft, nontender  : getting dialysis  MSK: moving all 4 extremities  SKIN: warm, dry. Port in place.     Invasive Devices       Peripheral Intravenous Line  Duration             Peripheral IV 03/06/24 Left;Ventral (anterior) Forearm <1 day              Hemodialysis Catheter  Duration             HD Permanent Double Catheter 34 days                          Lab Results: Results: I have personally reviewed all pertinent laboratory/tests results, BMP/CMP:   Lab Results   Component Value Date    SODIUM 137 03/07/2024    K 4.1 03/07/2024    CL 98 03/07/2024    CO2 27 03/07/2024    BUN 18 03/07/2024    CREATININE 3.07 (H) 03/07/2024    CALCIUM 8.7 03/07/2024    EGFR 19 03/07/2024   , and CBC:   Lab Results   Component Value Date    WBC 3.80 (L) 03/07/2024    HGB 10.0 (L) 03/07/2024    HCT 31.4 (L) 03/07/2024     (H) 03/07/2024     03/07/2024    RBC 3.11 (L) 03/07/2024    MCH 32.2 03/07/2024    MCHC 31.8 03/07/2024    RDW 13.6 03/07/2024    MPV 9.8 03/07/2024    NRBC 0 03/07/2024     Imaging: I have personally reviewed pertinent reports.     Other Studies:

## 2024-03-07 NOTE — ASSESSMENT & PLAN NOTE
With primary palliative Dx of ESRD and brain hematoma in setting of trauma    Patient is engaged with a palliative program in [place] (out of network. Wife can't recall name of agency). States that he has had a discussion with their hospice team approximately 3 weeks prior. He is considering of transition to comfort care and will decide with that team once discharged.    Anticipate palliative care to sign off on 3/8 given patient with clear goals and plan to follow-up with his palliative care team at discharge.

## 2024-03-07 NOTE — MALNUTRITION/BMI
This medical record reflects one or more clinical indicators suggestive of malnutrition and/or morbid obesity.    Malnutrition Findings:                                 BMI Findings:  Adult BMI Classifications: Underweight < 18.5 (PT is underweight with BMI 17.43, unable to physically access PT at this time as he is off the unit in dialysis, will f/u to assess for malnutriton when PT is present.)        There is no height or weight on file to calculate BMI.     See Nutrition note dated 3/7/24 for additional details.  Completed nutrition assessment is viewable in the nutrition documentation.

## 2024-03-07 NOTE — ASSESSMENT & PLAN NOTE
"- Initial CTH 3/4: \"Multifocal parenchymal hemorrhage with surrounding edema though no significant mass effect or midline shift. Underlying lesions including metastasis or vascular malformations not excluded.\"  - S/p fall while transferring out of his motorized scooter, head strike, no LOC, takes ASA/Plavix  - Neuro exam: GCS 15, non-focal  - Continue neurologic checks: Every 4 hours.  - Reversal agent administered: DDAVP  - Repeat CT scan of the head on 3/5 stable  - Appreciate Neurosurgery evaluation and recommendations.  - MRI shows stable hemorrhage. NSGY signed off.  Planning with Nephrology due to plan for use of IV contrast ( will need dialysis)  - Complete 7 day course of Keppra for seizure prophylaxis  - Chemical DVT prophylaxis: dvt ppx started.   - Hold all anticoagulants and anti platelet medications for 2 weeks and/or until cleared by Neurosurgery to resume.  - PT and OT (including cognitive evaluation) evaluation and treatment as indicated.      "

## 2024-03-07 NOTE — ASSESSMENT & PLAN NOTE
Primary palliative Dx    Patient has been on HD for 13 years.     Patient considering of hospice, states that after discharge she will follow-up with his palliative care team to make final decision    Lab Results   Component Value Date    EGFR 19 03/07/2024    EGFR 12 03/06/2024    EGFR 7 03/05/2024    CREATININE 3.07 (H) 03/07/2024    CREATININE 4.33 (H) 03/06/2024    CREATININE 6.93 (H) 03/05/2024

## 2024-03-07 NOTE — PHYSICAL THERAPY NOTE
PHYSICAL THERAPY EVALUATION  NAME:  Jeffery Bishop  DATE: 03/07/24    AGE:   68 y.o.  Mrn:   [id number]  ADMIT DX:  Hospice care [Z51.5]  Polypharmacy [Z79.899]  Intraparenchymal hemorrhage of brain ([facility]) [I61.9]  Closed nondisplaced fracture of right clavicle, unspecified part of clavicle, initial encounter [S42.001A]  Stage 5 chronic kidney disease on chronic dialysis ([facility]) [N18.6, Z99.2]  Unspecified multiple injuries, initial encounter [T07.XXXA]    Past Medical History:   Diagnosis Date    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    With Cerebral Infarction:  Last Assessed:  October 20, 2016    Chronic kidney disease 2012    on dialysis     COVID 05/2022    COVID-19 04/2022 4/2022-while in NH    Diabetes mellitus ([facility])     Dialysis patient ([facility])     T/TH/Sat    Difficulty walking     GERD (gastroesophageal reflux disease)     Hyperlipidemia     Hypertension     Labyrinthitis 09/10/2011    Myocardial infarction ([facility]) 2014    x3 others were in 2009 & 2010    Sleep disturbances 09/20/2010    Stroke ([facility]) 2007    x1, RLE deficit- uses walker    Type 2 diabetes, uncontrolled, with renal manifestation 05/03/2017       Past Surgical History:   Procedure Laterality Date    AV FISTULA PLACEMENT      AV FISTULA REPAIR Left 6/3/2023    Procedure: LEFT UPPER EXTREMITY A-V GRAFT EXPLANT, LEFT BRACHIAL ANGIOPATCH,  APPLICATION OF  VAC;  Surgeon: Neyda Rider MD;  Location:  MAIN OR;  Service: Vascular    CARDIAC CATHETERIZATION Left 02/03/2023    Procedure: Cardiac Left Heart Cath;  Surgeon: Tiffani Gregorio MD;  Location: [facility]ARDIAC C[facility] LAB;  Service: Cardiology    CARDIAC CATHETERIZATION N/A 02/08/2023    Procedure: Cardiac catheterization with complex PCI with Dr. Patricio, patient is a renal dialysis patient;  Surgeon: Marcos Ramirez MD;  Location:  CARDIAC CATH LAB;  Service: Cardiology    CARDIAC SURGERY  2010    stents x3    COLONOSCOPY N/A 12/12/2016    Procedure: COLONOSCOPY;  Surgeon: Radha Moss MD;  Location:  "[facility];  Service:     COLONOSCOPY N/A 04/03/2017    Procedure:  COLONOSCOPY;  Surgeon: Radha Moss MD;  Location: [facility];  Service:     HARDWARE REMOVAL Right 04/04/2022    Procedure: REMOVAL HARDWARE HIP;  Surgeon: Moises Kent MD;  Location: [facility];  Service: Orthopedics    IR AV FISTULA/GRAFT DECLOT  04/29/2021    IR AV FISTULA/GRAFT DECLOT  03/25/2022    IR AV FISTULA/GRAFT DECLOT  09/21/2022    IR AV FISTULAGRAM/GRAFTOGRAM  03/28/2022    IR AV FISTULAGRAM/GRAFTOGRAM  07/28/2022    IR TUNNELED CENTRAL LINE REMOVAL  12/07/2021    IR TUNNELED DIALYSIS CATHETER CHECK/CHANGE/REPOSITION/ANGIOPLASTY  2/1/2024    IR TUNNELED DIALYSIS CATHETER PLACEMENT  05/24/2021    PORTACATH PLACEMENT      per pt \"port in chest\"    RI ARTERIOVENOUS ANASTOMOSIS OPEN DIRECT Left 07/22/2021    Procedure: CREATION FISTULA ARTERIOVENOUS (AV);  Surgeon: Kareem Nye MD;  Location: [facility];  Service: Vascular    RI ARTERIOVENOUS ANASTOMOSIS OPEN DIRECT Left 3/21/2023    Procedure: left brachiobasilic fistula,  AVG Graft;  Surgeon: Catalino Hook MD;  Location: [facility];  Service: Vascular    RI ARTERIOVENOUS ANASTOMOSIS OPEN DIRECT Left 5/19/2023    Procedure: CREATION of LEFT FISTULA ARTERIOVENOUS (AV) GRAFT;  Surgeon: Catalino Hook MD;  Location:  MAIN OR;  Service: Vascular    RI HEMIARTHROPLASTY HIP PARTIAL Right 04/04/2022    Procedure: HEMIARTHROPLASTY HIP (BIPOLAR);  Surgeon: Moises Kent MD;  Location: [facility];  Service: Orthopedics       Length Of Stay: 3    PHYSICAL THERAPY EVALUATION:     Actual pt care time [phone]   03/07/24 0815   Note Type   Note type Evaluation   Pain Assessment   Pain Assessment Tool 0-10   Pain Score No Pain   Restrictions/Precautions   Weight Bearing Precautions Per Order Yes   RUE Weight Bearing Per Order (S)  WBAT   Other Precautions Chair Alarm;Bed Alarm;Multiple lines;Fall Risk   Home Living   Type of Home House   Home Layout Two " level;Able to live on main level with bedroom/bathroom;Stairs to enter with rails  (3 PHILLY)   Home Equipment Walker;Cane;Wheelchair-manual;Electric scooter   Additional Comments Pt reports living with his spouse who is able to provide assist if needed   Prior Function   Level of [place] Independent with functional mobility   Lives With Spouse   Receives Help From Family   Falls in the last 6 months 1 to 4   Comments Pt reports being able to walk short distances with the use of a RW. Pt states he primarily utilizes electric scooter vs WC for mobility PTA   General   Family/Caregiver Present No   Cognition   Overall Cognitive Status WFL   Arousal/Participation Alert   Orientation Level Oriented X4   Memory Within functional limits   Following Commands Follows one step commands without difficulty   RUE Assessment   RUE Assessment WFL   LUE Assessment   LUE Assessment WFL   RLE Assessment   RLE Assessment X   Strength RLE   RLE Overall Strength 3-/5   LLE Assessment   LLE Assessment X   Strength LLE   LLE Overall Strength 3-/5   Bed Mobility   Supine to Sit 3  Moderate assistance   Additional items Assist x 1;Increased time required;Verbal cues   Transfers   Sit to Stand 3  Moderate assistance   Additional items Assist x 2;Increased time required;Verbal cues   Stand to Sit 3  Moderate assistance   Additional items Assist x 2;Increased time required;Verbal cues   Additional Comments b/l HHA utilized for transfers   Ambulation/Elevation   Gait pattern Excessively slow;Short stride;Foward flexed;Inconsistent michael   Gait Assistance 3  Moderate assist   Additional items Assist x 2   Assistive Device   (b/l HHA)   Distance 3ft   Balance   Static Sitting Fair -   Static Standing Poor   Ambulatory Poor   Endurance Deficit   Endurance Deficit Yes   Endurance Deficit Description fatigue, weakness   Activity Tolerance   Activity Tolerance Patient limited by fatigue   Medical Staff Made Aware MICHAEL Rainey; MICHAEL Martinez student;  Farzaneh, SPT; OT present for co evaluation due to pts current medical presentation   Nurse Made Aware Pt appropriate to be seen and mobilize per nsg   Assessment   Prognosis Good   Problem List Decreased strength;Impaired balance;Decreased endurance;Decreased range of motion;Decreased mobility;Pain   Assessment Pt is 68 y.o. male seen for PT evaluation s/p admit to [facility] on 3/4/2024. Two pt identifiers were used to confirm. Pt presented s/p fall while transferring from electric scooter to .  Pt was admitted with a primary dx of: Intraparenchymal hematoma of brain due to trauma, closed nondisplaced fracture of right clavicle, bilateral pleural effusion, and other active problems including ambulatory dysfunction, type II DM, stage renal disease on dialysis.  Per Ortho patient is WBAT to RUE.  PT now consulted for assessment of mobility and d/c needs. Pt with Up in chair orders.  Pts current co morbidities affecting treatment include:  has a past medical history of Cerebral thrombosis, Chronic kidney disease, COVID, COVID-19, Diabetes mellitus (HCC), Dialysis patient (HCC), Difficulty walking, GERD (gastroesophageal reflux disease), Hyperlipidemia, Hypertension, Labyrinthitis, Myocardial infarction (HCC), Sleep disturbances, Stroke (HCC), and Type 2 diabetes, uncontrolled, with renal manifestation. . Pts current clinical presentation is Unstable/ Unpredictable (high complexity) due to Ongoing medical management for primary dx, Decreased activity tolerance compared to baseline, Fall risk, Increased assistance needed from caregiver at current time, Cog status, Continuous pulse oximetry monitoring   . Upon evaluation, pt currently is requiring Mod Ax1 for bed mobility; Mod Ax2 for transfers and Mod Ax2 for ambulation w/  b/l HHA . Pt presents at PT eval functioning below baseline and currently w/ overall mobility deficits 2* to: BLE weakness, decreased ROM, impaired balance, decreased endurance, gait  "deviations, decreased activity tolerance compared to baseline, decreased safety awareness, fall risk. Pt currently at a fall risk 2* to impairments listed above.  Based on the aforementioned PT evaluation, pt will continue to benefit from skilled Acute PT interventions to address stated impairments; to maximize functional mobility; for ongoing pt/ family training; and DME needs. At conclusion of PT session pt returned back in chair and chair alarm engaged with phone and call bell within reach. Pt denies any further questions at this time. PT is currently recommending Rehab. PT will continue to follow during hospital stay.   Barriers to Discharge Inaccessible home environment;Decreased caregiver support   Goals   Patient Goals \"to go home\"   STG Expiration Date 03/21/24   Short Term Goal #1 In 14 days pt will complete: 1) Bed mobility skills with S to increase safety and independence as well as decrease caregiver burden. 2) Functional transfers with min Ax1 to promote increased independence, safety, and QOL. 3) Ambulate 75' using least restrictive AD with min Ax1 without LOB and stable vitals so that pt can negotiate previous living environment safely and promote independence with functional mobility and return to PLOF. 4) Stair training up/ down 3 step/s using rail/s with min Ax1 so that pt can enter/negotiate previous living environment safely and decrease fall risk. 5) Improve balance grades by 1/2 grade to increase safety with all mobility and decrease fall risk.  6) Improve BLE strength by 1/2 grade to help increase overall functional mobility and decrease fall risk.   Plan   Treatment/Interventions Functional transfer training;LE strengthening/ROM;Therapeutic exercise;Endurance training;Patient/family training;Equipment eval/education;Bed mobility;Gait training;Spoke to nursing;OT   PT Frequency 3-5x/wk   Discharge Recommendation   Rehab Resource Intensity Level, PT II (Moderate Resource Intensity)   AM-PAC " Basic Mobility Inpatient   Turning in Flat Bed Without Bedrails 2   Lying on Back to Sitting on Edge of Flat Bed Without Bedrails 2   Moving Bed to Chair 1   Standing Up From Chair Using Arms 1   Walk in Room 1   Climb 3-5 Stairs With Railing 1   Basic Mobility Inpatient Raw Score 8   Turning Head Towards Sound 4   Follow Simple Instructions 4   Low Function Basic Mobility Raw Score  16   Low Function Basic Mobility Standardized Score  25.72   Highest Level Of Mobility   -[facility] Goal 3: Sit at edge of bed   -HL Achieved 4: Move to chair/commode   Modified [place] Scale   Modified [place] Scale 4   Barthel Index   Feeding 10   Bathing 0   Grooming Score 5   Dressing Score 5   Bladder Score 10   Bowels Score 10   Toilet Use Score 5   Transfers (Bed/Chair) Score 5   Mobility (Level Surface) Score 0   Stairs Score 0   Barthel Index Score 50   Portions of the documentation may have been created using voice recognition software.Occasional wrong word or sound alike substitutions may have occurred due to the inherent limitations of the voice recognition software. Read the chart carefully and recognize, using context, where substitutions have occurred.    Da Maddox, PT, DPT

## 2024-03-07 NOTE — PROGRESS NOTES
Progress Note - Nephrology   Jeffery Bishop 68 y.o. male MRN: [id number]  Unit/Bed#: [facility] 618-01 Encounter: [id number]    ASSESSMENT AND PLAN:  68-year-old male with history ESRD on HD TTS/hypertension/dyslipidemia/CAD/PCI with episode of fall found to have intracranial hemorrhage we are seeing for ESRD    1.  ESRD:  - TTS at [facility]  - Status post HD yesterday and today given MRI with gadolinium  - Target weight 61.5 kg but below that at this point at 59.5 kg  - Access: Right TDC          HEMODIALYSIS PROCEDURE NOTE  The patient was seen and examined while on hemodialysis. The patient is tolerating the procedure well.  Time: 4 hours  Sodium: 138 Blood flow: 400   Dialyzer: F160 Potassium: 3 Dialysate flow: 500   Access: Right TDC Bicarbonate: 35 Ultrafiltration goal: Even   Medications on HD: None       2.  Blood pressure acceptable, history of chronic hypotension doing well however off midodrine will monitor and appears euvolemic    3.  Electrolytes/acid-base: All acceptable    4.  MBD of ESRD:  - Hyperphosphatemia: Increase sevelamer to 1600 mg 3 times a day with meals  - Secondary hyperparathyroidism of ESRD continue outpatient treatment    5.  Anemia:  - Hemoglobin acceptable at 10.0  - Had been on Mircera 30 mcg every 4 weeks as an outpatient.  Will hold ALICE given hemoglobin essentially at goal    6.  Main problem: Multifocal intracranial parenchymal hemorrhage with surrounding edema no mass effect no midline affect.  MRI shows 3 separate areas of intraparenchymal hemorrhage noted on CT unchanged with mild surrounding edema.  Recommend short-term follow-up with MRI of the brain with contrast as hemorrhage resolves.  Patient would need HD on consecutive days.  Abnormal appearance of distal right vertebral artery with recommended CT angiography head and neck to further evaluate the right vertebral artery in question.  Neurosurgery will follow-up in a couple weeks repeat CT of the head at that  time.            Subjective:   Patient feels well today  No chest pain or shortness of breath  No nausea vomiting or diarrhea      Objective:     Vitals: Blood pressure 127/69, pulse 74, temperature 97.9 °F (36.6 °C), resp. rate 18, SpO2 97%.,There is no height or weight on file to calculate BMI.    Weight (last 2 days)       None              Intake/Output Summary (Last 24 hours) at 3/7/2024 0852  Last data filed at 3/7/2024 0600  Gross per 24 hour   Intake 900 ml   Output 1500 ml   Net -600 ml       HD Permanent Double Catheter (Active)   Reasons to continue HD Cath Treatment Therapy 03/07/24 0800   Goal for Removal N/A- chronic HD catheter 03/07/24 0800   Line Necessity Reviewed Yes, reviewed with provider 03/06/24 0830   Site Assessment WDL;Clean;Dry;Intact 03/07/24 0800   Proximal Lumen Status Capped 03/07/24 0800   Distal Lumen Status Capped 03/07/24 0800   Dressing Type Chlorhexidine dressing 03/07/24 0800   Dressing Status Clean;Dry;Intact 03/07/24 0800   Dressing Intervention 7 day central line dressing changed 03/04/24 1825   Dressing Change Due 03/11/24 03/07/24 0800       Physical Exam: General:  No acute distress/seen on HD  Skin:  No acute rash except for mild excoriated lesions  Eyes:  No scleral icterus and noninjected  ENT:  Moist mucous membranes  Neck:  Supple, no jugular venous distention, trachea midline, overall appearance is normal  Chest:  Clear to auscultation; right TDC clean and dry  CVS:  Regular rate and rhythm, without a rub or gallops  Abdomen:  Normal bowel sounds, soft and nontender and nondistended  Extremities:  No edema, and no cyanosis, no significant arthritic changes  Neuro:  No gross focality  Psych:  Alert and oriented and appropriate                Medications:    Scheduled Meds:  Current Facility-Administered Medications   Medication Dose Route Frequency Provider Last Rate    acetaminophen  975 mg Oral Q8H PRN Graham Martinez MD      ammonium lactate   Topical BID PRN Graham  "MD Michelle      ascorbic acid  500 mg Oral Daily Graham Martinez MD      docusate sodium  100 mg Oral Daily PRN Zane Wilkinson PA-C      gabapentin  300 mg Oral Daily Lizandro Aguilar MD      heparin (porcine)  5,000 Units Subcutaneous Q8H SRINI Min Oj Carbone MD      HYDROmorphone  0.5 mg Intravenous Q3H PRN Mita Santana MD      insulin lispro  1-5 Units Subcutaneous TID AC Graham Martinez MD      lactulose  10 g Oral Daily Graham Martinez MD      levETIRAcetam  500 mg Oral BID Graham Martinez MD      metoprolol  5 mg Intravenous Q6H PRN Graham Martinez MD      nicotine  1 patch Transdermal Daily Graham Martinez MD      oxyCODONE  10 mg Oral Q6H PRN Zane Wilkinson PA-C      oxyCODONE  5 mg Oral Q6H PRN Zane Wilkinson PA-C      pantoprazole  40 mg Oral Early Morning Graham Martinez MD      polyethylene glycol  17 g Oral Daily Mita Santana MD      senna  1 tablet Oral HS Mita Santana MD      sevelamer  800 mg Oral TID With Meals Lizandro Aguilar MD         PRN Meds:.  acetaminophen    ammonium lactate    docusate sodium    HYDROmorphone    metoprolol    oxyCODONE    oxyCODONE    Continuous Infusions:     Lab, Imaging and other studies: I have personally reviewed pertinent labs.  Laboratory Results:  Results from last 7 days   Lab Units 03/07/24  0444 03/06/24  0821 03/05/24  0428 03/04/24  2358 03/04/24  1343   WBC Thousand/uL 3.80* 3.81* 5.82  --  4.78   HEMOGLOBIN g/dL 10.0* 8.7* 9.4*  --  9.3*   HEMATOCRIT % 31.4* 26.8* 30.0*  --  28.6*   PLATELETS Thousands/uL 151 137* 129*  --  130*   POTASSIUM mmol/L 4.1 4.4 5.6* 6.0* 7.3*   CHLORIDE mmol/L 98 99 96 96 96   CO2 mmol/L 27 29 27 28 26   BUN mg/dL 18 28* 54* 50* 89*   CREATININE mg/dL 3.07* 4.33* 6.93* 6.68* 10.19*   CALCIUM mg/dL 8.7 8.4 8.7 8.3* 8.6   MAGNESIUM mg/dL  --   --  2.0 1.9  --    PHOSPHORUS mg/dL  --   --  8.8* 7.9*  --      Urinalysis: No results found for: \"COLORU\", \"CLARITYU\", \"SPECGRAV\", \"PHUR\", \"LEUKOCYTESUR\", \"NITRITE\", " "\"PROTEINUA\", \"GLUCOSEU\", \"KETONESU\", \"BILIRUBINUR\", \"BLOODU\"  ABGs:   Lab Results   Component Value Date    PH 7.547 () 05/19/2023     Radiology review:     Portions of the record may have been created with voice recognition software.  Occasional wrong word or \"sound a like\" substitutions may have occurred due to the inherent limitations of voice recognition software.  Read the chart carefully and recognize, using context, where substitutions have occurred.                    "

## 2024-03-07 NOTE — CASE MANAGEMENT
Case Management Discharge Planning Note    Patient name Jeffery Bishop  Location [facility] 618/[facility] 618-01 MRN [id number]  : 1955 Date 3/7/2024       Current Admission Date: 3/4/2024  Current Admission Diagnosis:Intraparenchymal hematoma of brain due to trauma (HCC)   Patient Active Problem List    Diagnosis Date Noted    Intraparenchymal hematoma of brain due to trauma (HCC) 2024    Closed nondisplaced fracture of right clavicle 2024    Palliative care by specialist 2024    Goals of care, counseling/discussion 2024    Vomiting without nausea 2024    Unintentional weight loss 2024    Diarrhea 2024    Elevated LFTs 2024    Abnormal MRI 2024    Constipation 10/04/2023    Leukopenia 10/04/2023    Pleural effusion, bilateral 10/04/2023    History of CVA (cerebrovascular accident) 2023    Generalized weakness 2023    Open wound 2023    Chronic pain 2023    Anxiety disorder, unspecified 2023    Secondary hyperparathyroidism of renal origin ([facility]) 2023    Unspecified protein-calorie malnutrition ([facility]) 2023    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 06/15/2023    Ambulatory dysfunction 2023    Infection of dialysis AV graft 2023    Hypotension 2023    S/P angioplasty with stent 2023    Chronic deep vein thrombosis (DVT) of internal jugular vein ([facility]) 2022    End-stage renal disease  2022    Chronic kidney disease 2022    Right intertrochanteric femur fracture 2022    Thrombocytopenia  2022    Arteriovenous fistula ([facility]) 2021    AV fistula thrombosis, initial encounter ([facility]) 2021    Encounter for extracorporeal dialysis ([facility]) 2019    Intestinal malabsorption 2017    Pernicious anemia 2017    Irritable bowel syndrome 2017    Elevated serum alkaline phosphatase level 2017    Anemia of chronic disease 2017     Coronary artery disease  10/20/2016    Hypertension 10/20/2016    Dependence on renal dialysis (HCC) 03/08/2016    Vitamin D deficiency, unspecified 02/12/2013    Mixed hyperlipidemia 10/04/2012    ESRD (end stage renal disease) on dialysis (HCC) 09/04/2012    Nicotine dependence 09/04/2012    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    Type 2 diabetes mellitus 09/04/2012      LOS (days): 3  Geometric Mean LOS (GMLOS) (days): 4.7  Days to GMLOS:1.8     OBJECTIVE:  Risk of Unplanned Readmission Score: 49.01         Current admission status: Inpatient   Preferred Pharmacy:   [id number] [address] [address]  Phone: [phone] Fax: [phone]    Primary Care Provider: Rohit Parkinson MD    Primary Insurance: MEDICARE  Secondary Insurance: AARP    DISCHARGE DETAILS:    CM spoke to pt's wife, regarding d/c planning  CM explained that therapy's recommendation is IP rehab but that pt isn't willing to pursue this level of care.   Pt's wife is amenable to [facility] coming into the home and requested CM to place referrals.

## 2024-03-07 NOTE — CONSULTS
"Consultation - Behavioral Health   Jeffery Bishop 68 y.o. male MRN: [id number]  [facility] Encounter: [id number]      Chief Complaint: I am tired of having Dallin WELSH for many years    History of Present Illness   Physician Requesting Consult: Da Lopez DO  Reason for Consult / Principal Problem: Suicidal ideation    Jeffery Bishop is a 68 y.o. male with PMHx pertinent for CKD on dialysis T/Th/Sat, T2DM, HDL, HTN, MI, stroke who presents to [facility] as a trauma transfer. Patient initially presented to [place] after a fall. He reported that he was moving from his motorized scooter to a wheelchair and fell because he forgot to put on the brakes for his scooter.    A consult was made to psychiatry because 2 days after initial presentation, the patient was found trying to pull out his HD catheter.     Today the patient states that he has been on dialysis for 14 years, and it has taken a toll on him and made him very tired. He reports that for about a month now, he has been thinking about wanting to be dead. He is adamant that he would like his death to \"be quick, because you only get one chance\". He expresses that he is interested in undergoing medically euthanasia  because it would assure that he would go quickly and there would be no chance that he would be further debilitated from an injury. He reports that he does not own any firearms at home.  He confirms that the fall he had from his scooter was an accident. He describes his wife and children as protective factors against attempting to end his life, and expresses plans to have a discussion with his wife and kids alf about his wishes to stop further medical treatment. He expresses that he would like his wife and children to be understanding and approving of his decision before he were to make any further decisions. The patient reports meeting with hospice care through his palliative team and is aware of the services they offer.  During the whole " "conversation patient continues to state that he is tired of being in the region for many years at the same time and he is ambivalent about wanting to stop the dialysis.  He is stated that he need to think about it .  He wants his family support for any decision that he may.     He denies prior history of suicidal ideation, intent, or attempts. Denies homicidal ideation, auditory or visual hallucinations. Denies history of psychiatric disorders or hospital admission for psychiatric symptoms.       Psychiatric Review Of Systems:  sleep: no  appetite changes: no  weight changes: no  energy/anergy: yes, low energy and fatigue s/p hemodialysis. Says this is typical for him.   interest/pleasure/anhedonia: no  somatic symptoms: no  anxiety/panic: no  jacob: no  guilty/hopeless: no, but contemplating how much longer he can keep going through hemodialysis   self injurious behavior/risky behavior: yes, s/p attempting to pull out HD catheter so he \"can bleed out\".     Historical Information   Past Psychiatric History:   None  Currently in treatment with palliative care .  Past Suicide attempts: None  Past Violent behavior: None  Past Psychiatric medication trial: None    Substance Abuse History:  None   I have assessed this patient for substance use within the past 12 months     History of IP/OP rehabilitation program: None  Smoking history: Current   Family Psychiatric History:   None reported     Social History  Education: some college  Learning Disabilities:  None  Marital history:   Living arrangement, social support: The patient lives in home with wife.  Occupational History: retired  Functioning Relationships: good support system, good relationship with spouse or significant other, and good relationship with children.  Other Pertinent History: None    Traumatic History:   Abuse:  None   Other Traumatic Events:  None    Past Medical History:   Diagnosis Date    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    With Cerebral " Infarction:  Last Assessed:  October 20, 2016    Chronic kidney disease 2012    on dialysis     COVID 05/2022    COVID-19 04/2022 4/2022-while in [place]    Diabetes mellitus ([facility])     Dialysis patient ([facility])     T/TH/Sat    Difficulty walking     GERD (gastroesophageal reflux disease)     Hyperlipidemia     Hypertension     Labyrinthitis 09/10/2011    Myocardial infarction ([facility]) 2014    x3 others were in 2009 & 2010    Sleep disturbances 09/20/2010    Stroke ([facility]) 2007    x1, RLE deficit- uses walker    Type 2 diabetes, uncontrolled, with renal manifestation 05/03/2017       Medical Review Of Systems:  Review of Systems - Negative except generalized itching s/p dialysis , pain in the left clavicle, all other systems reviewed and are negative    Meds/Allergies   all current active meds have been reviewed  No Known Allergies    Objective   Vital signs in last 24 hours:  Temp:  [97.8 °F (36.6 °C)-99.3 °F (37.4 °C)] 98.6 °F (37 °C)  HR:  [62-92] 84  Resp:  [15-18] 18  BP: ()/(62-96) 143/75      Intake/Output Summary (Last 24 hours) at 3/7/2024 1414  Last data filed at 3/7/2024 1245  Gross per 24 hour   Intake 1100 ml   Output 500 ml   Net 600 ml       Mental Status Evaluation:  Appearance:  older than stated age, thin & gaunt looking, and scattered bruising over b/l upper extremities   Behavior:  restless and fidgety   Speech:  normal pitch and normal volume   Mood:  euthymic   Affect:  mood-congruent   Language: naming objects and repeating phrases   Thought Process:  goal directed   Associations: intact associations   Thought Content:  normal   Perceptual Disturbances: None   Risk Potential: Suicidal Ideations none, Homicidal Ideations none, and Potential for Aggression No   Sensorium:  person and place   Memory:  short term memory mildly impaired, long term memory intact   Cognition:  short term memory mildly impaired, long term memory grossly intact   Consciousness:  alert and awake    Attention: attention span  "and concentration were age appropriate   Intellect: within normal limits   Fund of Knowledge: awareness of current events: within normal limits , past history: within normal limits , and vocabulary: within normal limits    Insight:  fair   Judgment: fair   Muscle Strength and Tone: Within normal limits    Gait/Station: Patient laying in bed    Motor Activity: no abnormal movements     Lab Results:  I have personally reviewed all pertinent laboratory/tests results.    Code Status: )Level 1 - Full Code    Assessment/Plan     Assessment:   Jeffery Bishop is a 68 y.o. male with PMHx of CKD on dialysis T/Th/Sat, T2DM, HDL, HTN, MI, stroke who presents to [facility] as a trauma transfer after a fall. Psychiatry consult was placed for suicidal ideation, because the patient was found trying to pull out his HD catheter.  Today, the patient expresses a 1 month history of wanting to die, preferably via Euthanasia. He expresses a wish to \"die fast, because you only get one shot\". The patient would like to discuss with his wife and children and he expects that they  understand and approving of his decision.     Diagnosis: adjustment disorder unspecified    Plan:   Continue medical management  Continue one-to-one observation until discharge   Discussed with primary medical team   Discussed with the palliative care team  At this time patient does not need inpatient psych admission  No intervention at this moment I will sign off but call me back if necessary  Risks, benefits and possible side effects of Medications:   Risks, benefits, and possible side effects of medications explained to patient and patient verbalizes understanding.          Liseth Ramos MD  "

## 2024-03-07 NOTE — DISCHARGE SUMMARY
"  Discharge Summary - Jeffery Bishop 68 y.o. male MRN: [id number]    Unit/Bed#: [facility] 618-01 Encounter: [id number]    Admission Date:   Admission Orders (From admission, onward)       Ordered        03/04/24 1759  Inpatient Admission  Once                            Admitting Diagnosis: Hospice care [Z51.5]  Polypharmacy [Z79.899]  Intraparenchymal hemorrhage of brain (HCC) [I61.9]  Closed nondisplaced fracture of right clavicle, unspecified part of clavicle, initial encounter [S42.001A]  Stage 5 chronic kidney disease on chronic dialysis (HCC) [N18.6, Z99.2]  Unspecified multiple injuries, initial encounter [T07.XXXA]    HPI: \"Jeffery Bishop is a 68 y.o. male who presents as a trauma transfer. Patient presented to [place] after a fall. Patient states he was moving from his motorized scooter to a wheelchair when he fell. The patient denies having loss of consciousness, denies blacking out. Endorses head strike. Patient denies having nausea, vomiting, fevers, chills, chest pain, shortness of breath. He is tolerating PO intake. He does not void. Patient is on hemodialysis via his right port on a Tuesday, Thursday, Saturday schedule. Patient is currently on aspirin and plavix. He was reversed with DDAVP. He denies taking anticoagulation. PMHX is pertinent for CKD on dialysis as stated above, COVID, T2DM, GERD, HLD, HTN, MI, Stroke with right sided deficits. PSHX includes coronary stents, AV fistula that is no longer usable. Has 0.5 PPD history of smoking. Patient presented to [facility] afebrile with stable vitals. He was hyperkalemic at [place] to 7.1. See above for assessment and plan. \"    Procedures Performed: No orders of the defined types were placed in this encounter.      Summary of Hospital Course:     Patient received care from multiple providers, as listed below. Patient underwent Ct with contrast which required 2 extra dialysis sessions which he finished up. Patient requesting to leave today, Behavorial health " "evaluated patient at bedside, patient medically stable. Ultimately patient is to follow up in 2 weeks with neurosurgery for further scans and evaluation.     Significant Findings, Care, Treatment and Services Provided:     Neurosurgery consulted  Nephrology consulted  Psych consulted  Palliative care consulted  PT consulted  OT consulted  Lauren consulted  Wound care consulted    XR chest portable    Result Date: 3/7/2024  Impression: Stable hazy opacity in the right mid and lower lung field which may represent a combination of layered effusion and parenchymal opacity. Stable bilateral pleural effusions. Workstation performed: [id number]     XR chest pa & lateral    Result Date: 3/6/2024  Impression: Enlarging right pleural effusion. Stable left pleural effusion Workstation performed: [id number]     MRI brain w wo contrast    Result Date: 3/6/2024  Impression: 3 separate areas of intraparenchymal hemorrhage as seen on recent CT scan, unchanged with mild surrounding edema. No underlying ischemia or mass is identified at this time. Recommend short-term follow-up MRI of the brain with contrast as hemorrhage resolves. There is a small amount of intraventricular hemorrhage layering within the occipital horns of the lateral ventricles. Moderate to advanced chronic microangiopathic change within the cerebral hemispheres. Abnormal appearance of the distal right vertebral artery which is hyperintense on T2 weighted imaging and demonstrates mild enhancement after administration of contrast, possibly representing slow flow or occlusion. This can be further evaluated with CT angiography of the head and neck. This examination was marked \"immediate notification\" in Epic in order to begin the standard process by which the radiology reading room liaison alerts the referring practitioner. Workstation performed: [id number]     XR clavicle right    Result Date: 3/5/2024  Impression: Minimally displaced fracture of the distal third of " the clavicle. AC joint is congruent. Workstation performed: [id number]     XR chest pa & lateral    Result Date: 3/5/2024  Impression: Mildly improved hazy opacity in the right mid and upper lung field. Persistent hazy opacity right lower lung field. Persistent bilateral layered pleural effusions. Workstation performed: [id number]     CT head wo contrast    Result Date: 3/5/2024  Impression: Stable small parenchymal hemorrhages in the right frontal parietal subcortical white matter likely due due to hemorrhagic axonal injury. Stable posttraumatic hemorrhage within the septum pellucidum. Workstation performed: [id number]     XR hip/pelv 2-3 vws right    Result Date: 3/4/2024  Impression: Hip hemiarthroplasty without evidence of a hardware complication or failure. Workstation performed: [id number]     XR shoulder 2+ views LEFT    Result Date: 3/4/2024  Impression: No acute osseous abnormality. Workstation performed: [id number]     XR shoulder 2+ views RIGHT    Result Date: 3/4/2024  Impression: Minimally displaced distal right clavicular fracture. The study was marked in EPIC for immediate notification. Workstation performed: [id number]     XR chest 1 view    Result Date: 3/4/2024  Impression: Acute mildly displaced distal right clavicle fracture. Large right and trace left pleural effusion. The study was marked in EPIC for immediate notification. Workstation performed: [id number]     CT head without contrast    Result Date: 3/4/2024  Impression: Multifocal parenchymal hemorrhage with surrounding edema though no significant mass effect or midline shift. Underlying lesions including metastasis or vascular malformations not excluded. I personally discussed this study with KISHORE CHRISTIANSON on 3/4/2024 2:35 PM. Workstation performed: [id number]     CT cervical spine without contrast    Result Date: 3/4/2024  Impression: 1.  No cervical spine fracture or traumatic malalignment. 2.  Large right pleural effusion partially  imaged. The study was marked in EPIC for immediate notification. Workstation performed: [id number]        Complications: None    Discharge Diagnosis: Intraparenchymal hematoma, right clavicle fracture, ESRD    Medical Problems       Resolved Problems  Date Reviewed: 3/5/2024   None         Condition at Discharge: stable         Discharge instructions/Information to patient and family:   See after visit summary for information provided to patient and family.      Provisions for Follow-Up Care:  See after visit summary for information related to follow-up care and any pertinent home health orders.      PCP: Rohit Parkinson MD    Disposition: See After Visit Summary for discharge disposition information.    Planned Readmission: No      Discharge Statement   I spent 30 minutes discharging the patient. This time was spent on the day of discharge. I had direct contact with the patient on the day of discharge. Additional documentation is required if more than 30 minutes were spent on discharge.     Discharge Medications:  See after visit summary for reconciled discharge medications provided to patient and family.

## 2024-03-07 NOTE — PROGRESS NOTES
[facility]  Progress Note  Name: Jeffery Bishop I  MRN: [id number]  Unit/Bed#: [facility] 618-01 I Date of Admission: 3/4/2024   Date of Service: 3/7/2024 I Hospital Day: 3    Assessment/Plan   ESRD (end stage renal disease) on dialysis (HCC)  Assessment & Plan  Primary palliative Dx    Patient has been on HD for 13 years.     Patient considering of hospice, states that after discharge she will follow-up with his palliative care team to make final decision    Lab Results   Component Value Date    EGFR 19 03/07/2024    EGFR 12 03/06/2024    EGFR 7 03/05/2024    CREATININE 3.07 (H) 03/07/2024    CREATININE 4.33 (H) 03/06/2024    CREATININE 6.93 (H) 03/05/2024       Palliative care by specialist  Assessment & Plan  With primary palliative Dx of ESRD and brain hematoma in setting of trauma    Patient is engaged with a palliative program in [place] (out of network. Wife can't recall name of agency). States that he has had a discussion with their hospice team approximately 3 weeks prior. He is considering of transition to comfort care and will decide with that team once discharged.    Anticipate palliative care to sign off on 3/8 given patient with clear goals and plan to follow-up with his palliative care team at discharge.    Goals of care, counseling/discussion  Assessment & Plan  Patient considering of comfort care, with plan to decide after discharge with existing palliative team    Code Status: Full - Level 1  Decisional apparatus:  Patient is competent on my exam today.  If competence is lost, patient's substitute decision maker would default to spouse, Mary, by [place] 169.  Advance Directive / Living Will / POLST:  none previously completed    On initial evaluation, patient endorsed desire to continue medical optimization without limits.  Intermittently he has had consideration of comfort care.    Closed nondisplaced fracture of right clavicle  Assessment & Plan  Likely with  history of chronic pain in addition to acute pain    - Continue current pain regimen of oral oxycodone 5 to 10 mg every 6 hours as needed and IV Dilaudid 0.5 mg every 3 hours as needed for breakthrough pain  - Continue current bowel regimen     Ambulatory dysfunction  Assessment & Plan  Right sided weakness as a result of prior CVA  Patient uses wheelchair/scooter primarily to get around.  S/p fall, head strike, and IPH (non-operative management at this time) with close monitoring.          Interval history:  No acute overnight events    As needed medication administration over 24 hours:  Oral oxycodone 10 mg x 2 doses  IV Dilaudid 0.5 mg x 2 doses    Patient in hemodialysis session during my encounter.  Patient is resting comfortably.  States that he has improved pain from prior.  States that he would like to consider comfort cares, but intends to make his decision once he is discharged and back with his home palliative care team.  Patient denies any further needs at this time.  He states that he is just tired and that he has been doing dialysis for a very long time.    MEDICATIONS / ALLERGIES:     all current active meds have been reviewed and current meds:   Current Facility-Administered Medications   Medication Dose Route Frequency    acetaminophen (TYLENOL) tablet 975 mg  975 mg Oral Q8H PRN    ammonium lactate (LAC-HYDRIN) 12 % lotion   Topical BID PRN    ascorbic acid (VITAMIN C) tablet 500 mg  500 mg Oral Daily    docusate sodium (COLACE) capsule 100 mg  100 mg Oral Daily PRN    gabapentin (NEURONTIN) capsule 300 mg  300 mg Oral Daily    heparin (porcine) subcutaneous injection 5,000 Units  5,000 Units Subcutaneous Q8H SRINI    HYDROmorphone (DILAUDID) injection 0.5 mg  0.5 mg Intravenous Q3H PRN    insulin lispro (HumALOG/ADMELOG) 100 units/mL subcutaneous injection 1-5 Units  1-5 Units Subcutaneous TID AC    lactulose (CHRONULAC) oral solution 10 g  10 g Oral Daily    levETIRAcetam (KEPPRA) tablet 500 mg   500 mg Oral BID    metoprolol (LOPRESSOR) injection 5 mg  5 mg Intravenous Q6H PRN    nicotine (NICODERM CQ) 7 mg/24hr TD 24 hr patch 1 patch  1 patch Transdermal Daily    oxyCODONE (ROXICODONE) immediate release tablet 10 mg  10 mg Oral Q6H PRN    oxyCODONE (ROXICODONE) IR tablet 5 mg  5 mg Oral Q6H PRN    pantoprazole (PROTONIX) EC tablet 40 mg  40 mg Oral Early Morning    polyethylene glycol (MIRALAX) packet 17 g  17 g Oral Daily    senna (SENOKOT) tablet 8.6 mg  1 tablet Oral HS    sevelamer (RENAGEL) tablet 1,600 mg  1,600 mg Oral TID With Meals       No Known Allergies    OBJECTIVE:    Physical Exam  Physical Exam  Vitals and nursing note reviewed.   Constitutional:       General: He is not in acute distress.     Appearance: He is well-developed. He is ill-appearing.   HENT:      Head: Normocephalic and atraumatic.   Eyes:      Conjunctiva/sclera: Conjunctivae normal.   Pulmonary:      Effort: Pulmonary effort is normal. No respiratory distress.   Abdominal:      General: There is no distension.      Palpations: Abdomen is soft.   Musculoskeletal:         General: No swelling.      Cervical back: Neck supple.   Skin:     General: Skin is warm and dry.      Capillary Refill: Capillary refill takes less than 2 seconds.   Neurological:      Mental Status: He is alert.   Psychiatric:         Mood and Affect: Mood normal.         Lab Results:   Results from last 7 days   Lab Units 03/07/24 0444 03/06/24  0821 03/05/24  0428   WBC Thousand/uL 3.80* 3.81* 5.82   HEMOGLOBIN g/dL 10.0* 8.7* 9.4*   HEMATOCRIT % 31.4* 26.8* 30.0*   PLATELETS Thousands/uL 151 137* 129*   NEUTROS PCT % 71 74 84*   MONOS PCT % 8 8 6   EOS PCT % 6 7* 3     Results from last 7 days   Lab Units 03/07/24  0444 03/06/24  0821 03/05/24  0428 03/04/24  2358 03/04/24  1343   POTASSIUM mmol/L 4.1 4.4 5.6*   < > 7.3*   CHLORIDE mmol/L 98 99 96   < > 96   CO2 mmol/L 27 29 27   < > 26   BUN mg/dL 18 28* 54*   < > 89*   CREATININE mg/dL 3.07* 4.33*  6.93*   < > 10.19*   CALCIUM mg/dL 8.7 8.4 8.7   < > 8.6   ALK PHOS U/L  --   --  80  --  69   ALT U/L  --   --  13  --  11   AST U/L  --   --  20  --  17    < > = values in this interval not displayed.       Imaging Studies: reviewed pertinent studies   EKG, Pathology, and Other Studies: reviewed pertinent studies    Counseling / Coordination of Care    Total floor / unit time spent today 35 minutes. Greater than 50% of total time was spent with the patient and / or family counseling and / or coordination of care. A description of the counseling / coordination of care: Evaluation of patient's clinical status, symptoms, treatment plan, goals of care.  Coordination of care with palliative care social work and nephrology team.

## 2024-03-07 NOTE — PLAN OF CARE
Problem: METABOLIC, FLUID AND ELECTROLYTES - ADULT  Goal: Electrolytes maintained within normal limits  Description: INTERVENTIONS:  - Monitor labs and assess patient for signs and symptoms of electrolyte imbalances  - Administer electrolyte replacement as ordered  - Monitor response to electrolyte replacements, including repeat lab results as appropriate  - Instruct patient on fluid and nutrition as appropriate  Outcome: Progressing  Goal: Fluid balance maintained  Description: INTERVENTIONS:  - Monitor labs   - Monitor I/O and WT  - Instruct patient on fluid and nutrition as appropriate  - Assess for signs & symptoms of volume excess or deficit  Outcome: Progressing  Goal: Glucose maintained within target range  Description: INTERVENTIONS:  - Monitor Blood Glucose as ordered  - Assess for signs and symptoms of hyperglycemia and hypoglycemia  - Administer ordered medications to maintain glucose within target range  - Assess nutritional intake and initiate nutrition service referral as needed  Outcome: Progressing     Problem: NEUROSENSORY - ADULT  Goal: Achieves stable or improved neurological status  Description: INTERVENTIONS  - Monitor and report changes in neurological status  - Monitor vital signs such as temperature, blood pressure, glucose, and any other labs ordered   - Initiate measures to prevent increased intracranial pressure  - Monitor for seizure activity and implement precautions if appropriate      Outcome: Progressing  Goal: Achieves maximal functionality and self care  Description: INTERVENTIONS  - Monitor swallowing and airway patency with patient fatigue and changes in neurological status  - Encourage and assist patient to increase activity and self care.   - Encourage visually impaired, hearing impaired and aphasic patients to use assistive/communication devices  Outcome: Progressing     Problem: RESPIRATORY - ADULT  Goal: Achieves optimal ventilation and oxygenation  Description:  INTERVENTIONS:  - Assess for changes in respiratory status  - Assess for changes in mentation and behavior  - Position to facilitate oxygenation and minimize respiratory effort  - Oxygen administered by appropriate delivery if ordered  - Initiate smoking cessation education as indicated  - Encourage broncho-pulmonary hygiene including cough, deep breathe, Incentive Spirometry  - Assess the need for suctioning and aspirate as needed  - Assess and instruct to report SOB or any respiratory difficulty  - Respiratory Therapy support as indicated  Outcome: Progressing     Problem: HEMATOLOGIC - ADULT  Goal: Maintains hematologic stability  Description: INTERVENTIONS  - Assess for signs and symptoms of bleeding or hemorrhage  - Monitor labs  - Administer supportive blood products/factors as ordered and appropriate  Outcome: Progressing     Problem: Potential for Falls  Goal: Patient will remain free of falls  Description: INTERVENTIONS:  - Educate patient/family on patient safety including physical limitations  - Instruct patient to call for assistance with activity   - Consult OT/PT to assist with strengthening/mobility   - Keep Call bell within reach  - Keep bed low and locked with side rails adjusted as appropriate  - Keep care items and personal belongings within reach  - Initiate and maintain comfort rounds  - Make Fall Risk Sign visible to staff  - Offer Toileting every 2 Hours, in advance of need  - Initiate/Maintain bed/chair alarm  - Obtain necessary fall risk management equipment:   - Apply yellow socks and bracelet for high fall risk patients  - Consider moving patient to room near nurses station  Outcome: Progressing     Problem: Prexisting or High Potential for Compromised Skin Integrity  Goal: Skin integrity is maintained or improved  Description: INTERVENTIONS:  - Identify patients at risk for skin breakdown  - Assess and monitor skin integrity  - Assess and monitor nutrition and hydration status  - Monitor  labs   - Assess for incontinence   - Turn and reposition patient  - Assist with mobility/ambulation  - Relieve pressure over bony prominences  - Avoid friction and shearing  - Provide appropriate hygiene as needed including keeping skin clean and dry  - Evaluate need for skin moisturizer/barrier cream  - Collaborate with interdisciplinary team   - Patient/family teaching  - Consider wound care consult   Outcome: Progressing

## 2024-03-07 NOTE — ASSESSMENT & PLAN NOTE
- will reeval from palliative team  -patient wants to continue with dialysis  - patient stating possible suicidal ideations, will consult psych

## 2024-03-07 NOTE — ASSESSMENT & PLAN NOTE
Patient considering of comfort care, with plan to decide after discharge with existing palliative team    Code Status: Full - Level 1  Decisional apparatus:  Patient is competent on my exam today.  If competence is lost, patient's substitute decision maker would default to spouse, Mary, by [place].  Advance Directive / Living Will / POLST:  none previously completed    On initial evaluation, patient endorsed desire to continue medical optimization without limits.  Intermittently he has had consideration of comfort care.

## 2024-03-07 NOTE — PROGRESS NOTES
"Got report on patient at 1500. Woke him up from a nap and checked blood sugar at 1618. Flat affect but I was able to created small talk to which he asked me \"Are you afraid to die?\". PCA informed me he was refusing dinner so I went to check on him to give his dinner time meds. Convinced him to drink a soda and he agreed to take his Keppra. He then asked me for a pair of scissors to just cut some paper, so I offered to do it for him but he wanted me to leave them there at the bedside. This raised a red flag so I asked him how he was feeling and why he wanted me to leave scissors with him and if he had any ideas to harm himself to which he denied and said he was going to try to take another nap. Received a call that the patients \"tooth fell out\", my student nurse went in to investigate to find him holding onto his HD cath trying to pull it out saying \"I want this out so I can bleed to death by 3am\". He then vented to me about how tired he is of going to dialysis and he wishes euthanasia was legal. I contacted trauma resident, Graham Martinez. He came to the bedside and felt that the patient was frustrated due to lack of pain medication being ordered. Patient wants fentanyl instead of oxycodone and dilaudid. One time dose ordered for now. We both agreed that restraints weren't necessary at this time, but decided on 1:1 coverage instead. Patient seemed to be in a better mood after I gave him some snacks, fentanyl was given, and he was able to vent to us a little bit about how he was feeling all day.   "

## 2024-03-07 NOTE — ASSESSMENT & PLAN NOTE
Likely with history of chronic pain in addition to acute pain    - Continue current pain regimen of oral oxycodone 5 to 10 mg every 6 hours as needed and IV Dilaudid 0.5 mg every 3 hours as needed for breakthrough pain  - Continue current bowel regimen

## 2024-03-19 DIAGNOSIS — R90.89 ABNORMAL BRAIN MRI: Primary | ICD-10-CM

## 2024-03-19 NOTE — QUICK NOTE
Was reached out by Chelsey Marin.  In regards to ordering CTA with CT scan.  I reiterated the last time I spoke with patient he would not want to continue dialysis or follow-up or repeat imaging.  It appears as though patient is agreeable now.  CTA head and neck ordered to further evaluate right vertebral artery questionable occlusion.  Will call office and help schedule appointment once CTA head and neck and CT head completed.

## 2024-03-20 ENCOUNTER — HOSPITAL ENCOUNTER (OUTPATIENT)
Facility: HOSPITAL | Age: 69
Setting detail: OBSERVATION
Discharge: HOME | End: 2024-03-21
Attending: EMERGENCY MEDICINE | Admitting: ANESTHESIOLOGY
Payer: MEDICARE

## 2024-03-20 ENCOUNTER — APPOINTMENT (OUTPATIENT)
Dept: DIALYSIS | Facility: HOSPITAL | Age: 69
End: 2024-03-20
Payer: MEDICARE

## 2024-03-20 DIAGNOSIS — Z51.5 COMFORT MEASURES ONLY STATUS: ICD-10-CM

## 2024-03-20 DIAGNOSIS — Z99.2 ESRD (END STAGE RENAL DISEASE) ON DIALYSIS (HCC): ICD-10-CM

## 2024-03-20 DIAGNOSIS — N18.6 ESRD (END STAGE RENAL DISEASE) ON DIALYSIS (HCC): ICD-10-CM

## 2024-03-20 DIAGNOSIS — E87.5 HYPERKALEMIA: Primary | ICD-10-CM

## 2024-03-20 DIAGNOSIS — R94.31 EKG ABNORMALITY: ICD-10-CM

## 2024-03-20 PROBLEM — E16.2 HYPOGLYCEMIA: Status: ACTIVE | Noted: 2024-03-20

## 2024-03-20 PROBLEM — E87.20 METABOLIC ACIDOSIS: Status: ACTIVE | Noted: 2024-03-20

## 2024-03-20 PROBLEM — E44.0 MODERATE PROTEIN-CALORIE MALNUTRITION (HCC): Status: ACTIVE | Noted: 2023-08-17

## 2024-03-20 LAB
ANION GAP SERPL CALCULATED.3IONS-SCNC: 14 MMOL/L (ref 4–13)
ANION GAP SERPL CALCULATED.3IONS-SCNC: 18 MMOL/L (ref 4–13)
BASOPHILS # BLD AUTO: 0.07 THOUSANDS/ÂΜL (ref 0–0.1)
BASOPHILS NFR BLD AUTO: 1 % (ref 0–1)
BUN SERPL-MCNC: 90 MG/DL (ref 5–25)
BUN SERPL-MCNC: 91 MG/DL (ref 5–25)
CALCIUM SERPL-MCNC: 8.4 MG/DL (ref 8.4–10.2)
CALCIUM SERPL-MCNC: 8.9 MG/DL (ref 8.4–10.2)
CHLORIDE SERPL-SCNC: 94 MMOL/L (ref 96–108)
CHLORIDE SERPL-SCNC: 94 MMOL/L (ref 96–108)
CK SERPL-CCNC: 30 U/L (ref 39–308)
CO2 SERPL-SCNC: 26 MMOL/L (ref 21–32)
CO2 SERPL-SCNC: 26 MMOL/L (ref 21–32)
CREAT SERPL-MCNC: 8.43 MG/DL (ref 0.6–1.3)
CREAT SERPL-MCNC: 8.9 MG/DL (ref 0.6–1.3)
EOSINOPHIL # BLD AUTO: 0.33 THOUSAND/ÂΜL (ref 0–0.61)
EOSINOPHIL NFR BLD AUTO: 4 % (ref 0–6)
ERYTHROCYTE [DISTWIDTH] IN BLOOD BY AUTOMATED COUNT: 14.4 % (ref 11.6–15.1)
GFR SERPL CREATININE-BSD FRML MDRD: 5 ML/MIN/1.73SQ M
GFR SERPL CREATININE-BSD FRML MDRD: 5 ML/MIN/1.73SQ M
GLUCOSE SERPL-MCNC: 30 MG/DL (ref 65–140)
GLUCOSE SERPL-MCNC: 32 MG/DL (ref 65–140)
GLUCOSE SERPL-MCNC: 55 MG/DL (ref 65–140)
GLUCOSE SERPL-MCNC: 70 MG/DL (ref 65–140)
GLUCOSE SERPL-MCNC: 79 MG/DL (ref 65–140)
GLUCOSE SERPL-MCNC: 81 MG/DL (ref 65–140)
HCT VFR BLD AUTO: 32.4 % (ref 36.5–49.3)
HGB BLD-MCNC: 10.2 G/DL (ref 12–17)
IMM GRANULOCYTES # BLD AUTO: 0.04 THOUSAND/UL (ref 0–0.2)
IMM GRANULOCYTES NFR BLD AUTO: 1 % (ref 0–2)
LYMPHOCYTES # BLD AUTO: 0.67 THOUSANDS/ÂΜL (ref 0.6–4.47)
LYMPHOCYTES NFR BLD AUTO: 9 % (ref 14–44)
MCH RBC QN AUTO: 32 PG (ref 26.8–34.3)
MCHC RBC AUTO-ENTMCNC: 31.5 G/DL (ref 31.4–37.4)
MCV RBC AUTO: 102 FL (ref 82–98)
MONOCYTES # BLD AUTO: 0.45 THOUSAND/ÂΜL (ref 0.17–1.22)
MONOCYTES NFR BLD AUTO: 6 % (ref 4–12)
NEUTROPHILS # BLD AUTO: 6.01 THOUSANDS/ÂΜL (ref 1.85–7.62)
NEUTS SEG NFR BLD AUTO: 79 % (ref 43–75)
NRBC BLD AUTO-RTO: 0 /100 WBCS
PLATELET # BLD AUTO: 185 THOUSANDS/UL (ref 149–390)
PMV BLD AUTO: 9 FL (ref 8.9–12.7)
POTASSIUM SERPL-SCNC: 5.8 MMOL/L (ref 3.5–5.3)
POTASSIUM SERPL-SCNC: 7.8 MMOL/L (ref 3.5–5.3)
RBC # BLD AUTO: 3.19 MILLION/UL (ref 3.88–5.62)
SODIUM SERPL-SCNC: 134 MMOL/L (ref 135–147)
SODIUM SERPL-SCNC: 138 MMOL/L (ref 135–147)
WBC # BLD AUTO: 7.57 THOUSAND/UL (ref 4.31–10.16)

## 2024-03-20 PROCEDURE — 80048 BASIC METABOLIC PNL TOTAL CA: CPT | Performed by: NURSE PRACTITIONER

## 2024-03-20 PROCEDURE — 80048 BASIC METABOLIC PNL TOTAL CA: CPT

## 2024-03-20 PROCEDURE — 83605 ASSAY OF LACTIC ACID: CPT | Performed by: NURSE PRACTITIONER

## 2024-03-20 PROCEDURE — 82948 REAGENT STRIP/BLOOD GLUCOSE: CPT

## 2024-03-20 PROCEDURE — 82550 ASSAY OF CK (CPK): CPT | Performed by: NURSE PRACTITIONER

## 2024-03-20 PROCEDURE — 99291 CRITICAL CARE FIRST HOUR: CPT

## 2024-03-20 PROCEDURE — 36415 COLL VENOUS BLD VENIPUNCTURE: CPT | Performed by: EMERGENCY MEDICINE

## 2024-03-20 PROCEDURE — 87081 CULTURE SCREEN ONLY: CPT | Performed by: ANESTHESIOLOGY

## 2024-03-20 PROCEDURE — 93005 ELECTROCARDIOGRAM TRACING: CPT

## 2024-03-20 PROCEDURE — 94640 AIRWAY INHALATION TREATMENT: CPT

## 2024-03-20 PROCEDURE — 80048 BASIC METABOLIC PNL TOTAL CA: CPT | Performed by: EMERGENCY MEDICINE

## 2024-03-20 PROCEDURE — 85025 COMPLETE CBC W/AUTO DIFF WBC: CPT | Performed by: EMERGENCY MEDICINE

## 2024-03-20 PROCEDURE — G0257 UNSCHED DIALYSIS ESRD PT HOS: HCPCS

## 2024-03-20 PROCEDURE — 99215 OFFICE O/P EST HI 40 MIN: CPT | Performed by: STUDENT IN AN ORGANIZED HEALTH CARE EDUCATION/TRAINING PROGRAM

## 2024-03-20 PROCEDURE — 99223 1ST HOSP IP/OBS HIGH 75: CPT | Performed by: ANESTHESIOLOGY

## 2024-03-20 RX ORDER — INSULIN LISPRO 100 [IU]/ML
1-6 INJECTION, SOLUTION INTRAVENOUS; SUBCUTANEOUS
Status: DISCONTINUED | OUTPATIENT
Start: 2024-03-20 | End: 2024-03-21 | Stop reason: HOSPADM

## 2024-03-20 RX ORDER — MIDODRINE HYDROCHLORIDE 5 MG/1
10 TABLET ORAL
Status: DISCONTINUED | OUTPATIENT
Start: 2024-03-20 | End: 2024-03-21 | Stop reason: HOSPADM

## 2024-03-20 RX ORDER — DEXTROSE MONOHYDRATE 25 G/50ML
25 INJECTION, SOLUTION INTRAVENOUS ONCE
Status: COMPLETED | OUTPATIENT
Start: 2024-03-20 | End: 2024-03-20

## 2024-03-20 RX ORDER — HEPARIN SODIUM 5000 [USP'U]/ML
5000 INJECTION, SOLUTION INTRAVENOUS; SUBCUTANEOUS EVERY 8 HOURS SCHEDULED
Status: DISCONTINUED | OUTPATIENT
Start: 2024-03-20 | End: 2024-03-20

## 2024-03-20 RX ORDER — DEXTROSE MONOHYDRATE 50 MG/ML
50 INJECTION, SOLUTION INTRAVENOUS CONTINUOUS
Status: DISCONTINUED | OUTPATIENT
Start: 2024-03-20 | End: 2024-03-21 | Stop reason: HOSPADM

## 2024-03-20 RX ORDER — DEXTROSE MONOHYDRATE 25 G/50ML
50 INJECTION, SOLUTION INTRAVENOUS ONCE
Status: COMPLETED | OUTPATIENT
Start: 2024-03-20 | End: 2024-03-20

## 2024-03-20 RX ORDER — HYDROXYZINE HYDROCHLORIDE 25 MG/1
25 TABLET, FILM COATED ORAL EVERY 6 HOURS PRN
Status: DISCONTINUED | OUTPATIENT
Start: 2024-03-20 | End: 2024-03-20

## 2024-03-20 RX ORDER — HYDROMORPHONE HCL/PF 1 MG/ML
1 SYRINGE (ML) INJECTION ONCE
Status: COMPLETED | OUTPATIENT
Start: 2024-03-20 | End: 2024-03-20

## 2024-03-20 RX ORDER — ATORVASTATIN CALCIUM 10 MG/1
20 TABLET, FILM COATED ORAL
Status: DISCONTINUED | OUTPATIENT
Start: 2024-03-20 | End: 2024-03-21 | Stop reason: HOSPADM

## 2024-03-20 RX ORDER — SODIUM POLYSTYRENE SULFONATE 4.1 MEQ/G
15 POWDER, FOR SUSPENSION ORAL; RECTAL ONCE
Status: COMPLETED | OUTPATIENT
Start: 2024-03-20 | End: 2024-03-20

## 2024-03-20 RX ORDER — INSULIN LISPRO 100 [IU]/ML
1-6 INJECTION, SOLUTION INTRAVENOUS; SUBCUTANEOUS
Status: DISCONTINUED | OUTPATIENT
Start: 2024-03-20 | End: 2024-03-20

## 2024-03-20 RX ORDER — PANTOPRAZOLE SODIUM 40 MG/1
40 TABLET, DELAYED RELEASE ORAL
Status: DISCONTINUED | OUTPATIENT
Start: 2024-03-21 | End: 2024-03-21 | Stop reason: HOSPADM

## 2024-03-20 RX ORDER — CHLORHEXIDINE GLUCONATE ORAL RINSE 1.2 MG/ML
15 SOLUTION DENTAL EVERY 12 HOURS SCHEDULED
Status: DISCONTINUED | OUTPATIENT
Start: 2024-03-20 | End: 2024-03-21 | Stop reason: HOSPADM

## 2024-03-20 RX ORDER — CALCIUM GLUCONATE 20 MG/ML
1 INJECTION, SOLUTION INTRAVENOUS ONCE
Status: COMPLETED | OUTPATIENT
Start: 2024-03-20 | End: 2024-03-20

## 2024-03-20 RX ORDER — ASCORBIC ACID 500 MG
500 TABLET ORAL DAILY
Status: DISCONTINUED | OUTPATIENT
Start: 2024-03-21 | End: 2024-03-21 | Stop reason: HOSPADM

## 2024-03-20 RX ORDER — HEPARIN SODIUM 5000 [USP'U]/ML
5000 INJECTION, SOLUTION INTRAVENOUS; SUBCUTANEOUS EVERY 8 HOURS SCHEDULED
Status: DISCONTINUED | OUTPATIENT
Start: 2024-03-20 | End: 2024-03-21 | Stop reason: HOSPADM

## 2024-03-20 RX ORDER — DEXTROSE MONOHYDRATE 25 G/50ML
INJECTION, SOLUTION INTRAVENOUS
Status: COMPLETED
Start: 2024-03-20 | End: 2024-03-20

## 2024-03-20 RX ORDER — GABAPENTIN 100 MG/1
100 CAPSULE ORAL
Status: DISCONTINUED | OUTPATIENT
Start: 2024-03-20 | End: 2024-03-21 | Stop reason: HOSPADM

## 2024-03-20 RX ORDER — ALBUTEROL SULFATE 2.5 MG/3ML
2.5 SOLUTION RESPIRATORY (INHALATION) ONCE
Status: COMPLETED | OUTPATIENT
Start: 2024-03-20 | End: 2024-03-20

## 2024-03-20 RX ORDER — LEVETIRACETAM 500 MG/1
500 TABLET ORAL 2 TIMES DAILY
Status: DISCONTINUED | OUTPATIENT
Start: 2024-03-20 | End: 2024-03-21 | Stop reason: HOSPADM

## 2024-03-20 RX ORDER — LACTULOSE 10 G/15ML
10 SOLUTION ORAL 3 TIMES DAILY
Status: DISCONTINUED | OUTPATIENT
Start: 2024-03-20 | End: 2024-03-20

## 2024-03-20 RX ADMIN — HYDROMORPHONE HYDROCHLORIDE 1 MG: 1 INJECTION, SOLUTION INTRAMUSCULAR; INTRAVENOUS; SUBCUTANEOUS at 16:25

## 2024-03-20 RX ADMIN — ATORVASTATIN CALCIUM 20 MG: 10 TABLET, FILM COATED ORAL at 21:07

## 2024-03-20 RX ADMIN — DEXTROSE 50 ML/HR: 5 SOLUTION INTRAVENOUS at 22:26

## 2024-03-20 RX ADMIN — HEPARIN SODIUM 5000 UNITS: 5000 INJECTION, SOLUTION INTRAVENOUS; SUBCUTANEOUS at 17:49

## 2024-03-20 RX ADMIN — ALBUTEROL SULFATE 2.5 MG: 2.5 SOLUTION RESPIRATORY (INHALATION) at 16:34

## 2024-03-20 RX ADMIN — DEXTROSE MONOHYDRATE 25 ML: 25 INJECTION, SOLUTION INTRAVENOUS at 18:38

## 2024-03-20 RX ADMIN — SODIUM BICARBONATE 50 MEQ: 84 INJECTION INTRAVENOUS at 17:05

## 2024-03-20 RX ADMIN — LACTULOSE 10 G: 20 SOLUTION ORAL at 21:06

## 2024-03-20 RX ADMIN — INSULIN HUMAN 10 UNITS: 100 INJECTION, SOLUTION PARENTERAL at 16:28

## 2024-03-20 RX ADMIN — DEXTROSE MONOHYDRATE 25 ML: 25 INJECTION, SOLUTION INTRAVENOUS at 17:58

## 2024-03-20 RX ADMIN — CALCIUM GLUCONATE 1 G: 20 INJECTION, SOLUTION INTRAVENOUS at 16:32

## 2024-03-20 RX ADMIN — LEVETIRACETAM 500 MG: 500 TABLET, FILM COATED ORAL at 19:40

## 2024-03-20 RX ADMIN — MIDODRINE HYDROCHLORIDE 10 MG: 5 TABLET ORAL at 19:40

## 2024-03-20 RX ADMIN — CHLORHEXIDINE GLUCONATE 15 ML: 1.2 SOLUTION ORAL at 21:06

## 2024-03-20 RX ADMIN — DEXTROSE MONOHYDRATE 50 ML: 25 INJECTION, SOLUTION INTRAVENOUS at 18:03

## 2024-03-20 RX ADMIN — SODIUM POLYSTYRENE SULFONATE 15 G: 1 POWDER ORAL; RECTAL at 17:08

## 2024-03-20 RX ADMIN — DEXTROSE MONOHYDRATE 25 ML: 25 INJECTION, SOLUTION INTRAVENOUS at 16:30

## 2024-03-20 RX ADMIN — GABAPENTIN 100 MG: 100 CAPSULE ORAL at 21:07

## 2024-03-20 RX ADMIN — HEPARIN SODIUM 5000 UNITS: 5000 INJECTION, SOLUTION INTRAVENOUS; SUBCUTANEOUS at 22:26

## 2024-03-20 NOTE — PLAN OF CARE
Post-Dialysis RN Treatment Note    Blood Pressure:  Pre 177/82 mm/Hg  Post 162/73 mmHg   EDW  61.5 kg    Weight:  Pre 64.7 kg   Post 62.6 kg   Mode of weight measurement: Bed Scale   Volume Removed  2,000 ml    Treatment duration 120 minutes    NS given  No    Treatment shortened? No   Medications given during Rx Midodrine 10mg PO given by the primary RN   Estimated Kt/V  Not Applicable   Access type: Permacath/TDC   Access Issues: Yes, describe: lines were reversed d/t frequent AP alarms.     Report called to primary nurse   Yes, via [employer] to Katerin SIEGEL RN      Started patient on hemodialysis treatment with UF goal of 1.5-2L net as tolerated x 2 hours x 2K bath then 1K bath at the last hour of tx per HD order.      Problem: METABOLIC, FLUID AND ELECTROLYTES - ADULT  Goal: Electrolytes maintained within normal limits  Description: INTERVENTIONS:  - Monitor labs and assess patient for signs and symptoms of electrolyte imbalances  - Administer electrolyte replacement as ordered  - Monitor response to electrolyte replacements, including repeat lab results as appropriate  - Instruct patient on fluid and nutrition as appropriate  Outcome: Progressing  Goal: Fluid balance maintained  Description: INTERVENTIONS:  - Monitor labs   - Monitor I/O and WT  - Instruct patient on fluid and nutrition as appropriate  - Assess for signs & symptoms of volume excess or deficit  Outcome: Progressing

## 2024-03-20 NOTE — CONSULTS
Consultation - Nephrology   Jeffery Bishop 68 y.o. male MRN: [id number]  Unit/Bed#: ED 10 Encounter: [id number]    ASSESSMENT/PLAN:  ESRD on maintenance HD TTS@[facility]:  -last treatment 3/16/2024 with missed treatment yesterday  -EDW: 61.5 kg  -Significant hyperkalemia upon presentation  -pln for urgent 2 hr HD now  -next treatment 3/21/2024.  Will plan to continue regular Tuesday, Thursday, Saturday schedule during hospitalization.  -d/w Dr. Reyes    Severe hyperkalemia:  -In the setting of missed hemodialysis treatment  -K+ 7.8 upon presentation  -Temporizing with medical management with insulin, D50, bicarb, albuterol, calcium gluconate and Kayexalate  -Plan for urgent 2 hr HD now.  Will plan to 2K bath for first hour and 1K bath last hour  -Will manage with HD  -Low potassium diet  -Continue to monitor    Access: R IJV PermCath  -site clear  -hx left brachiobasilic AVG 3/21/2023, chronically thrombosed  -continue to use catheter for hemodialysis access    Chronic hypotension/volume status:  -BP currently hypertensive, likely volume mediated  -volume status clinically euvolemic  -maintain goal BP <140/90  -home medications: Midodrine 10 mg 3 times daily  -Hold midodrine for now  -continue UF with dialysis as BP tolerates  -continue to monitor    Hyponatremia:  -Due to ESRD and inability to excrete free water  -Mild.  Serum sodium 134  -Will manage with HD  -Fluid restriction  -Continue to monitor    Anemia in CKD:  -Hbg 10.2, at goal.  Goal Hgb >10  -On Mircera 50 mcg every 4 weeks and Venofer 50 mg IV weekly as outpatient  -continue to monitor  -transfuse for Hgb <7.0  -Management per primary team    Bone mineral disease of renal origin:  -Secondary hyperparathyroidism: Continue cholecalciferol  -Renal diet, low potassium diet  -continue to monitor phosphorus, PTH, calcium, magnesium as outpatient    DM II:  -stable  -HgbA1C 4.8  -continue to optimize glycemic control   -management per primary  team    Recent ICH:  -Hx fall from scooter c/b ICH  -Management per primary team    HISTORY OF PRESENT ILLNESS:  Requesting Physician: Yaw Padron MD  Reason for Consult: ESRD on HD    Jeffery Bishop is a 68 y.o. year old male with ESRD on HD TTS via [facility] @ [facility], DM2, HTN, HLD, CAD, s/p PCI/stent , CVA, recent ICH after fall who was admitted to [facility] after presenting with vomiting and abnormal outpatient labs.  Patient found to have potassium 7.2 on outpatient labs and referred to the ED.  Potassium 7.8 upon presentation.  Last HD 3/16/2024 and with missed HD yesterday.  Patient recently hospitalized 3/4/2024 - 3/7/2024 after fall with severe hyperkalemia in the setting of missed HD.  A renal consultation is requested today for assistance in the management of ESRD. Jeffery Bishop is a known ESRD patient who undergoes maintenance hemodialysis at [facility] on Tuesday, Thursday, Saturday.  Patient answering questions intermittently.  Unable to obtain full history    PAST MEDICAL HISTORY:  Past Medical History:   Diagnosis Date    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    With Cerebral Infarction:  Last Assessed:  October 20, 2016    Chronic kidney disease 2012    on dialysis     COVID 05/2022    COVID-19 04/2022 4/2022-while in [place]    Diabetes mellitus (HCC)     Dialysis patient ([facility])     T/TH/Sat    Difficulty walking     GERD (gastroesophageal reflux disease)     Hyperlipidemia     Hypertension     Labyrinthitis 09/10/2011    Myocardial infarction ([facility]) 2014    x3 others were in 2009 & 2010    Sleep disturbances 09/20/2010    Stroke ([facility]) 2007    x1, RLE deficit- uses walker    Type 2 diabetes, uncontrolled, with renal manifestation 05/03/2017       PAST SURGICAL HISTORY:  Past Surgical History:   Procedure Laterality Date    AV FISTULA PLACEMENT      AV FISTULA REPAIR Left 6/3/2023    Procedure: LEFT UPPER EXTREMITY A-V GRAFT EXPLANT, LEFT BRACHIAL ANGIOPATCH,  APPLICATION OF  VAC;   "Surgeon: Neyda Rider MD;  Location: [facility];  Service: Vascular    CARDIAC CATHETERIZATION Left 02/03/2023    Procedure: Cardiac Left Heart Cath;  Surgeon: Tiffani Gregorio MD;  Location: [facility] C[facility];  Service: Cardiology    CARDIAC CATHETERIZATION N/A 02/08/2023    Procedure: Cardiac catheterization with complex PCI with Dr. Patricio, patient is a renal dialysis patient;  Surgeon: Marcos Ramirez MD;  Location:  CARDIAC CATH LAB;  Service: Cardiology    CARDIAC SURGERY  2010    stents x3    COLONOSCOPY N/A 12/12/2016    Procedure: COLONOSCOPY;  Surgeon: Radha Moss MD;  Location: [facility] GI LAB;  Service:     COLONOSCOPY N/A 04/03/2017    Procedure:  COLONOSCOPY;  Surgeon: Radha Moss MD;  Location: [facility] GI LAB;  Service:     HARDWARE REMOVAL Right 04/04/2022    Procedure: REMOVAL HARDWARE HIP;  Surgeon: Moises Kent MD;  Location: [facility];  Service: Orthopedics    IR AV FISTULA/GRAFT DECLOT  04/29/2021    IR AV FISTULA/GRAFT DECLOT  03/25/2022    IR AV FISTULA/GRAFT DECLOT  09/21/2022    IR AV FISTULAGRAM/GRAFTOGRAM  03/28/2022    IR AV FISTULAGRAM/GRAFTOGRAM  07/28/2022    IR TUNNELED CENTRAL LINE REMOVAL  12/07/2021    IR TUNNELED DIALYSIS CATHETER CHECK/CHANGE/REPOSITION/ANGIOPLASTY  2/1/2024    IR TUNNELED DIALYSIS CATHETER PLACEMENT  05/24/2021    PORTACATH PLACEMENT      per pt \"port in chest\"    AK ARTERIOVENOUS ANASTOMOSIS OPEN DIRECT Left 07/22/2021    Procedure: CREATION FISTULA ARTERIOVENOUS (AV);  Surgeon: Kareem Nye MD;  Location: [facility];  Service: Vascular    AK ARTERIOVENOUS ANASTOMOSIS OPEN DIRECT Left 3/21/2023    Procedure: left brachiobasilic fistula,  AVG Graft;  Surgeon: Catalino Hook MD;  [facility];  Service: Vascular    AK ARTERIOVENOUS ANASTOMOSIS OPEN DIRECT Left 5/19/2023    Procedure: CREATION of LEFT FISTULA ARTERIOVENOUS (AV) GRAFT;  Surgeon: Catalino Hook MD;  [facility];  Service: Vascular    AK " HEMIARTHROPLASTY HIP PARTIAL Right 04/04/2022    Procedure: HEMIARTHROPLASTY HIP (BIPOLAR);  Surgeon: Moises Kent MD;  Location: [facility];  Service: Orthopedics       ALLERGIES:  No Known Allergies    SOCIAL HISTORY:  Social History     Substance and Sexual Activity   Alcohol Use Yes    Alcohol/week: 1.0 standard drink of alcohol    Types: 1 Shots of liquor per week    Comment: 1 shot daily     Social History     Substance and Sexual Activity   Drug Use Yes    Types: Marijuana    Comment: gummies     Social History     Tobacco Use   Smoking Status Every Day    Current packs/day: 1.00    Average packs/day: 1 pack/day for 30.0 years (30.0 ttl pk-yrs)    Types: Cigarettes   Smokeless Tobacco Never   Tobacco Comments    Currently not smoking - in facility       FAMILY HISTORY:  Family History   Problem Relation Age of Onset    Leukemia Mother     Crohn's disease Father     Heart disease Father     Transient ischemic attack Brother        MEDICATIONS:    Current Facility-Administered Medications:     calcium gluconate 1 g in sodium chloride 0.9% 50 mL (premix), 1 g, Intravenous, Once, Sachin Carmona MD, Last Rate: 100 mL/hr at 03/20/24 1632, 1 g at 03/20/24 1632    chlorhexidine (PERIDEX) 0.12 % oral rinse 15 mL, 15 mL, Mouth/Throat, Q12H SRINI, TESSY Moore    heparin (porcine) subcutaneous injection 5,000 Units, 5,000 Units, Subcutaneous, Q8H SRINI, TESSY Moore    insulin lispro (HumALOG/ADMELOG) 100 units/mL subcutaneous injection 1-6 Units, 1-6 Units, Subcutaneous, HS, TESSY Moore    insulin lispro (HumALOG/ADMELOG) 100 units/mL subcutaneous injection 1-6 Units, 1-6 Units, Subcutaneous, TID AC **AND** Fingerstick Glucose (POCT), , , 4x Daily AC and at bedtime, TESSY Moore    sodium bicarbonate 8.4 % injection 50 mEq, 50 mEq, Intravenous, Once, TESSY Moore    sodium polystyrene (KAYEXALATE) powder 15 g, 15 g, Oral, Once,  Sachin Carmona MD    Current Outpatient Medications:     acetaminophen (TYLENOL) 325 mg tablet, Take 3 tablets (975 mg total) by mouth every 8 (eight) hours as needed for mild pain, Disp: , Rfl: 0    ammonium lactate (LAC-HYDRIN) 12 % lotion, Apply topically 2 (two) times a day as needed for dry skin, Disp: 396 g, Rfl: 0    ascorbic acid (VITAMIN C) 500 MG tablet, Take 500 mg by mouth daily, Disp: , Rfl:     atorvastatin (LIPITOR) 20 mg tablet, Take 20 mg by mouth daily at bedtime, Disp: , Rfl:     Cholecalciferol 50 MCG (2000 UT) CAPS, Take by mouth daily, Disp: , Rfl:     [place] Choice Comfort EZ 33G X 4 MM MISC, , Disp: , Rfl:     gabapentin (NEURONTIN) 100 mg capsule, Take 1 capsule (100 mg total) by mouth daily at bedtime, Disp: 30 capsule, Rfl: 3    hydrOXYzine HCL (ATARAX) 25 mg tablet, , Disp: , Rfl:     lactulose (CHRONULAC) 10 g/15 mL solution, TAKE 15 ML BY MOUTH THREE TIMES DAILY, Disp: , Rfl:     levETIRAcetam (KEPPRA) 500 mg tablet, Take 1 tablet (500 mg total) by mouth 2 (two) times a day Do not start before March 11, 2024., Disp: 8 tablet, Rfl: 0    midodrine (PROAMATINE) 10 MG tablet, Take 1 tablet (10 mg total) by mouth 3 (three) times a day before meals (Patient taking differently: Take 10 mg by mouth 3 (three) times a day before meals Takes BID), Disp: 90 tablet, Rfl: 0    omeprazole (PriLOSEC) 40 MG capsule, Take 40 mg by mouth daily Take before a meal, Disp: , Rfl:     ondansetron (ZOFRAN-ODT) 8 mg disintegrating tablet, Take 1 tablet by mouth, Disp: , Rfl:     oxyCODONE (ROXICODONE) 5 immediate release tablet, Take 0.5 tablets (2.5 mg total) by mouth every 8 (eight) hours as needed for severe pain for up to 10 doses Max Daily Amount: 7.5 mg, Disp: 5 tablet, Rfl: 0    pantoprazole (PROTONIX) 40 mg tablet, Take 1 tablet (40 mg total) by mouth daily, Disp: 90 tablet, Rfl: 0    vitamin B-12 (VITAMIN B-12) 1,000 mcg tablet, Take 1,000 mcg by mouth daily, Disp: , Rfl:     REVIEW OF SYSTEMS:  A  complete 10 point review of systems was performed and found to be negative unless otherwise noted below or in the HPI.  General: No fevers, chills.   Cardiovascular: No chest pain, shortness of breath, palpitations, leg edema.  Respiratory: No cough, sputum production, shortness of breath.  Gastrointestinal: No nausea, +vomiting, no abdominal pain, no diarrhea.  Genitourinary: No hematuria.    PHYSICAL EXAM:  Current Weight:    First Weight:    Vitals:    03/20/24 1429 03/20/24 1620   BP: 166/84 166/74   BP Location: Left arm    Pulse: 68 64   Resp: 20 19   Temp: 98.8 °F (37.1 °C)    TempSrc: Tympanic    SpO2: 99% 96%     No intake or output data in the 24 hours ending 03/20/24 1658  General:  Awake, alert, appears comfortable and in no acute distress.  Nontoxic.  Skin:  No rash, warm, good skin turgor   Eyes:  PERRL, EOMI, sclerae nonicteric.  no periorbital edema   ENT:  Moist mucous membranes  Neck:  Trachea midline, symmetric.  No JVD.  No carotid bruits.  Chest:  Clear to auscultation bilaterally without wheezes, crackles or rhonchi  CVS:  Regular rate and rhythm without murmur, gallop or rub.  S1 and S2 identified and normal.  No S3, S4.   Abdomen:  Soft, nontender, nondistended without masses.  Normal bowel sounds x 4 quadrants.  No bruit.  Extremities:  Warm, pink, motor and sensory intact and well perfused.  No cyanosis, pallor.  No BLE edema.  Neuro:  Awake, alert, oriented x3.  Grossly intact  Psych:  Appropriate affect.  Mentating appropriately.  Normal mental status exam  Access: R IJV PermCath in place dressing intact.  + Old thrombosed LUE AVG       Invasive Devices: R IJV PermCath     Lab Results:   Results from last 7 days   Lab Units 03/20/24  1451   WBC Thousand/uL 7.57   HEMOGLOBIN g/dL 10.2*   HEMATOCRIT % 32.4*   PLATELETS Thousands/uL 185   SODIUM mmol/L 134*   POTASSIUM mmol/L 7.8*   CHLORIDE mmol/L 94*   CO2 mmol/L 26   BUN mg/dL 91*   CREATININE mg/dL 8.43*   CALCIUM mg/dL 8.4     Lab  Results   Component Value Date    CALCIUM 8.4 03/20/2024    PHOS 8.8 (H) 03/05/2024       EMR, including Care Everywhere, [facility],  [facility] and outpatient notes reviewed.  I have personally reviewed the blood work as stated above and in my note..  I have personally reviewed primary medical service and previous nephrology note.

## 2024-03-20 NOTE — ED PROVIDER NOTES
History  Chief Complaint   Patient presents with    Abnormal Lab     Potassium over 7 drawn couple days ago. Pt with no c/o     Patient is been on dialysis for 14 years.  His last dialysis was Saturday 5 days ago.  He missed Tuesday as he was vomiting.  Patient states he had blood work done on Saturday and was told last night his results were abnormal he should come to the ED for evaluation.  They reported a potassium of 7.2.  Patient denies any chest pain or abdominal pain        Prior to Admission Medications   Prescriptions Last Dose Informant Patient Reported? Taking?   Cholecalciferol 50 MCG (2000 UT) CAPS  Self, Outside Facility (Specify) Yes No   Sig: Take by mouth daily   [place] Choice Comfort EZ 33G X 4 MM MISC  Self, Outside Facility (Specify) Yes No   Patient not taking: Reported on 11/13/2023   acetaminophen (TYLENOL) 325 mg tablet  Outside Facility (Specify), Self No No   Sig: Take 3 tablets (975 mg total) by mouth every 8 (eight) hours as needed for mild pain   ammonium lactate (LAC-HYDRIN) 12 % lotion  Self, Outside Facility (Specify) No No   Sig: Apply topically 2 (two) times a day as needed for dry skin   ascorbic acid (VITAMIN C) 500 MG tablet   Yes No   Sig: Take 500 mg by mouth daily   atorvastatin (LIPITOR) 20 mg tablet  Self, Outside Facility (Specify) Yes No   Sig: Take 20 mg by mouth daily at bedtime   gabapentin (NEURONTIN) 100 mg capsule  Self, Outside Facility (Specify) No No   Sig: Take 1 capsule (100 mg total) by mouth daily at bedtime   hydrOXYzine HCL (ATARAX) 25 mg tablet  Self, Outside Facility (Specify) Yes No   lactulose (CHRONULAC) 10 g/15 mL solution   Yes No   Sig: TAKE 15 ML BY MOUTH THREE TIMES DAILY   levETIRAcetam (KEPPRA) 500 mg tablet   No No   Sig: Take 1 tablet (500 mg total) by mouth 2 (two) times a day Do not start before March 11, 2024.   midodrine (PROAMATINE) 10 MG tablet   No No   Sig: Take 1 tablet (10 mg total) by mouth 3 (three) times a day before meals    Patient taking differently: Take 10 mg by mouth 3 (three) times a day before meals Takes BID   omeprazole (PriLOSEC) 40 MG capsule   Yes No   Sig: Take 40 mg by mouth daily Take before a meal   ondansetron (ZOFRAN-ODT) 8 mg disintegrating tablet  Self, Outside Facility (Specify) Yes No   Sig: Take 1 tablet by mouth   oxyCODONE (ROXICODONE) 5 immediate release tablet  Self, Outside Facility (Specify) No No   Sig: Take 0.5 tablets (2.5 mg total) by mouth every 8 (eight) hours as needed for severe pain for up to 10 doses Max Daily Amount: 7.5 mg   pantoprazole (PROTONIX) 40 mg tablet   No No   Sig: Take 1 tablet (40 mg total) by mouth daily   vitamin B-12 (VITAMIN B-12) 1,000 mcg tablet  Outside Facility (Specify), Self Yes No   Sig: Take 1,000 mcg by mouth daily      Facility-Administered Medications: None       Past Medical History:   Diagnosis Date    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    With Cerebral Infarction:  Last Assessed:  October 20, 2016    Chronic kidney disease 2012    on dialysis     COVID 05/2022    COVID-19 04/2022 4/2022-while in [place]    Diabetes mellitus ([facility])     Dialysis patient ([facility])     T/TH/Sat    Difficulty walking     GERD (gastroesophageal reflux disease)     Hyperlipidemia     Hypertension     Labyrinthitis 09/10/2011    Myocardial infarction ([facility]) 2014    x3 others were in 2009 & 2010    Sleep disturbances 09/20/2010    Stroke ([facility]) 2007    x1, RLE deficit- uses walker    Type 2 diabetes, uncontrolled, with renal manifestation 05/03/2017       Past Surgical History:   Procedure Laterality Date    AV FISTULA PLACEMENT      AV FISTULA REPAIR Left 6/3/2023    Procedure: LEFT UPPER EXTREMITY A-V GRAFT EXPLANT, LEFT BRACHIAL ANGIOPATCH,  APPLICATION OF  VAC;  Surgeon: Neyda Rider MD;  Location:  MAIN OR;  Service: Vascular    CARDIAC CATHETERIZATION Left 02/03/2023    Procedure: Cardiac Left Heart Cath;  Surgeon: Tiffani Gregorio MD;  Location: [facility] CARDIAC CATH LAB;  Service: Cardiology     "CARDIAC CATHETERIZATION N/A 02/08/2023    Procedure: Cardiac catheterization with complex PCI with Dr. Patricio, patient is a renal dialysis patient;  Surgeon: Marcos Ramirez MD;  Location:  CARDIAC CATH LAB;  Service: Cardiology    CARDIAC SURGERY  2010    stents x3    COLONOSCOPY N/A 12/12/2016    Procedure: COLONOSCOPY;  Surgeon: Radha Moss MD;  Location: [facility] GI LAB;  Service:     COLONOSCOPY N/A 04/03/2017    Procedure:  COLONOSCOPY;  Surgeon: Radha Moss MD;  Location: [facility] GI LAB;  Service:     HARDWARE REMOVAL Right 04/04/2022    Procedure: REMOVAL HARDWARE HIP;  Surgeon: Moises Kent MD;  Location: [facility];  Service: Orthopedics    IR AV FISTULA/GRAFT DECLOT  04/29/2021    IR AV FISTULA/GRAFT DECLOT  03/25/2022    IR AV FISTULA/GRAFT DECLOT  09/21/2022    IR AV FISTULAGRAM/GRAFTOGRAM  03/28/2022    IR AV FISTULAGRAM/GRAFTOGRAM  07/28/2022    IR TUNNELED CENTRAL LINE REMOVAL  12/07/2021    IR TUNNELED DIALYSIS CATHETER CHECK/CHANGE/REPOSITION/ANGIOPLASTY  2/1/2024    IR TUNNELED DIALYSIS CATHETER PLACEMENT  05/24/2021    PORTACATH PLACEMENT      per pt \"port in chest\"    AL ARTERIOVENOUS ANASTOMOSIS OPEN DIRECT Left 07/22/2021    Procedure: CREATION FISTULA ARTERIOVENOUS (AV);  Surgeon: Kareem Nye MD;  Location: [facility] MAIN [facility];  Service: Vascular    AL ARTERIOVENOUS ANASTOMOSIS OPEN DIRECT Left 3/21/2023    Procedure: left brachiobasilic fistula,  AVG Graft;  Surgeon: Catalino Hook MD;  Location:  MAIN OR;  Service: Vascular    AL ARTERIOVENOUS ANASTOMOSIS OPEN DIRECT Left 5/19/2023    Procedure: CREATION of LEFT FISTULA ARTERIOVENOUS (AV) GRAFT;  Surgeon: Catalino Hook MD;  Location:  MAIN OR;  Service: Vascular    AL HEMIARTHROPLASTY HIP PARTIAL Right 04/04/2022    Procedure: HEMIARTHROPLASTY HIP (BIPOLAR);  Surgeon: Moises Kent MD;  Location:  MAIN OR;  Service: Orthopedics       Family History   Problem Relation Age of Onset    Leukemia Mother     " Crohn's disease Father     Heart disease Father     Transient ischemic attack Brother      I have reviewed and agree with the history as documented.    E-Cigarette/Vaping    E-Cigarette Use Never User      E-Cigarette/Vaping Substances    Nicotine No     THC No     CBD No     Flavoring No     Other No     Unknown No      Social History     Tobacco Use    Smoking status: Every Day     Current packs/day: 1.00     Average packs/day: 1 pack/day for 30.0 years (30.0 ttl pk-yrs)     Types: Cigarettes    Smokeless tobacco: Never    Tobacco comments:     Currently not smoking - in facility   Vaping Use    Vaping status: Never Used   Substance Use Topics    Alcohol use: Yes     Alcohol/week: 1.0 standard drink of alcohol     Types: 1 Shots of liquor per week     Comment: 1 shot daily    Drug use: Yes     Types: Marijuana     Comment: gummies       Review of Systems   Constitutional:  Negative for chills and fever.   HENT:  Negative for congestion.    Eyes:  Negative for visual disturbance.   Respiratory:  Negative for cough, shortness of breath and wheezing.    Cardiovascular:  Negative for chest pain.   Gastrointestinal:  Positive for vomiting. Negative for abdominal pain.   Genitourinary:  Negative for dysuria.        Patient is anuric   Musculoskeletal:  Negative for back pain.   Skin:  Negative for rash.   Neurological:  Negative for weakness and headaches.   Hematological:  Bruises/bleeds easily.   Psychiatric/Behavioral:  Negative for confusion.    All other systems reviewed and are negative.      Physical Exam  Physical Exam  Vitals and nursing note reviewed.   Constitutional:       Appearance: Normal appearance.   HENT:      Head: Normocephalic.      Right Ear: External ear normal.      Left Ear: External ear normal.      Nose: Nose normal.      Mouth/Throat:      Mouth: Mucous membranes are moist.   Eyes:      Conjunctiva/sclera: Conjunctivae normal.   Cardiovascular:      Rate and Rhythm: Normal rate and regular  rhythm.      Pulses: Normal pulses.   Pulmonary:      Effort: Pulmonary effort is normal.      Breath sounds: Normal breath sounds.   Abdominal:      Palpations: Abdomen is soft.      Tenderness: There is no abdominal tenderness.   Musculoskeletal:         General: Normal range of motion.      Cervical back: Normal range of motion.      Right lower leg: No edema.      Left lower leg: No edema.   Skin:     General: Skin is warm.      Capillary Refill: Capillary refill takes less than 2 seconds.      Findings: Bruising present.   Neurological:      General: No focal deficit present.      Mental Status: He is alert.   Psychiatric:         Mood and Affect: Mood normal.         Behavior: Behavior normal.         Vital Signs  ED Triage Vitals [03/20/24 1429]   Temperature Pulse Respirations Blood Pressure SpO2   98.8 °F (37.1 °C) 68 20 166/84 99 %      Temp Source Heart Rate Source Patient Position - Orthostatic VS BP Location FiO2 (%)   Tympanic Monitor Sitting Left arm --      Pain Score       5           Vitals:    03/20/24 1429 03/20/24 1620   BP: 166/84 166/74   Pulse: 68 64   Patient Position - Orthostatic VS: Sitting          Visual Acuity      ED Medications  Medications   albuterol inhalation solution 2.5 mg (has no administration in time range)   calcium gluconate 1 g in sodium chloride 0.9% 50 mL (premix) (has no administration in time range)   insulin regular (HumuLIN R,NovoLIN R) injection 10 Units (has no administration in time range)   dextrose 50 % IV solution 25 mL (has no administration in time range)   sodium polystyrene (KAYEXALATE) powder 15 g (has no administration in time range)   HYDROmorphone (DILAUDID) injection 1 mg (has no administration in time range)       Diagnostic Studies  Results Reviewed       Procedure Component Value Units Date/Time    Basic metabolic panel [[id number]]  (Abnormal) Collected: 03/20/24 1451    Lab Status: Final result Specimen: Blood from Arm, Right Updated: 03/20/24  1601     Sodium 134 mmol/L      Potassium 7.8 mmol/L      Chloride 94 mmol/L      CO2 26 mmol/L      ANION GAP 14 mmol/L      BUN 91 mg/dL      Creatinine 8.43 mg/dL      Glucose 79 mg/dL      Calcium 8.4 mg/dL      eGFR 5 ml/min/1.73sq m     Narrative:      National Kidney Disease Foundation guidelines for Chronic Kidney Disease (CKD):     Stage 1 with normal or high GFR (GFR > 90 mL/min/1.73 square meters)    Stage 2 Mild CKD (GFR = 60-89 mL/min/1.73 square meters)    Stage 3A Moderate CKD (GFR = 45-59 mL/min/1.73 square meters)    Stage 3B Moderate CKD (GFR = 30-44 mL/min/1.73 square meters)    Stage 4 Severe CKD (GFR = 15-29 mL/min/1.73 square meters)    Stage 5 End Stage CKD (GFR <15 mL/min/1.73 square meters)  Note: GFR calculation is accurate only with a steady state creatinine    CBC and differential [[id number]]  (Abnormal) Collected: 03/20/24 1451    Lab Status: Final result Specimen: Blood from Arm, Right Updated: 03/20/24 1458     WBC 7.57 Thousand/uL      RBC 3.19 Million/uL      Hemoglobin 10.2 g/dL      Hematocrit 32.4 %       fL      MCH 32.0 pg      MCHC 31.5 g/dL      RDW 14.4 %      MPV 9.0 fL      Platelets 185 Thousands/uL      nRBC 0 /100 WBCs      Neutrophils Relative 79 %      Immature Grans % 1 %      Lymphocytes Relative 9 %      Monocytes Relative 6 %      Eosinophils Relative 4 %      Basophils Relative 1 %      Neutrophils Absolute 6.01 Thousands/µL      Absolute Immature Grans 0.04 Thousand/uL      Absolute Lymphocytes 0.67 Thousands/µL      Absolute Monocytes 0.45 Thousand/µL      Eosinophils Absolute 0.33 Thousand/µL      Basophils Absolute 0.07 Thousands/µL                    No orders to display              Procedures  ECG 12 Lead Documentation Only    Date/Time: 3/20/2024 2:44 PM    Performed by: Sachin Carmona MD  Authorized by: Sachin Carmona MD    Indications / Diagnosis:  Hyperkalemia  ECG reviewed by me, the ED Provider: yes    Patient location:  ED  Interpretation:      Interpretation: abnormal    Rate:     ECG rate:  65    ECG rate assessment: normal    Rhythm:     Rhythm: sinus rhythm    Ectopy:     Ectopy: none    QRS:     QRS axis:  Left    QRS intervals:  Normal  Conduction:     Conduction: abnormal      Abnormal conduction: complete RBBB, LAFB and bifascicular block    ST segments:     ST segments:  Normal  T waves:     T waves: peaked      Peaked:  V4 and V5  CriticalCare Time    Date/Time: 3/20/2024 4:21 PM    Performed by: Sachin Carmona MD  Authorized by: Sachin Carmona MD    Critical care provider statement:     Critical care time (minutes):  35    Critical care was necessary to treat or prevent imminent or life-threatening deterioration of the following conditions:  Renal failure and metabolic crisis    Critical care was time spent personally by me on the following activities:  Obtaining history from patient or surrogate, development of treatment plan with patient or surrogate, discussions with consultants, evaluation of patient's response to treatment, examination of patient, ordering and review of laboratory studies, re-evaluation of patient's condition and review of old charts           ED Course  ED Course as of 03/20/24 1623   Wed Mar 20, 2024   1520 Basic metabolic panel   1606 Potassium(!!): 7.8                                             Medical Decision Making  Abnormal potassium was drawn 5 days ago.  Patient has missed dialysis since that time.  Will recheck electrolytes, consider emergency dialysis.    Results reviewed with patient and family.  Nephrology consulted for emergency hemodialysis.  Medications for hyperkalemia ordered and patient will be admitted to ICU    Amount and/or Complexity of Data Reviewed  Labs: ordered. Decision-making details documented in ED Course.    Risk  OTC drugs.  Prescription drug management.             Disposition  Final diagnoses:   ESRD (end stage renal disease) on dialysis (HCC)   Hyperkalemia   EKG abnormality      Time reflects when diagnosis was documented in both MDM as applicable and the Disposition within this note       Time User Action Codes Description Comment    3/20/2024  4:15 PM Sachin Carmona Add [N18.6,  Z99.2] ESRD (end stage renal disease) on dialysis (HCC)     3/20/2024  4:15 PM Sachin Carmona Add [E87.5] Hyperkalemia     3/20/2024  4:22 PM Sachin Carmona Modify [N18.6,  Z99.2] ESRD (end stage renal disease) on dialysis (HCC)     3/20/2024  4:22 PM Sachin Carmona Modify [E87.5] Hyperkalemia     3/20/2024  4:22 PM Sachin Carmona Add [R94.31] EKG abnormality           ED Disposition       ED Disposition   Admit    Condition   Stable    Date/Time   Wed Mar 20, 2024  4:22 PM    Comment   Case was discussed with critical care and the patient's admission status was agreed to be Admission Status: observation status to the service of Dr. Padron.               Follow-up Information    None         Patient's Medications   Discharge Prescriptions    No medications on file       No discharge procedures on file.    PDMP Review         Value Time User    PDMP Reviewed  Yes 5/24/2023  5:40 PM Mary Stubbs MD            ED Provider  Electronically Signed by             Sachin Carmona MD  03/20/24 7788

## 2024-03-21 ENCOUNTER — APPOINTMENT (OUTPATIENT)
Dept: RADIOLOGY | Facility: HOSPITAL | Age: 69
End: 2024-03-21
Payer: MEDICARE

## 2024-03-21 ENCOUNTER — APPOINTMENT (OUTPATIENT)
Dept: DIALYSIS | Facility: HOSPITAL | Age: 69
End: 2024-03-21
Payer: MEDICARE

## 2024-03-21 VITALS
RESPIRATION RATE: 18 BRPM | BODY MASS INDEX: 19.82 KG/M2 | WEIGHT: 138.45 LBS | TEMPERATURE: 98 F | SYSTOLIC BLOOD PRESSURE: 148 MMHG | HEART RATE: 85 BPM | DIASTOLIC BLOOD PRESSURE: 63 MMHG | HEIGHT: 70 IN | OXYGEN SATURATION: 97 %

## 2024-03-21 PROBLEM — J96.00 ACUTE RESPIRATORY FAILURE (HCC): Status: ACTIVE | Noted: 2024-03-21

## 2024-03-21 LAB
ALBUMIN SERPL BCP-MCNC: 3.3 G/DL (ref 3.5–5)
ALP SERPL-CCNC: 112 U/L (ref 34–104)
ALT SERPL W P-5'-P-CCNC: 7 U/L (ref 7–52)
ANION GAP SERPL CALCULATED.3IONS-SCNC: 12 MMOL/L (ref 4–13)
ANION GAP SERPL CALCULATED.3IONS-SCNC: 16 MMOL/L (ref 4–13)
ARTERIAL PATENCY WRIST A: YES
AST SERPL W P-5'-P-CCNC: 13 U/L (ref 13–39)
BASE EXCESS BLDA CALC-SCNC: -0.3 MMOL/L
BASOPHILS # BLD AUTO: 0.02 THOUSANDS/ÂΜL (ref 0–0.1)
BASOPHILS NFR BLD AUTO: 0 % (ref 0–1)
BILIRUB SERPL-MCNC: 0.49 MG/DL (ref 0.2–1)
BODY TEMPERATURE: 97.8 DEGREES FEHRENHEIT
BUN SERPL-MCNC: 58 MG/DL (ref 5–25)
BUN SERPL-MCNC: 60 MG/DL (ref 5–25)
CA-I BLD-SCNC: 0.82 MMOL/L (ref 1.12–1.32)
CALCIUM ALBUM COR SERPL-MCNC: 9 MG/DL (ref 8.3–10.1)
CALCIUM SERPL-MCNC: 8.4 MG/DL (ref 8.4–10.2)
CALCIUM SERPL-MCNC: 8.4 MG/DL (ref 8.4–10.2)
CHLORIDE SERPL-SCNC: 93 MMOL/L (ref 96–108)
CHLORIDE SERPL-SCNC: 95 MMOL/L (ref 96–108)
CO2 SERPL-SCNC: 26 MMOL/L (ref 21–32)
CO2 SERPL-SCNC: 27 MMOL/L (ref 21–32)
CREAT SERPL-MCNC: 6.31 MG/DL (ref 0.6–1.3)
CREAT SERPL-MCNC: 6.68 MG/DL (ref 0.6–1.3)
EOSINOPHIL # BLD AUTO: 0.07 THOUSAND/ÂΜL (ref 0–0.61)
EOSINOPHIL NFR BLD AUTO: 1 % (ref 0–6)
ERYTHROCYTE [DISTWIDTH] IN BLOOD BY AUTOMATED COUNT: 14.4 % (ref 11.6–15.1)
FLUAV RNA RESP QL NAA+PROBE: NEGATIVE
FLUBV RNA RESP QL NAA+PROBE: NEGATIVE
GFR SERPL CREATININE-BSD FRML MDRD: 7 ML/MIN/1.73SQ M
GFR SERPL CREATININE-BSD FRML MDRD: 8 ML/MIN/1.73SQ M
GLUCOSE P FAST SERPL-MCNC: 83 MG/DL (ref 65–99)
GLUCOSE P FAST SERPL-MCNC: 86 MG/DL (ref 65–99)
GLUCOSE SERPL-MCNC: 108 MG/DL (ref 65–140)
GLUCOSE SERPL-MCNC: 150 MG/DL (ref 65–140)
GLUCOSE SERPL-MCNC: 28 MG/DL (ref 65–140)
GLUCOSE SERPL-MCNC: 83 MG/DL (ref 65–140)
GLUCOSE SERPL-MCNC: 86 MG/DL (ref 65–140)
GLUCOSE SERPL-MCNC: 92 MG/DL (ref 65–140)
GLUCOSE SERPL-MCNC: 97 MG/DL (ref 65–140)
HCO3 BLDA-SCNC: 24.6 MMOL/L (ref 22–28)
HCT VFR BLD AUTO: 30.3 % (ref 36.5–49.3)
HGB BLD-MCNC: 9.5 G/DL (ref 12–17)
IMM GRANULOCYTES # BLD AUTO: 0.03 THOUSAND/UL (ref 0–0.2)
IMM GRANULOCYTES NFR BLD AUTO: 0 % (ref 0–2)
LACTATE SERPL-SCNC: 0.8 MMOL/L (ref 0.5–2)
LYMPHOCYTES # BLD AUTO: 0.25 THOUSANDS/ÂΜL (ref 0.6–4.47)
LYMPHOCYTES NFR BLD AUTO: 4 % (ref 14–44)
MACROCYTES BLD QL AUTO: PRESENT
MAGNESIUM SERPL-MCNC: 1.9 MG/DL (ref 1.9–2.7)
MCH RBC QN AUTO: 32.2 PG (ref 26.8–34.3)
MCHC RBC AUTO-ENTMCNC: 31.4 G/DL (ref 31.4–37.4)
MCV RBC AUTO: 103 FL (ref 82–98)
MONOCYTES # BLD AUTO: 0.32 THOUSAND/ÂΜL (ref 0.17–1.22)
MONOCYTES NFR BLD AUTO: 5 % (ref 4–12)
NASAL CANNULA: 10
NEUTROPHILS # BLD AUTO: 6.23 THOUSANDS/ÂΜL (ref 1.85–7.62)
NEUTS SEG NFR BLD AUTO: 90 % (ref 43–75)
NON VENT TYPE- NRB: 100
NRBC BLD AUTO-RTO: 0 /100 WBCS
O2 CT BLDA-SCNC: 13.2 ML/DL (ref 16–23)
OXYHGB MFR BLDA: 85.2 % (ref 94–97)
PCO2 BLDA: 41.2 MM HG (ref 36–44)
PCO2 TEMP ADJ BLDA: 40.5 MM HG (ref 36–44)
PH BLD: 7.4 [PH] (ref 7.35–7.45)
PH BLDA: 7.39 [PH] (ref 7.35–7.45)
PHOSPHATE SERPL-MCNC: 8.5 MG/DL (ref 2.3–4.1)
PLATELET # BLD AUTO: 125 THOUSANDS/UL (ref 149–390)
PLATELET BLD QL SMEAR: ABNORMAL
PMV BLD AUTO: 9.4 FL (ref 8.9–12.7)
PO2 BLD: 56.5 MM HG (ref 75–129)
PO2 BLDA: 58.1 MM HG (ref 75–129)
POTASSIUM SERPL-SCNC: 5.7 MMOL/L (ref 3.5–5.3)
POTASSIUM SERPL-SCNC: 6 MMOL/L (ref 3.5–5.3)
PROT SERPL-MCNC: 6.3 G/DL (ref 6.4–8.4)
RBC # BLD AUTO: 2.95 MILLION/UL (ref 3.88–5.62)
RBC MORPH BLD: PRESENT
RSV RNA RESP QL NAA+PROBE: NEGATIVE
SARS-COV-2 RNA RESP QL NAA+PROBE: NEGATIVE
SODIUM SERPL-SCNC: 134 MMOL/L (ref 135–147)
SODIUM SERPL-SCNC: 135 MMOL/L (ref 135–147)
SPECIMEN SOURCE: ABNORMAL
WBC # BLD AUTO: 6.92 THOUSAND/UL (ref 4.31–10.16)

## 2024-03-21 PROCEDURE — 99232 SBSQ HOSP IP/OBS MODERATE 35: CPT | Performed by: STUDENT IN AN ORGANIZED HEALTH CARE EDUCATION/TRAINING PROGRAM

## 2024-03-21 PROCEDURE — 94760 N-INVAS EAR/PLS OXIMETRY 1: CPT

## 2024-03-21 PROCEDURE — 94668 MNPJ CHEST WALL SBSQ: CPT

## 2024-03-21 PROCEDURE — 99233 SBSQ HOSP IP/OBS HIGH 50: CPT | Performed by: ANESTHESIOLOGY

## 2024-03-21 PROCEDURE — 71045 X-RAY EXAM CHEST 1 VIEW: CPT

## 2024-03-21 PROCEDURE — 82805 BLOOD GASES W/O2 SATURATION: CPT | Performed by: NURSE PRACTITIONER

## 2024-03-21 PROCEDURE — 0241U HB NFCT DS VIR RESP RNA 4 TRGT: CPT | Performed by: NURSE PRACTITIONER

## 2024-03-21 PROCEDURE — 83735 ASSAY OF MAGNESIUM: CPT

## 2024-03-21 PROCEDURE — 36600 WITHDRAWAL OF ARTERIAL BLOOD: CPT

## 2024-03-21 PROCEDURE — 94669 MECHANICAL CHEST WALL OSCILL: CPT

## 2024-03-21 PROCEDURE — 94640 AIRWAY INHALATION TREATMENT: CPT

## 2024-03-21 PROCEDURE — 82330 ASSAY OF CALCIUM: CPT

## 2024-03-21 PROCEDURE — 84100 ASSAY OF PHOSPHORUS: CPT

## 2024-03-21 PROCEDURE — G0257 UNSCHED DIALYSIS ESRD PT HOS: HCPCS

## 2024-03-21 PROCEDURE — 94002 VENT MGMT INPAT INIT DAY: CPT

## 2024-03-21 PROCEDURE — 85025 COMPLETE CBC W/AUTO DIFF WBC: CPT

## 2024-03-21 PROCEDURE — 94664 DEMO&/EVAL PT USE INHALER: CPT

## 2024-03-21 PROCEDURE — 82948 REAGENT STRIP/BLOOD GLUCOSE: CPT

## 2024-03-21 PROCEDURE — 80053 COMPREHEN METABOLIC PANEL: CPT | Performed by: NURSE PRACTITIONER

## 2024-03-21 RX ORDER — CALCIUM GLUCONATE 20 MG/ML
2 INJECTION, SOLUTION INTRAVENOUS ONCE
Status: DISCONTINUED | OUTPATIENT
Start: 2024-03-21 | End: 2024-03-21 | Stop reason: HOSPADM

## 2024-03-21 RX ORDER — CALCIUM GLUCONATE 20 MG/ML
1 INJECTION, SOLUTION INTRAVENOUS ONCE
Status: COMPLETED | OUTPATIENT
Start: 2024-03-21 | End: 2024-03-21

## 2024-03-21 RX ORDER — DEXTROSE MONOHYDRATE 25 G/50ML
25 INJECTION, SOLUTION INTRAVENOUS ONCE
Status: DISCONTINUED | OUTPATIENT
Start: 2024-03-21 | End: 2024-03-21

## 2024-03-21 RX ORDER — CALCIUM GLUCONATE 20 MG/ML
2 INJECTION, SOLUTION INTRAVENOUS ONCE
Status: COMPLETED | OUTPATIENT
Start: 2024-03-21 | End: 2024-03-21

## 2024-03-21 RX ORDER — INSULIN LISPRO 100 [IU]/ML
1-5 INJECTION, SOLUTION INTRAVENOUS; SUBCUTANEOUS EVERY 6 HOURS SCHEDULED
Status: CANCELLED | OUTPATIENT
Start: 2024-03-21

## 2024-03-21 RX ORDER — SODIUM CHLORIDE FOR INHALATION 3 %
4 VIAL, NEBULIZER (ML) INHALATION
Status: DISCONTINUED | OUTPATIENT
Start: 2024-03-21 | End: 2024-03-21 | Stop reason: HOSPADM

## 2024-03-21 RX ORDER — IPRATROPIUM BROMIDE AND ALBUTEROL SULFATE 2.5; .5 MG/3ML; MG/3ML
3 SOLUTION RESPIRATORY (INHALATION)
Status: DISCONTINUED | OUTPATIENT
Start: 2024-03-21 | End: 2024-03-21 | Stop reason: HOSPADM

## 2024-03-21 RX ORDER — DEXTROSE MONOHYDRATE 25 G/50ML
50 INJECTION, SOLUTION INTRAVENOUS ONCE
Status: COMPLETED | OUTPATIENT
Start: 2024-03-21 | End: 2024-03-21

## 2024-03-21 RX ADMIN — SODIUM CHLORIDE SOLN NEBU 3% 4 ML: 3 NEBU SOLN at 03:40

## 2024-03-21 RX ADMIN — CALCIUM GLUCONATE 1 G: 20 INJECTION, SOLUTION INTRAVENOUS at 00:31

## 2024-03-21 RX ADMIN — DEXTROSE MONOHYDRATE 50 ML: 25 INJECTION, SOLUTION INTRAVENOUS at 02:46

## 2024-03-21 RX ADMIN — IPRATROPIUM BROMIDE AND ALBUTEROL SULFATE 3 ML: 2.5; .5 SOLUTION RESPIRATORY (INHALATION) at 07:03

## 2024-03-21 RX ADMIN — IPRATROPIUM BROMIDE AND ALBUTEROL SULFATE 3 ML: 2.5; .5 SOLUTION RESPIRATORY (INHALATION) at 03:40

## 2024-03-21 RX ADMIN — HEPARIN SODIUM 5000 UNITS: 5000 INJECTION, SOLUTION INTRAVENOUS; SUBCUTANEOUS at 05:35

## 2024-03-21 RX ADMIN — CALCIUM GLUCONATE 2 G: 20 INJECTION, SOLUTION INTRAVENOUS at 11:01

## 2024-03-21 RX ADMIN — DEXTROSE MONOHYDRATE 50 ML: 25 INJECTION, SOLUTION INTRAVENOUS at 00:39

## 2024-03-21 RX ADMIN — SODIUM CHLORIDE SOLN NEBU 3% 4 ML: 3 NEBU SOLN at 07:03

## 2024-03-21 RX ADMIN — INSULIN HUMAN 10 UNITS: 100 INJECTION, SOLUTION PARENTERAL at 00:39

## 2024-03-21 NOTE — CASE MANAGEMENT
Case Management Assessment & Discharge Planning Note    Patient name Jeffery Bishop  Location ICU 04/ICU 04 MRN [id number]  : 1955 Date 3/21/2024       Current Admission Date: 3/20/2024  Current Admission Diagnosis:Hyperkalemia   Patient Active Problem List    Diagnosis Date Noted    Acute respiratory failure (HCC) 2024    Hypoglycemia 2024    Hyperkalemia 2024    Metabolic acidosis 2024    Intraparenchymal hematoma of brain due to trauma ([facility]) 2024    Closed nondisplaced fracture of right clavicle 2024    Palliative care by specialist 2024    Goals of care, counseling/discussion 2024    Vomiting without nausea 2024    Unintentional weight loss 2024    Diarrhea 2024    Elevated LFTs 2024    Abnormal MRI 2024    Constipation 10/04/2023    Leukopenia 10/04/2023    Pleural effusion, bilateral 10/04/2023    History of CVA (cerebrovascular accident) 2023    Generalized weakness 2023    Open wound 2023    Chronic pain 2023    Nausea & vomiting 2023    Anxiety disorder, unspecified 2023    Secondary hyperparathyroidism of renal origin ([facility]) 2023    Moderate protein-calorie malnutrition ([facility]) 2023    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 06/15/2023    Ambulatory dysfunction 2023    Infection of dialysis AV graft 2023    Hypotension 2023    S/P angioplasty with stent 2023    Chronic deep vein thrombosis (DVT) of internal jugular vein ([facility]) 2022    End-stage renal disease  2022    Chronic kidney disease 2022    Right intertrochanteric femur fracture 2022    Thrombocytopenia  2022    Arteriovenous fistula ([facility]) 2021    AV fistula thrombosis, initial encounter ([facility]) 2021    Encounter for extracorporeal dialysis ([facility]) 2019    Intestinal malabsorption 2017    Pernicious anemia 2017     Irritable bowel syndrome 06/08/2017    Elevated serum alkaline phosphatase level 05/03/2017    Anemia of chronic disease 02/23/2017    Coronary artery disease  10/20/2016    Hypertension 10/20/2016    Dependence on renal dialysis (HCC) 03/08/2016    Vitamin D deficiency, unspecified 02/12/2013    Mixed hyperlipidemia 10/04/2012    ESRD (end stage renal disease) on dialysis (HCC) 09/04/2012    Nicotine dependence 09/04/2012    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       Advance Directives  Does patient currently have a Health Care decision maker?: Yes, please see Health Care Proxy section  Primary Contact: Mary Bishop    Obs Notice Signed: 03/21/24 (Met with milton to explain Medicare Observation Status Notice.  Daughter signed notice and copy given. Copy also placed in scan bin for chart.)    Readmission Root Cause  30 Day Readmission: No    Patient Information  Admitted from:: Home  Mental Status: Alert  During Assessment patient was accompanied by: Daughter  Assessment information provided by:: Patient, Daughter  Primary Caregiver: Family  Caregiver's Name:: Mary Bishop  Caregiver's Relationship to Patient:: Family Member (wife)  Caregiver's Telephone Number:: [phone]  Support Systems: Spouse/significant other, Son, Daughter  County of Residence: [facility]  What city do you live in?: [place]  Home entry access options. Select all  that apply.: Ramp  Type of Current Residence: 2 story home  Upon entering residence, is there a bedroom on the main floor (no further steps)?: Yes  Upon entering residence, is there a bathroom on the main floor (no further steps)?: Yes  Living Arrangements: Lives w/ Spouse/significant other    Activities of Daily Living Prior to Admission  Functional Status: Assistance  Completes ADLs independently?: No  Level of ADL dependence: Assistance  Ambulates independently?: No  Level of ambulatory dependence: Assistance  Does patient use assisted devices?: Yes  Assisted Devices (DME) used: Hospital Bed, Wheelchair  Does patient currently own DME?: Yes  What DME does the patient currently own?: Hospital Bed, Wheelchair, Walker  Does patient have a history of Outpatient Therapy (PT/OT)?: No  Does the patient have a history of Short-Term Rehab?: Yes  Does patient have a history of HHC?: Yes  Does patient currently have HHC?: Yes    Current Home Health Care  Type of Current Home Care Services: Nurse visit, Home PT  [facility]  Current Home Health Follow-Up Provider:: PCP    Patient Information Continued  Income Source: Pension/senior care  Does patient have prescription coverage?: Yes  Does patient receive dialysis treatments?: Yes ([facility])    Means of Transportation  Means of Transport to Appts:: Family transport      Social Determinants of Health (SDOH)      Flowsheet Row Most Recent Value   Housing Stability    In the last 12 months, was there a time when you were not able to pay the mortgage or rent on time? Pt Declined   In the last 12 months, how many places have you lived? 1   In the last 12 months, was there a time when you did not have a steady place to sleep or slept in a shelter (including now)? Pt Declined   Transportation Needs    In the past 12 months, has lack of transportation kept you from medical appointments or from getting medications? Pt Declined   In the  past 12 months, has lack of transportation kept you from meetings, work, or from getting things needed for daily living? Pt Declined   Food Insecurity    Within the past 12 months, you worried that your food would run out before you got the money to buy more. Pt Declined   Within the past 12 months, the food you bought just didn't last and you didn't have money to get more. Pt Declined   Utilities    In the past 12 months has the electric, gas, oil, or water company threatened to shut off services in your home? Pt Declined            DISCHARGE DETAILS:    Discharge planning discussed with:: Patient and daughter, Elvia Vyas  Freedom of Choice: Yes  Comments - Freedom of Choice: SW met with briefly with pt and daughter to assess needs and discuss plans.  Pt and family are requesting to return home with hospice care today.  Pt's wife, daughter and son will be caring for pt with assistance of agency.  Daughter was not sure of agency name however had phone number of hospice representative she has been talking with.  Pt has been receiving home care services through [facility] who also has hospice care.  SW offered to call number to determine agency in order to make formal referral and discuss plans.  Per daughter pt has hospital bed and wheelchair at home.  Currently does not have oxygen so SW will request from hospice.  Daughter and pt are planning on transporting pt home by car as soon as plans are in place. SW will follow to assist with planning.  CM contacted family/caregiver?: Yes  Were Treatment Team discharge recommendations reviewed with patient/caregiver?: Yes  Did patient/caregiver verbalize understanding of patient care needs?: Yes    Contacts  Patient Contacts: Elvia Vyas  Relationship to Patient:: Family (daughter)  Contact Method: In Person  Reason/Outcome: Discharge Planning    Requested Home Health Care         Is the patient interested in HHC at discharge?: No    DME Referral  Provided  Referral made for DME?: No    Other Referral/Resources/Interventions Provided:  Interventions: Hospice  Referral Comments: SW placed call to number provided by daughter ([phone]).  CHERIE spoke with Merlyn Blackmon, Hospice manager with [facility].  Merlyn said she had spoken to pt's daughter and is aware of pt's plan for hospice care.  CHERIE discussed pt's desire to return home today and recommendation for oxygen.  Merlyn said she will work on arranging nurse to evaluate pt at home later today.  Formal referral placed in [facility].  CHERIE discussed plan with ICU care team.    Treatment Team Recommendation: Home, Hospice  Discharge Destination Plan:: Home, Hospice  Transport at Discharge : Family

## 2024-03-21 NOTE — ACP (ADVANCE CARE PLANNING)
Code status discussed with patient's wife and decision maker via the phone. She reports that he recently filled out paperwork to make himself a DNR at his dialysis facility. He does not have a formal living will. When discussing whether he would want intubation if necessary, she initially consented to trial of intubation. After our conversion, I discussed code status with Jeffery and he elected to be DNR/DNI. I then called Mary back and reviewed Jeffery's wishes and she agrees with changing his code status to level 3 per his request. She then tells me that Jeffery has been evaluated by hospice given his overall poor quality of life. He has been hesitant to move forward with hospice but it has been something that they have been discussing lately. All questions answered. Code status changed to DNR/DNI and nursing team made aware.

## 2024-03-21 NOTE — ASSESSMENT & PLAN NOTE
Patient reported nausea with vomiting and diarrhea over the weekend which is now resolved  Abdomen is soft, nontender  No current complaints  Per chart review, extensive history of nausea and vomiting, previously evaluated by GI   Encourage PO intake as tolerated   If further episodes of nausea/vomiting, will check STAT CT head given recent IPH   Patient discharged, pursuing home hospice
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
## 2024-03-21 NOTE — ASSESSMENT & PLAN NOTE
3/21 patient developed worsening hypoxia, escalated from 2L  NC to NRB after saturations decreased to 79%  ABG on NRB 7.40/40/58/24.8/0.3  STAT CXR - pulmonary edema with bilateral pleural effusions   Negative for COVID/flu/RSV    Plan:  Continue HFNC  Hypertonic saline nebs with duonebs  Vest therapy   Aggressive pulmonary hygiene   Patient discharged as he wishes to pursue home hospice

## 2024-03-21 NOTE — ASSESSMENT & PLAN NOTE
Hypoglycemia on arrival with BG as low as 30  Likely in the setting of poor PO intake and recent vomiting  S/p D50 x 3, will initiate D5 gtt for now and encourage PO intake as tolerated   Continue frequent BG monitoring

## 2024-03-21 NOTE — ASSESSMENT & PLAN NOTE
Metabolic acidosis likely in the setting of renal failure, BUN 90  LA negative  Serum bicarb and AG improved on evening check   Trend serum bicarb, AG  Close I&Os
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
## 2024-03-21 NOTE — CASE MANAGEMENT
Case Management Discharge Planning Note    Patient name Jeffery Bishop  Location ICU 04/ICU 04 MRN [id number]  : 1955 Date 3/21/2024       Current Admission Date: 3/20/2024  Current Admission Diagnosis:Hyperkalemia   Patient Active Problem List    Diagnosis Date Noted    Acute respiratory failure (HCC) 2024    Hypoglycemia 2024    Hyperkalemia 2024    Metabolic acidosis 2024    Intraparenchymal hematoma of brain due to trauma ([facility]) 2024    Closed nondisplaced fracture of right clavicle 2024    Palliative care by specialist 2024    Goals of care, counseling/discussion 2024    Vomiting without nausea 2024    Unintentional weight loss 2024    Diarrhea 2024    Elevated LFTs 2024    Abnormal MRI 2024    Constipation 10/04/2023    Leukopenia 10/04/2023    Pleural effusion, bilateral 10/04/2023    History of CVA (cerebrovascular accident) 2023    Generalized weakness 2023    Open wound 2023    Chronic pain 2023    Nausea & vomiting 2023    Anxiety disorder, unspecified 2023    Secondary hyperparathyroidism of renal origin ([facility]) 2023    Moderate protein-calorie malnutrition ([facility]) 2023    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 06/15/2023    Ambulatory dysfunction 2023    Infection of dialysis AV graft 2023    Hypotension 2023    S/P angioplasty with stent 2023    Chronic deep vein thrombosis (DVT) of internal jugular vein ([facility]) 2022    End-stage renal disease  2022    Chronic kidney disease 2022    Right intertrochanteric femur fracture 2022    Thrombocytopenia  2022    Arteriovenous fistula ([facility]) 2021    AV fistula thrombosis, initial encounter ([facility]) 2021    Encounter for extracorporeal dialysis ([facility]) 2019    Intestinal malabsorption 2017    Pernicious anemia 2017    Irritable bowel  syndrome 06/08/2017    Elevated serum alkaline phosphatase level 05/03/2017    Anemia of chronic disease 02/23/2017    Coronary artery disease  10/20/2016    Hypertension 10/20/2016    Dependence on renal dialysis (HCC) 03/08/2016    Vitamin D deficiency, unspecified 02/12/2013    Mixed hyperlipidemia 10/04/2012    ESRD (end stage renal disease) on dialysis (HCC) 09/04/2012    Nicotine dependence 09/04/2012    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    Type 2 diabetes mellitus 09/04/2012      LOS (days): 0  Geometric Mean LOS (GMLOS) (days):   Days to GMLOS:     OBJECTIVE:     Current admission status: Observation   Preferred Pharmacy:   [id number] - [address] [address]  Phone: [phone] Fax: [phone]    Primary Care Provider: Rohit Parkinson MD    Primary Insurance: MEDICARE  Secondary Insurance: AARP    DISCHARGE DETAILS:    Contacts  Patient Contacts: Elvia Vyas  Relationship to Patient:: Family (daughter)  Contact Method: In Person  Reason/Outcome: Discharge Planning    Other Referral/Resources/Interventions Provided:  Interventions: Hospice  Referral Comments: CHERIE placed call to [facility] to confirm acceptance and plan. Per [facility]lyn she has made arrangement for on-call nurse to see pt later this afternoon. She said nurse will contact pt's wife with visit time. Merlyn said she has also ordered oxygen for delivery today.  Per Merlyn pt should have a comfort pack at home already and daughter confirmed that there is a sealed box at home. Merlyn said nurse will review all medication in box with family when she visits today.  CHERIE relayed all information to daughter. No other needs expressed by daughter.  Pt requesting to discharge as soon as possible. Agency will be reserved in [place] as requested by family.    Treatment Team Recommendation: Home, Hospice  Discharge Destination Plan:: Home, Hospice  Transport at Discharge  : Family

## 2024-03-21 NOTE — ASSESSMENT & PLAN NOTE
Metabolic acidosis likely in the setting of renal failure. BUN 90, LA negative  Serum bicarb and AG improved on evening check   Trended serum bicarb, AG  Closely monitored I&Os  Patient discharge, pursuing home hospice

## 2024-03-21 NOTE — ASSESSMENT & PLAN NOTE
Hypoglycemia on arrival with BG as low as 30. Likely in the setting of poor PO intake and recent vomiting  S/p D50 x 3, followed by initiation of D5 gtt at 50mL/hr  Hypoglycemic again at 0240 after administration of 10 regular insulin (despite 50mL of D50). Additional D50 given     Plan:   Encourage PO intake as tolerated   Continue D5W gtt. Wean off as able   Continue frequent BG monitoring   Patient discharged, pursuing home hospice
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
## 2024-03-21 NOTE — ASSESSMENT & PLAN NOTE
Malnutrition Findings:   Wife reports approx 80lb weight loss over the past year secondary to poor appetite  Nutrition consulted, appreciate recommendations    BMI Findings:  Body mass index is 20.47 kg/m².   Patient discharged, pursuing home hospice. Encourage PO diet as tolerated, pleasure feeds. Aspiration precautions, no other diet restrictions.

## 2024-03-21 NOTE — ASSESSMENT & PLAN NOTE
Lab Results   Component Value Date    HGBA1C 4.8 01/05/2023       Recent Labs     03/20/24  1751 03/20/24  1835 03/20/24  1951 03/20/24 2006   POCGLU 32* 55* 81 70       Blood Sugar Average: Last 72 hrs:  (P) 59.5      Continue SSI  Currently hypoglycemic  Encourage PO intake

## 2024-03-21 NOTE — ASSESSMENT & PLAN NOTE
Patient reported nausea with vomiting and diarrhea over the weekend which is now resolved  Abdomen is soft, nontender  No current complaints  Encourage PO intake as tolerated

## 2024-03-21 NOTE — PROGRESS NOTES
Patient:  KETURAH VERDUZCO    MRN:  [id number]    Aidin Request ID:  [id number]    Level of care reserved:  Hospice    Partner Reserved:  [facility], [place] [address] [phone]    Clinical needs requested:    Geography searched:  [phone]    Start of Service:    Request sent:  12:52pm EDT on 3/21/2024 by Susy Wang    Partner reserved:  1:20pm EDT on 3/21/2024 by Susy Wang    Choice list shared:  1:13pm EDT on 3/21/2024 by Susy Wang

## 2024-03-21 NOTE — ASSESSMENT & PLAN NOTE
Hypoglycemia on arrival with BG as low as 30  Likely in the setting of poor PO intake and recent vomiting  S/p D50 x 3, followed by initiation of D5 gtt at 50mL/hr  Hypoglycemic again at 0240 after administration of 10 regular insulin (despite 50mL of D50)  Additional D50 given   Encourage PO intake as tolerated   Wean D5W gtt to off as able   Continue frequent BG monitoring

## 2024-03-21 NOTE — ASSESSMENT & PLAN NOTE
Patient reported nausea with vomiting and diarrhea over the weekend which is now resolved  Abdomen is soft, nontender  No current complaints  Encourage PO intake as tolerated   If further episodes of nausea/vomiting, will check STAT CT head given recent IPH

## 2024-03-21 NOTE — DISCHARGE INSTR - AVS FIRST PAGE
To have follow up with [facility], spoke with Merlyn    Anticipate hospice arrival to home after 5pm    Continue current medications

## 2024-03-21 NOTE — ASSESSMENT & PLAN NOTE
Chronic anemia in the setting of ESRD  Hemoglobin 10 on arrival which is above baseline  Continue to trend

## 2024-03-21 NOTE — ASSESSMENT & PLAN NOTE
Chronic anemia in the setting of ESRD  Hemoglobin 10 on arrival which is above baseline  Trended Hgb   Pursuing home hospice

## 2024-03-21 NOTE — ASSESSMENT & PLAN NOTE
Hyperkalemia to 7.8 in the setting of missed dialysis. EKG with peaked T waves   Patient given medical treatment for hyperkalemia in the ER and nephrology consulted for urgent dialysis  3/20 urgent iHD session  Repeat K was 6.0 overnight, repeated insulin/dextrose  Received 3 hr HD session today   Monitored on telemetry   Repeat BMP this AM, K 5.7   Patient discharged as he wishes to pursue home hospice

## 2024-03-21 NOTE — ASSESSMENT & PLAN NOTE
Lab Results   Component Value Date    EGFR 5 03/20/2024    EGFR 5 03/20/2024    EGFR 19 03/07/2024    CREATININE 8.90 (H) 03/20/2024    CREATININE 8.43 (H) 03/20/2024    CREATININE 3.07 (H) 03/07/2024       ESRD on TTHS dialysis  Last dialysis session on Saturday  Presents with K of 7.8 and generalized weakness  Urgent dialysis session 3/20  Scheduled full length dialysis session planned for today  Patient with right chest HD catheter, issues with left arm fistula site is ongoing per family   Closely monitor electrolytes  Continue to trend on telemetry   Close I&Os

## 2024-03-21 NOTE — ASSESSMENT & PLAN NOTE
Hyperkalemia to 7.8 in the setting of missed dialysis  EKG with peaked T waves per report (initial EKG unable to be viewed via EMR at this time)  Patient given medical treatment for hyperkalemia and nephrology consulted for urgent dialysis  Patient to receive iHD tonight  Plan for full HD session tomorrow  Continue telemetry monitoring  Repeat BMP this evening and in AM

## 2024-03-21 NOTE — PROGRESS NOTES
NEPHROLOGY PROGRESS NOTE   Jeffery Bishop 68 y.o. male MRN: [id number]  Unit/Bed#: ICU 04 Encounter: [id number]  Reason for Consult: Management of ESRD    ASSESSMENT AND PLAN:  69 yo man with PMH of ESRD on HD TTS, diabetes, hypertension, hyperlipidemia, CAD status post PCI, CVA, recent ICD H.  Patient was admitted with severe hyperkalemia, required urgent dialysis on admission.  Nephrology is consulted for management of ESRD    PLAN    #ESRD on HD TTS:  Dialysis unit/days:[facility]  Access: [facility]  Had urgent dialysis due to severe hyperkalemia  Plan to continue with regular scheduled today   Fluid restriction 1-1.5L/d  Adjust medications to GFR<10  Avoid opioids     #Volume status/hypertension:  Volume: Hypervolemia  Blood pressure: Hypertensive, /68, goal less than 140/90  Low-sodium diet  UF 3 L on HD today    # Intradialytic hypotension  Midodrine as needed    # Severe hyperkalemia  Potassium 7.8 on admission with EKG changes  Hide urgent dialysis  Still hyperkalemic  Low potassium diet  HD today with 1K potassium the first hour    #Secondary Hyperparasitoidism   Low phosphorus diet     # Anemia of Kidney Disease  Current hemoglobin:10.2 mg/dL  Treatment:  Mircera as an outpatient      # Hypoxic respiratory failure  Component of hypervolemia  UF on HD today      The highlighted and/or bolded points in my assessment, plan, and disposition were discussed with the primary team and they agree with those points and the plan.  Previous records were personally reviewed by me to obtain a baseline creatinine.   The images (CXR) were personally reviewed by me in PACS      SUBJECTIVE:  Patient seen and examined at bedside. No chest pain, shortness of breath, nausea, vomiting.  Patient had dialysis yesterday, well-tolerated    OBJECTIVE:  Current Weight: Weight - Scale: 62.8 kg (138 lb 7.2 oz)  Vitals:    03/21/24 0603   BP:    Pulse:    Resp:    Temp: (!) 96.5 °F (35.8 °C)   SpO2:        Intake/Output Summary (Last 24  hours) at 3/21/2024 0638  Last data filed at 3/21/2024 0601  Gross per 24 hour   Intake 1129.17 ml   Output 2500 ml   Net -1370.83 ml     Wt Readings from Last 3 Encounters:   03/21/24 62.8 kg (138 lb 7.2 oz)   03/04/24 55.1 kg (121 lb 7.6 oz)   01/17/24 55.8 kg (123 lb)     Temp Readings from Last 3 Encounters:   03/21/24 (!) 96.5 °F (35.8 °C) (Tympanic)   03/07/24 98.1 °F (36.7 °C)   03/04/24 98.8 °F (37.1 °C) (Temporal)     BP Readings from Last 3 Encounters:   03/21/24 141/68   03/07/24 141/71   03/04/24 151/78     Pulse Readings from Last 3 Encounters:   03/21/24 70   03/07/24 80   03/04/24 82        General:  no acute distress at this time  Skin:  No acute rash  Eyes:  No scleral icterus and noninjected  ENT:  mucous membranes moist  Neck:  no carotid bruits  Chest: Bilateral crackles , good respiratory effort, no use of accessory respiratory muscles  CVS:  Regular rate and rhythm without rub   Abdomen:  soft and nontender   Extremities: no significant lower extremity edema  Neuro:  No gross focality  Psych:  Alert , cooperative   Dialysis access: PermCath      Medications:    Current Facility-Administered Medications:     ascorbic acid (VITAMIN C) tablet 500 mg, 500 mg, Oral, Daily, TESSY Moore    atorvastatin (LIPITOR) tablet 20 mg, 20 mg, Oral, HS, TESSY Moore, 20 mg at 03/20/24 2107    chlorhexidine (PERIDEX) 0.12 % oral rinse 15 mL, 15 mL, Mouth/Throat, Q12H SRINIVishal CRNP, 15 mL at 03/20/24 2106    dextrose 5 % infusion, 50 mL/hr, Intravenous, Continuous, TESSY Zhao, Last Rate: 50 mL/hr at 03/20/24 2226, 50 mL/hr at 03/20/24 2226    gabapentin (NEURONTIN) capsule 100 mg, 100 mg, Oral, HS, TESSY Moore, 100 mg at 03/20/24 2107    heparin (porcine) subcutaneous injection 5,000 Units, 5,000 Units, Subcutaneous, Q8H Vishal MILLIGAN CRNP, 5,000 Units at 03/21/24 0535    insulin lispro (HumALOG/ADMELOG) 100  "units/mL subcutaneous injection 1-6 Units, 1-6 Units, Subcutaneous, HS, TESSY Moore    insulin lispro (HumALOG/ADMELOG) 100 units/mL subcutaneous injection 1-6 Units, 1-6 Units, Subcutaneous, TID AC **AND** Fingerstick Glucose (POCT), , , 4x Daily AC and at bedtime, TESSY Moore    ipratropium-albuterol (DUO-NEB) 0.5-2.5 mg/3 mL inhalation solution 3 mL, 3 mL, Nebulization, Q6H, TESSY Zhao, 3 mL at 03/21/24 0340    levETIRAcetam (KEPPRA) tablet 500 mg, 500 mg, Oral, BID, TESSY Moore, 500 mg at 03/20/24 1940    midodrine (PROAMATINE) tablet 10 mg, 10 mg, Oral, TID AC, TESSY Moore, 10 mg at 03/20/24 1940    pantoprazole (PROTONIX) EC tablet 40 mg, 40 mg, Oral, Early Morning, TESSY Moore    sodium chloride 3 % inhalation solution 4 mL, 4 mL, Nebulization, Q6H, TESSY Zhao, 4 mL at 03/21/24 0340    Laboratory Results:  Results from last 7 days   Lab Units 03/21/24  0533 03/20/24  2353 03/20/24  1743 03/20/24  1451   WBC Thousand/uL  --   --   --  7.57   HEMOGLOBIN g/dL  --   --   --  10.2*   HEMATOCRIT %  --   --   --  32.4*   PLATELETS Thousands/uL  --   --   --  185   SODIUM mmol/L 135 134* 138 134*   POTASSIUM mmol/L 5.7* 6.0* 5.8* 7.8*   CHLORIDE mmol/L 93* 95* 94* 94*   CO2 mmol/L 26 27 26 26   BUN mg/dL 60* 58* 90* 91*   CREATININE mg/dL 6.68* 6.31* 8.90* 8.43*   CALCIUM mg/dL 8.4 8.4 8.9 8.4   MAGNESIUM mg/dL 1.9  --   --   --    PHOSPHORUS mg/dL 8.5*  --   --   --        XR chest portable    (Results Pending)       Portions of the record may have been created with voice recognition software. Occasional wrong word or \"sound a like\" substitutions may have occurred due to the inherent limitations of voice recognition software. Read the chart carefully and recognize, using context, where substitutions have occurred.    "
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
## 2024-03-21 NOTE — ASSESSMENT & PLAN NOTE
Lab Results   Component Value Date    EGFR 5 03/20/2024    EGFR 5 03/20/2024    EGFR 19 03/07/2024    CREATININE 8.90 (H) 03/20/2024    CREATININE 8.43 (H) 03/20/2024    CREATININE 3.07 (H) 03/07/2024       ESRD on TTHS dialysis  Last dialysis session on Saturday  Presents with K of 7.8 and generalized weakness  Urgent dialysis session tonight  Scheduled full length dialysis session planned for tomorrow  Patient with right chest HD catheter, issues with left arm fistula site is ongoing per family   Closely monitor electrolytes  Continue to trend on telemetry   Close I&Os

## 2024-03-21 NOTE — ASSESSMENT & PLAN NOTE
Lab Results   Component Value Date    HGBA1C 4.8 01/05/2023       Recent Labs     03/21/24  0242 03/21/24  0301 03/21/24  0725 03/21/24  1047   POCGLU 28* 150* 92 108   Blood Sugar Average: Last 72 hrs:  (P) 79    On insulin sliding scale   Currently hypoglycemic  Encourage PO intake   Discharged, pursing home hospice. Aspiration precautions, no other diet restrictions.

## 2024-03-21 NOTE — ASSESSMENT & PLAN NOTE
Chronic hypotension in the setting of ESRD  Continued midodrine 10mg TID  Currently normotensive  Goal MAP >65  Discharged, pursuing home hospice

## 2024-03-21 NOTE — ASSESSMENT & PLAN NOTE
Hyperkalemia to 7.8 in the setting of missed dialysis  EKG with peaked T waves   Patient given medical treatment for hyperkalemia in the ER and nephrology consulted for urgent dialysis  3/20 urgent iHD session  Repeat K was 6.0 overnight, repeated insulin/dextrose  Plan for full HD session today   Continue telemetry monitoring  Repeat BMP pending for this AM

## 2024-03-21 NOTE — ASSESSMENT & PLAN NOTE
Lab Results   Component Value Date    EGFR 7 03/21/2024    EGFR 8 03/20/2024    EGFR 5 03/20/2024    CREATININE 6.68 (H) 03/21/2024    CREATININE 6.31 (H) 03/20/2024    CREATININE 8.90 (H) 03/20/2024     ESRD on TThS dialysis  Last dialysis session on Saturday  Presented with K of 7.8 and generalized weakness. Urgent dialysis session 3/20  Completed 3 hour dialysis session today  Patient with right chest HD catheter, issues with left arm fistula site is ongoing per family   Monitored electrolytes, I&Os, telemetry   Patient discharged, pursuing home hospice

## 2024-03-21 NOTE — ASSESSMENT & PLAN NOTE
Malnutrition Findings:          Wife reports approx 80lb weight loss over the past year secondary to poor appetite  Nutrition consulted, appreciate recommendations  Encourage PO diet as toleraetd                         BMI Findings:           Body mass index is 20.47 kg/m².
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
## 2024-03-21 NOTE — SEPSIS NOTE
Sepsis Note   Jeffery Bishop 68 y.o. male MRN: [id number]  Unit/Bed#: ICU 04 Encounter: [id number]       Initial Sepsis Screening       Row Name 03/20/24 2130                Is the patient's history suggestive of a new or worsening infection? No  -MS                  User Key  (r) = Recorded By, (t) = Taken By, (c) = Cosigned By      Initials Name Provider Type    MS TESSY Zhao Nurse Practitioner                        Body mass index is 20.47 kg/m².  Wt Readings from Last 1 Encounters:   03/20/24 64.7 kg (142 lb 10.2 oz)     IBW (Ideal Body Weight): 73 kg    Ideal body weight: 73 kg (160 lb 15 oz)

## 2024-03-21 NOTE — ASSESSMENT & PLAN NOTE
Chronic hypotension in the setting of ESRD  Continue midodrine 10mg TID  Currently normotensive  Goal MAP >65

## 2024-03-21 NOTE — PLAN OF CARE
Post-Dialysis RN Treatment Note    Blood Pressure:  Pre 113/59 mm/Hg  Post 119/64 mmHg   EDW  61.5 kg    Weight:  Pre 62.8 kg   Post 60.1 kg   Mode of weight measurement: Bed Scale   Volume Removed  2700 ml    Treatment duration 165 minutes    NS given  No    Treatment shortened? Yes, describe: pt requested   Medications given during Rx Not Applicable   Estimated Kt/V  Not Applicable   Access type: Permacath/TDC   Access Issues: No    Report called to primary nurse   Yes /     April Blancas RN         3000 ml as tolerated, 1K/2K bath, 3hrs at pts request  Problem: METABOLIC, FLUID AND ELECTROLYTES - ADULT  Goal: Electrolytes maintained within normal limits  Description: INTERVENTIONS:  - Monitor labs and assess patient for signs and symptoms of electrolyte imbalances  - Administer electrolyte replacement as ordered  - Monitor response to electrolyte replacements, including repeat lab results as appropriate  - Instruct patient on fluid and nutrition as appropriate  Outcome: Progressing  Goal: Fluid balance maintained  Description: INTERVENTIONS:  - Monitor labs   - Monitor I/O and WT  - Instruct patient on fluid and nutrition as appropriate  - Assess for signs & symptoms of volume excess or deficit  Outcome: Progressing

## 2024-03-22 LAB — MRSA NOSE QL CULT: NORMAL

## 2024-03-24 LAB
ATRIAL RATE: 65 BPM
ATRIAL RATE: 96 BPM
P AXIS: 54 DEGREES
P AXIS: 71 DEGREES
PR INTERVAL: 158 MS
PR INTERVAL: 194 MS
QRS AXIS: 21 DEGREES
QRS AXIS: 270 DEGREES
QRSD INTERVAL: 134 MS
QRSD INTERVAL: 148 MS
QT INTERVAL: 408 MS
QT INTERVAL: 478 MS
QTC INTERVAL: 497 MS
QTC INTERVAL: 515 MS
T WAVE AXIS: 59 DEGREES
T WAVE AXIS: 68 DEGREES
VENTRICULAR RATE: 65 BPM
VENTRICULAR RATE: 96 BPM

## 2024-03-24 PROCEDURE — 93010 ELECTROCARDIOGRAM REPORT: CPT | Performed by: INTERNAL MEDICINE

## 2024-03-29 ENCOUNTER — TELEPHONE (OUTPATIENT)
Age: 69
End: 2024-03-29

## 2024-04-20 NOTE — PROGRESS NOTES
Progress Note - Vascular Surgery   Roland Jeana 70 y o  male [id number]  Unit/Bed#:MICU 11 Encounter: [id number]      Assessment:    70 y o  with PMH LUE AVG (5/19 MQ), ESRD c RIJ TDC, HTN, DM, CKD, CAD c MI/PCI, CVA presenting with LUE AVG Site dehiscence, LUE AVG graft infection     Vascular surgery consulted for LUE AVG graft infection  6/3: LUE AVG explant c Brachial Vein patch angioplasty    Plan:  - No need to resume Eliquis for HD graft patency  - Cont NV Chks to [facility]  - Diet as tolerated  - OOB as tolerated  - Hgb 7 6 overnight c 1 prcb transfusion   - 2 PRBC 1 FFP this admission   - VAC to [facility]; will change monday  - Abx: Vanco, Zosyn   - ID recs   - 6/2 BCX: NGTD   - 6/3 Wcx: Pending   - VTEppx: SQH  - Cont Aspirin, Statin  - Unclear as to why on plavix  Was not on it at time of discharge on 5/24/23  No current need for plavix from a vascular surgery standpoint  - Appreciated MICU support  - HD per Nephro via Right IJ TDC      Subjective:    Pt s/e  No acute events overnight  Pt awake, alert  Required surgical dressing change secondary to dressing saturation  Requiring Levo/Vaso post-op for pressure support  Levo weaned from 13 to 2 this AM      Pt complains of left arm pain at incision site  Tolerating diet  Not OOB  Having bowel function  No new complaints  Denies n/v, sob, chest pain, f/c  Admits to left hand numbness that was present prior to presentation  I/Os:  I/O last 3 completed shifts: In: 2620 4 [P O :200; I V :1120 4; Blood:950; IV Piggyback:350]  Out: 450 [Drains:150; Blood:300]  I/O this shift:  In: 146 1 [P O :100; I V :46 1]  Out: -     Review of Systems   All other systems reviewed and are negative  Vitals:    06/04/23 0759   BP: 93/59   Pulse: (!) 42   Resp: 22   Temp: (!) 97 4 °F (36 3 °C)   SpO2: 100%        Physical Exam  Vitals and nursing note reviewed  Constitutional:       General: He is not in acute distress  Appearance: He is well-developed   He is not ill-appearing, toxic-appearing or diaphoretic  HENT:      Head: Normocephalic and atraumatic  Eyes:      Conjunctiva/sclera: Conjunctivae normal    Cardiovascular:      Rate and Rhythm: Normal rate and regular rhythm  Heart sounds: No murmur heard  Comments: LUE Radial doppler signal   Pulmonary:      Effort: Pulmonary effort is normal  No respiratory distress  Breath sounds: Normal breath sounds  Abdominal:      General: There is no distension  Palpations: Abdomen is soft  Tenderness: There is no abdominal tenderness  There is no guarding or rebound  Hernia: No hernia is present  Musculoskeletal:         General: No swelling  Cervical back: Neck supple  Right lower leg: No edema  Left lower leg: No edema  Skin:     General: Skin is warm and dry  Capillary Refill: Capillary refill takes less than 2 seconds  Neurological:      General: No focal deficit present  Mental Status: He is alert and oriented to person, place, and time  Mental status is at baseline  Comments: Left hand parethesias    Left upper extremity motor and sensory intact    Psychiatric:         Mood and Affect: Mood normal          Imaging:  I have personally reviewed pertinent reports    , CBC with diff:   Lab Results   Component Value Date    HCT 22 8 (L) 06/03/2023    HGB 7 6 (L) 06/03/2023    MCH 33 8 06/03/2023    MCHC 31 8 06/03/2023     (H) 06/03/2023    MPV 10 9 06/03/2023     (L) 06/03/2023    RBC 2 28 (L) 06/03/2023    RDW 22 0 (H) 06/03/2023    WBC 8 62 06/03/2023   , BMP/CMP:   Lab Results   Component Value Date    ALKPHOS 94 06/04/2023    ALT 13 06/04/2023    AST 11 06/04/2023    BUN 31 (H) 06/04/2023    CALCIUM 7 3 (L) 06/04/2023     06/04/2023    CO2 26 06/04/2023    CREATININE 8 57 (H) 06/04/2023    EGFR 5 06/04/2023    K 4 6 06/04/2023    SODIUM 135 06/04/2023         Results Reviewed     None           Patient Active Problem List   Diagnosis   • Acute on chronic anemia   • Coronary artery disease involving native coronary artery   • Hypertension   • Diabetes with neurologic complications (HCC)   • Elevated serum alkaline phosphatase level   • ESRD (end stage renal disease) on dialysis [facility])   • Intestinal malabsorption   • Irritable bowel syndrome with diarrhea   • Mixed hyperlipidemia   • Nicotine dependence   • Organic impotence   • Pernicious anemia   • Type 2 diabetes mellitus ([facility] )   • Encounter for extracorporeal dialysis ([facility] )   • AV fistula thrombosis, initial encounter ([facility] )   • Arteriovenous fistula ([facility] )   • Right intertrochanteric femur fracture   • Thrombocytopenia (HCC)   • Dependence on renal dialysis ([facility] )   • Vitamin D deficiency, unspecified   • End-stage renal disease on hemodialysis ([place] [address] )   • Chronic kidney disease   • Chronic deep vein thrombosis (DVT) of internal jugular vein ([facility])   • S/P angioplasty with stent   • Hypotension   • Infection of AV graft for dialysis [facility])       Past Surgical History:   Procedure Laterality Date   • AV FISTULA PLACEMENT     • CARDIAC CATHETERIZATION Left 02/03/2023    Procedure: Cardiac Left Heart Cath;  Surgeon: Ema Corbin MD;  Location: [place] [address] CATH LAB; Service: Cardiology   • CARDIAC CATHETERIZATION N/A 02/08/2023    Procedure: Cardiac catheterization with complex PCI with Dr Justine Davison, patient is a renal dialysis patient;  Surgeon: Avril Atkinson MD;  Location:  CARDIAC CATH LAB; Service: Cardiology   • CARDIAC SURGERY  2010    stents x3   • COLONOSCOPY N/A 12/12/2016    Procedure: COLONOSCOPY;  Surgeon: Slime Reyna MD;  Location: [facility] GI LAB; Service:    • COLONOSCOPY N/A 04/03/2017    Procedure:  COLONOSCOPY;  Surgeon: Slime Reyna MD;  Location: [facility] GI LAB;   Service:    • HARDWARE REMOVAL Right 04/04/2022    Procedure: REMOVAL HARDWARE HIP;  Surgeon: Ji Hutchison MD;  Location:  MAIN OR;  Service: Orthopedics   • IR AV FISTULA/GRAFT DECLOT  04/29/2021   • IR AV "FISTULA/GRAFT DECLOT  03/25/2022   • IR AV FISTULA/GRAFT DECLOT  09/21/2022   • IR AV FISTULAGRAM/GRAFTOGRAM  03/28/2022   • IR AV FISTULAGRAM/GRAFTOGRAM  07/28/2022   • IR TUNNELED CENTRAL LINE REMOVAL  12/07/2021   • IR TUNNELED DIALYSIS CATHETER PLACEMENT  05/24/2021   • PORTACATH PLACEMENT      per pt \"port in chest\"   • [place] ARTERIOVENOUS ANASTOMOSIS OPEN DIRECT Left 07/22/2021    Procedure: CREATION FISTULA ARTERIOVENOUS (AV); Surgeon: Randi Ordoñez MD;  Location: [address] [place] [address];  Service: Vascular   • [place] ARTERIOVENOUS ANASTOMOSIS OPEN DIRECT Left 3/21/2023    Procedure: left brachiobasilic fistula,  AVG Graft;  Surgeon: Arianna Mata MD;  Location: [facility] MAIN OR;  Service: Vascular   • WA ARTERIOVENOUS ANASTOMOSIS OPEN DIRECT Left 5/19/2023    Procedure: CREATION of LEFT FISTULA ARTERIOVENOUS (AV) GRAFT;  Surgeon: Arianna Mata MD;  Location: [facility]IN [facility];  Service: Vascular   • WA HEMIARTHROPLASTY HIP PARTIAL Right 04/04/2022    Procedure: HEMIARTHROPLASTY HIP (BIPOLAR);   Surgeon: Arsenio Sue MD;  Location: [facility];  Service: Orthopedics       Family History   Problem Relation Age of Onset   • Leukemia Mother    • Crohn's disease Father    • Transient ischemic attack Brother        Social History     Socioeconomic History   • Marital status: /Civil Union     Spouse name: Ashutosh Cantu   • Number of children: 2   • Years of education: 15   • Highest education level: Some college, no degree   Occupational History   • Not on file   Tobacco Use   • Smoking status: Every Day     Packs/day: 0 25     Years: 30 00     Total pack years: 7 50     Types: Cigarettes   • Smokeless tobacco: Never   Vaping Use   • Vaping Use: Never used   Substance and Sexual Activity   • Alcohol use: Yes     Comment: rarely - No alcohol use per [employer]   • Drug use: Not Currently     Types: Marijuana     Comment: occasional brownie for sleep   • Sexual activity: Not on file   Other Topics Concern   • Not on " file   Social History Narrative    Daily caffeinated coffee consumption     Social Determinants of Health     Financial Resource Strain: Not on file   Food Insecurity: No Food Insecurity (4/4/2022)    Hunger Vital Sign    • Worried About Running Out of Food in the Last Year: Never true    • Ran Out of Food in the Last Year: Never true   Transportation Needs: No Transportation Needs (4/4/2022)    PRAPARE - Transportation    • Lack of Transportation (Medical): No    • Lack of Transportation (Non-Medical):  No   Physical Activity: Not on file   Stress: Not on file   Social Connections: Not on file   Intimate Partner Violence: Not on file   Housing Stability: Low Risk  (4/4/2022)    Housing Stability Vital Sign    • Unable to Pay for Housing in the Last Year: No    • Number of Places Lived in the Last Year: 1    • Unstable Housing in the Last Year: No       No Known Allergies [facility]

## 2024-07-31 NOTE — ED NOTES
3 RN's tried to straight stick patient but was able to get only the CMP specimen. Dr. Jessica Pires made aware and will talk to the patient and family.       Nisha Leung RN  09/20/23 6273 Where Is Your Acne Located?: Face

## 2024-12-13 NOTE — CASE MANAGEMENT
----- Message from Jose FARRIS sent at 12/13/2024  3:51 PM EST -----  Contact: GUIDO [phone]  Caller states the patient continuously experiencing constant ringing in the head, vision problems/ trouble focusing, dizziness and pt's balance is off. Caller is very concerned with these symptoms and what could be causing it. Caller states she had met up with the patient this afternoon and could visibly notice the symptoms and had determined the patient was not well enough to drive herself safely. Pharmacy does not have the below medication until 12/16. Caller asking for your recommendations or suggestions for the patient. Please advise     meclizine (ANTIVERT) 12.5 MG tablet     [facility] [id number] - [facility] [address] [facility] [address] - P [phone] - F [phone] [address] [place] [address]  Phone: [phone]  Fax: [phone]   Case Management Discharge Planning Note    Patient name Annie Mathews  Location [facility] [id number] MRN [id number]  : 1955 Date 2023       Current Admission Date: 2023  Current Admission Diagnosis:Infection of AV graft for dialysis [facility])   Patient Active Problem List    Diagnosis Date Noted   • Ambulatory dysfunction 2023   • Infection of AV graft for dialysis ([facility] ) 2023   • Hypotension 2023   • S/P angioplasty with stent 2023   • Chronic deep vein thrombosis (DVT) of internal jugular vein ([facility] ) 2022   • End-stage renal disease on hemodialysis ([place] [address] ) 2022   • Chronic kidney disease 2022   • Right intertrochanteric femur fracture 2022   • Thrombocytopenia ([facility] ) 2022   • Arteriovenous fistula ([place] [address] ) 2021   • AV fistula thrombosis, initial encounter ([place] [address] ) 2021   • Encounter for extracorporeal dialysis ([place] [address] ) 2019   • Intestinal malabsorption 2017   • Pernicious anemia 2017   • Irritable bowel syndrome with diarrhea 2017   • Elevated serum alkaline phosphatase level 2017   • Acute on chronic anemia 2017   • Coronary artery disease involving native coronary artery 10/20/2016   • Hypertension 10/20/2016   • Dependence on renal dialysis ([facility] ) 2016   • Vitamin D deficiency, unspecified 2013   • Diabetes with neurologic complications ([place] [address] )    • Mixed hyperlipidemia 10/04/2012   • ESRD (end stage renal disease) on dialysis ([place] [address] ) 2012   • Nicotine dependence 2012   • Organic impotence 2012   • Type 2 diabetes mellitus ([facility] ) 2012      LOS (days): 7  Geometric Mean LOS (GMLOS) (days): 6 80  Days to GMLOS:-0 1     OBJECTIVE:  Risk of Unplanned Readmission Score: 22 71         Current admission status: Inpatient   Preferred Pharmacy:   [facility]  [address] [place] [address],[address] [place] [address] Rd [address]  Phone: [phone] Fax: [phone]    Primary Care Provider: Zonia Tolbert MD    Primary Insurance: MEDICARE  Secondary Insurance: AARP    DISCHARGE DETAILS:                                          Other Referral/Resources/Interventions Provided:  Interventions: Dialysis  Referral Comments: Faxed clinical info H/p, med list, neprhology notes ,dialysis flow sheets, facesheet , hepatitis panel from 3/23 and chest xray results to [facility] [phone]  referral placed on AIDIN as well for consideration at [facility] for [address]  Additional Comments: Completed [place] PASSR, pt denies any ORC, mental illness or inpatient psychiatric stays or history of drug or alcohol abuse  [place] PASSR on [address]  [place] PASSR also completed

## 2025-03-02 NOTE — NURSING NOTE
Patient discharged to home  Prescriptions and Discharge instructions were given and reviewed with patient  All questions answered  IV was removed per policy and procedure  Pt tolerated this well without complaint  Occlusive dressing was applied to the site  Patient left the unit with all belongings and a firm understanding of discharge instructions  The patient is a 65y Male complaining of fall.

## (undated) DEVICE — CHLORAPREP HI-LITE 26ML ORANGE

## (undated) DEVICE — SURGICEL 4 X 8

## (undated) DEVICE — SURGIFOAM 8.5 X 12.5

## (undated) DEVICE — DECANTER: Brand: UNBRANDED

## (undated) DEVICE — 1200CC GUARDIAN II: Brand: GUARDIAN

## (undated) DEVICE — SUT MONOCRYL 4-0 PS-2 27 IN Y426H

## (undated) DEVICE — STRL BETHLEHEM A V FISTULA PK: Brand: CARDINAL HEALTH

## (undated) DEVICE — CAPIT HIP BIPOLAR HEAD POR PRIMARY

## (undated) DEVICE — SUT PROLENE 6-0 BV130 30 IN 8709H

## (undated) DEVICE — ADHESIVE SKIN HIGH VISCOSITY EXOFIN 1ML

## (undated) DEVICE — INTENDED FOR TISSUE SEPARATION, AND OTHER PROCEDURES THAT REQUIRE A SHARP SURGICAL BLADE TO PUNCTURE OR CUT.: Brand: BARD-PARKER SAFETY BLADES SIZE 15, STERILE

## (undated) DEVICE — PETRI DISH STERILE

## (undated) DEVICE — DRAPE SHEET X-LG

## (undated) DEVICE — SUT SILK 2-0 18 IN A185H

## (undated) DEVICE — SUT VICRYL PLUS 1 CTB-1 36 IN VCPB947H

## (undated) DEVICE — DRESSING MEPILEX AG BORDER 4 X 12 IN

## (undated) DEVICE — NON-STERILE REUSABLE TOURNIQUET CUFF SINGLE BLADDER, DUAL PORT AND QUICK CONNECT CONNECTOR: Brand: COLOR CUFF

## (undated) DEVICE — SUT PROLENE 6-0 BV-1/BV-1 24 IN 8805H

## (undated) DEVICE — 3M™ V.A.C.® GRANUFOAM™ DRESSING KIT, M8275052, MEDIUM: Brand: 3M™ V.A.C.® GRANUFOAM™

## (undated) DEVICE — TUBING BUBBLE CLEAR 5MM X 100 FT NS

## (undated) DEVICE — 2108 SERIES SAGITTAL BLADE (18.6 X 0.64 X 61.1MM)

## (undated) DEVICE — SUT VICRYL 3-0 SH 27 IN J416H

## (undated) DEVICE — LIGACLIP MCA MULTIPLE CLIP APPLIERS, 20 MEDIUM CLIPS: Brand: LIGACLIP

## (undated) DEVICE — SUT VICRYL PLUS 2-0 CTB-1 27 IN VCPB259H

## (undated) DEVICE — SPONGE LAP 18 X 18 IN STRL RFD

## (undated) DEVICE — GLOVE SRG BIOGEL 8

## (undated) DEVICE — PAD GROUNDING ADULT

## (undated) DEVICE — PINNACLE INTRODUCER SHEATH: Brand: PINNACLE

## (undated) DEVICE — PENCIL ELECTROSURG E-Z CLEAN -0035H

## (undated) DEVICE — BRUSH ENDO CLEANING DBL-HEADER

## (undated) DEVICE — STERILE ORIF HIP PACK: Brand: CARDINAL HEALTH

## (undated) DEVICE — GUIDEWIRE .035 260CM 3MM J TIP

## (undated) DEVICE — NC TREK NEO™ CORONARY DILATATION CATHETER 4.00 MM X 20 MM / RAPID-EXCHANGE: Brand: NC TREK NEO™

## (undated) DEVICE — TUBING AUX CHANNEL

## (undated) DEVICE — DRAPE INTESTINAL ISOLATION BAG

## (undated) DEVICE — VESSEL LOOPS X-RAY DETECTABLE: Brand: DEROYAL

## (undated) DEVICE — SUT MONOCRYL 4-0 PS-2 18 IN Y496G

## (undated) DEVICE — NC TREK NEO™ CORONARY DILATATION CATHETER 3.50 MM X 20 MM / RAPID-EXCHANGE: Brand: NC TREK NEO™

## (undated) DEVICE — SUT SILK 3-0 18 IN A184H

## (undated) DEVICE — HEAVY DUTY TABLE COVER: Brand: CONVERTORS

## (undated) DEVICE — VESSELOOPS MINI YELLOW

## (undated) DEVICE — GLOVE SRG BIOGEL 7

## (undated) DEVICE — SOLIDIFIER FLUID WASTE CONTROL 1500ML

## (undated) DEVICE — PACK GENERAL LF

## (undated) DEVICE — SUT SILK 4-0 18 IN A183H

## (undated) DEVICE — SNARE BARBED 230CM

## (undated) DEVICE — ACE WRAP 4 IN STERILE

## (undated) DEVICE — RUNTHROUGH NS EXTRA FLOPPY PTCA GUIDEWIRE: Brand: RUNTHROUGH

## (undated) DEVICE — DRAPE SURGIKIT SADDLE BAG

## (undated) DEVICE — BASIC DOUBLE BASIN 2-LF: Brand: MEDLINE INDUSTRIES, INC.

## (undated) DEVICE — SUT SILK 3-0 SH 30 IN K832H

## (undated) DEVICE — SURGICEL 2 X 3

## (undated) DEVICE — ACE WRAP 6 IN STERILE

## (undated) DEVICE — CORONARY IMAGING CATHETER: Brand: OPTICROSS™ HD

## (undated) DEVICE — SUT SILK 3-0 30 IN SA84H

## (undated) DEVICE — ASTOUND STANDARD SURGICAL GOWN, XL: Brand: CONVERTORS

## (undated) DEVICE — ULTRASOUND GEL STERILE FOIL PK

## (undated) DEVICE — LIGACLIP MCA MULTIPLE CLIP APPLIERS, 20 SMALL CLIPS: Brand: LIGACLIP

## (undated) DEVICE — GLOVE SRG BIOGEL ECLIPSE 6

## (undated) DEVICE — PROXIMATE PLUS MD MULTI-DIRECTIONAL RELEASE SKIN STAPLERS CONTAINS 35 STAINLESS STEEL STAPLES APPROXIMATE CLOSED DIMENSIONS: 6.9MM X 3.9MM WIDE: Brand: PROXIMATE

## (undated) DEVICE — MICROPUNCTURE INTRODUCER SET SILHOUETTE TRANSITIONLESS PUSH-PLUS DESIGN WITH NITINOL WIRE GUIDE: Brand: MICROPUNCTURE

## (undated) DEVICE — SUT PROLENE 5-0 C-1/C-1 36 IN 8720H

## (undated) DEVICE — TIBURON SPLIT SHEET: Brand: CONVERTORS

## (undated) DEVICE — 60 ML SYRINGE,REGULAR TIP: Brand: MONOJECT

## (undated) DEVICE — BRUSH CYTOLOGY 3 MM 240 CM

## (undated) DEVICE — 1/2 FORCE SURGICAL SPRING CLIP: Brand: STEALTH® SPRING CLIP

## (undated) DEVICE — TRAY FOLEY 16FR URIMETER SURESTEP

## (undated) DEVICE — GAUZE SPONGES,USP TYPE VII GAUZE, 12 PLY: Brand: CURITY

## (undated) DEVICE — 3M™ IOBAN™ 2 ANTIMICROBIAL INCISE DRAPE 6640EZ: Brand: IOBAN™ 2

## (undated) DEVICE — SUT SILK 2-0 30 IN SA85H

## (undated) DEVICE — VAC CANISTER 500ML

## (undated) DEVICE — PACK CUSTOM C V DRAPE SCV11CDSLA

## (undated) DEVICE — GLOVE EXAM NON-STRL NTRL PLUS LRG PF

## (undated) DEVICE — BAG SPECIMEN BIOHAZARD 10 X 10 ADHESIVE

## (undated) DEVICE — 3M™ STERI-DRAPE™ PATIENT ISOLATION DRAPE 1014: Brand: STERI-DRAPE™

## (undated) DEVICE — VESSEL LOOP MAXI - RED

## (undated) DEVICE — SINGLE-USE BIOPSY FORCEPS: Brand: RADIAL JAW 4

## (undated) DEVICE — SUT VICRYL 2-0 CT-1 27 IN J259H

## (undated) DEVICE — SPONGE PVP SCRUB WING STERILE

## (undated) DEVICE — DRESSING MEPILEX AG BORDER POST-OP 4 X 8 IN

## (undated) DEVICE — ACE WRAP 6 IN UNSTERILE

## (undated) DEVICE — THE SIMPULSE SOLO SYSTEM WITH ULTREX RETRACTABLE SPLASH SHIELD TIP: Brand: SIMPULSE SOLO

## (undated) DEVICE — INTENDED FOR TISSUE SEPARATION, AND OTHER PROCEDURES THAT REQUIRE A SHARP SURGICAL BLADE TO PUNCTURE OR CUT.: Brand: BARD-PARKER SAFETY BLADES SIZE 10, STERILE

## (undated) DEVICE — SUT ETHILON 2-0 FSLX 30 IN 1674H

## (undated) DEVICE — BAG DECANTER

## (undated) DEVICE — KERLIX BANDAGE ROLL: Brand: KERLIX

## (undated) DEVICE — GAUZE SPONGES,16 PLY: Brand: CURITY

## (undated) DEVICE — AIRLIFE™  ADULT CUSHION NASAL CANNULA WITH 7 FOOT (2.1 M) CRUSH-RESISTANT OXYGEN TUBING, AND U/CONNECT-IT ADAPTER: Brand: AIRLIFE™

## (undated) DEVICE — STOCKINETTE REGULAR

## (undated) DEVICE — MICROPUNCTURE INTRODUCER SET SILHOUETTE TRANSITIONLESS PUSH-PLUS DESIGN - STIFFENED CANNULA WITH NITINOL WIRE GUIDE: Brand: MICROPUNCTURE

## (undated) DEVICE — DISPOSABLE BIOPSY VALVE MAJ-1555: Brand: SINGLE USE BIOPSY VALVE (STERILE)

## (undated) DEVICE — Device: Brand: OLYMPUS

## (undated) DEVICE — CATH GUIDE LAUNCHER 6FR JL 4.0

## (undated) DEVICE — DRESSING MEPILEX BORDER 4 X 12 IN

## (undated) DEVICE — SPONGE LAP 18 X 18 IN

## (undated) DEVICE — TRAVELKIT CONTAINS FIRST STEP KIT (200ML EP-4 KIT) AND SOILED SCOPE BAG - 1 KIT: Brand: TRAVELKIT CONTAINS FIRST STEP KIT AND SOILED SCOPE BAG

## (undated) DEVICE — MARKER SPOT EX  BOWEL TATTOO SYRINGE

## (undated) DEVICE — DGW .035 FC J3MM 150CM TEF: Brand: EMERALD

## (undated) DEVICE — PRESSURE GUIDEWIRE: Brand: COMET™ II

## (undated) DEVICE — DRESSING MEPILEX AG BORDER POST-OP 4 X 6 IN

## (undated) DEVICE — SUT MONOCRYL 3-0 SH 27 IN Y416H

## (undated) DEVICE — DRESSING XEROFORM 5 X 9

## (undated) DEVICE — TRAP POLY

## (undated) DEVICE — MEDI-VAC YANK SUCT HNDL W/TPRD BULBOUS TIP: Brand: CARDINAL HEALTH

## (undated) DEVICE — CATH DIAG 5FR IMPULSE 100CM FL4

## (undated) DEVICE — 3M™ TEGADERM™ TRANSPARENT FILM DRESSING FRAME STYLE, 1626W, 4 IN X 4-3/4 IN (10 CM X 12 CM), 50/CT 4CT/CASE: Brand: 3M™ TEGADERM™

## (undated) DEVICE — GROUNDING PAD UNIVERSAL SLW

## (undated) DEVICE — FORCEP ELECSURG RADIAL JAW4 2.2 X 240CM  HOT BX

## (undated) DEVICE — INTENDED FOR TISSUE SEPARATION, AND OTHER PROCEDURES THAT REQUIRE A SHARP SURGICAL BLADE TO PUNCTURE OR CUT.: Brand: BARD-PARKER ® CARBON RIB-BACK BLADES

## (undated) DEVICE — CATH GUIDE LAUNCHER 6FR AL 1.0

## (undated) DEVICE — BANDAGE, ESMARK LF STR 6"X9' (20/CS): Brand: CYPRESS

## (undated) DEVICE — DRESSING MEPILEX AG BORDER 4 X 8 IN

## (undated) DEVICE — Device

## (undated) DEVICE — SUT BOOTS

## (undated) DEVICE — GLOVE INDICATOR PI UNDERGLOVE SZ 6.5 BLUE

## (undated) DEVICE — CATH DIAG 5FR IMPULSE 100CM FR4

## (undated) DEVICE — DRAPE EQUIPMENT RF WAND

## (undated) DEVICE — ABDUCTION PILLOW FOAM POSITIONER: Brand: CARDINAL HEALTH

## (undated) DEVICE — MEDI-VAC YANKAUER SUCTION HANDLE: Brand: CARDINAL HEALTH

## (undated) DEVICE — LUBRICANT SURGILUBE TUBE 4 OZ  FLIP TOP

## (undated) DEVICE — GLOVE INDICATOR PI UNDERGLOVE SZ 8.5 BLUE

## (undated) DEVICE — SILVER-COATED ANTIMICROBIAL BARRIER DRESSING: Brand: ACTICOAT   4" X 8"

## (undated) DEVICE — HEMOSTATIC MATRIX SURGIFLO 8ML W/THROMBIN

## (undated) DEVICE — HOOD: Brand: FLYTE, SURGICOOL

## (undated) DEVICE — DRAPE SHEET THREE QUARTER

## (undated) DEVICE — GUIDELINER CATHETERS ARE INTENDED TO BE USED IN CONJUNCTION WITH GUIDE CATHETERS TO ACCESS DISCRETE REGIONS OF THE CORONARY AND/OR PERIPHERAL VASCULATURE, AND TO FACILITATE PLACEMENT OF INTERVENTIONAL DEVICES.: Brand: GUIDELINER® V3 CATHETER

## (undated) DEVICE — SUT SILK 4-0 18 IN SA63H

## (undated) DEVICE — CLOSURE DEVICE FEMORAL BLOOD STOP ARCH GOLD

## (undated) DEVICE — BALLOON NC EUPHORA 2.5 X 15MM

## (undated) DEVICE — BETHLEHEM TOTAL HIP, KIT: Brand: CARDINAL HEALTH